# Patient Record
Sex: MALE | Race: WHITE | Employment: OTHER | URBAN - METROPOLITAN AREA
[De-identification: names, ages, dates, MRNs, and addresses within clinical notes are randomized per-mention and may not be internally consistent; named-entity substitution may affect disease eponyms.]

---

## 2019-02-11 ENCOUNTER — HOSPITAL ENCOUNTER (OUTPATIENT)
Dept: GENERAL RADIOLOGY | Facility: CLINIC | Age: 69
Discharge: HOME | End: 2019-02-11
Attending: FAMILY MEDICINE | Admitting: FAMILY MEDICINE
Payer: COMMERCIAL

## 2019-02-11 ENCOUNTER — OFFICE VISIT (OUTPATIENT)
Dept: FAMILY MEDICINE | Facility: CLINIC | Age: 69
End: 2019-02-11
Payer: COMMERCIAL

## 2019-02-11 VITALS
HEIGHT: 65 IN | OXYGEN SATURATION: 94 % | DIASTOLIC BLOOD PRESSURE: 102 MMHG | WEIGHT: 227.8 LBS | BODY MASS INDEX: 37.95 KG/M2 | SYSTOLIC BLOOD PRESSURE: 154 MMHG | RESPIRATION RATE: 16 BRPM | TEMPERATURE: 97.7 F | HEART RATE: 104 BPM

## 2019-02-11 DIAGNOSIS — M79.674 PAIN IN TOES OF BOTH FEET: ICD-10-CM

## 2019-02-11 DIAGNOSIS — I10 ESSENTIAL HYPERTENSION: ICD-10-CM

## 2019-02-11 DIAGNOSIS — M79.675 PAIN IN TOES OF BOTH FEET: Primary | ICD-10-CM

## 2019-02-11 DIAGNOSIS — Z87.891 PERSONAL HISTORY OF TOBACCO USE: ICD-10-CM

## 2019-02-11 DIAGNOSIS — M79.675 PAIN IN TOES OF BOTH FEET: ICD-10-CM

## 2019-02-11 DIAGNOSIS — M79.674 PAIN IN TOES OF BOTH FEET: Primary | ICD-10-CM

## 2019-02-11 DIAGNOSIS — Z12.11 COLON CANCER SCREENING: ICD-10-CM

## 2019-02-11 LAB
ALBUMIN SERPL-MCNC: 3.9 G/DL (ref 3.4–5)
ALP SERPL-CCNC: 73 U/L (ref 40–150)
ALT SERPL W P-5'-P-CCNC: 45 U/L (ref 0–70)
ANION GAP SERPL CALCULATED.3IONS-SCNC: 5 MMOL/L (ref 3–14)
AST SERPL W P-5'-P-CCNC: 28 U/L (ref 0–45)
BASOPHILS # BLD AUTO: 0.1 10E9/L (ref 0–0.2)
BASOPHILS NFR BLD AUTO: 0.6 %
BILIRUB SERPL-MCNC: 0.5 MG/DL (ref 0.2–1.3)
BUN SERPL-MCNC: 19 MG/DL (ref 7–30)
CALCIUM SERPL-MCNC: 8.6 MG/DL (ref 8.5–10.1)
CHLORIDE SERPL-SCNC: 105 MMOL/L (ref 94–109)
CHOLEST SERPL-MCNC: 194 MG/DL
CO2 SERPL-SCNC: 29 MMOL/L (ref 20–32)
CREAT SERPL-MCNC: 1.21 MG/DL (ref 0.66–1.25)
DIFFERENTIAL METHOD BLD: ABNORMAL
EOSINOPHIL NFR BLD AUTO: 3.9 %
ERYTHROCYTE [DISTWIDTH] IN BLOOD BY AUTOMATED COUNT: 15.1 % (ref 10–15)
ERYTHROCYTE [SEDIMENTATION RATE] IN BLOOD BY WESTERGREN METHOD: 2 MM/H (ref 0–20)
GFR SERPL CREATININE-BSD FRML MDRD: 61 ML/MIN/{1.73_M2}
GLUCOSE SERPL-MCNC: 91 MG/DL (ref 70–99)
HCT VFR BLD AUTO: 50.7 % (ref 40–53)
HDLC SERPL-MCNC: 40 MG/DL
HGB BLD-MCNC: 16.5 G/DL (ref 13.3–17.7)
IMM GRANULOCYTES # BLD: 0.1 10E9/L (ref 0–0.4)
IMM GRANULOCYTES NFR BLD: 0.9 %
LDLC SERPL CALC-MCNC: 122 MG/DL
LYMPHOCYTES # BLD AUTO: 2.2 10E9/L (ref 0.8–5.3)
LYMPHOCYTES NFR BLD AUTO: 17.1 %
MCH RBC QN AUTO: 29.9 PG (ref 26.5–33)
MCHC RBC AUTO-ENTMCNC: 32.5 G/DL (ref 31.5–36.5)
MCV RBC AUTO: 92 FL (ref 78–100)
MONOCYTES # BLD AUTO: 0.7 10E9/L (ref 0–1.3)
MONOCYTES NFR BLD AUTO: 5.6 %
NEUTROPHILS # BLD AUTO: 9.1 10E9/L (ref 1.6–8.3)
NEUTROPHILS NFR BLD AUTO: 71.9 %
NONHDLC SERPL-MCNC: 154 MG/DL
NRBC # BLD AUTO: 0 10*3/UL
NRBC BLD AUTO-RTO: 0 /100
PLATELET # BLD AUTO: 484 10E9/L (ref 150–450)
POTASSIUM SERPL-SCNC: 4.4 MMOL/L (ref 3.4–5.3)
PROT SERPL-MCNC: 7.7 G/DL (ref 6.8–8.8)
RBC # BLD AUTO: 5.51 10E12/L (ref 4.4–5.9)
SODIUM SERPL-SCNC: 139 MMOL/L (ref 133–144)
TRIGL SERPL-MCNC: 159 MG/DL
URATE SERPL-MCNC: 7.1 MG/DL (ref 3.5–7.2)
WBC # BLD AUTO: 12.7 10E9/L (ref 4–11)

## 2019-02-11 PROCEDURE — 80053 COMPREHEN METABOLIC PANEL: CPT | Performed by: FAMILY MEDICINE

## 2019-02-11 PROCEDURE — 85652 RBC SED RATE AUTOMATED: CPT | Performed by: FAMILY MEDICINE

## 2019-02-11 PROCEDURE — 85025 COMPLETE CBC W/AUTO DIFF WBC: CPT | Performed by: FAMILY MEDICINE

## 2019-02-11 PROCEDURE — 84550 ASSAY OF BLOOD/URIC ACID: CPT | Performed by: FAMILY MEDICINE

## 2019-02-11 PROCEDURE — 36415 COLL VENOUS BLD VENIPUNCTURE: CPT | Performed by: FAMILY MEDICINE

## 2019-02-11 PROCEDURE — 99204 OFFICE O/P NEW MOD 45 MIN: CPT | Performed by: FAMILY MEDICINE

## 2019-02-11 PROCEDURE — 80061 LIPID PANEL: CPT | Performed by: FAMILY MEDICINE

## 2019-02-11 PROCEDURE — 73660 X-RAY EXAM OF TOE(S): CPT | Mod: TC,LT

## 2019-02-11 RX ORDER — LISINOPRIL/HYDROCHLOROTHIAZIDE 10-12.5 MG
1 TABLET ORAL DAILY
Qty: 90 TABLET | Refills: 3 | Status: SHIPPED | OUTPATIENT
Start: 2019-02-11 | End: 2019-02-22

## 2019-02-11 RX ORDER — NAPROXEN 500 MG/1
500 TABLET ORAL 2 TIMES DAILY PRN
Qty: 60 TABLET | Refills: 1 | Status: ON HOLD | OUTPATIENT
Start: 2019-02-11 | End: 2020-02-10

## 2019-02-11 SDOH — HEALTH STABILITY: MENTAL HEALTH: HOW OFTEN DO YOU HAVE A DRINK CONTAINING ALCOHOL?: NEVER

## 2019-02-11 ASSESSMENT — PAIN SCALES - GENERAL: PAINLEVEL: SEVERE PAIN (7)

## 2019-02-11 ASSESSMENT — MIFFLIN-ST. JEOR: SCORE: 1731.52

## 2019-02-11 NOTE — NURSING NOTE
Chief Complaint   Patient presents with     Musculoskeletal Problem     bilateral feet, mostly in the toes, x10 days, no known injury     Health Maintenance Due   Topic Date Due     PHQ-2 Q1 YR  11/01/1961     HEPATITIS C SCREENING  11/01/1967     DTAP/TDAP/TD IMMUNIZATION (1 - Tdap) 11/01/1974     LIPID SCREEN Q5 YR MALE (SYSTEM ASSIGNED)  11/01/1984     COLON CANCER SCREEN (SYSTEM ASSIGNED)  11/01/1999     ZOSTER IMMUNIZATION (1 of 2) 11/01/1999     ADVANCE DIRECTIVE PLANNING Q5 YRS  11/01/2004     FALL RISK ASSESSMENT  11/01/2014     PNEUMOCOCCAL IMMUNIZATION 65+ LOW/MEDIUM RISK (1 of 2 - PCV13) 11/01/2014     AORTIC ANEURYSM SCREENING (SYSTEM ASSIGNED)  11/01/2014     INFLUENZA VACCINE (1) 09/01/2018     Patient does not go to the dr's because he cannot afford it.     Martin Guardado, CMA

## 2019-02-11 NOTE — PROGRESS NOTES
SUBJECTIVE:   Renny Gannon is a 68 year old male who presents to clinic today for the following health issues:    Joint Pain    Onset: 10 days    Description:   Location: bilateral feet (toes)  Character: throbbing    Intensity: moderate, severe    Progression of Symptoms: intermittent    Accompanying Signs & Symptoms:  Other symptoms: swelling    History:   Previous similar pain: no       Precipitating factors:   Trauma or overuse: no     Alleviating factors:  Improved by: straightening leg out     Therapies Tried and outcome: n/a      Renny is here today for 10-day history is of the left foot pain.  Is mainly is on the left first and second toe.  Is now spreading to the right big toe.  It is red and swelling and sore.  Never had this before.  No injury, trauma or unusual activities.  No change in shoe wear.  No histories of gout.  No fever or chills.  No ankle, hip, knee or back pain.    Has not had any medical care in the last 10 years.  He feels that he is healthy.  No history of high blood pressure.  No headache or dizziness.  No chest pain or shortness of breath.  No leg swelling, orthopnea or dyspnea.  No other concerns.      Problem list and histories reviewed & adjusted, as indicated.  Additional history: as documented    There is no problem list on file for this patient.    Past Surgical History:   Procedure Laterality Date     NO HISTORY OF SURGERY         Social History     Tobacco Use     Smoking status: Former Smoker     Smokeless tobacco: Never Used   Substance Use Topics     Alcohol use: No     Frequency: Never     Family History   Problem Relation Age of Onset     No Known Problems Mother      Unknown/Adopted Father      Unknown/Adopted Maternal Grandmother      Unknown/Adopted Maternal Grandfather      Unknown/Adopted Paternal Grandmother      Unknown/Adopted Paternal Grandfather      No Known Problems Brother      Cancer Sister      No Known Problems Brother      No Known Problems Sister       "    Current Outpatient Medications   Medication Sig Dispense Refill     lisinopril-hydrochlorothiazide (PRINZIDE/ZESTORETIC) 10-12.5 MG tablet Take 1 tablet by mouth daily 90 tablet 3     naproxen (NAPROSYN) 500 MG tablet Take 1 tablet (500 mg) by mouth 2 times daily as needed for moderate pain 60 tablet 1     Allergies   Allergen Reactions     No Known Drug Allergies        Reviewed and updated as needed this visit by clinical staff  Tobacco  Allergies  Meds  Soc Hx      Reviewed and updated as needed this visit by Provider         ROS:  Constitutional, HEENT, cardiovascular, pulmonary, GI, , musculoskeletal, neuro, skin, endocrine and psych systems are negative, except as otherwise noted.    OBJECTIVE:     BP (!) 154/102 (BP Location: Right arm, Patient Position: Sitting, Cuff Size: Adult Large)   Pulse 104   Temp 97.7  F (36.5  C) (Temporal)   Resp 16   Ht 1.661 m (5' 5.4\")   Wt 103.3 kg (227 lb 12.8 oz)   SpO2 94%   BMI 37.45 kg/m    Body mass index is 37.45 kg/m .  GENERAL: healthy, alert and no distress  NECK: no adenopathy, supple, no lymphadenopathy or thyromegaly.  RESP: lungs clear to auscultation - no rales, rhonchi or wheezes  CV: regular rate and rhythm, no no murmur, no peripheral edema and peripheral pulses strong  ABDOMEN: soft, nontender, nondistended, no palpable masses or organomegaly with normal bowel sounds.  MS: no gross musculoskeletal defects noted, no edema walk without limping.  Hips, knees and ankle exam were normal. No swelling on the feet.  Both big toes and the left second toe are slight red and swelling. No warmth but is slightly tender to the touch.  No open wound.  All toes are in the normal range of motion with normal capillary refill.  Mild tender with palpation.    SKIN: no suspicious lesions or rashes  NEURO: Normal strength and tone, mentation intact and speech normal.  No focal weakness.    Diagnostic Test Results:  Results for orders placed or performed in visit on " 02/11/19   CBC with platelets and differential   Result Value Ref Range    WBC 12.7 (H) 4.0 - 11.0 10e9/L    RBC Count 5.51 4.4 - 5.9 10e12/L    Hemoglobin 16.5 13.3 - 17.7 g/dL    Hematocrit 50.7 40.0 - 53.0 %    MCV 92 78 - 100 fl    MCH 29.9 26.5 - 33.0 pg    MCHC 32.5 31.5 - 36.5 g/dL    RDW 15.1 (H) 10.0 - 15.0 %    Platelet Count 484 (H) 150 - 450 10e9/L    Diff Method Automated Method     % Neutrophils 71.9 %    % Lymphocytes 17.1 %    % Monocytes 5.6 %    % Eosinophils 3.9 %    % Basophils 0.6 %    % Immature Granulocytes 0.9 %    Nucleated RBCs 0 0 /100    Absolute Neutrophil 9.1 (H) 1.6 - 8.3 10e9/L    Absolute Lymphocytes 2.2 0.8 - 5.3 10e9/L    Absolute Monocytes 0.7 0.0 - 1.3 10e9/L    Absolute Basophils 0.1 0.0 - 0.2 10e9/L    Abs Immature Granulocytes 0.1 0 - 0.4 10e9/L    Absolute Nucleated RBC 0.0    Lipid panel reflex to direct LDL Non-fasting   Result Value Ref Range    Cholesterol 194 <200 mg/dL    Triglycerides 159 (H) <150 mg/dL    HDL Cholesterol 40 >39 mg/dL    LDL Cholesterol Calculated 122 (H) <100 mg/dL    Non HDL Cholesterol 154 (H) <130 mg/dL   Comprehensive metabolic panel   Result Value Ref Range    Sodium 139 133 - 144 mmol/L    Potassium 4.4 3.4 - 5.3 mmol/L    Chloride 105 94 - 109 mmol/L    Carbon Dioxide 29 20 - 32 mmol/L    Anion Gap 5 3 - 14 mmol/L    Glucose 91 70 - 99 mg/dL    Urea Nitrogen 19 7 - 30 mg/dL    Creatinine 1.21 0.66 - 1.25 mg/dL    GFR Estimate 61 >60 mL/min/[1.73_m2]    GFR Estimate If Black 70 >60 mL/min/[1.73_m2]    Calcium 8.6 8.5 - 10.1 mg/dL    Bilirubin Total 0.5 0.2 - 1.3 mg/dL    Albumin 3.9 3.4 - 5.0 g/dL    Protein Total 7.7 6.8 - 8.8 g/dL    Alkaline Phosphatase 73 40 - 150 U/L    ALT 45 0 - 70 U/L    AST 28 0 - 45 U/L   ESR: Erythrocyte sedimentation rate   Result Value Ref Range    Sed Rate 2 0 - 20 mm/h   Uric acid   Result Value Ref Range    Uric Acid 7.1 3.5 - 7.2 mg/dL       ASSESSMENT/PLAN:     1. Pain in toes of both feet  New-onset and has  been having the pain for 10 days.  The painful areas are slightly swollen and red but no sign of infection.  No open wound.  No history of gout.  Has not had any medical care in the last 10 years.  Labs as ordered and the are normal.  Uric acid level was normal , but still could not rule out gouty attacks completely.  X-ray was normal.  Good shoe support recommended.  No sign of infection and therefore will hold off on antibiotic.  Start naproxen twice a day as needed.  Encouraged him to take it with food.  Side effect discussed.  Call in or follow-up if the symptoms persist or get worse.    - XR Toe Left G/E 2 Views; Future  - CBC with platelets and differential  - ESR: Erythrocyte sedimentation rate  - Uric acid  - naproxen (NAPROSYN) 500 MG tablet; Take 1 tablet (500 mg) by mouth 2 times daily as needed for moderate pain  Dispense: 60 tablet; Refill: 1    2. Essential hypertension  New diagnosis.  Blood pressure is quite high today.  I again had not had any medical care in the last 10 years.  Discussed with him about the nature of the condition.  Educated him the long-term and short-term consequences of uncontrolled high blood pressure.  Unclear how long he has the high blood pressure.  Labs as ordered and result reviewed.  Start him on a low-dose lisinopril/adequate thiazide.  Side effect discussed.  Follow-up in a month, earlier if any concerns or question.  He was instructed to stop by the clinic in a week for blood pressure check.  Low-salt diet also recommend recommended.  Encouraged to increase physical activities with daily exercise and work on weight loss.  Avoid high caffeine/sugar intake.  All his question was answered.    - Lipid panel reflex to direct LDL Non-fasting  - Comprehensive metabolic panel  - lisinopril-hydrochlorothiazide (PRINZIDE/ZESTORETIC) 10-12.5 MG tablet; Take 1 tablet by mouth daily  Dispense: 90 tablet; Refill: 3    3. Colon cancer screening    - Fecal colorectal cancer screen  (FIT); Future quit smoking 28 years ago.    4. Personal history of tobacco use  Encouraged him to keep the good work.    - Prof Fee: Shared Decision Making Visit for Lung Cancer Screening      Angus Clements Mai, MD  [facility]          Lung Cancer Screening Shared Decision Making Visit     Renny Gannon is not eligible for lung cancer screening on the basis of the information provided in my signed lung cancer screening order. Renny's smoking history is below the threshold and so it is not recommended.    Patient is not currently a smoker and so we did discuss that the only way to prevent lung cancer is to not smoke. Smoking cessation assistance was not offered - stopped smoking for 28 years.     ShouldIScreen

## 2019-02-12 ENCOUNTER — TELEPHONE (OUTPATIENT)
Dept: FAMILY MEDICINE | Facility: CLINIC | Age: 69
End: 2019-02-12

## 2019-02-12 NOTE — TELEPHONE ENCOUNTER
----- Message from Angus Clements Mai, MD sent at 2/12/2019  6:43 AM CST -----  Please let patient know that his labs showed his kidney function test, liver function test and electrolytes were normal.  No diabetes.  Uric acid level was normal, unlikely that he has gout.  Cholesterol is slightly elevated, recommended to work on healthy diet and exercise.  Repeat in a year.  Sed rate level was also normal, no indication of active inflammation.  CBC with a white count was slightly elevated.  No anemia.

## 2019-02-12 NOTE — TELEPHONE ENCOUNTER
Patient was informed that his xray of the toe was normal.  His labs showed his kidney function test, liver function test and electrolytes was normal.  No diabetes. Uric acid test was normal, unlikely he has gout. Cholesterol is slightly elevated, recommends to to work on healthy diet and exercise. Repeat in a year. Sed reate level was also normal. No indication of active inflammation. CBC with a white count was slightly elevated. No anemia.    Martin Guardado, CMA

## 2019-02-13 PROCEDURE — 82274 ASSAY TEST FOR BLOOD FECAL: CPT | Performed by: FAMILY MEDICINE

## 2019-02-15 ENCOUNTER — ALLIED HEALTH/NURSE VISIT (OUTPATIENT)
Dept: FAMILY MEDICINE | Facility: CLINIC | Age: 69
End: 2019-02-15
Payer: COMMERCIAL

## 2019-02-15 VITALS — SYSTOLIC BLOOD PRESSURE: 150 MMHG | DIASTOLIC BLOOD PRESSURE: 84 MMHG

## 2019-02-15 DIAGNOSIS — I10 ESSENTIAL HYPERTENSION: Primary | ICD-10-CM

## 2019-02-15 PROCEDURE — 99207 ZZC NO CHARGE NURSE ONLY: CPT

## 2019-02-15 NOTE — PROGRESS NOTES
Renny Gannon is a 68 year old year old patient who comes in today for a Blood Pressure check because of new medication.  Patient is taking medication as prescribed  Patient is tolerating medications well.  Patient is not monitoring Blood Pressure at home.    Current complaints: none  Disposition:  huddled with provider and per Dr. Scott increase lisinopril-hydrochlorothiazide to 2 a day and return in one week to recheck.  Yolanda Walls MA

## 2019-02-16 DIAGNOSIS — Z12.11 COLON CANCER SCREENING: ICD-10-CM

## 2019-02-16 LAB — HEMOCCULT STL QL IA: NEGATIVE

## 2019-02-22 ENCOUNTER — ALLIED HEALTH/NURSE VISIT (OUTPATIENT)
Dept: FAMILY MEDICINE | Facility: CLINIC | Age: 69
End: 2019-02-22
Payer: COMMERCIAL

## 2019-02-22 VITALS — HEART RATE: 76 BPM | DIASTOLIC BLOOD PRESSURE: 80 MMHG | SYSTOLIC BLOOD PRESSURE: 132 MMHG

## 2019-02-22 DIAGNOSIS — I10 ESSENTIAL HYPERTENSION: ICD-10-CM

## 2019-02-22 PROCEDURE — 99207 ZZC NO CHARGE NURSE ONLY: CPT

## 2019-02-22 RX ORDER — LISINOPRIL/HYDROCHLOROTHIAZIDE 10-12.5 MG
2 TABLET ORAL DAILY
Qty: 90 TABLET | Refills: 3 | COMMUNITY
Start: 2019-02-22 | End: 2019-07-12

## 2019-02-22 NOTE — NURSING NOTE
Patient came in for a blood pressure check.   Consulted with Dr. Scott on his blood pressure was 132/80. He said it was ok to go and follow up in 1 month a blood pressure check.     Patient was notified and he has an appt on 3/11/2019.  ADIN/MA

## 2019-03-11 ENCOUNTER — OFFICE VISIT (OUTPATIENT)
Dept: FAMILY MEDICINE | Facility: CLINIC | Age: 69
End: 2019-03-11
Payer: COMMERCIAL

## 2019-03-11 VITALS
RESPIRATION RATE: 16 BRPM | HEART RATE: 102 BPM | WEIGHT: 223.8 LBS | DIASTOLIC BLOOD PRESSURE: 78 MMHG | BODY MASS INDEX: 36.79 KG/M2 | SYSTOLIC BLOOD PRESSURE: 136 MMHG | TEMPERATURE: 97.2 F | OXYGEN SATURATION: 95 %

## 2019-03-11 DIAGNOSIS — E66.01 MORBID OBESITY (H): ICD-10-CM

## 2019-03-11 DIAGNOSIS — R35.0 URINARY FREQUENCY: ICD-10-CM

## 2019-03-11 DIAGNOSIS — Z11.59 ENCOUNTER FOR HEPATITIS C SCREENING TEST FOR LOW RISK PATIENT: ICD-10-CM

## 2019-03-11 DIAGNOSIS — Z00.00 ENCOUNTER FOR ROUTINE ADULT HEALTH EXAMINATION WITHOUT ABNORMAL FINDINGS: Primary | ICD-10-CM

## 2019-03-11 DIAGNOSIS — Z12.5 SCREENING FOR PROSTATE CANCER: ICD-10-CM

## 2019-03-11 DIAGNOSIS — G47.00 INSOMNIA, UNSPECIFIED TYPE: ICD-10-CM

## 2019-03-11 DIAGNOSIS — I78.1 NON-NEOPLASTIC NEVUS: ICD-10-CM

## 2019-03-11 DIAGNOSIS — I10 ESSENTIAL HYPERTENSION: ICD-10-CM

## 2019-03-11 DIAGNOSIS — L82.1 SEBORRHEIC KERATOSES: ICD-10-CM

## 2019-03-11 DIAGNOSIS — R41.3 MEMORY LOSS: ICD-10-CM

## 2019-03-11 LAB
PSA SERPL-ACNC: 0.33 UG/L (ref 0–4)
TSH SERPL DL<=0.005 MIU/L-ACNC: 2.44 MU/L (ref 0.4–4)
VIT B12 SERPL-MCNC: 1192 PG/ML (ref 193–986)

## 2019-03-11 PROCEDURE — G0438 PPPS, INITIAL VISIT: HCPCS | Performed by: FAMILY MEDICINE

## 2019-03-11 PROCEDURE — 82607 VITAMIN B-12: CPT | Performed by: FAMILY MEDICINE

## 2019-03-11 PROCEDURE — 84443 ASSAY THYROID STIM HORMONE: CPT | Performed by: FAMILY MEDICINE

## 2019-03-11 PROCEDURE — 36415 COLL VENOUS BLD VENIPUNCTURE: CPT | Performed by: FAMILY MEDICINE

## 2019-03-11 PROCEDURE — G0472 HEP C SCREEN HIGH RISK/OTHER: HCPCS | Performed by: FAMILY MEDICINE

## 2019-03-11 PROCEDURE — G0103 PSA SCREENING: HCPCS | Performed by: FAMILY MEDICINE

## 2019-03-11 PROCEDURE — 99213 OFFICE O/P EST LOW 20 MIN: CPT | Mod: 25 | Performed by: FAMILY MEDICINE

## 2019-03-11 RX ORDER — TRAZODONE HYDROCHLORIDE 50 MG/1
TABLET, FILM COATED ORAL
Qty: 30 TABLET | Refills: 3 | Status: SHIPPED | OUTPATIENT
Start: 2019-03-11 | End: 2019-12-11

## 2019-03-11 ASSESSMENT — PAIN SCALES - GENERAL: PAINLEVEL: NO PAIN (0)

## 2019-03-11 NOTE — PROGRESS NOTES
"    SUBJECTIVE:   Renny Gannon is a 68 year old male who presents for Preventive Visit.  Are you in the first 12 months of your Medicare Part B coverage?  No    Physical Health:    In general, how would you rate your overall physical health? good    Outside of work, how many days during the week do you exercise? 6-7 days/week    Outside of work, approximately how many minutes a day do you exercise?15-30 minutes    If you drink alcohol do you typically have >3 drinks per day or >7 drinks per week? No    Do you usually eat at least 4 servings of fruit and vegetables a day, include whole grains & fiber and avoid regularly eating high fat or \"junk\" foods? NO - 1 or 2    Do you have any problems taking medications regularly?  No    Do you have any side effects from medications? none    Needs assistance for the following daily activities: no assistance needed    Which of the following safety concerns are present in your home?  none identified     Hearing impairment: No    In the past 6 months, have you been bothered by leaking of urine? Frequency but not leaking    Mental Health:    In general, how would you rate your overall mental or emotional health? good  PHQ-2 Score:      Do you feel safe in your environment? Yes    Do you have a Health Care Directive? No: Advance care planning reviewed with patient; information given to patient to review.    Additional concerns to address?  YES    Fall risk:  Fallen 2 or more times in the past year?: No  Any fall with injury in the past year?: No    Cognitive Screenin) Repeat 3 items (Leader, Season, Table)    2) Clock draw: NORMAL  3) 3 item recall: Recalls 1 object   Results: NORMAL clock, 1-2 items recalled: COGNITIVE IMPAIRMENT LESS LIKELY    Mini-CogTM Copyright ALIDA Avina. Licensed by the author for use in [facility]; reprinted with permission (joi@.[facility]). All rights reserved.      Do you have sleep apnea, excessive snoring or daytime drowsiness?: can not " shut his mind off        PROBLEMS TO ADD ON...  Hypertension Follow-up      Outpatient blood pressures are not being checked.    Low Salt Diet: not monitoring salt      Renny is here today for his general physical with a couple of concerns.    1.  First is to follow-up on his high blood pressure.  He takes Zestoretic 10/12.5 mg twice a day with no side effect.  Does not check his blood pressure at home.  No headache or dizziness.  No chest pain or shortness of breath.  No leg swelling, orthopnea or dyspnea.    2.  Also concerned about his memory.  Feels like he has been getting more forgetful.  No history of traumatic brain injury or stroke.  No family history is of Alzheimer disease.  More of a short-term memory problem.  No problem with speech or thought process.  No alcohol and quit smoking years ago.  No drugs.    3.  Otherwise, he is doing well. His last physical exam was couple years ago and it was normal.  No major medical care or procedure done since the last physical. No headache or dizziness.  No acute visual change. No URI symptoms include running nose, nasal congestion, coughing, fever or chill. No CP/SOB. No N/V/D/C.  Daytime urinary frequency is about the same.  No nocturia.  Drinks about 10 cups of coffee and a lot of water during the day.  No nocturia.  No abdominal pain.  Denied of STD risks.  No leg swelling or pain. Does not exercises.  Been having problems with falling and maintaining sleep.  Want to try something for it and never been on medication for it.  Feels safe at home and lives wife.  No weight change and denies of being depressed. No other concern today    Reviewed and updated as needed this visit by clinical staff  Tobacco  Allergies  Meds  Soc Hx        Reviewed and updated as needed this visit by Provider        Social History     Tobacco Use     Smoking status: Former Smoker     Smokeless tobacco: Never Used   Substance Use Topics     Alcohol use: No     Frequency: Never                            Current providers sharing in care for this patient include:   Patient Care Team:  Angus Scott MD as PCP - General (Family Practice)  Angus Scott MD as Assigned PCP    The following health maintenance items are reviewed in Epic and correct as of today:  Health Maintenance   Topic Date Due     HEPATITIS C SCREENING  11/01/1967     DTAP/TDAP/TD IMMUNIZATION (1 - Tdap) 11/01/1974     ZOSTER IMMUNIZATION (1 of 2) 11/01/1999     ADVANCE DIRECTIVE PLANNING Q5 YRS  11/01/2004     MEDICARE ANNUAL WELLNESS VISIT  11/01/2014     PNEUMOCOCCAL IMMUNIZATION 65+ LOW/MEDIUM RISK (1 of 2 - PCV13) 11/01/2014     AORTIC ANEURYSM SCREENING (SYSTEM ASSIGNED)  11/01/2014     INFLUENZA VACCINE (1) 09/01/2018     FALL RISK ASSESSMENT  02/11/2020     PHQ-2 Q1 YR  02/11/2020     FIT Q1 YR  02/13/2020     LIPID SCREEN Q5 YR MALE (SYSTEM ASSIGNED)  02/11/2024     IPV IMMUNIZATION  Aged Out     MENINGITIS IMMUNIZATION  Aged Out     BP Readings from Last 3 Encounters:   03/11/19 136/78   02/22/19 132/80   02/15/19 150/84    Wt Readings from Last 3 Encounters:   03/11/19 101.5 kg (223 lb 12.8 oz)   02/11/19 103.3 kg (227 lb 12.8 oz)   12/12/07 96.6 kg (213 lb)                  Patient Active Problem List   Diagnosis     Essential hypertension     Memory loss     Obesity (BMI 35.0-39.9) with comorbidity (H)     Insomnia, unspecified type     Past Surgical History:   Procedure Laterality Date     NO HISTORY OF SURGERY         Social History     Tobacco Use     Smoking status: Former Smoker     Smokeless tobacco: Never Used   Substance Use Topics     Alcohol use: No     Frequency: Never     Family History   Problem Relation Age of Onset     No Known Problems Mother      Unknown/Adopted Father      Unknown/Adopted Maternal Grandmother      Unknown/Adopted Maternal Grandfather      Unknown/Adopted Paternal Grandmother      Unknown/Adopted Paternal Grandfather      Cancer Brother         lung cancer - smoking     No Known Problems  "Sister      Coronary Artery Disease Brother          of MI at 66     No Known Problems Sister          Current Outpatient Medications   Medication Sig Dispense Refill     lisinopril-hydrochlorothiazide (PRINZIDE/ZESTORETIC) 10-12.5 MG tablet Take 2 tablets by mouth daily 90 tablet 3     naproxen (NAPROSYN) 500 MG tablet Take 1 tablet (500 mg) by mouth 2 times daily as needed for moderate pain 60 tablet 1     traZODone (DESYREL) 50 MG tablet Take 1/2 to 1 tablet couple hours before bedtime 30 tablet 3     Allergies   Allergen Reactions     No Known Drug Allergies      Seasonal Allergies        ROS:  Constitutional, HEENT, cardiovascular, pulmonary, GI, , musculoskeletal, neuro, skin, endocrine and psych systems are negative, except as otherwise noted.    OBJECTIVE:   /78 (BP Location: Right arm, Patient Position: Sitting, Cuff Size: Adult Large)   Pulse 102   Temp 97.2  F (36.2  C) (Temporal)   Resp 16   Wt 101.5 kg (223 lb 12.8 oz)   SpO2 95%   BMI 36.79 kg/m   Estimated body mass index is 36.79 kg/m  as calculated from the following:    Height as of 19: 1.661 m (5' 5.4\").    Weight as of this encounter: 101.5 kg (223 lb 12.8 oz).  EXAM:   GENERAL: healthy, alert and no distress  EYES: Eyes grossly normal to inspection, PERRL and conjunctivae and sclerae normal.  No nystagmus.  All 4 visual fields are intact  HENT: Ear canals and TM's normal.  Nares are non-congested. Oropharynx is pink and moist. No tender with palpation to the sinuses.  NECK: no adenopathy, supple, no lymphadenopathy or thyromegaly.  No tender with palpation to the cervical spine or its paraspinous muscle.  RESP: lungs clear to auscultation - no rales, rhonchi or wheezes  CV: regular rate and rhythm, no murmur.  ABDOMEN: soft, nondistended, nontender, no palpable masses or organomegaly with normal bowel sounds.  MS: no gross musculoskeletal defects noted, no edema.  Walk with no limping, normal gait.  All 4 extremities are " equally in strength. Ankle, knees, hips, shoulders, elbows and wrists exams normal.  Normal fine motor skills on fingers.  Back is straight, no lordosis or scoliosis.  No tender with palpation.  SKIN: Crusted with callus nodule on the right pina with no crust or drainage.  No pigmentation.  A 1 x 0.7 cm dark black nodule on the left temporal area.  NEURO: Normal strength and tone, mentation intact and speech normal.  Cranial nerves II through XII intact, DTR +2 throughout, no focal neurological deficit.  PSYCH: mentation appears normal, affect normal/bright.  Thoughts intact, no hallucination.  No suicidal or homicidal ideation.  LYMPH: no cervical, supraclavicular or axillary adenopathy.   (male): normal male genitalia without lesions or urethral discharge, no hernia. Testes are descended bilaterally with no testicular mass.  Declined rectal exam.      Diagnostic Test Results:  No results found for this or any previous visit (from the past 24 hour(s)).  Results for orders placed or performed in visit on 03/11/19   Vitamin B12   Result Value Ref Range    Vitamin B12 1,192 (H) 193 - 986 pg/mL   TSH with free T4 reflex   Result Value Ref Range    TSH 2.44 0.40 - 4.00 mU/L   PSA, screen   Result Value Ref Range    PSA 0.33 0 - 4 ug/L   Hepatitis C antibody   Result Value Ref Range    Hepatitis C Antibody Nonreactive NR^Nonreactive       ASSESSMENT / PLAN:   1. Encounter for routine adult health examination without abnormal findings  Overall Renny is healthy and doing well.  UTD for immunization -declined flu, shingles, pneumonia and Tdap vaccinations today.  Risk and benefit discussed and he is willing to take the risk.  Topics appropriate for his age discussed include safety issue and healthy lifestyle modification as well as substance abuse/STD/depression prevention. Recommended daily exercise for at least 30 minutes.  Emphasized on healthy diet with adequate fluid intake and resting. Feels safe at home.  Follow in  1 year, earlier as needed.  Labs as ordered below.   UTD for colonoscopy Discussed about the pros and cons of prostate cancer screening with PSA.  All of his questions were answered.    2. Memory loss  Short-term memory loss.  Mini-Mental status exam today was normal with normal three words recall.  Labs as ordered.  Discussed with him about further workup with head MRI/CT scan but he declined.  Recommended to start working with memory exercise with words finding or playing puzzles.  Also encouraged to utilize reminder sticking notes.  Clinical presentation and physical exam did not suggest of stroke.  He has no history of stroke.  Follow-up if it gets worse or if has any concern.  He was also reminded that memory loss from Alzheimer disease is not reversible.    - Vitamin B12  - TSH with free T4 reflex    3. Essential hypertension  BP is normal and stable. Encouraged him to keep up the good work.  Continue with the current doses of Zestoretic.  Been tolerating it well.  Emphasized on healthy/low salt diet, daily excercise and weight loss. Avoid high sugar/caffeine intake. Lab as ordered.  Follow up in 6 month, earlier as needed.    4. Insomnia, unspecified type  Discussed with him about nature of the condition.  Good sleeping hygiene was dicussed.  Also discuss with him ways to reduce and adjust to stress.  Avoid high caffeinated beaverages after 3 pm.  Avoid any stimulant in the bedroom, include TV, radio, light or reading book.  Get up if not able to fall sleep in 30 minutes and go back to bed again when feel tire.  Avoid taking naps during the day complete.  Also recommend healthy diet and daily regular exercise.  Will have him to try the trazodone, side effect discussed.  Call in if not improve or worsening.    - traZODone (DESYREL) 50 MG tablet; Take 1/2 to 1 tablet couple hours before bedtime  Dispense: 30 tablet; Refill: 3    5. Morbid obesity (H)  Discussed about healthy diet and exercise.  He is not  "interested to see a dietitian or to take medication for it.  Definitely does not want surgery.    6. Urinary frequency  Most likely due to excessive intake of caffeine.  Recommend to limits to 2-3 cups of coffee a day.  No coffee after 3 PM.  No nocturia.  Will continue to monitor.    7. Screening for prostate cancer  Pro and con about prostate cancer screening with PSA discussed.  Informed him that there is a small chance of false elevation of PSA level which will be strongly recommended for further evaluation.  He would like to have the PSA level checked today.  - PSA, screen    8. Encounter for hepatitis C screening test for low risk patient    - Hepatitis C antibody    9. Non-neoplastic nevus  Lesion on the left scalp is quite dark.  Recommended to have it excised and he will return in a couple weeks for its excision.    10. Seborrheic keratoses  On his right ear.  Will treat it with liquid nitrogen at the next visit.      End of Life Planning:  Patient currently has an advanced directive: Yes.  Practitioner is supportive of decision.    COUNSELING:  Reviewed preventive health counseling, as reflected in patient instructions       Regular exercise       Healthy diet/nutrition       Vision screening       Hearing screening       Dental care       Fall risk prevention       Aspirin Prophylaxsis       Alcohol Use       Hepatitis C screening       Colon cancer screening       Prostate cancer screening       Osteoporosis Prevention/Bone Health    BP Readings from Last 1 Encounters:   03/11/19 136/78     Estimated body mass index is 36.79 kg/m  as calculated from the following:    Height as of 2/11/19: 1.661 m (5' 5.4\").    Weight as of this encounter: 101.5 kg (223 lb 12.8 oz).      Weight management plan: Discussed healthy diet and exercise guidelines     reports that he has quit smoking. he has never used smokeless tobacco.      Appropriate preventive services were discussed with this patient, including applicable " screening as appropriate for cardiovascular disease, diabetes, osteopenia/osteoporosis, and glaucoma.  As appropriate for age/gender, discussed screening for colorectal cancer, prostate cancer, breast cancer, and cervical cancer. Checklist reviewing preventive services available has been given to the patient.    Reviewed patients plan of care and provided an AVS. The Basic Care Plan (routine screening as documented in Health Maintenance) for Renny meets the Care Plan requirement. This Care Plan has been established and reviewed with the Patient.    Counseling Resources:  ATP IV Guidelines  Pooled Cohorts Equation Calculator  Breast Cancer Risk Calculator  FRAX Risk Assessment  ICSI Preventive Guidelines  Dietary Guidelines for Americans, 2010  USDA's MyPlate  ASA Prophylaxis  Lung CA Screening    Agnus Clements Mai, MD  [facility]

## 2019-03-11 NOTE — NURSING NOTE
Chief Complaint   Patient presents with     Physical     Health Maintenance Due   Topic Date Due     HEPATITIS C SCREENING  11/01/1967     DTAP/TDAP/TD IMMUNIZATION (1 - Tdap) 11/01/1974     ZOSTER IMMUNIZATION (1 of 2) 11/01/1999     ADVANCE DIRECTIVE PLANNING Q5 YRS  11/01/2004     MEDICARE ANNUAL WELLNESS VISIT  11/01/2014     PNEUMOCOCCAL IMMUNIZATION 65+ LOW/MEDIUM RISK (1 of 2 - PCV13) 11/01/2014     AORTIC ANEURYSM SCREENING (SYSTEM ASSIGNED)  11/01/2014     INFLUENZA VACCINE (1) 09/01/2018     Martin Guardado, [facility]

## 2019-03-11 NOTE — PATIENT INSTRUCTIONS
Preventive Health Recommendations:     See your health care provider every year to    Review health changes.     Discuss preventive care.      Review your medicines if your doctor has prescribed any.    Talk with your health care provider about whether you should have a test to screen for prostate cancer (PSA).    Every 3 years, have a diabetes test (fasting glucose). If you are at risk for diabetes, you should have this test more often.    Every 5 years, have a cholesterol test. Have this test more often if you are at risk for high cholesterol or heart disease.     Every 10 years, have a colonoscopy. Or, have a yearly FIT test (stool test). These exams will check for colon cancer.    Talk to with your health care provider about screening for Abdominal Aortic Aneurysm if you have a family history of AAA or have a history of smoking.  Shots:     Get a flu shot each year.     Get a tetanus shot every 10 years.     Talk to your doctor about your pneumonia vaccines. There are now two you should receive - Pneumovax (PPSV 23) and Prevnar (PCV 13).    Talk to your pharmacist about a shingles vaccine.     Talk to your doctor about the hepatitis B vaccine.  Nutrition:     Eat at least 5 servings of fruits and vegetables each day.     Eat whole-grain bread, whole-wheat pasta and brown rice instead of white grains and rice.     Get adequate Calcium and Vitamin D.   Lifestyle    Exercise for at least 150 minutes a week (30 minutes a day, 5 days a week). This will help you control your weight and prevent disease.     Limit alcohol to one drink per day.     No smoking.     Wear sunscreen to prevent skin cancer.     See your dentist every six months for an exam and cleaning.     See your eye doctor every 1 to 2 years to screen for conditions such as glaucoma, macular degeneration and cataracts.    Personalized Prevention Plan  You are due for the preventive services outlined below.  Your care team is available to assist you in  scheduling these services.  If you have already completed any of these items, please share that information with your care team to update in your medical record.    Health Maintenance Due   Topic Date Due     Hepatitis C Screening  11[birth year]01/1968     Diptheria Tetanus Pertussis (DTAP/TDAP/TD) Vaccine (1 - Tdap) 11/01/1974     Zoster (Shingles) Vaccine (1 of 2) 11/01/1999     Discuss Advance Directive Planning  11/01/2004     Annual Wellness Visit  11/01/2014     Pneumococcal Vaccine (1 of 2 - PCV13) 11/01/2014     AORTIC ANEURYSM SCREENING (SYSTEM ASSIGNED)  11/01/2014     Flu Vaccine (1) 09/01/2018

## 2019-03-12 ENCOUNTER — TELEPHONE (OUTPATIENT)
Dept: FAMILY MEDICINE | Facility: CLINIC | Age: 69
End: 2019-03-12

## 2019-03-12 LAB — HCV AB SERPL QL IA: NONREACTIVE

## 2019-03-12 NOTE — TELEPHONE ENCOUNTER
----- Message from Angus Clements Mai, MD sent at 3/11/2019  6:04 PM CDT -----  Please let patient know that his labs today show his vitamin B12 level was pretty high.  Please check to see if he is taking vitamin B12 supplement.  His prostate enzyme was normal as well as his thyroid level.  Thank you

## 2019-03-20 ENCOUNTER — OFFICE VISIT (OUTPATIENT)
Dept: FAMILY MEDICINE | Facility: CLINIC | Age: 69
End: 2019-03-20
Payer: COMMERCIAL

## 2019-03-20 VITALS
WEIGHT: 224 LBS | HEART RATE: 92 BPM | BODY MASS INDEX: 36.82 KG/M2 | SYSTOLIC BLOOD PRESSURE: 126 MMHG | TEMPERATURE: 97.8 F | DIASTOLIC BLOOD PRESSURE: 68 MMHG | RESPIRATION RATE: 16 BRPM | OXYGEN SATURATION: 94 %

## 2019-03-20 DIAGNOSIS — L82.1 SEBORRHEIC KERATOSES: ICD-10-CM

## 2019-03-20 DIAGNOSIS — L81.9 ATYPICAL PIGMENTED LESION: Primary | ICD-10-CM

## 2019-03-20 PROCEDURE — 17110 DESTRUCTION B9 LES UP TO 14: CPT | Performed by: FAMILY MEDICINE

## 2019-03-20 PROCEDURE — 88305 TISSUE EXAM BY PATHOLOGIST: CPT | Mod: TC | Performed by: FAMILY MEDICINE

## 2019-03-20 PROCEDURE — 11421 EXC H-F-NK-SP B9+MARG 0.6-1: CPT | Mod: 59 | Performed by: FAMILY MEDICINE

## 2019-03-20 ASSESSMENT — PAIN SCALES - GENERAL: PAINLEVEL: NO PAIN (0)

## 2019-03-20 NOTE — PROGRESS NOTES
SUBJECTIVE:   Renny Gannon is a 68 year old male who presents to clinic today for the following health issues:    Lesion removal on left side of head    Renny is here today for an excision of the lesion left scalp.  Been there for years; gotten darker and perhaps bigger slightly. No pain, irritation or pruritus.  No headache or dizziness.  No personal or family history is of skin cancer.  He was seen last week and was recommended to have it biopsied or excised.    Also have a lesions on his left ear.  It was calluses and irritated.  At the last visit, it was thought that he has seborrheic keratosis.  He would like it to be treated today as well.    Problem list and histories reviewed & adjusted, as indicated.  Additional history: as documented    Current Outpatient Medications   Medication Sig Dispense Refill     lisinopril-hydrochlorothiazide (PRINZIDE/ZESTORETIC) 10-12.5 MG tablet Take 2 tablets by mouth daily 90 tablet 3     naproxen (NAPROSYN) 500 MG tablet Take 1 tablet (500 mg) by mouth 2 times daily as needed for moderate pain 60 tablet 1     traZODone (DESYREL) 50 MG tablet Take 1/2 to 1 tablet couple hours before bedtime 30 tablet 3     Allergies   Allergen Reactions     No Known Drug Allergies      Seasonal Allergies        Reviewed and updated as needed this visit by clinical staff  Tobacco  Allergies  Meds  Soc Hx      Reviewed and updated as needed this visit by Provider         ROS:  Constitutional, HEENT, cardiovascular, pulmonary, gi and gu systems are negative, except as otherwise noted.    OBJECTIVE:     /68 (BP Location: Right arm, Patient Position: Sitting, Cuff Size: Adult Regular)   Pulse 92   Temp 97.8  F (36.6  C) (Temporal)   Resp 16   Wt 101.6 kg (224 lb)   SpO2 94%   BMI 36.82 kg/m    Body mass index is 36.82 kg/m .  GENERAL: healthy, alert and no distress  MS: no gross musculoskeletal defects noted, no edema  SKIN: small nodule on the right pina with no crust or  drainage.  No pigmentation.  A 1.2 x 0.7 cm dark black irregular nodule on the left temporal area, 2 cm above the ear.    Diagnostic Test Results:  No results found for this or any previous visit (from the past 24 hour(s)).    ASSESSMENT/PLAN:     1. Atypical pigmented lesion  Discussed with him about the options of have a bunch biopsy versus excisional biopsy.  Pros and cons of each option discussed.  He would like to have it excised today.  Please see the procedural note for further details.  Tolerated procedure well.  Postprocedure care discussed and symptoms that need to be seen or call in discussed.  Tylenol or Motrin as needed for pain.  Return in 10 days for suture removal with the nursing staff.  He feels comfortable with the plan.  He was informed that if it is cancer and margins are positive, he will have to come back for wide excision.  Unlikely is a cancerous lesion however.    - scalp, neck, hands, feet, genitalia, other  - Dermatological path order and indications    2. Seborrheic keratoses  Informed him that the nodules on his ear is consistent with seborrheic keratosis. I discussed with him about the nature of the condition and its pathophysiology.  I reassured him that it is benign in nature. He wanted to be treated today.  Cryotherapy with liquid nitrogen performed with no complication.  Please see my procedural note for further details.  He tolerated the procedure well.       - DESTRUC BENIGN/PREMAL,1ST LESION [[id number]]      Angus Clements Mai, MD  [facility]

## 2019-03-20 NOTE — NURSING NOTE
Chief Complaint   Patient presents with     Lesion Removal     left side of head     Patient signed the informed consent for surgery or procedure in the room.    Health Maintenance Due   Topic Date Due     DTAP/TDAP/TD IMMUNIZATION (1 - Tdap) 11/01/1974     ZOSTER IMMUNIZATION (1 of 2) 11/01/1999     ADVANCE DIRECTIVE PLANNING Q5 YRS  11/01/2004     AORTIC ANEURYSM SCREENING (SYSTEM ASSIGNED)  11/01/2014     INFLUENZA VACCINE (1) 09/01/2018     Martin Guardado, [facility]

## 2019-03-21 NOTE — PROCEDURES
Procedure: Cryotherapy with liquid nitrogen on the left ear    Indication: Irritated seborrheic keratosis    The whole procedure discussed with patient in details.  Risks associated with the procedure explained, which include pain, infection, scaring, or failure of the treatment.  He agrees to proceed the procedure.    The whole procedure was done in a usual sterile manner.  Liquid nitrogen applied directly on each of the nodule on the chest for 12 second follow by a 30 second thaw, 4 cycles total.  He tolerated the procedures well and there was no complication.  Post procedure care discussed. He was also educated symptoms to call in or be seen.     Angus Scott MD

## 2019-03-21 NOTE — PROCEDURES
Procedure:  Excision of lesion on the left temporal scalp     Indication:  Dark irregular border pigmented lesion that has gotten darker and bigger     The who procedure was discussed with patient in details.  Risk associated with the procedure was also discussed.  Educated alternative options including referral to dermatology.  She elected to have it remove today.      Time out performed - patient was identified time 2.  Name and location of the      The lesion that is 0.7x1.2 cm in diameter on the left temporal scalp was cleaned with alcohol swab and locally anesthetized with lidocaine with epi, about 5 ml were used. Good anesthesia noted.   The area was then cleaned with iodine in the usual fashion.  The whole procedure was performed in usual sterile manner.  Blade #15 was used to make an eclipse incision - 1x2 cm in dimension with no complication. The lesion was then removed in the usual manner, using the  and blade # 15. The incision was then sutured with 3.0 Vicryl, one run on suture placed.        The excisional area was then cleaned with normal saline.  Appropriate dressing applied. The procedure was well tolerated and without complications. Specimen was sent for pathological evaluation.     Post procedure and wound care discussed . Encouraged to monitor for sign of infections.  Follow-up in 10 days for suture removal, earlier as needed.     Angus Scott MD.

## 2019-03-22 LAB — COPATH REPORT: NORMAL

## 2019-03-29 ENCOUNTER — ALLIED HEALTH/NURSE VISIT (OUTPATIENT)
Dept: FAMILY MEDICINE | Facility: CLINIC | Age: 69
End: 2019-03-29
Payer: COMMERCIAL

## 2019-03-29 DIAGNOSIS — Z48.02 VISIT FOR SUTURE REMOVAL: Primary | ICD-10-CM

## 2019-03-29 PROCEDURE — 99207 ZZC NO CHARGE NURSE ONLY: CPT

## 2019-03-29 NOTE — NURSING NOTE
Renny Gannon presents to the clinic today for  removal of sutures.  The patient has had the sutures in place for 9 days.    There has been no history of infection or drainage.    O: 4 sutures are seen located on the left temporal scalp.  The wound is healing well with no signs of infection.    A: Suture removal.    P:  All sutures were easily removed today.  Routine wound care discussed.  The patient will follow up as needed.    Patient expressed verbal understanding of wound care.     Belle Peralta RN

## 2019-07-12 ENCOUNTER — TELEPHONE (OUTPATIENT)
Dept: FAMILY MEDICINE | Facility: CLINIC | Age: 69
End: 2019-07-12

## 2019-07-12 DIAGNOSIS — I10 ESSENTIAL HYPERTENSION: ICD-10-CM

## 2019-07-12 RX ORDER — LISINOPRIL AND HYDROCHLOROTHIAZIDE 20; 25 MG/1; MG/1
1 TABLET ORAL DAILY
Qty: 90 TABLET | Refills: 1 | Status: SHIPPED | OUTPATIENT
Start: 2019-07-12 | End: 2020-01-07

## 2019-07-12 NOTE — TELEPHONE ENCOUNTER
Reason for Call:  Medication or medication refill:    Do you use a [facility]?  Name of the pharmacy and phone number for the current request:  [facility] - [phone]    Name of the medication requested: lisinopril-hydrochlorothiazide (PRINZIDE/ZESTORETIC) 10-12.5 MG tablet    Other request: The order for this script does not equal up to the amount of tabs per refill that is needed. The refill says 90 tabs, but he is supposed to take 2 tabs a day. He is running out quicker then need be. He needs to get a refill asap. He is out. Pharmacy said to check with provider to get this fixed. Please address as soon as possible and contact patient once it has been addressed.     Can we leave a detailed message on this number? YES    Phone number patient can be reached at: Home number on file [phone] (home)    Best Time: any    Call taken on 7/12/2019 at 1:15 PM by Laura Healy

## 2019-12-11 ENCOUNTER — OFFICE VISIT (OUTPATIENT)
Dept: FAMILY MEDICINE | Facility: CLINIC | Age: 69
End: 2019-12-11
Payer: COMMERCIAL

## 2019-12-11 ENCOUNTER — HOSPITAL ENCOUNTER (OUTPATIENT)
Dept: GENERAL RADIOLOGY | Facility: CLINIC | Age: 69
Discharge: HOME | End: 2019-12-11
Attending: FAMILY MEDICINE | Admitting: FAMILY MEDICINE
Payer: COMMERCIAL

## 2019-12-11 VITALS
TEMPERATURE: 97.4 F | WEIGHT: 204.1 LBS | BODY MASS INDEX: 33.55 KG/M2 | DIASTOLIC BLOOD PRESSURE: 70 MMHG | OXYGEN SATURATION: 97 % | SYSTOLIC BLOOD PRESSURE: 120 MMHG | HEART RATE: 54 BPM | RESPIRATION RATE: 16 BRPM

## 2019-12-11 DIAGNOSIS — R07.9 CHEST PAIN, UNSPECIFIED TYPE: ICD-10-CM

## 2019-12-11 DIAGNOSIS — J18.9 PNEUMONIA DUE TO INFECTIOUS ORGANISM, UNSPECIFIED LATERALITY, UNSPECIFIED PART OF LUNG: ICD-10-CM

## 2019-12-11 DIAGNOSIS — I10 ESSENTIAL HYPERTENSION: Primary | ICD-10-CM

## 2019-12-11 DIAGNOSIS — D72.829 LEUKOCYTOSIS, UNSPECIFIED TYPE: ICD-10-CM

## 2019-12-11 LAB
ANION GAP SERPL CALCULATED.3IONS-SCNC: 7 MMOL/L (ref 3–14)
BUN SERPL-MCNC: 24 MG/DL (ref 7–30)
CALCIUM SERPL-MCNC: 8.6 MG/DL (ref 8.5–10.1)
CHLORIDE SERPL-SCNC: 104 MMOL/L (ref 94–109)
CHOLEST SERPL-MCNC: 165 MG/DL
CO2 SERPL-SCNC: 26 MMOL/L (ref 20–32)
CREAT SERPL-MCNC: 1.4 MG/DL (ref 0.66–1.25)
GFR SERPL CREATININE-BSD FRML MDRD: 51 ML/MIN/{1.73_M2}
GLUCOSE SERPL-MCNC: 101 MG/DL (ref 70–99)
HDLC SERPL-MCNC: 29 MG/DL
LDLC SERPL CALC-MCNC: 70 MG/DL
NONHDLC SERPL-MCNC: 136 MG/DL
POTASSIUM SERPL-SCNC: 4.1 MMOL/L (ref 3.4–5.3)
SODIUM SERPL-SCNC: 137 MMOL/L (ref 133–144)
TRIGL SERPL-MCNC: 332 MG/DL
TROPONIN I SERPL-MCNC: <0.015 UG/L (ref 0–0.04)
TSH SERPL DL<=0.005 MIU/L-ACNC: 2.14 MU/L (ref 0.4–4)

## 2019-12-11 PROCEDURE — 99215 OFFICE O/P EST HI 40 MIN: CPT | Performed by: FAMILY MEDICINE

## 2019-12-11 PROCEDURE — 84484 ASSAY OF TROPONIN QUANT: CPT | Performed by: FAMILY MEDICINE

## 2019-12-11 PROCEDURE — 36415 COLL VENOUS BLD VENIPUNCTURE: CPT | Performed by: FAMILY MEDICINE

## 2019-12-11 PROCEDURE — 93000 ELECTROCARDIOGRAM COMPLETE: CPT | Performed by: FAMILY MEDICINE

## 2019-12-11 PROCEDURE — 85025 COMPLETE CBC W/AUTO DIFF WBC: CPT | Performed by: FAMILY MEDICINE

## 2019-12-11 PROCEDURE — 85060 BLOOD SMEAR INTERPRETATION: CPT | Performed by: FAMILY MEDICINE

## 2019-12-11 PROCEDURE — 80061 LIPID PANEL: CPT | Performed by: FAMILY MEDICINE

## 2019-12-11 PROCEDURE — 80048 BASIC METABOLIC PNL TOTAL CA: CPT | Performed by: FAMILY MEDICINE

## 2019-12-11 PROCEDURE — 84443 ASSAY THYROID STIM HORMONE: CPT | Performed by: FAMILY MEDICINE

## 2019-12-11 PROCEDURE — 71046 X-RAY EXAM CHEST 2 VIEWS: CPT | Mod: TC

## 2019-12-11 RX ORDER — LEVOFLOXACIN 750 MG/1
750 TABLET, FILM COATED ORAL DAILY
Qty: 7 TABLET | Refills: 0 | Status: SHIPPED | OUTPATIENT
Start: 2019-12-11 | End: 2019-12-27

## 2019-12-11 ASSESSMENT — PAIN SCALES - GENERAL: PAINLEVEL: NO PAIN (0)

## 2019-12-11 NOTE — PROGRESS NOTES
Subjective     Renny Gannon is a 69 year old male who presents to clinic today for the following health issues:    HPI   Hypertension Follow-up      Do you check your blood pressure regularly outside of the clinic? No     Are you following a low salt diet? Yes    Are your blood pressures ever more than 140 on the top number (systolic) OR more   than 90 on the bottom number (diastolic), for example 140/90? No      How many servings of fruits and vegetables do you eat daily?  2-3    On average, how many sweetened beverages do you drink each day (Examples: soda, juice, sweet tea, etc.  Do NOT count diet or artificially sweetened beverages)?   1    How many days per week do you miss taking your medication? 0    Renny is here today for HTN follow with concern of chest pain. Known to have high blood pressure for a while. Been taking the medication as prescribed with no side effect.  Not checking his BP at home. No headache or dizziness.  No acute change in his vision.  No chest pain or shortness of breath.  No leg swelling, orthopnea or dyspnea.  Does not exercise but trying to be as active as possible.  Denied of excessive salt or caffeine intake.    Stated that he has been having the burning sensation and pressure discomfort across his upper chest with the exertion in the last month and is getting worse.  Never had it before.  It happens only when he is exerting himself such as walking about half a block or gingo up about 20 stairs.  Resolved within a minute with resting.  No associated tightness sensation, palpitation or diaphoresis.  No history of heart disease but has a strong family of CAD to his father and his brother who  of heart attack at 74 and 60 respectively.  He has an uncle die of heart disease in his early 40s.  Stated he also feel little shortness of breath when it happened; no orthopnea.  No leg swelling. It happens every time he exerts  himself.  No coughing or wheezing.  No fever or chill. No  N/V/D/C. No smoking drinking or drug.  Not taking aspirin or statin.  No other concerns.    Current Outpatient Medications   Medication Sig Dispense Refill     levofloxacin (LEVAQUIN) 750 MG tablet Take 1 tablet (750 mg) by mouth daily for 7 days 7 tablet 0     lisinopril-hydrochlorothiazide (PRINZIDE/ZESTORETIC) 20-25 MG tablet Take 1 tablet by mouth daily Please stop the 10/12.5 mg dose 90 tablet 1     naproxen (NAPROSYN) 500 MG tablet Take 1 tablet (500 mg) by mouth 2 times daily as needed for moderate pain 60 tablet 1     Allergies   Allergen Reactions     No Known Drug Allergies      Seasonal Allergies          Reviewed and updated as needed this visit by Provider         Review of Systems   ROS COMP: Constitutional, HEENT, cardiovascular, pulmonary, GI, , musculoskeletal, neuro, skin, endocrine and psych systems are negative, except as otherwise noted.      Objective    /70   Pulse 54   Temp 97.4  F (36.3  C) (Temporal)   Resp 16   Wt 92.6 kg (204 lb 1.6 oz)   SpO2 97%   BMI 33.55 kg/m    Body mass index is 33.55 kg/m .  Physical Exam   GENERAL: healthy, alert and no distress  EYES: Eyes grossly normal to inspection, PERRL and conjunctivae and sclerae normal  HENT: ear canals and TM's normal, nose and mouth without ulcers or lesions  NECK: no adenopathy, no asymmetry, masses, or scars and thyroid normal to palpation  RESP: lungs clear to auscultation - no rales, rhonchi or wheezes  CV: regular rate and rhythm, normal S1 S2, no S3 or S4, no murmur, click or rub, no peripheral edema and peripheral pulses strong  ABDOMEN: soft, nontender, no hepatosplenomegaly, no masses and bowel sounds normal  MS: no gross musculoskeletal defects noted, no edema  SKIN: no suspicious lesions or rashes  NEURO: Normal strength and tone, mentation intact and speech normal    Diagnostic Test Results:  Labs reviewed in Epic  Results for orders placed or performed in visit on 12/11/19   CBC with platelets and  differential     Status: Abnormal   Result Value Ref Range    WBC 79.1 (HH) 4.0 - 11.0 10e9/L    RBC Count 3.94 (L) 4.4 - 5.9 10e12/L    Hemoglobin 11.9 (L) 13.3 - 17.7 g/dL    Hematocrit 36.0 (L) 40.0 - 53.0 %    MCV 91 78 - 100 fl    MCH 30.2 26.5 - 33.0 pg    MCHC 33.1 31.5 - 36.5 g/dL    RDW 19.0 (H) 10.0 - 15.0 %    Platelet Count 378 150 - 450 10e9/L    Diff Method Manual Differential     % Neutrophils 72.0 %    % Lymphocytes 18.0 %    % Monocytes 2.0 %    % Eosinophils 0.0 %    % Basophils 0.0 %    % Other Cells 8.0 %    Nucleated RBCs 4 (H) 0 /100    Absolute Neutrophil 57.0 (H) 1.6 - 8.3 10e9/L    Absolute Lymphocytes 14.2 (H) 0.8 - 5.3 10e9/L    Absolute Monocytes 1.6 (H) 0.0 - 1.3 10e9/L    Absolute Eosinophils 0.0 0.0 - 0.7 10e9/L    Absolute Basophils 0.0 0.0 - 0.2 10e9/L    Absolute Other Cells 6.3 (H) 0 10e9/L    Absolute Nucleated RBC 3.2     Anisocytosis Slight     Smudge Cells Present     RBC Morphology Consistent with reported results     Platelet Estimate       Automated count confirmed.  Platelet morphology is normal.   Basic metabolic panel  (Ca, Cl, CO2, Creat, Gluc, K, Na, BUN)     Status: Abnormal   Result Value Ref Range    Sodium 137 133 - 144 mmol/L    Potassium 4.1 3.4 - 5.3 mmol/L    Chloride 104 94 - 109 mmol/L    Carbon Dioxide 26 20 - 32 mmol/L    Anion Gap 7 3 - 14 mmol/L    Glucose 101 (H) 70 - 99 mg/dL    Urea Nitrogen 24 7 - 30 mg/dL    Creatinine 1.40 (H) 0.66 - 1.25 mg/dL    GFR Estimate 51 (L) >60 mL/min/[1.73_m2]    GFR Estimate If Black 59 (L) >60 mL/min/[1.73_m2]    Calcium 8.6 8.5 - 10.1 mg/dL   Lipid panel reflex to direct LDL Non-fasting     Status: Abnormal   Result Value Ref Range    Cholesterol 165 <200 mg/dL    Triglycerides 332 (H) <150 mg/dL    HDL Cholesterol 29 (L) >39 mg/dL    LDL Cholesterol Calculated 70 <100 mg/dL    Non HDL Cholesterol 136 (H) <130 mg/dL   Troponin I     Status: None   Result Value Ref Range    Troponin I ES <0.015 0.000 - 0.045 ug/L   TSH  with free T4 reflex     Status: None   Result Value Ref Range    TSH 2.14 0.40 - 4.00 mU/L     EKG: Sinus rhythm with frequent PAC.  Heart rate was 95 bpm.  No ST elevation.  ST depression noted in V2 to V4    CHEST TWO VIEW 2019 3:42 PM      HISTORY: Chest pain, unspecified type.     COMPARISON: None.                                                                      IMPRESSION: Elevation left hemidiaphragm of uncertain age and  etiology. Small amount of infiltrate and/or atelectatic change in the  right lung base. No pleural effusions. The remainder of the lungs are  clear and the heart size is borderline.     TAURUS TUCKER MD     Assessment & Plan     1. Essential hypertension  BP is normal today and it has been stable. Encouraged him to keep up the good work with taking the medication.  Continue with the current doses of Zestoretic.  Been tolerating it well.  Emphasized on healthy/low salt diet, daily excercise and weight loss. Avoid high sugar/caffeine intake. Lab as ordered.  Follow up in 6 month, earlier as needed.    - Lipid panel reflex to direct LDL Non-fasting  - TSH with free T4 reflex    2. Chest pain, unspecified type  Clinical presentation is suggestive of angina.  No personal history of CAD although his father and brother  of MI at age of 74 and 60 respectively.  He also has an uncle die of heart attack at the age of 40.  Risk factors for CAD include high blood pressure which is well controlled.  He does not have high cholesterol or diabetes.  No smoking, drugs or alcohol.  Not on aspirin or statin.  EKG today shows sinus rhythm with frequent PAC.  Minimal ST depression noted in V2 to V4; its significant is unclear.  Labs as ordered and reviewed which showed leukocytosis with white blood count of 71,000 with elevated neutrophil, lymphocyte and monocyte.  Troponin was normal.  BMP showed creatinine 1.4 which is slightly elevated.  TSH level was normal.  Cholesterol did not look too bad  -with LDL of 70 and HDL of 29.  Chest x-ray is also reviewed.    I am concerned about his angina symptoms and need to rule out cardiac origin.  He is set up for nuclear stress test which is scheduled for tomorrow.  In the meantime, continue with the lisinopril.  Recommend to start a baby aspirin daily.  Further work-up/evaluation based on the nuclear stress test result.  In the meantime, instructed him to go to the ER if develop chest pain, shortness of breath, diaphoresis, dyspnea, orthopnea, or if has any concern.    - EKG 12-lead complete w/read - Clinics  - XR Chest 2 Views; Future  - CBC with platelets and differential  - Basic metabolic panel  (Ca, Cl, CO2, Creat, Gluc, K, Na, BUN)  - Lipid panel reflex to direct LDL Non-fasting  - Troponin I  - NM Exercise stress test; Future    3. Pneumonia due to infectious organism, unspecified laterality, unspecified part of lung  CBC showed leukocytosis with elevated neutrophils, lymphocytes and monocytes.  Chest x-ray showed elevated left diaphragm with with small amount of infiltrate/atelectasis, no pleural effusion.  These results came after he left the office today.  I discussed with him about the results over the phone.  Will treat empirically for pneumonia with Levaquin - aware of his kidney function test status.  He will return tomorrow for re-evaluation and potential further evaluation.  He did not look acutely sick in the office with no respiratory symptoms.  O2 sat was stable and normal.  Will monitor this closely.  If not improve or get worse, will consider CT scan for further evaluation.    - levofloxacin (LEVAQUIN) 750 MG tablet; Take 1 tablet (750 mg) by mouth daily for 7 days  Dispense: 7 tablet; Refill: 0    4. Leukocytosis, unspecified type  White blood count 71,000 with elevated neutrophils, monocytes and lymphocytes.  He did not display symptoms of pneumonia.  Does not smoke.  Follow up tomorrow, will recheck CBC and checking the peripheral blood  "smear.  Will continue to monitor closely, if not improve with the antibiotic, will refer to hematology for further evaluation.  Concerned for leukemia/lymphoma.  His last CBC was 10 months ago and the white blood count was minimally elevated at that time.       BMI:   Estimated body mass index is 33.55 kg/m  as calculated from the following:    Height as of 2/11/19: 1.661 m (5' 5.4\").    Weight as of this encounter: 92.6 kg (204 lb 1.6 oz).   Weight management plan: Discussed healthy diet and exercise guidelines      Return in about 1 day (around 12/12/2019) for folow up chest pain with leukocytosis.    Angus Clements Mai, MD  [facility]        "

## 2019-12-13 ENCOUNTER — OFFICE VISIT (OUTPATIENT)
Dept: FAMILY MEDICINE | Facility: CLINIC | Age: 69
End: 2019-12-13
Payer: COMMERCIAL

## 2019-12-13 VITALS
OXYGEN SATURATION: 96 % | DIASTOLIC BLOOD PRESSURE: 66 MMHG | WEIGHT: 206.6 LBS | TEMPERATURE: 97.5 F | BODY MASS INDEX: 33.96 KG/M2 | SYSTOLIC BLOOD PRESSURE: 120 MMHG | HEART RATE: 88 BPM | RESPIRATION RATE: 18 BRPM

## 2019-12-13 DIAGNOSIS — R93.89 ABNORMAL CHEST X-RAY: ICD-10-CM

## 2019-12-13 DIAGNOSIS — R07.9 EXERTIONAL CHEST PAIN: ICD-10-CM

## 2019-12-13 DIAGNOSIS — D72.829 LEUKOCYTOSIS, UNSPECIFIED TYPE: Primary | ICD-10-CM

## 2019-12-13 LAB
DIFFERENTIAL METHOD BLD: ABNORMAL
ERYTHROCYTE [DISTWIDTH] IN BLOOD BY AUTOMATED COUNT: 19.3 % (ref 10–15)
HCT VFR BLD AUTO: 35.7 % (ref 40–53)
HGB BLD-MCNC: 11.7 G/DL (ref 13.3–17.7)
MCH RBC QN AUTO: 30.2 PG (ref 26.5–33)
MCHC RBC AUTO-ENTMCNC: 32.8 G/DL (ref 31.5–36.5)
MCV RBC AUTO: 92 FL (ref 78–100)
PLATELET # BLD AUTO: 351 10E9/L (ref 150–450)
RBC # BLD AUTO: 3.88 10E12/L (ref 4.4–5.9)
RETICS # AUTO: 126.5 10E9/L (ref 25–95)
RETICS/RBC NFR AUTO: 3.3 % (ref 0.5–2)
WBC # BLD AUTO: 73.5 10E9/L (ref 4–11)

## 2019-12-13 PROCEDURE — 85045 AUTOMATED RETICULOCYTE COUNT: CPT | Performed by: FAMILY MEDICINE

## 2019-12-13 PROCEDURE — 36415 COLL VENOUS BLD VENIPUNCTURE: CPT | Performed by: FAMILY MEDICINE

## 2019-12-13 PROCEDURE — 85025 COMPLETE CBC W/AUTO DIFF WBC: CPT | Performed by: FAMILY MEDICINE

## 2019-12-13 PROCEDURE — 99214 OFFICE O/P EST MOD 30 MIN: CPT | Performed by: FAMILY MEDICINE

## 2019-12-13 RX ORDER — ASPIRIN 81 MG/1
81 TABLET ORAL DAILY
COMMUNITY
End: 2019-12-27

## 2019-12-13 ASSESSMENT — PAIN SCALES - GENERAL: PAINLEVEL: NO PAIN (0)

## 2019-12-13 NOTE — PROGRESS NOTES
Subjective     Renny Gannon is a 69 year old male who presents to clinic today for the following health issues:    HPI     Recheck lab    Concern - Recheck pneumonia  Onset: a while over a month    Description:   Burns a little from walking from the parking lot    Intensity: mild    Progression of Symptoms:  same    Accompanying Signs & Symptoms:  none    Precipitating factors:   Worsened by: exerting self    Alleviating factors:  Improved by: pills    Therapies Tried and outcome: niyahuin    Renny is here to follow up pneumonia and leukocytosis.  He was seen couple days a go for physical exam with chest pain that he has had with exertion in the last couple months.  Please see my last dictation for further details.  Lab work up showed leukocytosis with the WBC of 79,000.  Chest Xray showed elevated left hemidiaphragm and small amount of infiltrate and/or atelectasis at the base of the right lung.  He did not look acute sick or septic.  Cardiac work up was normal and EKG showed NSR with frequent PAC.  He was treated empirically with Levaquin for pneumonia.  He is scheduled for nuclear stress test next week.      Stated that she still feel about the same.  Still has the chest discomfort with exertion.  No runny nose, nasal congestion or coughing.  No fever or chill. No excessive sweating or night sweat.  No N/V/D/C.  No problem with urination.  No coughing.  Normal appetite.  No unintended weight loss.  No family history of lymphoma.  Denied of excessive fatigue.  No other concern today.    Current Outpatient Medications   Medication Sig Dispense Refill     aspirin 81 MG EC tablet Take 81 mg by mouth daily       lisinopril-hydrochlorothiazide (PRINZIDE/ZESTORETIC) 20-25 MG tablet Take 1 tablet by mouth daily Please stop the 10/12.5 mg dose 90 tablet 1     naproxen (NAPROSYN) 500 MG tablet Take 1 tablet (500 mg) by mouth 2 times daily as needed for moderate pain 60 tablet 1     aspirin (ASA) 81 MG tablet Take 1 tablet  (81 mg) by mouth daily 100 tablet 0     metoprolol succinate ER (TOPROL-XL) 25 MG 24 hr tablet Take 0.5 tablets (12.5 mg) by mouth daily 30 tablet 0     Allergies   Allergen Reactions     No Known Drug Allergies      Seasonal Allergies        Reviewed and updated as needed this visit by Provider         Review of Systems   ROS COMP: Constitutional, HEENT, cardiovascular, pulmonary, gi and gu systems are negative, except as otherwise noted.      Objective    /66 (BP Location: Left arm, Patient Position: Chair, Cuff Size: Adult Large)   Pulse 88   Temp 97.5  F (36.4  C) (Temporal)   Resp 18   Wt 93.7 kg (206 lb 9.6 oz)   SpO2 96%   BMI 33.96 kg/m    Body mass index is 33.96 kg/m .  Physical Exam   GENERAL: alert and no distress. Did not look acute sick or septic.  Comfortable sitting on chair - speaking in full sentences.  EYES: Eyes grossly normal to inspection, PERRL and conjunctivae and sclerae normal.  No conjunctival injection or redness  HENT: ear canals and TM's normal, nose and mouth without ulcers or lesions.  Nares are non-congested. Oropharynx is pink and moist.  No tonsillar hypertrophy, exudate or erythema.  No tender with palpation to the sinuses.   NECK: no adenopathy, supple, no lymphadenopathy or thyroidmegaly.  No tender with palpation to the spine or its para-spinous muscle.   RESP: lungs clear to auscultation - no rales, rhonchi or wheezes.  Good respiratory effort through out.  CV: regular rate and rhythm, no murmur, click or rub, no peripheral edema and peripheral pulses strong  ABDOMEN: soft, nontender, non-distended, normal bowel sound. No palpable massed or organomegaly.  No CVA tenderness.  No tender with palpation to the lower abdomen or suprapelvic area.    MS: no gross musculoskeletal defects noted, no edema.  All joints are in the normal range of motion,  No focal weakness.  No joint pain  SKIN: no suspicious lesions or rashes  NEURO: Normal strength and tone, mentation intact  and speech normal. Cranial nerve 2-12 are in tact.    Diagnostic Test Results:  Labs reviewed in Epic  Results for orders placed or performed in visit on 12/13/19   CBC with platelets and differential     Status: Abnormal   Result Value Ref Range    WBC 73.5 (HH) 4.0 - 11.0 10e9/L    RBC Count 3.88 (L) 4.4 - 5.9 10e12/L    Hemoglobin 11.7 (L) 13.3 - 17.7 g/dL    Hematocrit 35.7 (L) 40.0 - 53.0 %    MCV 92 78 - 100 fl    MCH 30.2 26.5 - 33.0 pg    MCHC 32.8 31.5 - 36.5 g/dL    RDW 19.3 (H) 10.0 - 15.0 %    Platelet Count 351 150 - 450 10e9/L    Diff Method Manual Method    Blood Morphology Pathologist Review     Status: None   Result Value Ref Range    Copath Report       Patient Name: ABI VALDEZ  MR#: [id number]  Specimen #: [id number]  Collected: 12/11/2019  Received: 12/12/2019  Reported: 12/15/2019 15:37  Ordering Phy(s): ANA LUISA GOLDEN MAI    For improved result formatting, select 'View Enhanced Report Format' under   Linked Documents section.    TEST(S):  Peripheral Smear Morphology    FINAL DIAGNOSIS:  Peripheral Smear Morphology:  - Leukoerythroblastic reaction with neutrophilic left shift and rare   circulating blasts without dysplasia.  - Moderate normochromic, normocytic anemia.    COMMENT:  The presence of a leukoerythroblastic reaction may be related to a   infiltrating process in the bone marrow,  including both benign and malignant neoplastic processes, marked   physiologic stress such as sepsis, or a  primary disorder myelopoiesis such as a myeloproliferative neoplastic   process.  Further clinical correlation  is required.    Electronically signed out by:    WALI Guerra M.D.    CLINICAL HISTORY:  69-year-old male.  Elevated white count.     PERIPHERAL BLOOD DATA:  PERIPHERAL BLOOD DATA (Date: 12/11/19)  See Epic imported clinical laboratory data at the end of this report which   includes peripheral blood indices.    PERIPHERAL BLOOD DIFFERENTIAL: Manual 200 cells    The manual differential is  preferred for this case as immature precursors   are present which are not adequately  differentiated in the automated differential.    (Reference ranges >18 year old)    Percent  0.5%  Blasts  94.0% Neutrophils    - 66.0% Segmented    - 10.5% Metamyelocytes    - 17.5% Myelocytes  3.5%  Lymphocytes  0.0%  Monocytes  1.5%  Eosinophils  0.5%  Basophils  NRBC 6/100 WBC    Absolute  0.40   Blasts  74.33  Neutrophils  (Ref normal 1.6 - 8.3 x 10*9/L)  2.77   Lymphocytes  (Ref normal 0.8 - 5.3 x 10*9/L)  0.00   Monocytes (Ref normal 0 -1.3 x 10*9/L)  1.19   Eosinophils (Ref normal 0 - 0.7 x 10*9/L)  0.40   Basophils (Ref normal 0 - 0.2 x 10*9/L)    PERIPHERAL BLOOD MORPHOLOGY:    ERYTHROCYTES:  The red cells are normocytic, normochromic  and mildly to   moderately decreased in number for the  patient's age and gender.  Moderate anisopoikilocytosis is present with   ovalocytes and occasional  stomatocytes.  Normoblasts are present (6/100WBC).    LEUKOCYTES:  The leukocytes are markedly increased in number with a   neutrophilic left shift without dysplasia  and occasional circulating blasts.  No Wily rods are seen in the blasts.    A mild absolute basophilia is  present.    PLATELETS:  The platelets are normal in number and morphology. (Dictated   by: ÁNGEL Guerra MD 12/15/2019)    CLINICAL LAB RESULTS:  Battery Order No. Lab Test Code Clinical Result Ref. Range Units Result   Date  Lipid Panel, Reflex [id number] PL Non HDL Cholesterol SEE TEXT <130 mg/dL   12/11/2019 17:05       Text/Comments:   Flags  H  136 Above Desirable:  130-159 mg/dl Borderline high:  160-189 mg/dl High:      190-219 mg/dl Very high:       >219 mg/dl    Basic Metabolic Bat   Urea Nitrogen 24 7-30 mg/dL 12/11/2019 15:44       Creatinine H 1.40 0. 66-1.25 mg/dL 12/11/2019 15:44       GFR, Estimated SEE TEXT >60 mL/min/1.73_m2 12/11/2019 15:44       Text/Comments:   Flags  L  51 Non  GFR Calc Starting 12/18/2018, serum creatinine    based  estimated GFR (eGFR) will be calculated using the Chronic Kidney Disease  Epidemiology Collaboration (CKD-EPI) equation.       GFR, Est., if Black SEE TEXT >60 mL/min/1.73_m2 12/11/2019 15:44       Text/Comments:   Flags  L  59  GFR Calc Starting 12/18/2018, serum creatinine based  estimated GFR (eGFR) will be calculated using the Chronic Kidney Disease  Epidemiology Collaboration (CKD-EPI) equation.    Hemogram/Diff/PLT   WBC Count SEE TEXT 4.0-11.0 10e9/L 12/11/2019 15:35       Text/Comments:   Flags  HH  79.1 This result has been called to BHARATHI HERNANDEZ by Yulisa Crabtree on 12 11 2019 at  1533, and has been read back.       RBC Count L 3.94 4.4-5.9 10e12/L 12/11/2019 15:35       Hemoglobin L 11.9 13.3-17.7 g/dL 12/11/2019 15:35       Hematocrit L 36.0 40.0-53. 0 % 12/11/2019 15:35       MCV 91  fl 12/11/2019 15:35       MCH 30.2 26.5-33.0 pg 12/11/2019 15:35       MCHC 33.1 31.5-36.5 g/dL 12/11/2019 15:35       RDW H 19.0 10.0-15.0 % 12/11/2019 15:35       Platelet Count 378 150-450 10e9/L 12/11/2019 15:35        SEE TEXT   12/11/2019 15:35       Text/Comments:  Manual Differential STBR Slide to be reviewed by Pathologist       % Neutrophils 72.0  % 12/11/2019 15:35       % Lymphocytes 18.0  % 12/11/2019 15:35       % Monocytes 2.0  % 12/11/2019 15:35       % Eosinophils 0.0  % 12/11/2019 15:35       % Basophils 0.0  % 12/11/2019 15:35       % Other Cells 8.0  % 12/11/2019 15:35       Nucleated RBCs H 4 0 /100 12/11/2019 15:35       abs Neutrophils H 57.0 1.6-8.3 10e9/L 12/11/2019 15:35       abs Lymphocytes H 14.2 0.8-5.3 10e9/L 12/11/2019 15:35       abs Monocytes H 1.6 0.0-1.3 10e9/L 12/11/2019 15:35       abs Eosinophils 0.0 0.0-0.7 10e9/L 12/11/2019 15:35       abs Basophils 0.0 0.0-0.2 10e9/L 12/11/2019 15:35       abs Other Cell s H 6.3 0 10e9/L 12/11/2019 15:35       abs NRBC 3.2   12/11/2019 15:35       Anisocytosis Slight   12/11/2019 15:35       Smudge Cells Present    12/11/2019 15:35       RBC Morphology SEE TEXT   12/11/2019 15:35       Text/Comments:  Consistent with reported results       Platelet Estimate SEE TEXT   12/11/2019 15:35       Text/Comments:  Automated count confirmed.  Platelet morphology is normal.    The technical component of this testing was completed at the [facility], with the professional component performed   at the [facility], [address] [place] [id number] ([phone])    CPT Codes:  A: 09660-AXVJ    COLLECTION SITE:  Client:  [facility]  Location:  [facility] (P)       Reticulocyte Count     Status: Abnormal   Result Value Ref Range    % Retic 3.3 (H) 0.5 - 2.0 %    Absolute Retic 126.5 (H) 25 - 95 10e9/L           Assessment & Plan       ICD-10-CM    1. Leukocytosis, unspecified type D72.829 CBC with platelets and differential     Blood Morphology Pathologist Review     Reticulocyte Count     CT Chest w/o Contrast     CANCELED: CBC with platelets differential     CANCELED: CT Chest w/o & w Contrast   2. Abnormal chest x-ray R93.89 CT Chest w/o Contrast     CANCELED: CT Chest w/o & w Contrast   3. Exertional chest pain R07.9      Incidental leukocytosis with elevated  Neutrophil and eosinophils.  Last CBC was on 2/2019 which was pretty normal.  Been having chest pain with minimal SOB with exertion in the last couple months and it getting worse.  Chest xray showed small infiltrate/ectalectasis with elevated left hemidiaphragm. No coughing or URI symptoms.  Vital signs have been stable and normal.  He did not look acute sick or septic in the office.  Physical exam was otherwise normal.      Unlikely his Leukocytosis is due to infection or sepsis.  I am concerned of neoplastic process.   Recheck CBC today.  Also check peripheral blood smear and reticulocyte counts.  Send for chest CT to further evaluate its  abnormal finding on the Xray.      Also refer to hematology for further evaluation.      Will continue with the Levaquin as prescribed.  Call in or ER if has any concern.    CP - stable. Troponin was negative.  EKG showed NSR with frequent PAC.  Nuclear stress test as scheduled next week.      He feels comfortable with the plan and all of his questions were answered.      Return in about 1 week (around 12/20/2019) for folow up for leukocytosis with Hematologist as  scheduled..    Angus Clements Mai, MD  [facility]

## 2019-12-16 ENCOUNTER — HOSPITAL ENCOUNTER (OUTPATIENT)
Dept: CT IMAGING | Facility: CLINIC | Age: 69
Discharge: HOME | End: 2019-12-16
Attending: FAMILY MEDICINE | Admitting: FAMILY MEDICINE
Payer: COMMERCIAL

## 2019-12-16 DIAGNOSIS — D72.829 LEUKOCYTOSIS, UNSPECIFIED TYPE: ICD-10-CM

## 2019-12-16 DIAGNOSIS — D72.829 LEUKOCYTOSIS, UNSPECIFIED TYPE: Primary | ICD-10-CM

## 2019-12-16 DIAGNOSIS — R93.89 ABNORMAL CHEST X-RAY: ICD-10-CM

## 2019-12-16 LAB
ANISOCYTOSIS BLD QL SMEAR: SLIGHT
BASOPHILS # BLD AUTO: 0.4 10E9/L (ref 0–0.2)
BASOPHILS NFR BLD AUTO: 0.5 %
BLASTS # BLD: 0.4 10E9/L
BLASTS BLD QL SMEAR: 0.5 %
DIFFERENTIAL METHOD BLD: ABNORMAL
EOSINOPHIL # BLD AUTO: 1.2 10E9/L (ref 0–0.7)
EOSINOPHIL NFR BLD AUTO: 1.5 %
ERYTHROCYTE [DISTWIDTH] IN BLOOD BY AUTOMATED COUNT: 19 % (ref 10–15)
HCT VFR BLD AUTO: 36 % (ref 40–53)
HGB BLD-MCNC: 11.9 G/DL (ref 13.3–17.7)
LYMPHOCYTES # BLD AUTO: 2.8 10E9/L (ref 0.8–5.3)
LYMPHOCYTES NFR BLD AUTO: 3.5 %
MCH RBC QN AUTO: 30.2 PG (ref 26.5–33)
MCHC RBC AUTO-ENTMCNC: 33.1 G/DL (ref 31.5–36.5)
MCV RBC AUTO: 91 FL (ref 78–100)
METAMYELOCYTES # BLD: 8.3 10E9/L
METAMYELOCYTES NFR BLD MANUAL: 10.5 %
MYELOCYTES # BLD: 13.8 10E9/L
MYELOCYTES NFR BLD MANUAL: 17.5 %
NEUTROPHILS # BLD AUTO: 52.2 10E9/L (ref 1.6–8.3)
NEUTROPHILS NFR BLD AUTO: 66 %
NRBC # BLD AUTO: 2.4 10*3/UL
NRBC BLD AUTO-RTO: 3 /100
PLATELET # BLD AUTO: 378 10E9/L (ref 150–450)
PLATELET # BLD EST: ABNORMAL 10*3/UL
RBC # BLD AUTO: 3.94 10E12/L (ref 4.4–5.9)
RBC MORPH BLD: ABNORMAL
SMUDGE CELLS BLD QL SMEAR: PRESENT
WBC # BLD AUTO: 79.1 10E9/L (ref 4–11)

## 2019-12-16 PROCEDURE — 71250 CT THORAX DX C-: CPT

## 2019-12-17 ENCOUNTER — TELEPHONE (OUTPATIENT)
Dept: FAMILY MEDICINE | Facility: CLINIC | Age: 69
End: 2019-12-17

## 2019-12-17 ENCOUNTER — HOSPITAL ENCOUNTER (OUTPATIENT)
Dept: NUCLEAR MEDICINE | Facility: CLINIC | Age: 69
Setting detail: NUCLEAR MEDICINE
Discharge: HOME | End: 2019-12-17
Attending: FAMILY MEDICINE | Admitting: FAMILY MEDICINE
Payer: COMMERCIAL

## 2019-12-17 VITALS — WEIGHT: 204 LBS | BODY MASS INDEX: 33.53 KG/M2

## 2019-12-17 DIAGNOSIS — I10 ESSENTIAL HYPERTENSION: Primary | ICD-10-CM

## 2019-12-17 DIAGNOSIS — I25.118 CORONARY ARTERY DISEASE WITH EXERTIONAL ANGINA (H): Primary | ICD-10-CM

## 2019-12-17 DIAGNOSIS — R07.9 CHEST PAIN, UNSPECIFIED TYPE: ICD-10-CM

## 2019-12-17 DIAGNOSIS — R93.1 ABNORMAL NUCLEAR CARDIAC IMAGING TEST: ICD-10-CM

## 2019-12-17 LAB
CV STRESS MAX HR HE: 122
NUC STRESS EJECTION FRACTION: 66 %
RATE PRESSURE PRODUCT: NORMAL
STRESS ECHO BASELINE DIASTOLIC HE: 72
STRESS ECHO BASELINE HR: 100
STRESS ECHO BASELINE SYSTOLIC BP: 124
STRESS ECHO CALCULATED PERCENT HR: 81 %
STRESS ECHO LAST STRESS DIASTOLIC BP: 68
STRESS ECHO LAST STRESS SYSTOLIC BP: 116
STRESS ECHO TARGET HR: 150
STRESS/REST PERFUSION RATIO: 1.09

## 2019-12-17 PROCEDURE — 93017 CV STRESS TEST TRACING ONLY: CPT | Performed by: INTERNAL MEDICINE

## 2019-12-17 PROCEDURE — 78452 HT MUSCLE IMAGE SPECT MULT: CPT | Mod: 26 | Performed by: INTERNAL MEDICINE

## 2019-12-17 PROCEDURE — 25000128 H RX IP 250 OP 636: Performed by: FAMILY MEDICINE

## 2019-12-17 PROCEDURE — 34300033 ZZH RX 343: Performed by: FAMILY MEDICINE

## 2019-12-17 PROCEDURE — A9502 TC99M TETROFOSMIN: HCPCS | Performed by: FAMILY MEDICINE

## 2019-12-17 PROCEDURE — 93018 CV STRESS TEST I&R ONLY: CPT | Performed by: INTERNAL MEDICINE

## 2019-12-17 PROCEDURE — 93016 CV STRESS TEST SUPVJ ONLY: CPT | Performed by: INTERNAL MEDICINE

## 2019-12-17 PROCEDURE — 78452 HT MUSCLE IMAGE SPECT MULT: CPT

## 2019-12-17 RX ORDER — REGADENOSON 0.08 MG/ML
0.4 INJECTION, SOLUTION INTRAVENOUS ONCE
Status: COMPLETED | OUTPATIENT
Start: 2019-12-17 | End: 2019-12-17

## 2019-12-17 RX ADMIN — REGADENOSON 0.4 MG: 0.08 INJECTION, SOLUTION INTRAVENOUS at 09:01

## 2019-12-17 RX ADMIN — TETROFOSMIN 30.2 MCI.: 1.38 INJECTION, POWDER, LYOPHILIZED, FOR SOLUTION INTRAVENOUS at 09:26

## 2019-12-17 RX ADMIN — TETROFOSMIN 10 MCI.: 1.38 INJECTION, POWDER, LYOPHILIZED, FOR SOLUTION INTRAVENOUS at 07:34

## 2019-12-17 NOTE — TELEPHONE ENCOUNTER
Patient stated he is a bit frazzled but okay. Patient finished the antibiotic this morning and still has some burring when he walks. He doesn't feel he needs to follow up. Has his CT scan come in yet?  Dianne Olivares MA 12/17/2019

## 2019-12-17 NOTE — TELEPHONE ENCOUNTER
Attempted to reach patient by phone. Unable to leave voicemail due to it not being set up. Will try again later.   Dianne Olivares MA 12/17/2019

## 2019-12-17 NOTE — TELEPHONE ENCOUNTER
----- Message from Angus Clements Mai, MD sent at 12/16/2019 10:06 PM CST -----  Please call with results. Please call to check on how he is doing? Follow up if has any concern. thanks.      Angus Scott MD.

## 2019-12-18 RX ORDER — METOPROLOL SUCCINATE 25 MG/1
12.5 TABLET, EXTENDED RELEASE ORAL DAILY
Qty: 30 TABLET | Refills: 0 | Status: SHIPPED | OUTPATIENT
Start: 2019-12-18 | End: 2020-01-02

## 2019-12-18 NOTE — TELEPHONE ENCOUNTER
Spoke with cardiology they stated that when Dr. Scott comes back from deliveries to call Dr. Yousif.  He is to call [phone] and they will get Dr. Yousif for him.   Marta REBOLLEDO

## 2019-12-18 NOTE — TELEPHONE ENCOUNTER
----- Message from Angus Clements Mai, MD sent at 12/17/2019  5:28 PM CST -----  Talked to patient and stated that he is feeling good - no concern. No chest pain or sob. Discussed about the the nuclear stress result - informed him that it is abnormal.  Recommended to go to ER but he refused. Instructed him to the ER tonight if develops ches pain, SOB, or if has any concern.    Please refer him to cardiology asap - preferred to be seen today 12/18/19 due chest pain and abnormal nuclear stress.  Ordered.  Thanks    Angus Scott MD  [facility]             Please call with results. Please ensure that his chest CT was normal - no concerned finding identified.       Angus Scott MD.

## 2019-12-18 NOTE — TELEPHONE ENCOUNTER
Jan 2nd. @ 1:00pm is the soonest for the cardiology appointment.  Per Dr. Scott he would like to speak with a cardiology re: this patient.  I have called the cardiology dept and also sent a page to them to have them discuss this with Dr. Scott.     Marta REBOLLEDO

## 2019-12-19 NOTE — TELEPHONE ENCOUNTER
Attempted to reach patient by phone. Unable to leave voicemail due to it not being set up. Will try again later.   Dianne Olivares MA 12/19/2019

## 2019-12-19 NOTE — TELEPHONE ENCOUNTER
Talked to Dr. Yousif over the phone.  He reviewed the nuclear stress test.  He feels comfortable to wait until the next available appointment on 1/2/19.  Please be sure that he is aware of the cardiology appointment on 1/2/19.  Also please be sure that he is aware of the hematology appointment on December 26 as well.    As per Dr. Yousif's recommendations, please have him start low dose/baby aspirin 81 mg daily.  Also will started him on medication call Toprol -  take half a tablet daily.  The prescriptions were sent to the pharmacy.    Thanks

## 2019-12-20 ENCOUNTER — DOCUMENTATION ONLY (OUTPATIENT)
Dept: OTHER | Facility: CLINIC | Age: 69
End: 2019-12-20

## 2019-12-23 NOTE — TELEPHONE ENCOUNTER
Patient informed of PCP's message below.  No further questions at this time.  Tatianna Baker, CMA

## 2019-12-24 LAB — COPATH REPORT: NORMAL

## 2019-12-26 ENCOUNTER — ONCOLOGY VISIT (OUTPATIENT)
Dept: ONCOLOGY | Facility: CLINIC | Age: 69
End: 2019-12-26
Attending: FAMILY MEDICINE
Payer: COMMERCIAL

## 2019-12-26 ENCOUNTER — TELEPHONE (OUTPATIENT)
Dept: ONCOLOGY | Facility: CLINIC | Age: 69
End: 2019-12-26

## 2019-12-26 VITALS
HEART RATE: 96 BPM | RESPIRATION RATE: 20 BRPM | BODY MASS INDEX: 34.27 KG/M2 | HEIGHT: 65 IN | WEIGHT: 205.7 LBS | OXYGEN SATURATION: 97 % | DIASTOLIC BLOOD PRESSURE: 60 MMHG | SYSTOLIC BLOOD PRESSURE: 110 MMHG | TEMPERATURE: 96.9 F

## 2019-12-26 DIAGNOSIS — D72.829 LEUKOCYTOSIS, UNSPECIFIED TYPE: Primary | ICD-10-CM

## 2019-12-26 DIAGNOSIS — I10 ESSENTIAL HYPERTENSION: ICD-10-CM

## 2019-12-26 LAB
ALBUMIN SERPL-MCNC: 3.8 G/DL (ref 3.4–5)
ALP SERPL-CCNC: 72 U/L (ref 40–150)
ALT SERPL W P-5'-P-CCNC: 28 U/L (ref 0–70)
ANION GAP SERPL CALCULATED.3IONS-SCNC: 2 MMOL/L (ref 3–14)
ANISOCYTOSIS BLD QL SMEAR: SLIGHT
AST SERPL W P-5'-P-CCNC: 37 U/L (ref 0–45)
BASOPHILS # BLD AUTO: 0 10E9/L (ref 0–0.2)
BASOPHILS NFR BLD AUTO: 0 %
BILIRUB SERPL-MCNC: 0.6 MG/DL (ref 0.2–1.3)
BLASTS # BLD: 0.8 10E9/L
BLASTS BLD QL SMEAR: 1 %
BUN SERPL-MCNC: 26 MG/DL (ref 7–30)
CALCIUM SERPL-MCNC: 8.7 MG/DL (ref 8.5–10.1)
CHLORIDE SERPL-SCNC: 107 MMOL/L (ref 94–109)
CO2 SERPL-SCNC: 28 MMOL/L (ref 20–32)
CREAT SERPL-MCNC: 1.25 MG/DL (ref 0.66–1.25)
CRP SERPL-MCNC: 4.9 MG/L (ref 0–8)
DIFFERENTIAL METHOD BLD: ABNORMAL
EOSINOPHIL # BLD AUTO: 4.8 10E9/L (ref 0–0.7)
EOSINOPHIL NFR BLD AUTO: 6 %
ERYTHROCYTE [DISTWIDTH] IN BLOOD BY AUTOMATED COUNT: 19.3 % (ref 10–15)
GFR SERPL CREATININE-BSD FRML MDRD: 58 ML/MIN/{1.73_M2}
GLUCOSE SERPL-MCNC: 92 MG/DL (ref 70–99)
HCT VFR BLD AUTO: 37 % (ref 40–53)
HGB BLD-MCNC: 11.7 G/DL (ref 13.3–17.7)
LYMPHOCYTES # BLD AUTO: 11.2 10E9/L (ref 0.8–5.3)
LYMPHOCYTES NFR BLD AUTO: 14 %
MCH RBC QN AUTO: 29.8 PG (ref 26.5–33)
MCHC RBC AUTO-ENTMCNC: 31.6 G/DL (ref 31.5–36.5)
MCV RBC AUTO: 94 FL (ref 78–100)
METAMYELOCYTES # BLD: 2.4 10E9/L
METAMYELOCYTES NFR BLD MANUAL: 3 %
MONOCYTES # BLD AUTO: 7.2 10E9/L (ref 0–1.3)
MONOCYTES NFR BLD AUTO: 9 %
MYELOCYTES # BLD: 1.6 10E9/L
MYELOCYTES NFR BLD MANUAL: 2 %
NEUTROPHILS # BLD AUTO: 49.5 10E9/L (ref 1.6–8.3)
NEUTROPHILS NFR BLD AUTO: 62 %
PLATELET # BLD AUTO: 366 10E9/L (ref 150–450)
POTASSIUM SERPL-SCNC: 4.4 MMOL/L (ref 3.4–5.3)
PROMYELOCYTES # BLD MANUAL: 2.4 10E9/L
PROMYELOCYTES NFR BLD MANUAL: 3 %
PROT SERPL-MCNC: 7.6 G/DL (ref 6.8–8.8)
RBC # BLD AUTO: 3.92 10E12/L (ref 4.4–5.9)
RBC MORPH BLD: ABNORMAL
SODIUM SERPL-SCNC: 137 MMOL/L (ref 133–144)
URATE SERPL-MCNC: 8.3 MG/DL (ref 3.5–7.2)
WBC # BLD AUTO: 79.9 10E9/L (ref 4–11)

## 2019-12-26 PROCEDURE — 99205 OFFICE O/P NEW HI 60 MIN: CPT | Performed by: INTERNAL MEDICINE

## 2019-12-26 PROCEDURE — 81206 BCR/ABL1 GENE MAJOR BP: CPT | Performed by: INTERNAL MEDICINE

## 2019-12-26 PROCEDURE — 86140 C-REACTIVE PROTEIN: CPT | Performed by: INTERNAL MEDICINE

## 2019-12-26 PROCEDURE — 80053 COMPREHEN METABOLIC PANEL: CPT | Performed by: INTERNAL MEDICINE

## 2019-12-26 PROCEDURE — 36415 COLL VENOUS BLD VENIPUNCTURE: CPT | Performed by: INTERNAL MEDICINE

## 2019-12-26 PROCEDURE — 85025 COMPLETE CBC W/AUTO DIFF WBC: CPT | Performed by: INTERNAL MEDICINE

## 2019-12-26 PROCEDURE — 84550 ASSAY OF BLOOD/URIC ACID: CPT | Performed by: INTERNAL MEDICINE

## 2019-12-26 ASSESSMENT — MIFFLIN-ST. JEOR: SCORE: 1626.28

## 2019-12-26 NOTE — PATIENT INSTRUCTIONS
Today: labs     Office visit follow up with Dr. Cristina in 3-4 days after bone marrow biopsy. The onlcolgy RN will be in contact with you to schedule this:    Bone Marrow Biopsy Date/Time:       Office Visit Date/Time:      If you have any questions or concerns please feel free to call.    If you need to reschedule please call:  Clinic or Lab Appointment - [phone]  Infusion - [phone]  Imaging - [phone]    JACOB Bass  [facility]  Oncology/Hematology at [facility]  [phone]

## 2019-12-26 NOTE — TELEPHONE ENCOUNTER
BMBX Coordination    Dr. Scott, are you ok with clearing patient for BMBX. I am trying to get patient scheduled for Monday December 30th 2019.    Coordination update:  Patient would like BMBX in  with sedation. I have sent the email, sending a message to Dr. Scott for pre-op clearance, and awaiting confirmation from multidisciplinary team.    Shelia Bustos RN on 12/26/2019 at 12:57 PM

## 2019-12-26 NOTE — NURSING NOTE
DISCHARGE PLAN:  Next appointments: See patient instruction section  Departure Mode:   Accompanied by:   Debi reviewed AVS and plan with patient  60 minutes for nursing discharge (face to face time)     Patient was not seen by writing nurse at time of appointment. Patient to get labs drawn today. Then RN will coordinate a BMBx. Jonnie would ideally like this done Mon/Tues, then to see patient back for an office visit in 1 week from today. Writer to coordinate BMBX then call and coordinate with patient. See patient instructions and Oncologist's Progress note for further details. Questions and concerns addressed to patient's satisfaction. Contact information provided and patient is encouraged to call with any that arise in the interim of care.    Shelia Bustos RN  [facility]  [phone]  12/26/2019 12:12 PM

## 2019-12-26 NOTE — PROGRESS NOTES
DATE OF VISIT: Dec 26, 2019    REASON FOR REFERRAL: Leukocytosis    CHIEF COMPLAINT:   Chief Complaint   Patient presents with     Hematology     Leukocytosis, unspecified type     Consult     Ref: Angus Scott MD       HISTORY OF PRESENT ILLNESS:   69-year-old male patient was referred today for evaluation for leukocytosis.  Presented to primary care physician with a complaint of increasing fatigue and shortness of breath on exertion.  Further work-up including a CBC which was done on December 11, 2019 showed a total white count of 79.1 with a hemoglobin is 11.9 platelet count of 378.  Peripheral blood smear revealed leukoerythroblastic reaction with neutrophilic left shift and rare circulating blasts without dysplasia.  Patient denies any bruising or bleeding.  He denies any fever or chills.  He denies any bone aches or pains.  He denies any weight loss.  He denies any mouth sores or ulcers.  His main complaint including exertional dyspnea and intermittent retrosternal chest pain.  Patient is scheduled to see cardiology soon.    REVIEW OF SYSTEMS:   Constitutional: Negative for fever, chills, and night sweats.  Increasing fatigue.  Skin: negative.  Eyes: negative.  Ears/Nose/Throat: negative.  Respiratory: Increasing shortness of breath on exertion, cough, or hemoptysis.  Cardiovascular: Intermittent chest pain.  The patient will be seen by cardiology next week.  Gastrointestinal: negative.  Genitourinary: negative.  Musculoskeletal: negative.  Neurologic: negative.  Psychiatric: negative.  Hematologic/Lymphatic/Immunologic: negative.  Endocrine: negative.    PAST MEDICAL HISTORY:   Past Medical History:   Diagnosis Date     Hypertension        PAST SURGICAL HISTORY:   Past Surgical History:   Procedure Laterality Date     NO HISTORY OF SURGERY         ALLERGIES:   Allergies as of 12/26/2019 - Reviewed 12/26/2019   Allergen Reaction Noted     No known drug allergies  01/25/2005     Seasonal allergies  03/11/2019        MEDICATIONS:   Current Outpatient Medications   Medication Sig Dispense Refill     aspirin (ASA) 81 MG tablet Take 1 tablet (81 mg) by mouth daily 100 tablet 0     aspirin 81 MG EC tablet Take 81 mg by mouth daily       lisinopril-hydrochlorothiazide (PRINZIDE/ZESTORETIC) 20-25 MG tablet Take 1 tablet by mouth daily Please stop the 10/12.5 mg dose 90 tablet 1     metoprolol succinate ER (TOPROL-XL) 25 MG 24 hr tablet Take 0.5 tablets (12.5 mg) by mouth daily 30 tablet 0     naproxen (NAPROSYN) 500 MG tablet Take 1 tablet (500 mg) by mouth 2 times daily as needed for moderate pain 60 tablet 1        FAMILY HISTORY:   Family History   Problem Relation Age of Onset     No Known Problems Mother      Unknown/Adopted Father      Unknown/Adopted Maternal Grandmother      Unknown/Adopted Maternal Grandfather      Unknown/Adopted Paternal Grandmother      Unknown/Adopted Paternal Grandfather      Cancer Brother         lung cancer - smoking     No Known Problems Sister      Coronary Artery Disease Brother          of MI at 66     No Known Problems Sister           SOCIAL HISTORY:   Social History     Socioeconomic History     Marital status: Single     Spouse name: None     Number of children: None     Years of education: None     Highest education level: None   Occupational History     None   Social Needs     Financial resource strain: None     Food insecurity:     Worry: None     Inability: None     Transportation needs:     Medical: None     Non-medical: None   Tobacco Use     Smoking status: Former Smoker     Smokeless tobacco: Never Used   Substance and Sexual Activity     Alcohol use: No     Frequency: Never     Drug use: No     Sexual activity: Never   Lifestyle     Physical activity:     Days per week: None     Minutes per session: None     Stress: None   Relationships     Social connections:     Talks on phone: None     Gets together: None     Attends Hoahaoism service: None     Active member of club or  "organization: None     Attends meetings of clubs or organizations: None     Relationship status: None     Intimate partner violence:     Fear of current or ex partner: None     Emotionally abused: None     Physically abused: None     Forced sexual activity: None   Other Topics Concern     None   Social History Narrative     None       PHYSICAL EXAMINATION:   /60   Pulse 96   Temp 96.9  F (36.1  C) (Temporal)   Resp 20   Ht 1.661 m (5' 5.4\")   Wt 93.3 kg (205 lb 11.2 oz)   SpO2 97%   BMI 33.81 kg/m    Wt Readings from Last 10 Encounters:   12/26/19 93.3 kg (205 lb 11.2 oz)   12/17/19 92.5 kg (204 lb)   12/13/19 93.7 kg (206 lb 9.6 oz)   12/11/19 92.6 kg (204 lb 1.6 oz)   03/20/19 101.6 kg (224 lb)   03/11/19 101.5 kg (223 lb 12.8 oz)   02/11/19 103.3 kg (227 lb 12.8 oz)   12/12/07 96.6 kg (213 lb)   01/25/05 98 kg (216 lb)      ECOG performance status: 0  GENERAL APPEARANCE: Healthy, alert and in no acute distress.  HEENT: Sclerae anicteric. PERRLA. Oropharynx without ulcers, lesions, or thrush.  NECK: Supple. No asymmetry or masses.  LYMPHATICS: No palpable cervical, supraclavicular, axillary, or inguinal lymphadenopathy.  RESP: Lungs clear to auscultation bilaterally without rales, rhonchi or wheezes.  CARDIOVASCULAR: Regular rate and rhythm. Normal S1, S2; no S3 or S4. No murmur, gallop, or rub.  ABDOMEN: Soft, nontender. Bowel sounds normal.  Splenomegaly MUSCULOSKELETAL: Extremities without gross deformities noted. No edema of bilateral lower extremities.  SKIN: No suspicious lesions or rashes.  NEURO: Alert and oriented x 3. Cranial nerves II-XII grossly intact.  PSYCHIATRIC: Mentation and affect appear normal.    LABORATORY RESULTS:  Hospital Outpatient Visit on 12/17/2019   Component Date Value Ref Range Status     Target HR 12/17/2019 150   Final     Baseline Systolic BP 12/17/2019 124   Final     Baseline Diastolic BP 12/17/2019 72   Final     Last Stress Systolic BP 12/17/2019 116   Final     " Last Stress Diastolic BP 12/17/2019 68   Final     Baseline HR 12/17/2019 100   Final     Max HR 12/17/2019 122   Final     Calculated Percent HR 12/17/2019 81  % Final     Rate Pressure Product 12/17/2019 14,152.0   Final     Left Ventricular EF 12/17/2019 66  % Final     Stress/rest perfusion ratio 12/17/2019 1.09   Final       IMAGING RESULTS:  Recent Results (from the past 744 hour(s))   XR Chest 2 Views    Narrative    CHEST TWO VIEW 12/11/2019 3:42 PM     HISTORY: Chest pain, unspecified type.    COMPARISON: None.      Impression    IMPRESSION: Elevation left hemidiaphragm of uncertain age and  etiology. Small amount of infiltrate and/or atelectatic change in the  right lung base. No pleural effusions. The remainder of the lungs are  clear and the heart size is borderline.    TAURUS TUCKER MD   CT Chest w/o Contrast    Narrative    CT CHEST WITHOUT CONTRAST  12/16/2019 3:52 PM    HISTORY:  Pneumonia, unresolved or complicated. Leukocytosis with WBC  of 79,000, no symptoms of infection and chest x-ray was abnormal.  Leukocytosis, unspecified type. Abnormal chest x-ray.    TECHNIQUE:  Scans obtained from the apices through the diaphragm  without IV contrast.  Radiation dose for this scan was reduced using  automated exposure control, adjustment of the mA and/or kV according  to patient size, or iterative reconstruction technique.    COMPARISON:  Chest x-rays dated 12/11/2019.    FINDINGS:  Calcific pleural plaquing along bilateral hemidiaphragms  indicates prior chronic history of industrial dust exposure. There is  atelectasis in the left lung base. Lungs are otherwise grossly clear.  No infiltrate, effusion, pneumothorax, nodule, or mass is identified.    The heart is grossly normal size. Pulmonary artery and aortic  calcifications. The spleen is enlarged measuring 12.4 x 19.8 cm in  transverse and AP dimensions respectively. It measures at least 15.5  cm in craniocaudal dimension but is not completely  included on this  study. Liver and other visualized upper abdominal contents are  otherwise grossly within normal limits given the lack of IV contrast.    No aggressive osseous lesions or acute osseous fractures are  identified. There are degenerative changes in the spine.      Impression    IMPRESSION:  1. Evidence of chronic industrial dust exposure as described above.  2. Splenomegaly. The liver appears somewhat smaller than typically  seen. Cannot exclude cirrhosis.  3. Nonaneurysmal atherosclerosis.  4. No other significant abnormalities are notified.     LEEANNA KAUR MD   NM MPI w Lexiscan   Result Value    Target     Baseline Systolic     Baseline Diastolic BP 72    Last Stress Systolic     Last Stress Diastolic BP 68    Baseline     Max     Calculated Percent HR 81    Rate Pressure Product 14,152.0    Left Ventricular EF 66    Stress/rest perfusion ratio 1.09    Narrative       The nuclear stress test is abnormal.     There is a small area of nontransmural infarction in the apical   segment(s) of the left ventricle.     There is a small area of ischemia in the basal anterior segment(s) of   the left ventricle.     The stress electrocardiogram was non-diagnostic due to baseline ST   changes. However, there was significant worsening of the ST depression in   the inferolateral and precordial leads during stress. There was also 1.5   mm ST elevation in aVR during stress. EKG changes coincided with 7/10   epigastric burning noted during the test. Consider further testing with   another stress modality or CT coronary angiogram.     Left ventricular function is normal. The left ventricular ejection   fraction at stress is 66%.     The image quality is suboptimal due to significant extra cardiac uptake   of the radioisotope which decreases senstivity of the test.     There is no prior study for comparison.         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  I reviewed with the patient differential diagnosis.  Today we will check next-generation sequencing.  I will also arrange a bone marrow biopsy to rule out myelo infiltrative process and to rule out acute leukemia.  I will see the patient results of investigations are available to discuss further management plan.    (I10) Essential hypertension  Blood pressure is currently well controlled.  Patient currently on lisinopril-hydrochlorothiazide 20-25 mg orally daily.  Patient currently on metoprolol ER 25 mg orally daily.    The patient is ready to learn, no apparent learning barriers were identified, Diagnosis and treatment plans were explained to the patient. The patient expressed understanding of the content. The patient questions were answered to his satisfaction.    Mani Cristina MD    Chart documentation with Dragon Voice recognition Software. Although reviewed after completion, some words and grammatical errors may remain.

## 2019-12-26 NOTE — NURSING NOTE
"Oncology Rooming Note    December 26, 2019 4:50 PM   Renny Gannon is a 69 year old male who presents for:    Chief Complaint   Patient presents with     Hematology     Leukocytosis, unspecified type     Consult     Ref: Angus Scott MD     Initial Vitals: /60   Pulse 96   Temp 96.9  F (36.1  C) (Temporal)   Resp 20   Ht 1.661 m (5' 5.4\")   Wt 93.3 kg (205 lb 11.2 oz)   SpO2 97%   BMI 33.81 kg/m   Estimated body mass index is 33.81 kg/m  as calculated from the following:    Height as of this encounter: 1.661 m (5' 5.4\").    Weight as of this encounter: 93.3 kg (205 lb 11.2 oz). Body surface area is 2.07 meters squared.  Data Unavailable Comment: Data Unavailable   No LMP for male patient.  Allergies reviewed: Yes  Medications reviewed: Yes    Medications: Medication refills not needed today.  Pharmacy name entered into [facility]: [facility] - [place], [place] - [address]    4 minutes for nursing intake (face to face time)     Debi FAIR CMA              "

## 2019-12-27 ENCOUNTER — OFFICE VISIT (OUTPATIENT)
Dept: FAMILY MEDICINE | Facility: CLINIC | Age: 69
End: 2019-12-27
Payer: COMMERCIAL

## 2019-12-27 ENCOUNTER — ANESTHESIA EVENT (OUTPATIENT)
Dept: SURGERY | Facility: CLINIC | Age: 69
End: 2019-12-27
Payer: COMMERCIAL

## 2019-12-27 VITALS
RESPIRATION RATE: 16 BRPM | WEIGHT: 201.4 LBS | HEART RATE: 94 BPM | BODY MASS INDEX: 33.11 KG/M2 | DIASTOLIC BLOOD PRESSURE: 58 MMHG | OXYGEN SATURATION: 94 % | SYSTOLIC BLOOD PRESSURE: 112 MMHG | TEMPERATURE: 97.5 F

## 2019-12-27 DIAGNOSIS — D72.829 LEUKOCYTOSIS, UNSPECIFIED TYPE: ICD-10-CM

## 2019-12-27 DIAGNOSIS — I10 ESSENTIAL HYPERTENSION: ICD-10-CM

## 2019-12-27 DIAGNOSIS — Z01.818 PREOP GENERAL PHYSICAL EXAM: Primary | ICD-10-CM

## 2019-12-27 PROCEDURE — 99214 OFFICE O/P EST MOD 30 MIN: CPT | Performed by: FAMILY MEDICINE

## 2019-12-27 ASSESSMENT — LIFESTYLE VARIABLES: TOBACCO_USE: 1

## 2019-12-27 ASSESSMENT — PAIN SCALES - GENERAL: PAINLEVEL: NO PAIN (0)

## 2019-12-27 NOTE — TELEPHONE ENCOUNTER
BMBX confirmed for Monday January 30th at 0730 in [place] with lab, surgery scheduling, and pathologist via email.  appt with Jonnie scheduled 1/2/20 for BMBX results.    Writer called patient and informed him of these dates/times. Informed patient that a nurse from [facility] will call him later today to review check in instructions. Patient verbalizes understanding.    Shelia Bustos RN on 12/27/2019 at 11:12 AM

## 2019-12-27 NOTE — PROGRESS NOTES
[address] [address]  [phone]  Dept: [phone]    PRE-OP EVALUATION:  Today's date: 2019    Renny Gannon (: 1950) presents for pre-operative evaluation assessment as requested by Dr. Krause.  He requires evaluation and anesthesia risk assessment prior to undergoing surgery/procedure for treatment of Bone Marrow Biospy  .    Proposed Surgery/ Procedure: bone marrow biopsy  Date of Surgery/ Procedure: 2019  Time of Surgery/ Procedure: 7:30am  Hospital/Surgical Facility: [facility]     Primary Physician: Angus Scott  Type of Anesthesia Anticipated: to be determined    Patient has a Health Care Directive or Living Will:  NO    1. NO - Do you have a history of heart attack, stroke, stent, bypass or surgery on an artery in the head, neck, heart or legs?  2. NO - Do you ever have any pain or discomfort in your chest?  3. NO - Do you have a history of  Heart Failure?  4. YES - ARE YOUR TROUBLED BY SHORTNESS OF BREATH WHEN WALKING ON THE LEVEL, UP A SLIGHT HILL OR AT NIGHT? Up a hill sometimes.    5. NO - Do you currently have a cold, bronchitis or other respiratory infection?  6. NO - Do you have a cough, shortness of breath or wheezing?  7. YES - DO YOU SOMETIMES GET PAINS IN THE CALVES OF YOUR LEGS WHEN YOU WALK? sometimes  8. NO - Do you or anyone in your family have previous history of blood clots?  9. NO - Do you or does anyone in your family have a serious bleeding problem such as prolonged bleeding following surgeries or cuts?  10. NO - Have you ever had problems with anemia or been told to take iron pills?  11. NO - Have you had any abnormal blood loss such as black, tarry or bloody stools, or abnormal vaginal bleeding?  12. NO - Have you ever had a blood transfusion?  13. NO - Have you or any of your relatives ever had problems with anesthesia?  14. NO - Do you have sleep apnea, excessive snoring or daytime drowsiness?  15. NO - Do  "you have any prosthetic heart valves?  16. NO - Do you have prosthetic joints?  17. NO - Is there any chance that you may be pregnant?      HPI:     HPI related to upcoming procedure:     Renny is here today for pre-op physical. He is scheduled for bone marrow biopsy on 12/30/2019 with Dr. Krause at [facility] for leukocytosis.  It is expected to be the same day procedure with MAC anesthesia. No personal or family history of anesthesia complication or pre-matured CAD or MI.  Has not been on steroid orally in the last 6 months.  Taking Aspirin daily with the last dose was couple days ago.  No other form of blood thinner.  Last dose of NSAID was yesterday.      Generally is healthy.  His blood pressure has been well controlled with the Zestoretic and metoprolol.  He has been having mild chest pain and shortness of breath with exertion in the last couple months.  Recent nuclear stress test showed \"small area of non-transmural infarction of the apical segment of the left ventricle, small area of ischemia in the basal inferior segment of the left ventricle\" but the left ventricular function was normal with ejection fraction of 66%.  He is scheduled to see the cardiologist in the next week.    He generally is doing well and has no concern today. No headache or dizziness. No URI symptoms include running nose, nasal congestion, ST, coughing, fever or chill.  No chest pain or SOB.  No N/V/D/C and no problem with urination.  No smoking and  denied of having breathing problem.  No history of asthma or COPD.      MEDICAL HISTORY:     Patient Active Problem List    Diagnosis Date Noted     Leukocytosis, unspecified type 12/22/2019     Priority: Medium     Essential hypertension 03/11/2019     Priority: Medium     Memory loss 03/11/2019     Priority: Medium     Obesity (BMI 35.0-39.9) with comorbidity (H) 03/11/2019     Priority: Medium     Insomnia, unspecified type 03/11/2019     Priority: Medium      Past Medical " History:   Diagnosis Date     Hypertension      Past Surgical History:   Procedure Laterality Date     NO HISTORY OF SURGERY       Current Outpatient Medications   Medication Sig Dispense Refill     aspirin (ASA) 81 MG tablet Take 1 tablet (81 mg) by mouth daily 100 tablet 0     lisinopril-hydrochlorothiazide (PRINZIDE/ZESTORETIC) 20-25 MG tablet Take 1 tablet by mouth daily Please stop the 10/12.5 mg dose 90 tablet 1     metoprolol succinate ER (TOPROL-XL) 25 MG 24 hr tablet Take 0.5 tablets (12.5 mg) by mouth daily 30 tablet 0     naproxen (NAPROSYN) 500 MG tablet Take 1 tablet (500 mg) by mouth 2 times daily as needed for moderate pain 60 tablet 1     OTC products: None, except as noted above    Allergies   Allergen Reactions     No Known Drug Allergies      Seasonal Allergies       Latex Allergy: NO    Social History     Tobacco Use     Smoking status: Former Smoker     Smokeless tobacco: Never Used   Substance Use Topics     Alcohol use: No     Frequency: Never     History   Drug Use No       REVIEW OF SYSTEMS:   Constitutional, neuro, ENT, endocrine, pulmonary, cardiac, gastrointestinal, genitourinary, musculoskeletal, integument and psychiatric systems are negative, except as otherwise noted.    EXAM:   /58   Pulse 94   Temp 97.5  F (36.4  C) (Temporal)   Resp 16   Wt 91.4 kg (201 lb 6.4 oz)   SpO2 94%   BMI 33.11 kg/m        GENERAL APPEARANCE: healthy, alert and no distress     EYES: EOMI,- PERRL     HENT: ear canals and TM's normal. Nares are non-congested. Oropharynx is pink and moist, no ulcers or lesions. No tender with palpation to the sinuses.     NECK: no adenopathy, no asymmetry.  No tender with palpation to the cervical spine and its para-spinous muscle bilaterally.     RESP: lungs clear to auscultation - no rales, rhonchi or wheezes     CV: regular rates and rhythm and no murmur.     ABDOMEN:  soft, nontender, nondistended with no palpable masses and bowel sounds normal     MS:  extremities normal- no gross deformities noted.  No edema.  All 4 extremities are equally in strength.     NEURO: Normal strength and tone,  mentation intact and speech normal.  No focal neurological deficit             DIAGNOSTICS:   EKG: Not indicated due to non-vascular surgery and last ekg on 12/11/2019 (within 30 days for chest discomfort and shortness of breath with exertion)  EKG showed SR with frequent PAC.    Recent Labs   Lab Test 12/26/19  1222 12/13/19  1538 12/11/19  1505   HGB 11.7* 11.7* 11.9*    351 378     --  137   POTASSIUM 4.4  --  4.1   CR 1.25  --  1.40*        IMPRESSION:   Reason for surgery/procedure: Leukocytosis   Diagnosis/reason for consult: pre-op physical to evaluate for anesthesia and its fili-operative risks.    The proposed surgical procedure is considered LOW risk.    REVISED CARDIAC RISK INDEX  The patient has the following serious cardiovascular risks for perioperative complications such as (MI, PE, VFib and 3  AV Block):  Coronary Artery Disease (MI, positive stress test, angina, Qs on EKG)  INTERPRETATION: 1 risks: Class II (low risk - 0.9% complication rate)    The patient has the following additional risks for perioperative complications:  No identified additional risks      ICD-10-CM    1. Preop general physical exam Z01.818    2. Leukocytosis, unspecified type D72.829    3. Essential hypertension I10        RECOMMENDATIONS:     --No history of DVT or PE history, DVT/PE prophylaxis per surgeon's desire/recommendation.    Cardiovascular Risk  Performs 4 METs exercise without symptoms (Light housework (dusting, washing dishes) and Climb a flight of stairs) .   Patient is already on a Beta Blocker. Continue Betablocker therapy after surgery, using Beta blocker order set as necessary for NPO status.    His blood pressure is well controlled.  Been having some discomfort on the chest with a little shortness of breath with exertion the last couple months which is  resolving.  Nuclear stress test recently showed abnormality with ejection fraction reserved/normal.  Scheduled to see a cardiologist next weeks.  Low risk procedures with MAC anesthesia.       Pulmonary Risk  No pulmonary risk identified.  No history of COPD or asthma.  Quitted smoking many years ago.      Obstructive Sleep Apnea (or suspected sleep apnea)  No history of sleep apnea.  No risk for sleep apnea identified.      Anemia  No history of anemia or blood transfusion.  He is okay to be transfused if necessary.      --Patient is to take all scheduled medications on the day of surgery EXCEPT for modifications listed below.  Please take your medications as prescribed except.  No aspirin or NSAID (Ibuprofen, Aleve, Motrin, Naproxen, ect.) until after the procedure  Hold lisinopril/hctz for 24 hours before the procedure   Take the Metoprolol as usual on am of procedure with small sip of water  Nothing to eat or drink except taking med with small sip of water for 8 hrs before the procedure.    APPROVAL GIVEN to proceed with proposed procedure, without further diagnostic evaluation    Renny is overall doing well.  He is clear for the bone marrow biopsy procedure as scheduled.  No further work up is needed.  Reviewed his medication and instruction as above. A written instruction was given. May resume all of his medications after the surgery.  Follow up with the cardiologist as scheduled.  All of his questions were answered.         Signed Electronically by: Angus Clements Mai, MD    Copy of this evaluation report is provided to requesting physician.    [place] Preop Guidelines    Revised Cardiac Risk Index

## 2019-12-27 NOTE — ANESTHESIA PREPROCEDURE EVALUATION
Anesthesia Pre-Procedure Evaluation    Patient: Renny Gannon   MRN: [id number] : 1950          Preoperative Diagnosis: Leukocytosis, unspecified type [D72.829]    Procedure(s):  BIOPSY, BONE MARROW    Past Medical History:   Diagnosis Date     Hypertension      Past Surgical History:   Procedure Laterality Date     NO HISTORY OF SURGERY         Anesthesia Evaluation     . Pt has not had prior anesthetic            ROS/MED HX    ENT/Pulmonary: Comment: SOB with increased fatigue    Recent pneumonia 19    (+)tobacco use, Past use , . .    Neurologic: Comment: Memory loss        Cardiovascular:     (+) hypertension----. : . . . :. . Previous cardiac testing date:results:Stress Testdate: results:  The nuclear stress test is abnormal.    There is a small area of nontransmural infarction in the apical   segment(s) of the left ventricle.    There is a small area of ischemia in the basal anterior segment(s) of   the left ventricle.     The stress electrocardiogram was non-diagnostic due to baseline ST   changes. However, there was significant worsening of the ST depression in   the inferolateral and precordial leads during stress. There was also 1.5   mm ST elevation in aVR during stress. EKG changes coincided with 7/10   epigastric burning noted during the test. Consider further testing with   another stress modality or CT coronary angiogram.     Left ventricular function is normal. The left ventricular ejection   fraction at stress is 66%.     The image quality is suboptimal due to significant extra cardiac uptake   of the radioisotope which decreases senstivity of the test.     There is no prior study for comparison.      Successful Lexiscan Nuclear Stress Test. Chest burning sensation constant the  last month,. With testing there was subtle ECG changes mid lexiscan/exercise  until into recovery when there was observed more prominent ST changes in  ANT/LAT leads. These returned to normal around  "7:48 araceli in recovery.ECG reviewed date:12/11/19 results:SR with PAC's date: results:          METS/Exercise Tolerance:  1 - Eating, dressing   Hematologic: Comments: Leukocytosis        Musculoskeletal:         GI/Hepatic:  - neg GI/hepatic ROS       Renal/Genitourinary:         Endo:     (+) Obesity, .      Psychiatric: Comment: insomnia        Infectious Disease:         Malignancy:         Other:    (+) No chance of pregnancy C-spine cleared: N/A, no H/O Chronic Pain,                        Physical Exam  Normal systems: cardiovascular, pulmonary and dental    Airway   Mallampati: II  TM distance: >3 FB  Neck ROM: full    Dental     Cardiovascular   Rhythm and rate: regular and normal      Pulmonary             Lab Results   Component Value Date    WBC 79.9 (HH) 12/26/2019    HGB 11.7 (L) 12/26/2019    HCT 37.0 (L) 12/26/2019     12/26/2019    CRP 4.9 12/26/2019    SED 2 02/11/2019     12/26/2019    POTASSIUM 4.4 12/26/2019    CHLORIDE 107 12/26/2019    CO2 28 12/26/2019    BUN 26 12/26/2019    CR 1.25 12/26/2019    GLC 92 12/26/2019    MINNIE 8.7 12/26/2019    ALBUMIN 3.8 12/26/2019    PROTTOTAL 7.6 12/26/2019    ALT 28 12/26/2019    AST 37 12/26/2019    ALKPHOS 72 12/26/2019    BILITOTAL 0.6 12/26/2019    TSH 2.14 12/11/2019       Preop Vitals  BP Readings from Last 3 Encounters:   12/27/19 112/58   12/26/19 110/60   12/13/19 120/66    Pulse Readings from Last 3 Encounters:   12/27/19 94   12/26/19 96   12/13/19 88      Resp Readings from Last 3 Encounters:   12/27/19 16   12/26/19 20   12/13/19 18    SpO2 Readings from Last 3 Encounters:   12/27/19 94%   12/26/19 97%   12/13/19 96%      Temp Readings from Last 1 Encounters:   12/27/19 97.5  F (36.4  C) (Temporal)    Ht Readings from Last 1 Encounters:   12/26/19 1.661 m (5' 5.4\")      Wt Readings from Last 1 Encounters:   12/27/19 91.4 kg (201 lb 6.4 oz)    Estimated body mass index is 33.11 kg/m  as calculated from the following:    Height as of " "12/26/19: 1.661 m (5' 5.4\").    Weight as of 12/27/19: 91.4 kg (201 lb 6.4 oz).       Anesthesia Plan      History & Physical Review  History and physical reviewed and following examination; no interval change.    ASA Status:  3 .    NPO Status:  > 8 hours    Plan for MAC with Intravenous and Propofol induction. Maintenance will be TIVA.  Reason for MAC:  Difficulty with conscious sedation (QS)         Postoperative Care  Postoperative pain management:  Oral pain medications and IV analgesics.      Consents  Anesthetic plan, risks, benefits and alternatives discussed with:  Patient..                 UNA Hernánedz CRNA  "

## 2019-12-27 NOTE — PATIENT INSTRUCTIONS
Before Your Surgery      Call your surgeon if there is any change in your health. This includes signs of a cold or flu (such as a sore throat, runny nose, cough, rash or fever).    Do not smoke, drink alcohol or take over the counter medicine (unless your surgeon or primary care doctor tells you to) for the 24 hours before and after surgery.    If you take prescribed drugs: Follow your doctor s orders about which medicines to take and which to stop until after surgery.    Eating and drinking prior to surgery: follow the instructions from your surgeon    Take a shower or bath the night before surgery. Use the soap your surgeon gave you to gently clean your skin. If you do not have soap from your surgeon, use your regular soap. Do not shave or scrub the surgery site.  Wear clean pajamas and have clean sheets on your bed.         Please take your medications as prescribed except.  No aspirin or NSAID (Ibuprofen, Aleve, Motrin, Naproxen, ect.) until after the procedure  Hold lisinopril/hctz for 24 hours before the procedure   Take the Metoprolol as usual on am of procedure with small sip of water  Nothing to eat or drink except taking med with small sip of water for 8 hrs before the procedure.

## 2019-12-30 ENCOUNTER — ANESTHESIA (OUTPATIENT)
Dept: SURGERY | Facility: CLINIC | Age: 69
End: 2019-12-30
Payer: COMMERCIAL

## 2019-12-30 ENCOUNTER — HOSPITAL ENCOUNTER (OUTPATIENT)
Facility: CLINIC | Age: 69
Discharge: HOME | End: 2019-12-30
Attending: PATHOLOGY | Admitting: PATHOLOGY
Payer: COMMERCIAL

## 2019-12-30 VITALS
DIASTOLIC BLOOD PRESSURE: 62 MMHG | OXYGEN SATURATION: 92 % | HEART RATE: 80 BPM | SYSTOLIC BLOOD PRESSURE: 106 MMHG | TEMPERATURE: 97 F | RESPIRATION RATE: 20 BRPM

## 2019-12-30 DIAGNOSIS — D72.829 LEUKOCYTOSIS, UNSPECIFIED TYPE: Primary | ICD-10-CM

## 2019-12-30 LAB
ANISOCYTOSIS BLD QL SMEAR: SLIGHT
BASO STIPL BLD QL SMEAR: PRESENT
BASOPHILS # BLD AUTO: 0 10E9/L (ref 0–0.2)
BASOPHILS NFR BLD AUTO: 0 %
COPATH REPORT: NORMAL
DIFFERENTIAL METHOD BLD: ABNORMAL
EOSINOPHIL # BLD AUTO: 0 10E9/L (ref 0–0.7)
EOSINOPHIL NFR BLD AUTO: 0 %
ERYTHROCYTE [DISTWIDTH] IN BLOOD BY AUTOMATED COUNT: 19.3 % (ref 10–15)
HCT VFR BLD AUTO: 37.1 % (ref 40–53)
HGB BLD-MCNC: 12.1 G/DL (ref 13.3–17.7)
LYMPHOCYTES # BLD AUTO: 17.7 10E9/L (ref 0.8–5.3)
LYMPHOCYTES NFR BLD AUTO: 20 %
MCH RBC QN AUTO: 30 PG (ref 26.5–33)
MCHC RBC AUTO-ENTMCNC: 32.6 G/DL (ref 31.5–36.5)
MCV RBC AUTO: 92 FL (ref 78–100)
MONOCYTES # BLD AUTO: 4.4 10E9/L (ref 0–1.3)
MONOCYTES NFR BLD AUTO: 5 %
NEUTROPHILS # BLD AUTO: 61 10E9/L (ref 1.6–8.3)
NEUTROPHILS NFR BLD AUTO: 69 %
NRBC # BLD AUTO: 0.9 10*3/UL
NRBC BLD AUTO-RTO: 1 /100
OTHER CELLS # BLD MANUAL: 5.3 10E9/L
OTHER CELLS NFR BLD MANUAL: 6 %
PLATELET # BLD AUTO: 385 10E9/L (ref 150–450)
POIKILOCYTOSIS BLD QL SMEAR: SLIGHT
POLYCHROMASIA BLD QL SMEAR: ABNORMAL
RBC # BLD AUTO: 4.04 10E12/L (ref 4.4–5.9)
WBC # BLD AUTO: 88.4 10E9/L (ref 4–11)

## 2019-12-30 PROCEDURE — 37000009 ZZH ANESTHESIA TECHNICAL FEE, EACH ADDTL 15 MIN: Performed by: PATHOLOGY

## 2019-12-30 PROCEDURE — 81403 MOPATH PROCEDURE LEVEL 4: CPT | Performed by: PATHOLOGY

## 2019-12-30 PROCEDURE — 88161 CYTOPATH SMEAR OTHER SOURCE: CPT | Mod: 26 | Performed by: INTERNAL MEDICINE

## 2019-12-30 PROCEDURE — 88313 SPECIAL STAINS GROUP 2: CPT | Performed by: INTERNAL MEDICINE

## 2019-12-30 PROCEDURE — 88311 DECALCIFY TISSUE: CPT | Mod: 26 | Performed by: INTERNAL MEDICINE

## 2019-12-30 PROCEDURE — 25000128 H RX IP 250 OP 636: Performed by: NURSE ANESTHETIST, CERTIFIED REGISTERED

## 2019-12-30 PROCEDURE — 88161 CYTOPATH SMEAR OTHER SOURCE: CPT | Performed by: INTERNAL MEDICINE

## 2019-12-30 PROCEDURE — 88313 SPECIAL STAINS GROUP 2: CPT | Mod: 26 | Performed by: INTERNAL MEDICINE

## 2019-12-30 PROCEDURE — 25000125 ZZHC RX 250: Performed by: NURSE ANESTHETIST, CERTIFIED REGISTERED

## 2019-12-30 PROCEDURE — 81206 BCR/ABL1 GENE MAJOR BP: CPT | Performed by: PATHOLOGY

## 2019-12-30 PROCEDURE — 88342 IMHCHEM/IMCYTCHM 1ST ANTB: CPT | Mod: 26 | Performed by: INTERNAL MEDICINE

## 2019-12-30 PROCEDURE — 40000803 ZZHCL STATISTIC DNA ISOL HIGH PURITY: Performed by: PATHOLOGY

## 2019-12-30 PROCEDURE — 71000027 ZZH RECOVERY PHASE 2 EACH 15 MINS: Performed by: PATHOLOGY

## 2019-12-30 PROCEDURE — 40000305 ZZH STATISTIC PRE PROC ASSESS I: Performed by: PATHOLOGY

## 2019-12-30 PROCEDURE — 88264 CHROMOSOME ANALYSIS 20-25: CPT | Performed by: INTERNAL MEDICINE

## 2019-12-30 PROCEDURE — 88305 TISSUE EXAM BY PATHOLOGIST: CPT | Mod: 26 | Performed by: INTERNAL MEDICINE

## 2019-12-30 PROCEDURE — 88280 CHROMOSOME KARYOTYPE STUDY: CPT | Performed by: INTERNAL MEDICINE

## 2019-12-30 PROCEDURE — 88342 IMHCHEM/IMCYTCHM 1ST ANTB: CPT | Performed by: INTERNAL MEDICINE

## 2019-12-30 PROCEDURE — 88237 TISSUE CULTURE BONE MARROW: CPT | Performed by: INTERNAL MEDICINE

## 2019-12-30 PROCEDURE — 85025 COMPLETE CBC W/AUTO DIFF WBC: CPT | Performed by: PATHOLOGY

## 2019-12-30 PROCEDURE — 88185 FLOWCYTOMETRY/TC ADD-ON: CPT | Performed by: INTERNAL MEDICINE

## 2019-12-30 PROCEDURE — 25800030 ZZH RX IP 258 OP 636: Performed by: NURSE ANESTHETIST, CERTIFIED REGISTERED

## 2019-12-30 PROCEDURE — 88305 TISSUE EXAM BY PATHOLOGIST: CPT | Performed by: INTERNAL MEDICINE

## 2019-12-30 PROCEDURE — 88311 DECALCIFY TISSUE: CPT | Performed by: INTERNAL MEDICINE

## 2019-12-30 PROCEDURE — 36415 COLL VENOUS BLD VENIPUNCTURE: CPT | Performed by: PATHOLOGY

## 2019-12-30 PROCEDURE — 40000847 ZZHCL STATISTIC MORPHOLOGY W/INTERP HISTOLOGY TC 85060: Performed by: INTERNAL MEDICINE

## 2019-12-30 PROCEDURE — 40000424 ZZHCL STATISTIC BONE MARROW CORE PERF TC 38221: Performed by: INTERNAL MEDICINE

## 2019-12-30 PROCEDURE — 85060 BLOOD SMEAR INTERPRETATION: CPT | Performed by: INTERNAL MEDICINE

## 2019-12-30 PROCEDURE — 81270 JAK2 GENE: CPT | Performed by: PATHOLOGY

## 2019-12-30 PROCEDURE — 36000050 ZZH SURGERY LEVEL 2 1ST 30 MIN: Performed by: PATHOLOGY

## 2019-12-30 PROCEDURE — 37000008 ZZH ANESTHESIA TECHNICAL FEE, 1ST 30 MIN: Performed by: PATHOLOGY

## 2019-12-30 PROCEDURE — 88184 FLOWCYTOMETRY/ TC 1 MARKER: CPT | Performed by: INTERNAL MEDICINE

## 2019-12-30 PROCEDURE — 40001005 ZZHCL STATISTIC FLOW >15 ABY TC 88189: Performed by: INTERNAL MEDICINE

## 2019-12-30 PROCEDURE — 81219 CALR GENE COM VARIANTS: CPT | Performed by: PATHOLOGY

## 2019-12-30 RX ORDER — MEPERIDINE HYDROCHLORIDE 25 MG/ML
12.5 INJECTION INTRAMUSCULAR; INTRAVENOUS; SUBCUTANEOUS
Status: DISCONTINUED | OUTPATIENT
Start: 2019-12-30 | End: 2019-12-30 | Stop reason: HOSPADM

## 2019-12-30 RX ORDER — ONDANSETRON 4 MG/1
4 TABLET, ORALLY DISINTEGRATING ORAL EVERY 30 MIN PRN
Status: DISCONTINUED | OUTPATIENT
Start: 2019-12-30 | End: 2019-12-30 | Stop reason: HOSPADM

## 2019-12-30 RX ORDER — LIDOCAINE HYDROCHLORIDE 20 MG/ML
INJECTION, SOLUTION INFILTRATION; PERINEURAL PRN
Status: DISCONTINUED | OUTPATIENT
Start: 2019-12-30 | End: 2019-12-30

## 2019-12-30 RX ORDER — SODIUM CHLORIDE, SODIUM LACTATE, POTASSIUM CHLORIDE, CALCIUM CHLORIDE 600; 310; 30; 20 MG/100ML; MG/100ML; MG/100ML; MG/100ML
INJECTION, SOLUTION INTRAVENOUS CONTINUOUS
Status: DISCONTINUED | OUTPATIENT
Start: 2019-12-30 | End: 2019-12-30 | Stop reason: HOSPADM

## 2019-12-30 RX ORDER — LIDOCAINE 40 MG/G
CREAM TOPICAL
Status: DISCONTINUED | OUTPATIENT
Start: 2019-12-30 | End: 2019-12-30 | Stop reason: HOSPADM

## 2019-12-30 RX ORDER — PROPOFOL 10 MG/ML
INJECTION, EMULSION INTRAVENOUS CONTINUOUS PRN
Status: DISCONTINUED | OUTPATIENT
Start: 2019-12-30 | End: 2019-12-30

## 2019-12-30 RX ORDER — FENTANYL CITRATE 50 UG/ML
INJECTION, SOLUTION INTRAMUSCULAR; INTRAVENOUS PRN
Status: DISCONTINUED | OUTPATIENT
Start: 2019-12-30 | End: 2019-12-30

## 2019-12-30 RX ORDER — ONDANSETRON 2 MG/ML
4 INJECTION INTRAMUSCULAR; INTRAVENOUS EVERY 30 MIN PRN
Status: DISCONTINUED | OUTPATIENT
Start: 2019-12-30 | End: 2019-12-30 | Stop reason: HOSPADM

## 2019-12-30 RX ORDER — PROPOFOL 10 MG/ML
INJECTION, EMULSION INTRAVENOUS PRN
Status: DISCONTINUED | OUTPATIENT
Start: 2019-12-30 | End: 2019-12-30

## 2019-12-30 RX ORDER — NALOXONE HYDROCHLORIDE 0.4 MG/ML
.1-.4 INJECTION, SOLUTION INTRAMUSCULAR; INTRAVENOUS; SUBCUTANEOUS
Status: DISCONTINUED | OUTPATIENT
Start: 2019-12-30 | End: 2019-12-30 | Stop reason: HOSPADM

## 2019-12-30 RX ORDER — EPHEDRINE SULFATE 50 MG/ML
INJECTION, SOLUTION INTRAMUSCULAR; INTRAVENOUS; SUBCUTANEOUS PRN
Status: DISCONTINUED | OUTPATIENT
Start: 2019-12-30 | End: 2019-12-30

## 2019-12-30 RX ADMIN — FENTANYL CITRATE 25 MCG: 50 INJECTION, SOLUTION INTRAMUSCULAR; INTRAVENOUS at 07:28

## 2019-12-30 RX ADMIN — LIDOCAINE HYDROCHLORIDE 40 MG: 20 INJECTION, SOLUTION INFILTRATION; PERINEURAL at 07:29

## 2019-12-30 RX ADMIN — LIDOCAINE HYDROCHLORIDE 1 ML: 10 INJECTION, SOLUTION EPIDURAL; INFILTRATION; INTRACAUDAL; PERINEURAL at 07:12

## 2019-12-30 RX ADMIN — FENTANYL CITRATE 50 MCG: 50 INJECTION, SOLUTION INTRAMUSCULAR; INTRAVENOUS at 07:29

## 2019-12-30 RX ADMIN — FENTANYL CITRATE 25 MCG: 50 INJECTION, SOLUTION INTRAMUSCULAR; INTRAVENOUS at 07:26

## 2019-12-30 RX ADMIN — PROPOFOL 40 MG: 10 INJECTION, EMULSION INTRAVENOUS at 07:29

## 2019-12-30 RX ADMIN — Medication 5 MG: at 07:44

## 2019-12-30 RX ADMIN — SODIUM CHLORIDE, POTASSIUM CHLORIDE, SODIUM LACTATE AND CALCIUM CHLORIDE: 600; 310; 30; 20 INJECTION, SOLUTION INTRAVENOUS at 07:12

## 2019-12-30 RX ADMIN — PROPOFOL 150 MCG/KG/MIN: 10 INJECTION, EMULSION INTRAVENOUS at 07:29

## 2019-12-30 NOTE — ANESTHESIA CARE TRANSFER NOTE
Patient: Renny Gannon    Procedure(s):  BIOPSY, BONE MARROW    Diagnosis: Leukocytosis, unspecified type [D72.829]  Diagnosis Additional Information: No value filed.    Anesthesia Type:   MAC     Note:  Airway :Room Air  Patient transferred to:Phase II  Handoff Report: Identifed the Patient, Identified the Reponsible Provider, Reviewed the pertinent medical history, Discussed the surgical course, Reviewed Intra-OP anesthesia mangement and issues during anesthesia, Set expectations for post-procedure period and Allowed opportunity for questions and acknowledgement of understanding      Vitals: (Last set prior to Anesthesia Care Transfer)    CRNA VITALS  12/30/2019 0723 - 12/30/2019 0803      12/30/2019             SpO2:  91 %    Resp Rate (observed):  24                Electronically Signed By: UNA Hernández CRNA  December 30, 2019  8:03 AM

## 2019-12-30 NOTE — OP NOTE
"Bone Marrow Procedure Note    Date: 12/30/2019  Diagnosis or Indication: Leukocytosis  Location: [address]    Premedication per physician order.    Patients identification was positively verified by: identification band. After informed consent was obtained (see the completed Affirmation of Consent for Bone Marrow Aspiration and/or Biopsy procedures form in the patient's chart), the patient was placed in the lateral decubitous position and the bony landmarks of the pelvis were identified.     Medical staff reconfirmed the patient's name, date of birth, procedure, and procedure site marking(s).  Medication was administered.(See Anesthesia documentation). The skin surface(s) over the posterior iliac crest(s) was scrubbed with Betadine and draped in a sterile fashion with sterile towels to create a sterile field.  The skin and periosteal surface(s) of the Right posterior iliac crest (RPIC) were anesthetized with a total of 2.0 mL of 1% Lidocaine and a small incision(s) was made through the skin over the corresponding site(s) with a scalpel.     Trephine bone marrow core(s) were obtained from the RPIC (# 2). Bone marrow aspirates were \"dry tap\"; therefore a second bone marrow core was obtained, this was submitted in [facility] for the ancillary studies (cytogenetics, flow cytometry and molecular diagnostics).  Touch imprints were made.      Direct pressure was applied to the biopsy site(s) with sterile gauze. The biopsy site(s) was cleaned with alcohol and sterile dressing(s) was placed over the biopsy incision(s) using a pressure bandage. The patient was then placed in the supine position to maintain pressure on the biopsy site.  The patient's bed/examination table was lowered (if possible) and the railings were raised (if present).  Post-procedure wound care instructions, including routin dressing instructions and analgesia, were given to the caregiver.     The patient tolerated the procedure well with no discomfort. " Complications: None.    Aguilar Krause MD

## 2019-12-30 NOTE — DISCHARGE INSTRUCTIONS
"[facility]  Same-Day Surgery   Adult Discharge Orders & Instructions     For 24 hours after surgery    1. Get plenty of rest.  A responsible adult must stay with you for at least 24 hours after you leave the hospital.   2. Do not drive or use heavy equipment.  If you have weakness or tingling, don't drive or use heavy equipment until this feeling goes away.  3. Do not drink alcohol.  4. Avoid strenuous or risky activities.  Ask for help when climbing stairs.   5. You may feel lightheaded.  IF so, sit for a few minutes before standing.  Have someone help you get up.   6. If you have nausea (feel sick to your stomach): Drink only clear liquids such as apple juice, ginger ale, broth or 7-Up.  Rest may also help.  Be sure to drink enough fluids.  Move to a regular diet as you feel able.  7. You may have a slight fever. Call the doctor if your fever is over 100 F (37.7 C) (taken under the tongue) or lasts longer than 24 hours.  8. You may have a dry mouth, a sore throat, muscle aches or trouble sleeping.  These should go away after 24 hours.  9. Do not make important or legal decisions.   Call your doctor for any of the followin.  Signs of infection (fever, growing tenderness at the surgery site, a large amount of drainage or bleeding, severe pain, foul-smelling drainage, redness, swelling).    2. It has been over 8 to 10 hours since surgery and you are still not able to urinate (pass water).    3.  Headache for over 24 hours.      Pt. Also received Pre-printed Information Sheet titled \"Your Bone Marrow Biopsy\"      "

## 2019-12-30 NOTE — ANESTHESIA POSTPROCEDURE EVALUATION
Patient: Renny Gannon    Procedure(s):  BIOPSY, BONE MARROW    Diagnosis:Leukocytosis, unspecified type [D72.829]  Diagnosis Additional Information: No value filed.    Anesthesia Type:  MAC    Note:  Anesthesia Post Evaluation    Patient location during evaluation: Phase 2  Patient participation: Able to fully participate in evaluation  Level of consciousness: awake and alert  Pain management: adequate  Airway patency: patent  Cardiovascular status: blood pressure returned to baseline  Respiratory status: spontaneous ventilation and room air  Hydration status: acceptable  PONV: none     Anesthetic complications: None    Comments: Patient was very pleased with his anesthetic. No anesthesia concerns.         Last vitals:  Vitals:    12/30/19 0820 12/30/19 0830 12/30/19 0845   BP:  110/57 106/62   Pulse: 80 78 80   Resp: 20     Temp:      SpO2: 96%  92%         Electronically Signed By: UNA Hernández CRNA  December 30, 2019  9:06 AM

## 2019-12-31 NOTE — OR NURSING
[facility] Same Day Surgery  Discharge Call Back  Renny Gannon  1950  MRN: [id number]  Home: [phone] (home)   PCP: Angus Scott    We are calling to see how you're doing since your surgery/procedure with us?   Comments: doing well  Clinical Questions  1. Have you had time to look at your discharge instructions? Do you have any questions in regards to the instructions?   Comment: yes no  2. Do you feel your pain is being controlled with the regimen the surgeon sent you home on? (ie: prescription medications, over the counter pain medications, ice packs)   Comments: yes  3. Have you noticed any drainage on your dressing? Do you know what to do if you have bleeding as a result of your procedure?   Comments: no yes  4. Have you had any nausea/vomiting? Do you know how to treat this?   Comment: no yes  5. Have you had any signs/symptoms of infection? (ie: fever, swelling, heat, drainage or redness) Do you know what to do if you have?   Comment: no yes  6. Do you have a follow up appointment made with your surgeon? Do you have a number to contact them at if you need it?   Comment: yes  Retained Foreign Object (BYRON, Hemovac, Penrose, Wound Packing, Vaginal Packing, Nasal Splints, Urethral Stents, Almendarez Catheter)  1. Do you still have NA in place?   2. If the item is still in place, can you review the plan for removal with me? NA      You may be randomly selected to fill out a [facility] survey. We would appreciate you taking the time to fill this out. It is important to us if you would answer all of the questions on the survey.

## 2020-01-02 ENCOUNTER — HOSPITAL ENCOUNTER (OUTPATIENT)
Dept: CARDIOLOGY | Facility: CLINIC | Age: 70
Discharge: HOME | End: 2020-01-02
Attending: FAMILY MEDICINE | Admitting: FAMILY MEDICINE
Payer: COMMERCIAL

## 2020-01-02 ENCOUNTER — OFFICE VISIT (OUTPATIENT)
Dept: CARDIOLOGY | Facility: CLINIC | Age: 70
End: 2020-01-02
Attending: FAMILY MEDICINE
Payer: COMMERCIAL

## 2020-01-02 VITALS
HEART RATE: 80 BPM | DIASTOLIC BLOOD PRESSURE: 62 MMHG | OXYGEN SATURATION: 98 % | SYSTOLIC BLOOD PRESSURE: 110 MMHG | HEIGHT: 68 IN | WEIGHT: 207 LBS | BODY MASS INDEX: 31.37 KG/M2

## 2020-01-02 DIAGNOSIS — I25.118 CORONARY ARTERY DISEASE INVOLVING NATIVE CORONARY ARTERY OF NATIVE HEART WITH OTHER FORM OF ANGINA PECTORIS (H): Primary | ICD-10-CM

## 2020-01-02 DIAGNOSIS — I10 ESSENTIAL HYPERTENSION: ICD-10-CM

## 2020-01-02 LAB
COPATH REPORT: NORMAL
COPATH REPORT: NORMAL

## 2020-01-02 PROCEDURE — 93010 ELECTROCARDIOGRAM REPORT: CPT | Performed by: INTERNAL MEDICINE

## 2020-01-02 PROCEDURE — 99203 OFFICE O/P NEW LOW 30 MIN: CPT | Performed by: INTERNAL MEDICINE

## 2020-01-02 PROCEDURE — 93005 ELECTROCARDIOGRAM TRACING: CPT | Performed by: REHABILITATION PRACTITIONER

## 2020-01-02 RX ORDER — ATORVASTATIN CALCIUM 40 MG/1
40 TABLET, FILM COATED ORAL DAILY
Qty: 30 TABLET | Refills: 11 | Status: SHIPPED | OUTPATIENT
Start: 2020-01-02 | End: 2020-05-29

## 2020-01-02 RX ORDER — NITROGLYCERIN 0.4 MG/1
TABLET SUBLINGUAL
Qty: 30 TABLET | Refills: 3 | Status: ON HOLD | OUTPATIENT
Start: 2020-01-02 | End: 2020-02-10

## 2020-01-02 RX ORDER — METOPROLOL SUCCINATE 25 MG/1
50 TABLET, EXTENDED RELEASE ORAL DAILY
Qty: 30 TABLET | Refills: 3 | Status: ON HOLD | OUTPATIENT
Start: 2020-01-02 | End: 2020-02-10

## 2020-01-02 ASSESSMENT — MIFFLIN-ST. JEOR: SCORE: 1673.45

## 2020-01-02 NOTE — LETTER
"1/2/2020    Angus Clements Mai, MD  [address] [place] [address]    RE: Renny Gannon       Dear Colleague,    I had the pleasure of seeing Renny Gannon in the [facility].    HISTORY OF PRESENT ILLNESS:  One month history exertional chest burning. Referred for stress test.   HTN, does not smoke. Positive family history. LDL ok not on statin. No diabetes. No surgery. No allergies. 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 2 sisters and mom.     Orders this Visit:  Orders Placed This Encounter   Procedures     EKG 12-LEAD CLINIC READ     No orders of the defined types were placed in this encounter.    There are no discontinued medications.    Encounter Diagnosis   Name Primary?     Chest pain, unspecified type Yes       CURRENT MEDICATIONS:  Current Outpatient Medications   Medication Sig Dispense Refill     aspirin (ASA) 81 MG tablet Take 1 tablet (81 mg) by mouth daily 100 tablet 0     lisinopril-hydrochlorothiazide (PRINZIDE/ZESTORETIC) 20-25 MG tablet Take 1 tablet by mouth daily Please stop the 10/12.5 mg dose 90 tablet 1     metoprolol succinate ER (TOPROL-XL) 25 MG 24 hr tablet Take 0.5 tablets (12.5 mg) by mouth daily 30 tablet 0     naproxen (NAPROSYN) 500 MG tablet Take 1 tablet (500 mg) by mouth 2 times daily as needed for moderate pain 60 tablet 1       ALLERGIES     Allergies   Allergen Reactions     No Known Drug Allergies      Seasonal Allergies        PAST MEDICAL, SURGICAL, FAMILY, SOCIAL HISTORY:  History was reviewed and updated as needed, see medical record.    Review of Systems:  A 12-point review of systems was completed, see medical record for detailed review of systems information.    Physical Exam:  Vitals: /62 (BP Location: Right arm, Patient Position: Fowlers, Cuff Size: Adult Large)   Pulse 80   Ht 1.727 m (5' 8\")   Wt 93.9 kg (207 lb)   SpO2 98%   BMI 31.47 kg/m       Constitutional:           Skin:      "      Head:           Eyes:           ENT:           Neck:           Chest:           Cardiac:                    Abdomen:           Vascular:                                        Extremities and Back:           Neurological:           ASSESSMENT: progressive angina. Positive stress test.        RECOMMENDATIONS:   1) increase metoprolol ER to 50 daily  2) add NTG prn  3) to ER immediately for any worsening  4) CAG possible PCI risks benefits explained.       Recent Lab Results:  LIPID RESULTS:  Lab Results   Component Value Date    CHOL 165 12/11/2019    HDL 29 (L) 12/11/2019    LDL 70 12/11/2019    TRIG 332 (H) 12/11/2019       LIVER ENZYME RESULTS:  Lab Results   Component Value Date    AST 37 12/26/2019    ALT 28 12/26/2019       CBC RESULTS:  Lab Results   Component Value Date    WBC 88.4 (HH) 12/30/2019    RBC 4.04 (L) 12/30/2019    HGB 12.1 (L) 12/30/2019    HCT 37.1 (L) 12/30/2019    MCV 92 12/30/2019    MCH 30.0 12/30/2019    MCHC 32.6 12/30/2019    RDW 19.3 (H) 12/30/2019     12/30/2019       BMP RESULTS:  Lab Results   Component Value Date     12/26/2019    POTASSIUM 4.4 12/26/2019    CHLORIDE 107 12/26/2019    CO2 28 12/26/2019    ANIONGAP 2 (L) 12/26/2019    GLC 92 12/26/2019    BUN 26 12/26/2019    CR 1.25 12/26/2019    GFRESTIMATED 58 (L) 12/26/2019    GFRESTBLACK 67 12/26/2019    MINNIE 8.7 12/26/2019        A1C RESULTS:  No results found for: A1C    INR RESULTS:  No results found for: INR    We greatly appreciate the opportunity to be involved in the care of your patient, Renny Gannon.    Sincerely,  Devin Bentley MD      CC  Angus Clements Mai, MD  [address] [place] [address]                                                                       Thank you for allowing me to participate in the care of your patient.      Sincerely,     Devin Bentley MD     [facility]    cc:   MD Daryl Conde [place] [facility] [place] [id number]

## 2020-01-02 NOTE — PATIENT INSTRUCTIONS
Coronary Angiogram    Scheduled: TBD  Location: [place]  Check-in time: TBD  Procedure time: TBD      Prep instructions were reviewed with patient in clinic. Patient had no questions.    See instructions below    If procedure is before 12 noon  NPO after midnight, the night before the procedure.     If procedure is after 12 noon   Clear liquid breakfast before 8:00 am. NPO after 8:00 am.     Patient should hold lisinopril-Hydrocholorthiazide the day of procedure.    All other medications can be taken on the morning of the procedure (with small sips of water).       Patient is aware they will need a ride home, and a person to stay with him for 24 hours after the procedure.       Vivian Rubin RN

## 2020-01-02 NOTE — PROGRESS NOTES
"HISTORY OF PRESENT ILLNESS:  One month history exertional chest burning. Referred for stress test.   HTN, does not smoke. Positive family history. LDL ok not on statin. No diabetes. No surgery. No allergies. 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 2 sisters and mom.     Orders this Visit:  Orders Placed This Encounter   Procedures     EKG 12-LEAD CLINIC READ     No orders of the defined types were placed in this encounter.    There are no discontinued medications.    Encounter Diagnosis   Name Primary?     Chest pain, unspecified type Yes       CURRENT MEDICATIONS:  Current Outpatient Medications   Medication Sig Dispense Refill     aspirin (ASA) 81 MG tablet Take 1 tablet (81 mg) by mouth daily 100 tablet 0     lisinopril-hydrochlorothiazide (PRINZIDE/ZESTORETIC) 20-25 MG tablet Take 1 tablet by mouth daily Please stop the 10/12.5 mg dose 90 tablet 1     metoprolol succinate ER (TOPROL-XL) 25 MG 24 hr tablet Take 0.5 tablets (12.5 mg) by mouth daily 30 tablet 0     naproxen (NAPROSYN) 500 MG tablet Take 1 tablet (500 mg) by mouth 2 times daily as needed for moderate pain 60 tablet 1       ALLERGIES     Allergies   Allergen Reactions     No Known Drug Allergies      Seasonal Allergies        PAST MEDICAL, SURGICAL, FAMILY, SOCIAL HISTORY:  History was reviewed and updated as needed, see medical record.    Review of Systems:  A 12-point review of systems was completed, see medical record for detailed review of systems information.    Physical Exam:  Vitals: /62 (BP Location: Right arm, Patient Position: Fowlers, Cuff Size: Adult Large)   Pulse 80   Ht 1.727 m (5' 8\")   Wt 93.9 kg (207 lb)   SpO2 98%   BMI 31.47 kg/m      Constitutional:           Skin:           Head:           Eyes:           ENT:           Neck:           Chest:           Cardiac:                    Abdomen:           Vascular:                                        Extremities and Back:       "     Neurological:           ASSESSMENT: progressive angina. Positive stress test.        RECOMMENDATIONS:   1) increase metoprolol ER to 50 daily  2) add NTG prn  3) to ER immediately for any worsening  4) CAG possible PCI risks benefits explained.       Recent Lab Results:  LIPID RESULTS:  Lab Results   Component Value Date    CHOL 165 12/11/2019    HDL 29 (L) 12/11/2019    LDL 70 12/11/2019    TRIG 332 (H) 12/11/2019       LIVER ENZYME RESULTS:  Lab Results   Component Value Date    AST 37 12/26/2019    ALT 28 12/26/2019       CBC RESULTS:  Lab Results   Component Value Date    WBC 88.4 (HH) 12/30/2019    RBC 4.04 (L) 12/30/2019    HGB 12.1 (L) 12/30/2019    HCT 37.1 (L) 12/30/2019    MCV 92 12/30/2019    MCH 30.0 12/30/2019    MCHC 32.6 12/30/2019    RDW 19.3 (H) 12/30/2019     12/30/2019       BMP RESULTS:  Lab Results   Component Value Date     12/26/2019    POTASSIUM 4.4 12/26/2019    CHLORIDE 107 12/26/2019    CO2 28 12/26/2019    ANIONGAP 2 (L) 12/26/2019    GLC 92 12/26/2019    BUN 26 12/26/2019    CR 1.25 12/26/2019    GFRESTIMATED 58 (L) 12/26/2019    GFRESTBLACK 67 12/26/2019    MINNIE 8.7 12/26/2019        A1C RESULTS:  No results found for: A1C    INR RESULTS:  No results found for: INR    We greatly appreciate the opportunity to be involved in the care of your patient, Renny KESHA Jagdeep.    Sincerely,  Devin Bentley MD        Angus Clements Mai, MD  [address] [place] [address]

## 2020-01-02 NOTE — PROGRESS NOTES
Service Date: 2020      Angus Clements Mai, MD    [address] [place] [address]      RE: Renny Gannon   MRN: [id number]   : 1950      Dear Doctors:       Renny Gannon, a 69-year-old man with recently diagnosed hypertension and leukoerythroblastic reaction, was evaluated in consultation at your request for exertional chest burning and an abnormal stress test.      For about 1 month, Mr. Gannon has been subject to episodes of substernal chest burning with dyspnea.  The patient had been working out on a regular basis in his health club until about 2 weeks ago at which time he noted that recurrent exercise-induced chest burning made him stop activity.During a nuclear stress test on 2019, the patient demonstrated hypertension and  developed ST-segment changes along with chest burning.  Imaging showed a normal resting ejection fraction but an area of small nontransmural infarction in the apical segment and inferior wall ischemia.   There have been no episodes of awaking him  from sleep or occurring at rest.  He feels the symptoms have been slightly progressive since their onset about 1 month ago. You placed the patient on metoprolol XL  12.5 daily, aspirin, lisinopril  and requested cardiology consultation.       During his recent office visit, you noted his  CBC  showed a white blood cell count of 79.1 with a hemoglobin of 11.9 and a platelet count of 378,000.  Peripheral blood smear revealed leukoerythroblastic reaction with neutrophilic shift and rare blasts.  The patient was referred to  Dr. Cristina Hematology/Oncology  A bone marrow biopsy has been performed and the results are pending at the time of this clinic visit.      The patient's symptoms have not changed since he last saw you.  He denies episodes occurring at rest or awakening from sleep.      PAST MEDICAL HISTORY:   1.  Hypertension.   2.  Recently diagnosed leukoerythroblastic reaction.  Bone marrow  biopsy pending.   3.  Hypertension.      ALLERGIES:  None known.       HABITS:  The patient does not abuse tobacco or alcohol.      SOCIAL HISTORY:  The patient is single.  He lives on his own.  He has 2 sisters and a mother who are still alive, living in the area.  He had a brother who had a myocardial infarction.      CARDIAC RISK FACTORS:  Family history significant for coronary disease, hypertension.      Denies smoking, diabetes mellitus or previous MI.      REVIEW OF SYSTEMS:  A 12-point review of systems was performed.  Outside the issues mentioned in the HPI, there are no other complaints.      PHYSICAL EXAMINATION:   GENERAL:  Today demonstrates a very pleasant, cooperative and soft-spoken 69-year-old man.   VITAL SIGNS:  His blood pressure is 110/62, his heart rate is 80.  His height is 1.73 meters, his weight is 90.49 kg, his BMI is 31.   LUNGS:  Clear to percussion and auscultation.   CARDIOVASCULAR:  Shows a normal S1 with a normal S2, no S3.  There is no murmur or click.   EXTREMITIES:  His pulses are full and symmetrical.      DIAGNOSTIC STUDIES:  His ECG shows a sinus rhythm with occasional premature atrial contractions.  Diffuse nonspecific ST wave changes.  An age-indeterminate inferior wall infarction cannot be excluded based on Q waves present in II, III and aVF.  There is somewhat early R-wave transition.      The bone marrow biopsy results are pending.  His hemoglobin is 12.1, platelet count 385,000.      His creatinine is 1.25.  His LDL cholesterol is 70 with triglycerides of 332.      ASSESSMENT:  This 69-year-old man with hypertension presents with progressive exertional chest burning and abnormal nuclear stress test.  His presentation is highly suspicious for significant underlying coronary disease.      I am uncertain as to the cause of his hematologic abnormalities but bone marrow biopsy is pending to help determine whether he has a hematologic malignancy    He has typical anginal  symptoms, slightly progressive despite current medical therapy..  In addition to intensifying medical therapy, diagnostic angiography is warranted to see whether the patient is a candidate for mechanical revascularization.  Hopefully, the results of the biopsy will be back soon, so we can make better decisions regarding the patient's candidacy for mechanical revascularization.      I have reviewed the risks of coronary angiography/ possible revascularization including  death, myocardial infarction, stroke, hematoma, urgent bypass surgery, use of physiologic testing,  the need for dual antiplatelet therapy should a stent be placed, the option of medical therapy alone and the risk of peripheral vascular complications.  He voiced understanding and wishes to proceed.      RECOMMENDATIONS:   1.  We would like to see the results of the patient's bone marrow biopsy and Dr. Cristina's recommendations as soon as they are available.  I will send a copy of this consultation to Dr. Cristina today.  2.  Agree with aspirin.  The patient's blood pressure is well controlled on lisinopril.   3.  Increase metoprolol XL from 12.5 to 50 mg daily.  We could increase to as high as 100 mg a day if needed as long as heart rate is greater than 60 and blood pressure greater than 100. PRN NTG sl.   4.  Add atorvastatin 40 mg daily.     5.  Diagnostic coronary angiography followed by possible revascularization depending on the findings.  If the patient does have leukemia, we need to discuss his management in more detail with Dr. Cristina.      I have asked the patient to go directly to the emergency room should there be any change in the pattern of his symptoms between now and his upcoming coronary angiogram / possible intervention     We have appreciated the opportunity to see your patient, Mr. Renny Gannon.       Sincerely,          Devin Bentley MD       cc:   Mani Cristina MD    [facility]    [address] [place] [address]         MELIA YAP MD             D: 2020   T: 2020   MT: CHRISTIAN      Name:     ABI VALDEZ   MRN:      [id number]        Account:      [id number]   :      1950           Service Date: 2020      Document: [id number]

## 2020-01-02 NOTE — LETTER
2020      Angus Clements Mai, MD  [address] [place] [address] [place] [address]      RE: Renny Gannon       Dear Colleague,    I had the pleasure of seeing Renny Gannon in the [facility].    Service Date: 2020      Angsu Clements Mai, MD    [address] [place] [address]      RE: Renny Gannon   MRN: [id number]   : 1950      Dear Doctors:       Renny Gannon, a 69-year-old man with recently diagnosed hypertension and leukoerythroblastic reaction, was evaluated in consultation at your request for exertional chest burning and an abnormal stress test.      For about 1 month, Mr. Gannon has been subject to episodes of substernal chest burning with dyspnea.  The patient had been working out on a regular basis in his health club until about 2 weeks ago at which time he noted that recurrent exercise-induced chest burning made him stop activity.During a nuclear stress test on 2019, the patient demonstrated hypertension and  developed ST-segment changes along with chest burning.  Imaging showed a normal resting ejection fraction but an area of small nontransmural infarction in the apical segment and inferior wall ischemia.   There have been no episodes of awaking him  from sleep or occurring at rest.  He feels the symptoms have been slightly progressive since their onset about 1 month ago. You placed the patient on metoprolol XL  12.5 daily, aspirin, lisinopril  and requested cardiology consultation.       During his recent office visit, you noted his  CBC  showed a white blood cell count of 79.1 with a hemoglobin of 11.9 and a platelet count of 378,000.  Peripheral blood smear revealed leukoerythroblastic reaction with neutrophilic shift and rare blasts.  The patient was referred to  Dr. Cristina Hematology/Oncology  A bone marrow biopsy has been performed and the results are pending at the time of this clinic visit.      The patient's symptoms have  not changed since he last saw you.  He denies episodes occurring at rest or awakening from sleep.      PAST MEDICAL HISTORY:   1.  Hypertension.   2.  Recently diagnosed leukoerythroblastic reaction.  Bone marrow biopsy pending.   3.  Hypertension.      ALLERGIES:  None known.       HABITS:  The patient does not abuse tobacco or alcohol.      SOCIAL HISTORY:  The patient is single.  He lives on his own.  He has 2 sisters and a mother who are still alive, living in the area.  He had a brother who had a myocardial infarction.      CARDIAC RISK FACTORS:  Family history significant for coronary disease, hypertension.      Denies smoking, diabetes mellitus or previous MI.      REVIEW OF SYSTEMS:  A 12-point review of systems was performed.  Outside the issues mentioned in the HPI, there are no other complaints.      PHYSICAL EXAMINATION:   GENERAL:  Today demonstrates a very pleasant, cooperative and soft-spoken 69-year-old man.   VITAL SIGNS:  His blood pressure is 110/62, his heart rate is 80.  His height is 1.73 meters, his weight is 90.49 kg, his BMI is 31.   LUNGS:  Clear to percussion and auscultation.   CARDIOVASCULAR:  Shows a normal S1 with a normal S2, no S3.  There is no murmur or click.   EXTREMITIES:  His pulses are full and symmetrical.      DIAGNOSTIC STUDIES:  His ECG shows a sinus rhythm with occasional premature atrial contractions.  Diffuse nonspecific ST wave changes.  An age-indeterminate inferior wall infarction cannot be excluded based on Q waves present in II, III and aVF.  There is somewhat early R-wave transition.      The bone marrow biopsy results are pending.  His hemoglobin is 12.1, platelet count 385,000.      His creatinine is 1.25.  His LDL cholesterol is 70 with triglycerides of 332.      ASSESSMENT:  This 69-year-old man with hypertension presents with progressive exertional chest burning and abnormal nuclear stress test.  His presentation is highly suspicious for significant underlying  coronary disease.      I am uncertain as to the cause of his hematologic abnormalities but bone marrow biopsy is pending to help determine whether he has a hematologic malignancy    He has typical anginal symptoms, slightly progressive despite current medical therapy..  In addition to intensifying medical therapy, diagnostic angiography is warranted to see whether the patient is a candidate for mechanical revascularization.  Hopefully, the results of the biopsy will be back soon, so we can make better decisions regarding the patient's candidacy for mechanical revascularization.      I have reviewed the risks of coronary angiography/ possible revascularization including  death, myocardial infarction, stroke, hematoma, urgent bypass surgery, use of physiologic testing,  the need for dual antiplatelet therapy should a stent be placed, the option of medical therapy alone and the risk of peripheral vascular complications.  He voiced understanding and wishes to proceed.      RECOMMENDATIONS:   1.  We would like to see the results of the patient's bone marrow biopsy and Dr. Cristina's recommendations as soon as they are available.  I will send a copy of this consultation to Dr. Cristina today.  2.  Agree with aspirin.  The patient's blood pressure is well controlled on lisinopril.   3.  Increase metoprolol XL from 12.5 to 50 mg daily.  We could increase to as high as 100 mg a day if needed as long as heart rate is greater than 60 and blood pressure greater than 100. PRN NTG sl.   4.  Add atorvastatin 40 mg daily.     5.  Diagnostic coronary angiography followed by possible revascularization depending on the findings.  If the patient does have leukemia, we need to discuss his management in more detail with Dr. Cristina.      I have asked the patient to go directly to the emergency room should there be any change in the pattern of his symptoms between now and his upcoming coronary angiogram / possible intervention     We have  appreciated the opportunity to see your patient, Mr. Renny Valdez.       Sincerely,          Devin Bentley MD       cc:   Mani Cristina MD    [facility]    [address] [place] [address]         DEVIN BENTLEY MD             D: 2020   T: 2020   MT: CHRSITIAN      Name:     RENNY VALDEZ   MRN:      [id number]        Account:      [id number]   :      1950           Service Date: 2020      Document: [id number]           Outpatient Encounter Medications as of 2020   Medication Sig Dispense Refill     aspirin (ASA) 81 MG tablet Take 1 tablet (81 mg) by mouth daily 100 tablet 0     atorvastatin (LIPITOR) 40 MG tablet Take 1 tablet (40 mg) by mouth daily 30 tablet 11     metoprolol succinate ER (TOPROL-XL) 25 MG 24 hr tablet Take 2 tablets (50 mg) by mouth daily 30 tablet 3     naproxen (NAPROSYN) 500 MG tablet Take 1 tablet (500 mg) by mouth 2 times daily as needed for moderate pain 60 tablet 1     nitroGLYcerin (NITROSTAT) 0.4 MG sublingual tablet For chest pain place 1 tablet under the tongue every 5 minutes for 3 doses. If symptoms persist 5 minutes after 1st dose call 911. 30 tablet 3     [DISCONTINUED] lisinopril-hydrochlorothiazide (PRINZIDE/ZESTORETIC) 20-25 MG tablet Take 1 tablet by mouth daily Please stop the 10/12.5 mg dose 90 tablet 1     [DISCONTINUED] metoprolol succinate ER (TOPROL-XL) 25 MG 24 hr tablet Take 0.5 tablets (12.5 mg) by mouth daily 30 tablet 0     No facility-administered encounter medications on file as of 2020.        Again, thank you for allowing me to participate in the care of your patient.      Sincerely,    Devin Bentley MD     [facility]

## 2020-01-03 LAB — COPATH REPORT: NORMAL

## 2020-01-07 DIAGNOSIS — I10 ESSENTIAL HYPERTENSION: ICD-10-CM

## 2020-01-07 RX ORDER — POTASSIUM CHLORIDE 750 MG/1
20 TABLET, EXTENDED RELEASE ORAL
Status: CANCELLED | OUTPATIENT
Start: 2020-01-07

## 2020-01-07 RX ORDER — SODIUM CHLORIDE 9 MG/ML
INJECTION, SOLUTION INTRAVENOUS CONTINUOUS
Status: CANCELLED | OUTPATIENT
Start: 2020-01-07

## 2020-01-07 RX ORDER — LISINOPRIL AND HYDROCHLOROTHIAZIDE 20; 25 MG/1; MG/1
TABLET ORAL
Qty: 90 TABLET | Refills: 1 | Status: ON HOLD | OUTPATIENT
Start: 2020-01-07 | End: 2020-02-10

## 2020-01-07 RX ORDER — LIDOCAINE 40 MG/G
CREAM TOPICAL
Status: CANCELLED | OUTPATIENT
Start: 2020-01-07

## 2020-01-07 RX ORDER — ASPIRIN 81 MG/1
81 TABLET ORAL DAILY
Status: CANCELLED | OUTPATIENT
Start: 2020-01-07 | End: 2020-01-09

## 2020-01-07 NOTE — TELEPHONE ENCOUNTER
"Lisinopril  Last Written Prescription Date:  0712/2019  Last Fill Quantity: 90,  # refills: 1   Last office visit: 12/27/2019 with prescribing provider:  Sonia   Future Office Visit:   Next 5 appointments (look out 90 days)    Jan 09, 2020  3:15 PM CST  Return Visit with Mani Cristina MD  [facility] [address] [place] [id number]  [phone]   [place]inton 15, 2020 10:45 AM CST  Return Visit with UNA Lopes CNP  [facility] ([address] [address]  [phone]      Prescription approved per G Refill Protocol.    Requested Prescriptions   Pending Prescriptions Disp Refills     lisinopril-hydrochlorothiazide (PRINZIDE/ZESTORETIC) 20-25 MG tablet [Pharmacy Med Name: LISINOPRIL-HCTZ 20-25MG TABS] 90 tablet 1     Sig: TAKE ONE TABLET BY MOUTH ONCE DAILY STOP THE 10-12.5 MG DOSE       Diuretics (Including Combos) Protocol Passed - 1/7/2020 10:55 AM        Passed - Blood pressure under 140/90 in past 12 months     BP Readings from Last 3 Encounters:   01/02/20 110/62   12/30/19 106/62   12/27/19 112/58           Passed - Recent (12 mo) or future (30 days) visit within the authorizing provider's specialty     Patient has had an office visit with the authorizing provider or a provider within the authorizing providers department within the previous 12 mos or has a future within next 30 days. See \"Patient Info\" tab in inbasket, or \"Choose Columns\" in Meds & Orders section of the refill encounter.          Passed - Medication is active on med list        Passed - Patient is age 18 or older        Passed - Normal serum creatinine on file in past 12 months     Recent Labs   Lab Test 12/26/19  1222   CR 1.25           Passed - Normal serum potassium on file in past 12 months     Recent Labs   Lab Test 12/26/19  1222   POTASSIUM 4.4           Passed - Normal serum sodium on " file in past 12 months     Recent Labs   Lab Test 12/26/19  1222            Belle Peralta RN

## 2020-01-08 LAB — COPATH REPORT: NORMAL

## 2020-01-09 ENCOUNTER — APPOINTMENT (OUTPATIENT)
Dept: CARDIOLOGY | Facility: CLINIC | Age: 70
End: 2020-01-09
Attending: SURGERY
Payer: COMMERCIAL

## 2020-01-09 ENCOUNTER — APPOINTMENT (OUTPATIENT)
Dept: ULTRASOUND IMAGING | Facility: CLINIC | Age: 70
End: 2020-01-09
Attending: SURGERY
Payer: COMMERCIAL

## 2020-01-09 ENCOUNTER — APPOINTMENT (OUTPATIENT)
Dept: GENERAL RADIOLOGY | Facility: CLINIC | Age: 70
End: 2020-01-09
Attending: SURGERY
Payer: COMMERCIAL

## 2020-01-09 ENCOUNTER — HOSPITAL ENCOUNTER (OUTPATIENT)
Facility: CLINIC | Age: 70
Discharge: HOME | End: 2020-01-09
Admitting: INTERNAL MEDICINE
Payer: COMMERCIAL

## 2020-01-09 VITALS
OXYGEN SATURATION: 94 % | HEART RATE: 83 BPM | BODY MASS INDEX: 30.45 KG/M2 | RESPIRATION RATE: 16 BRPM | HEIGHT: 68 IN | TEMPERATURE: 97.9 F | WEIGHT: 200.9 LBS | DIASTOLIC BLOOD PRESSURE: 66 MMHG | SYSTOLIC BLOOD PRESSURE: 110 MMHG

## 2020-01-09 DIAGNOSIS — I65.23 BILATERAL CAROTID ARTERY STENOSIS: Primary | ICD-10-CM

## 2020-01-09 DIAGNOSIS — I25.118 CORONARY ARTERY DISEASE INVOLVING NATIVE CORONARY ARTERY OF NATIVE HEART WITH OTHER FORM OF ANGINA PECTORIS (H): ICD-10-CM

## 2020-01-09 DIAGNOSIS — I10 ESSENTIAL HYPERTENSION: ICD-10-CM

## 2020-01-09 PROBLEM — Z98.890 STATUS POST CORONARY ANGIOGRAM: Status: ACTIVE | Noted: 2020-01-09

## 2020-01-09 LAB
ALBUMIN SERPL-MCNC: 4.1 G/DL (ref 3.4–5)
ALBUMIN UR-MCNC: NEGATIVE MG/DL
ALP SERPL-CCNC: 77 U/L (ref 40–150)
ALT SERPL W P-5'-P-CCNC: 33 U/L (ref 0–70)
ANION GAP SERPL CALCULATED.3IONS-SCNC: 6 MMOL/L (ref 3–14)
APPEARANCE UR: CLEAR
APTT PPP: 33 SEC (ref 22–37)
AST SERPL W P-5'-P-CCNC: 61 U/L (ref 0–45)
BILIRUB DIRECT SERPL-MCNC: <0.1 MG/DL (ref 0–0.2)
BILIRUB SERPL-MCNC: 0.7 MG/DL (ref 0.2–1.3)
BILIRUB UR QL STRIP: NEGATIVE
BUN SERPL-MCNC: 25 MG/DL (ref 7–30)
CALCIUM SERPL-MCNC: 9.2 MG/DL (ref 8.5–10.1)
CHLORIDE SERPL-SCNC: 106 MMOL/L (ref 94–109)
CO2 SERPL-SCNC: 25 MMOL/L (ref 20–32)
COLOR UR AUTO: YELLOW
COPATH REPORT: NORMAL
COPATH REPORT: NORMAL
CREAT SERPL-MCNC: 1.24 MG/DL (ref 0.66–1.25)
ERYTHROCYTE [DISTWIDTH] IN BLOOD BY AUTOMATED COUNT: 19.3 % (ref 10–15)
GFR SERPL CREATININE-BSD FRML MDRD: 58 ML/MIN/{1.73_M2}
GLUCOSE SERPL-MCNC: 101 MG/DL (ref 70–99)
GLUCOSE UR STRIP-MCNC: NEGATIVE MG/DL
HBA1C MFR BLD: 5.6 % (ref 0–5.6)
HCT VFR BLD AUTO: 37.4 % (ref 40–53)
HGB BLD-MCNC: 12.1 G/DL (ref 13.3–17.7)
HGB UR QL STRIP: NEGATIVE
INR PPP: 1.15 (ref 0.86–1.14)
KETONES UR STRIP-MCNC: NEGATIVE MG/DL
LEUKOCYTE ESTERASE UR QL STRIP: NEGATIVE
MCH RBC QN AUTO: 29.6 PG (ref 26.5–33)
MCHC RBC AUTO-ENTMCNC: 32.4 G/DL (ref 31.5–36.5)
MCV RBC AUTO: 91 FL (ref 78–100)
MUCOUS THREADS #/AREA URNS LPF: PRESENT /LPF
NITRATE UR QL: NEGATIVE
PH UR STRIP: 6 PH (ref 5–7)
PLATELET # BLD AUTO: 426 10E9/L (ref 150–450)
POTASSIUM SERPL-SCNC: 4.5 MMOL/L (ref 3.4–5.3)
PROT SERPL-MCNC: 7.8 G/DL (ref 6.8–8.8)
RBC # BLD AUTO: 4.09 10E12/L (ref 4.4–5.9)
RBC #/AREA URNS AUTO: 0 /HPF (ref 0–2)
SODIUM SERPL-SCNC: 137 MMOL/L (ref 133–144)
SOURCE: ABNORMAL
SP GR UR STRIP: 1.01 (ref 1–1.03)
UROBILINOGEN UR STRIP-MCNC: NORMAL MG/DL (ref 0–2)
WBC # BLD AUTO: 90 10E9/L (ref 4–11)
WBC #/AREA URNS AUTO: <1 /HPF (ref 0–5)

## 2020-01-09 PROCEDURE — 93306 TTE W/DOPPLER COMPLETE: CPT | Mod: 26 | Performed by: INTERNAL MEDICINE

## 2020-01-09 PROCEDURE — 99152 MOD SED SAME PHYS/QHP 5/>YRS: CPT | Performed by: INTERNAL MEDICINE

## 2020-01-09 PROCEDURE — 86850 RBC ANTIBODY SCREEN: CPT | Performed by: SURGERY

## 2020-01-09 PROCEDURE — 85730 THROMBOPLASTIN TIME PARTIAL: CPT | Performed by: INTERNAL MEDICINE

## 2020-01-09 PROCEDURE — 80048 BASIC METABOLIC PNL TOTAL CA: CPT | Performed by: INTERNAL MEDICINE

## 2020-01-09 PROCEDURE — 86900 BLOOD TYPING SEROLOGIC ABO: CPT | Performed by: SURGERY

## 2020-01-09 PROCEDURE — 36415 COLL VENOUS BLD VENIPUNCTURE: CPT | Performed by: SURGERY

## 2020-01-09 PROCEDURE — 99153 MOD SED SAME PHYS/QHP EA: CPT | Performed by: INTERNAL MEDICINE

## 2020-01-09 PROCEDURE — C1887 CATHETER, GUIDING: HCPCS | Performed by: INTERNAL MEDICINE

## 2020-01-09 PROCEDURE — 25000125 ZZHC RX 250: Performed by: INTERNAL MEDICINE

## 2020-01-09 PROCEDURE — 80076 HEPATIC FUNCTION PANEL: CPT | Performed by: INTERNAL MEDICINE

## 2020-01-09 PROCEDURE — C1769 GUIDE WIRE: HCPCS | Performed by: INTERNAL MEDICINE

## 2020-01-09 PROCEDURE — 25000128 H RX IP 250 OP 636: Performed by: INTERNAL MEDICINE

## 2020-01-09 PROCEDURE — 40000235 ZZH STATISTIC TELEMETRY

## 2020-01-09 PROCEDURE — 36415 COLL VENOUS BLD VENIPUNCTURE: CPT

## 2020-01-09 PROCEDURE — 93306 TTE W/DOPPLER COMPLETE: CPT

## 2020-01-09 PROCEDURE — 93970 EXTREMITY STUDY: CPT

## 2020-01-09 PROCEDURE — C1894 INTRO/SHEATH, NON-LASER: HCPCS | Performed by: INTERNAL MEDICINE

## 2020-01-09 PROCEDURE — 81001 URINALYSIS AUTO W/SCOPE: CPT | Performed by: SURGERY

## 2020-01-09 PROCEDURE — 71046 X-RAY EXAM CHEST 2 VIEWS: CPT

## 2020-01-09 PROCEDURE — 83036 HEMOGLOBIN GLYCOSYLATED A1C: CPT | Performed by: INTERNAL MEDICINE

## 2020-01-09 PROCEDURE — 85027 COMPLETE CBC AUTOMATED: CPT | Performed by: INTERNAL MEDICINE

## 2020-01-09 PROCEDURE — 25500064 ZZH RX 255 OP 636

## 2020-01-09 PROCEDURE — 93454 CORONARY ARTERY ANGIO S&I: CPT | Performed by: INTERNAL MEDICINE

## 2020-01-09 PROCEDURE — 86923 COMPATIBILITY TEST ELECTRIC: CPT | Performed by: SURGERY

## 2020-01-09 PROCEDURE — 93005 ELECTROCARDIOGRAM TRACING: CPT

## 2020-01-09 PROCEDURE — 40000065 ZZH STATISTIC EKG NON-CHARGEABLE

## 2020-01-09 PROCEDURE — 25800030 ZZH RX IP 258 OP 636: Performed by: INTERNAL MEDICINE

## 2020-01-09 PROCEDURE — 40000852 ZZH STATISTIC HEART CATH LAB OR EP LAB

## 2020-01-09 PROCEDURE — 85610 PROTHROMBIN TIME: CPT | Performed by: INTERNAL MEDICINE

## 2020-01-09 PROCEDURE — 25000128 H RX IP 250 OP 636

## 2020-01-09 PROCEDURE — 93880 EXTRACRANIAL BILAT STUDY: CPT

## 2020-01-09 PROCEDURE — 93010 ELECTROCARDIOGRAM REPORT: CPT | Performed by: INTERNAL MEDICINE

## 2020-01-09 PROCEDURE — 27210794 ZZH OR GENERAL SUPPLY STERILE: Performed by: INTERNAL MEDICINE

## 2020-01-09 PROCEDURE — 25000132 ZZH RX MED GY IP 250 OP 250 PS 637: Performed by: INTERNAL MEDICINE

## 2020-01-09 PROCEDURE — 86901 BLOOD TYPING SEROLOGIC RH(D): CPT | Performed by: SURGERY

## 2020-01-09 RX ORDER — NALOXONE HYDROCHLORIDE 0.4 MG/ML
.2-.4 INJECTION, SOLUTION INTRAMUSCULAR; INTRAVENOUS; SUBCUTANEOUS
Status: DISCONTINUED | OUTPATIENT
Start: 2020-01-09 | End: 2020-01-09 | Stop reason: HOSPADM

## 2020-01-09 RX ORDER — NITROGLYCERIN 20 MG/100ML
.07-2 INJECTION INTRAVENOUS CONTINUOUS PRN
Status: DISCONTINUED | OUTPATIENT
Start: 2020-01-09 | End: 2020-01-09 | Stop reason: HOSPADM

## 2020-01-09 RX ORDER — ACETAMINOPHEN 325 MG/1
650 TABLET ORAL EVERY 4 HOURS PRN
Status: DISCONTINUED | OUTPATIENT
Start: 2020-01-09 | End: 2020-01-09 | Stop reason: HOSPADM

## 2020-01-09 RX ORDER — DOBUTAMINE HYDROCHLORIDE 200 MG/100ML
2-20 INJECTION INTRAVENOUS CONTINUOUS PRN
Status: DISCONTINUED | OUTPATIENT
Start: 2020-01-09 | End: 2020-01-09 | Stop reason: HOSPADM

## 2020-01-09 RX ORDER — FENTANYL CITRATE 50 UG/ML
INJECTION, SOLUTION INTRAMUSCULAR; INTRAVENOUS
Status: DISCONTINUED | OUTPATIENT
Start: 2020-01-09 | End: 2020-01-09 | Stop reason: HOSPADM

## 2020-01-09 RX ORDER — POTASSIUM CHLORIDE 1500 MG/1
20 TABLET, EXTENDED RELEASE ORAL
Status: DISCONTINUED | OUTPATIENT
Start: 2020-01-09 | End: 2020-01-09 | Stop reason: HOSPADM

## 2020-01-09 RX ORDER — ARGATROBAN 1 MG/ML
150 INJECTION, SOLUTION INTRAVENOUS
Status: DISCONTINUED | OUTPATIENT
Start: 2020-01-09 | End: 2020-01-09 | Stop reason: HOSPADM

## 2020-01-09 RX ORDER — LIDOCAINE 40 MG/G
CREAM TOPICAL
Status: DISCONTINUED | OUTPATIENT
Start: 2020-01-09 | End: 2020-01-09 | Stop reason: HOSPADM

## 2020-01-09 RX ORDER — HEPARIN SODIUM 1000 [USP'U]/ML
INJECTION, SOLUTION INTRAVENOUS; SUBCUTANEOUS
Status: DISCONTINUED | OUTPATIENT
Start: 2020-01-09 | End: 2020-01-09 | Stop reason: HOSPADM

## 2020-01-09 RX ORDER — VERAPAMIL HYDROCHLORIDE 2.5 MG/ML
INJECTION, SOLUTION INTRAVENOUS
Status: DISCONTINUED | OUTPATIENT
Start: 2020-01-09 | End: 2020-01-09 | Stop reason: HOSPADM

## 2020-01-09 RX ORDER — ASPIRIN 81 MG/1
81 TABLET ORAL DAILY
Status: DISCONTINUED | OUTPATIENT
Start: 2020-01-09 | End: 2020-01-09 | Stop reason: HOSPADM

## 2020-01-09 RX ORDER — ATROPINE SULFATE 0.1 MG/ML
0.5 INJECTION INTRAVENOUS EVERY 5 MIN PRN
Status: DISCONTINUED | OUTPATIENT
Start: 2020-01-09 | End: 2020-01-09 | Stop reason: HOSPADM

## 2020-01-09 RX ORDER — SODIUM CHLORIDE 9 MG/ML
INJECTION, SOLUTION INTRAVENOUS CONTINUOUS
Status: DISCONTINUED | OUTPATIENT
Start: 2020-01-09 | End: 2020-01-09 | Stop reason: HOSPADM

## 2020-01-09 RX ORDER — NOREPINEPHRINE BITARTRATE 0.06 MG/ML
.03-.4 INJECTION, SOLUTION INTRAVENOUS CONTINUOUS PRN
Status: DISCONTINUED | OUTPATIENT
Start: 2020-01-09 | End: 2020-01-09 | Stop reason: HOSPADM

## 2020-01-09 RX ORDER — PHENYLEPHRINE HCL IN 0.9% NACL 50MG/250ML
.5-6 PLASTIC BAG, INJECTION (ML) INTRAVENOUS CONTINUOUS PRN
Status: DISCONTINUED | OUTPATIENT
Start: 2020-01-09 | End: 2020-01-09 | Stop reason: HOSPADM

## 2020-01-09 RX ORDER — EPTIFIBATIDE 2 MG/ML
2 INJECTION, SOLUTION INTRAVENOUS CONTINUOUS PRN
Status: DISCONTINUED | OUTPATIENT
Start: 2020-01-09 | End: 2020-01-09 | Stop reason: HOSPADM

## 2020-01-09 RX ORDER — NITROGLYCERIN 5 MG/ML
VIAL (ML) INTRAVENOUS
Status: DISCONTINUED | OUTPATIENT
Start: 2020-01-09 | End: 2020-01-09 | Stop reason: HOSPADM

## 2020-01-09 RX ORDER — DOPAMINE HYDROCHLORIDE 160 MG/100ML
2-20 INJECTION, SOLUTION INTRAVENOUS CONTINUOUS PRN
Status: DISCONTINUED | OUTPATIENT
Start: 2020-01-09 | End: 2020-01-09 | Stop reason: HOSPADM

## 2020-01-09 RX ORDER — HEPARIN SODIUM 10000 [USP'U]/100ML
100-1000 INJECTION, SOLUTION INTRAVENOUS CONTINUOUS PRN
Status: DISCONTINUED | OUTPATIENT
Start: 2020-01-09 | End: 2020-01-09 | Stop reason: HOSPADM

## 2020-01-09 RX ORDER — FENTANYL CITRATE 50 UG/ML
25-50 INJECTION, SOLUTION INTRAMUSCULAR; INTRAVENOUS
Status: DISCONTINUED | OUTPATIENT
Start: 2020-01-09 | End: 2020-01-09 | Stop reason: HOSPADM

## 2020-01-09 RX ORDER — ARGATROBAN 1 MG/ML
350 INJECTION, SOLUTION INTRAVENOUS
Status: DISCONTINUED | OUTPATIENT
Start: 2020-01-09 | End: 2020-01-09 | Stop reason: HOSPADM

## 2020-01-09 RX ORDER — EPTIFIBATIDE 2 MG/ML
180 INJECTION, SOLUTION INTRAVENOUS EVERY 10 MIN PRN
Status: DISCONTINUED | OUTPATIENT
Start: 2020-01-09 | End: 2020-01-09 | Stop reason: HOSPADM

## 2020-01-09 RX ORDER — FLUMAZENIL 0.1 MG/ML
0.2 INJECTION, SOLUTION INTRAVENOUS
Status: DISCONTINUED | OUTPATIENT
Start: 2020-01-09 | End: 2020-01-09 | Stop reason: HOSPADM

## 2020-01-09 RX ORDER — NALOXONE HYDROCHLORIDE 0.4 MG/ML
.1-.4 INJECTION, SOLUTION INTRAMUSCULAR; INTRAVENOUS; SUBCUTANEOUS
Status: DISCONTINUED | OUTPATIENT
Start: 2020-01-09 | End: 2020-01-09 | Stop reason: HOSPADM

## 2020-01-09 RX ADMIN — HUMAN ALBUMIN MICROSPHERES AND PERFLUTREN 9 ML: 10; .22 INJECTION, SOLUTION INTRAVENOUS at 17:09

## 2020-01-09 RX ADMIN — SODIUM CHLORIDE: 9 INJECTION, SOLUTION INTRAVENOUS at 09:37

## 2020-01-09 RX ADMIN — ASPIRIN 81 MG: 81 TABLET, DELAYED RELEASE ORAL at 10:03

## 2020-01-09 ASSESSMENT — MIFFLIN-ST. JEOR: SCORE: 1645.78

## 2020-01-09 NOTE — DISCHARGE INSTRUCTIONS
Cardiac Angiogram Discharge Instructions - Radial    After you go home:      Have an adult stay with you until tomorrow.    Drink extra fluids for 2 days.    You may resume your normal diet.    No smoking       For 24 hours - due to the sedation you received:    Relax and take it easy.    Do NOT make any important or legal decisions.    Do NOT drive or operate machines at home or at work.    Do NOT drink alcohol.    Care of Wrist Puncture Site:      For the first 24 hrs - check the puncture site every 1-2 hours while awake.    It is normal to have soreness at the puncture site and mild tingling in your hand for up to 3 days.    Remove the bandaid after 24 hours. If there is minor oozing, apply another bandaid and remove it after 12 hours.    You may shower tomorrow.  Do NOT take a bath, or use a hot tub or pool for at least 3 days. Do NOT scrub the site. Do not use lotion or powder near the puncture site.           Activity:        For 2 days:     do not use your hand or arm to support your weight (such as rising from a chair)     do not bend your wrist (such as lifting a garage door).    do not lift more than 5 pounds or exercise your arm (such as tennis, golf or bowling).    Do NOT do any heavy activity such as exercise, lifting, or straining.     Bleeding:      If you start bleeding from the site in your wrist, sit down and press firmly on/above the site for 10 minutes.     Once bleeding stops, keep arm still for 2 hours.     Call [facility] as soon as you can.       Call 911 right away if you have heavy bleeding or bleeding that does not stop.      Medicines:      You may resume your medications    Follow Up Appointments:      Follow up with [facility] Nurse Practitioner at [facility] of patient preference in 7-10 days.    Call the clinic if:      You have a large or growing hard lump around the site.    The site is red, swollen, hot or tender.    Blood or fluid is draining from the site.    You have chills  or a fever greater than 101 F (38 C).    Your arm feels numb, cool or changes color.    You have hives, a rash or unusual itching.    Any questions or concerns.          [facility] [place]:    [phone] UM (7 days a week)

## 2020-01-09 NOTE — PROGRESS NOTES
Care Suites Discharge Nursing Note    Education/questions answered: yes  UA sent, Echo completed.  Pt is ready to be discharged.  Patient DC location: Door 3  Accompanied by: sisters  CS discharge time: 7640

## 2020-01-09 NOTE — PROGRESS NOTES
Care Suites Admission Nursing Note    Reason for admission: Left heart cath  CS arrival time: 9:05  Accompanied by: Colette-   Name/phone of DC : Colette  Medications held: Took all meds yesterday  Consent signed: Pending  Abnormal assessment/labs: WBC 90.    If abnormal, provider notified: Is being seen by hematology/oncology.  Bone marrow biopsy pending  Education/questions answered: Yes  Plan: Left heart cath at 11:00 am

## 2020-01-09 NOTE — PRE-PROCEDURE
GENERAL PRE-PROCEDURE:   Procedure:  Coronary angiogram possible revascularization  Date/Time:  1/9/2020 11:31 AM    Verbal consent obtained?: Yes    Consent given by:  Patient  Patient states understanding of procedure being performed: Yes    Procedure consent matches procedure scheduled: Yes    Expected level of sedation:  Moderate  Appropriately NPO:  Yes  ASA Class:  Class 3- Severe systemic disease, definite functional limitations  Mallampati  :  Grade 2- soft palate, base of uvula, tonsillar pillars, and portion of posterior pharyngeal wall visible  Lungs:  Lungs clear with good breath sounds bilaterally  History & Physical reviewed:  History and physical reviewed and no updates needed  I have examined the patient, reviewed the history, medications and pre procedural tests. He has angina with a positive stress test and symptoms continue despite present therapy. Please see my note. His bone marrow is suggestive myelodysplasia, however formal input from  is pending.  I have explained to the patient the risks of death, MI, stroke, hematoma, possible urgent bypass surgery for failed PCI, use of stents, thienopyridine agents, possible peripheral vascular complications, arrhythmia, the use of FFR in clinical decision-making and alternative of medical therapy alone in regards to left heart catheterization, left ventriculography, coronary angiography, and possible percutaneous coronary intervention. The patient voiced understanding and wishes to proceed. The patient has a good right radial pulse, normal ulnar pulse and a normal Bean's sign.

## 2020-01-10 ENCOUNTER — TELEPHONE (OUTPATIENT)
Dept: CARDIOLOGY | Facility: CLINIC | Age: 70
End: 2020-01-10

## 2020-01-10 NOTE — TELEPHONE ENCOUNTER
Attempted to call pt after coronary angiogram yesterday showed three vessel disease. Recommendations were surgery evaluation.I see that part of that work up has been initiated. Pt did not answer either home or cell phone, there was not an option to leave a voice mail message. He has follow up next week in clinic with Madeleine and a visit with our surgeon Dr. Barrera 1/21.    UNA Cordon, CNP  Cardiology

## 2020-01-12 LAB — INTERPRETATION ECG - MUSE: NORMAL

## 2020-01-13 ENCOUNTER — TELEPHONE (OUTPATIENT)
Dept: ONCOLOGY | Facility: CLINIC | Age: 70
End: 2020-01-13

## 2020-01-13 NOTE — TELEPHONE ENCOUNTER
Called patient to reschedule with Dr. Cristina sooner per MD request.  Patient ok with rescheduling for this Thursday at 8:15.  Reviewed upcoming schedule.  Patient denies questions or concerns.    Ki Lamb RN, BSN, OCN  1/13/2020, 8:34 AM

## 2020-01-15 ENCOUNTER — OFFICE VISIT (OUTPATIENT)
Dept: CARDIOLOGY | Facility: CLINIC | Age: 70
End: 2020-01-15
Payer: COMMERCIAL

## 2020-01-15 VITALS
DIASTOLIC BLOOD PRESSURE: 68 MMHG | HEART RATE: 72 BPM | BODY MASS INDEX: 31.22 KG/M2 | SYSTOLIC BLOOD PRESSURE: 106 MMHG | OXYGEN SATURATION: 98 % | WEIGHT: 206 LBS | HEIGHT: 68 IN

## 2020-01-15 DIAGNOSIS — I25.118 CORONARY ARTERY DISEASE INVOLVING NATIVE CORONARY ARTERY OF NATIVE HEART WITH OTHER FORM OF ANGINA PECTORIS (H): Primary | ICD-10-CM

## 2020-01-15 PROCEDURE — 99204 OFFICE O/P NEW MOD 45 MIN: CPT | Performed by: NURSE PRACTITIONER

## 2020-01-15 RX ORDER — ISOSORBIDE MONONITRATE 30 MG/1
30 TABLET, EXTENDED RELEASE ORAL DAILY
Qty: 30 TABLET | Refills: 4 | Status: ON HOLD | OUTPATIENT
Start: 2020-01-15 | End: 2020-02-10

## 2020-01-15 ASSESSMENT — MIFFLIN-ST. JEOR: SCORE: 1668.91

## 2020-01-15 NOTE — LETTER
1/15/2020    Angus Clements Mai, MD  [address] [place] [address]    RE: Renny Gannon       Dear Colleague,    I had the pleasure of seeing Renny Gannon in the [facility].    Cardiology Clinic Progress Note  Renny Gannon MRN# [id number]   YOB: 1950 Age: 69 year old     Primary cardiologist: Dr. Bentley    Reason for visit: Post coronary angiogram follow-up    History of presenting illness:    Renny Gannon, a pleasant 69 year old patient who was initially seen in consultation by Dr. Bentley on 1/2/2020 with exertional chest burning and an abnormal stress test.  His metoprolol XL was increased to 50 mg daily and atorvastatin 40 mg daily was added. Subsequently, he underwent a coronary angiogram that revealed left main and three-vessel coronary artery disease (40% distal left main, 50% ostial to proximal LAD, 70% proximal to mid LAD, 70% first diagonal, 90% proximal first OM, 60-90 90% first marginal, and 90% RPDA).  Dr. Bentley recommended a CT surgery consultation for potential CABG. he recently underwent a bone marrow biopsy and will be following up with Dr. Cristina in oncology tomorrow to review the results.  He also has an upcoming appointment with Dr. Barrera in CV surgery on 1/21/2020.  His echocardiogram revealed preserved LVEF without significant valvular or wall motion abnormalities.    Today in clinic he is accompanied by his sister, Colette.  He states he continues to have exertional chest burning with ambulation.  Specifically, he has symptoms when walking from his apartment to his car.  He has no other symptoms of shortness of breath, PND, orthopnea, dizziness or lightheadedness.    Past medical history :    1. Hypertension  2. Coronary artery disease  3. Leukoerythroblastic reaction  4. Obesity         Assessment and Plan:     ASSESSMENT:    1. Coronary artery disease    Three-vessel coronary artery disease including left main 40% stenosis  "distally    Referred to CV surgery and has upcoming appointment on 1/21/2020    Ongoing exertional chest burning    Aspirin 81 mg daily, metoprolol XL 50 mg daily and atorvastatin 40 mg daily    2. 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 Imdur 30 mg daily for ongoing exertional chest discomfort  2. Tylenol as need for Imdur associated headaches  3. Follow up with oncology tomorrow and Dr. Barrera 1/21/2020            Review of Systems:     Review of Systems:  Skin:  Negative     Eyes:  Positive for glasses  ENT:  Negative    Respiratory:  Positive for dyspnea on exertion  Cardiovascular:  Negative for;palpitations Positive for;chest pain;edema;lightheadedness;dizziness;fatigue  Gastroenterology: Negative    Genitourinary:  Negative    Musculoskeletal:  Negative    Neurologic:  Negative    Psychiatric:  Positive for sleep disturbances  Heme/Lymph/Imm:  Positive for allergies  Endocrine:  Negative              Physical Exam:     Vitals: /68 (BP Location: Right arm, Patient Position: Fowlers, Cuff Size: Adult Regular)   Pulse 72   Ht 1.727 m (5' 8\")   Wt 93.4 kg (206 lb)   SpO2 98%   BMI 31.32 kg/m     Constitutional:  cooperative, alert and oriented, well developed, well nourished, in no acute distress        Skin:  warm and dry to the touch, no apparent skin lesions or masses noted        Head:  normocephalic, no masses or lesions        Eyes:  pupils equal and round, conjunctivae and lids unremarkable, sclera white, no xanthalasma, EOMS intact, no nystagmus        ENT:  no pallor or cyanosis, dentition good        Neck:  carotid pulses are full and equal bilaterally, JVP normal, no carotid bruit        Chest:  normal breath sounds, clear to auscultation, normal A-P diameter, normal " symmetry, normal respiratory excursion, no use of accessory muscles        Cardiac: regular rhythm, normal S1/S2, no S3 or S4, apical impulse not displaced, no murmurs, gallops or rubs                  Abdomen:  abdomen soft, non-tender, BS normoactive, no mass, no HSM, no bruits        Vascular: pulses full and equal, no bruits auscultated                                      Extremities and Back:  no deformities, clubbing, cyanosis, erythema observed        Neurological:  no gross motor deficits   Right radial access site CDI without hematoma or tenderness           Medications:     Current Outpatient Medications   Medication Sig Dispense Refill     aspirin (ASA) 81 MG tablet Take 1 tablet (81 mg) by mouth daily 100 tablet 0     atorvastatin (LIPITOR) 40 MG tablet Take 1 tablet (40 mg) by mouth daily 30 tablet 11     isosorbide mononitrate (IMDUR) 30 MG 24 hr tablet Take 1 tablet (30 mg) by mouth daily 30 tablet 4     lisinopril-hydrochlorothiazide (PRINZIDE/ZESTORETIC) 20-25 MG tablet TAKE ONE TABLET BY MOUTH ONCE DAILY STOP THE 10-12.5 MG DOSE 90 tablet 1     metoprolol succinate ER (TOPROL-XL) 25 MG 24 hr tablet Take 2 tablets (50 mg) by mouth daily 30 tablet 3     naproxen (NAPROSYN) 500 MG tablet Take 1 tablet (500 mg) by mouth 2 times daily as needed for moderate pain 60 tablet 1     nitroGLYcerin (NITROSTAT) 0.4 MG sublingual tablet For chest pain place 1 tablet under the tongue every 5 minutes for 3 doses. If symptoms persist 5 minutes after 1st dose call 911. (Patient not taking: Reported on 1/15/2020) 30 tablet 3       Family History   Problem Relation Age of Onset     No Known Problems Mother      Unknown/Adopted Father      Unknown/Adopted Maternal Grandmother      Unknown/Adopted Maternal Grandfather      Unknown/Adopted Paternal Grandmother      Unknown/Adopted Paternal Grandfather      Cancer Brother         lung cancer - smoking     No Known Problems Sister      Coronary Artery Disease Brother           of MI at 66     No Known Problems Sister        Social History     Socioeconomic History     Marital status: Single     Spouse name: Not on file     Number of children: Not on file     Years of education: Not on file     Highest education level: Not on file   Occupational History     Not on file   Social Needs     Financial resource strain: Not on file     Food insecurity:     Worry: Not on file     Inability: Not on file     Transportation needs:     Medical: Not on file     Non-medical: Not on file   Tobacco Use     Smoking status: Former Smoker     Smokeless tobacco: Never Used   Substance and Sexual Activity     Alcohol use: No     Frequency: Never     Drug use: No     Sexual activity: Not Currently   Lifestyle     Physical activity:     Days per week: Not on file     Minutes per session: Not on file     Stress: Not on file   Relationships     Social connections:     Talks on phone: Not on file     Gets together: Not on file     Attends Muslim service: Not on file     Active member of club or organization: Not on file     Attends meetings of clubs or organizations: Not on file     Relationship status: Not on file     Intimate partner violence:     Fear of current or ex partner: Not on file     Emotionally abused: Not on file     Physically abused: Not on file     Forced sexual activity: Not on file   Other Topics Concern     Not on file   Social History Narrative     Not on file            Past Medical History:     Past Medical History:   Diagnosis Date     Hypertension               Past Surgical History:     Past Surgical History:   Procedure Laterality Date     BONE MARROW BIOPSY, BONE SPECIMEN, NEEDLE/TROCAR N/A 2019    Procedure: BIOPSY, BONE MARROW;  Surgeon: Aguilar Krause MD;  Location: [facility]     CV CORONARY ANGIOGRAM Left 2020    Procedure: Coronary Angiogram;  Surgeon: Devin Bentley MD;  Location:  HE[facility] CARDI[facility] CATH LAB     NO HISTORY OF SURGERY                 Allergies:   No known drug allergies and Seasonal allergies       Data:   All laboratory data reviewed:    Recent Labs   Lab Test 12/11/19  1505 03/11/19  1102 02/11/19  1123   LDL 70  --  122*   HDL 29*  --  40   NHDL 136*  --  154*   CHOL 165  --  194   TRIG 332*  --  159*   TSH 2.14 2.44  --        Lab Results   Component Value Date    WBC 90.0 (HH) 01/09/2020    RBC 4.09 (L) 01/09/2020    HGB 12.1 (L) 01/09/2020    HCT 37.4 (L) 01/09/2020    MCV 91 01/09/2020    MCH 29.6 01/09/2020    MCHC 32.4 01/09/2020    RDW 19.3 (H) 01/09/2020     01/09/2020       Lab Results   Component Value Date     01/09/2020    POTASSIUM 4.5 01/09/2020    CHLORIDE 106 01/09/2020    CO2 25 01/09/2020    ANIONGAP 6 01/09/2020     (H) 01/09/2020    BUN 25 01/09/2020    CR 1.24 01/09/2020    GFRESTIMATED 58 (L) 01/09/2020    GFRESTBLACK 68 01/09/2020    MINNIE 9.2 01/09/2020      Lab Results   Component Value Date    AST 61 (H) 01/09/2020    ALT 33 01/09/2020       Lab Results   Component Value Date    A1C 5.6 01/09/2020       Lab Results   Component Value Date    INR 1.15 (H) 01/09/2020         UNA YEE CNP  [facility]  Pager: [phone]  RN phone: [phone]    Thank you for allowing me to participate in the care of your patient.    Sincerely,     UNA YEE CNP     [facility]

## 2020-01-15 NOTE — LETTER
1/15/2020    Angus Clements Mai, MD  [address] [place] [address]    RE: Renny Gannon       Dear Colleague,    I had the pleasure of seeing Renny Gannon in the [facility].    Cardiology Clinic Progress Note  Renny Gannon MRN# [id number]   YOB: 1950 Age: 69 year old     Primary cardiologist: Dr. Bentley    Reason for visit: Post coronary angiogram follow-up    History of presenting illness:    Renny Gannon, a pleasant 69 year old patient who was initially seen in consultation by Dr. Bentley on 1/2/2020 with exertional chest burning and an abnormal stress test.  His metoprolol XL was increased to 50 mg daily and atorvastatin 40 mg daily was added. Subsequently, he underwent a coronary angiogram that revealed left main and three-vessel coronary artery disease (40% distal left main, 50% ostial to proximal LAD, 70% proximal to mid LAD, 70% first diagonal, 90% proximal first OM, 60-90 90% first marginal, and 90% RPDA).  Dr. Bentley recommended a CT surgery consultation for potential CABG. he recently underwent a bone marrow biopsy and will be following up with Dr. Cristina in oncology tomorrow to review the results.  He also has an upcoming appointment with Dr. Barrera in CV surgery on 1/21/2020.  His echocardiogram revealed preserved LVEF without significant valvular or wall motion abnormalities.    Today in clinic he is accompanied by his sister, Colette.  He states he continues to have exertional chest burning with ambulation.  Specifically, he has symptoms when walking from his apartment to his car.  He has no other symptoms of shortness of breath, PND, orthopnea, dizziness or lightheadedness.    Past medical history :    1. Hypertension  2. Coronary artery disease  3. Leukoerythroblastic reaction  4. Obesity         Assessment and Plan:     ASSESSMENT:    1. Coronary artery disease    Three-vessel coronary artery disease including left main 40% stenosis  "distally    Referred to CV surgery and has upcoming appointment on 1/21/2020    Ongoing exertional chest burning    Aspirin 81 mg daily, metoprolol XL 50 mg daily and atorvastatin 40 mg daily    2. 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 Imdur 30 mg daily for ongoing exertional chest discomfort  2. Tylenol as need for Imdur associated headaches  3. Follow up with oncology tomorrow and Dr. Barrera 1/21/2020            Review of Systems:     Review of Systems:  Skin:  Negative     Eyes:  Positive for glasses  ENT:  Negative    Respiratory:  Positive for dyspnea on exertion  Cardiovascular:  Negative for;palpitations Positive for;chest pain;edema;lightheadedness;dizziness;fatigue  Gastroenterology: Negative    Genitourinary:  Negative    Musculoskeletal:  Negative    Neurologic:  Negative    Psychiatric:  Positive for sleep disturbances  Heme/Lymph/Imm:  Positive for allergies  Endocrine:  Negative              Physical Exam:     Vitals: /68 (BP Location: Right arm, Patient Position: Fowlers, Cuff Size: Adult Regular)   Pulse 72   Ht 1.727 m (5' 8\")   Wt 93.4 kg (206 lb)   SpO2 98%   BMI 31.32 kg/m     Constitutional:  cooperative, alert and oriented, well developed, well nourished, in no acute distress        Skin:  warm and dry to the touch, no apparent skin lesions or masses noted        Head:  normocephalic, no masses or lesions        Eyes:  pupils equal and round, conjunctivae and lids unremarkable, sclera white, no xanthalasma, EOMS intact, no nystagmus        ENT:  no pallor or cyanosis, dentition good        Neck:  carotid pulses are full and equal bilaterally, JVP normal, no carotid bruit        Chest:  normal breath sounds, clear to auscultation, normal A-P diameter, normal " symmetry, normal respiratory excursion, no use of accessory muscles        Cardiac: regular rhythm, normal S1/S2, no S3 or S4, apical impulse not displaced, no murmurs, gallops or rubs                  Abdomen:  abdomen soft, non-tender, BS normoactive, no mass, no HSM, no bruits        Vascular: pulses full and equal, no bruits auscultated                                      Extremities and Back:  no deformities, clubbing, cyanosis, erythema observed        Neurological:  no gross motor deficits   Right radial access site CDI without hematoma or tenderness           Medications:     Current Outpatient Medications   Medication Sig Dispense Refill     aspirin (ASA) 81 MG tablet Take 1 tablet (81 mg) by mouth daily 100 tablet 0     atorvastatin (LIPITOR) 40 MG tablet Take 1 tablet (40 mg) by mouth daily 30 tablet 11     isosorbide mononitrate (IMDUR) 30 MG 24 hr tablet Take 1 tablet (30 mg) by mouth daily 30 tablet 4     lisinopril-hydrochlorothiazide (PRINZIDE/ZESTORETIC) 20-25 MG tablet TAKE ONE TABLET BY MOUTH ONCE DAILY STOP THE 10-12.5 MG DOSE 90 tablet 1     metoprolol succinate ER (TOPROL-XL) 25 MG 24 hr tablet Take 2 tablets (50 mg) by mouth daily 30 tablet 3     naproxen (NAPROSYN) 500 MG tablet Take 1 tablet (500 mg) by mouth 2 times daily as needed for moderate pain 60 tablet 1     nitroGLYcerin (NITROSTAT) 0.4 MG sublingual tablet For chest pain place 1 tablet under the tongue every 5 minutes for 3 doses. If symptoms persist 5 minutes after 1st dose call 911. (Patient not taking: Reported on 1/15/2020) 30 tablet 3       Family History   Problem Relation Age of Onset     No Known Problems Mother      Unknown/Adopted Father      Unknown/Adopted Maternal Grandmother      Unknown/Adopted Maternal Grandfather      Unknown/Adopted Paternal Grandmother      Unknown/Adopted Paternal Grandfather      Cancer Brother         lung cancer - smoking     No Known Problems Sister      Coronary Artery Disease Brother           of MI at 66     No Known Problems Sister        Social History     Socioeconomic History     Marital status: Single     Spouse name: Not on file     Number of children: Not on file     Years of education: Not on file     Highest education level: Not on file   Occupational History     Not on file   Social Needs     Financial resource strain: Not on file     Food insecurity:     Worry: Not on file     Inability: Not on file     Transportation needs:     Medical: Not on file     Non-medical: Not on file   Tobacco Use     Smoking status: Former Smoker     Smokeless tobacco: Never Used   Substance and Sexual Activity     Alcohol use: No     Frequency: Never     Drug use: No     Sexual activity: Not Currently   Lifestyle     Physical activity:     Days per week: Not on file     Minutes per session: Not on file     Stress: Not on file   Relationships     Social connections:     Talks on phone: Not on file     Gets together: Not on file     Attends [employer] service: Not on file     Active member of club or organization: Not on file     Attends meetings of clubs or organizations: Not on file     Relationship status: Not on file     Intimate partner violence:     Fear of current or ex partner: Not on file     Emotionally abused: Not on file     Physically abused: Not on file     Forced sexual activity: Not on file   Other Topics Concern     Not on file   Social History Narrative     Not on file            Past Medical History:     Past Medical History:   Diagnosis Date     Hypertension               Past Surgical History:     Past Surgical History:   Procedure Laterality Date     BONE MARROW BIOPSY, BONE SPECIMEN, NEEDLE/TROCAR N/A 2019    Procedure: BIOPSY, BONE MARROW;  Surgeon: Aguilar Krause MD;  Location: [facility]     CV CORONARY ANGIOGRAM Left 2020    Procedure: Coronary Angiogram;  Surgeon: Devin Bentley MD;  Location:  HE[facility] CARDI[facility] CATH LAB     NO HISTORY OF SURGERY                 Allergies:   No known drug allergies and Seasonal allergies       Data:   All laboratory data reviewed:    Recent Labs   Lab Test 12/11/19  1505 03/11/19  1102 02/11/19  1123   LDL 70  --  122*   HDL 29*  --  40   NHDL 136*  --  154*   CHOL 165  --  194   TRIG 332*  --  159*   TSH 2.14 2.44  --        Lab Results   Component Value Date    WBC 90.0 (HH) 01/09/2020    RBC 4.09 (L) 01/09/2020    HGB 12.1 (L) 01/09/2020    HCT 37.4 (L) 01/09/2020    MCV 91 01/09/2020    MCH 29.6 01/09/2020    MCHC 32.4 01/09/2020    RDW 19.3 (H) 01/09/2020     01/09/2020       Lab Results   Component Value Date     01/09/2020    POTASSIUM 4.5 01/09/2020    CHLORIDE 106 01/09/2020    CO2 25 01/09/2020    ANIONGAP 6 01/09/2020     (H) 01/09/2020    BUN 25 01/09/2020    CR 1.24 01/09/2020    GFRESTIMATED 58 (L) 01/09/2020    GFRESTBLACK 68 01/09/2020    MINNIE 9.2 01/09/2020      Lab Results   Component Value Date    AST 61 (H) 01/09/2020    ALT 33 01/09/2020       Lab Results   Component Value Date    A1C 5.6 01/09/2020       Lab Results   Component Value Date    INR 1.15 (H) 01/09/2020         UNA YEE CNP  [facility]  Pager: [phone]  RN phone: [phone]    Thank you for allowing me to participate in the care of your patient.      Sincerely,     UNA YEE CNP     [facility]    cc:   No referring provider defined for this encounter.

## 2020-01-15 NOTE — PATIENT INSTRUCTIONS
TODAY'S RECOMMENDATIONS    1. Start Imdur 30 mg daily (long acting nitroglycerin)  2. Use Tylenol as needed for headaches secondary to Imdur  3. Follow up with Dr. Barrera as scheduled    If you have questions or concerns please call clinic at [phone].    Please call [phone] for scheduling.      It was a pleasure seeing you today!

## 2020-01-15 NOTE — PROGRESS NOTES
Cardiology Clinic Progress Note  Renny Gannon MRN# [id number]   YOB: 1950 Age: 69 year old     Primary cardiologist: Dr. Bentley    Reason for visit: Post coronary angiogram follow-up    History of presenting illness:    Renny Gannon, a pleasant 69 year old patient who was initially seen in consultation by Dr. Bentley on 1/2/2020 with exertional chest burning and an abnormal stress test.  His metoprolol XL was increased to 50 mg daily and atorvastatin 40 mg daily was added. Subsequently, he underwent a coronary angiogram that revealed left main and three-vessel coronary artery disease (40% distal left main, 50% ostial to proximal LAD, 70% proximal to mid LAD, 70% first diagonal, 90% proximal first OM, 60-90 90% first marginal, and 90% RPDA).  Dr. Bentley recommended a CT surgery consultation for potential CABG. he recently underwent a bone marrow biopsy and will be following up with Dr. Cristina in oncology tomorrow to review the results.  He also has an upcoming appointment with Dr. Barrera in CV surgery on 1/21/2020.  His echocardiogram revealed preserved LVEF without significant valvular or wall motion abnormalities.    Today in clinic he is accompanied by his sister, Colette.  He states he continues to have exertional chest burning with ambulation.  Specifically, he has symptoms when walking from his apartment to his car.  He has no other symptoms of shortness of breath, PND, orthopnea, dizziness or lightheadedness.    Past medical history :    1. Hypertension  2. Coronary artery disease  3. Leukoerythroblastic reaction  4. Obesity         Assessment and Plan:     ASSESSMENT:    1. Coronary artery disease    Three-vessel coronary artery disease including left main 40% stenosis distally    Referred to CV surgery and has upcoming appointment on 1/21/2020    Ongoing exertional chest burning    Aspirin 81 mg daily, metoprolol XL 50 mg daily and atorvastatin 40 mg  "daily    2. 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 Imdur 30 mg daily for ongoing exertional chest discomfort  2. Tylenol as need for Imdur associated headaches  3. Follow up with oncology tomorrow and Dr. Barrera 1/21/2020            Review of Systems:     Review of Systems:  Skin:  Negative     Eyes:  Positive for glasses  ENT:  Negative    Respiratory:  Positive for dyspnea on exertion  Cardiovascular:  Negative for;palpitations Positive for;chest pain;edema;lightheadedness;dizziness;fatigue  Gastroenterology: Negative    Genitourinary:  Negative    Musculoskeletal:  Negative    Neurologic:  Negative    Psychiatric:  Positive for sleep disturbances  Heme/Lymph/Imm:  Positive for allergies  Endocrine:  Negative              Physical Exam:     Vitals: /68 (BP Location: Right arm, Patient Position: Fowlers, Cuff Size: Adult Regular)   Pulse 72   Ht 1.727 m (5' 8\")   Wt 93.4 kg (206 lb)   SpO2 98%   BMI 31.32 kg/m    Constitutional:  cooperative, alert and oriented, well developed, well nourished, in no acute distress        Skin:  warm and dry to the touch, no apparent skin lesions or masses noted        Head:  normocephalic, no masses or lesions        Eyes:  pupils equal and round, conjunctivae and lids unremarkable, sclera white, no xanthalasma, EOMS intact, no nystagmus        ENT:  no pallor or cyanosis, dentition good        Neck:  carotid pulses are full and equal bilaterally, JVP normal, no carotid bruit        Chest:  normal breath sounds, clear to auscultation, normal A-P diameter, normal symmetry, normal respiratory excursion, no use of accessory muscles        Cardiac: regular rhythm, normal S1/S2, no S3 or S4, apical impulse not displaced, no murmurs, gallops or rubs         "          Abdomen:  abdomen soft, non-tender, BS normoactive, no mass, no HSM, no bruits        Vascular: pulses full and equal, no bruits auscultated                                      Extremities and Back:  no deformities, clubbing, cyanosis, erythema observed        Neurological:  no gross motor deficits   Right radial access site CDI without hematoma or tenderness           Medications:     Current Outpatient Medications   Medication Sig Dispense Refill     aspirin (ASA) 81 MG tablet Take 1 tablet (81 mg) by mouth daily 100 tablet 0     atorvastatin (LIPITOR) 40 MG tablet Take 1 tablet (40 mg) by mouth daily 30 tablet 11     isosorbide mononitrate (IMDUR) 30 MG 24 hr tablet Take 1 tablet (30 mg) by mouth daily 30 tablet 4     lisinopril-hydrochlorothiazide (PRINZIDE/ZESTORETIC) 20-25 MG tablet TAKE ONE TABLET BY MOUTH ONCE DAILY STOP THE 10-12.5 MG DOSE 90 tablet 1     metoprolol succinate ER (TOPROL-XL) 25 MG 24 hr tablet Take 2 tablets (50 mg) by mouth daily 30 tablet 3     naproxen (NAPROSYN) 500 MG tablet Take 1 tablet (500 mg) by mouth 2 times daily as needed for moderate pain 60 tablet 1     nitroGLYcerin (NITROSTAT) 0.4 MG sublingual tablet For chest pain place 1 tablet under the tongue every 5 minutes for 3 doses. If symptoms persist 5 minutes after 1st dose call 911. (Patient not taking: Reported on 1/15/2020) 30 tablet 3       Family History   Problem Relation Age of Onset     No Known Problems Mother      Unknown/Adopted Father      Unknown/Adopted Maternal Grandmother      Unknown/Adopted Maternal Grandfather      Unknown/Adopted Paternal Grandmother      Unknown/Adopted Paternal Grandfather      Cancer Brother         lung cancer - smoking     No Known Problems Sister      Coronary Artery Disease Brother          of MI at 66     No Known Problems Sister        Social History     Socioeconomic History     Marital status: Single     Spouse name: Not on file     Number of children: Not on file      Years of education: Not on file     Highest education level: Not on file   Occupational History     Not on file   Social Needs     Financial resource strain: Not on file     Food insecurity:     Worry: Not on file     Inability: Not on file     Transportation needs:     Medical: Not on file     Non-medical: Not on file   Tobacco Use     Smoking status: Former Smoker     Smokeless tobacco: Never Used   Substance and Sexual Activity     Alcohol use: No     Frequency: Never     Drug use: No     Sexual activity: Not Currently   Lifestyle     Physical activity:     Days per week: Not on file     Minutes per session: Not on file     Stress: Not on file   Relationships     Social connections:     Talks on phone: Not on file     Gets together: Not on file     Attends [employer] service: Not on file     Active member of club or organization: Not on file     Attends meetings of clubs or organizations: Not on file     Relationship status: Not on file     Intimate partner violence:     Fear of current or ex partner: Not on file     Emotionally abused: Not on file     Physically abused: Not on file     Forced sexual activity: Not on file   Other Topics Concern     Not on file   Social History Narrative     Not on file            Past Medical History:     Past Medical History:   Diagnosis Date     Hypertension               Past Surgical History:     Past Surgical History:   Procedure Laterality Date     BONE MARROW BIOPSY, BONE SPECIMEN, NEEDLE/TROCAR N/A 12/30/2019    Procedure: BIOPSY, BONE MARROW;  Surgeon: Aguilar Krause MD;  Location: [facility]     CV CORONARY ANGIOGRAM Left 1/9/2020    Procedure: Coronary Angiogram;  Surgeon: Devin Bentley MD;  Location:  [facility]     NO HISTORY OF SURGERY                Allergies:   No known drug allergies and Seasonal allergies       Data:   All laboratory data reviewed:    Recent Labs   Lab Test 12/11/19  1505 03/11/19  1102 02/11/19  1123   LDL 70  --   122*   HDL 29*  --  40   NHDL 136*  --  154*   CHOL 165  --  194   TRIG 332*  --  159*   TSH 2.14 2.44  --        Lab Results   Component Value Date    WBC 90.0 (HH) 01/09/2020    RBC 4.09 (L) 01/09/2020    HGB 12.1 (L) 01/09/2020    HCT 37.4 (L) 01/09/2020    MCV 91 01/09/2020    MCH 29.6 01/09/2020    MCHC 32.4 01/09/2020    RDW 19.3 (H) 01/09/2020     01/09/2020       Lab Results   Component Value Date     01/09/2020    POTASSIUM 4.5 01/09/2020    CHLORIDE 106 01/09/2020    CO2 25 01/09/2020    ANIONGAP 6 01/09/2020     (H) 01/09/2020    BUN 25 01/09/2020    CR 1.24 01/09/2020    GFRESTIMATED 58 (L) 01/09/2020    GFRESTBLACK 68 01/09/2020    MINNIE 9.2 01/09/2020      Lab Results   Component Value Date    AST 61 (H) 01/09/2020    ALT 33 01/09/2020       Lab Results   Component Value Date    A1C 5.6 01/09/2020       Lab Results   Component Value Date    INR 1.15 (H) 01/09/2020         UNA YEE [facility]  Pager: [phone]  RN phone: [phone]

## 2020-01-16 ENCOUNTER — ONCOLOGY VISIT (OUTPATIENT)
Dept: ONCOLOGY | Facility: CLINIC | Age: 70
End: 2020-01-16
Payer: COMMERCIAL

## 2020-01-16 ENCOUNTER — TELEPHONE (OUTPATIENT)
Dept: ONCOLOGY | Facility: CLINIC | Age: 70
End: 2020-01-16

## 2020-01-16 VITALS
BODY MASS INDEX: 30.68 KG/M2 | HEIGHT: 68 IN | WEIGHT: 202.4 LBS | DIASTOLIC BLOOD PRESSURE: 68 MMHG | RESPIRATION RATE: 18 BRPM | OXYGEN SATURATION: 94 % | HEART RATE: 88 BPM | SYSTOLIC BLOOD PRESSURE: 124 MMHG | TEMPERATURE: 97.6 F

## 2020-01-16 DIAGNOSIS — D47.1 MYELOPROLIFERATIVE DISORDER (H): Primary | ICD-10-CM

## 2020-01-16 LAB
ANISOCYTOSIS BLD QL SMEAR: ABNORMAL
BASOPHILS # BLD AUTO: 0 10E9/L (ref 0–0.2)
BASOPHILS NFR BLD AUTO: 0 %
DIFFERENTIAL METHOD BLD: ABNORMAL
EOSINOPHIL # BLD AUTO: 0 10E9/L (ref 0–0.7)
EOSINOPHIL NFR BLD AUTO: 0 %
ERYTHROCYTE [DISTWIDTH] IN BLOOD BY AUTOMATED COUNT: 19.4 % (ref 10–15)
HCT VFR BLD AUTO: 35.4 % (ref 40–53)
HGB BLD-MCNC: 11.5 G/DL (ref 13.3–17.7)
LYMPHOCYTES # BLD AUTO: 17 10E9/L (ref 0.8–5.3)
LYMPHOCYTES NFR BLD AUTO: 19 %
MCH RBC QN AUTO: 29.9 PG (ref 26.5–33)
MCHC RBC AUTO-ENTMCNC: 32.5 G/DL (ref 31.5–36.5)
MCV RBC AUTO: 92 FL (ref 78–100)
MONOCYTES # BLD AUTO: 0.9 10E9/L (ref 0–1.3)
MONOCYTES NFR BLD AUTO: 1 %
NEUTROPHILS # BLD AUTO: 65.3 10E9/L (ref 1.6–8.3)
NEUTROPHILS NFR BLD AUTO: 73 %
NRBC # BLD AUTO: 0.9 10*3/UL
NRBC BLD AUTO-RTO: 1 /100
OTHER CELLS # BLD MANUAL: 6.3 10E9/L
OTHER CELLS NFR BLD MANUAL: 7 %
PLATELET # BLD AUTO: 376 10E9/L (ref 150–450)
PLATELET # BLD EST: ABNORMAL 10*3/UL
POLYCHROMASIA BLD QL SMEAR: SLIGHT
RBC # BLD AUTO: 3.85 10E12/L (ref 4.4–5.9)
RBC MORPH BLD: ABNORMAL
WBC # BLD AUTO: 89.5 10E9/L (ref 4–11)

## 2020-01-16 PROCEDURE — 36415 COLL VENOUS BLD VENIPUNCTURE: CPT | Performed by: INTERNAL MEDICINE

## 2020-01-16 PROCEDURE — 99214 OFFICE O/P EST MOD 30 MIN: CPT | Performed by: INTERNAL MEDICINE

## 2020-01-16 PROCEDURE — 85025 COMPLETE CBC W/AUTO DIFF WBC: CPT | Performed by: INTERNAL MEDICINE

## 2020-01-16 RX ORDER — HYDROXYUREA 500 MG/1
500 CAPSULE ORAL DAILY
Qty: 90 CAPSULE | Refills: 1 | Status: SHIPPED | OUTPATIENT
Start: 2020-01-16 | End: 2020-02-27

## 2020-01-16 ASSESSMENT — MIFFLIN-ST. JEOR: SCORE: 1652.58

## 2020-01-16 NOTE — TELEPHONE ENCOUNTER
DATE:  1/16/2020   TIME OF RECEIPT FROM LAB:  0959  LAB TEST:  WBC  LAB VALUE:  89.5   RESULTS GIVEN WITH READ-BACK TO (PROVIDER):  PAU DOVER  TIME LAB VALUE REPORTED TO PROVIDER:   This lab was expected. Continue current plan of care.    Shelia Bustos RN on 1/16/2020 at 10:00 AM

## 2020-01-16 NOTE — NURSING NOTE
"DISCHARGE PLAN:  Next appointments: See patient instruction section  Departure Mode: Ambulatory  Accompanied by: sister  25 minutes for nursing discharge (face to face time)     Writing nurse seen patient after Medical Oncology appointment to address questions/concerns/coordinate care. patient will be starting on hydroxyurea -writer reviewed education on this with patient/sister. To have weekly labs starting today, start hydroxyurea, US for liver & spleen, and f/u in 1 month. Ki popped in the room for just a moment and said she would call the patient next week to see how he is doing. Writer also gave patient the \"chemo waste\" printout. Patient ambulated by nurse to  to schedule follow up and/or lab appointments.  Imaging scheduled if ordered. See patient instructions and Oncologist's Progress note for further details. Questions and concerns addressed to patient's satisfaction. Contact information provided and patient is encouraged to call with any that arise in the interim of care.    Shelia Bustos RN  [facility]  [phone]  1/16/2020 9:30 AM          "

## 2020-01-16 NOTE — PATIENT INSTRUCTIONS
Diagnosis: Myeloproliferative disorder    Today: start hydroxyurea (see printout provided).  Labs today. Then weekly    Lab date/time (1/23/20):    Lab date/time (1/30/20):    Lab date/time (2/6/20):    Ultrasound liver/spleen date/time:       Office visit follow up with Dr. Cristina in 1 month with labs prior (2/13/20)  Office Visit Date/Time:    If you have any questions or concerns please feel free to call.    If you need to reschedule please call:  Clinic or Lab Appointment - [phone]  Infusion - [phone]  Imaging - [phone]    JACOB Bass  [facility]  Oncology/Hematology at [facility]  [phone]

## 2020-01-16 NOTE — LETTER
1/16/2020         RE: Renny Gannon  [address]  [place] [facility] [address]        Dear Colleague,    Thank you for referring your patient, Renny Gannon, to the [facility]. Please see a copy of my visit note below.    DATE OF VISIT: Jan 16, 2020    Renny Gannon is a 69 year old male is seen today for   Chief Complaint   Patient presents with     RECHECK     BMBX results completed 12/30/19     Treatment Plan   .       (D47.1) Myeloproliferative disorder (H)  (primary encounter diagnosis)  I reviewed with the patient today the results of most recent bone marrow biopsy from December 30, 2019 showing extremely hypercellular marrow with granulocytic and megakaryocytic proliferation, megakaryocytic clustering and atypia, marrow fibrosis and 1.5% blasts.  There is also marked peripheral leukocytosis and leukoerythroblastic clusters and 1.5% circulating blasts.  There is mild normocytic normochromic anemia.  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  I reviewed with the patient today the natural history of myelo fibrosis in details.  We talked about transformation to leukemia, prognosis and management.  At this time I would recommend to start hydroxyurea 500 mg orally daily.  We will continue to monitor.  I will arrange for ultrasound of the spleen there is significant enlargement of the spleen we will consider the use of Jakafi.  I will see the patient again in 1 month's time or sooner if there are new developments or concerns.  Patient is scheduled to proceed with bypass surgery.  I do not see any contraindication to proceed with his surgery if absolutely indicated.    The patient is ready to learn, no apparent learning barriers were identified.  Diagnosis and treatment plans were explained to the patient. The patient expressed understanding of the content. The patient asked appropriate questions. The patient questions were  answered to his satisfaction.  Time spent 25 minutes more than 50% of the time in counseling coordination of care including discussion of myelofibrosis, natural history, management, follow-up and prognosis.  Chart documentation with Dragon Voice recognition Software. Although reviewed after completion, some words and grammatical errors may remain.    Again, thank you for allowing me to participate in the care of your patient.        Sincerely,        Mani Cristina MD

## 2020-01-16 NOTE — NURSING NOTE
"Oncology Rooming Note    January 16, 2020 8:28 AM   Renny Gannon is a 69 year old male who presents for:    Chief Complaint   Patient presents with     RECHECK     BMBX results completed 12/30/19     Treatment Plan     Initial Vitals: /68   Pulse 88   Temp 97.6  F (36.4  C) (Temporal)   Resp 18   Ht 1.727 m (5' 8\")   Wt 91.8 kg (202 lb 6.4 oz)   SpO2 94%   BMI 30.77 kg/m   Estimated body mass index is 30.77 kg/m  as calculated from the following:    Height as of this encounter: 1.727 m (5' 8\").    Weight as of this encounter: 91.8 kg (202 lb 6.4 oz). Body surface area is 2.1 meters squared.  Data Unavailable Comment: Data Unavailable   No LMP for male patient.  Allergies reviewed: Yes  Medications reviewed: Yes    Medications: Medication refills not needed today.  Pharmacy name entered into [employer]: [facility] - [place], [place] - [facility]note 3] [address]    Clinical concerns: plan of care. Concerns reviewed with Dr. Cristina     5 minutes for nursing intake (face to face time)     Debi FAIR CMA              "

## 2020-01-20 ENCOUNTER — HOSPITAL ENCOUNTER (OUTPATIENT)
Dept: ULTRASOUND IMAGING | Facility: CLINIC | Age: 70
Discharge: HOME | End: 2020-01-20
Attending: INTERNAL MEDICINE | Admitting: INTERNAL MEDICINE
Payer: COMMERCIAL

## 2020-01-20 DIAGNOSIS — D47.1 MYELOPROLIFERATIVE DISORDER (H): ICD-10-CM

## 2020-01-20 PROCEDURE — 76700 US EXAM ABDOM COMPLETE: CPT

## 2020-01-21 ENCOUNTER — DOCUMENTATION ONLY (OUTPATIENT)
Dept: OTHER | Facility: CLINIC | Age: 70
End: 2020-01-21

## 2020-01-21 ENCOUNTER — OFFICE VISIT (OUTPATIENT)
Dept: CARDIOLOGY | Facility: CLINIC | Age: 70
End: 2020-01-21
Payer: COMMERCIAL

## 2020-01-21 VITALS
SYSTOLIC BLOOD PRESSURE: 102 MMHG | BODY MASS INDEX: 30.62 KG/M2 | WEIGHT: 202 LBS | HEIGHT: 68 IN | HEART RATE: 77 BPM | DIASTOLIC BLOOD PRESSURE: 63 MMHG

## 2020-01-21 DIAGNOSIS — I25.118 CORONARY ARTERY DISEASE INVOLVING NATIVE CORONARY ARTERY OF NATIVE HEART WITH OTHER FORM OF ANGINA PECTORIS (H): Primary | ICD-10-CM

## 2020-01-21 ASSESSMENT — MIFFLIN-ST. JEOR: SCORE: 1650.77

## 2020-01-21 NOTE — LETTER
1/21/2020      RE: Rneny Gannon  [address]  [place] [address]       Dear Colleague,    Thank you for the opportunity to participate in the care of your patient, Renny Gannon, at the [facility] CARDIOTHORACIC at [facility]. Please see a copy of my visit note below.    CV Surgery    Patient seen, clinic note dictated #[id number].    Mable Barrera MD    Please do not hesitate to contact me if you have any questions/concerns.     Sincerely,     Mable Barrera MD

## 2020-01-21 NOTE — LETTER
1/21/2020       RE: Renny Gannon  [address]  [place] [place] [facility] [address]     Dear Colleague,    Thank you for referring your patient, Renny Gannon, to the [facility] CARDIOTHORACIC at [facility]. Please see a copy of my visit note below.    CV Surgery    Patient seen, clinic note dictated #[id number].    Mable Barrera MD    Service Date: 01/21/2020      REFERRING CARDIOLOGIST:  Devin Bentley MD      REASON FOR CONSULTATION:  Evaluation for coronary bypass grafting.      HISTORY OF PRESENT ILLNESS:  Mr. Gannon is a 69-year-old gentleman who was recently diagnosed to have leucoerythroblastic reaction with a white blood cell count of 80 who also for about a month or so has been experiencing exertional chest discomfort which eventually led him to have a nuclear stress test last month, demonstrating ST depression changes along the inferior wall.  A coronary angiogram was performed by Dr. Bentley on 01/02 demonstrating severe 3-vessel coronary artery disease.  For this reason, the patient was referred to me for coronary artery bypass grafting.      PAST MEDICAL HISTORY:  Hypertension and recently diagnosed leucoerythroblastic reaction.      PAST SURGICAL HISTORY:  Unremarkable.      ALLERGIES:  No known drug allergies.      CURRENT OUTPATIENT MEDICATIONS:  Aspirin, lisinopril, hydrochlorothiazide, metoprolol extended release, naproxen, hydroxyurea and Imdur.      FAMILY HISTORY:  Noncontributory except for coronary artery disease.      SOCIAL HISTORY:  He is single.  He is a former smoker.  He does not drink alcohol.      REVIEW OF SYSTEMS:  As per HPI.  All other 10-point review of systems are completed and were otherwise negative unless stated above.      PHYSICAL EXAMINATION:   VITAL SIGNS:  Stable.   GENERAL:  He appears well, in no acute distress.   HEENT:  Within normal limits.   NECK:  Supple, no lymphadenopathy.   CARDIOVASCULAR:  Regular rate and rhythm, normal S1, S2, no  murmurs.   LUNGS:  Clear bilaterally.   ABDOMEN:  Soft, nontender, nondistended.   EXTREMITIES:  Negative for edema, cyanosis or clubbing.   NEUROLOGIC:  A&O x3 with no focal deficits.      LABORATORY STUDIES:  Coronary angiogram performed 01/09/2020 demonstrates 40% distal left main disease, 70% proximal to mid-LAD disease, 70% first diagonal artery disease, 90% stenosis of the proximal portion of the first OM and 90% OM1 disease and 90% right-sided PDA disease as well.  Echocardiogram also from 01/09 demonstrates preserved LV systolic function with no valvular disease.      IMPRESSION AND PLAN:  Mr. Gannon is a 69-year-old gentleman with newly diagnosed severe 3-vessel coronary artery disease with preserved LV function.  He unfortunately also does have a recent diagnosis of leucoerythroblastic reaction with a white blood cell count with significant leukocytosis.  I have sent a message out to Dr. Mani Cristina, his hematologist/oncologist, to see what the risk factors would be specifically for him undergoing coronary artery bypass surgery from a hematologic standpoint and if there is anything we could be doing preoperatively or perioperatively to reduce his hematologic risk.  As long as he is a reasonable surgical candidate from a hematologic standpoint, I think we will offer him coronary artery bypass surgery.  His surgical risk would be low excluding his leukocytosis and I estimated his operative mortality risk to be around 1%.  I went over the diagnosis in detail and the reason for recommending coronary artery bypass surgery.  I also went over the risks and benefits of the operation and the potential complications associated with the surgery, which include but are not strictly limited to infection, bleeding, stroke, postop MI, postop arrhythmias, pulmonary or renal complications.  I will wait to hear back from Dr. Cristina and hope to schedule him for coronary bypass grafting with endoscopic vein harvest  within the next couple of weeks.      It was a pleasure to meet Mr. Gannon today.  Thank you very much for this referral.         YADIRA HODGSON MD             D: 2020   T: 2020   MT: cary      Name:     ABI GANNON   MRN:      [id number]        Account:      [id number]   :      1950           Service Date: 2020      Document: [id number]

## 2020-01-21 NOTE — PROGRESS NOTES
Met with patient and his sister in consultation with Dr. Barrera. Pre op imaging and labs reviewed. Pre op instructions reviewed. Plan CABG soon.

## 2020-01-22 ENCOUNTER — PREP FOR PROCEDURE (OUTPATIENT)
Dept: CARDIOLOGY | Facility: CLINIC | Age: 70
End: 2020-01-22

## 2020-01-22 ENCOUNTER — TELEPHONE (OUTPATIENT)
Dept: CARDIOLOGY | Facility: CLINIC | Age: 70
End: 2020-01-22

## 2020-01-22 DIAGNOSIS — I25.10 CAD (CORONARY ARTERY DISEASE): Primary | ICD-10-CM

## 2020-01-22 RX ORDER — MUPIROCIN 20 MG/G
OINTMENT TOPICAL 2 TIMES DAILY
Status: CANCELLED | OUTPATIENT
Start: 2020-01-22 | End: 2020-01-23

## 2020-01-22 RX ORDER — METOPROLOL TARTRATE 25 MG/1
25 TABLET, FILM COATED ORAL
Status: CANCELLED | OUTPATIENT
Start: 2020-01-22

## 2020-01-22 RX ORDER — CEFAZOLIN SODIUM 1 G/3ML
1 INJECTION, POWDER, FOR SOLUTION INTRAMUSCULAR; INTRAVENOUS SEE ADMIN INSTRUCTIONS
Status: CANCELLED | OUTPATIENT
Start: 2020-01-22

## 2020-01-22 RX ORDER — CEFAZOLIN SODIUM 2 G/100ML
2 INJECTION, SOLUTION INTRAVENOUS
Status: CANCELLED | OUTPATIENT
Start: 2020-01-22

## 2020-01-22 RX ORDER — FAMOTIDINE 20 MG/1
20 TABLET, FILM COATED ORAL
Status: CANCELLED | OUTPATIENT
Start: 2020-01-22

## 2020-01-22 NOTE — TELEPHONE ENCOUNTER
Per task, pt needs to schedule surgery with Dr. Barrera. Talked with pt and offered him the first opening 1/31. Pt ok with that. Will call if anything changes

## 2020-01-23 DIAGNOSIS — D47.1 MYELOPROLIFERATIVE DISORDER (H): ICD-10-CM

## 2020-01-23 PROCEDURE — 85025 COMPLETE CBC W/AUTO DIFF WBC: CPT | Performed by: INTERNAL MEDICINE

## 2020-01-23 PROCEDURE — 36415 COLL VENOUS BLD VENIPUNCTURE: CPT | Performed by: INTERNAL MEDICINE

## 2020-01-23 NOTE — PROGRESS NOTES
Service Date: 01/21/2020      REFERRING CARDIOLOGIST:  Devin Bentley MD      REASON FOR CONSULTATION:  Evaluation for coronary bypass grafting.      HISTORY OF PRESENT ILLNESS:  Mr. Gannon is a 69-year-old gentleman who was recently diagnosed to have leucoerythroblastic reaction with a white blood cell count of 80 who also for about a month or so has been experiencing exertional chest discomfort which eventually led him to have a nuclear stress test last month, demonstrating ST depression changes along the inferior wall.  A coronary angiogram was performed by Dr. Bentley on 01/02 demonstrating severe 3-vessel coronary artery disease.  For this reason, the patient was referred to me for coronary artery bypass grafting.      PAST MEDICAL HISTORY:  Hypertension and recently diagnosed leucoerythroblastic reaction.      PAST SURGICAL HISTORY:  Unremarkable.      ALLERGIES:  No known drug allergies.      CURRENT OUTPATIENT MEDICATIONS:  Aspirin, lisinopril, hydrochlorothiazide, metoprolol extended release, naproxen, hydroxyurea and Imdur.      FAMILY HISTORY:  Noncontributory except for coronary artery disease.      SOCIAL HISTORY:  He is single.  He is a former smoker.  He does not drink alcohol.      REVIEW OF SYSTEMS:  As per HPI.  All other 10-point review of systems are completed and were otherwise negative unless stated above.      PHYSICAL EXAMINATION:   VITAL SIGNS:  Stable.   GENERAL:  He appears well, in no acute distress.   HEENT:  Within normal limits.   NECK:  Supple, no lymphadenopathy.   CARDIOVASCULAR:  Regular rate and rhythm, normal S1, S2, no murmurs.   LUNGS:  Clear bilaterally.   ABDOMEN:  Soft, nontender, nondistended.   EXTREMITIES:  Negative for edema, cyanosis or clubbing.   NEUROLOGIC:  A&O x3 with no focal deficits.      LABORATORY STUDIES:  Coronary angiogram performed 01/09/2020 demonstrates 40% distal left main disease, 70% proximal to mid-LAD disease, 70% first diagonal artery  disease, 90% stenosis of the proximal portion of the first OM and 90% OM1 disease and 90% right-sided PDA disease as well.  Echocardiogram also from  demonstrates preserved LV systolic function with no valvular disease.      IMPRESSION AND PLAN:  Mr. Gannon is a 69-year-old gentleman with newly diagnosed severe 3-vessel coronary artery disease with preserved LV function.  He unfortunately also does have a recent diagnosis of leucoerythroblastic reaction with a white blood cell count with significant leukocytosis.  I have sent a message out to Dr. Mani Cristina, his hematologist/oncologist, to see what the risk factors would be specifically for him undergoing coronary artery bypass surgery from a hematologic standpoint and if there is anything we could be doing preoperatively or perioperatively to reduce his hematologic risk.  As long as he is a reasonable surgical candidate from a hematologic standpoint, I think we will offer him coronary artery bypass surgery.  His surgical risk would be low excluding his leukocytosis and I estimated his operative mortality risk to be around 1%.  I went over the diagnosis in detail and the reason for recommending coronary artery bypass surgery.  I also went over the risks and benefits of the operation and the potential complications associated with the surgery, which include but are not strictly limited to infection, bleeding, stroke, postop MI, postop arrhythmias, pulmonary or renal complications.  I will wait to hear back from Dr. Cristina and hope to schedule him for coronary bypass grafting with endoscopic vein harvest within the next couple of weeks.      It was a pleasure to meet Mr. Gannon today.  Thank you very much for this referral.         YADIRA HODGSON MD             D: 2020   T: 2020   MT: cary      Name:     ABI GANNON   MRN:      [id number]        Account:      [id number]   :      1950           Service Date: 2020       Document: [id number]

## 2020-01-27 LAB
DIFFERENTIAL METHOD BLD: ABNORMAL
EOSINOPHIL # BLD AUTO: 1.1 10E9/L (ref 0–0.7)
EOSINOPHIL NFR BLD AUTO: 2 %
ERYTHROCYTE [DISTWIDTH] IN BLOOD BY AUTOMATED COUNT: 19.7 % (ref 10–15)
HCT VFR BLD AUTO: 34.1 % (ref 40–53)
HGB BLD-MCNC: 11 G/DL (ref 13.3–17.7)
LYMPHOCYTES # BLD AUTO: 3.7 10E9/L (ref 0.8–5.3)
LYMPHOCYTES NFR BLD AUTO: 7 %
MCH RBC QN AUTO: 29.6 PG (ref 26.5–33)
MCHC RBC AUTO-ENTMCNC: 32.3 G/DL (ref 31.5–36.5)
MCV RBC AUTO: 92 FL (ref 78–100)
METAMYELOCYTES # BLD: 5.4 10E9/L
METAMYELOCYTES NFR BLD MANUAL: 10 %
MONOCYTES # BLD AUTO: 1.1 10E9/L (ref 0–1.3)
MONOCYTES NFR BLD AUTO: 2 %
MYELOCYTES # BLD: 13.9 10E9/L
MYELOCYTES NFR BLD MANUAL: 26 %
NEUTROPHILS # BLD AUTO: 28.4 10E9/L (ref 1.6–8.3)
NEUTROPHILS NFR BLD AUTO: 53 %
NRBC # BLD AUTO: 1.6 10*3/UL
NRBC BLD AUTO-RTO: 3 /100
PLATELET # BLD AUTO: 322 10E9/L (ref 150–450)
RBC # BLD AUTO: 3.71 10E12/L (ref 4.4–5.9)
WBC # BLD AUTO: 53.5 10E9/L (ref 4–11)

## 2020-01-27 NOTE — PROGRESS NOTES
DATE OF VISIT: Jan 16, 2020    Renny Gannon is a 69 year old male is seen today for   Chief Complaint   Patient presents with     RECHECK     BMBX results completed 12/30/19     Treatment Plan   .       (D47.1) Myeloproliferative disorder (H)  (primary encounter diagnosis)  I reviewed with the patient today the results of most recent bone marrow biopsy from December 30, 2019 showing extremely hypercellular marrow with granulocytic and megakaryocytic proliferation, megakaryocytic clustering and atypia, marrow fibrosis and 1.5% blasts.  There is also marked peripheral leukocytosis and leukoerythroblastic clusters and 1.5% circulating blasts.  There is mild normocytic normochromic anemia.  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  I reviewed with the patient today the natural history of myelo fibrosis in details.  We talked about transformation to leukemia, prognosis and management.  At this time I would recommend to start hydroxyurea 500 mg orally daily.  We will continue to monitor.  I will arrange for ultrasound of the spleen there is significant enlargement of the spleen we will consider the use of Jakafi.  I will see the patient again in 1 month's time or sooner if there are new developments or concerns.  Patient is scheduled to proceed with bypass surgery.  I do not see any contraindication to proceed with his surgery if absolutely indicated.    The patient is ready to learn, no apparent learning barriers were identified.  Diagnosis and treatment plans were explained to the patient. The patient expressed understanding of the content. The patient asked appropriate questions. The patient questions were answered to his satisfaction.  Time spent 25 minutes more than 50% of the time in counseling coordination of care including discussion of myelofibrosis, natural history, management, follow-up and prognosis.  Chart documentation with Dragon  Voice recognition Software. Although reviewed after completion, some words and grammatical errors may remain.

## 2020-01-30 ENCOUNTER — ANESTHESIA EVENT (OUTPATIENT)
Dept: SURGERY | Facility: CLINIC | Age: 70
DRG: 236 | End: 2020-01-30
Payer: COMMERCIAL

## 2020-01-30 DIAGNOSIS — D47.1 MYELOPROLIFERATIVE DISORDER (H): ICD-10-CM

## 2020-01-30 PROCEDURE — 36415 COLL VENOUS BLD VENIPUNCTURE: CPT | Performed by: INTERNAL MEDICINE

## 2020-01-30 PROCEDURE — 85025 COMPLETE CBC W/AUTO DIFF WBC: CPT | Performed by: INTERNAL MEDICINE

## 2020-01-30 ASSESSMENT — ENCOUNTER SYMPTOMS: SEIZURES: 0

## 2020-01-30 ASSESSMENT — LIFESTYLE VARIABLES: TOBACCO_USE: 0

## 2020-01-31 ENCOUNTER — ANESTHESIA (OUTPATIENT)
Dept: SURGERY | Facility: CLINIC | Age: 70
DRG: 236 | End: 2020-01-31
Payer: COMMERCIAL

## 2020-01-31 ENCOUNTER — HOSPITAL ENCOUNTER (INPATIENT)
Facility: CLINIC | Age: 70
LOS: 10 days | Discharge: HOME HEALTH | DRG: 236 | End: 2020-02-10
Attending: SURGERY | Admitting: SURGERY
Payer: COMMERCIAL

## 2020-01-31 ENCOUNTER — APPOINTMENT (OUTPATIENT)
Dept: GENERAL RADIOLOGY | Facility: CLINIC | Age: 70
DRG: 236 | End: 2020-01-31
Attending: SURGERY
Payer: COMMERCIAL

## 2020-01-31 DIAGNOSIS — I25.10 CAD (CORONARY ARTERY DISEASE): ICD-10-CM

## 2020-01-31 DIAGNOSIS — Z95.1 S/P CABG (CORONARY ARTERY BYPASS GRAFT): Primary | ICD-10-CM

## 2020-01-31 LAB
ABO + RH BLD: NORMAL
ABO + RH BLD: NORMAL
ALBUMIN SERPL-MCNC: 2.9 G/DL (ref 3.4–5)
ALP SERPL-CCNC: 61 U/L (ref 40–150)
ALT SERPL W P-5'-P-CCNC: 37 U/L (ref 0–70)
ANION GAP SERPL CALCULATED.3IONS-SCNC: 5 MMOL/L (ref 3–14)
ANION GAP SERPL CALCULATED.3IONS-SCNC: 7 MMOL/L (ref 3–14)
ANISOCYTOSIS BLD QL SMEAR: ABNORMAL
APTT PPP: 41 SEC (ref 22–37)
APTT PPP: 46 SEC (ref 22–37)
APTT PPP: 62 SEC (ref 22–37)
APTT PPP: 67 SEC (ref 22–37)
APTT PPP: 87 SEC (ref 22–37)
AST SERPL W P-5'-P-CCNC: 85 U/L (ref 0–45)
BASE DEFICIT BLDA-SCNC: 7.2 MMOL/L
BASE DEFICIT BLDV-SCNC: 1.1 MMOL/L
BASE DEFICIT BLDV-SCNC: 4.7 MMOL/L
BASE DEFICIT BLDV-SCNC: 6.6 MMOL/L
BASOPHILS # BLD AUTO: 0 10E9/L (ref 0–0.2)
BASOPHILS NFR BLD AUTO: 0 %
BILIRUB SERPL-MCNC: 0.6 MG/DL (ref 0.2–1.3)
BLASTS # BLD: 0.9 10E9/L
BLASTS BLD QL SMEAR: 2 %
BLD GP AB SCN SERPL QL: NORMAL
BLD PROD TYP BPU: NORMAL
BLD UNIT ID BPU: 0
BLOOD BANK CMNT PATIENT-IMP: NORMAL
BLOOD BANK CMNT PATIENT-IMP: NORMAL
BLOOD PRODUCT CODE: NORMAL
BPU ID: NORMAL
BUN SERPL-MCNC: 22 MG/DL (ref 7–30)
BUN SERPL-MCNC: 23 MG/DL (ref 7–30)
CA-I BLD-MCNC: 4.6 MG/DL (ref 4.4–5.2)
CA-I BLD-SCNC: 3.9 MG/DL (ref 4.4–5.2)
CA-I BLD-SCNC: 4.3 MG/DL (ref 4.4–5.2)
CA-I BLD-SCNC: 4.5 MG/DL (ref 4.4–5.2)
CA-I BLD-SCNC: 5 MG/DL (ref 4.4–5.2)
CALCIUM SERPL-MCNC: 7.7 MG/DL (ref 8.5–10.1)
CALCIUM SERPL-MCNC: 8.4 MG/DL (ref 8.5–10.1)
CHLORIDE SERPL-SCNC: 108 MMOL/L (ref 94–109)
CHLORIDE SERPL-SCNC: 114 MMOL/L (ref 94–109)
CO2 BLD-SCNC: 23 MMOL/L (ref 21–28)
CO2 BLD-SCNC: 24 MMOL/L (ref 21–28)
CO2 BLD-SCNC: 25 MMOL/L (ref 21–28)
CO2 BLDCOV-SCNC: 26 MMOL/L (ref 21–28)
CO2 SERPL-SCNC: 22 MMOL/L (ref 20–32)
CO2 SERPL-SCNC: 23 MMOL/L (ref 20–32)
CPB APPLIED: ABNORMAL
CREAT SERPL-MCNC: 1.13 MG/DL (ref 0.66–1.25)
CREAT SERPL-MCNC: 1.24 MG/DL (ref 0.66–1.25)
DACRYOCYTES BLD QL SMEAR: SLIGHT
DIFFERENTIAL METHOD BLD: ABNORMAL
EOSINOPHIL # BLD AUTO: 2.7 10E9/L (ref 0–0.7)
EOSINOPHIL NFR BLD AUTO: 6 %
ERYTHROCYTE [DISTWIDTH] IN BLOOD BY AUTOMATED COUNT: 18.1 % (ref 10–15)
ERYTHROCYTE [DISTWIDTH] IN BLOOD BY AUTOMATED COUNT: 20.6 % (ref 10–15)
ERYTHROCYTE [DISTWIDTH] IN BLOOD BY AUTOMATED COUNT: 20.9 % (ref 10–15)
ERYTHROCYTE [DISTWIDTH] IN BLOOD BY AUTOMATED COUNT: 21.1 % (ref 10–15)
ERYTHROCYTE [DISTWIDTH] IN BLOOD BY AUTOMATED COUNT: 21.1 % (ref 10–15)
FIBRINOGEN PPP-MCNC: 267 MG/DL (ref 200–420)
FIBRINOGEN PPP-MCNC: 293 MG/DL (ref 200–420)
FIBRINOGEN PPP-MCNC: 314 MG/DL (ref 200–420)
FIBRINOGEN PPP-MCNC: 334 MG/DL (ref 200–420)
GFR SERPL CREATININE-BSD FRML MDRD: 58 ML/MIN/{1.73_M2}
GFR SERPL CREATININE-BSD FRML MDRD: 65 ML/MIN/{1.73_M2}
GLUCOSE BLDC GLUCOMTR-MCNC: 107 MG/DL (ref 70–99)
GLUCOSE BLDC GLUCOMTR-MCNC: 128 MG/DL (ref 70–99)
GLUCOSE BLDC GLUCOMTR-MCNC: 144 MG/DL (ref 70–99)
GLUCOSE BLDC GLUCOMTR-MCNC: 170 MG/DL (ref 70–99)
GLUCOSE BLDC GLUCOMTR-MCNC: 171 MG/DL (ref 70–99)
GLUCOSE BLDC GLUCOMTR-MCNC: 182 MG/DL (ref 70–99)
GLUCOSE BLDC GLUCOMTR-MCNC: 188 MG/DL (ref 70–99)
GLUCOSE BLDC GLUCOMTR-MCNC: 191 MG/DL (ref 70–99)
GLUCOSE BLDC GLUCOMTR-MCNC: 195 MG/DL (ref 70–99)
GLUCOSE BLDC GLUCOMTR-MCNC: 197 MG/DL (ref 70–99)
GLUCOSE BLDC GLUCOMTR-MCNC: 203 MG/DL (ref 70–99)
GLUCOSE BLDC GLUCOMTR-MCNC: 203 MG/DL (ref 70–99)
GLUCOSE BLDC GLUCOMTR-MCNC: 218 MG/DL (ref 70–99)
GLUCOSE BLDC GLUCOMTR-MCNC: 219 MG/DL (ref 70–99)
GLUCOSE SERPL-MCNC: 189 MG/DL (ref 70–99)
GLUCOSE SERPL-MCNC: 197 MG/DL (ref 70–99)
HCO3 BLD-SCNC: 20 MMOL/L (ref 21–28)
HCO3 BLDV-SCNC: 21 MMOL/L (ref 21–28)
HCO3 BLDV-SCNC: 21 MMOL/L (ref 21–28)
HCO3 BLDV-SCNC: 24 MMOL/L (ref 21–28)
HCT VFR BLD AUTO: 25 % (ref 40–53)
HCT VFR BLD AUTO: 25.6 % (ref 40–53)
HCT VFR BLD AUTO: 29.6 % (ref 40–53)
HCT VFR BLD AUTO: 31.6 % (ref 40–53)
HCT VFR BLD AUTO: 36.6 % (ref 40–53)
HCT VFR BLD CALC: 29 %PCV (ref 40–53)
HCT VFR BLD CALC: 31 %PCV (ref 40–53)
HCT VFR BLD CALC: 32 %PCV (ref 40–53)
HCT VFR BLD CALC: 33 %PCV (ref 40–53)
HGB BLD CALC-MCNC: 10.5 G/DL (ref 13.3–17.7)
HGB BLD CALC-MCNC: 10.9 G/DL (ref 13.3–17.7)
HGB BLD CALC-MCNC: 11.2 G/DL (ref 13.3–17.7)
HGB BLD CALC-MCNC: 9.9 G/DL (ref 13.3–17.7)
HGB BLD-MCNC: 11.9 G/DL (ref 13.3–17.7)
HGB BLD-MCNC: 7.8 G/DL (ref 13.3–17.7)
HGB BLD-MCNC: 8.1 G/DL (ref 13.3–17.7)
HGB BLD-MCNC: 9.2 G/DL (ref 13.3–17.7)
HGB BLD-MCNC: 9.9 G/DL (ref 13.3–17.7)
INR PPP: 0.79 (ref 0.86–1.14)
INR PPP: 0.91 (ref 0.86–1.14)
INR PPP: 1.55 (ref 0.86–1.14)
INR PPP: 1.57 (ref 0.86–1.14)
INR PPP: 1.74 (ref 0.86–1.14)
LACTATE BLD-SCNC: 3.3 MMOL/L (ref 0.7–2)
LYMPHOCYTES # BLD AUTO: 3.2 10E9/L (ref 0.8–5.3)
LYMPHOCYTES NFR BLD AUTO: 7 %
MAGNESIUM SERPL-MCNC: 2.8 MG/DL (ref 1.6–2.3)
MCH RBC QN AUTO: 28.9 PG (ref 26.5–33)
MCH RBC QN AUTO: 29.1 PG (ref 26.5–33)
MCH RBC QN AUTO: 29.1 PG (ref 26.5–33)
MCH RBC QN AUTO: 29.2 PG (ref 26.5–33)
MCH RBC QN AUTO: 29.7 PG (ref 26.5–33)
MCHC RBC AUTO-ENTMCNC: 31.1 G/DL (ref 31.5–36.5)
MCHC RBC AUTO-ENTMCNC: 31.2 G/DL (ref 31.5–36.5)
MCHC RBC AUTO-ENTMCNC: 31.3 G/DL (ref 31.5–36.5)
MCHC RBC AUTO-ENTMCNC: 31.6 G/DL (ref 31.5–36.5)
MCHC RBC AUTO-ENTMCNC: 32.5 G/DL (ref 31.5–36.5)
MCV RBC AUTO: 91 FL (ref 78–100)
MCV RBC AUTO: 92 FL (ref 78–100)
MCV RBC AUTO: 93 FL (ref 78–100)
METAMYELOCYTES # BLD: 3.7 10E9/L
METAMYELOCYTES NFR BLD MANUAL: 8 %
MONOCYTES # BLD AUTO: 0 10E9/L (ref 0–1.3)
MONOCYTES NFR BLD AUTO: 0 %
MRSA DNA SPEC QL NAA+PROBE: NEGATIVE
MYELOCYTES # BLD: 1.8 10E9/L
MYELOCYTES NFR BLD MANUAL: 4 %
NEUTROPHILS # BLD AUTO: 32.4 10E9/L (ref 1.6–8.3)
NEUTROPHILS NFR BLD AUTO: 71 %
NRBC # BLD AUTO: 0.9 10*3/UL
NRBC BLD AUTO-RTO: 2 /100
NUM BPU REQUESTED: 4
O2/TOTAL GAS SETTING VFR VENT: ABNORMAL %
OXYHGB MFR BLD: 97 % (ref 92–100)
OXYHGB MFR BLDV: 50 %
OXYHGB MFR BLDV: 59 %
OXYHGB MFR BLDV: 61 %
PCO2 BLD: 42 MM HG (ref 35–45)
PCO2 BLD: 44 MM HG (ref 35–45)
PCO2 BLD: 50 MM HG (ref 35–45)
PCO2 BLD: 52 MM HG (ref 35–45)
PCO2 BLDV: 43 MM HG (ref 40–50)
PCO2 BLDV: 44 MM HG (ref 40–50)
PCO2 BLDV: 53 MM HG (ref 40–50)
PCO2 BLDV: 60 MM HG (ref 40–50)
PH BLD: 7.26 PH (ref 7.35–7.45)
PH BLD: 7.26 PH (ref 7.35–7.45)
PH BLD: 7.3 PH (ref 7.35–7.45)
PH BLD: 7.36 PH (ref 7.35–7.45)
PH BLDV: 7.21 PH (ref 7.32–7.43)
PH BLDV: 7.24 PH (ref 7.32–7.43)
PH BLDV: 7.3 PH (ref 7.32–7.43)
PH BLDV: 7.36 PH (ref 7.32–7.43)
PHOSPHATE SERPL-MCNC: 4.5 MG/DL (ref 2.5–4.5)
PLATELET # BLD AUTO: 286 10E9/L (ref 150–450)
PLATELET # BLD AUTO: 334 10E9/L (ref 150–450)
PLATELET # BLD AUTO: 400 10E9/L (ref 150–450)
PLATELET # BLD AUTO: 429 10E9/L (ref 150–450)
PLATELET # BLD AUTO: 430 10E9/L (ref 150–450)
PLATELET # BLD EST: ABNORMAL 10*3/UL
PO2 BLD: 119 MM HG (ref 80–105)
PO2 BLD: 151 MM HG (ref 80–105)
PO2 BLD: 162 MM HG (ref 80–105)
PO2 BLD: 177 MM HG (ref 80–105)
PO2 BLDV: 26 MM HG (ref 25–47)
PO2 BLDV: 31 MM HG (ref 25–47)
PO2 BLDV: 37 MM HG (ref 25–47)
PO2 BLDV: 45 MM HG (ref 25–47)
POLYCHROMASIA BLD QL SMEAR: SLIGHT
POTASSIUM BLD-SCNC: 4.4 MMOL/L (ref 3.4–5.3)
POTASSIUM BLD-SCNC: 6.1 MMOL/L (ref 3.4–5.3)
POTASSIUM BLD-SCNC: 6.2 MMOL/L (ref 3.4–5.3)
POTASSIUM BLD-SCNC: 6.4 MMOL/L (ref 3.4–5.3)
POTASSIUM SERPL-SCNC: 4.3 MMOL/L (ref 3.4–5.3)
POTASSIUM SERPL-SCNC: 4.5 MMOL/L (ref 3.4–5.3)
POTASSIUM SERPL-SCNC: 5.3 MMOL/L (ref 3.4–5.3)
PROMYELOCYTES # BLD MANUAL: 0.9 10E9/L
PROMYELOCYTES NFR BLD MANUAL: 2 %
PROT SERPL-MCNC: 5.2 G/DL (ref 6.8–8.8)
RBC # BLD AUTO: 2.68 10E12/L (ref 4.4–5.9)
RBC # BLD AUTO: 2.78 10E12/L (ref 4.4–5.9)
RBC # BLD AUTO: 3.18 10E12/L (ref 4.4–5.9)
RBC # BLD AUTO: 3.39 10E12/L (ref 4.4–5.9)
RBC # BLD AUTO: 4.01 10E12/L (ref 4.4–5.9)
SAO2 % BLDA FROM PO2: 99 % (ref 92–100)
SAO2 % BLDV FROM PO2: 71 %
SODIUM BLD-SCNC: 135 MMOL/L (ref 133–144)
SODIUM BLD-SCNC: 135 MMOL/L (ref 133–144)
SODIUM BLD-SCNC: 136 MMOL/L (ref 133–144)
SODIUM BLD-SCNC: 138 MMOL/L (ref 133–144)
SODIUM SERPL-SCNC: 137 MMOL/L (ref 133–144)
SODIUM SERPL-SCNC: 142 MMOL/L (ref 133–144)
SPECIMEN EXP DATE BLD: NORMAL
SPECIMEN SOURCE: NORMAL
TRANSFUSION STATUS PATIENT QL: NORMAL
URATE SERPL-MCNC: 7.7 MG/DL (ref 3.5–7.2)
WBC # BLD AUTO: 101.7 10E9/L (ref 4–11)
WBC # BLD AUTO: 45.7 10E9/L (ref 4–11)
WBC # BLD AUTO: 71.9 10E9/L (ref 4–11)
WBC # BLD AUTO: 98 10E9/L (ref 4–11)
WBC # BLD AUTO: 98.3 10E9/L (ref 4–11)

## 2020-01-31 PROCEDURE — 25000125 ZZHC RX 250: Performed by: REGISTERED NURSE

## 2020-01-31 PROCEDURE — 40000986 XR CHEST PORT 1 VW

## 2020-01-31 PROCEDURE — 84550 ASSAY OF BLOOD/URIC ACID: CPT | Performed by: SURGERY

## 2020-01-31 PROCEDURE — 83735 ASSAY OF MAGNESIUM: CPT | Performed by: SURGERY

## 2020-01-31 PROCEDURE — 85730 THROMBOPLASTIN TIME PARTIAL: CPT | Performed by: SURGERY

## 2020-01-31 PROCEDURE — 99291 CRITICAL CARE FIRST HOUR: CPT | Performed by: NURSE PRACTITIONER

## 2020-01-31 PROCEDURE — 41000022 ZZH PER-PERFUSION, SH ONLY,  1ST 30 MIN: Performed by: SURGERY

## 2020-01-31 PROCEDURE — 37000009 ZZH ANESTHESIA TECHNICAL FEE, EACH ADDTL 15 MIN: Performed by: SURGERY

## 2020-01-31 PROCEDURE — 27210794 ZZH OR GENERAL SUPPLY STERILE: Performed by: SURGERY

## 2020-01-31 PROCEDURE — 25800030 ZZH RX IP 258 OP 636: Performed by: REGISTERED NURSE

## 2020-01-31 PROCEDURE — 80053 COMPREHEN METABOLIC PANEL: CPT | Performed by: SURGERY

## 2020-01-31 PROCEDURE — 85610 PROTHROMBIN TIME: CPT | Performed by: SURGERY

## 2020-01-31 PROCEDURE — 83605 ASSAY OF LACTIC ACID: CPT | Performed by: SURGERY

## 2020-01-31 PROCEDURE — 4A133B3 MONITORING OF ARTERIAL PRESSURE, PULMONARY, PERCUTANEOUS APPROACH: ICD-10-PCS | Performed by: SURGERY

## 2020-01-31 PROCEDURE — 85014 HEMATOCRIT: CPT

## 2020-01-31 PROCEDURE — 40000275 ZZH STATISTIC RCP TIME EA 10 MIN

## 2020-01-31 PROCEDURE — 82805 BLOOD GASES W/O2 SATURATION: CPT | Performed by: SURGERY

## 2020-01-31 PROCEDURE — 87641 MR-STAPH DNA AMP PROBE: CPT | Performed by: SURGERY

## 2020-01-31 PROCEDURE — 85384 FIBRINOGEN ACTIVITY: CPT | Performed by: SURGERY

## 2020-01-31 PROCEDURE — 25000131 ZZH RX MED GY IP 250 OP 636 PS 637: Performed by: SURGERY

## 2020-01-31 PROCEDURE — 4A1239Z MONITORING OF CARDIAC OUTPUT, PERCUTANEOUS APPROACH: ICD-10-PCS | Performed by: SURGERY

## 2020-01-31 PROCEDURE — 84132 ASSAY OF SERUM POTASSIUM: CPT

## 2020-01-31 PROCEDURE — 021109W BYPASS CORONARY ARTERY, TWO ARTERIES FROM AORTA WITH AUTOLOGOUS VENOUS TISSUE, OPEN APPROACH: ICD-10-PCS | Performed by: SURGERY

## 2020-01-31 PROCEDURE — 25800030 ZZH RX IP 258 OP 636: Performed by: SURGERY

## 2020-01-31 PROCEDURE — 85025 COMPLETE CBC W/AUTO DIFF WBC: CPT | Performed by: ANESTHESIOLOGY

## 2020-01-31 PROCEDURE — 36000073 ZZH SURGERY LEVEL 6 1ST 30 MIN: Performed by: SURGERY

## 2020-01-31 PROCEDURE — 40000008 ZZH STATISTIC AIRWAY CARE

## 2020-01-31 PROCEDURE — 25800030 ZZH RX IP 258 OP 636: Performed by: ANESTHESIOLOGY

## 2020-01-31 PROCEDURE — 84132 ASSAY OF SERUM POTASSIUM: CPT | Performed by: ANESTHESIOLOGY

## 2020-01-31 PROCEDURE — B245ZZ4 ULTRASONOGRAPHY OF LEFT HEART, TRANSESOPHAGEAL: ICD-10-PCS | Performed by: SURGERY

## 2020-01-31 PROCEDURE — 82330 ASSAY OF CALCIUM: CPT | Performed by: SURGERY

## 2020-01-31 PROCEDURE — P9041 ALBUMIN (HUMAN),5%, 50ML: HCPCS

## 2020-01-31 PROCEDURE — 84100 ASSAY OF PHOSPHORUS: CPT | Performed by: SURGERY

## 2020-01-31 PROCEDURE — 25000132 ZZH RX MED GY IP 250 OP 250 PS 637: Performed by: SURGERY

## 2020-01-31 PROCEDURE — 25000128 H RX IP 250 OP 636: Performed by: REGISTERED NURSE

## 2020-01-31 PROCEDURE — P9016 RBC LEUKOCYTES REDUCED: HCPCS | Performed by: SURGERY

## 2020-01-31 PROCEDURE — 00000146 ZZHCL STATISTIC GLUCOSE BY METER IP

## 2020-01-31 PROCEDURE — 40000344 ZZHCL STATISTIC THAWING COMPONENT: Performed by: SURGERY

## 2020-01-31 PROCEDURE — 25000128 H RX IP 250 OP 636: Performed by: SURGERY

## 2020-01-31 PROCEDURE — 82803 BLOOD GASES ANY COMBINATION: CPT

## 2020-01-31 PROCEDURE — 80048 BASIC METABOLIC PNL TOTAL CA: CPT | Performed by: SURGERY

## 2020-01-31 PROCEDURE — 5A1221Z PERFORMANCE OF CARDIAC OUTPUT, CONTINUOUS: ICD-10-PCS | Performed by: SURGERY

## 2020-01-31 PROCEDURE — 25000555 ZZHC RX FACTOR IP 250 OP 636: Performed by: SURGERY

## 2020-01-31 PROCEDURE — 87640 STAPH A DNA AMP PROBE: CPT | Performed by: SURGERY

## 2020-01-31 PROCEDURE — 25000128 H RX IP 250 OP 636: Performed by: NURSE PRACTITIONER

## 2020-01-31 PROCEDURE — 25000128 H RX IP 250 OP 636

## 2020-01-31 PROCEDURE — 25000128 H RX IP 250 OP 636: Performed by: ANESTHESIOLOGY

## 2020-01-31 PROCEDURE — 84295 ASSAY OF SERUM SODIUM: CPT

## 2020-01-31 PROCEDURE — 40000171 ZZH STATISTIC PRE-PROCEDURE ASSESSMENT III: Performed by: SURGERY

## 2020-01-31 PROCEDURE — 94003 VENT MGMT INPAT SUBQ DAY: CPT

## 2020-01-31 PROCEDURE — 99292 CRITICAL CARE ADDL 30 MIN: CPT | Performed by: INTERNAL MEDICINE

## 2020-01-31 PROCEDURE — 06BQ4ZZ EXCISION OF LEFT SAPHENOUS VEIN, PERCUTANEOUS ENDOSCOPIC APPROACH: ICD-10-PCS | Performed by: SURGERY

## 2020-01-31 PROCEDURE — P9041 ALBUMIN (HUMAN),5%, 50ML: HCPCS | Performed by: SURGERY

## 2020-01-31 PROCEDURE — 37000008 ZZH ANESTHESIA TECHNICAL FEE, 1ST 30 MIN: Performed by: SURGERY

## 2020-01-31 PROCEDURE — 36000075 ZZH SURGERY LEVEL 6 EA 15 ADDTL MIN: Performed by: SURGERY

## 2020-01-31 PROCEDURE — 82330 ASSAY OF CALCIUM: CPT

## 2020-01-31 PROCEDURE — 02100Z9 BYPASS CORONARY ARTERY, ONE ARTERY FROM LEFT INTERNAL MAMMARY, OPEN APPROACH: ICD-10-PCS | Performed by: SURGERY

## 2020-01-31 PROCEDURE — 25000125 ZZHC RX 250: Performed by: SURGERY

## 2020-01-31 PROCEDURE — 25000566 ZZH SEVOFLURANE, EA 15 MIN: Performed by: SURGERY

## 2020-01-31 PROCEDURE — 36415 COLL VENOUS BLD VENIPUNCTURE: CPT | Performed by: ANESTHESIOLOGY

## 2020-01-31 PROCEDURE — 25000125 ZZHC RX 250: Performed by: ANESTHESIOLOGY

## 2020-01-31 PROCEDURE — 85027 COMPLETE CBC AUTOMATED: CPT | Performed by: SURGERY

## 2020-01-31 PROCEDURE — 06BP4ZZ EXCISION OF RIGHT SAPHENOUS VEIN, PERCUTANEOUS ENDOSCOPIC APPROACH: ICD-10-PCS | Performed by: SURGERY

## 2020-01-31 PROCEDURE — 93005 ELECTROCARDIOGRAM TRACING: CPT

## 2020-01-31 PROCEDURE — C9113 INJ PANTOPRAZOLE SODIUM, VIA: HCPCS | Performed by: SURGERY

## 2020-01-31 PROCEDURE — P9059 PLASMA, FRZ BETWEEN 8-24HOUR: HCPCS | Performed by: SURGERY

## 2020-01-31 PROCEDURE — 20000003 ZZH R&B ICU

## 2020-01-31 PROCEDURE — 25000301 ZZH OR RX SURGIFLO W/THROMBIN KIT 2ML 1991 OPNP: Performed by: SURGERY

## 2020-01-31 PROCEDURE — 41000023 ZZH PERA-PERFUSION, SH ONLY,  EACH ADDTL 15 MIN: Performed by: SURGERY

## 2020-01-31 PROCEDURE — 93010 ELECTROCARDIOGRAM REPORT: CPT | Performed by: INTERNAL MEDICINE

## 2020-01-31 RX ORDER — MEPERIDINE HYDROCHLORIDE 25 MG/ML
12.5-25 INJECTION INTRAMUSCULAR; INTRAVENOUS; SUBCUTANEOUS
Status: DISCONTINUED | OUTPATIENT
Start: 2020-01-31 | End: 2020-02-02

## 2020-01-31 RX ORDER — CEFAZOLIN SODIUM 2 G/100ML
2 INJECTION, SOLUTION INTRAVENOUS EVERY 8 HOURS
Status: COMPLETED | OUTPATIENT
Start: 2020-01-31 | End: 2020-02-01

## 2020-01-31 RX ORDER — LIDOCAINE 40 MG/G
CREAM TOPICAL
Status: DISCONTINUED | OUTPATIENT
Start: 2020-01-31 | End: 2020-02-10 | Stop reason: HOSPADM

## 2020-01-31 RX ORDER — GABAPENTIN 100 MG/1
100 CAPSULE ORAL 3 TIMES DAILY
Status: DISCONTINUED | OUTPATIENT
Start: 2020-01-31 | End: 2020-02-01

## 2020-01-31 RX ORDER — METHOCARBAMOL 500 MG/1
500 TABLET, FILM COATED ORAL 4 TIMES DAILY PRN
Status: DISCONTINUED | OUTPATIENT
Start: 2020-01-31 | End: 2020-02-10 | Stop reason: HOSPADM

## 2020-01-31 RX ORDER — NALOXONE HYDROCHLORIDE 0.4 MG/ML
.1-.4 INJECTION, SOLUTION INTRAMUSCULAR; INTRAVENOUS; SUBCUTANEOUS
Status: DISCONTINUED | OUTPATIENT
Start: 2020-01-31 | End: 2020-02-10 | Stop reason: HOSPADM

## 2020-01-31 RX ORDER — SODIUM CHLORIDE 9 MG/ML
INJECTION, SOLUTION INTRAVENOUS CONTINUOUS
Status: DISCONTINUED | OUTPATIENT
Start: 2020-01-31 | End: 2020-02-03

## 2020-01-31 RX ORDER — MAGNESIUM SULFATE HEPTAHYDRATE 40 MG/ML
4 INJECTION, SOLUTION INTRAVENOUS EVERY 4 HOURS PRN
Status: DISCONTINUED | OUTPATIENT
Start: 2020-01-31 | End: 2020-02-10 | Stop reason: HOSPADM

## 2020-01-31 RX ORDER — ALBUMIN, HUMAN INJ 5% 5 %
25 SOLUTION INTRAVENOUS ONCE
Status: DISCONTINUED | OUTPATIENT
Start: 2020-01-31 | End: 2020-01-31

## 2020-01-31 RX ORDER — DEXTROSE MONOHYDRATE 100 MG/ML
INJECTION, SOLUTION INTRAVENOUS CONTINUOUS PRN
Status: DISCONTINUED | OUTPATIENT
Start: 2020-01-31 | End: 2020-02-03

## 2020-01-31 RX ORDER — SODIUM CHLORIDE, SODIUM LACTATE, POTASSIUM CHLORIDE, CALCIUM CHLORIDE 600; 310; 30; 20 MG/100ML; MG/100ML; MG/100ML; MG/100ML
INJECTION, SOLUTION INTRAVENOUS CONTINUOUS PRN
Status: DISCONTINUED | OUTPATIENT
Start: 2020-01-31 | End: 2020-01-31

## 2020-01-31 RX ORDER — VECURONIUM BROMIDE 1 MG/ML
INJECTION, POWDER, LYOPHILIZED, FOR SOLUTION INTRAVENOUS PRN
Status: DISCONTINUED | OUTPATIENT
Start: 2020-01-31 | End: 2020-01-31

## 2020-01-31 RX ORDER — DEXTROSE MONOHYDRATE 25 G/50ML
25-50 INJECTION, SOLUTION INTRAVENOUS
Status: DISCONTINUED | OUTPATIENT
Start: 2020-01-31 | End: 2020-02-06

## 2020-01-31 RX ORDER — PROPOFOL 10 MG/ML
INJECTION, EMULSION INTRAVENOUS CONTINUOUS PRN
Status: DISCONTINUED | OUTPATIENT
Start: 2020-01-31 | End: 2020-01-31

## 2020-01-31 RX ORDER — ATORVASTATIN CALCIUM 40 MG/1
40 TABLET, FILM COATED ORAL AT BEDTIME
Status: DISCONTINUED | OUTPATIENT
Start: 2020-01-31 | End: 2020-02-10 | Stop reason: HOSPADM

## 2020-01-31 RX ORDER — HYDRALAZINE HYDROCHLORIDE 20 MG/ML
10 INJECTION INTRAMUSCULAR; INTRAVENOUS EVERY 30 MIN PRN
Status: DISCONTINUED | OUTPATIENT
Start: 2020-01-31 | End: 2020-02-10 | Stop reason: HOSPADM

## 2020-01-31 RX ORDER — HEPARIN SODIUM 1000 [USP'U]/ML
INJECTION, SOLUTION INTRAVENOUS; SUBCUTANEOUS PRN
Status: DISCONTINUED | OUTPATIENT
Start: 2020-01-31 | End: 2020-01-31

## 2020-01-31 RX ORDER — ONDANSETRON 2 MG/ML
4 INJECTION INTRAMUSCULAR; INTRAVENOUS EVERY 6 HOURS PRN
Status: DISCONTINUED | OUTPATIENT
Start: 2020-01-31 | End: 2020-02-10 | Stop reason: HOSPADM

## 2020-01-31 RX ORDER — PROPOFOL 10 MG/ML
5-75 INJECTION, EMULSION INTRAVENOUS CONTINUOUS
Status: DISCONTINUED | OUTPATIENT
Start: 2020-01-31 | End: 2020-02-03

## 2020-01-31 RX ORDER — SODIUM CHLORIDE, SODIUM LACTATE, POTASSIUM CHLORIDE, CALCIUM CHLORIDE 600; 310; 30; 20 MG/100ML; MG/100ML; MG/100ML; MG/100ML
INJECTION, SOLUTION INTRAVENOUS CONTINUOUS
Status: DISCONTINUED | OUTPATIENT
Start: 2020-01-31 | End: 2020-01-31 | Stop reason: HOSPADM

## 2020-01-31 RX ORDER — HEPARIN SODIUM 5000 [USP'U]/.5ML
5000 INJECTION, SOLUTION INTRAVENOUS; SUBCUTANEOUS EVERY 8 HOURS
Status: DISCONTINUED | OUTPATIENT
Start: 2020-02-01 | End: 2020-02-10 | Stop reason: HOSPADM

## 2020-01-31 RX ORDER — LIDOCAINE HYDROCHLORIDE 20 MG/ML
INJECTION, SOLUTION INFILTRATION; PERINEURAL PRN
Status: DISCONTINUED | OUTPATIENT
Start: 2020-01-31 | End: 2020-01-31

## 2020-01-31 RX ORDER — PROTAMINE SULFATE 10 MG/ML
40 INJECTION, SOLUTION INTRAVENOUS ONCE
Status: DISCONTINUED | OUTPATIENT
Start: 2020-01-31 | End: 2020-01-31

## 2020-01-31 RX ORDER — PAPAVERINE HYDROCHLORIDE 30 MG/ML
INJECTION INTRAMUSCULAR; INTRAVENOUS PRN
Status: DISCONTINUED | OUTPATIENT
Start: 2020-01-31 | End: 2020-01-31 | Stop reason: HOSPADM

## 2020-01-31 RX ORDER — AMOXICILLIN 250 MG
2 CAPSULE ORAL 2 TIMES DAILY
Status: DISCONTINUED | OUTPATIENT
Start: 2020-01-31 | End: 2020-02-01

## 2020-01-31 RX ORDER — METOPROLOL TARTRATE 25 MG/1
25 TABLET, FILM COATED ORAL
Status: DISCONTINUED | OUTPATIENT
Start: 2020-01-31 | End: 2020-01-31 | Stop reason: HOSPADM

## 2020-01-31 RX ORDER — EPHEDRINE SULFATE 50 MG/ML
INJECTION, SOLUTION INTRAMUSCULAR; INTRAVENOUS; SUBCUTANEOUS PRN
Status: DISCONTINUED | OUTPATIENT
Start: 2020-01-31 | End: 2020-01-31

## 2020-01-31 RX ORDER — POTASSIUM CHLORIDE 29.8 MG/ML
20 INJECTION INTRAVENOUS
Status: DISCONTINUED | OUTPATIENT
Start: 2020-01-31 | End: 2020-02-10 | Stop reason: HOSPADM

## 2020-01-31 RX ORDER — ALBUMIN, HUMAN INJ 5% 5 %
25 SOLUTION INTRAVENOUS ONCE
Status: COMPLETED | OUTPATIENT
Start: 2020-01-31 | End: 2020-01-31

## 2020-01-31 RX ORDER — ALBUMIN, HUMAN INJ 5% 5 %
12.5 SOLUTION INTRAVENOUS ONCE
Status: COMPLETED | OUTPATIENT
Start: 2020-01-31 | End: 2020-01-31

## 2020-01-31 RX ORDER — DIPHENHYDRAMINE HYDROCHLORIDE 50 MG/ML
12.5 INJECTION INTRAMUSCULAR; INTRAVENOUS EVERY 6 HOURS PRN
Status: DISCONTINUED | OUTPATIENT
Start: 2020-01-31 | End: 2020-02-10 | Stop reason: HOSPADM

## 2020-01-31 RX ORDER — HYDROXYUREA 500 MG/1
500 CAPSULE ORAL DAILY
Status: DISCONTINUED | OUTPATIENT
Start: 2020-01-31 | End: 2020-01-31

## 2020-01-31 RX ORDER — MAGNESIUM HYDROXIDE 1200 MG/15ML
LIQUID ORAL PRN
Status: DISCONTINUED | OUTPATIENT
Start: 2020-01-31 | End: 2020-01-31 | Stop reason: HOSPADM

## 2020-01-31 RX ORDER — CALCIUM CARBONATE/VITAMIN D3 600 MG-10
250 TABLET ORAL DAILY
COMMUNITY
End: 2020-04-29

## 2020-01-31 RX ORDER — CEFAZOLIN SODIUM 1 G/3ML
INJECTION, POWDER, FOR SOLUTION INTRAMUSCULAR; INTRAVENOUS PRN
Status: DISCONTINUED | OUTPATIENT
Start: 2020-01-31 | End: 2020-01-31

## 2020-01-31 RX ORDER — ACETAMINOPHEN 325 MG/1
975 TABLET ORAL EVERY 8 HOURS
Status: DISPENSED | OUTPATIENT
Start: 2020-01-31 | End: 2020-02-03

## 2020-01-31 RX ORDER — DIPHENHYDRAMINE HCL 12.5MG/5ML
12.5 LIQUID (ML) ORAL EVERY 6 HOURS PRN
Status: DISCONTINUED | OUTPATIENT
Start: 2020-01-31 | End: 2020-02-10 | Stop reason: HOSPADM

## 2020-01-31 RX ORDER — ONDANSETRON 4 MG/1
4 TABLET, ORALLY DISINTEGRATING ORAL EVERY 6 HOURS PRN
Status: DISCONTINUED | OUTPATIENT
Start: 2020-01-31 | End: 2020-02-10 | Stop reason: HOSPADM

## 2020-01-31 RX ORDER — METOCLOPRAMIDE HYDROCHLORIDE 5 MG/ML
5 INJECTION INTRAMUSCULAR; INTRAVENOUS EVERY 6 HOURS PRN
Status: DISCONTINUED | OUTPATIENT
Start: 2020-01-31 | End: 2020-02-10 | Stop reason: HOSPADM

## 2020-01-31 RX ORDER — POTASSIUM CHLORIDE 1.5 G/1.58G
20-40 POWDER, FOR SOLUTION ORAL
Status: DISCONTINUED | OUTPATIENT
Start: 2020-01-31 | End: 2020-02-10 | Stop reason: HOSPADM

## 2020-01-31 RX ORDER — MUPIROCIN 20 MG/G
0.5 OINTMENT TOPICAL 2 TIMES DAILY
Status: DISCONTINUED | OUTPATIENT
Start: 2020-01-31 | End: 2020-02-03 | Stop reason: CLARIF

## 2020-01-31 RX ORDER — PROCHLORPERAZINE MALEATE 5 MG
5 TABLET ORAL EVERY 6 HOURS PRN
Status: DISCONTINUED | OUTPATIENT
Start: 2020-01-31 | End: 2020-02-10 | Stop reason: HOSPADM

## 2020-01-31 RX ORDER — NICOTINE POLACRILEX 4 MG
15-30 LOZENGE BUCCAL
Status: DISCONTINUED | OUTPATIENT
Start: 2020-01-31 | End: 2020-02-06

## 2020-01-31 RX ORDER — AMOXICILLIN 250 MG
1 CAPSULE ORAL 2 TIMES DAILY
Status: DISCONTINUED | OUTPATIENT
Start: 2020-01-31 | End: 2020-02-01

## 2020-01-31 RX ORDER — METOPROLOL TARTRATE 25 MG/1
25 TABLET, FILM COATED ORAL EVERY 12 HOURS
Status: DISCONTINUED | OUTPATIENT
Start: 2020-02-01 | End: 2020-02-01

## 2020-01-31 RX ORDER — POTASSIUM CHLORIDE 1500 MG/1
20-40 TABLET, EXTENDED RELEASE ORAL
Status: DISCONTINUED | OUTPATIENT
Start: 2020-01-31 | End: 2020-02-10 | Stop reason: HOSPADM

## 2020-01-31 RX ORDER — FENTANYL CITRATE 50 UG/ML
INJECTION, SOLUTION INTRAMUSCULAR; INTRAVENOUS PRN
Status: DISCONTINUED | OUTPATIENT
Start: 2020-01-31 | End: 2020-01-31

## 2020-01-31 RX ORDER — POTASSIUM CL/LIDO/0.9 % NACL 10MEQ/0.1L
10 INTRAVENOUS SOLUTION, PIGGYBACK (ML) INTRAVENOUS
Status: DISCONTINUED | OUTPATIENT
Start: 2020-01-31 | End: 2020-02-10 | Stop reason: HOSPADM

## 2020-01-31 RX ORDER — HYDROMORPHONE HYDROCHLORIDE 1 MG/ML
.3-.5 INJECTION, SOLUTION INTRAMUSCULAR; INTRAVENOUS; SUBCUTANEOUS
Status: DISCONTINUED | OUTPATIENT
Start: 2020-01-31 | End: 2020-02-10 | Stop reason: HOSPADM

## 2020-01-31 RX ORDER — PROTAMINE SULFATE 10 MG/ML
INJECTION, SOLUTION INTRAVENOUS
Status: COMPLETED
Start: 2020-01-31 | End: 2020-01-31

## 2020-01-31 RX ORDER — DEXTROSE MONOHYDRATE, SODIUM CHLORIDE, AND POTASSIUM CHLORIDE 50; 1.49; 4.5 G/1000ML; G/1000ML; G/1000ML
INJECTION, SOLUTION INTRAVENOUS CONTINUOUS
Status: DISCONTINUED | OUTPATIENT
Start: 2020-01-31 | End: 2020-02-03

## 2020-01-31 RX ORDER — PROTAMINE SULFATE 10 MG/ML
INJECTION, SOLUTION INTRAVENOUS PRN
Status: DISCONTINUED | OUTPATIENT
Start: 2020-01-31 | End: 2020-01-31

## 2020-01-31 RX ORDER — PHENYLEPHRINE HCL IN 0.9% NACL 50MG/250ML
.5-2 PLASTIC BAG, INJECTION (ML) INTRAVENOUS CONTINUOUS
Status: DISCONTINUED | OUTPATIENT
Start: 2020-01-31 | End: 2020-02-03

## 2020-01-31 RX ORDER — MUPIROCIN 20 MG/G
OINTMENT TOPICAL 2 TIMES DAILY
Status: DISCONTINUED | OUTPATIENT
Start: 2020-01-31 | End: 2020-01-31 | Stop reason: HOSPADM

## 2020-01-31 RX ORDER — PROTAMINE SULFATE 10 MG/ML
50 INJECTION, SOLUTION INTRAVENOUS ONCE
Status: COMPLETED | OUTPATIENT
Start: 2020-01-31 | End: 2020-01-31

## 2020-01-31 RX ORDER — NITROGLYCERIN 20 MG/100ML
.07-2 INJECTION INTRAVENOUS CONTINUOUS
Status: DISCONTINUED | OUTPATIENT
Start: 2020-01-31 | End: 2020-02-03

## 2020-01-31 RX ORDER — POTASSIUM CHLORIDE, SODIUM CHLORIDE, CALCIUM CHLORIDE, AND MAGNESIUM CHLORIDE 17.6; 325.3; 119.3; 643 MG/100ML; MG/100ML; MG/100ML; MG/100ML
INJECTION, SOLUTION INTRA-ARTERIAL ONCE
Status: DISCONTINUED | OUTPATIENT
Start: 2020-01-31 | End: 2020-01-31

## 2020-01-31 RX ORDER — FUROSEMIDE 10 MG/ML
20 INJECTION INTRAMUSCULAR; INTRAVENOUS
Status: DISCONTINUED | OUTPATIENT
Start: 2020-01-31 | End: 2020-02-03

## 2020-01-31 RX ORDER — LABETALOL HYDROCHLORIDE 5 MG/ML
10 INJECTION, SOLUTION INTRAVENOUS EVERY 10 MIN PRN
Status: DISCONTINUED | OUTPATIENT
Start: 2020-01-31 | End: 2020-02-03

## 2020-01-31 RX ORDER — ALBUMIN, HUMAN INJ 5% 5 %
500-1000 SOLUTION INTRAVENOUS
Status: COMPLETED | OUTPATIENT
Start: 2020-01-31 | End: 2020-01-31

## 2020-01-31 RX ORDER — SODIUM CHLORIDE 9 MG/ML
INJECTION, SOLUTION INTRAVENOUS CONTINUOUS PRN
Status: DISCONTINUED | OUTPATIENT
Start: 2020-01-31 | End: 2020-01-31

## 2020-01-31 RX ORDER — OXYCODONE HYDROCHLORIDE 5 MG/1
5-10 TABLET ORAL EVERY 4 HOURS PRN
Status: DISCONTINUED | OUTPATIENT
Start: 2020-01-31 | End: 2020-02-10 | Stop reason: HOSPADM

## 2020-01-31 RX ORDER — CEFAZOLIN SODIUM 2 G/100ML
2 INJECTION, SOLUTION INTRAVENOUS
Status: COMPLETED | OUTPATIENT
Start: 2020-01-31 | End: 2020-01-31

## 2020-01-31 RX ORDER — ALBUMIN, HUMAN INJ 5% 5 %
SOLUTION INTRAVENOUS
Status: COMPLETED
Start: 2020-01-31 | End: 2020-01-31

## 2020-01-31 RX ORDER — FENTANYL CITRATE 50 UG/ML
50 INJECTION, SOLUTION INTRAMUSCULAR; INTRAVENOUS EVERY 5 MIN PRN
Status: DISCONTINUED | OUTPATIENT
Start: 2020-01-31 | End: 2020-01-31 | Stop reason: HOSPADM

## 2020-01-31 RX ORDER — FAMOTIDINE 20 MG/1
20 TABLET, FILM COATED ORAL
Status: COMPLETED | OUTPATIENT
Start: 2020-01-31 | End: 2020-01-31

## 2020-01-31 RX ORDER — POTASSIUM CHLORIDE 7.45 MG/ML
10 INJECTION INTRAVENOUS
Status: DISCONTINUED | OUTPATIENT
Start: 2020-01-31 | End: 2020-02-10 | Stop reason: HOSPADM

## 2020-01-31 RX ORDER — MAGNESIUM SULFATE HEPTAHYDRATE 40 MG/ML
2 INJECTION, SOLUTION INTRAVENOUS DAILY PRN
Status: DISCONTINUED | OUTPATIENT
Start: 2020-01-31 | End: 2020-02-10 | Stop reason: HOSPADM

## 2020-01-31 RX ORDER — METOCLOPRAMIDE 5 MG/1
5 TABLET ORAL EVERY 6 HOURS PRN
Status: DISCONTINUED | OUTPATIENT
Start: 2020-01-31 | End: 2020-02-10 | Stop reason: HOSPADM

## 2020-01-31 RX ORDER — ACETAMINOPHEN 325 MG/1
650 TABLET ORAL EVERY 4 HOURS PRN
Status: DISCONTINUED | OUTPATIENT
Start: 2020-02-03 | End: 2020-02-10 | Stop reason: HOSPADM

## 2020-01-31 RX ORDER — CEFAZOLIN SODIUM 1 G/3ML
1 INJECTION, POWDER, FOR SOLUTION INTRAMUSCULAR; INTRAVENOUS SEE ADMIN INSTRUCTIONS
Status: DISCONTINUED | OUTPATIENT
Start: 2020-01-31 | End: 2020-01-31 | Stop reason: HOSPADM

## 2020-01-31 RX ADMIN — SODIUM CHLORIDE, POTASSIUM CHLORIDE, SODIUM LACTATE AND CALCIUM CHLORIDE: 600; 310; 30; 20 INJECTION, SOLUTION INTRAVENOUS at 06:41

## 2020-01-31 RX ADMIN — VECURONIUM BROMIDE 5 MG: 1 INJECTION, POWDER, LYOPHILIZED, FOR SOLUTION INTRAVENOUS at 09:22

## 2020-01-31 RX ADMIN — ALBUMIN HUMAN 25 G: 0.05 INJECTION, SOLUTION INTRAVENOUS at 15:23

## 2020-01-31 RX ADMIN — CEFAZOLIN SODIUM 2 G: 2 INJECTION, SOLUTION INTRAVENOUS at 20:17

## 2020-01-31 RX ADMIN — CEFAZOLIN 1 G: 1 INJECTION, POWDER, FOR SOLUTION INTRAMUSCULAR; INTRAVENOUS at 11:45

## 2020-01-31 RX ADMIN — PHENYLEPHRINE HYDROCHLORIDE 50 MCG: 10 INJECTION INTRAVENOUS at 11:51

## 2020-01-31 RX ADMIN — PHENYLEPHRINE HYDROCHLORIDE 50 MCG: 10 INJECTION INTRAVENOUS at 11:52

## 2020-01-31 RX ADMIN — FENTANYL CITRATE 50 MCG: 50 INJECTION, SOLUTION INTRAMUSCULAR; INTRAVENOUS at 10:19

## 2020-01-31 RX ADMIN — MUPIROCIN 0.5 G: 20 OINTMENT TOPICAL at 20:17

## 2020-01-31 RX ADMIN — SODIUM CHLORIDE 3 UNITS/HR: 9 INJECTION, SOLUTION INTRAVENOUS at 23:42

## 2020-01-31 RX ADMIN — GABAPENTIN 100 MG: 100 CAPSULE ORAL at 21:09

## 2020-01-31 RX ADMIN — Medication 5 MG: at 12:12

## 2020-01-31 RX ADMIN — PHENYLEPHRINE HYDROCHLORIDE 100 MCG: 10 INJECTION INTRAVENOUS at 11:44

## 2020-01-31 RX ADMIN — SODIUM CHLORIDE: 9 INJECTION, SOLUTION INTRAVENOUS at 07:29

## 2020-01-31 RX ADMIN — VECURONIUM BROMIDE 5 MG: 1 INJECTION, POWDER, LYOPHILIZED, FOR SOLUTION INTRAVENOUS at 08:08

## 2020-01-31 RX ADMIN — MIDAZOLAM HYDROCHLORIDE 1 MG: 1 INJECTION, SOLUTION INTRAMUSCULAR; INTRAVENOUS at 07:26

## 2020-01-31 RX ADMIN — SODIUM CHLORIDE: 9 INJECTION, SOLUTION INTRAVENOUS at 12:33

## 2020-01-31 RX ADMIN — PANTOPRAZOLE SODIUM 40 MG: 40 INJECTION, POWDER, FOR SOLUTION INTRAVENOUS at 15:27

## 2020-01-31 RX ADMIN — PHENYLEPHRINE HYDROCHLORIDE 50 MCG: 10 INJECTION INTRAVENOUS at 09:28

## 2020-01-31 RX ADMIN — AMINOCAPROIC ACID 5 G/HR: 250 INJECTION, SOLUTION INTRAVENOUS at 07:50

## 2020-01-31 RX ADMIN — FENTANYL CITRATE 50 MCG: 50 INJECTION, SOLUTION INTRAMUSCULAR; INTRAVENOUS at 11:38

## 2020-01-31 RX ADMIN — PHENYLEPHRINE HYDROCHLORIDE 100 MCG: 10 INJECTION INTRAVENOUS at 11:43

## 2020-01-31 RX ADMIN — ALBUMIN HUMAN 12.5 G: 0.05 INJECTION, SOLUTION INTRAVENOUS at 17:04

## 2020-01-31 RX ADMIN — VECURONIUM BROMIDE 5 MG: 1 INJECTION, POWDER, LYOPHILIZED, FOR SOLUTION INTRAVENOUS at 10:19

## 2020-01-31 RX ADMIN — FENTANYL CITRATE 50 MCG: 50 INJECTION, SOLUTION INTRAMUSCULAR; INTRAVENOUS at 06:51

## 2020-01-31 RX ADMIN — FENTANYL CITRATE 100 MCG: 50 INJECTION, SOLUTION INTRAMUSCULAR; INTRAVENOUS at 08:16

## 2020-01-31 RX ADMIN — PROTAMINE SULFATE 50 MG: 10 INJECTION, SOLUTION INTRAVENOUS at 14:37

## 2020-01-31 RX ADMIN — Medication 5 MG: at 11:55

## 2020-01-31 RX ADMIN — CEFAZOLIN SODIUM 2 G: 2 INJECTION, SOLUTION INTRAVENOUS at 07:45

## 2020-01-31 RX ADMIN — CEFAZOLIN 1 G: 1 INJECTION, POWDER, FOR SOLUTION INTRAMUSCULAR; INTRAVENOUS at 09:45

## 2020-01-31 RX ADMIN — ALBUMIN HUMAN 25 G: 50 SOLUTION INTRAVENOUS at 15:23

## 2020-01-31 RX ADMIN — PHENYLEPHRINE HYDROCHLORIDE 100 MCG: 10 INJECTION INTRAVENOUS at 11:32

## 2020-01-31 RX ADMIN — PHENYLEPHRINE HYDROCHLORIDE 100 MCG: 10 INJECTION INTRAVENOUS at 07:36

## 2020-01-31 RX ADMIN — MUPIROCIN: 20 OINTMENT TOPICAL at 05:59

## 2020-01-31 RX ADMIN — POTASSIUM CHLORIDE, DEXTROSE MONOHYDRATE AND SODIUM CHLORIDE: 150; 5; 450 INJECTION, SOLUTION INTRAVENOUS at 13:43

## 2020-01-31 RX ADMIN — SODIUM CHLORIDE 1 UNITS/HR: 9 INJECTION, SOLUTION INTRAVENOUS at 13:58

## 2020-01-31 RX ADMIN — LIDOCAINE HYDROCHLORIDE 2 ML: 10 INJECTION, SOLUTION EPIDURAL; INFILTRATION; INTRACAUDAL; PERINEURAL at 06:42

## 2020-01-31 RX ADMIN — PROPOFOL 30 MCG/KG/MIN: 10 INJECTION, EMULSION INTRAVENOUS at 22:50

## 2020-01-31 RX ADMIN — ALBUMIN HUMAN 12.5 G: 50 SOLUTION INTRAVENOUS at 17:04

## 2020-01-31 RX ADMIN — ROCURONIUM BROMIDE 50 MG: 10 INJECTION INTRAVENOUS at 07:30

## 2020-01-31 RX ADMIN — ATORVASTATIN CALCIUM 40 MG: 40 TABLET, FILM COATED ORAL at 21:09

## 2020-01-31 RX ADMIN — PHENYLEPHRINE HYDROCHLORIDE 150 MCG: 10 INJECTION INTRAVENOUS at 11:48

## 2020-01-31 RX ADMIN — PHENYLEPHRINE HYDROCHLORIDE 0.25 MCG/KG/MIN: 10 INJECTION INTRAVENOUS at 07:36

## 2020-01-31 RX ADMIN — PHENYLEPHRINE HYDROCHLORIDE 100 MCG: 10 INJECTION INTRAVENOUS at 11:54

## 2020-01-31 RX ADMIN — PROPOFOL 40 MCG/KG/MIN: 10 INJECTION, EMULSION INTRAVENOUS at 12:35

## 2020-01-31 RX ADMIN — PHENYLEPHRINE HYDROCHLORIDE 100 MCG: 10 INJECTION INTRAVENOUS at 12:08

## 2020-01-31 RX ADMIN — PHENYLEPHRINE HYDROCHLORIDE 50 MCG: 10 INJECTION INTRAVENOUS at 09:25

## 2020-01-31 RX ADMIN — AMINOCAPROIC ACID 1 G/HR: 250 INJECTION, SOLUTION INTRAVENOUS at 08:50

## 2020-01-31 RX ADMIN — PHENYLEPHRINE HYDROCHLORIDE 100 MCG: 10 INJECTION INTRAVENOUS at 09:53

## 2020-01-31 RX ADMIN — PHENYLEPHRINE HYDROCHLORIDE 100 MCG: 10 INJECTION INTRAVENOUS at 09:56

## 2020-01-31 RX ADMIN — PHENYLEPHRINE HYDROCHLORIDE 100 MCG: 10 INJECTION INTRAVENOUS at 09:05

## 2020-01-31 RX ADMIN — SODIUM CHLORIDE, POTASSIUM CHLORIDE, SODIUM LACTATE AND CALCIUM CHLORIDE: 600; 310; 30; 20 INJECTION, SOLUTION INTRAVENOUS at 07:35

## 2020-01-31 RX ADMIN — EPINEPHRINE 0.03 MCG/KG/MIN: 1 INJECTION INTRAMUSCULAR; INTRAVENOUS; SUBCUTANEOUS at 11:22

## 2020-01-31 RX ADMIN — PHENYLEPHRINE HYDROCHLORIDE 100 MCG: 10 INJECTION INTRAVENOUS at 09:40

## 2020-01-31 RX ADMIN — FENTANYL CITRATE 50 MCG: 50 INJECTION, SOLUTION INTRAMUSCULAR; INTRAVENOUS at 09:22

## 2020-01-31 RX ADMIN — MIDAZOLAM HYDROCHLORIDE 1 MG: 1 INJECTION, SOLUTION INTRAMUSCULAR; INTRAVENOUS at 06:53

## 2020-01-31 RX ADMIN — LIDOCAINE HYDROCHLORIDE 100 MG: 20 INJECTION, SOLUTION INFILTRATION; PERINEURAL at 07:30

## 2020-01-31 RX ADMIN — HEPARIN SODIUM 35000 UNITS: 1000 INJECTION, SOLUTION INTRAVENOUS; SUBCUTANEOUS at 09:14

## 2020-01-31 RX ADMIN — COAGULATION FACTOR VIIA (RECOMBINANT) 8000 MCG: KIT at 18:53

## 2020-01-31 RX ADMIN — PHENYLEPHRINE HYDROCHLORIDE 50 MCG: 10 INJECTION INTRAVENOUS at 09:47

## 2020-01-31 RX ADMIN — PHENYLEPHRINE HYDROCHLORIDE 100 MCG: 10 INJECTION INTRAVENOUS at 12:27

## 2020-01-31 RX ADMIN — PHENYLEPHRINE HYDROCHLORIDE 100 MCG: 10 INJECTION INTRAVENOUS at 11:42

## 2020-01-31 RX ADMIN — SENNOSIDES AND DOCUSATE SODIUM 1 TABLET: 8.6; 5 TABLET ORAL at 21:09

## 2020-01-31 RX ADMIN — ACETAMINOPHEN 975 MG: 325 TABLET, FILM COATED ORAL at 21:08

## 2020-01-31 RX ADMIN — PROPOFOL 30 MCG/KG/MIN: 10 INJECTION, EMULSION INTRAVENOUS at 17:08

## 2020-01-31 RX ADMIN — PHENYLEPHRINE HYDROCHLORIDE 50 MCG: 10 INJECTION INTRAVENOUS at 09:57

## 2020-01-31 RX ADMIN — PHENYLEPHRINE HYDROCHLORIDE 100 MCG: 10 INJECTION INTRAVENOUS at 07:51

## 2020-01-31 RX ADMIN — ALBUMIN HUMAN 500 ML: 0.05 INJECTION, SOLUTION INTRAVENOUS at 14:16

## 2020-01-31 RX ADMIN — VECURONIUM BROMIDE 5 MG: 1 INJECTION, POWDER, LYOPHILIZED, FOR SOLUTION INTRAVENOUS at 08:46

## 2020-01-31 RX ADMIN — PHENYLEPHRINE HYDROCHLORIDE 1.4 MCG/KG/MIN: 10 INJECTION INTRAVENOUS at 19:22

## 2020-01-31 RX ADMIN — PROTAMINE SULFATE 200 MG: 10 INJECTION, SOLUTION INTRAVENOUS at 11:41

## 2020-01-31 RX ADMIN — FENTANYL CITRATE 300 MCG: 50 INJECTION, SOLUTION INTRAMUSCULAR; INTRAVENOUS at 07:30

## 2020-01-31 RX ADMIN — SODIUM CHLORIDE: 9 INJECTION, SOLUTION INTRAVENOUS at 22:09

## 2020-01-31 RX ADMIN — HYDROMORPHONE HYDROCHLORIDE 0.3 MG: 1 INJECTION, SOLUTION INTRAMUSCULAR; INTRAVENOUS; SUBCUTANEOUS at 16:00

## 2020-01-31 RX ADMIN — PHENYLEPHRINE HYDROCHLORIDE 100 MCG: 10 INJECTION INTRAVENOUS at 09:51

## 2020-01-31 RX ADMIN — PHENYLEPHRINE HYDROCHLORIDE 100 MCG: 10 INJECTION INTRAVENOUS at 12:56

## 2020-01-31 RX ADMIN — FAMOTIDINE 20 MG: 20 TABLET, FILM COATED ORAL at 05:59

## 2020-01-31 RX ADMIN — PHENYLEPHRINE HYDROCHLORIDE 100 MCG: 10 INJECTION INTRAVENOUS at 09:54

## 2020-01-31 RX ADMIN — PHENYLEPHRINE HYDROCHLORIDE 50 MCG: 10 INJECTION INTRAVENOUS at 09:46

## 2020-01-31 RX ADMIN — PROTAMINE SULFATE 50 MG: 10 INJECTION, SOLUTION INTRAVENOUS at 17:14

## 2020-01-31 RX ADMIN — FENTANYL CITRATE 100 MCG: 50 INJECTION, SOLUTION INTRAMUSCULAR; INTRAVENOUS at 08:09

## 2020-01-31 RX ADMIN — MIDAZOLAM HYDROCHLORIDE 8 MG: 1 INJECTION, SOLUTION INTRAMUSCULAR; INTRAVENOUS at 07:30

## 2020-01-31 RX ADMIN — MIDAZOLAM HYDROCHLORIDE 1 MG: 1 INJECTION, SOLUTION INTRAMUSCULAR; INTRAVENOUS at 07:22

## 2020-01-31 ASSESSMENT — ACTIVITIES OF DAILY LIVING (ADL)
ADLS_ACUITY_SCORE: 11
ADLS_ACUITY_SCORE: 11

## 2020-01-31 ASSESSMENT — MIFFLIN-ST. JEOR: SCORE: 1628.09

## 2020-01-31 NOTE — ANESTHESIA PROCEDURE NOTES
PA Catheter Insertion Note  Anesthesiologist: Boo Weston MD    PA Catheter placed by resident/CRNA in the presence of a teaching physician.  Introducer: Introducer placed as part of procedure (SEE separate note)   Skin prep:  Chloraprep Cap, Full body drape, hand hygiene, Mask, Sterile gloves and Sterile gown        Distance catheter advanced:  47 cm.    Appropriate RA, RV, PA  waveforms?: Yes    Dressing:  Biopatch, Tegaderm and Occlusive dressing    Complications:  None apparent        PA catheter notes:  Ultrasound Interpretation, central venous and arterial catheter    1. Ultrasound guidance was used to evaluate potential access sites.  2. Ultrasound was also used to verify the patency of the vessel specified above.   3. Ultrasound was used to visualize the needle entering the vessel.   4. The visualized structures were anatomically normal.  5. There were no apparent abnormal pathological findings.  6. A permanent ultrasound image was saved in the patient's record.

## 2020-01-31 NOTE — PROGRESS NOTES
Hematology consult received.  Patient to be formally seen tomorrow.  Patient is a patient of Dr. Cristina with a recent diagnosis of myeloproliferative neoplasm with associated leukocytosis and anemia.  He is s/p CABG and has expected worsened anemia with blood loss from the procedure. Note patient currently on ventilator in ICU.  Would recommend holding hydroxyurea at this time until patient is extubated and able to take PO meds.  Follow CBC daily and transfuse for Hgb < 7 g/dL.    Aaliyah Mena MD  Hematology/Oncology  [facility]

## 2020-01-31 NOTE — BRIEF OP NOTE
[facility]    Brief Operative Note    Pre-operative diagnosis: CAD (coronary artery disease) [I25.10]  Post-operative diagnosis Same as pre-operative diagnosis    Procedure(s):  CORONARY ARTERY BYPASS GRAFTING X 3 - LIMA TO LAD, SV TO OM  AND PDA; ENDOVEIN HARVEST OF BILATERAL LEGS; ON PUMP WITH SANDRA    Surgeon: Surgeon(s) and Role:     * Mable Barrera MD - Primary     * Suzy Chacon PA-C - Assisting     * Yovani Melendrez MD - Fellow - Assisting    Anesthesia: General     Estimated blood loss: 500cc    Drains: Mediastinal x 2    Specimens: * No specimens in log *    Findings:   coronary artery disease.  Complications: None.  Implants: * No implants in log *

## 2020-01-31 NOTE — CONSULTS
[facility]  History and Physical/ Consult  Critical Care Service  Date of Admission:  1/31/2020  Date of Service (when I saw the patient): 1/31/20  Assessment & Plan   Renny Gannon is a 69 year old male with a past medical history of HTN, and recently diagnosed with myelofibrosis who presented to the clinic for worsening shortness of breath and chest discomfort. He was was found to have  severe 3-vessel coronary artery disease. He underwent a CABx3 by Dr. Barrera on January 31, 2020     Neuro  1. AMS, 2nd to surgery and anesthesia  2. Acute pain  3. Sedation  Plan:  -- Scheduled acetaminophen, lidoderm with PRN dilaudid and oxycodone.  -- Propofol for sedation as needed until extubation    CV  1. S/p CAB x3   2. HTN  Plan:  -- Per pathway  -- Cardiac meds per CV surgery  -- Continue statin  -- Hold Imdur, metoprolol, lisinopril/hctz and nitroglycerin fili-operatively    Resp:  1. Post operative ventilator management  Plan:  -- AC ventilation   -- PST when hemodynamically stable    GI/Nutrition  1. No prior hx  Plan:  -- NPO  -- PPI    Renal  1. No prior hx.  Baseline creatinine appears to be 1.15  Plan:  --monitor function and electrolytes as needed with replacement per ICU protocols.   -- generally avoid nephrotoxic agents such as NSAID, IV contrast unless specifically required  -- adjust medications as needed for renal clearance  -- follow I/O's as appropriate.  -- maintain euvolemia    ID  1. No acute issues   Plan:  -Ancef per CV surgery     Endocrine  1. Stress Hyperglycemia  Plan:  --  Insulin gtt per protocol if indicated  -- Keep BG  <180 for optimal healing    Heme:  1. Acute blood loss anemia  2. Myelofibrosis   Plan:  -- Monitor hemoglobin.  -- Transfuse to keep > 7.0  -- Heme/Onc consulted outpatient.  -- Daily CBC   -- Continue hydroxyurea 500 mg daily    MSK  1. Deconditioning   Plan:  -- PT/OT consult     Skin  1. Sternotomy/Incision    Plan:  -- Wound care as ordered     General  cares:  DVT Prophylaxis: Pneumatic Compression Devices  GI Prophylaxis: PPI  Restraints: Restraints for medical healing needed: NO  Family update by me today: No  Current lines are required for patient management  Access:      John Payan    Time Spent on this Encounter   Billing:  I spent 35 minutes bedside and on the inpatient unit today managing the critical care of Renny Gannon in relation to the issues listed in this note.    Code Status   Full Code    Primary Care Physician   Angus Clements Mai    Chief Complaint   Chest pain         Past Medical History    I have reviewed this patient's medical history and updated it with pertinent information if needed.   Past Medical History:   Diagnosis Date     Hypertension        Past Surgical History   I have reviewed this patient's surgical history and updated it with pertinent information if needed.  Past Surgical History:   Procedure Laterality Date     BONE MARROW BIOPSY, BONE SPECIMEN, NEEDLE/TROCAR N/A 12/30/2019    Procedure: BIOPSY, BONE MARROW;  Surgeon: Aguilar Krause MD;  Location: [facility] OR     CV CORONARY ANGIOGRAM Left 1/9/2020    Procedure: Coronary Angiogram;  Surgeon: Devin Bentley MD;  Location:  [facility]ARD[facility] C[facility]     NO HISTORY OF SURGERY         Prior to Admission Medications   Prior to Admission Medications   Prescriptions Last Dose Informant Patient Reported? Taking?   atorvastatin (LIPITOR) 40 MG tablet 1/30/2020 at AM Self No Yes   Sig: Take 1 tablet (40 mg) by mouth daily   hydroxyurea (HYDREA) 500 MG capsule 1/30/2020 at AM Self No Yes   Sig: Take 1 capsule (500 mg) by mouth daily   isosorbide mononitrate (IMDUR) 30 MG 24 hr tablet 1/30/2020 at AM Self No Yes   Sig: Take 1 tablet (30 mg) by mouth daily   lisinopril-hydrochlorothiazide (PRINZIDE/ZESTORETIC) 20-25 MG tablet 1/30/2020 at AM Self No Yes   Sig: TAKE ONE TABLET BY MOUTH ONCE DAILY STOP THE 10-12.5 MG DOSE   metoprolol succinate ER (TOPROL-XL) 25 MG 24 hr  tablet 2020 at 0300 Self No Yes   Sig: Take 2 tablets (50 mg) by mouth daily   Patient taking differently: Take 25 mg by mouth daily    naproxen (NAPROSYN) 500 MG tablet MORE THAN A WEEK at PRN Self No Yes   Sig: Take 1 tablet (500 mg) by mouth 2 times daily as needed for moderate pain   nitroGLYcerin (NITROSTAT) 0.4 MG sublingual tablet ON HAND Self No Yes   Sig: For chest pain place 1 tablet under the tongue every 5 minutes for 3 doses. If symptoms persist 5 minutes after 1st dose call 911.   vitamin B-12 (CYANOCOBALAMIN) 250 MCG tablet 2020 at AM Self Yes Yes   Sig: Take 250 mcg by mouth daily      Facility-Administered Medications: None     Allergies   Allergies   Allergen Reactions     No Known Drug Allergies      Seasonal Allergies        Social History   I have reviewed this patient's social history and updated it with pertinent information if needed. Renny Gannon  reports that he quit smoking about 19 years ago. His smoking use included cigarettes. He started smoking about 59 years ago. He quit after 30.00 years of use. He has never used smokeless tobacco. He reports that he does not drink alcohol or use drugs.    Family History   I have reviewed this patient's family history and updated it with pertinent information if needed.   Family History   Problem Relation Age of Onset     No Known Problems Mother      Unknown/Adopted Father      Unknown/Adopted Maternal Grandmother      Unknown/Adopted Maternal Grandfather      Unknown/Adopted Paternal Grandmother      Unknown/Adopted Paternal Grandfather      Cancer Brother         lung cancer - smoking     No Known Problems Sister      Coronary Artery Disease Brother          of MI at 66     No Known Problems Sister        Review of Systems   Review of systems not obtained due to patient factors - intubation and sedation    Physical Exam   Temp: 97  F (36.1  C) Temp src: Temporal Temp  Min: 97  F (36.1  C)  Max: 97  F (36.1  C) BP: (!) 145/68 Pulse:  89 Heart Rate: 89 Resp: 16 SpO2: 95 % O2 Device: None (Room air)    Vital Signs with Ranges  Temp:  [97  F (36.1  C)] 97  F (36.1  C)  Pulse:  [89] 89  Heart Rate:  [89] 89  Resp:  [16] 16  BP: (145)/(68) 145/68  SpO2:  [95 %] 95 %  197 lbs 0 oz    GEN: Intubated and sedated   EYES: PERRL, Anicteric sclera.   HEENT:  Normocephalic, atraumatic, trachea midline, ETT secure  CV: RRR, no gallops, rubs, or murmurs  PULM/CHEST: Clear breath sounds bilaterally without rhonchi, crackles or wheeze, symmetric chest rise  GI: normal bowel sounds, soft, non-tender, no rebound tenderness or guarding, no masses  : hightower catheter in place, urine yellow and clear  EXTREMITIES: No peripheral edema, moving all extremities, peripheral pulses intact  NEURO: No focal deficits noted.   SKIN: No rashes, sores or ulcerations  PSYCH:  MARGI   Imaging personally reviewed:  ECG, chest xr     Data   Results for orders placed or performed during the hospital encounter of 01/31/20 (from the past 24 hour(s))   Methicillin Resistant Staph Aureus PCR   Result Value Ref Range    Specimen Description Nares     Methicillin Resist/Sens S. aureus PCR Negative NEG^Negative   Potassium   Result Value Ref Range    Potassium 4.5 3.4 - 5.3 mmol/L   CBC with platelets differential   Result Value Ref Range    WBC 45.7 (H) 4.0 - 11.0 10e9/L    RBC Count 4.01 (L) 4.4 - 5.9 10e12/L    Hemoglobin 11.9 (L) 13.3 - 17.7 g/dL    Hematocrit 36.6 (L) 40.0 - 53.0 %    MCV 91 78 - 100 fl    MCH 29.7 26.5 - 33.0 pg    MCHC 32.5 31.5 - 36.5 g/dL    RDW 20.6 (H) 10.0 - 15.0 %    Platelet Count 334 150 - 450 10e9/L    Diff Method Manual Differential     % Neutrophils 71.0 %    % Lymphocytes 7.0 %    % Monocytes 0.0 %    % Eosinophils 6.0 %    % Basophils 0.0 %    % Metamyelocytes 8.0 %    % Myelocytes 4.0 %    % Promyelocytes 2.0 %    % Blasts 2.0 %    Nucleated RBCs 2 (H) 0 /100    Absolute Neutrophil 32.4 (H) 1.6 - 8.3 10e9/L    Absolute Lymphocytes 3.2 0.8 - 5.3 10e9/L     Absolute Monocytes 0.0 0.0 - 1.3 10e9/L    Absolute Eosinophils 2.7 (H) 0.0 - 0.7 10e9/L    Absolute Basophils 0.0 0.0 - 0.2 10e9/L    Absolute Metamyelocytes 3.7 (H) 0 10e9/L    Absolute Myelocytes 1.8 (H) 0 10e9/L    Absolute Promyeloctyes 0.9 (H) 0 10e9/L    Absolute Blasts 0.9 (H) 0 10e9/L    Absolute Nucleated RBC 0.9     Anisocytosis Moderate     Polychromasia Slight     Teardrop Cells Slight     Platelet Estimate       Automated count confirmed.  Giant platelets are present.   ISTAT gases elec ica art POCT   Result Value Ref Range    pH Arterial 7.36 7.35 - 7.45 pH    pCO2 Arterial 42 35 - 45 mm Hg    pO2 Arterial 162 (H) 80 - 105 mm Hg    Bicarbonate Arterial 24 21 - 28 mmol/L    O2 Sat Arterial 99 92 - 100 %    Sodium 138 133 - 144 mmol/L    Potassium 4.4 3.4 - 5.3 mmol/L    Calcium Ionized 5.0 4.4 - 5.2 mg/dL    Hemoglobin 11.2 (L) 13.3 - 17.7 g/dL    Hematocrit - POCT 33 (L) 40.0 - 53.0 %PCV   Glucose by meter   Result Value Ref Range    Glucose 107 (H) 70 - 99 mg/dL   ISTAT gases elec ica carlos POCT   Result Value Ref Range    Ph Venous 7.24 (L) 7.32 - 7.43 pH    PCO2 Venous 60 (H) 40 - 50 mm Hg    PO2 Venous 45 25 - 47 mm Hg    Bicarbonate Venous 26 21 - 28 mmol/L    O2 Sat Venous 71 %    Sodium 136 133 - 144 mmol/L    Potassium 6.1 (HH) 3.4 - 5.3 mmol/L    Calcium Ionized 4.5 4.4 - 5.2 mg/dL    CPB Applied       CPB Algorithm Applied to Hemoglobin and/or Hematocrit    Hemoglobin 10.9 (L) 13.3 - 17.7 g/dL    Hematocrit - POCT 32 (L) 40.0 - 53.0 %PCV   ISTAT gases elec ica art POCT   Result Value Ref Range    pH Arterial 7.30 (L) 7.35 - 7.45 pH    pCO2 Arterial 50 (H) 35 - 45 mm Hg    pO2 Arterial 177 (H) 80 - 105 mm Hg    Bicarbonate Arterial 25 21 - 28 mmol/L    O2 Sat Arterial 99 92 - 100 %    Sodium 135 133 - 144 mmol/L    Potassium 6.2 (HH) 3.4 - 5.3 mmol/L    Calcium Ionized 4.3 (L) 4.4 - 5.2 mg/dL    CPB Applied       CPB Algorithm Applied to Hemoglobin and/or Hematocrit    Hemoglobin 10.5 (L) 13.3  - 17.7 g/dL    Hematocrit - POCT 31 (L) 40.0 - 53.0 %PCV   Glucose by meter   Result Value Ref Range    Glucose 191 (H) 70 - 99 mg/dL   ISTAT gases elec ica art POCT   Result Value Ref Range    pH Arterial 7.26 (L) 7.35 - 7.45 pH    pCO2 Arterial 52 (H) 35 - 45 mm Hg    pO2 Arterial 151 (H) 80 - 105 mm Hg    Bicarbonate Arterial 23 21 - 28 mmol/L    O2 Sat Arterial 99 92 - 100 %    Sodium 135 133 - 144 mmol/L    Potassium 6.4 (HH) 3.4 - 5.3 mmol/L    Calcium Ionized 3.9 (L) 4.4 - 5.2 mg/dL    CPB Applied       CPB Algorithm Applied to Hemoglobin and/or Hematocrit    Hemoglobin 9.9 (L) 13.3 - 17.7 g/dL    Hematocrit - POCT 29 (L) 40.0 - 53.0 %PCV   Glucose by meter   Result Value Ref Range    Glucose 197 (H) 70 - 99 mg/dL   Basic metabolic panel   Result Value Ref Range    Sodium 137 133 - 144 mmol/L    Potassium 5.3 3.4 - 5.3 mmol/L    Chloride 108 94 - 109 mmol/L    Carbon Dioxide 22 20 - 32 mmol/L    Anion Gap 7 3 - 14 mmol/L    Glucose 197 (H) 70 - 99 mg/dL    Urea Nitrogen 23 7 - 30 mg/dL    Creatinine 1.13 0.66 - 1.25 mg/dL    GFR Estimate 65 >60 mL/min/[1.73_m2]    GFR Estimate If Black 76 >60 mL/min/[1.73_m2]    Calcium 8.4 (L) 8.5 - 10.1 mg/dL   CBC with platelets   Result Value Ref Range    .7 (HH) 4.0 - 11.0 10e9/L    RBC Count 3.18 (L) 4.4 - 5.9 10e12/L    Hemoglobin 9.2 (L) 13.3 - 17.7 g/dL    Hematocrit 29.6 (L) 40.0 - 53.0 %    MCV 93 78 - 100 fl    MCH 28.9 26.5 - 33.0 pg    MCHC 31.1 (L) 31.5 - 36.5 g/dL    RDW 21.1 (H) 10.0 - 15.0 %    Platelet Count 430 150 - 450 10e9/L   Fibrinogen activity   Result Value Ref Range    Fibrinogen 334 200 - 420 mg/dL   INR   Result Value Ref Range    INR 1.55 (H) 0.86 - 1.14   Partial thromboplastin time   Result Value Ref Range    PTT 67 (H) 22 - 37 sec   Glucose by meter   Result Value Ref Range    Glucose 195 (H) 70 - 99 mg/dL

## 2020-01-31 NOTE — ANESTHESIA PREPROCEDURE EVALUATION
Anesthesia Pre-Procedure Evaluation    Patient: Renny Gannon   MRN: [id number] : 1950          Preoperative Diagnosis: CAD (coronary artery disease) [I25.10]    Procedure(s):  CORONARY ARTERY BYPASS GRAFTING    Past Medical History:   Diagnosis Date     Hypertension      Past Surgical History:   Procedure Laterality Date     BONE MARROW BIOPSY, BONE SPECIMEN, NEEDLE/TROCAR N/A 2019    Procedure: BIOPSY, BONE MARROW;  Surgeon: Aguilar Krause MD;  Location: [facility] OR     CV CORONARY ANGIOGRAM Left 2020    Procedure: Coronary Angiogram;  Surgeon: Devin Bentley MD;  Location:  [facility]ARD[facility]ATH LAB     NO HISTORY OF SURGERY         Anesthesia Evaluation     . Pt has had prior anesthetic. Type: General    No history of anesthetic complications          ROS/MED HX    ENT/Pulmonary:      (-) tobacco use and sleep apnea   Neurologic:      (-) seizures and CVA   Cardiovascular: Comment: 20 Echo;  Interpretation Summary     1. The left ventricle is normal in structure, function and size. The visual  ejection fraction is estimated at 60%. Normal left ventricular wall motion  2. The right ventricle is normal in structure, function and size.  3. No valve disease.     No previous echo for comparison.    20 cath;  1.  Severe three-vessel CAD as described below including the proximal to mid LAD, D1, multiple lesions within a large and branching OM1 system, and the RPDA.    (+) Dyslipidemia, hypertension--CAD, angina-with excertion, -. : . . . :. .       METS/Exercise Tolerance:     Hematologic: Comments: Myeloproliferative disorder.         Musculoskeletal:         GI/Hepatic:        (-) GERD   Renal/Genitourinary:     (+) chronic renal disease, type: CRI,       Endo:     (+) Obesity, .   (-) Type II DM   Psychiatric:         Infectious Disease:         Malignancy:   (+) Malignancy History of Lymphoma/Leukemia  Lymph CA Active status post,         Other:                       "    Physical Exam  Normal systems: dental    Airway   Mallampati: III  TM distance: >3 FB  Neck ROM: full    Dental     Cardiovascular   Rhythm and rate: regular  (-) no murmur    Pulmonary    breath sounds clear to auscultation            Lab Results   Component Value Date    WBC 42.3 (H) 01/30/2020    HGB 11.6 (L) 01/30/2020    HCT 35.9 (L) 01/30/2020     01/30/2020    CRP 4.9 12/26/2019    SED 2 02/11/2019     01/09/2020    POTASSIUM 4.5 01/09/2020    CHLORIDE 106 01/09/2020    CO2 25 01/09/2020    BUN 25 01/09/2020    CR 1.24 01/09/2020     (H) 01/09/2020    MINNIE 9.2 01/09/2020    ALBUMIN 4.1 01/09/2020    PROTTOTAL 7.8 01/09/2020    ALT 33 01/09/2020    AST 61 (H) 01/09/2020    ALKPHOS 77 01/09/2020    BILITOTAL 0.7 01/09/2020    PTT 33 01/09/2020    INR 1.15 (H) 01/09/2020    TSH 2.14 12/11/2019       Preop Vitals  BP Readings from Last 3 Encounters:   01/21/20 102/63   01/16/20 124/68   01/15/20 106/68    Pulse Readings from Last 3 Encounters:   01/21/20 77   01/16/20 88   01/15/20 72      Resp Readings from Last 3 Encounters:   01/16/20 18   01/09/20 16   12/30/19 20    SpO2 Readings from Last 3 Encounters:   01/16/20 94%   01/15/20 98%   01/09/20 94%      Temp Readings from Last 1 Encounters:   01/16/20 36.4  C (97.6  F) (Temporal)    Ht Readings from Last 1 Encounters:   01/21/20 1.727 m (5' 8\")      Wt Readings from Last 1 Encounters:   01/21/20 91.6 kg (202 lb)    Estimated body mass index is 30.71 kg/m  as calculated from the following:    Height as of 1/21/20: 1.727 m (5' 8\").    Weight as of 1/21/20: 91.6 kg (202 lb).       Anesthesia Plan      History & Physical Review  History and physical reviewed and following examination; no interval change.    ASA Status:  3 .    NPO Status:  > 8 hours    Plan for General and ETT with Intravenous induction. Maintenance will be Balanced.    PONV prophylaxis:  Ondansetron (or other 5HT-3)  Additional equipment: SANDRA, PA Catheter, CVP, Arterial " Line, Central Line and Videolaryngoscope Normal LV.       Postoperative Care  Postoperative pain management:  Multi-modal analgesia.      Consents  Anesthetic plan, risks, benefits and alternatives discussed with:  Patient.  Use of blood products discussed: Yes.   Use of blood products discussed with Patient.  Consented to blood products.  .                 Boo Weston MD

## 2020-01-31 NOTE — ANESTHESIA PROCEDURE NOTES
CENTRAL LINE INSERTION PROCEDURE NOTE:   Pre-Procedure  Performed by  in the presence of a teaching physician  Boo Weston MD  Location: pre-op      Pre-Anesthestic Checklist: patient identified, IV checked, risks and benefits discussed, informed consent, monitors and equipment checked, pre-op evaluation, at physician/surgeon's request and post-op pain management    Timeout  Correct Patient: Yes   Correct Procedure: Yes   Correct Site: Yes   Correct Laterality: N/A   Correct Position: Yes   Site Marked: N/A   .   Procedure Documentation   Procedure: central line  Position: Trendelenburg  Patient Prep/Sterile Barriers; chlorhexidine gluconate and isopropyl alcohol, maximum sterile barriers used during central venous catheter insertion      Insertion Site:right, internal jugular    Skin infiltrated with mL of 1% lidocaine.  Catheter: 9 Vietnamese, 10 cm (sheath introducer)  Assessment/Narrative    Catheter: 9 Vietnamese, 10 cm (sheath introducer)      Tegaderm and Biopatch dressing used.  blood aspirated from all lumens  All lumens flushed: Yes    Comments:  Patient tolerated well.   No complications.  Ultrasound Interpretation, central venous and arterial catheter    1. Ultrasound guidance was used to evaluate potential access sites.  2. Ultrasound was also used to verify the patency of the vessel specified above.   3. Ultrasound was used to visualize the needle entering the vessel.   4. The visualized structures were anatomically normal.  5. There were no apparent abnormal pathological findings.  6. A permanent ultrasound image was saved in the patient's record.

## 2020-01-31 NOTE — CONSULTS
CARDIAC SURGERY NUTRITION CONSULT    Received standing order to assess and educate patient.    Will follow and complete assessment once patient is extubated and/or is transferred to medical unit.    Patient will receive nutrition education during the Outpatient Cardiac Rehab Program (nutrition classes/dietitian counseling).    Glenda Graham RD, LD, [facility]   Clinical Dietitian - [facility]

## 2020-01-31 NOTE — PROGRESS NOTES
Medication History Completed by Medication Scribe  Admission medication history interview status for the 1/31/2020  admission is complete. See EPIC admission navigator for prior to admission medications     Medication history sources: Patient and H&P  Medication history source reliability: Moderate  Adherence assessment: N/A Not Observed    Significant changes made to the medication list:  None      Additional medication history information:   Patient brought own home meds: NITROSTAT    Medication reconciliation completed by provider prior to medication history? No    Time spent in this activity: 20 MINUTES      Prior to Admission medications    Medication Sig Last Dose Taking? Auth Provider   atorvastatin (LIPITOR) 40 MG tablet Take 1 tablet (40 mg) by mouth daily 1/30/2020 at AM Yes Devin Bentley MD   hydroxyurea (HYDREA) 500 MG capsule Take 1 capsule (500 mg) by mouth daily 1/30/2020 at AM Yes Mani Cristina MD   isosorbide mononitrate (IMDUR) 30 MG 24 hr tablet Take 1 tablet (30 mg) by mouth daily 1/30/2020 at AM Yes Madeleine Durand APRN CNP   lisinopril-hydrochlorothiazide (PRINZIDE/ZESTORETIC) 20-25 MG tablet TAKE ONE TABLET BY MOUTH ONCE DAILY STOP THE 10-12.5 MG DOSE 1/30/2020 at AM Yes Angus Scott MD   metoprolol succinate ER (TOPROL-XL) 25 MG 24 hr tablet Take 2 tablets (50 mg) by mouth daily  Patient taking differently: Take 25 mg by mouth daily  1/31/2020 at 0300 Yes Devin Bentley MD   naproxen (NAPROSYN) 500 MG tablet Take 1 tablet (500 mg) by mouth 2 times daily as needed for moderate pain MORE THAN A WEEK at PRN Yes Angus Scott MD   nitroGLYcerin (NITROSTAT) 0.4 MG sublingual tablet For chest pain place 1 tablet under the tongue every 5 minutes for 3 doses. If symptoms persist 5 minutes after 1st dose call 911. ON HAND Yes Devin Bentley MD   vitamin B-12 (CYANOCOBALAMIN) 250 MCG tablet Take 250 mcg by mouth daily 1/30/2020 at AM Yes Reported, Patient

## 2020-01-31 NOTE — ANESTHESIA CARE TRANSFER NOTE
Patient: Renny Gannon    Procedure(s):  CORONARY ARTERY BYPASS GRAFTING X 3 - LIMA TO LAD, SV TO OM  AND PDA; ENDOVEIN HARVEST OF BILATERAL LEGS; ON PUMP WITH SANDRA    Diagnosis: CAD (coronary artery disease) [I25.10]  Diagnosis Additional Information: No value filed.    Anesthesia Type:   General, ETT     Note:  Airway :ETT  Patient transferred to:ICU  Comments: Patient connected to SpO2, EKG, and arterial blood pressure transport monitors and accompanied by CRNA, anesthesiologist, circulating RN, surgeon (fellow) to ICU room. Patient ventilated by anesthesiologist with ambu via ETT with O2 at 6 liters per minute during transport.     On arrival to ICU, endotracheal tube position unchanged, equal, bilateral breath sounds auscultated in ICU room, patient placed on ICU ventilator by respiratory therapist, teeth and oral mucosa intact and unchanged at handoff of care. At anesthesia handoff of care, clinical monitors and alarms on and functioning, report on patient's clinical status given to ICU RN, report on patient's clinical status given to intensivist, ICU staff questions answered.ICU Handoff: Call for PAUSE to initiate/utilize ICU HANDOFF, Identified Patient, Identified Responsible Provider, Reviewed the Pertinent Medical History, Discussed Surgical Course, Reviewed Intra-OP Anesthesia Management and Issues during Anesthesia, Set Expectations for Post Procedure Period and Allowed Opportunity for Questions and Acknowledgement of Understanding      Vitals: (Last set prior to Anesthesia Care Transfer)    CRNA VITALS  1/31/2020 1246 - 1/31/2020 1345      1/31/2020             ART BP:  (!) 84/50    ART Mean:  69    Resp Rate (observed):  (!) 33                Electronically Signed By: UNA Cullen CRNA  January 31, 2020  1:45 PM

## 2020-01-31 NOTE — ANESTHESIA PROCEDURE NOTES
ARTERIAL LINE PROCEDURE NOTE:   Pre-Procedure  Performed by Boo Weston MD  Location: pre-op      Pre-Anesthestic Checklist: patient identified, IV checked, risks and benefits discussed, informed consent, monitors and equipment checked and pre-op evaluation    Timeout  Correct Patient: Yes   Correct Procedure: Yes   Correct Site: Yes   Correct Laterality: Yes   Correct Position: Yes   Site Marked: Yes   .   Procedure Documentation  Procedure: arterial line  Diagnosis:Hemodynamic instability   Supine  Insertion Site:radial (Right).Skin infiltrated with 3 mL of 1% lidocaine. Injection technique: ultrasound guided  .  Artery evaluated via ultrasound confirming patency.  Using realtime imaging the artery was punctured and needle was observed entering artery..  A permanent image is entered into the patient's record.Patient Prep/Sterile Barriers; all elements of maximal sterile barrier technique followed, mask, hat, sterile gown, sterile gloves, draped, hand hygiene, chlorhexidine gluconate and isopropyl alcohol    Assessment/Narrative    Catheter: 20 gauge, 1.75 in/4.5 cm quick cath (integral wire)     Secured by other  Tegaderm dressing used.    Arterial waveform: Yes     Comments:  Ultrasound Interpretation, central venous and arterial catheter    1. Ultrasound guidance was used to evaluate potential access sites.  2. Ultrasound was also used to verify the patency of the vessel specified above.   3. Ultrasound was used to visualize the needle entering the vessel.   4. The visualized structures were anatomically normal.  5. There were no apparent abnormal pathological findings.  6. A permanent ultrasound image was saved in the patient's record.

## 2020-01-31 NOTE — OP NOTE
Procedure Date: 01/31/2020      REFERRING CARDIOLOGIST:  Devin Bentley MD      PREOPERATIVE DIAGNOSIS:  Severe 3-vessel coronary artery disease.      POSTOPERATIVE DIAGNOSIS:  Severe 3-vessel coronary artery disease.      SURGEON:  Mable Barrera MD      ASSISTANTS:  Yovani Melendrez MD; ETHAN Calderon      NAME OF OPERATION:  Coronary artery bypass grafting x3 with left internal mammary artery to the left anterior descending, reverse saphenous vein graft to posterior descending artery, reverse saphenous vein graft to obtuse marginal 2 artery, endoscopic vein harvest from bilateral lower extremities, intraoperative SANDRA.      ANESTHESIA:  General endotracheal.      INDICATIONS FOR PROCEDURE:  Mr. Gannon is a very pleasant 69-year-old gentleman who was recently diagnosed with leukoerythroblastic reaction and also severe 3-vessel coronary artery disease that was found after being worked up for exertional angina that he had been experiencing for about a month or so.  Coronary angiogram demonstrated severe diffuse 3-vessel coronary artery disease.  He was taken to the operating room today for coronary artery bypass grafting.      OPERATIVE FINDINGS:  The patient had an overall normal LV systolic size and function.  His left internal mammary artery was an excellent quality conduit with good flow and it was roughly 2.5 mm in diameter.  The greater saphenous veins were of acceptable quality for use.  They were quite thin-walled and somewhat small, roughly 3 mm in maximal diameter.  Overall, his coronary arteries were quite small and diffusely calcified and there were challenging surgical targets.  The posterior descending artery was on the small side, diffusely and moderately diseased.  The probe size was 1.5 mm.  The obtuse marginal 1 artery was intramyocardial and was too calcified for bypass grafting.  The obtuse marginal 2 artery was also moderately diseased but with a severe segment that was graftable and was  1.5 mm in diameter.  The mid to distal LAD had mild disease and the probe size was 1.5 mm.      DESCRIPTION OF PROCEDURE:  After informed consent was obtained, the patient was brought down to the operating room and was placed on the OR table in a supine position.  Intravenous and intra-arterial lines were begun.  While monitoring his blood pressure and EKG tracing, he was anesthetized and intubated using a single lumen endotracheal tube.  His entire chest, abdomen, both groins and legs were prepped down to the toes using multiple layers of prep.  He was draped in a sterile field.  Median sternotomy was performed and, in the meantime, the greater saphenous veins were harvested endoscopically from both legs.  The left internal mammary artery was taken down.  Prior to clipping the LIMA distally, the patient was fully heparinized.  The sternal edges were retracted laterally and the pericardium was opened to suspend the heart in a pericardial cradle.  The ascending aorta and the right atrial appendage were cannulated.  A retrograde cardioplegia catheter was placed into the coronary sinus without difficulty.  An antegrade needle/aortic root was placed in the ascending aorta as well.  After appropriate ACT level was achieved, cardiopulmonary bypass was established.  The patient was kept normothermic during the entire operation.  The aorta was then crossclamped and antegrade cold blood cardioplegia was given to arrest the heart.  The patient went into good diastolic arrest without any LV distention.  Following this, intermittent retrograde cardioplegia dose was given on average of 15 minutes for myocardial protection while the aorta was crossclamped.      The first coronary vessel grafted was the posterior descending artery.  Conduit used was a saphenous vein.  This anastomosis was performed in an end-to-side fashion using running 7-0 Prolene.  Next, the obtuse marginal 2 artery was grafted.  Another saphenous vein was used  as a conduit.  This anastomosis was performed in an end-to-side fashion using running 7-0 Prolene.  Finally, the LIMA to LAD anastomosis was performed.  This was also done in an end-to-side fashion using running 7-0 Prolene.  This anastomosis was protected by tacking the LIMA pedicle down to the epicardium using interrupted 6-0 Prolene x2.  A liter of warm blood cardioplegia was given as a retrograde hot shot and the aortic crossclamp was removed.  Aortic crossclamp time was 66 minutes.  Partial clamp was placed on the mid ascending aorta.  Two 4-mm aortotomies were created to perform the 2 proximal vein anastomoses in an end-to-side fashion using running 6-0 Prolene.  The partial clamp was removed.        The patient was weaned off cardiopulmonary bypass with low dose epinephrine and Luan-Synephrine drips.  Total cardiopulmonary bypass time was 94 minutes.  LV function was good on SANDRA.  Once the patient remained stable off bypass, the venous cannula was removed and protamine was administered.  The aortic cannula was subsequently removed as well.  Temporary ventricular pacing wires placed in the RV muscle, 32-Chinese angled and straight chest tubes were placed in mediastinum as well.  These were all brought out percutaneously below the sternotomy incision and secured to the skin using 2-0 Ethibond.  The right pleural space was slightly opened.  The mediastinum was irrigated with antibiotic saline and hemostasis was achieved.  The sternum was reapproximated using multiple and interrupted single wires.  The incision was closed in layers of running Vicryl suture.  Skin was closed using 4-0 Vicryl and was sealed using Dermabond.  It should be noted that temporary ventricular pacing wire was placed in the RV muscle.  A 32-Chinese straight chest tube was placed in mediastinum as well.  These were all brought out percutaneously below the sternotomy incision and secured to skin using 2-0 Ethibond.      There were no  intraoperative complications and the patient tolerated the operation well.  No blood products were given intraoperatively.  All sponge counts, needle counts and instrument counts were correct x2 at the end the operation.      ESTIMATED BLOOD LOSS:  Unknown.      SPECIMEN REMOVED:  None.      The patient was brought to the ICU in medically stable condition and remained intubated.         YADIRA HODGSON MD             D: 2020   T: 2020   MT: SARAHI      Name:     ABI VALDEZ   MRN:      [id number]        Account:        [id number]   :      1950           Procedure Date: 2020      Document: [id number]       cc: Devin Bentley MD

## 2020-02-01 ENCOUNTER — APPOINTMENT (OUTPATIENT)
Dept: PHYSICAL THERAPY | Facility: CLINIC | Age: 70
DRG: 236 | End: 2020-02-01
Attending: SURGERY
Payer: COMMERCIAL

## 2020-02-01 ENCOUNTER — APPOINTMENT (OUTPATIENT)
Dept: CT IMAGING | Facility: CLINIC | Age: 70
DRG: 236 | End: 2020-02-01
Attending: SURGERY
Payer: COMMERCIAL

## 2020-02-01 ENCOUNTER — APPOINTMENT (OUTPATIENT)
Dept: CT IMAGING | Facility: CLINIC | Age: 70
DRG: 236 | End: 2020-02-01
Attending: INTERNAL MEDICINE
Payer: COMMERCIAL

## 2020-02-01 ENCOUNTER — APPOINTMENT (OUTPATIENT)
Dept: GENERAL RADIOLOGY | Facility: CLINIC | Age: 70
DRG: 236 | End: 2020-02-01
Attending: SURGERY
Payer: COMMERCIAL

## 2020-02-01 LAB
ALBUMIN SERPL-MCNC: 3.2 G/DL (ref 3.4–5)
ALP SERPL-CCNC: 45 U/L (ref 40–150)
ALT SERPL W P-5'-P-CCNC: 47 U/L (ref 0–70)
ANION GAP SERPL CALCULATED.3IONS-SCNC: 4 MMOL/L (ref 3–14)
AST SERPL W P-5'-P-CCNC: 125 U/L (ref 0–45)
BASE DEFICIT BLDA-SCNC: 1.8 MMOL/L
BILIRUB SERPL-MCNC: 0.4 MG/DL (ref 0.2–1.3)
BLD PROD TYP BPU: NORMAL
BLD UNIT ID BPU: 0
BLOOD PRODUCT CODE: NORMAL
BPU ID: NORMAL
BUN SERPL-MCNC: 28 MG/DL (ref 7–30)
CA-I BLD-MCNC: 4 MG/DL (ref 4.4–5.2)
CALCIUM SERPL-MCNC: 7.3 MG/DL (ref 8.5–10.1)
CHLORIDE SERPL-SCNC: 115 MMOL/L (ref 94–109)
CO2 SERPL-SCNC: 25 MMOL/L (ref 20–32)
CREAT SERPL-MCNC: 1.43 MG/DL (ref 0.66–1.25)
ERYTHROCYTE [DISTWIDTH] IN BLOOD BY AUTOMATED COUNT: 18 % (ref 10–15)
ERYTHROCYTE [DISTWIDTH] IN BLOOD BY AUTOMATED COUNT: 18.5 % (ref 10–15)
GFR SERPL CREATININE-BSD FRML MDRD: 49 ML/MIN/{1.73_M2}
GLUCOSE BLDC GLUCOMTR-MCNC: 105 MG/DL (ref 70–99)
GLUCOSE BLDC GLUCOMTR-MCNC: 110 MG/DL (ref 70–99)
GLUCOSE BLDC GLUCOMTR-MCNC: 113 MG/DL (ref 70–99)
GLUCOSE BLDC GLUCOMTR-MCNC: 115 MG/DL (ref 70–99)
GLUCOSE BLDC GLUCOMTR-MCNC: 115 MG/DL (ref 70–99)
GLUCOSE BLDC GLUCOMTR-MCNC: 124 MG/DL (ref 70–99)
GLUCOSE BLDC GLUCOMTR-MCNC: 130 MG/DL (ref 70–99)
GLUCOSE BLDC GLUCOMTR-MCNC: 132 MG/DL (ref 70–99)
GLUCOSE BLDC GLUCOMTR-MCNC: 134 MG/DL (ref 70–99)
GLUCOSE BLDC GLUCOMTR-MCNC: 138 MG/DL (ref 70–99)
GLUCOSE BLDC GLUCOMTR-MCNC: 142 MG/DL (ref 70–99)
GLUCOSE BLDC GLUCOMTR-MCNC: 143 MG/DL (ref 70–99)
GLUCOSE BLDC GLUCOMTR-MCNC: 168 MG/DL (ref 70–99)
GLUCOSE SERPL-MCNC: 172 MG/DL (ref 70–99)
HCO3 BLD-SCNC: 23 MMOL/L (ref 21–28)
HCT VFR BLD AUTO: 24.1 % (ref 40–53)
HCT VFR BLD AUTO: 24.1 % (ref 40–53)
HGB BLD-MCNC: 7.6 G/DL (ref 13.3–17.7)
HGB BLD-MCNC: 8 G/DL (ref 13.3–17.7)
HGB BLD-MCNC: 8 G/DL (ref 13.3–17.7)
MAGNESIUM SERPL-MCNC: 2.5 MG/DL (ref 1.6–2.3)
MCH RBC QN AUTO: 28.8 PG (ref 26.5–33)
MCH RBC QN AUTO: 30 PG (ref 26.5–33)
MCHC RBC AUTO-ENTMCNC: 31.5 G/DL (ref 31.5–36.5)
MCHC RBC AUTO-ENTMCNC: 33.2 G/DL (ref 31.5–36.5)
MCV RBC AUTO: 90 FL (ref 78–100)
MCV RBC AUTO: 91 FL (ref 78–100)
O2/TOTAL GAS SETTING VFR VENT: ABNORMAL %
OXYHGB MFR BLD: 93 % (ref 92–100)
PCO2 BLD: 40 MM HG (ref 35–45)
PH BLD: 7.37 PH (ref 7.35–7.45)
PHOSPHATE SERPL-MCNC: 4.1 MG/DL (ref 2.5–4.5)
PLATELET # BLD AUTO: 261 10E9/L (ref 150–450)
PLATELET # BLD AUTO: 320 10E9/L (ref 150–450)
PO2 BLD: 69 MM HG (ref 80–105)
POTASSIUM SERPL-SCNC: 4.4 MMOL/L (ref 3.4–5.3)
PROT SERPL-MCNC: 5.5 G/DL (ref 6.8–8.8)
RBC # BLD AUTO: 2.64 10E12/L (ref 4.4–5.9)
RBC # BLD AUTO: 2.67 10E12/L (ref 4.4–5.9)
SODIUM SERPL-SCNC: 144 MMOL/L (ref 133–144)
TRANSFUSION STATUS PATIENT QL: NORMAL
TRANSFUSION STATUS PATIENT QL: NORMAL
WBC # BLD AUTO: 54 10E9/L (ref 4–11)
WBC # BLD AUTO: 64.4 10E9/L (ref 4–11)

## 2020-02-01 PROCEDURE — 93010 ELECTROCARDIOGRAM REPORT: CPT | Performed by: INTERNAL MEDICINE

## 2020-02-01 PROCEDURE — 80053 COMPREHEN METABOLIC PANEL: CPT | Performed by: SURGERY

## 2020-02-01 PROCEDURE — 70498 CT ANGIOGRAPHY NECK: CPT

## 2020-02-01 PROCEDURE — 25000132 ZZH RX MED GY IP 250 OP 250 PS 637: Performed by: SURGERY

## 2020-02-01 PROCEDURE — 99291 CRITICAL CARE FIRST HOUR: CPT | Performed by: INTERNAL MEDICINE

## 2020-02-01 PROCEDURE — 0042T CT HEAD PERFUSION WITH CONTRAST: CPT

## 2020-02-01 PROCEDURE — 85027 COMPLETE CBC AUTOMATED: CPT | Performed by: SURGERY

## 2020-02-01 PROCEDURE — 25000131 ZZH RX MED GY IP 250 OP 636 PS 637: Performed by: SURGERY

## 2020-02-01 PROCEDURE — 82805 BLOOD GASES W/O2 SATURATION: CPT | Performed by: SURGERY

## 2020-02-01 PROCEDURE — 25000128 H RX IP 250 OP 636: Performed by: PHYSICIAN ASSISTANT

## 2020-02-01 PROCEDURE — 40000008 ZZH STATISTIC AIRWAY CARE

## 2020-02-01 PROCEDURE — 25800030 ZZH RX IP 258 OP 636: Performed by: SURGERY

## 2020-02-01 PROCEDURE — 27210338 ZZH CIRCUIT HUMID FACE/TRACH MSK

## 2020-02-01 PROCEDURE — 25000128 H RX IP 250 OP 636: Performed by: SURGERY

## 2020-02-01 PROCEDURE — 86850 RBC ANTIBODY SCREEN: CPT | Performed by: SURGERY

## 2020-02-01 PROCEDURE — 82330 ASSAY OF CALCIUM: CPT | Performed by: SURGERY

## 2020-02-01 PROCEDURE — 00000146 ZZHCL STATISTIC GLUCOSE BY METER IP

## 2020-02-01 PROCEDURE — 40000275 ZZH STATISTIC RCP TIME EA 10 MIN

## 2020-02-01 PROCEDURE — 86901 BLOOD TYPING SEROLOGIC RH(D): CPT | Performed by: SURGERY

## 2020-02-01 PROCEDURE — 94003 VENT MGMT INPAT SUBQ DAY: CPT

## 2020-02-01 PROCEDURE — 97161 PT EVAL LOW COMPLEX 20 MIN: CPT | Mod: GP | Performed by: PHYSICAL THERAPIST

## 2020-02-01 PROCEDURE — 25000128 H RX IP 250 OP 636: Performed by: NURSE PRACTITIONER

## 2020-02-01 PROCEDURE — 70450 CT HEAD/BRAIN W/O DYE: CPT

## 2020-02-01 PROCEDURE — 97110 THERAPEUTIC EXERCISES: CPT | Mod: GP | Performed by: PHYSICAL THERAPIST

## 2020-02-01 PROCEDURE — 27210339 ZZH CIRCUIT HUMIDITY W/CPAP BIP

## 2020-02-01 PROCEDURE — 84100 ASSAY OF PHOSPHORUS: CPT | Performed by: SURGERY

## 2020-02-01 PROCEDURE — 71045 X-RAY EXAM CHEST 1 VIEW: CPT

## 2020-02-01 PROCEDURE — 93005 ELECTROCARDIOGRAM TRACING: CPT

## 2020-02-01 PROCEDURE — 83735 ASSAY OF MAGNESIUM: CPT | Performed by: SURGERY

## 2020-02-01 PROCEDURE — P9041 ALBUMIN (HUMAN),5%, 50ML: HCPCS | Performed by: PHYSICIAN ASSISTANT

## 2020-02-01 PROCEDURE — 85018 HEMOGLOBIN: CPT | Performed by: INTERNAL MEDICINE

## 2020-02-01 PROCEDURE — P9016 RBC LEUKOCYTES REDUCED: HCPCS | Performed by: SURGERY

## 2020-02-01 PROCEDURE — 99356 ZZC PROLONGED SERV,INPATIENT,1ST HR: CPT | Performed by: NURSE PRACTITIONER

## 2020-02-01 PROCEDURE — 25000125 ZZHC RX 250: Performed by: SURGERY

## 2020-02-01 PROCEDURE — 86900 BLOOD TYPING SEROLOGIC ABO: CPT | Performed by: SURGERY

## 2020-02-01 PROCEDURE — 86923 COMPATIBILITY TEST ELECTRIC: CPT | Performed by: SURGERY

## 2020-02-01 PROCEDURE — 20000003 ZZH R&B ICU

## 2020-02-01 PROCEDURE — 97530 THERAPEUTIC ACTIVITIES: CPT | Mod: GP | Performed by: PHYSICAL THERAPIST

## 2020-02-01 RX ORDER — GABAPENTIN 100 MG/1
100 CAPSULE ORAL 3 TIMES DAILY
Status: DISCONTINUED | OUTPATIENT
Start: 2020-02-01 | End: 2020-02-10 | Stop reason: HOSPADM

## 2020-02-01 RX ORDER — GABAPENTIN 250 MG/5ML
100 SOLUTION ORAL 3 TIMES DAILY
Status: DISCONTINUED | OUTPATIENT
Start: 2020-02-01 | End: 2020-02-01

## 2020-02-01 RX ORDER — AMOXICILLIN 250 MG
1-2 CAPSULE ORAL 2 TIMES DAILY
Status: DISCONTINUED | OUTPATIENT
Start: 2020-02-01 | End: 2020-02-10 | Stop reason: HOSPADM

## 2020-02-01 RX ORDER — ALBUMIN, HUMAN INJ 5% 5 %
25 SOLUTION INTRAVENOUS ONCE
Status: COMPLETED | OUTPATIENT
Start: 2020-02-01 | End: 2020-02-01

## 2020-02-01 RX ORDER — IOPAMIDOL 755 MG/ML
120 INJECTION, SOLUTION INTRAVASCULAR ONCE
Status: COMPLETED | OUTPATIENT
Start: 2020-02-01 | End: 2020-02-01

## 2020-02-01 RX ORDER — ASPIRIN 325 MG
325 TABLET ORAL DAILY
Status: DISCONTINUED | OUTPATIENT
Start: 2020-02-01 | End: 2020-02-01

## 2020-02-01 RX ORDER — PANTOPRAZOLE SODIUM 40 MG/1
40 TABLET, DELAYED RELEASE ORAL
Status: DISCONTINUED | OUTPATIENT
Start: 2020-02-02 | End: 2020-02-02

## 2020-02-01 RX ADMIN — GABAPENTIN 100 MG: 250 SOLUTION ORAL at 10:46

## 2020-02-01 RX ADMIN — DOCUSATE SODIUM 100 MG: 50 LIQUID ORAL at 09:12

## 2020-02-01 RX ADMIN — GABAPENTIN 100 MG: 100 CAPSULE ORAL at 16:50

## 2020-02-01 RX ADMIN — SODIUM CHLORIDE 100 ML: 9 INJECTION, SOLUTION INTRAVENOUS at 18:20

## 2020-02-01 RX ADMIN — Medication 40 MG: at 09:12

## 2020-02-01 RX ADMIN — OXYCODONE HYDROCHLORIDE 5 MG: 5 TABLET ORAL at 14:53

## 2020-02-01 RX ADMIN — IOPAMIDOL 120 ML: 755 INJECTION, SOLUTION INTRAVENOUS at 18:20

## 2020-02-01 RX ADMIN — OXYCODONE HYDROCHLORIDE 5 MG: 5 TABLET ORAL at 01:08

## 2020-02-01 RX ADMIN — PHENYLEPHRINE HYDROCHLORIDE 1.2 MCG/KG/MIN: 10 INJECTION INTRAVENOUS at 08:04

## 2020-02-01 RX ADMIN — HEPARIN SODIUM 5000 UNITS: 5000 INJECTION, SOLUTION INTRAVENOUS; SUBCUTANEOUS at 12:18

## 2020-02-01 RX ADMIN — HYDROMORPHONE HYDROCHLORIDE 0.5 MG: 1 INJECTION, SOLUTION INTRAMUSCULAR; INTRAVENOUS; SUBCUTANEOUS at 09:59

## 2020-02-01 RX ADMIN — SENNOSIDES 5 ML: 8.8 SYRUP ORAL at 09:12

## 2020-02-01 RX ADMIN — SODIUM CHLORIDE: 9 INJECTION, SOLUTION INTRAVENOUS at 21:26

## 2020-02-01 RX ADMIN — MUPIROCIN 0.5 G: 20 OINTMENT TOPICAL at 09:44

## 2020-02-01 RX ADMIN — ACETAMINOPHEN 975 MG: 325 TABLET, FILM COATED ORAL at 14:53

## 2020-02-01 RX ADMIN — HYDROMORPHONE HYDROCHLORIDE 0.3 MG: 1 INJECTION, SOLUTION INTRAMUSCULAR; INTRAVENOUS; SUBCUTANEOUS at 22:18

## 2020-02-01 RX ADMIN — METHOCARBAMOL 500 MG: 500 TABLET, FILM COATED ORAL at 10:01

## 2020-02-01 RX ADMIN — HEPARIN SODIUM 5000 UNITS: 5000 INJECTION, SOLUTION INTRAVENOUS; SUBCUTANEOUS at 20:29

## 2020-02-01 RX ADMIN — PROPOFOL 25 MCG/KG/MIN: 10 INJECTION, EMULSION INTRAVENOUS at 05:34

## 2020-02-01 RX ADMIN — MUPIROCIN 0.5 G: 20 OINTMENT TOPICAL at 20:31

## 2020-02-01 RX ADMIN — OXYCODONE HYDROCHLORIDE 10 MG: 5 TABLET ORAL at 10:01

## 2020-02-01 RX ADMIN — METHOCARBAMOL 500 MG: 500 TABLET, FILM COATED ORAL at 01:08

## 2020-02-01 RX ADMIN — CEFAZOLIN SODIUM 2 G: 2 INJECTION, SOLUTION INTRAVENOUS at 12:16

## 2020-02-01 RX ADMIN — ALBUMIN HUMAN 25 G: 0.05 INJECTION, SOLUTION INTRAVENOUS at 14:46

## 2020-02-01 RX ADMIN — SODIUM CHLORIDE: 9 INJECTION, SOLUTION INTRAVENOUS at 07:43

## 2020-02-01 RX ADMIN — POTASSIUM CHLORIDE, DEXTROSE MONOHYDRATE AND SODIUM CHLORIDE: 150; 5; 450 INJECTION, SOLUTION INTRAVENOUS at 18:47

## 2020-02-01 RX ADMIN — SODIUM CHLORIDE 3.5 UNITS/HR: 9 INJECTION, SOLUTION INTRAVENOUS at 08:27

## 2020-02-01 RX ADMIN — CEFAZOLIN SODIUM 2 G: 2 INJECTION, SOLUTION INTRAVENOUS at 03:52

## 2020-02-01 RX ADMIN — ASPIRIN 325 MG ORAL TABLET 325 MG: 325 PILL ORAL at 10:03

## 2020-02-01 RX ADMIN — PHENYLEPHRINE HYDROCHLORIDE 1.8 MCG/KG/MIN: 10 INJECTION INTRAVENOUS at 01:08

## 2020-02-01 RX ADMIN — ACETAMINOPHEN 975 MG: 325 TABLET, FILM COATED ORAL at 05:34

## 2020-02-01 ASSESSMENT — ACTIVITIES OF DAILY LIVING (ADL)
ADLS_ACUITY_SCORE: 14

## 2020-02-01 ASSESSMENT — MIFFLIN-ST. JEOR: SCORE: 1644.5

## 2020-02-01 NOTE — PROGRESS NOTES
Intensivist Daily Note  2020      Brief History:  Renny Gannon is a 69 year old male with a past medical history of HTN, and recently diagnosed with myelofibrosis who presented to the clinic for worsening shortness of breath and chest discomfort. He was was found to have  severe 3-vessel coronary artery disease. He underwent a CABx3 by Dr. Barrera on 2020     Interval Events:  Did well overnight.  Epinephrine gtt weaned off mid-morning today, continues on low dose phenylephrine for BP support (0.3 presently).  Remains intubated but doing well on PST with imminent plans for extubation.  Course complicated by post-op coaguloapthy ultimately requiring Factor 7. Received 1U pRBC for hgb 7.6 this morning.      PMH:  Past Medical History:   Diagnosis Date     Hypertension        PSurgHx:  Past Surgical History:   Procedure Laterality Date     BONE MARROW BIOPSY, BONE SPECIMEN, NEEDLE/TROCAR N/A 2019    Procedure: BIOPSY, BONE MARROW;  Surgeon: Aguilar Krause MD;  Location: [facility]     CV CORONARY ANGIOGRAM Left 2020    Procedure: Coronary Angiogram;  Surgeon: Devin Bentley MD;  Location:  [facility]     NO HISTORY OF SURGERY         Family History:  Family History     Problem (# of Occurrences) Relation (Name,Age of Onset)    Cancer (1) Brother: lung cancer - smoking    Coronary Artery Disease (1) Brother:  of MI at 66    No Known Problems (3) Mother, Sister, Sister    Unknown/Adopted (5) Father, Maternal Grandmother, Maternal Grandfather, Paternal Grandmother, Paternal Grandfather          Social History:  Social History     Socioeconomic History     Marital status: Single     Spouse name: Not on file     Number of children: Not on file     Years of education: Not on file     Highest education level: Not on file   Occupational History     Not on file   Social Needs     Financial resource strain: Not on file     Food insecurity:     Worry: Not on file      Inability: Not on file     Transportation needs:     Medical: Not on file     Non-medical: Not on file   Tobacco Use     Smoking status: Former Smoker     Years: 30.00     Types: Cigarettes     Start date: 1961     Last attempt to quit: 2001     Years since quittin.0     Smokeless tobacco: Never Used   Substance and Sexual Activity     Alcohol use: No     Frequency: Never     Drug use: No     Sexual activity: Not Currently   Lifestyle     Physical activity:     Days per week: Not on file     Minutes per session: Not on file     Stress: Not on file   Relationships     Social connections:     Talks on phone: Not on file     Gets together: Not on file     Attends [employer] service: Not on file     Active member of club or organization: Not on file     Attends meetings of clubs or organizations: Not on file     Relationship status: Not on file     Intimate partner violence:     Fear of current or ex partner: Not on file     Emotionally abused: Not on file     Physically abused: Not on file     Forced sexual activity: Not on file   Other Topics Concern     Parent/sibling w/ CABG, MI or angioplasty before 65F 55M? Not Asked   Social History Narrative     Not on file       Allergy:  Allergies   Allergen Reactions     No Known Drug Allergies      Seasonal Allergies         Medications:  Current Facility-Administered Medications   Medication     [START ON 2/3/2020] acetaminophen (TYLENOL) tablet 650 mg     acetaminophen (TYLENOL) tablet 975 mg     aminocaproic acid (AMICAR) 7.5 g in sodium chloride 0.9 % 150 mL infusion     aspirin (ASA) tablet 325 mg     atorvastatin (LIPITOR) tablet 40 mg     ceFAZolin (ANCEF) intermittent infusion 2 g in 100 mL dextrose PRE-MIX     dextrose 10% infusion     dextrose 5% and 0.45% NaCl + KCl 20 mEq/L infusion     glucose gel 15-30 g    Or     dextrose 50 % injection 25-50 mL    Or     glucagon injection 1 mg     diphenhydrAMINE (BENADRYL) solution 12.5 mg    Or      diphenhydrAMINE (BENADRYL) injection 12.5 mg     docusate (COLACE) 50 MG/5ML liquid  mg     EPINEPHrine (ADRENALIN) 5 mg in sodium chloride 0.9 % 250 mL infusion     furosemide (LASIX) injection 20 mg     gabapentin (NEURONTIN) solution 100 mg     heparin ANTICOAGULANT injection 5,000 Units     hydrALAZINE (APRESOLINE) injection 10 mg     HYDROmorphone (PF) (DILAUDID) injection 0.3-0.5 mg     insulin 1 unit/mL in saline (NovoLIN, HumuLIN Regular) drip - ADULT IV Infusion     insulin aspart (NovoLOG) inj (RAPID ACTING)     insulin aspart (NovoLOG) inj (RAPID ACTING)     labetalol (NORMODYNE/TRANDATE) injection 10 mg     lidocaine (LMX4) cream     lidocaine 1 % 0.1-1 mL     magnesium sulfate 2 g in water intermittent infusion     magnesium sulfate 4 g in 100 mL sterile water (premade)     melatonin tablet 1 mg     meperidine (DEMEROL) injection 12.5-25 mg     methocarbamol (ROBAXIN) tablet 500 mg     metoclopramide (REGLAN) tablet 5 mg    Or     metoclopramide (REGLAN) injection 5 mg     metoprolol tartrate (LOPRESSOR) half-tab 12.5 mg     mupirocin (BACTROBAN) 2 % ointment 0.5 g     naloxone (NARCAN) injection 0.1-0.4 mg     nitroGLYcerin 50 mg in D5W 250 mL (adult std) infusion     ondansetron (ZOFRAN-ODT) ODT tab 4 mg    Or     ondansetron (ZOFRAN) injection 4 mg     oxyCODONE (ROXICODONE) tablet 5-10 mg     pantoprazole (PROTONIX) 2 mg/mL suspension 40 mg     phenylephrine (SONAM-SYNEPHRINE) 50 mg in NaCl 0.9 % 250 mL infusion     potassium chloride (KLOR-CON) Packet 20-40 mEq     potassium chloride 10 mEq in 100 mL intermittent infusion with 10 mg lidocaine     potassium chloride 10 mEq in 100 mL sterile water intermittent infusion (premix)     potassium chloride 20 mEq in 50 mL intermittent infusion     potassium chloride ER (K-DUR/KLOR-CON M) CR tablet 20-40 mEq     prochlorperazine (COMPAZINE) injection 5 mg    Or     prochlorperazine (COMPAZINE) tablet 5 mg     propofol (DIPRIVAN) infusion     sennosides  (SENOKOT) syrup 5-10 mL     sodium chloride (PF) 0.9% PF flush 3 mL     sodium chloride (PF) 0.9% PF flush 3 mL     sodium chloride 0.9% infusion     sodium phosphate 10 mmol in D5W intermittent infusion     sodium phosphate 15 mmol in D5W intermittent infusion     sodium phosphate 20 mmol in D5W intermittent infusion     sodium phosphate 25 mmol in D5W intermittent infusion         Physical examination:  Vital signs:  Temp: 99.3  F (37.4  C) Temp src: Bladder BP: 110/55 Pulse: 91 Heart Rate: 111 Resp: (!) 34 SpO2: 95 % O2 Device: BiPAP/CPAP      General: Intubated but follows commands on sedation wean.  Appears comfortable.  HEENT: neck supple, symmetrical  Lungs: Clear to auscultation, no wheezing  CVS: Normal S1 & S2   Abdomen: soft, non tender  Extremities/musculoskeletal: no edema.  Extremities warm and dry.  Neurology: sleepy/sedate but awakens easily and follows all commands.  Skin: no rash  Exam of Line sites: No erythema or discharge.    Data:    ROUTINE ICU LABS (Last four results)  CMP  Recent Labs   Lab 02/01/20  0400 01/31/20  1335 01/31/20  1149 01/31/20  1118    142 137 135   POTASSIUM 4.4 4.3 5.3 6.4*   CHLORIDE 115* 114* 108  --    CO2 25 23 22  --    ANIONGAP 4 5 7  --    * 189* 197*  --    BUN 28 22 23  --    CR 1.43* 1.24 1.13  --    GFRESTIMATED 49* 58* 65  --    GFRESTBLACK 57* 68 76  --    MINNIE 7.3* 7.7* 8.4*  --    MAG 2.5* 2.8*  --   --    PHOS 4.1 4.5  --   --    PROTTOTAL 5.5* 5.2*  --   --    ALBUMIN 3.2* 2.9*  --   --    BILITOTAL 0.4 0.6  --   --    ALKPHOS 45 61  --   --    * 85*  --   --    ALT 47 37  --   --      CBC  Recent Labs   Lab 02/01/20  0400 01/31/20  2334 01/31/20  1944 01/31/20  1625   WBC 64.4* 54.0* 71.9* 98.3*   RBC 2.64* 2.67* 2.78* 2.68*   HGB 7.6* 8.0* 8.1* 7.8*   HCT 24.1* 24.1* 25.6* 25.0*   MCV 91 90 92 93   MCH 28.8 30.0 29.1 29.1   MCHC 31.5 33.2 31.6 31.2*   RDW 18.5* 18.0* 18.1* 21.1*    261 286 400     INR  Recent Labs   Lab  01/31/20  2334 01/31/20  1944 01/31/20  1625 01/31/20  1335   INR 0.91 0.79* 1.74* 1.57*     Arterial Blood Gas  Recent Labs   Lab 02/01/20  1045 01/31/20  1355 01/31/20  1335 01/31/20  1118 01/31/20  1048   [facility] 7.37  --  7.26* 7.26* 7.30*   PCO2 40  --  44 52* 50*   PO2 69*  --  119* 151* 177*   HCO3 23  --  20* 23 25   O2PER Ventilator 60%  --   --   --        Recent Results (from the past 24 hour(s))   XR Chest Port 1 View    Narrative    CHEST PORTABLE ONE VIEW   1/31/2020 2:40 PM     HISTORY: CAB.    COMPARISON: 1/9/2020.      Impression    IMPRESSION: ET tube newly seen, its tip at the mid trachea. Right  [place]-Ezekiel catheter tip at the pulmonary artery origin region.  Nasogastric tube in place within the stomach. Stable cardiac  silhouette. Bibasilar atelectasis. Hypoinflated lungs.    ALEXIS FARIAS MD   XR Chest Port 1 View    Narrative    XR PORTABLE CHEST ONE VIEW   1/31/2020 4:34 PM     HISTORY: Postoperative coronary artery bypass graft.      COMPARISON: Chest x-ray 1/31/2020 at 1421 hours.      Impression    IMPRESSION: Stable ET tube lying above the brett. Stable nasogastric  tube and right neck [place]-Ezekiel catheter. Stable hypoinflated lungs. No  new airspace disease. Stable cardiac silhouette.    ALEXIS FARIAS MD   XR Chest Port 1 View    Narrative    EXAM: XR CHEST PORTABLE 1 VIEW  LOCATION: [facility]  DATE/TIME: 02/01/2020, 5:27 AM    INDICATION: Postop day #1 coronary artery bypass.  COMPARISON: 01/31/2020.    FINDINGS: Semiupright portable chest. ET tube 4.5 cm above the brett. Right IJ pulmonary artery catheter in place. NG tube passes into the stomach. Sternal wires and mediastinal clips. Mediastinal drain. There is no pneumothorax. The heart size is   normal. There is atelectasis at the lung bases.      Impression    IMPRESSION: Mild bibasilar atelectasis. No pneumothorax.           Assessment and Plan:    Neuro  1. Acute pain  2. Sedation  Plan:  -- Scheduled acetaminophen, lidoderm,  gabapentin, with PRN dilaudid and oxycodone.  -- Propofol for sedation as needed until extubation     CV  1. S/p CAB x3   2. HTN  3. Shock  Plan:  -- Cardiac meds per CV surgery  -- Continue statin and ASA   -- Start metoprolol low dose today  -- continue phenylephrine gtt to keep MAP > 65, wean as tolerated     Resp:  1. Acute hypoxic respiratory failure -- post operative ventilator management  Plan:  -- AC ventilation   -- Mild tachypnea on PST this am but work of breathing appears reasonable and RSBI in 80s, will plan for trial of extubation to Bipap.     GI/Nutrition  1. No prior hx  Plan:  -- NPO; bedside swallow eval once extubated  -- PPI for SUP     Renal  1. Mild KRIS with creatinine 1.43.  Baseline creatinine appears to be 1.15  Plan:  --monitor function and electrolytes as needed with replacement per ICU protocols.   -- generally avoid nephrotoxic agents such as NSAID, IV contrast unless specifically required  -- adjust medications as needed for renal clearance  -- follow I/O's as appropriate.  -- maintain euvolemia     ID  1. No acute issues   Plan:  -Monitor fever curve, WBC less helpful given known myelofibrosis     Endocrine  1. Stress Hyperglycemia  Plan:  --  Insulin gtt per protocol if indicated otherwise sliding scale insulin appropriate  -- Keep BG  <180 for optimal healing     Heme:  1. Acute blood loss anemia   2. Myelofibrosis   Plan:  -- Monitor hemoglobin; received 1U pRBC this am, will recheck hemoglobin this afternoon  -- Transfuse to keep > 7.0  -- Heme/Onc consult  -- Daily CBC   -- Hold hydroxyurea until extubated and able to swallow per heme     MSK  1. Deconditioning   Plan:  -- PT/OT consult for mobility     Skin  1. Sternotomy/Incision    Plan:  -- Wound care as ordered      General cares:  DVT Prophylaxis: Pneumatic Compression Devices; SQH to start today  GI Prophylaxis: PPI  Restraints: Restraints for medical healing needed: YES  Family update by me today: No  Current lines are  required for patient management      Billing: Critical care time 40 min excluding procedure time.      Chichi Brown MD  Pulmonary and Critical Care Medicine

## 2020-02-01 NOTE — PLAN OF CARE
Neuro: pt remains on propofol, pt follows commands and responds to voice. Pupils are equal and reactive. Not complaining of pain.  CV: remains on Luan. Epi was off at some point but back on to keep MAP > 65. Chest tube output has been around 60ml over 12hrs. Remains dark red. Tip & tilt done. SR, hemodynamic numbers stable - see previous notes. 1 unit of RBCS pending type & screen.   Pulm: clear ls. 40%/600/16/5.   GI/: good urine output, cloudy. Hypoactive BS. OG remains clamped.   Lines: All lines remain in place. [place] 49.   Restraints: remain in place for patient safety & adequacy of lines and tubes.   Plan: Pt sister updated by phone last night. Plan to p/s? WIll give blood & and continue to monitor.

## 2020-02-01 NOTE — PROGRESS NOTES
Extubation Note    Successful completion of SBT (Yes or No):Yes PS 5/5 for 80 minutes  Extubation time:1110    Patient assessment:  Lung sounds:clear and diminished  Stridor Present (Yes or No):No  Patient tolerance:Good    Oxygen device:  BiPAP 10/5 40%    SpO2:97%    Plan:Will cont BiPAP for now. Gel pad and mepilex in place. Skin intact. Alarm volume set at 10.  Will cont to monitor.  2/1/2020  Verenice Andrade, RT

## 2020-02-01 NOTE — CODE/RAPID RESPONSE
[facility]    CODE STROKE NOTE  2/1/2020   Time Called: 5: 37 PM    Neuro:  Left sided facial droop   Time:  Pre-operatively 1/31/2020  tPA: No large vessel occlusion   Glucose level: 115    Physical Exam   Vital Signs with Ranges:  Temp:  [97.3  F (36.3  C)-101.7  F (38.7  C)] 98.2  F (36.8  C)  Pulse:  [] 97  Heart Rate:  [] 93  Resp:  [8-45] 22  BP: ()/(46-78) 92/49  MAP:  [46 mmHg-83 mmHg] 65 mmHg  Arterial Line BP: ()/(10-66) 98/41  FiO2 (%):  [40 %] 40 %  SpO2:  [85 %-100 %] 96 %  I/O last 3 completed shifts:  In: 6241.14 [P.O.:915; I.V.:2893.14; NG/GT:180]  Out: 2638 [Urine:1218; Chest Tube:1420]    Assessment & Plan   Left sided facial droop status post coronary artery bypass 1/31/2020  Code Stroke called by ICU RN after left sided facial droop noted just prior to 1700 2/1/2020. Mr. Gannon remained intubated after CAB on 1/31/2020 and was extubated directly to BiPap at 1100 2/1/2020. BiPap taken off just prior to 1700 with slight left facial droop noted. No other obvious focal deficits. He is normotensive on IV vasopressors, normothermic, and normoglycemic.     Discussed with Dr. Lozano- no large vessel occlusion, 50% right carotid artery stenosis, which was previously known. Code Stroke de-escalated.     Discussed with and defer further cares to Dr. Lozano, Stroke Neurology. I have called and updated his sister Rose at his request.     Code Status: Full Code    Demetrice Gonzalez, UNA, CNP  Hospitalist Service, House Officer  [facility]     Text Page  Pager: [phone]    Allergies   Allergies   Allergen Reactions     No Known Drug Allergies      Seasonal Allergies      NEURO: Awake, alert, oriented x 3. Slight left sided facial droop with nasolabial fold still present. Extremities globally weak, no pronator drift.   Constitutional: 69- year old male laying in bed without obvious acute distress.   Pulmonary: He requires oxygen. No obvious acute  respiratory distress.   Cardiovascular: Appears well perfused.   Skin/Integumen: No obvious concerning rashes or lesions.   Psych:  Calm, cooperative.   Extremities: Moves all extremities.     ABG:  -  Recent Labs   Lab 02/01/20  1045   PH 7.37   PCO2 40   PO2 69*   HCO3 23   O2PER Ventilator       Troponin:    Recent Labs   Lab Test 12/11/19  1505   TROPI <0.015       IMAGING: (X-ray/CT/MRI)   Recent Results (from the past 24 hour(s))   XR Chest Port 1 View    Narrative    EXAM: XR CHEST PORTABLE 1 VIEW  LOCATION: [facility]  DATE/TIME: 02/01/2020, 5:27 AM    INDICATION: Postop day #1 coronary artery bypass.  COMPARISON: 01/31/2020.    FINDINGS: Semiupright portable chest. ET tube 4.5 cm above the brett. Right IJ pulmonary artery catheter in place. NG tube passes into the stomach. Sternal wires and mediastinal clips. Mediastinal drain. There is no pneumothorax. The heart size is   normal. There is atelectasis at the lung bases.      Impression    IMPRESSION: Mild bibasilar atelectasis. No pneumothorax.         CBC with Diff:  Recent Labs   Lab Test 02/01/20  1335 02/01/20  0400 01/31/20  2334   WBC  --  64.4* 54.0*   HGB 8.0* 7.6* 8.0*   MCV  --  91 90   PLT  --  320 261   INR  --   --  0.91      Absolute Retic (10e9/L)   Date Value   12/13/2019 126.5 (H)     % Retic (%)   Date Value   12/13/2019 3.3 (H)       Lactic Acid:    Lab Results   Component Value Date    LACT 3.3 01/31/2020           Comprehensive Metabolic Panel:  Recent Labs   Lab 02/01/20  0400      POTASSIUM 4.4   CHLORIDE 115*   CO2 25   ANIONGAP 4   *   BUN 28   CR 1.43*   GFRESTIMATED 49*   GFRESTBLACK 57*   MINNIE 7.3*   MAG 2.5*   PHOS 4.1   PROTTOTAL 5.5*   ALBUMIN 3.2*   BILITOTAL 0.4   ALKPHOS 45   *   ALT 47       INR:    Recent Labs   Lab Test 01/31/20  2334   INR 0.91       Time Spent on this Encounter   I spent 70 minutes on the unit/floor managing the care of Renny Gannon. Over 50% of my time was spent  counseling the patient and/or coordinating care regarding services listed in this note.

## 2020-02-01 NOTE — PROGRESS NOTES
[facility]  Cardiovascular and Thoracic Surgery Daily Note          Assessment and Plan:   POD#1 s/p CABx3 (LIMA to LAD, rSVG to OM and rPDA  -CVS: post op coagulopathy recovered with 2u FFP, 3u pRBCs, protamine 50mg x 2 and factor 7.  Last 12 hours with minimal chest tube output.  On minimal epi and rosa.  Rhythm NSR.  ASA and betablocker today after pressor discontinuation.  Removed lower leg wraps, dusky feet improved, never lost signals.  -Resp: ABG appropriate, chest xray wnl, extubate after weaning sedation if able.  -Neuro:  Appropriate when pausing propofol sedation  -Renal: good UOP, creatinine bump, will trend.  H/o CKD, electrolytes normal  -GI:  Continue bowel regimen  -:  Almendarez in place, clear yellow  -Endo: Continue Insulin gtt 48hrs, Consult placed to Hospitalist for glucose management  -FEN: replete lytes as needed, ADAT  -ID: Temp (24hrs), Av.1  F (37.8  C), Min:99  F (37.2  C), Max:101.7  F (38.7  C)  WBC reactive, forgo infectious workup unless clinical indication is high  Continue post op abx to completion  -Heme: history of myeloproliferative disorder.  Recommendation from hem/onc to hold hydroxyurea..  -Proph: PCD, heparin subcutaneous 5000u  -Dispo: extubate today. Chest tubes to remain.  Wean pressors. F/u hem/onc recommendations for hydroxyurea.          Interval History:   Coagulopathy post op, responded to resuscitation with blood products and factor 7.  Stable overnight on minimal pressors.  Remained extubated to watch hemodynamics.  Follows commands on sedation pause.          Medications:       acetaminophen  975 mg Oral Q8H     aspirin  325 mg Oral Daily     atorvastatin  40 mg Oral At Bedtime     ceFAZolin  2 g Intravenous Q8H     gabapentin  100 mg Oral TID     heparin ANTICOAGULANT  5,000 Units Subcutaneous Q8H     insulin aspart   Subcutaneous TID w/meals     metoprolol tartrate  25 mg Oral Q12H    Or     metoprolol  25 mg Per NG tube Q12H     mupirocin  0.5  "g Both Nostrils BID     pantoprazole (PROTONIX) IV  40 mg Intravenous Daily     senna-docusate  1 tablet Oral BID    Or     senna-docusate  2 tablet Oral BID     sodium chloride (PF)  3 mL Intracatheter Q8H     [START ON 2/3/2020] acetaminophen, dextrose, glucose **OR** dextrose **OR** glucagon, diphenhydrAMINE **OR** diphenhydrAMINE, furosemide, hydrALAZINE, HYDROmorphone, insulin aspart, labetalol, lidocaine 4%, lidocaine (buffered or not buffered), magnesium sulfate, magnesium sulfate, melatonin, meperidine, methocarbamol, metoclopramide **OR** metoclopramide, naloxone, ondansetron **OR** ondansetron, oxyCODONE IR, potassium chloride, potassium chloride with lidocaine, potassium chloride, potassium chloride, potassium chloride, prochlorperazine **OR** prochlorperazine, sodium chloride (PF), sodium phosphate, sodium phosphate, sodium phosphate, sodium phosphate          Physical Exam:   Vitals were reviewed  Blood pressure 101/50, pulse 90, temperature 100.8  F (38.2  C), resp. rate 22, height 1.727 m (5' 8\"), weight 91 kg (200 lb 9.9 oz), SpO2 97 %.  Rhythm: RRR    Lungs: breathing comfortably on vent, bilateral chest rise    Cardiovascular: chest tubes dry,     Abdomen: soft, non distended    Extremeties: well perfused, palpable pulses    Incision: clean, dry, intact    CT: 1480cc total since post op, 40 ml since midnight    Weight:   Vitals:    01/31/20 0606 02/01/20 0400   Weight: 89.4 kg (197 lb) 91 kg (200 lb 9.9 oz)            Data:   Labs:   Lab Results   Component Value Date    WBC 64.4 02/01/2020     Lab Results   Component Value Date    RBC 2.64 02/01/2020     Lab Results   Component Value Date    HGB 7.6 02/01/2020     Lab Results   Component Value Date    HCT 24.1 02/01/2020     No components found for: MCT  Lab Results   Component Value Date    MCV 91 02/01/2020     Lab Results   Component Value Date    MCH 28.8 02/01/2020     Lab Results   Component Value Date    MCHC 31.5 02/01/2020     Lab Results "   Component Value Date    RDW 18.5 02/01/2020     Lab Results   Component Value Date     02/01/2020       Last Basic Metabolic Panel:  Lab Results   Component Value Date     02/01/2020      Lab Results   Component Value Date    POTASSIUM 4.4 02/01/2020     Lab Results   Component Value Date    CHLORIDE 115 02/01/2020     Lab Results   Component Value Date    MINNIE 7.3 02/01/2020     Lab Results   Component Value Date    CO2 25 02/01/2020     Lab Results   Component Value Date    BUN 28 02/01/2020     Lab Results   Component Value Date    CR 1.43 02/01/2020     Lab Results   Component Value Date     02/01/2020       CXR: Mild bibasilar atelectasis. No pneumothorax.    Yovani Scottammed  Cardiac Surgery Fellow  [phone]

## 2020-02-01 NOTE — PROGRESS NOTES
[facility] ICU RESPIRATORY NOTE        Date of Admission: 1/31/2020    Date of Intubation (most recent):  1/31/20    Reason for Mechanical Ventilation: Heart    Number of Days on Mechanical Ventilation:  1    Met Criteria for Spontaneous Breathing Trial: No    Reason for No Spontaneous Breathing Trial: Per MD    Significant Events Today:  None    ABG Results:   Recent Labs   Lab 01/31/20  1355 01/31/20  1335 01/31/20  1118 01/31/20  1048 01/31/20  0752   PH  --  7.26* 7.26* 7.30* 7.36   PCO2  --  44 52* 50* 42   PO2  --  119* 151* 177* 162*   HCO3  --  20* 23 25 24   O2PER 60%  --   --   --   --        Current Vent Settings: Ventilation Mode: CMV/AC  (Continuous Mandatory Ventilation/ Assist Control)  FiO2 (%): 40 %  Rate Set (breaths/minute): 16 breaths/min  Tidal Volume Set (mL): 600 mL  PEEP (cm H2O): 5 cmH2O  Oxygen Concentration (%): 60 %  Resp: 15      Plan:  Pt to remain on full vent support overnight    Freddy Faith, RT

## 2020-02-01 NOTE — PLAN OF CARE
[id number]  Returned from OR at 1325.  Chest tubes: R one very little output, L clots easily.  I: Continuously stripping chest tubes by 1 person for whole time period. I: Dr Melendrez aware.  Albumin total 1250cc /4 hrs. E: Chest tube output increased and cont to clot easily. I: Dr Melendrez notified and here.  Chest tube suctioned out with suction catheter.  E: Continuing to clot easily and yet drainage up to 180-250cc/hr.  I: Dr Melendrez notified and labs sent.  Dr Melendrez here.  CVP and B/P decreases after albumin infused.  Dr Melendrez updated re labs and continuously in room for rest of shift.  2 units PRBC  and 2 units FFP infused at 999cc/hr.  Chest tubes continues with clots and drainage 2-300cc/hr. Chest tubes suctioned with suction catheter to remove clots. I: Factor VII given and care passed to next nurse.  Neuro: Propofol decreased to 20mcg/kg/min.  Pt nodded yes to pain and started moving hands to pull on chest tubes.  Also moves legs spontaneously and equally.  I: due to chest tube loss, pt given pain med and Propofol increased.  Lungs: Vent settings adjusted.  Lungs clear.  O2 sats 100%  CV: ST.  Cuff pressure greater than Turner MAP > 65 even with SBP 88.  I: Albumin.  E: once more Albumin infused, cuff and Turner close.  Remains on Phenylephrine and Epinephrine to keep MAP greater than 65.  Able to wean some.  U/O borderline to increasing.    Sisters here and updated prior to 1500.

## 2020-02-01 NOTE — PROVIDER NOTIFICATION
Pt Hgb 7.6., bp labile with turns as low as 80s. bp does come back up with time. , co 6.1 & ci 3.8.    Paged Dr. Melendrez, CV surg, Orders to give 1 unit RBCS. Will continue to monitor.

## 2020-02-01 NOTE — PROGRESS NOTES
[facility] ICU RESPIRATORY NOTE           Date of Admission: 1/31/2020     Date of Intubation (most recent):  1/31/20     Reason for Mechanical Ventilation: Heart     Number of Days on Mechanical Ventilation:  2     Met Criteria for Spontaneous Breathing Trial: No     Reason for No Spontaneous Breathing Trial: Per MD     Significant Events Today:  None     ABG Results:   Recent Labs   Lab 01/31/20  1355 01/31/20  1335 01/31/20  1118 01/31/20  1048 01/31/20  0752   PH  --  7.26* 7.26* 7.30* 7.36   PCO2  --  44 52* 50* 42   PO2  --  119* 151* 177* 162*   HCO3  --  20* 23 25 24   O2PER 60%  --   --   --   --      Ventilation Mode: CMV/AC  (Continuous Mandatory Ventilation/ Assist Control)  FiO2 (%): 40 %  Rate Set (breaths/minute): 16 breaths/min  Tidal Volume Set (mL): 600 mL  PEEP (cm H2O): 5 cmH2O  Oxygen Concentration (%): 60 %  Resp: 16    Will continue to follow.     Karan Pate, RT

## 2020-02-01 NOTE — PLAN OF CARE
Discharge Planner PT   Patient plan for discharge: None stated.    Current status: Evaluation completed, treatment initiated. 68 yo male POD # 1 CABG x 3. Extubated this am.    Resides in an apartment, alone. Independent at baseline. States he was goi ng to the gym and doing cardio and weights, but began getting too tired about 6 months ago.     Presents alert and oriented x 4. Fatigued, but participates. Sup>sit Mod A. Sit>stand Min A, stand pivot Min A with 2nd assist for lines and furniture assist. Follows sternal precautions.     Barriers to return to prior living situation: Lives alone, impaired activity tolerance and strength.     Recommendations for discharge: TCU    Rationale for recommendations: Pt will benefit from continued skilled rehab services to progress independence and safety with functional mobility, continue to monitor and progress exercise/activity tolerance as well as continue education for optimal heart health.        Entered by: Ada Paula 02/01/2020 4:45 PM

## 2020-02-01 NOTE — PROVIDER NOTIFICATION
Svco2 40% after new VBG drawn and put into vigilance monitor. Bp stable,CO and CI stable. pt not bleeding from chest tube and urine output is fine. Remains on Luan. Epi off.     Dr. Melendrez CV surg notified. No new orders at this time.

## 2020-02-01 NOTE — PROGRESS NOTES
"   02/01/20 1618   Quick Adds   Type of Visit Initial PT Evaluation   Living Environment   Lives With alone   Living Arrangements house;apartment   Living Environment Comment reports independent living   Self-Care   Usual Activity Tolerance good   Current Activity Tolerance fair   Equipment Currently Used at Home none   Activity/Exercise/Self-Care Comment Reports he had been doing \"cardio and strength \" at the gym up until about 6 months ago.    Functional Level Prior   Ambulation 0-->independent   Transferring 0-->independent   Toileting 0-->independent   Bathing 0-->independent   Fall history within last six months no   Which of the above functional risks had a recent onset or change? ambulation;transferring;toileting;bathing;dressing   Prior Functional Level Comment independent [facility] ADLs, IADLs and mobility at baseline.   General Information   Onset of Illness/Injury or Date of Surgery - Date 01/31/20   Referring Physician Yovani Melendrez MD   Patient/Family Goals Statement None stated.   Pertinent History of Current Problem (include personal factors and/or comorbidities that impact the POC) 68 yo male adm with worsening SOB, found to have 3 vessel disease, now POD#1 CABG x 3. Recent diagnosis of myelofibrosis. Just extubated this am.   Precautions/Limitations fall precautions;oxygen therapy device and L/min;sternal precautions  (6L via NC)   General Info Comments Multiple lines, Saxton in place, R radial art line, CT, hightower, pacer wires.    Cognitive Status Examination   Orientation orientation to person, place and time   Level of Consciousness alert   Follows Commands and Answers Questions 100% of the time;able to follow single-step instructions   Personal Safety and Judgment intact   Integumentary/Edema   Integumentary/Edema Comments B LEs 1+ pitting   Posture    Posture Forward head position;Protracted shoulders;Kyphosis   Range of Motion (ROM)   ROM Comment B LEs WFL as obs via AROM in sjpine. B UEs " Attempted to contact the patient by phone; the patient was not available and a message was left regarding regarding results and need for stress test to  contact office back.Patient phone message stated he is out of town.    Letter mailed to patient to contact office back to schedule the test.   "WFL wiht in sternal prec range.   Strength   Strength Comments Demonstrates antigravity strength, impaired activity tolerance with mobility   Bed Mobility   Bed Mobility Comments Sup>sit Mod A at roya trunk   Transfer Skills   Transfer Comments Sit>stand Min A   Gait   Gait Comments Bedside gait Min A   Balance   Balance Comments Sitting balance fair +, leans R   Sensory Examination   Sensory Perception Comments B LEs intact to light touch   General Therapy Interventions   Planned Therapy Interventions balance training;bed mobility training;gait training;neuromuscular re-education;ROM;strengthening;stretching;transfer training;home program guidelines;progressive activity/exercise;risk factor education   Clinical Impression   Criteria for Skilled Therapeutic Intervention yes, treatment indicated   PT Diagnosis Impaired functional activity tolerance   Influenced by the following impairments weakness, fatigue, sternal precautions, decreased balance, need for monitored exercise and activity tolerance post heart surgery   Functional limitations due to impairments Decreased independence wtih functional mobilitly   Clinical Presentation Stable/Uncomplicated   Clinical Presentation Rationale see MR   Clinical Decision Making (Complexity) Low complexity   Therapy Frequency 2x/day   Predicted Duration of Therapy Intervention (days/wks) 6 days   Anticipated Discharge Disposition Transitional Care Facility   Risk & Benefits of therapy have been explained Yes   Patient, Family & other staff in agreement with plan of care Yes   [facility] [employer] \"6 Clicks\"   2016, Trustees of [facility], under license to [employer].  All rights reserved.   6 Clicks Short Forms Basic Mobility Inpatient Short Form   [facility] [employer]  \"6 Clicks\" V.2 Basic Mobility Inpatient Short Form   1. Turning from your back to your side while in a flat bed without using bedrails? 3 - A Little   2. Moving from lying on your back to " sitting on the side of a flat bed without using bedrails? 2 - A Lot   3. Moving to and from a bed to a chair (including a wheelchair)? 3 - A Little   4. Standing up from a chair using your arms (e.g., wheelchair, or bedside chair)? 2 - A Lot   5. To walk in hospital room? 3 - A Little   6. Climbing 3-5 steps with a railing? 2 - A Lot   Basic Mobility Raw Score (Score out of 24.Lower scores equate to lower levels of function) 15   Total Evaluation Time   Total Evaluation Time (Minutes) 10

## 2020-02-02 ENCOUNTER — APPOINTMENT (OUTPATIENT)
Dept: GENERAL RADIOLOGY | Facility: CLINIC | Age: 70
DRG: 236 | End: 2020-02-02
Attending: SURGERY
Payer: COMMERCIAL

## 2020-02-02 ENCOUNTER — APPOINTMENT (OUTPATIENT)
Dept: PHYSICAL THERAPY | Facility: CLINIC | Age: 70
DRG: 236 | End: 2020-02-02
Attending: SURGERY
Payer: COMMERCIAL

## 2020-02-02 ENCOUNTER — APPOINTMENT (OUTPATIENT)
Dept: SPEECH THERAPY | Facility: CLINIC | Age: 70
DRG: 236 | End: 2020-02-02
Attending: PHYSICIAN ASSISTANT
Payer: COMMERCIAL

## 2020-02-02 LAB
ABO + RH BLD: NORMAL
ABO + RH BLD: NORMAL
BLD GP AB SCN SERPL QL: NORMAL
BLD PROD TYP BPU: NORMAL
BLD PROD TYP BPU: NORMAL
BLD UNIT ID BPU: 0
BLOOD BANK CMNT PATIENT-IMP: NORMAL
BLOOD PRODUCT CODE: NORMAL
BPU ID: NORMAL
BUN SERPL-MCNC: 31 MG/DL (ref 7–30)
CA-I BLD-MCNC: 4.1 MG/DL (ref 4.4–5.2)
CALCIUM SERPL-MCNC: 7.6 MG/DL (ref 8.5–10.1)
CHLORIDE SERPL-SCNC: 114 MMOL/L (ref 94–109)
CO2 SERPL-SCNC: 25 MMOL/L (ref 20–32)
CREAT SERPL-MCNC: 1.39 MG/DL (ref 0.66–1.25)
ERYTHROCYTE [DISTWIDTH] IN BLOOD BY AUTOMATED COUNT: 18.4 % (ref 10–15)
GFR SERPL CREATININE-BSD FRML MDRD: 51 ML/MIN/{1.73_M2}
GLUCOSE BLDC GLUCOMTR-MCNC: 108 MG/DL (ref 70–99)
GLUCOSE BLDC GLUCOMTR-MCNC: 113 MG/DL (ref 70–99)
GLUCOSE BLDC GLUCOMTR-MCNC: 114 MG/DL (ref 70–99)
GLUCOSE BLDC GLUCOMTR-MCNC: 114 MG/DL (ref 70–99)
GLUCOSE BLDC GLUCOMTR-MCNC: 120 MG/DL (ref 70–99)
GLUCOSE BLDC GLUCOMTR-MCNC: 122 MG/DL (ref 70–99)
GLUCOSE BLDC GLUCOMTR-MCNC: 124 MG/DL (ref 70–99)
GLUCOSE BLDC GLUCOMTR-MCNC: 126 MG/DL (ref 70–99)
GLUCOSE SERPL-MCNC: 127 MG/DL (ref 70–99)
HCT VFR BLD AUTO: 22.4 % (ref 40–53)
HGB BLD-MCNC: 6.9 G/DL (ref 13.3–17.7)
HGB BLD-MCNC: 7.8 G/DL (ref 13.3–17.7)
MAGNESIUM SERPL-MCNC: 2.9 MG/DL (ref 1.6–2.3)
MCH RBC QN AUTO: 28.9 PG (ref 26.5–33)
MCHC RBC AUTO-ENTMCNC: 30.8 G/DL (ref 31.5–36.5)
MCV RBC AUTO: 94 FL (ref 78–100)
NUM BPU REQUESTED: 2
PHOSPHATE SERPL-MCNC: 3.4 MG/DL (ref 2.5–4.5)
PLATELET # BLD AUTO: 228 10E9/L (ref 150–450)
POTASSIUM SERPL-SCNC: 4.6 MMOL/L (ref 3.4–5.3)
RBC # BLD AUTO: 2.39 10E12/L (ref 4.4–5.9)
SODIUM SERPL-SCNC: 142 MMOL/L (ref 133–144)
SPECIMEN EXP DATE BLD: NORMAL
TRANSFUSION STATUS PATIENT QL: NORMAL
TRANSFUSION STATUS PATIENT QL: NORMAL
WBC # BLD AUTO: 28.7 10E9/L (ref 4–11)

## 2020-02-02 PROCEDURE — 99221 1ST HOSP IP/OBS SF/LOW 40: CPT | Mod: GC | Performed by: PSYCHIATRY & NEUROLOGY

## 2020-02-02 PROCEDURE — 25000125 ZZHC RX 250: Performed by: PHYSICIAN ASSISTANT

## 2020-02-02 PROCEDURE — 97110 THERAPEUTIC EXERCISES: CPT | Mod: GP | Performed by: PHYSICAL THERAPIST

## 2020-02-02 PROCEDURE — 92526 ORAL FUNCTION THERAPY: CPT | Mod: GN | Performed by: SPEECH-LANGUAGE PATHOLOGIST

## 2020-02-02 PROCEDURE — 20000003 ZZH R&B ICU

## 2020-02-02 PROCEDURE — 92610 EVALUATE SWALLOWING FUNCTION: CPT | Mod: GN | Performed by: SPEECH-LANGUAGE PATHOLOGIST

## 2020-02-02 PROCEDURE — 82330 ASSAY OF CALCIUM: CPT | Performed by: SURGERY

## 2020-02-02 PROCEDURE — 25000132 ZZH RX MED GY IP 250 OP 250 PS 637: Performed by: SURGERY

## 2020-02-02 PROCEDURE — P9016 RBC LEUKOCYTES REDUCED: HCPCS | Performed by: SURGERY

## 2020-02-02 PROCEDURE — 00000146 ZZHCL STATISTIC GLUCOSE BY METER IP

## 2020-02-02 PROCEDURE — 85027 COMPLETE CBC AUTOMATED: CPT | Performed by: SURGERY

## 2020-02-02 PROCEDURE — 25800030 ZZH RX IP 258 OP 636: Performed by: SURGERY

## 2020-02-02 PROCEDURE — 84100 ASSAY OF PHOSPHORUS: CPT | Performed by: SURGERY

## 2020-02-02 PROCEDURE — 97530 THERAPEUTIC ACTIVITIES: CPT | Mod: GP | Performed by: PHYSICAL THERAPIST

## 2020-02-02 PROCEDURE — 40000275 ZZH STATISTIC RCP TIME EA 10 MIN

## 2020-02-02 PROCEDURE — 71045 X-RAY EXAM CHEST 1 VIEW: CPT

## 2020-02-02 PROCEDURE — 99233 SBSQ HOSP IP/OBS HIGH 50: CPT | Performed by: INTERNAL MEDICINE

## 2020-02-02 PROCEDURE — 25000128 H RX IP 250 OP 636: Performed by: SURGERY

## 2020-02-02 PROCEDURE — 25000131 ZZH RX MED GY IP 250 OP 636 PS 637: Performed by: SURGERY

## 2020-02-02 PROCEDURE — 25000132 ZZH RX MED GY IP 250 OP 250 PS 637: Performed by: PHYSICIAN ASSISTANT

## 2020-02-02 PROCEDURE — 94660 CPAP INITIATION&MGMT: CPT

## 2020-02-02 PROCEDURE — 25000128 H RX IP 250 OP 636: Performed by: PHYSICIAN ASSISTANT

## 2020-02-02 PROCEDURE — 83735 ASSAY OF MAGNESIUM: CPT | Performed by: SURGERY

## 2020-02-02 PROCEDURE — 85018 HEMOGLOBIN: CPT | Performed by: PHYSICIAN ASSISTANT

## 2020-02-02 PROCEDURE — 80048 BASIC METABOLIC PNL TOTAL CA: CPT | Performed by: SURGERY

## 2020-02-02 PROCEDURE — C9113 INJ PANTOPRAZOLE SODIUM, VIA: HCPCS | Performed by: PHYSICIAN ASSISTANT

## 2020-02-02 RX ORDER — ASPIRIN 325 MG
325 TABLET ORAL DAILY
Status: DISCONTINUED | OUTPATIENT
Start: 2020-02-02 | End: 2020-02-10 | Stop reason: HOSPADM

## 2020-02-02 RX ORDER — METOPROLOL TARTRATE 1 MG/ML
2.5 INJECTION, SOLUTION INTRAVENOUS ONCE
Status: COMPLETED | OUTPATIENT
Start: 2020-02-02 | End: 2020-02-02

## 2020-02-02 RX ADMIN — OXYCODONE HYDROCHLORIDE 10 MG: 5 TABLET ORAL at 10:25

## 2020-02-02 RX ADMIN — PANTOPRAZOLE SODIUM 40 MG: 40 INJECTION, POWDER, FOR SOLUTION INTRAVENOUS at 13:14

## 2020-02-02 RX ADMIN — SODIUM CHLORIDE 1.5 UNITS/HR: 9 INJECTION, SOLUTION INTRAVENOUS at 08:35

## 2020-02-02 RX ADMIN — ATORVASTATIN CALCIUM 40 MG: 40 TABLET, FILM COATED ORAL at 22:15

## 2020-02-02 RX ADMIN — MUPIROCIN 0.5 G: 20 OINTMENT TOPICAL at 22:16

## 2020-02-02 RX ADMIN — GABAPENTIN 100 MG: 100 CAPSULE ORAL at 22:23

## 2020-02-02 RX ADMIN — METOPROLOL TARTRATE 2.5 MG: 5 INJECTION INTRAVENOUS at 08:13

## 2020-02-02 RX ADMIN — MUPIROCIN 0.5 G: 20 OINTMENT TOPICAL at 10:48

## 2020-02-02 RX ADMIN — GABAPENTIN 100 MG: 100 CAPSULE ORAL at 18:23

## 2020-02-02 RX ADMIN — SENNOSIDES AND DOCUSATE SODIUM 1 TABLET: 8.6; 5 TABLET ORAL at 10:26

## 2020-02-02 RX ADMIN — HYDROMORPHONE HYDROCHLORIDE 0.5 MG: 1 INJECTION, SOLUTION INTRAMUSCULAR; INTRAVENOUS; SUBCUTANEOUS at 00:05

## 2020-02-02 RX ADMIN — ACETAMINOPHEN 975 MG: 325 TABLET, FILM COATED ORAL at 18:23

## 2020-02-02 RX ADMIN — HYDROMORPHONE HYDROCHLORIDE 0.5 MG: 1 INJECTION, SOLUTION INTRAMUSCULAR; INTRAVENOUS; SUBCUTANEOUS at 08:39

## 2020-02-02 RX ADMIN — SENNOSIDES AND DOCUSATE SODIUM 1 TABLET: 8.6; 5 TABLET ORAL at 22:14

## 2020-02-02 RX ADMIN — SODIUM CHLORIDE 1.5 UNITS/HR: 9 INJECTION, SOLUTION INTRAVENOUS at 00:13

## 2020-02-02 RX ADMIN — ASPIRIN 325 MG ORAL TABLET 325 MG: 325 PILL ORAL at 13:02

## 2020-02-02 RX ADMIN — METOPROLOL TARTRATE 12.5 MG: 25 TABLET, FILM COATED ORAL at 22:14

## 2020-02-02 RX ADMIN — ACETAMINOPHEN 975 MG: 325 TABLET, FILM COATED ORAL at 10:26

## 2020-02-02 RX ADMIN — HEPARIN SODIUM 5000 UNITS: 5000 INJECTION, SOLUTION INTRAVENOUS; SUBCUTANEOUS at 22:14

## 2020-02-02 RX ADMIN — HYDROMORPHONE HYDROCHLORIDE 0.5 MG: 1 INJECTION, SOLUTION INTRAMUSCULAR; INTRAVENOUS; SUBCUTANEOUS at 04:14

## 2020-02-02 RX ADMIN — HEPARIN SODIUM 5000 UNITS: 5000 INJECTION, SOLUTION INTRAVENOUS; SUBCUTANEOUS at 13:00

## 2020-02-02 RX ADMIN — HEPARIN SODIUM 5000 UNITS: 5000 INJECTION, SOLUTION INTRAVENOUS; SUBCUTANEOUS at 04:27

## 2020-02-02 RX ADMIN — METOPROLOL TARTRATE 12.5 MG: 25 TABLET, FILM COATED ORAL at 10:25

## 2020-02-02 RX ADMIN — GABAPENTIN 100 MG: 100 CAPSULE ORAL at 10:26

## 2020-02-02 ASSESSMENT — ACTIVITIES OF DAILY LIVING (ADL)
ADLS_ACUITY_SCORE: 13
ADLS_ACUITY_SCORE: 13
ADLS_ACUITY_SCORE: 12
ADLS_ACUITY_SCORE: 13
ADLS_ACUITY_SCORE: 12
ADLS_ACUITY_SCORE: 13

## 2020-02-02 ASSESSMENT — MIFFLIN-ST. JEOR: SCORE: 1654.5

## 2020-02-02 NOTE — PROVIDER NOTIFICATION
Pt to go to MRI, pt still has Pacer wires. Notified Dr. Lozano Neuro crit. Ok to wait until pacer out. Will continue to monitor.

## 2020-02-02 NOTE — PROGRESS NOTES
Consult received. Patient has myelofibrosis. Patient had CABG on 01/31/2020. Will see him in next few days when he is better.

## 2020-02-02 NOTE — PROGRESS NOTES
Pt stable on Bipap overnight, no issues. Skin protective barriers in place, alarm level set at 10.    Karan Pate, RT

## 2020-02-02 NOTE — PROGRESS NOTES
Intensivist Daily Note  2020      Brief History:  Renny Gannon is a 69 year old male with a past medical history of HTN, and recently diagnosed with myelofibrosis who presented to the clinic for worsening shortness of breath and chest discomfort. He was was found to have  severe 3-vessel coronary artery disease. He underwent a CABx3 by Dr. Barrera on 2020     Interval Events:  Code stroke called overnight for left facial droop.  Head CT negative for acute bleed. Respiratory status stable on bipap overnight on 6L NC this morning.  Pressors off since yesterday , afternoon..  Received 1U pRBC for hgb 6.9 this am.    PMH:  Past Medical History:   Diagnosis Date     Hypertension        PSurgHx:  Past Surgical History:   Procedure Laterality Date     BONE MARROW BIOPSY, BONE SPECIMEN, NEEDLE/TROCAR N/A 2019    Procedure: BIOPSY, BONE MARROW;  Surgeon: Aguilar Krause MD;  Location: [facility]     CV CORONARY ANGIOGRAM Left 2020    Procedure: Coronary Angiogram;  Surgeon: Devin Bentley MD;  Location:  [facility]     NO HISTORY OF SURGERY         Family History:  Family History     Problem (# of Occurrences) Relation (Name,Age of Onset)    Cancer (1) Brother: lung cancer - smoking    Coronary Artery Disease (1) Brother:  of MI at 66    No Known Problems (3) Mother, Sister, Sister    Unknown/Adopted (5) Father, Maternal Grandmother, Maternal Grandfather, Paternal Grandmother, Paternal Grandfather          Social History:  Social History     Socioeconomic History     Marital status: Single     Spouse name: Not on file     Number of children: Not on file     Years of education: Not on file     Highest education level: Not on file   Occupational History     Not on file   Social Needs     Financial resource strain: Not on file     Food insecurity:     Worry: Not on file     Inability: Not on file     Transportation needs:     Medical: Not on file     Non-medical:  Not on file   Tobacco Use     Smoking status: Former Smoker     Years: 30.00     Types: Cigarettes     Start date: 1961     Last attempt to quit: 2001     Years since quittin.0     Smokeless tobacco: Never Used   Substance and Sexual Activity     Alcohol use: No     Frequency: Never     Drug use: No     Sexual activity: Not Currently   Lifestyle     Physical activity:     Days per week: Not on file     Minutes per session: Not on file     Stress: Not on file   Relationships     Social connections:     Talks on phone: Not on file     Gets together: Not on file     Attends Tenri[employer] service: Not on file     Active member of club or organization: Not on file     Attends meetings of clubs or organizations: Not on file     Relationship status: Not on file     Intimate partner violence:     Fear of current or ex partner: Not on file     Emotionally abused: Not on file     Physically abused: Not on file     Forced sexual activity: Not on file   Other Topics Concern     Parent/sibling w/ CABG, MI or angioplasty before 65F 55M? Not Asked   Social History Narrative     Not on file       Allergy:  Allergies   Allergen Reactions     No Known Drug Allergies      Seasonal Allergies         Medications:  Current Facility-Administered Medications   Medication     [START ON 2/3/2020] acetaminophen (TYLENOL) tablet 650 mg     acetaminophen (TYLENOL) tablet 975 mg     aminocaproic acid (AMICAR) 7.5 g in sodium chloride 0.9 % 150 mL infusion     aspirin (ASA) tablet 325 mg     atorvastatin (LIPITOR) tablet 40 mg     dextrose 10% infusion     dextrose 5% and 0.45% NaCl + KCl 20 mEq/L infusion     glucose gel 15-30 g    Or     dextrose 50 % injection 25-50 mL    Or     glucagon injection 1 mg     diphenhydrAMINE (BENADRYL) solution 12.5 mg    Or     diphenhydrAMINE (BENADRYL) injection 12.5 mg     EPINEPHrine (ADRENALIN) 5 mg in sodium chloride 0.9 % 250 mL infusion     furosemide (LASIX) injection 20 mg     gabapentin  (NEURONTIN) capsule 100 mg     heparin ANTICOAGULANT injection 5,000 Units     hydrALAZINE (APRESOLINE) injection 10 mg     HYDROmorphone (PF) (DILAUDID) injection 0.3-0.5 mg     insulin 1 unit/mL in saline (NovoLIN, HumuLIN Regular) drip - ADULT IV Infusion     insulin aspart (NovoLOG) inj (RAPID ACTING)     insulin aspart (NovoLOG) inj (RAPID ACTING)     labetalol (NORMODYNE/TRANDATE) injection 10 mg     lidocaine (LMX4) cream     lidocaine 1 % 0.1-1 mL     magnesium sulfate 2 g in water intermittent infusion     magnesium sulfate 4 g in 100 mL sterile water (premade)     melatonin tablet 1 mg     methocarbamol (ROBAXIN) tablet 500 mg     metoclopramide (REGLAN) tablet 5 mg    Or     metoclopramide (REGLAN) injection 5 mg     metoprolol tartrate (LOPRESSOR) half-tab 12.5 mg     mupirocin (BACTROBAN) 2 % ointment 0.5 g     naloxone (NARCAN) injection 0.1-0.4 mg     nitroGLYcerin 50 mg in D5W 250 mL (adult std) infusion     ondansetron (ZOFRAN-ODT) ODT tab 4 mg    Or     ondansetron (ZOFRAN) injection 4 mg     oxyCODONE (ROXICODONE) tablet 5-10 mg     pantoprazole (PROTONIX) 40 mg IV push injection     phenylephrine (SONAM-SYNEPHRINE) 50 mg in NaCl 0.9 % 250 mL infusion     potassium chloride (KLOR-CON) Packet 20-40 mEq     potassium chloride 10 mEq in 100 mL intermittent infusion with 10 mg lidocaine     potassium chloride 10 mEq in 100 mL sterile water intermittent infusion (premix)     potassium chloride 20 mEq in 50 mL intermittent infusion     potassium chloride ER (K-DUR/KLOR-CON M) CR tablet 20-40 mEq     prochlorperazine (COMPAZINE) injection 5 mg    Or     prochlorperazine (COMPAZINE) tablet 5 mg     propofol (DIPRIVAN) infusion     senna-docusate (SENOKOT-S/PERICOLACE) 8.6-50 MG per tablet 1-2 tablet     sodium chloride (PF) 0.9% PF flush 3 mL     sodium chloride (PF) 0.9% PF flush 3 mL     sodium chloride 0.9% infusion     sodium phosphate 10 mmol in D5W intermittent infusion     sodium phosphate 15 mmol in  D5W intermittent infusion     sodium phosphate 20 mmol in D5W intermittent infusion     sodium phosphate 25 mmol in D5W intermittent infusion         Physical examination:  Vital signs:  Temp: 97.7  F (36.5  C) Temp src: Bladder BP: 115/69 Pulse: 100 Heart Rate: 100 Resp: (!) 39 SpO2: 95 % O2 Device: (P) Nasal cannula Oxygen Delivery: (P) 6 LPM    General: Awake, seated up in chair this morning, no acute distress.  HEENT: neck supple, symmetrical  Lungs: Clear to auscultation, no wheezing  CVS: Normal S1 & S2   Abdomen: soft, non tender  Extremities/musculoskeletal: no edema.  Extremities warm and dry.  Neurology: alert and oriented x 3  Skin: no rash  Exam of Line sites: No erythema or discharge.    Data:    ROUTINE ICU LABS (Last four results)  CMP  Recent Labs   Lab 02/02/20 0410 02/01/20 0400 01/31/20  1335 01/31/20  1149    144 142 137   POTASSIUM 4.6 4.4 4.3 5.3   CHLORIDE 114* 115* 114* 108   CO2 25 25 23 22   ANIONGAP  --  4 5 7   * 172* 189* 197*   BUN 31* 28 22 23   CR 1.39* 1.43* 1.24 1.13   GFRESTIMATED 51* 49* 58* 65   GFRESTBLACK 59* 57* 68 76   MINNIE 7.6* 7.3* 7.7* 8.4*   MAG 2.9* 2.5* 2.8*  --    PHOS 3.4 4.1 4.5  --    PROTTOTAL  --  5.5* 5.2*  --    ALBUMIN  --  3.2* 2.9*  --    BILITOTAL  --  0.4 0.6  --    ALKPHOS  --  45 61  --    AST  --  125* 85*  --    ALT  --  47 37  --      CBC  Recent Labs   Lab 02/02/20  0410 02/01/20  1335 02/01/20  0400 01/31/20  2334 01/31/20  1944   WBC 28.7*  --  64.4* 54.0* 71.9*   RBC 2.39*  --  2.64* 2.67* 2.78*   HGB 6.9* 8.0* 7.6* 8.0* 8.1*   HCT 22.4*  --  24.1* 24.1* 25.6*   MCV 94  --  91 90 92   MCH 28.9  --  28.8 30.0 29.1   MCHC 30.8*  --  31.5 33.2 31.6   RDW 18.4*  --  18.5* 18.0* 18.1*     --  320 261 286     INR  Recent Labs   Lab 01/31/20  2334 01/31/20  1944 01/31/20  1625 01/31/20  1335   INR 0.91 0.79* 1.74* 1.57*     Arterial Blood Gas  Recent Labs   Lab 02/01/20  1045 01/31/20  1355 01/31/20  1335 01/31/20  1118 01/31/20  1048    [facility] 7.37  --  7.26* 7.26* 7.30*   PCO2 40  --  44 52* 50*   PO2 69*  --  119* 151* 177*   HCO3 23  --  20* 23 25   O2PER Ventilator 60%  --   --   --        Recent Results (from the past 24 hour(s))   CT Head w/o Contrast    Narrative    CT SCAN OF THE HEAD WITHOUT CONTRAST   2/1/2020 6:11 PM     HISTORY: Left facial droop.    TECHNIQUE:  Axial images of the head and coronal reformations without  IV contrast material. Radiation dose for this scan was reduced using  automated exposure control, adjustment of the mA and/or kV according  to patient size, or iterative reconstruction technique.    COMPARISON: None.    FINDINGS: There is no evidence of intracranial hemorrhage, mass, or  anomaly. The ventricles are normal in size, shape and configuration.  The brain parenchyma and subarachnoid spaces are normal.     The visualized portions of the sinuses and mastoids appear normal. The  bony calvarium and bones of the skull base appear intact.       Impression    IMPRESSION:   No evidence of acute intracranial hemorrhage, mass, or  herniation.    KELSEY ELI MD   CTA Head Neck with Contrast    Narrative    CT ANGIOGRAM OF THE HEAD AND NECK WITH CONTRAST  CT HEAD PERFUSION WITH CONTRAST  2/1/2020 6:20 PM     HISTORY: Left facial droop.    TECHNIQUE:  CT angiography with an injection of 70 mL Isovue-370      (accession [id number]), 50 mL Isovue-370 (accession [id number]) IV with  scans through the head and neck. Images were transferred to a separate  3-D workstation where multiplanar reformations and 3-D images were  created. Estimates of carotid stenoses are made relative to the distal  internal carotid artery diameters except as noted. Radiation dose for  this scan was reduced using automated exposure control, adjustment of  the mA and/or kV according to patient size, or iterative  reconstruction technique.     Perfusion scans were performed with injection of additional IV  contrast. These images were processed on a  separate 3-D workstation.     COMPARISON: None.     CT HEAD FINDINGS: No contrast enhancing lesions. CT perfusion images  of the head are unremarkable.     CT ANGIOGRAM HEAD FINDINGS:  The major intracranial arteries including  the proximal branches of the anterior cerebral, middle cerebral, and  posterior cerebral arteries appear patent without vascular cutoff. No  aneurysm identified. No significant stenosis. Venous circulation is  unremarkable.     The A1 segment of the right anterior cerebral artery is not visualized  and is likely hypoplastic or congenitally absent. The A2 segment of  right anterior cerebral artery is supplied by the patent anterior  communicating artery.    CT ANGIOGRAM NECK FINDINGS:   Normal origin of the great vessels from the aortic arch.     Right carotid artery: The right common and internal carotid arteries  are patent. Marked soft and calcified plaque at the carotid  bifurcation and proximal internal carotid artery resulting in  approximately 50% stenosis by NASCET criteria.     Left carotid artery: The left common and internal carotid arteries are  patent. Mild calcified atherosclerotic disease at the carotid  bifurcation without stenosis.     Vertebral arteries: Vertebral arteries are patent without evidence of  dissection. Mild stenosis at the origin the right vertebral artery  secondary to soft plaque.    Other findings: Partially visualized surgical changes of sternotomy  and coronary artery bypass grafting. Scattered foci of air in the  mediastinum and neck likely related to recent surgery.       Impression    IMPRESSION:   1. Patent proximal major intracranial arteries without vascular  cutoff. No significant intracranial stenosis or aneurysm.  2. Patent arteries in the neck without evidence of dissection.  3. Moderate atherosclerotic disease in the right carotid artery  resulting in approximately 50% stenosis by NASCET criteria. Mild  atherosclerotic disease in the left carotid  artery without significant  stenosis.  4. Mild stenosis at the origin of the right vertebral artery secondary  to soft plaque.  5. Unremarkable CT perfusion images of the head.    Results discussed with nurse Delmi Conn at 6:35 PM on 2/1/2020.      KELSEY ELI MD   CT Head Perfusion w Contrast    Narrative    CT ANGIOGRAM OF THE HEAD AND NECK WITH CONTRAST  CT HEAD PERFUSION WITH CONTRAST  2/1/2020 6:20 PM     HISTORY: Left facial droop.    TECHNIQUE:  CT angiography with an injection of 70 mL Isovue-370      (accession [id number]), 50 mL Isovue-370 (accession [id number]) IV with  scans through the head and neck. Images were transferred to a separate  3-D workstation where multiplanar reformations and 3-D images were  created. Estimates of carotid stenoses are made relative to the distal  internal carotid artery diameters except as noted. Radiation dose for  this scan was reduced using automated exposure control, adjustment of  the mA and/or kV according to patient size, or iterative  reconstruction technique.     Perfusion scans were performed with injection of additional IV  contrast. These images were processed on a separate 3-D workstation.     COMPARISON: None.     CT HEAD FINDINGS: No contrast enhancing lesions. CT perfusion images  of the head are unremarkable.     CT ANGIOGRAM HEAD FINDINGS:  The major intracranial arteries including  the proximal branches of the anterior cerebral, middle cerebral, and  posterior cerebral arteries appear patent without vascular cutoff. No  aneurysm identified. No significant stenosis. Venous circulation is  unremarkable.     The A1 segment of the right anterior cerebral artery is not visualized  and is likely hypoplastic or congenitally absent. The A2 segment of  right anterior cerebral artery is supplied by the patent anterior  communicating artery.    CT ANGIOGRAM NECK FINDINGS:   Normal origin of the great vessels from the aortic arch.     Right carotid artery: The  right common and internal carotid arteries  are patent. Marked soft and calcified plaque at the carotid  bifurcation and proximal internal carotid artery resulting in  approximately 50% stenosis by NASCET criteria.     Left carotid artery: The left common and internal carotid arteries are  patent. Mild calcified atherosclerotic disease at the carotid  bifurcation without stenosis.     Vertebral arteries: Vertebral arteries are patent without evidence of  dissection. Mild stenosis at the origin the right vertebral artery  secondary to soft plaque.    Other findings: Partially visualized surgical changes of sternotomy  and coronary artery bypass grafting. Scattered foci of air in the  mediastinum and neck likely related to recent surgery.       Impression    IMPRESSION:   1. Patent proximal major intracranial arteries without vascular  cutoff. No significant intracranial stenosis or aneurysm.  2. Patent arteries in the neck without evidence of dissection.  3. Moderate atherosclerotic disease in the right carotid artery  resulting in approximately 50% stenosis by NASCET criteria. Mild  atherosclerotic disease in the left carotid artery without significant  stenosis.  4. Mild stenosis at the origin of the right vertebral artery secondary  to soft plaque.  5. Unremarkable CT perfusion images of the head.    Results discussed with nurse Delmi Conn at 6:35 PM on 2/1/2020.      KELSEY ELI MD   XR Chest Port 1 View    Narrative    XR PORTABLE CHEST ONE VIEW   2/2/2020 8:25 AM     HISTORY: CAB    COMPARISON: 2/1/2020.      Impression    IMPRESSION: Removal of ET tube and nasogastric tube. Right neck  central venous sheath is in place. Increasing consolidation at the  left lung base. Cannot exclude increasing acute airspace disease.  Stable right lung. Cardiomegaly again suggested.         Assessment and Plan:    Neuro  1. Acute pain  2. Left facial droop -- code stroke 2/1 w/ negative head CT  Plan:  -- Scheduled  acetaminophen, lidoderm, gabapentin, with PRN dilaudid and oxycodone for pain  -- Neurocritical care/stroke team following -- recommending MRI, will be done once pacer wires come out (? Tomorrow) unless change in neuro status     CV  1. S/p CAB x3   2. HTN  3. Shock -- resolved, off pressors since 2/1  Plan:  -- Cardiac meds per CV surgery  -- Continue statin and ASA        Resp:  1. Acute hypoxic respiratory failure -- resolved, extubated 2/1  Plan:  -- Supplemental O2 to keep sats > 92%, bipap prn for patient comfort  -- pulmonary hygiene measures     GI/Nutrition  1. No prior hx  Plan:  -- Bedside swallow today  -- PPI for SUP     Renal  1. Mild KRIS with creatinine 1.39.  Baseline creatinine appears to be 1.15  Plan:  --monitor function and electrolytes as needed with replacement per ICU protocols.   -- generally avoid nephrotoxic agents such as NSAID, IV contrast unless specifically required  -- adjust medications as needed for renal clearance  -- follow I/O's as appropriate.       ID  1. No acute issues   Plan:  -Monitor fever curve, WBC less helpful given known myelofibrosis     Endocrine  1. Stress Hyperglycemia  Plan:  -- Insulin gtt per protocol if indicated otherwise sliding scale insulin appropriate  -- Keep BG  <180 for optimal healing     Heme:  1. Acute blood loss anemia   2. Myelofibrosis   Plan:  -- Monitor hemoglobin; received 1U pRBC this am  -- Transfuse to keep > 7.0  -- Heme/Onc consult  -- Daily CBC   -- Hold hydroxyurea until extubated and able to swallow per heme     MSK  1. Deconditioning   Plan:  -- PT/OT consult for mobility     Skin  1. Sternotomy/Incision    Plan:  -- Wound care as ordered      General cares:  DVT Prophylaxis: Pneumatic Compression Devices; SQH   GI Prophylaxis: PPI  Restraints: Restraints for medical healing needed: YES  Family update by me today: No  Current lines are required for patient management      Chichi Brown MD  Pulmonary and Critical Care  Medicine

## 2020-02-02 NOTE — PLAN OF CARE
Discharge Planner SLP   Patient plan for discharge: Did not state  Current status: SLP: Pt presents with moderate dysphagia on Clinical Swallow Evaluation. Pt alert and reported difficulty swallowing, but did not expand on symptoms. Oral motor exam significant for left facial droop. Ice chips and thin liquids trialed with function oral phase, however, suspect premature spillage and pharyngeal dysphagia with immediate coughing. Continued good oral phase with honey thick liquids by teaspoon and puree solids, however, double swallows on each sip. After 3 bites of applesauce, pt denied additional PO. Reported globus sensation improved with dry swallows.     Pt verbalized agreement with recommend: full liquid, honey thick liquids by teaspoon only. Pt must be alert, fully upright, and encourage 2-3 swallows per bite. Would crush meds if able or place meds in applesauce and clear with dry swallows and liquid washes. Pending progress, pt will likely need instrumental assessment to further evaluate pharyngeal phase.     Barriers to return to prior living situation: Dysphagia; pt reported aphasia symptoms  Recommendations for discharge: ARU  Rationale for recommendations: Expect pt will progress to be able to tolerate 3 hours of therapy a day. Pt motivated to return to PLOF. Will continue daily for swallowing and further evaluate speech/language skills.        Entered by: Neetu Tello 02/02/2020 9:26 AM

## 2020-02-02 NOTE — PROVIDER NOTIFICATION
HGB 6.9. Notified Dr. Melendrez, CV surg. Orders to give 1 unit RBCs.     Will continue to monitor.

## 2020-02-02 NOTE — CONSULTS
"  [facility]    Stroke Consult Note    Reason for Consult:  L facial droop    Chief Complaint: No chief complaint on file.       HPI  Renny Gannon is a 69 year old male with a past medical history of HTN, and recently diagnosed with myelofibrosis who presented to the clinic for worsening shortness of breath and chest discomfort. He was was found to have  severe 3-vessel coronary artery disease. He underwent a CABG on January 31, 2020. Post-CABG, he was transferred to ICU intubated and sedated. Post-extubation, nurse noted left facial droop for which stroke code was alerted. No other neuro deficits. NIHSS was 1. CTH/CTA/CTP were negative for any acute abnormality.     This morning his L facial droop appears to be resolved. Has mild dysarthria and dysphagia which could be from intubation. NIHSS 0. Currently on aspirin and statin.     Impression  Left facial droop- which appears to be resolved today. Unclear if a stroke/TIA. Some reported dysarthria dn dysphagia, which warrents MRI brain to rule out any small stroke     Recommendations  - Neurochecks Q 4 hours  - Daily aspirin 325 mg for secondary stroke prevention  - Statin: Lipitor  - MRI Stroke Protocol  - PT/OT/SLP  - Stroke Education  - Euthermia, Euglycemia    Patient Follow-up    - final recommendation pending work-up    Thank you for this consult. We will continue to follow.     Haja Painting MD  Fellow, Vascular Neurology  Text Page    To page stroke neurology after hours through [facility], click here: [facility]COM  Choose \"On Call\" tab at top, then search dropdown box for \"Neurology Adult\"    _____________________________________________________    Past Medical History   Past Medical History:   Diagnosis Date     Hypertension      Past Surgical History   Past Surgical History:   Procedure Laterality Date     BONE MARROW BIOPSY, BONE SPECIMEN, NEEDLE/TROCAR N/A 12/30/2019    Procedure: BIOPSY, BONE MARROW;  Surgeon: Aguilar Krause MD; "  Location: [facility] OR     CV CORONARY ANGIOGRAM Left 1/9/2020    Procedure: Coronary Angiogram;  Surgeon: Devin Bentley MD;  Location:  [facility]     NO HISTORY OF SURGERY       Medications   Home Meds  Prior to Admission medications    Medication Sig Start Date End Date Taking? Authorizing Provider   atorvastatin (LIPITOR) 40 MG tablet Take 1 tablet (40 mg) by mouth daily 1/2/20  Yes Devin Bentley MD   hydroxyurea (HYDREA) 500 MG capsule Take 1 capsule (500 mg) by mouth daily 1/16/20  Yes Mani Cristina MD   isosorbide mononitrate (IMDUR) 30 MG 24 hr tablet Take 1 tablet (30 mg) by mouth daily 1/15/20  Yes Madeleine Durand APRN CNP   lisinopril-hydrochlorothiazide (PRINZIDE/ZESTORETIC) 20-25 MG tablet TAKE ONE TABLET BY MOUTH ONCE DAILY STOP THE 10-12.5 MG DOSE 1/7/20  Yes Angus Scott MD   metoprolol succinate ER (TOPROL-XL) 25 MG 24 hr tablet Take 2 tablets (50 mg) by mouth daily  Patient taking differently: Take 25 mg by mouth daily  1/2/20  Yes Devin Bentley MD   naproxen (NAPROSYN) 500 MG tablet Take 1 tablet (500 mg) by mouth 2 times daily as needed for moderate pain 2/11/19  Yes Angus Scott MD   nitroGLYcerin (NITROSTAT) 0.4 MG sublingual tablet For chest pain place 1 tablet under the tongue every 5 minutes for 3 doses. If symptoms persist 5 minutes after 1st dose call 911. 1/2/20  Yes Devin Bentley MD   vitamin B-12 (CYANOCOBALAMIN) 250 MCG tablet Take 250 mcg by mouth daily   Yes Reported, Patient       Scheduled Meds    acetaminophen  975 mg Oral Q8H     aspirin  325 mg Oral Daily     atorvastatin  40 mg Oral At Bedtime     gabapentin  100 mg Oral TID     heparin ANTICOAGULANT  5,000 Units Subcutaneous Q8H     insulin aspart  1-7 Units Subcutaneous TID AC     insulin aspart  1-5 Units Subcutaneous At Bedtime     metoprolol tartrate  12.5 mg Oral BID     mupirocin  0.5 g Both Nostrils BID     pantoprazole (PROTONIX) IV  40 mg Intravenous Daily with  breakfast     senna-docusate  1-2 tablet Oral BID     sodium chloride (PF)  3 mL Intracatheter Q8H       Infusion Meds    aminocaproic acid (AMICAR) infusion 7.5gm/150mL ADULT Stopped (20 1530)     dextrose       dextrose 5% and 0.45% NaCl + KCl 20 mEq/L Stopped (20 0502)     EPINEPHrine IV infusion ADULT Stopped (20 1025)     nitroGLYcerin Stopped (20 1342)     phenylephrine Stopped (20 1930)     propofol (DIPRIVAN) infusion Stopped (20 1105)     sodium chloride 10 mL/hr at 20 0502       PRN Meds  [START ON 2/3/2020] acetaminophen, dextrose, glucose **OR** dextrose **OR** glucagon, diphenhydrAMINE **OR** diphenhydrAMINE, furosemide, hydrALAZINE, HYDROmorphone, labetalol, lidocaine 4%, lidocaine (buffered or not buffered), magnesium sulfate, magnesium sulfate, melatonin, methocarbamol, metoclopramide **OR** metoclopramide, naloxone, ondansetron **OR** ondansetron, oxyCODONE IR, potassium chloride, potassium chloride with lidocaine, potassium chloride, potassium chloride, potassium chloride, prochlorperazine **OR** prochlorperazine, sodium chloride (PF), sodium phosphate, sodium phosphate, sodium phosphate, sodium phosphate    Allergies   Allergies   Allergen Reactions     No Known Drug Allergies      Seasonal Allergies      Family History   Family History   Problem Relation Age of Onset     No Known Problems Mother      Unknown/Adopted Father      Unknown/Adopted Maternal Grandmother      Unknown/Adopted Maternal Grandfather      Unknown/Adopted Paternal Grandmother      Unknown/Adopted Paternal Grandfather      Cancer Brother         lung cancer - smoking     No Known Problems Sister      Coronary Artery Disease Brother          of MI at 66     No Known Problems Sister      Social History   Social History     Tobacco Use     Smoking status: Former Smoker     Years: 30.00     Types: Cigarettes     Start date: 1961     Last attempt to quit: 2001     Years since  quittin.0     Smokeless tobacco: Never Used   Substance Use Topics     Alcohol use: No     Frequency: Never     Drug use: No       Review of Systems   The 10 point Review of Systems is negative other than noted in the HPI or here.        PHYSICAL EXAMINATION   Temp:  [76.1  F (24.5  C)-99.1  F (37.3  C)] 99.1  F (37.3  C)  Pulse:  [] 100  Heart Rate:  [] 101  Resp:  [8-62] 25  BP: ()/(43-85) 100/59  MAP:  [65 mmHg-83 mmHg] 65 mmHg  Arterial Line BP: ()/(18-65) 98/41  FiO2 (%):  [40 %] 40 %  SpO2:  [85 %-99 %] 94 %    Neurologic  Mental Status:  alert, oriented x 3, follows commands, speech clear and fluent, naming and repetition normal  Cranial Nerves:  visual fields intact, PERRL, EOMI with normal smooth pursuit, facial sensation intact and symmetric, facial movements symmetric, hearing not formally tested but intact to conversation, palate elevation symmetric and uvula midline, no dysarthria, shoulder shrug strong bilaterally, tongue protrusion midline  Motor:  normal muscle tone and bulk, no abnormal movements, able to move all limbs spontaneously, strength 5/5 throughout upper and lower extremities, no pronator drift  Reflexes:  toes down-going  Sensory:  light touch sensation intact and symmetric throughout upper and lower extremities, no extinction on double simultaneous stimulation   Coordination:  normal finger-to-nose and heel-to-shin bilaterally without dysmetria, rapid alternating movements symmetric  Station/Gait:  deferred      Stroke Scales    NIHSS  Interval (day 1) (20 1354)   Interval Comments     1a. Level of Consciousness 0-->Alert, keenly responsive   1b. LOC Questions 0-->Answers both questions correctly   1c. LOC Commands 0-->Performs both tasks correctly   2.   Best Gaze 0-->Normal   3.   Visual 0-->No visual loss   4.   Facial Palsy 0-->Normal symmetrical movements   5a. Motor Arm, Left 0-->No drift, limb holds 90 (or 45) degrees for full 10 secs   5b. Motor  Arm, Right 0-->No drift, limb holds 90 (or 45) degrees for full 10 secs   6a. Motor Leg, Left 0-->No drift, leg holds 30 degree position for full 5 secs   6b. Motor Leg, right 0-->No drift, leg holds 30 degree position for full 5 secs   7.   Limb Ataxia 0-->Absent   8.   Sensory 0-->Normal, no sensory loss   9.   Best Language 0-->No aphasia, normal   10. Dysarthria 0-->Normal   11. Extinction and Inattention  0-->No abnormality   Total 0 (02/02/20 1354)       Imaging  I personally reviewed all imaging; relevant findings per HPI.    Labs Data   CBC  Recent Labs   Lab 02/02/20  0410 02/01/20  1335 02/01/20  0400 01/31/20  2334   WBC 28.7*  --  64.4* 54.0*   RBC 2.39*  --  2.64* 2.67*   HGB 6.9* 8.0* 7.6* 8.0*   HCT 22.4*  --  24.1* 24.1*     --  320 261     Basic Metabolic Panel   Recent Labs   Lab 02/02/20  0410 02/01/20  0400 01/31/20  1335    144 142   POTASSIUM 4.6 4.4 4.3   CHLORIDE 114* 115* 114*   CO2 25 25 23   BUN 31* 28 22   CR 1.39* 1.43* 1.24   * 172* 189*   MINNIE 7.6* 7.3* 7.7*     Liver Panel  Recent Labs   Lab Test 02/01/20  0400 01/31/20  1335 01/09/20  0926   PROTTOTAL 5.5* 5.2* 7.8   ALBUMIN 3.2* 2.9* 4.1   BILITOTAL 0.4 0.6 0.7   ALKPHOS 45 61 77   * 85* 61*   ALT 47 37 33     INR  Recent Labs   Lab Test 01/31/20  2334 01/31/20  1944 01/31/20  1625   INR 0.91 0.79* 1.74*      Lipid Profile  Recent Labs   Lab Test 12/11/19  1505 02/11/19  1123   CHOL 165 194   HDL 29* 40   LDL 70 122*   TRIG 332* 159*     A1C  Recent Labs   Lab Test 01/09/20  0926   A1C 5.6     Troponin Shannon results for input(s): TROPI in the last 168 hours.       Stroke Code / Stroke Consult Data Data This was a non-emergent, non-tele stroke consult.

## 2020-02-02 NOTE — PLAN OF CARE
[id number]  0800: Follows commands, answers yes and no questions.    0950: Propofol weaned.  Began weaning on vent 5/5.  RR 28-35.  Complains of pain.  I: Oxycodone, Robaxin E: RR cont increased.  I: Dilaudid. E: About 3/4 of pain gone.  E: Cont to wean with RR 27-33, -450's.  I: ABG's.  KYLAH Melendrez PA ok with extubation.  ABG's results back and notified Dr Brown  assessed pt  Extubated to BIPAP at 1110.  RR upper 20's to 32 's to 500's.  When fell asleep, rate decreased to upper teens. Uses IS poorly to 750, Flutter valve better.  Small amt white to creamy secretions.  Around 1230, discussed with KYLAH Melendrez PS re up in chair.  Ok to dangle with chest tubes. No leak now.  1310 Dangled .  No chest tube air leak. Off BIPAP, on NC for dangling.  Feels slightly dizzy.  MAP >65.  Dangled for 5 min. Answered questions appropriately.Back on BIPAP. Slept  Able to wean off Epinephrine, weaning Phenylephrine to keep MAP >65.  Repeat Hgb after 1 unit PRBC 8.0.  Albumin given.  1530: Cardiac Rehab here.  Up in chair.  Feels dizzy when up.  MAP>65.  Maria T slight leak, not sutured in and partially out. I: Removed.  Took water and few sips Boost but feels slight difficulty swallowing. Back to bed at 1645.  Full neurocheck at 1700.  Found L facial droop.  Pt states he does not have facial droop at home.  Sister, Rose, called and agrees.  I: CV Beeper text paged re facial droop, no return call.  Dr Melendrez notified.  Neuro to see.  Dr Smith consulted and Code Stroke called.  Team here, Dr Lozano returned call, to CT scan with flyer and bedside nurse. See S Roby NP note.  Care passed to next nurse.  Dr Melendrez also notified of air leak from L chest tube after back to bed.  See order. Also notified of Tie Siding out.

## 2020-02-02 NOTE — PROGRESS NOTES
[facility]  Cardiovascular and Thoracic Surgery Daily Note          Assessment and Plan:   POD#2 s/p CABx3 (LIMA to LAD, rSVG to OM and rPDA  -CVS: post op coagulopathy recovered with 2u FFP, 3u pRBCs, protamine 50mg x 2 and factor 7.  Off pressors.  Rhythm NSR.  Not quite ready for metop or lasix yet.  1 unit prbc this morning for 6.9  Removed lower leg wraps, dusky feet improved, never lost signals.  -Resp: on nasal canula.  Aggressive pulm hygiene.  -Neuro:  Slight left facial droop last night.  Stroke code activated, no large vessel abnormality of perfusion scan.  Recommended MRI.  Pt has pacer wires, not stable to remove.  Would prefer to hold MRI until wires removed tomorrow.  Unless neurologic symptoms worsen.  -Renal: good UOP, creatinine 1.39<1.43.  -GI:  Continue bowel regimen  -:  Almendarez in place, clear yellow  -Endo: Continue Insulin gtt 48hrs, Consult placed to Hospitalist for glucose management  -FEN: replete lytes as needed, ADAT  -ID: Temp (24hrs), Av.1  F (37.8  C), Min:99  F (37.2  C), Max:101.7  F (38.7  C)  WBC reactive, forgo infectious workup unless clinical indication is high  Continue post op abx to completion  -Heme: history of myeloproliferative disorder.  Recommendation from hem/onc to hold hydroxyurea..  -Proph: PCD, heparin subcutaneous 5000u  -Dispo: remove left mediastinal tube, slightly pulled out now has air leak.  Pulm hygiene.  MRI recommended per neuro, hold until wires are removed.          Interval History:   No longer bleeding.  Left facial droop without any other deficits.  Breathing comfortably.         Medications:       acetaminophen  975 mg Oral Q8H     aspirin  325 mg Oral Daily     atorvastatin  40 mg Oral At Bedtime     gabapentin  100 mg Oral TID     heparin ANTICOAGULANT  5,000 Units Subcutaneous Q8H     insulin aspart   Subcutaneous TID w/meals     metoprolol tartrate  12.5 mg Oral BID     mupirocin  0.5 g Both Nostrils BID     pantoprazole  40  "mg Oral QAM AC     senna-docusate  1-2 tablet Oral BID     sodium chloride (PF)  3 mL Intracatheter Q8H     [START ON 2/3/2020] acetaminophen, dextrose, glucose **OR** dextrose **OR** glucagon, diphenhydrAMINE **OR** diphenhydrAMINE, furosemide, hydrALAZINE, HYDROmorphone, insulin aspart, labetalol, lidocaine 4%, lidocaine (buffered or not buffered), magnesium sulfate, magnesium sulfate, melatonin, methocarbamol, metoclopramide **OR** metoclopramide, naloxone, ondansetron **OR** ondansetron, oxyCODONE IR, potassium chloride, potassium chloride with lidocaine, potassium chloride, potassium chloride, potassium chloride, prochlorperazine **OR** prochlorperazine, sodium chloride (PF), sodium phosphate, sodium phosphate, sodium phosphate, sodium phosphate          Physical Exam:   Vitals were reviewed  Blood pressure 124/62, pulse 107, temperature 97.7  F (36.5  C), resp. rate 20, height 1.727 m (5' 8\"), weight 92 kg (202 lb 13.2 oz), SpO2 95 %.  Rhythm: RRR    Lungs: breathing comfortably on NC, bilateral chest rise    Cardiovascular: chest tubes dry,     Abdomen: soft, non distended    Extremeties: well perfused, palpable pulses    Incision: clean, dry, intact    CT: 150cc overnight.    Weight:   Vitals:    01/31/20 0606 02/01/20 0400 02/02/20 0400   Weight: 89.4 kg (197 lb) 91 kg (200 lb 9.9 oz) 92 kg (202 lb 13.2 oz)            Data:   Labs:   Lab Results   Component Value Date    WBC 64.4 02/01/2020     Lab Results   Component Value Date    RBC 2.64 02/01/2020     Lab Results   Component Value Date    HGB 7.6 02/01/2020     Lab Results   Component Value Date    HCT 24.1 02/01/2020     No components found for: MCT  Lab Results   Component Value Date    MCV 91 02/01/2020     Lab Results   Component Value Date    MCH 28.8 02/01/2020     Lab Results   Component Value Date    MCHC 31.5 02/01/2020     Lab Results   Component Value Date    RDW 18.5 02/01/2020     Lab Results   Component Value Date     02/01/2020 "       Last Basic Metabolic Panel:  Lab Results   Component Value Date     02/01/2020      Lab Results   Component Value Date    POTASSIUM 4.4 02/01/2020     Lab Results   Component Value Date    CHLORIDE 115 02/01/2020     Lab Results   Component Value Date    MINNIE 7.3 02/01/2020     Lab Results   Component Value Date    CO2 25 02/01/2020     Lab Results   Component Value Date    BUN 28 02/01/2020     Lab Results   Component Value Date    CR 1.43 02/01/2020     Lab Results   Component Value Date     02/01/2020       CXR: pending    Yovani Scottammed  Cardiac Surgery Fellow  [phone]

## 2020-02-02 NOTE — PLAN OF CARE
Neuro: Pt complains of pain 7/10 prn dilaudid given with minimal results. Pt does have flat affect and very soft spoken. Slight left facial droop unchanged. Otherwise neuro intact.   CV: SR to ST. BP stable, remains off rosa. Hgb 6.9, 1 unit infusing. Chest tube air leak significant. No output. WDL at site.   Pulm: Clear Ls. Pt on bipap most of night, 40%, 10/5. Otherwise on NC 6L. Pt needs more encouragement on cough/deep breath with IS. Does well on acapella.   GI/: good urine output. Hypoactive bs. NPO. Pt complained of swallowing being difficult.   Lines: swan pulled.   Plan: MRI once pacer wires out. Pt updated on care overnight. Will continue to monitor.

## 2020-02-02 NOTE — PROGRESS NOTES
ICU Brief Update    Nursing noted new left facial droop otherwise ext symmetric- CV notified as was I - Stroke code called.  CTH and CTA w/o change or acute large vessel occlusion. MRI pending      Vishal Vang MD

## 2020-02-02 NOTE — PROGRESS NOTES
"   02/02/20 0930   General Information   Onset Date 01/31/20   Start of Care Date 02/02/20   Referring Physician Alix Melendrez PA-C   Patient Profile Review/OT: Additional Occupational Profile Info See Profile for full history and prior level of function   Patient/Family Goals Statement did not state   Swallowing Evaluation Bedside swallow evaluation   Behaviorial Observations WFL (within functional limits)   Mode of current nutrition NPO   Respiratory Status O2 Supply   Type of O2 supply Nasal cannula   Comments \"Pt is 1 day post CABG. Found with left facial droop today. CTH and CTA without change or large vessel occlusion. Right carotid stenosis.\" MRI pending. Pt denied dysphagia symptoms at baseline.    Clinical Swallow Evaluation   Oral Musculature anomalies present   Dentition present and adequate   Mucosal Quality dry;sticky   Oral Labial Strength and Mobility impaired seal   Lingual Strength and Mobility WFL   Laryngeal Function Cough;Throat clear;Swallow;Voicing initiated   Oral Musculature Comments left droop; weak cough   Additional Documentation Yes   Clinical Swallow Eval: Thin Liquid Texture Trial   Mode of Presentation, Thin Liquids cup;spoon;straw;self-fed   Oral Phase of Swallow Premature pharyngeal entry   Pharyngeal Phase of Swallow impaired;coughing/choking;repeated swallows;wet vocal quality after swallow   Diagnostic Statement s/sx of aspiration   Clinical Swallow Eval: Honey Thick Liquid Texture Trial   Mode of Presentation, Honey spoon;fed by clinician   Oral Phase, Honey WFL   Pharyngeal Phase, Honey impaired;repeated swallows   Diagnostic Statement double swallow; no s/sx of aspiration   Clinical Swallow Eval: Puree Solid Texture Trial   Mode of Presentation, Puree spoon;fed by clinician   Oral Phase, Puree WFL   Pharyngeal Phase, Puree coughing/choking;repeated swallows   Diagnostic Statement double swallowing; globus sensation reported   Swallow Compensations   Swallow Compensations " Alternate viscosity of consistencies;Effortful swallow;Pacing;Reduce amounts;Multiple swallow   General Therapy Interventions   Planned Therapy Interventions Dysphagia Treatment   Dysphagia treatment Modified diet education;Instruction of safe swallow strategies   Swallow Eval: Clinical Impressions   Skilled Criteria for Therapy Intervention Skilled criteria met.  Treatment indicated.   Functional Assessment Scale (FAS) 3   Treatment Diagnosis dysphagia   Diet texture recommendations Full liquid;Honey thick liquids   Recommended Feeding/Eating Techniques alternate between small bites and sips of food/liquid;check mouth frequently for oral residue/pocketing;hard swallow w/ each bite or sip;small sips/bites   Demonstrates Need for Referral to Another Service dietitian   Therapy Frequency Daily   Predicted Duration of Therapy Intervention (days/wks) 1 week   Anticipated Discharge Disposition extended care facility   Risks and Benefits of Treatment have been explained. Yes   Patient, family and/or staff in agreement with Plan of Care Yes   Clinical Impression Comments SLP: Pt presents with moderate dysphagia on Clinical Swallow Evaluation. Pt alert and reported difficulty swallowing, but did not expand on symptoms. Oral motor exam significant for left facial droop. Ice chips and thin liquids trialed with function oral phase, however, suspect premature spillage and pharyngeal dysphagia with immediate coughing. Continued good oral phase with honey thick liquids by teaspoon and puree solids, however, double swallows on each sip. After 3 bites of applesauce, pt denied additional PO. Reported globus sensation improved with dry swallows. Pt verbalized agreement with recommend: full liquid, honey thick liquids by teaspoon only. Pt must be alert, fully upright, and encourage 2-3 swallows per bite. Would crush meds if able or place meds in applesauce and clear with dry swallows and liquid washes. Pending progress, pt will likely  need instrumental assessment to further evaluate pharyngeal phase.    Total Evaluation Time   Total Evaluation Time (Minutes) 20

## 2020-02-02 NOTE — PLAN OF CARE
Discharge Planner PT   Patient plan for discharge: None stated.  Current status: Sup>sit Min A. Sit<>stand Min A. Stand pivot Min A. Tolerated supine and seated cals with reported dizziness. VS monitored throughout session, stable. Slight drop in systolic and diastolic pressure; otherwise VSS.   Barriers to return to prior living situation: Lives alone, not at baseline, post op weakness, fatigue, sternal precautions  Recommendations for discharge: TCU  Rationale for recommendations: Pt will benefit from continued skilled rehab services to progress independence and safety with mobility, closely monitor exercise/activity progression and continue education for optimal heart health.        Entered by: Ada Paula 02/02/2020 12:19 PM

## 2020-02-02 NOTE — PLAN OF CARE
[id number]  Neuro: L facial droop remains.  Slight when rested, worse after being up in chair for 2 hrs, otherwise no change.  Lungs: Tolerating 4LPM NC  CV: Increased PAC's especially this am.  I: KYLAH LONG notified  Lopressor 2.5mg given.  E: Less PAC's  's to 110's.  Lopressor oral given after swallow study. E: SB/P 100-110's.  GI: poor appetite.  Magic Shake arrived.  Spoon fed pt. Swallows better when tucks chin.  No coughing.  U/O: borderline to at 1400, 30cc/2 hrs.  35cc/1.5hr after.  CV surgery notified and care passed to next nurse.

## 2020-02-02 NOTE — PROGRESS NOTES
Brief NCC Note    Pt is 1 day post CABG. Found with left facial droop today. CTH and CTA without change or large vessel occlusion. Right carotid stenosis.    -MRI brain when able  -Cont Aspirin for now    Yusef Lozano

## 2020-02-03 ENCOUNTER — APPOINTMENT (OUTPATIENT)
Dept: GENERAL RADIOLOGY | Facility: CLINIC | Age: 70
DRG: 236 | End: 2020-02-03
Attending: SURGERY
Payer: COMMERCIAL

## 2020-02-03 ENCOUNTER — APPOINTMENT (OUTPATIENT)
Dept: PHYSICAL THERAPY | Facility: CLINIC | Age: 70
DRG: 236 | End: 2020-02-03
Attending: SURGERY
Payer: COMMERCIAL

## 2020-02-03 ENCOUNTER — APPOINTMENT (OUTPATIENT)
Dept: SPEECH THERAPY | Facility: CLINIC | Age: 70
DRG: 236 | End: 2020-02-03
Attending: PHYSICIAN ASSISTANT
Payer: COMMERCIAL

## 2020-02-03 LAB
ALBUMIN SERPL-MCNC: 3.1 G/DL (ref 3.4–5)
ALP SERPL-CCNC: 59 U/L (ref 40–150)
ALT SERPL W P-5'-P-CCNC: 78 U/L (ref 0–70)
ANION GAP SERPL CALCULATED.3IONS-SCNC: 3 MMOL/L (ref 3–14)
AST SERPL W P-5'-P-CCNC: 106 U/L (ref 0–45)
BILIRUB SERPL-MCNC: 0.7 MG/DL (ref 0.2–1.3)
BUN SERPL-MCNC: 35 MG/DL (ref 7–30)
CALCIUM SERPL-MCNC: 7.6 MG/DL (ref 8.5–10.1)
CHLORIDE SERPL-SCNC: 112 MMOL/L (ref 94–109)
CO2 SERPL-SCNC: 25 MMOL/L (ref 20–32)
CREAT SERPL-MCNC: 1.24 MG/DL (ref 0.66–1.25)
ERYTHROCYTE [DISTWIDTH] IN BLOOD BY AUTOMATED COUNT: 18.2 % (ref 10–15)
GFR SERPL CREATININE-BSD FRML MDRD: 58 ML/MIN/{1.73_M2}
GLUCOSE BLDC GLUCOMTR-MCNC: 108 MG/DL (ref 70–99)
GLUCOSE BLDC GLUCOMTR-MCNC: 112 MG/DL (ref 70–99)
GLUCOSE BLDC GLUCOMTR-MCNC: 116 MG/DL (ref 70–99)
GLUCOSE SERPL-MCNC: 150 MG/DL (ref 70–99)
HCT VFR BLD AUTO: 23.9 % (ref 40–53)
HGB BLD-MCNC: 7.5 G/DL (ref 13.3–17.7)
INTERPRETATION ECG - MUSE: NORMAL
INTERPRETATION ECG - MUSE: NORMAL
LACTATE BLD-SCNC: 1.3 MMOL/L (ref 0.7–2)
MCH RBC QN AUTO: 29.9 PG (ref 26.5–33)
MCHC RBC AUTO-ENTMCNC: 31.4 G/DL (ref 31.5–36.5)
MCV RBC AUTO: 95 FL (ref 78–100)
PLATELET # BLD AUTO: 288 10E9/L (ref 150–450)
POTASSIUM SERPL-SCNC: 4.6 MMOL/L (ref 3.4–5.3)
PROT SERPL-MCNC: 5.8 G/DL (ref 6.8–8.8)
RBC # BLD AUTO: 2.51 10E12/L (ref 4.4–5.9)
SODIUM SERPL-SCNC: 140 MMOL/L (ref 133–144)
WBC # BLD AUTO: 32.7 10E9/L (ref 4–11)

## 2020-02-03 PROCEDURE — 36415 COLL VENOUS BLD VENIPUNCTURE: CPT | Performed by: SURGERY

## 2020-02-03 PROCEDURE — 25800030 ZZH RX IP 258 OP 636: Performed by: PHYSICIAN ASSISTANT

## 2020-02-03 PROCEDURE — 80053 COMPREHEN METABOLIC PANEL: CPT | Performed by: SURGERY

## 2020-02-03 PROCEDURE — 00000146 ZZHCL STATISTIC GLUCOSE BY METER IP

## 2020-02-03 PROCEDURE — 25000132 ZZH RX MED GY IP 250 OP 250 PS 637: Performed by: PHYSICIAN ASSISTANT

## 2020-02-03 PROCEDURE — 25000128 H RX IP 250 OP 636: Performed by: PHYSICIAN ASSISTANT

## 2020-02-03 PROCEDURE — C9113 INJ PANTOPRAZOLE SODIUM, VIA: HCPCS | Performed by: PHYSICIAN ASSISTANT

## 2020-02-03 PROCEDURE — 99232 SBSQ HOSP IP/OBS MODERATE 35: CPT | Mod: GC | Performed by: PSYCHIATRY & NEUROLOGY

## 2020-02-03 PROCEDURE — 25000128 H RX IP 250 OP 636: Performed by: SURGERY

## 2020-02-03 PROCEDURE — 85027 COMPLETE CBC AUTOMATED: CPT | Performed by: SURGERY

## 2020-02-03 PROCEDURE — 71045 X-RAY EXAM CHEST 1 VIEW: CPT

## 2020-02-03 PROCEDURE — 99221 1ST HOSP IP/OBS SF/LOW 40: CPT | Performed by: INTERNAL MEDICINE

## 2020-02-03 PROCEDURE — 83605 ASSAY OF LACTIC ACID: CPT | Performed by: SURGERY

## 2020-02-03 PROCEDURE — 92526 ORAL FUNCTION THERAPY: CPT | Mod: GN | Performed by: SPEECH-LANGUAGE PATHOLOGIST

## 2020-02-03 PROCEDURE — 25000132 ZZH RX MED GY IP 250 OP 250 PS 637: Performed by: SURGERY

## 2020-02-03 PROCEDURE — 97110 THERAPEUTIC EXERCISES: CPT | Mod: GP | Performed by: PHYSICAL THERAPIST

## 2020-02-03 PROCEDURE — 97530 THERAPEUTIC ACTIVITIES: CPT | Mod: GP | Performed by: PHYSICAL THERAPIST

## 2020-02-03 PROCEDURE — 12000000 ZZH R&B MED SURG/OB

## 2020-02-03 RX ORDER — SODIUM CHLORIDE 9 MG/ML
INJECTION, SOLUTION INTRAVENOUS CONTINUOUS
Status: DISCONTINUED | OUTPATIENT
Start: 2020-02-03 | End: 2020-02-03

## 2020-02-03 RX ORDER — HYDROXYUREA 500 MG/1
500 CAPSULE ORAL
Status: DISCONTINUED | OUTPATIENT
Start: 2020-02-03 | End: 2020-02-04

## 2020-02-03 RX ADMIN — PANTOPRAZOLE SODIUM 40 MG: 40 INJECTION, POWDER, FOR SOLUTION INTRAVENOUS at 08:30

## 2020-02-03 RX ADMIN — HEPARIN SODIUM 5000 UNITS: 5000 INJECTION, SOLUTION INTRAVENOUS; SUBCUTANEOUS at 11:52

## 2020-02-03 RX ADMIN — ATORVASTATIN CALCIUM 40 MG: 40 TABLET, FILM COATED ORAL at 21:35

## 2020-02-03 RX ADMIN — GABAPENTIN 100 MG: 100 CAPSULE ORAL at 21:35

## 2020-02-03 RX ADMIN — METOPROLOL TARTRATE 12.5 MG: 25 TABLET, FILM COATED ORAL at 08:33

## 2020-02-03 RX ADMIN — SODIUM CHLORIDE: 9 INJECTION, SOLUTION INTRAVENOUS at 12:56

## 2020-02-03 RX ADMIN — SENNOSIDES AND DOCUSATE SODIUM 2 TABLET: 8.6; 5 TABLET ORAL at 20:54

## 2020-02-03 RX ADMIN — ACETAMINOPHEN 975 MG: 325 TABLET, FILM COATED ORAL at 10:24

## 2020-02-03 RX ADMIN — SENNOSIDES AND DOCUSATE SODIUM 2 TABLET: 8.6; 5 TABLET ORAL at 08:33

## 2020-02-03 RX ADMIN — HEPARIN SODIUM 5000 UNITS: 5000 INJECTION, SOLUTION INTRAVENOUS; SUBCUTANEOUS at 04:19

## 2020-02-03 RX ADMIN — ACETAMINOPHEN 975 MG: 325 TABLET, FILM COATED ORAL at 03:14

## 2020-02-03 RX ADMIN — ASPIRIN 325 MG ORAL TABLET 325 MG: 325 PILL ORAL at 08:33

## 2020-02-03 RX ADMIN — HEPARIN SODIUM 5000 UNITS: 5000 INJECTION, SOLUTION INTRAVENOUS; SUBCUTANEOUS at 20:53

## 2020-02-03 RX ADMIN — METOPROLOL TARTRATE 12.5 MG: 25 TABLET, FILM COATED ORAL at 20:54

## 2020-02-03 RX ADMIN — MUPIROCIN 0.5 G: 20 OINTMENT TOPICAL at 08:39

## 2020-02-03 RX ADMIN — GABAPENTIN 100 MG: 100 CAPSULE ORAL at 15:46

## 2020-02-03 RX ADMIN — GABAPENTIN 100 MG: 100 CAPSULE ORAL at 08:33

## 2020-02-03 ASSESSMENT — ACTIVITIES OF DAILY LIVING (ADL)
ADLS_ACUITY_SCORE: 12
ADLS_ACUITY_SCORE: 14

## 2020-02-03 ASSESSMENT — MIFFLIN-ST. JEOR
SCORE: 1646.5
SCORE: 1650.5

## 2020-02-03 NOTE — CONSULTS
Consult Date:  02/03/2020      This consult has been requested by Alix Melendrez PA-C for myelofibrosis.      Mr. Gannon is a 69-year-old gentleman with myelofibrosis.  He was seen by Dr. Cristina in Oncology Clinic in 12/2019 for leukocytosis.  The patient had a bone marrow biopsy on 12/30/2019.  CBC on the day of bone marrow revealed WBC of 88 with hemoglobin of 12.1 and platelet of 385.  Bone marrow biopsy revealed extremely hypercellular bone marrow with 100% cellularity with granulocytic and megakaryocytic proliferation.  There was megakaryocytic clustering.  There was marrow fibrosis.  Picture is all consistent with myelofibrosis.  JAK2 mutation positive.  The patient was started on hydroxyurea 500 mg once a day on 01/16/2020.      The patient has coronary artery disease.  The patient was admitted for bypass surgery.  He had CABG x 3 on 01/31/2020.  He is recovering from surgery.  Because of surgery, his hydroxyurea was put on hold.      The patient has some discomfort in the chest from surgery.  No headache.  No acute shortness of breath.  No vomiting.  Appetite is improving.  No fever or chills.  No bleeding.      All other review of systems negative.      ALLERGIES:  REVIEWED.      MEDICATIONS:  Reviewed.      PAST MEDICAL HISTORY:   1.  Myelofibrosis.   2.  Hypertension.   3.  Coronary artery disease.      SOCIAL HISTORY:  He is a former smoker.  No alcohol use.      PHYSICAL EXAMINATION:   GENERAL:  He was alert, oriented x3.   VITAL SIGNS:  Reviewed.   The rest of the systems not examined.      LABORATORY DATA:  Reviewed.      ASSESSMENT:    1.  A 69-year-old gentleman with myelofibrosis diagnosed in 12/2019.  He was on hydroxyurea.  Hydroxyurea on hold because of surgery.   2.  Normocytic anemia.  Anemia is mainly from postoperative blood loss.  Some anemia is from myelofibrosis.   3.  Other medical problems including hypertension and coronary artery disease.      RECOMMENDATIONS:   1.  The patient is  recovering well from surgery.  He is going to be transferred out of ICU today.  I explained to the patient that for myelofibrosis we will restart his hydroxyurea tomorrow.  He is agreeable for it.  He was tolerating it well before and I am hoping he will continue to tolerate it well.   2.  The patient has anemia, mainly due to blood loss.  We will check iron studies.   3.  The patient will follow up with Dr. Cristina in Oncology Clinic in about a month.  CBC will be monitored as outpatient.  We will sign off.  Please call us with any questions. Discussed with Alix Melendrez PA-C.     Thanks for the consult.      TOTAL TIME SPENT:  30 minutes, more than 50% of the time spent in counseling and coordination of care.         DAVE DE LA FUENTE MD             D: 2020   T: 2020   MT: ANNIE      Name:     ABI VALDEZ   MRN:      [id number]        Account:       [id number]   :      1950           Consult Date:  2020      Document: [id number]

## 2020-02-03 NOTE — PLAN OF CARE
Pt up to the chair multiple times per the last 16 hours and also went for a walk with PT.     Pt does have a continued left facial droop. No changes neurologically this shift.   CV- Irregular sinus rhythm, one chest tube.  Resp-Dim lung sounds, mostly clear, on 3L NC  -adequate urine output  GI-Sat on the commode, passed gas only  Skin-Reddened and blanchable coccyx, mepilex in place.  Pt did have a swallow study and is cleared for honey thickened full liquid diet.

## 2020-02-03 NOTE — PLAN OF CARE
"Discharge Planner PT   Patient plan for discharge: None stated.  Current status: Ambulated 60' with WW, Min A for balance and walker management. On 4L, stable CV response to activity. Up in chair following.   PM: Increased ambulation distance,pace and tolerance this afternoon. Ambulated with WW, CGA, Min A for walker navigation 120'. HR increased this  with ambulation, states he does \"feel a little better,\" this afternoon.   Barriers to return to prior living situation: Lives alone, post op weakness, fatigue and decreased activity tolerance. Decreased balance fall risk, sternal precautions.   Recommendations for discharge: TCU  Rationale for recommendations: Pt will benefit from continued skilled rehab services to progress independence and safety with mobility, continue education for optimal heart health and closely monitor progression of exercise/activity tolerance post heart surgery.        Entered by: Ada Paula 02/03/2020 9:02 AM       "

## 2020-02-03 NOTE — PLAN OF CARE
Discharge Planner SLP   Patient plan for discharge: Not addressed.   Current status: SLP: Patient seen for swallow treatment while up in the chair. Speech was clear with decreased vocal intensity. He was able to tolerate honey thick liquids by spoon with mild delay and reduced laryngeal elevation. No overt Sx of aspiration. Mildly reduced AP movment with apple sauce and minimal oral residue on left side of the tongue, that cleared with a liquid rinse. He demonstrated premature entry of nectar and thin liquids with a delayed cough response. Suspect penetration and silent aspiration can not be excluded.   Recommend: 1. Continue on the full liquid diet with honey thick liquids. 2. Upright or in a chair, small bites/sips, slow pace and alternate liquids solids. 3. Complete a video swallow study to full assess pharyngeal function and determine aspiration risk. MD please order if in agreement.   Barriers to return to prior living situation: Acuity of his illness.  Recommendations for discharge: TCU  Rationale for recommendations: Patient will need on going ST needs for dysphagia management and complete a speech/language evaluation. Patient is below his baseline.        Entered by: Verenice Chavarria 02/03/2020 11:41 AM

## 2020-02-03 NOTE — PLAN OF CARE
Pt. In nasal cannula. With clear diminished lungs. Noted to sleep/snore load and sat into high 80s at times, needing 4LNC vs 3LNC to resolve, good perfusion with adequate urine output through hightower, improving PO intake with thickened liquid, overall appetite not large , chest tube and pacer wires removed by CV surgery, pt's sisters visited briefly, and he was transferred to station 33 successfully.

## 2020-02-03 NOTE — PROGRESS NOTES
[facility]  Cardiovascular and Thoracic Surgery Daily Note          Assessment and Plan:   POD#3 s/p Coronary artery bypass grafting x3 with left internal mammary artery to left anterior descending, reverse saphenous vein graft to posterior descending artery, reverse saphenous vein graft to obtuse marginal 2 artery, endoscopic vein harvest from bilateral lower extremities, intraoperative SANDRA by Dr. Mable Barrera  -CVS: HR: 90s. SBP: 90s-100s, pre op EF: 60%. ASA, BB, statin. Plan to remove chest tubes and pacer wires today.   -Resp: Extubated on POD#1. Saturating well on 3L. Continue to encourage IS, cough, deep breathing, ambulation.   -Neuro:  Grossly intact. Slight left facial droop noted on POD#1. Stroke code activated, no large vessel abnormality of perfusion scan. Neurology following- MRI cancelled as thought it will not change therapy.   -Renal: borderline UOP. Cr: 1.24<1.39<1.43 up about 2kg from preoperative weight.   -GI:  Tolerating diet. Dysphagia. SLP following.  -:  Continue hightower today.  -Endo: pre op A1c: 5.6. Completed insulin gtt. Continue sliding scale insulin.   -FEN: replace electrolytes as needed. Na: 140. K: 4.6.   Orders Placed This Encounter      Combination Diet Full Liquid Diet; Honey Thickened Liquids (pre-thickened or use instant food thickener) (by teaspoon only)    -ID: Temp (24hrs), Av.4  F (37.4  C), Min:99.3  F (37.4  C), Max:100  F (37.8  C)  WBC: 32.7<28.7<64.4. Completed perioperative abx. Leukocytosis likely related to hx of Myeloproliferative disorder and stress from surgery.   -Heme: Hgb: 7.5. plt: 288. Acute blood loss anemia and thrombocytopenia related to surgery. Post op coagulopathy recovered with 2u FFP, 3u PRBCs, protamine and factor 7. Hx of myeloproliferative disoder, Heme/onc following. Planning to resume hydroxyurea tomorrow.   -Proph: PCD, ASA, BB, statin, PPI, sub q heparin  -Dispo: Transfer to [facility] today. Continue therapies. Chest tubes  "and pacer wires removed today. MRI cancelled per neuro. Neuro and heme/onc following.           Interval History:   No acute events overnight. Saturating well on 2-3L. Pain controlled. Tolerating diet. No BM.          Medications:       acetaminophen  975 mg Oral Q8H     aspirin  325 mg Oral Daily     atorvastatin  40 mg Oral At Bedtime     gabapentin  100 mg Oral TID     heparin ANTICOAGULANT  5,000 Units Subcutaneous Q8H     insulin aspart  1-7 Units Subcutaneous TID AC     insulin aspart  1-5 Units Subcutaneous At Bedtime     metoprolol tartrate  12.5 mg Oral BID     mupirocin  0.5 g Both Nostrils BID     pantoprazole (PROTONIX) IV  40 mg Intravenous Daily with breakfast     senna-docusate  1-2 tablet Oral BID     sodium chloride (PF)  3 mL Intracatheter Q8H     acetaminophen, dextrose, glucose **OR** dextrose **OR** glucagon, diphenhydrAMINE **OR** diphenhydrAMINE, furosemide, hydrALAZINE, HYDROmorphone, labetalol, lidocaine 4%, lidocaine (buffered or not buffered), magnesium sulfate, magnesium sulfate, melatonin, methocarbamol, metoclopramide **OR** metoclopramide, naloxone, ondansetron **OR** ondansetron, oxyCODONE IR, potassium chloride, potassium chloride with lidocaine, potassium chloride, potassium chloride, potassium chloride, prochlorperazine **OR** prochlorperazine, sodium chloride (PF), sodium phosphate, sodium phosphate, sodium phosphate, sodium phosphate          Physical Exam:   Vitals were reviewed  Blood pressure 96/53, pulse 93, temperature 99.3  F (37.4  C), resp. rate 25, height 1.727 m (5' 8\"), weight 91.6 kg (201 lb 15.1 oz), SpO2 96 %.  Rhythm: NSR    Lungs: diminished bases    Cardiovascular: RRR normal s1 and s2    Abdomen: soft NTND    Extremeties: 1-2+ edema    Incision: CDI    CT: n/a    Weight:   Vitals:    01/31/20 0606 02/01/20 0400 02/02/20 0400 02/03/20 0530   Weight: 89.4 kg (197 lb) 91 kg (200 lb 9.9 oz) 92 kg (202 lb 13.2 oz) 91.6 kg (201 lb 15.1 oz)            Data:   Labs: "   Lab Results   Component Value Date    WBC 32.7 02/03/2020     Lab Results   Component Value Date    RBC 2.51 02/03/2020     Lab Results   Component Value Date    HGB 7.5 02/03/2020     Lab Results   Component Value Date    HCT 23.9 02/03/2020     No components found for: MCT  Lab Results   Component Value Date    MCV 95 02/03/2020     Lab Results   Component Value Date    MCH 29.9 02/03/2020     Lab Results   Component Value Date    MCHC 31.4 02/03/2020     Lab Results   Component Value Date    RDW 18.2 02/03/2020     Lab Results   Component Value Date     02/03/2020       Last Basic Metabolic Panel:  Lab Results   Component Value Date     02/03/2020      Lab Results   Component Value Date    POTASSIUM 4.6 02/03/2020     Lab Results   Component Value Date    CHLORIDE 112 02/03/2020     Lab Results   Component Value Date    MINNIE 7.6 02/03/2020     Lab Results   Component Value Date    CO2 25 02/03/2020     Lab Results   Component Value Date    BUN 35 02/03/2020     Lab Results   Component Value Date    CR 1.24 02/03/2020     Lab Results   Component Value Date     02/03/2020       CXR: 2/3/2020    FINDINGS: Upright portable chest. Sternal wires and mediastinal clips. Right IJ cordis. No chest tube is evident. No pneumothorax. Probable bilateral pleural effusions. Calcified pleural plaque at the right lung base. Atelectasis at the left lung base.                                                                      IMPRESSION: Bilateral pleural effusions. Left basilar probable atelectasis.    Alix Melendrez PA-C  CV Surgery  Pager # [phone]

## 2020-02-03 NOTE — PROGRESS NOTES
"  [facility]    Stroke Progress Note    Interval Events  This morning his L facial droop again appears subtle. NIHSS 0. Currently on aspirin and statin.      Impression  Left facial droop- which appears to be very subtle. He may have a small stroke. MRI brain would not change the current management, so not needed.       Leukocytosis (32.7k) likely related to hx of Myeloproliferative disorder and stress from surgery.      Recommendations  - Daily aspirin 325 mg for secondary stroke prevention  - Statin: Lipitor 40 mg daily  - PT/OT/SLP  - Stroke Education  - Euthermia, Euglycemia     Thank you for this consult. No further work up is needed from stroke standpoint. We will sign off. Please call us with any questions      Haja Painting MD  Fellow, Vascular Neurology  Text Page     To page stroke neurology after hours through [facility], click here: AMCOM  Choose \"On Call\" tab at top, then search dropdown box for \"Neurology Adult\"    ______________________________________________________    Medications   Home Meds  Prior to Admission medications    Medication Sig Start Date End Date Taking? Authorizing Provider   atorvastatin (LIPITOR) 40 MG tablet Take 1 tablet (40 mg) by mouth daily 1/2/20  Yes Devin Bentley MD   hydroxyurea (HYDREA) 500 MG capsule Take 1 capsule (500 mg) by mouth daily 1/16/20  Yes Mani Cristina MD   isosorbide mononitrate (IMDUR) 30 MG 24 hr tablet Take 1 tablet (30 mg) by mouth daily 1/15/20  Yes Madeleine Durand APRN CNP   lisinopril-hydrochlorothiazide (PRINZIDE/ZESTORETIC) 20-25 MG tablet TAKE ONE TABLET BY MOUTH ONCE DAILY STOP THE 10-12.5 MG DOSE 1/7/20  Yes Angus Scott MD   metoprolol succinate ER (TOPROL-XL) 25 MG 24 hr tablet Take 2 tablets (50 mg) by mouth daily  Patient taking differently: Take 25 mg by mouth daily  1/2/20  Yes Devin Bentley MD   naproxen (NAPROSYN) 500 MG tablet Take 1 tablet (500 mg) by mouth 2 times daily as needed for moderate " pain 2/11/19  Yes Angus Scott MD   nitroGLYcerin (NITROSTAT) 0.4 MG sublingual tablet For chest pain place 1 tablet under the tongue every 5 minutes for 3 doses. If symptoms persist 5 minutes after 1st dose call 911. 1/2/20  Yes Devin Bentley MD   vitamin B-12 (CYANOCOBALAMIN) 250 MCG tablet Take 250 mcg by mouth daily   Yes Reported, Patient       Scheduled Meds    acetaminophen  975 mg Oral Q8H     aspirin  325 mg Oral Daily     atorvastatin  40 mg Oral At Bedtime     gabapentin  100 mg Oral TID     heparin ANTICOAGULANT  5,000 Units Subcutaneous Q8H     insulin aspart  1-7 Units Subcutaneous TID AC     insulin aspart  1-5 Units Subcutaneous At Bedtime     metoprolol tartrate  12.5 mg Oral BID     mupirocin  0.5 g Both Nostrils BID     pantoprazole (PROTONIX) IV  40 mg Intravenous Daily with breakfast     senna-docusate  1-2 tablet Oral BID     sodium chloride (PF)  3 mL Intracatheter Q8H       Infusion Meds    aminocaproic acid (AMICAR) infusion 7.5gm/150mL ADULT Stopped (01/31/20 1530)     dextrose       EPINEPHrine IV infusion ADULT Stopped (02/01/20 1025)     nitroGLYcerin Stopped (01/31/20 1342)     phenylephrine Stopped (02/01/20 1930)     propofol (DIPRIVAN) infusion Stopped (02/01/20 1105)     sodium chloride 10 mL/hr at 02/03/20 1256     sodium chloride Stopped (02/02/20 1330)       PRN Meds  acetaminophen, dextrose, glucose **OR** dextrose **OR** glucagon, diphenhydrAMINE **OR** diphenhydrAMINE, furosemide, hydrALAZINE, HYDROmorphone, labetalol, lidocaine 4%, lidocaine (buffered or not buffered), magnesium sulfate, magnesium sulfate, melatonin, methocarbamol, metoclopramide **OR** metoclopramide, naloxone, ondansetron **OR** ondansetron, oxyCODONE IR, potassium chloride, potassium chloride with lidocaine, potassium chloride, potassium chloride, potassium chloride, prochlorperazine **OR** prochlorperazine, sodium chloride (PF), sodium phosphate, sodium phosphate, sodium phosphate, sodium  phosphate       PHYSICAL EXAMINATION  Temp:  [98.7  F (37.1  C)-100  F (37.8  C)] 98.7  F (37.1  C)  Pulse:  [] 100  Heart Rate:  [] 101  Resp:  [7-50] 21  BP: ()/(32-86) 104/60  SpO2:  [80 %-98 %] 94 %     Neurologic  Mental Status:  alert, oriented x 3, follows commands, speech hypophonic and slow but no aphasia  Cranial Nerves:  visual fields intact, PERRL, EOMI with normal smooth pursuit, facial sensation intact and symmetric, hearing not formally tested but intact to conversation, palate elevation symmetric and uvula midline, no dysarthria, shoulder shrug strong bilaterally, tongue protrusion midline, mild asymmetry facial movements  Motor:  normal muscle tone and bulk, no abnormal movements, able to move all limbs spontaneously, strength 5/5 throughout upper and lower extremities, no pronator drift  Reflexes:  toes down-going  Sensory:  light touch sensation intact and symmetric throughout upper and lower extremities, no extinction on double simultaneous stimulation   Coordination:  normal finger-to-nose and heel-to-shin bilaterally without dysmetria, rapid alternating movements symmetric  Station/Gait:  deferred     Stroke Scales    NIHSS  Interval  (02/03/20)   Interval Comments     1a. Level of Consciousness 0-->Alert, keenly responsive   1b. LOC Questions 0-->Answers both questions correctly   1c. LOC Commands 0-->Performs both tasks correctly   2.   Best Gaze 0-->Normal   3.   Visual 0-->No visual loss   4.   Facial Palsy 0-->Normal symmetrical movements   5a. Motor Arm, Left 0-->No drift, limb holds 90 (or 45) degrees for full 10 secs   5b. Motor Arm, Right 0-->No drift, limb holds 90 (or 45) degrees for full 10 secs   6a. Motor Leg, Left 0-->No drift, leg holds 30 degree position for full 5 secs   6b. Motor Leg, right 0-->No drift, leg holds 30 degree position for full 5 secs   7.   Limb Ataxia 0-->Absent   8.   Sensory 0-->Normal, no sensory loss   9.   Best Language 0-->No aphasia,  normal   10. Dysarthria 0-->Normal   11. Extinction and Inattention  0-->No abnormality   Total 0  (02/03/20)       Imaging  I personally reviewed all imaging; relevant findings per HPI.     Lab Results Data   CBC  Recent Labs   Lab 02/03/20  0424 02/02/20  1638 02/02/20  0410  02/01/20  0400   WBC 32.7*  --  28.7*  --  64.4*   RBC 2.51*  --  2.39*  --  2.64*   HGB 7.5* 7.8* 6.9*   < > 7.6*   HCT 23.9*  --  22.4*  --  24.1*     --  228  --  320    < > = values in this interval not displayed.     Basic Metabolic Panel    Recent Labs   Lab 02/03/20  0424 02/02/20  0410 02/01/20  0400    142 144   POTASSIUM 4.6 4.6 4.4   CHLORIDE 112* 114* 115*   CO2 25 25 25   BUN 35* 31* 28   CR 1.24 1.39* 1.43*   * 127* 172*   MINNIE 7.6* 7.6* 7.3*     Liver Panel  Recent Labs   Lab Test 02/03/20  0424 02/01/20  0400 01/31/20  1335   PROTTOTAL 5.8* 5.5* 5.2*   ALBUMIN 3.1* 3.2* 2.9*   BILITOTAL 0.7 0.4 0.6   ALKPHOS 59 45 61   * 125* 85*   ALT 78* 47 37     INR  Recent Labs   Lab Test 01/31/20  2334 01/31/20  1944 01/31/20  1625   INR 0.91 0.79* 1.74*      Lipid Profile  Recent Labs   Lab Test 12/11/19  1505 02/11/19  1123   CHOL 165 194   HDL 29* 40   LDL 70 122*   TRIG 332* 159*     A1C  Recent Labs   Lab Test 01/09/20  0926   A1C 5.6     Troponin Shannon results for input(s): TROPI in the last 168 hours.

## 2020-02-04 ENCOUNTER — APPOINTMENT (OUTPATIENT)
Dept: PHYSICAL THERAPY | Facility: CLINIC | Age: 70
DRG: 236 | End: 2020-02-04
Attending: SURGERY
Payer: COMMERCIAL

## 2020-02-04 ENCOUNTER — APPOINTMENT (OUTPATIENT)
Dept: GENERAL RADIOLOGY | Facility: CLINIC | Age: 70
DRG: 236 | End: 2020-02-04
Attending: SURGERY
Payer: COMMERCIAL

## 2020-02-04 ENCOUNTER — APPOINTMENT (OUTPATIENT)
Dept: SPEECH THERAPY | Facility: CLINIC | Age: 70
DRG: 236 | End: 2020-02-04
Attending: SURGERY
Payer: COMMERCIAL

## 2020-02-04 ENCOUNTER — APPOINTMENT (OUTPATIENT)
Dept: GENERAL RADIOLOGY | Facility: CLINIC | Age: 70
DRG: 236 | End: 2020-02-04
Attending: PHYSICIAN ASSISTANT
Payer: COMMERCIAL

## 2020-02-04 LAB
ANION GAP SERPL CALCULATED.3IONS-SCNC: 1 MMOL/L (ref 3–14)
BUN SERPL-MCNC: 29 MG/DL (ref 7–30)
CALCIUM SERPL-MCNC: 8.6 MG/DL (ref 8.5–10.1)
CHLORIDE SERPL-SCNC: 112 MMOL/L (ref 94–109)
CO2 SERPL-SCNC: 28 MMOL/L (ref 20–32)
CREAT SERPL-MCNC: 1.07 MG/DL (ref 0.66–1.25)
ERYTHROCYTE [DISTWIDTH] IN BLOOD BY AUTOMATED COUNT: 18.2 % (ref 10–15)
FERRITIN SERPL-MCNC: 410 NG/ML (ref 26–388)
GFR SERPL CREATININE-BSD FRML MDRD: 70 ML/MIN/{1.73_M2}
GLUCOSE BLDC GLUCOMTR-MCNC: 108 MG/DL (ref 70–99)
GLUCOSE BLDC GLUCOMTR-MCNC: 128 MG/DL (ref 70–99)
GLUCOSE BLDC GLUCOMTR-MCNC: 134 MG/DL (ref 70–99)
GLUCOSE BLDC GLUCOMTR-MCNC: 158 MG/DL (ref 70–99)
GLUCOSE BLDC GLUCOMTR-MCNC: 99 MG/DL (ref 70–99)
GLUCOSE SERPL-MCNC: 106 MG/DL (ref 70–99)
HCT VFR BLD AUTO: 26.3 % (ref 40–53)
HGB BLD-MCNC: 8.4 G/DL (ref 13.3–17.7)
IRON SATN MFR SERPL: 4 % (ref 15–46)
IRON SERPL-MCNC: 6 UG/DL (ref 35–180)
LACTATE BLD-SCNC: 1.4 MMOL/L (ref 0.7–2)
MCH RBC QN AUTO: 30.1 PG (ref 26.5–33)
MCHC RBC AUTO-ENTMCNC: 31.9 G/DL (ref 31.5–36.5)
MCV RBC AUTO: 94 FL (ref 78–100)
PLATELET # BLD AUTO: 360 10E9/L (ref 150–450)
POTASSIUM SERPL-SCNC: 4.2 MMOL/L (ref 3.4–5.3)
RBC # BLD AUTO: 2.79 10E12/L (ref 4.4–5.9)
SODIUM SERPL-SCNC: 141 MMOL/L (ref 133–144)
TIBC SERPL-MCNC: 168 UG/DL (ref 240–430)
WBC # BLD AUTO: 48 10E9/L (ref 4–11)

## 2020-02-04 PROCEDURE — 83605 ASSAY OF LACTIC ACID: CPT | Performed by: SURGERY

## 2020-02-04 PROCEDURE — 00000146 ZZHCL STATISTIC GLUCOSE BY METER IP

## 2020-02-04 PROCEDURE — 97116 GAIT TRAINING THERAPY: CPT | Mod: GP | Performed by: PHYSICAL THERAPIST

## 2020-02-04 PROCEDURE — 80048 BASIC METABOLIC PNL TOTAL CA: CPT | Performed by: PHYSICIAN ASSISTANT

## 2020-02-04 PROCEDURE — 36415 COLL VENOUS BLD VENIPUNCTURE: CPT | Performed by: SURGERY

## 2020-02-04 PROCEDURE — 97110 THERAPEUTIC EXERCISES: CPT | Mod: GP | Performed by: PHYSICAL THERAPIST

## 2020-02-04 PROCEDURE — 25000132 ZZH RX MED GY IP 250 OP 250 PS 637: Performed by: INTERNAL MEDICINE

## 2020-02-04 PROCEDURE — 25000132 ZZH RX MED GY IP 250 OP 250 PS 637: Performed by: PHYSICIAN ASSISTANT

## 2020-02-04 PROCEDURE — 92526 ORAL FUNCTION THERAPY: CPT | Mod: GN

## 2020-02-04 PROCEDURE — 97530 THERAPEUTIC ACTIVITIES: CPT | Mod: GP | Performed by: PHYSICAL THERAPIST

## 2020-02-04 PROCEDURE — 12000000 ZZH R&B MED SURG/OB

## 2020-02-04 PROCEDURE — 82728 ASSAY OF FERRITIN: CPT | Performed by: INTERNAL MEDICINE

## 2020-02-04 PROCEDURE — 71046 X-RAY EXAM CHEST 2 VIEWS: CPT

## 2020-02-04 PROCEDURE — 92611 MOTION FLUOROSCOPY/SWALLOW: CPT | Mod: GN

## 2020-02-04 PROCEDURE — 25800030 ZZH RX IP 258 OP 636: Performed by: INTERNAL MEDICINE

## 2020-02-04 PROCEDURE — 25000128 H RX IP 250 OP 636: Performed by: PHYSICIAN ASSISTANT

## 2020-02-04 PROCEDURE — 83540 ASSAY OF IRON: CPT | Performed by: INTERNAL MEDICINE

## 2020-02-04 PROCEDURE — 74230 X-RAY XM SWLNG FUNCJ C+: CPT

## 2020-02-04 PROCEDURE — 25000128 H RX IP 250 OP 636: Performed by: INTERNAL MEDICINE

## 2020-02-04 PROCEDURE — C9113 INJ PANTOPRAZOLE SODIUM, VIA: HCPCS | Performed by: PHYSICIAN ASSISTANT

## 2020-02-04 PROCEDURE — 83550 IRON BINDING TEST: CPT | Performed by: INTERNAL MEDICINE

## 2020-02-04 PROCEDURE — 36415 COLL VENOUS BLD VENIPUNCTURE: CPT | Performed by: INTERNAL MEDICINE

## 2020-02-04 PROCEDURE — 85027 COMPLETE CBC AUTOMATED: CPT | Performed by: INTERNAL MEDICINE

## 2020-02-04 RX ORDER — BARIUM SULFATE 400 MG/ML
SUSPENSION ORAL ONCE
Status: DISCONTINUED | OUTPATIENT
Start: 2020-02-04 | End: 2020-02-04 | Stop reason: CLARIF

## 2020-02-04 RX ORDER — BARIUM SULFATE 400 MG/ML
SUSPENSION ORAL ONCE
Status: COMPLETED | OUTPATIENT
Start: 2020-02-04 | End: 2020-02-04

## 2020-02-04 RX ORDER — METOPROLOL TARTRATE 25 MG/1
25 TABLET, FILM COATED ORAL 2 TIMES DAILY
Status: DISCONTINUED | OUTPATIENT
Start: 2020-02-04 | End: 2020-02-06

## 2020-02-04 RX ORDER — HYDROXYUREA 500 MG/1
500 CAPSULE ORAL DAILY
Status: DISCONTINUED | OUTPATIENT
Start: 2020-02-04 | End: 2020-02-10 | Stop reason: HOSPADM

## 2020-02-04 RX ORDER — FERROUS SULFATE 325(65) MG
325 TABLET ORAL DAILY
Status: DISCONTINUED | OUTPATIENT
Start: 2020-02-05 | End: 2020-02-10 | Stop reason: HOSPADM

## 2020-02-04 RX ADMIN — GABAPENTIN 100 MG: 100 CAPSULE ORAL at 16:38

## 2020-02-04 RX ADMIN — GABAPENTIN 100 MG: 100 CAPSULE ORAL at 21:33

## 2020-02-04 RX ADMIN — HEPARIN SODIUM 5000 UNITS: 5000 INJECTION, SOLUTION INTRAVENOUS; SUBCUTANEOUS at 05:02

## 2020-02-04 RX ADMIN — IRON SUCROSE 300 MG: 20 INJECTION, SOLUTION INTRAVENOUS at 12:28

## 2020-02-04 RX ADMIN — HEPARIN SODIUM 5000 UNITS: 5000 INJECTION, SOLUTION INTRAVENOUS; SUBCUTANEOUS at 12:28

## 2020-02-04 RX ADMIN — METOPROLOL TARTRATE 25 MG: 25 TABLET ORAL at 21:33

## 2020-02-04 RX ADMIN — ATORVASTATIN CALCIUM 40 MG: 40 TABLET, FILM COATED ORAL at 21:33

## 2020-02-04 RX ADMIN — METOPROLOL TARTRATE 12.5 MG: 25 TABLET, FILM COATED ORAL at 16:38

## 2020-02-04 RX ADMIN — SENNOSIDES AND DOCUSATE SODIUM 2 TABLET: 8.6; 5 TABLET ORAL at 08:51

## 2020-02-04 RX ADMIN — BARIUM SULFATE 20 ML: 400 SUSPENSION ORAL at 11:07

## 2020-02-04 RX ADMIN — PANTOPRAZOLE SODIUM 40 MG: 40 INJECTION, POWDER, FOR SOLUTION INTRAVENOUS at 08:54

## 2020-02-04 RX ADMIN — METOPROLOL TARTRATE 12.5 MG: 25 TABLET, FILM COATED ORAL at 08:51

## 2020-02-04 RX ADMIN — HEPARIN SODIUM 5000 UNITS: 5000 INJECTION, SOLUTION INTRAVENOUS; SUBCUTANEOUS at 21:33

## 2020-02-04 RX ADMIN — GABAPENTIN 100 MG: 100 CAPSULE ORAL at 08:51

## 2020-02-04 RX ADMIN — ASPIRIN 325 MG ORAL TABLET 325 MG: 325 PILL ORAL at 08:51

## 2020-02-04 RX ADMIN — HYDROXYUREA 500 MG: 500 CAPSULE ORAL at 13:58

## 2020-02-04 RX ADMIN — SENNOSIDES AND DOCUSATE SODIUM 1 TABLET: 8.6; 5 TABLET ORAL at 21:33

## 2020-02-04 ASSESSMENT — ACTIVITIES OF DAILY LIVING (ADL)
ADLS_ACUITY_SCORE: 14
ADLS_ACUITY_SCORE: 15
ADLS_ACUITY_SCORE: 16
ADLS_ACUITY_SCORE: 15
ADLS_ACUITY_SCORE: 15
ADLS_ACUITY_SCORE: 14

## 2020-02-04 ASSESSMENT — MIFFLIN-ST. JEOR: SCORE: 1643.96

## 2020-02-04 NOTE — PLAN OF CARE
Discharge Planner SLP   Patient plan for discharge: Did not discuss  Current status: Pt seen for video swallow evaluation to further assess pharyngeal phase of swallow and risk for aspiration. Deficits include premature spillage of liquids. Prolonged and piecemeal posterior propulsion. Mild delay in swallow initiation and delayed but eventual complete epiglottic inversion. Reduced tongue base retraction and weak pharyngeal constriction.  Penetration is felt secondary to delayed epiglottic inversion and bolus size as pt demonstrated adequate airway protection with smaller boluses.     Recommend dysphagia diet 1/puree and thin liquids - no straws.  Pt should have 1:1 supervision at this time and be cued to take small & single cup sips.   He should be at 90 degrees with PO intake, eat/drink at a slow rate, and alternate solids and liquids.     Barriers to return to prior living situation: Below baseline  Recommendations for discharge: TCU  Rationale for recommendations: SLP at next level of care for management of dysphagia given swallow function is below baseline requiring ongoing assessment, education, and exercise program          Entered by: Nancy Carias 02/04/2020 11:41 AM

## 2020-02-04 NOTE — PROGRESS NOTES
[facility]  Cardiovascular and Thoracic Surgery Daily Note          Assessment and Plan:   POD#4 s/p Coronary artery bypass grafting x3 with left internal mammary artery to left anterior descending, reverse saphenous vein graft to posterior descending artery, reverse saphenous vein graft to obtuse marginal 2 artery, endoscopic vein harvest from bilateral lower extremities, intraoperative SANDRA by Dr. Mable Barrera  -CVS: HR: 90s-100s. SBP: 100s-110s. Pre op EF: 60%. ASA, BB, statin. Chest tubes and pacer wires removed yesterday. CXR stable.   -Resp: Extubated on POD#1. Saturating well on 2L. Wean as able. Continue to encourage IS, cough, deep breathing, ambulation.   -Neuro:  Grossly intact. Pain controlled. Slight left facial droop noted on POD#1. Stroke code activated, no large vessel abnormality of perfusion scan. Neurology evaluated- determine no further interventions needed, agree with daily 325mg aspirin and have since signed off.   -Renal: good UOP. Cr: 1.07<1.24<1.39<1.43. KRIS resolved. Up about 1kg from preoperative weight. Minimal   -GI:  Dysphagia. SLP following. Video swallow done today. SLP following. Recommend continued dysphagia diet with 1:1 supervision with meals. +BM. Continue bowel regimen.   -:  Remove hightower today.   -Endo: pre op A1c: 5.6. Completed insulin gtt. Continue sliding scale insulin.   -FEN: replace electrolytes as needed. Na: 141. K: 4.2.   Orders Placed This Encounter      Combination Diet Dysphagia Diet Level 1: Pureed; Thin Liquids (water, ice chips, juice, milk gelatin, ice cream, etc) (No straw)    -ID: Temp (24hrs), Av.2  F (36.8  C), Min:97.6  F (36.4  C), Max:98.8  F (37.1  C)  WBC: 48.0<32.7<28.7<64.4. Completed perioperative abx. Leukocytosis likely related to hx of Myeloproliferative disorder and stress from surgery.   -Heme: Hgb: 8.4. plt: 360. Acute blood loss anemia and thrombocytopenia related to surgery. Post op coagulopathy recovered with 2u FFP,  "3u PRBCs, protamine and factor 7. Hx of myeloproliferative disoder, Heme/onc following. Planning to resume hydroxyurea tomorrow.   -Proph: PCD, ASA, BB, statin, PPI, sub q heparin  -Dispo: Continue St[address]. Continue therapies. CXR stable. Hydroxyurea resumed today. Swallow study today.           Interval History:   No acute events overnight. Saturating well on 2L. Pain controlled. Tolerating diet. +BM.          Medications:       aspirin  325 mg Oral Daily     atorvastatin  40 mg Oral At Bedtime     [START ON 2/5/2020] ferrous sulfate  325 mg Oral Daily     gabapentin  100 mg Oral TID     heparin ANTICOAGULANT  5,000 Units Subcutaneous Q8H     hydroxyurea  500 mg Oral Daily     insulin aspart  1-7 Units Subcutaneous TID AC     insulin aspart  1-5 Units Subcutaneous At Bedtime     metoprolol tartrate  12.5 mg Oral BID     pantoprazole (PROTONIX) IV  40 mg Intravenous Daily with breakfast     senna-docusate  1-2 tablet Oral BID     sodium chloride (PF)  3 mL Intracatheter Q8H     acetaminophen, glucose **OR** dextrose **OR** glucagon, diphenhydrAMINE **OR** diphenhydrAMINE, hydrALAZINE, HYDROmorphone, lidocaine 4%, lidocaine (buffered or not buffered), magnesium sulfate, magnesium sulfate, melatonin, methocarbamol, metoclopramide **OR** metoclopramide, naloxone, ondansetron **OR** ondansetron, oxyCODONE IR, potassium chloride, potassium chloride with lidocaine, potassium chloride, potassium chloride, potassium chloride, prochlorperazine **OR** prochlorperazine, sodium chloride (PF), sodium phosphate, sodium phosphate, sodium phosphate, sodium phosphate          Physical Exam:   Vitals were reviewed  Blood pressure 126/71, pulse 92, temperature 97.9  F (36.6  C), temperature source Axillary, resp. rate 18, height 1.727 m (5' 8\"), weight 90.9 kg (200 lb 8 oz), SpO2 96 %.  Rhythm: NSR    Lungs: diminished bases    Cardiovascular: RRR normal s1 and s2    Abdomen: soft NTND    Extremeties: minimal edema    Incision: " CDI    CT: n/a    Weight:   Vitals:    02/01/20 0400 02/02/20 0400 02/03/20 0530 02/03/20 1613   Weight: 91 kg (200 lb 9.9 oz) 92 kg (202 lb 13.2 oz) 91.6 kg (201 lb 15.1 oz) 91.2 kg (201 lb 1 oz)    02/04/20 0617   Weight: 90.9 kg (200 lb 8 oz)            Data:   Labs:   Lab Results   Component Value Date    WBC 48.0 02/04/2020     Lab Results   Component Value Date    RBC 2.79 02/04/2020     Lab Results   Component Value Date    HGB 8.4 02/04/2020     Lab Results   Component Value Date    HCT 26.3 02/04/2020     No components found for: MCT  Lab Results   Component Value Date    MCV 94 02/04/2020     Lab Results   Component Value Date    MCH 30.1 02/04/2020     Lab Results   Component Value Date    MCHC 31.9 02/04/2020     Lab Results   Component Value Date    RDW 18.2 02/04/2020     Lab Results   Component Value Date     02/04/2020       Last Basic Metabolic Panel:  Lab Results   Component Value Date     02/04/2020      Lab Results   Component Value Date    POTASSIUM 4.2 02/04/2020     Lab Results   Component Value Date    CHLORIDE 112 02/04/2020     Lab Results   Component Value Date    MINNIE 8.6 02/04/2020     Lab Results   Component Value Date    CO2 28 02/04/2020     Lab Results   Component Value Date    BUN 29 02/04/2020     Lab Results   Component Value Date    CR 1.07 02/04/2020     Lab Results   Component Value Date     02/04/2020       CXR: 2/4/2020    IMPRESSION: Since February 3, 2020, cardiomediastinal silhouette  remains enlarged. Suspect small bilateral pleural effusions, slightly  increased from previous exam. Scattered bibasilar opacities relatively  unchanged. No pneumothorax. Interval removal of right IJ sheath.      Alix Melendrez PA-C  CV Surgery  Pager # [phone]

## 2020-02-04 NOTE — PROGRESS NOTES
"   02/04/20 1113   General Information   Onset Date 01/31/20   Start of Care Date 02/02/20   Referring Physician Alix Melendrez PA-C   Patient Profile Review/OT: Additional Occupational Profile Info See Profile for full history and prior level of function   Patient/Family Goals Statement None stated    Swallowing Evaluation Videofluoroscopic evaluation   Behaviorial Observations WFL (within functional limits)   Mode of current nutrition Oral diet   Type of oral diet Honey - thick liquid  (full liquid )   Respiratory Status O2 Supply   Type of O2 supply Nasal cannula   Comments \"Pt is 1 day post CABG. Found with left facial droop today. CTH and CTA without change or large vessel occlusion. Right carotid stenosis.\" MRI pending. Pt denied dysphagia symptoms at baseline. Video swallow completed per provider order to further assess pharyngeal phase and risk for aspiration    VFSS Eval: Radiology   Radiologist Dr. Keith    Views Taken left lateral   Physical Location of Procedure [facility]. Radiology Suite   VFSS Eval: Thin Liquid Texture Trial   Mode of Presentation, Thin Liquid cup;straw;self-fed   Preparatory Phase WFL   Oral Phase, Thin Liquid Premature pharyngeal entry;Poor AP movement   Pharyngeal Phase, Thin Liquid Residue in valleculae;Residue in pyriform sinus;Delayed swallow reflex;Pharyngeal wall coating  (Mild delay)   Rosenbek's Penetration Aspiration Scale: Thin Liquid Trial Results 3 - contrast remains above the vocal cords, visible residue remains (penetration)   Diagnostic Statement Moderate deep penetration x 2 - once during consecutive swallows and once when using a straw. Additionall small/single cup sips were consumed without penetration or aspiration. Mild-moderate amounts of pharyngeal residue, pt completing double swallows appropriately    VFSS Eval: Nectar Thick Liquid Texture Trial   Mode of Presentation, Nectar cup;spoon;self-fed   Preparatory Phase WFL   Oral Phase, " Nectar Premature pharyngeal entry;Poor AP movement   Pharyngeal Phase, Nectar Residue in valleculae;Residue in pyriform sinus   Rosenbek's Penetration Aspiration Scale: Nectar-Thick Liquid Trial Results 1 - no aspiration, contrast does not enter airway   Diagnostic Statement No penetration or aspiration, mild-moderate pharyngeal residue    VFSS Eval: Puree Solid Texture Trial   Mode of Presentation, Puree spoon;fed by clinician   Preparatory Phase WFL   Oral Phase, Puree WFL   Pharyngeal Phase, Puree Pharyngeal wall coating;Residue in valleculae;Residue in pyriform sinus   Rosenbek's Penetration Aspiration Scale: Puree Food Trial Results 1 - no aspiration, contrast does not enter airway   Diagnostic Statement Adequate oral manipulation. No penetration or aspiration. Mild-moderate pharyngeal residue    VFSS Eval: Solid Food Texture Trial   Mode of Presentation, Solid self-fed   Preparatory Phase WFL   Oral Phase, Solid Poor AP movement  (Prolonged manipulation )   Pharyngeal Phase, Solid WFL   Rosenbek's Penetration Aspiration Scale: Solid Food Trial Results 1 - no aspiration, contrast does not enter airway   Diagnostic Statement Prolonged oral manipulation, piecemeal posterior transport. No penetration or aspiration. Mild-mod amounts of pharyngeal residue    Swallow Compensations   Swallow Compensations Alternate viscosity of consistencies;Effortful swallow;Pacing;Multiple swallow   Results No difficulties noted   Esophageal Phase of Swallow   Patient reports or presents with symptoms of esophageal dysphagia No   General Therapy Interventions   Planned Therapy Interventions Dysphagia Treatment   Dysphagia treatment Oropharyngeal exercise training;Modified diet education;Instruction of safe swallow strategies   Swallow Eval: Clinical Impressions   Skilled Criteria for Therapy Intervention Skilled criteria met.  Treatment indicated.   Dysphagia Outcome Severity Scale (ERVIN) Level 4 - ERVIN   Treatment Diagnosis  Mild-moderate oropharyngeal dysphagia    Diet texture recommendations Dysphagia diet level 2;Thin liquids   Recommended Feeding/Eating Techniques alternate between small bites and sips of food/liquid;check mouth frequently for oral residue/pocketing;hard swallow w/ each bite or sip;maintain upright posture during/after eating for 30 mins;no straws;small sips/bites   Therapy Frequency 5x/week   Predicted Duration of Therapy Intervention (days/wks) 1 week    Anticipated Discharge Disposition extended care facility   Risks and Benefits of Treatment have been explained. Yes   Patient, family and/or staff in agreement with Plan of Care Yes   Clinical Impression Comments Pt seen for video swallow evaluation to further assess pharyngeal phase of swallow and risk for aspiration. Pt was assessed with various textures/consistencies of barium under fluoroscopy. Deficits include premature spillage of liquids. Prolonged and piecemeal posterior propulsion. Mild delay in swallow initiation and delayed but eventual complete epiglottic inversion. Reduced tongue base retraction and weak pharyngeal constriction. Thin liquids were trialed numerous times by small/single cup sip and there was minimal transient penetration in 1/3 trials, full clearance of the laryngeal vestibule and no aspiration. Consecutive cup sips of thin liquids resulted in moderately deep penetration with trace residuals in laryngeal vestibule upon completion of swallow. Straw sip resulted in deeper penetration again with trace residuals. Penetration is felt secondary to delayed epiglottic inversion and bolus size as pt demonstrated adequate airway protection with smaller boluses. Nectar thick, puree, and solids were consumed without penetration or aspiration. Mild-moderate amounts of vallecular, pyriform and pharyngeal wall residue across trials - double swallows and alternating solids and liquids assisted in clearing residuals. Recommend dysphagia diet 1puree and  thin liquids - no straws. Pt should have 1:1 supervision at this time and be cued to take small & single cup sips. He should be at 90 degrees with PO intake, eat/drink at a slow rate, and alternate solids and liquids. SLP will further assess solids and compliance with strategies at bedside before further advancing pt's diet. Oropharyngeal exercises also to be initiated.    Total Evaluation Time   Total Evaluation Time (Minutes) 10

## 2020-02-04 NOTE — PLAN OF CARE
"Discharge Planner PT   Patient plan for discharge: TCU  Current status: CGA sit<>stand, CGA with ambulation x 8 minutes in the [place] at slow pace, use of WW on 2L via NC. Rates self 5/10 on the OMNI scale. Moving better today. Reports having some blurry vision with initial walking, stated \"it got better the longer I was up.\" Pt reported this just upon finishing walking, did not mention during activity. Of note pt more talkative today, tolerated increased duration of activity, stronger.  Barriers to return to prior living situation: Lives alone, impaired strength, activity tolerance and balance, sternal precautions, fall risk  Recommendations for discharge: TCU  Rationale for recommendations: pt will benefit from continued skilled rehab services to progress independence and safety with activity/exercise tolerance as well as continue education for optimal childs health.        Entered by: Ada Paula 02/04/2020 11:02 AM       "

## 2020-02-04 NOTE — PLAN OF CARE
Discharge Planner PT   Patient plan for discharge: None stated during session, per chart pt is agreeable to TCU.  Current status: Review sternal precautions with patient. Pt required Benjamin for bed mobility, cues provided to maintain sternal precautions. Transfers with use of FWW and CGA. Pt tolerated slow ambulation in hallway x 7 minutes with FWW and CGA, wheelchair follow provided for safety. Pt reported 5/10 on OMNI effort scale.   Barriers to return to prior living situation: Decreased activity tolerance, Level of assist, Sternal precautions  Recommendations for discharge: TCU  Rationale for recommendations: Pt will benefit from continued skilled PT intervention in order to progress independence and safety with mobility.        Entered by: Rosemary Zarate 02/04/2020 5:04 PM

## 2020-02-04 NOTE — PLAN OF CARE
VSS, LS clear, dim.  SR w/ accas PAC.  Denies pain, +BM, adequate UOP per hightower.  Up Ax1 w/ GB walker.  Swallow eval scheduled for today.  @ view CXR done.

## 2020-02-04 NOTE — PLAN OF CARE
VSS.  Pt's sternal incision is C/D/I with derma bond.  Tele= SR c PACs.  Pt denies pain.  Pt was to the chair with assist x 1 this shift. Phos+ was replaced per protocol.

## 2020-02-04 NOTE — PLAN OF CARE
Patient arrived to floor from ICU at 1605. A/Ox4. VSS. Tele ST with PAC. LS diminished. +Bs, +flatus, +BM. Almendarez patent with adequate output. Sternal incision ecchymotic cdi/brandon with liquid bandage. Adair sites ecchymotic cdi/brandon with liquid bandage. Dressing to chest tube sites cdi. Denies pain. On full liquid honey thick diet. Refused dinner. Up with assist x1gb and walker.

## 2020-02-04 NOTE — CONSULTS
"NUTRITION ASSESSMENT    REASON FOR ASSESSMENT:  Cardiac Surgery Nutrition Consult    CURRENT DIET / INTAKE:  DD1 + Thins (upgraded from NTL today).   - Pt resting w/ Magic Shake at bedside, having taken a few bites. He reports that he was able to tolerate the Cherry Gelatein (20 g pro, 150 kcal) yesterday afternoon and is happy to continue receiving supplements.   - overall, appetite is poor.   - 50% documentation for meals.     ANTHROPOMETRICS:   Ht: 5' 8\"  Wt: 89.4 kg (197#)  BMI: 29.95 kg/m2  IBW: 70 kg  Weight Status: Overweight BMI 25-29.9  %IBW: 128%    MALNUTRITION:  Patient does not meet two of the following criteria necessary for diagnosing malnutrition:  significant weight loss, reduced intake, subcutaneous fat loss, muscle loss or fluid retention. Nutrition Focused Physical Assessment (NFPA) not appropriate at this time.    NUTRITION DIAGNOSIS:   Inadequate oral intake r/t poor appetite post op CABG x3 and dysphagia AEB intakes of 50% of meals or less since surgery on 1/31.    INTERVENTIONS:    Nutrition Prescription:  Diet per SLP  Diet-Appropriate Supplements BID between meals, w/ lunch and dinner.     Implementation:  Nutrition Education (Content):  Discussed the importance of adequate nutrition post-op for healing and energy, emphasizing protein foods, and encouraged patient to order a protein food at each meal.    Goals:  Patient to consume ~75% at meals in the next 3 - 5 days    Follow Up/Monitoring (InPatient):  Food and Fluid intake -  Monitor for adequacy    Follow Up/Monitoring (OutPatient):  Patient will participate in out-patient cardiac rehab and attend nutrition classes during the program    Dianne Khan RD, LD  [facility], [address], [place]   Pager: [phone]  Weekend Pager: [phone]    "

## 2020-02-04 NOTE — PROGRESS NOTES
Chart check.  Labs reveal iron deficiency.  This is secondary to his surgical blood loss.  We will give him 1 dose of IV Venofer and also start oral ferrous sulfate.

## 2020-02-05 ENCOUNTER — APPOINTMENT (OUTPATIENT)
Dept: PHYSICAL THERAPY | Facility: CLINIC | Age: 70
DRG: 236 | End: 2020-02-05
Attending: SURGERY
Payer: COMMERCIAL

## 2020-02-05 ENCOUNTER — APPOINTMENT (OUTPATIENT)
Dept: SPEECH THERAPY | Facility: CLINIC | Age: 70
DRG: 236 | End: 2020-02-05
Attending: SURGERY
Payer: COMMERCIAL

## 2020-02-05 LAB
ALBUMIN UR-MCNC: 30 MG/DL
ANION GAP SERPL CALCULATED.3IONS-SCNC: 6 MMOL/L (ref 3–14)
APPEARANCE UR: CLEAR
BILIRUB UR QL STRIP: NEGATIVE
BUN SERPL-MCNC: 30 MG/DL (ref 7–30)
CALCIUM SERPL-MCNC: 8.4 MG/DL (ref 8.5–10.1)
CHLORIDE SERPL-SCNC: 108 MMOL/L (ref 94–109)
CO2 SERPL-SCNC: 24 MMOL/L (ref 20–32)
COLOR UR AUTO: YELLOW
CREAT SERPL-MCNC: 1.14 MG/DL (ref 0.66–1.25)
ERYTHROCYTE [DISTWIDTH] IN BLOOD BY AUTOMATED COUNT: 18.1 % (ref 10–15)
GFR SERPL CREATININE-BSD FRML MDRD: 65 ML/MIN/{1.73_M2}
GLUCOSE BLDC GLUCOMTR-MCNC: 103 MG/DL (ref 70–99)
GLUCOSE BLDC GLUCOMTR-MCNC: 112 MG/DL (ref 70–99)
GLUCOSE BLDC GLUCOMTR-MCNC: 112 MG/DL (ref 70–99)
GLUCOSE BLDC GLUCOMTR-MCNC: 97 MG/DL (ref 70–99)
GLUCOSE SERPL-MCNC: 106 MG/DL (ref 70–99)
GLUCOSE UR STRIP-MCNC: NEGATIVE MG/DL
HCT VFR BLD AUTO: 26.2 % (ref 40–53)
HGB BLD-MCNC: 8.3 G/DL (ref 13.3–17.7)
HGB UR QL STRIP: ABNORMAL
HYALINE CASTS #/AREA URNS LPF: 1 /LPF (ref 0–2)
KETONES UR STRIP-MCNC: NEGATIVE MG/DL
LACTATE BLD-SCNC: 1.7 MMOL/L (ref 0.7–2)
LEUKOCYTE ESTERASE UR QL STRIP: NEGATIVE
MAGNESIUM SERPL-MCNC: 2.6 MG/DL (ref 1.6–2.3)
MCH RBC QN AUTO: 29.7 PG (ref 26.5–33)
MCHC RBC AUTO-ENTMCNC: 31.7 G/DL (ref 31.5–36.5)
MCV RBC AUTO: 94 FL (ref 78–100)
MUCOUS THREADS #/AREA URNS LPF: PRESENT /LPF
NITRATE UR QL: NEGATIVE
PH UR STRIP: 5.5 PH (ref 5–7)
PHOSPHATE SERPL-MCNC: 1.9 MG/DL (ref 2.5–4.5)
PHOSPHATE SERPL-MCNC: 2.7 MG/DL (ref 2.5–4.5)
PLATELET # BLD AUTO: 377 10E9/L (ref 150–450)
POTASSIUM SERPL-SCNC: 3.9 MMOL/L (ref 3.4–5.3)
RBC # BLD AUTO: 2.79 10E12/L (ref 4.4–5.9)
RBC #/AREA URNS AUTO: <1 /HPF (ref 0–2)
SODIUM SERPL-SCNC: 138 MMOL/L (ref 133–144)
SOURCE: ABNORMAL
SP GR UR STRIP: 1.03 (ref 1–1.03)
UROBILINOGEN UR STRIP-MCNC: NORMAL MG/DL (ref 0–2)
WBC # BLD AUTO: 50.2 10E9/L (ref 4–11)
WBC #/AREA URNS AUTO: 1 /HPF (ref 0–5)

## 2020-02-05 PROCEDURE — C9113 INJ PANTOPRAZOLE SODIUM, VIA: HCPCS | Performed by: PHYSICIAN ASSISTANT

## 2020-02-05 PROCEDURE — 83605 ASSAY OF LACTIC ACID: CPT | Performed by: SURGERY

## 2020-02-05 PROCEDURE — 25000132 ZZH RX MED GY IP 250 OP 250 PS 637: Performed by: PHYSICIAN ASSISTANT

## 2020-02-05 PROCEDURE — 25000128 H RX IP 250 OP 636: Performed by: PHYSICIAN ASSISTANT

## 2020-02-05 PROCEDURE — 97110 THERAPEUTIC EXERCISES: CPT | Mod: GP

## 2020-02-05 PROCEDURE — 81001 URINALYSIS AUTO W/SCOPE: CPT | Performed by: PHYSICIAN ASSISTANT

## 2020-02-05 PROCEDURE — 36415 COLL VENOUS BLD VENIPUNCTURE: CPT | Performed by: PHYSICIAN ASSISTANT

## 2020-02-05 PROCEDURE — 25800030 ZZH RX IP 258 OP 636: Performed by: PHYSICIAN ASSISTANT

## 2020-02-05 PROCEDURE — 25000125 ZZHC RX 250: Performed by: PHYSICIAN ASSISTANT

## 2020-02-05 PROCEDURE — 83735 ASSAY OF MAGNESIUM: CPT | Performed by: PHYSICIAN ASSISTANT

## 2020-02-05 PROCEDURE — 00000146 ZZHCL STATISTIC GLUCOSE BY METER IP

## 2020-02-05 PROCEDURE — 36415 COLL VENOUS BLD VENIPUNCTURE: CPT | Performed by: SURGERY

## 2020-02-05 PROCEDURE — 85027 COMPLETE CBC AUTOMATED: CPT | Performed by: PHYSICIAN ASSISTANT

## 2020-02-05 PROCEDURE — 84100 ASSAY OF PHOSPHORUS: CPT | Performed by: SURGERY

## 2020-02-05 PROCEDURE — 97530 THERAPEUTIC ACTIVITIES: CPT | Mod: GP

## 2020-02-05 PROCEDURE — 84100 ASSAY OF PHOSPHORUS: CPT | Performed by: PHYSICIAN ASSISTANT

## 2020-02-05 PROCEDURE — 97110 THERAPEUTIC EXERCISES: CPT | Mod: GP | Performed by: PHYSICAL THERAPIST

## 2020-02-05 PROCEDURE — 92526 ORAL FUNCTION THERAPY: CPT | Mod: GN

## 2020-02-05 PROCEDURE — 97530 THERAPEUTIC ACTIVITIES: CPT | Mod: GP | Performed by: PHYSICAL THERAPIST

## 2020-02-05 PROCEDURE — 12000000 ZZH R&B MED SURG/OB

## 2020-02-05 PROCEDURE — 25000132 ZZH RX MED GY IP 250 OP 250 PS 637: Performed by: INTERNAL MEDICINE

## 2020-02-05 PROCEDURE — 80048 BASIC METABOLIC PNL TOTAL CA: CPT | Performed by: PHYSICIAN ASSISTANT

## 2020-02-05 RX ORDER — CEPHALEXIN 500 MG/1
500 CAPSULE ORAL EVERY 12 HOURS SCHEDULED
Status: DISCONTINUED | OUTPATIENT
Start: 2020-02-05 | End: 2020-02-10 | Stop reason: HOSPADM

## 2020-02-05 RX ADMIN — HEPARIN SODIUM 5000 UNITS: 5000 INJECTION, SOLUTION INTRAVENOUS; SUBCUTANEOUS at 21:12

## 2020-02-05 RX ADMIN — HEPARIN SODIUM 5000 UNITS: 5000 INJECTION, SOLUTION INTRAVENOUS; SUBCUTANEOUS at 04:36

## 2020-02-05 RX ADMIN — GABAPENTIN 100 MG: 100 CAPSULE ORAL at 21:10

## 2020-02-05 RX ADMIN — SENNOSIDES AND DOCUSATE SODIUM 2 TABLET: 8.6; 5 TABLET ORAL at 08:29

## 2020-02-05 RX ADMIN — METOPROLOL TARTRATE 25 MG: 25 TABLET ORAL at 08:29

## 2020-02-05 RX ADMIN — HEPARIN SODIUM 5000 UNITS: 5000 INJECTION, SOLUTION INTRAVENOUS; SUBCUTANEOUS at 12:44

## 2020-02-05 RX ADMIN — ATORVASTATIN CALCIUM 40 MG: 40 TABLET, FILM COATED ORAL at 21:10

## 2020-02-05 RX ADMIN — METOPROLOL TARTRATE 25 MG: 25 TABLET ORAL at 21:10

## 2020-02-05 RX ADMIN — FERROUS SULFATE TAB 325 MG (65 MG ELEMENTAL FE) 325 MG: 325 (65 FE) TAB at 08:29

## 2020-02-05 RX ADMIN — SENNOSIDES AND DOCUSATE SODIUM 1 TABLET: 8.6; 5 TABLET ORAL at 21:10

## 2020-02-05 RX ADMIN — GABAPENTIN 100 MG: 100 CAPSULE ORAL at 08:29

## 2020-02-05 RX ADMIN — POTASSIUM CHLORIDE 20 MEQ: 1.5 POWDER, FOR SOLUTION ORAL at 08:29

## 2020-02-05 RX ADMIN — CEPHALEXIN 500 MG: 500 CAPSULE ORAL at 21:10

## 2020-02-05 RX ADMIN — ASPIRIN 325 MG ORAL TABLET 325 MG: 325 PILL ORAL at 08:29

## 2020-02-05 RX ADMIN — SODIUM PHOSPHATE, MONOBASIC, MONOHYDRATE 20 MMOL: 276; 142 INJECTION, SOLUTION INTRAVENOUS at 10:39

## 2020-02-05 RX ADMIN — PANTOPRAZOLE SODIUM 40 MG: 40 INJECTION, POWDER, FOR SOLUTION INTRAVENOUS at 08:30

## 2020-02-05 RX ADMIN — GABAPENTIN 100 MG: 100 CAPSULE ORAL at 17:04

## 2020-02-05 RX ADMIN — HYDROXYUREA 500 MG: 500 CAPSULE ORAL at 08:57

## 2020-02-05 ASSESSMENT — ACTIVITIES OF DAILY LIVING (ADL)
ADLS_ACUITY_SCORE: 15
ADLS_ACUITY_SCORE: 17
ADLS_ACUITY_SCORE: 15
ADLS_ACUITY_SCORE: 15

## 2020-02-05 ASSESSMENT — MIFFLIN-ST. JEOR: SCORE: 1642.5

## 2020-02-05 NOTE — PLAN OF CARE
Discharge Planner SLP   Patient plan for discharge: did not discuss  Current status: Swallow treatment completed. Pt full from lunch but agreeable to small single sips of water which he tolerated well. Initiated oropharyngeal exercises: pt completed effortful swallow x10, musa maneuver x10, mendelsohn maneuver x10 with min-mod cues before fatiguing.     Recommend dysphagia diet 1/puree and thin liquids - no straws.  Pt should have 1:1 supervision at this time and be cued to take small & single cup sips.   He should be at 90 degrees with PO intake, eat/drink at a slow rate, and alternate solids and liquids.      Barriers to return to prior living situation: Below baseline  Recommendations for discharge: TCU  Rationale for recommendations: SLP at next level of care for management of dysphagia given swallow function is below baseline requiring ongoing assessment, education, and exercise program        Entered by: Stacey Burrell 02/05/2020 3:15 PM

## 2020-02-05 NOTE — CONSULTS
Care Transition Initial Assessment - DANA     Met with: Patient and Family    Principal Problem:    CAD (coronary artery disease)       DATA  Lives With: alone   Living Arrangements: house, apartment  Quality of Family Relationships: supportive, helpful  Description of Support System: Involved, Supportive  Who is your support system?: Sibling(s)  Support Assessment: Adequate family and caregiver support, Adequate social supports.  Quality of Family Relationships: supportive, helpful    ASSESSMENT  Cognitive Status:  awake, alert and oriented    DANA has reviewed pt records. Pt is Renny, a 68yo gentleman s/p CABG x3 on 1/31/20. It is recommended that pt discharge to TCU prior to returning home. DANA discussed with CV surgery team today and it was confirmed that pt will continue on his oral chemo medication (Hydroxyurea 500mg) while at the TCU. This is known to be a potential barrier to TCU placement. DANA first checking with facility near pt home ([place]) to determine if able to bring his own medication from home. DANA sent information to [facility]. DANA also sent to  TCU to determine if this facility would be able to accept if [facility] TCUs unable. DANA will update pt and care team tomorrow once options known. DANA left message with [facility] admissions requesting return call to discuss case further.     PLAN  Financial costs for the patient includes: None known at this time  Patient given options and choices for discharge Determining options available in light of need for chemo med while at TCU.  Patient/family is agreeable to the plan?  Yes  Transportation/person available to transport on day of discharge: TBD  Patient Goals and Preferences: TCU.  Patient anticipates discharging to:  TCU.    NICANOR Gonzalez, LICSW  [facility]  Daytime (8:00am-4:30pm): [phone]  After-Hours SW Pager (4:30pm-11:30pm): [phone]

## 2020-02-05 NOTE — PLAN OF CARE
A&Ox4. VSS,tele sinus tachy() dysrhythmia w/ BBB, still desat below 90% on RA. Sat 95% on 2L/NC. Encouraged CDB & IS Incision ecchymotic-CDI. Up with one assist, gait belt & walker. Maintained sternal precautions. Denies pain. Will continue to monitor.

## 2020-02-05 NOTE — PLAN OF CARE
A&Ox4. VSS on 2L O2 NC. DD1 with thin liquids, poor appetite. Takes pills in applesauce, 1:1 supervision with feeds. CT removal sites LOIDA. Up with assist of one GB and fww. Blood sugar checks. LLE with drszulma, blister. Chest incisions WNL, ecchymosis. Denies pain.

## 2020-02-05 NOTE — PROGRESS NOTES
SPIRITUAL HEALTH SERVICES  Spiritual Assessment Progress Note  [facility]   had a brief conversation with pt.  He stated that he was doing well and noticing improvement each day.  Pt stated that he lives in an apartment in [place], [place] and his sisters live nearby.     No needs at this time.    No plans to follow.                                                                                                                                            Kiara Barrera M.Div., [facility]  Staff    Pager [phone]

## 2020-02-05 NOTE — PLAN OF CARE
Discharge Planner PT   Patient plan for discharge: TCU  Current status: Pt performs sit to supine with mod assist, sit to/from stand with min assist, cues for sternal precautions. Pt tolerates ambulating x 10 minutes with seated rest break with CGA, on 2L O2 via NC. Pt with normal CV response to activity, please see flow sheet.  Barriers to return to prior living situation: Lives alone, impaired strength, activity tolerance and balance, sternal precautions, fall risk  Recommendations for discharge: TCU  Rationale for recommendations: Tt will benefit from continued skilled rehab services to progress independence and safety with activity/exercise tolerance as well as continue education for optimal heart health.        Entered by: Laina Dumont 02/05/2020 11:14 AM

## 2020-02-05 NOTE — PROVIDER NOTIFICATION
Around 1200 informed Alix PA of redness, warmth and swelling of the pt's (L) LE.  New orders placed in the computer by the PA.

## 2020-02-05 NOTE — PROGRESS NOTES
[facility]  Cardiovascular and Thoracic Surgery Daily Note          Assessment and Plan:   POD#5 s/p Coronary artery bypass grafting x3 with left internal mammary artery to left anterior descending, reverse saphenous vein graft to posterior descending artery, reverse saphenous vein graft to obtuse marginal 2 artery, endoscopic vein harvest from bilateral lower extremities, intraoperative SANDRA by Dr. Mable Barrera  -CVS: HR: 80s-90s. SBP: 100-130s. Pre op EF: 60%. ASA, BB, statin.   -Resp: Extubated within protocol. Saturating well on 2L. Wean as able. Continue to encourage IS, cough, deep breathing, ambulation.   -Neuro:  Grossly intact. Pain controlled. Slight left facial droop noted on POD#1. Stroke code activated, no large vessel abnormality of perfusion scan. Neurology evaluated- determine no further interventions needed, agree with daily 325mg aspirin and have since signed off.   -Renal: good UOP. Cr: 1.14<1.07<1.24<1.39<1.43. KRIS resolved. Up about 1kg from preoperative weight. Will continue to monitor.   -GI:  Dysphagia. SLP following. Video swallow done yesterday. Recommended continued dysphagia diet with 1:1 supervision with meals. +BM. Continue bowel regimen.   -:  Voiding well on own.   -Endo: pre op A1c: 5.6. Completed insulin gtt. Continue sliding scale insulin.   -FEN: replace electrolytes as needed. Na: 138. K: 3.9.  Orders Placed This Encounter      Dysphagia Diet Level 1 Pureed Thin Liquids (water, ice chips, juice, milk gelatin, ice cream, etc)    -ID: Temp (24hrs), Av.3  F (36.3  C), Min:94.6  F (34.8  C), Max:98.8  F (37.1  C)  WBC: 50.2<48.0<32.7<28.7<64.4. Completed perioperative abx. Leukocytosis likely related to hx of Myeloproliferative disorder and stress from surgery.   -Heme: Hgb: 8.3. plt: 377. Acute blood loss anemia and thrombocytopenia related to stress from surgery. Post op coagulopathy recovered with 2u FFP, 3u PRBCs, protamine and factor 7. Hx of  "myeloproliferative disorder. Heme/onc consulted- resumed hydroxyurea, have since signed off.   -Proph: PCD, ASA, BB, statin, PPI, sub q heparin  -Dispo: [facility]. Continue therapies. Social work consulted- exploring options for discharge. May be complicated d/t chemotherapy drugs.           Interval History:   No acute events overnight. Saturating well on 2L. Pain controlled. Tolerating diet. +BM         Medications:       aspirin  325 mg Oral Daily     atorvastatin  40 mg Oral At Bedtime     ferrous sulfate  325 mg Oral Daily     gabapentin  100 mg Oral TID     heparin ANTICOAGULANT  5,000 Units Subcutaneous Q8H     hydroxyurea  500 mg Oral Daily     insulin aspart  1-7 Units Subcutaneous TID AC     insulin aspart  1-5 Units Subcutaneous At Bedtime     metoprolol tartrate  25 mg Oral BID     pantoprazole (PROTONIX) IV  40 mg Intravenous Daily with breakfast     senna-docusate  1-2 tablet Oral BID     sodium chloride (PF)  3 mL Intracatheter Q8H     acetaminophen, glucose **OR** dextrose **OR** glucagon, diphenhydrAMINE **OR** diphenhydrAMINE, hydrALAZINE, HYDROmorphone, lidocaine 4%, lidocaine (buffered or not buffered), magnesium sulfate, magnesium sulfate, melatonin, methocarbamol, metoclopramide **OR** metoclopramide, naloxone, ondansetron **OR** ondansetron, oxyCODONE IR, potassium chloride, potassium chloride with lidocaine, potassium chloride, potassium chloride, potassium chloride, prochlorperazine **OR** prochlorperazine, sodium chloride (PF), sodium phosphate, sodium phosphate, sodium phosphate, sodium phosphate          Physical Exam:   Vitals were reviewed  Blood pressure 104/64, pulse 102, temperature 97.9  F (36.6  C), temperature source Axillary, resp. rate 18, height 1.727 m (5' 8\"), weight 90.8 kg (200 lb 2.8 oz), SpO2 97 %.  Rhythm: NSR    Lungs: diminished bases    Cardiovascular: RRR normal s1 and s2    Abdomen: soft NTND    Extremeties: 1-2+ edema    Incision: CDI    CT: n/a    Weight:   Vitals: "    02/02/20 0400 02/03/20 0530 02/03/20 1613 02/04/20 0617   Weight: 92 kg (202 lb 13.2 oz) 91.6 kg (201 lb 15.1 oz) 91.2 kg (201 lb 1 oz) 90.9 kg (200 lb 8 oz)    02/05/20 0500   Weight: 90.8 kg (200 lb 2.8 oz)            Data:   Labs:   Lab Results   Component Value Date    WBC 50.2 02/05/2020     Lab Results   Component Value Date    RBC 2.79 02/05/2020     Lab Results   Component Value Date    HGB 8.3 02/05/2020     Lab Results   Component Value Date    HCT 26.2 02/05/2020     No components found for: MCT  Lab Results   Component Value Date    MCV 94 02/05/2020     Lab Results   Component Value Date    MCH 29.7 02/05/2020     Lab Results   Component Value Date    MCHC 31.7 02/05/2020     Lab Results   Component Value Date    RDW 18.1 02/05/2020     Lab Results   Component Value Date     02/05/2020       Last Basic Metabolic Panel:  Lab Results   Component Value Date     02/05/2020      Lab Results   Component Value Date    POTASSIUM 3.9 02/05/2020     Lab Results   Component Value Date    CHLORIDE 108 02/05/2020     Lab Results   Component Value Date    MINNIE 8.4 02/05/2020     Lab Results   Component Value Date    CO2 24 02/05/2020     Lab Results   Component Value Date    BUN 30 02/05/2020     Lab Results   Component Value Date    CR 1.14 02/05/2020     Lab Results   Component Value Date     02/05/2020       CXR: 2/4/2020    IMPRESSION: Since February 3, 2020, cardiomediastinal silhouette  remains enlarged. Suspect small bilateral pleural effusions, slightly  increased from previous exam. Scattered bibasilar opacities relatively  unchanged. No pneumothorax. Interval removal of right IJ sheath.    Alix Melendrez PA-C  CV Surgery  Pager # [phone]

## 2020-02-05 NOTE — PLAN OF CARE
A/Ox4. AVSS except tachy at times. Tele ST with PAC's. Tolerating 2LO2 via nasal cannula. Advanced to DD1 with thin liquids, no straws. Poor appetite. Denies any pain. LS diminished. Up with AX1 GB and walker. CMS intact. Incisions WDL ex. Bruising. Voiding and BM+. Continue to monitor.

## 2020-02-05 NOTE — PLAN OF CARE
VSS.  Tele= SR with PAC's.  Pt denies pain.  Some redness, warmth and swelling noted on the pt's (L) LEAlix aware.  Sternal incision is C/D/I.  Bilateral LE have 3+ edema, bilateral LE elevated on pillows.  Phos+ replaced per protocol.

## 2020-02-06 ENCOUNTER — APPOINTMENT (OUTPATIENT)
Dept: PHYSICAL THERAPY | Facility: CLINIC | Age: 70
DRG: 236 | End: 2020-02-06
Attending: SURGERY
Payer: COMMERCIAL

## 2020-02-06 ENCOUNTER — APPOINTMENT (OUTPATIENT)
Dept: SPEECH THERAPY | Facility: CLINIC | Age: 70
DRG: 236 | End: 2020-02-06
Attending: SURGERY
Payer: COMMERCIAL

## 2020-02-06 LAB
ANION GAP SERPL CALCULATED.3IONS-SCNC: 6 MMOL/L (ref 3–14)
BUN SERPL-MCNC: 28 MG/DL (ref 7–30)
CALCIUM SERPL-MCNC: 8.4 MG/DL (ref 8.5–10.1)
CHLORIDE SERPL-SCNC: 109 MMOL/L (ref 94–109)
CO2 SERPL-SCNC: 25 MMOL/L (ref 20–32)
CREAT SERPL-MCNC: 1.13 MG/DL (ref 0.66–1.25)
ERYTHROCYTE [DISTWIDTH] IN BLOOD BY AUTOMATED COUNT: 18.5 % (ref 10–15)
GFR SERPL CREATININE-BSD FRML MDRD: 65 ML/MIN/{1.73_M2}
GLUCOSE BLDC GLUCOMTR-MCNC: 103 MG/DL (ref 70–99)
GLUCOSE BLDC GLUCOMTR-MCNC: 104 MG/DL (ref 70–99)
GLUCOSE BLDC GLUCOMTR-MCNC: 110 MG/DL (ref 70–99)
GLUCOSE BLDC GLUCOMTR-MCNC: 97 MG/DL (ref 70–99)
GLUCOSE SERPL-MCNC: 106 MG/DL (ref 70–99)
HCT VFR BLD AUTO: 25.3 % (ref 40–53)
HGB BLD-MCNC: 8.1 G/DL (ref 13.3–17.7)
MCH RBC QN AUTO: 29.9 PG (ref 26.5–33)
MCHC RBC AUTO-ENTMCNC: 32 G/DL (ref 31.5–36.5)
MCV RBC AUTO: 93 FL (ref 78–100)
PHOSPHATE SERPL-MCNC: 2.8 MG/DL (ref 2.5–4.5)
PLATELET # BLD AUTO: 345 10E9/L (ref 150–450)
POTASSIUM SERPL-SCNC: 4.1 MMOL/L (ref 3.4–5.3)
RBC # BLD AUTO: 2.71 10E12/L (ref 4.4–5.9)
SODIUM SERPL-SCNC: 140 MMOL/L (ref 133–144)
WBC # BLD AUTO: 49 10E9/L (ref 4–11)

## 2020-02-06 PROCEDURE — 84100 ASSAY OF PHOSPHORUS: CPT | Performed by: PHYSICIAN ASSISTANT

## 2020-02-06 PROCEDURE — 25000128 H RX IP 250 OP 636: Performed by: PHYSICIAN ASSISTANT

## 2020-02-06 PROCEDURE — 92526 ORAL FUNCTION THERAPY: CPT | Mod: GN

## 2020-02-06 PROCEDURE — 85027 COMPLETE CBC AUTOMATED: CPT | Performed by: PHYSICIAN ASSISTANT

## 2020-02-06 PROCEDURE — 25000132 ZZH RX MED GY IP 250 OP 250 PS 637: Performed by: PHYSICIAN ASSISTANT

## 2020-02-06 PROCEDURE — 80048 BASIC METABOLIC PNL TOTAL CA: CPT | Performed by: PHYSICIAN ASSISTANT

## 2020-02-06 PROCEDURE — 12000000 ZZH R&B MED SURG/OB

## 2020-02-06 PROCEDURE — 25000132 ZZH RX MED GY IP 250 OP 250 PS 637: Performed by: INTERNAL MEDICINE

## 2020-02-06 PROCEDURE — C9113 INJ PANTOPRAZOLE SODIUM, VIA: HCPCS | Performed by: PHYSICIAN ASSISTANT

## 2020-02-06 PROCEDURE — 97110 THERAPEUTIC EXERCISES: CPT | Mod: GP

## 2020-02-06 PROCEDURE — 97530 THERAPEUTIC ACTIVITIES: CPT | Mod: GP

## 2020-02-06 PROCEDURE — 36415 COLL VENOUS BLD VENIPUNCTURE: CPT | Performed by: PHYSICIAN ASSISTANT

## 2020-02-06 PROCEDURE — 00000146 ZZHCL STATISTIC GLUCOSE BY METER IP

## 2020-02-06 RX ORDER — METOPROLOL TARTRATE 25 MG/1
25 TABLET, FILM COATED ORAL ONCE
Status: COMPLETED | OUTPATIENT
Start: 2020-02-06 | End: 2020-02-06

## 2020-02-06 RX ORDER — METOPROLOL TARTRATE 50 MG
50 TABLET ORAL 2 TIMES DAILY
Status: DISCONTINUED | OUTPATIENT
Start: 2020-02-06 | End: 2020-02-08

## 2020-02-06 RX ADMIN — CEPHALEXIN 500 MG: 500 CAPSULE ORAL at 08:18

## 2020-02-06 RX ADMIN — ATORVASTATIN CALCIUM 40 MG: 40 TABLET, FILM COATED ORAL at 21:10

## 2020-02-06 RX ADMIN — GABAPENTIN 100 MG: 100 CAPSULE ORAL at 08:18

## 2020-02-06 RX ADMIN — FERROUS SULFATE TAB 325 MG (65 MG ELEMENTAL FE) 325 MG: 325 (65 FE) TAB at 08:18

## 2020-02-06 RX ADMIN — METHOCARBAMOL 500 MG: 500 TABLET, FILM COATED ORAL at 08:18

## 2020-02-06 RX ADMIN — GABAPENTIN 100 MG: 100 CAPSULE ORAL at 21:10

## 2020-02-06 RX ADMIN — SENNOSIDES AND DOCUSATE SODIUM 2 TABLET: 8.6; 5 TABLET ORAL at 08:18

## 2020-02-06 RX ADMIN — METOPROLOL TARTRATE 25 MG: 25 TABLET, FILM COATED ORAL at 12:26

## 2020-02-06 RX ADMIN — PANTOPRAZOLE SODIUM 40 MG: 40 INJECTION, POWDER, FOR SOLUTION INTRAVENOUS at 08:19

## 2020-02-06 RX ADMIN — CEPHALEXIN 500 MG: 500 CAPSULE ORAL at 20:30

## 2020-02-06 RX ADMIN — ASPIRIN 325 MG ORAL TABLET 325 MG: 325 PILL ORAL at 08:23

## 2020-02-06 RX ADMIN — HYDROXYUREA 500 MG: 500 CAPSULE ORAL at 08:21

## 2020-02-06 RX ADMIN — HEPARIN SODIUM 5000 UNITS: 5000 INJECTION, SOLUTION INTRAVENOUS; SUBCUTANEOUS at 03:46

## 2020-02-06 RX ADMIN — METOPROLOL TARTRATE 50 MG: 50 TABLET, FILM COATED ORAL at 20:30

## 2020-02-06 RX ADMIN — HEPARIN SODIUM 5000 UNITS: 5000 INJECTION, SOLUTION INTRAVENOUS; SUBCUTANEOUS at 20:30

## 2020-02-06 RX ADMIN — HEPARIN SODIUM 5000 UNITS: 5000 INJECTION, SOLUTION INTRAVENOUS; SUBCUTANEOUS at 12:25

## 2020-02-06 RX ADMIN — METOPROLOL TARTRATE 25 MG: 25 TABLET ORAL at 08:18

## 2020-02-06 RX ADMIN — GABAPENTIN 100 MG: 100 CAPSULE ORAL at 16:08

## 2020-02-06 ASSESSMENT — ACTIVITIES OF DAILY LIVING (ADL)
ADLS_ACUITY_SCORE: 15
ADLS_ACUITY_SCORE: 16
ADLS_ACUITY_SCORE: 15

## 2020-02-06 ASSESSMENT — MIFFLIN-ST. JEOR: SCORE: 1649.41

## 2020-02-06 NOTE — PROGRESS NOTES
"CT surgery Progress Note    Severe 3 vessel coronary artery disease s/p CABG x 3 (LIMA to LAD, SVG to PDA, SVG to OM2) POD #6    Subjective:  States that pain is being controlled. Denies any hallucinations. Breathing is ok. Working with IS. Appetite is ok. Denies any nausea. +BMs, denies any popping/clicking in his breast bone, ambulated x 2 out of room yesterday, was able to get some sleep last night, has showered, plans for TCU at the time of discharge    Objective:  Up in chair, comfortable, -O2  BP (!) 140/70 (BP Location: Right arm)   Pulse 95   Temp 95.6  F (35.3  C) (Oral)   Resp 18   Ht 1.727 m (5' 8\")   Wt 91.5 kg (201 lb 11.2 oz)   SpO2 95%   BMI 30.67 kg/m    CT removed  CV - RRR, telemetry SR 90s, +edema LE  Pulm - CTA, -1000 ml  Abd - BS+, NT/ND, soft  Derm - sternal incision D/I   L LE incisions D/I, +blood blister near knee incision    R LE incisions D/I  Lab - WBC 49.0   Hgb/Hct 8.1/25.3   Plt 345   Na 140   K 4.1   Cr/BUN 1.13/28   Glucose   Extubated POD #1 at 1110  Transferred to step down unit on 2/3/20  UA neg (2/5/20)  Echo (1/9/20) - EF 60%   No valvular disease   Mild tricuspid regurgitation   No pericardial effusion  Carotid US (1/9/20) - R ICA greater than 70% stenosis   L ICA 50-69% stenosis  CT angio Head/Neck (2/1/20) - mod atherosclerotic disease in the R ICA (approximately 50%)   Mild stenosis at the origin of the R vertebral artery   Unremarkable CT perfusion images of the head    A/P:  1. S/p CABG x 3 POD #6  2. HTN - increase lopressor to 50 mg bid  3. Hyperlipidemia - lipitor 40 mg   4. R ICA stenosis - will set up in vascular clinic as outpatient  5. Myeloproliferative disorder - hydroxyurea 500 mg daily  6. Dysphagia - speech following, dysphagia diet  7. Anticoagulation - aspirin 325 mg, heparin subcutaneous tid  8. L LE erythema - improved, keflex 500 mg PO bid started 2/5 (will continue x 7 days)   Encouraged IS/TCDB/amb. Sternal precautions. Will " discontinue accuchecks. Working on placement for TCU. Legs wrapped due to swelling. Continue to monitor.    Gareth Llanos PA-C  [phone]

## 2020-02-06 NOTE — PLAN OF CARE
Alert and VSS. Weaned off O2 and sating well on RA. Rhythm sinus tachy with v rate in low 100s. Incisions brandon. BLE edema present, LE erythematous. Lymph therapy utilized. OOB and ambulating in halls. Discharge to TCU pending.  Will continue to monitor.

## 2020-02-06 NOTE — PROGRESS NOTES
SW:    I: DANA following for discharge planning. [facility] contacted this writer, reviewing medications to determine ability to accept. Pt has [employer] and will need prior auth.    P: SW to follow.    ADDENDUM: [facility] is able to clinically accept, will submit [employer] auth today. Anticipate discharge tomorrow 2/7 if [employer] auth obtained.    NICANOR Gonzalez, LICSW  [facility]  Daytime (8:00am-4:30pm): [phone]  After-Hours SW Pager (4:30pm-11:30pm): [phone]

## 2020-02-06 NOTE — PLAN OF CARE
Discharge Planner PT   Patient plan for discharge: Pt receptive to TCU  Current status: Pt continues to need cueing for sternal precautions with functional tasks. Pt CGA for sit to stand transfers. Pt amb 5 mins with FWW and SBA to CGA with increased fatigue. Pt with very slow pace and needing one standing rest break. Pt demonstrates hypertensive response to activity. Pt reports 7/10 on OMNI effort scale.  Barriers to return to prior living situation: Decreased activity tolerance, increased assistance needed for functional mobility tasks and pt lives alone  Recommendations for discharge: TCU  Rationale for recommendations: Pt would benefit from continued PT at TCU to optimize functional independence prior to return home followed by outpatient cardiac rehab once able to manage being home alone to optimize aerobic activity tolerance in monitored setting and for education to promote optimal heart health.       Entered by: Ana Laura Adler 02/06/2020 8:52 AM

## 2020-02-06 NOTE — PLAN OF CARE
Pt is A&Ox4, VSS, on O2 Overnight for desating into upper 80s with exertion. Sternal incision is CDI, LOIDA.  Bilateral groin sites are CDI, LOIDA. LLE harvest site dsg is CDI. LS dim, BS+, BM x1, flatus+, voiding adequately. Pt is up with assist x1 and walker, on DD1 diet, no straws. Tele: NSR.

## 2020-02-06 NOTE — PLAN OF CARE
Patient is A+OX4, VSS on RA. Tele sinus tachycardia. Denies pain. Incisions CDI. Up with assist of 1. Phosphorus recheck 2.7. Sepsis protocol triggered- lactic acid 1.7. Urine sample collected. Chemo precautions maintained. TCU at discharge, date TBD.

## 2020-02-06 NOTE — PLAN OF CARE
Discharge Planner SLP   Patient plan for discharge: Did not discuss  Current status: Pt tolerated puree and regular solids. Oral manipulation of solids is slow but very functional and pt independently taking small bites/sips and eating/drinking at a slow rate. No overt s/sx of aspiration with thin liquids by cup sip.     Dysphagia diet 3 and thin liquids - no straws. Chair for meals, small bites/sips, slow rate of intake, and alternate solids and liquids     Barriers to return to prior living situation: Below baseline, deconditioning   Recommendations for discharge: TCU  Rationale for recommendations: SLP at next level of care given swallow function is below baseline         Entered by: Nancy Carias 02/06/2020 12:01 PM

## 2020-02-07 ENCOUNTER — APPOINTMENT (OUTPATIENT)
Dept: PHYSICAL THERAPY | Facility: CLINIC | Age: 70
DRG: 236 | End: 2020-02-07
Attending: SURGERY
Payer: COMMERCIAL

## 2020-02-07 ENCOUNTER — APPOINTMENT (OUTPATIENT)
Dept: OCCUPATIONAL THERAPY | Facility: CLINIC | Age: 70
DRG: 236 | End: 2020-02-07
Attending: PHYSICIAN ASSISTANT
Payer: COMMERCIAL

## 2020-02-07 ENCOUNTER — APPOINTMENT (OUTPATIENT)
Dept: SPEECH THERAPY | Facility: CLINIC | Age: 70
DRG: 236 | End: 2020-02-07
Attending: SURGERY
Payer: COMMERCIAL

## 2020-02-07 LAB
ERYTHROCYTE [DISTWIDTH] IN BLOOD BY AUTOMATED COUNT: 18.6 % (ref 10–15)
GLUCOSE BLDC GLUCOMTR-MCNC: 115 MG/DL (ref 70–99)
HCT VFR BLD AUTO: 26.5 % (ref 40–53)
HGB BLD-MCNC: 8.6 G/DL (ref 13.3–17.7)
LACTATE BLD-SCNC: 1 MMOL/L (ref 0.7–2)
MAGNESIUM SERPL-MCNC: 2.6 MG/DL (ref 1.6–2.3)
MCH RBC QN AUTO: 30.4 PG (ref 26.5–33)
MCHC RBC AUTO-ENTMCNC: 32.5 G/DL (ref 31.5–36.5)
MCV RBC AUTO: 94 FL (ref 78–100)
PHOSPHATE SERPL-MCNC: 2.8 MG/DL (ref 2.5–4.5)
PLATELET # BLD AUTO: 367 10E9/L (ref 150–450)
POTASSIUM SERPL-SCNC: 4.6 MMOL/L (ref 3.4–5.3)
RBC # BLD AUTO: 2.83 10E12/L (ref 4.4–5.9)
WBC # BLD AUTO: 51.5 10E9/L (ref 4–11)

## 2020-02-07 PROCEDURE — 25000128 H RX IP 250 OP 636: Performed by: PHYSICIAN ASSISTANT

## 2020-02-07 PROCEDURE — 85027 COMPLETE CBC AUTOMATED: CPT | Performed by: PHYSICIAN ASSISTANT

## 2020-02-07 PROCEDURE — 12000000 ZZH R&B MED SURG/OB

## 2020-02-07 PROCEDURE — 00000146 ZZHCL STATISTIC GLUCOSE BY METER IP

## 2020-02-07 PROCEDURE — 25000132 ZZH RX MED GY IP 250 OP 250 PS 637: Performed by: INTERNAL MEDICINE

## 2020-02-07 PROCEDURE — 84100 ASSAY OF PHOSPHORUS: CPT | Performed by: PHYSICIAN ASSISTANT

## 2020-02-07 PROCEDURE — 93010 ELECTROCARDIOGRAM REPORT: CPT | Performed by: INTERNAL MEDICINE

## 2020-02-07 PROCEDURE — 97166 OT EVAL MOD COMPLEX 45 MIN: CPT | Mod: GO

## 2020-02-07 PROCEDURE — 97110 THERAPEUTIC EXERCISES: CPT | Mod: GP

## 2020-02-07 PROCEDURE — 25000132 ZZH RX MED GY IP 250 OP 250 PS 637: Performed by: PHYSICIAN ASSISTANT

## 2020-02-07 PROCEDURE — 36415 COLL VENOUS BLD VENIPUNCTURE: CPT | Performed by: PHYSICIAN ASSISTANT

## 2020-02-07 PROCEDURE — 93005 ELECTROCARDIOGRAM TRACING: CPT

## 2020-02-07 PROCEDURE — 83735 ASSAY OF MAGNESIUM: CPT | Performed by: PHYSICIAN ASSISTANT

## 2020-02-07 PROCEDURE — 84132 ASSAY OF SERUM POTASSIUM: CPT | Performed by: PHYSICIAN ASSISTANT

## 2020-02-07 PROCEDURE — 83605 ASSAY OF LACTIC ACID: CPT | Performed by: SURGERY

## 2020-02-07 PROCEDURE — 97535 SELF CARE MNGMENT TRAINING: CPT | Mod: GO

## 2020-02-07 PROCEDURE — C9113 INJ PANTOPRAZOLE SODIUM, VIA: HCPCS | Performed by: PHYSICIAN ASSISTANT

## 2020-02-07 PROCEDURE — 92526 ORAL FUNCTION THERAPY: CPT | Mod: GN | Performed by: SPEECH-LANGUAGE PATHOLOGIST

## 2020-02-07 PROCEDURE — 97530 THERAPEUTIC ACTIVITIES: CPT | Mod: GP

## 2020-02-07 RX ORDER — POTASSIUM CHLORIDE 750 MG/1
10 TABLET, EXTENDED RELEASE ORAL DAILY
Status: DISCONTINUED | OUTPATIENT
Start: 2020-02-07 | End: 2020-02-10 | Stop reason: HOSPADM

## 2020-02-07 RX ORDER — FUROSEMIDE 20 MG
20 TABLET ORAL DAILY
Status: DISCONTINUED | OUTPATIENT
Start: 2020-02-07 | End: 2020-02-08

## 2020-02-07 RX ADMIN — METOPROLOL TARTRATE 50 MG: 50 TABLET, FILM COATED ORAL at 19:39

## 2020-02-07 RX ADMIN — PANTOPRAZOLE SODIUM 40 MG: 40 INJECTION, POWDER, FOR SOLUTION INTRAVENOUS at 09:40

## 2020-02-07 RX ADMIN — METHOCARBAMOL 500 MG: 500 TABLET, FILM COATED ORAL at 19:39

## 2020-02-07 RX ADMIN — HEPARIN SODIUM 5000 UNITS: 5000 INJECTION, SOLUTION INTRAVENOUS; SUBCUTANEOUS at 19:38

## 2020-02-07 RX ADMIN — HEPARIN SODIUM 5000 UNITS: 5000 INJECTION, SOLUTION INTRAVENOUS; SUBCUTANEOUS at 05:04

## 2020-02-07 RX ADMIN — HEPARIN SODIUM 5000 UNITS: 5000 INJECTION, SOLUTION INTRAVENOUS; SUBCUTANEOUS at 12:52

## 2020-02-07 RX ADMIN — METOPROLOL TARTRATE 50 MG: 50 TABLET, FILM COATED ORAL at 09:40

## 2020-02-07 RX ADMIN — ATORVASTATIN CALCIUM 40 MG: 40 TABLET, FILM COATED ORAL at 19:38

## 2020-02-07 RX ADMIN — HYDROXYUREA 500 MG: 500 CAPSULE ORAL at 09:37

## 2020-02-07 RX ADMIN — POTASSIUM CHLORIDE 10 MEQ: 10 TABLET, EXTENDED RELEASE ORAL at 09:40

## 2020-02-07 RX ADMIN — FERROUS SULFATE TAB 325 MG (65 MG ELEMENTAL FE) 325 MG: 325 (65 FE) TAB at 09:39

## 2020-02-07 RX ADMIN — ASPIRIN 325 MG ORAL TABLET 325 MG: 325 PILL ORAL at 09:39

## 2020-02-07 RX ADMIN — FUROSEMIDE 20 MG: 20 TABLET ORAL at 09:39

## 2020-02-07 RX ADMIN — CEPHALEXIN 500 MG: 500 CAPSULE ORAL at 19:39

## 2020-02-07 RX ADMIN — SENNOSIDES AND DOCUSATE SODIUM 1 TABLET: 8.6; 5 TABLET ORAL at 09:40

## 2020-02-07 RX ADMIN — GABAPENTIN 100 MG: 100 CAPSULE ORAL at 09:40

## 2020-02-07 RX ADMIN — GABAPENTIN 100 MG: 100 CAPSULE ORAL at 15:54

## 2020-02-07 RX ADMIN — GABAPENTIN 100 MG: 100 CAPSULE ORAL at 19:39

## 2020-02-07 RX ADMIN — CEPHALEXIN 500 MG: 500 CAPSULE ORAL at 09:39

## 2020-02-07 ASSESSMENT — MIFFLIN-ST. JEOR: SCORE: 1648.5

## 2020-02-07 ASSESSMENT — ACTIVITIES OF DAILY LIVING (ADL)
ADLS_ACUITY_SCORE: 15
ADLS_ACUITY_SCORE: 15
PREVIOUS_RESPONSIBILITIES: MEAL PREP;HOUSEKEEPING;LAUNDRY;SHOPPING;MEDICATION MANAGEMENT;FINANCES;DRIVING
ADLS_ACUITY_SCORE: 15

## 2020-02-07 NOTE — PLAN OF CARE
Discharge Planner OT   Patient plan for discharge: home  Current status: OT eval completed and treatment initiated on 70yo male s/p CABG with post-op stroke. Pt lives alone in apartment and was previously independent with ADLs, IADLs including meal prep, laundry, cleaning, med and financial mgmt, driving,  mobility.Today patient A & O to self, , month, date, year. Could not recall city, name of hospital or [facility]. Required cueing for sternal precautions during 3/4 transfer tasks. Sit<>stand from chair and commode over toilet SBA with cues as noted. Required Mod A with LB dressing, Min A UB dressing. Tired quickly with amb to/from bathroom (FWW CGA). OT to see daily to advance ADLs and will perform cognitive assessment next session as concerned with his returning home alone to current living situation.   Barriers to return to prior living situation: lives alone, current level of A with self-cares, possibly impaired cognitive function  Recommendations for discharge: TCU however has been declined by patient's insurance company. Therefore recommend home with HH nurse to manage meds, HHA for bathing and dressing tasks, family A with IADLS (laundry, shopping, driving, cleaning) and home health OT for home safety assessment and ADL retraining .   Rationale for recommendations: given pt's current level of performance not safe to manage ADLs/IADLS independently at this time.        Entered by: Kobi Michelle 2020 4:12 PM

## 2020-02-07 NOTE — PLAN OF CARE
A&Ox4. VSS ex tachy. Up with assist of one. Up in recliner. DD3 with thin liquid diet, 1:1 supervision with feeding. ACE wrap in place on bilateral lower extremities. Tele: NSR w/ PACs.

## 2020-02-07 NOTE — PLAN OF CARE
A&O, VSS. Lung sounds diminished. Bowel sounds active, +flatus. Adequate urine output. Sternal incision & bilateral harvest sites LOIDA w some ecchymosis. BLE +3 edema. Wraps in place.  Ambulates assist x1. Tolerating DD3 Diet. Denies pain. Tele: NSR. Possible discharge to TCU tomorrow.

## 2020-02-07 NOTE — PLAN OF CARE
Discharge Planner SLP   Patient plan for discharge: Not stated.   Current status: Patient seen for dysphagia treatment. Patient tolerating single sips of thin liquids, exhibiting throat clear with consecutive swallowing.  Educated patient re: dysmotility, safe swallowing strategies trained.  Recommend continue dysphagia diet level 3 with thin liquids when alert and up to chair, one sip at a time.   Barriers to return to prior living situation: Weakness.   Recommendations for discharge: TCU  Rationale for recommendations: Consider cognitive-linguistic evaluation at next level of care, continue dysphagia intervention for safe return to regular diet.        Entered by: Anastasiya Manning 02/07/2020 10:10 AM

## 2020-02-07 NOTE — PLAN OF CARE
A+O VSS on room air. LS diminished, reports SOB when laying in bed but improves when up to chair. Tele NSR. Bowels active, flatus+. Voiding adequately. Incisions bruised. Lymphedema wraps on BLE for 3+ edema. Tolerating DD3 diet. Denies pain.

## 2020-02-07 NOTE — PROGRESS NOTES
"CT surgery Progress Note    Severe 3 vessel coronary artery disease s/p CABG x 3 (LIMA to LAD, SVG to PDA, SVG to OM2) POD #7    Subjective:  No acute complaints. States that pain is being controlled. Denies any hallucinations. Breathing is ok, does have mild SPENCER and orthopnea. Working with IS. Appetite is ok. Denies any nausea. +BMs, denies any popping/clicking in his breast bone, plans for TCU at the time of discharge    Objective:  Up in chair, comfortable, -O2  /62 (BP Location: Right arm)   Pulse 103   Temp 98.3  F (36.8  C) (Axillary)   Resp 16   Ht 1.727 m (5' 8\")   Wt 91.4 kg (201 lb 8 oz)   SpO2 94%   BMI 30.64 kg/m    CT removed  CV - RRR, telemetry SR 90s, +edema LE  Pulm - CTA, -1000 ml  Abd - BS+, NT/ND, soft  Derm - sternal incision D/I   L LE incisions D/I, +blood blister near knee incision, no erythema surrounding    R LE incisions D/I  Labs:  BMP  Recent Labs   Lab 02/06/20  0706 02/05/20  0731 02/04/20  0724 02/03/20  0424    138 141 140   POTASSIUM 4.1 3.9 4.2 4.6   CHLORIDE 109 108 112* 112*   MINNIE 8.4* 8.4* 8.6 7.6*   CO2 25 24 28 25   BUN 28 30 29 35*   CR 1.13 1.14 1.07 1.24   * 106* 106* 150*     CBC  Recent Labs   Lab 02/07/20  0808 02/06/20  0706 02/05/20  0731 02/04/20  0724   WBC 51.5* 49.0* 50.2* 48.0*   RBC 2.83* 2.71* 2.79* 2.79*   HGB 8.6* 8.1* 8.3* 8.4*   HCT 26.5* 25.3* 26.2* 26.3*   MCV 94 93 94 94   MCH 30.4 29.9 29.7 30.1   MCHC 32.5 32.0 31.7 31.9   RDW 18.6* 18.5* 18.1* 18.2*    345 377 360     INR  Recent Labs   Lab 01/31/20  2334 01/31/20  1944 01/31/20  1625 01/31/20  1335   INR 0.91 0.79* 1.74* 1.57*      Hepatic Panel   Recent Labs   Lab 02/03/20  0424 02/01/20  0400 01/31/20  1335   * 125* 85*   ALT 78* 47 37   ALKPHOS 59 45 61   BILITOTAL 0.7 0.4 0.6   ALBUMIN 3.1* 3.2* 2.9*     Glucose  Recent Labs   Lab 02/06/20  1221 02/06/20  0803 02/06/20  0706 02/06/20  0211 02/05/20  2109 02/05/20  1702 02/05/20  1248  02/05/20  0731  " 02/04/20  0724  02/03/20  0424  02/02/20  0410  02/01/20  0400   GLC  --   --  106*  --   --   --   --   --  106*  --  106*  --  150*  --  127*  --  172*   * 97  --  103* 110* 97 103*   < >  --    < >  --    < >  --    < >  --    < >  --     < > = values in this interval not displayed.     Extubated POD #1 at 1110  Transferred to step down unit on 2/3/20  UA neg (2/5/20)  Echo (1/9/20) - EF 60%   No valvular disease   Mild tricuspid regurgitation   No pericardial effusion  Carotid US (1/9/20) - R ICA greater than 70% stenosis   L ICA 50-69% stenosis  CT angio Head/Neck (2/1/20) - mod atherosclerotic disease in the R ICA (approximately 50%)   Mild stenosis at the origin of the R vertebral artery   Unremarkable CT perfusion images of the head    A/P:  1. S/p CABG x 3 POD #7  2. HTN - Lopressor to 50 mg bid  3. Hyperlipidemia - lipitor 40 mg   4. R ICA stenosis - will set up in vascular clinic as outpatient  5. Myeloproliferative disorder - hydroxyurea 500 mg daily  6. Dysphagia - speech following, dysphagia diet  7. Anticoagulation - aspirin 325 mg, heparin subcutaneous tid  8. L LE erythema - improved, keflex 500 mg PO bid started 2/5 (will continue x 7 days)   Encouraged IS/TCDB/amb. Sternal precautions. Working on placement for TCU. Legs wrapped due to swelling. Start PO lasix 20 mg PO for LE edema, weight up from pre-op. Possible discharge today. Continue to monitor.    Joo Bonilla PA-C  Cardiothoracic Surgery  p: [phone]

## 2020-02-08 ENCOUNTER — APPOINTMENT (OUTPATIENT)
Dept: PHYSICAL THERAPY | Facility: CLINIC | Age: 70
DRG: 236 | End: 2020-02-08
Attending: SURGERY
Payer: COMMERCIAL

## 2020-02-08 LAB
BUN SERPL-MCNC: 27 MG/DL (ref 7–30)
CALCIUM SERPL-MCNC: 8.5 MG/DL (ref 8.5–10.1)
CHLORIDE SERPL-SCNC: 109 MMOL/L (ref 94–109)
CO2 SERPL-SCNC: 21 MMOL/L (ref 20–32)
CREAT SERPL-MCNC: 1.05 MG/DL (ref 0.66–1.25)
ERYTHROCYTE [DISTWIDTH] IN BLOOD BY AUTOMATED COUNT: 18.7 % (ref 10–15)
GFR SERPL CREATININE-BSD FRML MDRD: 71 ML/MIN/{1.73_M2}
GLUCOSE BLDC GLUCOMTR-MCNC: 97 MG/DL (ref 70–99)
GLUCOSE SERPL-MCNC: 96 MG/DL (ref 70–99)
HCT VFR BLD AUTO: 26.6 % (ref 40–53)
HGB BLD-MCNC: 8.5 G/DL (ref 13.3–17.7)
LACTATE BLD-SCNC: 1.1 MMOL/L (ref 0.7–2)
MCH RBC QN AUTO: 30.1 PG (ref 26.5–33)
MCHC RBC AUTO-ENTMCNC: 32 G/DL (ref 31.5–36.5)
MCV RBC AUTO: 94 FL (ref 78–100)
PLATELET # BLD AUTO: 396 10E9/L (ref 150–450)
POTASSIUM SERPL-SCNC: 4.5 MMOL/L (ref 3.4–5.3)
RBC # BLD AUTO: 2.82 10E12/L (ref 4.4–5.9)
SODIUM SERPL-SCNC: 138 MMOL/L (ref 133–144)
WBC # BLD AUTO: 57.8 10E9/L (ref 4–11)

## 2020-02-08 PROCEDURE — 25000132 ZZH RX MED GY IP 250 OP 250 PS 637: Performed by: PHYSICIAN ASSISTANT

## 2020-02-08 PROCEDURE — 85027 COMPLETE CBC AUTOMATED: CPT | Performed by: PHYSICIAN ASSISTANT

## 2020-02-08 PROCEDURE — 97110 THERAPEUTIC EXERCISES: CPT | Mod: GP

## 2020-02-08 PROCEDURE — 36415 COLL VENOUS BLD VENIPUNCTURE: CPT | Performed by: INTERNAL MEDICINE

## 2020-02-08 PROCEDURE — 80048 BASIC METABOLIC PNL TOTAL CA: CPT | Performed by: PHYSICIAN ASSISTANT

## 2020-02-08 PROCEDURE — 83605 ASSAY OF LACTIC ACID: CPT | Performed by: INTERNAL MEDICINE

## 2020-02-08 PROCEDURE — 00000146 ZZHCL STATISTIC GLUCOSE BY METER IP

## 2020-02-08 PROCEDURE — C9113 INJ PANTOPRAZOLE SODIUM, VIA: HCPCS | Performed by: PHYSICIAN ASSISTANT

## 2020-02-08 PROCEDURE — 36415 COLL VENOUS BLD VENIPUNCTURE: CPT | Performed by: PHYSICIAN ASSISTANT

## 2020-02-08 PROCEDURE — 25000128 H RX IP 250 OP 636: Performed by: PHYSICIAN ASSISTANT

## 2020-02-08 PROCEDURE — 25000132 ZZH RX MED GY IP 250 OP 250 PS 637: Performed by: INTERNAL MEDICINE

## 2020-02-08 PROCEDURE — 12000000 ZZH R&B MED SURG/OB

## 2020-02-08 PROCEDURE — 97530 THERAPEUTIC ACTIVITIES: CPT | Mod: GP

## 2020-02-08 RX ORDER — FUROSEMIDE 20 MG
20 TABLET ORAL ONCE
Status: COMPLETED | OUTPATIENT
Start: 2020-02-08 | End: 2020-02-08

## 2020-02-08 RX ORDER — METOPROLOL TARTRATE 25 MG/1
25 TABLET, FILM COATED ORAL ONCE
Status: COMPLETED | OUTPATIENT
Start: 2020-02-08 | End: 2020-02-08

## 2020-02-08 RX ORDER — FUROSEMIDE 40 MG
40 TABLET ORAL DAILY
Status: DISCONTINUED | OUTPATIENT
Start: 2020-02-09 | End: 2020-02-10 | Stop reason: HOSPADM

## 2020-02-08 RX ADMIN — ASPIRIN 325 MG ORAL TABLET 325 MG: 325 PILL ORAL at 08:30

## 2020-02-08 RX ADMIN — FUROSEMIDE 20 MG: 20 TABLET ORAL at 12:04

## 2020-02-08 RX ADMIN — GABAPENTIN 100 MG: 100 CAPSULE ORAL at 08:30

## 2020-02-08 RX ADMIN — POTASSIUM CHLORIDE 10 MEQ: 10 TABLET, EXTENDED RELEASE ORAL at 08:30

## 2020-02-08 RX ADMIN — HEPARIN SODIUM 5000 UNITS: 5000 INJECTION, SOLUTION INTRAVENOUS; SUBCUTANEOUS at 12:04

## 2020-02-08 RX ADMIN — SENNOSIDES AND DOCUSATE SODIUM 1 TABLET: 8.6; 5 TABLET ORAL at 08:30

## 2020-02-08 RX ADMIN — PANTOPRAZOLE SODIUM 40 MG: 40 INJECTION, POWDER, FOR SOLUTION INTRAVENOUS at 08:30

## 2020-02-08 RX ADMIN — CEPHALEXIN 500 MG: 500 CAPSULE ORAL at 22:30

## 2020-02-08 RX ADMIN — HEPARIN SODIUM 5000 UNITS: 5000 INJECTION, SOLUTION INTRAVENOUS; SUBCUTANEOUS at 04:44

## 2020-02-08 RX ADMIN — METOPROLOL TARTRATE 25 MG: 25 TABLET ORAL at 08:43

## 2020-02-08 RX ADMIN — SENNOSIDES AND DOCUSATE SODIUM 1 TABLET: 8.6; 5 TABLET ORAL at 22:30

## 2020-02-08 RX ADMIN — METOPROLOL SUCCINATE 75 MG: 25 TABLET, EXTENDED RELEASE ORAL at 22:30

## 2020-02-08 RX ADMIN — FERROUS SULFATE TAB 325 MG (65 MG ELEMENTAL FE) 325 MG: 325 (65 FE) TAB at 08:30

## 2020-02-08 RX ADMIN — GABAPENTIN 100 MG: 100 CAPSULE ORAL at 15:54

## 2020-02-08 RX ADMIN — FUROSEMIDE 20 MG: 20 TABLET ORAL at 08:30

## 2020-02-08 RX ADMIN — ATORVASTATIN CALCIUM 40 MG: 40 TABLET, FILM COATED ORAL at 22:30

## 2020-02-08 RX ADMIN — CEPHALEXIN 500 MG: 500 CAPSULE ORAL at 08:30

## 2020-02-08 RX ADMIN — GABAPENTIN 100 MG: 100 CAPSULE ORAL at 22:30

## 2020-02-08 RX ADMIN — HEPARIN SODIUM 5000 UNITS: 5000 INJECTION, SOLUTION INTRAVENOUS; SUBCUTANEOUS at 23:23

## 2020-02-08 RX ADMIN — HYDROXYUREA 500 MG: 500 CAPSULE ORAL at 08:35

## 2020-02-08 ASSESSMENT — ACTIVITIES OF DAILY LIVING (ADL)
ADLS_ACUITY_SCORE: 15

## 2020-02-08 ASSESSMENT — MIFFLIN-ST. JEOR: SCORE: 1642.15

## 2020-02-08 NOTE — PLAN OF CARE
Discharge Planner PT   Patient plan for discharge: Not discussed this visit  Current status: Pt SBA for sit to stand transfers, needs cues for sternal precautions throughout, limited carryover between sessions. Pt amb with FWW in [place] at slow speed for 5 minutes, 15 seconds, only able to ambulate 300' in that time and needing 3 standing rest breaks due to SOB. Pt rates activity as 8/10 on OMNI effort scale. Vital signs stable with activity but pt obviously SOB and fatigued with minimal ambulation. Pt with blisters on B feet which are painful with ambulation.  Barriers to return to prior living situation: Decreased activity tolerance, ? Cognitive/memory deficits, unable to tolerate the amount of activity necessary to safely perform ADLs at home without assistance and pt lives alone  Recommendations for discharge: TCU  Rationale for recommendations: Pt is below baseline level of function, ambulates at a slow speed and only able to tolerate 5 minutes of continuous activity prior to fatiguing and needing to rest. Pt also appears to have limited carryover from session to session especially with sternal precautions. Pt lives alone and is not currently safe to ambulate without supervision due to quickly fatiguing.       Entered by: Ana Laura Adler 02/08/2020 10:38 AM

## 2020-02-08 NOTE — PLAN OF CARE
A&O. VSS ex tachy at times, on RA. Up with assist of one. Poor appetite, needs 1:1 supervision with feeds. Needs reminders with sternal cares. Denies pain. Chest incision with liquid bandage, eccymotic. Bilateral lower extremities with blisters, mepilex placed on top of bilateral toes and heels and also coccyx. Ambulated hallway x3.Chemo precautions. Tele NSR w/ PACs    WOC consult placed, Chair cushion ordered for recliner.

## 2020-02-08 NOTE — PROGRESS NOTES
Cardiothoracic Surgery Progress Note - 02/08/20  Pt: Renny Gannon  MRN: [id number]     No acute events overnight. Working with PT, tolerating regular diet. Voiding freely. Having regular bowel movements. Afebrile, pain controlled.     Temp:  [97.5  F (36.4  C)-98.1  F (36.7  C)] 97.5  F (36.4  C)  Pulse:  [] 97  Heart Rate:  [] 102  Resp:  [14-16] 16  BP: (104-129)/(60-71) 129/66  SpO2:  [94 %-98 %] 96 %    I/O last 3 completed shifts:  In: 1360 [P.O.:1360]  Out: 1575 [Urine:1575]    Resp: 16      Physical Exam:  Gen:  69 male Alert and oriented. No acute distress.  Pulm: Pt is on room air, breathing is unlabored.   CV: Regular rate and rhythm.   Chest: Sternal Incision: C/D/I. EVH sites healing well, left leg cellulitis cleared.  Abd: Abdomen soft, non-tender, non-distended    Labs reviewed in full; available in Epic.  LABS    BMP  Recent Labs   Lab 02/08/20  0732 02/07/20  0808 02/06/20  0706  02/05/20  0731   NA  --   --  140  --  138   POTASSIUM 4.5 4.6 4.1  --  3.9   CHLORIDE  --   --  109  --  108   CO2  --   --  25  --  24   ANIONGAP  --   --  6  --  6   GLC  --   --  106*  --  106*   BUN  --   --  28  --  30   CR  --   --  1.13  --  1.14   MINNIE  --   --  8.4*  --  8.4*   MAG  --  2.6*  --   --  2.6*   PHOS  --  2.8 2.8   < > 1.9*    < > = values in this interval not displayed.       LFT  Recent Labs   Lab 02/03/20  0424   ALBUMIN 3.1*   BILITOTAL 0.7   ALKPHOS 59   *   ALT 78*   PROTTOTAL 5.8*       CBC  Recent Labs   Lab 02/08/20  0732 02/07/20  0808   WBC 57.8* 51.5*   HGB 8.5* 8.6*    367   HCT 26.6* 26.5*       INRNo lab results found in last 7 days.    Arterial Blood Gas  Recent Labs   Lab 02/01/20  1045   PH 7.37   PCO2 40   PO2 69*   HCO3 23   O2PER Ventilator       A/P  Renny Gannon is a 69 year old male with a history of CAD who is POD# 8 s/p CABG x 3    NEURO: Stable. Pain: Well managed with PRN tylenol  CV:  Stable.   - Continue metoprolol  PULM:  Stable.   - Continue  IS, and rehab  FEN/GI: on electrolyte replacement protocol    - Diet: Regular diet   - Bowel regimen: PRN Senna/Docusate, Miralax   Renal: Voiding freely.  HEME: Hgb stable.  ID: Stable, WBC 50, at baseline for patient due to myeloproliferative disorder. Abx: Keflex of leg cellulitis  ENDO: Stable.  LINES: Vascular access: Peripheral IV   - Drains/tubes: Removed  PPx: DVT: Heparin Subq, GI: protonix,   DISPO: Rehab placement vs Home with home help    Pt seen and examined with Dr. Marina.    Myron Salas, PGY8  CT Surgery Fellow  Pager: [phone]

## 2020-02-08 NOTE — PROGRESS NOTES
Nursing [phone]: A&Ox4. Pleasant. Denies pain. A-1. Sternal and leg incisions C/D/I. Refused to go to bed to sleep. Wants to sleep in the recliner chair. Advised to elevate BLE due to the edema. Tele: NSR w/pacs.

## 2020-02-08 NOTE — PLAN OF CARE
A&O. VSS on RA. Up with 1 assist. Tele SR-ST with PACs. BLE edema. Rt knee incision weeping. Blisters noted to bilateral feet. Denies pain. Awaiting safe discharge.

## 2020-02-09 ENCOUNTER — APPOINTMENT (OUTPATIENT)
Dept: OCCUPATIONAL THERAPY | Facility: CLINIC | Age: 70
DRG: 236 | End: 2020-02-09
Attending: SURGERY
Payer: COMMERCIAL

## 2020-02-09 ENCOUNTER — APPOINTMENT (OUTPATIENT)
Dept: SPEECH THERAPY | Facility: CLINIC | Age: 70
DRG: 236 | End: 2020-02-09
Attending: SURGERY
Payer: COMMERCIAL

## 2020-02-09 ENCOUNTER — APPOINTMENT (OUTPATIENT)
Dept: PHYSICAL THERAPY | Facility: CLINIC | Age: 70
DRG: 236 | End: 2020-02-09
Attending: SURGERY
Payer: COMMERCIAL

## 2020-02-09 LAB
ANION GAP SERPL CALCULATED.3IONS-SCNC: 6 MMOL/L (ref 3–14)
BUN SERPL-MCNC: 24 MG/DL (ref 7–30)
CALCIUM SERPL-MCNC: 8.6 MG/DL (ref 8.5–10.1)
CHLORIDE SERPL-SCNC: 110 MMOL/L (ref 94–109)
CO2 SERPL-SCNC: 24 MMOL/L (ref 20–32)
CREAT SERPL-MCNC: 1.05 MG/DL (ref 0.66–1.25)
ERYTHROCYTE [DISTWIDTH] IN BLOOD BY AUTOMATED COUNT: 19.4 % (ref 10–15)
GFR SERPL CREATININE-BSD FRML MDRD: 71 ML/MIN/{1.73_M2}
GLUCOSE SERPL-MCNC: 113 MG/DL (ref 70–99)
HCT VFR BLD AUTO: 27.2 % (ref 40–53)
HGB BLD-MCNC: 8.7 G/DL (ref 13.3–17.7)
MCH RBC QN AUTO: 30.2 PG (ref 26.5–33)
MCHC RBC AUTO-ENTMCNC: 32 G/DL (ref 31.5–36.5)
MCV RBC AUTO: 94 FL (ref 78–100)
PLATELET # BLD AUTO: 408 10E9/L (ref 150–450)
POTASSIUM SERPL-SCNC: 4.5 MMOL/L (ref 3.4–5.3)
RBC # BLD AUTO: 2.88 10E12/L (ref 4.4–5.9)
SODIUM SERPL-SCNC: 140 MMOL/L (ref 133–144)
WBC # BLD AUTO: 58.3 10E9/L (ref 4–11)

## 2020-02-09 PROCEDURE — 85027 COMPLETE CBC AUTOMATED: CPT | Performed by: PHYSICIAN ASSISTANT

## 2020-02-09 PROCEDURE — 36415 COLL VENOUS BLD VENIPUNCTURE: CPT | Performed by: PHYSICIAN ASSISTANT

## 2020-02-09 PROCEDURE — 25000132 ZZH RX MED GY IP 250 OP 250 PS 637: Performed by: PHYSICIAN ASSISTANT

## 2020-02-09 PROCEDURE — 25000128 H RX IP 250 OP 636: Performed by: PHYSICIAN ASSISTANT

## 2020-02-09 PROCEDURE — 97110 THERAPEUTIC EXERCISES: CPT | Mod: GP | Performed by: PHYSICAL THERAPIST

## 2020-02-09 PROCEDURE — 92526 ORAL FUNCTION THERAPY: CPT | Mod: GN | Performed by: SPEECH-LANGUAGE PATHOLOGIST

## 2020-02-09 PROCEDURE — 97535 SELF CARE MNGMENT TRAINING: CPT | Mod: GO | Performed by: OCCUPATIONAL THERAPIST

## 2020-02-09 PROCEDURE — 12000000 ZZH R&B MED SURG/OB

## 2020-02-09 PROCEDURE — 80048 BASIC METABOLIC PNL TOTAL CA: CPT | Performed by: PHYSICIAN ASSISTANT

## 2020-02-09 PROCEDURE — C9113 INJ PANTOPRAZOLE SODIUM, VIA: HCPCS | Performed by: PHYSICIAN ASSISTANT

## 2020-02-09 PROCEDURE — 25000132 ZZH RX MED GY IP 250 OP 250 PS 637: Performed by: INTERNAL MEDICINE

## 2020-02-09 RX ORDER — METOPROLOL SUCCINATE 100 MG/1
100 TABLET, EXTENDED RELEASE ORAL 2 TIMES DAILY
Status: DISCONTINUED | OUTPATIENT
Start: 2020-02-09 | End: 2020-02-10 | Stop reason: HOSPADM

## 2020-02-09 RX ADMIN — CEPHALEXIN 500 MG: 500 CAPSULE ORAL at 08:28

## 2020-02-09 RX ADMIN — PANTOPRAZOLE SODIUM 40 MG: 40 INJECTION, POWDER, FOR SOLUTION INTRAVENOUS at 08:28

## 2020-02-09 RX ADMIN — ASPIRIN 325 MG ORAL TABLET 325 MG: 325 PILL ORAL at 08:27

## 2020-02-09 RX ADMIN — METOPROLOL SUCCINATE 75 MG: 25 TABLET, EXTENDED RELEASE ORAL at 08:28

## 2020-02-09 RX ADMIN — GABAPENTIN 100 MG: 100 CAPSULE ORAL at 15:44

## 2020-02-09 RX ADMIN — GABAPENTIN 100 MG: 100 CAPSULE ORAL at 20:55

## 2020-02-09 RX ADMIN — SENNOSIDES AND DOCUSATE SODIUM 2 TABLET: 8.6; 5 TABLET ORAL at 08:27

## 2020-02-09 RX ADMIN — FUROSEMIDE 40 MG: 40 TABLET ORAL at 08:28

## 2020-02-09 RX ADMIN — HEPARIN SODIUM 5000 UNITS: 5000 INJECTION, SOLUTION INTRAVENOUS; SUBCUTANEOUS at 08:27

## 2020-02-09 RX ADMIN — HEPARIN SODIUM 5000 UNITS: 5000 INJECTION, SOLUTION INTRAVENOUS; SUBCUTANEOUS at 15:44

## 2020-02-09 RX ADMIN — CEPHALEXIN 500 MG: 500 CAPSULE ORAL at 20:55

## 2020-02-09 RX ADMIN — HYDROXYUREA 500 MG: 500 CAPSULE ORAL at 08:36

## 2020-02-09 RX ADMIN — SENNOSIDES AND DOCUSATE SODIUM 2 TABLET: 8.6; 5 TABLET ORAL at 20:55

## 2020-02-09 RX ADMIN — GABAPENTIN 100 MG: 100 CAPSULE ORAL at 08:28

## 2020-02-09 RX ADMIN — POTASSIUM CHLORIDE 10 MEQ: 10 TABLET, EXTENDED RELEASE ORAL at 08:27

## 2020-02-09 RX ADMIN — METOPROLOL SUCCINATE 100 MG: 100 TABLET, EXTENDED RELEASE ORAL at 20:55

## 2020-02-09 RX ADMIN — ATORVASTATIN CALCIUM 40 MG: 40 TABLET, FILM COATED ORAL at 20:55

## 2020-02-09 RX ADMIN — FERROUS SULFATE TAB 325 MG (65 MG ELEMENTAL FE) 325 MG: 325 (65 FE) TAB at 08:28

## 2020-02-09 ASSESSMENT — MIFFLIN-ST. JEOR: SCORE: 1640.5

## 2020-02-09 ASSESSMENT — ACTIVITIES OF DAILY LIVING (ADL)
ADLS_ACUITY_SCORE: 15

## 2020-02-09 NOTE — PLAN OF CARE
Discharge Planner OT   Patient plan for discharge: home  Current status: Pt sitting in a chair, willing to participate.  Completed SLUMS, scored at the dementia level per scoring guidelines for the screen.  Stood to work to gather supplies for grooming task.  Needed moderate cueing to find supplies and avoid pushing off on arms of the chair to maintain sternal precautions.  Breakfast came towards end of session, pt set up to eat and was able to start feeding himself.  Barriers to return to prior living situation: lives alone, current level of A with self-cares, possibly impaired cognitive function  Recommendations for discharge: TCU however has been declined by patient's insurance company. Therefore recommend home with HH nurse to manage meds, HHA for bathing and dressing tasks, family A with IADLS (laundry, shopping, driving, cleaning) and home health OT for home safety assessment and ADL retraining .  Rationale for recommendations: Pt is currently not safe to manage home cares without assistance at this time.       Entered by: Chavez Mccormick 02/09/2020 8:05 AM

## 2020-02-09 NOTE — PLAN OF CARE
Dx:CABG x3 Hx:HTN, chemo Vs:stable on room air. A/O:x4. Labs: WBC 57.8. Incisions:Sternal incision LOIDA. BLE wrapped CDI. CWMS:WDL. Sinus Tach on tele. Pain level/controlled with:denies. Up with:Ax1 walker and GB. Tolerating Dd3 with thin liquid diet. No N/V. Passing gas. Voiding adequately. IS: using at bedside. GI:Ax4. Resp:clear lung sounds. Dyspnea on exertion. On chemo precautions. Plan: Will continue monitor. Waiting TCU placement

## 2020-02-09 NOTE — CONSULTS
Care Transition Initial Assessment - RN        Met with: Patient.  DATA   Principal Problem:    CAD (coronary artery disease)       Cognitive Status: awake and alert.     Contact information and PCP information verified: Yes  Lives With: alone   Living Arrangements: house, apartment  Quality of Family Relationships: supportive, helpful  Description of Support System: Involved, Supportive   Who is your support system?: Sibling(s)   Support Assessment: Adequate family and caregiver support, Adequate social supports   Insurance concerns: Other  Denied TCU auth  ASSESSMENT  Patient currently receives the following services:  none        Identified issues/concerns regarding health management: Received orders to meet with pt regarding home care due to pt's insurance denying TCU coverage.  Pt/family was given the Medicare Compare list for Home Care, with associated star ratings to assist with choice for referrals/discharge planning; Yes.  Education was given to pt/family that star ratings are updated/maintained by Medicare and can be reviewed by visiting www.medicare.gov; Yes. Pt would like to use [facility]. Pt understands they must be homebound. Pt informed of the plan and in agreement with the plan. E-mail referral sent to [facility] updating them on the orders.  Home Care phone number placed on discharge instructions.     PLAN  Financial costs for the patient include; copays.  Patient given options and choices for discharge; yes.  Patient/family is agreeable to the plan?  Yes:   Patient anticipates discharging to home with home care.     Patient anticipates needs for home equipment: Yes; needs walker  Transportation/person available to transport on day of discharge his sister.     Plan/Disposition: Home   Appointments:  See AVS    Attempted to assist with PCP appt however unable to schedule due to MDs schedule is full. Will have am care coordinator call the clinic.     Care  (CTS)  will continue to follow as needed.    Ada Villagomez RN BAN  Inpatient Care Coordination  [address]  [place] [address]  [email or web address]  [email or web address]   Office: [phone]  Fax: [phone]  Gender pronouns: she/her/hers  Employed by [employer]

## 2020-02-09 NOTE — PLAN OF CARE
Discharge Planner SLP   Patient plan for discharge: Patient did not state.   Current status: SLP: Patient was seen for swallow treatment at noon meal while up in the chair. He demonstrated prolonged mastication of the solids with mild oral residue. Had a coughing episode x1 when leaning back in the chair and started to cough on a noodle. He was able to tolerate small sips of coffee with premature entry but no overt Sx of aspiration. Patient not ready for a diet advancement.  Recommend: 1. Continue on the DDL 3 with thin liquids. 2. Up in a chair for all meals, no straws, small bites and single sips, check for oral residue and slow pace of eating.   Barriers to return to prior living situation: Dysphagia and safety.   Recommendations for discharge: Per notes patient is going home with home health.   Rationale for recommendations: Patient may benefit from short term ST needs for dysphagia management, diet tolerance and advancement. Patient below his baseline.        Entered by: Verenice Chavarria 02/09/2020 1:38 PM

## 2020-02-09 NOTE — PROGRESS NOTES
Cardiothoracic Surgery Progress Note - 02/08/20  Pt: Renny Gannon  MRN: [id number]     No acute events overnight. Working with PT, tolerating regular diet. Voiding freely. Having regular bowel movements. Afebrile, pain controlled.     Temp:  [97.5  F (36.4  C)-99.1  F (37.3  C)] 97.6  F (36.4  C)  Pulse:  [] 105  Heart Rate:  [] 94  Resp:  [16-20] 20  BP: (113-124)/(61-67) 117/65  SpO2:  [93 %-95 %] 94 %    I/O last 3 completed shifts:  In: 600 [P.O.:600]  Out: 1075 [Urine:1075]    Resp: 20      Physical Exam:  Gen:  69 male Alert and oriented. No acute distress.  Pulm: Pt is on room air, breathing is unlabored.   CV: Regular rate and rhythm.   Chest: Sternal Incision: C/D/I. EVH sites healing well, left leg cellulitis cleared.  Abd: Abdomen soft, non-tender, non-distended    Labs reviewed in full; available in Epic.  LABS    BMP  Recent Labs   Lab 02/09/20  0713 02/08/20  0732 02/07/20  0808 02/06/20  0706  02/05/20  0731    138  --  140  --  138   POTASSIUM 4.5 4.5 4.6 4.1  --  3.9   CHLORIDE 110* 109  --  109  --  108   CO2 24 21  --  25  --  24   ANIONGAP 6  --   --  6  --  6   * 96  --  106*  --  106*   BUN 24 27  --  28  --  30   CR 1.05 1.05  --  1.13  --  1.14   MINNIE 8.6 8.5  --  8.4*  --  8.4*   MAG  --   --  2.6*  --   --  2.6*   PHOS  --   --  2.8 2.8   < > 1.9*    < > = values in this interval not displayed.       LFT  Recent Labs   Lab 02/03/20  0424   ALBUMIN 3.1*   BILITOTAL 0.7   ALKPHOS 59   *   ALT 78*   PROTTOTAL 5.8*       CBC  Recent Labs   Lab 02/09/20  0713 02/08/20  0732   WBC 58.3* 57.8*   HGB 8.7* 8.5*    396   HCT 27.2* 26.6*       INRNo lab results found in last 7 days.    Arterial Blood Gas  No lab results found in last 7 days.    A/P  Renny Gannon is a 69 year old male with a history of CAD who is POD# 9 s/p CABG x 3    NEURO: Stable. Pain: Well managed with PRN tylenol  CV:  Stable.   - Continue metoprolol  PULM:  Stable.   - Continue IS, and  rehab  FEN/GI: on electrolyte replacement protocol    - Diet: Regular diet   - Bowel regimen: PRN Senna/Docusate, Miralax   Renal: Voiding freely.  HEME: Hgb stable.  ID: Stable, WBC 50, at baseline for patient due to myeloproliferative disorder. Abx: Keflex of leg cellulitis x 7 days total  ENDO: Stable.  LINES: Vascular access: Peripheral IV   - Drains/tubes: Removed  PPx: DVT: Heparin Subq, GI: protonix,   DISPO: Home with home help likely tomorrow    Pt seen and examined with Dr. Marina.    Myron Salas, PGY8  CT Surgery Fellow  Pager: [phone]

## 2020-02-09 NOTE — PLAN OF CARE
A&O. VSS on RA. Tele SR. Up with 1 assist. Sternal incisions CDI. Left knee incision, Bilateral feet blisters with drainage. BLE wrapped. +3 edema noted in BLE. Denies pain. Awaiting safe discharge.

## 2020-02-09 NOTE — PLAN OF CARE
Discharge Planner PT   Patient plan for discharge: Pt reports plan now for home    Current status: Pt SBA for sit<>stand transfers and ambulation with FWW, needs cues for sternal precautions, poor carryover through repetitions. Pt tolerated standing/ambulation activity to simulate home activity x4.5 minutes and x7 minutes, needed seated rest between bouts of standing activity. VSS but pt SPENCER and SOB with activity. Pt participated in seated/standing exercises for activity tolerance.    Barriers to return to prior living situation: Decreased activity tolerance, ? Cognitive/memory deficits, unable to tolerate the amount of activity necessary to safely perform ADLs at home without assistance and pt lives alone    Recommendations for discharge: TCU; however appears pt declined for TCU and plan for home; recommend use of FWW and Home PT    Rationale for recommendations: Pt limited be impaired balance, decreased activity tolerance; does not have tolerance to be IND at home without assistance. Pt will benefit from Home PT to progress functional activity tolerance, safety and IND with functional mobility. Pt appropriate for Home PT as he requires AD, assistive person, and taxing effort to leave the home.       Entered by: Betzaida Galeano 02/09/2020 10:18 AM

## 2020-02-10 ENCOUNTER — APPOINTMENT (OUTPATIENT)
Dept: PHYSICAL THERAPY | Facility: CLINIC | Age: 70
DRG: 236 | End: 2020-02-10
Attending: SURGERY
Payer: COMMERCIAL

## 2020-02-10 ENCOUNTER — TELEPHONE (OUTPATIENT)
Dept: FAMILY MEDICINE | Facility: CLINIC | Age: 70
End: 2020-02-10

## 2020-02-10 ENCOUNTER — APPOINTMENT (OUTPATIENT)
Dept: OCCUPATIONAL THERAPY | Facility: CLINIC | Age: 70
DRG: 236 | End: 2020-02-10
Attending: SURGERY
Payer: COMMERCIAL

## 2020-02-10 VITALS
HEART RATE: 90 BPM | HEIGHT: 68 IN | BODY MASS INDEX: 29.9 KG/M2 | TEMPERATURE: 97.9 F | SYSTOLIC BLOOD PRESSURE: 109 MMHG | DIASTOLIC BLOOD PRESSURE: 58 MMHG | OXYGEN SATURATION: 95 % | RESPIRATION RATE: 16 BRPM | WEIGHT: 197.31 LBS

## 2020-02-10 PROBLEM — R73.9 TRANSIENT HYPERGLYCEMIA POST PROCEDURE: Status: ACTIVE | Noted: 2020-02-10

## 2020-02-10 PROBLEM — R13.10 DYSPHAGIA: Status: ACTIVE | Noted: 2020-02-10

## 2020-02-10 PROBLEM — G45.9 TIA (TRANSIENT ISCHEMIC ATTACK): Status: ACTIVE | Noted: 2020-02-10

## 2020-02-10 PROBLEM — E87.70 FLUID OVERLOAD: Status: ACTIVE | Noted: 2020-02-10

## 2020-02-10 LAB — PLATELET # BLD AUTO: 428 10E9/L (ref 150–450)

## 2020-02-10 PROCEDURE — 40000901 ZZH STATISTIC WOC PT EDUCATION, 0-15 MIN

## 2020-02-10 PROCEDURE — 97530 THERAPEUTIC ACTIVITIES: CPT | Mod: GP

## 2020-02-10 PROCEDURE — 97116 GAIT TRAINING THERAPY: CPT | Mod: GP

## 2020-02-10 PROCEDURE — 25000132 ZZH RX MED GY IP 250 OP 250 PS 637: Performed by: PHYSICIAN ASSISTANT

## 2020-02-10 PROCEDURE — G0463 HOSPITAL OUTPT CLINIC VISIT: HCPCS

## 2020-02-10 PROCEDURE — 36415 COLL VENOUS BLD VENIPUNCTURE: CPT | Performed by: PHYSICIAN ASSISTANT

## 2020-02-10 PROCEDURE — 25000128 H RX IP 250 OP 636: Performed by: PHYSICIAN ASSISTANT

## 2020-02-10 PROCEDURE — 25000132 ZZH RX MED GY IP 250 OP 250 PS 637: Performed by: INTERNAL MEDICINE

## 2020-02-10 PROCEDURE — 97535 SELF CARE MNGMENT TRAINING: CPT | Mod: GO

## 2020-02-10 PROCEDURE — 85049 AUTOMATED PLATELET COUNT: CPT | Performed by: PHYSICIAN ASSISTANT

## 2020-02-10 PROCEDURE — C9113 INJ PANTOPRAZOLE SODIUM, VIA: HCPCS | Performed by: PHYSICIAN ASSISTANT

## 2020-02-10 RX ORDER — FERROUS SULFATE 325(65) MG
325 TABLET ORAL DAILY
Qty: 30 TABLET | Refills: 0 | Status: SHIPPED | OUTPATIENT
Start: 2020-02-11 | End: 2020-08-17

## 2020-02-10 RX ORDER — METOPROLOL SUCCINATE 100 MG/1
100 TABLET, EXTENDED RELEASE ORAL 2 TIMES DAILY
Qty: 60 TABLET | Refills: 3 | Status: SHIPPED | OUTPATIENT
Start: 2020-02-10 | End: 2020-05-29

## 2020-02-10 RX ORDER — METHOCARBAMOL 500 MG/1
500 TABLET, FILM COATED ORAL 4 TIMES DAILY PRN
Qty: 20 TABLET | Refills: 0 | Status: SHIPPED | OUTPATIENT
Start: 2020-02-10 | End: 2020-03-04

## 2020-02-10 RX ORDER — FAMOTIDINE 20 MG/1
20 TABLET, FILM COATED ORAL 2 TIMES DAILY
Qty: 28 TABLET | Refills: 0 | Status: SHIPPED | OUTPATIENT
Start: 2020-02-10 | End: 2020-03-04

## 2020-02-10 RX ORDER — AMOXICILLIN 250 MG
1-2 CAPSULE ORAL 2 TIMES DAILY PRN
Qty: 20 TABLET | Refills: 0 | Status: SHIPPED | OUTPATIENT
Start: 2020-02-10 | End: 2020-02-14

## 2020-02-10 RX ORDER — ASPIRIN 325 MG
325 TABLET ORAL DAILY
Qty: 30 TABLET | Refills: 0 | Status: SHIPPED | OUTPATIENT
Start: 2020-02-11 | End: 2020-05-29

## 2020-02-10 RX ORDER — POTASSIUM CHLORIDE 1500 MG/1
20 TABLET, EXTENDED RELEASE ORAL DAILY
Qty: 30 TABLET | Refills: 0 | Status: SHIPPED | OUTPATIENT
Start: 2020-02-10 | End: 2020-04-29

## 2020-02-10 RX ORDER — ACETAMINOPHEN 325 MG/1
650 TABLET ORAL EVERY 4 HOURS PRN
Qty: 40 TABLET | Refills: 0 | Status: ON HOLD | OUTPATIENT
Start: 2020-02-10 | End: 2020-02-16

## 2020-02-10 RX ORDER — CEPHALEXIN 500 MG/1
500 CAPSULE ORAL EVERY 12 HOURS
Qty: 6 CAPSULE | Refills: 0 | Status: SHIPPED | OUTPATIENT
Start: 2020-02-10 | End: 2020-02-14

## 2020-02-10 RX ORDER — FUROSEMIDE 40 MG
40 TABLET ORAL DAILY
Qty: 30 TABLET | Refills: 0 | Status: SHIPPED | OUTPATIENT
Start: 2020-02-11 | End: 2020-02-14

## 2020-02-10 RX ADMIN — FUROSEMIDE 40 MG: 40 TABLET ORAL at 08:46

## 2020-02-10 RX ADMIN — METOPROLOL SUCCINATE 100 MG: 100 TABLET, EXTENDED RELEASE ORAL at 08:45

## 2020-02-10 RX ADMIN — SENNOSIDES AND DOCUSATE SODIUM 2 TABLET: 8.6; 5 TABLET ORAL at 08:45

## 2020-02-10 RX ADMIN — ASPIRIN 325 MG ORAL TABLET 325 MG: 325 PILL ORAL at 08:45

## 2020-02-10 RX ADMIN — HEPARIN SODIUM 5000 UNITS: 5000 INJECTION, SOLUTION INTRAVENOUS; SUBCUTANEOUS at 08:45

## 2020-02-10 RX ADMIN — FERROUS SULFATE TAB 325 MG (65 MG ELEMENTAL FE) 325 MG: 325 (65 FE) TAB at 08:45

## 2020-02-10 RX ADMIN — GABAPENTIN 100 MG: 100 CAPSULE ORAL at 08:46

## 2020-02-10 RX ADMIN — HEPARIN SODIUM 5000 UNITS: 5000 INJECTION, SOLUTION INTRAVENOUS; SUBCUTANEOUS at 01:30

## 2020-02-10 RX ADMIN — CEPHALEXIN 500 MG: 500 CAPSULE ORAL at 08:45

## 2020-02-10 RX ADMIN — POTASSIUM CHLORIDE 10 MEQ: 10 TABLET, EXTENDED RELEASE ORAL at 08:45

## 2020-02-10 RX ADMIN — HYDROXYUREA 500 MG: 500 CAPSULE ORAL at 08:43

## 2020-02-10 RX ADMIN — PANTOPRAZOLE SODIUM 40 MG: 40 INJECTION, POWDER, FOR SOLUTION INTRAVENOUS at 08:45

## 2020-02-10 ASSESSMENT — ACTIVITIES OF DAILY LIVING (ADL)
ADLS_ACUITY_SCORE: 15

## 2020-02-10 ASSESSMENT — MIFFLIN-ST. JEOR: SCORE: 1629.5

## 2020-02-10 NOTE — PLAN OF CARE
Discharge Planner OT   Patient plan for discharge: Home today    Current status: Pt required SBA with FWW for functional transfers. Despite education on compensatory techniques and AE, pt completed LE dressing with mod-max A. Provided pt with AE/DME resources.     Barriers to return to prior living situation: Lives alone; Fall risk    Recommendations for discharge: TCU    Rationale for recommendations: Pt continues to be limited by impaired cognition and safety, sternal precautions, and decreased balance. Pt would benefit from TCU stay; however, it appears insurance has declined TCU. If discharges home, recommend A from family/friends for IADLs, HHA for bathing, home RN for med mgmt, and home OT for ongoing intervention.        Entered by: Darwin Yates 02/10/2020 12:16 PM      Occupational Therapy Discharge Summary    Reason for therapy discharge:    Discharged to home with home therapy.    Progress towards therapy goal(s). See goals on Care Plan in [facility] electronic health record for goal details.  Goals not met.  Barriers to achieving goals:   limited tolerance for therapy and discharge from facility.    Therapy recommendation(s):    Continued therapy is recommended.  Rationale/Recommendations:  Pt would benefit from rehab stay; however, discharged home today. Upon discharge home, pt would benefit from A from family/friends for all IADLs, HHA for bathing, home RN for med mgmt, and home OT for home safety eval and ongoing intervention to ensure safety and I with ADL participation. .

## 2020-02-10 NOTE — PROGRESS NOTES
CV Surgery    S: Sitting up in chair, breathing fine, tolerating diet, needs more rehab but turned down by insurance company    O: B/P: 104/61, T: 98.5, P: 93, R: 16  General: NAD  Heart: s1/s2, no m/r/g  Lungs: clear  Abd: s/nt/nd  Sternum: cdi  Extremities: 1+ LE edema    Lab Results   Component Value Date    WBC 58.3 02/09/2020     Lab Results   Component Value Date    RBC 2.88 02/09/2020     Lab Results   Component Value Date    HGB 8.7 02/09/2020     Lab Results   Component Value Date    HCT 27.2 02/09/2020     No components found for: MCT  Lab Results   Component Value Date    MCV 94 02/09/2020     Lab Results   Component Value Date    MCH 30.2 02/09/2020     Lab Results   Component Value Date    MCHC 32.0 02/09/2020     Lab Results   Component Value Date    RDW 19.4 02/09/2020     Lab Results   Component Value Date     02/10/2020       Last Basic Metabolic Panel:  Lab Results   Component Value Date     02/09/2020      Lab Results   Component Value Date    POTASSIUM 4.5 02/09/2020     Lab Results   Component Value Date    CHLORIDE 110 02/09/2020     Lab Results   Component Value Date    MINNIE 8.6 02/09/2020     Lab Results   Component Value Date    CO2 24 02/09/2020     Lab Results   Component Value Date    BUN 24 02/09/2020     Lab Results   Component Value Date    CR 1.05 02/09/2020     Lab Results   Component Value Date     02/09/2020       A/P: POD#10 s/p Coronary artery bypass grafting x3 with left internal mammary artery to left anterior descending, reverse saphenous vein graft to posterior descending artery, reverse saphenous vein graft to obtuse marginal 2 artery, endoscopic vein harvest from bilateral lower extremities, intraoperative SANDRA by Dr. Mable Chacon PA-C  Pager [phone]

## 2020-02-10 NOTE — TELEPHONE ENCOUNTER
Patient called to schedule an appointment for a hospital follow-up or appeared on a report showing that they were recently discharged from the hospital.    [facility]  Discharged date: 2/10/20  Reason for hospital admission:  Coronary artery bypass graph  Does patient have future appointment scheduled with provider? No  Date of future appointment:  Needs follow up in 7-10 days. NO 40 min spots available. Can you work him in?       This information will be used to help the care team plan for the patients upcoming visit.  The triage RN may determine that a follow up call is necessary and reach out to the patient via the phone number listed in the chart.     Please route this message on routine priority to the Triage RN pool.      Pt c/o nausea at this time, Dr Maria Fernanda Johnson aware       Radha Hernandez, RN  08/13/19 [id number]

## 2020-02-10 NOTE — PROGRESS NOTES
AVS given and explained to patient and sister, all medications given and explained to patient and sister. All belongings with patient. Escorted to ride by transport and [employer].

## 2020-02-10 NOTE — PLAN OF CARE
AVSS on RA. Pain controlled with PO. Incisions CDI with skin glue. [place]. LS dim. Diet regular with Good appt. Up with SBA. BS active and audible; stool this PM.

## 2020-02-10 NOTE — PLAN OF CARE
Speech Language Therapy Discharge Summary    Reason for therapy discharge:    Discharged to home with home therapy.    Progress towards therapy goal(s). See goals on Care Plan in [facility] electronic health record for goal details.  Goals partially met.  Barriers to achieving goals:   discharge from facility.    Therapy recommendation(s):    Continued therapy is recommended.  Rationale/Recommendations:  HH SLP given pt is below baseline swallow function.      At time of discharge - Recommend: 1. Continue on the DDL 3 with thin liquids. 2. Up in a chair for all meals, no straws, small bites and single sips, check for oral residue and slow pace of eating.

## 2020-02-10 NOTE — PLAN OF CARE
A&O. VSS. Flat affect. Denies pain. Sternal incision CDI. Tele NSR. Dressing to feet blisters oozing; dressings changed. Pt appear to have a large non-painful hernia above his umbilicus, that patient states is new for him. Pt has non-blanchable redness to inner buttock fold. Mepilex replaced. Pt sleeping in recliner and sitting on chair cushion. Pt refuses to sleep in bed. Encouraged repositioning self in chair frequently. Encouraged to place recliner feet up, however patient refusing.

## 2020-02-10 NOTE — PLAN OF CARE
Discharge Planner PT   Patient plan for discharge: home today   Current status:     Pt continues to require cues to maintain sternal precautions, poor carryover from previous session, supine > sit with SBA and cues to not pull with UE. Pt sat EOB for several minutes, skilled monitoring of vitals during session. Pt performed STS x 8 during session, improved carryover with blocked practice.     Hallway ambulation for improved activity tolerance and gait pattern, 120' x 2 FWW CGA, 2 extended standing rest breaks     Reviewed importance of AP and LA Q exercises pt to perform IND throughout the day. Pt left seated in recliner end of session with alarm on     Barriers to return to prior living situation: Lives alone, impaired activity tolerance   Recommendations for discharge: TCU  Rationale for recommendations: Pt will benefit from continued skilled PT at TCU to improve strength, balance, gait, endurance to improve functional mobility prior to return home as pt currently fatigues very quickly, requires cues to maintain sternal precautions, will not be safe at home alone. Per chart pt's insurance has declined TCU therefore recommend HHPT          Entered by: Yesenia Zelaya 02/10/2020 12:30 PM        Physical Therapy Discharge Summary    Reason for therapy discharge:    Discharged to home with home therapy.    Progress towards therapy goal(s). See goals on Care Plan in [facility] electronic health record for goal details.  Goals not met.  Barriers to achieving goals:   discharge from facility.    Therapy recommendation(s):    Continued therapy is recommended.  Rationale/Recommendations:  See above .

## 2020-02-10 NOTE — DISCHARGE SUMMARY
"Discharge Summary    Renny Gannon MRN# [id number]   YOB: 1950 Age: 69 year old     Date of Admission:  1/31/2020  Date of Discharge:  2/10/2020  Admitting Physician:  Mable Barrera MD  Discharge Physician:  Mable Barrera,*  Discharging Service:  Cardiovascular and Thoracic Surgery     Home clinic: [facility]  Primary Provider: Angus Scott          Admission Diagnoses:   CAD (coronary artery disease) [I25.10]          Discharge Diagnosis:   Patient Active Problem List   Diagnosis     Essential hypertension     Memory loss     Obesity (BMI 35.0-39.9) with comorbidity (H)     Insomnia, unspecified type     Leukocytosis, unspecified type     Coronary artery disease involving native coronary artery of native heart with other form of angina pectoris (H)     Status post coronary angiogram     CAD (coronary artery disease)     Myeloproliferative disorder (H)     S/P CABG (coronary artery bypass graft)     Fluid overload     Transient hyperglycemia post procedure     TIA (transient ischemic attack)     Dysphagia                Discharge Disposition:   Discharged to home           Condition on Discharge:   Discharge condition: Stable   Discharge vitals: Blood pressure 109/58, pulse 90, temperature 97.9  F (36.6  C), temperature source Axillary, resp. rate 16, height 1.727 m (5' 8\"), weight 89.5 kg (197 lb 5 oz), SpO2 95 %.     Code status on discharge: Full Code           Procedures:   On 1/31/20 Mr Gannon successfully underwent Coronary artery bypass grafting x3 with left internal mammary artery to the left anterior descending, reverse saphenous vein graft to posterior descending artery, reverse saphenous vein graft to obtuse marginal 2 artery, endoscopic vein harvest from bilateral lower extremities, intraoperative SANRDA by Dr. Mable Barrera.           Medications Prior to Admission:     Medications Prior to Admission   Medication Sig Dispense Refill Last Dose     atorvastatin " (LIPITOR) 40 MG tablet Take 1 tablet (40 mg) by mouth daily 30 tablet 11 1/30/2020 at AM     hydroxyurea (HYDREA) 500 MG capsule Take 1 capsule (500 mg) by mouth daily 90 capsule 1 1/30/2020 at AM     vitamin B-12 (CYANOCOBALAMIN) 250 MCG tablet Take 250 mcg by mouth daily   1/30/2020 at AM     [DISCONTINUED] isosorbide mononitrate (IMDUR) 30 MG 24 hr tablet Take 1 tablet (30 mg) by mouth daily 30 tablet 4 1/30/2020 at AM     [DISCONTINUED] lisinopril-hydrochlorothiazide (PRINZIDE/ZESTORETIC) 20-25 MG tablet TAKE ONE TABLET BY MOUTH ONCE DAILY STOP THE 10-12.5 MG DOSE 90 tablet 1 1/30/2020 at AM     [DISCONTINUED] metoprolol succinate ER (TOPROL-XL) 25 MG 24 hr tablet Take 2 tablets (50 mg) by mouth daily (Patient taking differently: Take 25 mg by mouth daily ) 30 tablet 3 1/31/2020 at 0300     [DISCONTINUED] naproxen (NAPROSYN) 500 MG tablet Take 1 tablet (500 mg) by mouth 2 times daily as needed for moderate pain 60 tablet 1 MORE THAN A WEEK at PRN     [DISCONTINUED] nitroGLYcerin (NITROSTAT) 0.4 MG sublingual tablet For chest pain place 1 tablet under the tongue every 5 minutes for 3 doses. If symptoms persist 5 minutes after 1st dose call 911. 30 tablet 3 ON HAND             Discharge Medications:     Current Discharge Medication List      START taking these medications    Details   acetaminophen (TYLENOL) 325 MG tablet Take 2 tablets (650 mg) by mouth every 4 hours as needed for mild pain  Qty: 40 tablet, Refills: 0    Associated Diagnoses: S/P CABG (coronary artery bypass graft)      aspirin (ASA) 325 MG tablet Take 1 tablet (325 mg) by mouth daily  Qty: 30 tablet, Refills: 0    Associated Diagnoses: S/P CABG (coronary artery bypass graft)      cephALEXin (KEFLEX) 500 MG capsule Take 1 capsule (500 mg) by mouth every 12 hours for 3 days  Qty: 6 capsule, Refills: 0    Associated Diagnoses: S/P CABG (coronary artery bypass graft)      famotidine (PEPCID) 20 MG tablet Take 1 tablet (20 mg) by mouth 2 times  daily  Qty: 28 tablet, Refills: 0    Associated Diagnoses: S/P CABG (coronary artery bypass graft)      ferrous sulfate (FEROSUL) 325 (65 Fe) MG tablet Take 1 tablet (325 mg) by mouth daily  Qty: 30 tablet, Refills: 0    Associated Diagnoses: S/P CABG (coronary artery bypass graft)      furosemide (LASIX) 40 MG tablet Take 1 tablet (40 mg) by mouth daily  Qty: 30 tablet, Refills: 0    Associated Diagnoses: S/P CABG (coronary artery bypass graft)      methocarbamol (ROBAXIN) 500 MG tablet Take 1 tablet (500 mg) by mouth 4 times daily as needed for muscle spasms  Qty: 20 tablet, Refills: 0    Associated Diagnoses: S/P CABG (coronary artery bypass graft)      potassium chloride ER (K-DUR/KLOR-CON M) 20 MEQ CR tablet Take 1 tablet (20 mEq) by mouth daily  Qty: 30 tablet, Refills: 0    Associated Diagnoses: S/P CABG (coronary artery bypass graft)      senna-docusate (SENOKOT-S/PERICOLACE) 8.6-50 MG tablet Take 1-2 tablets by mouth 2 times daily as needed for constipation  Qty: 20 tablet, Refills: 0    Associated Diagnoses: S/P CABG (coronary artery bypass graft)         CONTINUE these medications which have CHANGED    Details   metoprolol succinate ER (TOPROL-XL) 100 MG 24 hr tablet Take 1 tablet (100 mg) by mouth 2 times daily  Qty: 60 tablet, Refills: 3    Associated Diagnoses: S/P CABG (coronary artery bypass graft)         CONTINUE these medications which have NOT CHANGED    Details   atorvastatin (LIPITOR) 40 MG tablet Take 1 tablet (40 mg) by mouth daily  Qty: 30 tablet, Refills: 11    Associated Diagnoses: Coronary artery disease involving native coronary artery of native heart with other form of angina pectoris (H); Essential hypertension      hydroxyurea (HYDREA) 500 MG capsule Take 1 capsule (500 mg) by mouth daily  Qty: 90 capsule, Refills: 1    Associated Diagnoses: Myeloproliferative disorder (H)      vitamin B-12 (CYANOCOBALAMIN) 250 MCG tablet Take 250 mcg by mouth daily         STOP taking these  medications       isosorbide mononitrate (IMDUR) 30 MG 24 hr tablet Comments:   Reason for Stopping:         lisinopril-hydrochlorothiazide (PRINZIDE/ZESTORETIC) 20-25 MG tablet Comments:   Reason for Stopping:         naproxen (NAPROSYN) 500 MG tablet Comments:   Reason for Stopping:         nitroGLYcerin (NITROSTAT) 0.4 MG sublingual tablet Comments:   Reason for Stopping:                     Consultations:   Nutrition, Intensivist, SW, Heme/onc, Neurology             Brief History of Illness:   Mr. Gannon is a very pleasant 69-year-old gentleman who was recently diagnosed with leukoerythroblastic reaction and also severe 3-vessel coronary artery disease that was found after being worked up for exertional angina that he had been experiencing for about a month or so.  Coronary angiogram demonstrated severe diffuse 3-vessel coronary artery disease.  He was taken to the operating room today for coronary artery bypass grafting          Hospital Course:   On 1/31/20 Mr Gannon successfully underwent Coronary artery bypass grafting x3 with left internal mammary artery to the left anterior descending, reverse saphenous vein graft to posterior descending artery, reverse saphenous vein graft to obtuse marginal 2 artery, endoscopic vein harvest from bilateral lower extremities, intraoperative SANDRA by Dr. Mable Barrera.   Was extubated within 24 hours of surgery.   He has a myeloproliferative disease (baseline WBC 50K). He is managed on hydroxyurea.   Had some transient hyperglycemia treated with an insulin gtt, transitioned to sliding scale insulin and has no need at discharge.  Had some trouble swallowing. SLP was consulted and worked with him. He is discharged on level 3 diet.   He had a code stroke called on pod#1 for facial droop. Head CT negative. Resolved at discharge.   Had some fluid overload treated with diuretic medication. At discharge he is at his pre-op weight but appears to be fluid overloaded (LE  edema/blistering) and is sent with lasix and potassium.  He was recommended to go to TCU but his insurance denied him on fact that he could walk 4'. He will receive PT/OT/SLP at his apt. His sisters are also able to help although they seem easily flustered at discharge teaching. I have given them our number to call with any questions.   Heart rhythm remained stable. He progressed slowly with cardiac rehab. They are discharged to home on pod# 10 with the help of their family.              Significant Results:   Lab Results   Component Value Date    WBC 58.3 02/09/2020     Lab Results   Component Value Date    RBC 2.88 02/09/2020     Lab Results   Component Value Date    HGB 8.7 02/09/2020     Lab Results   Component Value Date    HCT 27.2 02/09/2020     No components found for: MCT  Lab Results   Component Value Date    MCV 94 02/09/2020     Lab Results   Component Value Date    MCH 30.2 02/09/2020     Lab Results   Component Value Date    MCHC 32.0 02/09/2020     Lab Results   Component Value Date    RDW 19.4 02/09/2020     Lab Results   Component Value Date     02/10/2020       Last Basic Metabolic Panel:  Lab Results   Component Value Date     02/09/2020      Lab Results   Component Value Date    POTASSIUM 4.5 02/09/2020     Lab Results   Component Value Date    CHLORIDE 110 02/09/2020     Lab Results   Component Value Date    MINNIE 8.6 02/09/2020     Lab Results   Component Value Date    CO2 24 02/09/2020     Lab Results   Component Value Date    BUN 24 02/09/2020     Lab Results   Component Value Date    CR 1.05 02/09/2020     Lab Results   Component Value Date     02/09/2020                  Pending Results:   None           Discharge Instructions and Follow-Up:   Discharge diet: Regular   Discharge activity: Daily weights: Call if weight gain 2-3 lbs over 24 hours or if greater than 5 lbs in 1 week.  No lifting more than 10 lbs for 8-12 weeks.  No driving for 1 month.  Call for pain  medication refill.  Call for temperature greater than 101.0  Daily shower with antibacterial soap.   Discharge follow-up: *Follow up primary care provider in 7-10 days after discharge in order to review your medication, vital signs, obtain any necessary lab work your doctor may want, and to update them on your hospitalization and medical issues.   *Follow up with Suzy/Alix/Flora Melendez with Dr Barrera, heart surgeon, at [facility] at [facility] [address] on 2/19/20 at 2:00 PM. If any questions or concerns call [phone].  You will see us once at this visit and then if everything is going well you will not need to see us again.  You will follow long term with your cardiologist.   *Follow up with Dr Bentley, cardiologist, on April 22, at 11 am in [place]. This is who you will follow with long term about your heart issues. [phone].   *Please schedule outpatient cardiac rehab within 5 days of discharge from TCU, call [phone].   Outpatient therapy: Cardiac rehab   Home Care agency: None    Supplies and equipment: None   Lines and drains: None    Wound care: Wash incision daily with antibacterial soap   Other instructions: None

## 2020-02-11 ENCOUNTER — TELEPHONE (OUTPATIENT)
Dept: EMERGENCY MEDICINE | Facility: CLINIC | Age: 70
End: 2020-02-11

## 2020-02-11 ENCOUNTER — HOSPITAL ENCOUNTER (EMERGENCY)
Facility: CLINIC | Age: 70
Discharge: HOME | End: 2020-02-11
Attending: EMERGENCY MEDICINE | Admitting: EMERGENCY MEDICINE
Payer: COMMERCIAL

## 2020-02-11 ENCOUNTER — APPOINTMENT (OUTPATIENT)
Dept: GENERAL RADIOLOGY | Facility: CLINIC | Age: 70
End: 2020-02-11
Attending: EMERGENCY MEDICINE
Payer: COMMERCIAL

## 2020-02-11 VITALS
HEART RATE: 99 BPM | SYSTOLIC BLOOD PRESSURE: 134 MMHG | DIASTOLIC BLOOD PRESSURE: 69 MMHG | TEMPERATURE: 96.7 F | RESPIRATION RATE: 16 BRPM | OXYGEN SATURATION: 99 % | BODY MASS INDEX: 29.95 KG/M2 | WEIGHT: 197 LBS

## 2020-02-11 DIAGNOSIS — D64.9 ANEMIA, UNSPECIFIED TYPE: ICD-10-CM

## 2020-02-11 DIAGNOSIS — S90.829A BLISTER (NONTHERMAL), UNSPECIFIED FOOT, INITIAL ENCOUNTER: ICD-10-CM

## 2020-02-11 DIAGNOSIS — J90 PLEURAL EFFUSION: ICD-10-CM

## 2020-02-11 DIAGNOSIS — D72.829 LEUKOCYTOSIS, UNSPECIFIED TYPE: ICD-10-CM

## 2020-02-11 LAB
ALBUMIN SERPL-MCNC: 3 G/DL (ref 3.4–5)
ALP SERPL-CCNC: 90 U/L (ref 40–150)
ALT SERPL W P-5'-P-CCNC: 22 U/L (ref 0–70)
ANION GAP SERPL CALCULATED.3IONS-SCNC: 8 MMOL/L (ref 3–14)
ANISOCYTOSIS BLD QL SMEAR: ABNORMAL
AST SERPL W P-5'-P-CCNC: 43 U/L (ref 0–45)
BASE EXCESS BLDV CALC-SCNC: 1.3 MMOL/L
BASOPHILS # BLD AUTO: 0.4 10E9/L (ref 0–0.2)
BASOPHILS NFR BLD AUTO: 1 %
BILIRUB SERPL-MCNC: 1 MG/DL (ref 0.2–1.3)
BUN SERPL-MCNC: 21 MG/DL (ref 7–30)
CALCIUM SERPL-MCNC: 8.4 MG/DL (ref 8.5–10.1)
CHLORIDE SERPL-SCNC: 106 MMOL/L (ref 94–109)
CO2 SERPL-SCNC: 25 MMOL/L (ref 20–32)
CREAT SERPL-MCNC: 1.23 MG/DL (ref 0.66–1.25)
DIFFERENTIAL METHOD BLD: ABNORMAL
EOSINOPHIL # BLD AUTO: 0.4 10E9/L (ref 0–0.7)
EOSINOPHIL NFR BLD AUTO: 1 %
ERYTHROCYTE [DISTWIDTH] IN BLOOD BY AUTOMATED COUNT: 19.8 % (ref 10–15)
GFR SERPL CREATININE-BSD FRML MDRD: 59 ML/MIN/{1.73_M2}
GLUCOSE SERPL-MCNC: 99 MG/DL (ref 70–99)
HCO3 BLDV-SCNC: 27 MMOL/L (ref 21–28)
HCT VFR BLD AUTO: 28.1 % (ref 40–53)
HGB BLD-MCNC: 8.8 G/DL (ref 13.3–17.7)
INTERPRETATION ECG - MUSE: NORMAL
LYMPHOCYTES # BLD AUTO: 3.5 10E9/L (ref 0.8–5.3)
LYMPHOCYTES NFR BLD AUTO: 8 %
MCH RBC QN AUTO: 30.1 PG (ref 26.5–33)
MCHC RBC AUTO-ENTMCNC: 31.3 G/DL (ref 31.5–36.5)
MCV RBC AUTO: 96 FL (ref 78–100)
METAMYELOCYTES # BLD: 3.5 10E9/L
METAMYELOCYTES NFR BLD MANUAL: 8 %
MONOCYTES # BLD AUTO: 1.3 10E9/L (ref 0–1.3)
MONOCYTES NFR BLD AUTO: 3 %
MYELOCYTES # BLD: 2.2 10E9/L
MYELOCYTES NFR BLD MANUAL: 5 %
NEUTROPHILS # BLD AUTO: 32.6 10E9/L (ref 1.6–8.3)
NEUTROPHILS NFR BLD AUTO: 74 %
NRBC # BLD AUTO: 7.9 10*3/UL
NRBC BLD AUTO-RTO: 18 /100
NT-PROBNP SERPL-MCNC: 3337 PG/ML (ref 0–900)
O2/TOTAL GAS SETTING VFR VENT: 21 %
PCO2 BLDV: 45 MM HG (ref 40–50)
PH BLDV: 7.39 PH (ref 7.32–7.43)
PLATELET # BLD AUTO: 405 10E9/L (ref 150–450)
PLATELET # BLD EST: ABNORMAL 10*3/UL
PO2 BLDV: 18 MM HG (ref 25–47)
POLYCHROMASIA BLD QL SMEAR: ABNORMAL
POTASSIUM SERPL-SCNC: 4.2 MMOL/L (ref 3.4–5.3)
PROT SERPL-MCNC: 6.6 G/DL (ref 6.8–8.8)
RBC # BLD AUTO: 2.92 10E12/L (ref 4.4–5.9)
SODIUM SERPL-SCNC: 139 MMOL/L (ref 133–144)
WBC # BLD AUTO: 44.1 10E9/L (ref 4–11)

## 2020-02-11 PROCEDURE — 99284 EMERGENCY DEPT VISIT MOD MDM: CPT | Mod: Z6 | Performed by: EMERGENCY MEDICINE

## 2020-02-11 PROCEDURE — 71046 X-RAY EXAM CHEST 2 VIEWS: CPT | Mod: TC

## 2020-02-11 PROCEDURE — 99284 EMERGENCY DEPT VISIT MOD MDM: CPT | Mod: 25 | Performed by: EMERGENCY MEDICINE

## 2020-02-11 PROCEDURE — 85025 COMPLETE CBC W/AUTO DIFF WBC: CPT | Performed by: EMERGENCY MEDICINE

## 2020-02-11 PROCEDURE — 83880 ASSAY OF NATRIURETIC PEPTIDE: CPT | Performed by: EMERGENCY MEDICINE

## 2020-02-11 PROCEDURE — 25000132 ZZH RX MED GY IP 250 OP 250 PS 637: Performed by: EMERGENCY MEDICINE

## 2020-02-11 PROCEDURE — 80053 COMPREHEN METABOLIC PANEL: CPT | Performed by: EMERGENCY MEDICINE

## 2020-02-11 PROCEDURE — 82803 BLOOD GASES ANY COMBINATION: CPT | Performed by: EMERGENCY MEDICINE

## 2020-02-11 RX ORDER — HYDROCODONE BITARTRATE AND ACETAMINOPHEN 5; 325 MG/1; MG/1
1 TABLET ORAL ONCE
Status: COMPLETED | OUTPATIENT
Start: 2020-02-11 | End: 2020-02-11

## 2020-02-11 RX ORDER — HYDROCODONE BITARTRATE AND ACETAMINOPHEN 5; 325 MG/1; MG/1
1 TABLET ORAL EVERY 4 HOURS PRN
Qty: 15 TABLET | Refills: 0 | Status: SHIPPED | OUTPATIENT
Start: 2020-02-11 | End: 2020-02-24

## 2020-02-11 RX ORDER — ACETAMINOPHEN 500 MG
500 TABLET ORAL ONCE
Status: COMPLETED | OUTPATIENT
Start: 2020-02-11 | End: 2020-02-11

## 2020-02-11 RX ADMIN — ACETAMINOPHEN 500 MG: 500 TABLET, FILM COATED ORAL at 11:30

## 2020-02-11 RX ADMIN — HYDROCODONE BITARTRATE AND ACETAMINOPHEN 1 TABLET: 5; 325 TABLET ORAL at 15:14

## 2020-02-11 ASSESSMENT — ENCOUNTER SYMPTOMS
FATIGUE: 1
NAUSEA: 0
DIFFICULTY URINATING: 0
ABDOMINAL PAIN: 0
APNEA: 0
DIAPHORESIS: 0
LIGHT-HEADEDNESS: 0
CHEST TIGHTNESS: 0
ABDOMINAL DISTENTION: 1
ARTHRALGIAS: 1
FEVER: 0
SHORTNESS OF BREATH: 1
WEAKNESS: 1
ACTIVITY CHANGE: 1
COUGH: 0

## 2020-02-11 NOTE — PROGRESS NOTES
[facility] WOC Nurse Inpatient Wound Assessment   Assessment of wound(s) on pt's:   Fluid filled and rupt bulla on bilateral feet/ toes and                                                                     Lt medial lower leg incision    WOC NURSE ASSESSMENT    Fluid filled and rupt bulla on bilateral feet/toes and Lt meidal lower leg incision: etiology per Cardiothoracic surgery is fluid overload. 2+ edema, feet cool. No local s/s infection  WOC NURSE TREATMENT PLAN    Plan of care for wound located  BLE edema, blisters toes/feet and Lt incision rupt blister: change dressing every 1-2 days and/or prior to compression wraps  1. Clean gently   2. Cover blister with strips of Aqua cell AG  3. Cover with non-stick foam  4. Secure Kerlix  5. Apply wraps as odered      Nursing to notify Provider(s) and re-consult the WOC Nurse if wound(s) deteriorate or new skin concerns    WOC Nurse follow-up plan: pt is discharging to home  OBJECTIVE DATA    Patient history according to provider notes:   On 1/31/20 Mr Gannon successfully underwent Coronary artery bypass grafting x3 with left internal mammary artery to the left anterior descending, reverse saphenous vein graft to posterior descending artery, reverse saphenous vein graft to obtuse marginal 2 artery, endoscopic vein harvest from bilateral lower extremities, intraoperative SANDRA by Dr. Mable Barrera.     Andrea Risk Assessment  Sensory Perception: 4-->no impairment    Moisture: 3-->occasionally moist   Activity: 3-->walks occasionally     Mobility: 3-->slightly limited   Nutrition: 3-->adequate   Friction and Shear: 3-->no apparent problem      Positioning: sits in chair secondary to SOB/anxiety    Mattress:  Atmos air    Current diet  Orders Placed This Encounter      Diet        Moisture Management: brief   I/O last 3 completed shifts:  In: 720 [P.O.:720]  Out: 780 [Urine:780]    Labs:   Recent Labs   Lab Test 02/09/20  0713  02/03/20  0424  01/31/20  2334  01/09/20  0930   12/26/19  1222   ALBUMIN  --   --  3.1*   < >  --    < > 4.1  --  3.8   HGB 8.7*   < > 7.5*   < > 8.0*   < > 12.1*   < > 11.7*   INR  --   --   --   --  0.91   < > 1.15*  --   --    WBC 58.3*   < > 32.7*   < > 54.0*   < > 90.0*   < > 79.9*   A1C  --   --   --   --   --   --  5.6  --   --    CRP  --   --   --   --   --   --   --   --  4.9    < > = values in this interval not displayed.         WO NURSE PHYSICAL EXAM    Wound Assessment (location):  Fluid filled and rupt bulla on bilateral feet/ toes and                                                           Lt medial lower leg incision    Date of last  WOC photo     WOC photo: Rt foot/toe blisters  2-10-20          WOC photo: 2-10-20 Lt leg incision        WOC photo: Lt toe/foot blisters    Bilateral feettoes with mix fluid filled and rupt blisters,  no erythema, no drainage, 2+ edema, foot and legs cool     Drainage:  Moderate sero-sang    Odor: None    Pain:  Tenderness, foot cool     WO NURSE INTERVENTIONS    Assessed   Wound Care: completed. Suzy Chacon Cardiothoracic Surgery JOSE applied ace wraps to legs prior to discharge  Supplies: @ bedside for discharge  Discussed plan of care with Patient, CC, Nursing       Janki Hinson, RN WOC Nurse

## 2020-02-11 NOTE — ED PROVIDER NOTES
History     Chief Complaint   Patient presents with     Foot Burn     Tailbone Pain     HPI  Renny Gannon is a 69 year old male who presents with shortness of breath, tailbone pain, and bilateral lower extremity edema. He has a history of CAD S/P CABG graft at [facility] on 01/31/20 with subsequent hospitalization until yesterday. He also has a history of a myeloproliferative disorder and he is receiving oral chemotherapy. He reports that he was originally planning on spending time in a TCU following discharge, but there were some discrepancies with insurance coverage. He was discharged to home with home care in addition to family caregivers.   Today he complains of shortness of breath, coccyx pain with skin breakdown, and bilateral lower extremity edema with blistering. He reports that he has been short of breath since his procedure and has been receiving lasix. He denies chest pain, coughing or wheezing. The pain in his coccyx developed following prolonged periods of time in bed following his bypass grafting. His bilateral lower extremity edema has also increased since his procedure and he has developed blistering and weeping from bilateral ankles and feet. He has been attempting to manage at his apartment with the care of family, who stayed with him throughout the night, and home care. He has not yet been seen by home care and due to his situation and circumstances he decided to come to the ER for evaluation.     Allergies:  Allergies   Allergen Reactions     No Known Drug Allergies      Seasonal Allergies        Problem List:    Patient Active Problem List    Diagnosis Date Noted     S/P CABG (coronary artery bypass graft) 02/10/2020     Priority: Medium     Fluid overload 02/10/2020     Priority: Medium     Transient hyperglycemia post procedure 02/10/2020     Priority: Medium     TIA (transient ischemic attack) 02/10/2020     Priority: Medium     Dysphagia 02/10/2020     Priority: Medium      Myeloproliferative disorder (H) 01/26/2020     Priority: Medium     CAD (coronary artery disease) 01/22/2020     Priority: Medium     Added automatically from request for surgery [id number]       Status post coronary angiogram 01/09/2020     Priority: Medium     Coronary artery disease involving native coronary artery of native heart with other form of angina pectoris (H) 01/02/2020     Priority: Medium     Added automatically from request for surgery [id number]       Leukocytosis, unspecified type 12/22/2019     Priority: Medium     Essential hypertension 03/11/2019     Priority: Medium     Memory loss 03/11/2019     Priority: Medium     Obesity (BMI 35.0-39.9) with comorbidity (H) 03/11/2019     Priority: Medium     Insomnia, unspecified type 03/11/2019     Priority: Medium        Past Medical History:    Past Medical History:   Diagnosis Date     Hypertension        Past Surgical History:    Past Surgical History:   Procedure Laterality Date     BONE MARROW BIOPSY, BONE SPECIMEN, NEEDLE/TROCAR N/A 12/30/2019    Procedure: BIOPSY, BONE MARROW;  Surgeon: Aguilar Krause MD;  Location:  OR     BYPASS GRAFT ARTERY CORONARY N/A 1/31/2020    Procedure: CORONARY ARTERY BYPASS GRAFTING X 3 - LIMA TO LAD, SV TO OM  AND PDA; ENDOVEIN HARVEST OF BILATERAL LEGS; ON PUMP WITH SANDRA;  Surgeon: Mable Barrera MD;  Location:  OR     CV CORONARY ANGIOGRAM Left 1/9/2020    Procedure: Coronary Angiogram;  Surgeon: Devin Bentley MD;  Location:  HE[facility] CARDI[facility] CATH LAB     NO HISTORY OF SURGERY         Family History:    Family History   Problem Relation Age of Onset     No Known Problems Mother      Unknown/Adopted Father      Unknown/Adopted Maternal Grandmother      Unknown/Adopted Maternal Grandfather      Unknown/Adopted Paternal Grandmother      Unknown/Adopted Paternal Grandfather      Cancer Brother         lung cancer - smoking     No Known Problems Sister      Coronary Artery Disease Brother           of MI at 66     No Known Problems Sister        Social History:  Marital Status:  Single [1]  Social History     Tobacco Use     Smoking status: Former Smoker     Years: 30.00     Types: Cigarettes     Start date: 1961     Last attempt to quit: 2001     Years since quittin.0     Smokeless tobacco: Never Used   Substance Use Topics     Alcohol use: No     Frequency: Never     Drug use: No        Medications:    HYDROcodone-acetaminophen (NORCO) 5-325 MG tablet  acetaminophen (TYLENOL) 325 MG tablet  aspirin (ASA) 325 MG tablet  atorvastatin (LIPITOR) 40 MG tablet  cephALEXin (KEFLEX) 500 MG capsule  famotidine (PEPCID) 20 MG tablet  ferrous sulfate (FEROSUL) 325 (65 Fe) MG tablet  furosemide (LASIX) 40 MG tablet  hydroxyurea (HYDREA) 500 MG capsule  methocarbamol (ROBAXIN) 500 MG tablet  metoprolol succinate ER (TOPROL-XL) 100 MG 24 hr tablet  potassium chloride ER (K-DUR/KLOR-CON M) 20 MEQ CR tablet  senna-docusate (SENOKOT-S/PERICOLACE) 8.6-50 MG tablet  vitamin B-12 (CYANOCOBALAMIN) 250 MCG tablet          Review of Systems   Constitutional: Positive for activity change and fatigue. Negative for diaphoresis and fever.   Respiratory: Positive for shortness of breath. Negative for apnea, cough and chest tightness.    Cardiovascular: Positive for leg swelling. Negative for chest pain.   Gastrointestinal: Positive for abdominal distention. Negative for abdominal pain and nausea.   Genitourinary: Negative for difficulty urinating.   Musculoskeletal: Positive for arthralgias and gait problem.   Neurological: Positive for weakness. Negative for syncope and light-headedness.       Physical Exam   BP: 124/57  Pulse: 92  Temp: 96.7  F (35.9  C)  Resp: 12  Weight: 89.4 kg (197 lb)  SpO2: 97 %      Physical Exam  Constitutional:       General: He is awake.      Appearance: He is ill-appearing. He is not toxic-appearing or diaphoretic.   Neck:      Vascular: No JVD.   Cardiovascular:      Rate and  Rhythm: Normal rate and regular rhythm.      Pulses:           Radial pulses are 2+ on the right side and 2+ on the left side.   Pulmonary:      Effort: Tachypnea present.      Breath sounds: Decreased air movement present. Examination of the right-lower field reveals decreased breath sounds. Examination of the left-lower field reveals decreased breath sounds. Decreased breath sounds present.   Chest:       Abdominal:      General: Abdomen is protuberant. Bowel sounds are normal. There is distension.      Palpations: Abdomen is soft.      Tenderness: There is no abdominal tenderness.   Musculoskeletal:      Right lower leg: 3+ Pitting Edema present.      Left lower leg: 3+ Pitting Edema present.   Feet:      Right foot:      Skin integrity: Blister and skin breakdown present.      Left foot:      Skin integrity: Blister and skin breakdown present.   Skin:     General: Skin is warm.      Findings: Wound present.          Neurological:      Mental Status: He is alert and oriented to person, place, and time.   Psychiatric:         Attention and Perception: Attention and perception normal.         Mood and Affect: Mood and affect normal.         Speech: Speech normal.         Behavior: Behavior is cooperative.         ED Course        Procedures                   Results for orders placed or performed during the hospital encounter of 02/11/20 (from the past 24 hour(s))   CBC with platelets differential   Result Value Ref Range    WBC 44.1 (H) 4.0 - 11.0 10e9/L    RBC Count 2.92 (L) 4.4 - 5.9 10e12/L    Hemoglobin 8.8 (L) 13.3 - 17.7 g/dL    Hematocrit 28.1 (L) 40.0 - 53.0 %    MCV 96 78 - 100 fl    MCH 30.1 26.5 - 33.0 pg    MCHC 31.3 (L) 31.5 - 36.5 g/dL    RDW 19.8 (H) 10.0 - 15.0 %    Platelet Count 405 150 - 450 10e9/L    Diff Method Manual Differential     % Neutrophils 74.0 %    % Lymphocytes 8.0 %    % Monocytes 3.0 %    % Eosinophils 1.0 %    % Basophils 1.0 %    % Metamyelocytes 8.0 %    % Myelocytes 5.0 %     Nucleated RBCs 18 (H) 0 /100    Absolute Neutrophil 32.6 (H) 1.6 - 8.3 10e9/L    Absolute Lymphocytes 3.5 0.8 - 5.3 10e9/L    Absolute Monocytes 1.3 0.0 - 1.3 10e9/L    Absolute Eosinophils 0.4 0.0 - 0.7 10e9/L    Absolute Basophils 0.4 (H) 0.0 - 0.2 10e9/L    Absolute Metamyelocytes 3.5 (H) 0 10e9/L    Absolute Myelocytes 2.2 (H) 0 10e9/L    Absolute Nucleated RBC 7.9     Anisocytosis Moderate     Polychromasia Moderate     Platelet Estimate       Automated count confirmed.  Platelet morphology is normal.   Comprehensive metabolic panel   Result Value Ref Range    Sodium 139 133 - 144 mmol/L    Potassium 4.2 3.4 - 5.3 mmol/L    Chloride 106 94 - 109 mmol/L    Carbon Dioxide 25 20 - 32 mmol/L    Anion Gap 8 3 - 14 mmol/L    Glucose 99 70 - 99 mg/dL    Urea Nitrogen 21 7 - 30 mg/dL    Creatinine 1.23 0.66 - 1.25 mg/dL    GFR Estimate 59 (L) >60 mL/min/[1.73_m2]    GFR Estimate If Black 68 >60 mL/min/[1.73_m2]    Calcium 8.4 (L) 8.5 - 10.1 mg/dL    Bilirubin Total 1.0 0.2 - 1.3 mg/dL    Albumin 3.0 (L) 3.4 - 5.0 g/dL    Protein Total 6.6 (L) 6.8 - 8.8 g/dL    Alkaline Phosphatase 90 40 - 150 U/L    ALT 22 0 - 70 U/L    AST 43 0 - 45 U/L   Nt probnp inpatient (BNP)   Result Value Ref Range    N-Terminal Pro BNP Inpatient 3,337 (H) 0 - 900 pg/mL   Blood gas venous   Result Value Ref Range    Ph Venous 7.39 7.32 - 7.43 pH    PCO2 Venous 45 40 - 50 mm Hg    PO2 Venous 18 (L) 25 - 47 mm Hg    Bicarbonate Venous 27 21 - 28 mmol/L    Base Excess Venous 1.3 mmol/L    FIO2 21    XR Chest 2 Views    Narrative    CHEST TWO VIEWS February 11, 2020 12:41 PM     HISTORY: Shortness of breath, status post coronary artery bypass  graft.    COMPARISON: Chest x-rays dated 2/4/2020.    FINDINGS: Postop changes status post CABG are again noted. Cardiac  silhouette enlargement is again seen. There are bilateral pleural  fluid collections which are small to moderate-sized on the left and  small on the right. The right pleural fluid  collection appears similar  in size to prior study. Left pleural fluid collection may have  slightly increased since the prior study. There is minimal vascular  congestion. No pneumothorax is identified. No acute fracture is seen.      Impression    IMPRESSION:  1. Slightly increased size of small to moderate-sized left pleural  fluid collection is noted. Right pleural fluid collection appears  slightly decreased in size. There appears to be minimal vascular  congestion. Along with cardiomegaly, vascular congestion and pleural  fluid collections indicate mild heart failure.  2. Postoperative changes of the chest are again noted.    I discussed the possibility of mild congestive heart failure with Dr. Enrique on 2/11/2020 12:53 PM.         Medications   acetaminophen (TYLENOL) tablet 500 mg (500 mg Oral Given 2/11/20 6580)   HYDROcodone-acetaminophen (NORCO) 5-325 MG per tablet 1 tablet (1 tablet Oral Given 2/11/20 7334)       Assessments & Plan (with Medical Decision Making)  69 yr old post CABG discharged from [facility] yesterday and is here with sob.  Pleural effusion noted on xray but patient not hypoxic and probably normal after CABG  Blisters on feet - wound care to see patient tomorrow. Since several blisters were tense and painful we decided to puncture them gently and allow fluid to drain.  Antibiotic ointment and non stick bandage placed.  Deconditioning - counseled patient and shifting positions to avoid pressure ulcers. Home care to come to his place tomorrow.  I spoke with  and reviewed the discharge plan.  I would have preferred for the patient to go to TCU for rehab and more intensive services but that was denied by [employer] insurance and the appeal also denied when he left [facility].  He has orders for wound care, pt, home health care already and should be seen tomorrow. I discussed observation admission with the patient and his sister but they preferred to go home at this time.   Return to er  precautions discussed.   Leukemia - anemia and leukocytosis is not new      I have reviewed the nursing notes.    I have reviewed the findings, diagnosis, plan and need for follow up with the patient.      New Prescriptions    HYDROCODONE-ACETAMINOPHEN (NORCO) 5-325 MG TABLET    Take 1 tablet by mouth every 4 hours as needed       Final diagnoses:   Pleural effusion   Blister (nonthermal), unspecified foot, initial encounter   Anemia, unspecified type   Leukocytosis, unspecified type       2/11/2020   [facility] EMERGENCY DEPARTMENT     Casey Enrique MD  02/11/20 0281

## 2020-02-11 NOTE — ED NOTES
Pt arrives with redness and discomfort to his coccyx and blisters/ weeping to bilateral feet with pain and burning after having a CABG on 1/31/2020. He was denied by his insurance to go to a TCU and was to have [facility]. [facility] was planning on seeing him today or tomorrow. [facility] nurse primary is Jennyfer [phone]. Prior to having his CABG he was diagnosed with leukemia and is on oral chemo. Pt lives alone and only has help from his sibblings who occasionally drop into see him.

## 2020-02-11 NOTE — DISCHARGE INSTRUCTIONS
Return to er if new or worsening symptoms. Follow up with home care, pt/ot, wound care etc. I hope you get better soon.

## 2020-02-11 NOTE — TELEPHONE ENCOUNTER
Reason for call:  Other   Patient called regarding (reason for call): appointment  Additional comments: ED followup with Dr Scott for pleural effusion this week    Phone number to reach patient:  Cell number on file:    Telephone Information:   Mobile [phone]       Best Time:  Any time    Can we leave a detailed message on this number?  YES

## 2020-02-11 NOTE — PROGRESS NOTES
Consulted to assist with disposition for patient.  Patient was discharged yesterday from [facility].  Talked with Dr. Enrique in the ED.  Sounds like patient was going to go to [facility] for rehab, however, was denied by [employer].  Writer talked with Scotty, at [facility].  Scotty also confirms that patient was denied by [employer].  They also did a peer to peer review while patient was at [facility], and [employer] still denied.  [facility]  If patient's discharges back home, he will not need a new home care referral as he had it ordered from [facility].    Yolie Murillo [facility] [phone]/ [facility] [phone]

## 2020-02-11 NOTE — ED AVS SNAPSHOT
[facility] Emergency Department  [address]   [place] [address]  Phone:  [phone]  Fax:  [phone]                                    Renny Gannon   MRN: [id number]    Department:  [facility] Emergency Department   Date of Visit:  2/11/2020           After Visit Summary Signature Page    I have received my discharge instructions, and my questions have been answered. I have discussed any challenges I see with this plan with the nurse or doctor.    ..........................................................................................................................................  Patient/Patient Representative Signature      ..........................................................................................................................................  Patient Representative Print Name and Relationship to Patient    ..................................................               ................................................  Date                                   Time    ..........................................................................................................................................  Reviewed by Signature/Title    ...................................................              ..............................................  Date                                               Time          22EPIC Rev 08/18

## 2020-02-11 NOTE — DISCHARGE INSTRUCTIONS
BLE edema, blisters toes/feet and Lt incision rupt blister: change dressing every 1-2 days and/or prior to compression wraps  1. Clean gently   2. Cover blister with strips of Aqua cell AG  3. Cover with non-stick foam  4. Secure Kerlix  5. Apply wraps as odered    Lt distal inner buttock PI  1. Change dressing on even days and prn  2. Foam dressing

## 2020-02-11 NOTE — TELEPHONE ENCOUNTER
Attempted to reach patient to assist in scheduling.  Can transfer to team to assist in scheduling.  Tatianna Baker, CMA

## 2020-02-11 NOTE — ED NOTES
Gareth Gudino the PA dressed the Pt's feet. Told Pt and his family that [facility] will be out in the AM. We will be sending some pain pills for him. Gave report to Jennyfer the [facility] nurse. Pt is eating lunch prior to his discharge.

## 2020-02-11 NOTE — PROGRESS NOTES
[facility]  WO Nurse Inpatient Wound Assessment   Reason for consultation: Evaluate and treat  Lt distal inner coccyx area suspected PI     Assessment  Lt distal inner coccyx area: Stage 2 PI with surrounding non-blanchable erythema. No local s/s infection    Treatment Plan  Wound care: Lt distal inner buttock  Lt distal inner buttock PI  1. Change dressing on even days and prn  2. Foam dressing   3. Pt sent with chair cushion    Orders In AVS      Nursing to notify the Provider(s) and re-consult the WO Nurse if wound(s) deteriorates or new skin concern.    Patient History  According to provider note(s):  s/p Coronary artery bypass grafting x3 with left internal mammary artery to left anterior descending, reverse saphenous vein graft to posterior descending artery, reverse saphenous vein graft to obtuse marginal 2 artery, endoscopic vein harvest from bilateral lower extremities, intraoperative SANDRA by Dr. Mable Barrera     Objective Data  Containment of urine/stool: brief and incontinence protocol for FIC/UIC    Active Diet Order:  Orders Placed This Encounter      Diet    Output:   I/O last 3 completed shifts:  In: 720 [P.O.:720]  Out: 780 [Urine:780]    Risk Assessment:   Sensory Perception: 4-->no impairment  Moisture: 3-->occasionally moist  Activity: 3-->walks occasionally  Mobility: 3-->slightly limited  Nutrition: 3-->adequate  Friction and Shear: 3-->no apparent problem  Andrea Score: 19                          Labs:   Recent Labs   Lab 02/09/20  0713   HGB 8.7*   WBC 58.3*       Physical Exam    Wound Location:  Lt distal inner coccyx   Date of last WOC  photo: 2-10-20            Measurements (length x width x depth, in cm): 2.0cm x 1.0cm x .2cm   Wound Base 100% pink  Tunneling: N/A  Undermining: N/A  Palpation of the wound bed: normal   Periwound skin: intact  Color:  Non-blanchable darker erythema  Temperature: normal   Drainage: none  Description of drainage: none  Odor:  none  Pain: very tender per patient    Interventions  Current support surface: atmos air  Current off-loading measures:  pillows  Visual inspection of wound(s) completed  Wound Care: completed  Supplies: for discharge  Education provided: off-loading by reclining and elevating legs on pillows with heel suspended  Discussed plan of care with Patient, Nursing, CC    Janki Hinson RN [facility] Nurse

## 2020-02-12 ENCOUNTER — MEDICAL CORRESPONDENCE (OUTPATIENT)
Dept: HEALTH INFORMATION MANAGEMENT | Facility: CLINIC | Age: 70
End: 2020-02-12

## 2020-02-12 ENCOUNTER — TELEPHONE (OUTPATIENT)
Dept: FAMILY MEDICINE | Facility: CLINIC | Age: 70
End: 2020-02-12

## 2020-02-12 LAB
DIFFERENTIAL METHOD BLD: ABNORMAL
EOSINOPHIL # BLD AUTO: 0.8 10E9/L (ref 0–0.7)
EOSINOPHIL NFR BLD AUTO: 2 %
ERYTHROCYTE [DISTWIDTH] IN BLOOD BY AUTOMATED COUNT: 20.3 % (ref 10–15)
HCT VFR BLD AUTO: 35.9 % (ref 40–53)
HGB BLD-MCNC: 11.6 G/DL (ref 13.3–17.7)
LYMPHOCYTES # BLD AUTO: 3 10E9/L (ref 0.8–5.3)
LYMPHOCYTES NFR BLD AUTO: 7 %
MCH RBC QN AUTO: 30 PG (ref 26.5–33)
MCHC RBC AUTO-ENTMCNC: 32.3 G/DL (ref 31.5–36.5)
MCV RBC AUTO: 93 FL (ref 78–100)
METAMYELOCYTES # BLD: 1.7 10E9/L
METAMYELOCYTES NFR BLD MANUAL: 4 %
MYELOCYTES # BLD: 6.3 10E9/L
MYELOCYTES NFR BLD MANUAL: 15 %
NEUTROPHILS # BLD AUTO: 30.5 10E9/L (ref 1.6–8.3)
NEUTROPHILS NFR BLD AUTO: 72 %
PLATELET # BLD AUTO: 302 10E9/L (ref 150–450)
RBC # BLD AUTO: 3.87 10E12/L (ref 4.4–5.9)
WBC # BLD AUTO: 42.3 10E9/L (ref 4–11)

## 2020-02-12 NOTE — TELEPHONE ENCOUNTER
Reason for Call:  Home Health Care    Ruby with [facility] called regarding (reason for call): verbal orders     Orders are needed for this patient:  She would not tell me, asked that the nurse just call her.   Pt Provider: Dr. Scott     Phone Number Homecare Nurse can be reached at: [phone]    Can we leave a detailed message on this number? YES    Phone number patient can be reached at: Home number on file [phone] (home)    Best Time: any     Call taken on 2/12/2020 at 1:11 PM by Leatha Grover

## 2020-02-12 NOTE — TELEPHONE ENCOUNTER
"Pt discharged from [facility] on 2/10/20. Needs Hospital F/U within 7-10 days. No available 40 minute appts. Could he be worked in?     Please call pt at home. Ok to leave a detailed message if pt does not answer [phone]    ED/Discharge Protocol    \"Hi, my name is Claudine Mello RN, a registered nurse, and I am calling on behalf of Dr. Scott's office at [facility].  I am calling to follow up and see how things are going for you after your recent visit.\"    \"I see that you were in the (ER/UC/IP) on 1/31/2020.    How are you doing now that you are home?\" feeling a little better. Some shortness of breath    Is patient experiencing symptoms that may require a hospital visit?  No    Discharge Instructions    \"Let's review your discharge instructions.  What is/are the follow-up recommendations?  Pt. Response: I don't think I have any questions.     \"Were you instructed to make a follow-up appointment?\"  Pt. Response: No.       \"When you see the provider, I would recommend that you bring your discharge instructions with you.    Medications    \"How many new medications are you on since your hospitalization/ED visit?\"    0-1  \"How many of your current medicines changed (dose, timing, name, etc.) while you were in the hospital/ED visit?\"   0-1  \"Do you have questions about your medications?\"   No  \"Were you newly diagnosed with heart failure, COPD, diabetes or did you have a heart attack?\"   No  For patients on insulin: \"Did you start on insulin in the hospital or did you have your insulin dose changed?\"   No  Post Discharge Medication Reconciliation Status: unable to reconcile discharge medications due to pt declines this today. .    Was MTM referral placed (*Make sure to put transitions as reason for referral)?   No    Call Summary    \"Do you have any questions or concerns about your condition or care plan at the moment?\"    No  Triage nurse advice given: No, home health coming out today.    Patient was in ER twice in " "the past year (assess appropriateness of ER visits.)      \"If you have questions or things don't continue to improve, we encourage you contact us through the main clinic number,  [phone].  Even if the clinic is not open, triage nurses are available 24/7 to help you.     We would like you to know that our clinic has extended hours (provide information).  We also have urgent care (provide details on closest location and hours/contact info)\"      \"Thank you for your time and take care!\"  Claudine Mello, RN, BSN      "

## 2020-02-12 NOTE — TELEPHONE ENCOUNTER
Okay to have a 20-minute appointment for him, be sure within a week after discharge from the hospital.

## 2020-02-13 ENCOUNTER — TELEPHONE (OUTPATIENT)
Dept: OTHER | Facility: CLINIC | Age: 70
End: 2020-02-13

## 2020-02-13 LAB
ANISOCYTOSIS BLD QL SMEAR: ABNORMAL
BASOPHILS # BLD AUTO: 0 10E9/L (ref 0–0.2)
BASOPHILS NFR BLD AUTO: 0 %
DIFFERENTIAL METHOD BLD: ABNORMAL
EOSINOPHIL # BLD AUTO: 1.9 10E9/L (ref 0–0.7)
EOSINOPHIL NFR BLD AUTO: 5.2 %
ERYTHROCYTE [DISTWIDTH] IN BLOOD BY AUTOMATED COUNT: 20.3 % (ref 10–15)
HCT VFR BLD AUTO: 32.3 % (ref 40–53)
HGB BLD-MCNC: 9.9 G/DL (ref 13.3–17.7)
LYMPHOCYTES # BLD AUTO: 3.7 10E9/L (ref 0.8–5.3)
LYMPHOCYTES NFR BLD AUTO: 10.3 %
MCH RBC QN AUTO: 29.5 PG (ref 26.5–33)
MCHC RBC AUTO-ENTMCNC: 30.7 G/DL (ref 31.5–36.5)
MCV RBC AUTO: 96 FL (ref 78–100)
METAMYELOCYTES # BLD: 2.5 10E9/L
METAMYELOCYTES NFR BLD MANUAL: 6.9 %
MICROCYTES BLD QL SMEAR: PRESENT
MONOCYTES # BLD AUTO: 1.5 10E9/L (ref 0–1.3)
MONOCYTES NFR BLD AUTO: 4.3 %
NEUTROPHILS # BLD AUTO: 26.1 10E9/L (ref 1.6–8.3)
NEUTROPHILS NFR BLD AUTO: 73.3 %
NRBC # BLD AUTO: 7.4 10*3/UL
NRBC BLD AUTO-RTO: 21 /100
PLATELET # BLD AUTO: 380 10E9/L (ref 150–450)
PLATELET # BLD EST: NORMAL 10*3/UL
POLYCHROMASIA BLD QL SMEAR: ABNORMAL
RBC # BLD AUTO: 3.36 10E12/L (ref 4.4–5.9)
WBC # BLD AUTO: 35.6 10E9/L (ref 4–11)

## 2020-02-13 PROCEDURE — 85025 COMPLETE CBC W/AUTO DIFF WBC: CPT | Performed by: FAMILY MEDICINE

## 2020-02-13 NOTE — TELEPHONE ENCOUNTER
48 hour post op call    ACTIVITY  How are you doing with activity? I am doing ok. Therapy is coming out to the house.   Are you continuing to use your IS 3-4 times a day and do you have any shortness of breath. My breathing is better. Yes I am using my IS.   Are you weighing yourself daily and has it been stable?. I am loosing weight. The swelling is getting better.     PAIN  How is your pain, what are you doing for your pain? I take the pain pills every 4 hours. I take tylenol and the muscle relaxer on ocassin. Instructed t take tylenol 3 X a day and Robaxin 4 X a day and take the Oxycodone for mod to severe pain only.     Are you still doing sternal precautions? Yes   Do you hear any clicking when you are moving or taking a deep breath?  No I do no have any clicking      INCISION: It looks ok. My legs are looking a little better.     ASSISTANCE  Do you have someone at home to help you with your daily activities and transportation needs? I an staying with my sister.       MEDICATIONS  I would like to review your discharge medications and answer any questions you may have.   reviewed briefly. States his sister is helping manage that.     Are you on a blood thinner/Coumadin  NO    Who is managing your Coumadin dosing/INR? NA       FOLLOW UP       Scheduled for cardiac rehab? I am getting home care   Scheduled to see our PA or Surgeon? 2/19 reviewed in [facility]  Scheduled to see your cardiologist ? 4/23  Scheduled to see your primary care physician? Not yet  Do you have our contact information? Contact information [email or web address]      Were you happy with your care? Yes I was  Could we have done anything better?  I wish I could of stayed longer or go to TCU but my insurance no.

## 2020-02-14 ENCOUNTER — OFFICE VISIT (OUTPATIENT)
Dept: FAMILY MEDICINE | Facility: CLINIC | Age: 70
End: 2020-02-14
Payer: COMMERCIAL

## 2020-02-14 ENCOUNTER — HOSPITAL ENCOUNTER (OUTPATIENT)
Dept: GENERAL RADIOLOGY | Facility: CLINIC | Age: 70
Discharge: HOME | End: 2020-02-14
Attending: FAMILY MEDICINE | Admitting: FAMILY MEDICINE
Payer: COMMERCIAL

## 2020-02-14 VITALS
BODY MASS INDEX: 29.65 KG/M2 | DIASTOLIC BLOOD PRESSURE: 64 MMHG | OXYGEN SATURATION: 97 % | WEIGHT: 195 LBS | SYSTOLIC BLOOD PRESSURE: 106 MMHG | HEART RATE: 85 BPM | TEMPERATURE: 98.3 F | RESPIRATION RATE: 14 BRPM

## 2020-02-14 DIAGNOSIS — D47.1 MYELOPROLIFERATIVE DISORDER (H): ICD-10-CM

## 2020-02-14 DIAGNOSIS — J90 PLEURAL EFFUSION: ICD-10-CM

## 2020-02-14 DIAGNOSIS — Z09 HOSPITAL DISCHARGE FOLLOW-UP: Primary | ICD-10-CM

## 2020-02-14 DIAGNOSIS — M79.89 LEG SWELLING: ICD-10-CM

## 2020-02-14 DIAGNOSIS — K59.00 CONSTIPATION, UNSPECIFIED CONSTIPATION TYPE: ICD-10-CM

## 2020-02-14 DIAGNOSIS — I25.118 CORONARY ARTERY DISEASE INVOLVING NATIVE CORONARY ARTERY OF NATIVE HEART WITH OTHER FORM OF ANGINA PECTORIS (H): ICD-10-CM

## 2020-02-14 DIAGNOSIS — L89.159 PRESSURE INJURY OF SKIN OF SACRAL REGION, UNSPECIFIED INJURY STAGE: ICD-10-CM

## 2020-02-14 DIAGNOSIS — I10 ESSENTIAL HYPERTENSION: ICD-10-CM

## 2020-02-14 DIAGNOSIS — Z95.1 S/P CABG (CORONARY ARTERY BYPASS GRAFT): ICD-10-CM

## 2020-02-14 PROBLEM — D72.829 LEUKOCYTOSIS, UNSPECIFIED TYPE: Status: RESOLVED | Noted: 2019-12-22 | Resolved: 2020-02-14

## 2020-02-14 PROCEDURE — 71046 X-RAY EXAM CHEST 2 VIEWS: CPT | Mod: TC

## 2020-02-14 PROCEDURE — 99215 OFFICE O/P EST HI 40 MIN: CPT | Performed by: FAMILY MEDICINE

## 2020-02-14 RX ORDER — AMOXICILLIN 250 MG
2 CAPSULE ORAL 2 TIMES DAILY
Qty: 120 TABLET | Refills: 0 | Status: SHIPPED | OUTPATIENT
Start: 2020-02-14 | End: 2020-05-29

## 2020-02-14 RX ORDER — FUROSEMIDE 40 MG
TABLET ORAL
Qty: 45 TABLET | Refills: 1 | Status: SHIPPED | OUTPATIENT
Start: 2020-02-14 | End: 2020-05-29

## 2020-02-14 ASSESSMENT — PAIN SCALES - GENERAL: PAINLEVEL: NO PAIN (0)

## 2020-02-14 NOTE — PROGRESS NOTES
Subjective     Renny Gannon is a 69 year old male who presents to clinic today for the following health issues:    \[facility]\""       Hospital Follow-up Visit:    Hospital/Nursing Home/IP Rehab Facility: [facility]  Date of Admission: 01/31/2020  Date of Discharge: 02/10/2020  Reason(s) for Admission: CAD with CABG            Problems taking medications regularly:  None       Medication changes since discharge: None       Problems adhering to non-medication therapy:  None    Summary of hospitalization:  [facility] discharge summary reviewed  Diagnostic Tests/Treatments reviewed.  Follow up needed: none  Other Healthcare Providers Involved in Patient s Care:         Homecare, Specialist appointment - Heme-Onc, Dr. Cristina and Surgical follow-up appointment - Cardiology  Update since discharge: stable.  Additional issues: Leg wounds, sacral pressure ulcer   Post Discharge Medication Reconciliation: discharge medications reconciled and changed, per note/orders (see AVS).  Plan of care communicated with patient and family     Coding guidelines for this visit:  Type of Medical   Decision Making Face-to-Face Visit       within 7 Days of discharge Face-to-Face Visit        within 14 days of discharge   Moderate Complexity [id number]   High Complexity [id number]          Renny is here today with his sister for follow-up after his hospitalization for CABG surgery. He was recently diagnosed with myeloproliferative disorder and CAD.  He has been on oral chemo therapy for his myeloproliferative which is managed by the hematologist/oncologist.  He tolerated the chemotherapy well.  He also had an open heart surgery on 1/31/2020 with no complication.  He was discharged from the hospital on 2/10/2020 and has been taking the medication as prescribed with no side effect.  Been taking aspirin 325 mg daily, Lipitor, iron sulfate, Lasix 40 mg a morning, hydroxyurea, metoprolol and potassium.  Has not been taking the  "Robaxin as he doesn't feel the need for it. Was unable to live in TCU after discharge hospital due to insurance issues.  Currently living with his sister and brother-in-law who are helping with his care.  Also has home care for wound dressings every other day.      He was seen in the ED the day after he was discharged from the hospital for shortness of breath, cocci pain with skin breakdown and bilateral lower extremity edema with blistering.  Home health care has been working on the wound care for pressure ulcers, feet blistering and leg swelling.  Overall he feels \"so-so\" since discharge.  No chest pain or incisional pain.  No fever or chills.  No drainage from the incisions on the chest.  Appetite is is low but been drinking a lot of water.  No nausea or vomiting.  Been constipated.  Pain is tolerable and has not been taking much of the pain med. Been taking Senna once daily.  Last bowel movement was 2 to 3 days ago, and had to strain to pass stool.  Leg swelling is about the same.    Following closely with heme-onc for myeloproliferative disorder.  Will follow-up with Dr. Florez next week.  Has been tolerating his chemotherapy well, without any nausea or vomiting.  Does experience this decrease in his appetite, but still eating and drinking plenty of fluids.      Also has postop follow-up next week with cardiology.    Patient Active Problem List   Diagnosis     Essential hypertension     Memory loss     Obesity (BMI 35.0-39.9) with comorbidity (H)     Insomnia, unspecified type     Coronary artery disease involving native coronary artery of native heart with other form of angina pectoris (H)     Status post coronary angiogram     CAD (coronary artery disease)     Myeloproliferative disorder (H)     S/P CABG (coronary artery bypass graft)     Fluid overload     Transient hyperglycemia post procedure     TIA (transient ischemic attack)     Dysphagia     Past Surgical History:   Procedure Laterality Date     BONE " MARROW BIOPSY, BONE SPECIMEN, NEEDLE/TROCAR N/A 2019    Procedure: BIOPSY, BONE MARROW;  Surgeon: Aguilar Krause MD;  Location: [facility] OR     BYPASS GRAFT ARTERY CORONARY N/A 2020    Procedure: CORONARY ARTERY BYPASS GRAFTING X 3 - LIMA TO LAD, SV TO OM  AND PDA; ENDOVEIN HARVEST OF BILATERAL LEGS; ON PUMP WITH SANDRA;  Surgeon: Mable Barrera MD;  Location:  OR     CV CORONARY ANGIOGRAM Left 2020    Procedure: Coronary Angiogram;  Surgeon: Devin Bentley MD;  Location:  [facility]     NO HISTORY OF SURGERY         Social History     Tobacco Use     Smoking status: Former Smoker     Years: 30.00     Types: Cigarettes     Start date: 1961     Last attempt to quit: 2001     Years since quittin.0     Smokeless tobacco: Never Used   Substance Use Topics     Alcohol use: No     Frequency: Never     Family History   Problem Relation Age of Onset     No Known Problems Mother      Unknown/Adopted Father      Unknown/Adopted Maternal Grandmother      Unknown/Adopted Maternal Grandfather      Unknown/Adopted Paternal Grandmother      Unknown/Adopted Paternal Grandfather      Cancer Brother         lung cancer - smoking     No Known Problems Sister      Coronary Artery Disease Brother          of MI at 66     No Known Problems Sister          Current Outpatient Medications   Medication Sig Dispense Refill     aspirin (ASA) 325 MG tablet Take 1 tablet (325 mg) by mouth daily 30 tablet 0     atorvastatin (LIPITOR) 40 MG tablet Take 1 tablet (40 mg) by mouth daily 30 tablet 11     famotidine (PEPCID) 20 MG tablet Take 1 tablet (20 mg) by mouth 2 times daily 28 tablet 0     ferrous sulfate (FEROSUL) 325 (65 Fe) MG tablet Take 1 tablet (325 mg) by mouth daily 30 tablet 0     furosemide (LASIX) 40 MG tablet Take 1 tablet in am and 0.5 tablet at 1 pm 45 tablet 1     HYDROcodone-acetaminophen (NORCO) 5-325 MG tablet Take 1 tablet by mouth every 4 hours as needed  15 tablet 0     hydroxyurea (HYDREA) 500 MG capsule Take 1 capsule (500 mg) by mouth daily 90 capsule 1     methocarbamol (ROBAXIN) 500 MG tablet Take 1 tablet (500 mg) by mouth 4 times daily as needed for muscle spasms 20 tablet 0     metoprolol succinate ER (TOPROL-XL) 100 MG 24 hr tablet Take 1 tablet (100 mg) by mouth 2 times daily 60 tablet 3     potassium chloride ER (K-DUR/KLOR-CON M) 20 MEQ CR tablet Take 1 tablet (20 mEq) by mouth daily 30 tablet 0     senna-docusate (SENOKOT-S/PERICOLACE) 8.6-50 MG tablet Take 2 tablets by mouth 2 times daily 120 tablet 0     vitamin B-12 (CYANOCOBALAMIN) 250 MCG tablet Take 250 mcg by mouth daily       acetaminophen (TYLENOL) 325 MG tablet Take 2 tablets (650 mg) by mouth every 4 hours as needed for mild pain (Patient not taking: Reported on 2/14/2020) 40 tablet 0     Allergies   Allergen Reactions     No Known Drug Allergies      Seasonal Allergies        Reviewed and updated as needed this visit by Provider         Review of Systems   ROS COMP: Constitutional, HEENT, cardiovascular, pulmonary, GI, , musculoskeletal, neuro, skin, endocrine and psych systems are negative, except as otherwise noted.      Objective    /64   Pulse 85   Temp 98.3  F (36.8  C) (Temporal)   Resp 14   Wt 88.5 kg (195 lb)   SpO2 97%   BMI 29.65 kg/m    Body mass index is 29.65 kg/m .  Physical Exam   GENERAL:  alert and no distress - general weakness - on wheelchair  EYES: Eyes grossly normal to inspection, PERRL and conjunctivae and sclerae normal  RESP: lungs clear to auscultation - no rales, rhonchi or wheezes.  Decreased breath sounds at the left base.  CV: regular rate and rhythm, normal S1 S2, no S3 or S4, no murmur, click or rub  MS: 2+ pitting edema to knee in bilateral lower extremities, lower extremities cool to touch bilaterally  SKIN:   - Left lower extremity: tense, intact, large bullae on dorsal aspect of foot at the base of the toes, appropriate healing incision on  medial calf, just below the knee - no redness or drainage.  -Right lower extremity: large, bursted bullae on dorsal aspect of foot, sporadic small intact bullae, well healing incision on medial upper calf just below the knee as well. No redness or drainage.  - Sacrum: stage 1 pressure ulcer  NEURO: Normal strength and tone, mentation intact and speech normal.    PSYCH: mentation appears normal, affect flat    Diagnostic Test Results:  Labs reviewed in Epic  CXR - left effusion, unchanged from 3 days ago        Assessment & Plan     1. Hospital discharge follow-up  Was hospitalized for about 10 days for CABG without complication.  Been taking the medications as prescribed with no side effect.  Chest wound is healing as expected and pain is overall controlled.  No chest pain or worsening SOB.  Not worsening of orthopnea.  Weight has been stable - dropped couple pound in the last 4 days.  Overall he is doing well. Currently living with sister with home health care every other day.  Will continue with the current meds and follow up with Cardiology as scheduled next week.  Continue with the wound care.  Encouraged to monitor his weight daily - call in if gained more than 3 pounds in 24 hrs or 5 pounds in 72 hrs.  Also he was educated about symptoms that need to be seen or call in.    Discussed depression associated with CAD.  He does not feel depressed and does want intervention at this time. Encouraged to let me if develops depressive symptoms.       2. Coronary artery disease involving native coronary artery of native heart with other form of angina pectoris (H)  Status post CABG.  Please see # 1 above for further details.      3. S/P CABG (coronary artery bypass graft)  Please see #1 above.    4. Myeloproliferative disorder (H)  Managed by Dr. Cristina and tolerated the oral chemotherapy well.  Last CBC showed improvement in his white blood cell count.  No nausea or vomiting and tolerates oral intake well.  Follow-up  appointment with heme-onc next week.    5. Essential hypertension  Blood pressure well controlled - on the lower side today.  No lightheadedness or dizziness.  Will follow blood pressures closely with increase of Lasix.    6. Pressure injury of skin of sacral region, unspecified injury stage  Completed the course of Keflex.  Wound on chest and leg are healing as expected. Mild ulcer pressured noted in the sacral area with no sign of active infection. Discussed with him about the nature of the condition.  Continue with the current wound care and home health.  Encourage to change position frequently and to try donut seating. Symptoms that need to be seen or call in discussed.  Follow-up in 1 week for close monitoring - low threshold to refer to wound care clinic.    7. Leg swelling  Bilateral leg swelling with pleural effusion. BNP level was high recently. Orthopnea stable - not worse. Weight also has been stable. Legs swelling is not getting worse or better.  Last echocardiogram on 1/9/2020 was normal with EF of 60%. Had opened heart surgery on 1/30/2020.  Does not look acutely sick today.  His leg swelling is most likely due to venous stasis.  Encouraged to follow up with cardiologist as scheduled.  Encouraged leg elevation and Ace wrapping of the leg as tolerated.  Avoid high salt diet.  Continue with the Lasix at 40 mg in the morning, added 20 mg at 1 PM.  Also encouraged to monitor his weight closely, call in if gaining more than 3 pounds in 24 hours or 5 pounds in 72 hours.  Symptom for congestive failure also discussed and recommend to call in if the answer question.    - furosemide (LASIX) 40 MG tablet; Take 1 tablet in am and 0.5 tablet at 1 pm  Dispense: 45 tablet; Refill: 1    8. Pleural effusion  Chest x-ray, reviewed and unchanged from the last chest x-ray couple days ago.  Please see #7 above for further details.    - XR Chest 2 Views; Future   - furosemide (LASIX) 40 MG tablet; Take 1 tablet in am and  0.5 tablet at 1 pm  Dispense: 45 tablet; Refill: 1    9.  Constipation  Recommended to increase Senna to 2 tablets twice daily and MiraLAX as needed.  Also recommended to increase fiber intake.      - senna-docusate (SENOKOT-S/PERICOLACE) 8.6-50 MG tablet; Take 2 tablets by mouth 2 times daily  Dispense: 120 tablet; Refill: 0      Follow-up in 1 week.    Return in about 1 week (around 2/21/2020) for folow up - pressure ulcers and leg swelling.    This document serves as a record of the services and decisions personally performed and made by Angus Scott MD.  It was created on his behalf by Kerry George, a trained medical student and scribe.  The creation of this record is based on the provider's personal observations and the statements of the patient.     Kerry George, MS3  February 14, 2020    Angus Clements Mai, MD  [facility]

## 2020-02-14 NOTE — PROGRESS NOTES
Appropriate assistive devices provided during their visit. yes (Yes, No, N/A) wheelchair (list device)    Exam table and/or cart  placed in the lowest position. yes (Yes, No, N/A)    Brakes on tables/carts/wheelchairs used at all times. yes (Yes, No, N/A)    Non slip footwear applied. yes (Yes, No, NA)    Patient was accompanied by staff throughout visit. yes (Yes, No, N/A)    Equipment safety straps used. yes (Yes, No, N/A)    Assist with toileting. na (Yes, No, N/A)

## 2020-02-15 ENCOUNTER — HOSPITAL ENCOUNTER (INPATIENT)
Facility: CLINIC | Age: 70
LOS: 9 days | Discharge: NURSING, REHAB OR LONG-TERM CARE | DRG: 187 | End: 2020-02-24
Attending: INTERNAL MEDICINE | Admitting: INTERNAL MEDICINE
Payer: COMMERCIAL

## 2020-02-15 ENCOUNTER — HOSPITAL ENCOUNTER (EMERGENCY)
Facility: CLINIC | Age: 70
Discharge: ANOTHER HOSPITAL | End: 2020-02-15
Attending: FAMILY MEDICINE | Admitting: FAMILY MEDICINE
Payer: COMMERCIAL

## 2020-02-15 ENCOUNTER — APPOINTMENT (OUTPATIENT)
Dept: GENERAL RADIOLOGY | Facility: CLINIC | Age: 70
End: 2020-02-15
Attending: FAMILY MEDICINE
Payer: COMMERCIAL

## 2020-02-15 DIAGNOSIS — D47.1 MYELOPROLIFERATIVE DISORDER (H): ICD-10-CM

## 2020-02-15 DIAGNOSIS — E87.70 HYPERVOLEMIA, UNSPECIFIED HYPERVOLEMIA TYPE: ICD-10-CM

## 2020-02-15 DIAGNOSIS — Z95.1 S/P CABG (CORONARY ARTERY BYPASS GRAFT): ICD-10-CM

## 2020-02-15 DIAGNOSIS — I25.10 CORONARY ARTERY DISEASE INVOLVING NATIVE HEART, ANGINA PRESENCE UNSPECIFIED, UNSPECIFIED VESSEL OR LESION TYPE: ICD-10-CM

## 2020-02-15 DIAGNOSIS — I10 ESSENTIAL HYPERTENSION: ICD-10-CM

## 2020-02-15 DIAGNOSIS — G47.00 INSOMNIA, UNSPECIFIED TYPE: ICD-10-CM

## 2020-02-15 DIAGNOSIS — R41.3 MEMORY LOSS: ICD-10-CM

## 2020-02-15 PROBLEM — R06.02 SOB (SHORTNESS OF BREATH): Status: ACTIVE | Noted: 2020-02-15

## 2020-02-15 PROBLEM — E66.01 MORBID OBESITY (H): Status: RESOLVED | Noted: 2019-03-11 | Resolved: 2020-02-15

## 2020-02-15 LAB
ALBUMIN SERPL-MCNC: 3 G/DL (ref 3.4–5)
ALP SERPL-CCNC: 105 U/L (ref 40–150)
ALT SERPL W P-5'-P-CCNC: 28 U/L (ref 0–70)
ANION GAP SERPL CALCULATED.3IONS-SCNC: 7 MMOL/L (ref 3–14)
APTT PPP: 35 SEC (ref 22–37)
AST SERPL W P-5'-P-CCNC: 34 U/L (ref 0–45)
BASE EXCESS BLDV CALC-SCNC: 3.4 MMOL/L
BASOPHILS # BLD AUTO: 0 10E9/L (ref 0–0.2)
BASOPHILS NFR BLD AUTO: 0 %
BILIRUB SERPL-MCNC: 0.8 MG/DL (ref 0.2–1.3)
BUN SERPL-MCNC: 22 MG/DL (ref 7–30)
CALCIUM SERPL-MCNC: 8.7 MG/DL (ref 8.5–10.1)
CHLORIDE SERPL-SCNC: 104 MMOL/L (ref 94–109)
CO2 SERPL-SCNC: 27 MMOL/L (ref 20–32)
CREAT SERPL-MCNC: 1.13 MG/DL (ref 0.66–1.25)
CRP SERPL-MCNC: 24.2 MG/L (ref 0–8)
DIFFERENTIAL METHOD BLD: ABNORMAL
EOSINOPHIL # BLD AUTO: 0 10E9/L (ref 0–0.7)
EOSINOPHIL NFR BLD AUTO: 0 %
ERYTHROCYTE [DISTWIDTH] IN BLOOD BY AUTOMATED COUNT: 21.5 % (ref 10–15)
FLUAV+FLUBV AG SPEC QL: NEGATIVE
FLUAV+FLUBV AG SPEC QL: NEGATIVE
GFR SERPL CREATININE-BSD FRML MDRD: 65 ML/MIN/{1.73_M2}
GLUCOSE SERPL-MCNC: 112 MG/DL (ref 70–99)
HCO3 BLDV-SCNC: 29 MMOL/L (ref 21–28)
HCT VFR BLD AUTO: 33 % (ref 40–53)
HGB BLD-MCNC: 10.3 G/DL (ref 13.3–17.7)
INR PPP: 1.16 (ref 0.86–1.14)
LACTATE BLD-SCNC: 0.8 MMOL/L (ref 0.7–2)
LYMPHOCYTES # BLD AUTO: 5.5 10E9/L (ref 0.8–5.3)
LYMPHOCYTES NFR BLD AUTO: 20 %
MCH RBC QN AUTO: 30.6 PG (ref 26.5–33)
MCHC RBC AUTO-ENTMCNC: 31.2 G/DL (ref 31.5–36.5)
MCV RBC AUTO: 98 FL (ref 78–100)
MONOCYTES # BLD AUTO: 0.5 10E9/L (ref 0–1.3)
MONOCYTES NFR BLD AUTO: 2 %
NEUTROPHILS # BLD AUTO: 21.4 10E9/L (ref 1.6–8.3)
NEUTROPHILS NFR BLD AUTO: 78 %
NT-PROBNP SERPL-MCNC: 2853 PG/ML (ref 0–900)
O2/TOTAL GAS SETTING VFR VENT: 21 %
PCO2 BLDV: 46 MM HG (ref 40–50)
PH BLDV: 7.4 PH (ref 7.32–7.43)
PLATELET # BLD AUTO: 329 10E9/L (ref 150–450)
PO2 BLDV: 20 MM HG (ref 25–47)
POTASSIUM SERPL-SCNC: 4.5 MMOL/L (ref 3.4–5.3)
PROT SERPL-MCNC: 7.2 G/DL (ref 6.8–8.8)
RBC # BLD AUTO: 3.37 10E12/L (ref 4.4–5.9)
SODIUM SERPL-SCNC: 138 MMOL/L (ref 133–144)
SPECIMEN SOURCE: NORMAL
TROPONIN I SERPL-MCNC: 0.07 UG/L (ref 0–0.04)
TROPONIN I SERPL-MCNC: 0.09 UG/L (ref 0–0.04)
WBC # BLD AUTO: 27.4 10E9/L (ref 4–11)

## 2020-02-15 PROCEDURE — 87800 DETECT AGNT MULT DNA DIREC: CPT | Performed by: FAMILY MEDICINE

## 2020-02-15 PROCEDURE — 93005 ELECTROCARDIOGRAM TRACING: CPT | Performed by: FAMILY MEDICINE

## 2020-02-15 PROCEDURE — 84484 ASSAY OF TROPONIN QUANT: CPT | Performed by: FAMILY MEDICINE

## 2020-02-15 PROCEDURE — 83605 ASSAY OF LACTIC ACID: CPT | Performed by: FAMILY MEDICINE

## 2020-02-15 PROCEDURE — 99284 EMERGENCY DEPT VISIT MOD MDM: CPT | Mod: 25 | Performed by: FAMILY MEDICINE

## 2020-02-15 PROCEDURE — 71046 X-RAY EXAM CHEST 2 VIEWS: CPT | Mod: TC

## 2020-02-15 PROCEDURE — 36415 COLL VENOUS BLD VENIPUNCTURE: CPT | Performed by: FAMILY MEDICINE

## 2020-02-15 PROCEDURE — 83880 ASSAY OF NATRIURETIC PEPTIDE: CPT | Performed by: FAMILY MEDICINE

## 2020-02-15 PROCEDURE — 99285 EMERGENCY DEPT VISIT HI MDM: CPT | Mod: 25 | Performed by: FAMILY MEDICINE

## 2020-02-15 PROCEDURE — 93010 ELECTROCARDIOGRAM REPORT: CPT | Mod: Z6 | Performed by: FAMILY MEDICINE

## 2020-02-15 PROCEDURE — 25000125 ZZHC RX 250: Performed by: FAMILY MEDICINE

## 2020-02-15 PROCEDURE — 87186 SC STD MICRODIL/AGAR DIL: CPT | Performed by: FAMILY MEDICINE

## 2020-02-15 PROCEDURE — 87077 CULTURE AEROBIC IDENTIFY: CPT | Performed by: FAMILY MEDICINE

## 2020-02-15 PROCEDURE — 80053 COMPREHEN METABOLIC PANEL: CPT | Performed by: EMERGENCY MEDICINE

## 2020-02-15 PROCEDURE — 84484 ASSAY OF TROPONIN QUANT: CPT | Performed by: EMERGENCY MEDICINE

## 2020-02-15 PROCEDURE — 21000001 ZZH R&B HEART CARE

## 2020-02-15 PROCEDURE — 94640 AIRWAY INHALATION TREATMENT: CPT

## 2020-02-15 PROCEDURE — 85730 THROMBOPLASTIN TIME PARTIAL: CPT | Performed by: FAMILY MEDICINE

## 2020-02-15 PROCEDURE — 87040 BLOOD CULTURE FOR BACTERIA: CPT | Performed by: FAMILY MEDICINE

## 2020-02-15 PROCEDURE — 85610 PROTHROMBIN TIME: CPT | Performed by: FAMILY MEDICINE

## 2020-02-15 PROCEDURE — 82803 BLOOD GASES ANY COMBINATION: CPT | Performed by: FAMILY MEDICINE

## 2020-02-15 PROCEDURE — 83880 ASSAY OF NATRIURETIC PEPTIDE: CPT | Performed by: EMERGENCY MEDICINE

## 2020-02-15 PROCEDURE — 87804 INFLUENZA ASSAY W/OPTIC: CPT | Performed by: FAMILY MEDICINE

## 2020-02-15 PROCEDURE — 86140 C-REACTIVE PROTEIN: CPT | Performed by: FAMILY MEDICINE

## 2020-02-15 PROCEDURE — 85025 COMPLETE CBC W/AUTO DIFF WBC: CPT | Performed by: EMERGENCY MEDICINE

## 2020-02-15 RX ORDER — ACETAMINOPHEN 325 MG/1
650 TABLET ORAL EVERY 4 HOURS PRN
Status: DISCONTINUED | OUTPATIENT
Start: 2020-02-15 | End: 2020-02-15

## 2020-02-15 RX ORDER — HYDROXYUREA 500 MG/1
500 CAPSULE ORAL DAILY
Status: DISCONTINUED | OUTPATIENT
Start: 2020-02-16 | End: 2020-02-16

## 2020-02-15 RX ORDER — FUROSEMIDE 10 MG/ML
20 INJECTION INTRAMUSCULAR; INTRAVENOUS
Status: DISCONTINUED | OUTPATIENT
Start: 2020-02-16 | End: 2020-02-21

## 2020-02-15 RX ORDER — ONDANSETRON 4 MG/1
4 TABLET, ORALLY DISINTEGRATING ORAL EVERY 6 HOURS PRN
Status: DISCONTINUED | OUTPATIENT
Start: 2020-02-15 | End: 2020-02-24 | Stop reason: HOSPADM

## 2020-02-15 RX ORDER — ATORVASTATIN CALCIUM 40 MG/1
40 TABLET, FILM COATED ORAL DAILY
Status: DISCONTINUED | OUTPATIENT
Start: 2020-02-16 | End: 2020-02-24 | Stop reason: HOSPADM

## 2020-02-15 RX ORDER — ONDANSETRON 2 MG/ML
4 INJECTION INTRAMUSCULAR; INTRAVENOUS EVERY 6 HOURS PRN
Status: DISCONTINUED | OUTPATIENT
Start: 2020-02-15 | End: 2020-02-24 | Stop reason: HOSPADM

## 2020-02-15 RX ORDER — NITROGLYCERIN 0.4 MG/1
0.4 TABLET SUBLINGUAL EVERY 5 MIN PRN
Status: DISCONTINUED | OUTPATIENT
Start: 2020-02-15 | End: 2020-02-24 | Stop reason: HOSPADM

## 2020-02-15 RX ORDER — LIDOCAINE 40 MG/G
CREAM TOPICAL
Status: DISCONTINUED | OUTPATIENT
Start: 2020-02-15 | End: 2020-02-24 | Stop reason: HOSPADM

## 2020-02-15 RX ORDER — FAMOTIDINE 20 MG/1
20 TABLET, FILM COATED ORAL 2 TIMES DAILY
Status: DISCONTINUED | OUTPATIENT
Start: 2020-02-16 | End: 2020-02-24 | Stop reason: HOSPADM

## 2020-02-15 RX ORDER — FERROUS SULFATE 325(65) MG
325 TABLET ORAL DAILY
Status: DISCONTINUED | OUTPATIENT
Start: 2020-02-16 | End: 2020-02-24 | Stop reason: HOSPADM

## 2020-02-15 RX ORDER — METOPROLOL SUCCINATE 100 MG/1
100 TABLET, EXTENDED RELEASE ORAL 2 TIMES DAILY
Status: DISCONTINUED | OUTPATIENT
Start: 2020-02-16 | End: 2020-02-24 | Stop reason: HOSPADM

## 2020-02-15 RX ORDER — AMOXICILLIN 250 MG
2 CAPSULE ORAL 2 TIMES DAILY
Status: DISCONTINUED | OUTPATIENT
Start: 2020-02-16 | End: 2020-02-24 | Stop reason: HOSPADM

## 2020-02-15 RX ORDER — ASPIRIN 325 MG
325 TABLET ORAL DAILY
Status: DISCONTINUED | OUTPATIENT
Start: 2020-02-16 | End: 2020-02-24 | Stop reason: HOSPADM

## 2020-02-15 RX ORDER — IPRATROPIUM BROMIDE AND ALBUTEROL SULFATE 2.5; .5 MG/3ML; MG/3ML
3 SOLUTION RESPIRATORY (INHALATION) ONCE
Status: COMPLETED | OUTPATIENT
Start: 2020-02-15 | End: 2020-02-15

## 2020-02-15 RX ORDER — ACETAMINOPHEN 325 MG/1
650 TABLET ORAL EVERY 4 HOURS PRN
Status: DISCONTINUED | OUTPATIENT
Start: 2020-02-15 | End: 2020-02-24 | Stop reason: HOSPADM

## 2020-02-15 RX ORDER — ASPIRIN 81 MG/1
324 TABLET, CHEWABLE ORAL ONCE
Status: COMPLETED | OUTPATIENT
Start: 2020-02-16 | End: 2020-02-16

## 2020-02-15 RX ORDER — POTASSIUM CHLORIDE 1500 MG/1
20 TABLET, EXTENDED RELEASE ORAL DAILY
Status: DISCONTINUED | OUTPATIENT
Start: 2020-02-16 | End: 2020-02-24 | Stop reason: HOSPADM

## 2020-02-15 RX ORDER — CYANOCOBALAMIN (VITAMIN B-12) 500 MCG
250 TABLET ORAL DAILY
Status: DISCONTINUED | OUTPATIENT
Start: 2020-02-16 | End: 2020-02-24 | Stop reason: HOSPADM

## 2020-02-15 RX ORDER — ACETAMINOPHEN 650 MG/1
650 SUPPOSITORY RECTAL EVERY 4 HOURS PRN
Status: DISCONTINUED | OUTPATIENT
Start: 2020-02-15 | End: 2020-02-24 | Stop reason: HOSPADM

## 2020-02-15 RX ADMIN — IPRATROPIUM BROMIDE AND ALBUTEROL SULFATE 3 ML: .5; 3 SOLUTION RESPIRATORY (INHALATION) at 17:00

## 2020-02-15 ASSESSMENT — ENCOUNTER SYMPTOMS
COUGH: 0
WEAKNESS: 0
CHILLS: 0
FEVER: 0
BACK PAIN: 0
DIZZINESS: 0
SHORTNESS OF BREATH: 1
EYE REDNESS: 0
SORE THROAT: 0
WHEEZING: 0
DYSURIA: 0
FATIGUE: 1

## 2020-02-15 ASSESSMENT — ACTIVITIES OF DAILY LIVING (ADL)
COGNITION: 0 - NO COGNITION ISSUES REPORTED
RETIRED_COMMUNICATION: 0-->UNDERSTANDS/COMMUNICATES WITHOUT DIFFICULTY
SWALLOWING: 0-->SWALLOWS FOODS/LIQUIDS WITHOUT DIFFICULTY
AMBULATION: 1-->ASSISTIVE EQUIPMENT
TRANSFERRING: 1-->ASSISTIVE EQUIPMENT
TOILETING: 1-->ASSISTIVE EQUIPMENT
FALL_HISTORY_WITHIN_LAST_SIX_MONTHS: NO
RETIRED_EATING: 0-->INDEPENDENT
AMBULATION: 0-->INDEPENDENT
WHICH_OF_THE_ABOVE_FUNCTIONAL_RISKS_HAD_A_RECENT_ONSET_OR_CHANGE?: AMBULATION;TRANSFERRING;TOILETING;DRESSING
BATHING: 0-->INDEPENDENT
DRESS: 0-->INDEPENDENT

## 2020-02-15 ASSESSMENT — MIFFLIN-ST. JEOR: SCORE: 1588.17

## 2020-02-15 NOTE — ED TRIAGE NOTES
Presents to ED with shortness of breath x4 hours and intermittent chest heaviness and pressure.  Had triple bypass 1 week ago.

## 2020-02-16 VITALS
OXYGEN SATURATION: 97 % | RESPIRATION RATE: 15 BRPM | TEMPERATURE: 97.5 F | HEART RATE: 94 BPM | SYSTOLIC BLOOD PRESSURE: 98 MMHG | DIASTOLIC BLOOD PRESSURE: 75 MMHG

## 2020-02-16 LAB
ANION GAP SERPL CALCULATED.3IONS-SCNC: 5 MMOL/L (ref 3–14)
BUN SERPL-MCNC: 22 MG/DL (ref 7–30)
CALCIUM SERPL-MCNC: 8.9 MG/DL (ref 8.5–10.1)
CHLORIDE SERPL-SCNC: 103 MMOL/L (ref 94–109)
CO2 SERPL-SCNC: 27 MMOL/L (ref 20–32)
CREAT SERPL-MCNC: 1.16 MG/DL (ref 0.66–1.25)
CRP SERPL-MCNC: 19.1 MG/L (ref 0–8)
ERYTHROCYTE [DISTWIDTH] IN BLOOD BY AUTOMATED COUNT: 21.4 % (ref 10–15)
ERYTHROCYTE [SEDIMENTATION RATE] IN BLOOD BY WESTERGREN METHOD: 30 MM/H (ref 0–20)
GFR SERPL CREATININE-BSD FRML MDRD: 63 ML/MIN/{1.73_M2}
GLUCOSE SERPL-MCNC: 107 MG/DL (ref 70–99)
HCT VFR BLD AUTO: 33.1 % (ref 40–53)
HGB BLD-MCNC: 10 G/DL (ref 13.3–17.7)
LACTATE BLD-SCNC: 0.9 MMOL/L (ref 0.7–2)
LDH SERPL L TO P-CCNC: 654 U/L (ref 85–227)
MCH RBC QN AUTO: 29 PG (ref 26.5–33)
MCHC RBC AUTO-ENTMCNC: 30.2 G/DL (ref 31.5–36.5)
MCV RBC AUTO: 96 FL (ref 78–100)
PLATELET # BLD AUTO: 307 10E9/L (ref 150–450)
POTASSIUM SERPL-SCNC: 4.6 MMOL/L (ref 3.4–5.3)
PROCALCITONIN SERPL-MCNC: 0.14 NG/ML
PROT SERPL-MCNC: 7.2 G/DL (ref 6.8–8.8)
RBC # BLD AUTO: 3.45 10E12/L (ref 4.4–5.9)
SODIUM SERPL-SCNC: 135 MMOL/L (ref 133–144)
URATE SERPL-MCNC: 8.3 MG/DL (ref 3.5–7.2)
WBC # BLD AUTO: 24.9 10E9/L (ref 4–11)

## 2020-02-16 PROCEDURE — 25000132 ZZH RX MED GY IP 250 OP 250 PS 637: Performed by: INTERNAL MEDICINE

## 2020-02-16 PROCEDURE — 84550 ASSAY OF BLOOD/URIC ACID: CPT | Performed by: INTERNAL MEDICINE

## 2020-02-16 PROCEDURE — 99221 1ST HOSP IP/OBS SF/LOW 40: CPT | Performed by: INTERNAL MEDICINE

## 2020-02-16 PROCEDURE — 12000000 ZZH R&B MED SURG/OB

## 2020-02-16 PROCEDURE — 80048 BASIC METABOLIC PNL TOTAL CA: CPT | Performed by: INTERNAL MEDICINE

## 2020-02-16 PROCEDURE — 36415 COLL VENOUS BLD VENIPUNCTURE: CPT | Performed by: HOSPITALIST

## 2020-02-16 PROCEDURE — 00000146 ZZHCL STATISTIC GLUCOSE BY METER IP

## 2020-02-16 PROCEDURE — 83615 LACTATE (LD) (LDH) ENZYME: CPT | Performed by: INTERNAL MEDICINE

## 2020-02-16 PROCEDURE — 99223 1ST HOSP IP/OBS HIGH 75: CPT | Mod: AI | Performed by: INTERNAL MEDICINE

## 2020-02-16 PROCEDURE — 84155 ASSAY OF PROTEIN SERUM: CPT | Performed by: INTERNAL MEDICINE

## 2020-02-16 PROCEDURE — 85027 COMPLETE CBC AUTOMATED: CPT | Performed by: INTERNAL MEDICINE

## 2020-02-16 PROCEDURE — 36415 COLL VENOUS BLD VENIPUNCTURE: CPT | Performed by: INTERNAL MEDICINE

## 2020-02-16 PROCEDURE — 86140 C-REACTIVE PROTEIN: CPT | Performed by: INTERNAL MEDICINE

## 2020-02-16 PROCEDURE — 84145 PROCALCITONIN (PCT): CPT | Performed by: HOSPITALIST

## 2020-02-16 PROCEDURE — 25000132 ZZH RX MED GY IP 250 OP 250 PS 637: Performed by: HOSPITALIST

## 2020-02-16 PROCEDURE — 83605 ASSAY OF LACTIC ACID: CPT | Performed by: INTERNAL MEDICINE

## 2020-02-16 PROCEDURE — 85652 RBC SED RATE AUTOMATED: CPT | Performed by: INTERNAL MEDICINE

## 2020-02-16 PROCEDURE — 25000128 H RX IP 250 OP 636: Performed by: INTERNAL MEDICINE

## 2020-02-16 RX ORDER — HYDROXYUREA 500 MG/1
500 CAPSULE ORAL DAILY
Status: DISCONTINUED | OUTPATIENT
Start: 2020-02-17 | End: 2020-02-24 | Stop reason: HOSPADM

## 2020-02-16 RX ORDER — MULTIPLE VITAMINS W/ MINERALS TAB 9MG-400MCG
1 TAB ORAL DAILY
Status: DISCONTINUED | OUTPATIENT
Start: 2020-02-16 | End: 2020-02-24 | Stop reason: HOSPADM

## 2020-02-16 RX ADMIN — POTASSIUM CHLORIDE 20 MEQ: 1500 TABLET, EXTENDED RELEASE ORAL at 08:12

## 2020-02-16 RX ADMIN — FAMOTIDINE 20 MG: 20 TABLET, FILM COATED ORAL at 20:36

## 2020-02-16 RX ADMIN — FUROSEMIDE 20 MG: 10 INJECTION, SOLUTION INTRAMUSCULAR; INTRAVENOUS at 15:36

## 2020-02-16 RX ADMIN — ASPIRIN 81 MG 324 MG: 81 TABLET ORAL at 00:19

## 2020-02-16 RX ADMIN — METOPROLOL SUCCINATE 100 MG: 100 TABLET, EXTENDED RELEASE ORAL at 20:35

## 2020-02-16 RX ADMIN — CYANOCOBALAMIN TAB 500 MCG 250 MCG: 500 TAB at 08:12

## 2020-02-16 RX ADMIN — HYDROXYUREA 500 MG: 500 CAPSULE ORAL at 08:18

## 2020-02-16 RX ADMIN — FAMOTIDINE 20 MG: 20 TABLET, FILM COATED ORAL at 08:12

## 2020-02-16 RX ADMIN — FUROSEMIDE 20 MG: 10 INJECTION, SOLUTION INTRAMUSCULAR; INTRAVENOUS at 08:12

## 2020-02-16 RX ADMIN — ATORVASTATIN CALCIUM 40 MG: 40 TABLET, FILM COATED ORAL at 08:12

## 2020-02-16 RX ADMIN — FERROUS SULFATE TAB 325 MG (65 MG ELEMENTAL FE) 325 MG: 325 (65 FE) TAB at 08:12

## 2020-02-16 RX ADMIN — FAMOTIDINE 20 MG: 20 TABLET, FILM COATED ORAL at 00:19

## 2020-02-16 RX ADMIN — ASPIRIN 325 MG ORAL TABLET 325 MG: 325 PILL ORAL at 08:12

## 2020-02-16 RX ADMIN — SENNOSIDES AND DOCUSATE SODIUM 1 TABLET: 8.6; 5 TABLET ORAL at 00:19

## 2020-02-16 RX ADMIN — METOPROLOL SUCCINATE 100 MG: 100 TABLET, EXTENDED RELEASE ORAL at 00:20

## 2020-02-16 RX ADMIN — SENNOSIDES AND DOCUSATE SODIUM 2 TABLET: 8.6; 5 TABLET ORAL at 08:12

## 2020-02-16 RX ADMIN — METOPROLOL SUCCINATE 100 MG: 100 TABLET, EXTENDED RELEASE ORAL at 08:12

## 2020-02-16 RX ADMIN — SENNOSIDES AND DOCUSATE SODIUM 2 TABLET: 8.6; 5 TABLET ORAL at 20:35

## 2020-02-16 RX ADMIN — MULTIPLE VITAMINS W/ MINERALS TAB 1 TABLET: TAB at 16:58

## 2020-02-16 ASSESSMENT — ACTIVITIES OF DAILY LIVING (ADL)
ADLS_ACUITY_SCORE: 18

## 2020-02-16 ASSESSMENT — MIFFLIN-ST. JEOR: SCORE: 1576.38

## 2020-02-16 NOTE — PLAN OF CARE
VSS. Room air. Lungs clear except for left lower lobe crackles. Patient does appear SOB with short walks in room. Up with standby assist and walker. Voiding in urinal. Last BM 2/14 per patient report. Wound to butt stage II. Mepilex in place. Incision to bilateral lower knee from previous graft site. Mepilex in place. Feet wrapped with non adherent bandage and Kerlix. Open blister above right toes. Large blisters above left toes. Wound care consult today. Chest incision is scabbed and appears to be healing well. Patient denies pain. Prefers sleeping in chair.

## 2020-02-16 NOTE — H&P
Admitted:     02/15/2020      PRIMARY CARE PHYSICIAN:  Angus SNIDER Mai, MD      PRIMARY CARDIOVASCULAR SURGEON:  Mable Barrera MD      PRIMARY ONCOLOGIST:  Mani Florez MD      CHIEF COMPLAINT:  Shortness of breath.      HISTORY OF PRESENT ILLNESS:  Renny Gannon is a very pleasant 69-year-old  gentleman who is single and lives by himself up in [place] who underwent 3-vessel coronary bypass surgery on 01/31 and discharged on 02/10.  He has myeloproliferative disorder for which he is followed by Dr. Cristina.  The patient had a prolonged hospitalization after his bypass surgery.  He did have a code stroke called for facial droop that resolved, and imaging studies were negative.  He was also volume overloaded for which he was treated with diuretics.  He was recommended to go to TCU, but his insurance denied him because he could walk 4 feet.  The patient was discharged to his sister's until his evaluation at [facility].      The patient states he has been having chronic orthopnea for a number of months, predating his bypass surgery.  His baseline weight is about 200.  He currently weighs 190 pounds.  The patient states he has been feeling more increased work of breathing, particularly when he wakes up in the morning, lasts for up to 4-5 hours a day, but seems to get a little bit better as the day goes on.  The patient is an extremely poor historian and not very forthcoming.  I wonder if the patient has some cognitive impairment.  The patient went into [facility] this morning with shortness of breath.  He had stable blood pressure and normal oxygen saturations.  He was afebrile.  His white count was elevated at 27,000, noting he has a myeloproliferative disorder.  His hemoglobin is stable at 10.3, and platelets were 329.  BNP was also normal.  BUN was 22, creatinine 1.13 with calculated GFR of 65.  LFTs were also unremarkable with a low albumin of 3.  He had a chest x-ray which showed  stable minimal known moderate left basilar pleural fluid with some atelectasis and minimal right basilar pleural fluid that is unchanged.  ECG shows sinus rhythm with nonspecific ST segment T-wave changes.  He did receive nebs which improved his wheezing.  After discussion with Cardiology, the patient was subsequently transferred to [facility] for further evaluation, diuresis and evaluation for possible post-thoracotomy syndrome.  The patient specifically denies any chest pain.  Currently, the patient is sitting upright, comfortable without any oxygen and without any increased work of breathing.      PAST MEDICAL HISTORY:   1.  ASCVD with history of coronary bypass surgery.   2.  Postop volume overload.   3.  History of TIA.   4.  Dysphagia.     5.  Myeloproliferative disorder.   6.  Hypertension.   7.  Memory loss.    8.  Insomnia.      PAST SURGICAL HISTORY:  Three-vessel coronary bypass surgery and bone marrow biopsy.      FAMILY HISTORY:  The patient is adopted.      SOCIAL HISTORY:  Single, lives by himself.  Former smoker, quit in 2001.  No alcohol.      CODE STATUS:  FULL CODE.      ALLERGIES:  NO KNOWN DRUG ALLERGIES.      CURRENT MEDICATIONS:   1.  Tylenol.   2.  Aspirin.   3.  Lipitor.   4.  Pepcid.   5.  Ferrous sulfate.     6.  Lasix.     7.  Norco.     8.  Hydrea.     9.  Methocarbamol   10.  Metoprolol XL.   11.  Potassium chloride.   12.  Senna.     13.  Docusate.     14.  Vitamin B12.      REVIEW OF SYSTEMS:  Ten points reviewed and as dictated in history of present illness.      PHYSICAL EXAMINATION:   VITAL SIGNS:  Temperature 97.3, heart rate 89, respirations 16, blood pressure 125/72, sats 95% on room air.   GENERAL:  The patient is alert and oriented x 3, currently in no distress.   HEENT:  Pupils equal.  Sclerae are anicteric.  Mucous membranes are moist.   NECK:  JVP is not elevated.   PULMONARY:  He has diminished breath sounds at the bases, left greater than right.    CARDIOVASCULAR:  S1, S2, regular rate and rhythm.   ABDOMEN:  Soft, nontender, normoactive bowel sounds.   MUSCULOSKELETAL:  Shows +1 edema.   NEUROLOGIC:  He is grossly nonfocal.  Cranial nerves grossly intact.   SKIN:  Warm, dry, well perfused.   PSYCHIATRIC:  Stable.      LABORATORY DATA:  As dictated in history of present illness.      ASSESSMENT:  Renny Gannon is a 69-year-old male who is post-coronary bypass surgery of about a little over 2 weeks, admitted with ongoing intermittent shortness of breath, as well as ongoing left pleural effusion, being admitted for further evaluation.      PLAN:   1.  Shortness of breath.  The patient currently is stable, not requiring oxygen, and is breathing comfortably.  The patient states his weight is actually below his baseline weight.  The EKG which shows no acute changes.  This does not appear to be post-cardiac surgery syndrome.  We will check inflammatory markers.  Given his shortness of breath and his effusion, we will proceed with ultrasound-guided thoracentesis for therapeutic and diagnostic purposes.  We will obtain a consultation from Cardiology and Cardiothoracic Surgery.  We will also start the patient on IV Lasix 20 mg twice a day and monitor his input and output.     2.  Atherosclerotic cardiovascular disease (ASCVD) with 3-vessel coronary bypass surgery.  The patient will be continued on his aspirin, statin, and beta blockers.  The patient denies any kind of chest pain or chest pressure.   3.  History of myeloproliferative disorder.  The patient will continue his Hydrea   4.  History of gastroesophageal reflux disease (GERD).  The patient will be continued on Pepcid.   5.  Hyperlipidemia.  The patient will be continued on Lipitor.   6.  Deep venous thrombosis prophylaxis.  Patient with compression boots.      CODE STATUS:  Full.         RENNY ASH MD             D: 02/16/2020   T: 02/16/2020   MT:       Name:     RENNY GANNON   MRN:       [id number]        Account:      [id number]   :      1950        Admitted:     02/15/2020                   Document: [id number]       cc: Mani Greco Mai, MD

## 2020-02-16 NOTE — PHARMACY-ADMISSION MEDICATION HISTORY
"Pharmacy Medication History  Admission medication history interview status for the 2/15/2020  admission is complete. See EPIC admission navigator for prior to admission medications     Medication history sources: Patient's family/friend (Sister Mary)  Medication history source reliability: Good  Adherence assessment: Good    Significant changes made to the medication list:  Deleted Acetaminophen  No other significant changes made at this time       Additional medication history information:   Patient appeared fully coherent, however stated that he wasn't aware of the medications he was on. He then stated his sister is responsible for setting his medications. Spoke to his Sister via phone after obtaining permission from the patient. The med list found on file appears to be up to date. Patient's sister was able to name all the medications and the SIGs, stating he last took his medications 02/15/2020 morning. She also mentions \"last time the nurse stopped by, she said his muscle relaxer will make him sleepy\". Patient decided to cut back the use of it and last took it either Monday or Tuesday 02/10 or 02/11    Medication reconciliation completed by provider prior to medication history? No    Time spent in this activity: 30min      Prior to Admission medications    Medication Sig Last Dose Taking? Auth Provider   aspirin (ASA) 325 MG tablet Take 1 tablet (325 mg) by mouth daily 2/15/2020 at amtime Yes Suzy Chacon PA-C   atorvastatin (LIPITOR) 40 MG tablet Take 1 tablet (40 mg) by mouth daily 2/15/2020 at am Yes Devin Bentley MD   famotidine (PEPCID) 20 MG tablet Take 1 tablet (20 mg) by mouth 2 times daily 2/15/2020 at am Yes Suzy Chacon PA-C   ferrous sulfate (FEROSUL) 325 (65 Fe) MG tablet Take 1 tablet (325 mg) by mouth daily 2/15/2020 at am Yes Suzy Chacon PA-C   furosemide (LASIX) 40 MG tablet Take 1 tablet in am and 0.5 tablet at 1 pm 2/15/2020 at am Yes Angus Scott MD "   HYDROcodone-acetaminophen (NORCO) 5-325 MG tablet Take 1 tablet by mouth every 4 hours as needed 2/15/2020 at am Yes Casey Enrique MD   hydroxyurea (HYDREA) 500 MG capsule Take 1 capsule (500 mg) by mouth daily 2/15/2020 at am Yes Mani Cristina MD   methocarbamol (ROBAXIN) 500 MG tablet Take 1 tablet (500 mg) by mouth 4 times daily as needed for muscle spasms Past Week at Unknown time Yes Suzy Chacon PA-C   metoprolol succinate ER (TOPROL-XL) 100 MG 24 hr tablet Take 1 tablet (100 mg) by mouth 2 times daily 2/15/2020 at am Yes Suzy Chacon PA-C   potassium chloride ER (K-DUR/KLOR-CON M) 20 MEQ CR tablet Take 1 tablet (20 mEq) by mouth daily 2/15/2020 at am Yes Suzy Chacon PA-C   senna-docusate (SENOKOT-S/PERICOLACE) 8.6-50 MG tablet Take 2 tablets by mouth 2 times daily 2/15/2020 at am Yes Angus Scott MD   vitamin B-12 (CYANOCOBALAMIN) 250 MCG tablet Take 250 mcg by mouth daily 2/15/2020 at am Yes Reported, Patient

## 2020-02-16 NOTE — CONSULTS
"CLINICAL NUTRITION SERVICES  -  ASSESSMENT NOTE    Recommendations Ordered by Registered Dietitian (RD):   - Boost Plus vanilla @ 2 pm  - Gelatein plus @ 10 am  - thera vit M daily     Nutrition education: Provided education on importance of protein, energy for healing, encouraged pt to have a snack at least while in the hospital. Pt hesitant at first to try snacks, but agreed after learning about importance of protein intake.    Malnutrition: (2/16)  % Weight Loss:  > 5% in 1 month (severe malnutrition)  % Intake:  <75% for >/= 3 months (non-severe malnutrition)  Subcutaneous Fat Loss:  Orbital region moderate-severe depletion and Upper arm region moderate depletion  Muscle Loss:  Temporal region mild-moderate depletion, Clavicle bone region mild-moderate depletion, Acromion bone region mild-moderate depletion and Dorsal hand region mild depletion  Fluid Retention:  None noted    Malnutrition Diagnosis: Severe malnutrition  In Context of:  Acute illness or injury  Chronic illness or disease     REASON FOR ASSESSMENT  Renny Gannon is a 69 year old male seen by Registered Dietitian for Admission Nutrition Risk Screen for unintentional loss of 10# or more in the past two months, reduced oral intake over the last month and stageable pressure injuries or large/non-healing wound or burn    NUTRITION HISTORY  - Information obtained from patient, EMR.   - Lives alone, had been staying with his sister post-op.  - Pt noted to be a poor historian, not very forthcoming.   - Recent CABG 1/31/2020    - Patient reports that his appetite has been reduced for the past year. It it likely even more so over the past month, though patient does not state this. When asked what he has been eating outside the hospital, he tells me the exact meals that he has eaten here today. I again attempted to ask him what he was eating at his sister's house and he stated \"pretty much the same thing\". Unclear how accurate this report his. " "   Breakfast: oatmeal, brown sugar, coffee   Lunch: grilled cheese, tomato soup   Dinner: \"I haven't ordered that yet\" Noted that at home an example might be Pizza.   - He states that he was drinking 1 vanilla Boost Plus daily.     CURRENT NUTRITION ORDERS  Diet Order:     Low Saturated Fat/2400 mg Sodium     Current Intake/Tolerance:  50% for one meal since admission, pt has ordered breakfast and lunch today. (oatmeal for breakfast, grilled cheese/soup for lunch)    NUTRITION FOCUSED PHYSICAL ASSESSMENT FOR DIAGNOSING MALNUTRITION)  Yes         Observed:    Muscle wasting (refer to documentation in Malnutrition section) and Subcutaneous fat loss (refer to documentation in Malnutrition section)    Obtained from Chart/Interdisciplinary Team:  Planned left thoracentesis  No BM since Friday 2/14    ANTHROPOMETRICS  Height: 5' 8\"  Weight: 185 lbs 9.6 oz (84.2 kg)   Body mass index is 28.22 kg/m .  Weight Status:  Overweight BMI 25-29.9  IBW: 70 kg  % IBW: 120%  Weight History: weight has trended down since CABG, up to a 15# loss (7.5%) from baseline wt 200#. Patient estimates he has also lost ~30# in the past year.   Wt Readings from Last 10 Encounters:   02/16/20 84.2 kg (185 lb 9.6 oz)   02/14/20 88.5 kg (195 lb)   02/11/20 89.4 kg (197 lb)   02/10/20 89.5 kg (197 lb 5 oz)   01/21/20 91.6 kg (202 lb)   01/16/20 91.8 kg (202 lb 6.4 oz)   01/15/20 93.4 kg (206 lb)   01/09/20 91.1 kg (200 lb 14.4 oz)   01/02/20 93.9 kg (207 lb)   12/27/19 91.4 kg (201 lb 6.4 oz)     LABS  Labs reviewed  Recent Labs   Lab Test 02/16/20  0553 02/15/20  1612 02/11/20  1224 02/09/20  0713 02/08/20  0732   POTASSIUM 4.6 4.5 4.2 4.5 4.5     Recent Labs   Lab Test 02/16/20  0553 02/15/20  1612 02/11/20  1224 02/09/20  0713 02/08/20  0732    138 139 140 138     Recent Labs   Lab Test 02/16/20  0553 02/15/20  1612 02/11/20  1224 02/09/20  0713 02/08/20  0732   CR 1.16 1.13 1.23 1.05 1.05     Recent Labs   Lab 02/16/20  0553 02/15/20  1612 " 02/11/20  1224   * 112* 99     Lab Results   Component Value Date    A1C 5.6 01/09/2020     MEDICATIONS  Medications reviewed  Vitamin B12 250 mcg  Ferrous sulfate -- 65 mg elemental Iron  Lasix  Bowel meds    ASSESSED NUTRITION NEEDS PER APPROVED PRACTICE GUIDELINES:  Dosing Weight 84.2 kg  Estimated Energy Needs: 5665-4479 kcals (25-30 Kcal/Kg)  Justification: maintenance/post op  Estimated Protein Needs: 101-126 grams protein (1.2-1.5 g pro/Kg)  Justification: post-op  Estimated Fluid Needs: Per MD pending fluid status    MALNUTRITION:  % Weight Loss:  > 5% in 1 month (severe malnutrition)  % Intake:  <75% for >/= 3 months (non-severe malnutrition)  Subcutaneous Fat Loss:  Orbital region moderate-severe depletion and Upper arm region moderate depletion  Muscle Loss:  Temporal region mild-moderate depletion, Clavicle bone region mild-moderate depletion, Acromion bone region mild-moderate depletion and Dorsal hand region mild depletion  Fluid Retention:  None noted    Malnutrition Diagnosis: Severe malnutrition  In Context of:  Acute illness or injury  Chronic illness or disease    NUTRITION DIAGNOSIS:  Malnutrition related to inadequate oral intake and increased nutrient needs (protein/calorie) post op CABG as evidenced by weight loss of 7.5% in the past month or less, e/o fat and muscle losses, reported reduced appetite for the past 1 year.       NUTRITION INTERVENTIONS  Recommendations / Nutrition Prescription  Diet per MD - KYLEIGHF, Na <2400 mg    Boost Plus @ 2 pm + Gelatien @ 10 am  Thera vit M daily     Implementation  Nutrition education: Provided education on importance of protein, energy for healing, encouraged pt to have a snack at least while in the hospital. Pt hesitant at first to try snacks, but agreed after learning about importance of protein intake.   Medical Food Supplement and Multivitamin/Mineral      Nutrition Goals  Intake of at least 75% meals + 2 supplements daily.       MONITORING AND  EVALUATION:  Progress towards goals will be monitored and evaluated per protocol and Practice Guidelines    Dianne Khan RD, LD  [facility] [place], 65, 54, [facility]   Pager: [phone]  Weekend Pager: [phone]

## 2020-02-16 NOTE — CONSULTS
[facility]    Cardiology Consultation     Date of Admission:  2/15/2020    Assessment & Plan   Renny Gannon is a 69 year old male who was admitted on 2/15/2020. I was asked to see the patient for shortness of breath.    The patient had a CABG on 1/31/2020.  The patient unfortunately is a poor historian so the details of the interval history are difficult to obtain.  I took a phone call on the patient last night and there was some concerns that he may have been volume overloaded shortly after his discharge.  Additionally there is a large left pleural effusion.  This is most notable on chest x-ray from 2/11/2020 and has been redemonstrated on chest x-ray dated 2/14 and 2/15/2020.    His troponin is mildly elevated but I do not suspect that this is related to an acute coronary event.  I suspect this is more likely due to demand from volume overload and the stress from his recent surgery.    The patient does have a pleural effusion and a mildly elevated CRP.  The mild elevation in CRP is less consistent with post sternotomy syndrome.  However this should be kept in the differential.    #1 Severe 3-vessel coronary artery disease s/p CABG x3 on 1/31/2020.  #2 HTN  #3 Fluid overload.  #4 Dyspnea  #5 Left pleural effusion    Plan:  We should keep post sternotomy syndrome in the differential but I think more likely he would benefit from a thoracentesis and diuresis.    It may be reasonable to obtain an echocardiogram to ensure the patient does not have a pericardial effusion (which would possibly be present and poststernotomy syndrome)    Agree with current Lasix dosing    Agree with evaluation for thoracentesis    Continue aspirin, metoprolol    Roberto Gonzalez MD     Code Status    Full Code    Reason for Consult   Reason for consult: Dyspnea    Primary Care Physician   Angus Clements Mai    Chief Complaint   Dyspnea    History is obtained from the patient    History of Present Illness   Renny Gannon  is a 69 year old male who presents with dyspnea on exertion.  The patient is status post a three-vessel bypass on 1/31/2020.  Postoperatively he complained of volume overload over the last few weeks.  The patient has been residing with a family member.  It was advised that he go to a transitional care unit but this was not approved by his insurance.    Unfortunately the patient is a very poor historian.    Patient has a left-sided pleural effusion.  This is most notable on chest x-ray from 2/11/2020 and has been redemonstrated on chest x-ray dated 2/14 and 2/15/2020.    Most recent echocardiogram was performed on 1/9/2020.  This noted a normal LVEF with no pericardial effusion.    Currently the patient is sitting up without any acute complaints or concerns.  He does not give any additional history.    Most recent coronary angiogram was done on 1/9/2020.  This noted severe three-vessel CAD as  including the proximal to mid LAD, D1, multiple lesions within a large and branching OM1 system, and the RPDA.    From this hospital encounter did not note any ST elevation consistent with ST elevation myocardial infarction.    In the setting of his volume overload and recent cardiovascular surgery troponin is mildly elevated however there is no significant trend..    Past Medical History   I have reviewed this patient's medical history and updated it with pertinent information if needed.   Past Medical History:   Diagnosis Date     Hypertension        Past Surgical History   I have reviewed this patient's surgical history and updated it with pertinent information if needed.  Past Surgical History:   Procedure Laterality Date     BONE MARROW BIOPSY, BONE SPECIMEN, NEEDLE/TROCAR N/A 12/30/2019    Procedure: BIOPSY, BONE MARROW;  Surgeon: Aguilar Krause MD;  Location: [facility] OR     BYPASS GRAFT ARTERY CORONARY N/A 1/31/2020    Procedure: CORONARY ARTERY BYPASS GRAFTING X 3 - LIMA TO LAD, SV TO OM  AND PDA; ENDOVEIN  HARVEST OF BILATERAL LEGS; ON PUMP WITH SANDRA;  Surgeon: Mable Barrera MD;  Location:  OR     CV CORONARY ANGIOGRAM Left 1/9/2020    Procedure: Coronary Angiogram;  Surgeon: Devin Bentley MD;  Location:  [facility]     NO HISTORY OF SURGERY         Prior to Admission Medications   Prior to Admission Medications   Prescriptions Last Dose Informant Patient Reported? Taking?   HYDROcodone-acetaminophen (NORCO) 5-325 MG tablet 2/15/2020 at am  No Yes   Sig: Take 1 tablet by mouth every 4 hours as needed   aspirin (ASA) 325 MG tablet 2/15/2020 at amtime  No Yes   Sig: Take 1 tablet (325 mg) by mouth daily   atorvastatin (LIPITOR) 40 MG tablet 2/15/2020 at am Self No Yes   Sig: Take 1 tablet (40 mg) by mouth daily   famotidine (PEPCID) 20 MG tablet 2/15/2020 at am  No Yes   Sig: Take 1 tablet (20 mg) by mouth 2 times daily   ferrous sulfate (FEROSUL) 325 (65 Fe) MG tablet 2/15/2020 at am  No Yes   Sig: Take 1 tablet (325 mg) by mouth daily   furosemide (LASIX) 40 MG tablet 2/15/2020 at am  No Yes   Sig: Take 1 tablet in am and 0.5 tablet at 1 pm   hydroxyurea (HYDREA) 500 MG capsule 2/15/2020 at am Self No Yes   Sig: Take 1 capsule (500 mg) by mouth daily   methocarbamol (ROBAXIN) 500 MG tablet Past Week at Unknown time  No Yes   Sig: Take 1 tablet (500 mg) by mouth 4 times daily as needed for muscle spasms   metoprolol succinate ER (TOPROL-XL) 100 MG 24 hr tablet 2/15/2020 at am  No Yes   Sig: Take 1 tablet (100 mg) by mouth 2 times daily   potassium chloride ER (K-DUR/KLOR-CON M) 20 MEQ CR tablet 2/15/2020 at am  No Yes   Sig: Take 1 tablet (20 mEq) by mouth daily   senna-docusate (SENOKOT-S/PERICOLACE) 8.6-50 MG tablet 2/15/2020 at am  No Yes   Sig: Take 2 tablets by mouth 2 times daily   vitamin B-12 (CYANOCOBALAMIN) 250 MCG tablet 2/15/2020 at am Self Yes Yes   Sig: Take 250 mcg by mouth daily      Facility-Administered Medications: None     Allergies   Allergies   Allergen Reactions      No Known Drug Allergies      Seasonal Allergies        Social History   I have reviewed this patient's social history and updated it with pertinent information if needed. Renny Gannon  reports that he quit smoking about 19 years ago. His smoking use included cigarettes. He started smoking about 59 years ago. He quit after 30.00 years of use. He has never used smokeless tobacco. He reports that he does not drink alcohol or use drugs.    Family History   I have reviewed this patient's family history and updated it with pertinent information if needed.   Family History   Problem Relation Age of Onset     No Known Problems Mother      Unknown/Adopted Father      Unknown/Adopted Maternal Grandmother      Unknown/Adopted Maternal Grandfather      Unknown/Adopted Paternal Grandmother      Unknown/Adopted Paternal Grandfather      Cancer Brother         lung cancer - smoking     No Known Problems Sister      Coronary Artery Disease Brother          of MI at 66     No Known Problems Sister        Review of Systems   The 12 point Review of Systems is negative other than noted in the HPI or here.     Physical Exam   Temp: 97.5  F (36.4  C) Temp src: Axillary BP: 112/61 Pulse: 86 Heart Rate: 85 Resp: 18 SpO2: 95 % O2 Device: None (Room air)    Vital Signs with Ranges  Temp:  [97.2  F (36.2  C)-97.9  F (36.6  C)] 97.5  F (36.4  C)  Pulse:  [78-94] 86  Heart Rate:  [76-93] 85  Resp:  [6-25] 18  BP: ()/(58-75) 112/61  SpO2:  [94 %-97 %] 95 %  185 lbs 9.6 oz    GENERAL APPEARANCE: Alert and oriented x3. No acute distress.  SKIN: Inspection of the skin reveals no rashes, ulcerations, warm, dry  NECK: Supple and symmetric.   Normal JVP  LUNGS: Bibasilar crackles and diminished in the bases worse on the left.  CARDIOVASCULAR: S1, S2, regular rate and rhythm without any murmurs, gallops, rubs. The carotid pulses were normal and 2+ bilaterally without bruits. Peripheral pulses were 2+ and symmetric.  ABDOMEN: Soft,  non-tender, non-distended with normal bowel sounds.  No ascites noted.  EXTREMITIES: 1+ edema to the knees.  NEUROLOGIC:  Slower than expected mentation      Data   Results for orders placed or performed during the hospital encounter of 02/15/20 (from the past 24 hour(s))   Lactate Dehydrogenase   Result Value Ref Range    Lactate Dehydrogenase 654 (H) 85 - 227 U/L   Protein total   Result Value Ref Range    Protein Total 7.2 6.8 - 8.8 g/dL   Uric acid   Result Value Ref Range    Uric Acid 8.3 (H) 3.5 - 7.2 mg/dL   Erythrocyte sedimentation rate auto   Result Value Ref Range    Sed Rate 30 (H) 0 - 20 mm/h   CRP inflammation   Result Value Ref Range    CRP Inflammation 19.1 (H) 0.0 - 8.0 mg/L   Basic metabolic panel   Result Value Ref Range    Sodium 135 133 - 144 mmol/L    Potassium 4.6 3.4 - 5.3 mmol/L    Chloride 103 94 - 109 mmol/L    Carbon Dioxide 27 20 - 32 mmol/L    Anion Gap 5 3 - 14 mmol/L    Glucose 107 (H) 70 - 99 mg/dL    Urea Nitrogen 22 7 - 30 mg/dL    Creatinine 1.16 0.66 - 1.25 mg/dL    GFR Estimate 63 >60 mL/min/[1.73_m2]    GFR Estimate If Black 73 >60 mL/min/[1.73_m2]    Calcium 8.9 8.5 - 10.1 mg/dL   CBC with platelets   Result Value Ref Range    WBC 24.9 (H) 4.0 - 11.0 10e9/L    RBC Count 3.45 (L) 4.4 - 5.9 10e12/L    Hemoglobin 10.0 (L) 13.3 - 17.7 g/dL    Hematocrit 33.1 (L) 40.0 - 53.0 %    MCV 96 78 - 100 fl    MCH 29.0 26.5 - 33.0 pg    MCHC 30.2 (L) 31.5 - 36.5 g/dL    RDW 21.4 (H) 10.0 - 15.0 %    Platelet Count 307 150 - 450 10e9/L   Cardiovascular Surgery IP Consult: Patient to be seen: Routine - within 24 hours; post op sob; Consultant may enter orders: Yes; Requesting provider? Attending physician    Narrative    Flora Bhatt NP     2/16/2020 12:16 PM  CV Surgery Progress Note   February 16, 2020   HPI:  Mr. Gannon is a very pleasant 69-year old gentleman with a PMH   of HTN, former smoker, recently diagnosed myelofibrosis on   hydroxyurea and severe 3-vessel coronary artery  disease s/p   coronary artery bypass grafting on 1/31/2020 by Dr. Mable Barrera. He did have a code stroke called postoperatively with   noted facial droop which resolved and CT head was negative for   acute pathology. He was discharged to home on diuretics on   2/10/2020 with home therapy as insurance refused authorization   for transitional care placement as recommended. He is staying   with his sister for now as he lives alone. Since discharge, he   has had two emergency department visits, the last being yesterday   (2/15) where he as admitted for ongoing shortness of breath and   BLE edema. Primacy care provider recently increased lasix dosing   and weights have been improving but he has continued dyspnea at   rest with associated orthopnea. Denies fever, chills, myalgias,   dysuria, productive cough, dizziness, syncope or near syncope,   palpitations or chest pain. Of note, he has a persistent left   pleural effusion.    Assessment & Plan:   # Severe 3-vessel coronary artery disease s/p CABG x3 on   1/31/2020.  # HTN  -  mg daily  - Atorvastatin 40 mg daily  - Metoprolol succinate 100 mg BID    # Former smoker  # Fluid overload. Weight today 185 lbs; was 197 lbs at time of   discharge from CABG stay. 1+ BLE appreciated. BNP 2853.  # Dyspnea  # Left pleural effusion. CRP 19.1, sed rate 30, .   Diminished breath sounds on left. Is reaching 1500 mL on IS.   Saturating well on room air.  - Agree with left thoracentesis  - Will obtain echo to assess for pericardial effusion  - Agree with lasix    # Myelofibrosis. Baseline WBC is between 40-50  - Continue PTA hydroxyurea    # Normocytic anemia in the setting of surgical/acute blood loss  - Continue Ferrous Sulfate    Subjective: Feels dyspneic at rest. Not on oxygen. Reaches 1500   mL on IS. States pain is minimal. Endorses BLE edema. Appetite   reduced, has not had BM since Friday. Denies sternal   popping/clicking. Denies chills, myalgias,  "palpitations, chest   pain, dizziness, syncope or near syncope.    Objective: Blood Pressure 120/64   Pulse 83   Temperature 97.8    F (36.6  C) (Oral)   Respiration 18   Height 1.727 m (5' 8\")     Weight 84.2 kg (185 lb 9.6 oz)   Oxygen Saturation 97%   Body   Mass Index 28.22 kg/m       General: NAD  Heart: S1, S2, RRR, no m/r/g  Rhythm: ST with PACs on most recent EKG  Lungs: CTA, dim left mid to lower lung fields  Abd: S/ND, +BS  Sternum/Incisions: Midline sternal and LLE incisions CDI  Extremities: 1+ BLE edema    Recent Labs   Lab 02/16/20  0553 02/16/20  0011 02/15/20  1936 02/15/20  1612 02/13/20  1335 02/11/20  1224   WBC 24.9*  --   --  27.4* 35.6* 44.1*   HGB 10.0*  --   --  10.3* 9.9* 8.8*   MCV 96  --   --  98 96 96     --   --  329 380 405   INR  --   --   --  1.16*  --   --      --   --  138  --  139   POTASSIUM 4.6  --   --  4.5  --  4.2   CHLORIDE 103  --   --  104  --  106   CO2 27  --   --  27  --  25   BUN 22  --   --  22  --  21   CR 1.16  --   --  1.13  --  1.23   ANIONGAP 5  --   --  7  --  8   MINNIE 8.9  --   --  8.7  --  8.4*   *  --   --  112*  --  99   ALBUMIN  --   --   --  3.0*  --  3.0*   PROTTOTAL  --  7.2  --  7.2  --  6.6*   BILITOTAL  --   --   --  0.8  --  1.0   ALKPHOS  --   --   --  105  --  90   ALT  --   --   --  28  --  22   AST  --   --   --  34  --  43   TROPI  --   --  0.086* 0.071*  --   --        HECTOR López, [place] [place]  Pager: [phone]                    "

## 2020-02-16 NOTE — CONSULTS
CV Surgery Progress Note   February 16, 2020   HPI:  Mr. Gannon is a very pleasant 69-year old gentleman with a PMH of HTN, former smoker, recently diagnosed myelofibrosis on hydroxyurea and severe 3-vessel coronary artery disease s/p coronary artery bypass grafting on 1/31/2020 by Dr. Mable Barrera. He did have a code stroke called postoperatively with noted facial droop which resolved and CT head was negative for acute pathology. He was discharged to home on diuretics on 2/10/2020 with home therapy as insurance refused authorization for transitional care placement as recommended. He is staying with his sister for now as he lives alone. Since discharge, he has had two emergency department visits, the last being yesterday (2/15) where he as admitted for ongoing shortness of breath and BLE edema. Primary care provider recently increased lasix dosing and weights have been improving but he has continued dyspnea at rest with associated orthopnea. Denies fever, chills, myalgias, dysuria, productive cough, dizziness, syncope or near syncope, palpitations or chest pain. Of note, he has a persistent left pleural effusion.    Assessment & Plan:   # Severe 3-vessel coronary artery disease s/p CABG x3 on 1/31/2020.  # HTN  -  mg daily  - Atorvastatin 40 mg daily  - Metoprolol succinate 100 mg BID    # Former smoker  # Fluid overload. Weight today 185 lbs; was 197 lbs at time of discharge from CABG stay. 1+ BLE appreciated. BNP 2853.  # Dyspnea  # Left pleural effusion. CRP 19.1, sed rate 30, . Diminished breath sounds on left. Is reaching 1500 mL on IS. Saturating well on room air.  - Agree with left thoracentesis  - Will obtain echo to assess for pericardial effusion  - Agree with lasix    # Myelofibrosis. Baseline WBC is between 40-50  - Continue PTA hydroxyurea    # Normocytic anemia in the setting of surgical/acute blood loss  - Continue Ferrous Sulfate    Subjective: Feels dyspneic at rest. Not on oxygen.  "Reaches 1500 mL on IS. States pain is minimal. Endorses BLE edema. Appetite reduced, has not had BM since Friday. Denies sternal popping/clicking. Denies chills, myalgias, palpitations, chest pain, dizziness, syncope or near syncope.    Objective: Blood Pressure 120/64   Pulse 83   Temperature 97.8  F (36.6  C) (Oral)   Respiration 18   Height 1.727 m (5' 8\")   Weight 84.2 kg (185 lb 9.6 oz)   Oxygen Saturation 97%   Body Mass Index 28.22 kg/m       General: NAD  Heart: S1, S2, RRR, no m/r/g  Rhythm: ST with PACs on most recent EKG  Lungs: CTA, dim left mid to lower lung fields  Abd: S/ND, +BS  Sternum/Incisions: Midline sternal and LLE incisions CDI  Extremities: 1+ BLE edema    Recent Labs   Lab 02/16/20  0553 02/16/20  0011 02/15/20  1936 02/15/20  1612 02/13/20  1335 02/11/20  1224   WBC 24.9*  --   --  27.4* 35.6* 44.1*   HGB 10.0*  --   --  10.3* 9.9* 8.8*   MCV 96  --   --  98 96 96     --   --  329 380 405   INR  --   --   --  1.16*  --   --      --   --  138  --  139   POTASSIUM 4.6  --   --  4.5  --  4.2   CHLORIDE 103  --   --  104  --  106   CO2 27  --   --  27  --  25   BUN 22  --   --  22  --  21   CR 1.16  --   --  1.13  --  1.23   ANIONGAP 5  --   --  7  --  8   MINNIE 8.9  --   --  8.7  --  8.4*   *  --   --  112*  --  99   ALBUMIN  --   --   --  3.0*  --  3.0*   PROTTOTAL  --  7.2  --  7.2  --  6.6*   BILITOTAL  --   --   --  0.8  --  1.0   ALKPHOS  --   --   --  105  --  90   ALT  --   --   --  28  --  22   AST  --   --   --  34  --  43   TROPI  --   --  0.086* 0.071*  --   --        HECTOR López, CCRN-[facility] [place]  Pager: [phone]          "

## 2020-02-16 NOTE — ED PROVIDER NOTES
"  History     Chief Complaint   Patient presents with     Shortness of Breath     HPI  Renny Gannon is a 69 year old male who recently underwent three-vessel bypass on 1/31 and was just discharged from the hospital on 2/10 who presents to the emergency department with some ongoing shortness of breath.  The patient also has a myeloproliferative disorder and is been seeing hematology/oncology for this.  He denies having any chest pain.  He was seen yesterday in the clinic by his primary care provider and his Lasix was increased to 40 mg in the a.m. and 20 mg in the p.m.  He has been noted to have a pleural effusion ever since his surgery.  It appears to be slowly resolving.  The patient's weight has slowly been in decreasing.  He denies any recent fever or chills.  He has an occasional nonproductive cough.  He feels worse if he tries to lay flat.  He also has dyspnea on exertion but no chest pain with exertion.  At rest here in the emergency department he states that he feels \"better\".    Allergies:  Allergies   Allergen Reactions     No Known Drug Allergies      Seasonal Allergies        Problem List:    Patient Active Problem List    Diagnosis Date Noted     S/P CABG (coronary artery bypass graft) 02/10/2020     Priority: Medium     Fluid overload 02/10/2020     Priority: Medium     Transient hyperglycemia post procedure 02/10/2020     Priority: Medium     TIA (transient ischemic attack) 02/10/2020     Priority: Medium     Dysphagia 02/10/2020     Priority: Medium     Myeloproliferative disorder (H) 01/26/2020     Priority: Medium     CAD (coronary artery disease) 01/22/2020     Priority: Medium     Added automatically from request for surgery [id number]       Status post coronary angiogram 01/09/2020     Priority: Medium     Coronary artery disease involving native coronary artery of native heart with other form of angina pectoris (H) 01/02/2020     Priority: Medium     Added automatically from request for surgery " [id number]       Essential hypertension 2019     Priority: Medium     Memory loss 2019     Priority: Medium     Insomnia, unspecified type 2019     Priority: Medium        Past Medical History:    Past Medical History:   Diagnosis Date     Hypertension        Past Surgical History:    Past Surgical History:   Procedure Laterality Date     BONE MARROW BIOPSY, BONE SPECIMEN, NEEDLE/TROCAR N/A 2019    Procedure: BIOPSY, BONE MARROW;  Surgeon: Aguilar Krause MD;  Location: [facility]     BYPASS GRAFT ARTERY CORONARY N/A 2020    Procedure: CORONARY ARTERY BYPASS GRAFTING X 3 - LIMA TO LAD, SV TO OM  AND PDA; ENDOVEIN HARVEST OF BILATERAL LEGS; ON PUMP WITH SANDRA;  Surgeon: Mable Barrera MD;  Location:  OR     CV CORONARY ANGIOGRAM Left 2020    Procedure: Coronary Angiogram;  Surgeon: Devin Bentley MD;  Location:  [facility]     NO HISTORY OF SURGERY         Family History:    Family History   Problem Relation Age of Onset     No Known Problems Mother      Unknown/Adopted Father      Unknown/Adopted Maternal Grandmother      Unknown/Adopted Maternal Grandfather      Unknown/Adopted Paternal Grandmother      Unknown/Adopted Paternal Grandfather      Cancer Brother         lung cancer - smoking     No Known Problems Sister      Coronary Artery Disease Brother          of MI at 66     No Known Problems Sister        Social History:  Marital Status:  Single [1]  Social History     Tobacco Use     Smoking status: Former Smoker     Years: 30.00     Types: Cigarettes     Start date: 1961     Last attempt to quit: 2001     Years since quittin.0     Smokeless tobacco: Never Used   Substance Use Topics     Alcohol use: No     Frequency: Never     Drug use: No        Medications:    acetaminophen (TYLENOL) 325 MG tablet  aspirin (ASA) 325 MG tablet  atorvastatin (LIPITOR) 40 MG tablet  famotidine (PEPCID) 20 MG tablet  ferrous sulfate  (FEROSUL) 325 (65 Fe) MG tablet  furosemide (LASIX) 40 MG tablet  HYDROcodone-acetaminophen (NORCO) 5-325 MG tablet  hydroxyurea (HYDREA) 500 MG capsule  methocarbamol (ROBAXIN) 500 MG tablet  metoprolol succinate ER (TOPROL-XL) 100 MG 24 hr tablet  potassium chloride ER (K-DUR/KLOR-CON M) 20 MEQ CR tablet  senna-docusate (SENOKOT-S/PERICOLACE) 8.6-50 MG tablet  vitamin B-12 (CYANOCOBALAMIN) 250 MCG tablet          Review of Systems   Constitutional: Positive for fatigue. Negative for chills and fever.   HENT: Negative for congestion and sore throat.    Eyes: Negative for redness.   Respiratory: Positive for shortness of breath. Negative for cough and wheezing.    Genitourinary: Negative for dysuria.   Musculoskeletal: Negative for back pain.   Skin: Negative for rash.   Allergic/Immunologic: Negative for immunocompromised state.   Neurological: Negative for dizziness and weakness.       Physical Exam   BP: 117/61  Pulse: 89  Heart Rate: 85  Temp: 97.5  F (36.4  C)  Resp: 18  SpO2: 97 %      Physical Exam  Vitals signs and nursing note reviewed.   Constitutional:       General: He is not in acute distress.     Appearance: He is obese. He is not ill-appearing.   HENT:      Head: Normocephalic and atraumatic.      Mouth/Throat:      Mouth: Mucous membranes are moist.      Pharynx: Oropharynx is clear.   Eyes:      Pupils: Pupils are equal, round, and reactive to light.   Neck:      Musculoskeletal: Normal range of motion and neck supple.   Cardiovascular:      Rate and Rhythm: Normal rate and regular rhythm.   Pulmonary:      Effort: Pulmonary effort is normal. Tachypnea present.      Breath sounds: Wheezing and rhonchi present.   Chest:      Chest wall: No deformity.      Comments: Incision healing well  Abdominal:      General: Bowel sounds are normal.      Palpations: Abdomen is soft.   Musculoskeletal:      Comments: Bilateral leg swelling-stable   Skin:     General: Skin is warm and dry.      Capillary Refill:  Capillary refill takes less than 2 seconds.   Neurological:      General: No focal deficit present.      Mental Status: He is alert and oriented to person, place, and time.   Psychiatric:         Mood and Affect: Mood normal.         Behavior: Behavior normal.         ED Course        Procedures               Critical Care time:  none               Results for orders placed or performed during the hospital encounter of 02/15/20 (from the past 24 hour(s))   CBC with platelets differential   Result Value Ref Range    WBC 27.4 (H) 4.0 - 11.0 10e9/L    RBC Count 3.37 (L) 4.4 - 5.9 10e12/L    Hemoglobin 10.3 (L) 13.3 - 17.7 g/dL    Hematocrit 33.0 (L) 40.0 - 53.0 %    MCV 98 78 - 100 fl    MCH 30.6 26.5 - 33.0 pg    MCHC 31.2 (L) 31.5 - 36.5 g/dL    RDW 21.5 (H) 10.0 - 15.0 %    Platelet Count 329 150 - 450 10e9/L    Diff Method Manual Differential     % Neutrophils 78.0 %    % Lymphocytes 20.0 %    % Monocytes 2.0 %    % Eosinophils 0.0 %    % Basophils 0.0 %    Absolute Neutrophil 21.4 (H) 1.6 - 8.3 10e9/L    Absolute Lymphocytes 5.5 (H) 0.8 - 5.3 10e9/L    Absolute Monocytes 0.5 0.0 - 1.3 10e9/L    Absolute Eosinophils 0.0 0.0 - 0.7 10e9/L    Absolute Basophils 0.0 0.0 - 0.2 10e9/L   Comprehensive metabolic panel   Result Value Ref Range    Sodium 138 133 - 144 mmol/L    Potassium 4.5 3.4 - 5.3 mmol/L    Chloride 104 94 - 109 mmol/L    Carbon Dioxide 27 20 - 32 mmol/L    Anion Gap 7 3 - 14 mmol/L    Glucose 112 (H) 70 - 99 mg/dL    Urea Nitrogen 22 7 - 30 mg/dL    Creatinine 1.13 0.66 - 1.25 mg/dL    GFR Estimate 65 >60 mL/min/[1.73_m2]    GFR Estimate If Black 76 >60 mL/min/[1.73_m2]    Calcium 8.7 8.5 - 10.1 mg/dL    Bilirubin Total 0.8 0.2 - 1.3 mg/dL    Albumin 3.0 (L) 3.4 - 5.0 g/dL    Protein Total 7.2 6.8 - 8.8 g/dL    Alkaline Phosphatase 105 40 - 150 U/L    ALT 28 0 - 70 U/L    AST 34 0 - 45 U/L   Troponin I   Result Value Ref Range    Troponin I ES 0.071 (H) 0.000 - 0.045 ug/L   Nt probnp inpatient   Result  Value Ref Range    N-Terminal Pro BNP Inpatient 2,853 (H) 0 - 900 pg/mL   INR   Result Value Ref Range    INR 1.16 (H) 0.86 - 1.14   Partial thromboplastin time   Result Value Ref Range    PTT 35 22 - 37 sec   CRP inflammation   Result Value Ref Range    CRP Inflammation 24.2 (H) 0.0 - 8.0 mg/L   Influenza A/B antigen   Result Value Ref Range    Influenza A/B Agn Specimen Nasopharyngeal     Influenza A Negative NEG^Negative    Influenza B Negative NEG^Negative   Lactic acid whole blood   Result Value Ref Range    Lactic Acid 0.8 0.7 - 2.0 mmol/L   Blood gas venous   Result Value Ref Range    Ph Venous 7.40 7.32 - 7.43 pH    PCO2 Venous 46 40 - 50 mm Hg    PO2 Venous 20 (L) 25 - 47 mm Hg    Bicarbonate Venous 29 (H) 21 - 28 mmol/L    Base Excess Venous 3.4 mmol/L    FIO2 21    XR Chest 2 Views    Narrative    CHEST TWO VIEWS 2/15/2020 6:19 PM     HISTORY: Shortness of breath for 4 hours with intermittent chest  heaviness and pressure. History of recent bypass surgery.    COMPARISON: 2/14/2020, 2/11/2020       Impression    Impression: Stable minimal to moderate left basilar pleural fluid with  atelectasis. Minimal right basilar pleural fluid unchanged. Mild  cardiac enlargement. Normal pulmonary vascularity. Sternotomy.  Atherosclerotic vascular calcification. Minimal degenerative changes  in the spine.    CURT L BEHRNS, MD          EKG Interpretation:      Interpreted by Severiano Wang MD   Symptoms at time of EKG: Shortness of breath     Rhythm: Normal sinus   Rate: Normal  Axis: Normal  Ectopy: None  Conduction: Normal  ST Segments/ T Waves: Non-specific ST-T wave changes  Q Waves: None  Comparison to prior: Unchanged    Clinical Impression: Normal sinus rhythm at a rate of 87 with some nonspecific ST-T changes.  No evidence of ischemia or infarction.        Medications   ipratropium - albuterol 0.5 mg/2.5 mg/3 mL (DUONEB) neb solution 3 mL (3 mLs Nebulization Given 2/15/20 1700)       Assessments & Plan (with  Medical Decision Making)   MDM--69-year-old male who presents to the emergency room with ongoing difficulties breathing.  He recently had three-vessel bypass done at [facility] .  Patient was discharged home 2/11.  He had a left pleural effusion at that time.  He saw his primary care provider yesterday who increased his Lasix from 40 mg a day to 40 and 20 mg a day.  Patient has not been able to lay flat since his surgery.  He had some cough and wheezing on arrival here was given 1 neb and resolved.  He still feels very weak.  He denies having any chest pain.  He has had no fever and no infectious symptoms.  Patient's medical history is complicated in that he has a myeloproliferative disorder.  His white count is been elevated but improving.  Chest x-ray here today shows an ongoing pleural effusion which looks stable.  I discussed the case with cardiology and discussed it with Dr. Gonzalez who recommended hospitalization and diuresis and further evaluation for post thoracotomy syndrome.  I discussed with our hospitalist who is not comfortable with him staying here.  I then discussed it with the hospitalist at [facility] with  who agrees with the transfer and hospitalization.  I also discussed this with the patient and his sister were also comfortable with this transfer plan.  Troponin has been repeated but is not back yet.  Patient is feeling comfortable at this time.  I have decided not to give him any IV diuresis here as he will be on the road during his transfer and this can be managed better once he gets down to [facility].      I have reviewed the nursing notes.    I have reviewed the findings, diagnosis, plan and need for follow up with the patient.       New Prescriptions    No medications on file       Final diagnoses:   S/P CABG (coronary artery bypass graft)   Hypervolemia, unspecified hypervolemia type   Myeloproliferative disorder (H)   Coronary artery disease involving  native heart, angina presence unspecified, unspecified vessel or lesion type   Essential hypertension   Memory loss   Insomnia, unspecified type       2/15/2020   [facility] EMERGENCY DEPARTMENT     Kathleen, Severiano FERRER MD  02/15/20 1943

## 2020-02-16 NOTE — PROGRESS NOTES
Pt A&O, denies pain and SOB. Report given to [facility] [address] RN prior to transfer. Pt transferred with all belongings via wheelchair.     US called regarding thoracentesis order, would provider like done today? Spoke with Dr Olsen who stated that we can hold off until Cards and CV surgery see so they can weigh in on need for thora today. RN on [address] notified.    S/F.

## 2020-02-17 ENCOUNTER — APPOINTMENT (OUTPATIENT)
Dept: CARDIOLOGY | Facility: CLINIC | Age: 70
DRG: 187 | End: 2020-02-17
Attending: NURSE PRACTITIONER
Payer: COMMERCIAL

## 2020-02-17 ENCOUNTER — APPOINTMENT (OUTPATIENT)
Dept: OCCUPATIONAL THERAPY | Facility: CLINIC | Age: 70
DRG: 187 | End: 2020-02-17
Attending: NURSE PRACTITIONER
Payer: COMMERCIAL

## 2020-02-17 ENCOUNTER — APPOINTMENT (OUTPATIENT)
Dept: ULTRASOUND IMAGING | Facility: CLINIC | Age: 70
DRG: 187 | End: 2020-02-17
Attending: INTERNAL MEDICINE
Payer: COMMERCIAL

## 2020-02-17 LAB
APPEARANCE FLD: NORMAL
COLOR FLD: NORMAL
CRP SERPL-MCNC: 19.8 MG/L (ref 0–8)
EOSINOPHIL NFR FLD MANUAL: 9 %
GLUCOSE BLDC GLUCOMTR-MCNC: 118 MG/DL (ref 70–99)
GLUCOSE FLD-MCNC: 119 MG/DL
GRAM STN SPEC: NORMAL
LDH FLD L TO P-CCNC: 654 U/L
LYMPHOCYTES NFR FLD MANUAL: 35 %
MONOS+MACROS NFR FLD MANUAL: 3 %
NEUTS BAND NFR FLD MANUAL: 53 %
PH FLD: NORMAL PH
PROCALCITONIN SERPL-MCNC: 0.12 NG/ML
PROT FLD-MCNC: 4.1 G/DL
SPECIMEN SOURCE FLD: NORMAL
SPECIMEN SOURCE: NORMAL
WBC # FLD AUTO: 3098 /UL

## 2020-02-17 PROCEDURE — 97110 THERAPEUTIC EXERCISES: CPT | Mod: GO | Performed by: OCCUPATIONAL THERAPIST

## 2020-02-17 PROCEDURE — 40000893 ZZH STATISTIC PT IP EVAL DEFER

## 2020-02-17 PROCEDURE — 25000128 H RX IP 250 OP 636: Performed by: INTERNAL MEDICINE

## 2020-02-17 PROCEDURE — 93321 DOPPLER ECHO F-UP/LMTD STD: CPT | Mod: 26 | Performed by: INTERNAL MEDICINE

## 2020-02-17 PROCEDURE — 89051 BODY FLUID CELL COUNT: CPT | Performed by: INTERNAL MEDICINE

## 2020-02-17 PROCEDURE — 87205 SMEAR GRAM STAIN: CPT | Performed by: INTERNAL MEDICINE

## 2020-02-17 PROCEDURE — 0W9B3ZX DRAINAGE OF LEFT PLEURAL CAVITY, PERCUTANEOUS APPROACH, DIAGNOSTIC: ICD-10-PCS | Performed by: RADIOLOGY

## 2020-02-17 PROCEDURE — 93325 DOPPLER ECHO COLOR FLOW MAPG: CPT

## 2020-02-17 PROCEDURE — 82945 GLUCOSE OTHER FLUID: CPT | Performed by: INTERNAL MEDICINE

## 2020-02-17 PROCEDURE — 25000132 ZZH RX MED GY IP 250 OP 250 PS 637: Performed by: INTERNAL MEDICINE

## 2020-02-17 PROCEDURE — 97166 OT EVAL MOD COMPLEX 45 MIN: CPT | Mod: GO | Performed by: OCCUPATIONAL THERAPIST

## 2020-02-17 PROCEDURE — 84145 PROCALCITONIN (PCT): CPT | Performed by: HOSPITALIST

## 2020-02-17 PROCEDURE — G0463 HOSPITAL OUTPT CLINIC VISIT: HCPCS

## 2020-02-17 PROCEDURE — 88112 CYTOPATH CELL ENHANCE TECH: CPT | Performed by: INTERNAL MEDICINE

## 2020-02-17 PROCEDURE — 83615 LACTATE (LD) (LDH) ENZYME: CPT | Performed by: INTERNAL MEDICINE

## 2020-02-17 PROCEDURE — 93308 TTE F-UP OR LMTD: CPT | Mod: 26 | Performed by: INTERNAL MEDICINE

## 2020-02-17 PROCEDURE — 99232 SBSQ HOSP IP/OBS MODERATE 35: CPT | Performed by: HOSPITALIST

## 2020-02-17 PROCEDURE — 88305 TISSUE EXAM BY PATHOLOGIST: CPT | Mod: 26 | Performed by: INTERNAL MEDICINE

## 2020-02-17 PROCEDURE — 12000000 ZZH R&B MED SURG/OB

## 2020-02-17 PROCEDURE — 93325 DOPPLER ECHO COLOR FLOW MAPG: CPT | Mod: 26 | Performed by: INTERNAL MEDICINE

## 2020-02-17 PROCEDURE — 00000102 ZZHCL STATISTIC CYTO WRIGHT STAIN TC: Performed by: INTERNAL MEDICINE

## 2020-02-17 PROCEDURE — 25000128 H RX IP 250 OP 636: Performed by: HOSPITALIST

## 2020-02-17 PROCEDURE — 36415 COLL VENOUS BLD VENIPUNCTURE: CPT | Performed by: HOSPITALIST

## 2020-02-17 PROCEDURE — 00000155 ZZHCL STATISTIC H-CELL BLOCK W/STAIN: Performed by: INTERNAL MEDICINE

## 2020-02-17 PROCEDURE — 27210190 US THORACENTESIS

## 2020-02-17 PROCEDURE — 25000132 ZZH RX MED GY IP 250 OP 250 PS 637: Performed by: HOSPITALIST

## 2020-02-17 PROCEDURE — 87070 CULTURE OTHR SPECIMN AEROBIC: CPT | Performed by: INTERNAL MEDICINE

## 2020-02-17 PROCEDURE — 83986 ASSAY PH BODY FLUID NOS: CPT | Performed by: INTERNAL MEDICINE

## 2020-02-17 PROCEDURE — 25500064 ZZH RX 255 OP 636: Performed by: INTERNAL MEDICINE

## 2020-02-17 PROCEDURE — 88305 TISSUE EXAM BY PATHOLOGIST: CPT | Performed by: INTERNAL MEDICINE

## 2020-02-17 PROCEDURE — 25800030 ZZH RX IP 258 OP 636: Performed by: HOSPITALIST

## 2020-02-17 PROCEDURE — 88112 CYTOPATH CELL ENHANCE TECH: CPT | Mod: 26 | Performed by: INTERNAL MEDICINE

## 2020-02-17 PROCEDURE — 86140 C-REACTIVE PROTEIN: CPT | Performed by: HOSPITALIST

## 2020-02-17 PROCEDURE — 84157 ASSAY OF PROTEIN OTHER: CPT | Performed by: INTERNAL MEDICINE

## 2020-02-17 PROCEDURE — 25000125 ZZHC RX 250: Performed by: RADIOLOGY

## 2020-02-17 PROCEDURE — 97535 SELF CARE MNGMENT TRAINING: CPT | Mod: GO | Performed by: OCCUPATIONAL THERAPIST

## 2020-02-17 PROCEDURE — 40000863 ZZH STATISTIC RADIOLOGY XRAY, US, CT, MAR, NM

## 2020-02-17 PROCEDURE — 99231 SBSQ HOSP IP/OBS SF/LOW 25: CPT | Mod: 25 | Performed by: INTERNAL MEDICINE

## 2020-02-17 RX ORDER — NICOTINE POLACRILEX 4 MG
15-30 LOZENGE BUCCAL
Status: DISCONTINUED | OUTPATIENT
Start: 2020-02-17 | End: 2020-02-17

## 2020-02-17 RX ORDER — DEXTROSE MONOHYDRATE 25 G/50ML
25-50 INJECTION, SOLUTION INTRAVENOUS
Status: DISCONTINUED | OUTPATIENT
Start: 2020-02-17 | End: 2020-02-17

## 2020-02-17 RX ORDER — LIDOCAINE HYDROCHLORIDE 10 MG/ML
10 INJECTION, SOLUTION EPIDURAL; INFILTRATION; INTRACAUDAL; PERINEURAL ONCE
Status: COMPLETED | OUTPATIENT
Start: 2020-02-17 | End: 2020-02-17

## 2020-02-17 RX ADMIN — SENNOSIDES AND DOCUSATE SODIUM 2 TABLET: 8.6; 5 TABLET ORAL at 21:20

## 2020-02-17 RX ADMIN — METOPROLOL SUCCINATE 100 MG: 100 TABLET, EXTENDED RELEASE ORAL at 21:20

## 2020-02-17 RX ADMIN — ATORVASTATIN CALCIUM 40 MG: 40 TABLET, FILM COATED ORAL at 08:02

## 2020-02-17 RX ADMIN — MULTIPLE VITAMINS W/ MINERALS TAB 1 TABLET: TAB at 08:02

## 2020-02-17 RX ADMIN — FERROUS SULFATE TAB 325 MG (65 MG ELEMENTAL FE) 325 MG: 325 (65 FE) TAB at 08:02

## 2020-02-17 RX ADMIN — POTASSIUM CHLORIDE 20 MEQ: 1500 TABLET, EXTENDED RELEASE ORAL at 08:02

## 2020-02-17 RX ADMIN — ASPIRIN 325 MG ORAL TABLET 325 MG: 325 PILL ORAL at 08:02

## 2020-02-17 RX ADMIN — HUMAN ALBUMIN MICROSPHERES AND PERFLUTREN 9 ML: 10; .22 INJECTION, SOLUTION INTRAVENOUS at 09:00

## 2020-02-17 RX ADMIN — METOPROLOL SUCCINATE 100 MG: 100 TABLET, EXTENDED RELEASE ORAL at 08:02

## 2020-02-17 RX ADMIN — VANCOMYCIN HYDROCHLORIDE 1750 MG: 10 INJECTION, POWDER, LYOPHILIZED, FOR SOLUTION INTRAVENOUS at 14:55

## 2020-02-17 RX ADMIN — FAMOTIDINE 20 MG: 20 TABLET, FILM COATED ORAL at 08:01

## 2020-02-17 RX ADMIN — FAMOTIDINE 20 MG: 20 TABLET, FILM COATED ORAL at 21:20

## 2020-02-17 RX ADMIN — SENNOSIDES AND DOCUSATE SODIUM 2 TABLET: 8.6; 5 TABLET ORAL at 08:01

## 2020-02-17 RX ADMIN — FUROSEMIDE 20 MG: 10 INJECTION, SOLUTION INTRAMUSCULAR; INTRAVENOUS at 08:02

## 2020-02-17 RX ADMIN — FUROSEMIDE 20 MG: 10 INJECTION, SOLUTION INTRAMUSCULAR; INTRAVENOUS at 17:00

## 2020-02-17 RX ADMIN — LIDOCAINE HYDROCHLORIDE 10 ML: 10 INJECTION, SOLUTION EPIDURAL; INFILTRATION; INTRACAUDAL; PERINEURAL at 09:20

## 2020-02-17 RX ADMIN — HYDROXYUREA 500 MG: 500 CAPSULE ORAL at 08:06

## 2020-02-17 RX ADMIN — CYANOCOBALAMIN TAB 500 MCG 250 MCG: 500 TAB at 08:01

## 2020-02-17 ASSESSMENT — ACTIVITIES OF DAILY LIVING (ADL)
ADLS_ACUITY_SCORE: 16
ADLS_ACUITY_SCORE: 18
PREVIOUS_RESPONSIBILITIES: MEAL PREP;HOUSEKEEPING;LAUNDRY;SHOPPING;MEDICATION MANAGEMENT;FINANCES;DRIVING
ADLS_ACUITY_SCORE: 16

## 2020-02-17 ASSESSMENT — MIFFLIN-ST. JEOR: SCORE: 1558.5

## 2020-02-17 NOTE — PROGRESS NOTES
[facility]    Cardiology Consultation     Date of Admission:  2/15/2020    Assessment & Plan   Renny Gannon is a 69 year old male who was admitted on 2/15/2020. I was asked to see the patient for shortness of breath.    The patient had a CABG on 1/31/2020.  The patient unfortunately is a poor historian so the details of the interval history are difficult to obtain.  I took a phone call on the patient last night and there was some concerns that he may have been volume overloaded shortly after his discharge.  Additionally there is a large left pleural effusion.  This is most notable on chest x-ray from 2/11/2020 and has been redemonstrated on chest x-ray dated 2/14 and 2/15/2020.    His troponin is mildly elevated but I do not suspect that this is related to an acute coronary event.  I suspect this is more likely due to demand from volume overload and the stress from his recent surgery.    The patient does have a pleural effusion and a mildly elevated CRP.  The mild elevation in CRP is less consistent with post sternotomy syndrome.  However this should be kept in the differential.    #1 Severe 3-vessel coronary artery disease s/p CABG x3 on 1/31/2020.  #2 HTN  #3 Fluid overload.  #4 Dyspnea  #5 Left pleural effusion    Plan:    1.  Patient underwent thoracentesis with removal of almost 1 L of serosanguineous fluid on the left side.  Patient states that his symptoms have significantly improved.  Review of echocardiogram does not demonstrate any significant pericardial effusion.  LV systolic function remains preserved.    2.  Agree with current Lasix dosing    3.  Continue aspirin, metoprolol    4.  Symptoms likely related to pleural effusion.  Cardiology will be available for questions.      Jalen Yap MD     Code Status    Full Code    Reason for Consult   Reason for consult: Dyspnea    Primary Care Physician   Angus Clements Mai    Chief Complaint   Dyspnea    History is obtained from the  patient    History of Present Illness   Renny Gannon is a 69 year old male who presents with dyspnea on exertion.  The patient is status post a three-vessel bypass on 1/31/2020.  Postoperatively he complained of volume overload over the last few weeks.  The patient has been residing with a family member.  It was advised that he go to a transitional care unit but this was not approved by his insurance.    Unfortunately the patient is a very poor historian.    Patient has a left-sided pleural effusion.  This is most notable on chest x-ray from 2/11/2020 and has been redemonstrated on chest x-ray dated 2/14 and 2/15/2020.    Most recent echocardiogram was performed on 1/9/2020.  This noted a normal LVEF with no pericardial effusion.    Currently the patient is sitting up without any acute complaints or concerns.  He does not give any additional history.    Most recent coronary angiogram was done on 1/9/2020.  This noted severe three-vessel CAD as  including the proximal to mid LAD, D1, multiple lesions within a large and branching OM1 system, and the RPDA.    From this hospital encounter did not note any ST elevation consistent with ST elevation myocardial infarction.    In the setting of his volume overload and recent cardiovascular surgery troponin is mildly elevated however there is no significant trend..    Past Medical History   I have reviewed this patient's medical history and updated it with pertinent information if needed.   Past Medical History:   Diagnosis Date     Hypertension        Past Surgical History   I have reviewed this patient's surgical history and updated it with pertinent information if needed.  Past Surgical History:   Procedure Laterality Date     BONE MARROW BIOPSY, BONE SPECIMEN, NEEDLE/TROCAR N/A 12/30/2019    Procedure: BIOPSY, BONE MARROW;  Surgeon: Aguilar Krause MD;  Location: [facility]     BYPASS GRAFT ARTERY CORONARY N/A 1/31/2020    Procedure: CORONARY ARTERY BYPASS  GRAFTING X 3 - LIMA TO LAD, SV TO OM  AND PDA; ENDOVEIN HARVEST OF BILATERAL LEGS; ON PUMP WITH SANDRA;  Surgeon: Mable Barrera MD;  Location:  OR     CV CORONARY ANGIOGRAM Left 1/9/2020    Procedure: Coronary Angiogram;  Surgeon: Devin Bentley MD;  Location:  [facility]ARDI[facility] CATH LAB     NO HISTORY OF SURGERY         Prior to Admission Medications   Prior to Admission Medications   Prescriptions Last Dose Informant Patient Reported? Taking?   HYDROcodone-acetaminophen (NORCO) 5-325 MG tablet 2/15/2020 at am  No Yes   Sig: Take 1 tablet by mouth every 4 hours as needed   aspirin (ASA) 325 MG tablet 2/15/2020 at amtime  No Yes   Sig: Take 1 tablet (325 mg) by mouth daily   atorvastatin (LIPITOR) 40 MG tablet 2/15/2020 at am Self No Yes   Sig: Take 1 tablet (40 mg) by mouth daily   famotidine (PEPCID) 20 MG tablet 2/15/2020 at am  No Yes   Sig: Take 1 tablet (20 mg) by mouth 2 times daily   ferrous sulfate (FEROSUL) 325 (65 Fe) MG tablet 2/15/2020 at am  No Yes   Sig: Take 1 tablet (325 mg) by mouth daily   furosemide (LASIX) 40 MG tablet 2/15/2020 at am  No Yes   Sig: Take 1 tablet in am and 0.5 tablet at 1 pm   hydroxyurea (HYDREA) 500 MG capsule 2/15/2020 at am Self No Yes   Sig: Take 1 capsule (500 mg) by mouth daily   methocarbamol (ROBAXIN) 500 MG tablet Past Week at Unknown time  No Yes   Sig: Take 1 tablet (500 mg) by mouth 4 times daily as needed for muscle spasms   metoprolol succinate ER (TOPROL-XL) 100 MG 24 hr tablet 2/15/2020 at am  No Yes   Sig: Take 1 tablet (100 mg) by mouth 2 times daily   potassium chloride ER (K-DUR/KLOR-CON M) 20 MEQ CR tablet 2/15/2020 at am  No Yes   Sig: Take 1 tablet (20 mEq) by mouth daily   senna-docusate (SENOKOT-S/PERICOLACE) 8.6-50 MG tablet 2/15/2020 at am  No Yes   Sig: Take 2 tablets by mouth 2 times daily   vitamin B-12 (CYANOCOBALAMIN) 250 MCG tablet 2/15/2020 at am Self Yes Yes   Sig: Take 250 mcg by mouth daily      Facility-Administered  Medications: None     Allergies   Allergies   Allergen Reactions     No Known Drug Allergies      Seasonal Allergies        Social History   I have reviewed this patient's social history and updated it with pertinent information if needed. Renny Gannon  reports that he quit smoking about 19 years ago. His smoking use included cigarettes. He started smoking about 59 years ago. He quit after 30.00 years of use. He has never used smokeless tobacco. He reports that he does not drink alcohol or use drugs.    Family History   I have reviewed this patient's family history and updated it with pertinent information if needed.   Family History   Problem Relation Age of Onset     No Known Problems Mother      Unknown/Adopted Father      Unknown/Adopted Maternal Grandmother      Unknown/Adopted Maternal Grandfather      Unknown/Adopted Paternal Grandmother      Unknown/Adopted Paternal Grandfather      Cancer Brother         lung cancer - smoking     No Known Problems Sister      Coronary Artery Disease Brother          of MI at 66     No Known Problems Sister        Review of Systems   The 12 point Review of Systems is negative other than noted in the HPI or here.     Physical Exam   Temp: 97.5  F (36.4  C) Temp src: Axillary BP: 117/60 Pulse: 98 Heart Rate: 78 Resp: 18 SpO2: 97 % O2 Device: None (Room air)    Vital Signs with Ranges  Temp:  [94.4  F (34.7  C)-98  F (36.7  C)] 97.5  F (36.4  C)  Pulse:  [68-98] 98  Heart Rate:  [78-88] 78  Resp:  [16-18] 18  BP: ()/(47-82) 117/60  SpO2:  [90 %-98 %] 97 %  181 lbs 10.54 oz    GENERAL APPEARANCE: Alert and oriented x3. No acute distress.  SKIN: Inspection of the skin reveals no rashes, ulcerations, warm, dry  NECK: Supple and symmetric.   Normal JVP  LUNGS: Bibasilar crackles and diminished in the bases worse on the left.  CARDIOVASCULAR: S1, S2, regular rate and rhythm without any murmurs, gallops, rubs. The carotid pulses were normal and 2+ bilaterally without  bruits. Peripheral pulses were 2+ and symmetric.  ABDOMEN: Soft, non-tender, non-distended with normal bowel sounds.  No ascites noted.  EXTREMITIES: 1+ edema to the knees.  NEUROLOGIC:  Slower than expected mentation      Data   Results for orders placed or performed during the hospital encounter of 02/15/20 (from the past 24 hour(s))   Procalcitonin   Result Value Ref Range    Procalcitonin 0.14 ng/ml   Lactic acid level STAT   Result Value Ref Range    Lactate for Sepsis Protocol 0.9 0.7 - 2.0 mmol/L   Glucose by meter   Result Value Ref Range    Glucose 118 (H) 70 - 99 mg/dL   pH fluid   Result Value Ref Range    pH Fluid Source Canceled, Test credited     Ph Fluid Canceled, Test credited pH   US Thoracentesis    Narrative    THORACENTESIS 2/17/2020 9:38 AM    HISTORY: Patient with history of postop left pleural effusion.    PROCEDURE/FINDINGS:  After obtaining informed consent, the patient was  positioned appropriately. The back was prepped and draped in the usual  sterile manner. Under ultrasound guidance, a Yueh needle was used to  access the pleural space. A permanent ultrasound image was saved to  document needle position. Thoracentesis was performed. Approximately  950 mL dark red/blood-tinged fluid was aspirated out. Fluid sample was  set aside for diagnostic testing.    The patient tolerated the procedure well. There were no immediate  postprocedure complications. The patient's vital signs were monitored  by radiology nursing staff under my supervision and remained stable  throughout the study.      Impression    IMPRESSION: Successful left thoracentesis performed with removal of  950 mL bloody fluid.    LEO MELTON MD

## 2020-02-17 NOTE — PLAN OF CARE
Lymph: Lymphedema consult received, chart reviewed, spoke with patient and observed pt's LEs. Pt has large blisters on B feet and toes which are wrapped in kerlix right now. Pt's lower legs demonstrate mild 1+ pitting edema and pedal edema 1-2+. PT does not feel that pt needs the intensity of compression that short stretch bandages provide at this time. Recommend pt continue to elevate LEs and nursing to continue wound cares for pt's blisters and open wounds. Would not provide compression over pt's blisters. Lymphedema to sign off.

## 2020-02-17 NOTE — PROGRESS NOTES
"   02/17/20 1726   Quick Adds   Type of Visit Initial Occupational Therapy Evaluation   Living Environment   Lives With alone   Living Arrangements apartment   Living Environment Comment Lives alone but has been staying with his sister since discharged from the hospital last week after CABG surgery 1/31/20   Self-Care   Usual Activity Tolerance good   Current Activity Tolerance fair   Equipment Currently Used at Home none   Activity/Exercise/Self-Care Comment Pt does not normally use equipment but has been using a walker the last couple of weeks since CABG   Functional Level   Ambulation 0-->independent   Transferring 0-->independent   Toileting 0-->independent   Bathing 0-->independent   Dressing 0-->independent   Eating 0-->independent   Communication 0-->understands/communicates without difficulty   Cognition 1 - attention or memory deficits   Fall history within last six months no   Prior Functional Level Comment Reports some trouble with memory the last several years and trouble with swallowing since his CABG a couple weeks ago. Normally he drives, shops, does his own meds, meals, chores.   General Information   Onset of Illness/Injury or Date of Surgery - Date 01/31/20   Referring Physician Flora Bhatt NP   Patient/Family Goals Statement \"I don't know\"   Additional Occupational Profile Info/Pertinent History of Current Problem Renyn Gannon is a 69 year old male who underwent a coronary artery bypass grafting x 3 on 1/31/20 and presented with shortness of breath to ER twice since discharge last week. He was readmitted and found to have a moderate left pleural effusion, s/p Thoracentesis. Of note, pt was recommended to go to TCU with last hospital stay but insurance adamantly declined to cover it and he was sent home with his sister. Medical history includes cognitive impairment, wounds on feet, myeloproliferative disorder and anemia   Precautions/Limitations fall precautions;sternal precautions   General " Observations Sitting up in a chair, agreeable to OT   Cognitive Status Examination   Orientation orientation to person, place and time   Level of Consciousness alert   Follows Commands (Cognition) follows one step commands   Memory impaired   Cognitive Comment Needs repeated cues for sternal precautions. Scored in dementia range on SLUMS when completed on Feb 9th.   Visual Perception   Visual Perception Wears glasses   Sensory Examination   Sensory Comments Reports diminished sensation in both feet   Pain Assessment   Patient Currently in Pain Yes, see Vital Sign flowsheet  (painful to take a deep breath)   Integumentary/Edema   Integumentary/Edema Comments Pitting edema and wounds on BLE; healing sternal incision   Posture   Posture forward head position;protracted shoulders   Range of Motion (ROM)   ROM Comment BUE AROM limited with sternal precautions   Strength   Strength Comments Limited by sternal precautions (10# restriction)   Hand Strength   Hand Strength Comments strong, equal grasp   Muscle Tone Assessment   Muscle Tone Quick Adds No deficits were identified   Coordination   Coordination Comments BUE mild impairment with reach to target and pincer grasp   Mobility   Bed Mobility Comments Not observed this session   Transfer Skill: Sit to Stand   Level of [place]: Sit/Stand minimum assist (75% patients effort)   Physical Assist/Nonphysical Assist: Sit/Stand verbal cues   Transfer Skill: Toilet Transfer   Level of [place]: Toilet minimum assist (75% patients effort)   Physical Assist/Nonphysical Assist: Toilet verbal cues   Balance   Balance Comments CGA with dynamic standing balance with WW   Upper Body Dressing   Level of [place]: Dress Upper Body minimum assist (75% patients effort)   Lower Body Dressing   Level of [place]: Dress Lower Body maximum assist (25% patients effort)  (wounds on feet)   Grooming   Level of [place]: Grooming stand-by assist   Eating/Self Feeding   Level  of [place]: Eating independent   Physical Assist/Nonphysical Assist: Eating set-up required   Instrumental Activities of Daily Living (IADL)   Previous Responsibilities meal prep;housekeeping;laundry;shopping;medication management;finances;driving   Activities of Daily Living Analysis   Impairments Contributing to Impaired Activities of Daily Living balance impaired;cognition impaired;coordination impaired;pain;post surgical precautions;postural control impaired;ROM decreased;strength decreased;sensation decreased   General Therapy Interventions   Planned Therapy Interventions ADL retraining;IADL retraining;balance training;bed mobility training;cognition;transfer training;risk factor education;progressive activity/exercise   Clinical Impression   Criteria for Skilled Therapeutic Interventions Met yes, treatment indicated   OT Diagnosis Impaired ADL and moiblity   Influenced by the following impairments sternal precautions, impaired cognition   Assessment of Occupational Performance 5 or more Performance Deficits   Identified Performance Deficits ADL, IADL, mobility   Clinical Decision Making (Complexity) Moderate complexity   Therapy Frequency 2x/day   Predicted Duration of Therapy Intervention (days/wks) 5 days   Anticipated Equipment Needs at Discharge shower chair;raised toilet seat   Anticipated Discharge Disposition Transitional Care Facility   Risks and Benefits of Treatment have been explained. Yes   Patient, Family & other staff in agreement with plan of care Yes   Clinical Impression Comments OT also doing cardiac rehab while here.   Total Evaluation Time   Total Evaluation Time (Minutes) 10

## 2020-02-17 NOTE — PROGRESS NOTES
"  [facility] Nurse Inpatient Wound Assessment   Initial Assessment of wound(s) on pt's:   Buttocks and bilateral feet    [facility] NURSE ASSESSMENT      Bilateral fleshy buttocks with 3 areas of full thickness, community acquired pressure injuries that are healing, nearly healed, tissue looks a little too dry, apply lotion and good PIP measures, no dressings necessary     Bilateral toes/ dorsal toes with blisters both intact and popped.  Drainage is a bit yellow looking, will use silver dressing and Plurogel for wound care and lymphedema ordered, see plan below  [facility] NURSE TREATMENT PLAN    Plan of care for wound located on buttocks- effective now  - use Caterina Cleanse and Protect for perineal cares, will help to moisturize the buttocks, too  - assure pt is sitting on a pressure relieving cushion (#381961) and does not sit for longer than 1-2 hours total at a time.  -pt should reposition every 2 hours, side to side only, when in bed    Plan of care for bilateral feet- daily  PERFORM \"GOOD FOOT CARE\" AT ALL TIMES:  1. CLEAN feet daily with a gentle soap and water, making sure to clean between the toes. Dry thoroughly, especially between the toes. Be careful when drying between toes to not use a sawing-action, this may cut the skin between the toes.   2. Spray the skin from toes to knees, not between the toes, with a thick layer of Caterina Cleanse and Protect, massage into skin and allow sit on skin for 5-10 minutes then wipe or rinse off (the Caterina will \"condition\" the skin by loosening crusty debris, moisturizing and cleaning the skin)  3. LOTION from toes to knees, not between toes (lotion moisturizes and too much moisture between toes may cause a fungal rash).    4.  WOUND CARE TO TOES- daily  - drape wounds/ blisters with kerlix fluffs, running gauze between toes, wet with Vashe (#[id number]) and let soak into the wounds x 5-10 minutes, wipe clean and dry to dry  - apply No Sting Skin Prep to all periwound tissue and across the " intact blisters (note there are blisters on the top and bottom of toes), dry  - weave strips of dry Aquacel Ag between toes  - apply smear of Plurogel to each open wound  - cover toe wounds with another piece of dry AquacelAg  - manage drainage with wide strip of the blue Covidien Pad, cut to wrap around toes  - secure with belkis or kerlix  -time and date dressing change  5.  If noticing moisture, itching or odor between the toes dust with antifungal powder, think Desenex, twice a day x 4-5 days.    6.  Keep nails trimmed and filed to minimize snagging, this could create a wound.  Follow up with podiatry to trim nails as needed.  7. Clean SOCKS every day.  8. SHOES:  Wear hard soled shoes at all times to minimize bumping feet and/or stepping on something.  This includes wearing hard soled slippers around the house.  No flip flops or open toed shoes in the summer, etc.  You need to protect your toes and feet. And if shoes do not fit then DO NOT WEAR THEM  8  Are you using COMPRESSION WRAPS?  Assure you follow up for regular fittings.    Note- all wraps and stockings wear out.  Some after only a few weeks.          Nursing to notify Provider(s) and re-consult the [facility] Nurse if wound(s) deteriorate or new skin concerns    [facility] Nurse follow-up plan: weekly  OBJECTIVE DATA    Patient history according to provider notes: 69-year old gentleman with a PMH of HTN, former smoker, recently diagnosed myelofibrosis on hydroxyurea and severe 3-vessel coronary artery disease s/p coronary artery bypass grafting on 1/31/2020 by Dr. Mable Barrera. He did have a code stroke called postoperatively with noted facial droop which resolved and CT head was negative for acute pathology. He was discharged to home on diuretics on 2/10/2020 with home therapy as insurance refused authorization for transitional care placement as recommended. He is staying with his sister for now as he lives alone. Since discharge, he has had two emergency  department visits, the last being yesterday (2/15) where he as admitted for ongoing shortness of breath and BLE edema. Primary care provider recently increased lasix dosing and weights have been improving but he has continued dyspnea at rest with associated orthopnea. Denies fever, chills, myalgias, dysuria, productive cough, dizziness, syncope or near syncope, palpitations or chest pain. Of note, he has a persistent left pleural effusion.  # Severe 3-vessel coronary artery disease s/p CABG x3 on 1/31/2020.  # HTN.     Andrea Risk Assessment  Sensory Perception: 4-->no impairment    Moisture: 4-->rarely moist   Activity: 3-->walks occasionally     Mobility: 3-->slightly limited   Nutrition: 3-->adequate   Friction and Shear: 3-->no apparent problem      Positioning: Pillows,     Mattress:  Standard , Atmos Air mattress    Current diet  Orders Placed This Encounter      Combination Diet Low Saturated Fat Na <2400mg Diet; No Caffeine for 24 hours (once tests completed, may have caffeine)      Moisture Management:  continent  o Catheter secured? Not applicable     o I/O last 3 completed shifts:  o In: 600 [P.O.:600]  o Out: 2075 [Urine:2075]    Labs:   Recent Labs   Lab Test 02/16/20  0553 02/15/20  1612  01/09/20  0926   ALBUMIN  --  3.0*   < > 4.1   HGB 10.0* 10.3*   < > 12.1*   INR  --  1.16*   < > 1.15*   WBC 24.9* 27.4*   < > 90.0*   A1C  --   --   --  5.6   CRP 19.1* 24.2*  --   --     < > = values in this interval not displayed.         [facility] NURSE PHYSICAL EXAM    Wound Assessment (location):   Fleshy buttocks  Wound History:  Buttocks are dry with several areas of light flaking of sloughy devitalized tissue  Date of last photo feb 17, 2020      Left buttock with an area of about 5cm x 1.5cm with two areas of lifting, dry slough, each spot about 1cm x 0.5cm, the right side has an area, more in the IT area with a similar appearance, about 1cm x 0.8cm, dry, no drainage, no fluctuance, no pain, no  erythema    Assessment of bilateral feet/ toes  Pt had open hear surgery 01/31/2020 and pt states he developed fluid and blisters on his toes following the surgery.  At the time of my assessment pt had his legs wrapped in ACE wraps  Feb 17, 2020        Bilateral LE with +1-2 edema, bilateral +2-3 pedal edema.    Right foot- intact to popped serous blisters, wound beds are moist with moderate serous drainage, drainage is a bit yellow-green but no odor, erythema present.  Left foot- blisters more intact than popped on this foot, dorsal side blisters are serous and plantar toes 3-4-5 are serosanguinous      [facility] NURSE INTERVENTIONS    Assessed buttocks and bilateral LE/ feet as noted above  Wound Care: done per plan of care  Supplies: reviewed, gathered, placed at the bedside and discussed with patient  Discussed plan of care with Patient  Education provided: importance of repositioning, wound progress, Infection prevention , Moisture management, Hygiene, Nail care and Off-loading pressure    Purvi Tiwari RN

## 2020-02-17 NOTE — PLAN OF CARE
A/Ox4. AVSS. Up with AX1 GB walker. Low sat/fat diet. Good appetite. Denies pain or discomfort. Sternal incision scabbed, healing well. Leg incisions erythremic with blisters on feet. Covered with gauze WOC consult pending. Denies SOB, on RA LS diminished with crackles in the left lower base. Plan for echo and possible thoracentesis pending testing results.

## 2020-02-17 NOTE — PROGRESS NOTES
[facility]    Medicine Progress Note - Hospitalist Service       Date of Admission:  2/15/2020  Assessment & Plan   Renny Gannon is a 69 year old male who underwent a coronary artery bypass grafting x 3 and presented with shortness of breath. He was found to have a moderate left pleural effusion.    Symptomatic left pleural effusion after coronary artery bypass grafting   Coronary artery disease status post coronary artery bypass grafting x3 on 1/31/20  He had a thoracentesis today with about 1L of fluid removed. He is feeling better.     Await pleural fluid studies     Continue furosemide     Cardiology and CT surgery are following    Continue other cardiac medications     MPD     Continue hydroxyurea     Gastroesophageal reflux disease     Continue H2 blocker     Hyperlipidemia     Continue statin     Addendum  1/2 admission blood cultures is growing GPC    Check C-reactive protein and pro-calcitonin     Start vancomycin     Await final report     Diet: Combination Diet Low Saturated Fat Na <2400mg Diet; No Caffeine for 24 hours (once tests completed, may have caffeine)  Snacks/Supplements Adult: Other; Gelatein @ 10 am, Vanilla Boost @ 2 pm; Between Meals    DVT Prophylaxis: Pneumatic Compression Devices  Almendarez Catheter: not present  Code Status: Full Code      Disposition Plan   Expected discharge: 1-2 days, recommended to prior living arrangement once cardiac /pulmonary status are stable .  Entered: Art Olsen MD 02/17/2020, 1:22 PM       The patient's care was discussed with the Patient.    Art Olsen MD  Hospitalist Service  [facility]    ______________________________________________________________________    Interval History   Back from thoracentesis and feeling better.  Less short of breath.  No chest pain. Mild non-productive cough.    Data reviewed today: I reviewed all medications, new labs and imaging results over the last 24 hours. I  personally reviewed no images or EKG's today.    Physical Exam   Vital Signs: Temp: 97.4  F (36.3  C) Temp src: Axillary BP: 106/58 Pulse: 84 Heart Rate: 83 Resp: 18 SpO2: 93 % O2 Device: None (Room air)    Weight: 181 lbs 10.54 oz  Constitutional: awake, alert, cooperative, no apparent distress, and appears stated age  Respiratory: No increased work of breathing, good air exchange, clear to auscultation bilaterally, no crackles or wheezing  Cardiovascular:  regular rate and rhythm, normal S1 and S2, no S3 or S4, and no murmur noted; 2+ right lower extremity edema, 1+ left lower extremity edema  GI: No scars, normal bowel sounds, soft, non-distended, non-tender  Neuropsychiatric: General: normal, calm and normal eye contact    Data   Recent Labs   Lab 02/16/20  0553 02/16/20  0011 02/15/20  1936 02/15/20  1612 02/13/20  1335 02/11/20  1224   WBC 24.9*  --   --  27.4* 35.6* 44.1*   HGB 10.0*  --   --  10.3* 9.9* 8.8*   MCV 96  --   --  98 96 96     --   --  329 380 405   INR  --   --   --  1.16*  --   --      --   --  138  --  139   POTASSIUM 4.6  --   --  4.5  --  4.2   CHLORIDE 103  --   --  104  --  106   CO2 27  --   --  27  --  25   BUN 22  --   --  22  --  21   CR 1.16  --   --  1.13  --  1.23   ANIONGAP 5  --   --  7  --  8   MINNIE 8.9  --   --  8.7  --  8.4*   *  --   --  112*  --  99   ALBUMIN  --   --   --  3.0*  --  3.0*   PROTTOTAL  --  7.2  --  7.2  --  6.6*   BILITOTAL  --   --   --  0.8  --  1.0   ALKPHOS  --   --   --  105  --  90   ALT  --   --   --  28  --  22   AST  --   --   --  34  --  43   TROPI  --   --  0.086* 0.071*  --   --      Recent Results (from the past 24 hour(s))   US Thoracentesis    Narrative    THORACENTESIS 2/17/2020 9:38 AM    HISTORY: Patient with history of postop left pleural effusion.    PROCEDURE/FINDINGS:  After obtaining informed consent, the patient was  positioned appropriately. The back was prepped and draped in the usual  sterile manner. Under  ultrasound guidance, a Yueh needle was used to  access the pleural space. A permanent ultrasound image was saved to  document needle position. Thoracentesis was performed. Approximately  950 mL dark red/blood-tinged fluid was aspirated out. Fluid sample was  set aside for diagnostic testing.    The patient tolerated the procedure well. There were no immediate  postprocedure complications. The patient's vital signs were monitored  by radiology nursing staff under my supervision and remained stable  throughout the study.      Impression    IMPRESSION: Successful left thoracentesis performed with removal of  950 mL bloody fluid.    LEO MELTON MD     Medications     - MEDICATION INSTRUCTIONS -         aspirin  325 mg Oral Daily     atorvastatin  40 mg Oral Daily     vitamin B-12  250 mcg Oral Daily     famotidine  20 mg Oral BID     ferrous sulfate  325 mg Oral Daily     furosemide  20 mg Intravenous BID     hydroxyurea  500 mg Oral Daily     metoprolol succinate ER  100 mg Oral BID     multivitamin w/minerals  1 tablet Oral Daily     potassium chloride ER  20 mEq Oral Daily     senna-docusate  2 tablet Oral BID     sodium chloride (PF)  3 mL Intracatheter Q8H

## 2020-02-17 NOTE — PROGRESS NOTES
Care Suites Procedure Nursing Note    Patient Information  Name: Renny Gannon  Age: 69 year old    Procedure  Procedure:  Thoracentesis  Timeout done at about 0918  Procedure start time:  0922  Procedure complete time:  0927  950 sanguinous fluid aspirated.  Concerns/abnormal assessment: no/no  If abnormal assessment, provider notified: N/A  Plan/Other: return to 3rd floor.  Transported per cart back to 3rd floor at 0936.    Demetrice Juarez RN

## 2020-02-17 NOTE — PHARMACY-VANCOMYCIN DOSING SERVICE
Pharmacy Vancomycin Initial Note  Date of Service 2020  Patient's  1950  69 year old, male    Indication: Sepsis    Current estimated CrCl = Estimated Creatinine Clearance: 62 mL/min (based on SCr of 1.16 mg/dL).    Creatinine for last 3 days  2/15/2020:  4:12 PM Creatinine 1.13 mg/dL  2020:  5:53 AM Creatinine 1.16 mg/dL    Recent Vancomycin Level(s) for last 3 days  No results found for requested labs within last 72 hours.      Vancomycin IV Administrations (past 72 hours)      No vancomycin orders with administrations in past 72 hours.                Nephrotoxins and other renal medications (From now, onward)    Start     Dose/Rate Route Frequency Ordered Stop    20 1430  vancomycin (VANCOCIN) 1,750 mg in sodium chloride 0.9 % 500 mL intermittent infusion      1,750 mg  over 2 Hours Intravenous EVERY 12 HOURS 20 1406      20 0800  furosemide (LASIX) injection 20 mg      20 mg  over 1-2 Minutes Intravenous 2 TIMES DAILY. 02/15/20 2345            Contrast Orders - past 72 hours (72h ago, onward)    Start     Dose/Rate Route Frequency Ordered Stop    20 0900  perflutren diluted 1mL to 2mL with saline (OPTISON) diluted injection 3 mL      3 mL Intravenous ONCE 20 0851 20 0900                Plan:  1.  Start vancomycin  1750 mg IV q12h.   2.  Goal Trough Level: 15-20 mg/L   3.  Pharmacy will check trough levels as appropriate in 1-3 Days or sooner if renal function worsens.  4. Serum creatinine levels will be ordered daily for the first week of therapy and at least twice weekly for subsequent weeks.    5. Elizabeth method utilized to dose vancomycin therapy: Method 2    Lilli Aviles, CarloD

## 2020-02-17 NOTE — PROGRESS NOTES
CV Surgery Progress Note   February 17, 2020   HPI:  Mr. Gannon is a very pleasant 69-year old gentleman with a PMH of HTN, former smoker, recently diagnosed myelofibrosis on hydroxyurea and severe 3-vessel coronary artery disease s/p coronary artery bypass grafting on 1/31/2020 by Dr. Mable Barrera. He did have a code stroke called postoperatively with noted facial droop which resolved and CT head was negative for acute pathology. He was discharged to home on diuretics on 2/10/2020 with home therapy as insurance refused authorization for transitional care placement as recommended. He is staying with his sister for now as he lives alone. Since discharge, he has had two emergency department visits, the last being yesterday (2/15) where he as admitted for ongoing shortness of breath and BLE edema. Primary care provider recently increased lasix dosing and weights have been improving but he has continued dyspnea at rest with associated orthopnea. Denies fever, chills, myalgias, dysuria, productive cough, dizziness, syncope or near syncope, palpitations or chest pain. Of note, he has a persistent left pleural effusion.     Assessment & Plan:   # Severe 3-vessel coronary artery disease s/p CABG x3 on 1/31/2020.  # HTN. -115/70s  -  mg daily  - Atorvastatin 40 mg daily  - Metoprolol succinate 100 mg BID     # Former smoker  # Fluid overload. Weight today 181 lbs down from 185 lbs on admission; was 197 lbs at time of discharge from CABG stay. 1+ BLE appreciated. BNP 2853.  # Dyspnea  # Left pleural effusion s/p left thoracentesis for 950 mL evacuated. CRP 19.1, sed rate 30, . Diminished breath sounds on left. Is reaching 1000 mL on IS. Saturating well on room air. Endorsing some pleuritic pain this am.  - Will obtain echo to assess for pericardial effusion (completed this morning)  - Agree with lasix  - Lymph edema wraps     # Myelofibrosis. Baseline WBC is between 40-50  - Continue PTA  "hydroxyurea     # Normocytic anemia in the setting of surgical/acute blood loss  - Continue Ferrous Sulfate     Subjective: No acute events overnight. States pain is well managed on current regimen, slept well overnight. Tolerating diet, is passing flatus, + BM. Breathing is good, able to reach 1000 mL on IS. Up in chair, working with therapies and ambulating in halls. Denies chest pain, palpitations, dizziness, syncopal symptoms, chills, myalgias or sternal popping/clicking.     Objective: Blood Pressure 117/60   Pulse 98   Temperature 97.5  F (36.4  C) (Axillary)   Respiration 18   Height 1.727 m (5' 8\")   Weight 82.4 kg (181 lb 10.5 oz)   Oxygen Saturation 97%   Body Mass Index 27.62 kg/m       General: NAD  Heart: S1, S2, RRR, no m/r/g  Rhythm: NSR  Lungs: CTA, dim left mid to lower lung fields  Abd: S/ND, +BS  Sternum/Incisions: Midline sternal and LLE incisions CDI  Extremities: 2+ BLE edema              Recent Labs   Lab 02/16/20  0553 02/16/20  0011 02/15/20  1936 02/15/20  1612 02/13/20  1335 02/11/20  1224   WBC 24.9*  --   --  27.4* 35.6* 44.1*   HGB 10.0*  --   --  10.3* 9.9* 8.8*   MCV 96  --   --  98 96 96     --   --  329 380 405   INR  --   --   --  1.16*  --   --      --   --  138  --  139   POTASSIUM 4.6  --   --  4.5  --  4.2   CHLORIDE 103  --   --  104  --  106   CO2 27  --   --  27  --  25   BUN 22  --   --  22  --  21   CR 1.16  --   --  1.13  --  1.23   ANIONGAP 5  --   --  7  --  8   MINNIE 8.9  --   --  8.7  --  8.4*   *  --   --  112*  --  99   ALBUMIN  --   --   --  3.0*  --  3.0*   PROTTOTAL  --  7.2  --  7.2  --  6.6*   BILITOTAL  --   --   --  0.8  --  1.0   ALKPHOS  --   --   --  105  --  90   ALT  --   --   --  28  --  22   AST  --   --   --  34  --  43   TROPI  --   --  0.086* 0.071*  --   --          HECTOR López, CCRN-[facility] [place]  Pager: [phone]       "

## 2020-02-17 NOTE — PLAN OF CARE
Patient is alert, up with stand by assist, vital signs stable, denies pain. Chest incision approximated. Mepilex in place to coccyx area. Mepilex in place to bilateral lower knee from previous graft site.  Feet wrapped with non adherent bandage and Kerlix. Open blister on toes toes. Wound care consult in place. Tele:

## 2020-02-17 NOTE — PLAN OF CARE
Discharge Planner OT   Patient plan for discharge: TCU  Current status: OT/CR eval completed and treatment initiated. Pt was recently discharged from this hospital after CABG x 3 on 1-31-20, and now readmitted with pleural effusion requiring thoracentesis. At that time, TCU was recommended but was denied by his insurance company, forcing him to go to his sister's house as he lives alone. Today patient requires continued cues for sternal precautions with transfers and ADL and Min A for transfers from lower surfaces. CGA for toilet transfers and Max A for LE dressing due to swelling and wounds on BLE. Needed CGA and WW for ambulation in hallway x 3 min with effort rated at 7/10. C/o mild chest tightness that resolves with rest. /70, O2 94% on RA, HR 86 bpm after activity. Updated RN.  Barriers to return to prior living situation: Lives alone, sternal precautions, impaired activity tolerance, current level of assist, wound cares  Recommendations for discharge: TCU  Rationale for recommendations: Continued therapies needed in an inpatient setting to maximize safety, (I), activity tolerance with ADL, IADL and mobility. Pt is not at his baseline ((I) with all mobility, driving, shopping, bathing, medications, chores, etc). Pt is not safe to discharge home alone. Last week patient was discharged home to his sisters house due to insurance denying TCU stay and subsequently had 2 ER visits and now is readmitted.       Entered by: Marilyn Almaraz 02/17/2020 2:19 PM

## 2020-02-18 ENCOUNTER — APPOINTMENT (OUTPATIENT)
Dept: GENERAL RADIOLOGY | Facility: CLINIC | Age: 70
DRG: 187 | End: 2020-02-18
Attending: HOSPITALIST
Payer: COMMERCIAL

## 2020-02-18 ENCOUNTER — APPOINTMENT (OUTPATIENT)
Dept: OCCUPATIONAL THERAPY | Facility: CLINIC | Age: 70
DRG: 187 | End: 2020-02-18
Attending: NURSE PRACTITIONER
Payer: COMMERCIAL

## 2020-02-18 LAB
COPATH REPORT: NORMAL
CREAT SERPL-MCNC: 1.19 MG/DL (ref 0.66–1.25)
GFR SERPL CREATININE-BSD FRML MDRD: 61 ML/MIN/{1.73_M2}
GLUCOSE BLDC GLUCOMTR-MCNC: 108 MG/DL (ref 70–99)
GLUCOSE BLDC GLUCOMTR-MCNC: 116 MG/DL (ref 70–99)

## 2020-02-18 PROCEDURE — 25800030 ZZH RX IP 258 OP 636: Performed by: HOSPITALIST

## 2020-02-18 PROCEDURE — 99233 SBSQ HOSP IP/OBS HIGH 50: CPT | Performed by: HOSPITALIST

## 2020-02-18 PROCEDURE — 25000132 ZZH RX MED GY IP 250 OP 250 PS 637: Performed by: INTERNAL MEDICINE

## 2020-02-18 PROCEDURE — 00000146 ZZHCL STATISTIC GLUCOSE BY METER IP

## 2020-02-18 PROCEDURE — 25000128 H RX IP 250 OP 636: Performed by: INTERNAL MEDICINE

## 2020-02-18 PROCEDURE — 97535 SELF CARE MNGMENT TRAINING: CPT | Mod: GO

## 2020-02-18 PROCEDURE — 71046 X-RAY EXAM CHEST 2 VIEWS: CPT

## 2020-02-18 PROCEDURE — 12000000 ZZH R&B MED SURG/OB

## 2020-02-18 PROCEDURE — 36415 COLL VENOUS BLD VENIPUNCTURE: CPT | Performed by: INTERNAL MEDICINE

## 2020-02-18 PROCEDURE — 82565 ASSAY OF CREATININE: CPT | Performed by: INTERNAL MEDICINE

## 2020-02-18 PROCEDURE — 25000128 H RX IP 250 OP 636: Performed by: HOSPITALIST

## 2020-02-18 PROCEDURE — 25000132 ZZH RX MED GY IP 250 OP 250 PS 637: Performed by: HOSPITALIST

## 2020-02-18 RX ADMIN — FAMOTIDINE 20 MG: 20 TABLET, FILM COATED ORAL at 20:37

## 2020-02-18 RX ADMIN — METOPROLOL SUCCINATE 100 MG: 100 TABLET, EXTENDED RELEASE ORAL at 20:37

## 2020-02-18 RX ADMIN — ACETAMINOPHEN 650 MG: 325 TABLET, FILM COATED ORAL at 06:35

## 2020-02-18 RX ADMIN — POTASSIUM CHLORIDE 20 MEQ: 1500 TABLET, EXTENDED RELEASE ORAL at 08:21

## 2020-02-18 RX ADMIN — VANCOMYCIN HYDROCHLORIDE 1750 MG: 10 INJECTION, POWDER, LYOPHILIZED, FOR SOLUTION INTRAVENOUS at 16:10

## 2020-02-18 RX ADMIN — METOPROLOL SUCCINATE 100 MG: 100 TABLET, EXTENDED RELEASE ORAL at 08:21

## 2020-02-18 RX ADMIN — CYANOCOBALAMIN TAB 500 MCG 250 MCG: 500 TAB at 08:21

## 2020-02-18 RX ADMIN — ATORVASTATIN CALCIUM 40 MG: 40 TABLET, FILM COATED ORAL at 08:21

## 2020-02-18 RX ADMIN — FERROUS SULFATE TAB 325 MG (65 MG ELEMENTAL FE) 325 MG: 325 (65 FE) TAB at 08:21

## 2020-02-18 RX ADMIN — ASPIRIN 325 MG ORAL TABLET 325 MG: 325 PILL ORAL at 08:21

## 2020-02-18 RX ADMIN — SENNOSIDES AND DOCUSATE SODIUM 2 TABLET: 8.6; 5 TABLET ORAL at 08:21

## 2020-02-18 RX ADMIN — FUROSEMIDE 20 MG: 10 INJECTION, SOLUTION INTRAMUSCULAR; INTRAVENOUS at 08:22

## 2020-02-18 RX ADMIN — HYDROXYUREA 500 MG: 500 CAPSULE ORAL at 08:19

## 2020-02-18 RX ADMIN — MULTIPLE VITAMINS W/ MINERALS TAB 1 TABLET: TAB at 08:21

## 2020-02-18 RX ADMIN — FUROSEMIDE 20 MG: 10 INJECTION, SOLUTION INTRAMUSCULAR; INTRAVENOUS at 16:50

## 2020-02-18 RX ADMIN — VANCOMYCIN HYDROCHLORIDE 1750 MG: 10 INJECTION, POWDER, LYOPHILIZED, FOR SOLUTION INTRAVENOUS at 05:04

## 2020-02-18 RX ADMIN — FAMOTIDINE 20 MG: 20 TABLET, FILM COATED ORAL at 08:21

## 2020-02-18 ASSESSMENT — ACTIVITIES OF DAILY LIVING (ADL)
ADLS_ACUITY_SCORE: 16

## 2020-02-18 ASSESSMENT — MIFFLIN-ST. JEOR: SCORE: 1546.89

## 2020-02-18 NOTE — PLAN OF CARE
VSS, denies pain, up SBA, ambulating in room, voiding, dressings CDI, A&O, tolerating meals, incisions WNL. Edema to bilateral LE unchanged.

## 2020-02-18 NOTE — CONSULTS
Care Transition Initial Assessment - DANA     Met with: Patient    Active Problems:    SOB (shortness of breath)       DATA  Lives With: alone   Living Arrangements: apartment  Quality of Family Relationships: involved, helpful  Description of Support System: Supportive, Involved  Who is your support system?: Sibling(s)  Support Assessment: Adequate family and caregiver support, Adequate social supports.   Identified issues/concerns regarding health management: Pt was denied auth by [employer] to go to TCU after previous admission. He has now been readmitted and the rec continues to be TCU.  Quality of Family Relationships: involved, helpful       ASSESSMENT  Cognitive Status:  awake, alert and oriented    DANA has reviewed pt records, also familiar to this writer from previous hospitalization. Pt is Renny, a 69 gentleman who has a recent hx of CABG who was readmitted due to pleural effusion. Therapy has evaluated and recommend TCU.     During last admission, care team planned for pt to have a TCU stay and had arrangements for [facility]. However, pt requires a prior auth from insurance and unfortunately this request was denied as [facility]ena felt pt could manage at home. DANA sent another referral to [facility] and will await response as to whether facility has bed availability. Zena will again need to review and provide prior auth before admission to TCU.      PLAN  Financial costs for the patient includes: None known at this time.  Patient given options and choices for discharge: Yes.  Patient/family is agreeable to the plan?  Yes  Transportation/person available to transport on day of discharge is: Unknown at this time.  Patient Goals and Preferences: TCU.  Patient anticipates discharging to:  TCU.    NICANOR Gonzalez, LICSW  [facility]  Daytime (8:00am-4:30pm): [phone]  After-Hours  Pager (4:30pm-11:30pm): [phone]

## 2020-02-18 NOTE — PROGRESS NOTES
CV Surgery Progress Note   February 18, 2020   HPI:  Mr. Gannon is a very pleasant 69-year old gentleman with a PMH of HTN, former smoker, recently diagnosed myelofibrosis on hydroxyurea and severe 3-vessel coronary artery disease s/p coronary artery bypass grafting on 1/31/2020 by Dr. Mable Barrera. He did have a code stroke called postoperatively with noted facial droop which resolved and CT head was negative for acute pathology. He was discharged to home on diuretics on 2/10/2020 with home therapy as insurance refused authorization for transitional care placement as recommended. He is staying with his sister for now as he lives alone. Since discharge, he has had two emergency department visits, the last being yesterday (2/15) where he as admitted for ongoing shortness of breath and BLE edema. Primary care provider recently increased lasix dosing and weights have been improving but he has continued dyspnea at rest with associated orthopnea. Denies fever, chills, myalgias, dysuria, productive cough, dizziness, syncope or near syncope, palpitations or chest pain. Of note, he has a persistent left pleural effusion.     Assessment & Plan:   # Severe 3-vessel coronary artery disease s/p CABG x3 on 1/31/2020.  # HTN. -120/60-70s, HR 70-80s/NSR  -  mg daily  - Atorvastatin 40 mg daily  - Metoprolol succinate 100 mg BID     # Former smoker  # Fluid overload. Weight today 179 lbs down from 185 lbs on admission; was 197 lbs at time of discharge from CABG stay. 1+ BLE appreciated. BNP 2853.  # Dyspnea  # Left pleural effusion s/p left thoracentesis for 950 mL evacuated. CRP 19.1, sed rate 30, . Diminished breath sounds on left. Is reaching 1000 mL on IS. Saturating well on room air. States breathing feels better. CXR today improved. Echocardiogram on 2/17 demonstrates no pericardial effusion.  - Agree with lasix  - Lymph edema wraps  - Fluid cultures pending    # Myelofibrosis. Baseline WBC is between  "40-50  - Continue PTA hydroxyurea     # Normocytic anemia in the setting of surgical/acute blood loss  - Continue Ferrous Sulfate    # GPC bacteremia on 1/2 blood cultures. Questionable contaminant.  - Currently on IV vancomycin  - Follow cultures     Subjective: No acute events overnight. States pain is well managed on current regimen, slept well overnight. Tolerating diet, is passing flatus, + BM. Breathing is improved today.  Up in chair, working with therapies and ambulating in halls. Denies chest pain, palpitations, dizziness, syncopal symptoms, chills, myalgias or sternal popping/clicking.       Objective: Blood Pressure 118/62 (BP Location: Left arm)   Pulse 96   Temperature 98.5  F (36.9  C) (Oral)   Respiration 18   Height 1.727 m (5' 8\")   Weight 81.2 kg (179 lb 1.6 oz)   Oxygen Saturation 98%   Body Mass Index 27.23 kg/m       General: NAD  Heart: S1, S2, RRR, no m/r/g  Rhythm: NSR  Lungs: CTA, improved aeration  Abd: S/ND, +BS  Sternum/Incisions: Midline sternal and LLE incisions CDI  Extremities: 2+ BLE edema              Recent Labs   Lab 02/16/20  0553 02/16/20  0011 02/15/20  1936 02/15/20  1612 02/13/20  1335 02/11/20  1224   WBC 24.9*  --   --  27.4* 35.6* 44.1*   HGB 10.0*  --   --  10.3* 9.9* 8.8*   MCV 96  --   --  98 96 96     --   --  329 380 405   INR  --   --   --  1.16*  --   --      --   --  138  --  139   POTASSIUM 4.6  --   --  4.5  --  4.2   CHLORIDE 103  --   --  104  --  106   CO2 27  --   --  27  --  25   BUN 22  --   --  22  --  21   CR 1.16  --   --  1.13  --  1.23   ANIONGAP 5  --   --  7  --  8   MINNIE 8.9  --   --  8.7  --  8.4*   *  --   --  112*  --  99   ALBUMIN  --   --   --  3.0*  --  3.0*   PROTTOTAL  --  7.2  --  7.2  --  6.6*   BILITOTAL  --   --   --  0.8  --  1.0   ALKPHOS  --   --   --  105  --  90   ALT  --   --   --  28  --  22   AST  --   --   --  34  --  43   TROPI  --   --  0.086* 0.071*  --   --       CXR:  IMPRESSION: Two views of the " chest are performed. Atelectasis left  lung base and small left pleural effusion appears stable compared to  prior exam. Probable mild elevation left hemidiaphragm. Lungs are  otherwise clear. No right pleural fluid. Heart size appears mildly  prominent, but is unchanged. Prior median sternotomy for CABG.  Degenerative lower thoracic spine changes are noted. No evidence of  Pneumothorax.    HECTOR López, CCRN-[facility] [place]  Pager: [phone]

## 2020-02-18 NOTE — PROGRESS NOTES
[facility]    Medicine Progress Note - Hospitalist Service       Date of Admission:  2/15/2020  Assessment & Plan   Renny Gannon is a 69 year old male who underwent a coronary artery bypass grafting x 3 and presented with shortness of breath. He was found to have a moderate left pleural effusion.    Symptomatic exudative left pleural effusion after coronary artery bypass grafting   Coronary artery disease status post coronary artery bypass grafting x3 on 1/31/20  GPC bacteremia  The patient underwent thoracentesis on February 17.  About 1 L of dark red/blood-tinged fluid was aspirated.  The patient felt better after the procedure.  Pleural fluid LDH is 654 and protein 4.1.  This is likely exudative.  Procalcitonin was mildly elevated at 0.14 and a repeat was 0.12.  He has a leukocytosis but he has a chronic leukocytosis from his myeloproliferative disorder.  He is afebrile and is not hypoxic.  He has a minimal non-productive cough but no pleuritic chest pain.  1 out of 2 admission blood cultures is growing GPC.  This may turn out to be a skin contaminant.    Chest x-ray today    Await pleural fluid culture    Continue vancomycin until cultures are finalized    Continue furosemide     Cardiology and CT surgery are following    Continue other cardiac medications     MPD     Continue hydroxyurea     Gastroesophageal reflux disease     Continue H2 blocker     Hyperlipidemia     Continue statin     Diet: Combination Diet Low Saturated Fat Na <2400mg Diet; No Caffeine for 24 hours (once tests completed, may have caffeine)  Snacks/Supplements Adult: Other; Gelatein @ 10 am, Vanilla Boost @ 2 pm; Between Meals    DVT Prophylaxis: Pneumatic Compression Devices  Almendarez Catheter: not present  Code Status: Full Code      Disposition Plan   Expected discharge: 1-2 days, recommended to prior living arrangement once cardiac /pulmonary status are stable .  Entered: Art Olsen MD 02/18/2020, 11:42 AM        The patient's care was discussed with the Patient.    Art Olsen MD  Hospitalist Service  [facility]    ______________________________________________________________________    Interval History   Renny has no new complaints.  He is feeling better overall.  He is not short of breath.  He has a minimal nonproductive cough.  He has no pleuritic chest pain.    Data reviewed today: I reviewed all medications, new labs and imaging results over the last 24 hours. I personally reviewed no images or EKG's today.    Physical Exam   Vital Signs: Temp: 97.9  F (36.6  C) Temp src: Oral BP: 122/77 Pulse: 96 Heart Rate: 88 Resp: 18 SpO2: 96 % O2 Device: None (Room air)    Weight: 179 lbs 1.6 oz  Constitutional: awake, alert, cooperative, no apparent distress, and appears stated age  Respiratory: No increased work of breathing, good air exchange, clear to auscultation bilaterally, no crackles or wheezing  Cardiovascular:  regular rate and rhythm, normal S1 and S2, no S3 or S4, and no murmur noted; 2+ right lower extremity edema, 1+ left lower extremity edema  GI: No scars, normal bowel sounds, soft, non-distended, non-tender  Neuropsychiatric: General: normal, calm and normal eye contact    Data   Recent Labs   Lab 02/18/20  0757 02/16/20  0553 02/16/20  0011 02/15/20  1936 02/15/20  1612 02/13/20  1335 02/11/20  1224   WBC  --  24.9*  --   --  27.4* 35.6* 44.1*   HGB  --  10.0*  --   --  10.3* 9.9* 8.8*   MCV  --  96  --   --  98 96 96   PLT  --  307  --   --  329 380 405   INR  --   --   --   --  1.16*  --   --    NA  --  135  --   --  138  --  139   POTASSIUM  --  4.6  --   --  4.5  --  4.2   CHLORIDE  --  103  --   --  104  --  106   CO2  --  27  --   --  27  --  25   BUN  --  22  --   --  22  --  21   CR 1.19 1.16  --   --  1.13  --  1.23   ANIONGAP  --  5  --   --  7  --  8   MINNIE  --  8.9  --   --  8.7  --  8.4*   GLC  --  107*  --   --  112*  --  99   ALBUMIN  --   --   --   --  3.0*  --   3.0*   PROTTOTAL  --   --  7.2  --  7.2  --  6.6*   BILITOTAL  --   --   --   --  0.8  --  1.0   ALKPHOS  --   --   --   --  105  --  90   ALT  --   --   --   --  28  --  22   AST  --   --   --   --  34  --  43   TROPI  --   --   --  0.086* 0.071*  --   --      No results found for this or any previous visit (from the past 24 hour(s)).  Medications     - MEDICATION INSTRUCTIONS -         aspirin  325 mg Oral Daily     atorvastatin  40 mg Oral Daily     vitamin B-12  250 mcg Oral Daily     famotidine  20 mg Oral BID     ferrous sulfate  325 mg Oral Daily     furosemide  20 mg Intravenous BID     hydroxyurea  500 mg Oral Daily     metoprolol succinate ER  100 mg Oral BID     multivitamin w/minerals  1 tablet Oral Daily     potassium chloride ER  20 mEq Oral Daily     senna-docusate  2 tablet Oral BID     sodium chloride (PF)  3 mL Intracatheter Q8H     vancomycin (VANCOCIN) IV  1,750 mg Intravenous Q12H

## 2020-02-18 NOTE — PROVIDER NOTIFICATION
Paged Flora Bhatt.  Blood Pressure 99/54, HR 80, pt not symptomatic, lasix IV scheduled for now.  Okay to give lasix per Flora.  Will continue to monitor.

## 2020-02-18 NOTE — PROGRESS NOTES
[facility]  Patient is currently open to home care services with [facility]. The [facility] and has received RN and OT evaluations at home; PT/SW and HA services are pending. Critical access hospital  and team have been notified of patient admission. Critical access hospital liaison will continue to follow patient during stay. If appropriate provide orders to resume home care at time of discharge.

## 2020-02-18 NOTE — PLAN OF CARE
OT/CR: Two attempts to see pt for intervention this morning. Initially pt receiving wound care for B LE and then pt leaving room for xray.

## 2020-02-18 NOTE — PLAN OF CARE
Discharge Planner OT   Patient plan for discharge: Rehab before home    Current status: Pt required min A with FWW for functional transfers. Pt stood at sink to brush teeth with CGA. Pt ambulated ~75 ft with CGA and FWW.    Barriers to return to prior living situation: Lives alone; Fall risk; Stairs; Cognitive impairment with safety concerns    Recommendations for discharge: TCU    Rationale for recommendations: Pt limited by impaired cognition and safety, sternal precautions, and decreased balance and activity tolerance; however, making gains with daily OT. Pt is not safe to return home at this time. If discharges home, recommend 24 hr supervision from family for A with dressing, bathing, and all IADLs and home RN/OT/PT.        Entered by: Darwin Yates 02/18/2020 3:24 PM

## 2020-02-18 NOTE — PLAN OF CARE
VSS.  Tele= SR with PACs.  Pt denies pain.  Bilateral foot care done per MD orders.  (L) thoracentesis site is CDI with a Band-Aid.  Sternal incision is C/D/I with derma bond.

## 2020-02-18 NOTE — PLAN OF CARE
A&O. VSS on room air. Lung sounds diminished. Tele: SR/ST with PACs. +2 BLE edema. Bilateral foot dressings CDI. Voiding well. +BS/flatus. Denies pain.

## 2020-02-19 ENCOUNTER — APPOINTMENT (OUTPATIENT)
Dept: OCCUPATIONAL THERAPY | Facility: CLINIC | Age: 70
DRG: 187 | End: 2020-02-19
Attending: INTERNAL MEDICINE
Payer: COMMERCIAL

## 2020-02-19 LAB
BACTERIA SPEC CULT: ABNORMAL
CREAT SERPL-MCNC: 1.24 MG/DL (ref 0.66–1.25)
CRP SERPL-MCNC: 37.6 MG/L (ref 0–8)
ERYTHROCYTE [DISTWIDTH] IN BLOOD BY AUTOMATED COUNT: 21.3 % (ref 10–15)
GFR SERPL CREATININE-BSD FRML MDRD: 58 ML/MIN/{1.73_M2}
HCT VFR BLD AUTO: 33.6 % (ref 40–53)
HGB BLD-MCNC: 10.1 G/DL (ref 13.3–17.7)
LACTATE BLD-SCNC: 1 MMOL/L (ref 0.7–2)
Lab: ABNORMAL
MCH RBC QN AUTO: 29.4 PG (ref 26.5–33)
MCHC RBC AUTO-ENTMCNC: 30.1 G/DL (ref 31.5–36.5)
MCV RBC AUTO: 98 FL (ref 78–100)
PLATELET # BLD AUTO: 257 10E9/L (ref 150–450)
PROCALCITONIN SERPL-MCNC: 0.09 NG/ML
RBC # BLD AUTO: 3.44 10E12/L (ref 4.4–5.9)
SPECIMEN SOURCE: ABNORMAL
VANCOMYCIN SERPL-MCNC: 24.7 MG/L
WBC # BLD AUTO: 19.1 10E9/L (ref 4–11)

## 2020-02-19 PROCEDURE — 97110 THERAPEUTIC EXERCISES: CPT | Mod: GO | Performed by: OCCUPATIONAL THERAPIST

## 2020-02-19 PROCEDURE — 86140 C-REACTIVE PROTEIN: CPT | Performed by: INTERNAL MEDICINE

## 2020-02-19 PROCEDURE — 25000132 ZZH RX MED GY IP 250 OP 250 PS 637: Performed by: INTERNAL MEDICINE

## 2020-02-19 PROCEDURE — 97535 SELF CARE MNGMENT TRAINING: CPT | Mod: GO | Performed by: OCCUPATIONAL THERAPIST

## 2020-02-19 PROCEDURE — 25000132 ZZH RX MED GY IP 250 OP 250 PS 637: Performed by: HOSPITALIST

## 2020-02-19 PROCEDURE — 25000128 H RX IP 250 OP 636: Performed by: HOSPITALIST

## 2020-02-19 PROCEDURE — 85027 COMPLETE CBC AUTOMATED: CPT | Performed by: HOSPITALIST

## 2020-02-19 PROCEDURE — 99232 SBSQ HOSP IP/OBS MODERATE 35: CPT | Performed by: HOSPITALIST

## 2020-02-19 PROCEDURE — 36415 COLL VENOUS BLD VENIPUNCTURE: CPT | Performed by: INTERNAL MEDICINE

## 2020-02-19 PROCEDURE — 25000128 H RX IP 250 OP 636: Performed by: INTERNAL MEDICINE

## 2020-02-19 PROCEDURE — 36415 COLL VENOUS BLD VENIPUNCTURE: CPT | Performed by: HOSPITALIST

## 2020-02-19 PROCEDURE — 12000000 ZZH R&B MED SURG/OB

## 2020-02-19 PROCEDURE — 80202 ASSAY OF VANCOMYCIN: CPT | Performed by: INTERNAL MEDICINE

## 2020-02-19 PROCEDURE — 82565 ASSAY OF CREATININE: CPT | Performed by: INTERNAL MEDICINE

## 2020-02-19 PROCEDURE — 25800030 ZZH RX IP 258 OP 636: Performed by: HOSPITALIST

## 2020-02-19 PROCEDURE — 84145 PROCALCITONIN (PCT): CPT | Performed by: HOSPITALIST

## 2020-02-19 PROCEDURE — 83605 ASSAY OF LACTIC ACID: CPT | Performed by: INTERNAL MEDICINE

## 2020-02-19 PROCEDURE — 25800030 ZZH RX IP 258 OP 636: Performed by: INTERNAL MEDICINE

## 2020-02-19 RX ADMIN — ATORVASTATIN CALCIUM 40 MG: 40 TABLET, FILM COATED ORAL at 07:55

## 2020-02-19 RX ADMIN — SENNOSIDES AND DOCUSATE SODIUM 2 TABLET: 8.6; 5 TABLET ORAL at 07:54

## 2020-02-19 RX ADMIN — FERROUS SULFATE TAB 325 MG (65 MG ELEMENTAL FE) 325 MG: 325 (65 FE) TAB at 07:55

## 2020-02-19 RX ADMIN — VANCOMYCIN HYDROCHLORIDE 1250 MG: 5 INJECTION, POWDER, LYOPHILIZED, FOR SOLUTION INTRAVENOUS at 11:00

## 2020-02-19 RX ADMIN — ASPIRIN 325 MG ORAL TABLET 325 MG: 325 PILL ORAL at 07:54

## 2020-02-19 RX ADMIN — METOPROLOL SUCCINATE 100 MG: 100 TABLET, EXTENDED RELEASE ORAL at 19:29

## 2020-02-19 RX ADMIN — FUROSEMIDE 20 MG: 10 INJECTION, SOLUTION INTRAMUSCULAR; INTRAVENOUS at 17:01

## 2020-02-19 RX ADMIN — VANCOMYCIN HYDROCHLORIDE 1750 MG: 10 INJECTION, POWDER, LYOPHILIZED, FOR SOLUTION INTRAVENOUS at 04:47

## 2020-02-19 RX ADMIN — FAMOTIDINE 20 MG: 20 TABLET, FILM COATED ORAL at 07:55

## 2020-02-19 RX ADMIN — FAMOTIDINE 20 MG: 20 TABLET, FILM COATED ORAL at 19:29

## 2020-02-19 RX ADMIN — ACETAMINOPHEN 650 MG: 325 TABLET, FILM COATED ORAL at 11:01

## 2020-02-19 RX ADMIN — MULTIPLE VITAMINS W/ MINERALS TAB 1 TABLET: TAB at 07:55

## 2020-02-19 RX ADMIN — ACETAMINOPHEN 650 MG: 325 TABLET, FILM COATED ORAL at 03:10

## 2020-02-19 RX ADMIN — ACETAMINOPHEN 650 MG: 325 TABLET, FILM COATED ORAL at 18:34

## 2020-02-19 RX ADMIN — HYDROXYUREA 500 MG: 500 CAPSULE ORAL at 07:48

## 2020-02-19 RX ADMIN — POTASSIUM CHLORIDE 20 MEQ: 1500 TABLET, EXTENDED RELEASE ORAL at 07:55

## 2020-02-19 RX ADMIN — METOPROLOL SUCCINATE 100 MG: 100 TABLET, EXTENDED RELEASE ORAL at 07:54

## 2020-02-19 RX ADMIN — CYANOCOBALAMIN TAB 500 MCG 250 MCG: 500 TAB at 07:56

## 2020-02-19 RX ADMIN — FUROSEMIDE 20 MG: 10 INJECTION, SOLUTION INTRAMUSCULAR; INTRAVENOUS at 07:54

## 2020-02-19 RX ADMIN — SENNOSIDES AND DOCUSATE SODIUM 2 TABLET: 8.6; 5 TABLET ORAL at 19:29

## 2020-02-19 ASSESSMENT — MIFFLIN-ST. JEOR: SCORE: 1550.97

## 2020-02-19 ASSESSMENT — ACTIVITIES OF DAILY LIVING (ADL)
ADLS_ACUITY_SCORE: 15
ADLS_ACUITY_SCORE: 16

## 2020-02-19 NOTE — PLAN OF CARE
Vital Signs stable. Telemetry sinus rhythm with PACs. Alert and Oriented. Lung sounds diminished, IS to 1250. Bowel sounds active and audible. Passing flatus. Low Na diet. Denies nausea. Adequate urinary output. CMS intact ex bilateral lower extremity edema, Bilateral foot care done per order. Sternal incision is open to air with liquid bandage closure. Thoracentesis site is clean dry and intact with band-aid covering. Up with assist of 1. Pt has denied pain.

## 2020-02-19 NOTE — PROGRESS NOTES
CV Surgery Progress Note   February 19, 2020   HPI:  Mr. Gannon is a very pleasant 69-year old gentleman with a PMH of HTN, former smoker, recently diagnosed myelofibrosis on hydroxyurea and severe 3-vessel coronary artery disease s/p coronary artery bypass grafting on 1/31/2020 by Dr. Mable Barrera. He did have a code stroke called postoperatively with noted facial droop which resolved and CT head was negative for acute pathology. He was discharged to home on diuretics on 2/10/2020 with home therapy as insurance refused authorization for transitional care placement as recommended. He is staying with his sister for now as he lives alone. Since discharge, he has had two emergency department visits, the last being yesterday (2/15) where he as admitted for ongoing shortness of breath and BLE edema. Primary care provider recently increased lasix dosing and weights have been improving but he has continued dyspnea at rest with associated orthopnea. Denies fever, chills, myalgias, dysuria, productive cough, dizziness, syncope or near syncope, palpitations or chest pain. Of note, he has a persistent left pleural effusion.     Assessment & Plan:   # Severe 3-vessel coronary artery disease s/p CABG x3 on 1/31/2020.  # HTN. -120/60s, HR 80-90s/NSR  -  mg daily  - Atorvastatin 40 mg daily  - Metoprolol succinate 100 mg BID     # Former smoker  # Fluid overload. Weight today 180 lbs down from 185 lbs on admission; was 197 lbs at time of discharge from CABG stay. 1+ BLE appreciated. BNP 2853.  # Dyspnea  # Left pleural effusion s/p left thoracentesis for 950 mL evacuated. CRP 19.1, sed rate 30, . Diminished breath sounds on left. Is reaching 1000 mL on IS. Saturating well on room air. States breathing feels better. CXR today improved. Echocardiogram on 2/17 demonstrates no pericardial effusion.  - Agree with lasix 20 mg IV BID  - Lymph edema wraps to BLE  - Fluid cultures pending    # Myelofibrosis. Baseline  "WBC is between 40-50  - Continue PTA hydroxyurea     # Normocytic anemia in the setting of surgical/acute blood loss  - Continue Ferrous Sulfate    # GPC bacteremia on 1/2 blood cultures. Questionable contaminant.  - Currently on IV vancomycin  - Follow cultures; growing micrococcus - sensitive to PCN and Vanco  - Management per hospitalist service     Subjective: No acute events overnight. States pain is well managed on current regimen, slept well overnight. Tolerating diet, is passing flatus, + BM. Breathing is good, able to reach 1250 mL on IS. Up in chair, working with therapies and ambulating in halls. Denies chest pain, palpitations, dizziness, syncopal symptoms, chills, myalgias or sternal popping/clicking.     Objective: Blood Pressure 110/60   Pulse 79   Temperature 97.8  F (36.6  C) (Oral)   Respiration 16   Height 1.727 m (5' 8\")   Weight 81.6 kg (180 lb)   Oxygen Saturation 95%   Body Mass Index 27.37 kg/m       General: NAD  Heart: S1, S2, RRR, no m/r/g  Rhythm: NSR  Lungs: CTA, improved aeration  Abd: S/ND, +BS  Sternum/Incisions: Midline sternal and LLE incisions CDI. LLE with some necrotic area but appears CDI, healing  Extremities: 2+ BLE edema              Recent Labs   Lab 02/16/20  0553 02/16/20  0011 02/15/20  1936 02/15/20  1612 02/13/20  1335 02/11/20  1224   WBC 24.9*  --   --  27.4* 35.6* 44.1*   HGB 10.0*  --   --  10.3* 9.9* 8.8*   MCV 96  --   --  98 96 96     --   --  329 380 405   INR  --   --   --  1.16*  --   --      --   --  138  --  139   POTASSIUM 4.6  --   --  4.5  --  4.2   CHLORIDE 103  --   --  104  --  106   CO2 27  --   --  27  --  25   BUN 22  --   --  22  --  21   CR 1.16  --   --  1.13  --  1.23   ANIONGAP 5  --   --  7  --  8   MINNIE 8.9  --   --  8.7  --  8.4*   *  --   --  112*  --  99   ALBUMIN  --   --   --  3.0*  --  3.0*   PROTTOTAL  --  7.2  --  7.2  --  6.6*   BILITOTAL  --   --   --  0.8  --  1.0   ALKPHOS  --   --   --  105  --  90   ALT "  --   --   --  28  --  22   AST  --   --   --  34  --  43   TROPI  --   --  0.086* 0.071*  --   --       CXR:  No new imaging today    HECTOR López, CCRN-[facility] [place]  Pager: [phone]

## 2020-02-19 NOTE — PLAN OF CARE
Neuro: Aox4.     Respiratory: RA. Diminished LS. IS use encouraged. Pt demonstrated correct technique.     Cardiac: WNL.     GI/: Bowel sounds audible x4 quads. BM 02/18. Adequate urine OP.     Skin: Sternal incision LOIDA; site WNL. Bilat knee incisions LOIDA; WNL. Bilat feet dressings CDI; UTV blisters.     Pain: Denies.     Mobility: Sbx1.     Diet: Low fat.    IVF: -    Plan of Care: Monitor cardiac activity; wounds and incisions.      Will continue to monitor.

## 2020-02-19 NOTE — PHARMACY-VANCOMYCIN DOSING SERVICE
Pharmacy Vancomycin Note  Date of Service 2020  Patient's  1950   69 year old, male    Indication: Sepsis  Goal Trough Level: 15-20 mg/L  Day of Therapy: 3  Current Vancomycin regimen:  1750 mg IV q12h    Current estimated CrCl = Estimated Creatinine Clearance: 64 mL/min (based on SCr of 1.24 mg/dL).    Creatinine for last 3 days  2020:  7:57 AM Creatinine 1.19 mg/dL  2020:  4:36 AM Creatinine 1.24 mg/dL    Recent Vancomycin Levels (past 3 days)  2020:  4:36 AM Vancomycin Level 24.7 mg/L    Vancomycin IV Administrations (past 72 hours)                   vancomycin (VANCOCIN) 1,750 mg in sodium chloride 0.9 % 500 mL intermittent infusion (mg) 1,750 mg New Bag 20 0447     1,750 mg New Bag 20 1610     1,750 mg New Bag  0504     1,750 mg New Bag 20 1455                Nephrotoxins and other renal medications (From now, onward)    Start     Dose/Rate Route Frequency Ordered Stop    20 1100  vancomycin 1250 mg in 0.9% NaCl 250 mL intermittent infusion 1,250 mg      1,250 mg  over 90 Minutes Intravenous EVERY 12 HOURS 20 1012      20 0800  furosemide (LASIX) injection 20 mg      20 mg  over 1-2 Minutes Intravenous 2 TIMES DAILY. 02/15/20 2345               Contrast Orders - past 72 hours (72h ago, onward)    Start     Dose/Rate Route Frequency Ordered Stop    20 0900  perflutren diluted 1mL to 2mL with saline (OPTISON) diluted injection 3 mL      3 mL Intravenous ONCE 20 0851 20 0900          Interpretation of levels and current regimen:  Trough level is  Supratherapeutic    Has serum creatinine changed > 50% in last 72 hours: No    Urine output:  unable to determine    Renal Function: Stable    Plan:  1.  Decrease Dose to Vancomycin 1250 mg IV q12h but will start ~ 18 hours after last dose.    2.  Pharmacy will check trough levels as appropriate in 1-3 Days.    3. Serum creatinine levels will be ordered daily for the first week of  therapy and at least twice weekly for subsequent weeks.      Nerissa King, PharmD, BCPS        .

## 2020-02-19 NOTE — CONSULTS
[facility]    Infectious Disease Consultation     Date of Admission:  2/15/2020  Date of Consult (When I saw the patient): 02/19/20    Assessment & Plan   Renny Gannon is a 69 year old male who was admitted on 2/15/2020.     Impression:  1. 69 y.o male s/p CABG X 3 done on 1/31/2020.   2. Admitted now with shortness of breath.   3. MDS with chronic leucocytosis.   4. Found to have a left sided pleural effusion, s/p thoracocentesis.   5. 1/2 positive blood cultures positive for micrococcus species, no fevers, procal negative.     Recommendations:   1. 1/2 positive blood culture for micrococcus is a contaminant, stop vancomycin.         Clari Montemayor MD    Reason for Consult   Reason for consult: I was asked to evaluate this patient for Micrococcus species in 1/2 blood cultures.    Primary Care Physician   Angus Clements Mai    Chief Complaint   Shortness of breath     History is obtained from the patient and medical records    History of Present Illness   Renny Gannon is a 69 year old male who underwent a coronary artery bypass grafting x 3 and presented with shortness of breath. He was found to have a bloody  left pleural effusion.  1/2 admission blood cultures is growing micrococcus    Past Medical History   I have reviewed this patient's medical history and updated it with pertinent information if needed.   Past Medical History:   Diagnosis Date     Hypertension        Past Surgical History   I have reviewed this patient's surgical history and updated it with pertinent information if needed.  Past Surgical History:   Procedure Laterality Date     BONE MARROW BIOPSY, BONE SPECIMEN, NEEDLE/TROCAR N/A 12/30/2019    Procedure: BIOPSY, BONE MARROW;  Surgeon: Aguilar Krause MD;  Location: [facility] OR     BYPASS GRAFT ARTERY CORONARY N/A 1/31/2020    Procedure: CORONARY ARTERY BYPASS GRAFTING X 3 - LIMA TO LAD, SV TO OM  AND PDA; ENDOVEIN HARVEST OF BILATERAL LEGS; ON PUMP WITH SANDRA;  Surgeon:  Mable Barrera MD;  Location:  OR     CV CORONARY ANGIOGRAM Left 1/9/2020    Procedure: Coronary Angiogram;  Surgeon: Devin Bentley MD;  Location:  HE[facility]ARDI[facility] CATH LAB     NO HISTORY OF SURGERY         Prior to Admission Medications   Prior to Admission Medications   Prescriptions Last Dose Informant Patient Reported? Taking?   HYDROcodone-acetaminophen (NORCO) 5-325 MG tablet 2/15/2020 at am  No Yes   Sig: Take 1 tablet by mouth every 4 hours as needed   aspirin (ASA) 325 MG tablet 2/15/2020 at amtime  No Yes   Sig: Take 1 tablet (325 mg) by mouth daily   atorvastatin (LIPITOR) 40 MG tablet 2/15/2020 at am Self No Yes   Sig: Take 1 tablet (40 mg) by mouth daily   famotidine (PEPCID) 20 MG tablet 2/15/2020 at am  No Yes   Sig: Take 1 tablet (20 mg) by mouth 2 times daily   ferrous sulfate (FEROSUL) 325 (65 Fe) MG tablet 2/15/2020 at am  No Yes   Sig: Take 1 tablet (325 mg) by mouth daily   furosemide (LASIX) 40 MG tablet 2/15/2020 at am  No Yes   Sig: Take 1 tablet in am and 0.5 tablet at 1 pm   hydroxyurea (HYDREA) 500 MG capsule 2/15/2020 at am Self No Yes   Sig: Take 1 capsule (500 mg) by mouth daily   methocarbamol (ROBAXIN) 500 MG tablet Past Week at Unknown time  No Yes   Sig: Take 1 tablet (500 mg) by mouth 4 times daily as needed for muscle spasms   metoprolol succinate ER (TOPROL-XL) 100 MG 24 hr tablet 2/15/2020 at am  No Yes   Sig: Take 1 tablet (100 mg) by mouth 2 times daily   potassium chloride ER (K-DUR/KLOR-CON M) 20 MEQ CR tablet 2/15/2020 at am  No Yes   Sig: Take 1 tablet (20 mEq) by mouth daily   senna-docusate (SENOKOT-S/PERICOLACE) 8.6-50 MG tablet 2/15/2020 at am  No Yes   Sig: Take 2 tablets by mouth 2 times daily   vitamin B-12 (CYANOCOBALAMIN) 250 MCG tablet 2/15/2020 at am Self Yes Yes   Sig: Take 250 mcg by mouth daily      Facility-Administered Medications: None     Allergies   Allergies   Allergen Reactions     No Known Drug Allergies      Seasonal Allergies         Immunization History     There is no immunization history on file for this patient.    Social History   I have reviewed this patient's social history and updated it with pertinent information if needed. Renny Gannon  reports that he quit smoking about 19 years ago. His smoking use included cigarettes. He started smoking about 59 years ago. He quit after 30.00 years of use. He has never used smokeless tobacco. He reports that he does not drink alcohol or use drugs.    Family History   I have reviewed this patient's family history and updated it with pertinent information if needed.   Family History   Problem Relation Age of Onset     No Known Problems Mother      Unknown/Adopted Father      Unknown/Adopted Maternal Grandmother      Unknown/Adopted Maternal Grandfather      Unknown/Adopted Paternal Grandmother      Unknown/Adopted Paternal Grandfather      Cancer Brother         lung cancer - smoking     No Known Problems Sister      Coronary Artery Disease Brother          of MI at 66     No Known Problems Sister        Review of Systems   The 10 point Review of Systems is negative other than noted in the HPI or here.     Physical Exam   Temp: 97.3  F (36.3  C) Temp src: Axillary BP: 121/57 Pulse: 79 Heart Rate: 132 Resp: 16 SpO2: 99 % O2 Device: None (Room air)    Vital Signs with Ranges  Temp:  [97.3  F (36.3  C)-97.8  F (36.6  C)] 97.3  F (36.3  C)  Pulse:  [79-98] 79  Heart Rate:  [] 132  Resp:  [16-19] 16  BP: ()/(54-68) 121/57  SpO2:  [94 %-99 %] 99 %  180 lbs 0 oz  Body mass index is 27.37 kg/m .    GENERAL APPEARANCE:  alert and no distress  EYES: Eyes grossly normal to inspection, PERRL and conjunctivae and sclerae normal  HENT: ear canals and TM's normal and nose and mouth without ulcers or lesions  NECK: no adenopathy, no asymmetry, masses, or scars and thyroid normal to palpation  RESP: lungs clear to auscultation - no rales, rhonchi or wheezes. The incision on the chest looks ok    The area of the bilateral vein graft has a dry eschar on the left and some redness the right loos ok   CV: regular rates and rhythm, normal S1 S2, no S3 or S4 and no murmur, click or rub  LYMPHATICS: normal ant/post cervical and supraclavicular nodes  ABDOMEN: soft, nontender, without hepatosplenomegaly or masses and bowel sounds normal  MS: extremities normal- no gross deformities noted      Data   Lab Results   Component Value Date    WBC 19.1 (H) 02/19/2020    HGB 10.1 (L) 02/19/2020    HCT 33.6 (L) 02/19/2020     02/19/2020     02/16/2020    POTASSIUM 4.6 02/16/2020    CHLORIDE 103 02/16/2020    CO2 27 02/16/2020    BUN 22 02/16/2020    CR 1.24 02/19/2020     (H) 02/16/2020    SED 30 (H) 02/16/2020    NTBNPI 2,853 (H) 02/15/2020    TROPI 0.086 (H) 02/15/2020    AST 34 02/15/2020    ALT 28 02/15/2020    ALKPHOS 105 02/15/2020    BILITOTAL 0.8 02/15/2020    INR 1.16 (H) 02/15/2020     Recent Labs   Lab 02/17/20  0925 02/15/20  1718 02/15/20  1659   CULT Culture negative monitoring continues Cultured on the 2nd day of incubation:  Micrococcus species  *  Critical Value/Significant Value, preliminary result only, called to and read back by  Jeanne Carson RN. @1322. 2.17.20. BS.     (Note)  NEGATIVE for the following: Staphylococcus spp., Staph aureus, Staph  epidermidis, Staph lugdunensis, Streptococcus spp., Strep pneumoniae,  Strep pyogenes, Strep agalactiae, Strep anginosus group, Enterococcus  faecalis, Enterococcus faecium, and Listeria spp. by [place]illionigene  multiplex nucleic acid test. Final identification and antimicrobial  susceptibility testing will be verified by standard methods.    Critical Value/Significant Value called to and read back by  Margot Patel RN @ 1610. 2/17/20. AV   No growth after 4 days     Recent Labs   Lab Test 02/17/20  0925 02/15/20  1718 02/15/20  1659   CULT Culture negative monitoring continues Cultured on the 2nd day of incubation:  Micrococcus species  *   Critical Value/Significant Value, preliminary result only, called to and read back by  Jeanne Carson RN. @1322. 2.17.20. BS.     (Note)  NEGATIVE for the following: Staphylococcus spp., Staph aureus, Staph  epidermidis, Staph lugdunensis, Streptococcus spp., Strep pneumoniae,  Strep pyogenes, Strep agalactiae, Strep anginosus group, Enterococcus  faecalis, Enterococcus faecium, and Listeria spp. by [place]  multiplex nucleic acid test. Final identification and antimicrobial  susceptibility testing will be verified by standard methods.    Critical Value/Significant Value called to and read back by  Margot Patel RN @ 1610. 2/17/20. AV   No growth after 4 days       Amount of time performed on this consult: 45 minutes. This includes face to face assessment and care coordination with the primary team.

## 2020-02-19 NOTE — PROGRESS NOTES
[facility]    Medicine Progress Note - Hospitalist Service       Date of Admission:  2/15/2020  Assessment & Plan   Renny Gannon is a 69 year old male who underwent a coronary artery bypass grafting x 3 and presented with shortness of breath. He was found to have a moderate exudative left pleural effusion.  1/2 admission blood cultures is growing micrococcus.    Symptomatic exudative left pleural effusion after coronary artery bypass grafting   Coronary artery disease status post coronary artery bypass grafting x3 on 1/31/20  GPC bacteremia (micrococcus)  The patient underwent thoracentesis on February 17.  About 1 L of dark red/blood-tinged fluid was aspirated.  The patient felt better after the procedure.  Pleural fluid LDH is 654 and protein 4.1.  This is likely exudative.  Procalcitonin was mildly elevated at 0.14 and a repeat was 0.12.  He has a leukocytosis but he has a chronic leukocytosis from his myeloproliferative disorder.  He is afebrile and is not hypoxic.  He has a minimal non-productive cough but no pleuritic chest pain.  1 out of 2 admission blood cultures is growing GPC which has now been identified as micrococcus. Pleural fluid culture is negative.  The culture is probably a contaminant but will get infectious disease's opinion.    Continue vancomycin for now     Continue furosemide      CT surgery is following    Continue other cardiac medications     MPD     Continue hydroxyurea     Gastroesophageal reflux disease     Continue H2 blocker     Hyperlipidemia     Continue statin     Diet: Combination Diet Low Saturated Fat Na <2400mg Diet; No Caffeine for 24 hours (once tests completed, may have caffeine)  Snacks/Supplements Adult: Other; Gelatein @ 10 am, Vanilla Boost @ 2 pm; Between Meals    DVT Prophylaxis: Pneumatic Compression Devices  Almendarez Catheter: not present  Code Status: Full Code      Disposition Plan   Expected discharge: 1-2 days, recommended to prior living  arrangement once cardiac /pulmonary status are stable .  Entered: Art Olsen MD 02/19/2020, 2:37 PM       The patient's care was discussed with the Patient.    Art Olsen MD  Hospitalist Service  [facility]    ______________________________________________________________________    Interval History   Renny complains of pedal edema.  No chest pain or shortness of breath. Minimal cough.  No chills.    Data reviewed today: I reviewed all medications, new labs and imaging results over the last 24 hours. I personally reviewed no images or EKG's today.    Physical Exam   Vital Signs: Temp: 97.8  F (36.6  C) Temp src: Oral BP: 110/60 Pulse: 79 Heart Rate: 84 Resp: 16 SpO2: 95 % O2 Device: None (Room air)    Weight: 180 lbs 0 oz  Constitutional: awake, alert, cooperative, no apparent distress, and appears stated age  Respiratory: No increased work of breathing, good air exchange, clear to auscultation bilaterally, no crackles or wheezing  Cardiovascular:  regular rate and rhythm, normal S1 and S2, no S3 or S4, and no murmur noted; 1+ right lower extremity edema, 1+ left lower extremity edema  GI: No scars, normal bowel sounds, soft, non-distended, non-tender  Neuropsychiatric: General: normal, calm and normal eye contact    Data   Recent Labs   Lab 02/19/20  1206 02/19/20  0436 02/18/20  0757 02/16/20  0553 02/16/20  0011 02/15/20  1936 02/15/20  1612   WBC 19.1*  --   --  24.9*  --   --  27.4*   HGB 10.1*  --   --  10.0*  --   --  10.3*   MCV 98  --   --  96  --   --  98     --   --  307  --   --  329   INR  --   --   --   --   --   --  1.16*   NA  --   --   --  135  --   --  138   POTASSIUM  --   --   --  4.6  --   --  4.5   CHLORIDE  --   --   --  103  --   --  104   CO2  --   --   --  27  --   --  27   BUN  --   --   --  22  --   --  22   CR  --  1.24 1.19 1.16  --   --  1.13   ANIONGAP  --   --   --  5  --   --  7   MINNIE  --   --   --  8.9  --   --  8.7   GLC  --   --   --   107*  --   --  112*   ALBUMIN  --   --   --   --   --   --  3.0*   PROTTOTAL  --   --   --   --  7.2  --  7.2   BILITOTAL  --   --   --   --   --   --  0.8   ALKPHOS  --   --   --   --   --   --  105   ALT  --   --   --   --   --   --  28   AST  --   --   --   --   --   --  34   TROPI  --   --   --   --   --  0.086* 0.071*     No results found for this or any previous visit (from the past 24 hour(s)).  Medications     - MEDICATION INSTRUCTIONS -         aspirin  325 mg Oral Daily     atorvastatin  40 mg Oral Daily     vitamin B-12  250 mcg Oral Daily     famotidine  20 mg Oral BID     ferrous sulfate  325 mg Oral Daily     furosemide  20 mg Intravenous BID     hydroxyurea  500 mg Oral Daily     metoprolol succinate ER  100 mg Oral BID     multivitamin w/minerals  1 tablet Oral Daily     potassium chloride ER  20 mEq Oral Daily     senna-docusate  2 tablet Oral BID     sodium chloride (PF)  3 mL Intracatheter Q8H     vancomycin (VANCOCIN) IV  1,250 mg Intravenous Q12H

## 2020-02-19 NOTE — PROGRESS NOTES
SPIRITUAL HEALTH SERVICES  Spiritual Assessment Progress Note  [facility]   attempted visit due to LOS.  Pt declined, stating he is doing just fine.     No plans to follow.                                                                                                                                           Kiara Barrera M.Div., [facility]  Staff    Pager [phone]

## 2020-02-19 NOTE — PLAN OF CARE
Discharge Planner OT   Patient plan for discharge: TCU  Current status: Pt requires Max A for LE dressing due to complex wound care for multiple wounds on B feet with oozing and fluid-filled blisters. He needs CGA and constant cueing for sternal precautions for transfers, bed mobility and ambulation. Ambulated approx 7 min with CGA and WW at slow pace. Planned to attempt stairs, but pt reports too fatigued and needed to return to bed. Vitals appropriate following activity.  Barriers to return to prior living situation: Lives alone, falls risk, complex wound care, cognitive impairment, level of assist with ADL and mobility  Recommendations for discharge: TCU  Rationale for recommendations: Pt limited by impaired cognition and safety, sternal precautions, and decreased balance and activity tolerance; however, making gains with daily OT. Pt is not safe to return home at this time. If discharges home, recommend 24 hr supervision from family for A with dressing, bathing, and all IADLs and home RN/OT/PT as pt is unable to leave the home without assist of another person and AD.       Entered by: Marilyn Almaraz 02/19/2020 10:31 AM     PM Session: Completed 5 stairs with railing and CGA; HR up to 132 bpm with stair training. Used NuStep x 10 min and denies CV symptoms. Continues to need significant cueing for sternal precautions, to monitor for CV symptoms (cannot tell OT what the symptoms to watch for are).

## 2020-02-20 ENCOUNTER — APPOINTMENT (OUTPATIENT)
Dept: PHYSICAL THERAPY | Facility: CLINIC | Age: 70
DRG: 187 | End: 2020-02-20
Attending: NURSE PRACTITIONER
Payer: COMMERCIAL

## 2020-02-20 ENCOUNTER — APPOINTMENT (OUTPATIENT)
Dept: OCCUPATIONAL THERAPY | Facility: CLINIC | Age: 70
DRG: 187 | End: 2020-02-20
Attending: INTERNAL MEDICINE
Payer: COMMERCIAL

## 2020-02-20 PROBLEM — R79.9 CONTAMINATION OF BLOOD CULTURE: Status: ACTIVE | Noted: 2020-02-20

## 2020-02-20 PROBLEM — J90 EXUDATIVE PLEURAL EFFUSION: Status: ACTIVE | Noted: 2020-02-20

## 2020-02-20 LAB
ANION GAP SERPL CALCULATED.3IONS-SCNC: 3 MMOL/L (ref 3–14)
BUN SERPL-MCNC: 25 MG/DL (ref 7–30)
CALCIUM SERPL-MCNC: 9.1 MG/DL (ref 8.5–10.1)
CHLORIDE SERPL-SCNC: 108 MMOL/L (ref 94–109)
CO2 SERPL-SCNC: 27 MMOL/L (ref 20–32)
CREAT SERPL-MCNC: 1.13 MG/DL (ref 0.66–1.25)
ERYTHROCYTE [DISTWIDTH] IN BLOOD BY AUTOMATED COUNT: 21.4 % (ref 10–15)
GFR SERPL CREATININE-BSD FRML MDRD: 65 ML/MIN/{1.73_M2}
GLUCOSE BLDC GLUCOMTR-MCNC: 97 MG/DL (ref 70–99)
GLUCOSE SERPL-MCNC: 101 MG/DL (ref 70–99)
HCT VFR BLD AUTO: 33.8 % (ref 40–53)
HGB BLD-MCNC: 10.2 G/DL (ref 13.3–17.7)
MCH RBC QN AUTO: 29.5 PG (ref 26.5–33)
MCHC RBC AUTO-ENTMCNC: 30.2 G/DL (ref 31.5–36.5)
MCV RBC AUTO: 98 FL (ref 78–100)
PLATELET # BLD AUTO: 281 10E9/L (ref 150–450)
POTASSIUM SERPL-SCNC: 4.3 MMOL/L (ref 3.4–5.3)
RBC # BLD AUTO: 3.46 10E12/L (ref 4.4–5.9)
SODIUM SERPL-SCNC: 138 MMOL/L (ref 133–144)
WBC # BLD AUTO: 18.2 10E9/L (ref 4–11)

## 2020-02-20 PROCEDURE — 25000128 H RX IP 250 OP 636: Performed by: INTERNAL MEDICINE

## 2020-02-20 PROCEDURE — 80048 BASIC METABOLIC PNL TOTAL CA: CPT | Performed by: HOSPITALIST

## 2020-02-20 PROCEDURE — 97535 SELF CARE MNGMENT TRAINING: CPT | Mod: GO

## 2020-02-20 PROCEDURE — 25000132 ZZH RX MED GY IP 250 OP 250 PS 637: Performed by: INTERNAL MEDICINE

## 2020-02-20 PROCEDURE — 99232 SBSQ HOSP IP/OBS MODERATE 35: CPT | Performed by: INTERNAL MEDICINE

## 2020-02-20 PROCEDURE — 97140 MANUAL THERAPY 1/> REGIONS: CPT | Mod: GP | Performed by: PHYSICAL THERAPIST

## 2020-02-20 PROCEDURE — 85027 COMPLETE CBC AUTOMATED: CPT | Performed by: HOSPITALIST

## 2020-02-20 PROCEDURE — 12000000 ZZH R&B MED SURG/OB

## 2020-02-20 PROCEDURE — 25000132 ZZH RX MED GY IP 250 OP 250 PS 637: Performed by: HOSPITALIST

## 2020-02-20 PROCEDURE — 00000146 ZZHCL STATISTIC GLUCOSE BY METER IP

## 2020-02-20 PROCEDURE — 36415 COLL VENOUS BLD VENIPUNCTURE: CPT | Performed by: HOSPITALIST

## 2020-02-20 PROCEDURE — 97110 THERAPEUTIC EXERCISES: CPT | Mod: GO

## 2020-02-20 RX ORDER — POTASSIUM CHLORIDE 7.45 MG/ML
10 INJECTION INTRAVENOUS
Status: DISCONTINUED | OUTPATIENT
Start: 2020-02-20 | End: 2020-02-24 | Stop reason: HOSPADM

## 2020-02-20 RX ORDER — POTASSIUM CHLORIDE 1.5 G/1.58G
20-40 POWDER, FOR SOLUTION ORAL
Status: DISCONTINUED | OUTPATIENT
Start: 2020-02-20 | End: 2020-02-24 | Stop reason: HOSPADM

## 2020-02-20 RX ORDER — POTASSIUM CL/LIDO/0.9 % NACL 10MEQ/0.1L
10 INTRAVENOUS SOLUTION, PIGGYBACK (ML) INTRAVENOUS
Status: DISCONTINUED | OUTPATIENT
Start: 2020-02-20 | End: 2020-02-24 | Stop reason: HOSPADM

## 2020-02-20 RX ORDER — MAGNESIUM SULFATE HEPTAHYDRATE 40 MG/ML
4 INJECTION, SOLUTION INTRAVENOUS EVERY 4 HOURS PRN
Status: DISCONTINUED | OUTPATIENT
Start: 2020-02-20 | End: 2020-02-24 | Stop reason: HOSPADM

## 2020-02-20 RX ORDER — POTASSIUM CHLORIDE 29.8 MG/ML
20 INJECTION INTRAVENOUS
Status: DISCONTINUED | OUTPATIENT
Start: 2020-02-20 | End: 2020-02-24 | Stop reason: HOSPADM

## 2020-02-20 RX ORDER — POTASSIUM CHLORIDE 1500 MG/1
20-40 TABLET, EXTENDED RELEASE ORAL
Status: DISCONTINUED | OUTPATIENT
Start: 2020-02-20 | End: 2020-02-24 | Stop reason: HOSPADM

## 2020-02-20 RX ADMIN — FUROSEMIDE 20 MG: 10 INJECTION, SOLUTION INTRAMUSCULAR; INTRAVENOUS at 08:31

## 2020-02-20 RX ADMIN — METOPROLOL SUCCINATE 100 MG: 100 TABLET, EXTENDED RELEASE ORAL at 21:25

## 2020-02-20 RX ADMIN — ASPIRIN 325 MG ORAL TABLET 325 MG: 325 PILL ORAL at 08:31

## 2020-02-20 RX ADMIN — HYDROXYUREA 500 MG: 500 CAPSULE ORAL at 08:41

## 2020-02-20 RX ADMIN — POTASSIUM CHLORIDE 20 MEQ: 1500 TABLET, EXTENDED RELEASE ORAL at 08:31

## 2020-02-20 RX ADMIN — CYANOCOBALAMIN TAB 500 MCG 250 MCG: 500 TAB at 08:31

## 2020-02-20 RX ADMIN — FERROUS SULFATE TAB 325 MG (65 MG ELEMENTAL FE) 325 MG: 325 (65 FE) TAB at 08:31

## 2020-02-20 RX ADMIN — FAMOTIDINE 20 MG: 20 TABLET, FILM COATED ORAL at 21:25

## 2020-02-20 RX ADMIN — ATORVASTATIN CALCIUM 40 MG: 40 TABLET, FILM COATED ORAL at 08:39

## 2020-02-20 RX ADMIN — SENNOSIDES AND DOCUSATE SODIUM 2 TABLET: 8.6; 5 TABLET ORAL at 21:25

## 2020-02-20 RX ADMIN — FUROSEMIDE 20 MG: 10 INJECTION, SOLUTION INTRAMUSCULAR; INTRAVENOUS at 15:25

## 2020-02-20 RX ADMIN — MULTIPLE VITAMINS W/ MINERALS TAB 1 TABLET: TAB at 08:31

## 2020-02-20 RX ADMIN — SENNOSIDES AND DOCUSATE SODIUM 2 TABLET: 8.6; 5 TABLET ORAL at 08:31

## 2020-02-20 RX ADMIN — FAMOTIDINE 20 MG: 20 TABLET, FILM COATED ORAL at 08:31

## 2020-02-20 RX ADMIN — METOPROLOL SUCCINATE 100 MG: 100 TABLET, EXTENDED RELEASE ORAL at 08:31

## 2020-02-20 ASSESSMENT — ACTIVITIES OF DAILY LIVING (ADL)
ADLS_ACUITY_SCORE: 16

## 2020-02-20 ASSESSMENT — MIFFLIN-ST. JEOR: SCORE: 1541.5

## 2020-02-20 NOTE — PROGRESS NOTES
SW:    I: DANA following for discharge planning. DANA spoke with Scotty in admissions at [facility] on 2/19/20. Referral received and pt familiar to facility from previous hospital admission and referral. Per Scotty, male bed availability is tight and he is awaiting response from other pt prior to being able to accept. If able, he will initiate prior auth with [employer].    P: SW to follow.    NICANOR Gonzalez, [facility] HospitalSW  [facility]  Daytime (8:00am-4:30pm): [phone]  After-Hours SW Pager (4:30pm-11:30pm): [phone]

## 2020-02-20 NOTE — PROGRESS NOTES
CV Surgery Progress Note   February 19, 2020   HPI:  Mr. Gannon is a very pleasant 69-year old gentleman with a PMH of HTN, former smoker, recently diagnosed myelofibrosis on hydroxyurea and severe 3-vessel coronary artery disease s/p coronary artery bypass grafting on 1/31/2020 by Dr. Mable Barrera. He did have a code stroke called postoperatively with noted facial droop which resolved and CT head was negative for acute pathology. He was discharged to home on diuretics on 2/10/2020 with home therapy as insurance refused authorization for transitional care placement as recommended. He is staying with his sister for now as he lives alone. Since discharge, he has had two emergency department visits, the last being yesterday (2/15) where he as admitted for ongoing shortness of breath and BLE edema. Primary care provider recently increased lasix dosing and weights have been improving but he has continued dyspnea at rest with associated orthopnea. Denies fever, chills, myalgias, dysuria, productive cough, dizziness, syncope or near syncope, palpitations or chest pain. Of note, he has a persistent left pleural effusion.     Assessment & Plan:   # Severe 3-vessel coronary artery disease s/p CABG x3 on 1/31/2020.  # HTN. -120/60s, HR 80-90s/NSR  -  mg daily  - Atorvastatin 40 mg daily  - Metoprolol succinate 100 mg BID     # Former smoker  # Fluid overload. Weight today 175 lbs down from 188 lbs on admission; was 197 lbs at time of discharge from CABG stay. 1+ BLE appreciated. BNP 2853.  # Dyspnea  # Left pleural effusion s/p left thoracentesis for 950 mL evacuated. CRP 19.1, sed rate 30, . Diminished breath sounds on left. Is reaching 1000 mL on IS. Saturating well on room air. States breathing feels better. CXR 2/18 improved. Echocardiogram on 2/17 demonstrates no pericardial effusion.  - Agree with lasix 20 mg IV BID  - Lymph edema wraps to BLE  - Fluid cultures pending    # Myelofibrosis. Baseline  "WBC is between 40-50  - Continue PTA hydroxyurea     # Normocytic anemia in the setting of surgical/acute blood loss  - Continue Ferrous Sulfate    # GPC bacteremia on 1/2 blood cultures. Questionable contaminant.  - Follow cultures; growing micrococcus - sensitive to PCN and Vanco  -Micrococcus thought to be contaminant per ID- vancomycin stopped.   - Management per hospitalist service and ID.     Subjective: No acute events overnight. Saturating well on room air. Pain is controlled. Tolerating diet. +BM.      Objective: BP 97/56 (BP Location: Left arm)   Pulse 89   Temp 97.3  F (36.3  C) (Axillary)   Resp 18   Ht 1.727 m (5' 8\")   Wt 80.7 kg (177 lb 14.6 oz)   SpO2 98%   BMI 27.05 kg/m       General: NAD  Heart: S1, S2, RRR, no m/r/g  Rhythm: NSR  Lungs: CTA, improved aeration  Abd: S/ND, +BS  Sternum/Incisions: Midline sternal and LLE incisions CDI. LLE with some necrotic area but appears CDI, healing  Extremities: 2+ BLE edema              Recent Labs   Lab 02/16/20  0553 02/16/20  0011 02/15/20  1936 02/15/20  1612 02/13/20  1335 02/11/20  1224   WBC 24.9*  --   --  27.4* 35.6* 44.1*   HGB 10.0*  --   --  10.3* 9.9* 8.8*   MCV 96  --   --  98 96 96     --   --  329 380 405   INR  --   --   --  1.16*  --   --      --   --  138  --  139   POTASSIUM 4.6  --   --  4.5  --  4.2   CHLORIDE 103  --   --  104  --  106   CO2 27  --   --  27  --  25   BUN 22  --   --  22  --  21   CR 1.16  --   --  1.13  --  1.23   ANIONGAP 5  --   --  7  --  8   MINNIE 8.9  --   --  8.7  --  8.4*   *  --   --  112*  --  99   ALBUMIN  --   --   --  3.0*  --  3.0*   PROTTOTAL  --  7.2  --  7.2  --  6.6*   BILITOTAL  --   --   --  0.8  --  1.0   ALKPHOS  --   --   --  105  --  90   ALT  --   --   --  28  --  22   AST  --   --   --  34  --  43   TROPI  --   --  0.086* 0.071*  --   --       CXR:  No new imaging today    Alix Melendrez PA-C  CV surgery  Pager [phone]       "

## 2020-02-20 NOTE — PLAN OF CARE
A/Ox4. VSS Tele: SR.  LS diminished. +BS, +flatus. Voiding adequately. Thoracentesis sites cdi/brandon. Denied pain. Up with assist x1. Tolerating diet. Plan to discharge today.

## 2020-02-20 NOTE — PROGRESS NOTES
SW:    I: DANA following for discharge planning. Pt is clinically accepted at [facility], bed available once prior auth received from [facility]. Admissions is submitting this request today. SW will await call from admissions regarding insurance response, hopeful for discharge tomorrow 2/21.    P: SW to follow.    NICANOR Gonzalez, [facility] CenterSW  [facility]  Daytime (8:00am-4:30pm): [phone]  After-Hours SW Pager (4:30pm-11:30pm): [phone]

## 2020-02-20 NOTE — PLAN OF CARE
Discharge Planner OT   Patient plan for discharge: Rehab before home    Current status: Pt required SBA with FWW for toileting task, standing at sink for G/H tasks, and retrieval of ADL items. Pt completed ambulation task with SBA and FWW. WY=191 bpm during exercise.     Barriers to return to prior living situation: Lives alone; Fall risk; Current level of A required    Recommendations for discharge: TCU    Rationale for recommendations: Pt continues to be limited by impaired cognition and safety, decreased balance and activity tolerance, and sternal precautions; thus, OT/CR will continue with daily intervention to ensure safety with ADLs and monitor vitals during exercise. Pt is not safe to return to independent living. Pt will need A for all dressing, bathing, and all IADLs if discharged home along with home RN/HHA/OT/PT.        Entered by: Darwin Yates 02/20/2020 9:57 AM

## 2020-02-20 NOTE — PROGRESS NOTES
[facility]    Hospitalist Progress Note      Assessment & Plan   Renny Gannon is a 69 year old male who underwent a coronary artery bypass grafting x 3 pm 1/31/2020 and presented on 2/16/2020 with shortness of breath. He was found to have a moderate exudative left pleural effusion s/p thoracentesis on 2/17/2020.    # Exudative Left Pleural Effusion s/p Thoracentesis on 2/17/2020 with 1L dark red/blood-tinged fluid removed, currently stable on room air  2/17/2020 Pleural Fluid Cx NGTD  Creat 1.13 (1.24)  - appreciate CT Surgery assistance  - Lasix 20mg IVP BID  - BLE lymph edema wraps    # CAD s/p recent CABG x3 on 1/31/2020  - appreciate CT Surgery assistance  - cont ASA 325mg PO daily  - cont Atorvastatin 40mg PO daily  - cont Metoprolol 100mg PO BID    # Positive Blood Cultures likely 2/2 Contaminant  1 out of 2 bottles positive Micrococcus  Afebrile, non-tachycardic  Procalcitonin negative  Chronic leukocytosis  - appreciate Infectious Disease assistance  - Vancomycin discontinued on 2/19    # MPD  - cont Hydroxyurea 500mg PO daily    # FEN  - cardiac diet    # Dispo  - CT Surgery following  - cont IV diuresis  - anticipate discharge to TCU when medically stable    DVT Prophylaxis: Pneumatic Compression Devices  Code Status: Full Code  Expected discharge: 2 - 3 days, recommended to transitional care unit once volume status improves.    Yao Garcia MD  Text page (7am - 6pm)    Interval History   - ambulated with PT today on room air, felt okay  - breathing is improved overall  - denies pain/discomfort  - 10+ ROS otherwise negative    -Data reviewed today: I reviewed all new labs and imaging results over the last 24 hours.    Physical Exam   Temp: 97.1  F (36.2  C) Temp src: Axillary BP: 110/65 Pulse: 86 Heart Rate: 81 Resp: 18 SpO2: 96 % O2 Device: None (Room air)    Vitals:    02/18/20 0600 02/19/20 0623 02/20/20 0500   Weight: 81.2 kg (179 lb 1.6 oz) 81.6 kg (180 lb) 80.7 kg (177 lb 14.6 oz)      Vital Signs with Ranges  Temp:  [97.1  F (36.2  C)-97.9  F (36.6  C)] 97.1  F (36.2  C)  Pulse:  [86-91] 86  Heart Rate:  [] 81  Resp:  [16-18] 18  BP: ()/(56-67) 110/65  SpO2:  [94 %-99 %] 96 %  I/O last 3 completed shifts:  In: 1240 [P.O.:1240]  Out: 1350 [Urine:1350]    Constitutional: Awake, alert, cooperative, no apparent distress.  Respiratory: Clear to auscultation bilaterally, no crackles or wheezing.  Cardiovascular: Regular rate and rhythm, normal S1 and S2, and no murmur noted.  GI: Soft, non-distended, non-tender, normal bowel sounds.  Skin: No rashes, no cyanosis, no edema.  Musculoskeletal: No joint swelling, erythema or tenderness.  Neurologic: Cranial nerves 2-12 intact, normal strength and sensation.  Psychiatric: Alert, oriented to person, place and time, no obvious anxiety or depression.    Medications     - MEDICATION INSTRUCTIONS -         aspirin  325 mg Oral Daily     atorvastatin  40 mg Oral Daily     vitamin B-12  250 mcg Oral Daily     famotidine  20 mg Oral BID     ferrous sulfate  325 mg Oral Daily     furosemide  20 mg Intravenous BID     hydroxyurea  500 mg Oral Daily     metoprolol succinate ER  100 mg Oral BID     multivitamin w/minerals  1 tablet Oral Daily     potassium chloride ER  20 mEq Oral Daily     senna-docusate  2 tablet Oral BID     sodium chloride (PF)  3 mL Intracatheter Q8H       Data   Recent Labs   Lab 02/20/20  0733 02/19/20  1206 02/19/20  0436 02/18/20  0757 02/16/20  0553 02/16/20  0011 02/15/20  1936 02/15/20  1612   WBC 18.2* 19.1*  --   --  24.9*  --   --  27.4*   HGB 10.2* 10.1*  --   --  10.0*  --   --  10.3*   MCV 98 98  --   --  96  --   --  98    257  --   --  307  --   --  329   INR  --   --   --   --   --   --   --  1.16*     --   --   --  135  --   --  138   POTASSIUM 4.3  --   --   --  4.6  --   --  4.5   CHLORIDE 108  --   --   --  103  --   --  104   CO2 27  --   --   --  27  --   --  27   BUN 25  --   --   --  22  --   --   22   CR 1.13  --  1.24 1.19 1.16  --   --  1.13   ANIONGAP 3  --   --   --  5  --   --  7   MINNIE 9.1  --   --   --  8.9  --   --  8.7   *  --   --   --  107*  --   --  112*   ALBUMIN  --   --   --   --   --   --   --  3.0*   PROTTOTAL  --   --   --   --   --  7.2  --  7.2   BILITOTAL  --   --   --   --   --   --   --  0.8   ALKPHOS  --   --   --   --   --   --   --  105   ALT  --   --   --   --   --   --   --  28   AST  --   --   --   --   --   --   --  34   TROPI  --   --   --   --   --   --  0.086* 0.071*       No results found for this or any previous visit (from the past 24 hour(s)).

## 2020-02-20 NOTE — PLAN OF CARE
A & O x 4.  [place].  Performed foot/wound care per plan of care.  Lymphedema wraps put on after by P.T.   LS diminished.  BM+, Flatus+.  VSS.  Voiding adequately.  BLE 2+.  Thoracentesis site is LOIDA.  Pain minimal; declines interventions.  Assist x 1 w /walker.  Diet of low fat/no caffeine, low NA tolerated; good appetite.  Tele=SR.

## 2020-02-20 NOTE — PLAN OF CARE
Discharge Planner PT   Patient plan for discharge: TCU  Current status: Received lymphadema order. Patient has 2+pitting edema bilateral lower legs. Edema stops bilaterally about 2 inches below the knee. Patient has open sores bilateral feet and bandages were changed prior to therapist seeing patient. Bilateral short stretch bandages applied to bilateral LE's with chux covering the dressings on his feet to keep bandages dry in case of drainage. Patient educated on keeping LE's elevated even when up in the chair. Left handout in taped on the wall beside the white board and educated nurse and patient on when to take bandages off-if they become soiled, if they are painful, if patient becomes SOB and toes turn white or blue. Nurse and patient expressed understanding. Wrote on white board to remove bandages and redress wounds at 2 tomorrow and PT will see at 3. If patient discharges to TCU, please include orders to continue lymphadema bandages. Educated patient not to throw them away.   Barriers to return to prior living situation: Lives alone, decreased activity tolerance  Recommendations for discharge: TC U with continued lymphadema wraps  Rationale for recommendations: Pt continues to be limited by impaired cognition and safety, decreased balance and activity tolerance, and sternal precautions. Would benefit from continued lymphadema therapy as well as therapy to increase functional independence and safety at TCU.        Entered by: Moira Tan 02/20/2020 2:41 PM

## 2020-02-20 NOTE — PROGRESS NOTES
CLINICAL NUTRITION SERVICES - REASSESSMENT NOTE      Recommendations Ordered by Registered Dietitian (RD):     Will cancel current supplement order ---> instead will send a 4oz PLUS2 drink at 10am and 2pm (each provides 240 malena and 9 gm pro)     Malnutrition: (2/16)  % Weight Loss:  > 5% in 1 month (severe malnutrition)  % Intake:  <75% for >/= 3 months (non-severe malnutrition)  Subcutaneous Fat Loss:  Orbital region moderate-severe depletion and Upper arm region moderate depletion  Muscle Loss:  Temporal region mild-moderate depletion, Clavicle bone region mild-moderate depletion, Acromion bone region mild-moderate depletion and Dorsal hand region mild depletion  Fluid Retention:  None noted     Malnutrition Diagnosis: Severe malnutrition  In Context of:  Acute illness or injury  Chronic illness or disease        EVALUATION OF PROGRESS TOWARD GOALS   Diet:    2400 mg Na, LSF, no caffeine  10am: Gelatein PLUS  2pm: vanilla Boost    Intake/Tolerance:    Chart reviewed  Pt tolerating po  Flowsheets reflect meal intake has been %    Visited with pt this morning  Tells me that he ate 100% of his breakfast - oatmeal, milk, banana  Has been taking the supplements, but is getting sick of them and would like a smaller option      NEW FINDINGS:   Working on discharge plans (poss TCU)    2/17: Left pleural effusion s/p left thoracentesis for 950 mL evacuated    Previous Goals (2/16):   Intake of at least 75% meals + 2 supplements daily  Evaluation: Met    Previous Nutrition Diagnosis (2/16):   Malnutrition related to inadequate oral intake and increased nutrient needs (protein/calorie) post op CABG as evidenced by weight loss of 7.5% in the past month or less, e/o fat and muscle losses, reported reduced appetite for the past 1 year.   Evaluation: No change      CURRENT NUTRITION DIAGNOSIS  Malnutrition related to inadequate oral intake and increased nutrient needs (protein/calorie) post op CABG as evidenced by weight  loss of 7.5% in the past month or less, e/o fat and muscle losses, reported reduced appetite for the past 1 year.     INTERVENTIONS  Recommendations / Nutrition Prescription  2400 mg Na, LSF, no caffeine  Nutrition supplements    Implementation  Will cancel current supplement order ---> instead will send a 4oz PLUS2 drink at 10am and 2pm (each provides 240 malena and 9 gm pro)    Goals  Pt to consume 75% meals ordered and 100% supplements      MONITORING AND EVALUATION:  Progress towards goals will be monitored and evaluated per protocol and Practice Guidelines

## 2020-02-20 NOTE — PROGRESS NOTES
No acute changes. Alert and interactive. Up in chair for the evening. BLE elevated on a chair. Dressings C/D/I. On sternal precaution. Did endorse BLE aching pain. Pain well managed with PRN PO tylenol x 1. Eating and drinking well. No N/V/D. Tele: NSR.

## 2020-02-21 ENCOUNTER — TELEPHONE (OUTPATIENT)
Dept: FAMILY MEDICINE | Facility: CLINIC | Age: 70
End: 2020-02-21

## 2020-02-21 ENCOUNTER — APPOINTMENT (OUTPATIENT)
Dept: OCCUPATIONAL THERAPY | Facility: CLINIC | Age: 70
DRG: 187 | End: 2020-02-21
Attending: INTERNAL MEDICINE
Payer: COMMERCIAL

## 2020-02-21 LAB
ANION GAP SERPL CALCULATED.3IONS-SCNC: 3 MMOL/L (ref 3–14)
BACTERIA SPEC CULT: NO GROWTH
BUN SERPL-MCNC: 24 MG/DL (ref 7–30)
CALCIUM SERPL-MCNC: 9.3 MG/DL (ref 8.5–10.1)
CHLORIDE SERPL-SCNC: 107 MMOL/L (ref 94–109)
CO2 SERPL-SCNC: 29 MMOL/L (ref 20–32)
CREAT SERPL-MCNC: 1.22 MG/DL (ref 0.66–1.25)
GFR SERPL CREATININE-BSD FRML MDRD: 60 ML/MIN/{1.73_M2}
GLUCOSE SERPL-MCNC: 100 MG/DL (ref 70–99)
MAGNESIUM SERPL-MCNC: 2.4 MG/DL (ref 1.6–2.3)
POTASSIUM SERPL-SCNC: 4.5 MMOL/L (ref 3.4–5.3)
SODIUM SERPL-SCNC: 139 MMOL/L (ref 133–144)
SPECIMEN SOURCE: NORMAL

## 2020-02-21 PROCEDURE — 25000132 ZZH RX MED GY IP 250 OP 250 PS 637: Performed by: HOSPITALIST

## 2020-02-21 PROCEDURE — 97110 THERAPEUTIC EXERCISES: CPT | Mod: GO

## 2020-02-21 PROCEDURE — 80048 BASIC METABOLIC PNL TOTAL CA: CPT | Performed by: INTERNAL MEDICINE

## 2020-02-21 PROCEDURE — 97535 SELF CARE MNGMENT TRAINING: CPT | Mod: GO

## 2020-02-21 PROCEDURE — 25000132 ZZH RX MED GY IP 250 OP 250 PS 637: Performed by: INTERNAL MEDICINE

## 2020-02-21 PROCEDURE — 83735 ASSAY OF MAGNESIUM: CPT | Performed by: INTERNAL MEDICINE

## 2020-02-21 PROCEDURE — 99232 SBSQ HOSP IP/OBS MODERATE 35: CPT | Performed by: INTERNAL MEDICINE

## 2020-02-21 PROCEDURE — 36415 COLL VENOUS BLD VENIPUNCTURE: CPT | Performed by: INTERNAL MEDICINE

## 2020-02-21 PROCEDURE — 12000000 ZZH R&B MED SURG/OB

## 2020-02-21 RX ADMIN — METOPROLOL SUCCINATE 100 MG: 100 TABLET, EXTENDED RELEASE ORAL at 21:20

## 2020-02-21 RX ADMIN — CYANOCOBALAMIN TAB 500 MCG 250 MCG: 500 TAB at 08:48

## 2020-02-21 RX ADMIN — ACETAMINOPHEN 650 MG: 325 TABLET, FILM COATED ORAL at 21:39

## 2020-02-21 RX ADMIN — POTASSIUM CHLORIDE 20 MEQ: 1500 TABLET, EXTENDED RELEASE ORAL at 08:49

## 2020-02-21 RX ADMIN — FAMOTIDINE 20 MG: 20 TABLET, FILM COATED ORAL at 21:20

## 2020-02-21 RX ADMIN — ATORVASTATIN CALCIUM 40 MG: 40 TABLET, FILM COATED ORAL at 08:49

## 2020-02-21 RX ADMIN — HYDROXYUREA 500 MG: 500 CAPSULE ORAL at 08:48

## 2020-02-21 RX ADMIN — MULTIPLE VITAMINS W/ MINERALS TAB 1 TABLET: TAB at 08:49

## 2020-02-21 RX ADMIN — ASPIRIN 325 MG ORAL TABLET 325 MG: 325 PILL ORAL at 08:49

## 2020-02-21 RX ADMIN — FERROUS SULFATE TAB 325 MG (65 MG ELEMENTAL FE) 325 MG: 325 (65 FE) TAB at 08:49

## 2020-02-21 RX ADMIN — METOPROLOL SUCCINATE 100 MG: 100 TABLET, EXTENDED RELEASE ORAL at 08:49

## 2020-02-21 RX ADMIN — FAMOTIDINE 20 MG: 20 TABLET, FILM COATED ORAL at 08:49

## 2020-02-21 ASSESSMENT — ACTIVITIES OF DAILY LIVING (ADL)
ADLS_ACUITY_SCORE: 16

## 2020-02-21 ASSESSMENT — MIFFLIN-ST. JEOR: SCORE: 1537.5

## 2020-02-21 NOTE — TELEPHONE ENCOUNTER
Reason for Call:  Form, our goal is to have forms completed with 72 hours, however, some forms may require a visit or additional information.    Type of letter, form or note:  Home Health Certification    Who is the form from?: Home care    Where did the form come from: form was faxed in    What clinic location was the form placed at?: [facility]    Where the form was placed: Given to MA/RN    What number is listed as a contact on the form?: [phone]       Additional comments: please fax to [phone] and also [phone]    Call taken on 2/21/2020 at 2:07 PM by Susan Sorensen

## 2020-02-21 NOTE — PROGRESS NOTES
CV Surgery    Social visit. Patient appearing much improved, lymphedema wraps on legs. TCU hopefully, pending his insurance company will realize that patient needs additional help than what can be provided at home with home care from last time.     Will see patient in clinic 3/2/20 at 1:30    Suzy Chacon PA-C  CV Surgery

## 2020-02-21 NOTE — PROGRESS NOTES
SW:    I: DANA following for discharge planning. Pt has been clinically accepted at [facility], awaiting insurance approval. DANA received call from admission this morning; Zena requesting more clinical information. Scotty at [facility] will continue to update this writer in re: to insurance auth.    P: DANA to follow.    ADDENDUM: DANA received call from [facility] admissions. Zena is requesting a peer to peer review. CV surgery on floor; inquired if PA desires to do this as she performed the peer to peer last admissions when he was denied TCU.    DANA to update [facility] with outcome.    NICANOR Gonzalez, LICSW  [facility]  Daytime (8:00am-4:30pm): [phone]  After-Hours  Pager (4:30pm-11:30pm): [phone]

## 2020-02-21 NOTE — PLAN OF CARE
PT-Therapist was late to see patient for lymphadema. Nurse had already unwrapped LE's, redressed the wounds and rewrapped LE's with short stretch bandages. Nurse reports patient tolerated them without difficulty and that edema was very minimal. Will check back tomorrow and determine whether wraps need to be continued or not.

## 2020-02-21 NOTE — PLAN OF CARE
Discharge Planner OT   Patient plan for discharge: rehab prior to home    Current status: Pt completed lower body dressing with minimal assistance, grooming standing at sink with supervision. Pt ambulated approximately 200 ft in [place] with FWW with SBA. VSS on room air. Pt needed reminders for sternal precautions and walker safety.    Barriers to return to prior living situation: Lives alone; Fall risk; Current level of A required, wound and edema management     Recommendations for discharge: TCU     Rationale for recommendations: Pt continues to be limited by impaired cognition and safety, decreased balance and activity tolerance, and sternal precautions; thus, OT/CR will continue with daily intervention to ensure safety with ADLs and monitor vitals during exercise. Pt is not safe to return to independent living. Pt will need A for all dressing, bathing, and all IADLs if discharged home along with home RN/HHA/OT/PT.        Entered by: Sophie Johnston 02/21/2020 9:04 AM

## 2020-02-21 NOTE — PROGRESS NOTES
[facility]    Hospitalist Progress Note      Assessment & Plan   Renny Gannon is a 69 year old male who underwent a coronary artery bypass grafting x 3 pm 1/31/2020 and presented on 2/16/2020 with shortness of breath. He was found to have a moderate exudative left pleural effusion s/p thoracentesis on 2/17/2020.     # Exudative Left Pleural Effusion s/p Thoracentesis on 2/17/2020 with 1L dark red/blood-tinged fluid removed, currently stable on room air  2/17/2020 Pleural Fluid Cx NGTD  Creat 1.22 (1.13)  - appreciate CT Surgery assistance  - discontinue Lasix  - BLE lymph edema wraps     # CAD s/p recent CABG x3 on 1/31/2020  - appreciate CT Surgery assistance  - cont ASA 325mg PO daily  - cont Atorvastatin 40mg PO daily  - cont Metoprolol 100mg PO BID  - outpt f/u with CT Surgery     # Positive Blood Cultures likely 2/2 Contaminant  1 out of 2 bottles positive Micrococcus  Afebrile, non-tachycardic  Procalcitonin negative  Chronic leukocytosis  - appreciate Infectious Disease assistance  - Vancomycin discontinued on 2/19     # MPD  - cont Hydroxyurea 500mg PO daily     # FEN  - cardiac diet     # Dispo  - patient is medically stable for discharge to TCU     DVT Prophylaxis: Pneumatic Compression Devices  Code Status: Full Code  Expected discharge: tomorrow, recommended to transitional care unit once placement is available.    Yao Garcia MD  Text page (7am - 6pm)    Interval History   - breathing is comfortable  - has been ambulating with very mild SPENCER, but feels much better overall  - 10+ ROS otherwise negative    -Data reviewed today: I reviewed all new labs and imaging results over the last 24 hours.    Physical Exam   Temp: 97.8  F (36.6  C) Temp src: Oral BP: 111/67 Pulse: 82 Heart Rate: 84 Resp: 16 SpO2: 92 % O2 Device: None (Room air)    Vitals:    02/19/20 0623 02/20/20 0500 02/21/20 0504   Weight: 81.6 kg (180 lb) 80.7 kg (177 lb 14.6 oz) (P) 80.3 kg (177 lb 0.5 oz)     Vital Signs with  Ranges  Temp:  [97.3  F (36.3  C)-98.3  F (36.8  C)] 97.8  F (36.6  C)  Pulse:  [76-89] 82  Heart Rate:  [74-87] 84  Resp:  [16-18] 16  BP: ()/(55-69) 111/67  SpO2:  [92 %-98 %] 92 %  I/O last 3 completed shifts:  In: 1260 [P.O.:1260]  Out: 1350 [Urine:1350]    Constitutional: Awake, alert, cooperative, no apparent distress.  Respiratory: Clear to auscultation bilaterally, no crackles or wheezing.  Cardiovascular: Regular rate and rhythm, normal S1 and S2, and no murmur noted.  GI: Soft, non-distended, non-tender, normal bowel sounds.  Skin: No rashes, no cyanosis, no edema.  Musculoskeletal: No joint swelling, erythema or tenderness.  Neurologic: Cranial nerves 2-12 intact, normal strength and sensation.  Psychiatric: Alert, oriented to person, place and time, no obvious anxiety or depression.    Medications     - MEDICATION INSTRUCTIONS -         aspirin  325 mg Oral Daily     atorvastatin  40 mg Oral Daily     vitamin B-12  250 mcg Oral Daily     famotidine  20 mg Oral BID     ferrous sulfate  325 mg Oral Daily     furosemide  20 mg Intravenous BID     hydroxyurea  500 mg Oral Daily     metoprolol succinate ER  100 mg Oral BID     multivitamin w/minerals  1 tablet Oral Daily     potassium chloride ER  20 mEq Oral Daily     senna-docusate  2 tablet Oral BID     sodium chloride (PF)  3 mL Intracatheter Q8H       Data   Recent Labs   Lab 02/21/20  0736 02/20/20  0733 02/19/20  1206 02/19/20  0436  02/16/20  0553 02/16/20  0011 02/15/20  1936 02/15/20  1612   WBC  --  18.2* 19.1*  --   --  24.9*  --   --  27.4*   HGB  --  10.2* 10.1*  --   --  10.0*  --   --  10.3*   MCV  --  98 98  --   --  96  --   --  98   PLT  --  281 257  --   --  307  --   --  329   INR  --   --   --   --   --   --   --   --  1.16*    138  --   --   --  135  --   --  138   POTASSIUM 4.5 4.3  --   --   --  4.6  --   --  4.5   CHLORIDE 107 108  --   --   --  103  --   --  104   CO2 PENDING 27  --   --   --  27  --   --  27   BUN  PENDING 25  --   --   --  22  --   --  22   CR PENDING 1.13  --  1.24   < > 1.16  --   --  1.13   ANIONGAP PENDING 3  --   --   --  5  --   --  7   MINNIE PENDING 9.1  --   --   --  8.9  --   --  8.7   GLC PENDING 101*  --   --   --  107*  --   --  112*   ALBUMIN  --   --   --   --   --   --   --   --  3.0*   PROTTOTAL  --   --   --   --   --   --  7.2  --  7.2   BILITOTAL  --   --   --   --   --   --   --   --  0.8   ALKPHOS  --   --   --   --   --   --   --   --  105   ALT  --   --   --   --   --   --   --   --  28   AST  --   --   --   --   --   --   --   --  34   TROPI  --   --   --   --   --   --   --  0.086* 0.071*    < > = values in this interval not displayed.       No results found for this or any previous visit (from the past 24 hour(s)).

## 2020-02-21 NOTE — PLAN OF CARE
A&O x 4, AVSS sinus rhythm. Denied pain. Lungs clear/diminished. Up with 1. Voids per urinal.  Incision cdi. Legs wrap with lymph wraps.

## 2020-02-22 ENCOUNTER — APPOINTMENT (OUTPATIENT)
Dept: PHYSICAL THERAPY | Facility: CLINIC | Age: 70
DRG: 187 | End: 2020-02-22
Attending: INTERNAL MEDICINE
Payer: COMMERCIAL

## 2020-02-22 LAB
BACTERIA SPEC CULT: NO GROWTH
CREAT SERPL-MCNC: 1.19 MG/DL (ref 0.66–1.25)
GFR SERPL CREATININE-BSD FRML MDRD: 61 ML/MIN/{1.73_M2}
SPECIMEN SOURCE: NORMAL

## 2020-02-22 PROCEDURE — 25000132 ZZH RX MED GY IP 250 OP 250 PS 637: Performed by: HOSPITALIST

## 2020-02-22 PROCEDURE — 36415 COLL VENOUS BLD VENIPUNCTURE: CPT | Performed by: INTERNAL MEDICINE

## 2020-02-22 PROCEDURE — 97140 MANUAL THERAPY 1/> REGIONS: CPT | Mod: GP | Performed by: PHYSICAL THERAPIST

## 2020-02-22 PROCEDURE — 12000000 ZZH R&B MED SURG/OB

## 2020-02-22 PROCEDURE — 99232 SBSQ HOSP IP/OBS MODERATE 35: CPT | Performed by: INTERNAL MEDICINE

## 2020-02-22 PROCEDURE — 25000132 ZZH RX MED GY IP 250 OP 250 PS 637: Performed by: INTERNAL MEDICINE

## 2020-02-22 PROCEDURE — 82565 ASSAY OF CREATININE: CPT | Performed by: INTERNAL MEDICINE

## 2020-02-22 RX ADMIN — METOPROLOL SUCCINATE 100 MG: 100 TABLET, EXTENDED RELEASE ORAL at 20:29

## 2020-02-22 RX ADMIN — FAMOTIDINE 20 MG: 20 TABLET, FILM COATED ORAL at 08:44

## 2020-02-22 RX ADMIN — ACETAMINOPHEN 650 MG: 325 TABLET, FILM COATED ORAL at 23:20

## 2020-02-22 RX ADMIN — SENNOSIDES AND DOCUSATE SODIUM 2 TABLET: 8.6; 5 TABLET ORAL at 08:44

## 2020-02-22 RX ADMIN — CYANOCOBALAMIN TAB 500 MCG 250 MCG: 500 TAB at 08:44

## 2020-02-22 RX ADMIN — METOPROLOL SUCCINATE 100 MG: 100 TABLET, EXTENDED RELEASE ORAL at 08:44

## 2020-02-22 RX ADMIN — MULTIPLE VITAMINS W/ MINERALS TAB 1 TABLET: TAB at 08:45

## 2020-02-22 RX ADMIN — ASPIRIN 325 MG ORAL TABLET 325 MG: 325 PILL ORAL at 08:45

## 2020-02-22 RX ADMIN — SENNOSIDES AND DOCUSATE SODIUM 2 TABLET: 8.6; 5 TABLET ORAL at 20:28

## 2020-02-22 RX ADMIN — HYDROXYUREA 500 MG: 500 CAPSULE ORAL at 08:47

## 2020-02-22 RX ADMIN — FERROUS SULFATE TAB 325 MG (65 MG ELEMENTAL FE) 325 MG: 325 (65 FE) TAB at 08:44

## 2020-02-22 RX ADMIN — ATORVASTATIN CALCIUM 40 MG: 40 TABLET, FILM COATED ORAL at 08:45

## 2020-02-22 RX ADMIN — FAMOTIDINE 20 MG: 20 TABLET, FILM COATED ORAL at 20:28

## 2020-02-22 RX ADMIN — POTASSIUM CHLORIDE 20 MEQ: 1500 TABLET, EXTENDED RELEASE ORAL at 08:44

## 2020-02-22 ASSESSMENT — ACTIVITIES OF DAILY LIVING (ADL)
ADLS_ACUITY_SCORE: 16
ADLS_ACUITY_SCORE: 14
ADLS_ACUITY_SCORE: 16

## 2020-02-22 ASSESSMENT — MIFFLIN-ST. JEOR: SCORE: 1532.5

## 2020-02-22 NOTE — PLAN OF CARE
Discharge Planner PT   Patient plan for discharge: TCU  Current status: Patient seen for lymphadema. Wraps had been removed by nursing and bilateral foot wounds had been dressed when therapist arrived. No edema noted in left lower leg and only trace in right lower leg but bilateral feet still puffy especially on the right. Therapist used 8cm short stretch bandage to wrap bilateral feet to just above the ankle but didn't use second bandage as lower legs not edematous. Educated patient and nurse to keep patients feet above his knees to assist with decreasing edema in bilateral feet. Therapist put the knees of the bed down and placed LE's on a second pillow to keep feet elevated.   Barriers to return to prior living situation: Lives alone, decreased activity tolerance.   Recommendations for discharge: TCU  Rationale for recommendations: Pt continues to be limited by impaired cognition and safety, decreased balance and activity tolerance, and sternal precautions. Would benefit from continued lymphadema therapy as well as therapy to increase functional independence and safety at TCU. May not need continued lymphadema wraps as edema is only in feet now.        Entered by: Moira Tan 02/22/2020 5:18 PM

## 2020-02-22 NOTE — PLAN OF CARE
VSS, room air .Tele SR. A/Ox 4. Incisions CDI, ace wraps to legs not removed. Pain controlled with tylenol. Up with SBA. Reg diet. BS +. Flatus +. Voiding +. LS clear. IS to 1000.  Legs remained ace wrapped, skin integrity maintained. Awaiting TCU placement.

## 2020-02-22 NOTE — PLAN OF CARE
VSS. A/O. SBA. Incisions CDI. Coccyx open skin, turns Q2, pt turns ind, reminded. Bilateral LE wound dressing changed. Lymphedema rewrapped. Minimal edema. L leg vein harvest site incision wound black in color. Tylenol given. Tolerating diet. Voiding fine. Dim lungs, IS 1000. Tele SR

## 2020-02-22 NOTE — PROGRESS NOTES
[facility]    Hospitalist Progress Note      Assessment & Plan   Renny Gannon is a 69 year old male who underwent a coronary artery bypass grafting x 3 pm 1/31/2020 and presented on 2/16/2020 with shortness of breath. He was found to have a moderate exudative left pleural effusion s/p thoracentesis on 2/17/2020.     # Exudative Left Pleural Effusion s/p Thoracentesis on 2/17/2020 with 1L dark red/blood-tinged fluid removed, currently stable on room air  2/17/2020 Pleural Fluid Cx NGTD  Creat 1.19 today  - appreciate CT Surgery assistance  - BLE lymph edema wraps     # CAD s/p recent CABG x3 on 1/31/2020  - appreciate CT Surgery assistance  - cont ASA 325mg PO daily  - cont Atorvastatin 40mg PO daily  - cont Metoprolol 100mg PO BID  - outpt f/u with CT Surgery     # Positive Blood Cultures likely 2/2 Contaminant  1 out of 2 bottles positive Micrococcus  Afebrile, non-tachycardic  Procalcitonin negative  Chronic leukocytosis  - appreciate Infectious Disease assistance  - Vancomycin discontinued on 2/19     # MPD  - cont Hydroxyurea 500mg PO daily     # FEN  - cardiac diet     # Dispo  - patient is medically stable for discharge to TCU     DVT Prophylaxis: Pneumatic Compression Devices  Code Status: Full Code  Expected discharge: tomorrow, recommended to transitional care unit once placement is available.    Yao Garcia MD  Text page (7am - 6pm)    Interval History   - breathing is comfortable, ambulating independently  - 10+ ROS otherwise negative    -Data reviewed today: I reviewed all new labs and imaging results over the last 24 hours.    Physical Exam   Temp: 97.6  F (36.4  C) Temp src: Axillary BP: 107/62 Pulse: 74 Heart Rate: 78 Resp: 16 SpO2: 95 % O2 Device: None (Room air)    Vitals:    02/20/20 0500 02/21/20 0504 02/22/20 0500   Weight: 80.7 kg (177 lb 14.6 oz) 80.3 kg (177 lb 0.5 oz) 79.8 kg (175 lb 14.8 oz)     Vital Signs with Ranges  Temp:  [97.2  F (36.2  C)-97.6  F (36.4  C)] 97.6  F  (36.4  C)  Pulse:  [74-84] 74  Heart Rate:  [78-87] 78  Resp:  [16] 16  BP: (104-116)/(58-67) 107/62  SpO2:  [94 %-97 %] 95 %  I/O last 3 completed shifts:  In: 800 [P.O.:800]  Out: 1225 [Urine:1225]    Constitutional: Awake, alert, cooperative, no apparent distress.  Respiratory: Clear to auscultation bilaterally, no crackles or wheezing.  Cardiovascular: Regular rate and rhythm, normal S1 and S2, and no murmur noted.  GI: Soft, non-distended, non-tender, normal bowel sounds.  Skin: No rashes, no cyanosis, no edema.  Musculoskeletal: No joint swelling, erythema or tenderness.  Neurologic: Cranial nerves 2-12 intact, normal strength and sensation.  Psychiatric: Alert, oriented to person, place and time, no obvious anxiety or depression.    Medications     - MEDICATION INSTRUCTIONS -         aspirin  325 mg Oral Daily     atorvastatin  40 mg Oral Daily     vitamin B-12  250 mcg Oral Daily     famotidine  20 mg Oral BID     ferrous sulfate  325 mg Oral Daily     hydroxyurea  500 mg Oral Daily     metoprolol succinate ER  100 mg Oral BID     multivitamin w/minerals  1 tablet Oral Daily     potassium chloride ER  20 mEq Oral Daily     senna-docusate  2 tablet Oral BID     sodium chloride (PF)  3 mL Intracatheter Q8H       Data   Recent Labs   Lab 02/22/20  0835 02/21/20  0736 02/20/20  0733 02/19/20  1206  02/16/20  0553 02/16/20  0011 02/15/20  1936 02/15/20  1612   WBC  --   --  18.2* 19.1*  --  24.9*  --   --  27.4*   HGB  --   --  10.2* 10.1*  --  10.0*  --   --  10.3*   MCV  --   --  98 98  --  96  --   --  98   PLT  --   --  281 257  --  307  --   --  329   INR  --   --   --   --   --   --   --   --  1.16*   NA  --  139 138  --   --  135  --   --  138   POTASSIUM  --  4.5 4.3  --   --  4.6  --   --  4.5   CHLORIDE  --  107 108  --   --  103  --   --  104   CO2  --  29 27  --   --  27  --   --  27   BUN  --  24 25  --   --  22  --   --  22   CR 1.19 1.22 1.13  --    < > 1.16  --   --  1.13   ANIONGAP  --  3 3  --    --  5  --   --  7   MINNIE  --  9.3 9.1  --   --  8.9  --   --  8.7   GLC  --  100* 101*  --   --  107*  --   --  112*   ALBUMIN  --   --   --   --   --   --   --   --  3.0*   PROTTOTAL  --   --   --   --   --   --  7.2  --  7.2   BILITOTAL  --   --   --   --   --   --   --   --  0.8   ALKPHOS  --   --   --   --   --   --   --   --  105   ALT  --   --   --   --   --   --   --   --  28   AST  --   --   --   --   --   --   --   --  34   TROPI  --   --   --   --   --   --   --  0.086* 0.071*    < > = values in this interval not displayed.       No results found for this or any previous visit (from the past 24 hour(s)).

## 2020-02-23 ENCOUNTER — APPOINTMENT (OUTPATIENT)
Dept: OCCUPATIONAL THERAPY | Facility: CLINIC | Age: 70
DRG: 187 | End: 2020-02-23
Attending: INTERNAL MEDICINE
Payer: COMMERCIAL

## 2020-02-23 ENCOUNTER — APPOINTMENT (OUTPATIENT)
Dept: PHYSICAL THERAPY | Facility: CLINIC | Age: 70
DRG: 187 | End: 2020-02-23
Attending: INTERNAL MEDICINE
Payer: COMMERCIAL

## 2020-02-23 LAB
CREAT SERPL-MCNC: 1.1 MG/DL (ref 0.66–1.25)
GFR SERPL CREATININE-BSD FRML MDRD: 68 ML/MIN/{1.73_M2}

## 2020-02-23 PROCEDURE — 25000132 ZZH RX MED GY IP 250 OP 250 PS 637: Performed by: HOSPITALIST

## 2020-02-23 PROCEDURE — 36415 COLL VENOUS BLD VENIPUNCTURE: CPT | Performed by: INTERNAL MEDICINE

## 2020-02-23 PROCEDURE — 97110 THERAPEUTIC EXERCISES: CPT | Mod: GP | Performed by: PHYSICAL THERAPIST

## 2020-02-23 PROCEDURE — 82565 ASSAY OF CREATININE: CPT | Performed by: INTERNAL MEDICINE

## 2020-02-23 PROCEDURE — 99232 SBSQ HOSP IP/OBS MODERATE 35: CPT | Performed by: INTERNAL MEDICINE

## 2020-02-23 PROCEDURE — 12000000 ZZH R&B MED SURG/OB

## 2020-02-23 PROCEDURE — 97535 SELF CARE MNGMENT TRAINING: CPT | Mod: GO | Performed by: OCCUPATIONAL THERAPIST

## 2020-02-23 PROCEDURE — 25000132 ZZH RX MED GY IP 250 OP 250 PS 637: Performed by: INTERNAL MEDICINE

## 2020-02-23 PROCEDURE — 97110 THERAPEUTIC EXERCISES: CPT | Mod: GO | Performed by: OCCUPATIONAL THERAPIST

## 2020-02-23 PROCEDURE — 97140 MANUAL THERAPY 1/> REGIONS: CPT | Mod: GP | Performed by: PHYSICAL THERAPIST

## 2020-02-23 PROCEDURE — 97530 THERAPEUTIC ACTIVITIES: CPT | Mod: GP | Performed by: PHYSICAL THERAPIST

## 2020-02-23 RX ADMIN — CYANOCOBALAMIN TAB 500 MCG 250 MCG: 500 TAB at 08:09

## 2020-02-23 RX ADMIN — METOPROLOL SUCCINATE 100 MG: 100 TABLET, EXTENDED RELEASE ORAL at 08:09

## 2020-02-23 RX ADMIN — FERROUS SULFATE TAB 325 MG (65 MG ELEMENTAL FE) 325 MG: 325 (65 FE) TAB at 08:09

## 2020-02-23 RX ADMIN — SENNOSIDES AND DOCUSATE SODIUM 2 TABLET: 8.6; 5 TABLET ORAL at 08:09

## 2020-02-23 RX ADMIN — MULTIPLE VITAMINS W/ MINERALS TAB 1 TABLET: TAB at 08:21

## 2020-02-23 RX ADMIN — ACETAMINOPHEN 650 MG: 325 TABLET, FILM COATED ORAL at 08:21

## 2020-02-23 RX ADMIN — METOPROLOL SUCCINATE 100 MG: 100 TABLET, EXTENDED RELEASE ORAL at 20:15

## 2020-02-23 RX ADMIN — ACETAMINOPHEN 650 MG: 325 TABLET, FILM COATED ORAL at 20:15

## 2020-02-23 RX ADMIN — ATORVASTATIN CALCIUM 40 MG: 40 TABLET, FILM COATED ORAL at 08:08

## 2020-02-23 RX ADMIN — SENNOSIDES AND DOCUSATE SODIUM 2 TABLET: 8.6; 5 TABLET ORAL at 20:15

## 2020-02-23 RX ADMIN — ASPIRIN 325 MG ORAL TABLET 325 MG: 325 PILL ORAL at 08:09

## 2020-02-23 RX ADMIN — HYDROXYUREA 500 MG: 500 CAPSULE ORAL at 08:09

## 2020-02-23 RX ADMIN — FAMOTIDINE 20 MG: 20 TABLET, FILM COATED ORAL at 08:09

## 2020-02-23 RX ADMIN — POTASSIUM CHLORIDE 20 MEQ: 1500 TABLET, EXTENDED RELEASE ORAL at 08:09

## 2020-02-23 RX ADMIN — FAMOTIDINE 20 MG: 20 TABLET, FILM COATED ORAL at 20:15

## 2020-02-23 ASSESSMENT — MIFFLIN-ST. JEOR: SCORE: 1529.5

## 2020-02-23 ASSESSMENT — ACTIVITIES OF DAILY LIVING (ADL)
ADLS_ACUITY_SCORE: 16
ADLS_ACUITY_SCORE: 14
ADLS_ACUITY_SCORE: 16

## 2020-02-23 NOTE — PLAN OF CARE
Pt up in halls with assist of one with walker and gait belt, complains of some leg pain but denies the need for pain medications, lymph wraps to legs and foot wound care done per orders, tolerates diet, incisions clean dry and intact, good urine output, did have BM this shift,

## 2020-02-23 NOTE — PROGRESS NOTES
[facility]    Hospitalist Progress Note      Assessment & Plan   Renny Gannon is a 69 year old male who underwent a coronary artery bypass grafting x 3 pm 1/31/2020 and presented on 2/16/2020 with shortness of breath. He was found to have a moderate exudative left pleural effusion s/p thoracentesis on 2/17/2020.     # Exudative Left Pleural Effusion s/p Thoracentesis on 2/17/2020 with 1L dark red/blood-tinged fluid removed, currently stable on room air  2/17/2020 Pleural Fluid Cx NGTD  Creat 1.10 today  - appreciate CT Surgery assistance  - BLE lymph edema wraps     # CAD s/p recent CABG x3 on 1/31/2020  - appreciate CT Surgery assistance  - cont ASA 325mg PO daily  - cont Atorvastatin 40mg PO daily  - cont Metoprolol 100mg PO BID  - outpt f/u with CT Surgery     # Positive Blood Cultures likely 2/2 Contaminant  1 out of 2 bottles positive Micrococcus  Afebrile, non-tachycardic  Procalcitonin negative  Chronic leukocytosis  - appreciate Infectious Disease assistance  - Vancomycin discontinued on 2/19     # MPD  - cont Hydroxyurea 500mg PO daily     # FEN  - cardiac diet     # Dispo  - patient is medically stable for discharge to TCU     DVT Prophylaxis: Pneumatic Compression Devices  Code Status: Full Code  Expected discharge: tomorrow, recommended to transitional care unit once placement is available.    Yao Garcia MD  Text page (7am - 6pm)    Interval History   - feels well overall, breathing is comfortable  - 10+ ROS otherwise negative    -Data reviewed today: I reviewed all new labs and imaging results over the last 24 hours.    Physical Exam   Temp: 97.2  F (36.2  C) Temp src: Axillary BP: 116/59 Pulse: 84 Heart Rate: 76 Resp: 16 SpO2: 95 % O2 Device: None (Room air)    Vitals:    02/21/20 0504 02/22/20 0500 02/23/20 0500   Weight: 80.3 kg (177 lb 0.5 oz) 79.8 kg (175 lb 14.8 oz) 79.5 kg (175 lb 4.3 oz)     Vital Signs with Ranges  Temp:  [97.2  F (36.2  C)-97.7  F (36.5  C)] 97.2  F (36.2   C)  Pulse:  [81-84] 84  Heart Rate:  [76-82] 76  Resp:  [16-18] 16  BP: (107-116)/(59-68) 116/59  SpO2:  [93 %-95 %] 95 %  I/O last 3 completed shifts:  In: 1440 [P.O.:1440]  Out: 1025 [Urine:1025]    Constitutional: Awake, alert, cooperative, no apparent distress.  Respiratory: Clear to auscultation bilaterally, no crackles or wheezing.  Cardiovascular: Regular rate and rhythm, normal S1 and S2, and no murmur noted.  GI: Soft, non-distended, non-tender, normal bowel sounds.  Skin: No rashes, no cyanosis, no edema.  Musculoskeletal: No joint swelling, erythema or tenderness.  Neurologic: Cranial nerves 2-12 intact, normal strength and sensation.  Psychiatric: Alert, oriented to person, place and time, no obvious anxiety or depression.      Medications     - MEDICATION INSTRUCTIONS -         aspirin  325 mg Oral Daily     atorvastatin  40 mg Oral Daily     vitamin B-12  250 mcg Oral Daily     famotidine  20 mg Oral BID     ferrous sulfate  325 mg Oral Daily     hydroxyurea  500 mg Oral Daily     metoprolol succinate ER  100 mg Oral BID     multivitamin w/minerals  1 tablet Oral Daily     potassium chloride ER  20 mEq Oral Daily     senna-docusate  2 tablet Oral BID     sodium chloride (PF)  3 mL Intracatheter Q8H       Data   Recent Labs   Lab 02/23/20  0728 02/22/20  0835 02/21/20  0736 02/20/20  0733 02/19/20  1206   WBC  --   --   --  18.2* 19.1*   HGB  --   --   --  10.2* 10.1*   MCV  --   --   --  98 98   PLT  --   --   --  281 257   NA  --   --  139 138  --    POTASSIUM  --   --  4.5 4.3  --    CHLORIDE  --   --  107 108  --    CO2  --   --  29 27  --    BUN  --   --  24 25  --    CR 1.10 1.19 1.22 1.13  --    ANIONGAP  --   --  3 3  --    MINNIE  --   --  9.3 9.1  --    GLC  --   --  100* 101*  --        No results found for this or any previous visit (from the past 24 hour(s)).

## 2020-02-23 NOTE — PLAN OF CARE
Lymphadema Discharge Summary    Reason for therapy discharge:    Lymphadema to be managed by nursing.      Progress towards therapy goal(s). See goals on Care Plan in Epic electronic health record for goal details.  Lymphadema goals met.     Therapy recommendation(s):    Continued physical therapy needed for functional mobility to progress patients independence with bed mobility and transfers while following sternal precautions and with amb as patient currently needing a walker for balance and amb without AD prior to CABG.

## 2020-02-23 NOTE — PLAN OF CARE
A+O VSS on room air. LS diminished. Tele NSR. Bowels active, flatus+. Voiding adequately. Past surgical incisions scabbed ACE wraps to BLE. Tylenol for pain. Up SBA.

## 2020-02-23 NOTE — PROGRESS NOTES
SW:    I: DANA following for discharge planning. Peer to peer review was completed by Suzy Chacon PA-C with [facility] yesterday, 2/22. [facility] ([phone], option [phone]) not willing to yet give auth, desire updated therapy note for Monday 2/24 review. Determination will be made Monday, Suzy to follow-up.    DANA to connect with [facility]-admissions asap 2/24 to discuss the need for admissions to fax into [facility] the updated therapy notes, as they have access to prior auth submission. DANA did leave a message for Scotty [phone] with this update and request to send updated notes asap Monday morning.    P: DANA to follow. [facility] vs Home with Home care pending [facility]'s decision on 2/24.    NICANOR Gonzalez, [facility]  Daytime (8:00am-4:30pm): [phone]  After-Hours SW Pager (4:30pm-11:30pm): [phone]

## 2020-02-23 NOTE — PLAN OF CARE
"Discharge Planner OT   Patient plan for discharge: rehab prior to home  Current status: Pt in bed, willing to participate.  Remembered he had to \"watch out\" for something with his arms when asked, but did not know specifics.  Reviewed sternal precautions, able to use them to come up to EOB.  Used walker ambulate 200 feet in [place].  Ending /64, HR 75.  Also used walker to get into bathroom, stood to urinate, and then shaved at the sink with Min verbal reminders for safety to avoid cutting himself.  Fatigued by end of session, used Min verbal cues to use sternal precautions to return to bed.  Scored at the mild neurocognitive disorder level when redid the SLUMS.  This is an improvement from his score on 2/9 but still indicates that he needs supervision to consistently engage in sternal precuations, wound care needs, and safe completion of IADLS such as finances, medication management, and shopping.  Pt stated he would need help for any rides and could not leave home on his own at this time.  Stated the sister where he was staying prior to this admission would have difficulty having him home as her spouse is having medical issues himself.  Could try his other sister, would need to do a flight of stairs at her house to get to a bedroom and shower.  Barriers to return to prior living situation: Stairs, decreased endurance, unknown amount of assistance family is able to provide, especially for longer time periods.  Recommendations for discharge: TCU  Rationale for recommendations: Pt is improving but still needs regular reminders and supervision to consistently use sternal precautions, manage foot and wound care, and complete IADLs.  Not able to leave his house without help or transportation.  Is getting stronger but still was fatigued by end of session, indicating pt would need to use EC techniques to do these tasks most efficiently.  Cognitive screen score indicates pt would have increased difficulty safely " managing more complex tasks or situations that change from a routine.  This could include tasks such as medication management, cooking, financial management, more complex wound care routines, and shopping.  If pt is not approved to go to TCU would recommend home with home care services including home OT for safety routines and following precautions, energy conservation techniques and adaption of home for safety.  Recommend regular supervision by family or home care personnel at this time if pt were to go home.       Entered by: Chavez Mccormick 02/23/2020 1:27 PM

## 2020-02-23 NOTE — PLAN OF CARE
Discharge Planner PT   Patient plan for discharge: TCU, He reports if he can't go to TCU then he would have to go to another sisters house. She has about 7 steps to get to the main level.   Current status: The dorsum of patients feet and inferior to bilateral malleoli (left LE > right) continue to be edematous but fairly normal contours of ankles. Bilateral lower legs above the ankle with only trace edema. Bilateral LE's wrapped with 10cm short stretch bandages taking care to maintain proper tension and spacing from toes to just above the ankle. Patient left sitting up in chair with LE's elevated on leg rest and then a pillow under LE's to assist in decreasing edema. Nursing to continue with lymphadema wraps starting tomorrow and order placed in computer. Instructions hung beside the patients white board.   Patient unable to maintain sternal precautions with sup to sit without min assist of 1. Attempted times 2 but has to push with left UE unless given min assist. Patient able to transfer sit to stand with supervision from the bed but needs min assist from an 18 inch chair in order to follow sternal precautions. Patient able to go up a flight of steps but pulls with his left arm and needed min assist for eccentric lowering going down steps.   OT/CR to continue with bed mobility, sternal precautions education, transfers. Lymphadema to be managed by nursing.  PT to address balance without an AD as patient was independent with prior to CABG surgery.   Barriers to return to prior living situation: Needs min assist for sup to sit and for sit to stand from standard 18 inch surfaces, Needs to pull with arms too much to maintain precautions when going up steps and needs min assist going down steps. Currently needing AD for balance.   Recommendations for discharge: TCU  Rationale for recommendations: Patient currently needs min assist for bed mobility and sit to stand from standard height surfaces in order to maintain  precautions and he does not have this assist at home. If he goes to his sisters home he has steps and currently he needs to use his arms too much to maintain precautions on stairs and needs min assist going down stairs.        Entered by: Moira Tan 02/23/2020 5:45 PM

## 2020-02-24 ENCOUNTER — APPOINTMENT (OUTPATIENT)
Dept: PHYSICAL THERAPY | Facility: CLINIC | Age: 70
DRG: 187 | End: 2020-02-24
Attending: PHYSICIAN ASSISTANT
Payer: COMMERCIAL

## 2020-02-24 ENCOUNTER — TELEPHONE (OUTPATIENT)
Dept: GERIATRICS | Facility: CLINIC | Age: 70
End: 2020-02-24

## 2020-02-24 VITALS
RESPIRATION RATE: 16 BRPM | OXYGEN SATURATION: 91 % | TEMPERATURE: 97.4 F | SYSTOLIC BLOOD PRESSURE: 105 MMHG | HEART RATE: 87 BPM | BODY MASS INDEX: 26.3 KG/M2 | HEIGHT: 68 IN | DIASTOLIC BLOOD PRESSURE: 64 MMHG | WEIGHT: 173.5 LBS

## 2020-02-24 DIAGNOSIS — J90 EXUDATIVE PLEURAL EFFUSION: Primary | ICD-10-CM

## 2020-02-24 LAB
CREAT SERPL-MCNC: 1.17 MG/DL (ref 0.66–1.25)
GFR SERPL CREATININE-BSD FRML MDRD: 63 ML/MIN/{1.73_M2}

## 2020-02-24 PROCEDURE — 25000132 ZZH RX MED GY IP 250 OP 250 PS 637: Performed by: HOSPITALIST

## 2020-02-24 PROCEDURE — 97750 PHYSICAL PERFORMANCE TEST: CPT | Mod: GP | Performed by: PHYSICAL THERAPIST

## 2020-02-24 PROCEDURE — G0463 HOSPITAL OUTPT CLINIC VISIT: HCPCS

## 2020-02-24 PROCEDURE — 82565 ASSAY OF CREATININE: CPT | Performed by: INTERNAL MEDICINE

## 2020-02-24 PROCEDURE — 99239 HOSP IP/OBS DSCHRG MGMT >30: CPT | Performed by: INTERNAL MEDICINE

## 2020-02-24 PROCEDURE — 97530 THERAPEUTIC ACTIVITIES: CPT | Mod: GP | Performed by: PHYSICAL THERAPIST

## 2020-02-24 PROCEDURE — 36415 COLL VENOUS BLD VENIPUNCTURE: CPT | Performed by: INTERNAL MEDICINE

## 2020-02-24 PROCEDURE — 25000132 ZZH RX MED GY IP 250 OP 250 PS 637: Performed by: INTERNAL MEDICINE

## 2020-02-24 RX ORDER — HYDROCODONE BITARTRATE AND ACETAMINOPHEN 5; 325 MG/1; MG/1
1 TABLET ORAL EVERY 4 HOURS PRN
Qty: 25 TABLET | Refills: 0 | Status: SHIPPED | OUTPATIENT
Start: 2020-02-24 | End: 2020-03-04

## 2020-02-24 RX ADMIN — POTASSIUM CHLORIDE 20 MEQ: 1500 TABLET, EXTENDED RELEASE ORAL at 08:15

## 2020-02-24 RX ADMIN — FAMOTIDINE 20 MG: 20 TABLET, FILM COATED ORAL at 08:15

## 2020-02-24 RX ADMIN — ASPIRIN 325 MG ORAL TABLET 325 MG: 325 PILL ORAL at 08:15

## 2020-02-24 RX ADMIN — HYDROXYUREA 500 MG: 500 CAPSULE ORAL at 08:15

## 2020-02-24 RX ADMIN — CYANOCOBALAMIN TAB 500 MCG 250 MCG: 500 TAB at 08:15

## 2020-02-24 RX ADMIN — ACETAMINOPHEN 650 MG: 325 TABLET, FILM COATED ORAL at 00:26

## 2020-02-24 RX ADMIN — ATORVASTATIN CALCIUM 40 MG: 40 TABLET, FILM COATED ORAL at 08:15

## 2020-02-24 RX ADMIN — FERROUS SULFATE TAB 325 MG (65 MG ELEMENTAL FE) 325 MG: 325 (65 FE) TAB at 08:15

## 2020-02-24 RX ADMIN — MULTIPLE VITAMINS W/ MINERALS TAB 1 TABLET: TAB at 08:15

## 2020-02-24 RX ADMIN — METOPROLOL SUCCINATE 100 MG: 100 TABLET, EXTENDED RELEASE ORAL at 08:15

## 2020-02-24 RX ADMIN — ACETAMINOPHEN 650 MG: 325 TABLET, FILM COATED ORAL at 06:53

## 2020-02-24 ASSESSMENT — ACTIVITIES OF DAILY LIVING (ADL)
ADLS_ACUITY_SCORE: 14

## 2020-02-24 ASSESSMENT — MIFFLIN-ST. JEOR: SCORE: 1521.5

## 2020-02-24 NOTE — PLAN OF CARE
Pt up in halls with assist of one and walker, tolerates diet, tylenol given for foot pain, foot wound dressing change done per plan of care, lymph wraps applied to feet and ankles, lungs diminished, O2 sats min 90's on room air, good urine output, passing flatus, did have BM today

## 2020-02-24 NOTE — PLAN OF CARE
A&O. VSS. Lung sounds clear. Adequate urine output, Previous sternal incision LOIDA. Left back incision LOIDA. BLE lymphedema wraps in place. Ambulates assist x1. Tolerating heart healthy diet. Denies pain. Discharge instructions given & questions answered. Pt. Discharge to TCU with sister.

## 2020-02-24 NOTE — PLAN OF CARE
Discharge Planner PT   Patient plan for discharge: TCU    Current status: Continues to require Min A for sup>sit in order to adhere to sternal precautions-bed flat, no rail, use of pillow for sternal brace. Completed DGI balance assessment with a score of 16/24, indicating higher fall risk. Pt comes almost to a complete stop in order to turn and look as well as navigate obstacles in the hallway. He is able to state sternal precautions with clues, but does not demonstrate with mobility, significant use of the railing with stairs. Up in recliner, LEs elevated all needs in reach.    Barriers to return to prior living situation: Lives alone, continues to requires Min A for bed mobility and transfers in order to adhere to sternal precautions. Currently needing AD for balance, score from DGI indicates increased fall risk.  If to discharge to sister's home would need to do stairs regularly, Min A with significant use of the arms on stairs.    Recommendations for discharge: TCU    Rationale for recommendations: Pt will benefit from continued skilled rehab services to progress strength and balance for independence and safety with functional mobility, provide skilled monitoring of cardiovascular response with exercise/activity tolerance as well as continue education for optimal heart health.       Entered by: Ada Paula 02/24/2020 9:39 AM     Physical Therapy Discharge Summary    Reason for therapy discharge:    Discharged to transitional care facility.    Progress towards therapy goal(s). See goals on Care Plan in [facility] electronic health record for goal details.  Goals partially met.  Barriers to achieving goals:   discharge from facility.    Therapy recommendation(s):    Continued therapy is recommended.  Rationale/Recommendations:  See above.

## 2020-02-24 NOTE — PROGRESS NOTES
SW:  D:  DANA following for discharge planning.  Call placed to [facility] to check insurance authorization and spoke with Scotty in admissions.  Per Scotty, he faxed over therapy notes this morning to [employer] and will call back once they get approval.     Addendum:    SW:  D:  Received call back from admissions stating they got [employer] authorization.  Patient will plan on going to [facility] on discharge.    P:  Will continue to follow.    DANA Ramos Student  [phone]  [facility]

## 2020-02-24 NOTE — PROGRESS NOTES
Dynamic Gait Index (DGI):The DGI is a measure of balance during gait that is reliable and valid for the elderly and individuals with Parkinson's disease, MS, vestibular disorders, or s/p stroke. Gait assistive device used: None    Patient score: 16/24  Scores ?19/24 indicate an increased risk for falls according to Leslie et al 2000  Minimal Detectable Change = 2.9 in community dwelling elderly according to Dennis et al 2011    Assessment (rationale for performing, application to patient s function & care plan): Care planning assist, continued rehab focus, need of AD, objective measure reference.  Minutes billed as physical performance test: 15       02/24/20 0999   Signing Clinician's Name / Credentials   Signing clinician's name / credentials Ada Paula PT,DPT   Dynamic Gait Index (Mp and Fallon Faywood, 1995)   Gait Level Surface 2   Change in Gait Speed 2   Gait and Horizontal Head Turns 2   Gait with Vertical Head Turns 2   Gait and Pivot Turns 2   Step Over Obstacle 2   Step Around Obstacles 2   Steps 2   Total Dynamic Gait Index Score  (A score of 19 or less has been correlated to an increased risk of falls in community dwelling older adults, patients with vestibular disorders, and patients with MS.)   Total Score (out of 24) 16

## 2020-02-24 NOTE — PROGRESS NOTES
SW:  Discharge Planner   Discharge Plans in progress: [facility] TCU  Barriers to discharge plan: none identified  Follow up plan: DANA spoke to Virgil in adm's who confirmed [employer] ins auth obtained. Patient needs to arrive by 1500.       Entered by: Marta Goode 02/24/2020 10:22 AM       DC orders: sent via DOD at 1117  Scripts: none in chart  PAS: completed  Trans: sister to transport at 1230

## 2020-02-24 NOTE — PLAN OF CARE
A+O VSS on room air. LS diminished. Tele NSR. Bowels active, flatus+. Voiding adequately. Past surgical incisions scabbed. ACE wraps to BLE. Foot wound dressings CDI. Tylenol x2 for pain. Up SBA w/ walker.

## 2020-02-24 NOTE — PROGRESS NOTES
"  [facility] Nurse Inpatient Wound Assessment   Follow up Assessment of wound(s) on pt's:   Buttocks and bilateral feet    [facility] NURSE ASSESSMENT      Medial gluteal cleft with linear area of very thin, lifting serous scabs, right side completely healed.  No dressings necessary, discussed with pt that it is really important he repositions himself, he needs to take control of repositioning and helping himself to help heal.  Tissue still a bit too dry so needs a good balance of moisture, adding a bit of lotion as able.  There had been a total of 3 areas of full thickness, community acquired pressure injuries.     Bilateral toes/ dorsal toes with blisters both intact and popped.  The blisters present are now more dry, overlying tissue dry, healing, wound on dorsal right foot is now nicely defined, minimal erythema.  Will continue with current plan of compression wraps, silver dressings and Plurogel, see plan below  [facility] NURSE TREATMENT PLAN    Plan of care for wound located on buttocks- effective now  - use Caterina Cleanse and Protect for perineal cares, will help to moisturize the buttocks, too  - assure pt is sitting on a pressure relieving cushion (#[id number]) and does not sit for longer than 1-2 hours total at a time.  -pt should reposition every 2 hours, side to side only, when in bed    Plan of care for bilateral feet- daily  PERFORM \"GOOD FOOT CARE\" AT ALL TIMES:  1. CLEAN feet daily with a gentle soap and water, making sure to clean between the toes. Dry thoroughly, especially between the toes. Be careful when drying between toes to not use a sawing-action, this may cut the skin between the toes.   2. Spray the skin from toes to knees, not between the toes, with a thick layer of Caterina Cleanse and Protect, massage into skin and allow sit on skin for 5-10 minutes then wipe or rinse off (the Caterina will \"condition\" the skin by loosening crusty debris, moisturizing and cleaning the skin)  3. LOTION from toes to knees, not between " toes (lotion moisturizes and too much moisture between toes may cause a fungal rash).    4.  WOUND CARE TO TOES- daily  - drape wounds/ blisters with kerlix fluffs, running gauze between toes, wet with Vashe (#[id number]) and let soak into the wounds x 5-10 minutes, wipe clean and dry to dry  - apply No Sting Skin Prep to all periwound tissue and across the intact blisters (note there are blisters on the top and bottom of toes), dry  - weave strips of dry Aquacel Ag between toes  - apply smear of Plurogel to each open wound  - cover toe wounds with another piece of dry AquacelAg  - manage drainage with wide strip of the blue Covidien Pad, cut to wrap around toes  - secure with belkis or kerlix  -time and date dressing change  5.  If noticing moisture, itching or odor between the toes dust with antifungal powder, think Desenex, twice a day x 4-5 days.    6.  Keep nails trimmed and filed to minimize snagging, this could create a wound.  Follow up with podiatry to trim nails as needed.  7. Clean SOCKS every day.  8. SHOES:  Wear hard soled shoes at all times to minimize bumping feet and/or stepping on something.  This includes wearing hard soled slippers around the house.  No flip flops or open toed shoes in the summer, etc.  You need to protect your toes and feet. And if shoes do not fit then DO NOT WEAR THEM  8  Are you using COMPRESSION WRAPS?  Assure you follow up for regular fittings.    Note- all wraps and stockings wear out.  Some after only a few weeks.          Nursing to notify Provider(s) and re-consult the WO Nurse if wound(s) deteriorate or new skin concerns    [facility] Nurse follow-up plan: weekly  OBJECTIVE DATA    Patient history according to provider notes: 69-year old gentleman with a PMH of HTN, former smoker, recently diagnosed myelofibrosis on hydroxyurea and severe 3-vessel coronary artery disease s/p coronary artery bypass grafting on 1/31/2020 by Dr. Mable Barrera. He did have a code stroke called  postoperatively with noted facial droop which resolved and CT head was negative for acute pathology. He was discharged to home on diuretics on 2/10/2020 with home therapy as insurance refused authorization for transitional care placement as recommended. He is staying with his sister for now as he lives alone. Since discharge, he has had two emergency department visits, the last being yesterday (2/15) where he as admitted for ongoing shortness of breath and BLE edema. Primary care provider recently increased lasix dosing and weights have been improving but he has continued dyspnea at rest with associated orthopnea. Denies fever, chills, myalgias, dysuria, productive cough, dizziness, syncope or near syncope, palpitations or chest pain. Of note, he has a persistent left pleural effusion.  # Severe 3-vessel coronary artery disease s/p CABG x3 on 1/31/2020.  # HTN.     Andrea Risk Assessment  Sensory Perception: 4-->no impairment    Moisture: 4-->rarely moist   Activity: 3-->walks occasionally     Mobility: 3-->slightly limited   Nutrition: 3-->adequate   Friction and Shear: 3-->no apparent problem      Positioning: Pillows,     Mattress:  Standard , Atmos Air mattress    Current diet  Orders Placed This Encounter      Combination Diet Low Saturated Fat Na <2400mg Diet; No Caffeine for 24 hours (once tests completed, may have caffeine)      Advance Diet as Tolerated      Moisture Management:  continent  o Catheter secured? Not applicable     I/O last 3 completed shifts:  In: 960 [P.O.:960]  Out: 2125 [Urine:2125]    Labs:   Recent Labs   Lab Test 02/16/20  0553 02/15/20  1612  01/09/20  0926   ALBUMIN  --  3.0*   < > 4.1   HGB 10.0* 10.3*   < > 12.1*   INR  --  1.16*   < > 1.15*   WBC 24.9* 27.4*   < > 90.0*   A1C  --   --   --  5.6   CRP 19.1* 24.2*  --   --     < > = values in this interval not displayed.         C NURSE PHYSICAL EXAM    Wound Assessment (location):   Fleshy buttocks  Wound History:  Buttocks are  dry with several areas of light flaking of sloughy devitalized tissue  Date of last photo feb 17, 2020      Left buttock with a linear area of 3cm x 0.5cm of thin, dry, lifting scabs, no drainage, no moisture, no erythema.  Tissue overall very dry and flaking.  Right buttock, etc intact. No pain    Assessment of bilateral feet/ toes  Pt had open heart surgery 01/31/2020 and pt states he developed fluid and blisters on his toes following the surgery.  Prior to admission and at the time of my first assessment pt was using ACE wraps for compression  Feb 17, 2020        Bilateral LE with +1 edema  Right foot- dorsal blister has popped down to moist, pale pink dermal/ granulation tissue, a little painful with touch, moderate serous drainage, 2.8cm x 3.4cm x 0.2cm.  Numerous areas of serousanginous intact blisters weaving through the toes, no drianage  Left foot- the large blisters that had been located along the toes-dorsal foot have all popped, moist healing tissue, pale pink, moderate drainage.  Again, through the toes, between the toes, are numerous intact serosanguineous  blisters, more dry and flat today    [facility] NURSE INTERVENTIONS    Assessed buttocks and bilateral LE/ feet as noted above  Wound Care: assessed  Supplies: reviewed, discussed with RN and discussed with patient  Discussed plan of care with Patient and Nurse  Education provided: importance of repositioning, wound progress, Infection prevention , Moisture management, Hygiene, Nail care and Off-loading pressure    Purvi Tiwari RN

## 2020-02-24 NOTE — DISCHARGE SUMMARY
[facility]    Discharge Summary  Hospitalist    Date of Admission:  2/15/2020  Date of Discharge:  2/24/2020  Discharging Provider: Yao Garcia MD  Date of Service (when I saw the patient): 02/24/20    Discharge Diagnoses   Exudative Left Pleural Effusion    History of Present Illness   Renny Gannon is a 69 year old male who underwent a coronary artery bypass grafting x 3 pm 1/31/2020 and presented on 2/16/2020 with shortness of breath. He was found to have a moderate exudative left pleural effusion s/p thoracentesis on 2/17/2020.    Hospital Course   Renny Gannon was admitted on 2/15/2020.  The following problems were addressed during his hospitalization:    # Exudative Left Pleural Effusion  - CT Surgery was consulted given the patient's recent 3x CABG. An ultrasound guided thoracentesis was performed on 2/17/2020 which removed 950cc of blood fluid. Post-procedure the patient recovered well. At the time of discharge he was stable on room air and ambulating without respiratory symptoms.    # CAD s/p recent 3x CABG on 1/31/2020  - CT Surgery was consulted. The patient was continued on ASA, Atorvastatin, and Metoprolol.    Yao Garcia MD    Significant Results and Procedures       Pending Results   These results will be followed up by   Unresulted Labs Ordered in the Past 30 Days of this Admission     Date and Time Order Name Status Description    1/31/2020 1705 Plasma prepare order unit In process           Code Status   Full Code       Primary Care Physician   Angus Clements Mai    Constitutional: Awake, alert, cooperative, no apparent distress.  Respiratory: Clear to auscultation bilaterally, no crackles or wheezing.  Cardiovascular: Regular rate and rhythm, normal S1 and S2, and no murmur noted.  GI: Soft, non-distended, non-tender, normal bowel sounds.  Skin: No rashes, no cyanosis, no edema.  Musculoskeletal: No joint swelling, erythema or tenderness.  Neurologic: Cranial nerves 2-12 intact,  normal strength and sensation.  Psychiatric: Alert, oriented to person, place and time, no obvious anxiety or depression.    Discharge Disposition   Discharged to short-term care facility  Condition at discharge: Stable    Consultations This Hospital Stay   CARDIOLOGY IP CONSULT  WOUND OSTOMY CONTINENCE NURSE  IP CONSULT  CARDIOVASCULAR SURGERY IP CONSULT  LYMPHEDEMA THERAPY IP CONSULT  CARDIAC REHAB IP CONSULT  PHARMACY TO DOSE VANCO  CARE TRANSITION RN/SW IP CONSULT  PHYSICAL THERAPY ADULT IP CONSULT  INFECTIOUS DISEASES IP CONSULT  PHYSICAL THERAPY ADULT IP CONSULT  PHYSICAL THERAPY ADULT IP CONSULT  OCCUPATIONAL THERAPY ADULT IP CONSULT  SMOKING CESSATION PROGRAM IP CONSULT    Time Spent on this Encounter   I, Yao Garcia MD, personally saw the patient today and spent greater than 30 minutes discharging this patient.    Discharge Orders      General info for SNF    Length of Stay Estimate: Short Term Care: Estimated # of Days <30  Condition at Discharge: Stable  Level of care:skilled   Rehabilitation Potential: Fair  Admission H&P remains valid and up-to-date: Yes  Recent Chemotherapy: N/A  Use Nursing Home Standing Orders: Yes     Mantoux instructions    Give two-step Mantoux (PPD) Per Facility Policy Yes     Reason for your hospital stay    69M with recent coronary artery bypass grafting x3 on 1/31/2020 who presented on 2/16/2020 with SOB. Found to have moderate left pleural effusion s/p thoracentesis on 2/17/2020 that removed 1 liter of dark red, blood-tinged fluid. Patient has remained stable post-thora, on room air, ambulating without symptoms.     Follow Up and recommended labs and tests    Follow up with Nursing home physician.  No follow up labs or test are needed.     Activity - Up ad conchita     Physical Therapy Adult Consult    Evaluate and treat as clinically indicated.    Reason:  Strength and conditioning     Occupational Therapy Adult Consult    Evaluate and treat as clinically indicated.    Reason:   Strength and conditioning     Advance Diet as Tolerated    Follow this diet upon discharge: Orders Placed This Encounter      Snacks/Supplements Adult: Other; 4oz PLUS2 drink at 10am and 2pm (RD); Between Meals      Combination Diet Low Saturated Fat Na <2400mg Diet; No Caffeine for 24 hours (once tests completed, may have caffeine)     Discharge Medications   Current Discharge Medication List      CONTINUE these medications which have NOT CHANGED    Details   aspirin (ASA) 325 MG tablet Take 1 tablet (325 mg) by mouth daily  Qty: 30 tablet, Refills: 0    Associated Diagnoses: S/P CABG (coronary artery bypass graft)      atorvastatin (LIPITOR) 40 MG tablet Take 1 tablet (40 mg) by mouth daily  Qty: 30 tablet, Refills: 11    Associated Diagnoses: Coronary artery disease involving native coronary artery of native heart with other form of angina pectoris (H); Essential hypertension      famotidine (PEPCID) 20 MG tablet Take 1 tablet (20 mg) by mouth 2 times daily  Qty: 28 tablet, Refills: 0    Associated Diagnoses: S/P CABG (coronary artery bypass graft)      ferrous sulfate (FEROSUL) 325 (65 Fe) MG tablet Take 1 tablet (325 mg) by mouth daily  Qty: 30 tablet, Refills: 0    Associated Diagnoses: S/P CABG (coronary artery bypass graft)      furosemide (LASIX) 40 MG tablet Take 1 tablet in am and 0.5 tablet at 1 pm  Qty: 45 tablet, Refills: 1    Associated Diagnoses: S/P CABG (coronary artery bypass graft)      HYDROcodone-acetaminophen (NORCO) 5-325 MG tablet Take 1 tablet by mouth every 4 hours as needed  Qty: 15 tablet, Refills: 0      hydroxyurea (HYDREA) 500 MG capsule Take 1 capsule (500 mg) by mouth daily  Qty: 90 capsule, Refills: 1    Associated Diagnoses: Myeloproliferative disorder (H)      methocarbamol (ROBAXIN) 500 MG tablet Take 1 tablet (500 mg) by mouth 4 times daily as needed for muscle spasms  Qty: 20 tablet, Refills: 0    Associated Diagnoses: S/P CABG (coronary artery bypass graft)      metoprolol  succinate ER (TOPROL-XL) 100 MG 24 hr tablet Take 1 tablet (100 mg) by mouth 2 times daily  Qty: 60 tablet, Refills: 3    Associated Diagnoses: S/P CABG (coronary artery bypass graft)      potassium chloride ER (K-DUR/KLOR-CON M) 20 MEQ CR tablet Take 1 tablet (20 mEq) by mouth daily  Qty: 30 tablet, Refills: 0    Associated Diagnoses: S/P CABG (coronary artery bypass graft)      senna-docusate (SENOKOT-S/PERICOLACE) 8.6-50 MG tablet Take 2 tablets by mouth 2 times daily  Qty: 120 tablet, Refills: 0    Associated Diagnoses: S/P CABG (coronary artery bypass graft)      vitamin B-12 (CYANOCOBALAMIN) 250 MCG tablet Take 250 mcg by mouth daily           Allergies   Allergies   Allergen Reactions     No Known Drug Allergies      Seasonal Allergies

## 2020-02-24 NOTE — PLAN OF CARE
Occupational Therapy Discharge Summary    Reason for therapy discharge:    Discharged to transitional care facility.    Progress towards therapy goal(s). See goals on Care Plan in [facility] electronic health record for goal details.  Goals partially met.  Barriers to achieving goals:   discharge from facility.    Therapy recommendation(s):    Continued therapy is recommended.  Rationale/Recommendations:  continued OT recommended in TCU setting to advance safety and indep with ADLs prior to return home.

## 2020-02-24 NOTE — TELEPHONE ENCOUNTER
New patient to [facility] and did not come with Norco script.  25 tabs sent to [facility]    Electronically signed by Kaylan De La Garza RN, CNP

## 2020-02-24 NOTE — PLAN OF CARE
Physical Therapy Discharge Summary    Reason for therapy discharge:    Discharged to transitional care facility.    Progress towards therapy goal(s). See goals on Care Plan in [facility] electronic health record for goal details.  Goals partially met.  Barriers to achieving goals:   discharge from facility.    Therapy recommendation(s):    Continued therapy is recommended.  Rationale/Recommendations:  Pt would benefit from continued therapy in TCU setting for progressing in independence and safety with functional mobility. .

## 2020-02-25 ENCOUNTER — TELEPHONE (OUTPATIENT)
Dept: CARDIOLOGY | Facility: CLINIC | Age: 70
End: 2020-02-25

## 2020-02-25 ENCOUNTER — NURSING HOME VISIT (OUTPATIENT)
Dept: GERIATRICS | Facility: CLINIC | Age: 70
End: 2020-02-25
Payer: COMMERCIAL

## 2020-02-25 VITALS
HEIGHT: 68 IN | DIASTOLIC BLOOD PRESSURE: 69 MMHG | RESPIRATION RATE: 16 BRPM | SYSTOLIC BLOOD PRESSURE: 126 MMHG | OXYGEN SATURATION: 93 % | WEIGHT: 174 LBS | TEMPERATURE: 95.6 F | BODY MASS INDEX: 26.37 KG/M2 | HEART RATE: 100 BPM

## 2020-02-25 DIAGNOSIS — I10 ESSENTIAL HYPERTENSION: ICD-10-CM

## 2020-02-25 DIAGNOSIS — K59.01 SLOW TRANSIT CONSTIPATION: ICD-10-CM

## 2020-02-25 DIAGNOSIS — R23.8 BLISTERS OF MULTIPLE SITES: ICD-10-CM

## 2020-02-25 DIAGNOSIS — D64.9 ANEMIA, UNSPECIFIED TYPE: ICD-10-CM

## 2020-02-25 DIAGNOSIS — M62.81 GENERALIZED MUSCLE WEAKNESS: ICD-10-CM

## 2020-02-25 DIAGNOSIS — Z95.1 S/P CABG (CORONARY ARTERY BYPASS GRAFT): ICD-10-CM

## 2020-02-25 DIAGNOSIS — J90 EXUDATIVE PLEURAL EFFUSION: Primary | ICD-10-CM

## 2020-02-25 DIAGNOSIS — D47.1 MYELOPROLIFERATIVE DISORDER (H): ICD-10-CM

## 2020-02-25 DIAGNOSIS — I25.810 CORONARY ARTERY DISEASE INVOLVING CORONARY BYPASS GRAFT OF NATIVE HEART WITHOUT ANGINA PECTORIS: ICD-10-CM

## 2020-02-25 PROCEDURE — 99310 SBSQ NF CARE HIGH MDM 45: CPT | Performed by: NURSE PRACTITIONER

## 2020-02-25 ASSESSMENT — MIFFLIN-ST. JEOR: SCORE: 1523.76

## 2020-02-25 NOTE — PROGRESS NOTES
[facility]  PRIMARY CARE PROVIDER AND CLINIC:  Angus Clements Mai, MD, [address]  / [place] [address]  Chief Complaint   Patient presents with     Hospital F/U     [facility] Medical Record Number:  [id number]  Place of Service where encounter took place:  [facility] [[id number]]    Renny Gannon  is a 69 year old  (1950), admitted to the above facility from  [facility]. Hospital stay 2/15/20 through 2/24/20..  Admitted to this facility for  rehab, medical management and nursing care.    HPI:    HPI information obtained from: facility chart records, facility staff, patient report and [facility] chart review.   Brief Summary of Hospital Course: On 1/31/2020, Renny underwent a CABG x3 vessel.  On 2/15/2020, he went to the [facility] ED with shortness of breath.  Was transferred down to [facility] for further management of the effusion.    He was found to have moderate exudative left pleural effusion after a thoracentesis on 2.17.2020.  950cc of blood fluid.  Did not require oxygen after thoracentesis and able to ambulate.    Updates on Status Since Skilled nursing Admission: meeting with Renny today in his room.  Laying on his bed.  No acute distress.  Only thing that gives him pain is his right foot.  Removed his sock and found resolving blisters from edema and not pressure.    Reviewed medications, goals of care and anticipated discharge planning.    CODE STATUS/ADVANCE DIRECTIVES DISCUSSION:   DNR / DNI  Patient's living condition: lives alone  ALLERGIES: No known drug allergies and Seasonal allergies  PAST MEDICAL HISTORY:  has a past medical history of Hypertension. He also has no past medical history of PONV (postoperative nausea and vomiting).  PAST SURGICAL HISTORY:   has a past surgical history that includes no history of surgery; Bone marrow biopsy, bone specimen, needle/trocar (N/A, 12/30/2019); Coronary Angiogram (Left, 1/9/2020); and  Bypass graft artery coronary (N/A, 1/31/2020).  FAMILY HISTORY: family history includes Cancer in his brother; Coronary Artery Disease in his brother; No Known Problems in his mother, sister, and sister; Unknown/Adopted in his father, maternal grandfather, maternal grandmother, paternal grandfather, and paternal grandmother.  SOCIAL HISTORY:   reports that he quit smoking about 19 years ago. His smoking use included cigarettes. He started smoking about 59 years ago. He quit after 30.00 years of use. He has never used smokeless tobacco. He reports that he does not drink alcohol or use drugs.    Post Discharge Medication Reconciliation Status: discharge medications reconciled, continue medications without change    Current Outpatient Medications   Medication Sig Dispense Refill     aspirin (ASA) 325 MG tablet Take 1 tablet (325 mg) by mouth daily 30 tablet 0     atorvastatin (LIPITOR) 40 MG tablet Take 1 tablet (40 mg) by mouth daily 30 tablet 11     famotidine (PEPCID) 20 MG tablet Take 1 tablet (20 mg) by mouth 2 times daily 28 tablet 0     ferrous sulfate (FEROSUL) 325 (65 Fe) MG tablet Take 1 tablet (325 mg) by mouth daily 30 tablet 0     furosemide (LASIX) 40 MG tablet Take 1 tablet in am and 0.5 tablet at 1 pm 45 tablet 1     HYDROcodone-acetaminophen (NORCO) 5-325 MG tablet Take 1 tablet by mouth every 4 hours as needed for pain 25 tablet 0     hydroxyurea (HYDREA) 500 MG capsule Take 1 capsule (500 mg) by mouth daily 90 capsule 1     methocarbamol (ROBAXIN) 500 MG tablet Take 1 tablet (500 mg) by mouth 4 times daily as needed for muscle spasms 20 tablet 0     metoprolol succinate ER (TOPROL-XL) 100 MG 24 hr tablet Take 1 tablet (100 mg) by mouth 2 times daily 60 tablet 3     potassium chloride ER (K-DUR/KLOR-CON M) 20 MEQ CR tablet Take 1 tablet (20 mEq) by mouth daily 30 tablet 0     senna-docusate (SENOKOT-S/PERICOLACE) 8.6-50 MG tablet Take 2 tablets by mouth 2 times daily 120 tablet 0     vitamin B-12  "(CYANOCOBALAMIN) 250 MCG tablet Take 250 mcg by mouth daily           ROS:  10 point ROS of systems including Constitutional, Eyes, Respiratory, Cardiovascular, Gastroenterology, Genitourinary, Integumentary, Musculoskeletal, Psychiatric were all negative except for pertinent positives noted in my HPI.    Vitals:  /69   Pulse 100   Temp 95.6  F (35.3  C)   Resp 16   Ht 1.727 m (5' 8\")   Wt 78.9 kg (174 lb)   SpO2 93%   BMI 26.46 kg/m    Exam:  GENERAL APPEARANCE:  Alert, in no distress, cooperative, quiet demeanor  EYES:  EOM, conjunctivae, lids, pupils and irises normal, PERRL  RESP:  respiratory effort and palpation of chest normal, lungs clear to auscultation , no respiratory distress  CV:  Palpation and auscultation of heart done , regular rate and rhythm, no murmur, rub, or gallop, no edema  ABDOMEN:  normal bowel sounds, soft, nontender, no hepatosplenomegaly or other masses, no guarding or rebound  M/S:   Gait and station abnormal uses a walker for ambulation and assist with one with transfers  SKIN:  large resolving dried blister on top of left foot.  right foot has multiple resolving blisters.  one is moist looking and has a dressing that is loose so able to pick to the side.  behind the toes at the base appears to have purple (blood) discoloration.    PSYCH:  oriented X 3, normal insight, judgement and memory, affect and mood normal, quiet    Lab/Diagnostic data:  Component      Latest Ref Rng & Units 2/22/2020   Creatinine      0.66 - 1.25 mg/dL 1.19   GFR Estimate      >60 mL/min/1.73:m2 61   GFR Estimate If Black      >60 mL/min/1.73:m2 71     Component      Latest Ref Rng & Units 2/20/2020   WBC      4.0 - 11.0 10e9/L 18.2 (H)   RBC Count      4.4 - 5.9 10e12/L 3.46 (L)   Hemoglobin      13.3 - 17.7 g/dL 10.2 (L)   Hematocrit      40.0 - 53.0 % 33.8 (L)   MCV      78 - 100 fl 98   MCH      26.5 - 33.0 pg 29.5   MCHC      31.5 - 36.5 g/dL 30.2 (L)   RDW      10.0 - 15.0 % 21.4 (H) "   Platelet Count      150 - 450 10e9/L 281       ASSESSMENT/PLAN:  Exudative pleural effusion  Generalized muscle weakness  Does not appear to need any follow up appointments except with NH doctor.  Explained to Renny that if he is here when the house MD comes in March, then he will be seen.  Otherwise he will follow up with his primary after discharge.  Goal is to attend therapies and once strong enough and health is stable, arrangements will be made for discharge.    Monitor his respiratory status for any further changes.      Coronary artery disease involving coronary bypass graft of native heart without angina pectoris  S/P CABG (coronary artery bypass graft)  - ASA 325mg daily and atorvastatin 40mg daily.    Incision on chest and 2 small ones on abdomen are scabbed and healing without signs of infection.    Has PRN Norco 325mg 1 tab every 4 hours prn and Robaxin 500mg QID prn for comfort.  No changes.      Essential hypertension  Blood pressures range from 100-120's/50-70's.    Vitals daily while here.  Remains on metoprolol 100mg twice a day.    Does not have edema but is on Lasix 20mg daily and K+ 20meq daily.    Needs orders for Lymph edema wraps that he came with.  On in AM and off in PM.    Myeloproliferative disorder (H)  Does follow oncology and has an upcoming appointment on 2/27/20  Remains on hydroxyurea 500mg daily.    Will see how his CBC is next Monday as noted his WBC runs elevated.    Anemia, unspecified type  Does take FeSO4 325mg daily as well as Vitamin B12 250mcg daily.    No changes.  Having labs next week.      Slow transit constipation  Does take Senna-S 2 tabs twice a day.  BMs monitored and use of facility standing orders is available.    Blisters of multiple sites  Orders to be written are:  Left foot blister:  Leave open to air  Right foot treatment is:  Cleanse with NS, apply non-stick dressing and cover with kerlix.  Weave intra dry in between toes to separate toes and blisters.          Orders written by provider at facility and transcribed by : Ruby Green MA  1.  CBC & BMP next Monday.  Dx: CAD, HTN  2.  Left foot blister:  Leave open to air  3.  Right foot treatment:  Cleanse areas with NS, apply non-stick dressing and cover with kerlix.  Weave intra dry in between toes to separate toes and blisters.  4.  Lymph edema wraps on in AM and off in PM - edema.    Total time spent with patient visit at the skilled nursing [facility] was 39 minutes including patient visit, review of past records and coordinating care with nursing. Greater than 50% of total time spent with counseling and coordinating care due to alteration in skin and discussing treatment with nursing, discussing his hospital stay, what his expectations are of his stay, review of medications and anticipated discharge.     Electronically signed by:  UNA Carmichael CNP

## 2020-02-25 NOTE — TELEPHONE ENCOUNTER
Patient was evaluated by cardiology while inpatient for SPENCER and SOB s/p 3V CABG on 1/31/20, elevated troponin. Pt noted to have large left pleural effusion without pericardial effusion. 2/15/20: Left thoracentesis drained 1000 ml. RN called [facility] to confirm the follow up plan for patient with Care Coordinator. RN confirmed with Care Coordinator that patient has a scheduled F/U appt on 3/2/20 with [facility] OR. Reminded to send last progress note, MAR, active orders, and provider order sheet to appt. ZARIA Cortés RN.

## 2020-02-25 NOTE — LETTER
2/25/2020        RE: Renny Gannon  [address]  [facility] [address]        [place] [facility]  PRIMARY CARE PROVIDER AND CLINIC:  Angus Clements Mai, MD, [address] [facility] / [place] [address]  Chief Complaint   Patient presents with     Hospital F/U     [facility] Medical Record Number:  [id number]  Place of Service where encounter took place:  [facility] [place] ([facility]) [[id number]]    Renny Gannon  is a 69 year old  (1950), admitted to the above facility from  [facility]. Hospital stay 2/15/20 through 2/24/20..  Admitted to this facility for  rehab, medical management and nursing care.    HPI:    HPI information obtained from: facility chart records, facility staff, patient report and [facility] chart review.   Brief Summary of Hospital Course: On 1/31/2020, Renny underwent a CABG x3 vessel.  On 2/15/2020, he went to the [facility] ED with shortness of breath.  Was transferred down to [facility] for further management of the effusion.    He was found to have moderate exudative left pleural effusion after a thoracentesis on 2.17.2020.  950cc of blood fluid.  Did not require oxygen after thoracentesis and able to ambulate.    Updates on Status Since Skilled nursing Admission: meeting with Renny today in his room.  Laying on his bed.  No acute distress.  Only thing that gives him pain is his right foot.  Removed his sock and found resolving blisters from edema and not pressure.    Reviewed medications, goals of care and anticipated discharge planning.    CODE STATUS/ADVANCE DIRECTIVES DISCUSSION:   DNR / DNI  Patient's living condition: lives alone  ALLERGIES: No known drug allergies and Seasonal allergies  PAST MEDICAL HISTORY:  has a past medical history of Hypertension. He also has no past medical history of PONV (postoperative nausea and vomiting).  PAST SURGICAL HISTORY:   has a past surgical history that includes no history of surgery; Bone marrow biopsy,  bone specimen, needle/trocar (N/A, 12/30/2019); Coronary Angiogram (Left, 1/9/2020); and Bypass graft artery coronary (N/A, 1/31/2020).  FAMILY HISTORY: family history includes Cancer in his brother; Coronary Artery Disease in his brother; No Known Problems in his mother, sister, and sister; Unknown/Adopted in his father, maternal grandfather, maternal grandmother, paternal grandfather, and paternal grandmother.  SOCIAL HISTORY:   reports that he quit smoking about 19 years ago. His smoking use included cigarettes. He started smoking about 59 years ago. He quit after 30.00 years of use. He has never used smokeless tobacco. He reports that he does not drink alcohol or use drugs.    Post Discharge Medication Reconciliation Status: discharge medications reconciled, continue medications without change    Current Outpatient Medications   Medication Sig Dispense Refill     aspirin (ASA) 325 MG tablet Take 1 tablet (325 mg) by mouth daily 30 tablet 0     atorvastatin (LIPITOR) 40 MG tablet Take 1 tablet (40 mg) by mouth daily 30 tablet 11     famotidine (PEPCID) 20 MG tablet Take 1 tablet (20 mg) by mouth 2 times daily 28 tablet 0     ferrous sulfate (FEROSUL) 325 (65 Fe) MG tablet Take 1 tablet (325 mg) by mouth daily 30 tablet 0     furosemide (LASIX) 40 MG tablet Take 1 tablet in am and 0.5 tablet at 1 pm 45 tablet 1     HYDROcodone-acetaminophen (NORCO) 5-325 MG tablet Take 1 tablet by mouth every 4 hours as needed for pain 25 tablet 0     hydroxyurea (HYDREA) 500 MG capsule Take 1 capsule (500 mg) by mouth daily 90 capsule 1     methocarbamol (ROBAXIN) 500 MG tablet Take 1 tablet (500 mg) by mouth 4 times daily as needed for muscle spasms 20 tablet 0     metoprolol succinate ER (TOPROL-XL) 100 MG 24 hr tablet Take 1 tablet (100 mg) by mouth 2 times daily 60 tablet 3     potassium chloride ER (K-DUR/KLOR-CON M) 20 MEQ CR tablet Take 1 tablet (20 mEq) by mouth daily 30 tablet 0     senna-docusate (SENOKOT-S/PERICOLACE)  "8.6-50 MG tablet Take 2 tablets by mouth 2 times daily 120 tablet 0     vitamin B-12 (CYANOCOBALAMIN) 250 MCG tablet Take 250 mcg by mouth daily           ROS:  10 point ROS of systems including Constitutional, Eyes, Respiratory, Cardiovascular, Gastroenterology, Genitourinary, Integumentary, Musculoskeletal, Psychiatric were all negative except for pertinent positives noted in my HPI.    Vitals:  /69   Pulse 100   Temp 95.6  F (35.3  C)   Resp 16   Ht 1.727 m (5' 8\")   Wt 78.9 kg (174 lb)   SpO2 93%   BMI 26.46 kg/m     Exam:  GENERAL APPEARANCE:  Alert, in no distress, cooperative, quiet demeanor  EYES:  EOM, conjunctivae, lids, pupils and irises normal, PERRL  RESP:  respiratory effort and palpation of chest normal, lungs clear to auscultation , no respiratory distress  CV:  Palpation and auscultation of heart done , regular rate and rhythm, no murmur, rub, or gallop, no edema  ABDOMEN:  normal bowel sounds, soft, nontender, no hepatosplenomegaly or other masses, no guarding or rebound  M/S:   Gait and station abnormal uses a walker for ambulation and assist with one with transfers  SKIN:  large resolving dried blister on top of left foot.  right foot has multiple resolving blisters.  one is moist looking and has a dressing that is loose so able to pick to the side.  behind the toes at the base appears to have purple (blood) discoloration.    PSYCH:  oriented X 3, normal insight, judgement and memory, affect and mood normal, quiet    Lab/Diagnostic data:  Component      Latest Ref Rng & Units 2/22/2020   Creatinine      0.66 - 1.25 mg/dL 1.19   GFR Estimate      >60 mL/min/1.73:m2 61   GFR Estimate If Black      >60 mL/min/1.73:m2 71     Component      Latest Ref Rng & Units 2/20/2020   WBC      4.0 - 11.0 10e9/L 18.2 (H)   RBC Count      4.4 - 5.9 10e12/L 3.46 (L)   Hemoglobin      13.3 - 17.7 g/dL 10.2 (L)   Hematocrit      40.0 - 53.0 % 33.8 (L)   MCV      78 - 100 fl 98   MCH      26.5 - 33.0 pg " 29.5   MCHC      31.5 - 36.5 g/dL 30.2 (L)   RDW      10.0 - 15.0 % 21.4 (H)   Platelet Count      150 - 450 10e9/L 281       ASSESSMENT/PLAN:  Exudative pleural effusion  Generalized muscle weakness  Does not appear to need any follow up appointments except with NH doctor.  Explained to Renny that if he is here when the house MD comes in March, then he will be seen.  Otherwise he will follow up with his primary after discharge.  Goal is to attend therapies and once strong enough and health is stable, arrangements will be made for discharge.    Monitor his respiratory status for any further changes.      Coronary artery disease involving coronary bypass graft of native heart without angina pectoris  S/P CABG (coronary artery bypass graft)  - ASA 325mg daily and atorvastatin 40mg daily.    Incision on chest and 2 small ones on abdomen are scabbed and healing without signs of infection.    Has PRN Norco 325mg 1 tab every 4 hours prn and Robaxin 500mg QID prn for comfort.  No changes.      Essential hypertension  Blood pressures range from 100-120's/50-70's.    Vitals daily while here.  Remains on metoprolol 100mg twice a day.    Does not have edema but is on Lasix 20mg daily and K+ 20meq daily.    Needs orders for Lymph edema wraps that he came with.  On in AM and off in PM.    Myeloproliferative disorder (H)  Does follow oncology and has an upcoming appointment on 2/27/20  Remains on hydroxyurea 500mg daily.    Will see how his CBC is next Monday as noted his WBC runs elevated.    Anemia, unspecified type  Does take FeSO4 325mg daily as well as Vitamin B12 250mcg daily.    No changes.  Having labs next week.      Slow transit constipation  Does take Senna-S 2 tabs twice a day.  BMs monitored and use of facility standing orders is available.    Blisters of multiple sites  Orders to be written are:  Left foot blister:  Leave open to air  Right foot treatment is:  Cleanse with NS, apply non-stick dressing and cover with  kerlix.  Weave intra dry in between toes to separate toes and blisters.         Orders written by provider at facility and transcribed by : Ruby Green MA  1.  CBC & BMP next Monday.  Dx: CAD, HTN  2.  Left foot blister:  Leave open to air  3.  Right foot treatment:  Cleanse areas with NS, apply non-stick dressing and cover with kerlix.  Weave intra dry in between toes to separate toes and blisters.  4.  Lymph edema wraps on in AM and off in PM - edema.    Total time spent with patient visit at the [facility] nursing [facility] was 39 minutes including patient visit, review of past records and coordinating care with nursing. Greater than 50% of total time spent with counseling and coordinating care due to alteration in skin and discussing treatment with nursing, discussing his hospital stay, what his expectations are of his stay, review of medications and anticipated discharge.     Electronically signed by:  UNA Carmichael CNP                         Sincerely,        UNA Carmichael CNP

## 2020-02-27 ENCOUNTER — ONCOLOGY VISIT (OUTPATIENT)
Dept: ONCOLOGY | Facility: CLINIC | Age: 70
End: 2020-02-27
Payer: COMMERCIAL

## 2020-02-27 ENCOUNTER — MEDICAL CORRESPONDENCE (OUTPATIENT)
Dept: HEALTH INFORMATION MANAGEMENT | Facility: CLINIC | Age: 70
End: 2020-02-27

## 2020-02-27 VITALS
DIASTOLIC BLOOD PRESSURE: 72 MMHG | HEIGHT: 68 IN | BODY MASS INDEX: 26.37 KG/M2 | TEMPERATURE: 98.1 F | WEIGHT: 174 LBS | HEART RATE: 96 BPM | RESPIRATION RATE: 18 BRPM | SYSTOLIC BLOOD PRESSURE: 124 MMHG | OXYGEN SATURATION: 96 %

## 2020-02-27 DIAGNOSIS — I10 ESSENTIAL HYPERTENSION: Primary | ICD-10-CM

## 2020-02-27 DIAGNOSIS — D47.1 MYELOPROLIFERATIVE DISORDER (H): ICD-10-CM

## 2020-02-27 LAB
ANISOCYTOSIS BLD QL SMEAR: ABNORMAL
BASOPHILS # BLD AUTO: 0 10E9/L (ref 0–0.2)
BASOPHILS NFR BLD AUTO: 0 %
DIFFERENTIAL METHOD BLD: ABNORMAL
EOSINOPHIL # BLD AUTO: 0 10E9/L (ref 0–0.7)
EOSINOPHIL NFR BLD AUTO: 0 %
ERYTHROCYTE [DISTWIDTH] IN BLOOD BY AUTOMATED COUNT: 21.3 % (ref 10–15)
HCT VFR BLD AUTO: 34.5 % (ref 40–53)
HGB BLD-MCNC: 10.9 G/DL (ref 13.3–17.7)
LYMPHOCYTES # BLD AUTO: 3.6 10E9/L (ref 0.8–5.3)
LYMPHOCYTES NFR BLD AUTO: 10 %
MCH RBC QN AUTO: 30.7 PG (ref 26.5–33)
MCHC RBC AUTO-ENTMCNC: 31.6 G/DL (ref 31.5–36.5)
MCV RBC AUTO: 97 FL (ref 78–100)
METAMYELOCYTES # BLD: 4 10E9/L
METAMYELOCYTES NFR BLD MANUAL: 11 %
MONOCYTES # BLD AUTO: 0.7 10E9/L (ref 0–1.3)
MONOCYTES NFR BLD AUTO: 2 %
MYELOCYTES # BLD: 2.2 10E9/L
MYELOCYTES NFR BLD MANUAL: 6 %
NEUTROPHILS # BLD AUTO: 25.6 10E9/L (ref 1.6–8.3)
NEUTROPHILS NFR BLD AUTO: 71 %
NRBC # BLD AUTO: 0.7 10*3/UL
NRBC BLD AUTO-RTO: 2 /100
PLATELET # BLD AUTO: 505 10E9/L (ref 150–450)
PLATELET # BLD EST: ABNORMAL 10*3/UL
POIKILOCYTOSIS BLD QL SMEAR: ABNORMAL
POLYCHROMASIA BLD QL SMEAR: ABNORMAL
RBC # BLD AUTO: 3.55 10E12/L (ref 4.4–5.9)
WBC # BLD AUTO: 36.1 10E9/L (ref 4–11)

## 2020-02-27 PROCEDURE — 36415 COLL VENOUS BLD VENIPUNCTURE: CPT | Performed by: INTERNAL MEDICINE

## 2020-02-27 PROCEDURE — 99214 OFFICE O/P EST MOD 30 MIN: CPT | Performed by: INTERNAL MEDICINE

## 2020-02-27 PROCEDURE — 85025 COMPLETE CBC W/AUTO DIFF WBC: CPT | Performed by: INTERNAL MEDICINE

## 2020-02-27 RX ORDER — HYDROXYUREA 500 MG/1
500 CAPSULE ORAL 2 TIMES DAILY
Qty: 60 CAPSULE | Refills: 3 | Status: SHIPPED | OUTPATIENT
Start: 2020-02-27 | End: 2020-03-03

## 2020-02-27 ASSESSMENT — PAIN SCALES - GENERAL: PAINLEVEL: MODERATE PAIN (5)

## 2020-02-27 ASSESSMENT — MIFFLIN-ST. JEOR: SCORE: 1523.76

## 2020-02-27 NOTE — NURSING NOTE
DISCHARGE PLAN:  Next appointments: See patient instruction section  Departure Mode: Ambulatory  Accompanied by: self  5 minutes for nursing discharge (face to face time)     Renny RAFA Jagdeep is here today for 6 wk follow up.  Writing nurse seen patient after Medical Oncology appointment to address questions/concerns/coordinate care. Patient ambulated by nurse to  to schedule follow up and/or lab appointments.   Hydrea twice daily, CBC weekly, f/u 1 month with labs.   See patient instructions and Oncologist's Progress note for further details. Questions and concerns addressed to patient's satisfaction. Patient verbalized and demonstrated understanding of plan.  Contact information provided and patient is encouraged to call with any that arise in the interim of care.    Debi FAIR [facility] Cancer/Hematology [facility]   [phone]  2/27/2020, 3:45 PM

## 2020-02-27 NOTE — LETTER
"    2/27/2020         RE: Renny Gannon  [address]  [facility] [address]        Dear Colleague,    Thank you for referring your patient, Renny Gannon, to the [facility]. Please see a copy of my visit note below.    Oncology Rooming Note    February 27, 2020 2:53 PM   Renny Gannon is a 69 year old male who presents for:    Chief Complaint   Patient presents with     Hematology     6 week follow up-Myeloproliferative disorder     Initial Vitals: /72   Pulse 96   Temp 98.1  F (36.7  C) (Temporal)   Resp 18   Ht 1.727 m (5' 8\")   Wt 78.9 kg (174 lb)   SpO2 96%   BMI 26.46 kg/m    Estimated body mass index is 26.46 kg/m  as calculated from the following:    Height as of this encounter: 1.727 m (5' 8\").    Weight as of this encounter: 78.9 kg (174 lb). Body surface area is 1.95 meters squared.  Moderate Pain (5) Comment: Data Unavailable   No LMP for male patient.  Allergies reviewed: Yes  Medications reviewed: Yes    Medications: Medication refills not needed today.  Pharmacy name entered into EPIC:    [facility] - [place], [place] - [address]  [facility] - [place], [place] - [address]    Clinical concerns: . Concerns reviewed with Dr. Cristina     5  minutes for nursing intake (face to face time)     Katie ALONZO CMA                Hematology/ Oncology Follow-up Visit:  Feb 27, 2020    Reason for Visit:   Chief Complaint   Patient presents with     Hematology     6 week follow up-Myeloproliferative disorder           Interval History:  Patient is here today for follow-up visit.  Last visit the patient was started on hydroxyurea because of myeloproliferative disorder with myelofibrosis.  Since then the patient had triple bypass surgery in January 31, 2020.  Discharge removed from the hospital and presented again with increasing shortness of breath related to pleural effusion.  Since he has thoracocentesis his symptoms has been improving.  Overall the patient is recovering " well from his surgery without significant weight loss or night sweats or fever or chills.  He has been tolerating hydroxyurea without significant issues.    Review Of Systems:  Constitutional: Negative for fever, chills, and night sweats.  Skin: negative.  Eyes: negative.  Ears/Nose/Throat: negative.  Respiratory: No shortness of breath, dyspnea on exertion, cough, or hemoptysis.  Cardiovascular: negative.  Gastrointestinal: negative.  Genitourinary: negative.  Musculoskeletal: negative.  Neurologic: negative.  Psychiatric: negative.  Hematologic/Lymphatic/Immunologic: negative.  Endocrine: negative.    All other ROS negative unless mentioned in interval history.    Past medical, social, surgical, and family histories reviewed.    Allergies:  Allergies as of 02/27/2020 - Reviewed 02/27/2020   Allergen Reaction Noted     No known drug allergies  01/25/2005     Seasonal allergies  03/11/2019       Current Medications:  Current Outpatient Medications   Medication Sig Dispense Refill     aspirin (ASA) 325 MG tablet Take 1 tablet (325 mg) by mouth daily 30 tablet 0     atorvastatin (LIPITOR) 40 MG tablet Take 1 tablet (40 mg) by mouth daily 30 tablet 11     famotidine (PEPCID) 20 MG tablet Take 1 tablet (20 mg) by mouth 2 times daily 28 tablet 0     ferrous sulfate (FEROSUL) 325 (65 Fe) MG tablet Take 1 tablet (325 mg) by mouth daily 30 tablet 0     furosemide (LASIX) 40 MG tablet Take 1 tablet in am and 0.5 tablet at 1 pm 45 tablet 1     HYDROcodone-acetaminophen (NORCO) 5-325 MG tablet Take 1 tablet by mouth every 4 hours as needed for pain 25 tablet 0     hydroxyurea (HYDREA) 500 MG capsule Take 1 capsule (500 mg) by mouth 2 times daily 60 capsule 3     methocarbamol (ROBAXIN) 500 MG tablet Take 1 tablet (500 mg) by mouth 4 times daily as needed for muscle spasms 20 tablet 0     metoprolol succinate ER (TOPROL-XL) 100 MG 24 hr tablet Take 1 tablet (100 mg) by mouth 2 times daily 60 tablet 3     potassium chloride ER  "(K-DUR/KLOR-CON M) 20 MEQ CR tablet Take 1 tablet (20 mEq) by mouth daily 30 tablet 0     senna-docusate (SENOKOT-S/PERICOLACE) 8.6-50 MG tablet Take 2 tablets by mouth 2 times daily 120 tablet 0     vitamin B-12 (CYANOCOBALAMIN) 250 MCG tablet Take 250 mcg by mouth daily          Physical Exam:  /72   Pulse 96   Temp 98.1  F (36.7  C) (Temporal)   Resp 18   Ht 1.727 m (5' 8\")   Wt 78.9 kg (174 lb)   SpO2 96%   BMI 26.46 kg/m     Wt Readings from Last 12 Encounters:   02/27/20 78.9 kg (174 lb)   02/25/20 78.9 kg (174 lb)   02/24/20 78.7 kg (173 lb 8 oz)   02/14/20 88.5 kg (195 lb)   02/11/20 89.4 kg (197 lb)   02/10/20 89.5 kg (197 lb 5 oz)   01/21/20 91.6 kg (202 lb)   01/16/20 91.8 kg (202 lb 6.4 oz)   01/15/20 93.4 kg (206 lb)   01/09/20 91.1 kg (200 lb 14.4 oz)   01/02/20 93.9 kg (207 lb)   12/27/19 91.4 kg (201 lb 6.4 oz)     ECOG performance status: 1  GENERAL APPEARANCE: Healthy, alert and in no acute distress.  HEENT: Sclerae anicteric. PERRLA. Oropharynx without ulcers, lesions, or thrush.  NECK: Supple. No asymmetry or masses.  LYMPHATICS: No palpable cervical, supraclavicular, axillary, or inguinal lymphadenopathy.  RESP: Lungs clear to auscultation bilaterally without rales, rhonchi or wheezes.  CARDIOVASCULAR: Regular rate and rhythm. Normal S1, S2; no S3 or S4. No murmur, gallop, or rub.  ABDOMEN: Soft, nontender. Bowel sounds normal. No palpable organomegaly or masses.  MUSCULOSKELETAL: Extremities without gross deformities noted. No edema of bilateral lower extremities.  SKIN: No suspicious lesions or rashes.  NEURO: Alert and oriented x 3. Cranial nerves II-XII grossly intact.  PSYCHIATRIC: Mentation and affect appear normal.    Laboratory/Imaging Studies:  No visits with results within 2 Week(s) from this visit.   Latest known visit with results is:   Orders Only on 02/01/2020   Component Date Value Ref Range Status     WBC 02/13/2020 35.6* 4.0 - 11.0 10e9/L Final     RBC Count " 02/13/2020 3.36* 4.4 - 5.9 10e12/L Final     Hemoglobin 02/13/2020 9.9* 13.3 - 17.7 g/dL Final     Hematocrit 02/13/2020 32.3* 40.0 - 53.0 % Final     MCV 02/13/2020 96  78 - 100 fl Final     MCH 02/13/2020 29.5  26.5 - 33.0 pg Final     MCHC 02/13/2020 30.7* 31.5 - 36.5 g/dL Final     RDW 02/13/2020 20.3* 10.0 - 15.0 % Final     Platelet Count 02/13/2020 380  150 - 450 10e9/L Final     Diff Method 02/13/2020 Manual Differential   Final     % Neutrophils 02/13/2020 73.3  % Final     % Lymphocytes 02/13/2020 10.3  % Final     % Monocytes 02/13/2020 4.3  % Final     % Eosinophils 02/13/2020 5.2  % Final     % Basophils 02/13/2020 0.0  % Final     % Metamyelocytes 02/13/2020 6.9  % Final     Nucleated RBCs 02/13/2020 21* 0 /100 Final     Absolute Neutrophil 02/13/2020 26.1* 1.6 - 8.3 10e9/L Final     Absolute Lymphocytes 02/13/2020 3.7  0.8 - 5.3 10e9/L Final     Absolute Monocytes 02/13/2020 1.5* 0.0 - 1.3 10e9/L Final     Absolute Eosinophils 02/13/2020 1.9* 0.0 - 0.7 10e9/L Final     Absolute Basophils 02/13/2020 0.0  0.0 - 0.2 10e9/L Final     Absolute Metamyelocytes 02/13/2020 2.5* 0 10e9/L Final     Absolute Nucleated RBC 02/13/2020 7.4   Final     Anisocytosis 02/13/2020 Moderate   Final     Polychromasia 02/13/2020 Moderate   Final     Microcytes 02/13/2020 Present   Final     Platelet Estimate 02/13/2020 Normal   Final        Recent Results (from the past 744 hour(s))   XR Chest Port 1 View    Narrative    CHEST PORTABLE ONE VIEW   1/31/2020 2:40 PM     HISTORY: CAB.    COMPARISON: 1/9/2020.      Impression    IMPRESSION: ET tube newly seen, its tip at the mid trachea. Right  [place]-Ezekiel catheter tip at the pulmonary artery origin region.  Nasogastric tube in place within the stomach. Stable cardiac  silhouette. Bibasilar atelectasis. Hypoinflated lungs.    ALEXIS FARIAS MD   XR Chest Port 1 View    Narrative    XR PORTABLE CHEST ONE VIEW   1/31/2020 4:34 PM     HISTORY: Postoperative coronary artery bypass graft.       COMPARISON: Chest x-ray 1/31/2020 at 1421 hours.      Impression    IMPRESSION: Stable ET tube lying above the brett. Stable nasogastric  tube and right neck [place]-Ezekiel catheter. Stable hypoinflated lungs. No  new airspace disease. Stable cardiac silhouette.    ALEXIS FARIAS MD   XR Chest Port 1 View    Narrative    EXAM: XR CHEST PORTABLE 1 VIEW  LOCATION: [facility]  DATE/TIME: 02/01/2020, 5:27 AM    INDICATION: Postop day #1 coronary artery bypass.  COMPARISON: 01/31/2020.    FINDINGS: Semiupright portable chest. ET tube 4.5 cm above the brett. Right IJ pulmonary artery catheter in place. NG tube passes into the stomach. Sternal wires and mediastinal clips. Mediastinal drain. There is no pneumothorax. The heart size is   normal. There is atelectasis at the lung bases.      Impression    IMPRESSION: Mild bibasilar atelectasis. No pneumothorax.     CT Head w/o Contrast    Narrative    CT SCAN OF THE HEAD WITHOUT CONTRAST   2/1/2020 6:11 PM     HISTORY: Left facial droop.    TECHNIQUE:  Axial images of the head and coronal reformations without  IV contrast material. Radiation dose for this scan was reduced using  automated exposure control, adjustment of the mA and/or kV according  to patient size, or iterative reconstruction technique.    COMPARISON: None.    FINDINGS: There is no evidence of intracranial hemorrhage, mass, or  anomaly. The ventricles are normal in size, shape and configuration.  The brain parenchyma and subarachnoid spaces are normal.     The visualized portions of the sinuses and mastoids appear normal. The  bony calvarium and bones of the skull base appear intact.       Impression    IMPRESSION:   No evidence of acute intracranial hemorrhage, mass, or  herniation.    KELSEY ELI MD   CTA Head Neck with Contrast    Narrative    CT ANGIOGRAM OF THE HEAD AND NECK WITH CONTRAST  CT HEAD PERFUSION WITH CONTRAST  2/1/2020 6:20 PM     HISTORY: Left facial droop.    TECHNIQUE:  CT  angiography with an injection of 70 mL Isovue-370      (accession [id number]), 50 mL Isovue-370 (accession [id number]) IV with  scans through the head and neck. Images were transferred to a separate  3-D workstation where multiplanar reformations and 3-D images were  created. Estimates of carotid stenoses are made relative to the distal  internal carotid artery diameters except as noted. Radiation dose for  this scan was reduced using automated exposure control, adjustment of  the mA and/or kV according to patient size, or iterative  reconstruction technique.     Perfusion scans were performed with injection of additional IV  contrast. These images were processed on a separate 3-D workstation.     COMPARISON: None.     CT HEAD FINDINGS: No contrast enhancing lesions. CT perfusion images  of the head are unremarkable.     CT ANGIOGRAM HEAD FINDINGS:  The major intracranial arteries including  the proximal branches of the anterior cerebral, middle cerebral, and  posterior cerebral arteries appear patent without vascular cutoff. No  aneurysm identified. No significant stenosis. Venous circulation is  unremarkable.     The A1 segment of the right anterior cerebral artery is not visualized  and is likely hypoplastic or congenitally absent. The A2 segment of  right anterior cerebral artery is supplied by the patent anterior  communicating artery.    CT ANGIOGRAM NECK FINDINGS:   Normal origin of the great vessels from the aortic arch.     Right carotid artery: The right common and internal carotid arteries  are patent. Marked soft and calcified plaque at the carotid  bifurcation and proximal internal carotid artery resulting in  approximately 50% stenosis by NASCET criteria.     Left carotid artery: The left common and internal carotid arteries are  patent. Mild calcified atherosclerotic disease at the carotid  bifurcation without stenosis.     Vertebral arteries: Vertebral arteries are patent without evidence  of  dissection. Mild stenosis at the origin the right vertebral artery  secondary to soft plaque.    Other findings: Partially visualized surgical changes of sternotomy  and coronary artery bypass grafting. Scattered foci of air in the  mediastinum and neck likely related to recent surgery.       Impression    IMPRESSION:   1. Patent proximal major intracranial arteries without vascular  cutoff. No significant intracranial stenosis or aneurysm.  2. Patent arteries in the neck without evidence of dissection.  3. Moderate atherosclerotic disease in the right carotid artery  resulting in approximately 50% stenosis by NASCET criteria. Mild  atherosclerotic disease in the left carotid artery without significant  stenosis.  4. Mild stenosis at the origin of the right vertebral artery secondary  to soft plaque.  5. Unremarkable CT perfusion images of the head.    Results discussed with nurse Delmi Conn at 6:35 PM on 2/1/2020.      KELSEY ELI MD   CT Head Perfusion w Contrast    Narrative    CT ANGIOGRAM OF THE HEAD AND NECK WITH CONTRAST  CT HEAD PERFUSION WITH CONTRAST  2/1/2020 6:20 PM     HISTORY: Left facial droop.    TECHNIQUE:  CT angiography with an injection of 70 mL Isovue-370      (accession [id number]), 50 mL Isovue-370 (accession [id number]) IV with  scans through the head and neck. Images were transferred to a separate  3-D workstation where multiplanar reformations and 3-D images were  created. Estimates of carotid stenoses are made relative to the distal  internal carotid artery diameters except as noted. Radiation dose for  this scan was reduced using automated exposure control, adjustment of  the mA and/or kV according to patient size, or iterative  reconstruction technique.     Perfusion scans were performed with injection of additional IV  contrast. These images were processed on a separate 3-D workstation.     COMPARISON: None.     CT HEAD FINDINGS: No contrast enhancing lesions. CT perfusion  images  of the head are unremarkable.     CT ANGIOGRAM HEAD FINDINGS:  The major intracranial arteries including  the proximal branches of the anterior cerebral, middle cerebral, and  posterior cerebral arteries appear patent without vascular cutoff. No  aneurysm identified. No significant stenosis. Venous circulation is  unremarkable.     The A1 segment of the right anterior cerebral artery is not visualized  and is likely hypoplastic or congenitally absent. The A2 segment of  right anterior cerebral artery is supplied by the patent anterior  communicating artery.    CT ANGIOGRAM NECK FINDINGS:   Normal origin of the great vessels from the aortic arch.     Right carotid artery: The right common and internal carotid arteries  are patent. Marked soft and calcified plaque at the carotid  bifurcation and proximal internal carotid artery resulting in  approximately 50% stenosis by NASCET criteria.     Left carotid artery: The left common and internal carotid arteries are  patent. Mild calcified atherosclerotic disease at the carotid  bifurcation without stenosis.     Vertebral arteries: Vertebral arteries are patent without evidence of  dissection. Mild stenosis at the origin the right vertebral artery  secondary to soft plaque.    Other findings: Partially visualized surgical changes of sternotomy  and coronary artery bypass grafting. Scattered foci of air in the  mediastinum and neck likely related to recent surgery.       Impression    IMPRESSION:   1. Patent proximal major intracranial arteries without vascular  cutoff. No significant intracranial stenosis or aneurysm.  2. Patent arteries in the neck without evidence of dissection.  3. Moderate atherosclerotic disease in the right carotid artery  resulting in approximately 50% stenosis by NASCET criteria. Mild  atherosclerotic disease in the left carotid artery without significant  stenosis.  4. Mild stenosis at the origin of the right vertebral artery secondary  to  soft plaque.  5. Unremarkable CT perfusion images of the head.    Results discussed with nurse Delmi Conn at 6:35 PM on 2/1/2020.      KELSEY ELI MD   XR Chest Port 1 View    Narrative    XR PORTABLE CHEST ONE VIEW   2/2/2020 8:25 AM     HISTORY: CAB    COMPARISON: 2/1/2020.      Impression    IMPRESSION: Removal of ET tube and nasogastric tube. Right neck  central venous sheath is in place. Increasing consolidation at the  left lung base. Cannot exclude increasing acute airspace disease.  Stable right lung. Cardiomegaly again suggested.    ALEXIS FARIAS MD   XR Chest Port 1 View    Narrative    EXAM: XR CHEST PORT 1 VW  LOCATION: [facility]  DATE/TIME: 2/3/2020 4:39 AM    INDICATION: Chest tube placement.  COMPARISON: 02/01/2020.    FINDINGS: Upright portable chest. Sternal wires and mediastinal clips. Right IJ cordis. No chest tube is evident. No pneumothorax. Probable bilateral pleural effusions. Calcified pleural plaque at the right lung base. Atelectasis at the left lung base.      Impression    IMPRESSION: Bilateral pleural effusions. Left basilar probable atelectasis.   XR Chest 2 Views    Narrative    CHEST TWO VIEWS February 4, 2020 6:31 AM     HISTORY: 69 year-old patient status post coronary artery bypass graft  and chest tube removal.       Impression    IMPRESSION: Since February 3, 2020, cardiomediastinal silhouette  remains enlarged. Suspect small bilateral pleural effusions, slightly  increased from previous exam. Scattered bibasilar opacities relatively  unchanged. No pneumothorax. Interval removal of right IJ sheath.    ANNA OREILLY MD   XR Video Swallow w/o Esophagram w/SLP or OT    Narrative    VIDEO SWALLOWING EVALUATION   2/4/2020 11:14 AM     HISTORY: Right CVA with CABG.    COMPARISON: None.    FLUOROSCOPY TIME: .7 minutes.  SPOT IMAGES OR CINE RUNS: 8      Impression    IMPRESSION:    Thin: Moderate penetration with the larger boluses and with straw. No  evidence of  aspiration.    Nectar: No penetration or aspiration.    Honey: Not administered.    Pudding: No penetration or aspiration.    Semisolid: Not administered.    Solid: No penetration or aspiration.    This study only includes the cervical esophagus.    KELSEY ELI MD   XR Chest 2 Views    Narrative    CHEST TWO VIEWS February 11, 2020 12:41 PM     HISTORY: Shortness of breath, status post coronary artery bypass  graft.    COMPARISON: Chest x-rays dated 2/4/2020.    FINDINGS: Postop changes status post CABG are again noted. Cardiac  silhouette enlargement is again seen. There are bilateral pleural  fluid collections which are small to moderate-sized on the left and  small on the right. The right pleural fluid collection appears similar  in size to prior study. Left pleural fluid collection may have  slightly increased since the prior study. There is minimal vascular  congestion. No pneumothorax is identified. No acute fracture is seen.      Impression    IMPRESSION:  1. Slightly increased size of small to moderate-sized left pleural  fluid collection is noted. Right pleural fluid collection appears  slightly decreased in size. There appears to be minimal vascular  congestion. Along with cardiomegaly, vascular congestion and pleural  fluid collections indicate mild heart failure.  2. Postoperative changes of the chest are again noted.    I discussed the possibility of mild congestive heart failure with Dr. Enrique on 2/11/2020 12:53 PM.    LEEANNA KAUR MD   XR Chest 2 Views    Narrative    CHEST TWO VIEWS 2/14/2020 2:50 PM     HISTORY: Pleural effusion    COMPARISON: Chest x-ray 2/11/2020       Impression    IMPRESSION: Sternotomy with CABG. Although the left heart border is  partially obscured, there is likely enlargement of the cardiac  silhouette. There is mild perihilar interstitial prominence, which may  be due to edema. A moderate left and small right pleural effusion are  not significantly changed. Compressive  atelectasis in both lower  lungs. No pneumothorax.    KYE GONZALEZ MD   XR Chest 2 Views    Narrative    CHEST TWO VIEWS 2/15/2020 6:19 PM     HISTORY: Shortness of breath for 4 hours with intermittent chest  heaviness and pressure. History of recent bypass surgery.    COMPARISON: 2020, 2020       Impression    Impression: Stable minimal to moderate left basilar pleural fluid with  atelectasis. Minimal right basilar pleural fluid unchanged. Mild  cardiac enlargement. Normal pulmonary vascularity. Sternotomy.  Atherosclerotic vascular calcification. Minimal degenerative changes  in the spine.    CURT L BEHRNS, MD   Echocardiogram Limited    Narrative    [id number]  [id number]  [id number]  [id number]^JOBY^JESSICA^TRACIE           [facility]  Echocardiography Laboratory  [address] [place] [address]        Name: ABI VALDEZ  MRN: [id number]  : 1950  Study Date: 2020 08:30 AM  Age: 69 yrs  Gender: Male  Patient Location: [facility]  Reason For Study: CABG  Ordering Physician: JESSICA HEMPHILL  Referring Physician: JESSICA HEMPHILL  Performed By: REY Ashraf     BSA: 2.0 m2  Height: 68 in  Weight: 181 lb  HR: 83  BP: 115/69 mmHg  _____________________________________________________________________________  __        Procedure  Optison (NDC #5383-4645) given intravenously. Limited Portable Echo Adult.  _____________________________________________________________________________  __        Interpretation Summary     Technically difficult study.     The left ventricle is normal in structure, function and size.  Mildly decreased right ventricular systolic function with normal RV size.  No significant valve dysfunction. Aortic sclerosis present without stenosis.  No pulmonary hypertension.  Inferior vena cava not well visualized for estimation of right atrial  pressure.  There is no pericardial effusion.     Compared to prior study dated 2020, mild RV dysfunction otherwise  no  change.  _____________________________________________________________________________  __        Left Ventricle  The left ventricle is normal in structure, function and size. The visual  ejection fraction is estimated at 60-65%. Left ventricular diastolic function  is normal. Normal left ventricular wall motion.     Right Ventricle  The right ventricle is normal size. Mildly decreased right ventricular  systolic function.     Atria  Normal left atrial size. Right atrial size is normal.     Mitral Valve  The mitral valve is normal in structure and function.        Tricuspid Valve  The tricuspid valve is normal in structure and function. The right ventricular  systolic pressure is approximated at 22.9 mmHg plus the right atrial pressure.  Right ventricular systolic pressure is normal. There is mild (1+) tricuspid  regurgitation.     Aortic Valve  The aortic valve is trileaflet with aortic valve sclerosis.     Pulmonic Valve  The pulmonic valve is not well visualized.     Vessels  The aortic root is normal size. Inferior vena cava not well visualized for  estimation of right atrial pressure.     Pericardium  There is no pericardial effusion.     _____________________________________________________________________________  __        Doppler Measurements & Calculations  TR max vlad: 239.0 cm/sec  TR max P.9 mmHg              _____________________________________________________________________________  __           Report approved by: Kalin Sewell 2020 01:45 PM      US Thoracentesis    Narrative    THORACENTESIS 2020 9:38 AM    HISTORY: Patient with history of postop left pleural effusion.    PROCEDURE/FINDINGS:  After obtaining informed consent, the patient was  positioned appropriately. The back was prepped and draped in the usual  sterile manner. Under ultrasound guidance, a Yueh needle was used to  access the pleural space. A permanent ultrasound image was saved to  document needle  position. Thoracentesis was performed. Approximately  950 mL dark red/blood-tinged fluid was aspirated out. Fluid sample was  set aside for diagnostic testing.    The patient tolerated the procedure well. There were no immediate  postprocedure complications. The patient's vital signs were monitored  by radiology nursing staff under my supervision and remained stable  throughout the study.      Impression    IMPRESSION: Successful left thoracentesis performed with removal of  950 mL bloody fluid.    LEO MELTON MD   XR Chest 2 Views    Narrative    CHEST TWO VIEWS    2/18/2020 11:04 AM     HISTORY: Left pleural effusion. Shortness of breath.    COMPARISON: Chest x-ray 2/15/2020.      Impression    IMPRESSION: Two views of the chest are performed. Atelectasis left  lung base and small left pleural effusion appears stable compared to  prior exam. Probable mild elevation left hemidiaphragm. Lungs are  otherwise clear. No right pleural fluid. Heart size appears mildly  prominent, but is unchanged. Prior median sternotomy for CABG.  Degenerative lower thoracic spine changes are noted. No evidence of  pneumothorax.    EJ MARIE MD       Assessment and plan:    (D47.1) Myeloproliferative disorder (H)  I reviewed with the patient today most recent laboratory tests.  I would recommend patient to continue on the hydroxyurea at the current dose.  We will continue to monitor CBC regularly and the dose of hydroxyurea will be adjusted accordingly.    (I10) Essential hypertension    Blood pressure is currently well controlled on metoprolol 100 mg orally daily.    The patient is ready to learn, no apparent learning barriers were identified.  Diagnosis and treatment plans were explained to the patient. The patient expressed understanding of the content. The patient asked appropriate questions. The patient questions were answered to his satisfaction.    Chart documentation with Dragon Voice recognition Software. Although  reviewed after completion, some words and grammatical errors may remain.      Again, thank you for allowing me to participate in the care of your patient.        Sincerely,        Mani Cristina MD

## 2020-02-27 NOTE — PATIENT INSTRUCTIONS
- Today:  Continue hydroxyurea (HYDREA) 500 MG capsule - twice daily    Weekly lab work.    Lab Date/Time:    Lab Date/Time:    Lab Date/Time:    - Please follow up with Dr. Cristina in 1 month with lab work.   - Please come 20 to 30 minutes early to complete lab work, check in at the [facility] desk they will direct you to the specialty lab area.    Office visit follow up with Dr. Cristina Date/Time:  Please come 20 to 30 minutes early to do labs.    If you have any questions or concerns please feel free to call.    If you need to reschedule please call:  Clinic or Lab Appointment - [phone]  Infusion - [phone]  Imaging - [phone]      [facility] [place] Team

## 2020-02-27 NOTE — PROGRESS NOTES
"Oncology Rooming Note    February 27, 2020 2:53 PM   Renny Gannon is a 69 year old male who presents for:    Chief Complaint   Patient presents with     Hematology     6 week follow up-Myeloproliferative disorder     Initial Vitals: /72   Pulse 96   Temp 98.1  F (36.7  C) (Temporal)   Resp 18   Ht 1.727 m (5' 8\")   Wt 78.9 kg (174 lb)   SpO2 96%   BMI 26.46 kg/m   Estimated body mass index is 26.46 kg/m  as calculated from the following:    Height as of this encounter: 1.727 m (5' 8\").    Weight as of this encounter: 78.9 kg (174 lb). Body surface area is 1.95 meters squared.  Moderate Pain (5) Comment: Data Unavailable   No LMP for male patient.  Allergies reviewed: Yes  Medications reviewed: Yes    Medications: Medication refills not needed today.  Pharmacy name entered into EPIC:    [facility] - [place], [place] - [address]  [facility] - [place], [place] - [address]    Clinical concerns: . Concerns reviewed with Dr. Cristina     5  minutes for nursing intake (face to face time)     Katie ALONZO CMA              "

## 2020-02-28 NOTE — PROGRESS NOTES
Hematology/ Oncology Follow-up Visit:  Feb 27, 2020    Reason for Visit:   Chief Complaint   Patient presents with     Hematology     6 week follow up-Myeloproliferative disorder           Interval History:  Patient is here today for follow-up visit.  Last visit the patient was started on hydroxyurea because of myeloproliferative disorder with myelofibrosis.  Since then the patient had triple bypass surgery in January 31, 2020.  Discharge removed from the hospital and presented again with increasing shortness of breath related to pleural effusion.  Since he has thoracocentesis his symptoms has been improving.  Overall the patient is recovering well from his surgery without significant weight loss or night sweats or fever or chills.  He has been tolerating hydroxyurea without significant issues.    Review Of Systems:  Constitutional: Negative for fever, chills, and night sweats.  Skin: negative.  Eyes: negative.  Ears/Nose/Throat: negative.  Respiratory: No shortness of breath, dyspnea on exertion, cough, or hemoptysis.  Cardiovascular: negative.  Gastrointestinal: negative.  Genitourinary: negative.  Musculoskeletal: negative.  Neurologic: negative.  Psychiatric: negative.  Hematologic/Lymphatic/Immunologic: negative.  Endocrine: negative.    All other ROS negative unless mentioned in interval history.    Past medical, social, surgical, and family histories reviewed.    Allergies:  Allergies as of 02/27/2020 - Reviewed 02/27/2020   Allergen Reaction Noted     No known drug allergies  01/25/2005     Seasonal allergies  03/11/2019       Current Medications:  Current Outpatient Medications   Medication Sig Dispense Refill     aspirin (ASA) 325 MG tablet Take 1 tablet (325 mg) by mouth daily 30 tablet 0     atorvastatin (LIPITOR) 40 MG tablet Take 1 tablet (40 mg) by mouth daily 30 tablet 11     famotidine (PEPCID) 20 MG tablet Take 1 tablet (20 mg) by mouth 2 times daily 28 tablet 0     ferrous sulfate (FEROSUL) 325  "(65 Fe) MG tablet Take 1 tablet (325 mg) by mouth daily 30 tablet 0     furosemide (LASIX) 40 MG tablet Take 1 tablet in am and 0.5 tablet at 1 pm 45 tablet 1     HYDROcodone-acetaminophen (NORCO) 5-325 MG tablet Take 1 tablet by mouth every 4 hours as needed for pain 25 tablet 0     hydroxyurea (HYDREA) 500 MG capsule Take 1 capsule (500 mg) by mouth 2 times daily 60 capsule 3     methocarbamol (ROBAXIN) 500 MG tablet Take 1 tablet (500 mg) by mouth 4 times daily as needed for muscle spasms 20 tablet 0     metoprolol succinate ER (TOPROL-XL) 100 MG 24 hr tablet Take 1 tablet (100 mg) by mouth 2 times daily 60 tablet 3     potassium chloride ER (K-DUR/KLOR-CON M) 20 MEQ CR tablet Take 1 tablet (20 mEq) by mouth daily 30 tablet 0     senna-docusate (SENOKOT-S/PERICOLACE) 8.6-50 MG tablet Take 2 tablets by mouth 2 times daily 120 tablet 0     vitamin B-12 (CYANOCOBALAMIN) 250 MCG tablet Take 250 mcg by mouth daily          Physical Exam:  /72   Pulse 96   Temp 98.1  F (36.7  C) (Temporal)   Resp 18   Ht 1.727 m (5' 8\")   Wt 78.9 kg (174 lb)   SpO2 96%   BMI 26.46 kg/m    Wt Readings from Last 12 Encounters:   02/27/20 78.9 kg (174 lb)   02/25/20 78.9 kg (174 lb)   02/24/20 78.7 kg (173 lb 8 oz)   02/14/20 88.5 kg (195 lb)   02/11/20 89.4 kg (197 lb)   02/10/20 89.5 kg (197 lb 5 oz)   01/21/20 91.6 kg (202 lb)   01/16/20 91.8 kg (202 lb 6.4 oz)   01/15/20 93.4 kg (206 lb)   01/09/20 91.1 kg (200 lb 14.4 oz)   01/02/20 93.9 kg (207 lb)   12/27/19 91.4 kg (201 lb 6.4 oz)     ECOG performance status: 1  GENERAL APPEARANCE: Healthy, alert and in no acute distress.  HEENT: Sclerae anicteric. PERRLA. Oropharynx without ulcers, lesions, or thrush.  NECK: Supple. No asymmetry or masses.  LYMPHATICS: No palpable cervical, supraclavicular, axillary, or inguinal lymphadenopathy.  RESP: Lungs clear to auscultation bilaterally without rales, rhonchi or wheezes.  CARDIOVASCULAR: Regular rate and rhythm. Normal S1, S2; " no S3 or S4. No murmur, gallop, or rub.  ABDOMEN: Soft, nontender. Bowel sounds normal. No palpable organomegaly or masses.  MUSCULOSKELETAL: Extremities without gross deformities noted. No edema of bilateral lower extremities.  SKIN: No suspicious lesions or rashes.  NEURO: Alert and oriented x 3. Cranial nerves II-XII grossly intact.  PSYCHIATRIC: Mentation and affect appear normal.    Laboratory/Imaging Studies:  No visits with results within 2 Week(s) from this visit.   Latest known visit with results is:   Orders Only on 02/01/2020   Component Date Value Ref Range Status     WBC 02/13/2020 35.6* 4.0 - 11.0 10e9/L Final     RBC Count 02/13/2020 3.36* 4.4 - 5.9 10e12/L Final     Hemoglobin 02/13/2020 9.9* 13.3 - 17.7 g/dL Final     Hematocrit 02/13/2020 32.3* 40.0 - 53.0 % Final     MCV 02/13/2020 96  78 - 100 fl Final     MCH 02/13/2020 29.5  26.5 - 33.0 pg Final     MCHC 02/13/2020 30.7* 31.5 - 36.5 g/dL Final     RDW 02/13/2020 20.3* 10.0 - 15.0 % Final     Platelet Count 02/13/2020 380  150 - 450 10e9/L Final     Diff Method 02/13/2020 Manual Differential   Final     % Neutrophils 02/13/2020 73.3  % Final     % Lymphocytes 02/13/2020 10.3  % Final     % Monocytes 02/13/2020 4.3  % Final     % Eosinophils 02/13/2020 5.2  % Final     % Basophils 02/13/2020 0.0  % Final     % Metamyelocytes 02/13/2020 6.9  % Final     Nucleated RBCs 02/13/2020 21* 0 /100 Final     Absolute Neutrophil 02/13/2020 26.1* 1.6 - 8.3 10e9/L Final     Absolute Lymphocytes 02/13/2020 3.7  0.8 - 5.3 10e9/L Final     Absolute Monocytes 02/13/2020 1.5* 0.0 - 1.3 10e9/L Final     Absolute Eosinophils 02/13/2020 1.9* 0.0 - 0.7 10e9/L Final     Absolute Basophils 02/13/2020 0.0  0.0 - 0.2 10e9/L Final     Absolute Metamyelocytes 02/13/2020 2.5* 0 10e9/L Final     Absolute Nucleated RBC 02/13/2020 7.4   Final     Anisocytosis 02/13/2020 Moderate   Final     Polychromasia 02/13/2020 Moderate   Final     Microcytes 02/13/2020 Present   Final      Platelet Estimate 02/13/2020 Normal   Final        Recent Results (from the past 744 hour(s))   XR Chest Port 1 View    Narrative    CHEST PORTABLE ONE VIEW   1/31/2020 2:40 PM     HISTORY: CAB.    COMPARISON: 1/9/2020.      Impression    IMPRESSION: ET tube newly seen, its tip at the mid trachea. Right  Washington-Ezekiel catheter tip at the pulmonary artery origin region.  Nasogastric tube in place within the stomach. Stable cardiac  silhouette. Bibasilar atelectasis. Hypoinflated lungs.    ALEXIS FARIAS MD   XR Chest Port 1 View    Narrative    XR PORTABLE CHEST ONE VIEW   1/31/2020 4:34 PM     HISTORY: Postoperative coronary artery bypass graft.      COMPARISON: Chest x-ray 1/31/2020 at 1421 hours.      Impression    IMPRESSION: Stable ET tube lying above the brett. Stable nasogastric  tube and right neck Washington-Ezekiel catheter. Stable hypoinflated lungs. No  new airspace disease. Stable cardiac silhouette.    ALEXIS FARIAS MD   XR Chest Port 1 View    Narrative    EXAM: XR CHEST PORTABLE 1 VIEW  LOCATION: [facility]  DATE/TIME: 02/01/2020, 5:27 AM    INDICATION: Postop day #1 coronary artery bypass.  COMPARISON: 01/31/2020.    FINDINGS: Semiupright portable chest. ET tube 4.5 cm above the brett. Right IJ pulmonary artery catheter in place. NG tube passes into the stomach. Sternal wires and mediastinal clips. Mediastinal drain. There is no pneumothorax. The heart size is   normal. There is atelectasis at the lung bases.      Impression    IMPRESSION: Mild bibasilar atelectasis. No pneumothorax.     CT Head w/o Contrast    Narrative    CT SCAN OF THE HEAD WITHOUT CONTRAST   2/1/2020 6:11 PM     HISTORY: Left facial droop.    TECHNIQUE:  Axial images of the head and coronal reformations without  IV contrast material. Radiation dose for this scan was reduced using  automated exposure control, adjustment of the mA and/or kV according  to patient size, or iterative reconstruction technique.    COMPARISON:  None.    FINDINGS: There is no evidence of intracranial hemorrhage, mass, or  anomaly. The ventricles are normal in size, shape and configuration.  The brain parenchyma and subarachnoid spaces are normal.     The visualized portions of the sinuses and mastoids appear normal. The  bony calvarium and bones of the skull base appear intact.       Impression    IMPRESSION:   No evidence of acute intracranial hemorrhage, mass, or  herniation.    KELSEY ELI MD   CTA Head Neck with Contrast    Narrative    CT ANGIOGRAM OF THE HEAD AND NECK WITH CONTRAST  CT HEAD PERFUSION WITH CONTRAST  2/1/2020 6:20 PM     HISTORY: Left facial droop.    TECHNIQUE:  CT angiography with an injection of 70 mL Isovue-370      (accession [id number]), 50 mL Isovue-370 (accession [id number]) IV with  scans through the head and neck. Images were transferred to a separate  3-D workstation where multiplanar reformations and 3-D images were  created. Estimates of carotid stenoses are made relative to the distal  internal carotid artery diameters except as noted. Radiation dose for  this scan was reduced using automated exposure control, adjustment of  the mA and/or kV according to patient size, or iterative  reconstruction technique.     Perfusion scans were performed with injection of additional IV  contrast. These images were processed on a separate 3-D workstation.     COMPARISON: None.     CT HEAD FINDINGS: No contrast enhancing lesions. CT perfusion images  of the head are unremarkable.     CT ANGIOGRAM HEAD FINDINGS:  The major intracranial arteries including  the proximal branches of the anterior cerebral, middle cerebral, and  posterior cerebral arteries appear patent without vascular cutoff. No  aneurysm identified. No significant stenosis. Venous circulation is  unremarkable.     The A1 segment of the right anterior cerebral artery is not visualized  and is likely hypoplastic or congenitally absent. The A2 segment of  right anterior  cerebral artery is supplied by the patent anterior  communicating artery.    CT ANGIOGRAM NECK FINDINGS:   Normal origin of the great vessels from the aortic arch.     Right carotid artery: The right common and internal carotid arteries  are patent. Marked soft and calcified plaque at the carotid  bifurcation and proximal internal carotid artery resulting in  approximately 50% stenosis by NASCET criteria.     Left carotid artery: The left common and internal carotid arteries are  patent. Mild calcified atherosclerotic disease at the carotid  bifurcation without stenosis.     Vertebral arteries: Vertebral arteries are patent without evidence of  dissection. Mild stenosis at the origin the right vertebral artery  secondary to soft plaque.    Other findings: Partially visualized surgical changes of sternotomy  and coronary artery bypass grafting. Scattered foci of air in the  mediastinum and neck likely related to recent surgery.       Impression    IMPRESSION:   1. Patent proximal major intracranial arteries without vascular  cutoff. No significant intracranial stenosis or aneurysm.  2. Patent arteries in the neck without evidence of dissection.  3. Moderate atherosclerotic disease in the right carotid artery  resulting in approximately 50% stenosis by NASCET criteria. Mild  atherosclerotic disease in the left carotid artery without significant  stenosis.  4. Mild stenosis at the origin of the right vertebral artery secondary  to soft plaque.  5. Unremarkable CT perfusion images of the head.    Results discussed with nurse Delmi Conn at 6:35 PM on 2/1/2020.      KELSEY ELI MD   CT Head Perfusion w Contrast    Narrative    CT ANGIOGRAM OF THE HEAD AND NECK WITH CONTRAST  CT HEAD PERFUSION WITH CONTRAST  2/1/2020 6:20 PM     HISTORY: Left facial droop.    TECHNIQUE:  CT angiography with an injection of 70 mL Isovue-370      (accession [id number]), 50 mL Isovue-370 (accession [id number]) IV with  scans through the  head and neck. Images were transferred to a separate  3-D workstation where multiplanar reformations and 3-D images were  created. Estimates of carotid stenoses are made relative to the distal  internal carotid artery diameters except as noted. Radiation dose for  this scan was reduced using automated exposure control, adjustment of  the mA and/or kV according to patient size, or iterative  reconstruction technique.     Perfusion scans were performed with injection of additional IV  contrast. These images were processed on a separate 3-D workstation.     COMPARISON: None.     CT HEAD FINDINGS: No contrast enhancing lesions. CT perfusion images  of the head are unremarkable.     CT ANGIOGRAM HEAD FINDINGS:  The major intracranial arteries including  the proximal branches of the anterior cerebral, middle cerebral, and  posterior cerebral arteries appear patent without vascular cutoff. No  aneurysm identified. No significant stenosis. Venous circulation is  unremarkable.     The A1 segment of the right anterior cerebral artery is not visualized  and is likely hypoplastic or congenitally absent. The A2 segment of  right anterior cerebral artery is supplied by the patent anterior  communicating artery.    CT ANGIOGRAM NECK FINDINGS:   Normal origin of the great vessels from the aortic arch.     Right carotid artery: The right common and internal carotid arteries  are patent. Marked soft and calcified plaque at the carotid  bifurcation and proximal internal carotid artery resulting in  approximately 50% stenosis by NASCET criteria.     Left carotid artery: The left common and internal carotid arteries are  patent. Mild calcified atherosclerotic disease at the carotid  bifurcation without stenosis.     Vertebral arteries: Vertebral arteries are patent without evidence of  dissection. Mild stenosis at the origin the right vertebral artery  secondary to soft plaque.    Other findings: Partially visualized surgical changes of  sternotomy  and coronary artery bypass grafting. Scattered foci of air in the  mediastinum and neck likely related to recent surgery.       Impression    IMPRESSION:   1. Patent proximal major intracranial arteries without vascular  cutoff. No significant intracranial stenosis or aneurysm.  2. Patent arteries in the neck without evidence of dissection.  3. Moderate atherosclerotic disease in the right carotid artery  resulting in approximately 50% stenosis by NASCET criteria. Mild  atherosclerotic disease in the left carotid artery without significant  stenosis.  4. Mild stenosis at the origin of the right vertebral artery secondary  to soft plaque.  5. Unremarkable CT perfusion images of the head.    Results discussed with nurse Delmi Conn at 6:35 PM on 2/1/2020.      KELSEY ELI MD   XR Chest Port 1 View    Narrative    XR PORTABLE CHEST ONE VIEW   2/2/2020 8:25 AM     HISTORY: CAB    COMPARISON: 2/1/2020.      Impression    IMPRESSION: Removal of ET tube and nasogastric tube. Right neck  central venous sheath is in place. Increasing consolidation at the  left lung base. Cannot exclude increasing acute airspace disease.  Stable right lung. Cardiomegaly again suggested.    ALEXIS FARIAS MD   XR Chest Port 1 View    Narrative    EXAM: XR CHEST PORT 1 VW  LOCATION: [facility]  DATE/TIME: 2/3/2020 4:39 AM    INDICATION: Chest tube placement.  COMPARISON: 02/01/2020.    FINDINGS: Upright portable chest. Sternal wires and mediastinal clips. Right IJ cordis. No chest tube is evident. No pneumothorax. Probable bilateral pleural effusions. Calcified pleural plaque at the right lung base. Atelectasis at the left lung base.      Impression    IMPRESSION: Bilateral pleural effusions. Left basilar probable atelectasis.   XR Chest 2 Views    Narrative    CHEST TWO VIEWS February 4, 2020 6:31 AM     HISTORY: 69 year-old patient status post coronary artery bypass graft  and chest tube removal.       Impression     IMPRESSION: Since February 3, 2020, cardiomediastinal silhouette  remains enlarged. Suspect small bilateral pleural effusions, slightly  increased from previous exam. Scattered bibasilar opacities relatively  unchanged. No pneumothorax. Interval removal of right IJ sheath.    ANNA OREILLY MD   XR Video Swallow w/o Esophagram w/SLP or OT    Narrative    VIDEO SWALLOWING EVALUATION   2/4/2020 11:14 AM     HISTORY: Right CVA with CABG.    COMPARISON: None.    FLUOROSCOPY TIME: .7 minutes.  SPOT IMAGES OR CINE RUNS: 8      Impression    IMPRESSION:    Thin: Moderate penetration with the larger boluses and with straw. No  evidence of aspiration.    Nectar: No penetration or aspiration.    Honey: Not administered.    Pudding: No penetration or aspiration.    Semisolid: Not administered.    Solid: No penetration or aspiration.    This study only includes the cervical esophagus.    KELSEY ELI MD   XR Chest 2 Views    Narrative    CHEST TWO VIEWS February 11, 2020 12:41 PM     HISTORY: Shortness of breath, status post coronary artery bypass  graft.    COMPARISON: Chest x-rays dated 2/4/2020.    FINDINGS: Postop changes status post CABG are again noted. Cardiac  silhouette enlargement is again seen. There are bilateral pleural  fluid collections which are small to moderate-sized on the left and  small on the right. The right pleural fluid collection appears similar  in size to prior study. Left pleural fluid collection may have  slightly increased since the prior study. There is minimal vascular  congestion. No pneumothorax is identified. No acute fracture is seen.      Impression    IMPRESSION:  1. Slightly increased size of small to moderate-sized left pleural  fluid collection is noted. Right pleural fluid collection appears  slightly decreased in size. There appears to be minimal vascular  congestion. Along with cardiomegaly, vascular congestion and pleural  fluid collections indicate mild heart failure.  2.  Postoperative changes of the chest are again noted.    I discussed the possibility of mild congestive heart failure with Dr. Enrique on 2020 12:53 PM.    LEEANNA KAUR MD   XR Chest 2 Views    Narrative    CHEST TWO VIEWS 2020 2:50 PM     HISTORY: Pleural effusion    COMPARISON: Chest x-ray 2020       Impression    IMPRESSION: Sternotomy with CABG. Although the left heart border is  partially obscured, there is likely enlargement of the cardiac  silhouette. There is mild perihilar interstitial prominence, which may  be due to edema. A moderate left and small right pleural effusion are  not significantly changed. Compressive atelectasis in both lower  lungs. No pneumothorax.    KYE GONZALEZ MD   XR Chest 2 Views    Narrative    CHEST TWO VIEWS 2/15/2020 6:19 PM     HISTORY: Shortness of breath for 4 hours with intermittent chest  heaviness and pressure. History of recent bypass surgery.    COMPARISON: 2020, 2020       Impression    Impression: Stable minimal to moderate left basilar pleural fluid with  atelectasis. Minimal right basilar pleural fluid unchanged. Mild  cardiac enlargement. Normal pulmonary vascularity. Sternotomy.  Atherosclerotic vascular calcification. Minimal degenerative changes  in the spine.    CURT L BEHRNS, MD   Echocardiogram Limited    Narrative    [id number]  [id number]  [id number]  [id number]           [facility]  Echocardiography Laboratory  [address] [place] [address]        Name: ABI VALDEZ  MRN: [id number]  : 1950  Study Date: 2020 08:30 AM  Age: 69 yrs  Gender: Male  Patient Location: [facility]  Reason For Study: CABG  Ordering Physician: JESSICA HEMPHILL  Referring Physician: JESSICA EHMPHILL  Performed By: REY Ashraf     BSA: 2.0 m2  Height: 68 in  Weight: 181 lb  HR: 83  BP: 115/69 mmHg  _____________________________________________________________________________  __        Procedure  Optison (NDC  #5622-4414) given intravenously. Limited Portable Echo Adult.  _____________________________________________________________________________  __        Interpretation Summary     Technically difficult study.     The left ventricle is normal in structure, function and size.  Mildly decreased right ventricular systolic function with normal RV size.  No significant valve dysfunction. Aortic sclerosis present without stenosis.  No pulmonary hypertension.  Inferior vena cava not well visualized for estimation of right atrial  pressure.  There is no pericardial effusion.     Compared to prior study dated 1/9/2020, mild RV dysfunction otherwise no  change.  _____________________________________________________________________________  __        Left Ventricle  The left ventricle is normal in structure, function and size. The visual  ejection fraction is estimated at 60-65%. Left ventricular diastolic function  is normal. Normal left ventricular wall motion.     Right Ventricle  The right ventricle is normal size. Mildly decreased right ventricular  systolic function.     Atria  Normal left atrial size. Right atrial size is normal.     Mitral Valve  The mitral valve is normal in structure and function.        Tricuspid Valve  The tricuspid valve is normal in structure and function. The right ventricular  systolic pressure is approximated at 22.9 mmHg plus the right atrial pressure.  Right ventricular systolic pressure is normal. There is mild (1+) tricuspid  regurgitation.     Aortic Valve  The aortic valve is trileaflet with aortic valve sclerosis.     Pulmonic Valve  The pulmonic valve is not well visualized.     Vessels  The aortic root is normal size. Inferior vena cava not well visualized for  estimation of right atrial pressure.     Pericardium  There is no pericardial effusion.     _____________________________________________________________________________  __        Doppler Measurements & Calculations  TR max vlad:  239.0 cm/sec  TR max P.9 mmHg              _____________________________________________________________________________  __           Report approved by: Kalin Sewell 2020 01:45 PM      US Thoracentesis    Narrative    THORACENTESIS 2020 9:38 AM    HISTORY: Patient with history of postop left pleural effusion.    PROCEDURE/FINDINGS:  After obtaining informed consent, the patient was  positioned appropriately. The back was prepped and draped in the usual  sterile manner. Under ultrasound guidance, a Yueh needle was used to  access the pleural space. A permanent ultrasound image was saved to  document needle position. Thoracentesis was performed. Approximately  950 mL dark red/blood-tinged fluid was aspirated out. Fluid sample was  set aside for diagnostic testing.    The patient tolerated the procedure well. There were no immediate  postprocedure complications. The patient's vital signs were monitored  by radiology nursing staff under my supervision and remained stable  throughout the study.      Impression    IMPRESSION: Successful left thoracentesis performed with removal of  950 mL bloody fluid.    LEO MELTON MD   XR Chest 2 Views    Narrative    CHEST TWO VIEWS    2020 11:04 AM     HISTORY: Left pleural effusion. Shortness of breath.    COMPARISON: Chest x-ray 2/15/2020.      Impression    IMPRESSION: Two views of the chest are performed. Atelectasis left  lung base and small left pleural effusion appears stable compared to  prior exam. Probable mild elevation left hemidiaphragm. Lungs are  otherwise clear. No right pleural fluid. Heart size appears mildly  prominent, but is unchanged. Prior median sternotomy for CABG.  Degenerative lower thoracic spine changes are noted. No evidence of  pneumothorax.    EJ MARIE MD       Assessment and plan:    (D47.1) Myeloproliferative disorder (H)  I reviewed with the patient today most recent laboratory tests.  I would recommend  patient to continue on the hydroxyurea at the current dose.  We will continue to monitor CBC regularly and the dose of hydroxyurea will be adjusted accordingly.    (I10) Essential hypertension    Blood pressure is currently well controlled on metoprolol 100 mg orally daily.    The patient is ready to learn, no apparent learning barriers were identified.  Diagnosis and treatment plans were explained to the patient. The patient expressed understanding of the content. The patient asked appropriate questions. The patient questions were answered to his satisfaction.    Chart documentation with Dragon Voice recognition Software. Although reviewed after completion, some words and grammatical errors may remain.

## 2020-03-02 ENCOUNTER — HOSPITAL LABORATORY (OUTPATIENT)
Dept: NURSING HOME | Facility: OTHER | Age: 70
End: 2020-03-02

## 2020-03-02 ENCOUNTER — OFFICE VISIT (OUTPATIENT)
Dept: CARDIOLOGY | Facility: CLINIC | Age: 70
End: 2020-03-02
Payer: COMMERCIAL

## 2020-03-02 VITALS
HEIGHT: 68 IN | DIASTOLIC BLOOD PRESSURE: 69 MMHG | SYSTOLIC BLOOD PRESSURE: 119 MMHG | WEIGHT: 178 LBS | HEART RATE: 92 BPM | BODY MASS INDEX: 26.98 KG/M2

## 2020-03-02 DIAGNOSIS — Z95.1 S/P CABG (CORONARY ARTERY BYPASS GRAFT): Primary | ICD-10-CM

## 2020-03-02 LAB
ANION GAP SERPL CALCULATED.3IONS-SCNC: 7 MMOL/L (ref 3–14)
BUN SERPL-MCNC: 18 MG/DL (ref 7–30)
CALCIUM SERPL-MCNC: 8.6 MG/DL (ref 8.5–10.1)
CHLORIDE SERPL-SCNC: 106 MMOL/L (ref 94–109)
CO2 SERPL-SCNC: 26 MMOL/L (ref 20–32)
CREAT SERPL-MCNC: 1.08 MG/DL (ref 0.66–1.25)
ERYTHROCYTE [DISTWIDTH] IN BLOOD BY AUTOMATED COUNT: 21.2 % (ref 10–15)
GFR SERPL CREATININE-BSD FRML MDRD: 69 ML/MIN/{1.73_M2}
GLUCOSE SERPL-MCNC: 97 MG/DL (ref 70–99)
HCT VFR BLD AUTO: 36.2 % (ref 40–53)
HGB BLD-MCNC: 11 G/DL (ref 13.3–17.7)
MCH RBC QN AUTO: 30.4 PG (ref 26.5–33)
MCHC RBC AUTO-ENTMCNC: 30.4 G/DL (ref 31.5–36.5)
MCV RBC AUTO: 100 FL (ref 78–100)
PLATELET # BLD AUTO: 503 10E9/L (ref 150–450)
POTASSIUM SERPL-SCNC: 4.1 MMOL/L (ref 3.4–5.3)
RBC # BLD AUTO: 3.62 10E12/L (ref 4.4–5.9)
SODIUM SERPL-SCNC: 139 MMOL/L (ref 133–144)
WBC # BLD AUTO: 28.6 10E9/L (ref 4–11)

## 2020-03-02 ASSESSMENT — MIFFLIN-ST. JEOR: SCORE: 1541.9

## 2020-03-02 NOTE — PROGRESS NOTES
CV Surgery Post Op Follow Up Note:     Assessment/Plan:     Mr. Gannon is doing well in his post operative period. He has been receiving daily if not bid cardiac rehab at the TCU and finds himself gaining his strength back. He has not worn shoes yet as his LE swelling is coming down. I have asked that he carefully monitor his weight and surgical incisions for any sign of infection. He was educated on warning signs. He will continue to use his IS and walk once he goes home. He has follow up with his PCP and nice cardiologist Dr. Bentley in [place].     All of the patient's questions were answered. Our contact information was given to him if they should have any further questions/concerns. No further follow-up is needed with us.    S: From discharge summary: On 1/31/20 Mr Gannon successfully underwent Coronary artery bypass grafting x3 with left internal mammary artery to the left anterior descending, reverse saphenous vein graft to posterior descending artery, reverse saphenous vein graft to obtuse marginal 2 artery, endoscopic vein harvest from bilateral lower extremities, intraoperative SANDRA by Dr. Mable Barrera.   Was extubated within 24 hours of surgery.   He has a myeloproliferative disease (baseline WBC 50K). He is managed on hydroxyurea.   Had some transient hyperglycemia treated with an insulin gtt, transitioned to sliding scale insulin and has no need at discharge.  Had some trouble swallowing. SLP was consulted and worked with him. He is discharged on level 3 diet.   He had a code stroke called on pod#1 for facial droop. Head CT negative. Resolved at discharge.   Had some fluid overload treated with diuretic medication. At discharge he is at his pre-op weight but appears to be fluid overloaded (LE edema/blistering) and is sent with lasix and potassium.  He was recommended to go to TCU but his insurance denied him on fact that he could walk 4'. He will receive PT/OT/SLP at his apt. His sisters are also  able to help although they seem easily flustered at discharge teaching. I have given them our number to call with any questions.   Heart rhythm remained stable. He progressed slowly with cardiac rehab. They are discharged to home on pod# 10 with the help of their family.     Allergies:   Allergies   Allergen Reactions     No Known Drug Allergies      Seasonal Allergies        Medications:   Current Outpatient Medications:      aspirin (ASA) 325 MG tablet, Take 1 tablet (325 mg) by mouth daily, Disp: 30 tablet, Rfl: 0     atorvastatin (LIPITOR) 40 MG tablet, Take 1 tablet (40 mg) by mouth daily, Disp: 30 tablet, Rfl: 11     ferrous sulfate (FEROSUL) 325 (65 Fe) MG tablet, Take 1 tablet (325 mg) by mouth daily, Disp: 30 tablet, Rfl: 0     furosemide (LASIX) 40 MG tablet, Take 1 tablet in am and 0.5 tablet at 1 pm, Disp: 45 tablet, Rfl: 1     hydroxyurea (HYDREA) 500 MG capsule, Take 1 capsule (500 mg) by mouth 2 times daily, Disp: 60 capsule, Rfl: 3     metoprolol succinate ER (TOPROL-XL) 100 MG 24 hr tablet, Take 1 tablet (100 mg) by mouth 2 times daily, Disp: 60 tablet, Rfl: 3     potassium chloride ER (K-DUR/KLOR-CON M) 20 MEQ CR tablet, Take 1 tablet (20 mEq) by mouth daily, Disp: 30 tablet, Rfl: 0     senna-docusate (SENOKOT-S/PERICOLACE) 8.6-50 MG tablet, Take 2 tablets by mouth 2 times daily, Disp: 120 tablet, Rfl: 0     vitamin B-12 (CYANOCOBALAMIN) 250 MCG tablet, Take 250 mcg by mouth daily, Disp: , Rfl:      famotidine (PEPCID) 20 MG tablet, Take 1 tablet (20 mg) by mouth 2 times daily (Patient not taking: Reported on 3/2/2020), Disp: 28 tablet, Rfl: 0     HYDROcodone-acetaminophen (NORCO) 5-325 MG tablet, Take 1 tablet by mouth every 4 hours as needed for pain (Patient not taking: Reported on 3/2/2020), Disp: 25 tablet, Rfl: 0     methocarbamol (ROBAXIN) 500 MG tablet, Take 1 tablet (500 mg) by mouth 4 times daily as needed for muscle spasms (Patient not taking: Reported on 3/2/2020), Disp: 20 tablet, Rfl:  "0    Physical Exam: /69   Pulse 92   Ht 1.727 m (5' 8\")   Wt 80.7 kg (178 lb)   BMI 27.06 kg/m    Constitutional: healthy, alert and no distress  Lungs: slt diminished on L  CV: s1/s2, no m/r/g  Sternum: cdi, so dehiscence at superior portion, cleansed and steri strips applied  Extremities: no LE edema        Suzy Chacon PA-C  CV Surgery  [facility]  Pager: [phone]    "

## 2020-03-02 NOTE — PATIENT INSTRUCTIONS
Date of Surgery: 1/31/20    Driving: Make decision with family    10 lbs weight restriction ends: 3/27/20     Cardiology: 4/22/20 at 11 am with Dr. Bentley in [place]

## 2020-03-03 ENCOUNTER — DISCHARGE SUMMARY NURSING HOME (OUTPATIENT)
Dept: GERIATRICS | Facility: CLINIC | Age: 70
End: 2020-03-03
Payer: COMMERCIAL

## 2020-03-03 VITALS
TEMPERATURE: 96.2 F | WEIGHT: 173.5 LBS | HEART RATE: 89 BPM | SYSTOLIC BLOOD PRESSURE: 106 MMHG | OXYGEN SATURATION: 97 % | DIASTOLIC BLOOD PRESSURE: 55 MMHG | RESPIRATION RATE: 20 BRPM | BODY MASS INDEX: 26.3 KG/M2 | HEIGHT: 68 IN

## 2020-03-03 DIAGNOSIS — R23.8 BLISTERS OF MULTIPLE SITES: ICD-10-CM

## 2020-03-03 DIAGNOSIS — D64.9 ANEMIA, UNSPECIFIED TYPE: ICD-10-CM

## 2020-03-03 DIAGNOSIS — I10 ESSENTIAL HYPERTENSION: ICD-10-CM

## 2020-03-03 DIAGNOSIS — J90 EXUDATIVE PLEURAL EFFUSION: Primary | ICD-10-CM

## 2020-03-03 DIAGNOSIS — M62.81 GENERALIZED MUSCLE WEAKNESS: ICD-10-CM

## 2020-03-03 DIAGNOSIS — I25.810 CORONARY ARTERY DISEASE INVOLVING CORONARY BYPASS GRAFT OF NATIVE HEART WITHOUT ANGINA PECTORIS: ICD-10-CM

## 2020-03-03 DIAGNOSIS — Z95.1 S/P CABG (CORONARY ARTERY BYPASS GRAFT): ICD-10-CM

## 2020-03-03 DIAGNOSIS — D47.1 MYELOPROLIFERATIVE DISORDER (H): ICD-10-CM

## 2020-03-03 DIAGNOSIS — K59.01 SLOW TRANSIT CONSTIPATION: ICD-10-CM

## 2020-03-03 PROCEDURE — 99316 NF DSCHRG MGMT 30 MIN+: CPT | Performed by: NURSE PRACTITIONER

## 2020-03-03 RX ORDER — ACETAMINOPHEN 325 MG/1
650 TABLET ORAL EVERY 4 HOURS PRN
COMMUNITY
End: 2020-08-17

## 2020-03-03 ASSESSMENT — MIFFLIN-ST. JEOR: SCORE: 1521.49

## 2020-03-03 NOTE — LETTER
3/3/2020        RE: Renny Gannon  [address]  [facility] [address]        [place] GERIATRIC SERVICES DISCHARGE SUMMARY  PATIENT'S NAME: Renny Gannon  YOB: 1950  MEDICAL RECORD NUMBER:  [id number]  Place of Service where encounter took place:  [facility] [place] ([facility]) [[id number]]    PRIMARY CARE PROVIDER AND CLINIC RESPONSIBLE AFTER TRANSFER:   Angus Clements Mai, MD, [address] [facility] [address]    [facility] Provider     Transferring providers: UNA Carmichael CNP, Zee Calvin MD  Recent Hospitalization/ED:  [facility] Hospital stay 2/15/20 to 2/24/20.  Date of SNF Admission: February / 24 / 2020  Date of SNF (anticipated) Discharge: March / 05 / 2020  Discharged to: previous independent home  Cognitive Scores: BIMS: 15/15  Physical Function: ambulating independently with walker  DME: Walker    CODE STATUS/ADVANCE DIRECTIVES DISCUSSION:  DNR / DNI   ALLERGIES: No known drug allergies and Seasonal allergies    DISCHARGE DIAGNOSIS/NURSING FACILITY COURSE:   Exudative pleural effusion  Renny came to [facility] for rehab after presenting to the ED with SOB after having a CABG x3 on 1/31/2020.  He was found to have a moderate exudative left pleural effusion s/p thoracentesis on 2/17/2020.  Has not had any symptoms since admission.  Has been attending therapies with goal of returning home.  Did see the surgeon on 3/2/2020.  Was asked to monitor his weight and then signs of infection at his incision sites.  No need to follow up with him anymore.  To follow regular cardiologist in [place].    - HOME CARE NURSING REFERRAL    Myeloproliferative disorder (H)  Is followed by oncology.  Labs monitored here for general reasons.  WBC elevated but stable.  Saw Dr. Cristina on 2/27/2020  - hydroxyurea (HYDREA) 500 MG capsule; Take 2 capsules (1,000 mg) by mouth daily  - HOME CARE NURSING REFERRAL    Generalized muscle weakness  Swelling getting better.  Using  a walker for ambulation.  Has been wearing gripper socks due to not able to get shoes on.  He feels he has gotten stronger as his voice and conversation has as well.  - HOME CARE NURSING REFERRAL    Coronary artery disease involving coronary bypass graft of native heart without angina pectoris  S/P CABG (coronary artery bypass graft)  Incisions monitored and healing without infection.    Remains on ASA 325mg daily, atorvastatin 40mg daily  - HOME CARE NURSING REFERRAL  1.  Discontinue Norco and Robaxin as no use.      Essential hypertension  Blood pressures range from 110-120's/50-60's.  Remains on metoprolol succinate 100mg twice a day.  No changes.  - HOME CARE NURSING REFERRAL  He does have Lasix 40mg in AM and 20mg in afternoon with K+ replacement.  Weight staying around 173 174 lbs.    Anemia, unspecified type  Remains on FeSO4 325mg daily.  No changes.  Is on vitamin B12 250mcg daily.  Lab Results   Component Value Date    HGB 11.0 03/02/2020    HGB 10.9 02/27/2020       Slow transit constipation  Does take Senna-S 2 tabs twice a day.  He had no complaints today.    Blisters of multiple sites  Dried and healing.  One on admission was moist in appearance and doing better now.  The blisters were from the amount of swelling he had.        Past Medical History:  has a past medical history of Hypertension. He also has no past medical history of PONV (postoperative nausea and vomiting).    Discharge Medications:    Current Outpatient Medications   Medication Sig Dispense Refill     acetaminophen (TYLENOL) 325 MG tablet Take 650 mg by mouth every 4 hours as needed for mild pain       aspirin (ASA) 325 MG tablet Take 1 tablet (325 mg) by mouth daily 30 tablet 0     atorvastatin (LIPITOR) 40 MG tablet Take 1 tablet (40 mg) by mouth daily 30 tablet 11     ferrous sulfate (FEROSUL) 325 (65 Fe) MG tablet Take 1 tablet (325 mg) by mouth daily 30 tablet 0     furosemide (LASIX) 40 MG tablet Take 1 tablet in am and 0.5  "tablet at 1 pm 45 tablet 1     hydroxyurea (HYDREA) 500 MG capsule Take 2 capsules (1,000 mg) by mouth daily 60 capsule 0     metoprolol succinate ER (TOPROL-XL) 100 MG 24 hr tablet Take 1 tablet (100 mg) by mouth 2 times daily 60 tablet 3     potassium chloride ER (K-DUR/KLOR-CON M) 20 MEQ CR tablet Take 1 tablet (20 mEq) by mouth daily 30 tablet 0     senna-docusate (SENOKOT-S/PERICOLACE) 8.6-50 MG tablet Take 2 tablets by mouth 2 times daily 120 tablet 0     vitamin B-12 (CYANOCOBALAMIN) 250 MCG tablet Take 250 mcg by mouth daily         Medication Changes/Rationale:   2/25/20 PT to eval and treat for therapeutic exercise, functional activity, gait training, and neuro re-ed  2/25/20  Change 4oz Plus 2 drink to 4oz mighty shake BID between meals.  Diet clarification:  MONI  2/25/20  CBC and BMP next Monday - CAD, HTN  2/27/20  tx for feet:  Left foot leave open to air.  Right foot - cleanse with NS, apply non-stick and cover with Kerlix.  Weave Intra dry to between toes, Lymph edema warps to legs on in AM off in PM  2/27/2020  Dr. Cristina - increase Hydroxyura to 2 tabs daily.  CBC weekly, follow up in one month.  2/28/2020  Add dx of malnutrition  3/2/20  Stop famotidine and iron can stop after 30 days - follow up with [facility] clinics.  3/2/2020  OK to discharge home with medications.  [facility]:  RN for vitals, skin checks and cardiac assessment, HHA for bathing, PT/OT eval and treat.    Discontinue Robaxin & Norco.    Tylenol 650 mg po Q4H PRN.    Discontinue Pepcid.    Controlled medications sent with patient:   not applicable/none     ROS:   4 point ROS including Respiratory, CV, GI and , other than that noted in the HPI,  is negative    Physical Exam:   Vitals: /55   Pulse 89   Temp 96.2  F (35.7  C)   Resp 20   Ht 1.727 m (5' 8\")   Wt 78.7 kg (173 lb 8 oz)   SpO2 97%   BMI 26.38 kg/m    BMI= Body mass index is 26.38 kg/m .  GENERAL APPEARANCE:  Alert, in no distress, appears healthy, " oriented  EYES:  EOM, conjunctivae, lids, pupils and irises normal, PERRL  RESP:  respiratory effort and palpation of chest normal, lungs clear to auscultation , no respiratory distress  CV:  Palpation and auscultation of heart done , regular rate and rhythm, no murmur, rub, or gallop, +1 edema bilaterally  ABDOMEN:  normal bowel sounds, soft, nontender, no hepatosplenomegaly or other masses, no guarding or rebound  M/S:   Gait and station abnormal using walker for ambulation  SKIN:  feet are covered but blisters are resolving  NEURO:   Cranial nerves 2-12 are normal tested and grossly at patient's baseline  PSYCH:  oriented X 3, normal insight, judgement and memory, affect and mood normal     SNF labs:   Most Recent 3 CBC's:  Recent Labs   Lab Test 03/02/20  0730 02/27/20  1413 02/20/20  0733   WBC 28.6* 36.1* 18.2*   HGB 11.0* 10.9* 10.2*    97 98   * 505* 281     Most Recent 3 BMP's:  Recent Labs   Lab Test 03/02/20  0730 02/24/20  0806 02/23/20  0728  02/21/20  0736 02/20/20  0733     --   --   --  139 138   POTASSIUM 4.1  --   --   --  4.5 4.3   CHLORIDE 106  --   --   --  107 108   CO2 26  --   --   --  29 27   BUN 18  --   --   --  24 25   CR 1.08 1.17 1.10   < > 1.22 1.13   ANIONGAP 7  --   --   --  3 3   MINNIE 8.6  --   --   --  9.3 9.1   GLC 97  --   --   --  100* 101*    < > = values in this interval not displayed.         DISCHARGE PLAN:    Follow up labs: weekly CBC via direction of oncology    Medical Follow Up:      Follow up with primary care provider in 1-2 weeks    MT referral needed and placed by this provider: No    Current [place] scheduled appointments:  Next 5 appointments (look out 90 days)    Mar 26, 2020  2:30 PM CDT  [facility] [address] [place] [id number] [phone]   Apr 22, 2020 11:00 AM CDT  Return Visit with Devin Bentley MD  [facility]  [facility] ([facility] [address] [place] [id number] [phone]           Discharge Services: Home Care:  Occupational Therapy, Physical Therapy, Registered Nurse, Home Health Aide and From:  [facility]    Discharge Instructions Verbalized to Patient at Discharge:     Weight bearing restrictions:  Weight bearing as tolerated.       TOTAL DISCHARGE TIME:   Greater than 30 minutes  Electronically signed by:  UNA Carmichael CNP                           Sincerely,        UNA Carmichael CNP

## 2020-03-03 NOTE — PROGRESS NOTES
[place] GERIATRIC SERVICES DISCHARGE SUMMARY  PATIENT'S NAME: Renny Gannon  YOB: 1950  MEDICAL RECORD NUMBER:  [id number]  Place of Service where encounter took place:  [facility] [place] ([facility]) [[id number]]    PRIMARY CARE PROVIDER AND CLINIC RESPONSIBLE AFTER TRANSFER:   Angus Clements Mai, MD, [address] [facility]  / [place] [address]    G Provider     Transferring providers: UNA Carmichael CNP, Zee Calvin MD  Recent Hospitalization/ED:  [facility] Hospital stay 2/15/20 to 2/24/20.  Date of SNF Admission: February / 24 / 2020  Date of SNF (anticipated) Discharge: March / 05 / 2020  Discharged to: previous independent home  Cognitive Scores: BIMS: 15/15  Physical Function: ambulating independently with walker  DME: Walker    CODE STATUS/ADVANCE DIRECTIVES DISCUSSION:  DNR / DNI   ALLERGIES: No known drug allergies and Seasonal allergies    DISCHARGE DIAGNOSIS/NURSING FACILITY COURSE:   Exudative pleural effusion  Renny came to [facility] for rehab after presenting to the ED with SOB after having a CABG x3 on 1/31/2020.  He was found to have a moderate exudative left pleural effusion s/p thoracentesis on 2/17/2020.  Has not had any symptoms since admission.  Has been attending therapies with goal of returning home.  Did see the surgeon on 3/2/2020.  Was asked to monitor his weight and then signs of infection at his incision sites.  No need to follow up with him anymore.  To follow regular cardiologist in [place].    - HOME CARE NURSING REFERRAL    Myeloproliferative disorder (H)  Is followed by oncology.  Labs monitored here for general reasons.  WBC elevated but stable.  Saw Dr. Cristina on 2/27/2020  - hydroxyurea (HYDREA) 500 MG capsule; Take 2 capsules (1,000 mg) by mouth daily  - HOME CARE NURSING REFERRAL    Generalized muscle weakness  Swelling getting better.  Using a walker for ambulation.  Has been wearing gripper socks due to not able to get shoes  on.  He feels he has gotten stronger as his voice and conversation has as well.  - HOME CARE NURSING REFERRAL    Coronary artery disease involving coronary bypass graft of native heart without angina pectoris  S/P CABG (coronary artery bypass graft)  Incisions monitored and healing without infection.    Remains on ASA 325mg daily, atorvastatin 40mg daily  - HOME CARE NURSING REFERRAL  1.  Discontinue Norco and Robaxin as no use.      Essential hypertension  Blood pressures range from 110-120's/50-60's.  Remains on metoprolol succinate 100mg twice a day.  No changes.  - HOME CARE NURSING REFERRAL  He does have Lasix 40mg in AM and 20mg in afternoon with K+ replacement.  Weight staying around 173 174 lbs.    Anemia, unspecified type  Remains on FeSO4 325mg daily.  No changes.  Is on vitamin B12 250mcg daily.  Lab Results   Component Value Date    HGB 11.0 03/02/2020    HGB 10.9 02/27/2020       Slow transit constipation  Does take Senna-S 2 tabs twice a day.  He had no complaints today.    Blisters of multiple sites  Dried and healing.  One on admission was moist in appearance and doing better now.  The blisters were from the amount of swelling he had.        Past Medical History:  has a past medical history of Hypertension. He also has no past medical history of PONV (postoperative nausea and vomiting).    Discharge Medications:    Current Outpatient Medications   Medication Sig Dispense Refill     acetaminophen (TYLENOL) 325 MG tablet Take 650 mg by mouth every 4 hours as needed for mild pain       aspirin (ASA) 325 MG tablet Take 1 tablet (325 mg) by mouth daily 30 tablet 0     atorvastatin (LIPITOR) 40 MG tablet Take 1 tablet (40 mg) by mouth daily 30 tablet 11     ferrous sulfate (FEROSUL) 325 (65 Fe) MG tablet Take 1 tablet (325 mg) by mouth daily 30 tablet 0     furosemide (LASIX) 40 MG tablet Take 1 tablet in am and 0.5 tablet at 1 pm 45 tablet 1     hydroxyurea (HYDREA) 500 MG capsule Take 2 capsules (1,000  "mg) by mouth daily 60 capsule 0     metoprolol succinate ER (TOPROL-XL) 100 MG 24 hr tablet Take 1 tablet (100 mg) by mouth 2 times daily 60 tablet 3     potassium chloride ER (K-DUR/KLOR-CON M) 20 MEQ CR tablet Take 1 tablet (20 mEq) by mouth daily 30 tablet 0     senna-docusate (SENOKOT-S/PERICOLACE) 8.6-50 MG tablet Take 2 tablets by mouth 2 times daily 120 tablet 0     vitamin B-12 (CYANOCOBALAMIN) 250 MCG tablet Take 250 mcg by mouth daily         Medication Changes/Rationale:   2/25/20 PT to eval and treat for therapeutic exercise, functional activity, gait training, and neuro re-ed  2/25/20  Change 4oz Plus 2 drink to 4oz mighty shake BID between meals.  Diet clarification:  MONI  2/25/20  CBC and BMP next Monday - CAD, HTN  2/27/20  tx for feet:  Left foot leave open to air.  Right foot - cleanse with NS, apply non-stick and cover with Kerlix.  Weave Intra dry to between toes, Lymph edema warps to legs on in AM off in PM  2/27/2020  Dr. Cristina - increase Hydroxyura to 2 tabs daily.  CBC weekly, follow up in one month.  2/28/2020  Add dx of malnutrition  3/2/20  Stop famotidine and iron can stop after 30 days - follow up with [facility] clinics.  3/2/2020  OK to discharge home with medications.  [facility]:  RN for vitals, skin checks and cardiac assessment, HHA for bathing, PT/OT eval and treat.    Discontinue Robaxin & Norco.    Tylenol 650 mg po Q4H PRN.    Discontinue Pepcid.    Controlled medications sent with patient:   not applicable/none     ROS:   4 point ROS including Respiratory, CV, GI and , other than that noted in the HPI,  is negative    Physical Exam:   Vitals: /55   Pulse 89   Temp 96.2  F (35.7  C)   Resp 20   Ht 1.727 m (5' 8\")   Wt 78.7 kg (173 lb 8 oz)   SpO2 97%   BMI 26.38 kg/m    BMI= Body mass index is 26.38 kg/m .  GENERAL APPEARANCE:  Alert, in no distress, appears healthy, oriented  EYES:  EOM, conjunctivae, lids, pupils and irises normal, PERRL  RESP:  respiratory " effort and palpation of chest normal, lungs clear to auscultation , no respiratory distress  CV:  Palpation and auscultation of heart done , regular rate and rhythm, no murmur, rub, or gallop, +1 edema bilaterally  ABDOMEN:  normal bowel sounds, soft, nontender, no hepatosplenomegaly or other masses, no guarding or rebound  M/S:   Gait and station abnormal using walker for ambulation  SKIN:  feet are covered but blisters are resolving  NEURO:   Cranial nerves 2-12 are normal tested and grossly at patient's baseline  PSYCH:  oriented X 3, normal insight, judgement and memory, affect and mood normal     SNF labs:   Most Recent 3 CBC's:  Recent Labs   Lab Test 03/02/20  0730 02/27/20  1413 02/20/20  0733   WBC 28.6* 36.1* 18.2*   HGB 11.0* 10.9* 10.2*    97 98   * 505* 281     Most Recent 3 BMP's:  Recent Labs   Lab Test 03/02/20  0730 02/24/20  0806 02/23/20  0728  02/21/20  0736 02/20/20  0733     --   --   --  139 138   POTASSIUM 4.1  --   --   --  4.5 4.3   CHLORIDE 106  --   --   --  107 108   CO2 26  --   --   --  29 27   BUN 18  --   --   --  24 25   CR 1.08 1.17 1.10   < > 1.22 1.13   ANIONGAP 7  --   --   --  3 3   MINNIE 8.6  --   --   --  9.3 9.1   GLC 97  --   --   --  100* 101*    < > = values in this interval not displayed.         DISCHARGE PLAN:    Follow up labs: weekly CBC via direction of oncology    Medical Follow Up:      Follow up with primary care provider in 1-2 weeks    [facility] referral needed and placed by this provider: No    Current [place] scheduled appointments:  Next 5 appointments (look out 90 days)    Mar 26, 2020  2:30 PM CDT  [facility] [address] [place] [id number] [phone]   Apr 22, 2020 11:00 AM CDT  Return Visit with Devin Bentley MD  [facility] ([facility]) [address] [facility]  [address]  [phone]           Discharge Services: Home Care:  Occupational Therapy, Physical Therapy, Registered Nurse, Home Health Aide and From:  [facility]    Discharge Instructions Verbalized to Patient at Discharge:     Weight bearing restrictions:  Weight bearing as tolerated.       TOTAL DISCHARGE TIME:   Greater than 30 minutes  Electronically signed by:  UNA Carmichael CNP                        home

## 2020-03-04 ENCOUNTER — TELEPHONE (OUTPATIENT)
Dept: FAMILY MEDICINE | Facility: CLINIC | Age: 70
End: 2020-03-04

## 2020-03-04 NOTE — TELEPHONE ENCOUNTER
Reason for Call:  March 16 Appointment, Requested Provider:  Angus Scott MD     PCP: Angus Scott    Reason for visit: Nursing home discharge. Pt getting discharged tomorrow 3.5. Please advise if pt can be seen on 3.16.    Duration of symptoms: Rehab unit since 2.24.    Have you been treated for this in the past? No    Additional comments:     Can we leave a detailed message on this number? YES    Phone number patient can be reached at: Home number on file [phone] (home)    Best Time:     Call taken on 3/4/2020 at 9:38 AM by Kaylee Zabala

## 2020-03-05 RX ORDER — HYDROXYUREA 500 MG/1
1000 CAPSULE ORAL DAILY
Qty: 60 CAPSULE | Refills: 0
Start: 2020-03-05 | End: 2020-03-24

## 2020-03-06 ENCOUNTER — HOSPITAL LABORATORY (OUTPATIENT)
Dept: NURSING HOME | Facility: OTHER | Age: 70
End: 2020-03-06

## 2020-03-09 ENCOUNTER — TELEPHONE (OUTPATIENT)
Dept: FAMILY MEDICINE | Facility: CLINIC | Age: 70
End: 2020-03-09

## 2020-03-09 DIAGNOSIS — K21.9 GASTROESOPHAGEAL REFLUX DISEASE WITHOUT ESOPHAGITIS: Primary | ICD-10-CM

## 2020-03-09 DIAGNOSIS — Z71.89 COUNSELING AND COORDINATION OF CARE: Primary | ICD-10-CM

## 2020-03-09 NOTE — TELEPHONE ENCOUNTER
Patient informed and verbalized understanding. No questions at this time. Ashley Busby LPN........3/9/2020 3:20 PM

## 2020-03-09 NOTE — TELEPHONE ENCOUNTER
Fax from [facility] states the following:    Famotidine 20 mg, #28, 1 tablet twice a day, after hospital discharge.     *H2 blockers including famotidine are in short supply or not available due to recalls. Please consider an alternative therapy. Thank you.

## 2020-03-09 NOTE — TELEPHONE ENCOUNTER
.Patient called to schedule an appointment for a hospital follow-up or appeared on a report showing that they were recently discharged from the hospital.    Patient was admitted to :  Geriatric services  Discharged date: 3/05/20  Reason for hospital admission: Exudative Pleural Effusion  Does patient have future appointment scheduled with provider? Yes Dr Scott  Date of future appointment:  3/16/20      This information will be used to help the care team plan for the patients upcoming visit.  The triage RN may determine that a follow up call is necessary and reach out to the patient via the phone number listed in the chart.     Please route this message on routine priority to the Triage RN pool.

## 2020-03-10 ENCOUNTER — TELEPHONE (OUTPATIENT)
Dept: ONCOLOGY | Facility: CLINIC | Age: 70
End: 2020-03-10

## 2020-03-10 ENCOUNTER — DOCUMENTATION ONLY (OUTPATIENT)
Dept: CARE COORDINATION | Facility: CLINIC | Age: 70
End: 2020-03-10

## 2020-03-10 ENCOUNTER — PATIENT OUTREACH (OUTPATIENT)
Dept: CARE COORDINATION | Facility: CLINIC | Age: 70
End: 2020-03-10

## 2020-03-10 NOTE — TELEPHONE ENCOUNTER
Routing to CC for review, patient was discharged from TCU after hospital stay.     [place] RN

## 2020-03-10 NOTE — TELEPHONE ENCOUNTER
CC orders placed, will have CHW out reach patient introduced care coordination for potential enrollment.    Barbara BARRAZA, RN, PHN, CCM  Primary Clinic Care Coordination    [facility]  [email or web address]   Office: [phone]  Employed by [facility]

## 2020-03-10 NOTE — TELEPHONE ENCOUNTER
Writing nurse calling to see if patient is willing to move appointment to 3/24/20 with the PA.  Patient agrees and appointment rescheduled.    Ki Lamb RN, BSN, OCN  3/10/2020, 10:59 AM

## 2020-03-10 NOTE — PROGRESS NOTES
Scheduled Follow Up Call: Attempt 1 Community Health Worker called and left a message for the patient. If the patient is returning my call, please transfer the patient to Mickie FERRER at [phone].        Reason for Referral: Care Transition: TCU discharge

## 2020-03-10 NOTE — PROGRESS NOTES
[facility] now requests orders and shares plan of care/discharge summaries for some patients through [employer].  Please REPLY TO THIS MESSAGE OR ROUTE BACK TO THE AUTHOR in order to give authorization for orders when needed.  This is considered a verbal order, you will still receive a faxed copy of orders for signature.  Thank you for your assistance in improving collaboration for our patients.    ORDER    Okay to discontinue pepcid?    Tried to order Pepcid, there is a shortage at pharmacy. Edu pt he can buy OTC, states he doesn't want it any more, therefore need okay to discontinue.     Thank you!  Claudine Leary RN  [email or web address]  [phone]

## 2020-03-11 ENCOUNTER — PATIENT OUTREACH (OUTPATIENT)
Dept: CARE COORDINATION | Facility: CLINIC | Age: 70
End: 2020-03-11

## 2020-03-11 NOTE — PROGRESS NOTES
The clinic Community Health Worker spoke with the patient today at the request of the PCP to discuss possible Clinic Care Coordination enrollment.  The service was described to the patient and immediate needs were discussed.  The patient declined enrollment at this time.  The PCP is encouraged to refer in the future if the patient's needs change.    The patient said that he is doing good being back home. He cant think of anything that he is having issues with at this time. He will keep CHW information and call if anything comes up.     Reason for Referral: Care Transition: TCU discharge

## 2020-03-12 ENCOUNTER — TELEPHONE (OUTPATIENT)
Dept: FAMILY MEDICINE | Facility: CLINIC | Age: 70
End: 2020-03-12

## 2020-03-12 NOTE — TELEPHONE ENCOUNTER
Reason for Call:  Form, our goal is to have forms completed with 72 hours, however, some forms may require a visit or additional information.    Type of letter, form or note:  Home Health Certification    Who is the form from?: Home care    Where did the form come from: form was faxed in    What clinic location was the form placed at?: [facility]    Where the form was placed: Given to MA/RN    What number is listed as a contact on the form?: [phone]       Additional comments: [phone] home care  [phone] HIMS    Call taken on 3/12/2020 at 2:46 PM by Susan Sorensen

## 2020-03-15 ENCOUNTER — TELEPHONE (OUTPATIENT)
Dept: FAMILY MEDICINE | Facility: CLINIC | Age: 70
End: 2020-03-15

## 2020-03-15 NOTE — TELEPHONE ENCOUNTER
Talked to patient over the phone.  Stated that he is good, does not feel the need to be seen tomorrow and is okay to reschedule.  Please call to reschedule him for follow-up.  He was also informed that if he feels the need to be seen, I am happy to see him anytime.

## 2020-03-20 ENCOUNTER — DOCUMENTATION ONLY (OUTPATIENT)
Dept: FAMILY MEDICINE | Facility: CLINIC | Age: 70
End: 2020-03-20

## 2020-03-20 NOTE — PROGRESS NOTES
[facility] utilizes an encounter to take the place of a direct phone call to your office. Please take a moment to review the below request. Please reply or route message to author of this encounter.  Message will act as a verbal OK of orders requested below. Thank you.    ORDER  Recommend change in plan of care for wound on the sterum, right and left upper abdomen, right dorsal foot, left medial lower leg and left foot 5 th toe. Leave open to air, cleanse with daily ADLs, ok to cover with dry dressing if drainage occurs.      MD SUMMARY/PLAN OF CARE  Hello, I assessed all of Renny's wounds and they are all dry, stable, no signs of infection, no drainage.  Recommend to leave open to air and only cover for signs of drainage or to protect sites from friction is any noted with clothing.   IF the above orders are ok please reply via Epic and I will enter new home care orders.  Thank you  Fernando Boykin RN [email or web address]

## 2020-03-23 DIAGNOSIS — D47.1 MYELOPROLIFERATIVE DISORDER (H): ICD-10-CM

## 2020-03-23 LAB
ANISOCYTOSIS BLD QL SMEAR: ABNORMAL
BASOPHILS # BLD AUTO: 0.4 10E9/L (ref 0–0.2)
BASOPHILS NFR BLD AUTO: 4 %
DIFFERENTIAL METHOD BLD: ABNORMAL
EOSINOPHIL # BLD AUTO: 0.6 10E9/L (ref 0–0.7)
EOSINOPHIL NFR BLD AUTO: 6 %
ERYTHROCYTE [DISTWIDTH] IN BLOOD BY AUTOMATED COUNT: 23.1 % (ref 10–15)
HCT VFR BLD AUTO: 33.1 % (ref 40–53)
HGB BLD-MCNC: 10.2 G/DL (ref 13.3–17.7)
LYMPHOCYTES # BLD AUTO: 1 10E9/L (ref 0.8–5.3)
LYMPHOCYTES NFR BLD AUTO: 11 %
MCH RBC QN AUTO: 30.9 PG (ref 26.5–33)
MCHC RBC AUTO-ENTMCNC: 30.8 G/DL (ref 31.5–36.5)
MCV RBC AUTO: 100 FL (ref 78–100)
METAMYELOCYTES # BLD: 0.4 10E9/L
METAMYELOCYTES NFR BLD MANUAL: 4 %
MONOCYTES # BLD AUTO: 0.9 10E9/L (ref 0–1.3)
MONOCYTES NFR BLD AUTO: 10 %
MYELOCYTES # BLD: 0.3 10E9/L
MYELOCYTES NFR BLD MANUAL: 3 %
NEUTROPHILS # BLD AUTO: 5.8 10E9/L (ref 1.6–8.3)
NEUTROPHILS NFR BLD AUTO: 62 %
PLATELET # BLD AUTO: 478 10E9/L (ref 150–450)
PLATELET # BLD EST: ABNORMAL 10*3/UL
POIKILOCYTOSIS BLD QL SMEAR: ABNORMAL
POLYCHROMASIA BLD QL SMEAR: SLIGHT
RBC # BLD AUTO: 3.3 10E12/L (ref 4.4–5.9)
WBC # BLD AUTO: 9.3 10E9/L (ref 4–11)

## 2020-03-23 PROCEDURE — 85025 COMPLETE CBC W/AUTO DIFF WBC: CPT | Performed by: INTERNAL MEDICINE

## 2020-03-23 PROCEDURE — 36415 COLL VENOUS BLD VENIPUNCTURE: CPT | Performed by: INTERNAL MEDICINE

## 2020-03-24 ENCOUNTER — VIRTUAL VISIT (OUTPATIENT)
Dept: ONCOLOGY | Facility: CLINIC | Age: 70
End: 2020-03-24
Payer: COMMERCIAL

## 2020-03-24 DIAGNOSIS — D47.1 MYELOPROLIFERATIVE DISORDER (H): Primary | ICD-10-CM

## 2020-03-24 PROCEDURE — 99212 OFFICE O/P EST SF 10 MIN: CPT | Mod: TEL | Performed by: PHYSICIAN ASSISTANT

## 2020-03-24 RX ORDER — HYDROXYUREA 500 MG/1
500 CAPSULE ORAL DAILY
Qty: 60 CAPSULE | Refills: 0 | Status: SHIPPED | OUTPATIENT
Start: 2020-03-24 | End: 2020-04-21

## 2020-03-24 NOTE — LETTER
"3/24/2020       RE: Renny Gannon  [address]  [facility] [address]     Dear Colleague,    Thank you for referring your patient, Renny Gannon, to the [facility]. Please see a copy of my visit note below.    Renny Gannon is a 69 year old male who is being evaluated via a billable telephone visit.      The patient has been notified of following:     \"This telephone visit will be conducted via a call between you and your physician/provider. We have found that certain health care needs can be provided without the need for a physical exam.  This service lets us provide the care you need with a short phone conversation.  If a prescription is necessary we can send it directly to your pharmacy.  If lab work is needed we can place an order for that and you can then stop by our lab to have the test done at a later time.    If during the course of the call the physician/provider feels a telephone visit is not appropriate, you will not be charged for this service.\"     Renny Gannon complains of  No chief complaint on file.      I have reviewed and updated the patient's Past Medical History, Social History, Family History and Medication List.    ALLERGIES  No known drug allergies and Seasonal allergies    Additional provider notes:     Reason for phone call: 4 week follow-up myelofibrosis    Heme history:  Mr Gannon is a 69 year old male with myelofibrosis, Thiago 2 positive. He has splenomegaly. He has been on hydroxyurea since 1/16/20. Current dose is 1 gram daily.    Interval history:   Mr. Gannon reports feeling fine and having no complaints today. He remains fatigued but this is stable. He is taking the hydrea and ferrous sulfate as prescribed without adverse effects. No bleeding sx.     ROS:  General: No fevers, chills, night sweats. Fatigue stable. Appetite ok. Weight stable.   HEENT: No mucositis  CV/pulm: No chest pain, cough, dyspnea  GI: No nausea, vomiting, diarrhea, constipation, " abdominal pain  : No frequency, urgency, dysuria  Musculoskeletal: No bone pain  Skin: no rash  Extremities:  lower extremity edema is stable  Psych: Mood good    Labs:  Review: 1/16 WBC was 90 and hgb 12.1.   3/2 hgb was down to 8 after CABG and was given IV iron and started on PO and hgb got up to 11 on 3/2. Now down to 10.2.     Results for ABI VALDEZ (MRN [id number]) as of 3/24/2020 12:17   Ref. Range 3/23/2020 13:00   WBC Latest Ref Range: 4.0 - 11.0 10e9/L 9.3   Hemoglobin Latest Ref Range: 13.3 - 17.7 g/dL 10.2 (L)   Hematocrit Latest Ref Range: 40.0 - 53.0 % 33.1 (L)   Platelet Count Latest Ref Range: 150 - 450 10e9/L 478 (H)   RBC Count Latest Ref Range: 4.4 - 5.9 10e12/L 3.30 (L)   MCV Latest Ref Range: 78 - 100 fl 100   MCH Latest Ref Range: 26.5 - 33.0 pg 30.9   MCHC Latest Ref Range: 31.5 - 36.5 g/dL 30.8 (L)   RDW Latest Ref Range: 10.0 - 15.0 % 23.1 (H)   Diff Method Unknown Manual Differential   % Neutrophils Latest Units: % 62.0   % Lymphocytes Latest Units: % 11.0   % Monocytes Latest Units: % 10.0   % Eosinophils Latest Units: % 6.0   % Basophils Latest Units: % 4.0   % Metamyelocytes Latest Units: % 4.0   % Myelocytes Latest Units: % 3.0   Absolute Neutrophil Latest Ref Range: 1.6 - 8.3 10e9/L 5.8   Absolute Lymphocytes Latest Ref Range: 0.8 - 5.3 10e9/L 1.0   Absolute Monocytes Latest Ref Range: 0.0 - 1.3 10e9/L 0.9   Absolute Eosinophils Latest Ref Range: 0.0 - 0.7 10e9/L 0.6   Absolute Basophils Latest Ref Range: 0.0 - 0.2 10e9/L 0.4 (H)   Absolute Metamyelocytes Latest Ref Range: 0 10e9/L 0.4 (H)   Absolute Myelocytes Latest Ref Range: 0 10e9/L 0.3 (H)   Anisocytosis Unknown Moderate   Poikilocytosis Unknown Moderate   Polychromasia Unknown Slight   Platelet Estimate Unknown Automated count confirmed.  Platelet morphology is normal.       Assessment/Plan:  Myelofibrosis  Presenting sx: leukocytosis, fatigue, dyspnea  Tx: Hydrea 1g daily  Response: normalization of WBC. Hgb low but also  has iron deficiency anemia from blood loss (CABG).   I spoke to Dr. Cristina regarding plan  Plan: decrease hydrea to 500 mg and check labs every 2 weeks.   Follow-up: Dr. Cristina by phone or in person in 4 weeks  All patients questions were answered    Iron deficiency anemia 2/2 blood loss   S/p 1 dose of venofer 2/5 and started ferrous sulfate 325 mg daily  -recheck ferritin and iron studies in 2 weeks    Phone call duration: 11 minutes    Alla Lopez PA-C      Again, thank you for allowing me to participate in the care of your patient.      Sincerely,    Alla Lopez PA-C

## 2020-03-24 NOTE — PATIENT INSTRUCTIONS
Reduce hydrea from 1 g daily to 500 mg daily  Labs every 2 weeks (CBC) and add ferritin and iron studies to labs in 2 weeks  Follow-up phone or in person with Dr. Cristina in 1 month    Today:  See Above.    Please follow up with Dr. Cristina in 4 weeks with labs prior.  Please schedule labs in 2 weeks and then come 20-30 minutes prior to follow up appointment for another set of labs.    Lab Date/Time:    Office visit follow up with Dr. Cristina Date/Time:   Come early for labs.  If we change your appointment to a telephone visit then we will call and get you on the lab schedule day prior or morning of appointment.    If you have any questions or concerns please feel free to call.    If you need to reschedule please call:  Clinic or Lab Appointment - [phone]  Infusion - [phone]  Imaging - [phone]    Ki Lamb RN, BSN, OCN  [facility]   Oncology/Hematology Care Coordinator RN  [facility]  [phone]

## 2020-03-24 NOTE — PROGRESS NOTES
"Renny Gannon is a 69 year old male who is being evaluated via a billable telephone visit.      The patient has been notified of following:     \"This telephone visit will be conducted via a call between you and your physician/provider. We have found that certain health care needs can be provided without the need for a physical exam.  This service lets us provide the care you need with a short phone conversation.  If a prescription is necessary we can send it directly to your pharmacy.  If lab work is needed we can place an order for that and you can then stop by our lab to have the test done at a later time.    If during the course of the call the physician/provider feels a telephone visit is not appropriate, you will not be charged for this service.\"     Renny Gannon complains of  No chief complaint on file.      I have reviewed and updated the patient's Past Medical History, Social History, Family History and Medication List.    ALLERGIES  No known drug allergies and Seasonal allergies    Additional provider notes:     Reason for phone call: 4 week follow-up myelofibrosis    Heme history:  Mr Gannon is a 69 year old male with myelofibrosis, Thiago 2 positive. He has splenomegaly. He has been on hydroxyurea since 1/16/20. Current dose is 1 gram daily.    Interval history:   Mr. Gannon reports feeling fine and having no complaints today. He remains fatigued but this is stable. He is taking the hydrea and ferrous sulfate as prescribed without adverse effects. No bleeding sx.     ROS:  General: No fevers, chills, night sweats. Fatigue stable. Appetite ok. Weight stable.   HEENT: No mucositis  CV/pulm: No chest pain, cough, dyspnea  GI: No nausea, vomiting, diarrhea, constipation, abdominal pain  : No frequency, urgency, dysuria  Musculoskeletal: No bone pain  Skin: no rash  Extremities:  lower extremity edema is stable  Psych: Mood good    Labs:  Review: 1/16 WBC was 90 and hgb 12.1.   3/2 hgb was down to 8 after " CABG and was given IV iron and started on PO and hgb got up to 11 on 3/2. Now down to 10.2.     Results for ABI VALDEZ (MRN [id number]) as of 3/24/2020 12:17   Ref. Range 3/23/2020 13:00   WBC Latest Ref Range: 4.0 - 11.0 10e9/L 9.3   Hemoglobin Latest Ref Range: 13.3 - 17.7 g/dL 10.2 (L)   Hematocrit Latest Ref Range: 40.0 - 53.0 % 33.1 (L)   Platelet Count Latest Ref Range: 150 - 450 10e9/L 478 (H)   RBC Count Latest Ref Range: 4.4 - 5.9 10e12/L 3.30 (L)   MCV Latest Ref Range: 78 - 100 fl 100   MCH Latest Ref Range: 26.5 - 33.0 pg 30.9   MCHC Latest Ref Range: 31.5 - 36.5 g/dL 30.8 (L)   RDW Latest Ref Range: 10.0 - 15.0 % 23.1 (H)   Diff Method Unknown Manual Differential   % Neutrophils Latest Units: % 62.0   % Lymphocytes Latest Units: % 11.0   % Monocytes Latest Units: % 10.0   % Eosinophils Latest Units: % 6.0   % Basophils Latest Units: % 4.0   % Metamyelocytes Latest Units: % 4.0   % Myelocytes Latest Units: % 3.0   Absolute Neutrophil Latest Ref Range: 1.6 - 8.3 10e9/L 5.8   Absolute Lymphocytes Latest Ref Range: 0.8 - 5.3 10e9/L 1.0   Absolute Monocytes Latest Ref Range: 0.0 - 1.3 10e9/L 0.9   Absolute Eosinophils Latest Ref Range: 0.0 - 0.7 10e9/L 0.6   Absolute Basophils Latest Ref Range: 0.0 - 0.2 10e9/L 0.4 (H)   Absolute Metamyelocytes Latest Ref Range: 0 10e9/L 0.4 (H)   Absolute Myelocytes Latest Ref Range: 0 10e9/L 0.3 (H)   Anisocytosis Unknown Moderate   Poikilocytosis Unknown Moderate   Polychromasia Unknown Slight   Platelet Estimate Unknown Automated count confirmed.  Platelet morphology is normal.       Assessment/Plan:  Myelofibrosis  Presenting sx: leukocytosis, fatigue, dyspnea  Tx: Hydrea 1g daily  Response: normalization of WBC. Hgb low but also has iron deficiency anemia from blood loss (CABG).   I spoke to Dr. Cristina regarding plan  Plan: decrease hydrea to 500 mg and check labs every 2 weeks.   Follow-up: Dr. Cristina by phone or in person in 4 weeks  All patients questions  were answered    Iron deficiency anemia 2/2 blood loss   S/p 1 dose of venofer 2/5 and started ferrous sulfate 325 mg daily  -recheck ferritin and iron studies in 2 weeks    Phone call duration: 11 minutes    Alla Lopez PA-C

## 2020-03-31 DIAGNOSIS — Z53.9 DIAGNOSIS NOT YET DEFINED: Primary | ICD-10-CM

## 2020-03-31 PROCEDURE — G0180 MD CERTIFICATION HHA PATIENT: HCPCS | Performed by: FAMILY MEDICINE

## 2020-04-08 DIAGNOSIS — D72.829 LEUKOCYTOSIS, UNSPECIFIED TYPE: ICD-10-CM

## 2020-04-08 DIAGNOSIS — D47.1 MYELOPROLIFERATIVE DISORDER (H): Primary | ICD-10-CM

## 2020-04-09 DIAGNOSIS — D72.829 LEUKOCYTOSIS, UNSPECIFIED TYPE: ICD-10-CM

## 2020-04-09 DIAGNOSIS — D47.1 MYELOPROLIFERATIVE DISORDER (H): ICD-10-CM

## 2020-04-09 LAB
ANISOCYTOSIS BLD QL SMEAR: ABNORMAL
BASOPHILS # BLD AUTO: 0 10E9/L (ref 0–0.2)
BASOPHILS NFR BLD AUTO: 0 %
DIFFERENTIAL METHOD BLD: ABNORMAL
EOSINOPHIL # BLD AUTO: 0 10E9/L (ref 0–0.7)
EOSINOPHIL NFR BLD AUTO: 0 %
ERYTHROCYTE [DISTWIDTH] IN BLOOD BY AUTOMATED COUNT: 25.5 % (ref 10–15)
FERRITIN SERPL-MCNC: 310 NG/ML (ref 26–388)
HCT VFR BLD AUTO: 35.7 % (ref 40–53)
HGB BLD-MCNC: 10.8 G/DL (ref 13.3–17.7)
IRON SATN MFR SERPL: 28 % (ref 15–46)
IRON SERPL-MCNC: 65 UG/DL (ref 35–180)
LYMPHOCYTES # BLD AUTO: 1.7 10E9/L (ref 0.8–5.3)
LYMPHOCYTES NFR BLD AUTO: 20 %
MCH RBC QN AUTO: 32.1 PG (ref 26.5–33)
MCHC RBC AUTO-ENTMCNC: 30.3 G/DL (ref 31.5–36.5)
MCV RBC AUTO: 106 FL (ref 78–100)
MONOCYTES # BLD AUTO: 0.2 10E9/L (ref 0–1.3)
MONOCYTES NFR BLD AUTO: 2 %
NEUTROPHILS # BLD AUTO: 6.7 10E9/L (ref 1.6–8.3)
NEUTROPHILS NFR BLD AUTO: 78 %
PLATELET # BLD AUTO: 376 10E9/L (ref 150–450)
PLATELET # BLD EST: ABNORMAL 10*3/UL
POIKILOCYTOSIS BLD QL SMEAR: SLIGHT
POLYCHROMASIA BLD QL SMEAR: SLIGHT
RBC # BLD AUTO: 3.36 10E12/L (ref 4.4–5.9)
RBC MORPH BLD: ABNORMAL
TIBC SERPL-MCNC: 232 UG/DL (ref 240–430)
WBC # BLD AUTO: 8.6 10E9/L (ref 4–11)

## 2020-04-09 PROCEDURE — 82728 ASSAY OF FERRITIN: CPT | Performed by: INTERNAL MEDICINE

## 2020-04-09 PROCEDURE — 85025 COMPLETE CBC W/AUTO DIFF WBC: CPT | Performed by: INTERNAL MEDICINE

## 2020-04-09 PROCEDURE — 36415 COLL VENOUS BLD VENIPUNCTURE: CPT | Performed by: INTERNAL MEDICINE

## 2020-04-09 PROCEDURE — 83550 IRON BINDING TEST: CPT | Performed by: INTERNAL MEDICINE

## 2020-04-09 PROCEDURE — 83540 ASSAY OF IRON: CPT | Performed by: INTERNAL MEDICINE

## 2020-04-21 ENCOUNTER — VIRTUAL VISIT (OUTPATIENT)
Dept: ONCOLOGY | Facility: CLINIC | Age: 70
End: 2020-04-21
Payer: COMMERCIAL

## 2020-04-21 VITALS — WEIGHT: 176 LBS | BODY MASS INDEX: 26.76 KG/M2

## 2020-04-21 DIAGNOSIS — D47.1 MYELOPROLIFERATIVE DISORDER (H): ICD-10-CM

## 2020-04-21 LAB
ANISOCYTOSIS BLD QL SMEAR: ABNORMAL
BASOPHILS # BLD AUTO: 0.1 10E9/L (ref 0–0.2)
BASOPHILS NFR BLD AUTO: 1 %
DIFFERENTIAL METHOD BLD: ABNORMAL
EOSINOPHIL # BLD AUTO: 0 10E9/L (ref 0–0.7)
EOSINOPHIL NFR BLD AUTO: 0 %
ERYTHROCYTE [DISTWIDTH] IN BLOOD BY AUTOMATED COUNT: 26.1 % (ref 10–15)
HCT VFR BLD AUTO: 35.1 % (ref 40–53)
HGB BLD-MCNC: 10.8 G/DL (ref 13.3–17.7)
LYMPHOCYTES # BLD AUTO: 1.1 10E9/L (ref 0.8–5.3)
LYMPHOCYTES NFR BLD AUTO: 10 %
MCH RBC QN AUTO: 32.9 PG (ref 26.5–33)
MCHC RBC AUTO-ENTMCNC: 30.8 G/DL (ref 31.5–36.5)
MCV RBC AUTO: 107 FL (ref 78–100)
MICROCYTES BLD QL SMEAR: PRESENT
MONOCYTES # BLD AUTO: 0 10E9/L (ref 0–1.3)
MONOCYTES NFR BLD AUTO: 0 %
MYELOCYTES # BLD: 0.1 10E9/L
MYELOCYTES NFR BLD MANUAL: 1 %
NEUTROPHILS # BLD AUTO: 9.6 10E9/L (ref 1.6–8.3)
NEUTROPHILS NFR BLD AUTO: 87 %
PLATELET # BLD AUTO: 375 10E9/L (ref 150–450)
POLYCHROMASIA BLD QL SMEAR: SLIGHT
PROMYELOCYTES # BLD MANUAL: 0.1 10E9/L
PROMYELOCYTES NFR BLD MANUAL: 1 %
RBC # BLD AUTO: 3.28 10E12/L (ref 4.4–5.9)
STOMATOCYTES BLD QL SMEAR: SLIGHT
WBC # BLD AUTO: 11 10E9/L (ref 4–11)

## 2020-04-21 PROCEDURE — 36415 COLL VENOUS BLD VENIPUNCTURE: CPT | Performed by: INTERNAL MEDICINE

## 2020-04-21 PROCEDURE — 99213 OFFICE O/P EST LOW 20 MIN: CPT | Mod: 95 | Performed by: INTERNAL MEDICINE

## 2020-04-21 PROCEDURE — 85025 COMPLETE CBC W/AUTO DIFF WBC: CPT | Performed by: INTERNAL MEDICINE

## 2020-04-21 RX ORDER — HYDROXYUREA 500 MG/1
500 CAPSULE ORAL DAILY
Qty: 60 CAPSULE | Refills: 0 | Status: SHIPPED | OUTPATIENT
Start: 2020-04-21 | End: 2020-05-29

## 2020-04-21 ASSESSMENT — PAIN SCALES - GENERAL: PAINLEVEL: NO PAIN (0)

## 2020-04-21 NOTE — PROGRESS NOTES
"Renny Gannon is a 69 year old male who is being evaluated via a billable telephone visit.      The patient has been notified of following:     \"This telephone visit will be conducted via a call between you and your physician/provider. We have found that certain health care needs can be provided without the need for a physical exam.  This service lets us provide the care you need with a short phone conversation.  If a prescription is necessary we can send it directly to your pharmacy.  If lab work is needed we can place an order for that and you can then stop by our lab to have the test done at a later time.    Telephone visits are billed at different rates depending on your insurance coverage. During this emergency period, for some insurers they may be billed the same as an in-person visit.  Please reach out to your insurance provider with any questions.    If during the course of the call the physician/provider feels a telephone visit is not appropriate, you will not be charged for this service.\"    Patient has given verbal consent for Telephone visit?  Yes    How would you like to obtain your AVS? Mail a copy      Mani Cristina MD      "

## 2020-04-21 NOTE — PATIENT INSTRUCTIONS
Check CBC monthly.  Continue hydroxyurea at the same dose.  Follow-up in 3 months  Today:  Continue hydroxyurea at the same dose.    Monthly CBC lab appts.     Lab Date/Time:    Lab Date/Time:    Lab Date/Time:    - Please follow up with Dr. Cristina in 3 months.      Office visit with Dr. Cristina Date/Time:    If you have any questions or concerns please feel free to call.    If you need to reschedule please call:  Clinic or Lab Appointment - [phone]  Infusion - [phone]  Imaging - [phone]      [facility] Oncology/Hematology Care - [place] Team

## 2020-04-21 NOTE — LETTER
"    4/21/2020         RE: Renny Gannon  [address]  [facility] [address]        Dear Colleague,    Thank you for referring your patient, Renny Gannon, to the [facility]. Please see a copy of my visit note below.    Renny Gannon is a 69 year old male who is being evaluated via a billable telephone visit.      The patient has been notified of following:     \"This telephone visit will be conducted via a call between you and your physician/provider. We have found that certain health care needs can be provided without the need for a physical exam.  This service lets us provide the care you need with a short phone conversation.  If a prescription is necessary we can send it directly to your pharmacy.  If lab work is needed we can place an order for that and you can then stop by our lab to have the test done at a later time.    Telephone visits are billed at different rates depending on your insurance coverage. During this emergency period, for some insurers they may be billed the same as an in-person visit.  Please reach out to your insurance provider with any questions.    If during the course of the call the physician/provider feels a telephone visit is not appropriate, you will not be charged for this service.\"    Patient has given verbal consent for Telephone visit?  Yes    How would you like to obtain your AVS? Mail a copy      Mani Cristina MD        DATE OF VISIT: Apr 21, 2020    Renny Gannon is a 69 year old male is seen today for   Chief Complaint   Patient presents with     RECHECK     4 wk follow up   .       (D47.1) Myeloproliferative disorder (H)  This is a telephone visit for management of myeloproliferative disorder/myelofibrosis.  The patient currently on hydroxyurea.  Has been tolerating treatment very well.  He has been feeling well without any recent complaints of weight loss or night sweats or fever or chills.  He denies any nausea vomiting or diarrhea.  He denies any " shortness of breath or cough or wheezing.  I would recommend to continue on hydroxyurea at the current dose.  We will continue to monitor CBC monthly.  I will see the patient again in 6 months or sooner if there are new developments or concerns.    The patient is ready to learn, no apparent learning barriers were identified.  Diagnosis and treatment plans were explained to the patient. The patient expressed understanding of the content. The patient asked appropriate questions. The patient questions were answered to his satisfaction.  Time of this visit is 15 minutes.  Chart documentation with Dragon Voice recognition Software. Although reviewed after completion, some words and grammatical errors may remain.    Again, thank you for allowing me to participate in the care of your patient.        Sincerely,        Mani Cristina MD

## 2020-04-21 NOTE — PROGRESS NOTES
DATE OF VISIT: Apr 21, 2020    Renny Gannon is a 69 year old male is seen today for   Chief Complaint   Patient presents with     RECHECK     4 wk follow up   .       (D47.1) Myeloproliferative disorder (H)  This is a telephone visit for management of myeloproliferative disorder/myelofibrosis.  The patient currently on hydroxyurea.  Has been tolerating treatment very well.  He has been feeling well without any recent complaints of weight loss or night sweats or fever or chills.  He denies any nausea vomiting or diarrhea.  He denies any shortness of breath or cough or wheezing.  I would recommend to continue on hydroxyurea at the current dose.  We will continue to monitor CBC monthly.  I will see the patient again in 6 months or sooner if there are new developments or concerns.    The patient is ready to learn, no apparent learning barriers were identified.  Diagnosis and treatment plans were explained to the patient. The patient expressed understanding of the content. The patient asked appropriate questions. The patient questions were answered to his satisfaction.  Time of this visit is 15 minutes.  Chart documentation with Dragon Voice recognition Software. Although reviewed after completion, some words and grammatical errors may remain.

## 2020-04-22 ENCOUNTER — VIRTUAL VISIT (OUTPATIENT)
Dept: CARDIOLOGY | Facility: CLINIC | Age: 70
End: 2020-04-22
Payer: COMMERCIAL

## 2020-04-22 VITALS — WEIGHT: 176 LBS | BODY MASS INDEX: 26.76 KG/M2

## 2020-04-22 DIAGNOSIS — I10 ESSENTIAL HYPERTENSION: ICD-10-CM

## 2020-04-22 DIAGNOSIS — I25.10 CORONARY ARTERY DISEASE INVOLVING NATIVE CORONARY ARTERY OF NATIVE HEART WITHOUT ANGINA PECTORIS: Primary | ICD-10-CM

## 2020-04-22 DIAGNOSIS — D47.1 MYELOPROLIFERATIVE DISORDER (H): ICD-10-CM

## 2020-04-22 PROCEDURE — 99212 OFFICE O/P EST SF 10 MIN: CPT | Mod: 95 | Performed by: INTERNAL MEDICINE

## 2020-04-22 NOTE — PROGRESS NOTES
"Renny Gannon is a 69 year old male who is being evaluated via a billable telephone visit.      The patient has been notified of following:     \"This telephone visit will be conducted via a call between you and your physician/provider. We have found that certain health care needs can be provided without the need for a physical exam.  This service lets us provide the care you need with a short phone conversation.  If a prescription is necessary we can send it directly to your pharmacy.  If lab work is needed we can place an order for that and you can then stop by our lab to have the test done at a later time.    Telephone visits are billed at different rates depending on your insurance coverage. During this emergency period, for some insurers they may be billed the same as an in-person visit.  Please reach out to your insurance provider with any questions.    If during the course of the call the physician/provider feels a telephone visit is not appropriate, you will not be charged for this service.\"    Patient has given verbal consent for Telephone visit?  Yes    How would you like to obtain your AVS? Mail a copy     Weight: 176 #  Blood pressure: no blood pressure cuff at home   Pulse:      Phone call duration: 10  Minutes  Phone call  Began 10:25 AM End 10:35 AM    See dictation      "

## 2020-04-22 NOTE — LETTER
4/22/2020      RE: Renny Gannon  [address]  [facility] [address]       Dear Colleague,    Thank you for the opportunity to participate in the care of your patient, Renny Gannon, at the [facility]. Please see a copy of my visit note below.    Service Date: 04/22/2020      CARDIOLOGY CLINIC TELEPHONE VISIT NOTE      PRIMARY CARE DOCTOR:  Dr. Angus Clements Mai      This is a telephone visit.  We offered a telemetry video visit but the patient declined and indicated he wanted to use the phone.  The duration of the phone call with 10 minutes starting at 10:25 a.m., ending at 10:35 a.m.  Location of the patient is home.  Location of the provider is the [facility].        HISTORY OF PRESENT ILLNESS:  Renny Gannon, a 69-year-old man, was evaluated with a telephone visit on 04/22/2020 at the request of his primary care provider, Dr. Scott, for followup of bypass surgery.      Mr. Gannon initially presented on 01/02/2020 with a history of progressive angina pectoris and a markedly abnormal nuclear stress test.  Diagnostic coronary angiography demonstrated severe 3-vessel disease.  A CBC, however suggested a myeloproliferative disorder.  Bone marrow biopsy and peripheral studies confirmed the presence of a myeloproliferative disorder Proper therapy was initiated for his blood disorder and medical therapy for CAD was intensified. The patient continued to experience angina with physical exertion. The patient was seen by his oncologist, Dr. Cristina, who cleared him for bypass surgery. .      The patient underwent bypass surgery by Dr. Mable Barrera on 01/31/2020 at which time a LIMA graft was placed in the LAD and separate saphenous venous bypass grafts were placed to the posterior descending branch of the right coronary and to the second obtuse marginal branch of the circumflex.  The patient was discharged on  02/10/2020.      Subsequent to his discharge  the patient was readmitted between 2/15/2020 and 2/24/ 2020   for respiratory symptoms related  to volume overload and a pleural effusion.  The patient  underwent successful thoracocentesis and diuresis with furosemide. Since discharge he  has continued to receive treatment for myeloproliferative disorder with hydroxyurea and during a telephone visit yesterday, Dr. Cristina indicated he was satisfied with the patient's progress.      Mr. Gannon remains at his baseline weight.  He denies chest, arm, neck, jaw or back discomfort, dyspnea or worsening edema on his current medical therapy.  The patient's cardiac rehabilitation and has been temporarily suspended because of the COVID pandemic but he plans to resume formal rehabilitation when cleared.      PAST MEDICAL HISTORY:   1.  Coronary artery disease.   a.  Presentation with progressive disabling angina in early 01/2020.  Markedly abnormal nuclear stress test.   b.  Coronary angiography showing severe 3-vessel disease.   c. Status post bypass surgery with Dr. Barrera on 01/31/2020 with LIMA graft to the LAD and saphenous vein grafts to second obtuse marginal branch and right coronary.   2.  Postoperative left pleural effusion/volume overload.  Successfully treated with thoracocentesis and diuretic therapy.   3.  Hypertension.   4.  Dyslipidemia.   5.  Myeloproliferative disorder, well controlled on hydroxyurea 500 mg daily.      LABORATORY STUDIES:  The most recent BMP on 03/2020 showed a BUN of 18, creatinine of 1.08 and potassium of 4.1.      Most recent blood count on 04/21/2020 showed a hemoglobin stable at 10.8, platelet count of 375,000.      ASSESSMENT:  Mr. Gannon is asymptomatic on guideline-directed medical therapy with optimal systolic blood pressure and LDL cholesterol levels.  His weight has remained stable since the recent hospital admission.  His echo prior to surgery showed a normal ejection fraction.       RECOMMENDATIONS:   1.  Continue present medical therapy.   2.  Mediterranean-style diet.   3.  Participate in cardiac rehabilitation once he is able to.   4.  Followup visit with me in about 1 year.   5.  Continue hydroxyurea as directed by Dr. Cristina.      We appreciate the opportunity to care for your patient, Renny Gannon.      HISTORY OF PRESENT ILLNESS:  See dictation  Since discharge feels fine. Visit with oncology went well yesterday, counts stable on hydroxyurea. No complaints. Compliant with therapy. Living alone, but can care for self. Rehab held due to COVID pandemic.     Orders this Visit:  No orders of the defined types were placed in this encounter.    No orders of the defined types were placed in this encounter.    There are no discontinued medications.    No diagnosis found.    CURRENT MEDICATIONS:  Current Outpatient Medications   Medication Sig Dispense Refill     acetaminophen (TYLENOL) 325 MG tablet Take 650 mg by mouth every 4 hours as needed for mild pain       aspirin (ASA) 325 MG tablet Take 1 tablet (325 mg) by mouth daily 30 tablet 0     atorvastatin (LIPITOR) 40 MG tablet Take 1 tablet (40 mg) by mouth daily 30 tablet 11     ferrous sulfate (FEROSUL) 325 (65 Fe) MG tablet Take 1 tablet (325 mg) by mouth daily 30 tablet 0     furosemide (LASIX) 40 MG tablet Take 1 tablet in am and 0.5 tablet at 1 pm 45 tablet 1     hydroxyurea (HYDREA) 500 MG capsule Take 1 capsule (500 mg) by mouth daily 60 capsule 0     metoprolol succinate ER (TOPROL-XL) 100 MG 24 hr tablet Take 1 tablet (100 mg) by mouth 2 times daily 60 tablet 3     omeprazole (PRILOSEC) 20 MG DR capsule Take 1 capsule (20 mg) by mouth daily 30 capsule 1     potassium chloride ER (K-DUR/KLOR-CON M) 20 MEQ CR tablet Take 1 tablet (20 mEq) by mouth daily 30 tablet 0     senna-docusate (SENOKOT-S/PERICOLACE) 8.6-50 MG tablet Take 2 tablets by mouth 2 times daily 120 tablet 0     vitamin B-12 (CYANOCOBALAMIN) 250 MCG tablet Take 250 mcg  by mouth daily         ALLERGIES     Allergies   Allergen Reactions     No Known Drug Allergies      Seasonal Allergies        PAST MEDICAL, SURGICAL, FAMILY, SOCIAL HISTORY:  History was reviewed and updated as needed, see medical record.    Review of Systems:  A 12-point review of systems was completed, see medical record for detailed review of systems information.    Physical Exam:  Vitals: Wt 79.8 kg (176 lb)   BMI 26.76 kg/m      Constitutional:           Skin:           Head:           Eyes:           ENT:           Neck:           Chest:           Cardiac:                    Abdomen:           Vascular:                                        Extremities and Back:           Neurological:           ASSESSMENT: Stable on guideline directed medical therapy. Myeloproliferative disorder improved with hydroxyurea.       RECOMMENDATIONS:   Mediterranean diet  Complete rehab  Continue present meds  Follow-up in one year.      Recent Lab Results:  LIPID RESULTS:  Lab Results   Component Value Date    CHOL 165 12/11/2019    HDL 29 (L) 12/11/2019    LDL 70 12/11/2019    TRIG 332 (H) 12/11/2019     LIVER ENZYME RESULTS:  Lab Results   Component Value Date    AST 34 02/15/2020    ALT 28 02/15/2020     CBC RESULTS:  Lab Results   Component Value Date    WBC 11.0 04/21/2020    RBC 3.28 (L) 04/21/2020    HGB 10.8 (L) 04/21/2020    HCT 35.1 (L) 04/21/2020     (H) 04/21/2020    MCH 32.9 04/21/2020    MCHC 30.8 (L) 04/21/2020    RDW 26.1 (H) 04/21/2020     04/21/2020     BMP RESULTS:  Lab Results   Component Value Date     03/02/2020    POTASSIUM 4.1 03/02/2020    CHLORIDE 106 03/02/2020    CO2 26 03/02/2020    ANIONGAP 7 03/02/2020    GLC 97 03/02/2020    BUN 18 03/02/2020    CR 1.08 03/02/2020    GFRESTIMATED 69 03/02/2020    GFRESTBLACK 80 03/02/2020    MINNIE 8.6 03/02/2020      A1C RESULTS:  Lab Results   Component Value Date    A1C 5.6 01/09/2020     INR RESULTS:  Lab Results   Component Value Date    INR  1.16 (H) 02/15/2020    INR 0.91 01/31/2020     We greatly appreciate the opportunity to be involved in the care of your patient, Renny Gannon.    Please do not hesitate to contact me if you have any questions/concerns.     Sincerely,     Devin Bentley MD

## 2020-04-22 NOTE — PROGRESS NOTES
Service Date: 04/22/2020      CARDIOLOGY CLINIC TELEPHONE VISIT NOTE      PRIMARY CARE DOCTOR:  Dr. Angus Clements Mai      This is a telephone visit.  We offered a telemetry video visit but the patient declined and indicated he wanted to use the phone.  The duration of the phone call with 10 minutes starting at 10:25 a.m., ending at 10:35 a.m.  Location of the patient is home.  Location of the provider is the [facility].        HISTORY OF PRESENT ILLNESS:  Renny Gannon, a 69-year-old man, was evaluated with a telephone visit on 04/22/2020 at the request of his primary care provider, Dr. Scott, for followup of bypass surgery.      Mr. Gannon initially presented on 01/02/2020 with a history of progressive angina pectoris and a markedly abnormal nuclear stress test.  Diagnostic coronary angiography demonstrated severe 3-vessel disease.  A CBC, however suggested a myeloproliferative disorder.  Bone marrow biopsy and peripheral studies confirmed the presence of a myeloproliferative disorder Proper therapy was initiated for his blood disorder and medical therapy for CAD was intensified. The patient continued to experience angina with physical exertion. The patient was seen by his oncologist, Dr. Cristina, who cleared him for bypass surgery. .      The patient underwent bypass surgery by Dr. Mable Barrera on 01/31/2020 at which time a LIMA graft was placed in the LAD and separate saphenous venous bypass grafts were placed to the posterior descending branch of the right coronary and to the second obtuse marginal branch of the circumflex.  The patient was discharged on 02/10/2020.      Subsequent to his discharge  the patient was readmitted between 2/15/2020 and 2/24/ 2020   for respiratory symptoms related  to volume overload and a pleural effusion.  The patient  underwent successful thoracocentesis and diuresis with furosemide. Since discharge he  has continued to receive treatment for myeloproliferative  disorder with hydroxyurea and during a telephone visit yesterday, Dr. Cristina indicated he was satisfied with the patient's progress.      Mr. Gannon remains at his baseline weight.  He denies chest, arm, neck, jaw or back discomfort, dyspnea or worsening edema on his current medical therapy.  The patient's cardiac rehabilitation and has been temporarily suspended because of the COVID pandemic but he plans to resume formal rehabilitation when cleared.      PAST MEDICAL HISTORY:   1.  Coronary artery disease.   a.  Presentation with progressive disabling angina in early 01/2020.  Markedly abnormal nuclear stress test.   b.  Coronary angiography showing severe 3-vessel disease.   c. Status post bypass surgery with Dr. Barrera on 01/31/2020 with LIMA graft to the LAD and saphenous vein grafts to second obtuse marginal branch and right coronary.   2.  Postoperative left pleural effusion/volume overload.  Successfully treated with thoracocentesis and diuretic therapy.   3.  Hypertension.   4.  Dyslipidemia.   5.  Myeloproliferative disorder, well controlled on hydroxyurea 500 mg daily.      LABORATORY STUDIES:  The most recent BMP on 03/2020 showed a BUN of 18, creatinine of 1.08 and potassium of 4.1.      Most recent blood count on 04/21/2020 showed a hemoglobin stable at 10.8, platelet count of 375,000.      ASSESSMENT:  Mr. Gannon is asymptomatic on guideline-directed medical therapy with optimal systolic blood pressure and LDL cholesterol levels.  His weight has remained stable since the recent hospital admission.  His echo prior to surgery showed a normal ejection fraction.      RECOMMENDATIONS:   1.  Continue present medical therapy.   2.  Mediterranean-style diet.   3.  Participate in cardiac rehabilitation once he is able to.   4.  Followup visit with me in about 1 year.   5.  Continue hydroxyurea as directed by Dr. Cristina.      We appreciate the opportunity to care for your patient, Renny Gannon.      Devin  MD MELIA Bentley MD             D: 2020   T: 2020   MT: CHRISTIAN      Name:     ABI VALDEZ   MRN:      [id number]        Account:      [id number]   :      1950           Service Date: 2020      Document: [id number]

## 2020-04-22 NOTE — PROGRESS NOTES
HISTORY OF PRESENT ILLNESS:  See dictation  Since discharge feels fine. Visit with oncology went well yesterday, counts stable on hydroxyurea. No complaints. Compliant with therapy. Living alone, but can care for self. Rehab held due to COVID pandemic.     Orders this Visit:  No orders of the defined types were placed in this encounter.    No orders of the defined types were placed in this encounter.    There are no discontinued medications.    No diagnosis found.    CURRENT MEDICATIONS:  Current Outpatient Medications   Medication Sig Dispense Refill     acetaminophen (TYLENOL) 325 MG tablet Take 650 mg by mouth every 4 hours as needed for mild pain       aspirin (ASA) 325 MG tablet Take 1 tablet (325 mg) by mouth daily 30 tablet 0     atorvastatin (LIPITOR) 40 MG tablet Take 1 tablet (40 mg) by mouth daily 30 tablet 11     ferrous sulfate (FEROSUL) 325 (65 Fe) MG tablet Take 1 tablet (325 mg) by mouth daily 30 tablet 0     furosemide (LASIX) 40 MG tablet Take 1 tablet in am and 0.5 tablet at 1 pm 45 tablet 1     hydroxyurea (HYDREA) 500 MG capsule Take 1 capsule (500 mg) by mouth daily 60 capsule 0     metoprolol succinate ER (TOPROL-XL) 100 MG 24 hr tablet Take 1 tablet (100 mg) by mouth 2 times daily 60 tablet 3     omeprazole (PRILOSEC) 20 MG DR capsule Take 1 capsule (20 mg) by mouth daily 30 capsule 1     potassium chloride ER (K-DUR/KLOR-CON M) 20 MEQ CR tablet Take 1 tablet (20 mEq) by mouth daily 30 tablet 0     senna-docusate (SENOKOT-S/PERICOLACE) 8.6-50 MG tablet Take 2 tablets by mouth 2 times daily 120 tablet 0     vitamin B-12 (CYANOCOBALAMIN) 250 MCG tablet Take 250 mcg by mouth daily         ALLERGIES     Allergies   Allergen Reactions     No Known Drug Allergies      Seasonal Allergies        PAST MEDICAL, SURGICAL, FAMILY, SOCIAL HISTORY:  History was reviewed and updated as needed, see medical record.    Review of Systems:  A 12-point review of systems was completed, see medical record for  detailed review of systems information.    Physical Exam:  Vitals: Wt 79.8 kg (176 lb)   BMI 26.76 kg/m      Constitutional:           Skin:           Head:           Eyes:           ENT:           Neck:           Chest:           Cardiac:                    Abdomen:           Vascular:                                        Extremities and Back:           Neurological:           ASSESSMENT: Stable on guideline directed medical therapy. Myeloproliferative disorder improved with hydroxyurea.       RECOMMENDATIONS:   Mediterranean diet  Complete rehab  Continue present meds  Follow-up in one year.      Recent Lab Results:  LIPID RESULTS:  Lab Results   Component Value Date    CHOL 165 12/11/2019    HDL 29 (L) 12/11/2019    LDL 70 12/11/2019    TRIG 332 (H) 12/11/2019       LIVER ENZYME RESULTS:  Lab Results   Component Value Date    AST 34 02/15/2020    ALT 28 02/15/2020       CBC RESULTS:  Lab Results   Component Value Date    WBC 11.0 04/21/2020    RBC 3.28 (L) 04/21/2020    HGB 10.8 (L) 04/21/2020    HCT 35.1 (L) 04/21/2020     (H) 04/21/2020    MCH 32.9 04/21/2020    MCHC 30.8 (L) 04/21/2020    RDW 26.1 (H) 04/21/2020     04/21/2020       BMP RESULTS:  Lab Results   Component Value Date     03/02/2020    POTASSIUM 4.1 03/02/2020    CHLORIDE 106 03/02/2020    CO2 26 03/02/2020    ANIONGAP 7 03/02/2020    GLC 97 03/02/2020    BUN 18 03/02/2020    CR 1.08 03/02/2020    GFRESTIMATED 69 03/02/2020    GFRESTBLACK 80 03/02/2020    MINNIE 8.6 03/02/2020        A1C RESULTS:  Lab Results   Component Value Date    A1C 5.6 01/09/2020       INR RESULTS:  Lab Results   Component Value Date    INR 1.16 (H) 02/15/2020    INR 0.91 01/31/2020       We greatly appreciate the opportunity to be involved in the care of your patient, Renny Gannon.    Sincerely,  Devin Bentley MD      CC  No referring provider defined for this encounter.

## 2020-04-22 NOTE — PROGRESS NOTES
DISCHARGE PLAN:  Next appointments: See patient instruction section  Telephone or virtual visit.    Renny CRAIG Jagdeep had a telephone/Virtual visit for oncology follow up on Hydrea.  Patient instructions completed per Dr. Ellis's post call instructions.  Appointments scheduled, called to patient, and AVS mailed.  If infusion is being scheduled as well, AVS brought to infusion and they will schedule and connect with patient.  Imaging scheduled if ordered.  Patient to infusion to schedule port labs (if needed), follow up, and infusion.  Imaging scheduled if ordered.  See patient instructions and Oncologist's Progress note for further details. Questions and concerns addressed to patient's satisfaction. Patient verbalized and demonstrated understanding of plan.  Contact information provided and patient is encouraged to call with any that arise in the interim of care.    Ki Lamb, RN, BSN, OCN   Oncology Care Coordinator RN  [facility]  [phone]  4/22/2020, 1:30 PM

## 2020-04-28 DIAGNOSIS — Z95.1 S/P CABG (CORONARY ARTERY BYPASS GRAFT): ICD-10-CM

## 2020-04-29 RX ORDER — CALCIUM CARBONATE/VITAMIN D3 600 MG-10
TABLET ORAL
Qty: 30 TABLET | Refills: 0 | Status: SHIPPED | OUTPATIENT
Start: 2020-04-29 | End: 2020-05-29

## 2020-04-29 RX ORDER — FERROUS SULFATE 324(65)MG
TABLET, DELAYED RELEASE (ENTERIC COATED) ORAL
Qty: 30 TABLET | Refills: 0 | Status: SHIPPED | OUTPATIENT
Start: 2020-04-29 | End: 2021-01-11

## 2020-04-29 RX ORDER — POTASSIUM CHLORIDE 1500 MG/1
TABLET, EXTENDED RELEASE ORAL
Qty: 30 TABLET | Refills: 0 | Status: SHIPPED | OUTPATIENT
Start: 2020-04-29 | End: 2020-05-28

## 2020-04-29 NOTE — TELEPHONE ENCOUNTER
Patient is scheduled for 5/15/2020 with Dr. Scott.    Sending pending medication to Dr. Scott.    Luh Jimenez CMA ([facility]) 4/29/2020

## 2020-04-29 NOTE — TELEPHONE ENCOUNTER
Routing to team to set up virtual appointment for patient.  Please route to Dr Scott when appointment has been scheduled for completion of refill.  Also due for labs.

## 2020-05-15 ENCOUNTER — VIRTUAL VISIT (OUTPATIENT)
Dept: FAMILY MEDICINE | Facility: CLINIC | Age: 70
End: 2020-05-15
Payer: COMMERCIAL

## 2020-05-15 DIAGNOSIS — Z95.1 S/P CABG (CORONARY ARTERY BYPASS GRAFT): ICD-10-CM

## 2020-05-15 DIAGNOSIS — I25.118 CORONARY ARTERY DISEASE INVOLVING NATIVE CORONARY ARTERY OF NATIVE HEART WITH OTHER FORM OF ANGINA PECTORIS (H): ICD-10-CM

## 2020-05-15 DIAGNOSIS — D47.1 MYELOPROLIFERATIVE DISORDER (H): ICD-10-CM

## 2020-05-15 DIAGNOSIS — I10 ESSENTIAL HYPERTENSION: Primary | ICD-10-CM

## 2020-05-15 PROCEDURE — 99214 OFFICE O/P EST MOD 30 MIN: CPT | Mod: 95 | Performed by: FAMILY MEDICINE

## 2020-05-15 NOTE — PROGRESS NOTES
"Renny Gannon is a 69 year old male who is being evaluated via a billable telephone visit.      The patient has been notified of following:     \"This telephone visit will be conducted via a call between you and your physician/provider. We have found that certain health care needs can be provided without the need for a physical exam.  This service lets us provide the care you need with a short phone conversation.  If a prescription is necessary we can send it directly to your pharmacy.  If lab work is needed we can place an order for that and you can then stop by our lab to have the test done at a later time.    Telephone visits are billed at different rates depending on your insurance coverage. During this emergency period, for some insurers they may be billed the same as an in-person visit.  Please reach out to your insurance provider with any questions.    If during the course of the call the physician/provider feels a telephone visit is not appropriate, you will not be charged for this service.\"    Patient has given verbal consent for Telephone visit?  Yes    What phone number would you like to be contacted at? [phone]    How would you like to obtain your AVS? Mail a copy    Subjective     Renny Gannon is a 69 year old male who presents to clinic today for the following health issues:    HPI  Hypertension Follow-up      Do you check your blood pressure regularly outside of the clinic? No     Are you following a low salt diet? Yes    Are your blood pressures ever more than 140 on the top number (systolic) OR more   than 90 on the bottom number (diastolic), for example 140/90? No      How many servings of fruits and vegetables do you eat daily?  2-3    On average, how many sweetened beverages do you drink each day (Examples: soda, juice, sweet tea, etc.  Do NOT count diet or artificially sweetened beverages)?   2    How many days per week do you exercise enough to make your heart beat faster? 3 or " less    How many minutes a day do you exercise enough to make your heart beat faster? 10 - 19    How many days per week do you miss taking your medication? 0    Renny is being seen by telephone today for general checkup after CABG and post surgical hospitalization.  He was also recently diagnosed with Lymphoma. Stated that overall he is doing well.  Been seeing his Cardiologist and Oncologist as recommended.  Saw his cardiologist about a month and was told that he was doing well - no changed on medication and was recommended to follow up in a year.  Has been taking his medications as prescribed with no side effects. His weight has been stable; both dyspnea and orthopnea improved after his hospitalization.  Hypertension has been stable and normal based on last readings couple months ago.  Does not check his blood pressure regularly as he does not have a cuff at home.  No headache or dizziness.  No acute change in his vision.  No URI symptoms including running nose, nasal congestion, ST or coughing.  No fever or chill.  No chest pain.  No N/V/D/C.  Sleep okay .  No leg swelling, orthopnea or dyspnea.  No problem with urination.  Eating and drinking ok with no salty diet.  No other concern today.      Current Outpatient Medications   Medication Sig Dispense Refill     acetaminophen (TYLENOL) 325 MG tablet Take 650 mg by mouth every 4 hours as needed for mild pain       aspirin (ASA) 325 MG tablet Take 1 tablet (325 mg) by mouth daily 30 tablet 0     atorvastatin (LIPITOR) 40 MG tablet Take 1 tablet (40 mg) by mouth daily 30 tablet 11     ferrous sulfate (FEROSUL) 325 (65 Fe) MG tablet Take 1 tablet (325 mg) by mouth daily 30 tablet 0     Ferrous Sulfate 324 (65 Fe) MG TBEC TAKE ONE TABLET BY MOUTH ONCE DAILY AT NOON 30 tablet 0     furosemide (LASIX) 40 MG tablet Take 1 tablet in am and 0.5 tablet at 1 pm 45 tablet 1     hydroxyurea (HYDREA) 500 MG capsule Take 1 capsule (500 mg) by mouth daily 60 capsule 0      metoprolol succinate ER (TOPROL-XL) 100 MG 24 hr tablet Take 1 tablet (100 mg) by mouth 2 times daily 60 tablet 3     omeprazole (PRILOSEC) 20 MG DR capsule Take 1 capsule (20 mg) by mouth daily 30 capsule 1     potassium chloride ER (KLOR-CON M) 20 MEQ CR tablet TAKE ONE TABLET BY MOUTH EVERY MORNING 30 tablet 0     senna-docusate (SENOKOT-S/PERICOLACE) 8.6-50 MG tablet Take 2 tablets by mouth 2 times daily 120 tablet 0     vitamin B-12 (CYANOCOBALAMIN) 250 MCG tablet TAKE ONE TABLET BY MOUTH EVERY MORNING 30 tablet 0     Allergies   Allergen Reactions     No Known Drug Allergies      Seasonal Allergies        Reviewed and updated as needed this visit by Provider         Review of Systems   Constitutional, HEENT, cardiovascular, pulmonary, GI, , musculoskeletal, neuro, skin, endocrine and psych systems are negative, except as otherwise noted.       Objective   Reported vitals:  There were no vitals taken for this visit.   Sounds healthy, alert and no distress  PSYCH: Alert and oriented times 3; coherent speech, normal   rate and volume, able to articulate logical thoughts, able   to abstract reason, no tangential thoughts, no hallucinations   or delusions  His affect is normal  RESP: No cough, no audible wheezing, able to talk in full sentences  Remainder of exam unable to be completed due to telephone visits    Diagnostic Test Results:  none         Assessment/Plan:  1. Essential hypertension  BP was normal and stable at last in person visit. Continue with the current dose of metoprolol as he has been tolerating it well.  Emphasized on healthy/low salt diet and daily excercise.  Avoid high sugar/caffeine intake.  No labs needed today.  Follow up in 6 month, earlier as needed.    2. S/P CABG (coronary artery bypass graft)  Seen by cardiology about 1 month ago for hospital follow-up after CHF exacerbation.  At that time weight is stable, and was no longer having shortness of breath.  Continues to be asymptomatic.   Overall he is doing well.  Will continue with current medications as per cardiology recommendation and resume cardiac rehab after COVID-19 resolution.  Encouraged to monitor his weight daily -call in if he gains more than 3 pounds in 24 hours or 5 pounds in 72 hours.  Educated about symptoms that need to be seen or called in.    3. Coronary artery disease involving native coronary artery of native heart with other form of angina pectoris (H)  Please see #1 above.    4. Myeloproliferative disorder (H)  Managed by Dr. Cristina and tolerating oral chemotherapy well.  Labs have been stable.  Anemia also been stable on oral iron.  No nausea or vomiting and tolerates oral intake well.  Continues to be followed by heme-onc.      Return in about 4 months (around 9/15/2020) for Physical Exam.    This document serves as a record of the services and decisions personally performed and made by Angus Scott MD.  It was created on his behalf by Kerry George, a trained medical student and scribe.  The creation of this record is based on the provider's personal observations and the statements of the patient.     Kerry George, MS3  May 15, 2020      Phone call duration:  10 minutes    Angus Clements Mai, MD

## 2020-05-21 DIAGNOSIS — D47.1 MYELOPROLIFERATIVE DISORDER (H): ICD-10-CM

## 2020-05-21 LAB
ANISOCYTOSIS BLD QL SMEAR: SLIGHT
BASOPHILS # BLD AUTO: 0.2 10E9/L (ref 0–0.2)
BASOPHILS NFR BLD AUTO: 2 %
DIFFERENTIAL METHOD BLD: ABNORMAL
EOSINOPHIL # BLD AUTO: 0 10E9/L (ref 0–0.7)
EOSINOPHIL NFR BLD AUTO: 0 %
ERYTHROCYTE [DISTWIDTH] IN BLOOD BY AUTOMATED COUNT: 24 % (ref 10–15)
HCT VFR BLD AUTO: 38.2 % (ref 40–53)
HGB BLD-MCNC: 12 G/DL (ref 13.3–17.7)
LYMPHOCYTES # BLD AUTO: 1.7 10E9/L (ref 0.8–5.3)
LYMPHOCYTES NFR BLD AUTO: 18 %
MCH RBC QN AUTO: 34.6 PG (ref 26.5–33)
MCHC RBC AUTO-ENTMCNC: 31.4 G/DL (ref 31.5–36.5)
MCV RBC AUTO: 110 FL (ref 78–100)
METAMYELOCYTES # BLD: 0.2 10E9/L
METAMYELOCYTES NFR BLD MANUAL: 2 %
MONOCYTES # BLD AUTO: 0.2 10E9/L (ref 0–1.3)
MONOCYTES NFR BLD AUTO: 2 %
NEUTROPHILS # BLD AUTO: 7.1 10E9/L (ref 1.6–8.3)
NEUTROPHILS NFR BLD AUTO: 76 %
PLATELET # BLD AUTO: 291 10E9/L (ref 150–450)
PLATELET # BLD EST: ABNORMAL 10*3/UL
POLYCHROMASIA BLD QL SMEAR: SLIGHT
RBC # BLD AUTO: 3.47 10E12/L (ref 4.4–5.9)
RBC MORPH BLD: ABNORMAL
WBC # BLD AUTO: 9.3 10E9/L (ref 4–11)

## 2020-05-21 PROCEDURE — 85025 COMPLETE CBC W/AUTO DIFF WBC: CPT | Performed by: INTERNAL MEDICINE

## 2020-05-21 PROCEDURE — 36415 COLL VENOUS BLD VENIPUNCTURE: CPT | Performed by: INTERNAL MEDICINE

## 2020-05-28 DIAGNOSIS — Z95.1 S/P CABG (CORONARY ARTERY BYPASS GRAFT): ICD-10-CM

## 2020-05-28 RX ORDER — POTASSIUM CHLORIDE 1500 MG/1
TABLET, EXTENDED RELEASE ORAL
Qty: 30 TABLET | Refills: 5 | Status: SHIPPED | OUTPATIENT
Start: 2020-05-28 | End: 2020-11-17

## 2020-05-29 DIAGNOSIS — Z95.1 S/P CABG (CORONARY ARTERY BYPASS GRAFT): ICD-10-CM

## 2020-05-29 RX ORDER — AMOXICILLIN 250 MG
2 CAPSULE ORAL 2 TIMES DAILY
Qty: 120 TABLET | Refills: 5 | Status: ON HOLD | OUTPATIENT
Start: 2020-05-29 | End: 2021-01-28

## 2020-05-29 NOTE — TELEPHONE ENCOUNTER
Prescription approved per [facility] Refill Protocol.    Belle Peralta RN

## 2020-06-23 DIAGNOSIS — D47.1 MYELOPROLIFERATIVE DISORDER (H): ICD-10-CM

## 2020-06-23 LAB
BASOPHILS # BLD AUTO: 0 10E9/L (ref 0–0.2)
BASOPHILS NFR BLD AUTO: 0.4 %
DIFFERENTIAL METHOD BLD: ABNORMAL
EOSINOPHIL NFR BLD AUTO: 5.4 %
ERYTHROCYTE [DISTWIDTH] IN BLOOD BY AUTOMATED COUNT: 18.8 % (ref 10–15)
HCT VFR BLD AUTO: 37.7 % (ref 40–53)
HGB BLD-MCNC: 12.8 G/DL (ref 13.3–17.7)
IMM GRANULOCYTES # BLD: 0 10E9/L (ref 0–0.4)
IMM GRANULOCYTES NFR BLD: 0.4 %
LYMPHOCYTES # BLD AUTO: 1.4 10E9/L (ref 0.8–5.3)
LYMPHOCYTES NFR BLD AUTO: 18.3 %
MCH RBC QN AUTO: 37.2 PG (ref 26.5–33)
MCHC RBC AUTO-ENTMCNC: 34 G/DL (ref 31.5–36.5)
MCV RBC AUTO: 110 FL (ref 78–100)
MONOCYTES # BLD AUTO: 0.5 10E9/L (ref 0–1.3)
MONOCYTES NFR BLD AUTO: 6.5 %
NEUTROPHILS # BLD AUTO: 5.1 10E9/L (ref 1.6–8.3)
NEUTROPHILS NFR BLD AUTO: 69 %
NRBC # BLD AUTO: 0 10*3/UL
NRBC BLD AUTO-RTO: 0 /100
PLATELET # BLD AUTO: 167 10E9/L (ref 150–450)
RBC # BLD AUTO: 3.44 10E12/L (ref 4.4–5.9)
WBC # BLD AUTO: 7.4 10E9/L (ref 4–11)

## 2020-06-23 PROCEDURE — 85025 COMPLETE CBC W/AUTO DIFF WBC: CPT | Performed by: INTERNAL MEDICINE

## 2020-06-23 PROCEDURE — 36415 COLL VENOUS BLD VENIPUNCTURE: CPT | Performed by: INTERNAL MEDICINE

## 2020-07-21 ENCOUNTER — VIRTUAL VISIT (OUTPATIENT)
Dept: ONCOLOGY | Facility: CLINIC | Age: 70
End: 2020-07-21
Payer: COMMERCIAL

## 2020-07-21 VITALS — HEIGHT: 68 IN | BODY MASS INDEX: 29.7 KG/M2 | WEIGHT: 196 LBS

## 2020-07-21 DIAGNOSIS — D47.1 MYELOPROLIFERATIVE DISORDER (H): ICD-10-CM

## 2020-07-21 DIAGNOSIS — D47.1 MYELOPROLIFERATIVE DISORDER (H): Primary | ICD-10-CM

## 2020-07-21 DIAGNOSIS — I10 ESSENTIAL HYPERTENSION: ICD-10-CM

## 2020-07-21 LAB
BASOPHILS # BLD AUTO: 0 10E9/L (ref 0–0.2)
BASOPHILS NFR BLD AUTO: 0.4 %
DIFFERENTIAL METHOD BLD: ABNORMAL
EOSINOPHIL NFR BLD AUTO: 3 %
ERYTHROCYTE [DISTWIDTH] IN BLOOD BY AUTOMATED COUNT: 15.6 % (ref 10–15)
HCT VFR BLD AUTO: 38.3 % (ref 40–53)
HGB BLD-MCNC: 12.7 G/DL (ref 13.3–17.7)
IMM GRANULOCYTES # BLD: 0 10E9/L (ref 0–0.4)
IMM GRANULOCYTES NFR BLD: 0.4 %
LYMPHOCYTES # BLD AUTO: 1.4 10E9/L (ref 0.8–5.3)
LYMPHOCYTES NFR BLD AUTO: 16.6 %
MCH RBC QN AUTO: 37.1 PG (ref 26.5–33)
MCHC RBC AUTO-ENTMCNC: 33.2 G/DL (ref 31.5–36.5)
MCV RBC AUTO: 112 FL (ref 78–100)
MONOCYTES # BLD AUTO: 0.7 10E9/L (ref 0–1.3)
MONOCYTES NFR BLD AUTO: 8.8 %
NEUTROPHILS # BLD AUTO: 6 10E9/L (ref 1.6–8.3)
NEUTROPHILS NFR BLD AUTO: 70.8 %
NRBC # BLD AUTO: 0 10*3/UL
NRBC BLD AUTO-RTO: 0 /100
PLATELET # BLD AUTO: 225 10E9/L (ref 150–450)
RBC # BLD AUTO: 3.42 10E12/L (ref 4.4–5.9)
WBC # BLD AUTO: 8.4 10E9/L (ref 4–11)

## 2020-07-21 PROCEDURE — 85025 COMPLETE CBC W/AUTO DIFF WBC: CPT | Performed by: INTERNAL MEDICINE

## 2020-07-21 PROCEDURE — 36415 COLL VENOUS BLD VENIPUNCTURE: CPT | Performed by: INTERNAL MEDICINE

## 2020-07-21 PROCEDURE — 99214 OFFICE O/P EST MOD 30 MIN: CPT | Mod: 95 | Performed by: INTERNAL MEDICINE

## 2020-07-21 ASSESSMENT — MIFFLIN-ST. JEOR: SCORE: 1623.55

## 2020-07-21 NOTE — LETTER
"    7/21/2020         RE: Renny Gannon  [address]  [facility] [address]        Dear Colleague,    Thank you for referring your patient, Renny Gannon, to the [facility]. Please see a copy of my visit note below.    Renny Gannon is a 69 year old male who is being evaluated via a billable telephone visit.      The patient has been notified of following:     \"This telephone visit will be conducted via a call between you and your physician/provider. We have found that certain health care needs can be provided without the need for a physical exam.  This service lets us provide the care you need with a short phone conversation.  If a prescription is necessary we can send it directly to your pharmacy.  If lab work is needed we can place an order for that and you can then stop by our lab to have the test done at a later time.    Telephone visits are billed at different rates depending on your insurance coverage. During this emergency period, for some insurers they may be billed the same as an in-person visit.  Please reach out to your insurance provider with any questions.    If during the course of the call the physician/provider feels a telephone visit is not appropriate, you will not be charged for this service.\"    Patient has given verbal consent for Telephone visit?  Yes    What phone number would you like to be contacted at? [phone]    How would you like to obtain your AVS? [employer]    Phone call duration:  minutes            DISCHARGE PLAN:  Next appointments: See patient instruction section  Telephone or virtual visit.    Renny Gannon had a telephone/Virtual visit for Hematology follow up.  Patient instructions completed per Dr. Ellis's post call instructions.  Patient to have monthly labs with follow up in 6 months.  Appointments scheduled and AVS mailed.  See patient instructions and Oncologist's Progress note for further details. Questions and concerns addressed to patient's " satisfaction. Patient verbalized and demonstrated understanding of plan.  Contact information provided and patient is encouraged to call with any that arise in the interim of care.    Ki Lamb RN, BSN, OCN   Oncology Care Coordinator RN  [facility]  [phone]  7/21/2020, 11:58 AM          Hematology/ Oncology Telephone Visit:  Jul 21, 2020    Due to the concerns around COVID-19 and adhering to social distancing we conducted this visit over the telephone.      Reason for Visit:   Chief Complaint   Patient presents with     Oncology Clinic Visit     3 month f/u - Myeloproliferative disorder - Hydroxyurea     Results     Labs       Oncologic History:  Myeloproliferative disorder on hydroxyurea.    Interval History:  Patient has been doing well without any recent complaints weight loss unexpected fever or chills.  He denies any nausea vomiting or diarrhea.  He denies any fever or chills.  He has been significant side effects.    Review of systems:  Pertinent positives have been included in HPI; remainder of detailed complete 20-point ROS was negative.    Past medical, social, surgical, and family histories reviewed.    Allergies:  Allergies as of 07/21/2020 - Reviewed 07/21/2020   Allergen Reaction Noted     No known drug allergies  01/25/2005     Seasonal allergies  03/11/2019       Current Medications:  Current Outpatient Medications   Medication Sig Dispense Refill     acetaminophen (TYLENOL) 325 MG tablet Take 650 mg by mouth every 4 hours as needed for mild pain       atorvastatin (LIPITOR) 40 MG tablet TAKE ONE TABLET BY MOUTH ONCE DAILY AT BEDTIME 90 tablet 0     ferrous sulfate (FEROSUL) 325 (65 Fe) MG tablet Take 1 tablet (325 mg) by mouth daily 30 tablet 0     Ferrous Sulfate 324 (65 Fe) MG TBEC TAKE ONE TABLET BY MOUTH ONCE DAILY AT NOON 30 tablet 0     furosemide (LASIX) 40 MG tablet TAKE ONE TABLET BY MOUTH DAILY IN THE MORNING AND TAKE 1/2 TABLET AT NOON 135 tablet 1     GOODSENSE ASPIRIN  325 MG tablet TAKE ONE TABLET BY MOUTH ONCE DAILY IN THE MORNING 90 tablet 3     hydroxyurea (HYDREA) 500 MG capsule TAKE TWO CAPSULES BY MOUTH ONCE DAILY 120 capsule 1     lisinopril-hydrochlorothiazide (ZESTORETIC) 20-25 MG tablet TAKE ONE TABLET BY MOUTH ONCE DAILY 90 tablet 0     metoprolol succinate ER (TOPROL-XL) 100 MG 24 hr tablet TAKE ONE TABLET BY MOUTH TWICE A DAY IN THE MORNING AND IN THE EVENING 60 tablet 0     omeprazole (PRILOSEC) 20 MG DR capsule Take 1 capsule (20 mg) by mouth daily 30 capsule 1     potassium chloride ER (KLOR-CON M) 20 MEQ CR tablet TAKE ONE TABLET BY MOUTH EVERY MORNING 30 tablet 5     senna-docusate (SENOKOT-S/PERICOLACE) 8.6-50 MG tablet Take 2 tablets by mouth 2 times daily 120 tablet 5     vitamin B-12 (CYANOCOBALAMIN) 250 MCG tablet TAKE ONE TABLET BY MOUTH EVERY MORNING 90 tablet 3        Laboratory/Imaging Studies:  Orders Only on 07/21/2020   Component Date Value Ref Range Status     WBC 07/21/2020 8.4  4.0 - 11.0 10e9/L Final     RBC Count 07/21/2020 3.42* 4.4 - 5.9 10e12/L Final     Hemoglobin 07/21/2020 12.7* 13.3 - 17.7 g/dL Final     Hematocrit 07/21/2020 38.3* 40.0 - 53.0 % Final     MCV 07/21/2020 112* 78 - 100 fl Final     MCH 07/21/2020 37.1* 26.5 - 33.0 pg Final     MCHC 07/21/2020 33.2  31.5 - 36.5 g/dL Final     RDW 07/21/2020 15.6* 10.0 - 15.0 % Final     Platelet Count 07/21/2020 225  150 - 450 10e9/L Final     Diff Method 07/21/2020 Automated Method   Final     % Neutrophils 07/21/2020 70.8  % Final     % Lymphocytes 07/21/2020 16.6  % Final     % Monocytes 07/21/2020 8.8  % Final     % Eosinophils 07/21/2020 3.0  % Final     % Basophils 07/21/2020 0.4  % Final     % Immature Granulocytes 07/21/2020 0.4  % Final     Nucleated RBCs 07/21/2020 0  0 /100 Final     Absolute Neutrophil 07/21/2020 6.0  1.6 - 8.3 10e9/L Final     Absolute Lymphocytes 07/21/2020 1.4  0.8 - 5.3 10e9/L Final     Absolute Monocytes 07/21/2020 0.7  0.0 - 1.3 10e9/L Final     Absolute  Basophils 07/21/2020 0.0  0.0 - 0.2 10e9/L Final     Abs Immature Granulocytes 07/21/2020 0.0  0 - 0.4 10e9/L Final     Absolute Nucleated RBC 07/21/2020 0.0   Final            Assessment and plan:  (D47.1) Myeloproliferative disorder (H)  (primary encounter diagnosis)  I reviewed with patient today most recent laboratory test.  CBC has been stable without any evidence of significant abnormalities.  We will continue hydroxyurea at the current dose.  I will see the patient again in 6 months or sooner or concerns.    (I10) Essential hypertension  Blood pressure is currently well controlled on metoprolol 100 mg orally daily.  Is also on lisinopril-hydrochlorothiazide 20-25 mg orally daily.  Patient also on Lasix 40 mg orally daily.    Hyperlipidemia.    Patient currently on Lipitor 40 mg orally daily.    Gastroesophageal reflux disease.  Patient continue omeprazole    The patient is ready to learn, no apparent learning barriers were identified.  Diagnosis and treatment plans were explained to the patient. The patient expressed understanding of the content. The patient asked appropriate questions. The patient questions were answered to his satisfaction.    Telephone call lasted 25 minutes.    Mani Cristina MD    Chart documentation with Dragon Voice recognition Software. Although reviewed after completion, some words and grammatical errors may remain.                                                    Again, thank you for allowing me to participate in the care of your patient.        Sincerely,        Mani Cristina MD

## 2020-07-21 NOTE — PATIENT INSTRUCTIONS
Check CBC monthly.  Follow-up in 6 months    Please follow up with Dr. Cristina in 6 months.  Please schedule labs monthly.    Lab Date/Time:    Lab Date/Time:    Lab Date/Time:    Lab Date/Time:    Lab Date/Time:    Lab Date/Time:    Office visit follow up with Dr. Cristina Date/Time:     If you have any questions or concerns please feel free to call.    If you need to reschedule please call:  Clinic or Lab Appointment - [phone]  Infusion - [phone]  Imaging - [phone]    Ki Lamb, RN, BSN, OCN  [facility]   Oncology/Hematology Care Coordinator RN  [facility]  [phone]

## 2020-07-21 NOTE — PROGRESS NOTES
"Renny Gannon is a 69 year old male who is being evaluated via a billable telephone visit.      The patient has been notified of following:     \"This telephone visit will be conducted via a call between you and your physician/provider. We have found that certain health care needs can be provided without the need for a physical exam.  This service lets us provide the care you need with a short phone conversation.  If a prescription is necessary we can send it directly to your pharmacy.  If lab work is needed we can place an order for that and you can then stop by our lab to have the test done at a later time.    Telephone visits are billed at different rates depending on your insurance coverage. During this emergency period, for some insurers they may be billed the same as an in-person visit.  Please reach out to your insurance provider with any questions.    If during the course of the call the physician/provider feels a telephone visit is not appropriate, you will not be charged for this service.\"    Patient has given verbal consent for Telephone visit?  Yes    What phone number would you like to be contacted at? [phone]    How would you like to obtain your AVS? [place]    Phone call duration:  minutes          "

## 2020-07-21 NOTE — LETTER
"    7/21/2020         RE: Renny Gannon  [address]  [facility] [address]        Dear Colleague,    Thank you for referring your patient, Renny Gannon, to the [facility]. Please see a copy of my visit note below.    Renny Gannon is a 69 year old male who is being evaluated via a billable telephone visit.      The patient has been notified of following:     \"This telephone visit will be conducted via a call between you and your physician/provider. We have found that certain health care needs can be provided without the need for a physical exam.  This service lets us provide the care you need with a short phone conversation.  If a prescription is necessary we can send it directly to your pharmacy.  If lab work is needed we can place an order for that and you can then stop by our lab to have the test done at a later time.    Telephone visits are billed at different rates depending on your insurance coverage. During this emergency period, for some insurers they may be billed the same as an in-person visit.  Please reach out to your insurance provider with any questions.    If during the course of the call the physician/provider feels a telephone visit is not appropriate, you will not be charged for this service.\"    Patient has given verbal consent for Telephone visit?  Yes    What phone number would you like to be contacted at? [phone]    How would you like to obtain your AVS? Zoodakhart    Phone call duration:  minutes            DISCHARGE PLAN:  Next appointments: See patient instruction section  Telephone or virtual visit.    Renny Gannon had a telephone/Virtual visit for Hematology follow up.  Patient instructions completed per Dr. Ellis's post call instructions.  Patient to have monthly labs with follow up in 6 months.  Appointments scheduled and AVS mailed.  See patient instructions and Oncologist's Progress note for further details. Questions and concerns addressed to patient's " satisfaction. Patient verbalized and demonstrated understanding of plan.  Contact information provided and patient is encouraged to call with any that arise in the interim of care.    Ki Lamb RN, BSN, OCN   Oncology Care Coordinator RN  [facility]  [phone]  7/21/2020, 11:58 AM          Hematology/ Oncology Telephone Visit:  Jul 21, 2020    Due to the concerns around COVID-19 and adhering to social distancing we conducted this visit over the telephone.      Reason for Visit:   Chief Complaint   Patient presents with     Oncology Clinic Visit     3 month f/u - Myeloproliferative disorder - Hydroxyurea     Results     Labs       Oncologic History:  Myeloproliferative disorder on hydroxyurea.    Interval History:  Patient has been doing well without any recent complaints weight loss unexpected fever or chills.  He denies any nausea vomiting or diarrhea.  He denies any fever or chills.  He has been significant side effects.    Review of systems:  Pertinent positives have been included in HPI; remainder of detailed complete 20-point ROS was negative.    Past medical, social, surgical, and family histories reviewed.    Allergies:  Allergies as of 07/21/2020 - Reviewed 07/21/2020   Allergen Reaction Noted     No known drug allergies  01/25/2005     Seasonal allergies  03/11/2019       Current Medications:  Current Outpatient Medications   Medication Sig Dispense Refill     acetaminophen (TYLENOL) 325 MG tablet Take 650 mg by mouth every 4 hours as needed for mild pain       atorvastatin (LIPITOR) 40 MG tablet TAKE ONE TABLET BY MOUTH ONCE DAILY AT BEDTIME 90 tablet 0     ferrous sulfate (FEROSUL) 325 (65 Fe) MG tablet Take 1 tablet (325 mg) by mouth daily 30 tablet 0     Ferrous Sulfate 324 (65 Fe) MG TBEC TAKE ONE TABLET BY MOUTH ONCE DAILY AT NOON 30 tablet 0     furosemide (LASIX) 40 MG tablet TAKE ONE TABLET BY MOUTH DAILY IN THE MORNING AND TAKE 1/2 TABLET AT NOON 135 tablet 1     GOODSENSE ASPIRIN  325 MG tablet TAKE ONE TABLET BY MOUTH ONCE DAILY IN THE MORNING 90 tablet 3     hydroxyurea (HYDREA) 500 MG capsule TAKE TWO CAPSULES BY MOUTH ONCE DAILY 120 capsule 1     lisinopril-hydrochlorothiazide (ZESTORETIC) 20-25 MG tablet TAKE ONE TABLET BY MOUTH ONCE DAILY 90 tablet 0     metoprolol succinate ER (TOPROL-XL) 100 MG 24 hr tablet TAKE ONE TABLET BY MOUTH TWICE A DAY IN THE MORNING AND IN THE EVENING 60 tablet 0     omeprazole (PRILOSEC) 20 MG DR capsule Take 1 capsule (20 mg) by mouth daily 30 capsule 1     potassium chloride ER (KLOR-CON M) 20 MEQ CR tablet TAKE ONE TABLET BY MOUTH EVERY MORNING 30 tablet 5     senna-docusate (SENOKOT-S/PERICOLACE) 8.6-50 MG tablet Take 2 tablets by mouth 2 times daily 120 tablet 5     vitamin B-12 (CYANOCOBALAMIN) 250 MCG tablet TAKE ONE TABLET BY MOUTH EVERY MORNING 90 tablet 3        Laboratory/Imaging Studies:  Orders Only on 07/21/2020   Component Date Value Ref Range Status     WBC 07/21/2020 8.4  4.0 - 11.0 10e9/L Final     RBC Count 07/21/2020 3.42* 4.4 - 5.9 10e12/L Final     Hemoglobin 07/21/2020 12.7* 13.3 - 17.7 g/dL Final     Hematocrit 07/21/2020 38.3* 40.0 - 53.0 % Final     MCV 07/21/2020 112* 78 - 100 fl Final     MCH 07/21/2020 37.1* 26.5 - 33.0 pg Final     MCHC 07/21/2020 33.2  31.5 - 36.5 g/dL Final     RDW 07/21/2020 15.6* 10.0 - 15.0 % Final     Platelet Count 07/21/2020 225  150 - 450 10e9/L Final     Diff Method 07/21/2020 Automated Method   Final     % Neutrophils 07/21/2020 70.8  % Final     % Lymphocytes 07/21/2020 16.6  % Final     % Monocytes 07/21/2020 8.8  % Final     % Eosinophils 07/21/2020 3.0  % Final     % Basophils 07/21/2020 0.4  % Final     % Immature Granulocytes 07/21/2020 0.4  % Final     Nucleated RBCs 07/21/2020 0  0 /100 Final     Absolute Neutrophil 07/21/2020 6.0  1.6 - 8.3 10e9/L Final     Absolute Lymphocytes 07/21/2020 1.4  0.8 - 5.3 10e9/L Final     Absolute Monocytes 07/21/2020 0.7  0.0 - 1.3 10e9/L Final     Absolute  Basophils 07/21/2020 0.0  0.0 - 0.2 10e9/L Final     Abs Immature Granulocytes 07/21/2020 0.0  0 - 0.4 10e9/L Final     Absolute Nucleated RBC 07/21/2020 0.0   Final            Assessment and plan:  (D47.1) Myeloproliferative disorder (H)  (primary encounter diagnosis)  I reviewed with patient today most recent laboratory test.  CBC has been stable without any evidence of significant abnormalities.  We will continue hydroxyurea at the current dose.  I will see the patient again in 6 months or sooner or concerns.    (I10) Essential hypertension  Blood pressure is currently well controlled on metoprolol 100 mg orally daily.  Is also on lisinopril-hydrochlorothiazide 20-25 mg orally daily.  Patient also on Lasix 40 mg orally daily.    Hyperlipidemia.    Patient currently on Lipitor 40 mg orally daily.    Gastroesophageal reflux disease.  Patient continue omeprazole    The patient is ready to learn, no apparent learning barriers were identified.  Diagnosis and treatment plans were explained to the patient. The patient expressed understanding of the content. The patient asked appropriate questions. The patient questions were answered to his satisfaction.    Telephone call lasted 25 minutes.    Mani Cristina MD    Chart documentation with Dragon Voice recognition Software. Although reviewed after completion, some words and grammatical errors may remain.                                                    Again, thank you for allowing me to participate in the care of your patient.        Sincerely,        Mani Cristina MD

## 2020-07-21 NOTE — PROGRESS NOTES
DISCHARGE PLAN:  Next appointments: See patient instruction section  Telephone or virtual visit.    Renny CRAIG Jagdeep had a telephone/Virtual visit for Hematology follow up.  Patient instructions completed per Dr. Ellis's post call instructions.  Patient to have monthly labs with follow up in 6 months.  Appointments scheduled and AVS mailed.  See patient instructions and Oncologist's Progress note for further details. Questions and concerns addressed to patient's satisfaction. Patient verbalized and demonstrated understanding of plan.  Contact information provided and patient is encouraged to call with any that arise in the interim of care.    Ki Lamb, RN, BSN, OCN   Oncology Care Coordinator RN  [facility]  [phone]  7/21/2020, 11:58 AM

## 2020-07-22 DIAGNOSIS — D47.1 MYELOPROLIFERATIVE DISORDER (H): ICD-10-CM

## 2020-07-22 NOTE — TELEPHONE ENCOUNTER
Hydroxyurea 500 MG       Last Written Prescription Date:  5/29/2020  Last Fill Quantity: 30,   # refills: 1  Last Office Visit: 5/15/2020  Future Office visit:       Routing refill request to provider for review/approval because:  Drug not on the [facility], [facility] or [facility] refill protocol or controlled substance

## 2020-07-27 ENCOUNTER — TELEPHONE (OUTPATIENT)
Dept: FAMILY MEDICINE | Facility: CLINIC | Age: 70
End: 2020-07-27

## 2020-07-27 NOTE — TELEPHONE ENCOUNTER
Please verify Metoprolol succinate 100mg dosing.  Renny is on my adherence list and when I talked to him today, he says he is taking 1 tablet daily. The directions on his prescription are twice daily.  Should he be taking once daily or twice daily?    Thank you,  Christina Montenegro [facility]  [phone]

## 2020-07-27 NOTE — TELEPHONE ENCOUNTER
Please contact and set patient up for appointment with PCP for face to face visit within the next 2 months.  Pharmacy would like patient seen for BP check and medication review.     Will route to PCP as FYI upon his return.     B[place]eralta RN

## 2020-07-28 ENCOUNTER — TRANSFERRED RECORDS (OUTPATIENT)
Dept: HEALTH INFORMATION MANAGEMENT | Facility: CLINIC | Age: 70
End: 2020-07-28

## 2020-07-28 RX ORDER — HYDROXYUREA 500 MG/1
CAPSULE ORAL
Qty: 120 CAPSULE | Refills: 1 | Status: SHIPPED | OUTPATIENT
Start: 2020-07-28 | End: 2020-11-24

## 2020-07-28 NOTE — TELEPHONE ENCOUNTER
If find out if this med is prescribed by oncology?  If so, need to be refilled by his oncologist.  thanks

## 2020-07-29 NOTE — TELEPHONE ENCOUNTER
----- Message from Angus Clements Mai, MD sent at 2/12/2019  6:29 AM CST -----  Please let patient know that his x-ray of the toe was normal.   What Is The Reason For Today's Visit?: Full Body Skin Examination What Is The Reason For Today's Visit? (Being Monitored For X): concerning skin lesions on an annual basis How Severe Are Your Spot(S)?: moderate Additional History: Patient has a history of Psoriasis and treating with Taltz. Patient also stated he is having a possible flare throughout his body. He also would like his feet Checked for tinea. He treated in the past with Naftin gel.

## 2020-07-29 NOTE — PROGRESS NOTES
Hematology/ Oncology Telephone Visit:  Jul 21, 2020    Due to the concerns around COVID-19 and adhering to social distancing we conducted this visit over the telephone.      Reason for Visit:   Chief Complaint   Patient presents with     Oncology Clinic Visit     3 month f/u - Myeloproliferative disorder - Hydroxyurea     Results     Labs       Oncologic History:  Myeloproliferative disorder on hydroxyurea.    Interval History:  Patient has been doing well without any recent complaints weight loss unexpected fever or chills.  He denies any nausea vomiting or diarrhea.  He denies any fever or chills.  He has been significant side effects.    Review of systems:  Pertinent positives have been included in HPI; remainder of detailed complete 20-point ROS was negative.    Past medical, social, surgical, and family histories reviewed.    Allergies:  Allergies as of 07/21/2020 - Reviewed 07/21/2020   Allergen Reaction Noted     No known drug allergies  01/25/2005     Seasonal allergies  03/11/2019       Current Medications:  Current Outpatient Medications   Medication Sig Dispense Refill     acetaminophen (TYLENOL) 325 MG tablet Take 650 mg by mouth every 4 hours as needed for mild pain       atorvastatin (LIPITOR) 40 MG tablet TAKE ONE TABLET BY MOUTH ONCE DAILY AT BEDTIME 90 tablet 0     ferrous sulfate (FEROSUL) 325 (65 Fe) MG tablet Take 1 tablet (325 mg) by mouth daily 30 tablet 0     Ferrous Sulfate 324 (65 Fe) MG TBEC TAKE ONE TABLET BY MOUTH ONCE DAILY AT NOON 30 tablet 0     furosemide (LASIX) 40 MG tablet TAKE ONE TABLET BY MOUTH DAILY IN THE MORNING AND TAKE 1/2 TABLET AT NOON 135 tablet 1     GOODSENSE ASPIRIN 325 MG tablet TAKE ONE TABLET BY MOUTH ONCE DAILY IN THE MORNING 90 tablet 3     hydroxyurea (HYDREA) 500 MG capsule TAKE TWO CAPSULES BY MOUTH ONCE DAILY 120 capsule 1     lisinopril-hydrochlorothiazide (ZESTORETIC) 20-25 MG tablet TAKE ONE TABLET BY MOUTH ONCE DAILY 90 tablet 0     metoprolol succinate  ER (TOPROL-XL) 100 MG 24 hr tablet TAKE ONE TABLET BY MOUTH TWICE A DAY IN THE MORNING AND IN THE EVENING 60 tablet 0     omeprazole (PRILOSEC) 20 MG DR capsule Take 1 capsule (20 mg) by mouth daily 30 capsule 1     potassium chloride ER (KLOR-CON M) 20 MEQ CR tablet TAKE ONE TABLET BY MOUTH EVERY MORNING 30 tablet 5     senna-docusate (SENOKOT-S/PERICOLACE) 8.6-50 MG tablet Take 2 tablets by mouth 2 times daily 120 tablet 5     vitamin B-12 (CYANOCOBALAMIN) 250 MCG tablet TAKE ONE TABLET BY MOUTH EVERY MORNING 90 tablet 3        Laboratory/Imaging Studies:  Orders Only on 07/21/2020   Component Date Value Ref Range Status     WBC 07/21/2020 8.4  4.0 - 11.0 10e9/L Final     RBC Count 07/21/2020 3.42* 4.4 - 5.9 10e12/L Final     Hemoglobin 07/21/2020 12.7* 13.3 - 17.7 g/dL Final     Hematocrit 07/21/2020 38.3* 40.0 - 53.0 % Final     MCV 07/21/2020 112* 78 - 100 fl Final     MCH 07/21/2020 37.1* 26.5 - 33.0 pg Final     MCHC 07/21/2020 33.2  31.5 - 36.5 g/dL Final     RDW 07/21/2020 15.6* 10.0 - 15.0 % Final     Platelet Count 07/21/2020 225  150 - 450 10e9/L Final     Diff Method 07/21/2020 Automated Method   Final     % Neutrophils 07/21/2020 70.8  % Final     % Lymphocytes 07/21/2020 16.6  % Final     % Monocytes 07/21/2020 8.8  % Final     % Eosinophils 07/21/2020 3.0  % Final     % Basophils 07/21/2020 0.4  % Final     % Immature Granulocytes 07/21/2020 0.4  % Final     Nucleated RBCs 07/21/2020 0  0 /100 Final     Absolute Neutrophil 07/21/2020 6.0  1.6 - 8.3 10e9/L Final     Absolute Lymphocytes 07/21/2020 1.4  0.8 - 5.3 10e9/L Final     Absolute Monocytes 07/21/2020 0.7  0.0 - 1.3 10e9/L Final     Absolute Basophils 07/21/2020 0.0  0.0 - 0.2 10e9/L Final     Abs Immature Granulocytes 07/21/2020 0.0  0 - 0.4 10e9/L Final     Absolute Nucleated RBC 07/21/2020 0.0   Final            Assessment and plan:  (D47.1) Myeloproliferative disorder (H)  (primary encounter diagnosis)  I reviewed with patient today most  recent laboratory test.  CBC has been stable without any evidence of significant abnormalities.  We will continue hydroxyurea at the current dose.  I will see the patient again in 6 months or sooner or concerns.    (I10) Essential hypertension  Blood pressure is currently well controlled on metoprolol 100 mg orally daily.  Is also on lisinopril-hydrochlorothiazide 20-25 mg orally daily.  Patient also on Lasix 40 mg orally daily.    Hyperlipidemia.    Patient currently on Lipitor 40 mg orally daily.    Gastroesophageal reflux disease.  Patient continue omeprazole    The patient is ready to learn, no apparent learning barriers were identified.  Diagnosis and treatment plans were explained to the patient. The patient expressed understanding of the content. The patient asked appropriate questions. The patient questions were answered to his satisfaction.    Telephone call lasted 25 minutes.    Mani Cristina MD    Chart documentation with Dragon Voice recognition Software. Although reviewed after completion, some words and grammatical errors may remain.

## 2020-08-17 ENCOUNTER — OFFICE VISIT (OUTPATIENT)
Dept: FAMILY MEDICINE | Facility: CLINIC | Age: 70
End: 2020-08-17
Payer: COMMERCIAL

## 2020-08-17 VITALS
HEART RATE: 74 BPM | OXYGEN SATURATION: 97 % | BODY MASS INDEX: 31.47 KG/M2 | WEIGHT: 207 LBS | RESPIRATION RATE: 14 BRPM | DIASTOLIC BLOOD PRESSURE: 62 MMHG | TEMPERATURE: 97.4 F | SYSTOLIC BLOOD PRESSURE: 110 MMHG

## 2020-08-17 DIAGNOSIS — Z95.1 S/P CABG (CORONARY ARTERY BYPASS GRAFT): ICD-10-CM

## 2020-08-17 DIAGNOSIS — I10 ESSENTIAL HYPERTENSION: Primary | ICD-10-CM

## 2020-08-17 DIAGNOSIS — I25.10 CORONARY ARTERY DISEASE INVOLVING NATIVE CORONARY ARTERY OF NATIVE HEART WITHOUT ANGINA PECTORIS: ICD-10-CM

## 2020-08-17 DIAGNOSIS — Z12.11 COLON CANCER SCREENING: ICD-10-CM

## 2020-08-17 DIAGNOSIS — D47.1 MYELOPROLIFERATIVE DISORDER (H): ICD-10-CM

## 2020-08-17 DIAGNOSIS — Z28.21 VACCINATION REFUSED BY PATIENT: ICD-10-CM

## 2020-08-17 DIAGNOSIS — G45.9 TIA (TRANSIENT ISCHEMIC ATTACK): ICD-10-CM

## 2020-08-17 DIAGNOSIS — R79.89 ELEVATED SERUM CREATININE: ICD-10-CM

## 2020-08-17 DIAGNOSIS — Z86.39 HX OF HYPERGLYCEMIA: ICD-10-CM

## 2020-08-17 PROBLEM — G47.00 INSOMNIA, UNSPECIFIED TYPE: Status: RESOLVED | Noted: 2019-03-11 | Resolved: 2020-08-17

## 2020-08-17 PROBLEM — R06.02 SOB (SHORTNESS OF BREATH): Status: RESOLVED | Noted: 2020-02-15 | Resolved: 2020-08-17

## 2020-08-17 PROBLEM — R73.9 TRANSIENT HYPERGLYCEMIA POST PROCEDURE: Status: RESOLVED | Noted: 2020-02-10 | Resolved: 2020-08-17

## 2020-08-17 PROBLEM — R79.9 CONTAMINATION OF BLOOD CULTURE: Status: RESOLVED | Noted: 2020-02-20 | Resolved: 2020-08-17

## 2020-08-17 PROBLEM — J90 EXUDATIVE PLEURAL EFFUSION: Status: RESOLVED | Noted: 2020-02-20 | Resolved: 2020-08-17

## 2020-08-17 PROBLEM — R13.10 DYSPHAGIA: Status: RESOLVED | Noted: 2020-02-10 | Resolved: 2020-08-17

## 2020-08-17 PROBLEM — E87.70 FLUID OVERLOAD: Status: RESOLVED | Noted: 2020-02-10 | Resolved: 2020-08-17

## 2020-08-17 LAB
ALBUMIN SERPL-MCNC: 3.8 G/DL (ref 3.4–5)
ALP SERPL-CCNC: 59 U/L (ref 40–150)
ALT SERPL W P-5'-P-CCNC: 26 U/L (ref 0–70)
ANION GAP SERPL CALCULATED.3IONS-SCNC: 5 MMOL/L (ref 3–14)
AST SERPL W P-5'-P-CCNC: 17 U/L (ref 0–45)
BILIRUB SERPL-MCNC: 0.5 MG/DL (ref 0.2–1.3)
BUN SERPL-MCNC: 32 MG/DL (ref 7–30)
CALCIUM SERPL-MCNC: 9.4 MG/DL (ref 8.5–10.1)
CHLORIDE SERPL-SCNC: 107 MMOL/L (ref 94–109)
CHOLEST SERPL-MCNC: 113 MG/DL
CO2 SERPL-SCNC: 27 MMOL/L (ref 20–32)
CREAT SERPL-MCNC: 1.31 MG/DL (ref 0.66–1.25)
GFR SERPL CREATININE-BSD FRML MDRD: 54 ML/MIN/{1.73_M2}
GLUCOSE SERPL-MCNC: 92 MG/DL (ref 70–99)
HBA1C MFR BLD: 5.3 % (ref 0–5.6)
HDLC SERPL-MCNC: 39 MG/DL
LDLC SERPL CALC-MCNC: 46 MG/DL
NONHDLC SERPL-MCNC: 74 MG/DL
POTASSIUM SERPL-SCNC: 4.8 MMOL/L (ref 3.4–5.3)
PROT SERPL-MCNC: 7.5 G/DL (ref 6.8–8.8)
SODIUM SERPL-SCNC: 139 MMOL/L (ref 133–144)
TRIGL SERPL-MCNC: 141 MG/DL

## 2020-08-17 PROCEDURE — 36415 COLL VENOUS BLD VENIPUNCTURE: CPT | Performed by: FAMILY MEDICINE

## 2020-08-17 PROCEDURE — 83036 HEMOGLOBIN GLYCOSYLATED A1C: CPT | Performed by: FAMILY MEDICINE

## 2020-08-17 PROCEDURE — 80061 LIPID PANEL: CPT | Performed by: FAMILY MEDICINE

## 2020-08-17 PROCEDURE — 80053 COMPREHEN METABOLIC PANEL: CPT | Performed by: FAMILY MEDICINE

## 2020-08-17 PROCEDURE — 99214 OFFICE O/P EST MOD 30 MIN: CPT | Performed by: FAMILY MEDICINE

## 2020-08-17 RX ORDER — FUROSEMIDE 40 MG
TABLET ORAL
Qty: 135 TABLET | Refills: 1 | Status: SHIPPED | OUTPATIENT
Start: 2020-08-17 | End: 2020-11-17

## 2020-08-17 ASSESSMENT — PAIN SCALES - GENERAL: PAINLEVEL: NO PAIN (0)

## 2020-08-17 NOTE — Clinical Note
Please let patient know that due to elevated kidney function test, please have him to hold off on the Lasix p.m. dose (20 mg), but keep taking the morning dose which is 40 mg.  Monitor his weight closely and call if gaining more than 3 pounds in 24 hours or 5 pounds in 72 hours.  Also call in if increased leg swelling, increase SOB or has any concern. Recheck the BMP in 7-10 days. thanks

## 2020-08-17 NOTE — PROGRESS NOTES
Subjective     Renny Gannon is a 69 year old male who presents to clinic today for the following health issues:    HPI       Hypertension Follow-up      Do you check your blood pressure regularly outside of the clinic? No     Are you following a low salt diet? Yes    Are your blood pressures ever more than 140 on the top number (systolic) OR more   than 90 on the bottom number (diastolic), for example 140/90? No      How many servings of fruits and vegetables do you eat daily?  0-1    On average, how many sweetened beverages do you drink each day (Examples: soda, juice, sweet tea, etc.  Do NOT count diet or artificially sweetened beverages)?   1    How many days per week do you exercise enough to make your heart beat faster? 3 or less    How many minutes a day do you exercise enough to make your heart beat faster? 10 - 19    How many days per week do you miss taking your medication? 0    Renny is here today for follow-up on his high blood pressure.  He has complex medical history which includes CAD with s/p CABG, TIA, high blood pressure, and myeloproliferative disorder.  He is seeing Dr. Cristina, an oncologist for his myeloproliferative disorder and is taking hydroxyurea with no side effect.  He is also seeing the cardiologist for his heart disease and his last cardiology visit was in April 2020.  He takes the medications as prescribed with no side effect which include Lipitor, Lasix, aspirin, Zestoretic, and metoprolol.  Stated that overall he is feeling good and has no specific concerns today.  He does not check his blood pressure at home but denied of low blood pressure symptom.  No headache or dizziness.  No acute change in her vision.  No chest pain, shortness of breath, dyspnea or orthopnea.  No leg swelling.  No pain.  Eating and drinking well.  Not exercising but tries to stay active.  No problem with sleeping.  No excessive salt or caffeine intake.  He lives alone and feel safe; does no feel he needs help  at this time.  He has no other concerns today.    Current Outpatient Medications   Medication Sig Dispense Refill     atorvastatin (LIPITOR) 40 MG tablet TAKE ONE TABLET BY MOUTH ONCE DAILY AT BEDTIME 90 tablet 0     Ferrous Sulfate 324 (65 Fe) MG TBEC TAKE ONE TABLET BY MOUTH ONCE DAILY AT NOON 30 tablet 0     furosemide (LASIX) 40 MG tablet TAKE ONE TABLET BY MOUTH DAILY IN THE MORNING AND TAKE 1/2 TABLET AT NOON 135 tablet 1     GOODSENSE ASPIRIN 325 MG tablet TAKE ONE TABLET BY MOUTH ONCE DAILY IN THE MORNING 90 tablet 3     hydroxyurea (HYDREA) 500 MG capsule TAKE TWO CAPSULES BY MOUTH ONCE DAILY 120 capsule 1     lisinopril-hydrochlorothiazide (ZESTORETIC) 20-25 MG tablet TAKE ONE TABLET BY MOUTH ONCE DAILY 90 tablet 0     metoprolol succinate ER (TOPROL-XL) 100 MG 24 hr tablet TAKE ONE TABLET BY MOUTH TWICE A DAY IN THE MORNING AND IN THE EVENING 60 tablet 0     omeprazole (PRILOSEC) 20 MG DR capsule Take 1 capsule (20 mg) by mouth daily 30 capsule 1     potassium chloride ER (KLOR-CON M) 20 MEQ CR tablet TAKE ONE TABLET BY MOUTH EVERY MORNING 30 tablet 5     senna-docusate (SENOKOT-S/PERICOLACE) 8.6-50 MG tablet Take 2 tablets by mouth 2 times daily 120 tablet 5     vitamin B-12 (CYANOCOBALAMIN) 250 MCG tablet TAKE ONE TABLET BY MOUTH EVERY MORNING 90 tablet 3     Allergies   Allergen Reactions     No Known Drug Allergies      Seasonal Allergies      Recent Labs   Lab Test 08/17/20  1110 03/02/20  0730  02/15/20  1612 02/11/20  1224  01/09/20  0926  12/11/19  1505 03/11/19  1102 02/11/19  1123   A1C 5.3  --   --   --   --   --  5.6  --   --   --   --    LDL 46  --   --   --   --   --   --   --  70  --  122*   HDL 39*  --   --   --   --   --   --   --  29*  --  40   TRIG 141  --   --   --   --   --   --   --  332*  --  159*   ALT 26  --   --  28 22   < > 33   < >  --   --  45   CR 1.31* 1.08   < > 1.13 1.23   < > 1.24   < > 1.40*  --  1.21   GFRESTIMATED 54* 69   < > 65 59*   < > 58*   < > 51*  --  61    GFRESTBLACK 63 80   < > 76 68   < > 68   < > 59*  --  70   POTASSIUM 4.8 4.1   < > 4.5 4.2   < > 4.5   < > 4.1  --  4.4   TSH  --   --   --   --   --   --   --   --  2.14 2.44  --     < > = values in this interval not displayed.      BP Readings from Last 3 Encounters:   08/17/20 110/62   03/03/20 106/55   03/02/20 119/69    Wt Readings from Last 3 Encounters:   08/17/20 93.9 kg (207 lb)   07/21/20 88.9 kg (196 lb)   04/22/20 79.8 kg (176 lb)           Reviewed and updated as needed this visit by Provider         Review of Systems   Constitutional, HEENT, cardiovascular, pulmonary, gi and gu systems are negative, except as otherwise noted.      Objective    /62   Pulse 74   Temp 97.4  F (36.3  C) (Temporal)   Resp 14   Wt 93.9 kg (207 lb)   SpO2 97%   BMI 31.47 kg/m    Body mass index is 31.47 kg/m .  Physical Exam   GENERAL: healthy, alert and no distress  HENT: ear canals and TM's normal.  Nares are non-congested. Oropharynx is pink and moist. No tender with palpation to the sinuses.  NECK: Supple, no lymphadenopathy or thyromegaly.  RESP: lungs clear to auscultation - no rales, rhonchi or wheezes  CV: regular rate and rhythm, no murmur, no peripheral edema and peripheral pulses strong  ABDOMEN: soft, nontender, nondistended, no palpable masses organomegaly with no bowel sounds.  MS: no gross musculoskeletal defects noted, no edema.  Walk without limping.  No focal weakness.  SKIN: no suspicious lesions or rashes.  No petechia ecchymosis.  NEURO: Normal strength and tone, mentation intact and speech normal.  Cranial nerves II to XII intact.  DTR +2 throughout.  No focal neurological deficit.  PSYCH: mentation appears normal, affect normal/bright.    Diagnostic Test Results:  Labs reviewed in Epic  Results for orders placed or performed in visit on 08/17/20   Lipid panel reflex to direct LDL Fasting     Status: Abnormal   Result Value Ref Range    Cholesterol 113 <200 mg/dL    Triglycerides 141 <150  mg/dL    HDL Cholesterol 39 (L) >39 mg/dL    LDL Cholesterol Calculated 46 <100 mg/dL    Non HDL Cholesterol 74 <130 mg/dL   Hemoglobin A1c     Status: None   Result Value Ref Range    Hemoglobin A1C 5.3 0 - 5.6 %   Comprehensive metabolic panel     Status: Abnormal   Result Value Ref Range    Sodium 139 133 - 144 mmol/L    Potassium 4.8 3.4 - 5.3 mmol/L    Chloride 107 94 - 109 mmol/L    Carbon Dioxide 27 20 - 32 mmol/L    Anion Gap 5 3 - 14 mmol/L    Glucose 92 70 - 99 mg/dL    Urea Nitrogen 32 (H) 7 - 30 mg/dL    Creatinine 1.31 (H) 0.66 - 1.25 mg/dL    GFR Estimate 54 (L) >60 mL/min/[1.73_m2]    GFR Estimate If Black 63 >60 mL/min/[1.73_m2]    Calcium 9.4 8.5 - 10.1 mg/dL    Bilirubin Total 0.5 0.2 - 1.3 mg/dL    Albumin 3.8 3.4 - 5.0 g/dL    Protein Total 7.5 6.8 - 8.8 g/dL    Alkaline Phosphatase 59 40 - 150 U/L    ALT 26 0 - 70 U/L    AST 17 0 - 45 U/L           Assessment & Plan     Essential hypertension  BP is normal and stable.  Comorbidity include CAD with s/p CABG and TIA.  The goal for his blood pressure is around 120s/80s.  Tolerating medications well and therefore will continue with them at the current doses except decrease the lasix to 40 mg daily from 60 mg daily.   Emphasized on healthy and low salt diet, staying active and weight loss.  Avoid high sugar/caffeine intake. Lab as ordered.  Follow up in 6 month, earlier as needed.    - Lipid panel reflex to direct LDL Fasting  - Comprehensive metabolic panel    Coronary artery disease involving native coronary artery of native heart without angina pectoris  Overall it is stable and he has been asymptomatic.  He is on appropriate medications.  His leg swelling has resolved.  Kidney function is cracking up - need to monitor this closely.  Will continue with the Lipitor, Aspirin, Metoprolol, and Zestoretic.  Will decrease the Lasix from 60 mg to 40 mg daily.  Monitor his weight closely and instructed to call in if gaining more than 3 pounds in 24 hours  or 5 pounds in 3 days.  Also call in if increased leg swelling.  Symptoms that need to be seen or call in also discussed.  He is not a smoker.  Encouraged to follow-up with the cardiologist as per his recommendation.      - Lipid panel reflex to direct LDL Fasting  - Comprehensive metabolic panel    TIA (transient ischemic attack)  Stable and will continue with the aspirin 325 mg daily.  Also continue with the Lipitor.  Blood pressure is well controlled.  Healthy lifestyle modification discussed as above.    Myeloproliferative disorder (H)  Stable and is doing well.  Recent CBC was normal.  Continue with the hydroxyurea and encouraged him to follow-up with the oncologist as per his recommendation.    Colon cancer screening  Discussed with him about options for colon cancer screening which include colonoscopy, Cologuard or FIT test.  Pros and cons of each option discussed.  He preferred FIT test.  He understands that positive FIT test will require further evaluation with colonoscopy.  He also informed that FIT is to be done annually.     - Fecal colorectal cancer screen (FIT); Future    Hx of hyperglycemia  No history of diabetes.  Due to high risk comorbidity, will screen for diabetes with hemoglobin A1c today.    - Hemoglobin A1c    S/P CABG (coronary artery bypass graft)  Please see #2 above for further details.      - furosemide (LASIX) 40 MG tablet; TAKE ONE TABLET BY MOUTH DAILY IN THE MORNING AND TAKE 1/2 TABLET AT NOON    Vaccination refused by patient  He is due for Tdap, pneumococcal and shingles vaccination - he refused them all of them today.  Risks and benefits discussed.  He is willing to take the risk.    Elevated serum creatinine  His creatinine level is 1.35 with the GFR of 54.  No history of renal disease.  He is on high-dose of diuretic with Lasix 60 mg daily and hydrochlorothiazide 12.5 mg.  He is also on lisinopril.  Will decrease the Lasix to 40 mg daily as his leg swelling has resolved.   "Continue with his current other medications.  Recheck the BMP in couple weeks.       BMI:   Estimated body mass index is 31.47 kg/m  as calculated from the following:    Height as of 7/21/20: 1.727 m (5' 8\").    Weight as of this encounter: 93.9 kg (207 lb).   Weight management plan: Discussed healthy diet and exercise guidelines        Return in about 3 months (around 11/17/2020) for Physical Exam.    Angus Clemnets Mai, MD  [facility]    "

## 2020-08-18 ENCOUNTER — TELEPHONE (OUTPATIENT)
Dept: FAMILY MEDICINE | Facility: CLINIC | Age: 70
End: 2020-08-18

## 2020-08-18 NOTE — LETTER
August 19, 2020      Renny Gannon  [address]   [place] [address]        Dear ,    We are writing to inform you of your test results.    labs showed that his kidney function test is elevated which is needed to be monitored closely.  Recommended to recheck the BMP in a week.  If it remains to be elevated, will need to adjust his medication.  Electrolytes were normal; no diabetes.  Liver function test was normal.  Thank you       Resulted Orders   Lipid panel reflex to direct LDL Fasting   Result Value Ref Range    Cholesterol 113 <200 mg/dL    Triglycerides 141 <150 mg/dL      Comment:      Non Fasting    HDL Cholesterol 39 (L) >39 mg/dL    LDL Cholesterol Calculated 46 <100 mg/dL      Comment:      Desirable:       <100 mg/dl    Non HDL Cholesterol 74 <130 mg/dL   Hemoglobin A1c   Result Value Ref Range    Hemoglobin A1C 5.3 0 - 5.6 %      Comment:      Normal <5.7% Prediabetes 5.7-6.4%  Diabetes 6.5% or higher - adopted from ADA   consensus guidelines.     Comprehensive metabolic panel   Result Value Ref Range    Sodium 139 133 - 144 mmol/L    Potassium 4.8 3.4 - 5.3 mmol/L    Chloride 107 94 - 109 mmol/L    Carbon Dioxide 27 20 - 32 mmol/L    Anion Gap 5 3 - 14 mmol/L    Glucose 92 70 - 99 mg/dL      Comment:      Non Fasting    Urea Nitrogen 32 (H) 7 - 30 mg/dL    Creatinine 1.31 (H) 0.66 - 1.25 mg/dL    GFR Estimate 54 (L) >60 mL/min/[1.73_m2]      Comment:      Non  GFR Calc  Starting 12/18/2018, serum creatinine based estimated GFR (eGFR) will be   calculated using the Chronic Kidney Disease Epidemiology Collaboration   (CKD-EPI) equation.      GFR Estimate If Black 63 >60 mL/min/[1.73_m2]      Comment:       GFR Calc  Starting 12/18/2018, serum creatinine based estimated GFR (eGFR) will be   calculated using the Chronic Kidney Disease Epidemiology Collaboration   (CKD-EPI) equation.      Calcium 9.4 8.5 - 10.1 mg/dL    Bilirubin Total 0.5 0.2 - 1.3 mg/dL     Albumin 3.8 3.4 - 5.0 g/dL    Protein Total 7.5 6.8 - 8.8 g/dL    Alkaline Phosphatase 59 40 - 150 U/L    ALT 26 0 - 70 U/L    AST 17 0 - 45 U/L       If you have any questions or concerns, please call the clinic at the number listed above.       Sincerely,        Angus Clements Mai, MD

## 2020-08-18 NOTE — TELEPHONE ENCOUNTER
----- Message from Angus Clements Mai, MD sent at 8/18/2020  1:28 PM CDT -----  Please  let patient know that his labs showed that his kidney function test is elevated which is needed to be monitored closely.  Recommended to recheck the BMP in a week.  If it remains to be elevated, will need to adjust his medication.  Electrolytes were normal; no diabetes.  Liver function test was normal.  Thank you    Angus Scott MD.

## 2020-08-18 NOTE — TELEPHONE ENCOUNTER
Attempted to reach patient, unable to leave message. Will have staff try again later. If patient calls back. Please relay results below to patient.   Rosemary Calero MA

## 2020-08-19 NOTE — TELEPHONE ENCOUNTER
2nd attempt, number on file not working letter sent. Routing to provider as FYI unable to reach.   Rosemary Calero MA

## 2020-08-20 ENCOUNTER — TELEPHONE (OUTPATIENT)
Dept: FAMILY MEDICINE | Facility: CLINIC | Age: 70
End: 2020-08-20

## 2020-08-20 PROBLEM — Z28.21 VACCINATION REFUSED BY PATIENT: Status: ACTIVE | Noted: 2020-08-20

## 2020-08-20 NOTE — TELEPHONE ENCOUNTER
Sonia, Angus Clements MD  [facility]               Please let patient know that due to elevated kidney function test, please have him to hold off on the Lasix p.m. dose (20 mg), but keep taking the morning dose which is 40 mg.  Monitor his weight closely and call if gaining more than 3 pounds in 24 hours or 5 pounds in 72 hours.  Also call in if increased leg swelling, increase SOB or has any concern. Recheck the BMP in 7-10 days. thanks      Attempted to contact patient with the above recommendations per Dr. Scott, phone number is no longer in service. Bertha Lockhart LPN

## 2020-08-20 NOTE — LETTER
[facility]  [address] [facility] [address]  [phone]        August 25, 2020    Renny Gannon  [address]   [place] [address]          Dear Renny,      Please let patient know that due to elevated kidney function test, please have him to hold off on the Lasix p.m. dose (20 mg), but keep taking the morning dose which is 40 mg.  Monitor his weight closely and call if gaining more than 3 pounds in 24 hours or 5 pounds in 72 hours.  Also call in if increased leg swelling, increase SOB or has any concern. Recheck the BMP in 7-10 days. thanks       Sincerely,        Angus Scott MD

## 2020-08-21 DIAGNOSIS — D47.1 MYELOPROLIFERATIVE DISORDER (H): ICD-10-CM

## 2020-08-21 LAB
BASOPHILS # BLD AUTO: 0 10E9/L (ref 0–0.2)
BASOPHILS NFR BLD AUTO: 0.4 %
DIFFERENTIAL METHOD BLD: ABNORMAL
EOSINOPHIL NFR BLD AUTO: 2.8 %
ERYTHROCYTE [DISTWIDTH] IN BLOOD BY AUTOMATED COUNT: 15 % (ref 10–15)
HCT VFR BLD AUTO: 40.6 % (ref 40–53)
HGB BLD-MCNC: 13.7 G/DL (ref 13.3–17.7)
IMM GRANULOCYTES # BLD: 0 10E9/L (ref 0–0.4)
IMM GRANULOCYTES NFR BLD: 0.3 %
LYMPHOCYTES # BLD AUTO: 1.2 10E9/L (ref 0.8–5.3)
LYMPHOCYTES NFR BLD AUTO: 15.4 %
MCH RBC QN AUTO: 37.1 PG (ref 26.5–33)
MCHC RBC AUTO-ENTMCNC: 33.7 G/DL (ref 31.5–36.5)
MCV RBC AUTO: 110 FL (ref 78–100)
MONOCYTES # BLD AUTO: 0.4 10E9/L (ref 0–1.3)
MONOCYTES NFR BLD AUTO: 5.7 %
NEUTROPHILS # BLD AUTO: 5.8 10E9/L (ref 1.6–8.3)
NEUTROPHILS NFR BLD AUTO: 75.4 %
NRBC # BLD AUTO: 0 10*3/UL
NRBC BLD AUTO-RTO: 0 /100
PLATELET # BLD AUTO: 206 10E9/L (ref 150–450)
RBC # BLD AUTO: 3.69 10E12/L (ref 4.4–5.9)
WBC # BLD AUTO: 7.6 10E9/L (ref 4–11)

## 2020-08-21 PROCEDURE — 85025 COMPLETE CBC W/AUTO DIFF WBC: CPT | Performed by: INTERNAL MEDICINE

## 2020-08-21 PROCEDURE — 36415 COLL VENOUS BLD VENIPUNCTURE: CPT | Performed by: INTERNAL MEDICINE

## 2020-08-21 NOTE — TELEPHONE ENCOUNTER
"Contacted patients sister to reach out to patient regarding his phone number \"no longer in service\".  Asked her to have the patient call the clinic.  Please give information and instruction in previous message.    Susan CLARKEO/  "

## 2020-08-21 NOTE — PROGRESS NOTES
I called this patient with the following per Dr. Scott:  Please let patient know that due to elevated kidney function test, please have him to hold off on the Lasix p.m. dose (20 mg), but keep taking the morning dose which is 40 mg.  Monitor his weight closely and call if gaining more than 3 pounds in 24 hours or 5 pounds in 72 hours.  Also call in if increased leg swelling, increase SOB or has any concern. Recheck the BMP in 7-10 days. thanks

## 2020-08-28 DIAGNOSIS — I25.118 CORONARY ARTERY DISEASE INVOLVING NATIVE CORONARY ARTERY OF NATIVE HEART WITH OTHER FORM OF ANGINA PECTORIS (H): ICD-10-CM

## 2020-08-28 DIAGNOSIS — I10 ESSENTIAL HYPERTENSION: ICD-10-CM

## 2020-08-28 RX ORDER — ATORVASTATIN CALCIUM 40 MG/1
TABLET, FILM COATED ORAL
Qty: 90 TABLET | Refills: 1 | Status: ON HOLD | OUTPATIENT
Start: 2020-08-28 | End: 2021-01-01

## 2020-08-28 NOTE — TELEPHONE ENCOUNTER
"  Requested Prescriptions   Pending Prescriptions Disp Refills     atorvastatin (LIPITOR) 40 MG tablet [Pharmacy Med Name: ATORVASTATIN CALCIUM 40MG TABS] 90 tablet 0     Sig: TAKE ONE TABLET BY MOUTH EVERY NIGHT AT BEDTIME   Last Written Prescription Date:  5/29/2020  Last Fill Quantity: 90,  # refills: 0   Last office visit: 8/17/2020   Future Office Visit:   Next 5 appointments (look out 90 days)    Nov 17, 2020  9:00 AM CST  PHYSICAL with Angus Clements Mai, MD  [facility] ([address] [id number] [phone]           [facility] Protocol Passed - 8/28/2020  9:14 AM        Passed - LDL on file in past 12 months     Recent Labs   Lab Test 08/17/20  1110   LDL 46             Passed - No abnormal creatine kinase in past 12 months     No lab results found.             Passed - Recent (12 mo) or future (30 days) visit within the authorizing provider's specialty     Patient has had an office visit with the authorizing provider or a provider within the authorizing providers department within the previous 12 mos or has a future within next 30 days. See \"Patient Info\" tab in inbasket, or \"Choose Columns\" in Meds & Orders section of the refill encounter.              Passed - Medication is active on med list        Passed - Patient is age 18 or older         Prescription approved per [facility] Refill Protocol.  Nicolasa Hernandez RN      "

## 2020-09-04 DIAGNOSIS — Z95.1 S/P CABG (CORONARY ARTERY BYPASS GRAFT): ICD-10-CM

## 2020-09-04 RX ORDER — METOPROLOL SUCCINATE 100 MG/1
TABLET, EXTENDED RELEASE ORAL
Qty: 60 TABLET | Refills: 2 | Status: SHIPPED | OUTPATIENT
Start: 2020-09-04 | End: 2020-12-14

## 2020-09-04 NOTE — TELEPHONE ENCOUNTER
Prescription approved per [facility] Refill Protocol.    Belle Peralta RN

## 2020-09-21 DIAGNOSIS — D47.1 MYELOPROLIFERATIVE DISORDER (H): ICD-10-CM

## 2020-09-21 LAB
BASOPHILS # BLD AUTO: 0 10E9/L (ref 0–0.2)
BASOPHILS NFR BLD AUTO: 0.4 %
DIFFERENTIAL METHOD BLD: ABNORMAL
EOSINOPHIL NFR BLD AUTO: 3.1 %
ERYTHROCYTE [DISTWIDTH] IN BLOOD BY AUTOMATED COUNT: 17.9 % (ref 10–15)
HCT VFR BLD AUTO: 39.2 % (ref 40–53)
HGB BLD-MCNC: 13.1 G/DL (ref 13.3–17.7)
IMM GRANULOCYTES # BLD: 0 10E9/L (ref 0–0.4)
IMM GRANULOCYTES NFR BLD: 0.3 %
LYMPHOCYTES # BLD AUTO: 1.3 10E9/L (ref 0.8–5.3)
LYMPHOCYTES NFR BLD AUTO: 16.9 %
MCH RBC QN AUTO: 37.3 PG (ref 26.5–33)
MCHC RBC AUTO-ENTMCNC: 33.4 G/DL (ref 31.5–36.5)
MCV RBC AUTO: 112 FL (ref 78–100)
MONOCYTES # BLD AUTO: 0.4 10E9/L (ref 0–1.3)
MONOCYTES NFR BLD AUTO: 5.4 %
NEUTROPHILS # BLD AUTO: 5.6 10E9/L (ref 1.6–8.3)
NEUTROPHILS NFR BLD AUTO: 73.9 %
NRBC # BLD AUTO: 0 10*3/UL
NRBC BLD AUTO-RTO: 0 /100
PLATELET # BLD AUTO: 176 10E9/L (ref 150–450)
RBC # BLD AUTO: 3.51 10E12/L (ref 4.4–5.9)
WBC # BLD AUTO: 7.6 10E9/L (ref 4–11)

## 2020-09-21 PROCEDURE — 36415 COLL VENOUS BLD VENIPUNCTURE: CPT | Performed by: INTERNAL MEDICINE

## 2020-09-21 PROCEDURE — 85025 COMPLETE CBC W/AUTO DIFF WBC: CPT | Performed by: INTERNAL MEDICINE

## 2020-10-05 DIAGNOSIS — I10 ESSENTIAL HYPERTENSION: ICD-10-CM

## 2020-10-05 RX ORDER — LISINOPRIL AND HYDROCHLOROTHIAZIDE 20; 25 MG/1; MG/1
TABLET ORAL
Qty: 90 TABLET | Refills: 0 | Status: SHIPPED | OUTPATIENT
Start: 2020-10-05 | End: 2020-12-14

## 2020-10-05 NOTE — TELEPHONE ENCOUNTER
Routing refill request to provider for review/approval because:  Labs out of range:  Creatinine.     Belle Peralta RN

## 2020-10-21 DIAGNOSIS — D47.1 MYELOPROLIFERATIVE DISORDER (H): ICD-10-CM

## 2020-10-21 LAB
BASOPHILS # BLD AUTO: 0 10E9/L (ref 0–0.2)
BASOPHILS NFR BLD AUTO: 0 %
DIFFERENTIAL METHOD BLD: ABNORMAL
EOSINOPHIL # BLD AUTO: 0.1 10E9/L (ref 0–0.7)
EOSINOPHIL NFR BLD AUTO: 1 %
ERYTHROCYTE [DISTWIDTH] IN BLOOD BY AUTOMATED COUNT: 19.4 % (ref 10–15)
HCT VFR BLD AUTO: 39.3 % (ref 40–53)
HGB BLD-MCNC: 13.3 G/DL (ref 13.3–17.7)
LYMPHOCYTES # BLD AUTO: 1.4 10E9/L (ref 0.8–5.3)
LYMPHOCYTES NFR BLD AUTO: 18 %
MCH RBC QN AUTO: 39 PG (ref 26.5–33)
MCHC RBC AUTO-ENTMCNC: 33.8 G/DL (ref 31.5–36.5)
MCV RBC AUTO: 115 FL (ref 78–100)
MONOCYTES # BLD AUTO: 0.4 10E9/L (ref 0–1.3)
MONOCYTES NFR BLD AUTO: 5 %
NEUTROPHILS # BLD AUTO: 6.1 10E9/L (ref 1.6–8.3)
NEUTROPHILS NFR BLD AUTO: 76 %
PLATELET # BLD AUTO: 171 10E9/L (ref 150–450)
RBC # BLD AUTO: 3.41 10E12/L (ref 4.4–5.9)
WBC # BLD AUTO: 8 10E9/L (ref 4–11)

## 2020-10-21 PROCEDURE — 85025 COMPLETE CBC W/AUTO DIFF WBC: CPT | Performed by: INTERNAL MEDICINE

## 2020-10-21 PROCEDURE — 36415 COLL VENOUS BLD VENIPUNCTURE: CPT | Performed by: INTERNAL MEDICINE

## 2020-11-17 ENCOUNTER — OFFICE VISIT (OUTPATIENT)
Dept: FAMILY MEDICINE | Facility: CLINIC | Age: 70
End: 2020-11-17
Payer: COMMERCIAL

## 2020-11-17 VITALS
OXYGEN SATURATION: 96 % | DIASTOLIC BLOOD PRESSURE: 68 MMHG | HEART RATE: 74 BPM | BODY MASS INDEX: 32.99 KG/M2 | RESPIRATION RATE: 14 BRPM | SYSTOLIC BLOOD PRESSURE: 120 MMHG | WEIGHT: 217 LBS | TEMPERATURE: 96.3 F

## 2020-11-17 DIAGNOSIS — G45.9 TIA (TRANSIENT ISCHEMIC ATTACK): ICD-10-CM

## 2020-11-17 DIAGNOSIS — Z95.1 S/P CABG (CORONARY ARTERY BYPASS GRAFT): ICD-10-CM

## 2020-11-17 DIAGNOSIS — D47.1 MYELOPROLIFERATIVE DISORDER (H): ICD-10-CM

## 2020-11-17 DIAGNOSIS — Z12.11 COLON CANCER SCREENING: ICD-10-CM

## 2020-11-17 DIAGNOSIS — R41.3 MEMORY LOSS: ICD-10-CM

## 2020-11-17 DIAGNOSIS — I10 ESSENTIAL HYPERTENSION: ICD-10-CM

## 2020-11-17 DIAGNOSIS — Z28.21 VACCINATION REFUSED BY PATIENT: ICD-10-CM

## 2020-11-17 DIAGNOSIS — R63.5 WEIGHT GAIN: ICD-10-CM

## 2020-11-17 DIAGNOSIS — Z00.00 ENCOUNTER FOR MEDICARE ANNUAL WELLNESS EXAM: Primary | ICD-10-CM

## 2020-11-17 DIAGNOSIS — R79.89 ELEVATED SERUM CREATININE: ICD-10-CM

## 2020-11-17 DIAGNOSIS — I25.118 CORONARY ARTERY DISEASE INVOLVING NATIVE CORONARY ARTERY OF NATIVE HEART WITH OTHER FORM OF ANGINA PECTORIS (H): ICD-10-CM

## 2020-11-17 LAB
ANION GAP SERPL CALCULATED.3IONS-SCNC: 6 MMOL/L (ref 3–14)
BUN SERPL-MCNC: 37 MG/DL (ref 7–30)
CALCIUM SERPL-MCNC: 9.3 MG/DL (ref 8.5–10.1)
CHLORIDE SERPL-SCNC: 102 MMOL/L (ref 94–109)
CO2 SERPL-SCNC: 28 MMOL/L (ref 20–32)
CREAT SERPL-MCNC: 1.55 MG/DL (ref 0.66–1.25)
GFR SERPL CREATININE-BSD FRML MDRD: 44 ML/MIN/{1.73_M2}
GLUCOSE SERPL-MCNC: 98 MG/DL (ref 70–99)
NT-PROBNP SERPL-MCNC: 479 PG/ML (ref 0–125)
POTASSIUM SERPL-SCNC: 4.4 MMOL/L (ref 3.4–5.3)
SODIUM SERPL-SCNC: 136 MMOL/L (ref 133–144)
TSH SERPL DL<=0.005 MIU/L-ACNC: 1.88 MU/L (ref 0.4–4)

## 2020-11-17 PROCEDURE — 84443 ASSAY THYROID STIM HORMONE: CPT | Performed by: FAMILY MEDICINE

## 2020-11-17 PROCEDURE — 99213 OFFICE O/P EST LOW 20 MIN: CPT | Mod: 25 | Performed by: FAMILY MEDICINE

## 2020-11-17 PROCEDURE — 99397 PER PM REEVAL EST PAT 65+ YR: CPT | Performed by: FAMILY MEDICINE

## 2020-11-17 PROCEDURE — 83880 ASSAY OF NATRIURETIC PEPTIDE: CPT | Performed by: FAMILY MEDICINE

## 2020-11-17 PROCEDURE — 80048 BASIC METABOLIC PNL TOTAL CA: CPT | Performed by: FAMILY MEDICINE

## 2020-11-17 PROCEDURE — 36415 COLL VENOUS BLD VENIPUNCTURE: CPT | Performed by: FAMILY MEDICINE

## 2020-11-17 RX ORDER — SPIRONOLACTONE 25 MG/1
TABLET ORAL
Qty: 30 TABLET | Refills: 1 | Status: SHIPPED | OUTPATIENT
Start: 2020-11-17 | End: 2020-11-21

## 2020-11-17 RX ORDER — FUROSEMIDE 40 MG
TABLET ORAL
Qty: 135 TABLET | Refills: 1 | Status: ON HOLD | OUTPATIENT
Start: 2020-11-17 | End: 2021-01-01

## 2020-11-17 ASSESSMENT — ENCOUNTER SYMPTOMS
PARESTHESIAS: 0
NAUSEA: 0
HEARTBURN: 0
CONSTIPATION: 0
PALPITATIONS: 0
EYE PAIN: 0
HEMATURIA: 0
DIARRHEA: 0
NERVOUS/ANXIOUS: 0
FEVER: 0
MYALGIAS: 0
FREQUENCY: 0
CHILLS: 0
SORE THROAT: 0
ARTHRALGIAS: 0
ABDOMINAL PAIN: 0
SHORTNESS OF BREATH: 0
HEADACHES: 0
DYSURIA: 0
COUGH: 0
DIZZINESS: 0
WEAKNESS: 0
HEMATOCHEZIA: 0
JOINT SWELLING: 0

## 2020-11-17 ASSESSMENT — ACTIVITIES OF DAILY LIVING (ADL): CURRENT_FUNCTION: NO ASSISTANCE NEEDED

## 2020-11-17 ASSESSMENT — PAIN SCALES - GENERAL: PAINLEVEL: NO PAIN (0)

## 2020-11-17 NOTE — PROGRESS NOTES
SUBJECTIVE:   Renny Gannon is a 70 year old male who presents for Preventive Visit.      Patient has been advised of split billing requirements and indicates understanding: Yes   Are you in the first 12 months of your Medicare coverage?  No    Healthy Habits:   PHQ-2 Total Score: 0       Do you feel safe in your environment? Yes    Have you ever done Advance Care Planning? (For example, a Health Directive, POLST, or a discussion with a medical provider or your loved ones about your wishes): No, advance care planning information given to patient to review.  Patient declined advance care planning discussion at this time.      Fall risk  Fallen 2 or more times in the past year?: No  Any fall with injury in the past year?: No    Cognitive Screening   1) Repeat 3 items (Leader, Season, Table)    2) Clock draw: NORMAL  3) 3 item recall: Recalls NO objects   Results: 0 items recalled: PROBABLE COGNITIVE IMPAIRMENT, **INFORM PROVIDER**    Mini-CogTM Copyright ALIDA Avina. Licensed by the author for use in [facility]; reprinted with permission (joi@[facility]). All rights reserved.      Do you have sleep apnea, excessive snoring or daytime drowsiness?: no    Reviewed and updated as needed this visit by clinical staff  Tobacco  Allergies  Meds      Soc Hx        Reviewed and updated as needed this visit by Provider                Social History     Tobacco Use     Smoking status: Former Smoker     Years: 30.00     Types: Cigarettes     Start date: 1961     Quit date: 2001     Years since quittin.8     Smokeless tobacco: Never Used   Substance Use Topics     Alcohol use: No     Frequency: Never     If you drink alcohol do you typically have >3 drinks per day or >7 drinks per week? No    Alcohol Use 2020   Prescreen: >3 drinks/day or >7 drinks/week? Not Applicable           PROBLEMS TO ADD ON...  Swollen on upper left hand    Renny is here today for his general physical and general follow-up.   He has a complex medical history which include CAD, history of TIA, high blood pressure, and myeloproliferative disorder.  Stated that he overall is feeling pretty good and has no specific concerns today except that his hands and his feet are swollen.  Been taking the medications as prescribed with no side effect.  Not checking her blood pressure at home.  Does not monitor his weights.  Note chest pain, shortness of breath, orthopnea or dyspnea.  Denied of excessive salt/caffeine intake.  He feels his hand is slightly swollen, but not really bother him.  No headache or dizziness.  No acute visual change. No running nose, nasal congestion, coughing, fever or chill.  No N/V/D/C. No problem with urination.  No abdominal pain. No pain. Does not exercises - has been very sedentary.  No alcohol, drug or tobacco use. No problem with sleeping.  Feels safe at home lives alone, but his sisters check on him frequently.  He cooks his own food, mostly microwave food.  Denied of being depressed. No other concern today      Current providers sharing in care for this patient include:   Patient Care Team:  Angus Scott MD as PCP - General (Family Practice)  Angus Scott MD as Assigned PCP  Ki Lamb RN as Specialty Care Coordinator (Oncology)  Mani Cristina MD as MD (Hematology & Oncology)  Mani Cristina MD as Assigned Cancer Care Provider  Devin Bentley MD as Assigned Heart and Vascular Provider    The following health maintenance items are reviewed in Epic and correct as of today:  Health Maintenance   Topic Date Due     DTAP/TDAP/TD IMMUNIZATION (1 - Tdap) 11/01/1974     ZOSTER IMMUNIZATION (1 of 2) 11/01/1999     Pneumococcal Vaccine: 65+ Years (1 of 1 - PPSV23) 11/01/2014     AORTIC ANEURYSM SCREENING (SYSTEM ASSIGNED)  11/01/2014     COLORECTAL CANCER SCREENING  02/13/2020     MEDICARE ANNUAL WELLNESS VISIT  03/11/2020     FALL RISK ASSESSMENT  03/11/2020     INFLUENZA VACCINE (1) 09/01/2020      ADVANCE CARE PLANNING  2024     LIPID  2025     HEPATITIS C SCREENING  Completed     PHQ-2  Completed     Pneumococcal Vaccine: Pediatrics (0 to 5 Years) and At-Risk Patients (6 to 64 Years)  Aged Out     IPV IMMUNIZATION  Aged Out     MENINGITIS IMMUNIZATION  Aged Out     HEPATITIS B IMMUNIZATION  Aged Out     BP Readings from Last 3 Encounters:   20 120/68   20 110/62   20 106/55    Wt Readings from Last 3 Encounters:   20 98.4 kg (217 lb)   20 93.9 kg (207 lb)   20 88.9 kg (196 lb)                  Patient Active Problem List   Diagnosis     Essential hypertension     Memory loss     Coronary artery disease involving native coronary artery of native heart with other form of angina pectoris (H)     Status post coronary angiogram     Myeloproliferative disorder (H)     S/P CABG (coronary artery bypass graft)     TIA (transient ischemic attack)     Vaccination refused by patient     Elevated serum creatinine     Past Surgical History:   Procedure Laterality Date     BONE MARROW BIOPSY, BONE SPECIMEN, NEEDLE/TROCAR N/A 2019    Procedure: BIOPSY, BONE MARROW;  Surgeon: Aguilar Krause MD;  Location: [facility] OR     BYPASS GRAFT ARTERY CORONARY N/A 2020    Procedure: CORONARY ARTERY BYPASS GRAFTING X 3 - LIMA TO LAD, SV TO OM  AND PDA; ENDOVEIN HARVEST OF BILATERAL LEGS; ON PUMP WITH SANDRA;  Surgeon: Mable Barrera MD;  Location:  OR     CV CORONARY ANGIOGRAM Left 2020    Procedure: Coronary Angiogram;  Surgeon: Devin Bentley MD;  Location:  HE[facility] CARDI[facility] CATH LAB     NO HISTORY OF SURGERY         Social History     Tobacco Use     Smoking status: Former Smoker     Years: 30.00     Types: Cigarettes     Start date: 1961     Quit date: 2001     Years since quittin.8     Smokeless tobacco: Never Used   Substance Use Topics     Alcohol use: No     Frequency: Never     Family History   Problem Relation Age of Onset      No Known Problems Mother      Unknown/Adopted Father      Unknown/Adopted Maternal Grandmother      Unknown/Adopted Maternal Grandfather      Unknown/Adopted Paternal Grandmother      Unknown/Adopted Paternal Grandfather      Cancer Brother         lung cancer - smoking     No Known Problems Sister      Coronary Artery Disease Brother          of MI at 66     No Known Problems Sister          Current Outpatient Medications   Medication Sig Dispense Refill     atorvastatin (LIPITOR) 40 MG tablet TAKE ONE TABLET BY MOUTH EVERY NIGHT AT BEDTIME 90 tablet 1     Ferrous Sulfate 324 (65 Fe) MG TBEC TAKE ONE TABLET BY MOUTH ONCE DAILY AT NOON 30 tablet 0     furosemide (LASIX) 40 MG tablet TAKE ONE TABLET BY MOUTH DAILY IN THE MORNING AND TAKE 1/2 TABLET AT NOON 135 tablet 1     GOODSENSE ASPIRIN 325 MG tablet TAKE ONE TABLET BY MOUTH ONCE DAILY IN THE MORNING 90 tablet 3     hydroxyurea (HYDREA) 500 MG capsule TAKE TWO CAPSULES BY MOUTH ONCE DAILY 120 capsule 1     lisinopril-hydrochlorothiazide (ZESTORETIC) 20-25 MG tablet TAKE ONE TABLET BY MOUTH ONCE DAILY 90 tablet 0     metoprolol succinate ER (TOPROL-XL) 100 MG 24 hr tablet TAKE ONE TABLET BY MOUTH TWICE A DAY IN THE MORNING AND IN THE EVENING 60 tablet 2     omeprazole (PRILOSEC) 20 MG DR capsule Take 1 capsule (20 mg) by mouth daily 30 capsule 1     senna-docusate (SENOKOT-S/PERICOLACE) 8.6-50 MG tablet Take 2 tablets by mouth 2 times daily 120 tablet 5     spironolactone (ALDACTONE) 25 MG tablet Take 1 tablet in am with the Lasix and hold off on the potassium 30 tablet 1     vitamin B-12 (CYANOCOBALAMIN) 250 MCG tablet TAKE ONE TABLET BY MOUTH EVERY MORNING 90 tablet 3     Allergies   Allergen Reactions     No Known Drug Allergies      Seasonal Allergies      Recent Labs   Lab Test 20  0957 20  1110 02/15/20  1612 02/15/20  1612 20  1224 20  0926 20  0926 19  1505 19  1505 19  1123 19  1123   A1C  --  5.3   "--   --   --   --  5.6  --   --   --   --    LDL  --  46  --   --   --   --   --   --  70  --  122*   HDL  --  39*  --   --   --   --   --   --  29*  --  40   TRIG  --  141  --   --   --   --   --   --  332*  --  159*   ALT  --  26  --  28 22   < > 33   < >  --   --  45   CR 1.55* 1.31*   < > 1.13 1.23   < > 1.24   < > 1.40*  --  1.21   GFRESTIMATED 44* 54*   < > 65 59*   < > 58*   < > 51*  --  61   GFRESTBLACK 51* 63   < > 76 68   < > 68   < > 59*  --  70   POTASSIUM 4.4 4.8   < > 4.5 4.2   < > 4.5   < > 4.1  --  4.4   TSH 1.88  --   --   --   --   --   --   --  2.14   < >  --     < > = values in this interval not displayed.      Pneumonia Vaccine: declined    Review of Systems  Constitutional, HEENT, cardiovascular, pulmonary, GI, , musculoskeletal, neuro, skin, endocrine and psych systems are negative, except as otherwise noted.    OBJECTIVE:   /68   Pulse 74   Temp 96.3  F (35.7  C) (Temporal)   Resp 14   Wt 98.4 kg (217 lb)   SpO2 96%   BMI 32.99 kg/m   Estimated body mass index is 32.99 kg/m  as calculated from the following:    Height as of 7/21/20: 1.727 m (5' 8\").    Weight as of this encounter: 98.4 kg (217 lb).  Physical Exam   GENERAL: healthy, alert and no distress  EYES: Eyes grossly normal to inspection, PERRL and conjunctivae and sclerae normal.  No nystagmus.  All 4 visual fields are intact  HENT: Ear canals and TM's normal.  Nares are non-congested. Oropharynx is pink and moist. No tender with palpation to the sinuses.  NECK: no adenopathy, supple, no lymphadenopathy or thyromegaly.  No tender with palpation to the cervical spine or its paraspinous muscle.  RESP: lungs clear to auscultation - no rales, rhonchi or wheezes  CV: regular rate and rhythm, no murmur.  ABDOMEN: soft, nondistended, nontender, no palpable masses or organomegaly with normal bowel sounds.  MS: no gross musculoskeletal defects noted, no edema.  Walk with no limping, normal gait.  All 4 extremities are equally in " strength. Ankle, knees, hips, shoulders, elbows and wrists exams normal.  Normal fine motor skills on fingers.  Back is straight, no lordosis or scoliosis.  No tender with palpation.  SKIN: no suspicious lesions or rashes  NEURO: Normal strength and tone, mentation intact and speech normal.  Cranial nerves II through XII intact, DTR +2 throughout, no focal neurological deficit.  PSYCH: mentation appears normal, flat affect.  Thoughts intact, no hallucination.  No suicidal or homicidal ideation.  LYMPH: no cervical, supraclavicular or axillary adenopathy.   (male): Offered but he declined.  He has no concern about it.      Diagnostic Test Results:  Labs reviewed in Epic  Results for orders placed or performed in visit on 11/17/20   Basic metabolic panel  (Ca, Cl, CO2, Creat, Gluc, K, Na, BUN)     Status: Abnormal   Result Value Ref Range    Sodium 136 133 - 144 mmol/L    Potassium 4.4 3.4 - 5.3 mmol/L    Chloride 102 94 - 109 mmol/L    Carbon Dioxide 28 20 - 32 mmol/L    Anion Gap 6 3 - 14 mmol/L    Glucose 98 70 - 99 mg/dL    Urea Nitrogen 37 (H) 7 - 30 mg/dL    Creatinine 1.55 (H) 0.66 - 1.25 mg/dL    GFR Estimate 44 (L) >60 mL/min/[1.73_m2]    GFR Estimate If Black 51 (L) >60 mL/min/[1.73_m2]    Calcium 9.3 8.5 - 10.1 mg/dL   BNP-N terminal pro     Status: Abnormal   Result Value Ref Range    N-Terminal Pro Bnp 479 (H) 0 - 125 pg/mL   TSH with free T4 reflex     Status: None   Result Value Ref Range    TSH 1.88 0.40 - 4.00 mU/L       ASSESSMENT / PLAN:   1. Encounter for Medicare annual wellness exam  Overall has a complex medical history but overall he is doing well.  He refused all vaccinations.  Topics appropriate for his age discussed include safety issue and healthy lifestyle modification falling and depression prevention. Recommended daily exercise for at least 30 minutes or as tolerated.  Emphasized on healthy diet with adequate fluid intake and resting. Feels safe at home.  Follow in 1 year, earlier as  needed.  Labs as ordered below.  Colon cancer screening discussed and recommended.  He declined colonoscopy but will consider FIT test which ordered.  Declined prostate cancer screening overall stable.   All of his questions were answered.    2. Coronary artery disease involving native coronary artery of native heart with other form of angina pectoris (H)  Stable and is doing well.  Been taking the medications as prescribed.  He gained about 10 pounds in last 3 months and 40 pounds in the last 6 months.  His legs are minimally swollen and has no symptoms of heart failure.   BNP level was normal today.  (See weight gain section below for further details) Will continue with the Lipitor, Zestoretic and Toprol XR. Continue with the Lasix for diuretic.  Follow-up with his cardiologist as per her regular history medication.  Encouraged to avoid high salt diet.  Symptoms that need to be seen or call in discussed.  He does not smoke, drink or use drugs and encouraged him to keep the good work.    - Basic metabolic panel  (Ca, Cl, CO2, Creat, Gluc, K, Na, BUN)  - BNP-N terminal pro      3. TIA (transient ischemic attack)  Overall is stable.  Blood pressure is controlled.  Will continue with aspirin at 325 mg daily.    4. Essential hypertension  BP is normal and stable. Continue with the current doses of Zestoretic, Toprol-XL and furosemide, been tolerating them well.  Emphasized on healthy/low salt diet, daily excercise and weight loss.  Avoid high sugar/caffeine intake. Lab as ordered.  Follow up in 6 month, earlier as needed.    - Basic metabolic panel  (Ca, Cl, CO2, Creat, Gluc, K, Na, BUN)    5. Myeloproliferative disorder (H)  Stable and is managed by the oncologist, Dr. Cristina.  Encouraged to follow-up with Dr. Cristina as per his recommendation.    6. Memory loss  Stable and will continue to monitor    7. Elevated serum creatinine  The creatinine level is rising.  He is taking Lasix and Zestoretic.  Also has history  of CAD.  Will refer to nephrology for further evaluation and management.    - Basic metabolic panel  (Ca, Cl, CO2, Creat, Gluc, K, Na, BUN)    8. Weight gain  Most likely due to excess calorie intake.  His legs are minimally swollen and BNP level was normal, therefore  Less likely congestive failure.  No shortness of breath, dyspnea or orthopnea.  Emphasized on healthy diet and exercise as above.  We will continue to monitor.    - TSH with free T4 reflex    9. Colon cancer screening  Discussed with him about options for colon cancer screening which include colonoscopy, Cologuard or FIT test.  Pros and cons of each option discussed.  He preferred FIT test.  He understands that positive FIT test will require further evaluation with colonoscopy.  He also informed that FIT is to be done annually.     - Fecal colorectal cancer screen (FIT); Future    10. S/P CABG (coronary artery bypass graft)  Please see #2 above for further details.    - furosemide (LASIX) 40 MG tablet; TAKE ONE TABLET BY MOUTH DAILY IN THE MORNING AND TAKE 1/2 TABLET AT NOON  Dispense: 135 tablet; Refill: 1    11. Vaccination refused by patient  I had a long conversation with him about the current vaccination's recommendation and its standard of care.  I also discussed with him about the potential risks and benefits associated with the vaccinations; all of his questions about immunization were answered.  I emphasized on the importance of preventive care and informed him that vaccination is effective in preventing of the dz/infection.  He however at this point decided to forego all vaccinations and is willing to take the risks.  I encouraged him to let me know if he ever changes his mind in regard to this.       Patient has been advised of split billing requirements and indicates understanding: Yes  COUNSELING:  Reviewed preventive health counseling, as reflected in patient instructions       Regular exercise       Healthy diet/nutrition       Vision  "screening       Hearing screening       Dental care       Bladder control       Fall risk prevention       Immunizations    Declined: Influenza, TDAP and Zoster due to Conscientious objector               Aspirin Prophylaxsis       Colon cancer screening       Osteoporosis Prevention/Bone Health    Estimated body mass index is 32.99 kg/m  as calculated from the following:    Height as of 7/21/20: 1.727 m (5' 8\").    Weight as of this encounter: 98.4 kg (217 lb).    Weight management plan: Discussed healthy diet and exercise guidelines    He reports that he quit smoking about 19 years ago. His smoking use included cigarettes. He started smoking about 59 years ago. He quit after 30.00 years of use. He has never used smokeless tobacco.      Appropriate preventive services were discussed with this patient, including applicable screening as appropriate for cardiovascular disease, diabetes, osteopenia/osteoporosis, and glaucoma.  As appropriate for age/gender, discussed screening for colorectal cancer, prostate cancer, breast cancer, and cervical cancer. Checklist reviewing preventive services available has been given to the patient.    Reviewed patients plan of care and provided an AVS. The Basic Care Plan (routine screening as documented in Health Maintenance) for Renny meets the Care Plan requirement. This Care Plan has been established and reviewed with the Patient.    His sister Chaparrita will be his POA.  He is desirous for DNR/DNI.    Counseling Resources:  ATP IV Guidelines  Pooled Cohorts Equation Calculator  Breast Cancer Risk Calculator  Breast Cancer: Medication to Reduce Risk  FRAX Risk Assessment  ICSI Preventive Guidelines  Dietary Guidelines for Americans, 2010  [employer]'s MyPlate  ASA Prophylaxis  Lung CA Screening    Angus Clements Mai, MD  [facility]    Identified Health Risks:  "

## 2020-11-17 NOTE — PATIENT INSTRUCTIONS
Patient Education   Personalized Prevention Plan  You are due for the preventive services outlined below.  Your care team is available to assist you in scheduling these services.  If you have already completed any of these items, please share that information with your care team to update in your medical record.  Health Maintenance Due   Topic Date Due     Diptheria Tetanus Pertussis (DTAP/TDAP/TD) Vaccine (1 - Tdap) 11/01/1974     Zoster (Shingles) Vaccine (1 of 2) 11/01/1999     Pneumococcal Vaccine (1 of 1 - PPSV23) 11/01/2014     AORTIC ANEURYSM SCREENING (SYSTEM ASSIGNED)  11/01/2014     Colorectal Cancer Screening  02/13/2020     FALL RISK ASSESSMENT  03/11/2020     Flu Vaccine (1) 09/01/2020

## 2020-11-17 NOTE — Clinical Note
Please let patient know that I want him to stop the Spironolactone (aldactone) - the newest prescription. Also please check on the status of nephrology consult. Thanks.

## 2020-11-18 ENCOUNTER — TELEPHONE (OUTPATIENT)
Dept: FAMILY MEDICINE | Facility: CLINIC | Age: 70
End: 2020-11-18

## 2020-11-18 DIAGNOSIS — I25.118 CORONARY ARTERY DISEASE INVOLVING NATIVE CORONARY ARTERY OF NATIVE HEART WITH OTHER FORM OF ANGINA PECTORIS (H): ICD-10-CM

## 2020-11-18 DIAGNOSIS — R79.89 ELEVATED SERUM CREATININE: ICD-10-CM

## 2020-11-18 DIAGNOSIS — I10 ESSENTIAL HYPERTENSION: Primary | ICD-10-CM

## 2020-11-18 NOTE — TELEPHONE ENCOUNTER
----- Message from Angus Clements Mai, MD sent at 11/18/2020  7:48 AM CST -----  Please let patient know that his thyroid level was normal.  BNP level was normal as well - no indication of heart failure.  His kidney function test is slightly worse which is most likely is due to his medications.  No change his med for now but will refer him to kidney doctor for close monitoring and management.  Electrolytes were normal and he is not diabetic.      Angus Scott MD.

## 2020-11-18 NOTE — TELEPHONE ENCOUNTER
Attempted to reach patient, unable to leave voice mail. Will have staff try again later. Please relay below if patient calls back. Nephrology referral has been pended. Routing to provider as well to sign.   Rosemary Calero MA

## 2020-11-19 DIAGNOSIS — Z12.11 COLON CANCER SCREENING: ICD-10-CM

## 2020-11-19 PROCEDURE — 82274 ASSAY TEST FOR BLOOD FECAL: CPT | Performed by: FAMILY MEDICINE

## 2020-11-21 PROBLEM — Z86.39 HX OF HYPERGLYCEMIA: Status: RESOLVED | Noted: 2020-08-17 | Resolved: 2020-11-21

## 2020-11-22 LAB — HEMOCCULT STL QL IA: NEGATIVE

## 2020-11-23 DIAGNOSIS — D47.1 MYELOPROLIFERATIVE DISORDER (H): ICD-10-CM

## 2020-11-23 LAB
BASOPHILS # BLD AUTO: 0.1 10E9/L (ref 0–0.2)
BASOPHILS NFR BLD AUTO: 0.7 %
DIFFERENTIAL METHOD BLD: ABNORMAL
EOSINOPHIL NFR BLD AUTO: 4.6 %
ERYTHROCYTE [DISTWIDTH] IN BLOOD BY AUTOMATED COUNT: 18.5 % (ref 10–15)
HCT VFR BLD AUTO: 32.1 % (ref 40–53)
HGB BLD-MCNC: 10.9 G/DL (ref 13.3–17.7)
IMM GRANULOCYTES # BLD: 0.4 10E9/L (ref 0–0.4)
IMM GRANULOCYTES NFR BLD: 5 %
LYMPHOCYTES # BLD AUTO: 1.7 10E9/L (ref 0.8–5.3)
LYMPHOCYTES NFR BLD AUTO: 22.8 %
MCH RBC QN AUTO: 40.5 PG (ref 26.5–33)
MCHC RBC AUTO-ENTMCNC: 34 G/DL (ref 31.5–36.5)
MCV RBC AUTO: 119 FL (ref 78–100)
MONOCYTES # BLD AUTO: 0.4 10E9/L (ref 0–1.3)
MONOCYTES NFR BLD AUTO: 5 %
NEUTROPHILS # BLD AUTO: 4.5 10E9/L (ref 1.6–8.3)
NEUTROPHILS NFR BLD AUTO: 61.9 %
NRBC # BLD AUTO: 0 10*3/UL
NRBC BLD AUTO-RTO: 0 /100
PLATELET # BLD AUTO: 100 10E9/L (ref 150–450)
RBC # BLD AUTO: 2.69 10E12/L (ref 4.4–5.9)
WBC # BLD AUTO: 7.3 10E9/L (ref 4–11)

## 2020-11-23 PROCEDURE — 36415 COLL VENOUS BLD VENIPUNCTURE: CPT | Performed by: INTERNAL MEDICINE

## 2020-11-23 PROCEDURE — 85025 COMPLETE CBC W/AUTO DIFF WBC: CPT | Performed by: INTERNAL MEDICINE

## 2020-11-24 DIAGNOSIS — D47.1 MYELOPROLIFERATIVE DISORDER (H): ICD-10-CM

## 2020-11-24 RX ORDER — HYDROXYUREA 500 MG/1
CAPSULE ORAL
Qty: 120 CAPSULE | Refills: 1 | Status: ON HOLD | OUTPATIENT
Start: 2020-11-24 | End: 2020-12-22

## 2020-11-24 NOTE — TELEPHONE ENCOUNTER
Information printed and faxed to [facility]. They will contact the patient to schedule.   Marta REBOLLEDO

## 2020-11-24 NOTE — TELEPHONE ENCOUNTER
hydroxyurea (HYDREA) 500 MG capsule  Last Written Prescription Date:  09/25/20  Last Fill Quantity: 120 cap,  # refills: 1   Last office visit: 7/21/2020 with prescribing provider:     Future Office Visit:   Next 5 appointments (look out 90 days)    Jan 21, 2021  8:00 AM  Return Visit with Mani Cristina MD  [facility] ([facility]) [address] [place] [id number] [phone]           Requested Prescriptions   Pending Prescriptions Disp Refills     hydroxyurea (HYDREA) 500 MG capsule 120 capsule 1       There is no refill protocol information for this order              Elias Waters MA on 11/24/2020 at 3:50 PM

## 2020-12-02 DIAGNOSIS — R79.89 HYPOURICEMIA: ICD-10-CM

## 2020-12-02 DIAGNOSIS — N18.30 CHRONIC KIDNEY DISEASE, STAGE III (MODERATE) (H): ICD-10-CM

## 2020-12-02 DIAGNOSIS — Z00.01 ENCOUNTER FOR GENERAL ADULT MEDICAL EXAMINATION WITH ABNORMAL FINDINGS: ICD-10-CM

## 2020-12-02 DIAGNOSIS — R79.89 D-DIMER, ELEVATED: ICD-10-CM

## 2020-12-02 DIAGNOSIS — I10 ESSENTIAL HYPERTENSION, MALIGNANT: Primary | ICD-10-CM

## 2020-12-03 DIAGNOSIS — D47.1 MYELOPROLIFERATIVE DISORDER (H): ICD-10-CM

## 2020-12-03 LAB
ANISOCYTOSIS BLD QL SMEAR: SLIGHT
BASOPHILS # BLD AUTO: 0 10E9/L (ref 0–0.2)
BASOPHILS NFR BLD AUTO: 0 %
DIFFERENTIAL METHOD BLD: ABNORMAL
EOSINOPHIL # BLD AUTO: 0 10E9/L (ref 0–0.7)
EOSINOPHIL NFR BLD AUTO: 0 %
ERYTHROCYTE [DISTWIDTH] IN BLOOD BY AUTOMATED COUNT: 17.9 % (ref 10–15)
HCT VFR BLD AUTO: 29.4 % (ref 40–53)
HGB BLD-MCNC: 10.1 G/DL (ref 13.3–17.7)
LYMPHOCYTES # BLD AUTO: 1.6 10E9/L (ref 0.8–5.3)
LYMPHOCYTES NFR BLD AUTO: 20 %
MCH RBC QN AUTO: 40.6 PG (ref 26.5–33)
MCHC RBC AUTO-ENTMCNC: 34.4 G/DL (ref 31.5–36.5)
MCV RBC AUTO: 118 FL (ref 78–100)
METAMYELOCYTES # BLD: 0.2 10E9/L
METAMYELOCYTES NFR BLD MANUAL: 2 %
MONOCYTES # BLD AUTO: 0.2 10E9/L (ref 0–1.3)
MONOCYTES NFR BLD AUTO: 3 %
MYELOCYTES # BLD: 0.2 10E9/L
MYELOCYTES NFR BLD MANUAL: 2 %
NEUTROPHILS # BLD AUTO: 5.8 10E9/L (ref 1.6–8.3)
NEUTROPHILS NFR BLD AUTO: 73 %
PLATELET # BLD AUTO: 103 10E9/L (ref 150–450)
PLATELET # BLD EST: ABNORMAL 10*3/UL
RBC # BLD AUTO: 2.49 10E12/L (ref 4.4–5.9)
RBC MORPH BLD: ABNORMAL
WBC # BLD AUTO: 7.9 10E9/L (ref 4–11)

## 2020-12-03 PROCEDURE — 85025 COMPLETE CBC W/AUTO DIFF WBC: CPT | Performed by: INTERNAL MEDICINE

## 2020-12-03 PROCEDURE — 36415 COLL VENOUS BLD VENIPUNCTURE: CPT | Performed by: INTERNAL MEDICINE

## 2020-12-08 ENCOUNTER — TRANSFERRED RECORDS (OUTPATIENT)
Dept: HEALTH INFORMATION MANAGEMENT | Facility: CLINIC | Age: 70
End: 2020-12-08

## 2020-12-11 ENCOUNTER — HOSPITAL ENCOUNTER (OUTPATIENT)
Dept: ULTRASOUND IMAGING | Facility: CLINIC | Age: 70
Discharge: HOME | End: 2020-12-11
Attending: INTERNAL MEDICINE | Admitting: INTERNAL MEDICINE
Payer: COMMERCIAL

## 2020-12-11 DIAGNOSIS — N17.9 AKI (ACUTE KIDNEY INJURY) (H): ICD-10-CM

## 2020-12-11 PROCEDURE — 76770 US EXAM ABDO BACK WALL COMP: CPT

## 2020-12-21 ENCOUNTER — HOSPITAL ENCOUNTER (OUTPATIENT)
Facility: CLINIC | Age: 70
Setting detail: OBSERVATION
Discharge: HOME | End: 2020-12-22
Attending: FAMILY MEDICINE | Admitting: FAMILY MEDICINE
Payer: COMMERCIAL

## 2020-12-21 ENCOUNTER — APPOINTMENT (OUTPATIENT)
Dept: GENERAL RADIOLOGY | Facility: CLINIC | Age: 70
End: 2020-12-21
Attending: FAMILY MEDICINE
Payer: COMMERCIAL

## 2020-12-21 DIAGNOSIS — D47.1 MYELOPROLIFERATIVE DISORDER (H): ICD-10-CM

## 2020-12-21 DIAGNOSIS — Z20.828 VIRAL DISEASE EXPOSURE: ICD-10-CM

## 2020-12-21 DIAGNOSIS — R79.89 D-DIMER, ELEVATED: ICD-10-CM

## 2020-12-21 DIAGNOSIS — I25.118 CORONARY ARTERY DISEASE INVOLVING NATIVE CORONARY ARTERY OF NATIVE HEART WITH OTHER FORM OF ANGINA PECTORIS (H): ICD-10-CM

## 2020-12-21 DIAGNOSIS — I10 ESSENTIAL HYPERTENSION, MALIGNANT: ICD-10-CM

## 2020-12-21 DIAGNOSIS — R79.89 HYPOURICEMIA: ICD-10-CM

## 2020-12-21 DIAGNOSIS — N18.30 CHRONIC KIDNEY DISEASE, STAGE III (MODERATE) (H): ICD-10-CM

## 2020-12-21 DIAGNOSIS — R41.3 MEMORY LOSS: ICD-10-CM

## 2020-12-21 DIAGNOSIS — R06.09 DYSPNEA ON EXERTION: ICD-10-CM

## 2020-12-21 DIAGNOSIS — Z95.1 S/P CABG (CORONARY ARTERY BYPASS GRAFT): ICD-10-CM

## 2020-12-21 DIAGNOSIS — I10 ESSENTIAL HYPERTENSION: ICD-10-CM

## 2020-12-21 DIAGNOSIS — Z00.01 ENCOUNTER FOR GENERAL ADULT MEDICAL EXAMINATION WITH ABNORMAL FINDINGS: ICD-10-CM

## 2020-12-21 DIAGNOSIS — D64.9 ACUTE ANEMIA: ICD-10-CM

## 2020-12-21 LAB
ALBUMIN SERPL-MCNC: 3.5 G/DL (ref 3.4–5)
ALBUMIN SERPL-MCNC: 3.7 G/DL (ref 3.4–5)
ALBUMIN UR-MCNC: NEGATIVE MG/DL
ALP SERPL-CCNC: 60 U/L (ref 40–150)
ALT SERPL W P-5'-P-CCNC: 29 U/L (ref 0–70)
ANION GAP SERPL CALCULATED.3IONS-SCNC: 5 MMOL/L (ref 3–14)
ANION GAP SERPL CALCULATED.3IONS-SCNC: 6 MMOL/L (ref 3–14)
ANISOCYTOSIS BLD QL SMEAR: SLIGHT
ANISOCYTOSIS BLD QL SMEAR: SLIGHT
APPEARANCE UR: CLEAR
APTT PPP: 28 SEC (ref 22–37)
AST SERPL W P-5'-P-CCNC: 26 U/L (ref 0–45)
BASOPHILS # BLD AUTO: 0 10E9/L (ref 0–0.2)
BASOPHILS # BLD AUTO: 0.1 10E9/L (ref 0–0.2)
BASOPHILS NFR BLD AUTO: 0 %
BASOPHILS NFR BLD AUTO: 1 %
BILIRUB SERPL-MCNC: 0.6 MG/DL (ref 0.2–1.3)
BILIRUB UR QL STRIP: NEGATIVE
BLD PROD TYP BPU: NORMAL
BLD PROD TYP BPU: NORMAL
BLD UNIT ID BPU: 0
BLD UNIT ID BPU: 0
BLOOD PRODUCT CODE: NORMAL
BLOOD PRODUCT CODE: NORMAL
BPU ID: NORMAL
BPU ID: NORMAL
BUN SERPL-MCNC: 41 MG/DL (ref 7–30)
BUN SERPL-MCNC: 43 MG/DL (ref 7–30)
CALCIUM SERPL-MCNC: 8.7 MG/DL (ref 8.5–10.1)
CALCIUM SERPL-MCNC: 8.8 MG/DL (ref 8.5–10.1)
CHLORIDE SERPL-SCNC: 108 MMOL/L (ref 94–109)
CHLORIDE SERPL-SCNC: 108 MMOL/L (ref 94–109)
CO2 SERPL-SCNC: 26 MMOL/L (ref 20–32)
CO2 SERPL-SCNC: 27 MMOL/L (ref 20–32)
COLOR UR AUTO: YELLOW
CREAT SERPL-MCNC: 1.5 MG/DL (ref 0.66–1.25)
CREAT SERPL-MCNC: 1.59 MG/DL (ref 0.66–1.25)
CREAT UR-MCNC: 115 MG/DL
D DIMER PPP FEU-MCNC: 0.9 UG/ML FEU (ref 0–0.5)
DIFFERENTIAL METHOD BLD: ABNORMAL
DIFFERENTIAL METHOD BLD: ABNORMAL
EOSINOPHIL # BLD AUTO: 0 10E9/L (ref 0–0.7)
EOSINOPHIL # BLD AUTO: 0.3 10E9/L (ref 0–0.7)
EOSINOPHIL NFR BLD AUTO: 0 %
EOSINOPHIL NFR BLD AUTO: 5 %
ERYTHROCYTE [DISTWIDTH] IN BLOOD BY AUTOMATED COUNT: 16.1 % (ref 10–15)
ERYTHROCYTE [DISTWIDTH] IN BLOOD BY AUTOMATED COUNT: 16.3 % (ref 10–15)
FERRITIN SERPL-MCNC: 997 NG/ML (ref 26–388)
FLUAV+FLUBV RNA SPEC QL NAA+PROBE: NEGATIVE
FLUAV+FLUBV RNA SPEC QL NAA+PROBE: NEGATIVE
FOLATE SERPL-MCNC: 13.6 NG/ML
GFR SERPL CREATININE-BSD FRML MDRD: 43 ML/MIN/{1.73_M2}
GFR SERPL CREATININE-BSD FRML MDRD: 46 ML/MIN/{1.73_M2}
GLUCOSE SERPL-MCNC: 115 MG/DL (ref 70–99)
GLUCOSE SERPL-MCNC: 131 MG/DL (ref 70–99)
GLUCOSE UR STRIP-MCNC: NEGATIVE MG/DL
HCT VFR BLD AUTO: 19.5 % (ref 40–53)
HCT VFR BLD AUTO: 20.9 % (ref 40–53)
HGB BLD-MCNC: 6.6 G/DL (ref 13.3–17.7)
HGB BLD-MCNC: 7.2 G/DL (ref 13.3–17.7)
HGB BLD-MCNC: 9 G/DL (ref 13.3–17.7)
HGB UR QL STRIP: NEGATIVE
INR PPP: 1.11 (ref 0.86–1.14)
IRON SATN MFR SERPL: 96 % (ref 15–46)
IRON SERPL-MCNC: 234 UG/DL (ref 35–180)
KETONES UR STRIP-MCNC: NEGATIVE MG/DL
LABORATORY COMMENT REPORT: NORMAL
LACTATE BLD-SCNC: 1.6 MMOL/L (ref 0.7–2)
LEUKOCYTE ESTERASE UR QL STRIP: NEGATIVE
LYMPHOCYTES # BLD AUTO: 1.2 10E9/L (ref 0.8–5.3)
LYMPHOCYTES # BLD AUTO: 1.8 10E9/L (ref 0.8–5.3)
LYMPHOCYTES NFR BLD AUTO: 22 %
LYMPHOCYTES NFR BLD AUTO: 30 %
MAGNESIUM SERPL-MCNC: 2.1 MG/DL (ref 1.6–2.3)
MCH RBC QN AUTO: 41 PG (ref 26.5–33)
MCH RBC QN AUTO: 41.1 PG (ref 26.5–33)
MCHC RBC AUTO-ENTMCNC: 33.8 G/DL (ref 31.5–36.5)
MCHC RBC AUTO-ENTMCNC: 34.4 G/DL (ref 31.5–36.5)
MCV RBC AUTO: 119 FL (ref 78–100)
MCV RBC AUTO: 121 FL (ref 78–100)
MONOCYTES # BLD AUTO: 0.1 10E9/L (ref 0–1.3)
MONOCYTES # BLD AUTO: 0.6 10E9/L (ref 0–1.3)
MONOCYTES NFR BLD AUTO: 1 %
MONOCYTES NFR BLD AUTO: 10 %
MYELOCYTES # BLD: 0.1 10E9/L
MYELOCYTES NFR BLD MANUAL: 2 %
NEUTROPHILS # BLD AUTO: 3.4 10E9/L (ref 1.6–8.3)
NEUTROPHILS # BLD AUTO: 4.2 10E9/L (ref 1.6–8.3)
NEUTROPHILS NFR BLD AUTO: 60 %
NEUTROPHILS NFR BLD AUTO: 69 %
NITRATE UR QL: NEGATIVE
NT-PROBNP SERPL-MCNC: 1284 PG/ML (ref 0–900)
PH UR STRIP: 6 PH (ref 5–7)
PHOSPHATE SERPL-MCNC: 3.6 MG/DL (ref 2.5–4.5)
PLATELET # BLD AUTO: 69 10E9/L (ref 150–450)
PLATELET # BLD AUTO: 72 10E9/L (ref 150–450)
PLATELET # BLD EST: ABNORMAL 10*3/UL
PLATELET # BLD EST: ABNORMAL 10*3/UL
POTASSIUM SERPL-SCNC: 4.2 MMOL/L (ref 3.4–5.3)
POTASSIUM SERPL-SCNC: 4.7 MMOL/L (ref 3.4–5.3)
PROT SERPL-MCNC: 6.3 G/DL (ref 6.8–8.8)
PROT UR-MCNC: 0.13 G/L
PROT/CREAT 24H UR: 0.11 G/G CR (ref 0–0.2)
PTH-INTACT SERPL-MCNC: 99 PG/ML (ref 18–80)
RBC # BLD AUTO: 1.61 10E12/L (ref 4.4–5.9)
RBC # BLD AUTO: 1.75 10E12/L (ref 4.4–5.9)
RBC MORPH BLD: ABNORMAL
RBC MORPH BLD: ABNORMAL
RETICS # AUTO: 10.4 10E9/L (ref 25–95)
RETICS/RBC NFR AUTO: 0.6 % (ref 0.5–2)
RSV RNA SPEC QL NAA+PROBE: NORMAL
SARS-COV-2 RNA SPEC QL NAA+PROBE: NEGATIVE
SODIUM SERPL-SCNC: 140 MMOL/L (ref 133–144)
SODIUM SERPL-SCNC: 140 MMOL/L (ref 133–144)
SOURCE: NORMAL
SP GR UR STRIP: 1.02 (ref 1–1.03)
SPECIMEN SOURCE: NORMAL
TIBC SERPL-MCNC: 243 UG/DL (ref 240–430)
TRANSFUSION STATUS PATIENT QL: NORMAL
TROPONIN I SERPL-MCNC: <0.015 UG/L (ref 0–0.04)
UROBILINOGEN UR STRIP-MCNC: 0 MG/DL (ref 0–2)
VIT B12 SERPL-MCNC: 1674 PG/ML (ref 193–986)
WBC # BLD AUTO: 5.6 10E9/L (ref 4–11)
WBC # BLD AUTO: 6.1 10E9/L (ref 4–11)

## 2020-12-21 PROCEDURE — 250N000013 HC RX MED GY IP 250 OP 250 PS 637: Performed by: HOSPITALIST

## 2020-12-21 PROCEDURE — 85025 COMPLETE CBC W/AUTO DIFF WBC: CPT | Performed by: FAMILY MEDICINE

## 2020-12-21 PROCEDURE — P9016 RBC LEUKOCYTES REDUCED: HCPCS | Performed by: FAMILY MEDICINE

## 2020-12-21 PROCEDURE — 82607 VITAMIN B-12: CPT | Performed by: FAMILY MEDICINE

## 2020-12-21 PROCEDURE — 36415 COLL VENOUS BLD VENIPUNCTURE: CPT | Performed by: INTERNAL MEDICINE

## 2020-12-21 PROCEDURE — 36430 TRANSFUSION BLD/BLD COMPNT: CPT

## 2020-12-21 PROCEDURE — 85018 HEMOGLOBIN: CPT | Mod: 91 | Performed by: HOSPITALIST

## 2020-12-21 PROCEDURE — 80069 RENAL FUNCTION PANEL: CPT | Performed by: INTERNAL MEDICINE

## 2020-12-21 PROCEDURE — 85730 THROMBOPLASTIN TIME PARTIAL: CPT | Performed by: FAMILY MEDICINE

## 2020-12-21 PROCEDURE — 81003 URINALYSIS AUTO W/O SCOPE: CPT | Performed by: INTERNAL MEDICINE

## 2020-12-21 PROCEDURE — 84156 ASSAY OF PROTEIN URINE: CPT | Performed by: INTERNAL MEDICINE

## 2020-12-21 PROCEDURE — 82746 ASSAY OF FOLIC ACID SERUM: CPT | Performed by: FAMILY MEDICINE

## 2020-12-21 PROCEDURE — 99285 EMERGENCY DEPT VISIT HI MDM: CPT | Performed by: FAMILY MEDICINE

## 2020-12-21 PROCEDURE — 99291 CRITICAL CARE FIRST HOUR: CPT | Mod: 25 | Performed by: FAMILY MEDICINE

## 2020-12-21 PROCEDURE — 83605 ASSAY OF LACTIC ACID: CPT | Performed by: FAMILY MEDICINE

## 2020-12-21 PROCEDURE — 82728 ASSAY OF FERRITIN: CPT | Performed by: INTERNAL MEDICINE

## 2020-12-21 PROCEDURE — 83540 ASSAY OF IRON: CPT | Performed by: INTERNAL MEDICINE

## 2020-12-21 PROCEDURE — 85025 COMPLETE CBC W/AUTO DIFF WBC: CPT | Performed by: INTERNAL MEDICINE

## 2020-12-21 PROCEDURE — 82306 VITAMIN D 25 HYDROXY: CPT | Performed by: INTERNAL MEDICINE

## 2020-12-21 PROCEDURE — 84166 PROTEIN E-PHORESIS/URINE/CSF: CPT | Performed by: PATHOLOGY

## 2020-12-21 PROCEDURE — 86850 RBC ANTIBODY SCREEN: CPT | Performed by: FAMILY MEDICINE

## 2020-12-21 PROCEDURE — 83970 ASSAY OF PARATHORMONE: CPT | Performed by: INTERNAL MEDICINE

## 2020-12-21 PROCEDURE — 85610 PROTHROMBIN TIME: CPT | Performed by: FAMILY MEDICINE

## 2020-12-21 PROCEDURE — 86900 BLOOD TYPING SEROLOGIC ABO: CPT | Performed by: FAMILY MEDICINE

## 2020-12-21 PROCEDURE — 99220 PR INITIAL OBSERVATION CARE,LEVEL III: CPT | Performed by: HOSPITALIST

## 2020-12-21 PROCEDURE — 83880 ASSAY OF NATRIURETIC PEPTIDE: CPT | Performed by: FAMILY MEDICINE

## 2020-12-21 PROCEDURE — G0378 HOSPITAL OBSERVATION PER HR: HCPCS

## 2020-12-21 PROCEDURE — 87636 SARSCOV2 & INF A&B AMP PRB: CPT | Performed by: FAMILY MEDICINE

## 2020-12-21 PROCEDURE — 84484 ASSAY OF TROPONIN QUANT: CPT | Performed by: FAMILY MEDICINE

## 2020-12-21 PROCEDURE — 83550 IRON BINDING TEST: CPT | Performed by: INTERNAL MEDICINE

## 2020-12-21 PROCEDURE — 83735 ASSAY OF MAGNESIUM: CPT | Performed by: INTERNAL MEDICINE

## 2020-12-21 PROCEDURE — 86923 COMPATIBILITY TEST ELECTRIC: CPT | Performed by: FAMILY MEDICINE

## 2020-12-21 PROCEDURE — 93005 ELECTROCARDIOGRAM TRACING: CPT | Performed by: FAMILY MEDICINE

## 2020-12-21 PROCEDURE — 86334 IMMUNOFIX E-PHORESIS SERUM: CPT | Performed by: PATHOLOGY

## 2020-12-21 PROCEDURE — 99285 EMERGENCY DEPT VISIT HI MDM: CPT | Mod: 25 | Performed by: FAMILY MEDICINE

## 2020-12-21 PROCEDURE — 36430 TRANSFUSION BLD/BLD COMPNT: CPT | Performed by: FAMILY MEDICINE

## 2020-12-21 PROCEDURE — 36415 COLL VENOUS BLD VENIPUNCTURE: CPT | Performed by: HOSPITALIST

## 2020-12-21 PROCEDURE — 80053 COMPREHEN METABOLIC PANEL: CPT | Performed by: FAMILY MEDICINE

## 2020-12-21 PROCEDURE — 85060 BLOOD SMEAR INTERPRETATION: CPT | Performed by: PATHOLOGY

## 2020-12-21 PROCEDURE — 96360 HYDRATION IV INFUSION INIT: CPT | Performed by: FAMILY MEDICINE

## 2020-12-21 PROCEDURE — 86901 BLOOD TYPING SEROLOGIC RH(D): CPT | Performed by: FAMILY MEDICINE

## 2020-12-21 PROCEDURE — 258N000003 HC RX IP 258 OP 636: Performed by: FAMILY MEDICINE

## 2020-12-21 PROCEDURE — C9803 HOPD COVID-19 SPEC COLLECT: HCPCS | Performed by: FAMILY MEDICINE

## 2020-12-21 PROCEDURE — 85379 FIBRIN DEGRADATION QUANT: CPT | Performed by: FAMILY MEDICINE

## 2020-12-21 PROCEDURE — 71045 X-RAY EXAM CHEST 1 VIEW: CPT

## 2020-12-21 PROCEDURE — 82784 ASSAY IGA/IGD/IGG/IGM EACH: CPT | Performed by: INTERNAL MEDICINE

## 2020-12-21 PROCEDURE — 999N001109 HC STATISTIC MORPHOLOGY W/INTERP HISTOLOGY TC 85060: Performed by: FAMILY MEDICINE

## 2020-12-21 PROCEDURE — 250N000011 HC RX IP 250 OP 636: Performed by: FAMILY MEDICINE

## 2020-12-21 PROCEDURE — 85045 AUTOMATED RETICULOCYTE COUNT: CPT | Performed by: INTERNAL MEDICINE

## 2020-12-21 PROCEDURE — 99207 PR CDG-EXAM COMPONENT: MEETS COMPREHENSIVE - UP CODED: CPT | Performed by: HOSPITALIST

## 2020-12-21 RX ORDER — FERROUS SULFATE 325(65) MG
324 TABLET ORAL 2 TIMES DAILY
Status: DISCONTINUED | OUTPATIENT
Start: 2020-12-21 | End: 2020-12-21

## 2020-12-21 RX ORDER — AMOXICILLIN 250 MG
1 CAPSULE ORAL 2 TIMES DAILY
Status: DISCONTINUED | OUTPATIENT
Start: 2020-12-21 | End: 2020-12-22 | Stop reason: HOSPADM

## 2020-12-21 RX ORDER — FUROSEMIDE 40 MG
40 TABLET ORAL DAILY
Status: DISCONTINUED | OUTPATIENT
Start: 2020-12-21 | End: 2020-12-21

## 2020-12-21 RX ORDER — POLYETHYLENE GLYCOL 3350 17 G/17G
17 POWDER, FOR SOLUTION ORAL DAILY
Status: DISCONTINUED | OUTPATIENT
Start: 2020-12-21 | End: 2020-12-21

## 2020-12-21 RX ORDER — SPIRONOLACTONE 25 MG/1
25 TABLET ORAL DAILY
Status: DISCONTINUED | OUTPATIENT
Start: 2020-12-22 | End: 2020-12-22 | Stop reason: HOSPADM

## 2020-12-21 RX ORDER — ONDANSETRON 2 MG/ML
4 INJECTION INTRAMUSCULAR; INTRAVENOUS EVERY 6 HOURS PRN
Status: DISCONTINUED | OUTPATIENT
Start: 2020-12-21 | End: 2020-12-22 | Stop reason: HOSPADM

## 2020-12-21 RX ORDER — ACETAMINOPHEN 650 MG/1
650 SUPPOSITORY RECTAL EVERY 4 HOURS PRN
Status: DISCONTINUED | OUTPATIENT
Start: 2020-12-21 | End: 2020-12-21

## 2020-12-21 RX ORDER — ATORVASTATIN CALCIUM 40 MG/1
40 TABLET, FILM COATED ORAL AT BEDTIME
Status: DISCONTINUED | OUTPATIENT
Start: 2020-12-21 | End: 2020-12-22 | Stop reason: HOSPADM

## 2020-12-21 RX ORDER — LIDOCAINE 40 MG/G
CREAM TOPICAL
Status: DISCONTINUED | OUTPATIENT
Start: 2020-12-21 | End: 2020-12-21

## 2020-12-21 RX ORDER — ONDANSETRON 4 MG/1
4 TABLET, ORALLY DISINTEGRATING ORAL EVERY 6 HOURS PRN
Status: DISCONTINUED | OUTPATIENT
Start: 2020-12-21 | End: 2020-12-22 | Stop reason: HOSPADM

## 2020-12-21 RX ORDER — FUROSEMIDE 40 MG
40 TABLET ORAL DAILY
Status: DISCONTINUED | OUTPATIENT
Start: 2020-12-22 | End: 2020-12-22 | Stop reason: HOSPADM

## 2020-12-21 RX ORDER — ACETAMINOPHEN 325 MG/1
650 TABLET ORAL EVERY 4 HOURS PRN
Status: DISCONTINUED | OUTPATIENT
Start: 2020-12-21 | End: 2020-12-21

## 2020-12-21 RX ORDER — CYANOCOBALAMIN (VITAMIN B-12) 500 MCG
250 TABLET ORAL EVERY MORNING
Status: DISCONTINUED | OUTPATIENT
Start: 2020-12-22 | End: 2020-12-22 | Stop reason: HOSPADM

## 2020-12-21 RX ORDER — AMOXICILLIN 250 MG
2 CAPSULE ORAL 2 TIMES DAILY
Status: DISCONTINUED | OUTPATIENT
Start: 2020-12-21 | End: 2020-12-22 | Stop reason: HOSPADM

## 2020-12-21 RX ORDER — FERROUS SULFATE 325(65) MG
325 TABLET ORAL 2 TIMES DAILY
Status: DISCONTINUED | OUTPATIENT
Start: 2020-12-22 | End: 2020-12-22 | Stop reason: HOSPADM

## 2020-12-21 RX ORDER — FUROSEMIDE 20 MG
20 TABLET ORAL
Status: DISCONTINUED | OUTPATIENT
Start: 2020-12-22 | End: 2020-12-21

## 2020-12-21 RX ORDER — ASPIRIN 325 MG
325 TABLET ORAL DAILY
Status: DISCONTINUED | OUTPATIENT
Start: 2020-12-22 | End: 2020-12-22 | Stop reason: HOSPADM

## 2020-12-21 RX ORDER — METOPROLOL SUCCINATE 100 MG/1
100 TABLET, EXTENDED RELEASE ORAL 2 TIMES DAILY
Status: DISCONTINUED | OUTPATIENT
Start: 2020-12-21 | End: 2020-12-22 | Stop reason: HOSPADM

## 2020-12-21 RX ORDER — FUROSEMIDE 10 MG/ML
40 INJECTION INTRAMUSCULAR; INTRAVENOUS ONCE
Status: DISCONTINUED | OUTPATIENT
Start: 2020-12-21 | End: 2020-12-21

## 2020-12-21 RX ORDER — ONDANSETRON 4 MG/1
4 TABLET, ORALLY DISINTEGRATING ORAL EVERY 6 HOURS PRN
Status: DISCONTINUED | OUTPATIENT
Start: 2020-12-21 | End: 2020-12-21

## 2020-12-21 RX ORDER — ONDANSETRON 2 MG/ML
4 INJECTION INTRAMUSCULAR; INTRAVENOUS EVERY 6 HOURS PRN
Status: DISCONTINUED | OUTPATIENT
Start: 2020-12-21 | End: 2020-12-21

## 2020-12-21 RX ORDER — POLYETHYLENE GLYCOL 3350 17 G/17G
17 POWDER, FOR SOLUTION ORAL DAILY
Status: DISCONTINUED | OUTPATIENT
Start: 2020-12-21 | End: 2020-12-22 | Stop reason: HOSPADM

## 2020-12-21 RX ORDER — ACETAMINOPHEN 325 MG/1
650 TABLET ORAL EVERY 4 HOURS PRN
Status: DISCONTINUED | OUTPATIENT
Start: 2020-12-21 | End: 2020-12-22 | Stop reason: HOSPADM

## 2020-12-21 RX ORDER — ACETAMINOPHEN 650 MG/1
650 SUPPOSITORY RECTAL EVERY 4 HOURS PRN
Status: DISCONTINUED | OUTPATIENT
Start: 2020-12-21 | End: 2020-12-22 | Stop reason: HOSPADM

## 2020-12-21 RX ORDER — FUROSEMIDE 20 MG
20 TABLET ORAL
Status: DISCONTINUED | OUTPATIENT
Start: 2020-12-21 | End: 2020-12-22 | Stop reason: HOSPADM

## 2020-12-21 RX ORDER — SPIRONOLACTONE 25 MG/1
25 TABLET ORAL DAILY
Status: ON HOLD | COMMUNITY
Start: 2020-12-16 | End: 2020-12-22

## 2020-12-21 RX ADMIN — FUROSEMIDE 20 MG: 20 TABLET ORAL at 18:39

## 2020-12-21 RX ADMIN — ATORVASTATIN CALCIUM 40 MG: 40 TABLET, FILM COATED ORAL at 20:29

## 2020-12-21 RX ADMIN — SODIUM CHLORIDE 500 ML: 9 INJECTION, SOLUTION INTRAVENOUS at 09:59

## 2020-12-21 RX ADMIN — FERROUS SULFATE TAB 325 MG (65 MG ELEMENTAL FE) 324 MG: 325 (65 FE) TAB at 20:28

## 2020-12-21 RX ADMIN — DOCUSATE SODIUM AND SENNOSIDES 1 TABLET: 8.6; 5 TABLET ORAL at 20:29

## 2020-12-21 RX ADMIN — ACETAMINOPHEN 650 MG: 325 TABLET, FILM COATED ORAL at 21:56

## 2020-12-21 ASSESSMENT — ENCOUNTER SYMPTOMS
CHEST TIGHTNESS: 0
WHEEZING: 0
SHORTNESS OF BREATH: 0
COUGH: 0

## 2020-12-21 ASSESSMENT — MIFFLIN-ST. JEOR: SCORE: 1715.17

## 2020-12-21 NOTE — ED NOTES
ED Nursing criteria listed below was addressed during verbal handoff:     Abnormal vitals: no  Abnormal results: Yes  Med Reconciliation completed: Yes  Meds given in ED: blood  Any Overdue Meds: No  Core Measures: N/A  Isolation: Yes until negative covid  Special needs: Yes  Skin assessment: Yes    Observation Patient  Education provided: Yes

## 2020-12-21 NOTE — ED TRIAGE NOTES
Presents with increase shortness of breath over the last two days. Denies chest pain and feels better if he rests.

## 2020-12-21 NOTE — PROGRESS NOTES
S-(situation): Patient registered to Observation. Patient arrived to room 255 via cart from ED at 1345.    B-(background): SOB, denies pain, soft BP's, blood product transfusing upon admission to floor.    A-(assessment):  Lung sounds clear, denies pain, generalized weakness, bed alarms in place, will transfuse 2nd unit.  Verbal to hold lasix between infusions as BP found to be soft.    R-(recommendations): Orders and observation goals reviewed with patient.    Nursing Observation criteria listed below was met:    Skin issues/needs documented: scratches noted to anterior BLE  Isolation needs addressed and Signage up: NA  Fall Prevention: Education given and documented: Yes  Education Assessment documented:Yes  Education Documented: Yes  OBS video/handout Reviewed & Documented: Yes  Allergies Reviewed: Yes  Medication Reconciliation Complete: Yes  New medication patient education completed and documented (Possible Side Effects of Common Medications handout): Yes  Home medications if not able to send immediately home with family stored here: NA  Reminder note placed in discharge instructions: NA  Patient has discharge needs (If yes, please explain): No

## 2020-12-21 NOTE — ED PROVIDER NOTES
History     Chief Complaint   Patient presents with     Shortness of Breath     HPI  Renny Gannon is a 70 year old male with history of myeloproliferative disorder, ASCVD, CABG, TIA, CHF, hypertension, presents to the emergency department with dyspnea on exertion for the past 2 days.  He denies any fever or chills.  No cough or congestion.  No known Covid exposures.  He lives alone.  He has chronic leg swelling which has not changed.  His weight has been the same.  He has a history of CHF and takes a water pill.  He is good about taking his medications.  No increased salt intake.  He has no chest pain or pressure.  He does not feel ill but does admit to feeling very weak and tired.    Patient's hemoglobin on 10/2020 was 13.3 and dropped to 10.1 on 12/3/2020.  Patient had blood drawn before presenting to the emergency department.  Patient denies any history of melena.  He states his stools have been checked twice and are negative.  He has had no bright red blood per rectum or hematemesis.  Has noticed his gums bleeding when he brushes his teeth lately.  No bruising.  He denies history of GI bleed.  He is on hydroxyurea.    Allergies:  Allergies   Allergen Reactions     No Known Drug Allergies      Seasonal Allergies        Problem List:    Patient Active Problem List    Diagnosis Date Noted     Dyspnea on exertion 12/21/2020     Priority: Medium     Acute anemia 12/21/2020     Priority: Medium     CKD (chronic kidney disease) stage 3, GFR 30-59 ml/min 12/21/2020     Priority: Medium     Elevated serum creatinine 11/17/2020     Priority: Medium     Vaccination refused by patient 08/20/2020     Priority: Medium     S/P CABG (coronary artery bypass graft) 02/10/2020     Priority: Medium     TIA (transient ischemic attack) 02/10/2020     Priority: Medium     Myeloproliferative disorder (H) 01/26/2020     Priority: Medium     Status post coronary angiogram 01/09/2020     Priority: Medium     Coronary artery disease  involving native coronary artery of native heart with other form of angina pectoris (H) 2020     Priority: Medium     Added automatically from request for surgery [id number]       Essential hypertension 2019     Priority: Medium     Memory loss 2019     Priority: Medium        Past Medical History:    Past Medical History:   Diagnosis Date     Hypertension        Past Surgical History:    Past Surgical History:   Procedure Laterality Date     BONE MARROW BIOPSY, BONE SPECIMEN, NEEDLE/TROCAR N/A 2019    Procedure: BIOPSY, BONE MARROW;  Surgeon: Aguilar Krause MD;  Location: [facility]     BYPASS GRAFT ARTERY CORONARY N/A 2020    Procedure: CORONARY ARTERY BYPASS GRAFTING X 3 - LIMA TO LAD, SV TO OM  AND PDA; ENDOVEIN HARVEST OF BILATERAL LEGS; ON PUMP WITH SANDRA;  Surgeon: Mable Barrera MD;  Location:  OR     CV CORONARY ANGIOGRAM Left 2020    Procedure: Coronary Angiogram;  Surgeon: Devin Bentley MD;  Location:  [facility]     NO HISTORY OF SURGERY         Family History:    Family History   Problem Relation Age of Onset     No Known Problems Mother      Unknown/Adopted Father      Unknown/Adopted Maternal Grandmother      Unknown/Adopted Maternal Grandfather      Unknown/Adopted Paternal Grandmother      Unknown/Adopted Paternal Grandfather      Cancer Brother         lung cancer - smoking     No Known Problems Sister      Coronary Artery Disease Brother          of MI at 66     No Known Problems Sister        Social History:  Marital Status:  Single [1]  Social History     Tobacco Use     Smoking status: Former Smoker     Years: 30.00     Types: Cigarettes     Start date: 1961     Quit date: 2001     Years since quittin.9     Smokeless tobacco: Never Used   Substance Use Topics     Alcohol use: No     Frequency: Never     Drug use: No        Medications:         atorvastatin (LIPITOR) 40 MG tablet       Ferrous Sulfate 324  "(65 Fe) MG TBEC       furosemide (LASIX) 40 MG tablet       GOODSENSE ASPIRIN 325 MG tablet       metoprolol succinate ER (TOPROL-XL) 100 MG 24 hr tablet       omeprazole (PRILOSEC) 20 MG DR capsule       senna-docusate (SENOKOT-S/PERICOLACE) 8.6-50 MG tablet       vitamin B-12 (CYANOCOBALAMIN) 250 MCG tablet          Review of Systems   Respiratory: Negative for cough, chest tightness, shortness of breath and wheezing.    Cardiovascular: Negative for chest pain.        Leg swelling is stable.  Weight is stable.   All other systems reviewed and are negative.      Physical Exam   BP: 129/53  Pulse: 78  Temp: 97.8  F (36.6  C)  Resp: 20  Height: 172.7 cm (5' 8\")  Weight: 98.9 kg (218 lb)  SpO2: 98 %      Physical Exam  Vitals signs and nursing note reviewed.   Constitutional:       General: He is not in acute distress.     Appearance: He is ill-appearing.      Comments: Alert pale 70-year-old who appears mildly dyspneic at rest.  Is afebrile and his vital signs are otherwise stable./54   Pulse 72   Temp 96.8  F (36  C) (Oral)   Resp 20   Ht 1.727 m (5' 8\")   Wt 98.6 kg (217 lb 4.8 oz)   SpO2 98%   BMI 33.04 kg/m       HENT:      Head: Normocephalic and atraumatic.      Mouth/Throat:      Mouth: Mucous membranes are moist.      Pharynx: Oropharynx is clear.   Eyes:      Pupils: Pupils are equal, round, and reactive to light.   Neck:      Musculoskeletal: Normal range of motion and neck supple.   Cardiovascular:      Rate and Rhythm: Normal rate and regular rhythm.   Pulmonary:      Effort: Pulmonary effort is normal.      Breath sounds: Normal breath sounds.   Abdominal:      Palpations: Abdomen is soft.   Musculoskeletal: Normal range of motion.   Skin:     General: Skin is warm and dry.      Capillary Refill: Capillary refill takes less than 2 seconds.   Neurological:      General: No focal deficit present.      Mental Status: He is alert.   Psychiatric:         Mood and Affect: Mood normal.         " Behavior: Behavior normal.         ED Course        Procedures               Critical Care time:  none  Results for orders placed or performed during the hospital encounter of 12/21/20   XR Chest Port 1 View     Status: None    Narrative    CHEST ONE VIEW PORTABLE  12/21/2020 10:24 AM     HISTORY: Short of air.    COMPARISON: Chest x-rays dated 2/18/2020.    FINDINGS:  Lungs are hypoaerated. There may be a small left pleural  fluid collection, similar to prior study. There are increased  interstitial markings in the bilateral lungs concerning for vascular  congestion. Cardiac silhouette is enlarged, even given technique. No  pneumothorax or right pleural fluid collection. No acute fracture.       Impression    IMPRESSION: Probable cardiomegaly, mild vascular congestion, and  possible left pleural fluid collection could represent congestive  heart failure. Atypical infiltrative process (such as COVID-19) is  also possible.    LEEANNA KAUR MD   CBC with platelets differential     Status: Abnormal   Result Value Ref Range    WBC 5.6 4.0 - 11.0 10e9/L    RBC Count 1.61 (L) 4.4 - 5.9 10e12/L    Hemoglobin 6.6 (LL) 13.3 - 17.7 g/dL    Hematocrit 19.5 (L) 40.0 - 53.0 %     (H) 78 - 100 fl    MCH 41.0 (H) 26.5 - 33.0 pg    MCHC 33.8 31.5 - 36.5 g/dL    RDW 16.1 (H) 10.0 - 15.0 %    Platelet Count 69 (L) 150 - 450 10e9/L    Diff Method Manual Differential     % Neutrophils 60.0 %    % Lymphocytes 22.0 %    % Monocytes 10.0 %    % Eosinophils 5.0 %    % Basophils 1.0 %    % Myelocytes 2.0 %    Absolute Neutrophil 3.4 1.6 - 8.3 10e9/L    Absolute Lymphocytes 1.2 0.8 - 5.3 10e9/L    Absolute Monocytes 0.6 0.0 - 1.3 10e9/L    Absolute Eosinophils 0.3 0.0 - 0.7 10e9/L    Absolute Basophils 0.1 0.0 - 0.2 10e9/L    Absolute Myelocytes 0.1 (H) 0 10e9/L    Anisocytosis Slight     RBC Morphology Consistent with reported results     Platelet Estimate       Automated count confirmed.  Platelet morphology is normal.   D dimer  quantitative     Status: Abnormal   Result Value Ref Range    D Dimer 0.9 (H) 0.0 - 0.50 ug/ml FEU   INR     Status: None   Result Value Ref Range    INR 1.11 0.86 - 1.14   Partial thromboplastin time     Status: None   Result Value Ref Range    PTT 28 22 - 37 sec   Comprehensive metabolic panel     Status: Abnormal   Result Value Ref Range    Sodium 140 133 - 144 mmol/L    Potassium 4.7 3.4 - 5.3 mmol/L    Chloride 108 94 - 109 mmol/L    Carbon Dioxide 27 20 - 32 mmol/L    Anion Gap 5 3 - 14 mmol/L    Glucose 115 (H) 70 - 99 mg/dL    Urea Nitrogen 43 (H) 7 - 30 mg/dL    Creatinine 1.50 (H) 0.66 - 1.25 mg/dL    GFR Estimate 46 (L) >60 mL/min/[1.73_m2]    GFR Estimate If Black 53 (L) >60 mL/min/[1.73_m2]    Calcium 8.8 8.5 - 10.1 mg/dL    Bilirubin Total 0.6 0.2 - 1.3 mg/dL    Albumin 3.5 3.4 - 5.0 g/dL    Protein Total 6.3 (L) 6.8 - 8.8 g/dL    Alkaline Phosphatase 60 40 - 150 U/L    ALT 29 0 - 70 U/L    AST 26 0 - 45 U/L   Lactic acid whole blood     Status: None   Result Value Ref Range    Lactic Acid 1.6 0.7 - 2.0 mmol/L   Troponin I     Status: None   Result Value Ref Range    Troponin I ES <0.015 0.000 - 0.045 ug/L   Symptomatic Influenza A/B & SARS-CoV2 (COVID-19) Virus PCR Multiplex     Status: None    Specimen: Nasopharyngeal   Result Value Ref Range    Flu A/B & SARS-COV-2 PCR Source Nasopharyngeal     SARS-CoV-2 PCR Result NEGATIVE     Influenza A PCR Negative NEG^Negative    Influenza B PCR Negative NEG^Negative    Respiratory Syncytial Virus PCR (Note)     Flu A/B & SARS-CoV-2 PCR Comment (Note)    Nt probnp inpatient     Status: Abnormal   Result Value Ref Range    N-Terminal Pro BNP Inpatient 1,284 (H) 0 - 900 pg/mL   Vitamin B12     Status: Abnormal   Result Value Ref Range    Vitamin B12 1,674 (H) 193 - 986 pg/mL   Folate     Status: None   Result Value Ref Range    Folate 13.6 >5.4 ng/mL   Blood Morphology Pathologist Review     Status: None   Result Value Ref Range    Copath Report       Patient  Name: ABI VALDEZ  MR#: [id number]  Specimen #: [id number]  Collected: 12/21/2020  Received: 12/22/2020  Reported: 12/22/2020 15:12  Ordering Phy(s): LEEANNA FERRER ZENAIDA    For improved result formatting, select 'View Enhanced Report Format' under   Linked Documents section.    TEST(S):  Peripheral Smear Morphology    FINAL DIAGNOSIS:  Peripheral blood demonstrating macrocytic anemia, left shift with isolated   myeloblasts identified,  thrombocytopenia    COMMENT:  Causes for macrocytic anemia typically include B12/folate deficiency,   liver disease, alcohol, medication  reactions, hypothyroidism, myelodysplasia, and reticulocytosis. The origin   of the noted left shift and  associated thrombocytopenia is not apparent from evaluation of the   peripheral blood specimen. Clinical  correlation is required.    Electronically signed out by:    Luis Lin M.D.    PERIPHERAL BLOOD DATA:    PERIPHERAL BLOOD DATA  Patient Value (Reference Range >18 year old male)  6.07 . . .WBC (4.0 -11.0 x 10*9/L)  1.75 . . .RBC (4.4-5.9 x 10*12/L)  7.2 . . .HGB (13.3-17.7 g/dL)  20.9 . . .HCT (40.0-53.0 %)  119.4 . . .MCV (78-100fL)  41.1 . . .MCH (26.5-33.0 pg)  34.4 . . .MCHC (31.5-36.5 g/dL)  16.3 . . .RDW (10.0-15.0 %)  72 . . .PLT (150-450 x 10*9/L)  0.6 . . .RETIC (2.0-6.0%)    PERIPHERAL BLOOD DIFFERENTIAL  (Reference ranges >18 year old)    Percent  65.1. .Neutrophils, segmented and bands (40 - 75)  21.7. .Lymphocytes (20 - 48)  6.4. .Monocytes (0 - 12)  6.3. .Eosinophils (0 - 6)  0.5. .Basophils (0 - 2)    Absolute  3.95. .Neutrophils,segmented and bands  (1.6 - 8.3 x 10*9/L)  1.32. .Lymphocytes  (0.8 - 5.3 x 10*9/L)  0.39. .Monocytes  (0 -1.3 x 10*9/L)  0.38. .Eosinophils  (0 - 0.7 x 10*9/L)  0.03. .Basophils  (0 - 0.2 x 10*9/L)    PERIPHERAL MORPHOLOGY:    ERYTHROCYTES: The red blood cells are decreased in number and are   macrocytic normochromic. Anisopoikilocytosis  is mild and nonspecific. Polychromasia is not increased.  Rouleaux is   evident.    LEUKOCYTES: The  ite blood cells are normal in number with neutrophils   predominating. Neutrophils exhibit  toxic change. A significant left shift comprised of myelocytes and rare   myeloblasts is noted. Dysplastic  features are not apparent.    PLATELETS: The platelets appear reduced in number but are morphologically   unremarkable.    The technical component of this testing was completed at the [facility], with the professional component performed   at the [facility], [facility] [place]  [address] ([phone])    CPT Codes:  A: 58418-JAMF    COLLECTION SITE:  Client:  Critical access hospital  Location:  [facility] (P)       Hemoglobin     Status: Abnormal   Result Value Ref Range    Hemoglobin 9.0 (L) 13.3 - 17.7 g/dL   Basic metabolic panel     Status: Abnormal   Result Value Ref Range    Sodium 139 133 - 144 mmol/L    Potassium 4.3 3.4 - 5.3 mmol/L    Chloride 107 94 - 109 mmol/L    Carbon Dioxide 27 20 - 32 mmol/L    Anion Gap 5 3 - 14 mmol/L    Glucose 100 (H) 70 - 99 mg/dL    Urea Nitrogen 37 (H) 7 - 30 mg/dL    Creatinine 1.43 (H) 0.66 - 1.25 mg/dL    GFR Estimate 49 (L) >60 mL/min/[1.73_m2]    GFR Estimate If Black 57 (L) >60 mL/min/[1.73_m2]    Calcium 9.3 8.5 - 10.1 mg/dL   CBC with platelets     Status: Abnormal   Result Value Ref Range    WBC 7.3 4.0 - 11.0 10e9/L    RBC Count 2.70 (L) 4.4 - 5.9 10e12/L    Hemoglobin 10.2 (L) 13.3 - 17.7 g/dL    Hematocrit 29.5 (L) 40.0 - 53.0 %     (H) 78 - 100 fl    MCH 37.8 (H) 26.5 - 33.0 pg    MCHC 34.6 31.5 - 36.5 g/dL    RDW Dimorphic population - unable to calculate 10.0 - 15.0 %    Platelet Count 61 (L) 150 - 450 10e9/L   ABO/Rh type and screen     Status: None   Result Value Ref Range    Units Ordered 1     ABO A     RH(D) Neg     Antibody Screen Neg     Test Valid Only At [facility]        Specimen Expires  12/24/2020     Crossmatch Red Blood Cells    Blood component     Status: None   Result Value Ref Range    Unit Number [id number]     Blood Component Type Red Blood Cells Leukocyte Reduced     Division Number 00     Status of Unit Released to care unit     Blood Product Code [id number]     Unit Status ISS    Blood component     Status: None   Result Value Ref Range    Unit Number [id number]     Blood Component Type Red Blood Cells Leukocyte Reduced     Division Number 00     Status of Unit Released to care unit     Blood Product Code [id number]     Unit Status ISS                    No results found for this or any previous visit (from the past 24 hour(s)).    Medications   0.9% sodium chloride BOLUS (0 mLs Intravenous Stopped 12/21/20 1049)     11:32 AM--discussed with Dr. Florez the patient's hematologist.  He recommends stopping hydroxyurea and admitting the patient for transfusion.  Assessments & Plan (with Medical Decision Making)   MDM--70-year-old male with myeloproliferative disorder, hypertension, chronic kidney disease, coronary artery disease and history of CHF who presents with dyspnea on exertion.  His found to be very anemic.--Hemoglobin 6.6.  He has a history of congestive heart failure.--BNP is slightly elevated because of this I recommended we admit him for transfusion so that we can give him Lasix in between his 2 units of packed red cells to avoid fluid overload.  I discussed the case with the patient's hematologist/oncologist Dr. Florez.  I discussed the case with -hospitalist who accepts care of the patient.  I have reviewed the nursing notes.    I have reviewed the findings, diagnosis, plan and need for follow up with the patient.       Discharge Medication List as of 12/22/2020  9:09 AM          Final diagnoses:   Acute anemia   Dyspnea on exertion   S/P CABG (coronary artery bypass graft)   Myeloproliferative disorder (H)   Coronary artery disease involving native coronary artery  of native heart with other form of angina pectoris (H)   Essential hypertension   Memory loss       12/21/2020   [facility] EMERGENCY DEPT     Kathleen, Severiano FERRER MD  12/23/20 1935

## 2020-12-21 NOTE — H&P
[facility]    History and Physical  Hospitalist       Date of Admission:  12/21/2020    Assessment & Plan   Principal Problem:    Acute anemia    Assessment: anemia, possible due to hydroxyurea. Pt is off hydroxyurea for the last 4 days now. hgb is 6.6 on admit was 10 on 12/3/20    Plan: will give 2 unit of blood, monitor hgb, will do occult blood test    Active Problems:    Dyspnea on exertion    Assessment: possible related to anemia, o2 sat is normal in room air    Plan: will transfuse hgb as above      Essential hypertension    Assessment: on lasix and hydrochlorothiazide/ lisinopril at home, currently hypotensive    Plan: will hold hydrochlorothiazide/lisinopril, will give lasix 40mg iv after 2 unit of blood      Myeloproliferative disorder (H)    Assessment: on hydroxyurea    Plan: HOLD due to anemia      CKD (chronic kidney disease) stage 3, GFR 30-59 ml/min    Assessment: creatinine is 1.7, seem to be baseline    Plan: monitor kidney fx. Avoid nephrotoxin, renally dose medication      Coronary artery disease involving native coronary artery of native heart with other form of angina pectoris (H)    Assessment: on aspirin 325mg daily    Plan: continue aspirin.       Renny Gannon is a 70 year old male who presents with weakness and shortness of breath. Pt o2 sat is normal. Pt felt more short of breaht with activity. It was noted his hgb is low at 6.6, apparently pt has myeloproliferative disorder and was on hydroxiurea due to that. Pt denies chest pain or chest pressure. Pt covid was swabbed and resulted negative in the Er. Pt cxr show no infiltrate       # Pain Assessment:  Current Pain Score 12/21/2020   Patient currently in pain? denies   Pain score (0-10) -   CPOT pain score -   Renny son pain level was assessed and he currently denies pain.      DVT Prophylaxis: Low Risk/Ambulatory with no VTE prophylaxis indicated  Code Status: Full Code    Disposition: Expected discharge in  1-2 days once hgb stable, denies shortness of breath.    Sina Tarango    Primary Care Physician   Angus Clements Mai    Chief Complaint   Shortness of breath on exertion     History is obtained from the patient    History of Present Illness   Renny Gannon is a 70 year old male who presents with weakness and shortness of breath. Pt o2 sat is normal. Pt felt more short of breaht with activity. It was noted his hgb is low at 6.6, apparently pt has myeloproliferative disorder and was on hydroxiurea due to that. Pt denies chest pain or chest pressure. Pt covid was swabbed and resulted negative in the Er. Pt cxr show no infiltrate     Past Medical History    I have reviewed this patient's medical history and updated it with pertinent information if needed.   Past Medical History:   Diagnosis Date     Hypertension        Past Surgical History   I have reviewed this patient's surgical history and updated it with pertinent information if needed.  Past Surgical History:   Procedure Laterality Date     BONE MARROW BIOPSY, BONE SPECIMEN, NEEDLE/TROCAR N/A 12/30/2019    Procedure: BIOPSY, BONE MARROW;  Surgeon: Aguilar Krause MD;  Location:  OR     BYPASS GRAFT ARTERY CORONARY N/A 1/31/2020    Procedure: CORONARY ARTERY BYPASS GRAFTING X 3 - LIMA TO LAD, SV TO OM  AND PDA; ENDOVEIN HARVEST OF BILATERAL LEGS; ON PUMP WITH SANDRA;  Surgeon: Mable Barrera MD;  Location:  OR     CV CORONARY ANGIOGRAM Left 1/9/2020    Procedure: Coronary Angiogram;  Surgeon: Devin Bentley MD;  Location:  [facility]     NO HISTORY OF SURGERY         Prior to Admission Medications   Prior to Admission Medications   Prescriptions Last Dose Informant Patient Reported? Taking?   Ferrous Sulfate 324 (65 Fe) MG TBEC 12/21/2020 at am Self No Yes   Sig: TAKE ONE TABLET BY MOUTH ONCE DAILY AT NOON   Patient taking differently: Take 324 mg by mouth daily    GOODSENSE ASPIRIN 325 MG tablet 12/21/2020 at am Self No  Yes   Sig: TAKE ONE TABLET BY MOUTH ONCE DAILY IN THE MORNING   Patient taking differently: Take 325 mg by mouth daily    atorvastatin (LIPITOR) 40 MG tablet 12/20/2020 at hs Self No Yes   Sig: TAKE ONE TABLET BY MOUTH EVERY NIGHT AT BEDTIME   Patient taking differently: Take 40 mg by mouth daily    furosemide (LASIX) 40 MG tablet 12/21/2020 at am Self No Yes   Sig: TAKE ONE TABLET BY MOUTH DAILY IN THE MORNING AND TAKE 1/2 TABLET AT NOON   Patient taking differently: Take 40 mg by mouth daily    hydroxyurea (HYDREA) 500 MG capsule 12/21/2020 at am Self No Yes   Sig: TAKE TWO CAPSULES BY MOUTH ONCE DAILY   Patient taking differently: Take 1,000 mg by mouth daily    lisinopril-hydrochlorothiazide (ZESTORETIC) 20-25 MG tablet 12/21/2020 at am Self No Yes   Sig: Take 1 tablet by mouth daily   metoprolol succinate ER (TOPROL-XL) 100 MG 24 hr tablet 12/21/2020 at am Self No Yes   Sig: TAKE ONE TABLET BY MOUTH TWICE A DAY IN THE MORNING AND IN THE EVENING   Patient taking differently: Take 100 mg by mouth 2 times daily    omeprazole (PRILOSEC) 20 MG DR capsule 12/21/2020 at am Self No Yes   Sig: Take 1 capsule (20 mg) by mouth daily   senna-docusate (SENOKOT-S/PERICOLACE) 8.6-50 MG tablet 12/21/2020 at am Self No Yes   Sig: Take 2 tablets by mouth 2 times daily   spironolactone (ALDACTONE) 25 MG tablet 12/21/2020 at am Self Yes Yes   Sig: Take 25 mg by mouth daily    vitamin B-12 (CYANOCOBALAMIN) 250 MCG tablet 12/21/2020 at am Self No Yes   Sig: TAKE ONE TABLET BY MOUTH EVERY MORNING   Patient taking differently: Take 250 mcg by mouth daily       Facility-Administered Medications: None     Allergies   Allergies   Allergen Reactions     No Known Drug Allergies      Seasonal Allergies        Social History   I have reviewed this patient's social history and updated it with pertinent information if needed. Renny Gannon  reports that he quit smoking about 19 years ago. His smoking use included cigarettes. He started  smoking about 59 years ago. He quit after 30.00 years of use. He has never used smokeless tobacco. He reports that he does not drink alcohol or use drugs.    Family History   I have reviewed this patient's family history and updated it with pertinent information if needed.   Family History   Problem Relation Age of Onset     No Known Problems Mother      Unknown/Adopted Father      Unknown/Adopted Maternal Grandmother      Unknown/Adopted Maternal Grandfather      Unknown/Adopted Paternal Grandmother      Unknown/Adopted Paternal Grandfather      Cancer Brother         lung cancer - smoking     No Known Problems Sister      Coronary Artery Disease Brother          of MI at 66     No Known Problems Sister        Review of Systems   The 10 point Review of Systems is negative other than noted in the HPI or here.     Physical Exam   Temp: 96.2  F (35.7  C) Temp src: Oral BP: 90/54(right) Pulse: 72   Resp: 22 SpO2: 97 % O2 Device: None (Room air)    Vital Signs with Ranges  Temp:  [96.1  F (35.6  C)-97.8  F (36.6  C)] 96.2  F (35.7  C)  Pulse:  [68-78] 72  Resp:  [20-28] 22  BP: ()/(49-61) 90/54  SpO2:  [95 %-99 %] 97 %  217 lbs 4.8 oz    Constitutional: alert, oriented, mild distress  Eyes: PERRLA  HEENT: normocephalic, atraumatic.   Respiratory: clear to auscultation bilaterally  Cardiovascular: regular rate and rhythm  GI: supple, non tender, non distended  Lymph/Hematologic: no lymph node enlargement  Genitourinary: not examined  Skin: no rash or bruise  Musculoskeletal: no edema noted  Neurologic: alert, oriented, normal speech. No abnormal movement  Psychiatric: affect normal.     Data     Data reviewed today:    Recent Results (from the past 168 hour(s))   CBC with platelets differential    Collection Time: 20  8:20 AM   Result Value Ref Range    WBC 6.1 4.0 - 11.0 10e9/L    RBC Count 1.75 (L) 4.4 - 5.9 10e12/L    Hemoglobin 7.2 (L) 13.3 - 17.7 g/dL    Hematocrit 20.9 (L) 40.0 - 53.0 %      (H) 78 - 100 fl    MCH 41.1 (H) 26.5 - 33.0 pg    MCHC 34.4 31.5 - 36.5 g/dL    RDW 16.3 (H) 10.0 - 15.0 %    Platelet Count 72 (L) 150 - 450 10e9/L    Diff Method Manual Differential     % Neutrophils 69.0 %    % Lymphocytes 30.0 %    % Monocytes 1.0 %    % Eosinophils 0.0 %    % Basophils 0.0 %    Absolute Neutrophil 4.2 1.6 - 8.3 10e9/L    Absolute Lymphocytes 1.8 0.8 - 5.3 10e9/L    Absolute Monocytes 0.1 0.0 - 1.3 10e9/L    Absolute Eosinophils 0.0 0.0 - 0.7 10e9/L    Absolute Basophils 0.0 0.0 - 0.2 10e9/L    Anisocytosis Slight     RBC Morphology Consistent with reported results     Platelet Estimate       Automated count confirmed.  Platelet morphology is normal.   Basic metabolic panel  (Ca, Cl, CO2, Creat, Gluc, K, Na, BUN)    Collection Time: 12/21/20  8:20 AM   Result Value Ref Range    Sodium 140 133 - 144 mmol/L    Potassium 4.2 3.4 - 5.3 mmol/L    Chloride 108 94 - 109 mmol/L    Carbon Dioxide 26 20 - 32 mmol/L    Anion Gap 6 3 - 14 mmol/L    Glucose 131 (H) 70 - 99 mg/dL    Urea Nitrogen 41 (H) 7 - 30 mg/dL    Creatinine 1.59 (H) 0.66 - 1.25 mg/dL    GFR Estimate 43 (L) >60 mL/min/[1.73_m2]    GFR Estimate If Black 50 (L) >60 mL/min/[1.73_m2]    Calcium 8.7 8.5 - 10.1 mg/dL   Iron and iron binding capacity    Collection Time: 12/21/20  8:20 AM   Result Value Ref Range    Iron 234 (H) 35 - 180 ug/dL    Iron Binding Cap 243 240 - 430 ug/dL    Iron Saturation Index 96 (H) 15 - 46 %   Ferritin    Collection Time: 12/21/20  8:20 AM   Result Value Ref Range    Ferritin 997 (H) 26 - 388 ng/mL   Magnesium    Collection Time: 12/21/20  8:20 AM   Result Value Ref Range    Magnesium 2.1 1.6 - 2.3 mg/dL   Albumin level    Collection Time: 12/21/20  8:20 AM   Result Value Ref Range    Albumin 3.7 3.4 - 5.0 g/dL   Phosphorus    Collection Time: 12/21/20  8:20 AM   Result Value Ref Range    Phosphorus 3.6 2.5 - 4.5 mg/dL   Parathyroid Hormone Intact    Collection Time: 12/21/20  8:20 AM   Result Value Ref Range     Parathyroid Hormone Intact 99 (H) 18 - 80 pg/mL   Reticulocyte count    Collection Time: 12/21/20  8:20 AM   Result Value Ref Range    % Retic 0.6 0.5 - 2.0 %    Absolute Retic 10.4 (L) 25 - 95 10e9/L   UA reflex to Microscopic (Yani Lomeli; [facility])    Collection Time: 12/21/20  8:32 AM   Result Value Ref Range    Color Urine Yellow     Appearance Urine Clear     Glucose Urine Negative NEG^Negative mg/dL    Bilirubin Urine Negative NEG^Negative    Ketones Urine Negative NEG^Negative mg/dL    Specific Gravity Urine 1.016 1.003 - 1.035    Blood Urine Negative NEG^Negative    pH Urine 6.0 5.0 - 7.0 pH    Protein Albumin Urine Negative NEG^Negative mg/dL    Urobilinogen mg/dL 0.0 0.0 - 2.0 mg/dL    Nitrite Urine Negative NEG^Negative    Leukocyte Esterase Urine Negative NEG^Negative    Source Unspecified Urine    Protein  random urine with Creat Ratio    Collection Time: 12/21/20  8:32 AM   Result Value Ref Range    Protein Random Urine 0.13 g/L    Protein Total Urine g/gr Creatinine 0.11 0 - 0.2 g/g Cr   Creatinine urine calculation only    Collection Time: 12/21/20  8:32 AM   Result Value Ref Range    Creatinine Urine 115 mg/dL   Vitamin B12    Collection Time: 12/21/20  8:32 AM   Result Value Ref Range    Vitamin B12 1,674 (H) 193 - 986 pg/mL   CBC with platelets differential    Collection Time: 12/21/20  9:45 AM   Result Value Ref Range    WBC 5.6 4.0 - 11.0 10e9/L    RBC Count 1.61 (L) 4.4 - 5.9 10e12/L    Hemoglobin 6.6 (LL) 13.3 - 17.7 g/dL    Hematocrit 19.5 (L) 40.0 - 53.0 %     (H) 78 - 100 fl    MCH 41.0 (H) 26.5 - 33.0 pg    MCHC 33.8 31.5 - 36.5 g/dL    RDW 16.1 (H) 10.0 - 15.0 %    Platelet Count 69 (L) 150 - 450 10e9/L    Diff Method Manual Differential     % Neutrophils 60.0 %    % Lymphocytes 22.0 %    % Monocytes 10.0 %    % Eosinophils 5.0 %    % Basophils 1.0 %    % Myelocytes 2.0 %    Absolute Neutrophil 3.4 1.6 - 8.3 10e9/L    Absolute Lymphocytes 1.2 0.8 - 5.3 10e9/L    Absolute  Monocytes 0.6 0.0 - 1.3 10e9/L    Absolute Eosinophils 0.3 0.0 - 0.7 10e9/L    Absolute Basophils 0.1 0.0 - 0.2 10e9/L    Absolute Myelocytes 0.1 (H) 0 10e9/L    Anisocytosis Slight     RBC Morphology Consistent with reported results     Platelet Estimate       Automated count confirmed.  Platelet morphology is normal.   D dimer quantitative    Collection Time: 12/21/20  9:45 AM   Result Value Ref Range    D Dimer 0.9 (H) 0.0 - 0.50 ug/ml FEU   INR    Collection Time: 12/21/20  9:45 AM   Result Value Ref Range    INR 1.11 0.86 - 1.14   Partial thromboplastin time    Collection Time: 12/21/20  9:45 AM   Result Value Ref Range    PTT 28 22 - 37 sec   Comprehensive metabolic panel    Collection Time: 12/21/20  9:45 AM   Result Value Ref Range    Sodium 140 133 - 144 mmol/L    Potassium 4.7 3.4 - 5.3 mmol/L    Chloride 108 94 - 109 mmol/L    Carbon Dioxide 27 20 - 32 mmol/L    Anion Gap 5 3 - 14 mmol/L    Glucose 115 (H) 70 - 99 mg/dL    Urea Nitrogen 43 (H) 7 - 30 mg/dL    Creatinine 1.50 (H) 0.66 - 1.25 mg/dL    GFR Estimate 46 (L) >60 mL/min/[1.73_m2]    GFR Estimate If Black 53 (L) >60 mL/min/[1.73_m2]    Calcium 8.8 8.5 - 10.1 mg/dL    Bilirubin Total 0.6 0.2 - 1.3 mg/dL    Albumin 3.5 3.4 - 5.0 g/dL    Protein Total 6.3 (L) 6.8 - 8.8 g/dL    Alkaline Phosphatase 60 40 - 150 U/L    ALT 29 0 - 70 U/L    AST 26 0 - 45 U/L   Lactic acid whole blood    Collection Time: 12/21/20  9:45 AM   Result Value Ref Range    Lactic Acid 1.6 0.7 - 2.0 mmol/L   Troponin I    Collection Time: 12/21/20  9:45 AM   Result Value Ref Range    Troponin I ES <0.015 0.000 - 0.045 ug/L   Symptomatic Influenza A/B & SARS-CoV2 (COVID-19) Virus PCR Multiplex    Collection Time: 12/21/20  9:45 AM    Specimen: Nasopharyngeal   Result Value Ref Range    Flu A/B & SARS-COV-2 PCR Source Nasopharyngeal     SARS-CoV-2 PCR Result NEGATIVE     Influenza A PCR Negative NEG^Negative    Influenza B PCR Negative NEG^Negative    Respiratory Syncytial Virus  PCR (Note)     Flu A/B & SARS-CoV-2 PCR Comment (Note)    Nt probnp inpatient    Collection Time: 12/21/20  9:45 AM   Result Value Ref Range    N-Terminal Pro BNP Inpatient 1,284 (H) 0 - 900 pg/mL   ABO/Rh type and screen    Collection Time: 12/21/20  9:45 AM   Result Value Ref Range    Units Ordered 1     ABO A     RH(D) Neg     Antibody Screen Neg     Test Valid Only At [facility]        Specimen Expires 12/24/2020     Crossmatch Red Blood Cells    Folate    Collection Time: 12/21/20  9:45 AM   Result Value Ref Range    Folate 13.6 >5.4 ng/mL   Blood component    Collection Time: 12/21/20  9:45 AM   Result Value Ref Range    Unit Number [id number]     Blood Component Type Red Blood Cells Leukocyte Reduced     Division Number 00     Status of Unit Released to care unit 12/21/2020 1218     Blood Product Code X2794I61     Unit Status ISS    Blood component    Collection Time: 12/21/20  9:45 AM   Result Value Ref Range    Unit Number [id number]     Blood Component Type Red Blood Cells Leukocyte Reduced     Division Number 00     Status of Unit Released to care unit 12/21/2020 1456     Blood Product Code [id number]     Unit Status ISS       Recent Results (from the past 24 hour(s))   XR Chest Port 1 View    Narrative    CHEST ONE VIEW PORTABLE  12/21/2020 10:24 AM     HISTORY: Short of air.    COMPARISON: Chest x-rays dated 2/18/2020.    FINDINGS:  Lungs are hypoaerated. There may be a small left pleural  fluid collection, similar to prior study. There are increased  interstitial markings in the bilateral lungs concerning for vascular  congestion. Cardiac silhouette is enlarged, even given technique. No  pneumothorax or right pleural fluid collection. No acute fracture.       Impression    IMPRESSION: Probable cardiomegaly, mild vascular congestion, and  possible left pleural fluid collection could represent congestive  heart failure. Atypical infiltrative process (such as COVID-19) is  also  possible.

## 2020-12-21 NOTE — PROGRESS NOTES
Patient tolerated second infusion without complication. Writer contacted to charge nurse when 92/50 between units, questioned giving ordered iv lasix.  Verbal to hold and wait until after transfusion completed.  Hospitalist then decided to discontinue iv lasix and placed new orders for PO lasix this evening.  Lung sounds clear.  BP continues to be soft .  Good appetite with regular diet. SOB improved, remains on RA with easy respirations.  Continue to monitor respiratory status and hgb.

## 2020-12-22 ENCOUNTER — TELEPHONE (OUTPATIENT)
Dept: ONCOLOGY | Facility: CLINIC | Age: 70
End: 2020-12-22

## 2020-12-22 VITALS
SYSTOLIC BLOOD PRESSURE: 104 MMHG | RESPIRATION RATE: 22 BRPM | HEIGHT: 68 IN | TEMPERATURE: 96.3 F | OXYGEN SATURATION: 93 % | WEIGHT: 211.3 LBS | DIASTOLIC BLOOD PRESSURE: 56 MMHG | HEART RATE: 67 BPM | BODY MASS INDEX: 32.02 KG/M2

## 2020-12-22 LAB
ANION GAP SERPL CALCULATED.3IONS-SCNC: 5 MMOL/L (ref 3–14)
BUN SERPL-MCNC: 37 MG/DL (ref 7–30)
CALCIUM SERPL-MCNC: 9.3 MG/DL (ref 8.5–10.1)
CHLORIDE SERPL-SCNC: 107 MMOL/L (ref 94–109)
CO2 SERPL-SCNC: 27 MMOL/L (ref 20–32)
COPATH REPORT: NORMAL
CREAT SERPL-MCNC: 1.43 MG/DL (ref 0.66–1.25)
ERYTHROCYTE [DISTWIDTH] IN BLOOD BY AUTOMATED COUNT: ABNORMAL % (ref 10–15)
GFR SERPL CREATININE-BSD FRML MDRD: 49 ML/MIN/{1.73_M2}
GLUCOSE SERPL-MCNC: 100 MG/DL (ref 70–99)
HCT VFR BLD AUTO: 29.5 % (ref 40–53)
HGB BLD-MCNC: 10.2 G/DL (ref 13.3–17.7)
IGA SERPL-MCNC: 141 MG/DL (ref 84–499)
IGG SERPL-MCNC: 911 MG/DL (ref 610–1616)
IGM SERPL-MCNC: 27 MG/DL (ref 35–242)
MCH RBC QN AUTO: 37.8 PG (ref 26.5–33)
MCHC RBC AUTO-ENTMCNC: 34.6 G/DL (ref 31.5–36.5)
MCV RBC AUTO: 109 FL (ref 78–100)
PLATELET # BLD AUTO: 61 10E9/L (ref 150–450)
POTASSIUM SERPL-SCNC: 4.3 MMOL/L (ref 3.4–5.3)
PROT PATTERN SERPL IFE-IMP: ABNORMAL
PROT PATTERN UR ELPH-IMP: NORMAL
RBC # BLD AUTO: 2.7 10E12/L (ref 4.4–5.9)
SODIUM SERPL-SCNC: 139 MMOL/L (ref 133–144)
WBC # BLD AUTO: 7.3 10E9/L (ref 4–11)

## 2020-12-22 PROCEDURE — 85027 COMPLETE CBC AUTOMATED: CPT | Performed by: HOSPITALIST

## 2020-12-22 PROCEDURE — 36415 COLL VENOUS BLD VENIPUNCTURE: CPT | Performed by: HOSPITALIST

## 2020-12-22 PROCEDURE — 80048 BASIC METABOLIC PNL TOTAL CA: CPT | Performed by: HOSPITALIST

## 2020-12-22 PROCEDURE — 99217 PR OBSERVATION CARE DISCHARGE: CPT | Performed by: HOSPITALIST

## 2020-12-22 PROCEDURE — G0378 HOSPITAL OBSERVATION PER HR: HCPCS

## 2020-12-22 PROCEDURE — 250N000013 HC RX MED GY IP 250 OP 250 PS 637: Performed by: HOSPITALIST

## 2020-12-22 RX ADMIN — OMEPRAZOLE 20 MG: 20 CAPSULE, DELAYED RELEASE ORAL at 08:55

## 2020-12-22 RX ADMIN — ACETAMINOPHEN 650 MG: 325 TABLET, FILM COATED ORAL at 05:45

## 2020-12-22 RX ADMIN — FUROSEMIDE 40 MG: 40 TABLET ORAL at 08:55

## 2020-12-22 RX ADMIN — ASPIRIN 325 MG ORAL TABLET 325 MG: 325 PILL ORAL at 08:55

## 2020-12-22 RX ADMIN — METOPROLOL SUCCINATE 100 MG: 100 TABLET, EXTENDED RELEASE ORAL at 08:54

## 2020-12-22 RX ADMIN — FERROUS SULFATE TAB 325 MG (65 MG ELEMENTAL FE) 325 MG: 325 (65 FE) TAB at 08:55

## 2020-12-22 RX ADMIN — CYANOCOBALAMIN TAB 500 MCG 250 MCG: 500 TAB at 09:24

## 2020-12-22 ASSESSMENT — MIFFLIN-ST. JEOR: SCORE: 1687.95

## 2020-12-22 NOTE — DISCHARGE SUMMARY
[facility]    Discharge Summary  Hospitalist    Date of Admission:  12/21/2020  Date of Discharge:  12/22/2020  Discharging Provider: Sina Tarango  Date of Service (when I saw the patient): 12/22/20    Discharge Diagnoses   Principal Problem:    Acute anemia (12/21/2020)  Active Problems:    Dyspnea on exertion (12/21/2020)    Essential hypertension (3/11/2019)    Myeloproliferative disorder (H) (1/26/2020)    CKD (chronic kidney disease) stage 3, GFR 30-59 ml/min (12/21/2020)    Coronary artery disease involving native coronary artery of native heart with other form of angina pectoris (H) (1/2/2020)       Hospital Course   weakness and shortness of breath. Pt o2 sat is normal. Pt felt more short of breaht with activity. It was noted his hgb is low at 6.6, apparently pt has myeloproliferative disorder and was on hydroxiurea due to that. Pt denies chest pain or chest pressure. Pt covid was swabbed and resulted negative in the Er. Pt cxr show no infiltrate. Pt was given 2 unit of blood, pt hgb up to 10 this Am. Stool occult blood as per report is negative. Seem his anemia is related to the hydroxiuria. Pt will be sent home with HOLDING the hydroxyurea. Pt also going to have his spironolactone and hydrochlorothiazide and lisinopril held on discharge. Follow up with pcp in 3-5 days     # Discharge Pain Plan:   - Patient currently has NO PAIN and is not being prescribed pain medications on discharge.    Sina Tarango MD    Significant Results and Procedures   Recent Results (from the past 168 hour(s))   CBC with platelets differential    Collection Time: 12/21/20  8:20 AM   Result Value Ref Range    WBC 6.1 4.0 - 11.0 10e9/L    RBC Count 1.75 (L) 4.4 - 5.9 10e12/L    Hemoglobin 7.2 (L) 13.3 - 17.7 g/dL    Hematocrit 20.9 (L) 40.0 - 53.0 %     (H) 78 - 100 fl    MCH 41.1 (H) 26.5 - 33.0 pg    MCHC 34.4 31.5 - 36.5 g/dL    RDW 16.3 (H) 10.0 - 15.0 %    Platelet Count 72  (L) 150 - 450 10e9/L    Diff Method Manual Differential     % Neutrophils 69.0 %    % Lymphocytes 30.0 %    % Monocytes 1.0 %    % Eosinophils 0.0 %    % Basophils 0.0 %    Absolute Neutrophil 4.2 1.6 - 8.3 10e9/L    Absolute Lymphocytes 1.8 0.8 - 5.3 10e9/L    Absolute Monocytes 0.1 0.0 - 1.3 10e9/L    Absolute Eosinophils 0.0 0.0 - 0.7 10e9/L    Absolute Basophils 0.0 0.0 - 0.2 10e9/L    Anisocytosis Slight     RBC Morphology Consistent with reported results     Platelet Estimate       Automated count confirmed.  Platelet morphology is normal.   Basic metabolic panel  (Ca, Cl, CO2, Creat, Gluc, K, Na, BUN)    Collection Time: 12/21/20  8:20 AM   Result Value Ref Range    Sodium 140 133 - 144 mmol/L    Potassium 4.2 3.4 - 5.3 mmol/L    Chloride 108 94 - 109 mmol/L    Carbon Dioxide 26 20 - 32 mmol/L    Anion Gap 6 3 - 14 mmol/L    Glucose 131 (H) 70 - 99 mg/dL    Urea Nitrogen 41 (H) 7 - 30 mg/dL    Creatinine 1.59 (H) 0.66 - 1.25 mg/dL    GFR Estimate 43 (L) >60 mL/min/[1.73_m2]    GFR Estimate If Black 50 (L) >60 mL/min/[1.73_m2]    Calcium 8.7 8.5 - 10.1 mg/dL   Iron and iron binding capacity    Collection Time: 12/21/20  8:20 AM   Result Value Ref Range    Iron 234 (H) 35 - 180 ug/dL    Iron Binding Cap 243 240 - 430 ug/dL    Iron Saturation Index 96 (H) 15 - 46 %   Ferritin    Collection Time: 12/21/20  8:20 AM   Result Value Ref Range    Ferritin 997 (H) 26 - 388 ng/mL   Magnesium    Collection Time: 12/21/20  8:20 AM   Result Value Ref Range    Magnesium 2.1 1.6 - 2.3 mg/dL   Albumin level    Collection Time: 12/21/20  8:20 AM   Result Value Ref Range    Albumin 3.7 3.4 - 5.0 g/dL   Phosphorus    Collection Time: 12/21/20  8:20 AM   Result Value Ref Range    Phosphorus 3.6 2.5 - 4.5 mg/dL   Parathyroid Hormone Intact    Collection Time: 12/21/20  8:20 AM   Result Value Ref Range    Parathyroid Hormone Intact 99 (H) 18 - 80 pg/mL   Reticulocyte count    Collection Time: 12/21/20  8:20 AM   Result Value Ref  Range    % Retic 0.6 0.5 - 2.0 %    Absolute Retic 10.4 (L) 25 - 95 10e9/L   UA reflex to Microscopic ([facility]; [facility])    Collection Time: 12/21/20  8:32 AM   Result Value Ref Range    Color Urine Yellow     Appearance Urine Clear     Glucose Urine Negative NEG^Negative mg/dL    Bilirubin Urine Negative NEG^Negative    Ketones Urine Negative NEG^Negative mg/dL    Specific Gravity Urine 1.016 1.003 - 1.035    Blood Urine Negative NEG^Negative    pH Urine 6.0 5.0 - 7.0 pH    Protein Albumin Urine Negative NEG^Negative mg/dL    Urobilinogen mg/dL 0.0 0.0 - 2.0 mg/dL    Nitrite Urine Negative NEG^Negative    Leukocyte Esterase Urine Negative NEG^Negative    Source Unspecified Urine    Protein  random urine with Creat Ratio    Collection Time: 12/21/20  8:32 AM   Result Value Ref Range    Protein Random Urine 0.13 g/L    Protein Total Urine g/gr Creatinine 0.11 0 - 0.2 g/g Cr   Creatinine urine calculation only    Collection Time: 12/21/20  8:32 AM   Result Value Ref Range    Creatinine Urine 115 mg/dL   Vitamin B12    Collection Time: 12/21/20  8:32 AM   Result Value Ref Range    Vitamin B12 1,674 (H) 193 - 986 pg/mL   CBC with platelets differential    Collection Time: 12/21/20  9:45 AM   Result Value Ref Range    WBC 5.6 4.0 - 11.0 10e9/L    RBC Count 1.61 (L) 4.4 - 5.9 10e12/L    Hemoglobin 6.6 (LL) 13.3 - 17.7 g/dL    Hematocrit 19.5 (L) 40.0 - 53.0 %     (H) 78 - 100 fl    MCH 41.0 (H) 26.5 - 33.0 pg    MCHC 33.8 31.5 - 36.5 g/dL    RDW 16.1 (H) 10.0 - 15.0 %    Platelet Count 69 (L) 150 - 450 10e9/L    Diff Method Manual Differential     % Neutrophils 60.0 %    % Lymphocytes 22.0 %    % Monocytes 10.0 %    % Eosinophils 5.0 %    % Basophils 1.0 %    % Myelocytes 2.0 %    Absolute Neutrophil 3.4 1.6 - 8.3 10e9/L    Absolute Lymphocytes 1.2 0.8 - 5.3 10e9/L    Absolute Monocytes 0.6 0.0 - 1.3 10e9/L    Absolute Eosinophils 0.3 0.0 - 0.7 10e9/L    Absolute Basophils 0.1 0.0 - 0.2 10e9/L     Absolute Myelocytes 0.1 (H) 0 10e9/L    Anisocytosis Slight     RBC Morphology Consistent with reported results     Platelet Estimate       Automated count confirmed.  Platelet morphology is normal.   D dimer quantitative    Collection Time: 12/21/20  9:45 AM   Result Value Ref Range    D Dimer 0.9 (H) 0.0 - 0.50 ug/ml FEU   INR    Collection Time: 12/21/20  9:45 AM   Result Value Ref Range    INR 1.11 0.86 - 1.14   Partial thromboplastin time    Collection Time: 12/21/20  9:45 AM   Result Value Ref Range    PTT 28 22 - 37 sec   Comprehensive metabolic panel    Collection Time: 12/21/20  9:45 AM   Result Value Ref Range    Sodium 140 133 - 144 mmol/L    Potassium 4.7 3.4 - 5.3 mmol/L    Chloride 108 94 - 109 mmol/L    Carbon Dioxide 27 20 - 32 mmol/L    Anion Gap 5 3 - 14 mmol/L    Glucose 115 (H) 70 - 99 mg/dL    Urea Nitrogen 43 (H) 7 - 30 mg/dL    Creatinine 1.50 (H) 0.66 - 1.25 mg/dL    GFR Estimate 46 (L) >60 mL/min/[1.73_m2]    GFR Estimate If Black 53 (L) >60 mL/min/[1.73_m2]    Calcium 8.8 8.5 - 10.1 mg/dL    Bilirubin Total 0.6 0.2 - 1.3 mg/dL    Albumin 3.5 3.4 - 5.0 g/dL    Protein Total 6.3 (L) 6.8 - 8.8 g/dL    Alkaline Phosphatase 60 40 - 150 U/L    ALT 29 0 - 70 U/L    AST 26 0 - 45 U/L   Lactic acid whole blood    Collection Time: 12/21/20  9:45 AM   Result Value Ref Range    Lactic Acid 1.6 0.7 - 2.0 mmol/L   Troponin I    Collection Time: 12/21/20  9:45 AM   Result Value Ref Range    Troponin I ES <0.015 0.000 - 0.045 ug/L   Symptomatic Influenza A/B & SARS-CoV2 (COVID-19) Virus PCR Multiplex    Collection Time: 12/21/20  9:45 AM    Specimen: Nasopharyngeal   Result Value Ref Range    Flu A/B & SARS-COV-2 PCR Source Nasopharyngeal     SARS-CoV-2 PCR Result NEGATIVE     Influenza A PCR Negative NEG^Negative    Influenza B PCR Negative NEG^Negative    Respiratory Syncytial Virus PCR (Note)     Flu A/B & SARS-CoV-2 PCR Comment (Note)    Nt probnp inpatient    Collection Time: 12/21/20  9:45 AM    Result Value Ref Range    N-Terminal Pro BNP Inpatient 1,284 (H) 0 - 900 pg/mL   ABO/Rh type and screen    Collection Time: 12/21/20  9:45 AM   Result Value Ref Range    Units Ordered 1     ABO A     RH(D) Neg     Antibody Screen Neg     Test Valid Only At [facility]        Specimen Expires 12/24/2020     Crossmatch Red Blood Cells    Folate    Collection Time: 12/21/20  9:45 AM   Result Value Ref Range    Folate 13.6 >5.4 ng/mL   Blood component    Collection Time: 12/21/20  9:45 AM   Result Value Ref Range    Unit Number [id number]     Blood Component Type Red Blood Cells Leukocyte Reduced     Division Number 00     Status of Unit Released to care unit     Blood Product Code E1978W01     Unit Status ISS    Blood component    Collection Time: 12/21/20  9:45 AM   Result Value Ref Range    Unit Number [id number]     Blood Component Type Red Blood Cells Leukocyte Reduced     Division Number 00     Status of Unit Released to care unit     Blood Product Code B4411K67     Unit Status ISS    Hemoglobin    Collection Time: 12/21/20  7:22 PM   Result Value Ref Range    Hemoglobin 9.0 (L) 13.3 - 17.7 g/dL   Basic metabolic panel    Collection Time: 12/22/20  5:53 AM   Result Value Ref Range    Sodium 139 133 - 144 mmol/L    Potassium 4.3 3.4 - 5.3 mmol/L    Chloride 107 94 - 109 mmol/L    Carbon Dioxide 27 20 - 32 mmol/L    Anion Gap 5 3 - 14 mmol/L    Glucose 100 (H) 70 - 99 mg/dL    Urea Nitrogen 37 (H) 7 - 30 mg/dL    Creatinine 1.43 (H) 0.66 - 1.25 mg/dL    GFR Estimate 49 (L) >60 mL/min/[1.73_m2]    GFR Estimate If Black 57 (L) >60 mL/min/[1.73_m2]    Calcium 9.3 8.5 - 10.1 mg/dL   CBC with platelets    Collection Time: 12/22/20  5:53 AM   Result Value Ref Range    WBC 7.3 4.0 - 11.0 10e9/L    RBC Count 2.70 (L) 4.4 - 5.9 10e12/L    Hemoglobin 10.2 (L) 13.3 - 17.7 g/dL    Hematocrit 29.5 (L) 40.0 - 53.0 %     (H) 78 - 100 fl    MCH 37.8 (H) 26.5 - 33.0 pg    MCHC 34.6 31.5 - 36.5 g/dL     RDW Dimorphic population - unable to calculate 10.0 - 15.0 %    Platelet Count 61 (L) 150 - 450 10e9/L        Pending Results   These results will be followed up by Angus Scott   Unresulted Labs Ordered in the Past 30 Days of this Admission     Date and Time Order Name Status Description    12/21/2020 1131 Blood Morphology Pathologist Review In process     12/21/2020 0817 25 HYDROXYVITAMIN D2 AND D3 In process     12/21/2020 0816 PROTEIN ELECTROPHORESIS RANDOM URINE In process     12/21/2020 0816 PROTEIN IMMUNOFIXATION SERUM In process           Code Status   Full Code       Primary Care Physician   Angus Clements Mai    Physical Exam   Temp: 96.3  F (35.7  C) Temp src: Oral BP: 104/56 Pulse: 67   Resp: 22 SpO2: 93 % O2 Device: None (Room air)    Vitals:    12/21/20 0849 12/21/20 1404 12/22/20 0526   Weight: 98.9 kg (218 lb) 98.6 kg (217 lb 4.8 oz) 95.8 kg (211 lb 4.8 oz)     Vital Signs with Ranges  Temp:  [96  F (35.6  C)-97.8  F (36.6  C)] 96.3  F (35.7  C)  Pulse:  [67-78] 67  Resp:  [20-28] 22  BP: ()/(49-61) 104/56  SpO2:  [93 %-99 %] 93 %  I/O last 3 completed shifts:  In: 1390 [P.O.:490]  Out: 1050 [Urine:1050]    Constitutional: alert, oriented, minimal distress  Eyes: PERRLA  ENT: within normal limit  Respiratory: clear to auscultation bilaterally  Cardiovascular:  Regular rate and rhythm  GI: supple, nontender, non distended,  Lymph/Hematologic: no lymph node enlargement  Genitourinary: not examined  Skin: no rash or bruise  Musculoskeletal: no leg edema  Neurologic: alert, oriented, normal speech  Neuropsychiatric: affect is normal    Discharge Disposition   Discharged to home  Condition at discharge: Stable    Consultations This Hospital Stay   PHYSICAL THERAPY ADULT IP CONSULT    Time Spent on this Encounter   Sina ROSADO MD, personally saw the patient today and spent greater than 30 minutes discharging this patient.    Discharge Orders      Reason for your hospital stay    Anemia      Follow-up and recommended labs and tests     Follow up with primary care provider, Angus Clements Mai, within 3-5 days to evaluate medication change.  The following labs/tests are recommended: cbc, bmp  .     Activity    Your activity upon discharge: activity as tolerated     Full Code     Diet    Follow this diet upon discharge: Orders Placed This Encounter      Combination Diet Low Saturated Fat Na <2400mg Diet, No Caffeine Diet     Discharge Medications   Current Discharge Medication List      CONTINUE these medications which have NOT CHANGED    Details   atorvastatin (LIPITOR) 40 MG tablet TAKE ONE TABLET BY MOUTH EVERY NIGHT AT BEDTIME  Qty: 90 tablet, Refills: 1    Associated Diagnoses: Coronary artery disease involving native coronary artery of native heart with other form of angina pectoris (H); Essential hypertension      Ferrous Sulfate 324 (65 Fe) MG TBEC TAKE ONE TABLET BY MOUTH ONCE DAILY AT NOON  Qty: 30 tablet, Refills: 0    Associated Diagnoses: S/P CABG (coronary artery bypass graft)      furosemide (LASIX) 40 MG tablet TAKE ONE TABLET BY MOUTH DAILY IN THE MORNING AND TAKE 1/2 TABLET AT NOON  Qty: 135 tablet, Refills: 1    Associated Diagnoses: S/P CABG (coronary artery bypass graft)      GOODSENSE ASPIRIN 325 MG tablet TAKE ONE TABLET BY MOUTH ONCE DAILY IN THE MORNING  Qty: 90 tablet, Refills: 3    Associated Diagnoses: S/P CABG (coronary artery bypass graft)      metoprolol succinate ER (TOPROL-XL) 100 MG 24 hr tablet TAKE ONE TABLET BY MOUTH TWICE A DAY IN THE MORNING AND IN THE EVENING  Qty: 60 tablet, Refills: 5    Associated Diagnoses: S/P CABG (coronary artery bypass graft)      omeprazole (PRILOSEC) 20 MG DR capsule Take 1 capsule (20 mg) by mouth daily  Qty: 30 capsule, Refills: 1    Associated Diagnoses: Gastroesophageal reflux disease without esophagitis      senna-docusate (SENOKOT-S/PERICOLACE) 8.6-50 MG tablet Take 2 tablets by mouth 2 times daily  Qty: 120 tablet, Refills: 5     Associated Diagnoses: S/P CABG (coronary artery bypass graft)      vitamin B-12 (CYANOCOBALAMIN) 250 MCG tablet TAKE ONE TABLET BY MOUTH EVERY MORNING  Qty: 90 tablet, Refills: 3    Associated Diagnoses: S/P CABG (coronary artery bypass graft)         STOP taking these medications       hydroxyurea (HYDREA) 500 MG capsule Comments:   Reason for Stopping:         lisinopril-hydrochlorothiazide (ZESTORETIC) 20-25 MG tablet Comments:   Reason for Stopping:         spironolactone (ALDACTONE) 25 MG tablet Comments:   Reason for Stopping:             Allergies   Allergies   Allergen Reactions     No Known Drug Allergies      Seasonal Allergies      Data   Recent Results (from the past 168 hour(s))   CBC with platelets differential    Collection Time: 12/21/20  8:20 AM   Result Value Ref Range    WBC 6.1 4.0 - 11.0 10e9/L    RBC Count 1.75 (L) 4.4 - 5.9 10e12/L    Hemoglobin 7.2 (L) 13.3 - 17.7 g/dL    Hematocrit 20.9 (L) 40.0 - 53.0 %     (H) 78 - 100 fl    MCH 41.1 (H) 26.5 - 33.0 pg    MCHC 34.4 31.5 - 36.5 g/dL    RDW 16.3 (H) 10.0 - 15.0 %    Platelet Count 72 (L) 150 - 450 10e9/L    Diff Method Manual Differential     % Neutrophils 69.0 %    % Lymphocytes 30.0 %    % Monocytes 1.0 %    % Eosinophils 0.0 %    % Basophils 0.0 %    Absolute Neutrophil 4.2 1.6 - 8.3 10e9/L    Absolute Lymphocytes 1.8 0.8 - 5.3 10e9/L    Absolute Monocytes 0.1 0.0 - 1.3 10e9/L    Absolute Eosinophils 0.0 0.0 - 0.7 10e9/L    Absolute Basophils 0.0 0.0 - 0.2 10e9/L    Anisocytosis Slight     RBC Morphology Consistent with reported results     Platelet Estimate       Automated count confirmed.  Platelet morphology is normal.   Basic metabolic panel  (Ca, Cl, CO2, Creat, Gluc, K, Na, BUN)    Collection Time: 12/21/20  8:20 AM   Result Value Ref Range    Sodium 140 133 - 144 mmol/L    Potassium 4.2 3.4 - 5.3 mmol/L    Chloride 108 94 - 109 mmol/L    Carbon Dioxide 26 20 - 32 mmol/L    Anion Gap 6 3 - 14 mmol/L    Glucose 131 (H) 70 - 99  mg/dL    Urea Nitrogen 41 (H) 7 - 30 mg/dL    Creatinine 1.59 (H) 0.66 - 1.25 mg/dL    GFR Estimate 43 (L) >60 mL/min/[1.73_m2]    GFR Estimate If Black 50 (L) >60 mL/min/[1.73_m2]    Calcium 8.7 8.5 - 10.1 mg/dL   Iron and iron binding capacity    Collection Time: 12/21/20  8:20 AM   Result Value Ref Range    Iron 234 (H) 35 - 180 ug/dL    Iron Binding Cap 243 240 - 430 ug/dL    Iron Saturation Index 96 (H) 15 - 46 %   Ferritin    Collection Time: 12/21/20  8:20 AM   Result Value Ref Range    Ferritin 997 (H) 26 - 388 ng/mL   Magnesium    Collection Time: 12/21/20  8:20 AM   Result Value Ref Range    Magnesium 2.1 1.6 - 2.3 mg/dL   Albumin level    Collection Time: 12/21/20  8:20 AM   Result Value Ref Range    Albumin 3.7 3.4 - 5.0 g/dL   Phosphorus    Collection Time: 12/21/20  8:20 AM   Result Value Ref Range    Phosphorus 3.6 2.5 - 4.5 mg/dL   Parathyroid Hormone Intact    Collection Time: 12/21/20  8:20 AM   Result Value Ref Range    Parathyroid Hormone Intact 99 (H) 18 - 80 pg/mL   Reticulocyte count    Collection Time: 12/21/20  8:20 AM   Result Value Ref Range    % Retic 0.6 0.5 - 2.0 %    Absolute Retic 10.4 (L) 25 - 95 10e9/L   UA reflex to Microscopic ([place]; [facility])    Collection Time: 12/21/20  8:32 AM   Result Value Ref Range    Color Urine Yellow     Appearance Urine Clear     Glucose Urine Negative NEG^Negative mg/dL    Bilirubin Urine Negative NEG^Negative    Ketones Urine Negative NEG^Negative mg/dL    Specific Gravity Urine 1.016 1.003 - 1.035    Blood Urine Negative NEG^Negative    pH Urine 6.0 5.0 - 7.0 pH    Protein Albumin Urine Negative NEG^Negative mg/dL    Urobilinogen mg/dL 0.0 0.0 - 2.0 mg/dL    Nitrite Urine Negative NEG^Negative    Leukocyte Esterase Urine Negative NEG^Negative    Source Unspecified Urine    Protein  random urine with Creat Ratio    Collection Time: 12/21/20  8:32 AM   Result Value Ref Range    Protein Random Urine 0.13 g/L    Protein Total Urine g/gr  Creatinine 0.11 0 - 0.2 g/g Cr   Creatinine urine calculation only    Collection Time: 12/21/20  8:32 AM   Result Value Ref Range    Creatinine Urine 115 mg/dL   Vitamin B12    Collection Time: 12/21/20  8:32 AM   Result Value Ref Range    Vitamin B12 1,674 (H) 193 - 986 pg/mL   CBC with platelets differential    Collection Time: 12/21/20  9:45 AM   Result Value Ref Range    WBC 5.6 4.0 - 11.0 10e9/L    RBC Count 1.61 (L) 4.4 - 5.9 10e12/L    Hemoglobin 6.6 (LL) 13.3 - 17.7 g/dL    Hematocrit 19.5 (L) 40.0 - 53.0 %     (H) 78 - 100 fl    MCH 41.0 (H) 26.5 - 33.0 pg    MCHC 33.8 31.5 - 36.5 g/dL    RDW 16.1 (H) 10.0 - 15.0 %    Platelet Count 69 (L) 150 - 450 10e9/L    Diff Method Manual Differential     % Neutrophils 60.0 %    % Lymphocytes 22.0 %    % Monocytes 10.0 %    % Eosinophils 5.0 %    % Basophils 1.0 %    % Myelocytes 2.0 %    Absolute Neutrophil 3.4 1.6 - 8.3 10e9/L    Absolute Lymphocytes 1.2 0.8 - 5.3 10e9/L    Absolute Monocytes 0.6 0.0 - 1.3 10e9/L    Absolute Eosinophils 0.3 0.0 - 0.7 10e9/L    Absolute Basophils 0.1 0.0 - 0.2 10e9/L    Absolute Myelocytes 0.1 (H) 0 10e9/L    Anisocytosis Slight     RBC Morphology Consistent with reported results     Platelet Estimate       Automated count confirmed.  Platelet morphology is normal.   D dimer quantitative    Collection Time: 12/21/20  9:45 AM   Result Value Ref Range    D Dimer 0.9 (H) 0.0 - 0.50 ug/ml FEU   INR    Collection Time: 12/21/20  9:45 AM   Result Value Ref Range    INR 1.11 0.86 - 1.14   Partial thromboplastin time    Collection Time: 12/21/20  9:45 AM   Result Value Ref Range    PTT 28 22 - 37 sec   Comprehensive metabolic panel    Collection Time: 12/21/20  9:45 AM   Result Value Ref Range    Sodium 140 133 - 144 mmol/L    Potassium 4.7 3.4 - 5.3 mmol/L    Chloride 108 94 - 109 mmol/L    Carbon Dioxide 27 20 - 32 mmol/L    Anion Gap 5 3 - 14 mmol/L    Glucose 115 (H) 70 - 99 mg/dL    Urea Nitrogen 43 (H) 7 - 30 mg/dL    Creatinine  1.50 (H) 0.66 - 1.25 mg/dL    GFR Estimate 46 (L) >60 mL/min/[1.73_m2]    GFR Estimate If Black 53 (L) >60 mL/min/[1.73_m2]    Calcium 8.8 8.5 - 10.1 mg/dL    Bilirubin Total 0.6 0.2 - 1.3 mg/dL    Albumin 3.5 3.4 - 5.0 g/dL    Protein Total 6.3 (L) 6.8 - 8.8 g/dL    Alkaline Phosphatase 60 40 - 150 U/L    ALT 29 0 - 70 U/L    AST 26 0 - 45 U/L   Lactic acid whole blood    Collection Time: 12/21/20  9:45 AM   Result Value Ref Range    Lactic Acid 1.6 0.7 - 2.0 mmol/L   Troponin I    Collection Time: 12/21/20  9:45 AM   Result Value Ref Range    Troponin I ES <0.015 0.000 - 0.045 ug/L   Symptomatic Influenza A/B & SARS-CoV2 (COVID-19) Virus PCR Multiplex    Collection Time: 12/21/20  9:45 AM    Specimen: Nasopharyngeal   Result Value Ref Range    Flu A/B & SARS-COV-2 PCR Source Nasopharyngeal     SARS-CoV-2 PCR Result NEGATIVE     Influenza A PCR Negative NEG^Negative    Influenza B PCR Negative NEG^Negative    Respiratory Syncytial Virus PCR (Note)     Flu A/B & SARS-CoV-2 PCR Comment (Note)    Nt probnp inpatient    Collection Time: 12/21/20  9:45 AM   Result Value Ref Range    N-Terminal Pro BNP Inpatient 1,284 (H) 0 - 900 pg/mL   ABO/Rh type and screen    Collection Time: 12/21/20  9:45 AM   Result Value Ref Range    Units Ordered 1     ABO A     RH(D) Neg     Antibody Screen Neg     Test Valid Only At [facility]        Specimen Expires 12/24/2020     Crossmatch Red Blood Cells    Folate    Collection Time: 12/21/20  9:45 AM   Result Value Ref Range    Folate 13.6 >5.4 ng/mL   Blood component    Collection Time: 12/21/20  9:45 AM   Result Value Ref Range    Unit Number [id number]     Blood Component Type Red Blood Cells Leukocyte Reduced     Division Number 00     Status of Unit Released to care unit     Blood Product Code [id number]     Unit Status ISS    Blood component    Collection Time: 12/21/20  9:45 AM   Result Value Ref Range    Unit Number [id number]     Blood Component Type Red  Blood Cells Leukocyte Reduced     Division Number 00     Status of Unit Released to care unit     Blood Product Code [id number]     Unit Status ISS    Hemoglobin    Collection Time: 12/21/20  7:22 PM   Result Value Ref Range    Hemoglobin 9.0 (L) 13.3 - 17.7 g/dL   Basic metabolic panel    Collection Time: 12/22/20  5:53 AM   Result Value Ref Range    Sodium 139 133 - 144 mmol/L    Potassium 4.3 3.4 - 5.3 mmol/L    Chloride 107 94 - 109 mmol/L    Carbon Dioxide 27 20 - 32 mmol/L    Anion Gap 5 3 - 14 mmol/L    Glucose 100 (H) 70 - 99 mg/dL    Urea Nitrogen 37 (H) 7 - 30 mg/dL    Creatinine 1.43 (H) 0.66 - 1.25 mg/dL    GFR Estimate 49 (L) >60 mL/min/[1.73_m2]    GFR Estimate If Black 57 (L) >60 mL/min/[1.73_m2]    Calcium 9.3 8.5 - 10.1 mg/dL   CBC with platelets    Collection Time: 12/22/20  5:53 AM   Result Value Ref Range    WBC 7.3 4.0 - 11.0 10e9/L    RBC Count 2.70 (L) 4.4 - 5.9 10e12/L    Hemoglobin 10.2 (L) 13.3 - 17.7 g/dL    Hematocrit 29.5 (L) 40.0 - 53.0 %     (H) 78 - 100 fl    MCH 37.8 (H) 26.5 - 33.0 pg    MCHC 34.6 31.5 - 36.5 g/dL    RDW Dimorphic population - unable to calculate 10.0 - 15.0 %    Platelet Count 61 (L) 150 - 450 10e9/L

## 2020-12-22 NOTE — PLAN OF CARE
S-(situation): PT orders    B-(background): Pt is a 70 year old male admitted under OBS for weakness.    A-(assessment): Pt is discharged from hospital prior to PT evaluation.  Per chart review no assessment needed as pt is moving within room without concerns per RN staff.    R-(recommendations): No PT evaluation needed.  Pt is discharged.    Thank you for your referral.    Savannah Sue, PT, DPT  [facility]  [phone]

## 2020-12-22 NOTE — PROVIDER NOTIFICATION
Dr. Escalera informed that Hgb is 9.0, and that toprolol XL held d/t last /60, 102/54, 100/50.  Asked for toprolol parameters, none writen at this time.

## 2020-12-22 NOTE — TELEPHONE ENCOUNTER
Chart review and noted patient discharged from hospital today and Hydroxyurea put on hold.  Attempted call to patient to schedule next available.    Ki Lamb RN, BSN, OCN  12/22/2020, 11:32 AM

## 2020-12-22 NOTE — PLAN OF CARE
S-(situation): Patient discharged to Home via Wheel chair with alone    B-(background): Observation goals met Goals met    A-(assessment): Please review system assessment and VS.  Note that pt denies pain.  VSS    R-(recommendations): Discharge instructions reviewed with pt. Listed belongings gathered and returned to patient.sent with pt.  Patient Education resolved: Yes  New medications-Pt. Has been educated about reason of use and side effects NA  Home and hospital acquired medications returned to patient NA  Medication Bin checked and emptied on discharge Yes

## 2020-12-22 NOTE — PLAN OF CARE
Patient is calm and cooperative. Vitally stable on RA, sating mid 90's overnight. Complained of slight cramping feeling in right foot, improved with ambulation. Otherwise no pain complaints. Patient ambulated around room and stated it provided relief. Hgl after 2 RBC was 9.0. Sight edema BLE. No BM present on shift, adequate urine output. Covid test negative. Patient down 6 lbs from yesterday.     Problem: Adult Inpatient Plan of Care  Goal: Plan of Care Review  Outcome: No Change  Goal: Patient-Specific Goal (Individualized)  Outcome: No Change  Goal: Absence of Hospital-Acquired Illness or Injury  Outcome: No Change  Intervention: Identify and Manage Fall Risk  Recent Flowsheet Documentation  Taken 12/22/2020 0000 by Betzaida Foss RN  Safety Promotion/Fall Prevention:    activity supervised    fall prevention program maintained    clutter free environment maintained    lighting adjusted    nonskid shoes/slippers when out of bed    bed alarm on  Intervention: Prevent Skin Injury  Recent Flowsheet Documentation  Taken 12/22/2020 0000 by Betzaida Foss RN  Body Position:    position changed independently    turned    right  Intervention: Prevent and Manage VTE (Venous Thromboembolism) Risk  Recent Flowsheet Documentation  Taken 12/22/2020 0000 by Betzaida Foss RN  VTE Prevention/Management: ambulation promoted  Goal: Optimal Comfort and Wellbeing  Outcome: No Change  Goal: Readiness for Transition of Care  Outcome: No Change     Problem: Adult Inpatient Plan of Care  Goal: Optimal Comfort and Wellbeing  Outcome: No Change    Betzaida Foss RN on 12/22/2020 at 4:51 AM

## 2020-12-23 NOTE — PROGRESS NOTES
Subjective     Renny Gannon is a 70 year old male who presents to clinic today for the following health issues:    HPI           Hospital Follow-up Visit:    Hospital/Nursing Home/IP Rehab Facility: [facility]  Date of Admission: 12/21/20  Date of Discharge: 12/22/20  Reason(s) for Admission: anemia      Was your hospitalization related to COVID-19? No   Problems taking medications regularly:  None  Medication changes since discharge: None  Problems adhering to non-medication therapy:  None  Increased shortness of breath x 1 week with activity  Low back pain x 12/22/20- constant cramping. Using heating pad-tried tylenol which did not help    Summary of hospitalization:  [facility] discharge summary reviewed  Diagnostic Tests/Treatments reviewed.  Follow up needed: follow-up labs  Other Healthcare Providers Involved in Patient s Care:         Hematology/Oncology  Update since discharge: improved.       Post Discharge Medication Reconciliation: reviewed in EPIC.  Plan of care communicated with patient    Patient presents today for hospital follow-up. Patient was seen in the ER on 12/21/2020 and admitted for weakness and shortness of breath. It sounds noted that his hemoglobin was at 6.6. He was given 2 units of blood. His hemoglobin did increase to 10. It was felt the anemia was caused by the hydroxiuria. He was instructed to hold this medication until further follow-up with oncology/hematology. He was also instructed to hold his sprinolactone, hydrochlorothiazide and lisinopril. He does have a follow-up visit scheduled on 1/5/2020. He notes that shortness of breath is somewhat improved. Really only bothersome with activity. He denies any headaches or lightheadedness. No chest pain or shortness of breath. Low back has been a bit bothersome since hospital discharge. He suspect due to the bed. It is getting a little better. Lower legs are slightly swollen but reports this has been unchanged.       Review of Systems   Constitutional, HEENT, cardiovascular, pulmonary, GI, , musculoskeletal, neuro, skin, endocrine and psych systems are negative, except as otherwise noted.      Objective    /55   Pulse 93   Temp 98  F (36.7  C)   Resp 16   SpO2 95%   There is no height or weight on file to calculate BMI.  Physical Exam   GENERAL: healthy, alert and no distress  EYES: Eyes grossly normal to inspection, PERRL and conjunctivae and sclerae normal  HENT: ear canals and TM's normal, nose and mouth without ulcers or lesions  NECK: no adenopathy, no asymmetry, masses, or scars and thyroid normal to palpation  RESP: lungs clear to auscultation - no rales, rhonchi or wheezes  CV: regular rate and rhythm, normal S1 S2, no S3 or S4, no murmur, click or rub, no peripheral edema and peripheral pulses strong  ABDOMEN: soft, nontender, no hepatosplenomegaly, no masses and bowel sounds normal  MS: no gross musculoskeletal defects noted, no edema  SKIN: no suspicious lesions or rashes  EXT: Mild edema of lower extremities affecting feet to mid-calf area. No tenderness or discoloration present.  PSYCH: mentation appears normal, affect normal/bright    Results for orders placed or performed in visit on 12/28/20   CBC with platelets     Status: Abnormal   Result Value Ref Range    WBC 9.9 4.0 - 11.0 10e9/L    RBC Count 2.08 (L) 4.4 - 5.9 10e12/L    Hemoglobin 7.8 (L) 13.3 - 17.7 g/dL    Hematocrit 23.3 (L) 40.0 - 53.0 %     (H) 78 - 100 fl    MCH 37.5 (H) 26.5 - 33.0 pg    MCHC 33.5 31.5 - 36.5 g/dL    RDW 20.0 (H) 10.0 - 15.0 %    Platelet Count 66 (L) 150 - 450 10e9/L   Basic metabolic panel  (Ca, Cl, CO2, Creat, Gluc, K, Na, BUN)     Status: Abnormal   Result Value Ref Range    Sodium 142 133 - 144 mmol/L    Potassium 4.2 3.4 - 5.3 mmol/L    Chloride 109 94 - 109 mmol/L    Carbon Dioxide 27 20 - 32 mmol/L    Anion Gap 6 3 - 14 mmol/L    Glucose 96 70 - 99 mg/dL    Urea Nitrogen 28 7 - 30 mg/dL    Creatinine  1.39 (H) 0.66 - 1.25 mg/dL    GFR Estimate 51 (L) >60 mL/min/[1.73_m2]    GFR Estimate If Black 59 (L) >60 mL/min/[1.73_m2]    Calcium 9.2 8.5 - 10.1 mg/dL         Assessment & Plan     Hospital discharge follow-up  Patient overall feels somewhat better since hospital discharge. Feels symptoms are slowly improving. He denies any sign of active bleeding including no bloody or black stools. He has been holding medications as directed. Blood pressure is still a bit low today so will have patient continue to hold Spironolactone, hydrochlorothiazide and lisinopril. He will also continue to hold his hydroxyuria until he can be further evaluated by hematology/oncology. Will recheck CBC and basic panel today. Patient notes he does not have a cell phone at this time but will follow-up on labs at his visit 1/5/2020. Back pain has been resolving. Encouraged continuation of ice/heat and tylenol as needed. He is aware to closely monitor symptoms and seek care in the ER if changing or worsening.     Myeloproliferative disorder (H)    Essential hypertension  - Basic metabolic panel  (Ca, Cl, CO2, Creat, Gluc, K, Na, BUN)    Elevated serum creatinine  - Basic metabolic panel  (Ca, Cl, CO2, Creat, Gluc, K, Na, BUN)    Stage 3 chronic kidney disease, unspecified whether stage 3a or 3b CKD  - Basic metabolic panel  (Ca, Cl, CO2, Creat, Gluc, K, Na, BUN)    Acute anemia  - CBC with platelets  - Basic metabolic panel  (Ca, Cl, CO2, Creat, Gluc, K, Na, BUN)      The patient indicates understanding of these issues and agrees with the plan.    JOSE Cali [facility]

## 2020-12-23 NOTE — TELEPHONE ENCOUNTER
Able to reach patient today who agrees with seeing Dr. Cristina sooner.  Next available will be 1/7/21.  No other questions or concerns at this time.    Ki Lamb RN, BSN, OCN  12/23/2020, 11:29 AM

## 2020-12-24 LAB
DEPRECATED CALCIDIOL+CALCIFEROL SERPL-MC: <23 UG/L (ref 20–75)
VITAMIN D2 SERPL-MCNC: <5 UG/L
VITAMIN D3 SERPL-MCNC: 18 UG/L

## 2020-12-25 NOTE — ANESTHESIA POSTPROCEDURE EVALUATION
Patient: Renny Gannon    Procedure(s):  CORONARY ARTERY BYPASS GRAFTING X 3 - LIMA TO LAD, SV TO OM  AND PDA; ENDOVEIN HARVEST OF BILATERAL LEGS; ON PUMP WITH SANDRA    Diagnosis:CAD (coronary artery disease) [I25.10]  Diagnosis Additional Information: No value filed.    Anesthesia Type:  General, ETT    Note:  Anesthesia Post Evaluation    Patient location during evaluation: ICU  Patient participation: Unable to evaluate secondary to administered sedation  Pain management: adequate  Airway patency: patent  Cardiovascular status: hemodynamically stable  Respiratory status: ventilator and ETT  Hydration status: acceptable  PONV: none     Anesthetic complications: None          Last vitals:  Vitals:    01/31/20 1350 01/31/20 1355 01/31/20 1400   BP: (!) 86/57 (!) 88/67 (!) 78/53   Pulse: 115 117    Resp: 19 8 10   Temp:      SpO2: 100% 100% 100%         Electronically Signed By: Boo Weston MD  January 31, 2020  2:05 PM  
no diabetes and no thyroid trouble.

## 2020-12-27 LAB
ABO + RH BLD: NORMAL
ABO + RH BLD: NORMAL
BLD GP AB SCN SERPL QL: NORMAL
BLD PROD TYP BPU: NORMAL
BLOOD BANK CMNT PATIENT-IMP: NORMAL
NUM BPU REQUESTED: 2
SPECIMEN EXP DATE BLD: NORMAL

## 2020-12-28 ENCOUNTER — OFFICE VISIT (OUTPATIENT)
Dept: FAMILY MEDICINE | Facility: CLINIC | Age: 70
End: 2020-12-28
Payer: COMMERCIAL

## 2020-12-28 VITALS
SYSTOLIC BLOOD PRESSURE: 110 MMHG | TEMPERATURE: 98 F | DIASTOLIC BLOOD PRESSURE: 55 MMHG | RESPIRATION RATE: 16 BRPM | OXYGEN SATURATION: 95 % | HEART RATE: 93 BPM

## 2020-12-28 DIAGNOSIS — Z09 HOSPITAL DISCHARGE FOLLOW-UP: Primary | ICD-10-CM

## 2020-12-28 DIAGNOSIS — N18.30 STAGE 3 CHRONIC KIDNEY DISEASE, UNSPECIFIED WHETHER STAGE 3A OR 3B CKD (H): ICD-10-CM

## 2020-12-28 DIAGNOSIS — I10 ESSENTIAL HYPERTENSION: ICD-10-CM

## 2020-12-28 DIAGNOSIS — D47.1 MYELOPROLIFERATIVE DISORDER (H): ICD-10-CM

## 2020-12-28 DIAGNOSIS — D64.9 ACUTE ANEMIA: ICD-10-CM

## 2020-12-28 DIAGNOSIS — R79.89 ELEVATED SERUM CREATININE: ICD-10-CM

## 2020-12-28 LAB
ANION GAP SERPL CALCULATED.3IONS-SCNC: 6 MMOL/L (ref 3–14)
BUN SERPL-MCNC: 28 MG/DL (ref 7–30)
CALCIUM SERPL-MCNC: 9.2 MG/DL (ref 8.5–10.1)
CHLORIDE SERPL-SCNC: 109 MMOL/L (ref 94–109)
CO2 SERPL-SCNC: 27 MMOL/L (ref 20–32)
CREAT SERPL-MCNC: 1.39 MG/DL (ref 0.66–1.25)
ERYTHROCYTE [DISTWIDTH] IN BLOOD BY AUTOMATED COUNT: 20 % (ref 10–15)
GFR SERPL CREATININE-BSD FRML MDRD: 51 ML/MIN/{1.73_M2}
GLUCOSE SERPL-MCNC: 96 MG/DL (ref 70–99)
HCT VFR BLD AUTO: 23.3 % (ref 40–53)
HGB BLD-MCNC: 7.8 G/DL (ref 13.3–17.7)
MCH RBC QN AUTO: 37.5 PG (ref 26.5–33)
MCHC RBC AUTO-ENTMCNC: 33.5 G/DL (ref 31.5–36.5)
MCV RBC AUTO: 112 FL (ref 78–100)
PLATELET # BLD AUTO: 66 10E9/L (ref 150–450)
POTASSIUM SERPL-SCNC: 4.2 MMOL/L (ref 3.4–5.3)
RBC # BLD AUTO: 2.08 10E12/L (ref 4.4–5.9)
SODIUM SERPL-SCNC: 142 MMOL/L (ref 133–144)
WBC # BLD AUTO: 9.9 10E9/L (ref 4–11)

## 2020-12-28 PROCEDURE — 85027 COMPLETE CBC AUTOMATED: CPT | Performed by: PHYSICIAN ASSISTANT

## 2020-12-28 PROCEDURE — 99214 OFFICE O/P EST MOD 30 MIN: CPT | Performed by: PHYSICIAN ASSISTANT

## 2020-12-28 PROCEDURE — 36415 COLL VENOUS BLD VENIPUNCTURE: CPT | Performed by: PHYSICIAN ASSISTANT

## 2020-12-28 PROCEDURE — 80048 BASIC METABOLIC PNL TOTAL CA: CPT | Performed by: PHYSICIAN ASSISTANT

## 2020-12-28 NOTE — RESULT ENCOUNTER NOTE
Noted. Per patient, does not have phone. Will review with oncology next week. He is aware to seek care in the ER with any new/worsening symptoms.    Richa Mckeon PA-C

## 2021-01-01 ENCOUNTER — PATIENT OUTREACH (OUTPATIENT)
Dept: NURSING | Facility: CLINIC | Age: 71
End: 2021-01-01
Payer: COMMERCIAL

## 2021-01-01 ENCOUNTER — PATIENT OUTREACH (OUTPATIENT)
Dept: ONCOLOGY | Facility: CLINIC | Age: 71
End: 2021-01-01

## 2021-01-01 ENCOUNTER — MEDICAL CORRESPONDENCE (OUTPATIENT)
Dept: HEALTH INFORMATION MANAGEMENT | Facility: CLINIC | Age: 71
End: 2021-01-01

## 2021-01-01 ENCOUNTER — APPOINTMENT (OUTPATIENT)
Dept: GENERAL RADIOLOGY | Facility: CLINIC | Age: 71
DRG: 418 | End: 2021-01-01
Attending: NURSE PRACTITIONER
Payer: COMMERCIAL

## 2021-01-01 ENCOUNTER — DOCUMENTATION ONLY (OUTPATIENT)
Dept: ONCOLOGY | Facility: CLINIC | Age: 71
End: 2021-01-01

## 2021-01-01 ENCOUNTER — TELEPHONE (OUTPATIENT)
Dept: FAMILY MEDICINE | Facility: CLINIC | Age: 71
End: 2021-01-01

## 2021-01-01 ENCOUNTER — LAB (OUTPATIENT)
Dept: INFUSION THERAPY | Facility: CLINIC | Age: 71
End: 2021-01-01
Attending: STUDENT IN AN ORGANIZED HEALTH CARE EDUCATION/TRAINING PROGRAM
Payer: COMMERCIAL

## 2021-01-01 ENCOUNTER — INFUSION THERAPY VISIT (OUTPATIENT)
Dept: INFUSION THERAPY | Facility: CLINIC | Age: 71
End: 2021-01-01
Attending: INTERNAL MEDICINE
Payer: COMMERCIAL

## 2021-01-01 ENCOUNTER — TELEPHONE (OUTPATIENT)
Dept: FAMILY MEDICINE | Facility: CLINIC | Age: 71
End: 2021-01-01
Payer: COMMERCIAL

## 2021-01-01 ENCOUNTER — LAB (OUTPATIENT)
Dept: LAB | Facility: CLINIC | Age: 71
End: 2021-01-01
Payer: COMMERCIAL

## 2021-01-01 ENCOUNTER — TELEPHONE (OUTPATIENT)
Dept: SURGERY | Facility: CLINIC | Age: 71
End: 2021-01-01

## 2021-01-01 ENCOUNTER — ONCOLOGY VISIT (OUTPATIENT)
Dept: ONCOLOGY | Facility: CLINIC | Age: 71
End: 2021-01-01
Payer: COMMERCIAL

## 2021-01-01 ENCOUNTER — TRANSFERRED RECORDS (OUTPATIENT)
Dept: HEALTH INFORMATION MANAGEMENT | Facility: CLINIC | Age: 71
End: 2021-01-01

## 2021-01-01 ENCOUNTER — TELEPHONE (OUTPATIENT)
Dept: ONCOLOGY | Facility: CLINIC | Age: 71
End: 2021-01-01

## 2021-01-01 ENCOUNTER — APPOINTMENT (OUTPATIENT)
Dept: GENERAL RADIOLOGY | Facility: CLINIC | Age: 71
End: 2021-01-01
Attending: EMERGENCY MEDICINE
Payer: COMMERCIAL

## 2021-01-01 ENCOUNTER — OFFICE VISIT (OUTPATIENT)
Dept: CARDIOLOGY | Facility: CLINIC | Age: 71
End: 2021-01-01
Attending: NURSE PRACTITIONER
Payer: COMMERCIAL

## 2021-01-01 ENCOUNTER — APPOINTMENT (OUTPATIENT)
Dept: LAB | Facility: CLINIC | Age: 71
End: 2021-01-01
Payer: COMMERCIAL

## 2021-01-01 ENCOUNTER — ANESTHESIA (OUTPATIENT)
Dept: SURGERY | Facility: CLINIC | Age: 71
DRG: 418 | End: 2021-01-01
Payer: COMMERCIAL

## 2021-01-01 ENCOUNTER — APPOINTMENT (OUTPATIENT)
Dept: PHYSICAL THERAPY | Facility: CLINIC | Age: 71
DRG: 834 | End: 2021-01-01
Attending: STUDENT IN AN ORGANIZED HEALTH CARE EDUCATION/TRAINING PROGRAM
Payer: COMMERCIAL

## 2021-01-01 ENCOUNTER — NURSE TRIAGE (OUTPATIENT)
Dept: NURSING | Facility: CLINIC | Age: 71
End: 2021-01-01

## 2021-01-01 ENCOUNTER — ANESTHESIA EVENT (OUTPATIENT)
Dept: SURGERY | Facility: CLINIC | Age: 71
End: 2021-01-01
Payer: COMMERCIAL

## 2021-01-01 ENCOUNTER — HOSPITAL ENCOUNTER (INPATIENT)
Facility: CLINIC | Age: 71
LOS: 15 days | Discharge: HOME HEALTH | DRG: 834 | End: 2021-06-18
Attending: STUDENT IN AN ORGANIZED HEALTH CARE EDUCATION/TRAINING PROGRAM | Admitting: STUDENT IN AN ORGANIZED HEALTH CARE EDUCATION/TRAINING PROGRAM
Payer: COMMERCIAL

## 2021-01-01 ENCOUNTER — APPOINTMENT (OUTPATIENT)
Dept: ULTRASOUND IMAGING | Facility: CLINIC | Age: 71
DRG: 834 | End: 2021-01-01
Attending: PHYSICIAN ASSISTANT
Payer: COMMERCIAL

## 2021-01-01 ENCOUNTER — DOCUMENTATION ONLY (OUTPATIENT)
Dept: PHARMACY | Facility: CLINIC | Age: 71
End: 2021-01-01

## 2021-01-01 ENCOUNTER — APPOINTMENT (OUTPATIENT)
Dept: LAB | Facility: CLINIC | Age: 71
End: 2021-01-01
Attending: STUDENT IN AN ORGANIZED HEALTH CARE EDUCATION/TRAINING PROGRAM
Payer: COMMERCIAL

## 2021-01-01 ENCOUNTER — LAB (OUTPATIENT)
Dept: INFUSION THERAPY | Facility: CLINIC | Age: 71
End: 2021-01-01

## 2021-01-01 ENCOUNTER — OFFICE VISIT (OUTPATIENT)
Dept: ORTHOPEDICS | Facility: CLINIC | Age: 71
End: 2021-01-01
Payer: COMMERCIAL

## 2021-01-01 ENCOUNTER — VIRTUAL VISIT (OUTPATIENT)
Dept: FAMILY MEDICINE | Facility: CLINIC | Age: 71
End: 2021-01-01
Payer: COMMERCIAL

## 2021-01-01 ENCOUNTER — VIRTUAL VISIT (OUTPATIENT)
Dept: PHARMACY | Facility: CLINIC | Age: 71
End: 2021-01-01
Attending: NURSE PRACTITIONER
Payer: COMMERCIAL

## 2021-01-01 ENCOUNTER — OFFICE VISIT (OUTPATIENT)
Dept: FAMILY MEDICINE | Facility: CLINIC | Age: 71
End: 2021-01-01
Payer: COMMERCIAL

## 2021-01-01 ENCOUNTER — PATIENT OUTREACH (OUTPATIENT)
Dept: CARE COORDINATION | Facility: CLINIC | Age: 71
End: 2021-01-01

## 2021-01-01 ENCOUNTER — ANESTHESIA (OUTPATIENT)
Dept: SURGERY | Facility: CLINIC | Age: 71
End: 2021-01-01
Payer: COMMERCIAL

## 2021-01-01 ENCOUNTER — PRE VISIT (OUTPATIENT)
Dept: ONCOLOGY | Facility: CLINIC | Age: 71
End: 2021-01-01

## 2021-01-01 ENCOUNTER — VIRTUAL VISIT (OUTPATIENT)
Dept: ONCOLOGY | Facility: CLINIC | Age: 71
End: 2021-01-01
Payer: COMMERCIAL

## 2021-01-01 ENCOUNTER — OFFICE VISIT (OUTPATIENT)
Dept: TRANSPLANT | Facility: CLINIC | Age: 71
End: 2021-01-01
Attending: INTERNAL MEDICINE
Payer: COMMERCIAL

## 2021-01-01 ENCOUNTER — VIRTUAL VISIT (OUTPATIENT)
Dept: ONCOLOGY | Facility: CLINIC | Age: 71
End: 2021-01-01
Attending: STUDENT IN AN ORGANIZED HEALTH CARE EDUCATION/TRAINING PROGRAM
Payer: COMMERCIAL

## 2021-01-01 ENCOUNTER — APPOINTMENT (OUTPATIENT)
Dept: PHYSICAL THERAPY | Facility: CLINIC | Age: 71
DRG: 834 | End: 2021-01-01
Attending: PHYSICIAN ASSISTANT
Payer: COMMERCIAL

## 2021-01-01 ENCOUNTER — TELEPHONE (OUTPATIENT)
Dept: PHARMACY | Facility: CLINIC | Age: 71
End: 2021-01-01

## 2021-01-01 ENCOUNTER — APPOINTMENT (OUTPATIENT)
Dept: GENERAL RADIOLOGY | Facility: CLINIC | Age: 71
End: 2021-01-01
Attending: NURSE PRACTITIONER
Payer: COMMERCIAL

## 2021-01-01 ENCOUNTER — APPOINTMENT (OUTPATIENT)
Dept: GENERAL RADIOLOGY | Facility: CLINIC | Age: 71
DRG: 834 | End: 2021-01-01
Attending: PHYSICIAN ASSISTANT
Payer: COMMERCIAL

## 2021-01-01 ENCOUNTER — LAB (OUTPATIENT)
Dept: INFUSION THERAPY | Facility: CLINIC | Age: 71
End: 2021-01-01
Payer: COMMERCIAL

## 2021-01-01 ENCOUNTER — APPOINTMENT (OUTPATIENT)
Dept: CT IMAGING | Facility: CLINIC | Age: 71
DRG: 418 | End: 2021-01-01
Attending: FAMILY MEDICINE
Payer: COMMERCIAL

## 2021-01-01 ENCOUNTER — HOSPITAL ENCOUNTER (EMERGENCY)
Facility: CLINIC | Age: 71
Discharge: HOME | End: 2021-10-21
Attending: EMERGENCY MEDICINE | Admitting: EMERGENCY MEDICINE
Payer: COMMERCIAL

## 2021-01-01 ENCOUNTER — HOSPITAL ENCOUNTER (OUTPATIENT)
Facility: CLINIC | Age: 71
Discharge: HOME | End: 2021-10-15
Attending: SURGERY | Admitting: SURGERY
Payer: COMMERCIAL

## 2021-01-01 ENCOUNTER — HOSPITAL ENCOUNTER (EMERGENCY)
Facility: CLINIC | Age: 71
Discharge: ANOTHER HOSPITAL | End: 2021-06-02
Attending: EMERGENCY MEDICINE | Admitting: EMERGENCY MEDICINE
Payer: COMMERCIAL

## 2021-01-01 ENCOUNTER — APPOINTMENT (OUTPATIENT)
Dept: GENERAL RADIOLOGY | Facility: CLINIC | Age: 71
End: 2021-01-01
Attending: FAMILY MEDICINE
Payer: COMMERCIAL

## 2021-01-01 ENCOUNTER — TELEPHONE (OUTPATIENT)
Dept: TRANSPLANT | Facility: CLINIC | Age: 71
End: 2021-01-01

## 2021-01-01 ENCOUNTER — PATIENT OUTREACH (OUTPATIENT)
Dept: FAMILY MEDICINE | Facility: CLINIC | Age: 71
End: 2021-01-01
Payer: COMMERCIAL

## 2021-01-01 ENCOUNTER — HOSPITAL ENCOUNTER (OUTPATIENT)
Dept: GENERAL RADIOLOGY | Facility: CLINIC | Age: 71
End: 2021-10-15
Attending: SURGERY
Payer: COMMERCIAL

## 2021-01-01 ENCOUNTER — APPOINTMENT (OUTPATIENT)
Dept: GENERAL RADIOLOGY | Facility: CLINIC | Age: 71
End: 2021-01-01
Attending: SURGERY
Payer: COMMERCIAL

## 2021-01-01 ENCOUNTER — OFFICE VISIT (OUTPATIENT)
Dept: SURGERY | Facility: CLINIC | Age: 71
End: 2021-01-01
Payer: COMMERCIAL

## 2021-01-01 ENCOUNTER — HOSPITAL ENCOUNTER (EMERGENCY)
Facility: CLINIC | Age: 71
Discharge: HOME | End: 2021-11-06
Attending: FAMILY MEDICINE | Admitting: FAMILY MEDICINE
Payer: COMMERCIAL

## 2021-01-01 ENCOUNTER — APPOINTMENT (OUTPATIENT)
Dept: PHYSICAL THERAPY | Facility: CLINIC | Age: 71
DRG: 280 | End: 2021-01-01
Payer: COMMERCIAL

## 2021-01-01 ENCOUNTER — OFFICE VISIT (OUTPATIENT)
Dept: ONCOLOGY | Facility: CLINIC | Age: 71
End: 2021-01-01
Attending: INTERNAL MEDICINE
Payer: COMMERCIAL

## 2021-01-01 ENCOUNTER — APPOINTMENT (OUTPATIENT)
Dept: OCCUPATIONAL THERAPY | Facility: CLINIC | Age: 71
DRG: 809 | End: 2021-01-01
Payer: COMMERCIAL

## 2021-01-01 ENCOUNTER — APPOINTMENT (OUTPATIENT)
Dept: OCCUPATIONAL THERAPY | Facility: CLINIC | Age: 71
DRG: 834 | End: 2021-01-01
Attending: PHYSICIAN ASSISTANT
Payer: COMMERCIAL

## 2021-01-01 ENCOUNTER — OFFICE VISIT (OUTPATIENT)
Dept: CARDIOLOGY | Facility: CLINIC | Age: 71
End: 2021-01-01
Payer: COMMERCIAL

## 2021-01-01 ENCOUNTER — ANESTHESIA EVENT (OUTPATIENT)
Dept: SURGERY | Facility: CLINIC | Age: 71
DRG: 418 | End: 2021-01-01
Payer: COMMERCIAL

## 2021-01-01 ENCOUNTER — APPOINTMENT (OUTPATIENT)
Dept: PHYSICAL THERAPY | Facility: CLINIC | Age: 71
DRG: 835 | End: 2021-01-01
Attending: INTERNAL MEDICINE
Payer: COMMERCIAL

## 2021-01-01 ENCOUNTER — APPOINTMENT (OUTPATIENT)
Dept: ULTRASOUND IMAGING | Facility: CLINIC | Age: 71
DRG: 418 | End: 2021-01-01
Attending: FAMILY MEDICINE
Payer: COMMERCIAL

## 2021-01-01 ENCOUNTER — HOSPITAL ENCOUNTER (INPATIENT)
Facility: CLINIC | Age: 71
LOS: 1 days | Discharge: ANOTHER HOSPITAL | DRG: 835 | End: 2021-06-03
Attending: STUDENT IN AN ORGANIZED HEALTH CARE EDUCATION/TRAINING PROGRAM | Admitting: INTERNAL MEDICINE
Payer: COMMERCIAL

## 2021-01-01 ENCOUNTER — APPOINTMENT (OUTPATIENT)
Dept: GENERAL RADIOLOGY | Facility: CLINIC | Age: 71
DRG: 834 | End: 2021-01-01
Attending: STUDENT IN AN ORGANIZED HEALTH CARE EDUCATION/TRAINING PROGRAM
Payer: COMMERCIAL

## 2021-01-01 ENCOUNTER — DOCUMENTATION ONLY (OUTPATIENT)
Dept: CARE COORDINATION | Facility: CLINIC | Age: 71
End: 2021-01-01

## 2021-01-01 ENCOUNTER — APPOINTMENT (OUTPATIENT)
Dept: CT IMAGING | Facility: CLINIC | Age: 71
End: 2021-01-01
Attending: EMERGENCY MEDICINE
Payer: COMMERCIAL

## 2021-01-01 ENCOUNTER — HOSPITAL ENCOUNTER (INPATIENT)
Facility: CLINIC | Age: 71
LOS: 1 days | Discharge: HOME | DRG: 809 | End: 2021-07-21
Attending: FAMILY MEDICINE | Admitting: INTERNAL MEDICINE
Payer: COMMERCIAL

## 2021-01-01 ENCOUNTER — APPOINTMENT (OUTPATIENT)
Dept: ULTRASOUND IMAGING | Facility: CLINIC | Age: 71
DRG: 809 | End: 2021-01-01
Attending: FAMILY MEDICINE
Payer: COMMERCIAL

## 2021-01-01 ENCOUNTER — HOSPITAL ENCOUNTER (OUTPATIENT)
Facility: CLINIC | Age: 71
Setting detail: OBSERVATION
Discharge: HOME | End: 2021-03-29
Attending: NURSE PRACTITIONER | Admitting: INTERNAL MEDICINE
Payer: COMMERCIAL

## 2021-01-01 ENCOUNTER — HOSPITAL ENCOUNTER (EMERGENCY)
Facility: CLINIC | Age: 71
Discharge: HOME | End: 2021-06-19
Attending: EMERGENCY MEDICINE | Admitting: EMERGENCY MEDICINE
Payer: COMMERCIAL

## 2021-01-01 ENCOUNTER — TELEPHONE (OUTPATIENT)
Dept: NUTRITION | Facility: CLINIC | Age: 71
End: 2021-01-01

## 2021-01-01 ENCOUNTER — ANCILLARY PROCEDURE (OUTPATIENT)
Dept: GENERAL RADIOLOGY | Facility: CLINIC | Age: 71
End: 2021-01-01
Attending: ORTHOPAEDIC SURGERY
Payer: COMMERCIAL

## 2021-01-01 ENCOUNTER — APPOINTMENT (OUTPATIENT)
Dept: INTERVENTIONAL RADIOLOGY/VASCULAR | Facility: CLINIC | Age: 71
DRG: 834 | End: 2021-01-01
Attending: NURSE PRACTITIONER
Payer: COMMERCIAL

## 2021-01-01 ENCOUNTER — TELEPHONE (OUTPATIENT)
Dept: CARDIOLOGY | Facility: CLINIC | Age: 71
End: 2021-01-01

## 2021-01-01 ENCOUNTER — OFFICE VISIT (OUTPATIENT)
Dept: ONCOLOGY | Facility: CLINIC | Age: 71
End: 2021-01-01
Attending: PHYSICIAN ASSISTANT
Payer: COMMERCIAL

## 2021-01-01 ENCOUNTER — PATIENT OUTREACH (OUTPATIENT)
Dept: FAMILY MEDICINE | Facility: CLINIC | Age: 71
End: 2021-01-01
Attending: NURSE PRACTITIONER
Payer: COMMERCIAL

## 2021-01-01 ENCOUNTER — HOSPITAL ENCOUNTER (INPATIENT)
Facility: CLINIC | Age: 71
LOS: 3 days | Discharge: HOME HEALTH | DRG: 418 | End: 2021-08-18
Attending: FAMILY MEDICINE | Admitting: INTERNAL MEDICINE
Payer: COMMERCIAL

## 2021-01-01 ENCOUNTER — INFUSION THERAPY VISIT (OUTPATIENT)
Dept: INFUSION THERAPY | Facility: CLINIC | Age: 71
End: 2021-01-01
Attending: FAMILY MEDICINE
Payer: COMMERCIAL

## 2021-01-01 ENCOUNTER — APPOINTMENT (OUTPATIENT)
Dept: OCCUPATIONAL THERAPY | Facility: CLINIC | Age: 71
DRG: 834 | End: 2021-01-01
Attending: STUDENT IN AN ORGANIZED HEALTH CARE EDUCATION/TRAINING PROGRAM
Payer: COMMERCIAL

## 2021-01-01 ENCOUNTER — APPOINTMENT (OUTPATIENT)
Dept: CARDIOLOGY | Facility: CLINIC | Age: 71
DRG: 809 | End: 2021-01-01
Attending: INTERNAL MEDICINE
Payer: COMMERCIAL

## 2021-01-01 ENCOUNTER — APPOINTMENT (OUTPATIENT)
Dept: PHYSICAL THERAPY | Facility: CLINIC | Age: 71
End: 2021-01-01
Attending: FAMILY MEDICINE
Payer: COMMERCIAL

## 2021-01-01 ENCOUNTER — OFFICE VISIT (OUTPATIENT)
Dept: SURGERY | Facility: CLINIC | Age: 71
End: 2021-01-01
Attending: INTERNAL MEDICINE
Payer: COMMERCIAL

## 2021-01-01 ENCOUNTER — INFUSION THERAPY VISIT (OUTPATIENT)
Dept: INFUSION THERAPY | Facility: CLINIC | Age: 71
DRG: 418 | End: 2021-01-01
Attending: STUDENT IN AN ORGANIZED HEALTH CARE EDUCATION/TRAINING PROGRAM
Payer: COMMERCIAL

## 2021-01-01 ENCOUNTER — DOCUMENTATION ONLY (OUTPATIENT)
Dept: ORTHOPEDICS | Facility: CLINIC | Age: 71
End: 2021-01-01

## 2021-01-01 ENCOUNTER — HOSPITAL ENCOUNTER (EMERGENCY)
Facility: CLINIC | Age: 71
Discharge: HOME | End: 2021-05-30
Attending: FAMILY MEDICINE | Admitting: FAMILY MEDICINE
Payer: COMMERCIAL

## 2021-01-01 ENCOUNTER — LAB (OUTPATIENT)
Dept: LAB | Facility: CLINIC | Age: 71
End: 2021-01-01
Attending: PHYSICIAN ASSISTANT
Payer: COMMERCIAL

## 2021-01-01 ENCOUNTER — INFUSION THERAPY VISIT (OUTPATIENT)
Dept: TRANSPLANT | Facility: CLINIC | Age: 71
End: 2021-01-01
Attending: STUDENT IN AN ORGANIZED HEALTH CARE EDUCATION/TRAINING PROGRAM
Payer: COMMERCIAL

## 2021-01-01 ENCOUNTER — LAB (OUTPATIENT)
Dept: INFUSION THERAPY | Facility: CLINIC | Age: 71
End: 2021-01-01
Attending: STUDENT IN AN ORGANIZED HEALTH CARE EDUCATION/TRAINING PROGRAM

## 2021-01-01 ENCOUNTER — APPOINTMENT (OUTPATIENT)
Dept: CARDIOLOGY | Facility: CLINIC | Age: 71
DRG: 834 | End: 2021-01-01
Attending: PHYSICIAN ASSISTANT
Payer: COMMERCIAL

## 2021-01-01 ENCOUNTER — APPOINTMENT (OUTPATIENT)
Dept: CT IMAGING | Facility: CLINIC | Age: 71
DRG: 809 | End: 2021-01-01
Attending: FAMILY MEDICINE
Payer: COMMERCIAL

## 2021-01-01 VITALS
SYSTOLIC BLOOD PRESSURE: 117 MMHG | HEART RATE: 80 BPM | OXYGEN SATURATION: 98 % | DIASTOLIC BLOOD PRESSURE: 49 MMHG | TEMPERATURE: 97.7 F | RESPIRATION RATE: 40 BRPM

## 2021-01-01 VITALS
RESPIRATION RATE: 20 BRPM | WEIGHT: 154.44 LBS | DIASTOLIC BLOOD PRESSURE: 58 MMHG | DIASTOLIC BLOOD PRESSURE: 58 MMHG | SYSTOLIC BLOOD PRESSURE: 116 MMHG | TEMPERATURE: 97.3 F | TEMPERATURE: 98.4 F | OXYGEN SATURATION: 96 % | HEART RATE: 87 BPM | HEIGHT: 68 IN | RESPIRATION RATE: 12 BRPM | HEART RATE: 87 BPM | OXYGEN SATURATION: 96 % | SYSTOLIC BLOOD PRESSURE: 115 MMHG | BODY MASS INDEX: 23.41 KG/M2

## 2021-01-01 VITALS
RESPIRATION RATE: 18 BRPM | HEART RATE: 80 BPM | SYSTOLIC BLOOD PRESSURE: 109 MMHG | TEMPERATURE: 97.2 F | DIASTOLIC BLOOD PRESSURE: 51 MMHG | OXYGEN SATURATION: 99 %

## 2021-01-01 VITALS
SYSTOLIC BLOOD PRESSURE: 107 MMHG | RESPIRATION RATE: 20 BRPM | TEMPERATURE: 97.6 F | HEART RATE: 87 BPM | OXYGEN SATURATION: 97 % | DIASTOLIC BLOOD PRESSURE: 52 MMHG

## 2021-01-01 VITALS
SYSTOLIC BLOOD PRESSURE: 116 MMHG | BODY MASS INDEX: 27.95 KG/M2 | HEART RATE: 87 BPM | TEMPERATURE: 96.2 F | RESPIRATION RATE: 16 BRPM | WEIGHT: 183.8 LBS | DIASTOLIC BLOOD PRESSURE: 63 MMHG | OXYGEN SATURATION: 98 %

## 2021-01-01 VITALS
WEIGHT: 159.2 LBS | BODY MASS INDEX: 24.21 KG/M2 | SYSTOLIC BLOOD PRESSURE: 116 MMHG | HEART RATE: 83 BPM | RESPIRATION RATE: 20 BRPM | OXYGEN SATURATION: 97 % | TEMPERATURE: 97.6 F | DIASTOLIC BLOOD PRESSURE: 75 MMHG

## 2021-01-01 VITALS
RESPIRATION RATE: 30 BRPM | OXYGEN SATURATION: 97 % | HEART RATE: 92 BPM | DIASTOLIC BLOOD PRESSURE: 47 MMHG | TEMPERATURE: 98.4 F | SYSTOLIC BLOOD PRESSURE: 115 MMHG | TEMPERATURE: 97.5 F | OXYGEN SATURATION: 98 % | SYSTOLIC BLOOD PRESSURE: 104 MMHG | DIASTOLIC BLOOD PRESSURE: 57 MMHG | RESPIRATION RATE: 22 BRPM | HEART RATE: 79 BPM

## 2021-01-01 VITALS
RESPIRATION RATE: 28 BRPM | HEART RATE: 78 BPM | OXYGEN SATURATION: 99 % | SYSTOLIC BLOOD PRESSURE: 107 MMHG | WEIGHT: 157.9 LBS | TEMPERATURE: 97.9 F | DIASTOLIC BLOOD PRESSURE: 47 MMHG | BODY MASS INDEX: 24.01 KG/M2

## 2021-01-01 VITALS
WEIGHT: 159 LBS | SYSTOLIC BLOOD PRESSURE: 128 MMHG | OXYGEN SATURATION: 97 % | TEMPERATURE: 98.4 F | HEART RATE: 114 BPM | DIASTOLIC BLOOD PRESSURE: 72 MMHG | BODY MASS INDEX: 24.18 KG/M2

## 2021-01-01 VITALS
WEIGHT: 164.7 LBS | SYSTOLIC BLOOD PRESSURE: 103 MMHG | OXYGEN SATURATION: 98 % | RESPIRATION RATE: 20 BRPM | HEART RATE: 87 BPM | TEMPERATURE: 96.3 F | DIASTOLIC BLOOD PRESSURE: 58 MMHG | TEMPERATURE: 98.1 F | BODY MASS INDEX: 25.04 KG/M2 | RESPIRATION RATE: 22 BRPM | HEART RATE: 95 BPM | DIASTOLIC BLOOD PRESSURE: 56 MMHG | SYSTOLIC BLOOD PRESSURE: 105 MMHG

## 2021-01-01 VITALS
WEIGHT: 154.38 LBS | TEMPERATURE: 98.2 F | HEART RATE: 86 BPM | DIASTOLIC BLOOD PRESSURE: 64 MMHG | BODY MASS INDEX: 24.8 KG/M2 | DIASTOLIC BLOOD PRESSURE: 61 MMHG | SYSTOLIC BLOOD PRESSURE: 113 MMHG | RESPIRATION RATE: 22 BRPM | OXYGEN SATURATION: 99 % | WEIGHT: 163.6 LBS | TEMPERATURE: 97.4 F | BODY MASS INDEX: 23.47 KG/M2 | OXYGEN SATURATION: 97 % | HEIGHT: 68 IN | SYSTOLIC BLOOD PRESSURE: 112 MMHG | HEART RATE: 67 BPM | RESPIRATION RATE: 18 BRPM

## 2021-01-01 VITALS
OXYGEN SATURATION: 99 % | HEART RATE: 82 BPM | SYSTOLIC BLOOD PRESSURE: 112 MMHG | DIASTOLIC BLOOD PRESSURE: 50 MMHG | RESPIRATION RATE: 28 BRPM | TEMPERATURE: 97.6 F

## 2021-01-01 VITALS
BODY MASS INDEX: 23.7 KG/M2 | TEMPERATURE: 97.3 F | RESPIRATION RATE: 22 BRPM | SYSTOLIC BLOOD PRESSURE: 125 MMHG | OXYGEN SATURATION: 99 % | WEIGHT: 155.9 LBS | DIASTOLIC BLOOD PRESSURE: 58 MMHG | HEART RATE: 72 BPM

## 2021-01-01 VITALS
HEART RATE: 97 BPM | TEMPERATURE: 97.3 F | RESPIRATION RATE: 16 BRPM | DIASTOLIC BLOOD PRESSURE: 62 MMHG | SYSTOLIC BLOOD PRESSURE: 113 MMHG | HEART RATE: 81 BPM | DIASTOLIC BLOOD PRESSURE: 53 MMHG | SYSTOLIC BLOOD PRESSURE: 131 MMHG | RESPIRATION RATE: 20 BRPM | TEMPERATURE: 70 F | WEIGHT: 178.7 LBS | BODY MASS INDEX: 27.17 KG/M2 | OXYGEN SATURATION: 100 % | OXYGEN SATURATION: 97 %

## 2021-01-01 VITALS
BODY MASS INDEX: 24.83 KG/M2 | DIASTOLIC BLOOD PRESSURE: 51 MMHG | SYSTOLIC BLOOD PRESSURE: 107 MMHG | HEART RATE: 73 BPM | TEMPERATURE: 97.3 F | OXYGEN SATURATION: 100 % | HEIGHT: 68 IN | RESPIRATION RATE: 20 BRPM | WEIGHT: 163.8 LBS

## 2021-01-01 VITALS
BODY MASS INDEX: 24.38 KG/M2 | TEMPERATURE: 97.6 F | RESPIRATION RATE: 16 BRPM | SYSTOLIC BLOOD PRESSURE: 119 MMHG | WEIGHT: 160.27 LBS | HEART RATE: 82 BPM | OXYGEN SATURATION: 100 % | DIASTOLIC BLOOD PRESSURE: 64 MMHG

## 2021-01-01 VITALS
RESPIRATION RATE: 18 BRPM | DIASTOLIC BLOOD PRESSURE: 66 MMHG | DIASTOLIC BLOOD PRESSURE: 54 MMHG | OXYGEN SATURATION: 97 % | TEMPERATURE: 97.9 F | BODY MASS INDEX: 24.04 KG/M2 | SYSTOLIC BLOOD PRESSURE: 99 MMHG | RESPIRATION RATE: 18 BRPM | HEART RATE: 70 BPM | OXYGEN SATURATION: 100 % | TEMPERATURE: 97.2 F | DIASTOLIC BLOOD PRESSURE: 57 MMHG | HEART RATE: 81 BPM | SYSTOLIC BLOOD PRESSURE: 122 MMHG | SYSTOLIC BLOOD PRESSURE: 121 MMHG | WEIGHT: 165.6 LBS | HEART RATE: 81 BPM | RESPIRATION RATE: 20 BRPM | TEMPERATURE: 97.2 F | OXYGEN SATURATION: 99 %

## 2021-01-01 VITALS
HEART RATE: 71 BPM | DIASTOLIC BLOOD PRESSURE: 55 MMHG | OXYGEN SATURATION: 100 % | WEIGHT: 155.6 LBS | RESPIRATION RATE: 24 BRPM | BODY MASS INDEX: 23.66 KG/M2 | SYSTOLIC BLOOD PRESSURE: 112 MMHG | TEMPERATURE: 97.7 F

## 2021-01-01 VITALS
DIASTOLIC BLOOD PRESSURE: 52 MMHG | RESPIRATION RATE: 22 BRPM | OXYGEN SATURATION: 98 % | SYSTOLIC BLOOD PRESSURE: 108 MMHG | TEMPERATURE: 97.4 F | HEART RATE: 72 BPM

## 2021-01-01 VITALS
WEIGHT: 156.19 LBS | HEART RATE: 78 BPM | TEMPERATURE: 97.1 F | BODY MASS INDEX: 23.67 KG/M2 | RESPIRATION RATE: 14 BRPM | SYSTOLIC BLOOD PRESSURE: 108 MMHG | HEIGHT: 68 IN | OXYGEN SATURATION: 98 % | DIASTOLIC BLOOD PRESSURE: 58 MMHG

## 2021-01-01 VITALS
DIASTOLIC BLOOD PRESSURE: 61 MMHG | HEART RATE: 77 BPM | RESPIRATION RATE: 20 BRPM | TEMPERATURE: 98 F | SYSTOLIC BLOOD PRESSURE: 121 MMHG | OXYGEN SATURATION: 100 %

## 2021-01-01 VITALS
BODY MASS INDEX: 23.01 KG/M2 | WEIGHT: 151.8 LBS | HEIGHT: 68 IN | HEART RATE: 83 BPM | SYSTOLIC BLOOD PRESSURE: 113 MMHG | RESPIRATION RATE: 16 BRPM | TEMPERATURE: 97.3 F | OXYGEN SATURATION: 98 % | DIASTOLIC BLOOD PRESSURE: 54 MMHG

## 2021-01-01 VITALS
RESPIRATION RATE: 20 BRPM | TEMPERATURE: 97.4 F | SYSTOLIC BLOOD PRESSURE: 125 MMHG | HEART RATE: 82 BPM | BODY MASS INDEX: 24.81 KG/M2 | DIASTOLIC BLOOD PRESSURE: 69 MMHG | OXYGEN SATURATION: 99 % | WEIGHT: 163.2 LBS

## 2021-01-01 VITALS
TEMPERATURE: 97.9 F | DIASTOLIC BLOOD PRESSURE: 51 MMHG | SYSTOLIC BLOOD PRESSURE: 108 MMHG | OXYGEN SATURATION: 95 % | RESPIRATION RATE: 20 BRPM | HEART RATE: 88 BPM

## 2021-01-01 VITALS
RESPIRATION RATE: 26 BRPM | OXYGEN SATURATION: 98 % | BODY MASS INDEX: 22.75 KG/M2 | OXYGEN SATURATION: 100 % | SYSTOLIC BLOOD PRESSURE: 103 MMHG | DIASTOLIC BLOOD PRESSURE: 58 MMHG | BODY MASS INDEX: 24.6 KG/M2 | RESPIRATION RATE: 24 BRPM | DIASTOLIC BLOOD PRESSURE: 46 MMHG | HEART RATE: 83 BPM | HEART RATE: 96 BPM | HEIGHT: 68 IN | TEMPERATURE: 97.8 F | WEIGHT: 149.6 LBS | SYSTOLIC BLOOD PRESSURE: 100 MMHG | TEMPERATURE: 97 F | WEIGHT: 162.3 LBS

## 2021-01-01 VITALS
OXYGEN SATURATION: 95 % | DIASTOLIC BLOOD PRESSURE: 66 MMHG | HEART RATE: 94 BPM | SYSTOLIC BLOOD PRESSURE: 122 MMHG | BODY MASS INDEX: 30.42 KG/M2 | WEIGHT: 200.7 LBS | HEIGHT: 68 IN

## 2021-01-01 VITALS
SYSTOLIC BLOOD PRESSURE: 105 MMHG | DIASTOLIC BLOOD PRESSURE: 48 MMHG | WEIGHT: 200.84 LBS | BODY MASS INDEX: 30.44 KG/M2 | TEMPERATURE: 97.1 F | OXYGEN SATURATION: 93 % | HEART RATE: 80 BPM | HEIGHT: 68 IN | RESPIRATION RATE: 16 BRPM

## 2021-01-01 VITALS
RESPIRATION RATE: 32 BRPM | SYSTOLIC BLOOD PRESSURE: 102 MMHG | HEART RATE: 84 BPM | WEIGHT: 151.8 LBS | HEIGHT: 68 IN | OXYGEN SATURATION: 96 % | DIASTOLIC BLOOD PRESSURE: 61 MMHG | BODY MASS INDEX: 23.01 KG/M2 | TEMPERATURE: 97.6 F

## 2021-01-01 VITALS
RESPIRATION RATE: 20 BRPM | OXYGEN SATURATION: 100 % | TEMPERATURE: 97.7 F | DIASTOLIC BLOOD PRESSURE: 65 MMHG | HEART RATE: 78 BPM | SYSTOLIC BLOOD PRESSURE: 118 MMHG

## 2021-01-01 VITALS
HEART RATE: 89 BPM | BODY MASS INDEX: 28.46 KG/M2 | SYSTOLIC BLOOD PRESSURE: 123 MMHG | OXYGEN SATURATION: 98 % | DIASTOLIC BLOOD PRESSURE: 67 MMHG | WEIGHT: 187.2 LBS | TEMPERATURE: 97.3 F

## 2021-01-01 VITALS
BODY MASS INDEX: 24.81 KG/M2 | WEIGHT: 163.2 LBS | DIASTOLIC BLOOD PRESSURE: 48 MMHG | RESPIRATION RATE: 24 BRPM | SYSTOLIC BLOOD PRESSURE: 116 MMHG | TEMPERATURE: 98 F | OXYGEN SATURATION: 98 % | HEART RATE: 81 BPM

## 2021-01-01 VITALS
OXYGEN SATURATION: 98 % | SYSTOLIC BLOOD PRESSURE: 115 MMHG | RESPIRATION RATE: 20 BRPM | HEART RATE: 89 BPM | TEMPERATURE: 97.9 F | DIASTOLIC BLOOD PRESSURE: 51 MMHG

## 2021-01-01 VITALS
HEART RATE: 75 BPM | RESPIRATION RATE: 18 BRPM | OXYGEN SATURATION: 100 % | WEIGHT: 164.9 LBS | TEMPERATURE: 97.6 F | SYSTOLIC BLOOD PRESSURE: 111 MMHG | BODY MASS INDEX: 25.07 KG/M2 | DIASTOLIC BLOOD PRESSURE: 60 MMHG

## 2021-01-01 VITALS
TEMPERATURE: 97.5 F | RESPIRATION RATE: 16 BRPM | OXYGEN SATURATION: 100 % | SYSTOLIC BLOOD PRESSURE: 111 MMHG | DIASTOLIC BLOOD PRESSURE: 44 MMHG | HEART RATE: 85 BPM

## 2021-01-01 VITALS
TEMPERATURE: 97.6 F | HEART RATE: 76 BPM | OXYGEN SATURATION: 98 % | SYSTOLIC BLOOD PRESSURE: 107 MMHG | DIASTOLIC BLOOD PRESSURE: 48 MMHG | RESPIRATION RATE: 18 BRPM

## 2021-01-01 VITALS
SYSTOLIC BLOOD PRESSURE: 110 MMHG | RESPIRATION RATE: 28 BRPM | HEART RATE: 89 BPM | WEIGHT: 185 LBS | OXYGEN SATURATION: 93 % | HEIGHT: 68 IN | TEMPERATURE: 96 F | BODY MASS INDEX: 28.04 KG/M2 | DIASTOLIC BLOOD PRESSURE: 56 MMHG

## 2021-01-01 VITALS
OXYGEN SATURATION: 98 % | DIASTOLIC BLOOD PRESSURE: 51 MMHG | TEMPERATURE: 97.6 F | BODY MASS INDEX: 24.91 KG/M2 | HEART RATE: 82 BPM | RESPIRATION RATE: 20 BRPM | RESPIRATION RATE: 20 BRPM | SYSTOLIC BLOOD PRESSURE: 107 MMHG | TEMPERATURE: 97.7 F | OXYGEN SATURATION: 100 % | WEIGHT: 163.8 LBS | DIASTOLIC BLOOD PRESSURE: 48 MMHG | HEART RATE: 74 BPM | SYSTOLIC BLOOD PRESSURE: 115 MMHG

## 2021-01-01 VITALS
SYSTOLIC BLOOD PRESSURE: 124 MMHG | DIASTOLIC BLOOD PRESSURE: 51 MMHG | HEART RATE: 97 BPM | WEIGHT: 163.6 LBS | TEMPERATURE: 97.8 F | OXYGEN SATURATION: 98 % | BODY MASS INDEX: 24.88 KG/M2 | RESPIRATION RATE: 20 BRPM

## 2021-01-01 VITALS
DIASTOLIC BLOOD PRESSURE: 48 MMHG | BODY MASS INDEX: 23.78 KG/M2 | TEMPERATURE: 96 F | SYSTOLIC BLOOD PRESSURE: 107 MMHG | HEART RATE: 78 BPM | RESPIRATION RATE: 20 BRPM | OXYGEN SATURATION: 100 % | WEIGHT: 156.4 LBS

## 2021-01-01 VITALS
OXYGEN SATURATION: 99 % | RESPIRATION RATE: 20 BRPM | DIASTOLIC BLOOD PRESSURE: 52 MMHG | BODY MASS INDEX: 24.01 KG/M2 | TEMPERATURE: 97.5 F | SYSTOLIC BLOOD PRESSURE: 99 MMHG | HEART RATE: 78 BPM | WEIGHT: 158.4 LBS | HEIGHT: 68 IN

## 2021-01-01 VITALS
SYSTOLIC BLOOD PRESSURE: 114 MMHG | TEMPERATURE: 98.3 F | DIASTOLIC BLOOD PRESSURE: 53 MMHG | RESPIRATION RATE: 20 BRPM | HEART RATE: 83 BPM | OXYGEN SATURATION: 99 %

## 2021-01-01 VITALS
DIASTOLIC BLOOD PRESSURE: 48 MMHG | TEMPERATURE: 98.1 F | RESPIRATION RATE: 18 BRPM | HEIGHT: 68 IN | SYSTOLIC BLOOD PRESSURE: 112 MMHG | OXYGEN SATURATION: 100 % | BODY MASS INDEX: 24.01 KG/M2 | HEART RATE: 77 BPM | WEIGHT: 158.4 LBS

## 2021-01-01 VITALS
DIASTOLIC BLOOD PRESSURE: 51 MMHG | TEMPERATURE: 97.2 F | RESPIRATION RATE: 20 BRPM | OXYGEN SATURATION: 99 % | HEART RATE: 83 BPM | SYSTOLIC BLOOD PRESSURE: 111 MMHG

## 2021-01-01 VITALS
HEART RATE: 77 BPM | OXYGEN SATURATION: 99 % | TEMPERATURE: 97.6 F | SYSTOLIC BLOOD PRESSURE: 111 MMHG | DIASTOLIC BLOOD PRESSURE: 58 MMHG | BODY MASS INDEX: 23 KG/M2 | WEIGHT: 151.2 LBS | RESPIRATION RATE: 16 BRPM

## 2021-01-01 VITALS
DIASTOLIC BLOOD PRESSURE: 62 MMHG | TEMPERATURE: 96.9 F | HEART RATE: 84 BPM | RESPIRATION RATE: 18 BRPM | BODY MASS INDEX: 22.96 KG/M2 | WEIGHT: 151 LBS | SYSTOLIC BLOOD PRESSURE: 124 MMHG | OXYGEN SATURATION: 94 %

## 2021-01-01 VITALS
DIASTOLIC BLOOD PRESSURE: 60 MMHG | TEMPERATURE: 97.5 F | BODY MASS INDEX: 23.58 KG/M2 | SYSTOLIC BLOOD PRESSURE: 124 MMHG | WEIGHT: 155.1 LBS

## 2021-01-01 VITALS
SYSTOLIC BLOOD PRESSURE: 124 MMHG | RESPIRATION RATE: 20 BRPM | TEMPERATURE: 98.1 F | WEIGHT: 162 LBS | DIASTOLIC BLOOD PRESSURE: 68 MMHG | OXYGEN SATURATION: 100 % | BODY MASS INDEX: 24.63 KG/M2 | HEART RATE: 90 BPM

## 2021-01-01 VITALS
DIASTOLIC BLOOD PRESSURE: 56 MMHG | HEART RATE: 84 BPM | BODY MASS INDEX: 22.86 KG/M2 | OXYGEN SATURATION: 97 % | RESPIRATION RATE: 24 BRPM | SYSTOLIC BLOOD PRESSURE: 112 MMHG | TEMPERATURE: 98.3 F | WEIGHT: 157.5 LBS

## 2021-01-01 VITALS
HEART RATE: 98 BPM | WEIGHT: 154.5 LBS | DIASTOLIC BLOOD PRESSURE: 62 MMHG | SYSTOLIC BLOOD PRESSURE: 110 MMHG | HEIGHT: 68 IN | BODY MASS INDEX: 23.42 KG/M2 | OXYGEN SATURATION: 99 %

## 2021-01-01 VITALS
OXYGEN SATURATION: 93 % | WEIGHT: 155.65 LBS | HEIGHT: 68 IN | HEART RATE: 93 BPM | SYSTOLIC BLOOD PRESSURE: 106 MMHG | DIASTOLIC BLOOD PRESSURE: 57 MMHG | RESPIRATION RATE: 20 BRPM | TEMPERATURE: 97.9 F | BODY MASS INDEX: 23.59 KG/M2

## 2021-01-01 VITALS
DIASTOLIC BLOOD PRESSURE: 56 MMHG | RESPIRATION RATE: 20 BRPM | SYSTOLIC BLOOD PRESSURE: 116 MMHG | OXYGEN SATURATION: 100 % | HEART RATE: 78 BPM | TEMPERATURE: 97.7 F

## 2021-01-01 VITALS
DIASTOLIC BLOOD PRESSURE: 56 MMHG | SYSTOLIC BLOOD PRESSURE: 110 MMHG | HEIGHT: 68 IN | RESPIRATION RATE: 15 BRPM | TEMPERATURE: 96.9 F | WEIGHT: 159.38 LBS | HEART RATE: 111 BPM | BODY MASS INDEX: 24.15 KG/M2 | OXYGEN SATURATION: 100 %

## 2021-01-01 VITALS
HEART RATE: 80 BPM | DIASTOLIC BLOOD PRESSURE: 55 MMHG | SYSTOLIC BLOOD PRESSURE: 112 MMHG | WEIGHT: 163.3 LBS | DIASTOLIC BLOOD PRESSURE: 57 MMHG | BODY MASS INDEX: 24.8 KG/M2 | OXYGEN SATURATION: 100 % | TEMPERATURE: 97.8 F | HEART RATE: 75 BPM | RESPIRATION RATE: 16 BRPM | RESPIRATION RATE: 20 BRPM | WEIGHT: 163.6 LBS | BODY MASS INDEX: 24.83 KG/M2 | SYSTOLIC BLOOD PRESSURE: 103 MMHG | OXYGEN SATURATION: 98 % | HEIGHT: 68 IN | TEMPERATURE: 97.3 F

## 2021-01-01 VITALS
OXYGEN SATURATION: 96 % | TEMPERATURE: 97.6 F | BODY MASS INDEX: 28.02 KG/M2 | RESPIRATION RATE: 24 BRPM | DIASTOLIC BLOOD PRESSURE: 56 MMHG | HEART RATE: 82 BPM | SYSTOLIC BLOOD PRESSURE: 120 MMHG | WEIGHT: 184.3 LBS

## 2021-01-01 VITALS
HEART RATE: 91 BPM | TEMPERATURE: 98 F | OXYGEN SATURATION: 97 % | RESPIRATION RATE: 34 BRPM | SYSTOLIC BLOOD PRESSURE: 109 MMHG | DIASTOLIC BLOOD PRESSURE: 40 MMHG

## 2021-01-01 VITALS
SYSTOLIC BLOOD PRESSURE: 116 MMHG | TEMPERATURE: 98 F | RESPIRATION RATE: 20 BRPM | OXYGEN SATURATION: 100 % | HEART RATE: 75 BPM | DIASTOLIC BLOOD PRESSURE: 63 MMHG | BODY MASS INDEX: 24.31 KG/M2 | HEIGHT: 68 IN | WEIGHT: 160.4 LBS

## 2021-01-01 VITALS
RESPIRATION RATE: 16 BRPM | DIASTOLIC BLOOD PRESSURE: 62 MMHG | TEMPERATURE: 98.2 F | DIASTOLIC BLOOD PRESSURE: 55 MMHG | OXYGEN SATURATION: 100 % | SYSTOLIC BLOOD PRESSURE: 115 MMHG | OXYGEN SATURATION: 99 % | HEART RATE: 76 BPM | RESPIRATION RATE: 18 BRPM | HEART RATE: 92 BPM | SYSTOLIC BLOOD PRESSURE: 129 MMHG | TEMPERATURE: 97.8 F

## 2021-01-01 VITALS
TEMPERATURE: 97.8 F | SYSTOLIC BLOOD PRESSURE: 102 MMHG | WEIGHT: 157.3 LBS | BODY MASS INDEX: 23.84 KG/M2 | WEIGHT: 156.6 LBS | RESPIRATION RATE: 20 BRPM | OXYGEN SATURATION: 100 % | DIASTOLIC BLOOD PRESSURE: 58 MMHG | SYSTOLIC BLOOD PRESSURE: 112 MMHG | DIASTOLIC BLOOD PRESSURE: 51 MMHG | OXYGEN SATURATION: 98 % | TEMPERATURE: 97 F | RESPIRATION RATE: 20 BRPM | HEART RATE: 90 BPM | HEART RATE: 80 BPM | HEIGHT: 68 IN | BODY MASS INDEX: 23.81 KG/M2

## 2021-01-01 VITALS
RESPIRATION RATE: 24 BRPM | HEART RATE: 76 BPM | TEMPERATURE: 97.4 F | OXYGEN SATURATION: 99 % | SYSTOLIC BLOOD PRESSURE: 115 MMHG | DIASTOLIC BLOOD PRESSURE: 55 MMHG

## 2021-01-01 VITALS
RESPIRATION RATE: 16 BRPM | WEIGHT: 157.2 LBS | SYSTOLIC BLOOD PRESSURE: 112 MMHG | TEMPERATURE: 97.4 F | HEART RATE: 89 BPM | DIASTOLIC BLOOD PRESSURE: 63 MMHG | OXYGEN SATURATION: 98 % | BODY MASS INDEX: 22.82 KG/M2

## 2021-01-01 VITALS
DIASTOLIC BLOOD PRESSURE: 65 MMHG | SYSTOLIC BLOOD PRESSURE: 121 MMHG | RESPIRATION RATE: 22 BRPM | OXYGEN SATURATION: 99 % | HEART RATE: 87 BPM | TEMPERATURE: 98.3 F

## 2021-01-01 VITALS
BODY MASS INDEX: 24.54 KG/M2 | DIASTOLIC BLOOD PRESSURE: 64 MMHG | SYSTOLIC BLOOD PRESSURE: 130 MMHG | HEIGHT: 68 IN | WEIGHT: 161.9 LBS | TEMPERATURE: 97.6 F

## 2021-01-01 VITALS
HEART RATE: 88 BPM | OXYGEN SATURATION: 100 % | SYSTOLIC BLOOD PRESSURE: 188 MMHG | TEMPERATURE: 97.9 F | DIASTOLIC BLOOD PRESSURE: 53 MMHG | RESPIRATION RATE: 22 BRPM

## 2021-01-01 VITALS
SYSTOLIC BLOOD PRESSURE: 114 MMHG | TEMPERATURE: 97.9 F | DIASTOLIC BLOOD PRESSURE: 64 MMHG | WEIGHT: 167 LBS | TEMPERATURE: 97.4 F | DIASTOLIC BLOOD PRESSURE: 52 MMHG | SYSTOLIC BLOOD PRESSURE: 120 MMHG | RESPIRATION RATE: 18 BRPM | BODY MASS INDEX: 25.39 KG/M2 | OXYGEN SATURATION: 98 % | HEART RATE: 84 BPM | HEART RATE: 99 BPM | RESPIRATION RATE: 20 BRPM | OXYGEN SATURATION: 99 %

## 2021-01-01 VITALS
DIASTOLIC BLOOD PRESSURE: 51 MMHG | RESPIRATION RATE: 18 BRPM | SYSTOLIC BLOOD PRESSURE: 112 MMHG | TEMPERATURE: 97.6 F | HEART RATE: 75 BPM | OXYGEN SATURATION: 98 %

## 2021-01-01 VITALS
OXYGEN SATURATION: 99 % | RESPIRATION RATE: 30 BRPM | DIASTOLIC BLOOD PRESSURE: 67 MMHG | HEART RATE: 79 BPM | SYSTOLIC BLOOD PRESSURE: 115 MMHG | TEMPERATURE: 97.5 F

## 2021-01-01 VITALS
OXYGEN SATURATION: 100 % | SYSTOLIC BLOOD PRESSURE: 119 MMHG | RESPIRATION RATE: 18 BRPM | HEART RATE: 98 BPM | TEMPERATURE: 99.2 F | DIASTOLIC BLOOD PRESSURE: 57 MMHG

## 2021-01-01 VITALS
OXYGEN SATURATION: 98 % | BODY MASS INDEX: 23.97 KG/M2 | DIASTOLIC BLOOD PRESSURE: 56 MMHG | TEMPERATURE: 97 F | SYSTOLIC BLOOD PRESSURE: 122 MMHG | HEIGHT: 68 IN | WEIGHT: 158.13 LBS | HEART RATE: 89 BPM

## 2021-01-01 VITALS
HEIGHT: 68 IN | SYSTOLIC BLOOD PRESSURE: 104 MMHG | HEART RATE: 92 BPM | RESPIRATION RATE: 24 BRPM | DIASTOLIC BLOOD PRESSURE: 66 MMHG | TEMPERATURE: 97.3 F | OXYGEN SATURATION: 96 % | WEIGHT: 187.4 LBS | BODY MASS INDEX: 28.4 KG/M2

## 2021-01-01 VITALS
TEMPERATURE: 98.1 F | DIASTOLIC BLOOD PRESSURE: 48 MMHG | SYSTOLIC BLOOD PRESSURE: 112 MMHG | HEART RATE: 77 BPM | OXYGEN SATURATION: 100 % | RESPIRATION RATE: 18 BRPM

## 2021-01-01 VITALS
DIASTOLIC BLOOD PRESSURE: 49 MMHG | OXYGEN SATURATION: 97 % | RESPIRATION RATE: 26 BRPM | HEART RATE: 85 BPM | SYSTOLIC BLOOD PRESSURE: 109 MMHG | TEMPERATURE: 97.8 F

## 2021-01-01 VITALS
RESPIRATION RATE: 24 BRPM | OXYGEN SATURATION: 98 % | RESPIRATION RATE: 20 BRPM | HEART RATE: 94 BPM | SYSTOLIC BLOOD PRESSURE: 120 MMHG | TEMPERATURE: 98.1 F | OXYGEN SATURATION: 99 % | TEMPERATURE: 97.5 F | SYSTOLIC BLOOD PRESSURE: 125 MMHG | HEART RATE: 91 BPM | DIASTOLIC BLOOD PRESSURE: 74 MMHG | DIASTOLIC BLOOD PRESSURE: 53 MMHG

## 2021-01-01 VITALS
HEART RATE: 84 BPM | SYSTOLIC BLOOD PRESSURE: 121 MMHG | WEIGHT: 163.8 LBS | BODY MASS INDEX: 24.91 KG/M2 | TEMPERATURE: 98.5 F | RESPIRATION RATE: 20 BRPM | DIASTOLIC BLOOD PRESSURE: 58 MMHG | OXYGEN SATURATION: 99 %

## 2021-01-01 VITALS
HEART RATE: 100 BPM | HEIGHT: 68 IN | OXYGEN SATURATION: 98 % | SYSTOLIC BLOOD PRESSURE: 118 MMHG | DIASTOLIC BLOOD PRESSURE: 58 MMHG | WEIGHT: 194 LBS | RESPIRATION RATE: 14 BRPM | TEMPERATURE: 98 F | BODY MASS INDEX: 29.4 KG/M2

## 2021-01-01 VITALS
HEART RATE: 89 BPM | DIASTOLIC BLOOD PRESSURE: 56 MMHG | OXYGEN SATURATION: 99 % | WEIGHT: 160.1 LBS | SYSTOLIC BLOOD PRESSURE: 102 MMHG | TEMPERATURE: 96.5 F | BODY MASS INDEX: 24.26 KG/M2 | HEIGHT: 68 IN

## 2021-01-01 VITALS
OXYGEN SATURATION: 98 % | SYSTOLIC BLOOD PRESSURE: 110 MMHG | RESPIRATION RATE: 20 BRPM | DIASTOLIC BLOOD PRESSURE: 47 MMHG | TEMPERATURE: 97.6 F | HEART RATE: 79 BPM

## 2021-01-01 VITALS
RESPIRATION RATE: 24 BRPM | OXYGEN SATURATION: 98 % | DIASTOLIC BLOOD PRESSURE: 56 MMHG | TEMPERATURE: 97.4 F | HEART RATE: 81 BPM | SYSTOLIC BLOOD PRESSURE: 119 MMHG

## 2021-01-01 VITALS
HEART RATE: 83 BPM | OXYGEN SATURATION: 100 % | SYSTOLIC BLOOD PRESSURE: 117 MMHG | RESPIRATION RATE: 24 BRPM | DIASTOLIC BLOOD PRESSURE: 58 MMHG | TEMPERATURE: 97.7 F

## 2021-01-01 VITALS
BODY MASS INDEX: 24.74 KG/M2 | TEMPERATURE: 98.4 F | HEART RATE: 78 BPM | HEIGHT: 68 IN | OXYGEN SATURATION: 97 % | RESPIRATION RATE: 20 BRPM | SYSTOLIC BLOOD PRESSURE: 114 MMHG | WEIGHT: 163.2 LBS | DIASTOLIC BLOOD PRESSURE: 57 MMHG

## 2021-01-01 VITALS
TEMPERATURE: 97.4 F | WEIGHT: 195 LBS | SYSTOLIC BLOOD PRESSURE: 111 MMHG | BODY MASS INDEX: 29.65 KG/M2 | RESPIRATION RATE: 18 BRPM | HEART RATE: 89 BPM | OXYGEN SATURATION: 95 % | DIASTOLIC BLOOD PRESSURE: 67 MMHG

## 2021-01-01 VITALS
WEIGHT: 171.1 LBS | BODY MASS INDEX: 24.5 KG/M2 | RESPIRATION RATE: 16 BRPM | DIASTOLIC BLOOD PRESSURE: 57 MMHG | OXYGEN SATURATION: 96 % | HEART RATE: 90 BPM | TEMPERATURE: 96.2 F | SYSTOLIC BLOOD PRESSURE: 127 MMHG | HEIGHT: 70 IN

## 2021-01-01 VITALS
WEIGHT: 178 LBS | DIASTOLIC BLOOD PRESSURE: 61 MMHG | OXYGEN SATURATION: 95 % | RESPIRATION RATE: 18 BRPM | BODY MASS INDEX: 27.06 KG/M2 | TEMPERATURE: 97 F | SYSTOLIC BLOOD PRESSURE: 119 MMHG | HEART RATE: 101 BPM

## 2021-01-01 VITALS
BODY MASS INDEX: 27.93 KG/M2 | WEIGHT: 184.3 LBS | SYSTOLIC BLOOD PRESSURE: 124 MMHG | HEIGHT: 68 IN | DIASTOLIC BLOOD PRESSURE: 62 MMHG

## 2021-01-01 VITALS
TEMPERATURE: 97.1 F | RESPIRATION RATE: 26 BRPM | DIASTOLIC BLOOD PRESSURE: 62 MMHG | SYSTOLIC BLOOD PRESSURE: 126 MMHG | HEART RATE: 75 BPM

## 2021-01-01 VITALS
SYSTOLIC BLOOD PRESSURE: 134 MMHG | OXYGEN SATURATION: 98 % | RESPIRATION RATE: 16 BRPM | TEMPERATURE: 98.6 F | HEART RATE: 88 BPM | DIASTOLIC BLOOD PRESSURE: 60 MMHG

## 2021-01-01 VITALS
HEART RATE: 84 BPM | RESPIRATION RATE: 18 BRPM | BODY MASS INDEX: 24.52 KG/M2 | WEIGHT: 161.8 LBS | OXYGEN SATURATION: 97 % | SYSTOLIC BLOOD PRESSURE: 107 MMHG | DIASTOLIC BLOOD PRESSURE: 47 MMHG | TEMPERATURE: 97.3 F | HEIGHT: 68 IN

## 2021-01-01 VITALS
RESPIRATION RATE: 26 BRPM | OXYGEN SATURATION: 98 % | DIASTOLIC BLOOD PRESSURE: 52 MMHG | HEART RATE: 86 BPM | TEMPERATURE: 97.4 F | SYSTOLIC BLOOD PRESSURE: 112 MMHG

## 2021-01-01 VITALS
WEIGHT: 178 LBS | HEIGHT: 68 IN | BODY MASS INDEX: 26.98 KG/M2 | SYSTOLIC BLOOD PRESSURE: 132 MMHG | HEART RATE: 88 BPM | TEMPERATURE: 97.8 F | OXYGEN SATURATION: 97 % | DIASTOLIC BLOOD PRESSURE: 67 MMHG | RESPIRATION RATE: 20 BRPM

## 2021-01-01 VITALS
WEIGHT: 154.8 LBS | RESPIRATION RATE: 20 BRPM | BODY MASS INDEX: 23.54 KG/M2 | HEART RATE: 89 BPM | DIASTOLIC BLOOD PRESSURE: 56 MMHG | TEMPERATURE: 98.4 F | SYSTOLIC BLOOD PRESSURE: 114 MMHG | OXYGEN SATURATION: 99 %

## 2021-01-01 VITALS
HEART RATE: 80 BPM | OXYGEN SATURATION: 97 % | SYSTOLIC BLOOD PRESSURE: 109 MMHG | DIASTOLIC BLOOD PRESSURE: 43 MMHG | RESPIRATION RATE: 18 BRPM | TEMPERATURE: 97.7 F

## 2021-01-01 VITALS
BODY MASS INDEX: 27.67 KG/M2 | DIASTOLIC BLOOD PRESSURE: 51 MMHG | WEIGHT: 182 LBS | SYSTOLIC BLOOD PRESSURE: 114 MMHG | HEART RATE: 81 BPM | RESPIRATION RATE: 18 BRPM | OXYGEN SATURATION: 97 % | TEMPERATURE: 97.4 F

## 2021-01-01 VITALS
OXYGEN SATURATION: 100 % | SYSTOLIC BLOOD PRESSURE: 119 MMHG | TEMPERATURE: 98.4 F | DIASTOLIC BLOOD PRESSURE: 63 MMHG | RESPIRATION RATE: 18 BRPM | HEART RATE: 74 BPM

## 2021-01-01 VITALS
RESPIRATION RATE: 22 BRPM | DIASTOLIC BLOOD PRESSURE: 53 MMHG | SYSTOLIC BLOOD PRESSURE: 110 MMHG | HEART RATE: 77 BPM | OXYGEN SATURATION: 100 % | TEMPERATURE: 97.8 F

## 2021-01-01 VITALS
OXYGEN SATURATION: 99 % | RESPIRATION RATE: 20 BRPM | SYSTOLIC BLOOD PRESSURE: 125 MMHG | HEART RATE: 81 BPM | TEMPERATURE: 97.5 F | DIASTOLIC BLOOD PRESSURE: 56 MMHG

## 2021-01-01 VITALS
SYSTOLIC BLOOD PRESSURE: 113 MMHG | TEMPERATURE: 100.9 F | HEART RATE: 108 BPM | DIASTOLIC BLOOD PRESSURE: 63 MMHG | RESPIRATION RATE: 16 BRPM | OXYGEN SATURATION: 100 %

## 2021-01-01 VITALS
DIASTOLIC BLOOD PRESSURE: 65 MMHG | HEART RATE: 88 BPM | RESPIRATION RATE: 20 BRPM | OXYGEN SATURATION: 99 % | TEMPERATURE: 97.7 F | SYSTOLIC BLOOD PRESSURE: 125 MMHG

## 2021-01-01 VITALS
OXYGEN SATURATION: 98 % | BODY MASS INDEX: 23.64 KG/M2 | RESPIRATION RATE: 20 BRPM | WEIGHT: 155.5 LBS | SYSTOLIC BLOOD PRESSURE: 117 MMHG | TEMPERATURE: 97.4 F | DIASTOLIC BLOOD PRESSURE: 58 MMHG | HEART RATE: 75 BPM

## 2021-01-01 VITALS
TEMPERATURE: 97.6 F | DIASTOLIC BLOOD PRESSURE: 45 MMHG | RESPIRATION RATE: 16 BRPM | HEART RATE: 69 BPM | SYSTOLIC BLOOD PRESSURE: 118 MMHG | OXYGEN SATURATION: 99 %

## 2021-01-01 VITALS
RESPIRATION RATE: 20 BRPM | DIASTOLIC BLOOD PRESSURE: 54 MMHG | SYSTOLIC BLOOD PRESSURE: 124 MMHG | OXYGEN SATURATION: 99 % | HEART RATE: 76 BPM | TEMPERATURE: 97.6 F

## 2021-01-01 DIAGNOSIS — D72.829 LEUKOCYTOSIS, UNSPECIFIED TYPE: Primary | ICD-10-CM

## 2021-01-01 DIAGNOSIS — C95.00 ACUTE LEUKEMIA NOT HAVING ACHIEVED REMISSION (H): Primary | ICD-10-CM

## 2021-01-01 DIAGNOSIS — D69.6 THROMBOCYTOPENIA (H): ICD-10-CM

## 2021-01-01 DIAGNOSIS — D75.81 MYELOFIBROSIS (H): ICD-10-CM

## 2021-01-01 DIAGNOSIS — D69.6 THROMBOCYTOPENIA (H): Primary | ICD-10-CM

## 2021-01-01 DIAGNOSIS — I24.89 DEMAND ISCHEMIA (H): ICD-10-CM

## 2021-01-01 DIAGNOSIS — C95.00 ACUTE LEUKEMIA NOT HAVING ACHIEVED REMISSION (H): ICD-10-CM

## 2021-01-01 DIAGNOSIS — I50.33 ACUTE ON CHRONIC DIASTOLIC CONGESTIVE HEART FAILURE (H): Primary | ICD-10-CM

## 2021-01-01 DIAGNOSIS — D46.9 MDS (MYELODYSPLASTIC SYNDROME) (H): ICD-10-CM

## 2021-01-01 DIAGNOSIS — D64.9 TRANSFUSION-DEPENDENT ANEMIA: ICD-10-CM

## 2021-01-01 DIAGNOSIS — T45.1X5A ANTINEOPLASTIC CHEMOTHERAPY INDUCED PANCYTOPENIA (H): ICD-10-CM

## 2021-01-01 DIAGNOSIS — D61.810 ANTINEOPLASTIC CHEMOTHERAPY INDUCED PANCYTOPENIA (H): ICD-10-CM

## 2021-01-01 DIAGNOSIS — D64.9 ANEMIA, UNSPECIFIED TYPE: ICD-10-CM

## 2021-01-01 DIAGNOSIS — D75.81 MYELOFIBROSIS (H): Primary | ICD-10-CM

## 2021-01-01 DIAGNOSIS — D64.9 TRANSFUSION-DEPENDENT ANEMIA: Primary | ICD-10-CM

## 2021-01-01 DIAGNOSIS — D63.8 ANEMIA IN OTHER CHRONIC DISEASES CLASSIFIED ELSEWHERE: ICD-10-CM

## 2021-01-01 DIAGNOSIS — D64.9 ACUTE ON CHRONIC ANEMIA: ICD-10-CM

## 2021-01-01 DIAGNOSIS — D47.1 MYELOPROLIFERATIVE DISORDER (H): ICD-10-CM

## 2021-01-01 DIAGNOSIS — D47.1 MYELOPROLIFERATIVE DISORDER (H): Primary | ICD-10-CM

## 2021-01-01 DIAGNOSIS — K60.2 ANAL FISSURE: ICD-10-CM
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
## 2021-01-01 DIAGNOSIS — K81.0 ACUTE CHOLECYSTITIS: ICD-10-CM

## 2021-01-01 DIAGNOSIS — K62.5 RECTAL BLEEDING: ICD-10-CM

## 2021-01-01 DIAGNOSIS — Z95.1 S/P CABG (CORONARY ARTERY BYPASS GRAFT): Primary | ICD-10-CM

## 2021-01-01 DIAGNOSIS — I50.20 HEART FAILURE WITH REDUCED EJECTION FRACTION (H): Primary | ICD-10-CM

## 2021-01-01 DIAGNOSIS — I50.32 CHRONIC DIASTOLIC CONGESTIVE HEART FAILURE (H): ICD-10-CM

## 2021-01-01 DIAGNOSIS — Z53.9 DIAGNOSIS NOT YET DEFINED: Primary | ICD-10-CM

## 2021-01-01 DIAGNOSIS — Z95.1 S/P CABG (CORONARY ARTERY BYPASS GRAFT): ICD-10-CM

## 2021-01-01 DIAGNOSIS — C91.00 ALL (ACUTE LYMPHOBLASTIC LEUKEMIA) (H): Primary | ICD-10-CM

## 2021-01-01 DIAGNOSIS — E87.6 HYPOKALEMIA: Primary | ICD-10-CM

## 2021-01-01 DIAGNOSIS — I50.9 CONGESTIVE HEART FAILURE, UNSPECIFIED HF CHRONICITY, UNSPECIFIED HEART FAILURE TYPE (H): ICD-10-CM

## 2021-01-01 DIAGNOSIS — D46.9 MDS (MYELODYSPLASTIC SYNDROME) (H): Primary | ICD-10-CM

## 2021-01-01 DIAGNOSIS — D46.9 MYELODYSPLASTIC SYNDROME (H): ICD-10-CM

## 2021-01-01 DIAGNOSIS — Z90.49 S/P CHOLECYSTECTOMY: ICD-10-CM

## 2021-01-01 DIAGNOSIS — R19.7 DIARRHEA, UNSPECIFIED TYPE: ICD-10-CM

## 2021-01-01 DIAGNOSIS — I21.4 NSTEMI (NON-ST ELEVATED MYOCARDIAL INFARCTION) (H): ICD-10-CM

## 2021-01-01 DIAGNOSIS — Z11.52 ENCOUNTER FOR SCREENING LABORATORY TESTING FOR SEVERE ACUTE RESPIRATORY SYNDROME CORONAVIRUS 2 (SARS-COV-2): ICD-10-CM

## 2021-01-01 DIAGNOSIS — D46.9 MDS (MYELODYSPLASTIC SYNDROME) (H): Chronic | ICD-10-CM

## 2021-01-01 DIAGNOSIS — I50.9 CONGESTIVE HEART FAILURE (H): Primary | ICD-10-CM

## 2021-01-01 DIAGNOSIS — E78.5 HYPERLIPIDEMIA LDL GOAL <100: ICD-10-CM

## 2021-01-01 DIAGNOSIS — I13.0 HYPERTENSIVE HEART AND RENAL DISEASE WITH CONGESTIVE HEART FAILURE (H): ICD-10-CM

## 2021-01-01 DIAGNOSIS — D47.1 MYELOPROLIFERATIVE NEOPLASM (H): ICD-10-CM

## 2021-01-01 DIAGNOSIS — R06.09 DYSPNEA ON EXERTION: ICD-10-CM

## 2021-01-01 DIAGNOSIS — D72.829 LEUKOCYTOSIS, UNSPECIFIED TYPE: ICD-10-CM

## 2021-01-01 DIAGNOSIS — I25.810 CORONARY ARTERY DISEASE INVOLVING CORONARY BYPASS GRAFT OF NATIVE HEART WITHOUT ANGINA PECTORIS: ICD-10-CM

## 2021-01-01 DIAGNOSIS — Z09 HOSPITAL DISCHARGE FOLLOW-UP: Primary | ICD-10-CM

## 2021-01-01 DIAGNOSIS — R06.02 SHORTNESS OF BREATH: ICD-10-CM

## 2021-01-01 DIAGNOSIS — K59.00 CONSTIPATION, UNSPECIFIED CONSTIPATION TYPE: Primary | ICD-10-CM

## 2021-01-01 DIAGNOSIS — C92.00 AML (ACUTE MYELOGENOUS LEUKEMIA) (H): ICD-10-CM

## 2021-01-01 DIAGNOSIS — N18.30 STAGE 3 CHRONIC KIDNEY DISEASE, UNSPECIFIED WHETHER STAGE 3A OR 3B CKD (H): ICD-10-CM

## 2021-01-01 DIAGNOSIS — I25.118 CORONARY ARTERY DISEASE INVOLVING NATIVE CORONARY ARTERY OF NATIVE HEART WITH OTHER FORM OF ANGINA PECTORIS (H): Primary | ICD-10-CM

## 2021-01-01 DIAGNOSIS — Z78.9 PROBLEM WITH VASCULAR ACCESS: ICD-10-CM

## 2021-01-01 DIAGNOSIS — R41.3 MEMORY LOSS: ICD-10-CM

## 2021-01-01 DIAGNOSIS — R11.0 NAUSEA: ICD-10-CM

## 2021-01-01 DIAGNOSIS — I25.10 CORONARY ARTERY DISEASE INVOLVING NATIVE CORONARY ARTERY OF NATIVE HEART WITHOUT ANGINA PECTORIS: ICD-10-CM

## 2021-01-01 DIAGNOSIS — I25.810 CORONARY ARTERY DISEASE INVOLVING CORONARY BYPASS GRAFT OF NATIVE HEART WITHOUT ANGINA PECTORIS: Primary | ICD-10-CM
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
## 2021-01-01 DIAGNOSIS — Z28.21 VACCINATION REFUSED BY PATIENT: ICD-10-CM

## 2021-01-01 DIAGNOSIS — R42 LIGHTHEADEDNESS: ICD-10-CM

## 2021-01-01 DIAGNOSIS — M25.511 RIGHT SHOULDER PAIN: ICD-10-CM

## 2021-01-01 DIAGNOSIS — Z79.899 MEDICATION MANAGEMENT: ICD-10-CM

## 2021-01-01 DIAGNOSIS — I10 ESSENTIAL HYPERTENSION: ICD-10-CM

## 2021-01-01 DIAGNOSIS — Z87.891 PERSONAL HISTORY OF TOBACCO USE, PRESENTING HAZARDS TO HEALTH: ICD-10-CM

## 2021-01-01 DIAGNOSIS — N18.31 STAGE 3A CHRONIC KIDNEY DISEASE (H): ICD-10-CM

## 2021-01-01 DIAGNOSIS — I50.9 HEART FAILURE, UNSPECIFIED HF CHRONICITY, UNSPECIFIED HEART FAILURE TYPE (H): ICD-10-CM

## 2021-01-01 DIAGNOSIS — I25.118 CORONARY ARTERY DISEASE OF NATIVE ARTERY OF NATIVE HEART WITH STABLE ANGINA PECTORIS (H): Primary | ICD-10-CM

## 2021-01-01 DIAGNOSIS — D61.818 PANCYTOPENIA (H): ICD-10-CM

## 2021-01-01 DIAGNOSIS — M25.511 RIGHT SHOULDER PAIN, UNSPECIFIED CHRONICITY: ICD-10-CM

## 2021-01-01 DIAGNOSIS — D63.8 ANEMIA IN OTHER CHRONIC DISEASES CLASSIFIED ELSEWHERE: Primary | ICD-10-CM

## 2021-01-01 DIAGNOSIS — C92.00 ACUTE MYELOID LEUKEMIA NOT HAVING ACHIEVED REMISSION (H): ICD-10-CM

## 2021-01-01 DIAGNOSIS — Z11.59 ENCOUNTER FOR SCREENING FOR OTHER VIRAL DISEASES: ICD-10-CM

## 2021-01-01 DIAGNOSIS — Z90.49 HISTORY OF CHOLECYSTECTOMY: ICD-10-CM

## 2021-01-01 DIAGNOSIS — R42 DIZZINESS AND GIDDINESS: ICD-10-CM

## 2021-01-01 DIAGNOSIS — S70.01XA CONTUSION OF RIGHT HIP, INITIAL ENCOUNTER: ICD-10-CM

## 2021-01-01 DIAGNOSIS — F51.04 PSYCHOPHYSIOLOGICAL INSOMNIA: ICD-10-CM

## 2021-01-01 DIAGNOSIS — D46.9 MYELODYSPLASTIC SYNDROME (H): Primary | ICD-10-CM

## 2021-01-01 DIAGNOSIS — M75.41 IMPINGEMENT SYNDROME, SHOULDER, RIGHT: ICD-10-CM

## 2021-01-01 DIAGNOSIS — Z09 HOSPITAL DISCHARGE FOLLOW-UP: ICD-10-CM

## 2021-01-01 DIAGNOSIS — S00.03XA CONTUSION OF SCALP, INITIAL ENCOUNTER: ICD-10-CM

## 2021-01-01 DIAGNOSIS — M75.41 SUBACROMIAL IMPINGEMENT OF RIGHT SHOULDER: Primary | ICD-10-CM

## 2021-01-01 DIAGNOSIS — M25.511 NONTRAUMATIC SHOULDER PAIN, RIGHT: ICD-10-CM

## 2021-01-01 DIAGNOSIS — R10.11 RUQ ABDOMINAL PAIN: ICD-10-CM

## 2021-01-01 DIAGNOSIS — Z78.9 TAKES DIETARY SUPPLEMENTS: ICD-10-CM

## 2021-01-01 DIAGNOSIS — J90 PLEURAL EFFUSION: ICD-10-CM

## 2021-01-01 DIAGNOSIS — J90 EXUDATIVE PLEURISY: ICD-10-CM

## 2021-01-01 DIAGNOSIS — S50.01XA CONTUSION OF RIGHT ELBOW, INITIAL ENCOUNTER: ICD-10-CM

## 2021-01-01 DIAGNOSIS — D61.818 PANCYTOPENIA (H): Primary | ICD-10-CM

## 2021-01-01 LAB
A*LOCUS SEROLOGIC EQUIVALENT: 1
A*LOCUS: NORMAL
AB TEST METHOD: NORMAL
ABO + RH BLD: NORMAL
ABO/RH TYPE: NORMAL
ABO/RH(D): NORMAL
ACANTHOCYTES BLD QL SMEAR: SLIGHT
ADDITIONAL COMMENTS: NORMAL
ADDITIONAL COMMENTS: NORMAL
ALBUMIN SERPL-MCNC: 2.4 G/DL (ref 3.4–5)
ALBUMIN SERPL-MCNC: 2.6 G/DL (ref 3.4–5)
ALBUMIN SERPL-MCNC: 2.7 G/DL (ref 3.4–5)
ALBUMIN SERPL-MCNC: 2.8 G/DL (ref 3.4–5)
ALBUMIN SERPL-MCNC: 2.9 G/DL (ref 3.4–5)
ALBUMIN SERPL-MCNC: 3 G/DL (ref 3.4–5)
ALBUMIN SERPL-MCNC: 3.1 G/DL (ref 3.4–5)
ALBUMIN SERPL-MCNC: 3.2 G/DL (ref 3.4–5)
ALBUMIN SERPL-MCNC: 3.3 G/DL (ref 3.4–5)
ALBUMIN SERPL-MCNC: 3.4 G/DL (ref 3.4–5)
ALBUMIN SERPL-MCNC: 3.5 G/DL (ref 3.4–5)
ALBUMIN SERPL-MCNC: 3.5 G/DL (ref 3.4–5)
ALBUMIN SERPL-MCNC: 3.6 G/DL (ref 3.4–5)
ALBUMIN SERPL-MCNC: 3.7 G/DL (ref 3.4–5)
ALBUMIN UR-MCNC: NEGATIVE MG/DL
ALP SERPL-CCNC: 100 U/L (ref 40–150)
ALP SERPL-CCNC: 100 U/L (ref 40–150)
ALP SERPL-CCNC: 101 U/L (ref 40–150)
ALP SERPL-CCNC: 102 U/L (ref 40–150)
ALP SERPL-CCNC: 102 U/L (ref 40–150)
ALP SERPL-CCNC: 103 U/L (ref 40–150)
ALP SERPL-CCNC: 104 U/L (ref 40–150)
ALP SERPL-CCNC: 104 U/L (ref 40–150)
ALP SERPL-CCNC: 105 U/L (ref 40–150)
ALP SERPL-CCNC: 106 U/L (ref 40–150)
ALP SERPL-CCNC: 114 U/L (ref 40–150)
ALP SERPL-CCNC: 116 U/L (ref 40–150)
ALP SERPL-CCNC: 119 U/L (ref 40–150)
ALP SERPL-CCNC: 122 U/L (ref 40–150)
ALP SERPL-CCNC: 122 U/L (ref 40–150)
ALP SERPL-CCNC: 123 U/L (ref 40–150)
ALP SERPL-CCNC: 124 U/L (ref 40–150)
ALP SERPL-CCNC: 129 U/L (ref 40–150)
ALP SERPL-CCNC: 135 U/L (ref 40–150)
ALP SERPL-CCNC: 136 U/L (ref 40–150)
ALP SERPL-CCNC: 143 U/L (ref 40–150)
ALP SERPL-CCNC: 155 U/L (ref 40–150)
ALP SERPL-CCNC: 156 U/L (ref 40–150)
ALP SERPL-CCNC: 156 U/L (ref 40–150)
ALP SERPL-CCNC: 160 U/L (ref 40–150)
ALP SERPL-CCNC: 172 U/L (ref 40–150)
ALP SERPL-CCNC: 178 U/L (ref 40–150)
ALP SERPL-CCNC: 184 U/L (ref 40–150)
ALP SERPL-CCNC: 190 U/L (ref 40–150)
ALP SERPL-CCNC: 193 U/L (ref 40–150)
ALP SERPL-CCNC: 213 U/L (ref 40–150)
ALP SERPL-CCNC: 230 U/L (ref 40–150)
ALP SERPL-CCNC: 246 U/L (ref 40–150)
ALP SERPL-CCNC: 263 U/L (ref 40–150)
ALP SERPL-CCNC: 71 U/L (ref 40–150)
ALP SERPL-CCNC: 76 U/L (ref 40–150)
ALP SERPL-CCNC: 78 U/L (ref 40–150)
ALP SERPL-CCNC: 79 U/L (ref 40–150)
ALP SERPL-CCNC: 80 U/L (ref 40–150)
ALP SERPL-CCNC: 81 U/L (ref 40–150)
ALP SERPL-CCNC: 83 U/L (ref 40–150)
ALP SERPL-CCNC: 84 U/L (ref 40–150)
ALP SERPL-CCNC: 85 U/L (ref 40–150)
ALP SERPL-CCNC: 87 U/L (ref 40–150)
ALP SERPL-CCNC: 87 U/L (ref 40–150)
ALP SERPL-CCNC: 88 U/L (ref 40–150)
ALP SERPL-CCNC: 89 U/L (ref 40–150)
ALP SERPL-CCNC: 90 U/L (ref 40–150)
ALP SERPL-CCNC: 91 U/L (ref 40–150)
ALP SERPL-CCNC: 92 U/L (ref 40–150)
ALP SERPL-CCNC: 94 U/L (ref 40–150)
ALP SERPL-CCNC: 95 U/L (ref 40–150)
ALP SERPL-CCNC: 96 U/L (ref 40–150)
ALP SERPL-CCNC: 97 U/L (ref 40–150)
ALP SERPL-CCNC: 97 U/L (ref 40–150)
ALT SERPL W P-5'-P-CCNC: 13 U/L (ref 0–70)
ALT SERPL W P-5'-P-CCNC: 13 U/L (ref 0–70)
ALT SERPL W P-5'-P-CCNC: 14 U/L (ref 0–70)
ALT SERPL W P-5'-P-CCNC: 14 U/L (ref 0–70)
ALT SERPL W P-5'-P-CCNC: 15 U/L (ref 0–70)
ALT SERPL W P-5'-P-CCNC: 16 U/L (ref 0–70)
ALT SERPL W P-5'-P-CCNC: 17 U/L (ref 0–70)
ALT SERPL W P-5'-P-CCNC: 18 U/L (ref 0–70)
ALT SERPL W P-5'-P-CCNC: 19 U/L (ref 0–70)
ALT SERPL W P-5'-P-CCNC: 20 U/L (ref 0–70)
ALT SERPL W P-5'-P-CCNC: 21 U/L (ref 0–70)
ALT SERPL W P-5'-P-CCNC: 22 U/L (ref 0–70)
ALT SERPL W P-5'-P-CCNC: 23 U/L (ref 0–70)
ALT SERPL W P-5'-P-CCNC: 24 U/L (ref 0–70)
ALT SERPL W P-5'-P-CCNC: 24 U/L (ref 0–70)
ALT SERPL W P-5'-P-CCNC: 26 U/L (ref 0–70)
ALT SERPL W P-5'-P-CCNC: 27 U/L (ref 0–70)
ALT SERPL W P-5'-P-CCNC: 27 U/L (ref 0–70)
ALT SERPL W P-5'-P-CCNC: 30 U/L (ref 0–70)
ALT SERPL W P-5'-P-CCNC: 31 U/L (ref 0–70)
ALT SERPL W P-5'-P-CCNC: 33 U/L (ref 0–70)
ALT SERPL W P-5'-P-CCNC: 34 U/L (ref 0–70)
ALT SERPL W P-5'-P-CCNC: 35 U/L (ref 0–70)
ALT SERPL W P-5'-P-CCNC: 35 U/L (ref 0–70)
ALT SERPL W P-5'-P-CCNC: 36 U/L (ref 0–70)
ALT SERPL W P-5'-P-CCNC: 56 U/L (ref 0–70)
AMORPH CRY #/AREA URNS HPF: ABNORMAL /HPF
ANION GAP SERPL CALCULATED.3IONS-SCNC: 10 MMOL/L (ref 3–14)
ANION GAP SERPL CALCULATED.3IONS-SCNC: 10 MMOL/L (ref 3–14)
ANION GAP SERPL CALCULATED.3IONS-SCNC: 2 MMOL/L (ref 3–14)
ANION GAP SERPL CALCULATED.3IONS-SCNC: 2 MMOL/L (ref 3–14)
ANION GAP SERPL CALCULATED.3IONS-SCNC: 3 MMOL/L (ref 3–14)
ANION GAP SERPL CALCULATED.3IONS-SCNC: 4 MMOL/L (ref 3–14)
ANION GAP SERPL CALCULATED.3IONS-SCNC: 5 MMOL/L (ref 3–14)
ANION GAP SERPL CALCULATED.3IONS-SCNC: 6 MMOL/L (ref 3–14)
ANION GAP SERPL CALCULATED.3IONS-SCNC: 7 MMOL/L (ref 3–14)
ANION GAP SERPL CALCULATED.3IONS-SCNC: 8 MMOL/L (ref 3–14)
ANION GAP SERPL CALCULATED.3IONS-SCNC: 9 MMOL/L (ref 3–14)
ANION GAP SERPL CALCULATED.3IONS-SCNC: <1 MMOL/L (ref 3–14)
ANION GAP SERPL CALCULATED.3IONS-SCNC: <1 MMOL/L (ref 3–14)
ANISOCYTOSIS BLD QL SMEAR: ABNORMAL
ANISOCYTOSIS BLD QL SMEAR: SLIGHT
ANTIBODY SCREEN: NEGATIVE
ANTIBODY SCREEN: NORMAL
APPEARANCE FLD: NORMAL
APPEARANCE UR: CLEAR
APTT PPP: 39 SECONDS (ref 22–38)
APTT PPP: 45 SEC (ref 22–37)
APTT PPP: 45 SECONDS (ref 22–38)
AST SERPL W P-5'-P-CCNC: 10 U/L (ref 0–45)
AST SERPL W P-5'-P-CCNC: 11 U/L (ref 0–45)
AST SERPL W P-5'-P-CCNC: 12 U/L (ref 0–45)
AST SERPL W P-5'-P-CCNC: 13 U/L (ref 0–45)
AST SERPL W P-5'-P-CCNC: 14 U/L (ref 0–45)
AST SERPL W P-5'-P-CCNC: 15 U/L (ref 0–45)
AST SERPL W P-5'-P-CCNC: 16 U/L (ref 0–45)
AST SERPL W P-5'-P-CCNC: 17 U/L (ref 0–45)
AST SERPL W P-5'-P-CCNC: 17 U/L (ref 0–45)
AST SERPL W P-5'-P-CCNC: 18 U/L (ref 0–45)
AST SERPL W P-5'-P-CCNC: 19 U/L (ref 0–45)
AST SERPL W P-5'-P-CCNC: 20 U/L (ref 0–45)
AST SERPL W P-5'-P-CCNC: 21 U/L (ref 0–45)
AST SERPL W P-5'-P-CCNC: 22 U/L (ref 0–45)
AST SERPL W P-5'-P-CCNC: 22 U/L (ref 0–45)
AST SERPL W P-5'-P-CCNC: 23 U/L (ref 0–45)
AST SERPL W P-5'-P-CCNC: 24 U/L (ref 0–45)
AST SERPL W P-5'-P-CCNC: 25 U/L (ref 0–45)
AST SERPL W P-5'-P-CCNC: 26 U/L (ref 0–45)
AST SERPL W P-5'-P-CCNC: 27 U/L (ref 0–45)
AST SERPL W P-5'-P-CCNC: 28 U/L (ref 0–45)
AST SERPL W P-5'-P-CCNC: 34 U/L (ref 0–45)
AST SERPL W P-5'-P-CCNC: 37 U/L (ref 0–45)
AST SERPL W P-5'-P-CCNC: 42 U/L (ref 0–45)
AST SERPL W P-5'-P-CCNC: 44 U/L (ref 0–45)
AST SERPL W P-5'-P-CCNC: 8 U/L (ref 0–45)
AST SERPL W P-5'-P-CCNC: 9 U/L (ref 0–45)
B*: NORMAL
B*LOCUS SEROLOGIC EQUIVALENT: 8
B*LOCUS: NORMAL
B*SEROLOGIC EQUIVALENT: 57
BACTERIA SPEC CULT: NO GROWTH
BACTERIA SPEC CULT: NORMAL
BASE EXCESS BLDV CALC-SCNC: 1.2 MMOL/L
BASOPHILS # BLD AUTO: 0 10E3/UL (ref 0–0.2)
BASOPHILS # BLD AUTO: 0 10E9/L (ref 0–0.2)
BASOPHILS # BLD AUTO: 0.1 10E3/UL (ref 0–0.2)
BASOPHILS # BLD AUTO: 0.1 10E9/L (ref 0–0.2)
BASOPHILS # BLD AUTO: 0.2 10E3/UL (ref 0–0.2)
BASOPHILS # BLD AUTO: 0.2 10E9/L (ref 0–0.2)
BASOPHILS # BLD AUTO: 0.3 10E9/L (ref 0–0.2)
BASOPHILS # BLD AUTO: 0.4 10E9/L (ref 0–0.2)
BASOPHILS # BLD AUTO: 0.6 10E9/L (ref 0–0.2)
BASOPHILS # BLD AUTO: 1.4 10E9/L (ref 0–0.2)
BASOPHILS # BLD AUTO: 1.4 10E9/L (ref 0–0.2)
BASOPHILS # BLD AUTO: 1.7 10E9/L (ref 0–0.2)
BASOPHILS # BLD AUTO: 1.8 10E9/L (ref 0–0.2)
BASOPHILS # BLD AUTO: 1.8 10E9/L (ref 0–0.2)
BASOPHILS # BLD AUTO: 2.2 10E9/L (ref 0–0.2)
BASOPHILS # BLD AUTO: 2.8 10E9/L (ref 0–0.2)
BASOPHILS # BLD AUTO: 2.9 10E9/L (ref 0–0.2)
BASOPHILS # BLD AUTO: 3.1 10E9/L (ref 0–0.2)
BASOPHILS # BLD MANUAL: 0 10E3/UL (ref 0–0.2)
BASOPHILS # BLD MANUAL: 0.1 10E3/UL (ref 0–0.2)
BASOPHILS # BLD MANUAL: 0.2 10E3/UL (ref 0–0.2)
BASOPHILS # BLD MANUAL: 0.3 10E3/UL (ref 0–0.2)
BASOPHILS # BLD MANUAL: 0.3 10E3/UL (ref 0–0.2)
BASOPHILS NFR BLD AUTO: 0 %
BASOPHILS NFR BLD AUTO: 0.9 %
BASOPHILS NFR BLD AUTO: 1 %
BASOPHILS NFR BLD AUTO: 1.8 %
BASOPHILS NFR BLD AUTO: 2 %
BASOPHILS NFR BLD AUTO: 2.5 %
BASOPHILS NFR BLD AUTO: 2.6 %
BASOPHILS NFR BLD AUTO: 2.6 %
BASOPHILS NFR BLD AUTO: 2.7 %
BASOPHILS NFR BLD AUTO: 3 %
BASOPHILS NFR BLD AUTO: 3.6 %
BASOPHILS NFR BLD AUTO: 3.6 %
BASOPHILS NFR BLD AUTO: 4.1 %
BASOPHILS NFR BLD AUTO: 4.3 %
BASOPHILS NFR BLD AUTO: 4.3 %
BASOPHILS NFR BLD AUTO: 4.4 %
BASOPHILS NFR BLD AUTO: 6.3 %
BASOPHILS NFR BLD AUTO: 6.4 %
BASOPHILS NFR BLD MANUAL: 0 %
BASOPHILS NFR BLD MANUAL: 1 %
BASOPHILS NFR BLD MANUAL: 2 %
BASOPHILS NFR BLD MANUAL: 3 %
BASOPHILS NFR BLD MANUAL: 3 %
BASOPHILS NFR BLD MANUAL: 5 %
BASOPHILS NFR BLD MANUAL: 6 %
BILIRUB DIRECT SERPL-MCNC: 0.1 MG/DL (ref 0–0.2)
BILIRUB DIRECT SERPL-MCNC: 0.2 MG/DL (ref 0–0.2)
BILIRUB DIRECT SERPL-MCNC: <0.1 MG/DL (ref 0–0.2)
BILIRUB SERPL-MCNC: 0.2 MG/DL (ref 0.2–1.3)
BILIRUB SERPL-MCNC: 0.3 MG/DL (ref 0.2–1.3)
BILIRUB SERPL-MCNC: 0.3 MG/DL (ref 0.2–1.3)
BILIRUB SERPL-MCNC: 0.4 MG/DL (ref 0.2–1.3)
BILIRUB SERPL-MCNC: 0.5 MG/DL (ref 0.2–1.3)
BILIRUB SERPL-MCNC: 0.6 MG/DL (ref 0.2–1.3)
BILIRUB SERPL-MCNC: 0.7 MG/DL (ref 0.2–1.3)
BILIRUB SERPL-MCNC: 0.8 MG/DL (ref 0.2–1.3)
BILIRUB SERPL-MCNC: 0.9 MG/DL (ref 0.2–1.3)
BILIRUB SERPL-MCNC: 0.9 MG/DL (ref 0.2–1.3)
BILIRUB SERPL-MCNC: 1 MG/DL (ref 0.2–1.3)
BILIRUB SERPL-MCNC: 1.2 MG/DL (ref 0.2–1.3)
BILIRUB UR QL STRIP: NEGATIVE
BLASTS # BLD MANUAL: 0 10E3/UL
BLASTS # BLD: 0.1 10E9/L
BLASTS # BLD: 0.2 10E9/L
BLASTS # BLD: 0.3 10E9/L
BLASTS # BLD: 0.5 10E9/L
BLASTS # BLD: 0.7 10E9/L
BLASTS # BLD: 1.4 10E9/L
BLASTS # BLD: 1.8 10E9/L
BLASTS # BLD: 12.8 10E9/L
BLASTS # BLD: 15.4 10E9/L
BLASTS # BLD: 16.1 10E9/L
BLASTS # BLD: 18.9 10E9/L
BLASTS # BLD: 2 10E9/L
BLASTS # BLD: 2.3 10E9/L
BLASTS # BLD: 2.4 10E9/L
BLASTS # BLD: 2.4 10E9/L
BLASTS # BLD: 2.7 10E9/L
BLASTS # BLD: 2.9 10E9/L
BLASTS # BLD: 21.7 10E9/L
BLASTS # BLD: 26.6 10E9/L
BLASTS # BLD: 3.1 10E9/L
BLASTS # BLD: 33.8 10E9/L
BLASTS # BLD: 4.4 10E9/L
BLASTS # BLD: 5.5 10E9/L
BLASTS # BLD: 7.9 10E9/L
BLASTS # BLD: 8.9 10E9/L
BLASTS BLD QL SMEAR: 1 %
BLASTS BLD QL SMEAR: 1 %
BLASTS BLD QL SMEAR: 1.8 %
BLASTS BLD QL SMEAR: 16.8 %
BLASTS BLD QL SMEAR: 17.4 %
BLASTS BLD QL SMEAR: 19.1 %
BLASTS BLD QL SMEAR: 23.2 %
BLASTS BLD QL SMEAR: 24.6 %
BLASTS BLD QL SMEAR: 25.2 %
BLASTS BLD QL SMEAR: 26 %
BLASTS BLD QL SMEAR: 26.1 %
BLASTS BLD QL SMEAR: 29.2 %
BLASTS BLD QL SMEAR: 3 %
BLASTS BLD QL SMEAR: 3 %
BLASTS BLD QL SMEAR: 31.3 %
BLASTS BLD QL SMEAR: 33.1 %
BLASTS BLD QL SMEAR: 34.2 %
BLASTS BLD QL SMEAR: 35 %
BLASTS BLD QL SMEAR: 35 %
BLASTS BLD QL SMEAR: 35.1 %
BLASTS BLD QL SMEAR: 37 %
BLASTS BLD QL SMEAR: 43.9 %
BLASTS BLD QL SMEAR: 46.4 %
BLASTS BLD QL SMEAR: 47.5 %
BLASTS BLD QL SMEAR: 7 %
BLASTS NFR BLD MANUAL: 2 %
BLD GP AB SCN SERPL QL: NORMAL
BLD PROD TYP BPU: NORMAL
BLD UNIT ID BPU: 0
BLOOD BANK CMNT PATIENT-IMP: NORMAL
BLOOD COMPONENT TYPE: NORMAL
BLOOD PRODUCT CODE: NORMAL
BPU ID: NORMAL
BUN SERPL-MCNC: 14 MG/DL (ref 7–30)
BUN SERPL-MCNC: 16 MG/DL (ref 7–30)
BUN SERPL-MCNC: 17 MG/DL (ref 7–30)
BUN SERPL-MCNC: 18 MG/DL (ref 7–30)
BUN SERPL-MCNC: 19 MG/DL (ref 7–30)
BUN SERPL-MCNC: 20 MG/DL (ref 7–30)
BUN SERPL-MCNC: 21 MG/DL (ref 7–30)
BUN SERPL-MCNC: 22 MG/DL (ref 7–30)
BUN SERPL-MCNC: 23 MG/DL (ref 7–30)
BUN SERPL-MCNC: 24 MG/DL (ref 7–30)
BUN SERPL-MCNC: 25 MG/DL (ref 7–30)
BUN SERPL-MCNC: 26 MG/DL (ref 7–30)
BUN SERPL-MCNC: 27 MG/DL (ref 7–30)
BUN SERPL-MCNC: 27 MG/DL (ref 7–30)
BUN SERPL-MCNC: 28 MG/DL (ref 7–30)
BUN SERPL-MCNC: 29 MG/DL (ref 7–30)
BUN SERPL-MCNC: 29 MG/DL (ref 7–30)
BUN SERPL-MCNC: 31 MG/DL (ref 7–30)
BUN SERPL-MCNC: 35 MG/DL (ref 7–30)
BUN SERPL-MCNC: 35 MG/DL (ref 7–30)
BUN SERPL-MCNC: 39 MG/DL (ref 7–30)
BUN SERPL-MCNC: 40 MG/DL (ref 7–30)
BW-1: NORMAL
BW-2: NORMAL
C*: NORMAL
C*LOCUS SEROLOGIC EQUIVALENT: 6
C*LOCUS: NORMAL
C*SEROLOGIC EQUIVALENT: 7
CALCIUM SERPL-MCNC: 7.8 MG/DL (ref 8.5–10.1)
CALCIUM SERPL-MCNC: 7.8 MG/DL (ref 8.5–10.1)
CALCIUM SERPL-MCNC: 7.9 MG/DL (ref 8.5–10.1)
CALCIUM SERPL-MCNC: 7.9 MG/DL (ref 8.5–10.1)
CALCIUM SERPL-MCNC: 8 MG/DL (ref 8.5–10.1)
CALCIUM SERPL-MCNC: 8.1 MG/DL (ref 8.5–10.1)
CALCIUM SERPL-MCNC: 8.2 MG/DL (ref 8.5–10.1)
CALCIUM SERPL-MCNC: 8.3 MG/DL (ref 8.5–10.1)
CALCIUM SERPL-MCNC: 8.4 MG/DL (ref 8.5–10.1)
CALCIUM SERPL-MCNC: 8.5 MG/DL (ref 8.5–10.1)
CALCIUM SERPL-MCNC: 8.5 MG/DL (ref 8.5–10.1)
CALCIUM SERPL-MCNC: 8.6 MG/DL (ref 8.5–10.1)
CALCIUM SERPL-MCNC: 8.7 MG/DL (ref 8.5–10.1)
CALCIUM SERPL-MCNC: 8.8 MG/DL (ref 8.5–10.1)
CALCIUM SERPL-MCNC: 8.9 MG/DL (ref 8.5–10.1)
CALCIUM SERPL-MCNC: 9 MG/DL (ref 8.5–10.1)
CALCIUM SERPL-MCNC: 9.1 MG/DL (ref 8.5–10.1)
CALCIUM SERPL-MCNC: 9.1 MG/DL (ref 8.5–10.1)
CALCIUM SERPL-MCNC: 9.2 MG/DL (ref 8.5–10.1)
CALCIUM SERPL-MCNC: 9.3 MG/DL (ref 8.5–10.1)
CALCIUM SERPL-MCNC: 9.4 MG/DL (ref 8.5–10.1)
CALCIUM SERPL-MCNC: 9.5 MG/DL (ref 8.5–10.1)
CALCIUM SERPL-MCNC: 9.5 MG/DL (ref 8.5–10.1)
CHLORIDE BLD-SCNC: 100 MMOL/L (ref 94–109)
CHLORIDE BLD-SCNC: 101 MMOL/L (ref 94–109)
CHLORIDE BLD-SCNC: 101 MMOL/L (ref 94–109)
CHLORIDE BLD-SCNC: 102 MMOL/L (ref 94–109)
CHLORIDE BLD-SCNC: 103 MMOL/L (ref 94–109)
CHLORIDE BLD-SCNC: 104 MMOL/L (ref 94–109)
CHLORIDE BLD-SCNC: 105 MMOL/L (ref 94–109)
CHLORIDE BLD-SCNC: 106 MMOL/L (ref 94–109)
CHLORIDE BLD-SCNC: 107 MMOL/L (ref 94–109)
CHLORIDE BLD-SCNC: 108 MMOL/L (ref 94–109)
CHLORIDE BLD-SCNC: 108 MMOL/L (ref 94–109)
CHLORIDE BLD-SCNC: 109 MMOL/L (ref 94–109)
CHLORIDE SERPL-SCNC: 100 MMOL/L (ref 94–109)
CHLORIDE SERPL-SCNC: 101 MMOL/L (ref 94–109)
CHLORIDE SERPL-SCNC: 102 MMOL/L (ref 94–109)
CHLORIDE SERPL-SCNC: 103 MMOL/L (ref 94–109)
CHLORIDE SERPL-SCNC: 104 MMOL/L (ref 94–109)
CHLORIDE SERPL-SCNC: 105 MMOL/L (ref 94–109)
CHLORIDE SERPL-SCNC: 106 MMOL/L (ref 94–109)
CHLORIDE SERPL-SCNC: 107 MMOL/L (ref 94–109)
CHLORIDE SERPL-SCNC: 108 MMOL/L (ref 94–109)
CHLORIDE SERPL-SCNC: 109 MMOL/L (ref 94–109)
CHLORIDE SERPL-SCNC: 96 MMOL/L (ref 94–109)
CMV IGG SERPL QL IA: 0.3 AI (ref 0–0.8)
CO2 SERPL-SCNC: 22 MMOL/L (ref 20–32)
CO2 SERPL-SCNC: 22 MMOL/L (ref 20–32)
CO2 SERPL-SCNC: 23 MMOL/L (ref 20–32)
CO2 SERPL-SCNC: 24 MMOL/L (ref 20–32)
CO2 SERPL-SCNC: 24 MMOL/L (ref 20–32)
CO2 SERPL-SCNC: 25 MMOL/L (ref 20–32)
CO2 SERPL-SCNC: 26 MMOL/L (ref 20–32)
CO2 SERPL-SCNC: 27 MMOL/L (ref 20–32)
CO2 SERPL-SCNC: 28 MMOL/L (ref 20–32)
CO2 SERPL-SCNC: 29 MMOL/L (ref 20–32)
CO2 SERPL-SCNC: 30 MMOL/L (ref 20–32)
CO2 SERPL-SCNC: 31 MMOL/L (ref 20–32)
CO2 SERPL-SCNC: 32 MMOL/L (ref 20–32)
CODING SYSTEM: NORMAL
COLOR FLD: NORMAL
COLOR UR AUTO: YELLOW
COPATH REPORT: NORMAL
CREAT SERPL-MCNC: 0.75 MG/DL (ref 0.66–1.25)
CREAT SERPL-MCNC: 0.76 MG/DL (ref 0.66–1.25)
CREAT SERPL-MCNC: 0.77 MG/DL (ref 0.66–1.25)
CREAT SERPL-MCNC: 0.78 MG/DL (ref 0.66–1.25)
CREAT SERPL-MCNC: 0.78 MG/DL (ref 0.66–1.25)
CREAT SERPL-MCNC: 0.8 MG/DL (ref 0.66–1.25)
CREAT SERPL-MCNC: 0.81 MG/DL (ref 0.66–1.25)
CREAT SERPL-MCNC: 0.82 MG/DL (ref 0.66–1.25)
CREAT SERPL-MCNC: 0.83 MG/DL (ref 0.66–1.25)
CREAT SERPL-MCNC: 0.84 MG/DL (ref 0.66–1.25)
CREAT SERPL-MCNC: 0.85 MG/DL (ref 0.66–1.25)
CREAT SERPL-MCNC: 0.86 MG/DL (ref 0.66–1.25)
CREAT SERPL-MCNC: 0.87 MG/DL (ref 0.66–1.25)
CREAT SERPL-MCNC: 0.88 MG/DL (ref 0.66–1.25)
CREAT SERPL-MCNC: 0.89 MG/DL (ref 0.66–1.25)
CREAT SERPL-MCNC: 0.9 MG/DL (ref 0.66–1.25)
CREAT SERPL-MCNC: 0.92 MG/DL (ref 0.66–1.25)
CREAT SERPL-MCNC: 0.93 MG/DL (ref 0.66–1.25)
CREAT SERPL-MCNC: 0.93 MG/DL (ref 0.66–1.25)
CREAT SERPL-MCNC: 0.94 MG/DL (ref 0.66–1.25)
CREAT SERPL-MCNC: 0.96 MG/DL (ref 0.66–1.25)
CREAT SERPL-MCNC: 0.97 MG/DL (ref 0.66–1.25)
CREAT SERPL-MCNC: 0.97 MG/DL (ref 0.66–1.25)
CREAT SERPL-MCNC: 0.98 MG/DL (ref 0.66–1.25)
CREAT SERPL-MCNC: 0.99 MG/DL (ref 0.66–1.25)
CREAT SERPL-MCNC: 1 MG/DL (ref 0.66–1.25)
CREAT SERPL-MCNC: 1 MG/DL (ref 0.66–1.25)
CREAT SERPL-MCNC: 1.01 MG/DL (ref 0.66–1.25)
CREAT SERPL-MCNC: 1.04 MG/DL (ref 0.66–1.25)
CREAT SERPL-MCNC: 1.05 MG/DL (ref 0.66–1.25)
CREAT SERPL-MCNC: 1.06 MG/DL (ref 0.66–1.25)
CREAT SERPL-MCNC: 1.06 MG/DL (ref 0.66–1.25)
CREAT SERPL-MCNC: 1.07 MG/DL (ref 0.66–1.25)
CREAT SERPL-MCNC: 1.08 MG/DL (ref 0.66–1.25)
CREAT SERPL-MCNC: 1.08 MG/DL (ref 0.66–1.25)
CREAT SERPL-MCNC: 1.1 MG/DL (ref 0.66–1.25)
CREAT SERPL-MCNC: 1.1 MG/DL (ref 0.66–1.25)
CREAT SERPL-MCNC: 1.13 MG/DL (ref 0.66–1.25)
CREAT SERPL-MCNC: 1.15 MG/DL (ref 0.66–1.25)
CREAT SERPL-MCNC: 1.21 MG/DL (ref 0.66–1.25)
CREAT SERPL-MCNC: 1.22 MG/DL (ref 0.66–1.25)
CREAT SERPL-MCNC: 1.25 MG/DL (ref 0.66–1.25)
CREAT SERPL-MCNC: 1.26 MG/DL (ref 0.66–1.25)
CREAT SERPL-MCNC: 1.45 MG/DL (ref 0.66–1.25)
CREAT SERPL-MCNC: 1.66 MG/DL (ref 0.66–1.25)
CREAT SERPL-MCNC: 1.75 MG/DL (ref 0.66–1.25)
CREAT SERPL-MCNC: 1.86 MG/DL (ref 0.66–1.25)
CROSSMATCH: NORMAL
CRP SERPL-MCNC: 120 MG/L (ref 0–8)
CRYSTALS SNV MICRO: NORMAL
CULTURE HARVEST COMPLETE DATE: NORMAL
D DIMER PPP FEU-MCNC: 6.24 UG/ML FEU (ref 0–0.5)
DIFFERENTIAL METHOD BLD: ABNORMAL
DPA1*: NORMAL
DPA1*LOCUS: NORMAL
DPB1*: NORMAL
DPB1*LOCUS NMDP: NORMAL
DPB1*LOCUS: NORMAL
DPB1*NMDP: NORMAL
DQA1*LOCUS: NORMAL
DQB1*LOCUS SEROLOGIC EQUIVALENT: 9
DQB1*LOCUS: NORMAL
DRB1*LOCUS SEROLOGIC EQUIVALENT: 7
DRB1*LOCUS: NORMAL
DRB4*LOCUS SEROLOGIC EQUIVALENT: NORMAL
DRB4*LOCUS: NORMAL
DRSSO TEST METHOD: NORMAL
EBV VCA IGG SER QL IA: >8 AI (ref 0–0.8)
ELLIPTOCYTES BLD QL SMEAR: SLIGHT
EOSINOPHIL # BLD AUTO: 0 10E3/UL (ref 0–0.7)
EOSINOPHIL # BLD AUTO: 0 10E9/L (ref 0–0.7)
EOSINOPHIL # BLD AUTO: 0.1 10E9/L (ref 0–0.7)
EOSINOPHIL # BLD AUTO: 0.2 10E9/L (ref 0–0.7)
EOSINOPHIL # BLD AUTO: 0.3 10E9/L (ref 0–0.7)
EOSINOPHIL # BLD AUTO: 0.4 10E3/UL (ref 0–0.7)
EOSINOPHIL # BLD AUTO: 0.4 10E9/L (ref 0–0.7)
EOSINOPHIL # BLD AUTO: 0.7 10E9/L (ref 0–0.7)
EOSINOPHIL # BLD AUTO: 0.8 10E9/L (ref 0–0.7)
EOSINOPHIL # BLD AUTO: 1.2 10E9/L (ref 0–0.7)
EOSINOPHIL # BLD AUTO: 1.2 10E9/L (ref 0–0.7)
EOSINOPHIL # BLD AUTO: 1.6 10E9/L (ref 0–0.7)
EOSINOPHIL # BLD AUTO: 1.6 10E9/L (ref 0–0.7)
EOSINOPHIL # BLD AUTO: 1.7 10E9/L (ref 0–0.7)
EOSINOPHIL # BLD AUTO: 2 10E9/L (ref 0–0.7)
EOSINOPHIL # BLD AUTO: 2 10E9/L (ref 0–0.7)
EOSINOPHIL # BLD AUTO: 2.1 10E9/L (ref 0–0.7)
EOSINOPHIL # BLD AUTO: 2.1 10E9/L (ref 0–0.7)
EOSINOPHIL # BLD AUTO: 2.3 10E9/L (ref 0–0.7)
EOSINOPHIL # BLD AUTO: 2.3 10E9/L (ref 0–0.7)
EOSINOPHIL # BLD AUTO: 2.5 10E9/L (ref 0–0.7)
EOSINOPHIL # BLD AUTO: 3.3 10E9/L (ref 0–0.7)
EOSINOPHIL # BLD AUTO: 3.6 10E9/L (ref 0–0.7)
EOSINOPHIL # BLD AUTO: 4.2 10E9/L (ref 0–0.7)
EOSINOPHIL # BLD AUTO: 6.2 10E9/L (ref 0–0.7)
EOSINOPHIL # BLD AUTO: 7.1 10E9/L (ref 0–0.7)
EOSINOPHIL # BLD AUTO: 7.3 10E9/L (ref 0–0.7)
EOSINOPHIL # BLD MANUAL: 0 10E3/UL (ref 0–0.7)
EOSINOPHIL # BLD MANUAL: 0.1 10E3/UL (ref 0–0.7)
EOSINOPHIL # BLD MANUAL: 0.3 10E3/UL (ref 0–0.7)
EOSINOPHIL # BLD MANUAL: 0.4 10E3/UL (ref 0–0.7)
EOSINOPHIL NFR BLD AUTO: 0 %
EOSINOPHIL NFR BLD AUTO: 0.9 %
EOSINOPHIL NFR BLD AUTO: 0.9 %
EOSINOPHIL NFR BLD AUTO: 1 %
EOSINOPHIL NFR BLD AUTO: 1.8 %
EOSINOPHIL NFR BLD AUTO: 10 %
EOSINOPHIL NFR BLD AUTO: 10 %
EOSINOPHIL NFR BLD AUTO: 10.5 %
EOSINOPHIL NFR BLD AUTO: 10.7 %
EOSINOPHIL NFR BLD AUTO: 11 %
EOSINOPHIL NFR BLD AUTO: 12 %
EOSINOPHIL NFR BLD AUTO: 12.6 %
EOSINOPHIL NFR BLD AUTO: 14 %
EOSINOPHIL NFR BLD AUTO: 15.5 %
EOSINOPHIL NFR BLD AUTO: 2 %
EOSINOPHIL NFR BLD AUTO: 2 %
EOSINOPHIL NFR BLD AUTO: 2.6 %
EOSINOPHIL NFR BLD AUTO: 3.3 %
EOSINOPHIL NFR BLD AUTO: 3.5 %
EOSINOPHIL NFR BLD AUTO: 3.6 %
EOSINOPHIL NFR BLD AUTO: 4 %
EOSINOPHIL NFR BLD AUTO: 4 %
EOSINOPHIL NFR BLD AUTO: 5 %
EOSINOPHIL NFR BLD AUTO: 5 %
EOSINOPHIL NFR BLD AUTO: 5.3 %
EOSINOPHIL NFR BLD AUTO: 5.4 %
EOSINOPHIL NFR BLD AUTO: 5.8 %
EOSINOPHIL NFR BLD AUTO: 6.1 %
EOSINOPHIL NFR BLD AUTO: 6.3 %
EOSINOPHIL NFR BLD AUTO: 7 %
EOSINOPHIL NFR BLD AUTO: 7 %
EOSINOPHIL NFR BLD AUTO: 8 %
EOSINOPHIL NFR BLD AUTO: 8 %
EOSINOPHIL NFR BLD AUTO: 9 %
EOSINOPHIL NFR BLD MANUAL: 0 %
EOSINOPHIL NFR BLD MANUAL: 1 %
EOSINOPHIL NFR BLD MANUAL: 2 %
EOSINOPHIL NFR BLD MANUAL: 3 %
EOSINOPHIL NFR BLD MANUAL: 3 %
EOSINOPHIL NFR BLD MANUAL: 5 %
EOSINOPHIL NFR BLD MANUAL: 5 %
EOSINOPHIL NFR BLD MANUAL: 8 %
ERYTHROCYTE [DISTWIDTH] IN BLOOD BY AUTOMATED COUNT: 13.3 % (ref 10–15)
ERYTHROCYTE [DISTWIDTH] IN BLOOD BY AUTOMATED COUNT: 13.4 % (ref 10–15)
ERYTHROCYTE [DISTWIDTH] IN BLOOD BY AUTOMATED COUNT: 13.4 % (ref 10–15)
ERYTHROCYTE [DISTWIDTH] IN BLOOD BY AUTOMATED COUNT: 13.6 % (ref 10–15)
ERYTHROCYTE [DISTWIDTH] IN BLOOD BY AUTOMATED COUNT: 13.7 % (ref 10–15)
ERYTHROCYTE [DISTWIDTH] IN BLOOD BY AUTOMATED COUNT: 13.8 % (ref 10–15)
ERYTHROCYTE [DISTWIDTH] IN BLOOD BY AUTOMATED COUNT: 13.8 % (ref 10–15)
ERYTHROCYTE [DISTWIDTH] IN BLOOD BY AUTOMATED COUNT: 13.9 % (ref 10–15)
ERYTHROCYTE [DISTWIDTH] IN BLOOD BY AUTOMATED COUNT: 14 % (ref 10–15)
ERYTHROCYTE [DISTWIDTH] IN BLOOD BY AUTOMATED COUNT: 14.3 % (ref 10–15)
ERYTHROCYTE [DISTWIDTH] IN BLOOD BY AUTOMATED COUNT: 14.4 % (ref 10–15)
ERYTHROCYTE [DISTWIDTH] IN BLOOD BY AUTOMATED COUNT: 14.5 % (ref 10–15)
ERYTHROCYTE [DISTWIDTH] IN BLOOD BY AUTOMATED COUNT: 14.6 % (ref 10–15)
ERYTHROCYTE [DISTWIDTH] IN BLOOD BY AUTOMATED COUNT: 14.7 % (ref 10–15)
ERYTHROCYTE [DISTWIDTH] IN BLOOD BY AUTOMATED COUNT: 14.7 % (ref 10–15)
ERYTHROCYTE [DISTWIDTH] IN BLOOD BY AUTOMATED COUNT: 14.8 % (ref 10–15)
ERYTHROCYTE [DISTWIDTH] IN BLOOD BY AUTOMATED COUNT: 14.9 % (ref 10–15)
ERYTHROCYTE [DISTWIDTH] IN BLOOD BY AUTOMATED COUNT: 15 % (ref 10–15)
ERYTHROCYTE [DISTWIDTH] IN BLOOD BY AUTOMATED COUNT: 15.1 % (ref 10–15)
ERYTHROCYTE [DISTWIDTH] IN BLOOD BY AUTOMATED COUNT: 15.3 % (ref 10–15)
ERYTHROCYTE [DISTWIDTH] IN BLOOD BY AUTOMATED COUNT: 15.3 % (ref 10–15)
ERYTHROCYTE [DISTWIDTH] IN BLOOD BY AUTOMATED COUNT: 15.4 % (ref 10–15)
ERYTHROCYTE [DISTWIDTH] IN BLOOD BY AUTOMATED COUNT: 15.4 % (ref 10–15)
ERYTHROCYTE [DISTWIDTH] IN BLOOD BY AUTOMATED COUNT: 15.5 % (ref 10–15)
ERYTHROCYTE [DISTWIDTH] IN BLOOD BY AUTOMATED COUNT: 15.6 % (ref 10–15)
ERYTHROCYTE [DISTWIDTH] IN BLOOD BY AUTOMATED COUNT: 15.7 % (ref 10–15)
ERYTHROCYTE [DISTWIDTH] IN BLOOD BY AUTOMATED COUNT: 15.7 % (ref 10–15)
ERYTHROCYTE [DISTWIDTH] IN BLOOD BY AUTOMATED COUNT: 15.8 % (ref 10–15)
ERYTHROCYTE [DISTWIDTH] IN BLOOD BY AUTOMATED COUNT: 15.9 % (ref 10–15)
ERYTHROCYTE [DISTWIDTH] IN BLOOD BY AUTOMATED COUNT: 16 % (ref 10–15)
ERYTHROCYTE [DISTWIDTH] IN BLOOD BY AUTOMATED COUNT: 16.1 % (ref 10–15)
ERYTHROCYTE [DISTWIDTH] IN BLOOD BY AUTOMATED COUNT: 16.2 % (ref 10–15)
ERYTHROCYTE [DISTWIDTH] IN BLOOD BY AUTOMATED COUNT: 16.3 % (ref 10–15)
ERYTHROCYTE [DISTWIDTH] IN BLOOD BY AUTOMATED COUNT: 16.4 % (ref 10–15)
ERYTHROCYTE [DISTWIDTH] IN BLOOD BY AUTOMATED COUNT: 16.4 % (ref 10–15)
ERYTHROCYTE [DISTWIDTH] IN BLOOD BY AUTOMATED COUNT: 16.5 % (ref 10–15)
ERYTHROCYTE [DISTWIDTH] IN BLOOD BY AUTOMATED COUNT: 16.6 % (ref 10–15)
ERYTHROCYTE [DISTWIDTH] IN BLOOD BY AUTOMATED COUNT: 16.7 % (ref 10–15)
ERYTHROCYTE [DISTWIDTH] IN BLOOD BY AUTOMATED COUNT: 16.7 % (ref 10–15)
ERYTHROCYTE [DISTWIDTH] IN BLOOD BY AUTOMATED COUNT: 17.2 % (ref 10–15)
ERYTHROCYTE [DISTWIDTH] IN BLOOD BY AUTOMATED COUNT: 17.9 % (ref 10–15)
ERYTHROCYTE [DISTWIDTH] IN BLOOD BY AUTOMATED COUNT: 18.2 % (ref 10–15)
ERYTHROCYTE [DISTWIDTH] IN BLOOD BY AUTOMATED COUNT: 18.6 % (ref 10–15)
ERYTHROCYTE [DISTWIDTH] IN BLOOD BY AUTOMATED COUNT: 19.6 % (ref 10–15)
ERYTHROCYTE [DISTWIDTH] IN BLOOD BY AUTOMATED COUNT: 20 % (ref 10–15)
ERYTHROCYTE [DISTWIDTH] IN BLOOD BY AUTOMATED COUNT: 20.2 % (ref 10–15)
ERYTHROCYTE [DISTWIDTH] IN BLOOD BY AUTOMATED COUNT: 20.6 % (ref 10–15)
ERYTHROCYTE [SEDIMENTATION RATE] IN BLOOD BY WESTERGREN METHOD: 76 MM/H (ref 0–20)
FASTING STATUS PATIENT QL REPORTED: YES
FIBRINOGEN PPP-MCNC: 494 MG/DL (ref 200–420)
FIBRINOGEN PPP-MCNC: 515 MG/DL (ref 200–420)
FIBRINOGEN PPP-MCNC: 549 MG/DL (ref 200–420)
FIBRINOGEN PPP-MCNC: 549 MG/DL (ref 200–420)
FIBRINOGEN PPP-MCNC: 562 MG/DL (ref 200–420)
FIBRINOGEN PPP-MCNC: 567 MG/DL (ref 200–420)
FIBRINOGEN PPP-MCNC: 574 MG/DL (ref 200–420)
FIBRINOGEN PPP-MCNC: 575 MG/DL (ref 200–420)
FIBRINOGEN PPP-MCNC: 576 MG/DL (ref 200–420)
FIBRINOGEN PPP-MCNC: 583 MG/DL (ref 200–420)
FIBRINOGEN PPP-MCNC: 603 MG/DL (ref 200–420)
FIBRINOGEN PPP-MCNC: 633 MG/DL (ref 200–420)
FIBRINOGEN PPP-MCNC: 645 MG/DL (ref 200–420)
FIBRINOGEN PPP-MCNC: 650 MG/DL (ref 200–420)
FIBRINOGEN PPP-MCNC: 659 MG/DL (ref 200–420)
FIBRINOGEN PPP-MCNC: 659 MG/DL (ref 200–420)
FLUAV RNA RESP QL NAA+PROBE: NEGATIVE
FLUBV RNA RESP QL NAA+PROBE: NEGATIVE
GFR SERPL CREATININE-BSD FRML MDRD: 35 ML/MIN/{1.73_M2}
GFR SERPL CREATININE-BSD FRML MDRD: 38 ML/MIN/{1.73_M2}
GFR SERPL CREATININE-BSD FRML MDRD: 41 ML/MIN/{1.73_M2}
GFR SERPL CREATININE-BSD FRML MDRD: 48 ML/MIN/{1.73_M2}
GFR SERPL CREATININE-BSD FRML MDRD: 57 ML/MIN/{1.73_M2}
GFR SERPL CREATININE-BSD FRML MDRD: 57 ML/MIN/{1.73_M2}
GFR SERPL CREATININE-BSD FRML MDRD: 59 ML/MIN/{1.73_M2}
GFR SERPL CREATININE-BSD FRML MDRD: 60 ML/MIN/{1.73_M2}
GFR SERPL CREATININE-BSD FRML MDRD: 63 ML/MIN/{1.73_M2}
GFR SERPL CREATININE-BSD FRML MDRD: 65 ML/MIN/{1.73_M2}
GFR SERPL CREATININE-BSD FRML MDRD: 67 ML/MIN/1.73M2
GFR SERPL CREATININE-BSD FRML MDRD: 67 ML/MIN/{1.73_M2}
GFR SERPL CREATININE-BSD FRML MDRD: 68 ML/MIN/{1.73_M2}
GFR SERPL CREATININE-BSD FRML MDRD: 68 ML/MIN/{1.73_M2}
GFR SERPL CREATININE-BSD FRML MDRD: 69 ML/MIN/{1.73_M2}
GFR SERPL CREATININE-BSD FRML MDRD: 70 ML/MIN/{1.73_M2}
GFR SERPL CREATININE-BSD FRML MDRD: 70 ML/MIN/{1.73_M2}
GFR SERPL CREATININE-BSD FRML MDRD: 71 ML/MIN/{1.73_M2}
GFR SERPL CREATININE-BSD FRML MDRD: 72 ML/MIN/{1.73_M2}
GFR SERPL CREATININE-BSD FRML MDRD: 74 ML/MIN/{1.73_M2}
GFR SERPL CREATININE-BSD FRML MDRD: 75 ML/MIN/{1.73_M2}
GFR SERPL CREATININE-BSD FRML MDRD: 76 ML/MIN/1.73M2
GFR SERPL CREATININE-BSD FRML MDRD: 76 ML/MIN/{1.73_M2}
GFR SERPL CREATININE-BSD FRML MDRD: 77 ML/MIN/1.73M2
GFR SERPL CREATININE-BSD FRML MDRD: 77 ML/MIN/1.73M2
GFR SERPL CREATININE-BSD FRML MDRD: 77 ML/MIN/{1.73_M2}
GFR SERPL CREATININE-BSD FRML MDRD: 78 ML/MIN/{1.73_M2}
GFR SERPL CREATININE-BSD FRML MDRD: 81 ML/MIN/1.73M2
GFR SERPL CREATININE-BSD FRML MDRD: 82 ML/MIN/1.73M2
GFR SERPL CREATININE-BSD FRML MDRD: 82 ML/MIN/{1.73_M2}
GFR SERPL CREATININE-BSD FRML MDRD: 82 ML/MIN/{1.73_M2}
GFR SERPL CREATININE-BSD FRML MDRD: 83 ML/MIN/1.73M2
GFR SERPL CREATININE-BSD FRML MDRD: 83 ML/MIN/{1.73_M2}
GFR SERPL CREATININE-BSD FRML MDRD: 85 ML/MIN/1.73M2
GFR SERPL CREATININE-BSD FRML MDRD: 86 ML/MIN/1.73M2
GFR SERPL CREATININE-BSD FRML MDRD: 86 ML/MIN/{1.73_M2}
GFR SERPL CREATININE-BSD FRML MDRD: 86 ML/MIN/{1.73_M2}
GFR SERPL CREATININE-BSD FRML MDRD: 87 ML/MIN/1.73M2
GFR SERPL CREATININE-BSD FRML MDRD: 88 ML/MIN/1.73M2
GFR SERPL CREATININE-BSD FRML MDRD: 89 ML/MIN/1.73M2
GFR SERPL CREATININE-BSD FRML MDRD: 90 ML/MIN/1.73M2
GFR SERPL CREATININE-BSD FRML MDRD: >90 ML/MIN/1.73M2
GLUCOSE BLD-MCNC: 100 MG/DL (ref 70–99)
GLUCOSE BLD-MCNC: 102 MG/DL (ref 70–99)
GLUCOSE BLD-MCNC: 104 MG/DL (ref 70–99)
GLUCOSE BLD-MCNC: 108 MG/DL (ref 70–99)
GLUCOSE BLD-MCNC: 108 MG/DL (ref 70–99)
GLUCOSE BLD-MCNC: 113 MG/DL (ref 70–99)
GLUCOSE BLD-MCNC: 115 MG/DL (ref 70–99)
GLUCOSE BLD-MCNC: 115 MG/DL (ref 70–99)
GLUCOSE BLD-MCNC: 116 MG/DL (ref 70–99)
GLUCOSE BLD-MCNC: 117 MG/DL (ref 70–99)
GLUCOSE BLD-MCNC: 118 MG/DL (ref 70–99)
GLUCOSE BLD-MCNC: 119 MG/DL (ref 70–99)
GLUCOSE BLD-MCNC: 121 MG/DL (ref 70–99)
GLUCOSE BLD-MCNC: 121 MG/DL (ref 70–99)
GLUCOSE BLD-MCNC: 123 MG/DL (ref 70–99)
GLUCOSE BLD-MCNC: 124 MG/DL (ref 70–99)
GLUCOSE BLD-MCNC: 125 MG/DL (ref 70–99)
GLUCOSE BLD-MCNC: 127 MG/DL (ref 70–99)
GLUCOSE BLD-MCNC: 127 MG/DL (ref 70–99)
GLUCOSE BLD-MCNC: 128 MG/DL (ref 70–99)
GLUCOSE BLD-MCNC: 128 MG/DL (ref 70–99)
GLUCOSE BLD-MCNC: 130 MG/DL (ref 70–99)
GLUCOSE BLD-MCNC: 133 MG/DL (ref 70–99)
GLUCOSE BLD-MCNC: 135 MG/DL (ref 70–99)
GLUCOSE BLD-MCNC: 136 MG/DL (ref 70–99)
GLUCOSE BLD-MCNC: 137 MG/DL (ref 70–99)
GLUCOSE BLD-MCNC: 137 MG/DL (ref 70–99)
GLUCOSE BLD-MCNC: 138 MG/DL (ref 70–99)
GLUCOSE BLD-MCNC: 138 MG/DL (ref 70–99)
GLUCOSE BLD-MCNC: 139 MG/DL (ref 70–99)
GLUCOSE BLD-MCNC: 144 MG/DL (ref 70–99)
GLUCOSE BLD-MCNC: 145 MG/DL (ref 70–99)
GLUCOSE BLD-MCNC: 146 MG/DL (ref 70–99)
GLUCOSE BLD-MCNC: 147 MG/DL (ref 70–99)
GLUCOSE BLD-MCNC: 148 MG/DL (ref 70–99)
GLUCOSE BLD-MCNC: 149 MG/DL (ref 70–99)
GLUCOSE BLD-MCNC: 149 MG/DL (ref 70–99)
GLUCOSE BLD-MCNC: 155 MG/DL (ref 70–99)
GLUCOSE BLD-MCNC: 157 MG/DL (ref 70–99)
GLUCOSE BLD-MCNC: 158 MG/DL (ref 70–99)
GLUCOSE BLD-MCNC: 161 MG/DL (ref 70–99)
GLUCOSE BLD-MCNC: 171 MG/DL (ref 70–99)
GLUCOSE BLD-MCNC: 172 MG/DL (ref 70–99)
GLUCOSE BLD-MCNC: 176 MG/DL (ref 70–99)
GLUCOSE BLD-MCNC: 90 MG/DL (ref 70–99)
GLUCOSE BLD-MCNC: 93 MG/DL (ref 70–99)
GLUCOSE BLD-MCNC: 93 MG/DL (ref 70–99)
GLUCOSE BLD-MCNC: 96 MG/DL (ref 70–99)
GLUCOSE BLD-MCNC: 99 MG/DL (ref 70–99)
GLUCOSE BLDC GLUCOMTR-MCNC: 122 MG/DL (ref 70–99)
GLUCOSE BLDC GLUCOMTR-MCNC: 124 MG/DL (ref 70–99)
GLUCOSE SERPL-MCNC: 101 MG/DL (ref 70–99)
GLUCOSE SERPL-MCNC: 102 MG/DL (ref 70–99)
GLUCOSE SERPL-MCNC: 102 MG/DL (ref 70–99)
GLUCOSE SERPL-MCNC: 103 MG/DL (ref 70–99)
GLUCOSE SERPL-MCNC: 105 MG/DL (ref 70–99)
GLUCOSE SERPL-MCNC: 106 MG/DL (ref 70–99)
GLUCOSE SERPL-MCNC: 107 MG/DL (ref 70–99)
GLUCOSE SERPL-MCNC: 109 MG/DL (ref 70–99)
GLUCOSE SERPL-MCNC: 110 MG/DL (ref 70–99)
GLUCOSE SERPL-MCNC: 113 MG/DL (ref 70–99)
GLUCOSE SERPL-MCNC: 114 MG/DL (ref 70–99)
GLUCOSE SERPL-MCNC: 116 MG/DL (ref 70–99)
GLUCOSE SERPL-MCNC: 119 MG/DL (ref 70–99)
GLUCOSE SERPL-MCNC: 119 MG/DL (ref 70–99)
GLUCOSE SERPL-MCNC: 124 MG/DL (ref 70–99)
GLUCOSE SERPL-MCNC: 130 MG/DL (ref 70–99)
GLUCOSE SERPL-MCNC: 133 MG/DL (ref 70–99)
GLUCOSE SERPL-MCNC: 134 MG/DL (ref 70–99)
GLUCOSE SERPL-MCNC: 135 MG/DL (ref 70–99)
GLUCOSE SERPL-MCNC: 145 MG/DL (ref 70–99)
GLUCOSE SERPL-MCNC: 161 MG/DL (ref 70–99)
GLUCOSE SERPL-MCNC: 207 MG/DL (ref 70–99)
GLUCOSE SERPL-MCNC: 85 MG/DL (ref 70–99)
GLUCOSE SERPL-MCNC: 86 MG/DL (ref 70–99)
GLUCOSE SERPL-MCNC: 87 MG/DL (ref 70–99)
GLUCOSE SERPL-MCNC: 88 MG/DL (ref 70–99)
GLUCOSE SERPL-MCNC: 91 MG/DL (ref 70–99)
GLUCOSE SERPL-MCNC: 92 MG/DL (ref 70–99)
GLUCOSE SERPL-MCNC: 92 MG/DL (ref 70–99)
GLUCOSE SERPL-MCNC: 93 MG/DL (ref 70–99)
GLUCOSE SERPL-MCNC: 94 MG/DL (ref 70–99)
GLUCOSE SERPL-MCNC: 94 MG/DL (ref 70–99)
GLUCOSE SERPL-MCNC: 96 MG/DL (ref 70–99)
GLUCOSE SERPL-MCNC: 99 MG/DL (ref 70–99)
GLUCOSE UR STRIP-MCNC: NEGATIVE MG/DL
GRAM STN SPEC: NORMAL
GRAM STN SPEC: NORMAL
HBV CORE AB SERPL QL IA: NONREACTIVE
HBV SURFACE AB SERPL IA-ACNC: 0.08 M[IU]/ML
HBV SURFACE AG SERPL QL IA: NONREACTIVE
HCO3 BLDV-SCNC: 26 MMOL/L (ref 21–28)
HCT VFR BLD AUTO: 14.6 % (ref 40–53)
HCT VFR BLD AUTO: 17.8 % (ref 40–53)
HCT VFR BLD AUTO: 17.9 % (ref 40–53)
HCT VFR BLD AUTO: 17.9 % (ref 40–53)
HCT VFR BLD AUTO: 18.1 % (ref 40–53)
HCT VFR BLD AUTO: 18.2 % (ref 40–53)
HCT VFR BLD AUTO: 18.6 % (ref 40–53)
HCT VFR BLD AUTO: 18.7 % (ref 40–53)
HCT VFR BLD AUTO: 18.9 % (ref 40–53)
HCT VFR BLD AUTO: 19.1 % (ref 40–53)
HCT VFR BLD AUTO: 19.2 % (ref 40–53)
HCT VFR BLD AUTO: 19.2 % (ref 40–53)
HCT VFR BLD AUTO: 19.3 % (ref 40–53)
HCT VFR BLD AUTO: 19.4 % (ref 40–53)
HCT VFR BLD AUTO: 19.5 % (ref 40–53)
HCT VFR BLD AUTO: 19.5 % (ref 40–53)
HCT VFR BLD AUTO: 19.6 % (ref 40–53)
HCT VFR BLD AUTO: 19.7 % (ref 40–53)
HCT VFR BLD AUTO: 19.7 % (ref 40–53)
HCT VFR BLD AUTO: 19.9 % (ref 40–53)
HCT VFR BLD AUTO: 20.1 % (ref 40–53)
HCT VFR BLD AUTO: 20.2 % (ref 40–53)
HCT VFR BLD AUTO: 20.3 % (ref 40–53)
HCT VFR BLD AUTO: 20.4 % (ref 40–53)
HCT VFR BLD AUTO: 20.5 % (ref 40–53)
HCT VFR BLD AUTO: 20.6 % (ref 40–53)
HCT VFR BLD AUTO: 20.6 % (ref 40–53)
HCT VFR BLD AUTO: 20.8 % (ref 40–53)
HCT VFR BLD AUTO: 20.9 % (ref 40–53)
HCT VFR BLD AUTO: 21.2 % (ref 40–53)
HCT VFR BLD AUTO: 21.4 % (ref 40–53)
HCT VFR BLD AUTO: 21.5 % (ref 40–53)
HCT VFR BLD AUTO: 21.6 % (ref 40–53)
HCT VFR BLD AUTO: 21.7 % (ref 40–53)
HCT VFR BLD AUTO: 21.7 % (ref 40–53)
HCT VFR BLD AUTO: 21.8 % (ref 40–53)
HCT VFR BLD AUTO: 22 % (ref 40–53)
HCT VFR BLD AUTO: 22.2 % (ref 40–53)
HCT VFR BLD AUTO: 22.2 % (ref 40–53)
HCT VFR BLD AUTO: 22.3 % (ref 40–53)
HCT VFR BLD AUTO: 22.4 % (ref 40–53)
HCT VFR BLD AUTO: 22.5 % (ref 40–53)
HCT VFR BLD AUTO: 22.7 % (ref 40–53)
HCT VFR BLD AUTO: 22.9 % (ref 40–53)
HCT VFR BLD AUTO: 23 % (ref 40–53)
HCT VFR BLD AUTO: 23 % (ref 40–53)
HCT VFR BLD AUTO: 23.1 % (ref 40–53)
HCT VFR BLD AUTO: 23.1 % (ref 40–53)
HCT VFR BLD AUTO: 23.2 % (ref 40–53)
HCT VFR BLD AUTO: 23.4 % (ref 40–53)
HCT VFR BLD AUTO: 23.5 % (ref 40–53)
HCT VFR BLD AUTO: 23.6 % (ref 40–53)
HCT VFR BLD AUTO: 23.6 % (ref 40–53)
HCT VFR BLD AUTO: 23.7 % (ref 40–53)
HCT VFR BLD AUTO: 23.8 % (ref 40–53)
HCT VFR BLD AUTO: 23.8 % (ref 40–53)
HCT VFR BLD AUTO: 24.1 % (ref 40–53)
HCT VFR BLD AUTO: 24.2 % (ref 40–53)
HCT VFR BLD AUTO: 24.2 % (ref 40–53)
HCT VFR BLD AUTO: 24.3 % (ref 40–53)
HCT VFR BLD AUTO: 24.4 % (ref 40–53)
HCT VFR BLD AUTO: 24.5 % (ref 40–53)
HCT VFR BLD AUTO: 24.6 % (ref 40–53)
HCT VFR BLD AUTO: 25.1 % (ref 40–53)
HCT VFR BLD AUTO: 25.1 % (ref 40–53)
HCT VFR BLD AUTO: 25.2 % (ref 40–53)
HCT VFR BLD AUTO: 25.3 % (ref 40–53)
HCT VFR BLD AUTO: 25.3 % (ref 40–53)
HCT VFR BLD AUTO: 25.5 % (ref 40–53)
HCT VFR BLD AUTO: 25.6 % (ref 40–53)
HCT VFR BLD AUTO: 25.7 % (ref 40–53)
HCT VFR BLD AUTO: 25.8 % (ref 40–53)
HCT VFR BLD AUTO: 25.9 % (ref 40–53)
HCT VFR BLD AUTO: 25.9 % (ref 40–53)
HCT VFR BLD AUTO: 26.1 % (ref 40–53)
HCT VFR BLD AUTO: 26.1 % (ref 40–53)
HCT VFR BLD AUTO: 26.4 % (ref 40–53)
HCT VFR BLD AUTO: 26.5 % (ref 40–53)
HCT VFR BLD AUTO: 26.6 % (ref 40–53)
HCT VFR BLD AUTO: 26.7 % (ref 40–53)
HCT VFR BLD AUTO: 26.8 % (ref 40–53)
HCT VFR BLD AUTO: 27.1 % (ref 40–53)
HCT VFR BLD AUTO: 27.4 % (ref 40–53)
HCT VFR BLD AUTO: 28.2 % (ref 40–53)
HCT VFR BLD AUTO: 28.3 % (ref 40–53)
HCV AB SERPL QL IA: NONREACTIVE
HGB BLD-MCNC: 4.8 G/DL (ref 13.3–17.7)
HGB BLD-MCNC: 5.6 G/DL (ref 13.3–17.7)
HGB BLD-MCNC: 5.8 G/DL (ref 13.3–17.7)
HGB BLD-MCNC: 5.9 G/DL (ref 13.3–17.7)
HGB BLD-MCNC: 6 G/DL (ref 13.3–17.7)
HGB BLD-MCNC: 6 G/DL (ref 13.3–17.7)
HGB BLD-MCNC: 6.2 G/DL (ref 13.3–17.7)
HGB BLD-MCNC: 6.2 G/DL (ref 13.3–17.7)
HGB BLD-MCNC: 6.3 G/DL (ref 13.3–17.7)
HGB BLD-MCNC: 6.4 G/DL (ref 13.3–17.7)
HGB BLD-MCNC: 6.5 G/DL (ref 13.3–17.7)
HGB BLD-MCNC: 6.6 G/DL (ref 13.3–17.7)
HGB BLD-MCNC: 6.6 G/DL (ref 13.3–17.7)
HGB BLD-MCNC: 6.7 G/DL (ref 13.3–17.7)
HGB BLD-MCNC: 6.8 G/DL (ref 13.3–17.7)
HGB BLD-MCNC: 6.9 G/DL (ref 13.3–17.7)
HGB BLD-MCNC: 6.9 G/DL (ref 13.3–17.7)
HGB BLD-MCNC: 7 G/DL (ref 13.3–17.7)
HGB BLD-MCNC: 7 G/DL (ref 13.3–17.7)
HGB BLD-MCNC: 7.1 G/DL (ref 13.3–17.7)
HGB BLD-MCNC: 7.2 G/DL (ref 13.3–17.7)
HGB BLD-MCNC: 7.3 G/DL (ref 13.3–17.7)
HGB BLD-MCNC: 7.3 G/DL (ref 13.3–17.7)
HGB BLD-MCNC: 7.4 G/DL (ref 13.3–17.7)
HGB BLD-MCNC: 7.5 G/DL (ref 13.3–17.7)
HGB BLD-MCNC: 7.6 G/DL (ref 13.3–17.7)
HGB BLD-MCNC: 7.7 G/DL (ref 13.3–17.7)
HGB BLD-MCNC: 7.8 G/DL (ref 13.3–17.7)
HGB BLD-MCNC: 7.9 G/DL (ref 13.3–17.7)
HGB BLD-MCNC: 8 G/DL (ref 13.3–17.7)
HGB BLD-MCNC: 8 G/DL (ref 13.3–17.7)
HGB BLD-MCNC: 8.1 G/DL (ref 13.3–17.7)
HGB BLD-MCNC: 8.2 G/DL (ref 13.3–17.7)
HGB BLD-MCNC: 8.3 G/DL (ref 13.3–17.7)
HGB BLD-MCNC: 8.4 G/DL (ref 13.3–17.7)
HGB BLD-MCNC: 8.5 G/DL (ref 13.3–17.7)
HGB BLD-MCNC: 8.6 G/DL (ref 13.3–17.7)
HGB BLD-MCNC: 8.6 G/DL (ref 13.3–17.7)
HGB BLD-MCNC: 8.7 G/DL (ref 13.3–17.7)
HGB BLD-MCNC: 8.8 G/DL (ref 13.3–17.7)
HGB BLD-MCNC: 8.8 G/DL (ref 13.3–17.7)
HGB BLD-MCNC: 8.9 G/DL (ref 13.3–17.7)
HGB BLD-MCNC: 8.9 G/DL (ref 13.3–17.7)
HGB BLD-MCNC: 9.1 G/DL (ref 13.3–17.7)
HGB UR QL STRIP: NEGATIVE
HIV 1+2 AB+HIV1 P24 AG SERPL QL IA: NONREACTIVE
HOLD SPECIMEN: NORMAL
HSV1 IGG SERPL QL IA: 6.2 AI (ref 0–0.8)
HSV2 IGG SERPL QL IA: <0.2 AI (ref 0–0.8)
HYPOCHROMIA BLD QL: PRESENT
IMM GRANULOCYTES # BLD: 0 10E3/UL
IMM GRANULOCYTES # BLD: 0.1 10E3/UL
IMM GRANULOCYTES NFR BLD: 1 %
IMM GRANULOCYTES NFR BLD: 2 %
IMM GRANULOCYTES NFR BLD: 3 %
INR PPP: 1.09 (ref 0.86–1.14)
INR PPP: 1.1 (ref 0.85–1.15)
INR PPP: 1.12 (ref 0.85–1.15)
INR PPP: 1.12 (ref 0.86–1.14)
INR PPP: 1.14 (ref 0.86–1.14)
INR PPP: 1.15 (ref 0.86–1.14)
INR PPP: 1.17 (ref 0.86–1.14)
INR PPP: 1.17 (ref 0.86–1.14)
INR PPP: 1.18 (ref 0.86–1.14)
INR PPP: 1.19 (ref 0.86–1.14)
INR PPP: 1.19 (ref 0.86–1.14)
INR PPP: 1.2 (ref 0.86–1.14)
INR PPP: 1.2 (ref 0.86–1.14)
INR PPP: 1.22 (ref 0.86–1.14)
INR PPP: 1.27 (ref 0.86–1.14)
INR PPP: 1.3 (ref 0.86–1.14)
INR PPP: 1.32 (ref 0.86–1.14)
INR PPP: 1.32 (ref 0.86–1.14)
INTERPRETATION ECG - MUSE: NORMAL
INTERPRETATION: NORMAL
ISCN: NORMAL
ISCN: NORMAL
ISSUE DATE AND TIME: NORMAL
KETONES UR STRIP-MCNC: NEGATIVE MG/DL
LAB DIRECTOR COMMENTS: NORMAL
LAB DIRECTOR DISCLAIMER: NORMAL
LAB DIRECTOR INTERPRETATION: NORMAL
LAB DIRECTOR METHODOLOGY: NORMAL
LAB DIRECTOR RESULTS: NORMAL
LABORATORY COMMENT REPORT: NORMAL
LACTATE BLD-SCNC: 0.7 MMOL/L (ref 0.7–2)
LACTATE BLD-SCNC: 1 MMOL/L (ref 0.7–2)
LACTATE BLD-SCNC: 1.1 MMOL/L (ref 0.7–2)
LACTATE BLD-SCNC: 1.3 MMOL/L (ref 0.7–2)
LACTATE BLD-SCNC: 1.3 MMOL/L (ref 0.7–2)
LACTATE BLD-SCNC: 1.9 MMOL/L (ref 0.7–2)
LACTATE SERPL-SCNC: 1 MMOL/L (ref 0.7–2)
LACTATE SERPL-SCNC: 1.1 MMOL/L (ref 0.7–2)
LDH SERPL L TO P-CCNC: 1000 U/L (ref 85–227)
LDH SERPL L TO P-CCNC: 1016 U/L (ref 85–227)
LDH SERPL L TO P-CCNC: 1121 U/L (ref 85–227)
LDH SERPL L TO P-CCNC: 1150 U/L (ref 85–227)
LDH SERPL L TO P-CCNC: 1247 U/L (ref 85–227)
LDH SERPL L TO P-CCNC: 1538 U/L (ref 85–227)
LDH SERPL L TO P-CCNC: 409 U/L (ref 85–227)
LDH SERPL L TO P-CCNC: 425 U/L (ref 85–227)
LDH SERPL L TO P-CCNC: 447 U/L (ref 85–227)
LDH SERPL L TO P-CCNC: 477 U/L (ref 85–227)
LDH SERPL L TO P-CCNC: 499 U/L (ref 85–227)
LDH SERPL L TO P-CCNC: 567 U/L (ref 85–227)
LDH SERPL L TO P-CCNC: 640 U/L (ref 85–227)
LDH SERPL L TO P-CCNC: 774 U/L (ref 85–227)
LDH SERPL L TO P-CCNC: 871 U/L (ref 85–227)
LDH SERPL L TO P-CCNC: 883 U/L (ref 85–227)
LDH SERPL L TO P-CCNC: 950 U/L (ref 85–227)
LDH SERPL L TO P-CCNC: 989 U/L (ref 85–227)
LEUKOCYTE ESTERASE UR QL STRIP: NEGATIVE
LIPASE SERPL-CCNC: 71 U/L (ref 73–393)
LVEF ECHO: NORMAL
LYMPHOCYTES # BLD AUTO: 0.4 10E3/UL (ref 0.8–5.3)
LYMPHOCYTES # BLD AUTO: 0.7 10E3/UL (ref 0.8–5.3)
LYMPHOCYTES # BLD AUTO: 0.7 10E9/L (ref 0.8–5.3)
LYMPHOCYTES # BLD AUTO: 0.8 10E3/UL (ref 0.8–5.3)
LYMPHOCYTES # BLD AUTO: 0.8 10E9/L (ref 0.8–5.3)
LYMPHOCYTES # BLD AUTO: 0.9 10E3/UL (ref 0.8–5.3)
LYMPHOCYTES # BLD AUTO: 0.9 10E3/UL (ref 0.8–5.3)
LYMPHOCYTES # BLD AUTO: 0.9 10E9/L (ref 0.8–5.3)
LYMPHOCYTES # BLD AUTO: 1 10E9/L (ref 0.8–5.3)
LYMPHOCYTES # BLD AUTO: 1 10E9/L (ref 0.8–5.3)
LYMPHOCYTES # BLD AUTO: 1.1 10E9/L (ref 0.8–5.3)
LYMPHOCYTES # BLD AUTO: 1.1 10E9/L (ref 0.8–5.3)
LYMPHOCYTES # BLD AUTO: 1.4 10E9/L (ref 0.8–5.3)
LYMPHOCYTES # BLD AUTO: 1.4 10E9/L (ref 0.8–5.3)
LYMPHOCYTES # BLD AUTO: 1.7 10E9/L (ref 0.8–5.3)
LYMPHOCYTES # BLD AUTO: 1.7 10E9/L (ref 0.8–5.3)
LYMPHOCYTES # BLD AUTO: 1.8 10E9/L (ref 0.8–5.3)
LYMPHOCYTES # BLD AUTO: 1.8 10E9/L (ref 0.8–5.3)
LYMPHOCYTES # BLD AUTO: 1.9 10E9/L (ref 0.8–5.3)
LYMPHOCYTES # BLD AUTO: 11.1 10E9/L (ref 0.8–5.3)
LYMPHOCYTES # BLD AUTO: 2 10E9/L (ref 0.8–5.3)
LYMPHOCYTES # BLD AUTO: 2.1 10E9/L (ref 0.8–5.3)
LYMPHOCYTES # BLD AUTO: 2.2 10E9/L (ref 0.8–5.3)
LYMPHOCYTES # BLD AUTO: 2.7 10E9/L (ref 0.8–5.3)
LYMPHOCYTES # BLD AUTO: 3 10E9/L (ref 0.8–5.3)
LYMPHOCYTES # BLD AUTO: 3.2 10E9/L (ref 0.8–5.3)
LYMPHOCYTES # BLD AUTO: 3.5 10E9/L (ref 0.8–5.3)
LYMPHOCYTES # BLD AUTO: 3.9 10E9/L (ref 0.8–5.3)
LYMPHOCYTES # BLD AUTO: 4.4 10E9/L (ref 0.8–5.3)
LYMPHOCYTES # BLD AUTO: 4.7 10E9/L (ref 0.8–5.3)
LYMPHOCYTES # BLD AUTO: 4.7 10E9/L (ref 0.8–5.3)
LYMPHOCYTES # BLD AUTO: 5.1 10E9/L (ref 0.8–5.3)
LYMPHOCYTES # BLD AUTO: 6.5 10E9/L (ref 0.8–5.3)
LYMPHOCYTES # BLD AUTO: 6.6 10E9/L (ref 0.8–5.3)
LYMPHOCYTES # BLD AUTO: 6.7 10E9/L (ref 0.8–5.3)
LYMPHOCYTES # BLD AUTO: 7.1 10E9/L (ref 0.8–5.3)
LYMPHOCYTES # BLD AUTO: 7.4 10E9/L (ref 0.8–5.3)
LYMPHOCYTES # BLD AUTO: 7.9 10E9/L (ref 0.8–5.3)
LYMPHOCYTES # BLD AUTO: 9.7 10E9/L (ref 0.8–5.3)
LYMPHOCYTES # BLD MANUAL: 0.3 10E3/UL (ref 0.8–5.3)
LYMPHOCYTES # BLD MANUAL: 0.4 10E3/UL (ref 0.8–5.3)
LYMPHOCYTES # BLD MANUAL: 0.5 10E3/UL (ref 0.8–5.3)
LYMPHOCYTES # BLD MANUAL: 0.6 10E3/UL (ref 0.8–5.3)
LYMPHOCYTES # BLD MANUAL: 0.7 10E3/UL (ref 0.8–5.3)
LYMPHOCYTES # BLD MANUAL: 0.7 10E3/UL (ref 0.8–5.3)
LYMPHOCYTES # BLD MANUAL: 0.8 10E3/UL (ref 0.8–5.3)
LYMPHOCYTES # BLD MANUAL: 0.9 10E3/UL (ref 0.8–5.3)
LYMPHOCYTES # BLD MANUAL: 1 10E3/UL (ref 0.8–5.3)
LYMPHOCYTES # BLD MANUAL: 1.1 10E3/UL (ref 0.8–5.3)
LYMPHOCYTES # BLD MANUAL: 1.2 10E3/UL (ref 0.8–5.3)
LYMPHOCYTES # BLD MANUAL: 1.2 10E3/UL (ref 0.8–5.3)
LYMPHOCYTES # BLD MANUAL: 1.4 10E3/UL (ref 0.8–5.3)
LYMPHOCYTES # BLD MANUAL: 1.5 10E3/UL (ref 0.8–5.3)
LYMPHOCYTES # BLD MANUAL: 1.6 10E3/UL (ref 0.8–5.3)
LYMPHOCYTES # BLD MANUAL: 1.6 10E3/UL (ref 0.8–5.3)
LYMPHOCYTES # BLD MANUAL: 1.7 10E3/UL (ref 0.8–5.3)
LYMPHOCYTES # BLD MANUAL: 7.4 10E3/UL (ref 0.8–5.3)
LYMPHOCYTES NFR BLD AUTO: 10 %
LYMPHOCYTES NFR BLD AUTO: 10.6 %
LYMPHOCYTES NFR BLD AUTO: 11 %
LYMPHOCYTES NFR BLD AUTO: 11.3 %
LYMPHOCYTES NFR BLD AUTO: 11.5 %
LYMPHOCYTES NFR BLD AUTO: 14 %
LYMPHOCYTES NFR BLD AUTO: 14 %
LYMPHOCYTES NFR BLD AUTO: 14.4 %
LYMPHOCYTES NFR BLD AUTO: 15 %
LYMPHOCYTES NFR BLD AUTO: 15.8 %
LYMPHOCYTES NFR BLD AUTO: 16 %
LYMPHOCYTES NFR BLD AUTO: 16.7 %
LYMPHOCYTES NFR BLD AUTO: 17 %
LYMPHOCYTES NFR BLD AUTO: 19.3 %
LYMPHOCYTES NFR BLD AUTO: 20 %
LYMPHOCYTES NFR BLD AUTO: 21 %
LYMPHOCYTES NFR BLD AUTO: 21 %
LYMPHOCYTES NFR BLD AUTO: 22.6 %
LYMPHOCYTES NFR BLD AUTO: 23 %
LYMPHOCYTES NFR BLD AUTO: 25 %
LYMPHOCYTES NFR BLD AUTO: 25.3 %
LYMPHOCYTES NFR BLD AUTO: 26 %
LYMPHOCYTES NFR BLD AUTO: 27 %
LYMPHOCYTES NFR BLD AUTO: 3 %
LYMPHOCYTES NFR BLD AUTO: 30 %
LYMPHOCYTES NFR BLD AUTO: 33 %
LYMPHOCYTES NFR BLD AUTO: 33 %
LYMPHOCYTES NFR BLD AUTO: 36 %
LYMPHOCYTES NFR BLD AUTO: 37 %
LYMPHOCYTES NFR BLD AUTO: 37 %
LYMPHOCYTES NFR BLD AUTO: 4.3 %
LYMPHOCYTES NFR BLD AUTO: 4.5 %
LYMPHOCYTES NFR BLD AUTO: 4.5 %
LYMPHOCYTES NFR BLD AUTO: 41 %
LYMPHOCYTES NFR BLD AUTO: 53 %
LYMPHOCYTES NFR BLD AUTO: 6 %
LYMPHOCYTES NFR BLD AUTO: 6 %
LYMPHOCYTES NFR BLD AUTO: 6.6 %
LYMPHOCYTES NFR BLD AUTO: 7 %
LYMPHOCYTES NFR BLD AUTO: 7 %
LYMPHOCYTES NFR BLD AUTO: 8 %
LYMPHOCYTES NFR BLD AUTO: 8.1 %
LYMPHOCYTES NFR BLD AUTO: 8.3 %
LYMPHOCYTES NFR BLD AUTO: 9 %
LYMPHOCYTES NFR BLD AUTO: 9.1 %
LYMPHOCYTES NFR BLD MANUAL: 12 %
LYMPHOCYTES NFR BLD MANUAL: 12 %
LYMPHOCYTES NFR BLD MANUAL: 13 %
LYMPHOCYTES NFR BLD MANUAL: 13 %
LYMPHOCYTES NFR BLD MANUAL: 15 %
LYMPHOCYTES NFR BLD MANUAL: 19 %
LYMPHOCYTES NFR BLD MANUAL: 21 %
LYMPHOCYTES NFR BLD MANUAL: 24 %
LYMPHOCYTES NFR BLD MANUAL: 25 %
LYMPHOCYTES NFR BLD MANUAL: 27 %
LYMPHOCYTES NFR BLD MANUAL: 28 %
LYMPHOCYTES NFR BLD MANUAL: 29 %
LYMPHOCYTES NFR BLD MANUAL: 30 %
LYMPHOCYTES NFR BLD MANUAL: 30 %
LYMPHOCYTES NFR BLD MANUAL: 31 %
LYMPHOCYTES NFR BLD MANUAL: 35 %
LYMPHOCYTES NFR BLD MANUAL: 37 %
LYMPHOCYTES NFR BLD MANUAL: 38 %
LYMPHOCYTES NFR BLD MANUAL: 41 %
LYMPHOCYTES NFR BLD MANUAL: 45 %
LYMPHOCYTES NFR BLD MANUAL: 45 %
LYMPHOCYTES NFR BLD MANUAL: 46 %
LYMPHOCYTES NFR BLD MANUAL: 48 %
LYMPHOCYTES NFR BLD MANUAL: 50 %
LYMPHOCYTES NFR BLD MANUAL: 50 %
LYMPHOCYTES NFR BLD MANUAL: 51 %
LYMPHOCYTES NFR BLD MANUAL: 58 %
LYMPHOCYTES NFR BLD MANUAL: 59 %
LYMPHOCYTES NFR BLD MANUAL: 76 %
LYMPHOCYTES NFR BLD MANUAL: 8 %
LYMPHOCYTES NFR BLD MANUAL: 85 %
LYMPHOCYTES NFR FLD MANUAL: 3 %
Lab: NORMAL
Lab: NORMAL
MAGNESIUM SERPL-MCNC: 2 MG/DL (ref 1.6–2.3)
MAGNESIUM SERPL-MCNC: 2.1 MG/DL (ref 1.6–2.3)
MAGNESIUM SERPL-MCNC: 2.2 MG/DL (ref 1.6–2.3)
MAGNESIUM SERPL-MCNC: 2.2 MG/DL (ref 1.6–2.3)
MAGNESIUM SERPL-MCNC: 2.3 MG/DL (ref 1.6–2.3)
MAGNESIUM SERPL-MCNC: 2.4 MG/DL (ref 1.6–2.3)
MAGNESIUM SERPL-MCNC: 2.6 MG/DL (ref 1.6–2.3)
MCH RBC QN AUTO: 27.7 PG (ref 26.5–33)
MCH RBC QN AUTO: 27.8 PG (ref 26.5–33)
MCH RBC QN AUTO: 27.9 PG (ref 26.5–33)
MCH RBC QN AUTO: 28.2 PG (ref 26.5–33)
MCH RBC QN AUTO: 28.2 PG (ref 26.5–33)
MCH RBC QN AUTO: 28.4 PG (ref 26.5–33)
MCH RBC QN AUTO: 28.4 PG (ref 26.5–33)
MCH RBC QN AUTO: 28.5 PG (ref 26.5–33)
MCH RBC QN AUTO: 28.6 PG (ref 26.5–33)
MCH RBC QN AUTO: 28.7 PG (ref 26.5–33)
MCH RBC QN AUTO: 28.8 PG (ref 26.5–33)
MCH RBC QN AUTO: 28.9 PG (ref 26.5–33)
MCH RBC QN AUTO: 29 PG (ref 26.5–33)
MCH RBC QN AUTO: 29.1 PG (ref 26.5–33)
MCH RBC QN AUTO: 29.2 PG (ref 26.5–33)
MCH RBC QN AUTO: 29.3 PG (ref 26.5–33)
MCH RBC QN AUTO: 29.4 PG (ref 26.5–33)
MCH RBC QN AUTO: 29.5 PG (ref 26.5–33)
MCH RBC QN AUTO: 29.6 PG (ref 26.5–33)
MCH RBC QN AUTO: 29.7 PG (ref 26.5–33)
MCH RBC QN AUTO: 29.8 PG (ref 26.5–33)
MCH RBC QN AUTO: 29.9 PG (ref 26.5–33)
MCH RBC QN AUTO: 30 PG (ref 26.5–33)
MCH RBC QN AUTO: 30.1 PG (ref 26.5–33)
MCH RBC QN AUTO: 30.1 PG (ref 26.5–33)
MCH RBC QN AUTO: 30.2 PG (ref 26.5–33)
MCH RBC QN AUTO: 30.3 PG (ref 26.5–33)
MCH RBC QN AUTO: 30.7 PG (ref 26.5–33)
MCHC RBC AUTO-ENTMCNC: 31.3 G/DL (ref 31.5–36.5)
MCHC RBC AUTO-ENTMCNC: 31.4 G/DL (ref 31.5–36.5)
MCHC RBC AUTO-ENTMCNC: 31.6 G/DL (ref 31.5–36.5)
MCHC RBC AUTO-ENTMCNC: 31.6 G/DL (ref 31.5–36.5)
MCHC RBC AUTO-ENTMCNC: 31.7 G/DL (ref 31.5–36.5)
MCHC RBC AUTO-ENTMCNC: 31.7 G/DL (ref 31.5–36.5)
MCHC RBC AUTO-ENTMCNC: 31.8 G/DL (ref 31.5–36.5)
MCHC RBC AUTO-ENTMCNC: 31.9 G/DL (ref 31.5–36.5)
MCHC RBC AUTO-ENTMCNC: 32 G/DL (ref 31.5–36.5)
MCHC RBC AUTO-ENTMCNC: 32.1 G/DL (ref 31.5–36.5)
MCHC RBC AUTO-ENTMCNC: 32.2 G/DL (ref 31.5–36.5)
MCHC RBC AUTO-ENTMCNC: 32.2 G/DL (ref 31.5–36.5)
MCHC RBC AUTO-ENTMCNC: 32.3 G/DL (ref 31.5–36.5)
MCHC RBC AUTO-ENTMCNC: 32.3 G/DL (ref 31.5–36.5)
MCHC RBC AUTO-ENTMCNC: 32.5 G/DL (ref 31.5–36.5)
MCHC RBC AUTO-ENTMCNC: 32.6 G/DL (ref 31.5–36.5)
MCHC RBC AUTO-ENTMCNC: 32.7 G/DL (ref 31.5–36.5)
MCHC RBC AUTO-ENTMCNC: 32.8 G/DL (ref 31.5–36.5)
MCHC RBC AUTO-ENTMCNC: 32.9 G/DL (ref 31.5–36.5)
MCHC RBC AUTO-ENTMCNC: 33 G/DL (ref 31.5–36.5)
MCHC RBC AUTO-ENTMCNC: 33.1 G/DL (ref 31.5–36.5)
MCHC RBC AUTO-ENTMCNC: 33.1 G/DL (ref 31.5–36.5)
MCHC RBC AUTO-ENTMCNC: 33.2 G/DL (ref 31.5–36.5)
MCHC RBC AUTO-ENTMCNC: 33.3 G/DL (ref 31.5–36.5)
MCHC RBC AUTO-ENTMCNC: 33.5 G/DL (ref 31.5–36.5)
MCHC RBC AUTO-ENTMCNC: 33.6 G/DL (ref 31.5–36.5)
MCHC RBC AUTO-ENTMCNC: 33.7 G/DL (ref 31.5–36.5)
MCHC RBC AUTO-ENTMCNC: 33.8 G/DL (ref 31.5–36.5)
MCHC RBC AUTO-ENTMCNC: 33.8 G/DL (ref 31.5–36.5)
MCHC RBC AUTO-ENTMCNC: 33.9 G/DL (ref 31.5–36.5)
MCHC RBC AUTO-ENTMCNC: 34 G/DL (ref 31.5–36.5)
MCHC RBC AUTO-ENTMCNC: 34.1 G/DL (ref 31.5–36.5)
MCHC RBC AUTO-ENTMCNC: 34.2 G/DL (ref 31.5–36.5)
MCHC RBC AUTO-ENTMCNC: 34.2 G/DL (ref 31.5–36.5)
MCHC RBC AUTO-ENTMCNC: 34.3 G/DL (ref 31.5–36.5)
MCHC RBC AUTO-ENTMCNC: 34.3 G/DL (ref 31.5–36.5)
MCHC RBC AUTO-ENTMCNC: 34.5 G/DL (ref 31.5–36.5)
MCHC RBC AUTO-ENTMCNC: 34.6 G/DL (ref 31.5–36.5)
MCHC RBC AUTO-ENTMCNC: 34.7 G/DL (ref 31.5–36.5)
MCV RBC AUTO: 84 FL (ref 78–100)
MCV RBC AUTO: 85 FL (ref 78–100)
MCV RBC AUTO: 86 FL (ref 78–100)
MCV RBC AUTO: 87 FL (ref 78–100)
MCV RBC AUTO: 88 FL (ref 78–100)
MCV RBC AUTO: 89 FL (ref 78–100)
MCV RBC AUTO: 90 FL (ref 78–100)
MCV RBC AUTO: 91 FL (ref 78–100)
MCV RBC AUTO: 92 FL (ref 78–100)
MCV RBC AUTO: 93 FL (ref 78–100)
METAMYELOCYTES # BLD MANUAL: 0 10E3/UL
METAMYELOCYTES # BLD MANUAL: 0 10E3/UL
METAMYELOCYTES # BLD MANUAL: 0.1 10E3/UL
METAMYELOCYTES # BLD MANUAL: 0.1 10E3/UL
METAMYELOCYTES # BLD MANUAL: 0.2 10E3/UL
METAMYELOCYTES # BLD MANUAL: 0.2 10E3/UL
METAMYELOCYTES # BLD MANUAL: 1.2 10E3/UL
METAMYELOCYTES # BLD: 0.1 10E9/L
METAMYELOCYTES # BLD: 0.1 10E9/L
METAMYELOCYTES # BLD: 0.2 10E9/L
METAMYELOCYTES # BLD: 0.2 10E9/L
METAMYELOCYTES # BLD: 0.3 10E9/L
METAMYELOCYTES # BLD: 0.5 10E9/L
METAMYELOCYTES # BLD: 0.6 10E9/L
METAMYELOCYTES # BLD: 0.8 10E9/L
METAMYELOCYTES # BLD: 0.9 10E9/L
METAMYELOCYTES # BLD: 1 10E9/L
METAMYELOCYTES # BLD: 1.1 10E9/L
METAMYELOCYTES # BLD: 1.1 10E9/L
METAMYELOCYTES # BLD: 1.4 10E9/L
METAMYELOCYTES # BLD: 1.5 10E9/L
METAMYELOCYTES # BLD: 1.5 10E9/L
METAMYELOCYTES # BLD: 2.5 10E9/L
METAMYELOCYTES # BLD: 3 10E9/L
METAMYELOCYTES # BLD: 3 10E9/L
METAMYELOCYTES NFR BLD MANUAL: 0.8 %
METAMYELOCYTES NFR BLD MANUAL: 0.9 %
METAMYELOCYTES NFR BLD MANUAL: 1 %
METAMYELOCYTES NFR BLD MANUAL: 1.8 %
METAMYELOCYTES NFR BLD MANUAL: 1.8 %
METAMYELOCYTES NFR BLD MANUAL: 11 %
METAMYELOCYTES NFR BLD MANUAL: 12 %
METAMYELOCYTES NFR BLD MANUAL: 18 %
METAMYELOCYTES NFR BLD MANUAL: 18 %
METAMYELOCYTES NFR BLD MANUAL: 2 %
METAMYELOCYTES NFR BLD MANUAL: 2.5 %
METAMYELOCYTES NFR BLD MANUAL: 2.7 %
METAMYELOCYTES NFR BLD MANUAL: 3 %
METAMYELOCYTES NFR BLD MANUAL: 4 %
METAMYELOCYTES NFR BLD MANUAL: 4.5 %
METAMYELOCYTES NFR BLD MANUAL: 6 %
METAMYELOCYTES NFR BLD MANUAL: 7.9 %
METHODS: NORMAL
METHODS: NORMAL
MICROCYTES BLD QL SMEAR: PRESENT
MONOCYTES # BLD AUTO: 0.1 10E3/UL (ref 0–1.3)
MONOCYTES # BLD AUTO: 0.2 10E3/UL (ref 0–1.3)
MONOCYTES # BLD AUTO: 0.2 10E9/L (ref 0–1.3)
MONOCYTES # BLD AUTO: 0.3 10E3/UL (ref 0–1.3)
MONOCYTES # BLD AUTO: 0.3 10E9/L (ref 0–1.3)
MONOCYTES # BLD AUTO: 0.4 10E3/UL (ref 0–1.3)
MONOCYTES # BLD AUTO: 0.4 10E9/L (ref 0–1.3)
MONOCYTES # BLD AUTO: 0.4 10E9/L (ref 0–1.3)
MONOCYTES # BLD AUTO: 0.5 10E3/UL (ref 0–1.3)
MONOCYTES # BLD AUTO: 0.5 10E3/UL (ref 0–1.3)
MONOCYTES # BLD AUTO: 0.5 10E9/L (ref 0–1.3)
MONOCYTES # BLD AUTO: 0.5 10E9/L (ref 0–1.3)
MONOCYTES # BLD AUTO: 0.6 10E3/UL (ref 0–1.3)
MONOCYTES # BLD AUTO: 0.7 10E9/L (ref 0–1.3)
MONOCYTES # BLD AUTO: 1.2 10E9/L (ref 0–1.3)
MONOCYTES # BLD AUTO: 1.3 10E9/L (ref 0–1.3)
MONOCYTES # BLD AUTO: 1.4 10E9/L (ref 0–1.3)
MONOCYTES # BLD AUTO: 1.8 10E9/L (ref 0–1.3)
MONOCYTES # BLD AUTO: 1.9 10E9/L (ref 0–1.3)
MONOCYTES # BLD AUTO: 15.1 10E9/L (ref 0–1.3)
MONOCYTES # BLD AUTO: 15.4 10E9/L (ref 0–1.3)
MONOCYTES # BLD AUTO: 16.6 10E9/L (ref 0–1.3)
MONOCYTES # BLD AUTO: 17.2 10E9/L (ref 0–1.3)
MONOCYTES # BLD AUTO: 19.4 10E9/L (ref 0–1.3)
MONOCYTES # BLD AUTO: 2 10E3/UL (ref 0–1.3)
MONOCYTES # BLD AUTO: 2.5 10E3/UL (ref 0–1.3)
MONOCYTES # BLD AUTO: 2.8 10E9/L (ref 0–1.3)
MONOCYTES # BLD AUTO: 27.3 10E9/L (ref 0–1.3)
MONOCYTES # BLD AUTO: 27.8 10E9/L (ref 0–1.3)
MONOCYTES # BLD AUTO: 28.4 10E9/L (ref 0–1.3)
MONOCYTES # BLD AUTO: 29.5 10E9/L (ref 0–1.3)
MONOCYTES # BLD AUTO: 3.6 10E9/L (ref 0–1.3)
MONOCYTES # BLD AUTO: 3.8 10E9/L (ref 0–1.3)
MONOCYTES # BLD AUTO: 4.1 10E9/L (ref 0–1.3)
MONOCYTES # BLD AUTO: 4.2 10E9/L (ref 0–1.3)
MONOCYTES # BLD AUTO: 4.5 10E9/L (ref 0–1.3)
MONOCYTES # BLD AUTO: 5.2 10E9/L (ref 0–1.3)
MONOCYTES # BLD AUTO: 9.3 10E9/L (ref 0–1.3)
MONOCYTES # BLD AUTO: 9.3 10E9/L (ref 0–1.3)
MONOCYTES # BLD MANUAL: 0 10E3/UL (ref 0–1.3)
MONOCYTES # BLD MANUAL: 0.1 10E3/UL (ref 0–1.3)
MONOCYTES # BLD MANUAL: 0.2 10E3/UL (ref 0–1.3)
MONOCYTES # BLD MANUAL: 0.3 10E3/UL (ref 0–1.3)
MONOCYTES # BLD MANUAL: 0.4 10E3/UL (ref 0–1.3)
MONOCYTES # BLD MANUAL: 0.6 10E3/UL (ref 0–1.3)
MONOCYTES # BLD MANUAL: 0.7 10E3/UL (ref 0–1.3)
MONOCYTES # BLD MANUAL: 0.8 10E3/UL (ref 0–1.3)
MONOCYTES # BLD MANUAL: 0.9 10E3/UL (ref 0–1.3)
MONOCYTES # BLD MANUAL: 1 10E3/UL (ref 0–1.3)
MONOCYTES # BLD MANUAL: 1 10E3/UL (ref 0–1.3)
MONOCYTES # BLD MANUAL: 1.3 10E3/UL (ref 0–1.3)
MONOCYTES # BLD MANUAL: 1.7 10E3/UL (ref 0–1.3)
MONOCYTES # BLD MANUAL: 1.8 10E3/UL (ref 0–1.3)
MONOCYTES # BLD MANUAL: 2.7 10E3/UL (ref 0–1.3)
MONOCYTES # BLD MANUAL: 3.2 10E3/UL (ref 0–1.3)
MONOCYTES NFR BLD AUTO: 1 %
MONOCYTES NFR BLD AUTO: 1.7 %
MONOCYTES NFR BLD AUTO: 10 %
MONOCYTES NFR BLD AUTO: 11 %
MONOCYTES NFR BLD AUTO: 11 %
MONOCYTES NFR BLD AUTO: 11.8 %
MONOCYTES NFR BLD AUTO: 12.3 %
MONOCYTES NFR BLD AUTO: 13.1 %
MONOCYTES NFR BLD AUTO: 13.3 %
MONOCYTES NFR BLD AUTO: 14.4 %
MONOCYTES NFR BLD AUTO: 15 %
MONOCYTES NFR BLD AUTO: 15 %
MONOCYTES NFR BLD AUTO: 16 %
MONOCYTES NFR BLD AUTO: 18 %
MONOCYTES NFR BLD AUTO: 18.9 %
MONOCYTES NFR BLD AUTO: 20 %
MONOCYTES NFR BLD AUTO: 21.1 %
MONOCYTES NFR BLD AUTO: 22 %
MONOCYTES NFR BLD AUTO: 22.5 %
MONOCYTES NFR BLD AUTO: 24 %
MONOCYTES NFR BLD AUTO: 26 %
MONOCYTES NFR BLD AUTO: 26.4 %
MONOCYTES NFR BLD AUTO: 27 %
MONOCYTES NFR BLD AUTO: 28.3 %
MONOCYTES NFR BLD AUTO: 3 %
MONOCYTES NFR BLD AUTO: 32 %
MONOCYTES NFR BLD AUTO: 33.3 %
MONOCYTES NFR BLD AUTO: 35.5 %
MONOCYTES NFR BLD AUTO: 42.6 %
MONOCYTES NFR BLD AUTO: 42.6 %
MONOCYTES NFR BLD AUTO: 42.9 %
MONOCYTES NFR BLD AUTO: 43 %
MONOCYTES NFR BLD AUTO: 48.7 %
MONOCYTES NFR BLD AUTO: 5 %
MONOCYTES NFR BLD AUTO: 57 %
MONOCYTES NFR BLD AUTO: 59 %
MONOCYTES NFR BLD AUTO: 8 %
MONOCYTES NFR BLD MANUAL: 0 %
MONOCYTES NFR BLD MANUAL: 1 %
MONOCYTES NFR BLD MANUAL: 1 %
MONOCYTES NFR BLD MANUAL: 10 %
MONOCYTES NFR BLD MANUAL: 13 %
MONOCYTES NFR BLD MANUAL: 14 %
MONOCYTES NFR BLD MANUAL: 16 %
MONOCYTES NFR BLD MANUAL: 18 %
MONOCYTES NFR BLD MANUAL: 19 %
MONOCYTES NFR BLD MANUAL: 2 %
MONOCYTES NFR BLD MANUAL: 21 %
MONOCYTES NFR BLD MANUAL: 22 %
MONOCYTES NFR BLD MANUAL: 22 %
MONOCYTES NFR BLD MANUAL: 26 %
MONOCYTES NFR BLD MANUAL: 29 %
MONOCYTES NFR BLD MANUAL: 3 %
MONOCYTES NFR BLD MANUAL: 3 %
MONOCYTES NFR BLD MANUAL: 30 %
MONOCYTES NFR BLD MANUAL: 34 %
MONOCYTES NFR BLD MANUAL: 37 %
MONOCYTES NFR BLD MANUAL: 4 %
MONOCYTES NFR BLD MANUAL: 43 %
MONOCYTES NFR BLD MANUAL: 5 %
MONOCYTES NFR BLD MANUAL: 6 %
MONOCYTES NFR BLD MANUAL: 8 %
MONOCYTES NFR BLD MANUAL: 9 %
MONOS+MACROS NFR FLD MANUAL: 59 %
MUCOUS THREADS #/AREA URNS LPF: PRESENT /LPF
MYELOCYTES # BLD MANUAL: 0 10E3/UL
MYELOCYTES # BLD MANUAL: 0.1 10E3/UL
MYELOCYTES # BLD MANUAL: 0.1 10E3/UL
MYELOCYTES # BLD MANUAL: 0.3 10E3/UL
MYELOCYTES # BLD MANUAL: 0.4 10E3/UL
MYELOCYTES # BLD MANUAL: 2 10E3/UL
MYELOCYTES # BLD: 0 10E9/L
MYELOCYTES # BLD: 0.1 10E9/L
MYELOCYTES # BLD: 0.2 10E9/L
MYELOCYTES # BLD: 0.3 10E9/L
MYELOCYTES # BLD: 0.3 10E9/L
MYELOCYTES # BLD: 0.4 10E9/L
MYELOCYTES # BLD: 0.4 10E9/L
MYELOCYTES # BLD: 0.6 10E9/L
MYELOCYTES # BLD: 0.7 10E9/L
MYELOCYTES # BLD: 0.8 10E9/L
MYELOCYTES # BLD: 0.9 10E9/L
MYELOCYTES # BLD: 1 10E9/L
MYELOCYTES # BLD: 1.1 10E9/L
MYELOCYTES # BLD: 1.2 10E9/L
MYELOCYTES # BLD: 1.7 10E9/L
MYELOCYTES # BLD: 1.8 10E9/L
MYELOCYTES # BLD: 1.8 10E9/L
MYELOCYTES # BLD: 2.1 10E9/L
MYELOCYTES # BLD: 2.2 10E9/L
MYELOCYTES # BLD: 2.7 10E9/L
MYELOCYTES # BLD: 3.7 10E9/L
MYELOCYTES # BLD: 4.3 10E9/L
MYELOCYTES # BLD: 5.3 10E9/L
MYELOCYTES # BLD: 5.9 10E9/L
MYELOCYTES NFR BLD MANUAL: 0.9 %
MYELOCYTES NFR BLD MANUAL: 1 %
MYELOCYTES NFR BLD MANUAL: 1.7 %
MYELOCYTES NFR BLD MANUAL: 1.7 %
MYELOCYTES NFR BLD MANUAL: 1.8 %
MYELOCYTES NFR BLD MANUAL: 10 %
MYELOCYTES NFR BLD MANUAL: 11.4 %
MYELOCYTES NFR BLD MANUAL: 12 %
MYELOCYTES NFR BLD MANUAL: 17 %
MYELOCYTES NFR BLD MANUAL: 2 %
MYELOCYTES NFR BLD MANUAL: 2 %
MYELOCYTES NFR BLD MANUAL: 2.6 %
MYELOCYTES NFR BLD MANUAL: 2.7 %
MYELOCYTES NFR BLD MANUAL: 26 %
MYELOCYTES NFR BLD MANUAL: 4 %
MYELOCYTES NFR BLD MANUAL: 5 %
MYELOCYTES NFR BLD MANUAL: 6 %
MYELOCYTES NFR BLD MANUAL: 7 %
MYELOCYTES NFR BLD MANUAL: 7.4 %
MYELOCYTES NFR BLD MANUAL: 8 %
MYELOCYTES NFR BLD MANUAL: 9 %
NEUTROPHILS # BLD AUTO: 0.6 10E3/UL (ref 1.6–8.3)
NEUTROPHILS # BLD AUTO: 0.8 10E9/L (ref 1.6–8.3)
NEUTROPHILS # BLD AUTO: 1 10E9/L (ref 1.6–8.3)
NEUTROPHILS # BLD AUTO: 1.2 10E3/UL (ref 1.6–8.3)
NEUTROPHILS # BLD AUTO: 1.2 10E9/L (ref 1.6–8.3)
NEUTROPHILS # BLD AUTO: 1.3 10E9/L (ref 1.6–8.3)
NEUTROPHILS # BLD AUTO: 1.7 10E3/UL (ref 1.6–8.3)
NEUTROPHILS # BLD AUTO: 1.9 10E3/UL (ref 1.6–8.3)
NEUTROPHILS # BLD AUTO: 10 10E9/L (ref 1.6–8.3)
NEUTROPHILS # BLD AUTO: 10.4 10E9/L (ref 1.6–8.3)
NEUTROPHILS # BLD AUTO: 10.8 10E9/L (ref 1.6–8.3)
NEUTROPHILS # BLD AUTO: 11.3 10E9/L (ref 1.6–8.3)
NEUTROPHILS # BLD AUTO: 12.9 10E9/L (ref 1.6–8.3)
NEUTROPHILS # BLD AUTO: 14.4 10E9/L (ref 1.6–8.3)
NEUTROPHILS # BLD AUTO: 15.1 10E9/L (ref 1.6–8.3)
NEUTROPHILS # BLD AUTO: 2 10E3/UL (ref 1.6–8.3)
NEUTROPHILS # BLD AUTO: 2 10E9/L (ref 1.6–8.3)
NEUTROPHILS # BLD AUTO: 2.3 10E3/UL (ref 1.6–8.3)
NEUTROPHILS # BLD AUTO: 3.2 10E9/L (ref 1.6–8.3)
NEUTROPHILS # BLD AUTO: 3.3 10E3/UL (ref 1.6–8.3)
NEUTROPHILS # BLD AUTO: 3.5 10E3/UL (ref 1.6–8.3)
NEUTROPHILS # BLD AUTO: 3.5 10E3/UL (ref 1.6–8.3)
NEUTROPHILS # BLD AUTO: 3.6 10E3/UL (ref 1.6–8.3)
NEUTROPHILS # BLD AUTO: 3.6 10E3/UL (ref 1.6–8.3)
NEUTROPHILS # BLD AUTO: 3.7 10E9/L (ref 1.6–8.3)
NEUTROPHILS # BLD AUTO: 3.9 10E9/L (ref 1.6–8.3)
NEUTROPHILS # BLD AUTO: 5.1 10E9/L (ref 1.6–8.3)
NEUTROPHILS # BLD AUTO: 5.3 10E9/L (ref 1.6–8.3)
NEUTROPHILS # BLD AUTO: 5.6 10E9/L (ref 1.6–8.3)
NEUTROPHILS # BLD AUTO: 5.8 10E9/L (ref 1.6–8.3)
NEUTROPHILS # BLD AUTO: 6 10E9/L (ref 1.6–8.3)
NEUTROPHILS # BLD AUTO: 6.5 10E9/L (ref 1.6–8.3)
NEUTROPHILS # BLD AUTO: 6.6 10E9/L (ref 1.6–8.3)
NEUTROPHILS # BLD AUTO: 6.7 10E9/L (ref 1.6–8.3)
NEUTROPHILS # BLD AUTO: 7.2 10E9/L (ref 1.6–8.3)
NEUTROPHILS # BLD AUTO: 7.4 10E9/L (ref 1.6–8.3)
NEUTROPHILS # BLD AUTO: 7.9 10E9/L (ref 1.6–8.3)
NEUTROPHILS # BLD AUTO: 8.3 10E9/L (ref 1.6–8.3)
NEUTROPHILS # BLD AUTO: 8.5 10E9/L (ref 1.6–8.3)
NEUTROPHILS # BLD AUTO: 8.8 10E9/L (ref 1.6–8.3)
NEUTROPHILS # BLD AUTO: 8.8 10E9/L (ref 1.6–8.3)
NEUTROPHILS # BLD AUTO: 9.1 10E9/L (ref 1.6–8.3)
NEUTROPHILS # BLD AUTO: 9.1 10E9/L (ref 1.6–8.3)
NEUTROPHILS # BLD AUTO: 9.6 10E9/L (ref 1.6–8.3)
NEUTROPHILS # BLD AUTO: 9.8 10E9/L (ref 1.6–8.3)
NEUTROPHILS # BLD AUTO: 9.9 10E9/L (ref 1.6–8.3)
NEUTROPHILS # BLD MANUAL: 0.1 10E3/UL (ref 1.6–8.3)
NEUTROPHILS # BLD MANUAL: 0.1 10E3/UL (ref 1.6–8.3)
NEUTROPHILS # BLD MANUAL: 0.2 10E3/UL (ref 1.6–8.3)
NEUTROPHILS # BLD MANUAL: 0.3 10E3/UL (ref 1.6–8.3)
NEUTROPHILS # BLD MANUAL: 0.3 10E3/UL (ref 1.6–8.3)
NEUTROPHILS # BLD MANUAL: 0.4 10E3/UL (ref 1.6–8.3)
NEUTROPHILS # BLD MANUAL: 0.5 10E3/UL (ref 1.6–8.3)
NEUTROPHILS # BLD MANUAL: 0.6 10E3/UL (ref 1.6–8.3)
NEUTROPHILS # BLD MANUAL: 0.6 10E3/UL (ref 1.6–8.3)
NEUTROPHILS # BLD MANUAL: 0.7 10E3/UL (ref 1.6–8.3)
NEUTROPHILS # BLD MANUAL: 0.8 10E3/UL (ref 1.6–8.3)
NEUTROPHILS # BLD MANUAL: 1.1 10E3/UL (ref 1.6–8.3)
NEUTROPHILS # BLD MANUAL: 1.4 10E3/UL (ref 1.6–8.3)
NEUTROPHILS # BLD MANUAL: 1.4 10E3/UL (ref 1.6–8.3)
NEUTROPHILS # BLD MANUAL: 1.5 10E3/UL (ref 1.6–8.3)
NEUTROPHILS # BLD MANUAL: 1.6 10E3/UL (ref 1.6–8.3)
NEUTROPHILS # BLD MANUAL: 1.8 10E3/UL (ref 1.6–8.3)
NEUTROPHILS # BLD MANUAL: 2.1 10E3/UL (ref 1.6–8.3)
NEUTROPHILS # BLD MANUAL: 2.2 10E3/UL (ref 1.6–8.3)
NEUTROPHILS # BLD MANUAL: 2.5 10E3/UL (ref 1.6–8.3)
NEUTROPHILS # BLD MANUAL: 2.6 10E3/UL (ref 1.6–8.3)
NEUTROPHILS # BLD MANUAL: 2.7 10E3/UL (ref 1.6–8.3)
NEUTROPHILS # BLD MANUAL: 2.8 10E3/UL (ref 1.6–8.3)
NEUTROPHILS # BLD MANUAL: 2.8 10E3/UL (ref 1.6–8.3)
NEUTROPHILS # BLD MANUAL: 3 10E3/UL (ref 1.6–8.3)
NEUTROPHILS # BLD MANUAL: 3.2 10E3/UL (ref 1.6–8.3)
NEUTROPHILS # BLD MANUAL: 3.2 10E3/UL (ref 1.6–8.3)
NEUTROPHILS # BLD MANUAL: 3.4 10E3/UL (ref 1.6–8.3)
NEUTROPHILS # BLD MANUAL: 3.4 10E3/UL (ref 1.6–8.3)
NEUTROPHILS # BLD MANUAL: 4.2 10E3/UL (ref 1.6–8.3)
NEUTROPHILS # BLD MANUAL: 7.2 10E3/UL (ref 1.6–8.3)
NEUTROPHILS NFR BLD AUTO: 11.4 %
NEUTROPHILS NFR BLD AUTO: 13.2 %
NEUTROPHILS NFR BLD AUTO: 13.3 %
NEUTROPHILS NFR BLD AUTO: 13.5 %
NEUTROPHILS NFR BLD AUTO: 13.9 %
NEUTROPHILS NFR BLD AUTO: 14 %
NEUTROPHILS NFR BLD AUTO: 14 %
NEUTROPHILS NFR BLD AUTO: 17 %
NEUTROPHILS NFR BLD AUTO: 18 %
NEUTROPHILS NFR BLD AUTO: 18.2 %
NEUTROPHILS NFR BLD AUTO: 18.9 %
NEUTROPHILS NFR BLD AUTO: 19.1 %
NEUTROPHILS NFR BLD AUTO: 19.2 %
NEUTROPHILS NFR BLD AUTO: 20 %
NEUTROPHILS NFR BLD AUTO: 20 %
NEUTROPHILS NFR BLD AUTO: 20.8 %
NEUTROPHILS NFR BLD AUTO: 23.6 %
NEUTROPHILS NFR BLD AUTO: 25.3 %
NEUTROPHILS NFR BLD AUTO: 30 %
NEUTROPHILS NFR BLD AUTO: 31.6 %
NEUTROPHILS NFR BLD AUTO: 32 %
NEUTROPHILS NFR BLD AUTO: 32.7 %
NEUTROPHILS NFR BLD AUTO: 33 %
NEUTROPHILS NFR BLD AUTO: 33 %
NEUTROPHILS NFR BLD AUTO: 33.6 %
NEUTROPHILS NFR BLD AUTO: 34 %
NEUTROPHILS NFR BLD AUTO: 35 %
NEUTROPHILS NFR BLD AUTO: 40 %
NEUTROPHILS NFR BLD AUTO: 42 %
NEUTROPHILS NFR BLD AUTO: 45 %
NEUTROPHILS NFR BLD AUTO: 46 %
NEUTROPHILS NFR BLD AUTO: 48 %
NEUTROPHILS NFR BLD AUTO: 50.9 %
NEUTROPHILS NFR BLD AUTO: 51 %
NEUTROPHILS NFR BLD AUTO: 55 %
NEUTROPHILS NFR BLD AUTO: 58 %
NEUTROPHILS NFR BLD AUTO: 58 %
NEUTROPHILS NFR BLD AUTO: 59 %
NEUTROPHILS NFR BLD AUTO: 62 %
NEUTROPHILS NFR BLD AUTO: 66 %
NEUTROPHILS NFR BLD AUTO: 66 %
NEUTROPHILS NFR BLD AUTO: 70 %
NEUTROPHILS NFR BLD AUTO: 71 %
NEUTROPHILS NFR BLD AUTO: 72 %
NEUTROPHILS NFR BLD AUTO: 72 %
NEUTROPHILS NFR BLD AUTO: 73 %
NEUTROPHILS NFR BLD AUTO: 78 %
NEUTROPHILS NFR BLD MANUAL: 14 %
NEUTROPHILS NFR BLD MANUAL: 22 %
NEUTROPHILS NFR BLD MANUAL: 24 %
NEUTROPHILS NFR BLD MANUAL: 24 %
NEUTROPHILS NFR BLD MANUAL: 27 %
NEUTROPHILS NFR BLD MANUAL: 32 %
NEUTROPHILS NFR BLD MANUAL: 34 %
NEUTROPHILS NFR BLD MANUAL: 36 %
NEUTROPHILS NFR BLD MANUAL: 38 %
NEUTROPHILS NFR BLD MANUAL: 39 %
NEUTROPHILS NFR BLD MANUAL: 42 %
NEUTROPHILS NFR BLD MANUAL: 42 %
NEUTROPHILS NFR BLD MANUAL: 43 %
NEUTROPHILS NFR BLD MANUAL: 44 %
NEUTROPHILS NFR BLD MANUAL: 45 %
NEUTROPHILS NFR BLD MANUAL: 46 %
NEUTROPHILS NFR BLD MANUAL: 47 %
NEUTROPHILS NFR BLD MANUAL: 47 %
NEUTROPHILS NFR BLD MANUAL: 50 %
NEUTROPHILS NFR BLD MANUAL: 50 %
NEUTROPHILS NFR BLD MANUAL: 51 %
NEUTROPHILS NFR BLD MANUAL: 52 %
NEUTROPHILS NFR BLD MANUAL: 54 %
NEUTROPHILS NFR BLD MANUAL: 55 %
NEUTROPHILS NFR BLD MANUAL: 58 %
NEUTROPHILS NFR BLD MANUAL: 58 %
NEUTROPHILS NFR BLD MANUAL: 64 %
NEUTROPHILS NFR BLD MANUAL: 65 %
NEUTROPHILS NFR BLD MANUAL: 70 %
NEUTROPHILS NFR BLD MANUAL: 70 %
NEUTROPHILS NFR BLD MANUAL: 71 %
NEUTROPHILS NFR BLD MANUAL: 71 %
NEUTROPHILS NFR BLD MANUAL: 76 %
NEUTS BAND # BLD AUTO: 0.2 10E9/L (ref 0–0.6)
NEUTS BAND NFR BLD MANUAL: 1 %
NEUTS BAND NFR FLD MANUAL: 38 %
NITRATE UR QL: NEGATIVE
NRBC # BLD AUTO: 0 10E3/UL
NRBC # BLD AUTO: 0.2 10*3/UL
NRBC # BLD AUTO: 0.3 10*3/UL
NRBC # BLD AUTO: 0.3 10*3/UL
NRBC # BLD AUTO: 0.4 10*3/UL
NRBC # BLD AUTO: 0.6 10*3/UL
NRBC # BLD AUTO: 1.2 10*3/UL
NRBC BLD AUTO-RTO: 0 /100
NRBC BLD AUTO-RTO: 1 /100
NRBC BLD AUTO-RTO: 2 /100
NRBC BLD AUTO-RTO: 3 /100
NRBC BLD AUTO-RTO: 3 /100
NRBC BLD MANUAL-RTO: 1 %
NT-PROBNP SERPL-MCNC: 1396 PG/ML (ref 0–900)
NT-PROBNP SERPL-MCNC: 5004 PG/ML (ref 0–900)
NUM BPU REQUESTED: 1
NUM BPU REQUESTED: 2
NUM BPU REQUESTED: 3
O2/TOTAL GAS SETTING VFR VENT: 21 %
OTHER CELLS # BLD MANUAL: 0 10E3/UL
OTHER CELLS # BLD MANUAL: 0.7 10E9/L
OTHER CELLS # BLD MANUAL: 13.6 10E9/L
OTHER CELLS NFR BLD MANUAL: 20.9 %
OTHER CELLS NFR BLD MANUAL: 3 %
OTHER CELLS NFR BLD MANUAL: 4 %
OVALOCYTES BLD QL SMEAR: SLIGHT
PATH REPORT.ADDENDUM SPEC: NORMAL
PATH REPORT.COMMENTS IMP SPEC: NORMAL
PATH REPORT.FINAL DX SPEC: NORMAL
PATH REPORT.GROSS SPEC: NORMAL
PATH REPORT.MICROSCOPIC SPEC OTHER STN: NORMAL
PATH REPORT.RELEVANT HX SPEC: NORMAL
PATH REV: ABNORMAL
PCO2 BLDV: 44 MM HG (ref 40–50)
PH BLDV: 7.39 PH (ref 7.32–7.43)
PH UR STRIP: 6 [PH] (ref 5–7)
PHOSPHATE SERPL-MCNC: 2.6 MG/DL (ref 2.5–4.5)
PHOSPHATE SERPL-MCNC: 3.3 MG/DL (ref 2.5–4.5)
PHOSPHATE SERPL-MCNC: 3.5 MG/DL (ref 2.5–4.5)
PHOSPHATE SERPL-MCNC: 3.6 MG/DL (ref 2.5–4.5)
PHOSPHATE SERPL-MCNC: 3.7 MG/DL (ref 2.5–4.5)
PHOSPHATE SERPL-MCNC: 3.8 MG/DL (ref 2.5–4.5)
PHOSPHATE SERPL-MCNC: 3.9 MG/DL (ref 2.5–4.5)
PHOSPHATE SERPL-MCNC: 3.9 MG/DL (ref 2.5–4.5)
PHOSPHATE SERPL-MCNC: 4.2 MG/DL (ref 2.5–4.5)
PHOSPHATE SERPL-MCNC: 4.3 MG/DL (ref 2.5–4.5)
PHOSPHATE SERPL-MCNC: 4.4 MG/DL (ref 2.5–4.5)
PHOSPHATE SERPL-MCNC: 4.6 MG/DL (ref 2.5–4.5)
PHOSPHATE SERPL-MCNC: 4.6 MG/DL (ref 2.5–4.5)
PHOSPHATE SERPL-MCNC: 5 MG/DL (ref 2.5–4.5)
PHOTO IMAGE: NORMAL
PLAT MORPH BLD: ABNORMAL
PLAT MORPH BLD: NORMAL
PLATELET # BLD AUTO: 1 10E3/UL (ref 150–450)
PLATELET # BLD AUTO: 10 10E3/UL (ref 150–450)
PLATELET # BLD AUTO: 11 10E3/UL (ref 150–450)
PLATELET # BLD AUTO: 11 10E3/UL (ref 150–450)
PLATELET # BLD AUTO: 11 10E9/L (ref 150–450)
PLATELET # BLD AUTO: 12 10E3/UL (ref 150–450)
PLATELET # BLD AUTO: 12 10E9/L (ref 150–450)
PLATELET # BLD AUTO: 12 10E9/L (ref 150–450)
PLATELET # BLD AUTO: 13 10E3/UL (ref 150–450)
PLATELET # BLD AUTO: 13 10E9/L (ref 150–450)
PLATELET # BLD AUTO: 14 10E3/UL (ref 150–450)
PLATELET # BLD AUTO: 14 10E3/UL (ref 150–450)
PLATELET # BLD AUTO: 15 10E3/UL (ref 150–450)
PLATELET # BLD AUTO: 15 10E9/L (ref 150–450)
PLATELET # BLD AUTO: 16 10E9/L (ref 150–450)
PLATELET # BLD AUTO: 17 10E3/UL (ref 150–450)
PLATELET # BLD AUTO: 17 10E9/L (ref 150–450)
PLATELET # BLD AUTO: 17 10E9/L (ref 150–450)
PLATELET # BLD AUTO: 18 10E3/UL (ref 150–450)
PLATELET # BLD AUTO: 18 10E9/L (ref 150–450)
PLATELET # BLD AUTO: 19 10E3/UL (ref 150–450)
PLATELET # BLD AUTO: 19 10E3/UL (ref 150–450)
PLATELET # BLD AUTO: 19 10E9/L (ref 150–450)
PLATELET # BLD AUTO: 2 10E3/UL (ref 150–450)
PLATELET # BLD AUTO: 20 10E3/UL (ref 150–450)
PLATELET # BLD AUTO: 20 10E3/UL (ref 150–450)
PLATELET # BLD AUTO: 20 10E9/L (ref 150–450)
PLATELET # BLD AUTO: 21 10E3/UL (ref 150–450)
PLATELET # BLD AUTO: 21 10E9/L (ref 150–450)
PLATELET # BLD AUTO: 22 10E3/UL (ref 150–450)
PLATELET # BLD AUTO: 23 10E3/UL (ref 150–450)
PLATELET # BLD AUTO: 23 10E3/UL (ref 150–450)
PLATELET # BLD AUTO: 23 10E9/L (ref 150–450)
PLATELET # BLD AUTO: 24 10E3/UL (ref 150–450)
PLATELET # BLD AUTO: 24 10E9/L (ref 150–450)
PLATELET # BLD AUTO: 25 10E3/UL (ref 150–450)
PLATELET # BLD AUTO: 25 10E9/L (ref 150–450)
PLATELET # BLD AUTO: 28 10E3/UL (ref 150–450)
PLATELET # BLD AUTO: 28 10E9/L (ref 150–450)
PLATELET # BLD AUTO: 29 10E3/UL (ref 150–450)
PLATELET # BLD AUTO: 30 10E3/UL (ref 150–450)
PLATELET # BLD AUTO: 31 10E3/UL (ref 150–450)
PLATELET # BLD AUTO: 31 10E9/L (ref 150–450)
PLATELET # BLD AUTO: 32 10E9/L (ref 150–450)
PLATELET # BLD AUTO: 33 10E3/UL (ref 150–450)
PLATELET # BLD AUTO: 33 10E3/UL (ref 150–450)
PLATELET # BLD AUTO: 34 10E3/UL (ref 150–450)
PLATELET # BLD AUTO: 34 10E3/UL (ref 150–450)
PLATELET # BLD AUTO: 37 10E3/UL (ref 150–450)
PLATELET # BLD AUTO: 37 10E9/L (ref 150–450)
PLATELET # BLD AUTO: 38 10E3/UL (ref 150–450)
PLATELET # BLD AUTO: 38 10E3/UL (ref 150–450)
PLATELET # BLD AUTO: 39 10E3/UL (ref 150–450)
PLATELET # BLD AUTO: 39 10E3/UL (ref 150–450)
PLATELET # BLD AUTO: 4 10E3/UL (ref 150–450)
PLATELET # BLD AUTO: 40 10E9/L (ref 150–450)
PLATELET # BLD AUTO: 40 10E9/L (ref 150–450)
PLATELET # BLD AUTO: 42 10E3/UL (ref 150–450)
PLATELET # BLD AUTO: 44 10E3/UL (ref 150–450)
PLATELET # BLD AUTO: 44 10E9/L (ref 150–450)
PLATELET # BLD AUTO: 5 10E3/UL (ref 150–450)
PLATELET # BLD AUTO: 5 10E3/UL (ref 150–450)
PLATELET # BLD AUTO: 54 10E9/L (ref 150–450)
PLATELET # BLD AUTO: 56 10E9/L (ref 150–450)
PLATELET # BLD AUTO: 57 10E9/L (ref 150–450)
PLATELET # BLD AUTO: 58 10E9/L (ref 150–450)
PLATELET # BLD AUTO: 6 10E3/UL (ref 150–450)
PLATELET # BLD AUTO: 6 10E9/L (ref 150–450)
PLATELET # BLD AUTO: 63 10E9/L (ref 150–450)
PLATELET # BLD AUTO: 67 10E9/L (ref 150–450)
PLATELET # BLD AUTO: 7 10E3/UL (ref 150–450)
PLATELET # BLD AUTO: 7 10E3/UL (ref 150–450)
PLATELET # BLD AUTO: 74 10E9/L (ref 150–450)
PLATELET # BLD AUTO: 78 10E9/L (ref 150–450)
PLATELET # BLD AUTO: 8 10E3/UL (ref 150–450)
PLATELET # BLD AUTO: 8 10E3/UL (ref 150–450)
PLATELET # BLD AUTO: 8 10E9/L (ref 150–450)
PLATELET # BLD AUTO: 81 10E9/L (ref 150–450)
PLATELET # BLD AUTO: 9 10E3/UL (ref 150–450)
PLATELET # BLD AUTO: 9 10E3/UL (ref 150–450)
PLATELET # BLD EST: ABNORMAL 10*3/UL
PO2 BLDV: 25 MM HG (ref 25–47)
POIKILOCYTOSIS BLD QL SMEAR: ABNORMAL
POIKILOCYTOSIS BLD QL SMEAR: SLIGHT
POLYCHROMASIA BLD QL SMEAR: SLIGHT
POTASSIUM BLD-SCNC: 3.2 MMOL/L (ref 3.4–5.3)
POTASSIUM BLD-SCNC: 3.3 MMOL/L (ref 3.4–5.3)
POTASSIUM BLD-SCNC: 3.6 MMOL/L (ref 3.4–5.3)
POTASSIUM BLD-SCNC: 3.7 MMOL/L (ref 3.4–5.3)
POTASSIUM BLD-SCNC: 3.8 MMOL/L (ref 3.4–5.3)
POTASSIUM BLD-SCNC: 3.9 MMOL/L (ref 3.4–5.3)
POTASSIUM BLD-SCNC: 4 MMOL/L (ref 3.4–5.3)
POTASSIUM BLD-SCNC: 4.1 MMOL/L (ref 3.4–5.3)
POTASSIUM BLD-SCNC: 4.2 MMOL/L (ref 3.4–5.3)
POTASSIUM BLD-SCNC: 4.3 MMOL/L (ref 3.4–5.3)
POTASSIUM BLD-SCNC: 4.3 MMOL/L (ref 3.4–5.3)
POTASSIUM BLD-SCNC: 4.4 MMOL/L (ref 3.4–5.3)
POTASSIUM BLD-SCNC: 4.4 MMOL/L (ref 3.4–5.3)
POTASSIUM BLD-SCNC: 4.6 MMOL/L (ref 3.4–5.3)
POTASSIUM BLD-SCNC: 4.7 MMOL/L (ref 3.4–5.3)
POTASSIUM BLD-SCNC: 4.8 MMOL/L (ref 3.4–5.3)
POTASSIUM SERPL-SCNC: 3.3 MMOL/L (ref 3.4–5.3)
POTASSIUM SERPL-SCNC: 3.4 MMOL/L (ref 3.4–5.3)
POTASSIUM SERPL-SCNC: 3.7 MMOL/L (ref 3.4–5.3)
POTASSIUM SERPL-SCNC: 3.8 MMOL/L (ref 3.4–5.3)
POTASSIUM SERPL-SCNC: 3.9 MMOL/L (ref 3.4–5.3)
POTASSIUM SERPL-SCNC: 4 MMOL/L (ref 3.4–5.3)
POTASSIUM SERPL-SCNC: 4.1 MMOL/L (ref 3.4–5.3)
POTASSIUM SERPL-SCNC: 4.2 MMOL/L (ref 3.4–5.3)
POTASSIUM SERPL-SCNC: 4.3 MMOL/L (ref 3.4–5.3)
POTASSIUM SERPL-SCNC: 4.6 MMOL/L (ref 3.4–5.3)
PROMYELOCYTES # BLD MANUAL: 0 10E3/UL
PROMYELOCYTES # BLD MANUAL: 0.1 10E9/L
PROMYELOCYTES # BLD MANUAL: 0.2 10E9/L
PROMYELOCYTES # BLD MANUAL: 0.3 10E9/L
PROMYELOCYTES # BLD MANUAL: 0.3 10E9/L
PROMYELOCYTES # BLD MANUAL: 0.4 10E9/L
PROMYELOCYTES # BLD MANUAL: 0.5 10E9/L
PROMYELOCYTES # BLD MANUAL: 0.8 10E9/L
PROMYELOCYTES # BLD MANUAL: 0.9 10E9/L
PROMYELOCYTES # BLD MANUAL: 1 10E9/L
PROMYELOCYTES # BLD MANUAL: 1.1 10E9/L
PROMYELOCYTES # BLD MANUAL: 1.1 10E9/L
PROMYELOCYTES # BLD MANUAL: 2.3 10E9/L
PROMYELOCYTES NFR BLD MANUAL: 0.8 %
PROMYELOCYTES NFR BLD MANUAL: 0.9 %
PROMYELOCYTES NFR BLD MANUAL: 0.9 %
PROMYELOCYTES NFR BLD MANUAL: 1 %
PROMYELOCYTES NFR BLD MANUAL: 1.7 %
PROMYELOCYTES NFR BLD MANUAL: 2 %
PROMYELOCYTES NFR BLD MANUAL: 3 %
PROMYELOCYTES NFR BLD MANUAL: 3.3 %
PROMYELOCYTES NFR BLD MANUAL: 4 %
PROMYELOCYTES NFR BLD MANUAL: 5 %
PROMYELOCYTES NFR BLD MANUAL: 6 %
PROT SERPL-MCNC: 6.1 G/DL (ref 6.8–8.8)
PROT SERPL-MCNC: 6.3 G/DL (ref 6.8–8.8)
PROT SERPL-MCNC: 6.3 G/DL (ref 6.8–8.8)
PROT SERPL-MCNC: 6.5 G/DL (ref 6.8–8.8)
PROT SERPL-MCNC: 6.7 G/DL (ref 6.8–8.8)
PROT SERPL-MCNC: 6.7 G/DL (ref 6.8–8.8)
PROT SERPL-MCNC: 6.8 G/DL (ref 6.8–8.8)
PROT SERPL-MCNC: 6.9 G/DL (ref 6.8–8.8)
PROT SERPL-MCNC: 7 G/DL (ref 6.8–8.8)
PROT SERPL-MCNC: 7.1 G/DL (ref 6.8–8.8)
PROT SERPL-MCNC: 7.2 G/DL (ref 6.8–8.8)
PROT SERPL-MCNC: 7.3 G/DL (ref 6.8–8.8)
PROT SERPL-MCNC: 7.4 G/DL (ref 6.8–8.8)
PROT SERPL-MCNC: 7.5 G/DL (ref 6.8–8.8)
PROT SERPL-MCNC: 7.6 G/DL (ref 6.8–8.8)
PROT SERPL-MCNC: 7.6 G/DL (ref 6.8–8.8)
PROT SERPL-MCNC: 7.7 G/DL (ref 6.8–8.8)
PROT SERPL-MCNC: 7.8 G/DL (ref 6.8–8.8)
PROT SERPL-MCNC: 7.9 G/DL (ref 6.8–8.8)
PROT SERPL-MCNC: 7.9 G/DL (ref 6.8–8.8)
PROT SERPL-MCNC: 8 G/DL (ref 6.8–8.8)
PROT SERPL-MCNC: 8 G/DL (ref 6.8–8.8)
RBC # BLD AUTO: 1.68 10E6/UL (ref 4.4–5.9)
RBC # BLD AUTO: 2.05 10E12/L (ref 4.4–5.9)
RBC # BLD AUTO: 2.07 10E6/UL (ref 4.4–5.9)
RBC # BLD AUTO: 2.08 10E6/UL (ref 4.4–5.9)
RBC # BLD AUTO: 2.1 10E6/UL (ref 4.4–5.9)
RBC # BLD AUTO: 2.14 10E6/UL (ref 4.4–5.9)
RBC # BLD AUTO: 2.15 10E12/L (ref 4.4–5.9)
RBC # BLD AUTO: 2.18 10E12/L (ref 4.4–5.9)
RBC # BLD AUTO: 2.18 10E6/UL (ref 4.4–5.9)
RBC # BLD AUTO: 2.19 10E6/UL (ref 4.4–5.9)
RBC # BLD AUTO: 2.2 10E12/L (ref 4.4–5.9)
RBC # BLD AUTO: 2.2 10E6/UL (ref 4.4–5.9)
RBC # BLD AUTO: 2.21 10E12/L (ref 4.4–5.9)
RBC # BLD AUTO: 2.21 10E6/UL (ref 4.4–5.9)
RBC # BLD AUTO: 2.23 10E12/L (ref 4.4–5.9)
RBC # BLD AUTO: 2.23 10E6/UL (ref 4.4–5.9)
RBC # BLD AUTO: 2.25 10E6/UL (ref 4.4–5.9)
RBC # BLD AUTO: 2.28 10E12/L (ref 4.4–5.9)
RBC # BLD AUTO: 2.29 10E12/L (ref 4.4–5.9)
RBC # BLD AUTO: 2.3 10E6/UL (ref 4.4–5.9)
RBC # BLD AUTO: 2.3 10E6/UL (ref 4.4–5.9)
RBC # BLD AUTO: 2.35 10E6/UL (ref 4.4–5.9)
RBC # BLD AUTO: 2.36 10E6/UL (ref 4.4–5.9)
RBC # BLD AUTO: 2.37 10E12/L (ref 4.4–5.9)
RBC # BLD AUTO: 2.37 10E6/UL (ref 4.4–5.9)
RBC # BLD AUTO: 2.39 10E12/L (ref 4.4–5.9)
RBC # BLD AUTO: 2.41 10E6/UL (ref 4.4–5.9)
RBC # BLD AUTO: 2.42 10E6/UL (ref 4.4–5.9)
RBC # BLD AUTO: 2.43 10E12/L (ref 4.4–5.9)
RBC # BLD AUTO: 2.43 10E12/L (ref 4.4–5.9)
RBC # BLD AUTO: 2.43 10E6/UL (ref 4.4–5.9)
RBC # BLD AUTO: 2.45 10E6/UL (ref 4.4–5.9)
RBC # BLD AUTO: 2.47 10E6/UL (ref 4.4–5.9)
RBC # BLD AUTO: 2.47 10E6/UL (ref 4.4–5.9)
RBC # BLD AUTO: 2.48 10E6/UL (ref 4.4–5.9)
RBC # BLD AUTO: 2.49 10E12/L (ref 4.4–5.9)
RBC # BLD AUTO: 2.5 10E12/L (ref 4.4–5.9)
RBC # BLD AUTO: 2.51 10E12/L (ref 4.4–5.9)
RBC # BLD AUTO: 2.51 10E6/UL (ref 4.4–5.9)
RBC # BLD AUTO: 2.51 10E6/UL (ref 4.4–5.9)
RBC # BLD AUTO: 2.52 10E6/UL (ref 4.4–5.9)
RBC # BLD AUTO: 2.53 10E6/UL (ref 4.4–5.9)
RBC # BLD AUTO: 2.54 10E6/UL (ref 4.4–5.9)
RBC # BLD AUTO: 2.55 10E12/L (ref 4.4–5.9)
RBC # BLD AUTO: 2.55 10E6/UL (ref 4.4–5.9)
RBC # BLD AUTO: 2.55 10E6/UL (ref 4.4–5.9)
RBC # BLD AUTO: 2.56 10E12/L (ref 4.4–5.9)
RBC # BLD AUTO: 2.56 10E6/UL (ref 4.4–5.9)
RBC # BLD AUTO: 2.57 10E6/UL (ref 4.4–5.9)
RBC # BLD AUTO: 2.58 10E12/L (ref 4.4–5.9)
RBC # BLD AUTO: 2.58 10E6/UL (ref 4.4–5.9)
RBC # BLD AUTO: 2.59 10E12/L (ref 4.4–5.9)
RBC # BLD AUTO: 2.59 10E12/L (ref 4.4–5.9)
RBC # BLD AUTO: 2.59 10E6/UL (ref 4.4–5.9)
RBC # BLD AUTO: 2.61 10E6/UL (ref 4.4–5.9)
RBC # BLD AUTO: 2.62 10E12/L (ref 4.4–5.9)
RBC # BLD AUTO: 2.63 10E12/L (ref 4.4–5.9)
RBC # BLD AUTO: 2.63 10E6/UL (ref 4.4–5.9)
RBC # BLD AUTO: 2.64 10E12/L (ref 4.4–5.9)
RBC # BLD AUTO: 2.64 10E6/UL (ref 4.4–5.9)
RBC # BLD AUTO: 2.65 10E12/L (ref 4.4–5.9)
RBC # BLD AUTO: 2.67 10E6/UL (ref 4.4–5.9)
RBC # BLD AUTO: 2.68 10E12/L (ref 4.4–5.9)
RBC # BLD AUTO: 2.68 10E6/UL (ref 4.4–5.9)
RBC # BLD AUTO: 2.69 10E6/UL (ref 4.4–5.9)
RBC # BLD AUTO: 2.71 10E6/UL (ref 4.4–5.9)
RBC # BLD AUTO: 2.72 10E6/UL (ref 4.4–5.9)
RBC # BLD AUTO: 2.73 10E6/UL (ref 4.4–5.9)
RBC # BLD AUTO: 2.74 10E6/UL (ref 4.4–5.9)
RBC # BLD AUTO: 2.75 10E12/L (ref 4.4–5.9)
RBC # BLD AUTO: 2.76 10E12/L (ref 4.4–5.9)
RBC # BLD AUTO: 2.77 10E6/UL (ref 4.4–5.9)
RBC # BLD AUTO: 2.78 10E6/UL (ref 4.4–5.9)
RBC # BLD AUTO: 2.83 10E12/L (ref 4.4–5.9)
RBC # BLD AUTO: 2.83 10E6/UL (ref 4.4–5.9)
RBC # BLD AUTO: 2.83 10E6/UL (ref 4.4–5.9)
RBC # BLD AUTO: 2.84 10E12/L (ref 4.4–5.9)
RBC # BLD AUTO: 2.84 10E6/UL (ref 4.4–5.9)
RBC # BLD AUTO: 2.85 10E6/UL (ref 4.4–5.9)
RBC # BLD AUTO: 2.88 10E12/L (ref 4.4–5.9)
RBC # BLD AUTO: 2.89 10E6/UL (ref 4.4–5.9)
RBC # BLD AUTO: 2.9 10E12/L (ref 4.4–5.9)
RBC # BLD AUTO: 2.91 10E6/UL (ref 4.4–5.9)
RBC # BLD AUTO: 2.92 10E12/L (ref 4.4–5.9)
RBC # BLD AUTO: 2.92 10E6/UL (ref 4.4–5.9)
RBC # BLD AUTO: 2.94 10E12/L (ref 4.4–5.9)
RBC # BLD AUTO: 2.94 10E6/UL (ref 4.4–5.9)
RBC # BLD AUTO: 2.94 10E6/UL (ref 4.4–5.9)
RBC # BLD AUTO: 2.96 10E12/L (ref 4.4–5.9)
RBC # BLD AUTO: 2.99 10E6/UL (ref 4.4–5.9)
RBC # BLD AUTO: 3 10E12/L (ref 4.4–5.9)
RBC # BLD AUTO: 3 10E12/L (ref 4.4–5.9)
RBC # BLD AUTO: 3.07 10E12/L (ref 4.4–5.9)
RBC # BLD AUTO: 3.09 10E12/L (ref 4.4–5.9)
RBC INCLUSIONS BLD: SLIGHT
RBC MORPH BLD: ABNORMAL
RBC MORPH BLD: NORMAL
RBC URINE: 1 /HPF
RETICS # AUTO: 9.7 10E9/L (ref 25–95)
RETICS/RBC NFR AUTO: 0.4 % (ref 0.5–2)
RSV RNA SPEC QL NAA+PROBE: NORMAL
SARS-COV-2 RNA RESP QL NAA+PROBE: NEGATIVE
SCR 1 TEST METHOD: NORMAL
SCR1 CELL: NORMAL
SCR1 COMMENTS: NORMAL
SCR1 RESULT: NORMAL
SCR2 CELL: NORMAL
SCR2 COMMENTS: NORMAL
SCR2 RESULT: NORMAL
SCR2 TEST METHOD: NORMAL
SIGNIFICANT RESULTS: NORMAL
SMUDGE CELLS BLD QL SMEAR: PRESENT
SODIUM SERPL-SCNC: 132 MMOL/L (ref 133–144)
SODIUM SERPL-SCNC: 133 MMOL/L (ref 133–144)
SODIUM SERPL-SCNC: 134 MMOL/L (ref 133–144)
SODIUM SERPL-SCNC: 135 MMOL/L (ref 133–144)
SODIUM SERPL-SCNC: 136 MMOL/L (ref 133–144)
SODIUM SERPL-SCNC: 137 MMOL/L (ref 133–144)
SODIUM SERPL-SCNC: 138 MMOL/L (ref 133–144)
SODIUM SERPL-SCNC: 139 MMOL/L (ref 133–144)
SODIUM SERPL-SCNC: 140 MMOL/L (ref 133–144)
SODIUM SERPL-SCNC: 141 MMOL/L (ref 133–144)
SP GR UR STRIP: 1.01 (ref 1–1.03)
SPECIMEN DESCRIPTION: NORMAL
SPECIMEN DESCRIPTION: NORMAL
SPECIMEN EXP DATE BLD: NORMAL
SPECIMEN EXPIRATION DATE: NORMAL
SPECIMEN SOURCE FLD: NORMAL
SPECIMEN SOURCE SNV: NORMAL
SPECIMEN SOURCE: NORMAL
TEST DETAILS, MDL: NORMAL
TOXIC GRANULES BLD QL SMEAR: PRESENT
TRANSFUSION STATUS PATIENT QL: NORMAL
TROPONIN I SERPL-MCNC: 0.05 UG/L (ref 0–0.04)
TROPONIN I SERPL-MCNC: 0.39 UG/L (ref 0–0.04)
TROPONIN I SERPL-MCNC: 0.6 UG/L (ref 0–0.04)
TROPONIN I SERPL-MCNC: <0.015 UG/L (ref 0–0.04)
UNIT ABO/RH: NORMAL
UNIT NUMBER: NORMAL
UNIT STATUS: NORMAL
UNIT TYPE ISBT: 2800
UNIT TYPE ISBT: 2800
UNIT TYPE ISBT: 600
UNIT TYPE ISBT: 6200
UNIT TYPE ISBT: 8400
UNIT TYPE ISBT: 9500
URATE SERPL-MCNC: 3.1 MG/DL (ref 3.5–7.2)
URATE SERPL-MCNC: 3.2 MG/DL (ref 3.5–7.2)
URATE SERPL-MCNC: 3.5 MG/DL (ref 3.5–7.2)
URATE SERPL-MCNC: 3.6 MG/DL (ref 3.5–7.2)
URATE SERPL-MCNC: 3.9 MG/DL (ref 3.5–7.2)
URATE SERPL-MCNC: 4 MG/DL (ref 3.5–7.2)
URATE SERPL-MCNC: 4.1 MG/DL (ref 3.5–7.2)
URATE SERPL-MCNC: 4.2 MG/DL (ref 3.5–7.2)
URATE SERPL-MCNC: 4.2 MG/DL (ref 3.5–7.2)
URATE SERPL-MCNC: 4.3 MG/DL (ref 3.5–7.2)
URATE SERPL-MCNC: 4.4 MG/DL (ref 3.5–7.2)
URATE SERPL-MCNC: 4.4 MG/DL (ref 3.5–7.2)
URATE SERPL-MCNC: 4.5 MG/DL (ref 3.5–7.2)
URATE SERPL-MCNC: 4.5 MG/DL (ref 3.5–7.2)
URATE SERPL-MCNC: 4.7 MG/DL (ref 3.5–7.2)
URATE SERPL-MCNC: 4.7 MG/DL (ref 3.5–7.2)
URATE SERPL-MCNC: 4.8 MG/DL (ref 3.5–7.2)
URATE SERPL-MCNC: 4.8 MG/DL (ref 3.5–7.2)
URATE SERPL-MCNC: 5.5 MG/DL (ref 3.5–7.2)
URATE SERPL-MCNC: 5.9 MG/DL (ref 3.5–7.2)
URATE SERPL-MCNC: 5.9 MG/DL (ref 3.5–7.2)
URATE SERPL-MCNC: 6 MG/DL (ref 3.5–7.2)
UROBILINOGEN UR STRIP-MCNC: NORMAL MG/DL
VARIANT LYMPHS BLD QL SMEAR: PRESENT
VARIANT LYMPHS BLD QL SMEAR: PRESENT
WBC # BLD AUTO: 0.2 10E3/UL (ref 4–11)
WBC # BLD AUTO: 0.3 10E3/UL (ref 4–11)
WBC # BLD AUTO: 0.3 10E3/UL (ref 4–11)
WBC # BLD AUTO: 0.4 10E3/UL (ref 4–11)
WBC # BLD AUTO: 0.5 10E3/UL (ref 4–11)
WBC # BLD AUTO: 0.5 10E3/UL (ref 4–11)
WBC # BLD AUTO: 0.7 10E3/UL (ref 4–11)
WBC # BLD AUTO: 0.8 10E3/UL (ref 4–11)
WBC # BLD AUTO: 1 10E3/UL (ref 4–11)
WBC # BLD AUTO: 1 10E3/UL (ref 4–11)
WBC # BLD AUTO: 1.1 10E3/UL (ref 4–11)
WBC # BLD AUTO: 1.2 10E3/UL (ref 4–11)
WBC # BLD AUTO: 1.3 10E3/UL (ref 4–11)
WBC # BLD AUTO: 1.4 10E3/UL (ref 4–11)
WBC # BLD AUTO: 1.6 10E3/UL (ref 4–11)
WBC # BLD AUTO: 1.7 10E3/UL (ref 4–11)
WBC # BLD AUTO: 1.8 10E3/UL (ref 4–11)
WBC # BLD AUTO: 11.5 10E9/L (ref 4–11)
WBC # BLD AUTO: 12.3 10E9/L (ref 4–11)
WBC # BLD AUTO: 13 10E9/L (ref 4–11)
WBC # BLD AUTO: 13.8 10E9/L (ref 4–11)
WBC # BLD AUTO: 14 10E9/L (ref 4–11)
WBC # BLD AUTO: 14.6 10E9/L (ref 4–11)
WBC # BLD AUTO: 14.7 10E9/L (ref 4–11)
WBC # BLD AUTO: 15.9 10E9/L (ref 4–11)
WBC # BLD AUTO: 16.5 10E9/L (ref 4–11)
WBC # BLD AUTO: 16.9 10E9/L (ref 4–11)
WBC # BLD AUTO: 16.9 10E9/L (ref 4–11)
WBC # BLD AUTO: 17.2 10E9/L (ref 4–11)
WBC # BLD AUTO: 17.3 10E9/L (ref 4–11)
WBC # BLD AUTO: 17.5 10E9/L (ref 4–11)
WBC # BLD AUTO: 17.7 10E9/L (ref 4–11)
WBC # BLD AUTO: 17.8 10E9/L (ref 4–11)
WBC # BLD AUTO: 18.3 10E9/L (ref 4–11)
WBC # BLD AUTO: 18.3 10E9/L (ref 4–11)
WBC # BLD AUTO: 18.8 10E9/L (ref 4–11)
WBC # BLD AUTO: 18.9 10E9/L (ref 4–11)
WBC # BLD AUTO: 19.3 10E9/L (ref 4–11)
WBC # BLD AUTO: 2 10E3/UL (ref 4–11)
WBC # BLD AUTO: 2.1 10E3/UL (ref 4–11)
WBC # BLD AUTO: 2.3 10E3/UL (ref 4–11)
WBC # BLD AUTO: 2.3 10E3/UL (ref 4–11)
WBC # BLD AUTO: 2.8 10E3/UL (ref 4–11)
WBC # BLD AUTO: 2.9 10E3/UL (ref 4–11)
WBC # BLD AUTO: 2.9 10E3/UL (ref 4–11)
WBC # BLD AUTO: 20 10E3/UL (ref 4–11)
WBC # BLD AUTO: 20.4 10E9/L (ref 4–11)
WBC # BLD AUTO: 22.6 10E9/L (ref 4–11)
WBC # BLD AUTO: 23 10E9/L (ref 4–11)
WBC # BLD AUTO: 25.1 10E9/L (ref 4–11)
WBC # BLD AUTO: 25.1 10E9/L (ref 4–11)
WBC # BLD AUTO: 3 10E3/UL (ref 4–11)
WBC # BLD AUTO: 3.1 10E3/UL (ref 4–11)
WBC # BLD AUTO: 3.3 10E3/UL (ref 4–11)
WBC # BLD AUTO: 3.5 10E3/UL (ref 4–11)
WBC # BLD AUTO: 3.9 10E3/UL (ref 4–11)
WBC # BLD AUTO: 30.2 10E9/L (ref 4–11)
WBC # BLD AUTO: 31.7 10E9/L (ref 4–11)
WBC # BLD AUTO: 34.7 10E9/L (ref 4–11)
WBC # BLD AUTO: 4 10E3/UL (ref 4–11)
WBC # BLD AUTO: 4.3 10E3/UL (ref 4–11)
WBC # BLD AUTO: 4.5 10E3/UL (ref 4–11)
WBC # BLD AUTO: 4.6 10E3/UL (ref 4–11)
WBC # BLD AUTO: 4.7 10E3/UL (ref 4–11)
WBC # BLD AUTO: 4.8 10E3/UL (ref 4–11)
WBC # BLD AUTO: 4.9 10E3/UL (ref 4–11)
WBC # BLD AUTO: 46.2 10E9/L (ref 4–11)
WBC # BLD AUTO: 46.8 10E9/L (ref 4–11)
WBC # BLD AUTO: 49 10E9/L (ref 4–11)
WBC # BLD AUTO: 5 10E3/UL (ref 4–11)
WBC # BLD AUTO: 5.1 10E3/UL (ref 4–11)
WBC # BLD AUTO: 5.3 10E3/UL (ref 4–11)
WBC # BLD AUTO: 5.4 10E9/L (ref 4–11)
WBC # BLD AUTO: 5.5 10E3/UL (ref 4–11)
WBC # BLD AUTO: 5.6 10E9/L (ref 4–11)
WBC # BLD AUTO: 5.7 10E9/L (ref 4–11)
WBC # BLD AUTO: 5.8 10E3/UL (ref 4–11)
WBC # BLD AUTO: 5.8 10E3/UL (ref 4–11)
WBC # BLD AUTO: 5.9 10E3/UL (ref 4–11)
WBC # BLD AUTO: 5.9 10E3/UL (ref 4–11)
WBC # BLD AUTO: 57.1 10E9/L (ref 4–11)
WBC # BLD AUTO: 6.1 10E3/UL (ref 4–11)
WBC # BLD AUTO: 6.1 10E3/UL (ref 4–11)
WBC # BLD AUTO: 6.2 10E3/UL (ref 4–11)
WBC # BLD AUTO: 6.3 10E3/UL (ref 4–11)
WBC # BLD AUTO: 6.4 10E3/UL (ref 4–11)
WBC # BLD AUTO: 6.4 10E3/UL (ref 4–11)
WBC # BLD AUTO: 6.7 10E3/UL (ref 4–11)
WBC # BLD AUTO: 6.7 10E9/L (ref 4–11)
WBC # BLD AUTO: 6.8 10E3/UL (ref 4–11)
WBC # BLD AUTO: 6.8 10E9/L (ref 4–11)
WBC # BLD AUTO: 6.9 10E3/UL (ref 4–11)
WBC # BLD AUTO: 62.9 10E9/L (ref 4–11)
WBC # BLD AUTO: 65.2 10E9/L (ref 4–11)
WBC # BLD AUTO: 66.3 10E9/L (ref 4–11)
WBC # BLD AUTO: 69.3 10E9/L (ref 4–11)
WBC # BLD AUTO: 71.1 10E9/L (ref 4–11)
WBC # BLD AUTO: 71.9 10E9/L (ref 4–11)
WBC # BLD AUTO: 72.4 10E9/L (ref 4–11)
WBC # BLD AUTO: 8 10E3/UL (ref 4–11)
WBC # BLD AUTO: 9.8 10E9/L (ref 4–11)
WBC # FLD AUTO: 7439 /UL
WBC URINE: 3 /HPF

## 2021-01-01 PROCEDURE — 86901 BLOOD TYPING SEROLOGIC RH(D): CPT

## 2021-01-01 PROCEDURE — 250N000013 HC RX MED GY IP 250 OP 250 PS 637: Performed by: INTERNAL MEDICINE

## 2021-01-01 PROCEDURE — 97110 THERAPEUTIC EXERCISES: CPT | Mod: GP

## 2021-01-01 PROCEDURE — 88311 DECALCIFY TISSUE: CPT | Mod: TC | Performed by: PHYSICIAN ASSISTANT

## 2021-01-01 PROCEDURE — 36415 COLL VENOUS BLD VENIPUNCTURE: CPT | Performed by: PEDIATRICS

## 2021-01-01 PROCEDURE — 36415 COLL VENOUS BLD VENIPUNCTURE: CPT | Performed by: NURSE PRACTITIONER

## 2021-01-01 PROCEDURE — 99284 EMERGENCY DEPT VISIT MOD MDM: CPT | Performed by: EMERGENCY MEDICINE

## 2021-01-01 PROCEDURE — 82040 ASSAY OF SERUM ALBUMIN: CPT | Performed by: STUDENT IN AN ORGANIZED HEALTH CARE EDUCATION/TRAINING PROGRAM

## 2021-01-01 PROCEDURE — 99283 EMERGENCY DEPT VISIT LOW MDM: CPT | Mod: 25 | Performed by: FAMILY MEDICINE

## 2021-01-01 PROCEDURE — 70450 CT HEAD/BRAIN W/O DYE: CPT

## 2021-01-01 PROCEDURE — 85025 COMPLETE CBC W/AUTO DIFF WBC: CPT | Performed by: INTERNAL MEDICINE

## 2021-01-01 PROCEDURE — 84550 ASSAY OF BLOOD/URIC ACID: CPT | Performed by: EMERGENCY MEDICINE

## 2021-01-01 PROCEDURE — 93971 EXTREMITY STUDY: CPT | Mod: 26 | Performed by: RADIOLOGY

## 2021-01-01 PROCEDURE — 36415 COLL VENOUS BLD VENIPUNCTURE: CPT

## 2021-01-01 PROCEDURE — 84550 ASSAY OF BLOOD/URIC ACID: CPT | Performed by: INTERNAL MEDICINE

## 2021-01-01 PROCEDURE — 999N001022 HC STATISTIC H-SPHEME PROCESS B/S: Performed by: PHYSICIAN ASSISTANT

## 2021-01-01 PROCEDURE — 36415 COLL VENOUS BLD VENIPUNCTURE: CPT | Performed by: PHYSICIAN ASSISTANT

## 2021-01-01 PROCEDURE — P9016 RBC LEUKOCYTES REDUCED: HCPCS | Performed by: STUDENT IN AN ORGANIZED HEALTH CARE EDUCATION/TRAINING PROGRAM

## 2021-01-01 PROCEDURE — 83605 ASSAY OF LACTIC ACID: CPT | Performed by: HOSPITALIST

## 2021-01-01 PROCEDURE — 86850 RBC ANTIBODY SCREEN: CPT | Performed by: STUDENT IN AN ORGANIZED HEALTH CARE EDUCATION/TRAINING PROGRAM

## 2021-01-01 PROCEDURE — 360N000082 HC SURGERY LEVEL 2 W/ FLUORO, PER MIN: Performed by: SURGERY

## 2021-01-01 PROCEDURE — 85027 COMPLETE CBC AUTOMATED: CPT | Performed by: INTERNAL MEDICINE

## 2021-01-01 PROCEDURE — 272N000001 HC OR GENERAL SUPPLY STERILE: Performed by: SURGERY

## 2021-01-01 PROCEDURE — 96413 CHEMO IV INFUSION 1 HR: CPT

## 2021-01-01 PROCEDURE — 80048 BASIC METABOLIC PNL TOTAL CA: CPT | Performed by: INTERNAL MEDICINE

## 2021-01-01 PROCEDURE — 999N001068 HC STATISTIC BONE MARROW CORE PERF TC 38221: Performed by: STUDENT IN AN ORGANIZED HEALTH CARE EDUCATION/TRAINING PROGRAM

## 2021-01-01 PROCEDURE — 999N000128 HC STATISTIC PERIPHERAL IV START W/O US GUIDANCE

## 2021-01-01 PROCEDURE — 88305 TISSUE EXAM BY PATHOLOGIST: CPT | Mod: 26 | Performed by: PATHOLOGY

## 2021-01-01 PROCEDURE — 86900 BLOOD TYPING SEROLOGIC ABO: CPT | Mod: 59 | Performed by: NURSE PRACTITIONER

## 2021-01-01 PROCEDURE — 120N000002 HC R&B MED SURG/OB UMMC

## 2021-01-01 PROCEDURE — 84100 ASSAY OF PHOSPHORUS: CPT | Performed by: PHYSICIAN ASSISTANT

## 2021-01-01 PROCEDURE — U0003 INFECTIOUS AGENT DETECTION BY NUCLEIC ACID (DNA OR RNA); SEVERE ACUTE RESPIRATORY SYNDROME CORONAVIRUS 2 (SARS-COV-2) (CORONAVIRUS DISEASE [COVID-19]), AMPLIFIED PROBE TECHNIQUE, MAKING USE OF HIGH THROUGHPUT TECHNOLOGIES AS DESCRIBED BY CMS-2020-01-R: HCPCS

## 2021-01-01 PROCEDURE — 36430 TRANSFUSION BLD/BLD COMPNT: CPT | Performed by: FAMILY MEDICINE

## 2021-01-01 PROCEDURE — 88184 FLOWCYTOMETRY/ TC 1 MARKER: CPT | Performed by: PHYSICIAN ASSISTANT

## 2021-01-01 PROCEDURE — 93005 ELECTROCARDIOGRAM TRACING: CPT

## 2021-01-01 PROCEDURE — 85384 FIBRINOGEN ACTIVITY: CPT | Performed by: PHYSICIAN ASSISTANT

## 2021-01-01 PROCEDURE — 36430 TRANSFUSION BLD/BLD COMPNT: CPT

## 2021-01-01 PROCEDURE — P9016 RBC LEUKOCYTES REDUCED: HCPCS | Mod: 59 | Performed by: STUDENT IN AN ORGANIZED HEALTH CARE EDUCATION/TRAINING PROGRAM

## 2021-01-01 PROCEDURE — 250N000011 HC RX IP 250 OP 636: Performed by: STUDENT IN AN ORGANIZED HEALTH CARE EDUCATION/TRAINING PROGRAM

## 2021-01-01 PROCEDURE — 36416 COLLJ CAPILLARY BLOOD SPEC: CPT | Performed by: INTERNAL MEDICINE

## 2021-01-01 PROCEDURE — P9016 RBC LEUKOCYTES REDUCED: HCPCS | Performed by: INTERNAL MEDICINE

## 2021-01-01 PROCEDURE — 93971 EXTREMITY STUDY: CPT | Mod: RT

## 2021-01-01 PROCEDURE — C9399 UNCLASSIFIED DRUGS OR BIOLOG: HCPCS | Performed by: INTERNAL MEDICINE

## 2021-01-01 PROCEDURE — 86900 BLOOD TYPING SEROLOGIC ABO: CPT | Performed by: INTERNAL MEDICINE

## 2021-01-01 PROCEDURE — 85097 BONE MARROW INTERPRETATION: CPT | Performed by: PATHOLOGY

## 2021-01-01 PROCEDURE — 88275 CYTOGENETICS 100-300: CPT | Performed by: PHYSICIAN ASSISTANT

## 2021-01-01 PROCEDURE — 99232 SBSQ HOSP IP/OBS MODERATE 35: CPT | Performed by: INTERNAL MEDICINE

## 2021-01-01 PROCEDURE — 86920 COMPATIBILITY TEST SPIN: CPT | Performed by: STUDENT IN AN ORGANIZED HEALTH CARE EDUCATION/TRAINING PROGRAM

## 2021-01-01 PROCEDURE — 258N000003 HC RX IP 258 OP 636: Performed by: PHYSICIAN ASSISTANT

## 2021-01-01 PROCEDURE — 76937 US GUIDE VASCULAR ACCESS: CPT | Mod: 26 | Performed by: SURGERY

## 2021-01-01 PROCEDURE — 85027 COMPLETE CBC AUTOMATED: CPT

## 2021-01-01 PROCEDURE — 84100 ASSAY OF PHOSPHORUS: CPT | Performed by: INTERNAL MEDICINE

## 2021-01-01 PROCEDURE — 250N000011 HC RX IP 250 OP 636: Performed by: FAMILY MEDICINE

## 2021-01-01 PROCEDURE — 97530 THERAPEUTIC ACTIVITIES: CPT | Mod: GP | Performed by: PHYSICAL THERAPIST

## 2021-01-01 PROCEDURE — 255N000002 HC RX 255 OP 636: Performed by: INTERNAL MEDICINE

## 2021-01-01 PROCEDURE — 999N001086 HC STATISTIC MORPHOLOGY W/INTERP HEMEPATH TC 85060: Performed by: PHYSICIAN ASSISTANT

## 2021-01-01 PROCEDURE — 85027 COMPLETE CBC AUTOMATED: CPT | Performed by: FAMILY MEDICINE

## 2021-01-01 PROCEDURE — 83615 LACTATE (LD) (LDH) ENZYME: CPT | Performed by: INTERNAL MEDICINE

## 2021-01-01 PROCEDURE — 86923 COMPATIBILITY TEST ELECTRIC: CPT | Performed by: STUDENT IN AN ORGANIZED HEALTH CARE EDUCATION/TRAINING PROGRAM

## 2021-01-01 PROCEDURE — 76705 ECHO EXAM OF ABDOMEN: CPT

## 2021-01-01 PROCEDURE — 250N000011 HC RX IP 250 OP 636: Performed by: SURGERY

## 2021-01-01 PROCEDURE — 258N000003 HC RX IP 258 OP 636: Performed by: NURSE ANESTHETIST, CERTIFIED REGISTERED

## 2021-01-01 PROCEDURE — 83735 ASSAY OF MAGNESIUM: CPT | Performed by: INTERNAL MEDICINE

## 2021-01-01 PROCEDURE — 250N000011 HC RX IP 250 OP 636: Performed by: INTERNAL MEDICINE

## 2021-01-01 PROCEDURE — 88189 FLOWCYTOMETRY/READ 16 & >: CPT | Performed by: PATHOLOGY

## 2021-01-01 PROCEDURE — 272N000103 HC INTRODUCER MICRO SET

## 2021-01-01 PROCEDURE — 86900 BLOOD TYPING SEROLOGIC ABO: CPT | Performed by: NURSE PRACTITIONER

## 2021-01-01 PROCEDURE — 80053 COMPREHEN METABOLIC PANEL: CPT | Performed by: PHYSICIAN ASSISTANT

## 2021-01-01 PROCEDURE — 86901 BLOOD TYPING SEROLOGIC RH(D): CPT | Performed by: INTERNAL MEDICINE

## 2021-01-01 PROCEDURE — 99232 SBSQ HOSP IP/OBS MODERATE 35: CPT | Performed by: STUDENT IN AN ORGANIZED HEALTH CARE EDUCATION/TRAINING PROGRAM

## 2021-01-01 PROCEDURE — 36415 COLL VENOUS BLD VENIPUNCTURE: CPT | Performed by: INTERNAL MEDICINE

## 2021-01-01 PROCEDURE — 80076 HEPATIC FUNCTION PANEL: CPT | Performed by: PHYSICIAN ASSISTANT

## 2021-01-01 PROCEDURE — 85610 PROTHROMBIN TIME: CPT | Performed by: INTERNAL MEDICINE

## 2021-01-01 PROCEDURE — 85060 BLOOD SMEAR INTERPRETATION: CPT | Performed by: PATHOLOGY

## 2021-01-01 PROCEDURE — 20605 DRAIN/INJ JOINT/BURSA W/O US: CPT

## 2021-01-01 PROCEDURE — 84550 ASSAY OF BLOOD/URIC ACID: CPT | Performed by: PHYSICIAN ASSISTANT

## 2021-01-01 PROCEDURE — 99233 SBSQ HOSP IP/OBS HIGH 50: CPT | Performed by: INTERNAL MEDICINE

## 2021-01-01 PROCEDURE — 250N000011 HC RX IP 250 OP 636: Performed by: PHYSICIAN ASSISTANT

## 2021-01-01 PROCEDURE — 999N001137 HC STATISTIC FLOW >15 ABY TC 88189: Performed by: INTERNAL MEDICINE

## 2021-01-01 PROCEDURE — 99215 OFFICE O/P EST HI 40 MIN: CPT | Performed by: PHYSICIAN ASSISTANT

## 2021-01-01 PROCEDURE — 84132 ASSAY OF SERUM POTASSIUM: CPT | Performed by: STUDENT IN AN ORGANIZED HEALTH CARE EDUCATION/TRAINING PROGRAM

## 2021-01-01 PROCEDURE — 250N000013 HC RX MED GY IP 250 OP 250 PS 637: Performed by: STUDENT IN AN ORGANIZED HEALTH CARE EDUCATION/TRAINING PROGRAM

## 2021-01-01 PROCEDURE — 96523 IRRIG DRUG DELIVERY DEVICE: CPT

## 2021-01-01 PROCEDURE — C9113 INJ PANTOPRAZOLE SODIUM, VIA: HCPCS | Performed by: SURGERY

## 2021-01-01 PROCEDURE — 80053 COMPREHEN METABOLIC PANEL: CPT | Performed by: FAMILY MEDICINE

## 2021-01-01 PROCEDURE — 97140 MANUAL THERAPY 1/> REGIONS: CPT | Mod: GP

## 2021-01-01 PROCEDURE — 250N000013 HC RX MED GY IP 250 OP 250 PS 637: Performed by: PHYSICIAN ASSISTANT

## 2021-01-01 PROCEDURE — 73030 X-RAY EXAM OF SHOULDER: CPT | Mod: RT

## 2021-01-01 PROCEDURE — 258N000003 HC RX IP 258 OP 636: Performed by: STUDENT IN AN ORGANIZED HEALTH CARE EDUCATION/TRAINING PROGRAM

## 2021-01-01 PROCEDURE — 83605 ASSAY OF LACTIC ACID: CPT | Performed by: INTERNAL MEDICINE

## 2021-01-01 PROCEDURE — 82247 BILIRUBIN TOTAL: CPT

## 2021-01-01 PROCEDURE — 85018 HEMOGLOBIN: CPT | Performed by: NURSE PRACTITIONER

## 2021-01-01 PROCEDURE — 99233 SBSQ HOSP IP/OBS HIGH 50: CPT | Performed by: HOSPITALIST

## 2021-01-01 PROCEDURE — 99207 PR CDG-CHARGE REQUIRED MANUAL ENTRY: CPT | Performed by: INTERNAL MEDICINE

## 2021-01-01 PROCEDURE — 250N000011 HC RX IP 250 OP 636: Performed by: HOSPITALIST

## 2021-01-01 PROCEDURE — 80053 COMPREHEN METABOLIC PANEL: CPT | Performed by: STUDENT IN AN ORGANIZED HEALTH CARE EDUCATION/TRAINING PROGRAM

## 2021-01-01 PROCEDURE — 82247 BILIRUBIN TOTAL: CPT | Performed by: STUDENT IN AN ORGANIZED HEALTH CARE EDUCATION/TRAINING PROGRAM

## 2021-01-01 PROCEDURE — 272N000458 ZZ HC KIT, 5 FR DL BIOFLO OPEN ENDED PICC

## 2021-01-01 PROCEDURE — 83880 ASSAY OF NATRIURETIC PEPTIDE: CPT | Performed by: FAMILY MEDICINE

## 2021-01-01 PROCEDURE — 36591 DRAW BLOOD OFF VENOUS DEVICE: CPT

## 2021-01-01 PROCEDURE — 99283 EMERGENCY DEPT VISIT LOW MDM: CPT | Performed by: EMERGENCY MEDICINE

## 2021-01-01 PROCEDURE — 86850 RBC ANTIBODY SCREEN: CPT | Performed by: INTERNAL MEDICINE

## 2021-01-01 PROCEDURE — 99214 OFFICE O/P EST MOD 30 MIN: CPT | Performed by: STUDENT IN AN ORGANIZED HEALTH CARE EDUCATION/TRAINING PROGRAM

## 2021-01-01 PROCEDURE — 36415 COLL VENOUS BLD VENIPUNCTURE: CPT | Performed by: HOSPITALIST

## 2021-01-01 PROCEDURE — 80053 COMPREHEN METABOLIC PANEL: CPT | Performed by: INTERNAL MEDICINE

## 2021-01-01 PROCEDURE — 36415 COLL VENOUS BLD VENIPUNCTURE: CPT | Performed by: FAMILY MEDICINE

## 2021-01-01 PROCEDURE — 86850 RBC ANTIBODY SCREEN: CPT | Performed by: NURSE PRACTITIONER

## 2021-01-01 PROCEDURE — 88271 CYTOGENETICS DNA PROBE: CPT | Performed by: STUDENT IN AN ORGANIZED HEALTH CARE EDUCATION/TRAINING PROGRAM

## 2021-01-01 PROCEDURE — 85027 COMPLETE CBC AUTOMATED: CPT | Performed by: STUDENT IN AN ORGANIZED HEALTH CARE EDUCATION/TRAINING PROGRAM

## 2021-01-01 PROCEDURE — 99207 PR MD CERTIFICATION HHA PATIENT: CPT | Performed by: FAMILY MEDICINE

## 2021-01-01 PROCEDURE — 83735 ASSAY OF MAGNESIUM: CPT | Performed by: PHYSICIAN ASSISTANT

## 2021-01-01 PROCEDURE — 85025 COMPLETE CBC W/AUTO DIFF WBC: CPT | Performed by: PHYSICIAN ASSISTANT

## 2021-01-01 PROCEDURE — 80048 BASIC METABOLIC PNL TOTAL CA: CPT | Performed by: PHYSICIAN ASSISTANT

## 2021-01-01 PROCEDURE — 999N001137 HC STATISTIC FLOW >15 ABY TC 88189: Performed by: PHYSICIAN ASSISTANT

## 2021-01-01 PROCEDURE — 36592 COLLECT BLOOD FROM PICC: CPT | Performed by: FAMILY MEDICINE

## 2021-01-01 PROCEDURE — P9035 PLATELET PHERES LEUKOREDUCED: HCPCS | Performed by: FAMILY MEDICINE

## 2021-01-01 PROCEDURE — 36415 COLL VENOUS BLD VENIPUNCTURE: CPT | Performed by: STUDENT IN AN ORGANIZED HEALTH CARE EDUCATION/TRAINING PROGRAM

## 2021-01-01 PROCEDURE — 85049 AUTOMATED PLATELET COUNT: CPT | Performed by: SURGERY

## 2021-01-01 PROCEDURE — 36592 COLLECT BLOOD FROM PICC: CPT | Performed by: INTERNAL MEDICINE

## 2021-01-01 PROCEDURE — 86828 HLA CLASS I&II ANTIBODY QUAL: CPT | Performed by: STUDENT IN AN ORGANIZED HEALTH CARE EDUCATION/TRAINING PROGRAM

## 2021-01-01 PROCEDURE — 250N000013 HC RX MED GY IP 250 OP 250 PS 637: Performed by: NURSE ANESTHETIST, CERTIFIED REGISTERED

## 2021-01-01 PROCEDURE — 85014 HEMATOCRIT: CPT | Performed by: INTERNAL MEDICINE

## 2021-01-01 PROCEDURE — 86900 BLOOD TYPING SEROLOGIC ABO: CPT | Performed by: STUDENT IN AN ORGANIZED HEALTH CARE EDUCATION/TRAINING PROGRAM

## 2021-01-01 PROCEDURE — 250N000013 HC RX MED GY IP 250 OP 250 PS 637: Performed by: HOSPITALIST

## 2021-01-01 PROCEDURE — 86923 COMPATIBILITY TEST ELECTRIC: CPT | Performed by: INTERNAL MEDICINE

## 2021-01-01 PROCEDURE — 84132 ASSAY OF SERUM POTASSIUM: CPT | Performed by: HOSPITALIST

## 2021-01-01 PROCEDURE — 88341 IMHCHEM/IMCYTCHM EA ADD ANTB: CPT | Mod: 26 | Performed by: PATHOLOGY

## 2021-01-01 PROCEDURE — 71045 X-RAY EXAM CHEST 1 VIEW: CPT

## 2021-01-01 PROCEDURE — P9035 PLATELET PHERES LEUKOREDUCED: HCPCS | Performed by: NURSE PRACTITIONER

## 2021-01-01 PROCEDURE — 272N000004 HC RX 272: Performed by: SURGERY

## 2021-01-01 PROCEDURE — 999N001086 HC STATISTIC MORPHOLOGY W/INTERP HEMEPATH TC 85060: Performed by: STUDENT IN AN ORGANIZED HEALTH CARE EDUCATION/TRAINING PROGRAM

## 2021-01-01 PROCEDURE — 250N000013 HC RX MED GY IP 250 OP 250 PS 637: Performed by: NURSE PRACTITIONER

## 2021-01-01 PROCEDURE — 255N000002 HC RX 255 OP 636: Performed by: STUDENT IN AN ORGANIZED HEALTH CARE EDUCATION/TRAINING PROGRAM

## 2021-01-01 PROCEDURE — 360N000076 HC SURGERY LEVEL 3, PER MIN: Performed by: SURGERY

## 2021-01-01 PROCEDURE — 370N000017 HC ANESTHESIA TECHNICAL FEE, PER MIN: Performed by: SURGERY

## 2021-01-01 PROCEDURE — 80053 COMPREHEN METABOLIC PANEL: CPT

## 2021-01-01 PROCEDURE — 84484 ASSAY OF TROPONIN QUANT: CPT | Performed by: INTERNAL MEDICINE

## 2021-01-01 PROCEDURE — 0R9J3ZZ DRAINAGE OF RIGHT SHOULDER JOINT, PERCUTANEOUS APPROACH: ICD-10-PCS | Performed by: RADIOLOGY

## 2021-01-01 PROCEDURE — 83615 LACTATE (LD) (LDH) ENZYME: CPT | Performed by: PHYSICIAN ASSISTANT

## 2021-01-01 PROCEDURE — 86901 BLOOD TYPING SEROLOGIC RH(D): CPT | Performed by: NURSE PRACTITIONER

## 2021-01-01 PROCEDURE — 258N000003 HC RX IP 258 OP 636: Performed by: SURGERY

## 2021-01-01 PROCEDURE — 85730 THROMBOPLASTIN TIME PARTIAL: CPT | Performed by: NURSE PRACTITIONER

## 2021-01-01 PROCEDURE — P9037 PLATE PHERES LEUKOREDU IRRAD: HCPCS | Performed by: NURSE PRACTITIONER

## 2021-01-01 PROCEDURE — 99285 EMERGENCY DEPT VISIT HI MDM: CPT | Mod: 25 | Performed by: EMERGENCY MEDICINE

## 2021-01-01 PROCEDURE — 36568 INSJ PICC <5 YR W/O IMAGING: CPT

## 2021-01-01 PROCEDURE — 88311 DECALCIFY TISSUE: CPT | Mod: 26 | Performed by: PATHOLOGY

## 2021-01-01 PROCEDURE — 96360 HYDRATION IV INFUSION INIT: CPT

## 2021-01-01 PROCEDURE — 258N000003 HC RX IP 258 OP 636: Performed by: INTERNAL MEDICINE

## 2021-01-01 PROCEDURE — 96376 TX/PRO/DX INJ SAME DRUG ADON: CPT | Performed by: FAMILY MEDICINE

## 2021-01-01 PROCEDURE — 271N000001 HC OR GENERAL SUPPLY NON-STERILE: Performed by: SURGERY

## 2021-01-01 PROCEDURE — 88342 IMHCHEM/IMCYTCHM 1ST ANTB: CPT | Mod: TC | Performed by: PHYSICIAN ASSISTANT

## 2021-01-01 PROCEDURE — 250N000012 HC RX MED GY IP 250 OP 636 PS 637: Performed by: PHYSICIAN ASSISTANT

## 2021-01-01 PROCEDURE — C1788 PORT, INDWELLING, IMP: HCPCS | Performed by: SURGERY

## 2021-01-01 PROCEDURE — 71046 X-RAY EXAM CHEST 2 VIEWS: CPT | Mod: 26 | Performed by: RADIOLOGY

## 2021-01-01 PROCEDURE — 88342 IMHCHEM/IMCYTCHM 1ST ANTB: CPT | Mod: 26 | Performed by: PATHOLOGY

## 2021-01-01 PROCEDURE — P9016 RBC LEUKOCYTES REDUCED: HCPCS | Performed by: SURGERY

## 2021-01-01 PROCEDURE — 999N000208 ECHOCARDIOGRAM COMPLETE

## 2021-01-01 PROCEDURE — P9037 PLATE PHERES LEUKOREDU IRRAD: HCPCS | Performed by: PHYSICIAN ASSISTANT

## 2021-01-01 PROCEDURE — 88313 SPECIAL STAINS GROUP 2: CPT | Mod: 26 | Performed by: STUDENT IN AN ORGANIZED HEALTH CARE EDUCATION/TRAINING PROGRAM

## 2021-01-01 PROCEDURE — 88185 FLOWCYTOMETRY/TC ADD-ON: CPT | Performed by: PHYSICIAN ASSISTANT

## 2021-01-01 PROCEDURE — 85014 HEMATOCRIT: CPT

## 2021-01-01 PROCEDURE — 93306 TTE W/DOPPLER COMPLETE: CPT | Mod: 26 | Performed by: INTERNAL MEDICINE

## 2021-01-01 PROCEDURE — 86695 HERPES SIMPLEX TYPE 1 TEST: CPT | Performed by: PHYSICIAN ASSISTANT

## 2021-01-01 PROCEDURE — 99283 EMERGENCY DEPT VISIT LOW MDM: CPT | Performed by: FAMILY MEDICINE

## 2021-01-01 PROCEDURE — 88311 DECALCIFY TISSUE: CPT | Mod: TC | Performed by: STUDENT IN AN ORGANIZED HEALTH CARE EDUCATION/TRAINING PROGRAM

## 2021-01-01 PROCEDURE — 99207 PR NOT IN PERSON INPATIENT CONSULT STATISTICAL MARKER: CPT | Performed by: INTERNAL MEDICINE

## 2021-01-01 PROCEDURE — 88264 CHROMOSOME ANALYSIS 20-25: CPT | Performed by: PHYSICIAN ASSISTANT

## 2021-01-01 PROCEDURE — U0005 INFEC AGEN DETEC AMPLI PROBE: HCPCS | Performed by: PHYSICIAN ASSISTANT

## 2021-01-01 PROCEDURE — G0452 MOLECULAR PATHOLOGY INTERPR: HCPCS | Mod: 26 | Performed by: PATHOLOGY

## 2021-01-01 PROCEDURE — G0463 HOSPITAL OUTPT CLINIC VISIT: HCPCS

## 2021-01-01 PROCEDURE — 250N000013 HC RX MED GY IP 250 OP 250 PS 637: Performed by: SURGERY

## 2021-01-01 PROCEDURE — G0179 MD RECERTIFICATION HHA PT: HCPCS | Performed by: FAMILY MEDICINE

## 2021-01-01 PROCEDURE — 250N000026 HC DESFLURANE, PER MIN: Performed by: SURGERY

## 2021-01-01 PROCEDURE — 99231 SBSQ HOSP IP/OBS SF/LOW 25: CPT | Performed by: NURSE PRACTITIONER

## 2021-01-01 PROCEDURE — 999N001104 HC STATISTIC DNA ISOL HIGH PURITY: Performed by: PHYSICIAN ASSISTANT

## 2021-01-01 PROCEDURE — 250N000011 HC RX IP 250 OP 636: Performed by: NURSE PRACTITIONER

## 2021-01-01 PROCEDURE — 999N000128 HC STATISTIC PERIPHERAL IV START W/O US GUIDANCE: Performed by: STUDENT IN AN ORGANIZED HEALTH CARE EDUCATION/TRAINING PROGRAM

## 2021-01-01 PROCEDURE — 85060 BLOOD SMEAR INTERPRETATION: CPT | Mod: 26 | Performed by: PATHOLOGY

## 2021-01-01 PROCEDURE — P9016 RBC LEUKOCYTES REDUCED: HCPCS | Performed by: NURSE PRACTITIONER

## 2021-01-01 PROCEDURE — 20610 DRAIN/INJ JOINT/BURSA W/O US: CPT | Performed by: FAMILY MEDICINE

## 2021-01-01 PROCEDURE — 88237 TISSUE CULTURE BONE MARROW: CPT | Performed by: PHYSICIAN ASSISTANT

## 2021-01-01 PROCEDURE — 88342 IMHCHEM/IMCYTCHM 1ST ANTB: CPT | Mod: 26 | Performed by: STUDENT IN AN ORGANIZED HEALTH CARE EDUCATION/TRAINING PROGRAM

## 2021-01-01 PROCEDURE — 88237 TISSUE CULTURE BONE MARROW: CPT | Performed by: STUDENT IN AN ORGANIZED HEALTH CARE EDUCATION/TRAINING PROGRAM

## 2021-01-01 PROCEDURE — 83605 ASSAY OF LACTIC ACID: CPT | Performed by: PEDIATRICS

## 2021-01-01 PROCEDURE — C9113 INJ PANTOPRAZOLE SODIUM, VIA: HCPCS | Performed by: FAMILY MEDICINE

## 2021-01-01 PROCEDURE — 88313 SPECIAL STAINS GROUP 2: CPT | Mod: TC | Performed by: STUDENT IN AN ORGANIZED HEALTH CARE EDUCATION/TRAINING PROGRAM

## 2021-01-01 PROCEDURE — 73502 X-RAY EXAM HIP UNI 2-3 VIEWS: CPT

## 2021-01-01 PROCEDURE — 93005 ELECTROCARDIOGRAM TRACING: CPT | Performed by: EMERGENCY MEDICINE

## 2021-01-01 PROCEDURE — 272N000201 ZZ HC ADHESIVE SKIN CLOSURE, DERMABOND

## 2021-01-01 PROCEDURE — 88161 CYTOPATH SMEAR OTHER SOURCE: CPT | Mod: TC | Performed by: PHYSICIAN ASSISTANT

## 2021-01-01 PROCEDURE — 88305 TISSUE EXAM BY PATHOLOGIST: CPT | Mod: TC | Performed by: PHYSICIAN ASSISTANT

## 2021-01-01 PROCEDURE — 38222 DX BONE MARROW BX & ASPIR: CPT | Performed by: PHYSICIAN ASSISTANT

## 2021-01-01 PROCEDURE — 0FT44ZZ RESECTION OF GALLBLADDER, PERCUTANEOUS ENDOSCOPIC APPROACH: ICD-10-PCS | Performed by: SURGERY

## 2021-01-01 PROCEDURE — 88313 SPECIAL STAINS GROUP 2: CPT | Mod: TC | Performed by: PHYSICIAN ASSISTANT

## 2021-01-01 PROCEDURE — 99239 HOSP IP/OBS DSCHRG MGMT >30: CPT | Performed by: HOSPITALIST

## 2021-01-01 PROCEDURE — 36592 COLLECT BLOOD FROM PICC: CPT | Performed by: PHYSICIAN ASSISTANT

## 2021-01-01 PROCEDURE — 97530 THERAPEUTIC ACTIVITIES: CPT | Mod: GP

## 2021-01-01 PROCEDURE — 120N000001 HC R&B MED SURG/OB

## 2021-01-01 PROCEDURE — 99223 1ST HOSP IP/OBS HIGH 75: CPT | Mod: AI | Performed by: STUDENT IN AN ORGANIZED HEALTH CARE EDUCATION/TRAINING PROGRAM

## 2021-01-01 PROCEDURE — 20610 DRAIN/INJ JOINT/BURSA W/O US: CPT | Mod: RT | Performed by: ORTHOPAEDIC SURGERY

## 2021-01-01 PROCEDURE — P9016 RBC LEUKOCYTES REDUCED: HCPCS

## 2021-01-01 PROCEDURE — 73080 X-RAY EXAM OF ELBOW: CPT | Mod: RT

## 2021-01-01 PROCEDURE — P9100 PATHOGEN TEST FOR PLATELETS: HCPCS | Performed by: PHYSICIAN ASSISTANT

## 2021-01-01 PROCEDURE — 250N000011 HC RX IP 250 OP 636: Mod: JW | Performed by: STUDENT IN AN ORGANIZED HEALTH CARE EDUCATION/TRAINING PROGRAM

## 2021-01-01 PROCEDURE — 258N000003 HC RX IP 258 OP 636: Performed by: FAMILY MEDICINE

## 2021-01-01 PROCEDURE — 86923 COMPATIBILITY TEST ELECTRIC: CPT | Performed by: HOSPITALIST

## 2021-01-01 PROCEDURE — 97116 GAIT TRAINING THERAPY: CPT | Mod: GP

## 2021-01-01 PROCEDURE — 88280 CHROMOSOME KARYOTYPE STUDY: CPT | Performed by: PHYSICIAN ASSISTANT

## 2021-01-01 PROCEDURE — P9037 PLATE PHERES LEUKOREDU IRRAD: HCPCS | Performed by: SURGERY

## 2021-01-01 PROCEDURE — 82040 ASSAY OF SERUM ALBUMIN: CPT

## 2021-01-01 PROCEDURE — 710N000012 HC RECOVERY PHASE 2, PER MINUTE: Performed by: SURGERY

## 2021-01-01 PROCEDURE — 88313 SPECIAL STAINS GROUP 2: CPT | Mod: 26 | Performed by: PATHOLOGY

## 2021-01-01 PROCEDURE — 88185 FLOWCYTOMETRY/TC ADD-ON: CPT | Performed by: STUDENT IN AN ORGANIZED HEALTH CARE EDUCATION/TRAINING PROGRAM

## 2021-01-01 PROCEDURE — 250N000009 HC RX 250: Performed by: NURSE ANESTHETIST, CERTIFIED REGISTERED

## 2021-01-01 PROCEDURE — 85049 AUTOMATED PLATELET COUNT: CPT | Mod: XU | Performed by: STUDENT IN AN ORGANIZED HEALTH CARE EDUCATION/TRAINING PROGRAM

## 2021-01-01 PROCEDURE — 999N001017 HC STATISTIC GLUCOSE BY METER IP

## 2021-01-01 PROCEDURE — 999N001126 HC STATISTIC BONE MARROW INTERP TC 85097: Performed by: PHYSICIAN ASSISTANT

## 2021-01-01 PROCEDURE — 73020 X-RAY EXAM OF SHOULDER: CPT | Mod: 26 | Performed by: RADIOLOGY

## 2021-01-01 PROCEDURE — P9016 RBC LEUKOCYTES REDUCED: HCPCS | Performed by: FAMILY MEDICINE

## 2021-01-01 PROCEDURE — 99214 OFFICE O/P EST MOD 30 MIN: CPT | Performed by: NURSE PRACTITIONER

## 2021-01-01 PROCEDURE — 88280 CHROMOSOME KARYOTYPE STUDY: CPT | Performed by: STUDENT IN AN ORGANIZED HEALTH CARE EDUCATION/TRAINING PROGRAM

## 2021-01-01 PROCEDURE — 85027 COMPLETE CBC AUTOMATED: CPT | Performed by: NURSE PRACTITIONER

## 2021-01-01 PROCEDURE — 80076 HEPATIC FUNCTION PANEL: CPT | Performed by: INTERNAL MEDICINE

## 2021-01-01 PROCEDURE — 85045 AUTOMATED RETICULOCYTE COUNT: CPT | Performed by: INTERNAL MEDICINE

## 2021-01-01 PROCEDURE — 97530 THERAPEUTIC ACTIVITIES: CPT | Mod: GO

## 2021-01-01 PROCEDURE — 74177 CT ABD & PELVIS W/CONTRAST: CPT

## 2021-01-01 PROCEDURE — 86900 BLOOD TYPING SEROLOGIC ABO: CPT

## 2021-01-01 PROCEDURE — 93325 DOPPLER ECHO COLOR FLOW MAPG: CPT

## 2021-01-01 PROCEDURE — 99239 HOSP IP/OBS DSCHRG MGMT >30: CPT | Performed by: INTERNAL MEDICINE

## 2021-01-01 PROCEDURE — 99215 OFFICE O/P EST HI 40 MIN: CPT | Mod: GC | Performed by: INTERNAL MEDICINE

## 2021-01-01 PROCEDURE — 86900 BLOOD TYPING SEROLOGIC ABO: CPT | Performed by: FAMILY MEDICINE

## 2021-01-01 PROCEDURE — 73020 X-RAY EXAM OF SHOULDER: CPT | Mod: RT

## 2021-01-01 PROCEDURE — 2894A VOIDCORRECT: CPT | Performed by: SURGERY

## 2021-01-01 PROCEDURE — 86901 BLOOD TYPING SEROLOGIC RH(D): CPT | Performed by: STUDENT IN AN ORGANIZED HEALTH CARE EDUCATION/TRAINING PROGRAM

## 2021-01-01 PROCEDURE — 86923 COMPATIBILITY TEST ELECTRIC: CPT

## 2021-01-01 PROCEDURE — 87635 SARS-COV-2 COVID-19 AMP PRB: CPT | Performed by: INTERNAL MEDICINE

## 2021-01-01 PROCEDURE — 93010 ELECTROCARDIOGRAM REPORT: CPT | Performed by: INTERNAL MEDICINE

## 2021-01-01 PROCEDURE — 96374 THER/PROPH/DIAG INJ IV PUSH: CPT | Mod: 59

## 2021-01-01 PROCEDURE — 99220 PR INITIAL OBSERVATION CARE,LEVEL III: CPT | Mod: 95 | Performed by: INTERNAL MEDICINE

## 2021-01-01 PROCEDURE — 250N000013 HC RX MED GY IP 250 OP 250 PS 637: Performed by: EMERGENCY MEDICINE

## 2021-01-01 PROCEDURE — 99285 EMERGENCY DEPT VISIT HI MDM: CPT | Mod: 25 | Performed by: FAMILY MEDICINE

## 2021-01-01 PROCEDURE — 81001 URINALYSIS AUTO W/SCOPE: CPT | Performed by: FAMILY MEDICINE

## 2021-01-01 PROCEDURE — 99231 SBSQ HOSP IP/OBS SF/LOW 25: CPT | Performed by: INTERNAL MEDICINE

## 2021-01-01 PROCEDURE — 81175 ASXL1 FULL GENE SEQUENCE: CPT | Performed by: PHYSICIAN ASSISTANT

## 2021-01-01 PROCEDURE — 82565 ASSAY OF CREATININE: CPT | Performed by: INTERNAL MEDICINE

## 2021-01-01 PROCEDURE — 99495 TRANSJ CARE MGMT MOD F2F 14D: CPT | Performed by: FAMILY MEDICINE

## 2021-01-01 PROCEDURE — 99222 1ST HOSP IP/OBS MODERATE 55: CPT | Mod: 57 | Performed by: SURGERY

## 2021-01-01 PROCEDURE — P9035 PLATELET PHERES LEUKOREDUCED: HCPCS | Performed by: STUDENT IN AN ORGANIZED HEALTH CARE EDUCATION/TRAINING PROGRAM

## 2021-01-01 PROCEDURE — 999N000065 XR CHEST PORT 1 VIEW

## 2021-01-01 PROCEDURE — 85018 HEMOGLOBIN: CPT | Performed by: FAMILY MEDICINE

## 2021-01-01 PROCEDURE — 99214 OFFICE O/P EST MOD 30 MIN: CPT | Performed by: FAMILY MEDICINE

## 2021-01-01 PROCEDURE — 80048 BASIC METABOLIC PNL TOTAL CA: CPT | Performed by: NURSE PRACTITIONER

## 2021-01-01 PROCEDURE — 88291 CYTO/MOLECULAR REPORT: CPT | Performed by: MEDICAL GENETICS

## 2021-01-01 PROCEDURE — 86665 EPSTEIN-BARR CAPSID VCA: CPT | Performed by: PHYSICIAN ASSISTANT

## 2021-01-01 PROCEDURE — 96374 THER/PROPH/DIAG INJ IV PUSH: CPT | Mod: 59 | Performed by: FAMILY MEDICINE

## 2021-01-01 PROCEDURE — 80048 BASIC METABOLIC PNL TOTAL CA: CPT | Performed by: FAMILY MEDICINE

## 2021-01-01 PROCEDURE — P9035 PLATELET PHERES LEUKOREDUCED: HCPCS | Performed by: INTERNAL MEDICINE

## 2021-01-01 PROCEDURE — 88184 FLOWCYTOMETRY/ TC 1 MARKER: CPT | Mod: TC | Performed by: INTERNAL MEDICINE

## 2021-01-01 PROCEDURE — 87340 HEPATITIS B SURFACE AG IA: CPT | Performed by: PHYSICIAN ASSISTANT

## 2021-01-01 PROCEDURE — 83605 ASSAY OF LACTIC ACID: CPT | Performed by: STUDENT IN AN ORGANIZED HEALTH CARE EDUCATION/TRAINING PROGRAM

## 2021-01-01 PROCEDURE — 99214 OFFICE O/P EST MOD 30 MIN: CPT | Performed by: INTERNAL MEDICINE

## 2021-01-01 PROCEDURE — 85610 PROTHROMBIN TIME: CPT | Performed by: PHYSICIAN ASSISTANT

## 2021-01-01 PROCEDURE — 81378 HLA I & II TYPING HR: CPT | Performed by: STUDENT IN AN ORGANIZED HEALTH CARE EDUCATION/TRAINING PROGRAM

## 2021-01-01 PROCEDURE — 96375 TX/PRO/DX INJ NEW DRUG ADDON: CPT | Performed by: FAMILY MEDICINE

## 2021-01-01 PROCEDURE — 88304 TISSUE EXAM BY PATHOLOGIST: CPT | Mod: 26 | Performed by: PATHOLOGY

## 2021-01-01 PROCEDURE — C9803 HOPD COVID-19 SPEC COLLECT: HCPCS | Performed by: EMERGENCY MEDICINE

## 2021-01-01 PROCEDURE — 36416 COLLJ CAPILLARY BLOOD SPEC: CPT | Performed by: STUDENT IN AN ORGANIZED HEALTH CARE EDUCATION/TRAINING PROGRAM

## 2021-01-01 PROCEDURE — 999N000044 HC STATISTIC CVC DRESSING CHANGE

## 2021-01-01 PROCEDURE — 99285 EMERGENCY DEPT VISIT HI MDM: CPT | Performed by: EMERGENCY MEDICINE

## 2021-01-01 PROCEDURE — 99233 SBSQ HOSP IP/OBS HIGH 50: CPT | Performed by: NURSE PRACTITIONER

## 2021-01-01 PROCEDURE — 85041 AUTOMATED RBC COUNT: CPT | Performed by: INTERNAL MEDICINE

## 2021-01-01 PROCEDURE — 86140 C-REACTIVE PROTEIN: CPT | Performed by: STUDENT IN AN ORGANIZED HEALTH CARE EDUCATION/TRAINING PROGRAM

## 2021-01-01 PROCEDURE — 999N001068 HC STATISTIC BONE MARROW CORE PERF TC 38221: Performed by: PHYSICIAN ASSISTANT

## 2021-01-01 PROCEDURE — 86923 COMPATIBILITY TEST ELECTRIC: CPT | Performed by: NURSE PRACTITIONER

## 2021-01-01 PROCEDURE — 20611 DRAIN/INJ JOINT/BURSA W/US: CPT | Mod: RT | Performed by: RADIOLOGY

## 2021-01-01 PROCEDURE — P9037 PLATE PHERES LEUKOREDU IRRAD: HCPCS | Performed by: INTERNAL MEDICINE

## 2021-01-01 PROCEDURE — 96365 THER/PROPH/DIAG IV INF INIT: CPT

## 2021-01-01 PROCEDURE — 86803 HEPATITIS C AB TEST: CPT | Performed by: PHYSICIAN ASSISTANT

## 2021-01-01 PROCEDURE — P9035 PLATELET PHERES LEUKOREDUCED: HCPCS

## 2021-01-01 PROCEDURE — 99443 PR PHYSICIAN TELEPHONE EVALUATION 21-30 MIN: CPT | Performed by: INTERNAL MEDICINE

## 2021-01-01 PROCEDURE — 250N000011 HC RX IP 250 OP 636: Performed by: NURSE ANESTHETIST, CERTIFIED REGISTERED

## 2021-01-01 PROCEDURE — 85384 FIBRINOGEN ACTIVITY: CPT | Performed by: INTERNAL MEDICINE

## 2021-01-01 PROCEDURE — 87075 CULTR BACTERIA EXCEPT BLOOD: CPT | Performed by: PHYSICIAN ASSISTANT

## 2021-01-01 PROCEDURE — 83735 ASSAY OF MAGNESIUM: CPT | Performed by: HOSPITALIST

## 2021-01-01 PROCEDURE — 96376 TX/PRO/DX INJ SAME DRUG ADON: CPT

## 2021-01-01 PROCEDURE — 87635 SARS-COV-2 COVID-19 AMP PRB: CPT | Performed by: EMERGENCY MEDICINE

## 2021-01-01 PROCEDURE — P9016 RBC LEUKOCYTES REDUCED: HCPCS | Mod: XU | Performed by: STUDENT IN AN ORGANIZED HEALTH CARE EDUCATION/TRAINING PROGRAM

## 2021-01-01 PROCEDURE — 88342 IMHCHEM/IMCYTCHM 1ST ANTB: CPT | Mod: TC | Performed by: STUDENT IN AN ORGANIZED HEALTH CARE EDUCATION/TRAINING PROGRAM

## 2021-01-01 PROCEDURE — 99285 EMERGENCY DEPT VISIT HI MDM: CPT | Performed by: FAMILY MEDICINE

## 2021-01-01 PROCEDURE — P9037 PLATE PHERES LEUKOREDU IRRAD: HCPCS | Performed by: STUDENT IN AN ORGANIZED HEALTH CARE EDUCATION/TRAINING PROGRAM

## 2021-01-01 PROCEDURE — 85730 THROMBOPLASTIN TIME PARTIAL: CPT | Performed by: FAMILY MEDICINE

## 2021-01-01 PROCEDURE — 73030 X-RAY EXAM OF SHOULDER: CPT | Mod: 26 | Performed by: RADIOLOGY

## 2021-01-01 PROCEDURE — 84100 ASSAY OF PHOSPHORUS: CPT | Performed by: HOSPITALIST

## 2021-01-01 PROCEDURE — 93010 ELECTROCARDIOGRAM REPORT: CPT | Performed by: FAMILY MEDICINE

## 2021-01-01 PROCEDURE — C9803 HOPD COVID-19 SPEC COLLECT: HCPCS | Performed by: FAMILY MEDICINE

## 2021-01-01 PROCEDURE — 84484 ASSAY OF TROPONIN QUANT: CPT | Performed by: FAMILY MEDICINE

## 2021-01-01 PROCEDURE — 96374 THER/PROPH/DIAG INJ IV PUSH: CPT | Performed by: EMERGENCY MEDICINE

## 2021-01-01 PROCEDURE — 38222 DX BONE MARROW BX & ASPIR: CPT

## 2021-01-01 PROCEDURE — 86923 COMPATIBILITY TEST ELECTRIC: CPT | Performed by: FAMILY MEDICINE

## 2021-01-01 PROCEDURE — 250N000009 HC RX 250: Performed by: RADIOLOGY

## 2021-01-01 PROCEDURE — 250N000009 HC RX 250: Performed by: SURGERY

## 2021-01-01 PROCEDURE — 99207 PR CDG-MDM COMPONENT: MEETS MODERATE - UP CODED: CPT | Performed by: INTERNAL MEDICINE

## 2021-01-01 PROCEDURE — 36415 COLL VENOUS BLD VENIPUNCTURE: CPT | Performed by: EMERGENCY MEDICINE

## 2021-01-01 PROCEDURE — 83605 ASSAY OF LACTIC ACID: CPT | Performed by: FAMILY MEDICINE

## 2021-01-01 PROCEDURE — 36415 COLL VENOUS BLD VENIPUNCTURE: CPT | Performed by: SURGERY

## 2021-01-01 PROCEDURE — 83605 ASSAY OF LACTIC ACID: CPT | Performed by: NURSE PRACTITIONER

## 2021-01-01 PROCEDURE — 80053 COMPREHEN METABOLIC PANEL: CPT | Performed by: SURGERY

## 2021-01-01 PROCEDURE — 82803 BLOOD GASES ANY COMBINATION: CPT | Performed by: NURSE PRACTITIONER

## 2021-01-01 PROCEDURE — 85027 COMPLETE CBC AUTOMATED: CPT | Performed by: HOSPITALIST

## 2021-01-01 PROCEDURE — 86644 CMV ANTIBODY: CPT | Performed by: PHYSICIAN ASSISTANT

## 2021-01-01 PROCEDURE — 99203 OFFICE O/P NEW LOW 30 MIN: CPT | Mod: 25 | Performed by: ORTHOPAEDIC SURGERY

## 2021-01-01 PROCEDURE — C9399 UNCLASSIFIED DRUGS OR BIOLOG: HCPCS | Performed by: PHYSICIAN ASSISTANT

## 2021-01-01 PROCEDURE — 250N000009 HC RX 250: Performed by: FAMILY MEDICINE

## 2021-01-01 PROCEDURE — 99207 PR CDG-MDM COMPONENT: MEETS HIGH - UP CODED: CPT | Performed by: NURSE PRACTITIONER

## 2021-01-01 PROCEDURE — 999N001102 HC STATISTIC DNA PROCESS AND HOLD: Performed by: PHYSICIAN ASSISTANT

## 2021-01-01 PROCEDURE — 83690 ASSAY OF LIPASE: CPT | Performed by: FAMILY MEDICINE

## 2021-01-01 PROCEDURE — 36591 DRAW BLOOD OFF VENOUS DEVICE: CPT | Performed by: INTERNAL MEDICINE

## 2021-01-01 PROCEDURE — 86850 RBC ANTIBODY SCREEN: CPT

## 2021-01-01 PROCEDURE — P9016 RBC LEUKOCYTES REDUCED: HCPCS | Performed by: HOSPITALIST

## 2021-01-01 PROCEDURE — 88264 CHROMOSOME ANALYSIS 20-25: CPT | Performed by: STUDENT IN AN ORGANIZED HEALTH CARE EDUCATION/TRAINING PROGRAM

## 2021-01-01 PROCEDURE — 86923 COMPATIBILITY TEST ELECTRIC: CPT | Performed by: SURGERY

## 2021-01-01 PROCEDURE — 87635 SARS-COV-2 COVID-19 AMP PRB: CPT | Performed by: FAMILY MEDICINE

## 2021-01-01 PROCEDURE — 73030 X-RAY EXAM OF SHOULDER: CPT | Mod: TC | Performed by: RADIOLOGY

## 2021-01-01 PROCEDURE — 96361 HYDRATE IV INFUSION ADD-ON: CPT | Performed by: FAMILY MEDICINE

## 2021-01-01 PROCEDURE — 77001 FLUOROGUIDE FOR VEIN DEVICE: CPT | Mod: 26 | Performed by: SURGERY

## 2021-01-01 PROCEDURE — 999N000104 HC STATISTIC NO CHARGE

## 2021-01-01 PROCEDURE — G0180 MD CERTIFICATION HHA PATIENT: HCPCS | Performed by: FAMILY MEDICINE

## 2021-01-01 PROCEDURE — 85652 RBC SED RATE AUTOMATED: CPT

## 2021-01-01 PROCEDURE — 96366 THER/PROPH/DIAG IV INF ADDON: CPT | Performed by: FAMILY MEDICINE

## 2021-01-01 PROCEDURE — 85014 HEMATOCRIT: CPT | Performed by: NURSE PRACTITIONER

## 2021-01-01 PROCEDURE — 85610 PROTHROMBIN TIME: CPT | Performed by: NURSE PRACTITIONER

## 2021-01-01 PROCEDURE — 81479 UNLISTED MOLECULAR PATHOLOGY: CPT | Performed by: PHYSICIAN ASSISTANT

## 2021-01-01 PROCEDURE — 88271 CYTOGENETICS DNA PROBE: CPT | Performed by: PHYSICIAN ASSISTANT

## 2021-01-01 PROCEDURE — 93010 ELECTROCARDIOGRAM REPORT: CPT | Performed by: EMERGENCY MEDICINE

## 2021-01-01 PROCEDURE — 71275 CT ANGIOGRAPHY CHEST: CPT

## 2021-01-01 PROCEDURE — 88341 IMHCHEM/IMCYTCHM EA ADD ANTB: CPT | Mod: TC | Performed by: STUDENT IN AN ORGANIZED HEALTH CARE EDUCATION/TRAINING PROGRAM

## 2021-01-01 PROCEDURE — 85025 COMPLETE CBC W/AUTO DIFF WBC: CPT | Performed by: EMERGENCY MEDICINE

## 2021-01-01 PROCEDURE — 81450 HL NEO GSAP 5-50DNA/DNA&RNA: CPT | Performed by: PHYSICIAN ASSISTANT

## 2021-01-01 PROCEDURE — 82374 ASSAY BLOOD CARBON DIOXIDE: CPT

## 2021-01-01 PROCEDURE — 89060 EXAM SYNOVIAL FLUID CRYSTALS: CPT | Performed by: PHYSICIAN ASSISTANT

## 2021-01-01 PROCEDURE — 86922 COMPATIBILITY TEST ANTIGLOB: CPT | Performed by: FAMILY MEDICINE

## 2021-01-01 PROCEDURE — 999N001109 HC STATISTIC MORPHOLOGY W/INTERP HISTOLOGY TC 85060: Performed by: INTERNAL MEDICINE

## 2021-01-01 PROCEDURE — 99223 1ST HOSP IP/OBS HIGH 75: CPT | Mod: AI | Performed by: INTERNAL MEDICINE

## 2021-01-01 PROCEDURE — 86901 BLOOD TYPING SEROLOGIC RH(D): CPT | Performed by: HOSPITALIST

## 2021-01-01 PROCEDURE — 85025 COMPLETE CBC W/AUTO DIFF WBC: CPT | Performed by: FAMILY MEDICINE

## 2021-01-01 PROCEDURE — 86696 HERPES SIMPLEX TYPE 2 TEST: CPT | Performed by: PHYSICIAN ASSISTANT

## 2021-01-01 PROCEDURE — 88341 IMHCHEM/IMCYTCHM EA ADD ANTB: CPT | Mod: 26 | Performed by: STUDENT IN AN ORGANIZED HEALTH CARE EDUCATION/TRAINING PROGRAM

## 2021-01-01 PROCEDURE — 82947 ASSAY GLUCOSE BLOOD QUANT: CPT | Performed by: NURSE PRACTITIONER

## 2021-01-01 PROCEDURE — 99223 1ST HOSP IP/OBS HIGH 75: CPT | Performed by: INTERNAL MEDICINE

## 2021-01-01 PROCEDURE — 81176 ASXL1 GENE TARGET SEQ ALYS: CPT | Performed by: PHYSICIAN ASSISTANT

## 2021-01-01 PROCEDURE — 87636 SARSCOV2 & INF A&B AMP PRB: CPT | Performed by: NURSE PRACTITIONER

## 2021-01-01 PROCEDURE — 97116 GAIT TRAINING THERAPY: CPT | Mod: GP | Performed by: PHYSICAL THERAPIST

## 2021-01-01 PROCEDURE — 36561 INSERT TUNNELED CV CATH: CPT | Performed by: SURGERY

## 2021-01-01 PROCEDURE — 97161 PT EVAL LOW COMPLEX 20 MIN: CPT | Mod: GP

## 2021-01-01 PROCEDURE — 96361 HYDRATE IV INFUSION ADD-ON: CPT

## 2021-01-01 PROCEDURE — 3E03305 INTRODUCTION OF OTHER ANTINEOPLASTIC INTO PERIPHERAL VEIN, PERCUTANEOUS APPROACH: ICD-10-PCS | Performed by: PHYSICIAN ASSISTANT

## 2021-01-01 PROCEDURE — 85004 AUTOMATED DIFF WBC COUNT: CPT | Performed by: PHYSICIAN ASSISTANT

## 2021-01-01 PROCEDURE — 88341 IMHCHEM/IMCYTCHM EA ADD ANTB: CPT | Mod: TC | Performed by: PHYSICIAN ASSISTANT

## 2021-01-01 PROCEDURE — 86850 RBC ANTIBODY SCREEN: CPT | Performed by: HOSPITALIST

## 2021-01-01 PROCEDURE — 999N001098 HC STATISTIC HLA ABY PRA SCREEN CLASS II: Performed by: STUDENT IN AN ORGANIZED HEALTH CARE EDUCATION/TRAINING PROGRAM

## 2021-01-01 PROCEDURE — 88185 FLOWCYTOMETRY/TC ADD-ON: CPT | Mod: TC | Performed by: PHYSICIAN ASSISTANT

## 2021-01-01 PROCEDURE — 93321 DOPPLER ECHO F-UP/LMTD STD: CPT | Mod: 26 | Performed by: INTERNAL MEDICINE

## 2021-01-01 PROCEDURE — 999N000127 HC STATISTIC PERIPHERAL IV START W US GUIDANCE

## 2021-01-01 PROCEDURE — G0378 HOSPITAL OBSERVATION PER HR: HCPCS

## 2021-01-01 PROCEDURE — 97535 SELF CARE MNGMENT TRAINING: CPT | Mod: GO

## 2021-01-01 PROCEDURE — 99207 PR APP CREDIT; MD BILLING SHARED VISIT: CPT | Performed by: INTERNAL MEDICINE

## 2021-01-01 PROCEDURE — 88305 TISSUE EXAM BY PATHOLOGIST: CPT | Mod: TC | Performed by: STUDENT IN AN ORGANIZED HEALTH CARE EDUCATION/TRAINING PROGRAM

## 2021-01-01 PROCEDURE — 250N000013 HC RX MED GY IP 250 OP 250 PS 637: Performed by: FAMILY MEDICINE

## 2021-01-01 PROCEDURE — 85379 FIBRIN DEGRADATION QUANT: CPT | Performed by: FAMILY MEDICINE

## 2021-01-01 PROCEDURE — 84132 ASSAY OF SERUM POTASSIUM: CPT | Performed by: INTERNAL MEDICINE

## 2021-01-01 PROCEDURE — 99203 OFFICE O/P NEW LOW 30 MIN: CPT | Performed by: SURGERY

## 2021-01-01 PROCEDURE — U0003 INFECTIOUS AGENT DETECTION BY NUCLEIC ACID (DNA OR RNA); SEVERE ACUTE RESPIRATORY SYNDROME CORONAVIRUS 2 (SARS-COV-2) (CORONAVIRUS DISEASE [COVID-19]), AMPLIFIED PROBE TECHNIQUE, MAKING USE OF HIGH THROUGHPUT TECHNOLOGIES AS DESCRIBED BY CMS-2020-01-R: HCPCS | Performed by: PHYSICIAN ASSISTANT

## 2021-01-01 PROCEDURE — 99207 PR NO CHARGE LOS: CPT | Performed by: PHARMACIST

## 2021-01-01 PROCEDURE — 87205 SMEAR GRAM STAIN: CPT | Performed by: PHYSICIAN ASSISTANT

## 2021-01-01 PROCEDURE — 86704 HEP B CORE ANTIBODY TOTAL: CPT | Performed by: PHYSICIAN ASSISTANT

## 2021-01-01 PROCEDURE — 999N000002 HC CANCELLED SURGERY UP TO 16-30 MINS: Performed by: PATHOLOGY

## 2021-01-01 PROCEDURE — 87389 HIV-1 AG W/HIV-1&-2 AB AG IA: CPT | Performed by: PHYSICIAN ASSISTANT

## 2021-01-01 PROCEDURE — 88275 CYTOGENETICS 100-300: CPT | Performed by: STUDENT IN AN ORGANIZED HEALTH CARE EDUCATION/TRAINING PROGRAM

## 2021-01-01 PROCEDURE — 36430 TRANSFUSION BLD/BLD COMPNT: CPT | Performed by: EMERGENCY MEDICINE

## 2021-01-01 PROCEDURE — 86706 HEP B SURFACE ANTIBODY: CPT | Performed by: PHYSICIAN ASSISTANT

## 2021-01-01 PROCEDURE — 99357 PR PROLONGED SERV,INPATIENT,EA ADDL 30 MIN: CPT | Performed by: INTERNAL MEDICINE

## 2021-01-01 PROCEDURE — 81229 CYTOG ALYS CHRML ABNR SNPCGH: CPT | Performed by: PHYSICIAN ASSISTANT

## 2021-01-01 PROCEDURE — 99283 EMERGENCY DEPT VISIT LOW MDM: CPT | Mod: 25 | Performed by: EMERGENCY MEDICINE

## 2021-01-01 PROCEDURE — 85025 COMPLETE CBC W/AUTO DIFF WBC: CPT | Performed by: NURSE PRACTITIONER

## 2021-01-01 PROCEDURE — G0452 MOLECULAR PATHOLOGY INTERPR: HCPCS | Mod: 26 | Performed by: MEDICAL GENETICS

## 2021-01-01 PROCEDURE — 36591 DRAW BLOOD OFF VENOUS DEVICE: CPT | Performed by: PHYSICIAN ASSISTANT

## 2021-01-01 PROCEDURE — 86900 BLOOD TYPING SEROLOGIC ABO: CPT | Performed by: HOSPITALIST

## 2021-01-01 PROCEDURE — G0463 HOSPITAL OUTPT CLINIC VISIT: HCPCS | Mod: 25

## 2021-01-01 PROCEDURE — 47562 LAPAROSCOPIC CHOLECYSTECTOMY: CPT | Mod: 22 | Performed by: SURGERY

## 2021-01-01 PROCEDURE — U0005 INFEC AGEN DETEC AMPLI PROBE: HCPCS

## 2021-01-01 PROCEDURE — 97110 THERAPEUTIC EXERCISES: CPT | Mod: GP | Performed by: PHYSICAL THERAPIST

## 2021-01-01 PROCEDURE — 99443 PR PHYSICIAN TELEPHONE EVALUATION 21-30 MIN: CPT | Performed by: STUDENT IN AN ORGANIZED HEALTH CARE EDUCATION/TRAINING PROGRAM

## 2021-01-01 PROCEDURE — 97165 OT EVAL LOW COMPLEX 30 MIN: CPT | Mod: GO

## 2021-01-01 PROCEDURE — 93325 DOPPLER ECHO COLOR FLOW MAPG: CPT | Mod: 26 | Performed by: INTERNAL MEDICINE

## 2021-01-01 PROCEDURE — 88185 FLOWCYTOMETRY/TC ADD-ON: CPT | Mod: TC | Performed by: INTERNAL MEDICINE

## 2021-01-01 PROCEDURE — 85610 PROTHROMBIN TIME: CPT | Performed by: FAMILY MEDICINE

## 2021-01-01 PROCEDURE — 38222 DX BONE MARROW BX & ASPIR: CPT | Mod: TEL | Performed by: STUDENT IN AN ORGANIZED HEALTH CARE EDUCATION/TRAINING PROGRAM

## 2021-01-01 PROCEDURE — 88184 FLOWCYTOMETRY/ TC 1 MARKER: CPT | Mod: TC | Performed by: PHYSICIAN ASSISTANT

## 2021-01-01 PROCEDURE — 81270 JAK2 GENE: CPT | Performed by: PHYSICIAN ASSISTANT

## 2021-01-01 PROCEDURE — XW0DXR5 INTRODUCTION OF VENETOCLAX ANTINEOPLASTIC INTO MOUTH AND PHARYNX, EXTERNAL APPROACH, NEW TECHNOLOGY GROUP 5: ICD-10-PCS | Performed by: PHYSICIAN ASSISTANT

## 2021-01-01 PROCEDURE — 83615 LACTATE (LD) (LDH) ENZYME: CPT | Performed by: EMERGENCY MEDICINE

## 2021-01-01 PROCEDURE — 999N000141 HC STATISTIC PRE-PROCEDURE NURSING ASSESSMENT: Performed by: SURGERY

## 2021-01-01 PROCEDURE — 99356 PR PROLONGED SERV,INPATIENT,1ST HR: CPT | Performed by: INTERNAL MEDICINE

## 2021-01-01 PROCEDURE — 84484 ASSAY OF TROPONIN QUANT: CPT | Performed by: NURSE PRACTITIONER

## 2021-01-01 PROCEDURE — 86900 BLOOD TYPING SEROLOGIC ABO: CPT | Performed by: SURGERY

## 2021-01-01 PROCEDURE — 85027 COMPLETE CBC AUTOMATED: CPT | Performed by: SURGERY

## 2021-01-01 PROCEDURE — 88161 CYTOPATH SMEAR OTHER SOURCE: CPT | Mod: TC | Performed by: STUDENT IN AN ORGANIZED HEALTH CARE EDUCATION/TRAINING PROGRAM

## 2021-01-01 PROCEDURE — 81382 HLA II TYPING 1 LOC HR: CPT | Performed by: STUDENT IN AN ORGANIZED HEALTH CARE EDUCATION/TRAINING PROGRAM

## 2021-01-01 PROCEDURE — 999N000179 XR SURGERY CARM FLUORO LESS THAN 5 MIN W STILLS

## 2021-01-01 PROCEDURE — 88261 CHROMOSOME ANALYSIS 5: CPT | Performed by: PHYSICIAN ASSISTANT

## 2021-01-01 PROCEDURE — 88189 FLOWCYTOMETRY/READ 16 & >: CPT | Performed by: STUDENT IN AN ORGANIZED HEALTH CARE EDUCATION/TRAINING PROGRAM

## 2021-01-01 PROCEDURE — 83880 ASSAY OF NATRIURETIC PEPTIDE: CPT | Performed by: NURSE PRACTITIONER

## 2021-01-01 PROCEDURE — 85025 COMPLETE CBC W/AUTO DIFF WBC: CPT

## 2021-01-01 PROCEDURE — 999N000128 HC STATISTIC PERIPHERAL IV START W/O US GUIDANCE: Performed by: NURSE PRACTITIONER

## 2021-01-01 PROCEDURE — 80048 BASIC METABOLIC PNL TOTAL CA: CPT | Performed by: HOSPITALIST

## 2021-01-01 PROCEDURE — 85049 AUTOMATED PLATELET COUNT: CPT | Performed by: INTERNAL MEDICINE

## 2021-01-01 PROCEDURE — 99024 POSTOP FOLLOW-UP VISIT: CPT | Performed by: SURGERY

## 2021-01-01 PROCEDURE — 710N000010 HC RECOVERY PHASE 1, LEVEL 2, PER MIN: Performed by: SURGERY

## 2021-01-01 PROCEDURE — 99284 EMERGENCY DEPT VISIT MOD MDM: CPT | Mod: 25 | Performed by: FAMILY MEDICINE

## 2021-01-01 PROCEDURE — 89051 BODY FLUID CELL COUNT: CPT | Performed by: PHYSICIAN ASSISTANT

## 2021-01-01 PROCEDURE — 07DR3ZX EXTRACTION OF ILIAC BONE MARROW, PERCUTANEOUS APPROACH, DIAGNOSTIC: ICD-10-PCS | Performed by: PHYSICIAN ASSISTANT

## 2021-01-01 PROCEDURE — 250N000011 HC RX IP 250 OP 636: Performed by: REGISTERED NURSE

## 2021-01-01 PROCEDURE — 99195 PHLEBOTOMY: CPT

## 2021-01-01 PROCEDURE — 76705 ECHO EXAM OF ABDOMEN: CPT | Mod: 26 | Performed by: RADIOLOGY

## 2021-01-01 PROCEDURE — 71046 X-RAY EXAM CHEST 2 VIEWS: CPT

## 2021-01-01 PROCEDURE — 88161 CYTOPATH SMEAR OTHER SOURCE: CPT | Mod: TC,XU | Performed by: PHYSICIAN ASSISTANT

## 2021-01-01 PROCEDURE — 99217 PR OBSERVATION CARE DISCHARGE: CPT | Performed by: FAMILY MEDICINE

## 2021-01-01 PROCEDURE — 88311 DECALCIFY TISSUE: CPT | Mod: 26 | Performed by: STUDENT IN AN ORGANIZED HEALTH CARE EDUCATION/TRAINING PROGRAM

## 2021-01-01 PROCEDURE — 88304 TISSUE EXAM BY PATHOLOGIST: CPT | Mod: TC | Performed by: SURGERY

## 2021-01-01 PROCEDURE — 80053 COMPREHEN METABOLIC PANEL: CPT | Performed by: EMERGENCY MEDICINE

## 2021-01-01 PROCEDURE — 85652 RBC SED RATE AUTOMATED: CPT | Performed by: STUDENT IN AN ORGANIZED HEALTH CARE EDUCATION/TRAINING PROGRAM

## 2021-01-01 PROCEDURE — 999N000111 HC STATISTIC OT IP EVAL DEFER: Performed by: OCCUPATIONAL THERAPIST

## 2021-01-01 PROCEDURE — 96365 THER/PROPH/DIAG IV INF INIT: CPT | Performed by: FAMILY MEDICINE

## 2021-01-01 PROCEDURE — 87070 CULTURE OTHR SPECIMN AEROBIC: CPT | Performed by: PHYSICIAN ASSISTANT

## 2021-01-01 PROCEDURE — 88189 FLOWCYTOMETRY/READ 16 & >: CPT | Mod: XE | Performed by: STUDENT IN AN ORGANIZED HEALTH CARE EDUCATION/TRAINING PROGRAM

## 2021-01-01 PROCEDURE — 999N001193 HC VIDEO/TELEPHONE VISIT; NO CHARGE

## 2021-01-01 PROCEDURE — 93308 TTE F-UP OR LMTD: CPT | Mod: 26 | Performed by: INTERNAL MEDICINE

## 2021-01-01 PROCEDURE — 76942 ECHO GUIDE FOR BIOPSY: CPT

## 2021-01-01 PROCEDURE — 99239 HOSP IP/OBS DSCHRG MGMT >30: CPT | Performed by: NURSE PRACTITIONER

## 2021-01-01 PROCEDURE — 85730 THROMBOPLASTIN TIME PARTIAL: CPT | Performed by: PHYSICIAN ASSISTANT

## 2021-01-01 PROCEDURE — 85018 HEMOGLOBIN: CPT | Performed by: INTERNAL MEDICINE

## 2021-01-01 PROCEDURE — 999N001022 HC STATISTIC H-SPHEME PROCESS B/S: Performed by: STUDENT IN AN ORGANIZED HEALTH CARE EDUCATION/TRAINING PROGRAM

## 2021-01-01 PROCEDURE — 97161 PT EVAL LOW COMPLEX 20 MIN: CPT | Mod: GP | Performed by: PHYSICAL THERAPIST

## 2021-01-01 DEVICE — CATH PORT POWERPORT 8FR SL W/SUTURE PLUGS 1708000: Type: IMPLANTABLE DEVICE | Site: NECK | Status: FUNCTIONAL

## 2021-01-01 RX ORDER — CYANOCOBALAMIN (VITAMIN B-12) 500 MCG
250 TABLET ORAL EVERY MORNING
Status: DISCONTINUED | OUTPATIENT
Start: 2021-01-01 | End: 2021-01-01

## 2021-01-01 RX ORDER — ALBUTEROL SULFATE 0.83 MG/ML
2.5 SOLUTION RESPIRATORY (INHALATION)
Status: CANCELLED | OUTPATIENT
Start: 2021-01-01

## 2021-01-01 RX ORDER — HEPARIN SODIUM (PORCINE) LOCK FLUSH IV SOLN 100 UNIT/ML 100 UNIT/ML
5 SOLUTION INTRAVENOUS
Status: DISCONTINUED | OUTPATIENT
Start: 2021-01-01 | End: 2021-01-01 | Stop reason: HOSPADM

## 2021-01-01 RX ORDER — ACETAMINOPHEN 325 MG/1
650 TABLET ORAL EVERY 4 HOURS PRN
COMMUNITY
Start: 2021-01-01 | End: 2021-01-01

## 2021-01-01 RX ORDER — HEPARIN SODIUM (PORCINE) LOCK FLUSH IV SOLN 100 UNIT/ML 100 UNIT/ML
5-10 SOLUTION INTRAVENOUS
Status: DISCONTINUED | OUTPATIENT
Start: 2021-01-01 | End: 2021-01-01 | Stop reason: HOSPADM

## 2021-01-01 RX ORDER — HEPARIN SODIUM (PORCINE) LOCK FLUSH IV SOLN 100 UNIT/ML 100 UNIT/ML
5 SOLUTION INTRAVENOUS
Status: CANCELLED | OUTPATIENT
Start: 2021-01-01

## 2021-01-01 RX ORDER — EPINEPHRINE 1 MG/ML
0.3 INJECTION, SOLUTION INTRAMUSCULAR; SUBCUTANEOUS EVERY 5 MIN PRN
Status: CANCELLED | OUTPATIENT
Start: 2021-01-01

## 2021-01-01 RX ORDER — NALOXONE HYDROCHLORIDE 0.4 MG/ML
0.2 INJECTION, SOLUTION INTRAMUSCULAR; INTRAVENOUS; SUBCUTANEOUS
Status: DISCONTINUED | OUTPATIENT
Start: 2021-01-01 | End: 2021-01-01 | Stop reason: HOSPADM

## 2021-01-01 RX ORDER — METHYLPREDNISOLONE SODIUM SUCCINATE 125 MG/2ML
125 INJECTION, POWDER, LYOPHILIZED, FOR SOLUTION INTRAMUSCULAR; INTRAVENOUS
Status: CANCELLED
Start: 2021-01-01

## 2021-01-01 RX ORDER — FUROSEMIDE 40 MG
40 TABLET ORAL DAILY
Status: DISCONTINUED | OUTPATIENT
Start: 2021-01-01 | End: 2021-01-01

## 2021-01-01 RX ORDER — FUROSEMIDE 10 MG/ML
40 INJECTION INTRAMUSCULAR; INTRAVENOUS ONCE
Status: COMPLETED | OUTPATIENT
Start: 2021-01-01 | End: 2021-01-01

## 2021-01-01 RX ORDER — ALBUTEROL SULFATE 90 UG/1
1-2 AEROSOL, METERED RESPIRATORY (INHALATION)
Status: CANCELLED
Start: 2021-01-01

## 2021-01-01 RX ORDER — LORAZEPAM 2 MG/ML
0.5 INJECTION INTRAMUSCULAR EVERY 4 HOURS PRN
Status: CANCELLED
Start: 2021-01-01

## 2021-01-01 RX ORDER — MORPHINE SULFATE 2 MG/ML
2 INJECTION, SOLUTION INTRAMUSCULAR; INTRAVENOUS ONCE
Status: COMPLETED | OUTPATIENT
Start: 2021-01-01 | End: 2021-01-01

## 2021-01-01 RX ORDER — AMOXICILLIN 250 MG
1 CAPSULE ORAL 2 TIMES DAILY PRN
Status: DISCONTINUED | OUTPATIENT
Start: 2021-01-01 | End: 2021-01-01

## 2021-01-01 RX ORDER — HEPARIN SODIUM,PORCINE 10 UNIT/ML
5 VIAL (ML) INTRAVENOUS
Status: CANCELLED | OUTPATIENT
Start: 2021-01-01

## 2021-01-01 RX ORDER — LIDOCAINE 40 MG/G
CREAM TOPICAL
Status: DISCONTINUED | OUTPATIENT
Start: 2021-01-01 | End: 2021-01-01 | Stop reason: HOSPADM

## 2021-01-01 RX ORDER — NALOXONE HYDROCHLORIDE 0.4 MG/ML
0.4 INJECTION, SOLUTION INTRAMUSCULAR; INTRAVENOUS; SUBCUTANEOUS
Status: DISCONTINUED | OUTPATIENT
Start: 2021-01-01 | End: 2021-01-01 | Stop reason: HOSPADM

## 2021-01-01 RX ORDER — ACETAMINOPHEN 325 MG/1
650 TABLET ORAL EVERY 4 HOURS PRN
Status: DISCONTINUED | OUTPATIENT
Start: 2021-01-01 | End: 2021-01-01 | Stop reason: HOSPADM

## 2021-01-01 RX ORDER — LIDOCAINE HYDROCHLORIDE 10 MG/ML
INJECTION, SOLUTION INFILTRATION; PERINEURAL PRN
Status: DISCONTINUED | OUTPATIENT
Start: 2021-01-01 | End: 2021-01-01

## 2021-01-01 RX ORDER — ALBUTEROL SULFATE 0.83 MG/ML
2.5 SOLUTION RESPIRATORY (INHALATION)
Status: CANCELLED | OUTPATIENT
Start: 2022-01-01

## 2021-01-01 RX ORDER — FUROSEMIDE 10 MG/ML
40 INJECTION INTRAMUSCULAR; INTRAVENOUS ONCE
Status: DISCONTINUED | OUTPATIENT
Start: 2021-01-01 | End: 2021-01-01

## 2021-01-01 RX ORDER — ONDANSETRON 2 MG/ML
INJECTION INTRAMUSCULAR; INTRAVENOUS PRN
Status: DISCONTINUED | OUTPATIENT
Start: 2021-01-01 | End: 2021-01-01

## 2021-01-01 RX ORDER — ACETAMINOPHEN 325 MG/1
975 TABLET ORAL EVERY 8 HOURS
Status: COMPLETED | OUTPATIENT
Start: 2021-01-01 | End: 2021-01-01

## 2021-01-01 RX ORDER — EPINEPHRINE 1 MG/ML
0.3 INJECTION, SOLUTION, CONCENTRATE INTRAVENOUS EVERY 5 MIN PRN
Status: CANCELLED | OUTPATIENT
Start: 2021-01-01

## 2021-01-01 RX ORDER — METOPROLOL TARTRATE 50 MG
50 TABLET ORAL 2 TIMES DAILY
Status: DISCONTINUED | OUTPATIENT
Start: 2021-01-01 | End: 2021-01-01 | Stop reason: HOSPADM

## 2021-01-01 RX ORDER — FENTANYL CITRATE-0.9 % NACL/PF 10 MCG/ML
PLASTIC BAG, INJECTION (ML) INTRAVENOUS CONTINUOUS PRN
Status: DISCONTINUED | OUTPATIENT
Start: 2021-01-01 | End: 2021-01-01

## 2021-01-01 RX ORDER — POLYETHYLENE GLYCOL 3350 17 G/17G
17 POWDER, FOR SOLUTION ORAL DAILY
Status: DISCONTINUED | OUTPATIENT
Start: 2021-01-01 | End: 2021-01-01 | Stop reason: HOSPADM

## 2021-01-01 RX ORDER — HEPARIN SODIUM (PORCINE) LOCK FLUSH IV SOLN 100 UNIT/ML 100 UNIT/ML
5 SOLUTION INTRAVENOUS
Status: CANCELLED | OUTPATIENT
Start: 2022-01-01

## 2021-01-01 RX ORDER — MEPERIDINE HYDROCHLORIDE 25 MG/ML
25 INJECTION INTRAMUSCULAR; INTRAVENOUS; SUBCUTANEOUS EVERY 30 MIN PRN
Status: CANCELLED | OUTPATIENT
Start: 2021-01-01

## 2021-01-01 RX ORDER — ALBUTEROL SULFATE 90 UG/1
1-2 AEROSOL, METERED RESPIRATORY (INHALATION)
Status: CANCELLED
Start: 2022-01-01

## 2021-01-01 RX ORDER — MEPERIDINE HYDROCHLORIDE 25 MG/ML
25 INJECTION INTRAMUSCULAR; INTRAVENOUS; SUBCUTANEOUS EVERY 30 MIN PRN
Status: CANCELLED | OUTPATIENT
Start: 2022-01-01

## 2021-01-01 RX ORDER — ALLOPURINOL 100 MG/1
300 TABLET ORAL DAILY
Status: DISCONTINUED | OUTPATIENT
Start: 2021-01-01 | End: 2021-01-01 | Stop reason: HOSPADM

## 2021-01-01 RX ORDER — METHYLPREDNISOLONE SODIUM SUCCINATE 125 MG/2ML
125 INJECTION, POWDER, LYOPHILIZED, FOR SOLUTION INTRAMUSCULAR; INTRAVENOUS
Status: DISCONTINUED | OUTPATIENT
Start: 2021-01-01 | End: 2021-01-01

## 2021-01-01 RX ORDER — FUROSEMIDE 40 MG
40 TABLET ORAL DAILY
Status: DISCONTINUED | OUTPATIENT
Start: 2021-01-01 | End: 2021-01-01 | Stop reason: HOSPADM

## 2021-01-01 RX ORDER — HEPARIN SODIUM (PORCINE) LOCK FLUSH IV SOLN 100 UNIT/ML 100 UNIT/ML
500 SOLUTION INTRAVENOUS EVERY 8 HOURS
Status: DISCONTINUED | OUTPATIENT
Start: 2021-01-01 | End: 2021-01-01 | Stop reason: HOSPADM

## 2021-01-01 RX ORDER — ALBUTEROL SULFATE 5 MG/ML
2.5 SOLUTION RESPIRATORY (INHALATION)
Status: DISCONTINUED | OUTPATIENT
Start: 2021-01-01 | End: 2021-01-01

## 2021-01-01 RX ORDER — AMOXICILLIN 250 MG
2 CAPSULE ORAL 2 TIMES DAILY PRN
Status: DISCONTINUED | OUTPATIENT
Start: 2021-01-01 | End: 2021-01-01 | Stop reason: HOSPADM

## 2021-01-01 RX ORDER — ONDANSETRON 2 MG/ML
4 INJECTION INTRAMUSCULAR; INTRAVENOUS EVERY 30 MIN PRN
Status: DISCONTINUED | OUTPATIENT
Start: 2021-01-01 | End: 2021-01-01 | Stop reason: HOSPADM

## 2021-01-01 RX ORDER — FENTANYL CITRATE 50 UG/ML
INJECTION, SOLUTION INTRAMUSCULAR; INTRAVENOUS PRN
Status: DISCONTINUED | OUTPATIENT
Start: 2021-01-01 | End: 2021-01-01

## 2021-01-01 RX ORDER — SODIUM CHLORIDE 9 MG/ML
INJECTION, SOLUTION INTRAVENOUS CONTINUOUS
Status: DISCONTINUED | OUTPATIENT
Start: 2021-01-01 | End: 2021-01-01 | Stop reason: HOSPADM

## 2021-01-01 RX ORDER — CEFAZOLIN SODIUM 2 G/100ML
2 INJECTION, SOLUTION INTRAVENOUS
Status: CANCELLED | OUTPATIENT
Start: 2021-01-01

## 2021-01-01 RX ORDER — EPINEPHRINE 1 MG/ML
0.3 INJECTION, SOLUTION, CONCENTRATE INTRAVENOUS EVERY 5 MIN PRN
Status: CANCELLED | OUTPATIENT
Start: 2022-01-01

## 2021-01-01 RX ORDER — LORAZEPAM 2 MG/ML
0.5 INJECTION INTRAMUSCULAR EVERY 4 HOURS PRN
Status: CANCELLED
Start: 2022-01-01

## 2021-01-01 RX ORDER — TRIAMCINOLONE ACETONIDE 40 MG/ML
40 INJECTION, SUSPENSION INTRA-ARTICULAR; INTRAMUSCULAR ONCE
Status: COMPLETED | OUTPATIENT
Start: 2021-01-01 | End: 2021-01-01

## 2021-01-01 RX ORDER — POTASSIUM CHLORIDE 750 MG/1
40 TABLET, EXTENDED RELEASE ORAL ONCE
Status: COMPLETED | OUTPATIENT
Start: 2021-01-01 | End: 2021-01-01

## 2021-01-01 RX ORDER — NALOXONE HYDROCHLORIDE 0.4 MG/ML
0.2 INJECTION, SOLUTION INTRAMUSCULAR; INTRAVENOUS; SUBCUTANEOUS
Status: CANCELLED | OUTPATIENT
Start: 2021-01-01

## 2021-01-01 RX ORDER — OXYCODONE HCL 5 MG/5 ML
5 SOLUTION, ORAL ORAL EVERY 4 HOURS PRN
Status: DISCONTINUED | OUTPATIENT
Start: 2021-01-01 | End: 2021-01-01

## 2021-01-01 RX ORDER — PROPOFOL 10 MG/ML
INJECTION, EMULSION INTRAVENOUS CONTINUOUS PRN
Status: DISCONTINUED | OUTPATIENT
Start: 2021-01-01 | End: 2021-01-01

## 2021-01-01 RX ORDER — FUROSEMIDE 20 MG
20 TABLET ORAL
Status: DISCONTINUED | OUTPATIENT
Start: 2021-01-01 | End: 2021-01-01

## 2021-01-01 RX ORDER — ALLOPURINOL 100 MG/1
100 TABLET ORAL DAILY
Status: DISCONTINUED | OUTPATIENT
Start: 2021-01-01 | End: 2021-01-01 | Stop reason: HOSPADM

## 2021-01-01 RX ORDER — FUROSEMIDE 10 MG/ML
40 INJECTION INTRAMUSCULAR; INTRAVENOUS EVERY 6 HOURS
Status: DISCONTINUED | OUTPATIENT
Start: 2021-01-01 | End: 2021-01-01 | Stop reason: HOSPADM

## 2021-01-01 RX ORDER — UBIDECARENONE 75 MG
200 CAPSULE ORAL EVERY MORNING
Status: DISCONTINUED | OUTPATIENT
Start: 2021-01-01 | End: 2021-01-01 | Stop reason: HOSPADM

## 2021-01-01 RX ORDER — METOPROLOL TARTRATE 50 MG
50 TABLET ORAL 2 TIMES DAILY
Qty: 30 TABLET | Refills: 3 | Status: SHIPPED | OUTPATIENT
Start: 2021-01-01 | End: 2021-01-01

## 2021-01-01 RX ORDER — METOPROLOL TARTRATE 50 MG
50 TABLET ORAL 2 TIMES DAILY
Qty: 60 TABLET | Refills: 5 | Status: SHIPPED | OUTPATIENT
Start: 2021-01-01 | End: 2022-01-01

## 2021-01-01 RX ORDER — CEFAZOLIN SODIUM 2 G/100ML
2 INJECTION, SOLUTION INTRAVENOUS SEE ADMIN INSTRUCTIONS
Status: DISCONTINUED | OUTPATIENT
Start: 2021-01-01 | End: 2021-01-01 | Stop reason: HOSPADM

## 2021-01-01 RX ORDER — LIDOCAINE HYDROCHLORIDE 10 MG/ML
8-10 INJECTION, SOLUTION EPIDURAL; INFILTRATION; INTRACAUDAL; PERINEURAL
Status: DISCONTINUED | OUTPATIENT
Start: 2021-01-01 | End: 2021-01-01 | Stop reason: HOSPADM

## 2021-01-01 RX ORDER — ROSUVASTATIN CALCIUM 5 MG/1
5 TABLET, COATED ORAL DAILY
Qty: 30 TABLET | Refills: 1 | Status: SHIPPED | OUTPATIENT
Start: 2021-01-01 | End: 2021-01-01

## 2021-01-01 RX ORDER — DIPHENHYDRAMINE HYDROCHLORIDE 50 MG/ML
50 INJECTION INTRAMUSCULAR; INTRAVENOUS
Status: CANCELLED
Start: 2021-01-01

## 2021-01-01 RX ORDER — AMOXICILLIN 250 MG
1 CAPSULE ORAL 2 TIMES DAILY PRN
Status: DISCONTINUED | OUTPATIENT
Start: 2021-01-01 | End: 2021-01-01 | Stop reason: HOSPADM

## 2021-01-01 RX ORDER — LEVOFLOXACIN 250 MG/1
250 TABLET, FILM COATED ORAL AT BEDTIME
Qty: 30 TABLET | Refills: 0 | Status: ON HOLD | OUTPATIENT
Start: 2021-01-01 | End: 2022-01-01

## 2021-01-01 RX ORDER — NITROGLYCERIN 0.4 MG/1
0.4 TABLET SUBLINGUAL EVERY 5 MIN PRN
Status: DISCONTINUED | OUTPATIENT
Start: 2021-01-01 | End: 2021-01-01 | Stop reason: HOSPADM

## 2021-01-01 RX ORDER — ONDANSETRON 2 MG/ML
8 INJECTION INTRAMUSCULAR; INTRAVENOUS EVERY 8 HOURS PRN
Status: DISCONTINUED | OUTPATIENT
Start: 2021-01-01 | End: 2021-01-01 | Stop reason: HOSPADM

## 2021-01-01 RX ORDER — EPINEPHRINE 1 MG/ML
0.3 INJECTION, SOLUTION, CONCENTRATE INTRAVENOUS EVERY 5 MIN PRN
Status: DISCONTINUED | OUTPATIENT
Start: 2021-01-01 | End: 2021-01-01

## 2021-01-01 RX ORDER — ACYCLOVIR 400 MG/1
TABLET ORAL
Qty: 60 TABLET | Refills: 11 | Status: ON HOLD | OUTPATIENT
Start: 2021-01-01 | End: 2022-01-01

## 2021-01-01 RX ORDER — ONDANSETRON 4 MG/1
8 TABLET, ORALLY DISINTEGRATING ORAL EVERY 8 HOURS PRN
Status: DISCONTINUED | OUTPATIENT
Start: 2021-01-01 | End: 2021-01-01

## 2021-01-01 RX ORDER — LORAZEPAM 2 MG/ML
.5-1 INJECTION INTRAMUSCULAR EVERY 6 HOURS PRN
Status: DISCONTINUED | OUTPATIENT
Start: 2021-01-01 | End: 2021-01-01

## 2021-01-01 RX ORDER — ALBUTEROL SULFATE 5 MG/ML
2.5 SOLUTION RESPIRATORY (INHALATION)
Status: CANCELLED | OUTPATIENT
Start: 2021-01-01

## 2021-01-01 RX ORDER — DIPHENHYDRAMINE HYDROCHLORIDE 50 MG/ML
50 INJECTION INTRAMUSCULAR; INTRAVENOUS
Status: CANCELLED
Start: 2022-01-01

## 2021-01-01 RX ORDER — FLUCONAZOLE 100 MG/1
200 TABLET ORAL DAILY
Status: DISCONTINUED | OUTPATIENT
Start: 2021-01-01 | End: 2021-01-01 | Stop reason: HOSPADM

## 2021-01-01 RX ORDER — MEPERIDINE HYDROCHLORIDE 25 MG/ML
25 INJECTION INTRAMUSCULAR; INTRAVENOUS; SUBCUTANEOUS EVERY 30 MIN PRN
Status: DISCONTINUED | OUTPATIENT
Start: 2021-01-01 | End: 2021-01-01

## 2021-01-01 RX ORDER — ALLOPURINOL 300 MG/1
300 TABLET ORAL DAILY
Status: DISCONTINUED | OUTPATIENT
Start: 2021-01-01 | End: 2021-01-01

## 2021-01-01 RX ORDER — ACETAMINOPHEN 650 MG/1
650 SUPPOSITORY RECTAL EVERY 4 HOURS PRN
Status: DISCONTINUED | OUTPATIENT
Start: 2021-01-01 | End: 2021-01-01 | Stop reason: HOSPADM

## 2021-01-01 RX ORDER — PROPOFOL 10 MG/ML
INJECTION, EMULSION INTRAVENOUS PRN
Status: DISCONTINUED | OUTPATIENT
Start: 2021-01-01 | End: 2021-01-01

## 2021-01-01 RX ORDER — DIMENHYDRINATE 50 MG/ML
12.5 INJECTION, SOLUTION INTRAMUSCULAR; INTRAVENOUS EVERY 10 MIN PRN
Status: DISCONTINUED | OUTPATIENT
Start: 2021-01-01 | End: 2021-01-01 | Stop reason: HOSPADM

## 2021-01-01 RX ORDER — FLUCONAZOLE 200 MG/1
200 TABLET ORAL DAILY
Qty: 30 TABLET | Refills: 1 | Status: SHIPPED | OUTPATIENT
Start: 2021-01-01 | End: 2021-01-01

## 2021-01-01 RX ORDER — CEFAZOLIN SODIUM 2 G/100ML
2 INJECTION, SOLUTION INTRAVENOUS SEE ADMIN INSTRUCTIONS
Status: CANCELLED | OUTPATIENT
Start: 2021-01-01

## 2021-01-01 RX ORDER — CYANOCOBALAMIN (VITAMIN B-12) 500 MCG
250 TABLET ORAL EVERY MORNING
Status: DISCONTINUED | OUTPATIENT
Start: 2021-01-01 | End: 2021-01-01 | Stop reason: HOSPADM

## 2021-01-01 RX ORDER — OXYCODONE HCL 10 MG/1
10 TABLET, FILM COATED, EXTENDED RELEASE ORAL AT BEDTIME
Status: DISCONTINUED | OUTPATIENT
Start: 2021-01-01 | End: 2021-01-01

## 2021-01-01 RX ORDER — ACYCLOVIR 400 MG/1
400 TABLET ORAL 2 TIMES DAILY
Qty: 60 TABLET | Refills: 1 | Status: SHIPPED | OUTPATIENT
Start: 2021-01-01 | End: 2021-01-01

## 2021-01-01 RX ORDER — FUROSEMIDE 10 MG/ML
20 INJECTION INTRAMUSCULAR; INTRAVENOUS DAILY
Status: DISCONTINUED | OUTPATIENT
Start: 2021-01-01 | End: 2021-01-01

## 2021-01-01 RX ORDER — NALOXONE HYDROCHLORIDE 0.4 MG/ML
0.2 INJECTION, SOLUTION INTRAMUSCULAR; INTRAVENOUS; SUBCUTANEOUS
Status: CANCELLED | OUTPATIENT
Start: 2022-01-01

## 2021-01-01 RX ORDER — POTASSIUM CHLORIDE 1500 MG/1
40 TABLET, EXTENDED RELEASE ORAL ONCE
Status: COMPLETED | OUTPATIENT
Start: 2021-01-01 | End: 2021-01-01

## 2021-01-01 RX ORDER — NALOXONE HYDROCHLORIDE 0.4 MG/ML
0.2 INJECTION, SOLUTION INTRAMUSCULAR; INTRAVENOUS; SUBCUTANEOUS
Status: DISCONTINUED | OUTPATIENT
Start: 2021-01-01 | End: 2021-01-01

## 2021-01-01 RX ORDER — LEVOFLOXACIN 250 MG/1
250 TABLET, FILM COATED ORAL AT BEDTIME
Qty: 30 TABLET | Refills: 1 | Status: SHIPPED | OUTPATIENT
Start: 2021-01-01 | End: 2021-01-01

## 2021-01-01 RX ORDER — FUROSEMIDE 10 MG/ML
40 INJECTION INTRAMUSCULAR; INTRAVENOUS DAILY
Status: DISCONTINUED | OUTPATIENT
Start: 2021-01-01 | End: 2021-01-01

## 2021-01-01 RX ORDER — PROCHLORPERAZINE MALEATE 5 MG
5 TABLET ORAL EVERY 6 HOURS PRN
Qty: 30 TABLET | Refills: 1 | Status: ON HOLD | OUTPATIENT
Start: 2021-01-01 | End: 2021-01-01

## 2021-01-01 RX ORDER — BISACODYL 10 MG
10 SUPPOSITORY, RECTAL RECTAL DAILY PRN
Status: DISCONTINUED | OUTPATIENT
Start: 2021-01-01 | End: 2021-01-01 | Stop reason: HOSPADM

## 2021-01-01 RX ORDER — ONDANSETRON 4 MG/1
4 TABLET, ORALLY DISINTEGRATING ORAL EVERY 6 HOURS PRN
Status: DISCONTINUED | OUTPATIENT
Start: 2021-01-01 | End: 2021-01-01

## 2021-01-01 RX ORDER — FUROSEMIDE 10 MG/ML
20 INJECTION INTRAMUSCULAR; INTRAVENOUS ONCE
Status: COMPLETED | OUTPATIENT
Start: 2021-01-01 | End: 2021-01-01

## 2021-01-01 RX ORDER — FERROUS SULFATE 325(65) MG
325 TABLET ORAL DAILY
Status: DISCONTINUED | OUTPATIENT
Start: 2021-01-01 | End: 2021-01-01 | Stop reason: HOSPADM

## 2021-01-01 RX ORDER — ALLOPURINOL 300 MG/1
300 TABLET ORAL DAILY
Status: DISCONTINUED | OUTPATIENT
Start: 2021-01-01 | End: 2021-01-01 | Stop reason: HOSPADM

## 2021-01-01 RX ORDER — ONDANSETRON 2 MG/ML
4 INJECTION INTRAMUSCULAR; INTRAVENOUS EVERY 30 MIN PRN
Status: DISCONTINUED | OUTPATIENT
Start: 2021-01-01 | End: 2021-01-01

## 2021-01-01 RX ORDER — TRAZODONE HYDROCHLORIDE 50 MG/1
50 TABLET, FILM COATED ORAL AT BEDTIME
Status: DISCONTINUED | OUTPATIENT
Start: 2021-01-01 | End: 2021-01-01 | Stop reason: HOSPADM

## 2021-01-01 RX ORDER — ALBUTEROL SULFATE 90 UG/1
1-2 AEROSOL, METERED RESPIRATORY (INHALATION)
Status: DISCONTINUED | OUTPATIENT
Start: 2021-01-01 | End: 2021-01-01

## 2021-01-01 RX ORDER — VITAMIN B COMPLEX
50 TABLET ORAL DAILY
Status: DISCONTINUED | OUTPATIENT
Start: 2021-01-01 | End: 2021-01-01 | Stop reason: HOSPADM

## 2021-01-01 RX ORDER — OXYCODONE HYDROCHLORIDE 5 MG/1
5 TABLET ORAL EVERY 6 HOURS PRN
COMMUNITY
End: 2021-01-01

## 2021-01-01 RX ORDER — LACTULOSE 10 G/15ML
30 SOLUTION ORAL ONCE
Status: COMPLETED | OUTPATIENT
Start: 2021-01-01 | End: 2021-01-01

## 2021-01-01 RX ORDER — OXYCODONE HYDROCHLORIDE 5 MG/1
10 TABLET ORAL EVERY 4 HOURS PRN
Status: DISCONTINUED | OUTPATIENT
Start: 2021-01-01 | End: 2021-01-01 | Stop reason: HOSPADM

## 2021-01-01 RX ORDER — HYDROMORPHONE HCL IN WATER/PF 6 MG/30 ML
0.4 PATIENT CONTROLLED ANALGESIA SYRINGE INTRAVENOUS
Status: DISCONTINUED | OUTPATIENT
Start: 2021-01-01 | End: 2021-01-01 | Stop reason: HOSPADM

## 2021-01-01 RX ORDER — OXYCODONE HYDROCHLORIDE 5 MG/1
5 TABLET ORAL EVERY 6 HOURS PRN
Qty: 10 TABLET | Refills: 0 | Status: ON HOLD | OUTPATIENT
Start: 2021-01-01 | End: 2021-01-01

## 2021-01-01 RX ORDER — OXYCODONE HYDROCHLORIDE 5 MG/1
5 TABLET ORAL EVERY 6 HOURS PRN
Qty: 2 TABLET | Refills: 0 | Status: SHIPPED | OUTPATIENT
Start: 2021-01-01 | End: 2021-01-01

## 2021-01-01 RX ORDER — METOPROLOL TARTRATE 50 MG
50 TABLET ORAL 2 TIMES DAILY
Qty: 30 TABLET | Refills: 5 | Status: SHIPPED | OUTPATIENT
Start: 2021-01-01 | End: 2021-01-01

## 2021-01-01 RX ORDER — ONDANSETRON 4 MG/1
4 TABLET, ORALLY DISINTEGRATING ORAL EVERY 6 HOURS PRN
Status: DISCONTINUED | OUTPATIENT
Start: 2021-01-01 | End: 2021-01-01 | Stop reason: HOSPADM

## 2021-01-01 RX ORDER — OXYCODONE HYDROCHLORIDE 5 MG/1
5-10 TABLET ORAL EVERY 4 HOURS PRN
Status: DISCONTINUED | OUTPATIENT
Start: 2021-01-01 | End: 2021-01-01 | Stop reason: HOSPADM

## 2021-01-01 RX ORDER — LIDOCAINE 4 G/G
1 PATCH TOPICAL EVERY 24 HOURS
COMMUNITY
End: 2021-01-01

## 2021-01-01 RX ORDER — TRAZODONE HYDROCHLORIDE 50 MG/1
50 TABLET, FILM COATED ORAL
Qty: 30 TABLET | Refills: 1 | Status: ON HOLD | OUTPATIENT
Start: 2021-01-01 | End: 2021-01-01

## 2021-01-01 RX ORDER — PROCHLORPERAZINE 25 MG
12.5 SUPPOSITORY, RECTAL RECTAL EVERY 12 HOURS PRN
Status: DISCONTINUED | OUTPATIENT
Start: 2021-01-01 | End: 2021-01-01 | Stop reason: HOSPADM

## 2021-01-01 RX ORDER — POTASSIUM CHLORIDE 1500 MG/1
20 TABLET, EXTENDED RELEASE ORAL ONCE
Status: COMPLETED | OUTPATIENT
Start: 2021-01-01 | End: 2021-01-01

## 2021-01-01 RX ORDER — HEPARIN SODIUM,PORCINE 10 UNIT/ML
5 VIAL (ML) INTRAVENOUS
Status: CANCELLED | OUTPATIENT
Start: 2022-01-01

## 2021-01-01 RX ORDER — AMPICILLIN AND SULBACTAM 2; 1 G/1; G/1
3 INJECTION, POWDER, FOR SOLUTION INTRAMUSCULAR; INTRAVENOUS ONCE
Status: COMPLETED | OUTPATIENT
Start: 2021-01-01 | End: 2021-01-01

## 2021-01-01 RX ORDER — LEVOFLOXACIN 250 MG/1
250 TABLET, FILM COATED ORAL AT BEDTIME
Status: DISCONTINUED | OUTPATIENT
Start: 2021-01-01 | End: 2021-01-01 | Stop reason: HOSPADM

## 2021-01-01 RX ORDER — LIDOCAINE 4 G/G
1 PATCH TOPICAL EVERY 24 HOURS
Qty: 30 PATCH | Refills: 1 | Status: SHIPPED | OUTPATIENT
Start: 2021-01-01 | End: 2021-01-01

## 2021-01-01 RX ORDER — METOPROLOL TARTRATE 1 MG/ML
INJECTION, SOLUTION INTRAVENOUS PRN
Status: DISCONTINUED | OUTPATIENT
Start: 2021-01-01 | End: 2021-01-01

## 2021-01-01 RX ORDER — ONDANSETRON 2 MG/ML
4 INJECTION INTRAMUSCULAR; INTRAVENOUS EVERY 6 HOURS PRN
Status: DISCONTINUED | OUTPATIENT
Start: 2021-01-01 | End: 2021-01-01

## 2021-01-01 RX ORDER — AMOXICILLIN 250 MG
1 CAPSULE ORAL 2 TIMES DAILY
Status: DISCONTINUED | OUTPATIENT
Start: 2021-01-01 | End: 2021-01-01 | Stop reason: HOSPADM

## 2021-01-01 RX ORDER — PROCHLORPERAZINE MALEATE 5 MG
5-10 TABLET ORAL EVERY 6 HOURS PRN
Status: DISCONTINUED | OUTPATIENT
Start: 2021-01-01 | End: 2021-01-01

## 2021-01-01 RX ORDER — ROSUVASTATIN CALCIUM 5 MG/1
5 TABLET, COATED ORAL DAILY
Status: DISCONTINUED | OUTPATIENT
Start: 2021-01-01 | End: 2021-01-01 | Stop reason: HOSPADM

## 2021-01-01 RX ORDER — POTASSIUM CHLORIDE 1500 MG/1
20 TABLET, EXTENDED RELEASE ORAL 2 TIMES DAILY
Qty: 30 TABLET | Refills: 4 | Status: ON HOLD | OUTPATIENT
Start: 2021-01-01 | End: 2022-01-01

## 2021-01-01 RX ORDER — OXYCODONE HYDROCHLORIDE 5 MG/1
5 TABLET ORAL EVERY 4 HOURS PRN
Status: DISCONTINUED | OUTPATIENT
Start: 2021-01-01 | End: 2021-01-01 | Stop reason: HOSPADM

## 2021-01-01 RX ORDER — ALLOPURINOL 300 MG/1
300 TABLET ORAL DAILY
DISCHARGE
Start: 2021-01-01 | End: 2021-01-01

## 2021-01-01 RX ORDER — DOCUSATE SODIUM 50MG AND SENNOSIDES 8.6MG 8.6; 5 MG/1; MG/1
TABLET, FILM COATED ORAL
Qty: 120 TABLET | Refills: 4 | Status: ON HOLD | OUTPATIENT
Start: 2021-01-01 | End: 2022-01-01

## 2021-01-01 RX ORDER — ACYCLOVIR 200 MG/1
400 CAPSULE ORAL 2 TIMES DAILY
Status: DISCONTINUED | OUTPATIENT
Start: 2021-01-01 | End: 2021-01-01 | Stop reason: HOSPADM

## 2021-01-01 RX ORDER — OXYCODONE HYDROCHLORIDE 5 MG/1
5 TABLET ORAL EVERY 6 HOURS PRN
Status: DISCONTINUED | OUTPATIENT
Start: 2021-01-01 | End: 2021-01-01 | Stop reason: HOSPADM

## 2021-01-01 RX ORDER — ONDANSETRON 2 MG/ML
4 INJECTION INTRAMUSCULAR; INTRAVENOUS EVERY 6 HOURS PRN
Status: DISCONTINUED | OUTPATIENT
Start: 2021-01-01 | End: 2021-01-01 | Stop reason: HOSPADM

## 2021-01-01 RX ORDER — METOPROLOL TARTRATE 1 MG/ML
1-2 INJECTION, SOLUTION INTRAVENOUS EVERY 5 MIN PRN
Status: DISCONTINUED | OUTPATIENT
Start: 2021-01-01 | End: 2021-01-01 | Stop reason: HOSPADM

## 2021-01-01 RX ORDER — PREDNISONE 20 MG/1
20 TABLET ORAL DAILY
Status: COMPLETED | OUTPATIENT
Start: 2021-01-01 | End: 2021-01-01

## 2021-01-01 RX ORDER — HEPARIN SODIUM 5000 [USP'U]/.5ML
5000 INJECTION, SOLUTION INTRAVENOUS; SUBCUTANEOUS EVERY 12 HOURS
Status: CANCELLED | OUTPATIENT
Start: 2021-01-01

## 2021-01-01 RX ORDER — IOPAMIDOL 755 MG/ML
100 INJECTION, SOLUTION INTRAVASCULAR ONCE
Status: COMPLETED | OUTPATIENT
Start: 2021-01-01 | End: 2021-01-01

## 2021-01-01 RX ORDER — HEPARIN SODIUM,PORCINE 10 UNIT/ML
5-10 VIAL (ML) INTRAVENOUS
Status: DISCONTINUED | OUTPATIENT
Start: 2021-01-01 | End: 2021-01-01 | Stop reason: HOSPADM

## 2021-01-01 RX ORDER — PROCHLORPERAZINE MALEATE 5 MG
5 TABLET ORAL EVERY 6 HOURS PRN
Status: DISCONTINUED | OUTPATIENT
Start: 2021-01-01 | End: 2021-01-01

## 2021-01-01 RX ORDER — HYDROMORPHONE HCL IN WATER/PF 6 MG/30 ML
0.2 PATIENT CONTROLLED ANALGESIA SYRINGE INTRAVENOUS EVERY 5 MIN PRN
Status: DISCONTINUED | OUTPATIENT
Start: 2021-01-01 | End: 2021-01-01 | Stop reason: HOSPADM

## 2021-01-01 RX ORDER — BUPIVACAINE HYDROCHLORIDE 5 MG/ML
5 INJECTION, SOLUTION EPIDURAL; INTRACAUDAL ONCE
Status: COMPLETED | OUTPATIENT
Start: 2021-01-01 | End: 2021-01-01

## 2021-01-01 RX ORDER — SODIUM CHLORIDE, SODIUM LACTATE, POTASSIUM CHLORIDE, CALCIUM CHLORIDE 600; 310; 30; 20 MG/100ML; MG/100ML; MG/100ML; MG/100ML
INJECTION, SOLUTION INTRAVENOUS CONTINUOUS
Status: DISCONTINUED | OUTPATIENT
Start: 2021-01-01 | End: 2021-01-01 | Stop reason: HOSPADM

## 2021-01-01 RX ORDER — POLYETHYLENE GLYCOL 3350 17 G/17G
17 POWDER, FOR SOLUTION ORAL DAILY PRN
Status: DISCONTINUED | OUTPATIENT
Start: 2021-01-01 | End: 2021-01-01 | Stop reason: HOSPADM

## 2021-01-01 RX ORDER — METOPROLOL TARTRATE 50 MG
50 TABLET ORAL 2 TIMES DAILY
DISCHARGE
Start: 2021-01-01 | End: 2021-01-01

## 2021-01-01 RX ORDER — TRAZODONE HYDROCHLORIDE 50 MG/1
50 TABLET, FILM COATED ORAL
Status: DISCONTINUED | OUTPATIENT
Start: 2021-01-01 | End: 2021-01-01 | Stop reason: HOSPADM

## 2021-01-01 RX ORDER — ONDANSETRON 4 MG/1
4 TABLET, ORALLY DISINTEGRATING ORAL EVERY 30 MIN PRN
Status: DISCONTINUED | OUTPATIENT
Start: 2021-01-01 | End: 2021-01-01 | Stop reason: HOSPADM

## 2021-01-01 RX ORDER — POLYETHYLENE GLYCOL 3350 17 G/17G
17 POWDER, FOR SOLUTION ORAL DAILY
Qty: 510 G | Status: ON HOLD | COMMUNITY
Start: 2021-01-01 | End: 2022-01-01

## 2021-01-01 RX ORDER — HYDROMORPHONE HYDROCHLORIDE 1 MG/ML
0.5 INJECTION, SOLUTION INTRAMUSCULAR; INTRAVENOUS; SUBCUTANEOUS
Status: COMPLETED | OUTPATIENT
Start: 2021-01-01 | End: 2021-01-01

## 2021-01-01 RX ORDER — FUROSEMIDE 10 MG/ML
20 INJECTION INTRAMUSCULAR; INTRAVENOUS EVERY 12 HOURS
Status: DISCONTINUED | OUTPATIENT
Start: 2021-01-01 | End: 2021-01-01 | Stop reason: HOSPADM

## 2021-01-01 RX ORDER — FENTANYL CITRATE 50 UG/ML
50 INJECTION, SOLUTION INTRAMUSCULAR; INTRAVENOUS EVERY 5 MIN PRN
Status: DISCONTINUED | OUTPATIENT
Start: 2021-01-01 | End: 2021-01-01 | Stop reason: HOSPADM

## 2021-01-01 RX ORDER — ATORVASTATIN CALCIUM 10 MG/1
10 TABLET, FILM COATED ORAL DAILY
Status: ON HOLD | DISCHARGE
Start: 2021-01-01 | End: 2021-01-01

## 2021-01-01 RX ORDER — OXYCODONE HYDROCHLORIDE 5 MG/1
5 TABLET ORAL
Status: COMPLETED | OUTPATIENT
Start: 2021-01-01 | End: 2021-01-01

## 2021-01-01 RX ORDER — ATORVASTATIN CALCIUM 10 MG/1
10 TABLET, FILM COATED ORAL DAILY
Status: DISCONTINUED | OUTPATIENT
Start: 2021-01-01 | End: 2021-01-01 | Stop reason: HOSPADM

## 2021-01-01 RX ORDER — HYDROMORPHONE HYDROCHLORIDE 1 MG/ML
0.5 INJECTION, SOLUTION INTRAMUSCULAR; INTRAVENOUS; SUBCUTANEOUS
Status: DISCONTINUED | OUTPATIENT
Start: 2021-01-01 | End: 2021-01-01

## 2021-01-01 RX ORDER — CHOLECALCIFEROL (VITAMIN D3) 50 MCG
1 TABLET ORAL DAILY
Status: ON HOLD | COMMUNITY
End: 2022-01-01

## 2021-01-01 RX ORDER — FLUCONAZOLE 200 MG/1
200 TABLET ORAL DAILY
Qty: 30 TABLET | Refills: 1 | Status: ON HOLD | OUTPATIENT
Start: 2021-01-01 | End: 2022-01-01

## 2021-01-01 RX ORDER — HEPARIN SODIUM (PORCINE) LOCK FLUSH IV SOLN 100 UNIT/ML 100 UNIT/ML
SOLUTION INTRAVENOUS PRN
Status: DISCONTINUED | OUTPATIENT
Start: 2021-01-01 | End: 2021-01-01 | Stop reason: HOSPADM

## 2021-01-01 RX ORDER — FUROSEMIDE 40 MG
80 TABLET ORAL
Status: DISCONTINUED | OUTPATIENT
Start: 2021-01-01 | End: 2021-01-01 | Stop reason: HOSPADM

## 2021-01-01 RX ORDER — ACETAMINOPHEN 325 MG/1
650 TABLET ORAL 3 TIMES DAILY
Status: DISCONTINUED | OUTPATIENT
Start: 2021-01-01 | End: 2021-01-01 | Stop reason: HOSPADM

## 2021-01-01 RX ORDER — AMOXICILLIN 250 MG
2 CAPSULE ORAL 2 TIMES DAILY
Status: DISCONTINUED | OUTPATIENT
Start: 2021-01-01 | End: 2021-01-01 | Stop reason: HOSPADM

## 2021-01-01 RX ORDER — ALLOPURINOL 300 MG/1
300 TABLET ORAL DAILY
Qty: 90 TABLET | Refills: 1 | Status: ON HOLD | OUTPATIENT
Start: 2021-01-01 | End: 2022-01-01

## 2021-01-01 RX ORDER — LORAZEPAM 0.5 MG/1
.5-1 TABLET ORAL EVERY 6 HOURS PRN
Status: DISCONTINUED | OUTPATIENT
Start: 2021-01-01 | End: 2021-01-01

## 2021-01-01 RX ORDER — FUROSEMIDE 20 MG
20 TABLET ORAL
Status: DISCONTINUED | OUTPATIENT
Start: 2021-01-01 | End: 2021-01-01 | Stop reason: HOSPADM

## 2021-01-01 RX ORDER — PROCHLORPERAZINE MALEATE 5 MG
5 TABLET ORAL EVERY 6 HOURS PRN
Status: DISCONTINUED | OUTPATIENT
Start: 2021-01-01 | End: 2021-01-01 | Stop reason: HOSPADM

## 2021-01-01 RX ORDER — LORAZEPAM 2 MG/ML
.5-1 INJECTION INTRAMUSCULAR EVERY 6 HOURS PRN
Status: DISCONTINUED | OUTPATIENT
Start: 2021-01-01 | End: 2021-01-01 | Stop reason: HOSPADM

## 2021-01-01 RX ORDER — DIPHENHYDRAMINE HYDROCHLORIDE 50 MG/ML
50 INJECTION INTRAMUSCULAR; INTRAVENOUS
Status: DISCONTINUED | OUTPATIENT
Start: 2021-01-01 | End: 2021-01-01

## 2021-01-01 RX ORDER — BUPIVACAINE HYDROCHLORIDE AND EPINEPHRINE 5; 5 MG/ML; UG/ML
INJECTION, SOLUTION EPIDURAL; INTRACAUDAL; PERINEURAL PRN
Status: DISCONTINUED | OUTPATIENT
Start: 2021-01-01 | End: 2021-01-01 | Stop reason: HOSPADM

## 2021-01-01 RX ORDER — ALBUTEROL SULFATE 0.83 MG/ML
2.5 SOLUTION RESPIRATORY (INHALATION) EVERY 4 HOURS PRN
Status: DISCONTINUED | OUTPATIENT
Start: 2021-01-01 | End: 2021-01-01 | Stop reason: HOSPADM

## 2021-01-01 RX ORDER — ACETAMINOPHEN 325 MG/1
650 TABLET ORAL EVERY 4 HOURS PRN
Status: DISCONTINUED | OUTPATIENT
Start: 2021-01-01 | End: 2021-01-01

## 2021-01-01 RX ORDER — HYDROMORPHONE HCL IN WATER/PF 6 MG/30 ML
0.2 PATIENT CONTROLLED ANALGESIA SYRINGE INTRAVENOUS
Status: DISCONTINUED | OUTPATIENT
Start: 2021-01-01 | End: 2021-01-01 | Stop reason: HOSPADM

## 2021-01-01 RX ORDER — AMPICILLIN AND SULBACTAM 2; 1 G/1; G/1
3 INJECTION, POWDER, FOR SOLUTION INTRAMUSCULAR; INTRAVENOUS EVERY 6 HOURS
Status: DISCONTINUED | OUTPATIENT
Start: 2021-01-01 | End: 2021-01-01 | Stop reason: HOSPADM

## 2021-01-01 RX ORDER — FUROSEMIDE 10 MG/ML
20 INJECTION INTRAMUSCULAR; INTRAVENOUS
Status: COMPLETED | OUTPATIENT
Start: 2021-01-01 | End: 2021-01-01

## 2021-01-01 RX ORDER — FUROSEMIDE 40 MG
40 TABLET ORAL DAILY
Qty: 30 TABLET | Refills: 1 | Status: ON HOLD | OUTPATIENT
Start: 2021-01-01 | End: 2022-01-01

## 2021-01-01 RX ORDER — ACYCLOVIR 400 MG/1
400 TABLET ORAL 2 TIMES DAILY
Status: DISCONTINUED | OUTPATIENT
Start: 2021-01-01 | End: 2021-01-01 | Stop reason: HOSPADM

## 2021-01-01 RX ORDER — FLUCONAZOLE 200 MG/1
200 TABLET ORAL DAILY
Status: DISCONTINUED | OUTPATIENT
Start: 2021-01-01 | End: 2021-01-01

## 2021-01-01 RX ORDER — LIDOCAINE HYDROCHLORIDE 20 MG/ML
INJECTION, SOLUTION INFILTRATION; PERINEURAL PRN
Status: DISCONTINUED | OUTPATIENT
Start: 2021-01-01 | End: 2021-01-01

## 2021-01-01 RX ORDER — METOPROLOL TARTRATE 50 MG
50 TABLET ORAL 2 TIMES DAILY
Qty: 60 TABLET | Refills: 5 | Status: CANCELLED | OUTPATIENT
Start: 2021-01-01

## 2021-01-01 RX ORDER — HEPARIN SODIUM,PORCINE 10 UNIT/ML
5 VIAL (ML) INTRAVENOUS
Status: DISCONTINUED | OUTPATIENT
Start: 2021-01-01 | End: 2021-01-01 | Stop reason: HOSPADM

## 2021-01-01 RX ORDER — HEPARIN SODIUM,PORCINE 10 UNIT/ML
5-10 VIAL (ML) INTRAVENOUS EVERY 24 HOURS
Status: DISCONTINUED | OUTPATIENT
Start: 2021-01-01 | End: 2021-01-01 | Stop reason: HOSPADM

## 2021-01-01 RX ORDER — TRAZODONE HYDROCHLORIDE 50 MG/1
50 TABLET, FILM COATED ORAL AT BEDTIME
Status: DISCONTINUED | OUTPATIENT
Start: 2021-01-01 | End: 2021-01-01

## 2021-01-01 RX ORDER — ROSUVASTATIN CALCIUM 5 MG/1
5 TABLET, COATED ORAL AT BEDTIME
Status: DISCONTINUED | OUTPATIENT
Start: 2021-01-01 | End: 2021-01-01 | Stop reason: HOSPADM

## 2021-01-01 RX ORDER — HYDROCODONE BITARTRATE AND ACETAMINOPHEN 5; 325 MG/1; MG/1
1-2 TABLET ORAL EVERY 4 HOURS PRN
Status: DISCONTINUED | OUTPATIENT
Start: 2021-01-01 | End: 2021-01-01 | Stop reason: HOSPADM

## 2021-01-01 RX ORDER — LIDOCAINE 4 G/G
1 PATCH TOPICAL
Status: DISCONTINUED | OUTPATIENT
Start: 2021-01-01 | End: 2021-01-01 | Stop reason: HOSPADM

## 2021-01-01 RX ORDER — HYDRALAZINE HYDROCHLORIDE 10 MG/1
10 TABLET, FILM COATED ORAL 4 TIMES DAILY PRN
Status: DISCONTINUED | OUTPATIENT
Start: 2021-01-01 | End: 2021-01-01 | Stop reason: HOSPADM

## 2021-01-01 RX ORDER — NITROGLYCERIN 0.4 MG/1
TABLET SUBLINGUAL
Status: ON HOLD | DISCHARGE
Start: 2021-01-01 | End: 2022-01-01

## 2021-01-01 RX ORDER — FLUCONAZOLE 200 MG/1
200 TABLET ORAL DAILY
Status: DISCONTINUED | OUTPATIENT
Start: 2021-01-01 | End: 2021-01-01 | Stop reason: HOSPADM

## 2021-01-01 RX ORDER — ROSUVASTATIN CALCIUM 5 MG/1
5 TABLET, COATED ORAL DAILY
Qty: 30 TABLET | Refills: 3 | Status: ON HOLD | OUTPATIENT
Start: 2021-01-01 | End: 2022-01-01

## 2021-01-01 RX ORDER — TRAZODONE HYDROCHLORIDE 50 MG/1
50 TABLET, FILM COATED ORAL
Qty: 30 TABLET | Refills: 0 | Status: ON HOLD | OUTPATIENT
Start: 2021-01-01 | End: 2021-01-01

## 2021-01-01 RX ORDER — BUPIVACAINE HYDROCHLORIDE AND EPINEPHRINE 2.5; 5 MG/ML; UG/ML
INJECTION, SOLUTION INFILTRATION; PERINEURAL PRN
Status: DISCONTINUED | OUTPATIENT
Start: 2021-01-01 | End: 2021-01-01

## 2021-01-01 RX ORDER — CEFAZOLIN SODIUM 2 G/100ML
2 INJECTION, SOLUTION INTRAVENOUS
Status: COMPLETED | OUTPATIENT
Start: 2021-01-01 | End: 2021-01-01

## 2021-01-01 RX ORDER — LIDOCAINE 40 MG/G
CREAM TOPICAL
Status: ACTIVE | OUTPATIENT
Start: 2021-01-01 | End: 2021-01-01

## 2021-01-01 RX ORDER — ONDANSETRON 8 MG/1
8 TABLET, ORALLY DISINTEGRATING ORAL EVERY 8 HOURS PRN
Qty: 30 TABLET | Refills: 1 | Status: ON HOLD | OUTPATIENT
Start: 2021-01-01 | End: 2021-01-01

## 2021-01-01 RX ORDER — OXYCODONE HYDROCHLORIDE 5 MG/1
5 TABLET ORAL EVERY 6 HOURS PRN
Status: DISCONTINUED | OUTPATIENT
Start: 2021-01-01 | End: 2021-01-01

## 2021-01-01 RX ORDER — AMOXICILLIN 250 MG
2 CAPSULE ORAL 2 TIMES DAILY PRN
Status: DISCONTINUED | OUTPATIENT
Start: 2021-01-01 | End: 2021-01-01

## 2021-01-01 RX ORDER — IOPAMIDOL 755 MG/ML
500 INJECTION, SOLUTION INTRAVASCULAR ONCE
Status: COMPLETED | OUTPATIENT
Start: 2021-01-01 | End: 2021-01-01

## 2021-01-01 RX ORDER — ONDANSETRON 4 MG/1
8 TABLET, ORALLY DISINTEGRATING ORAL EVERY 8 HOURS PRN
Status: DISCONTINUED | OUTPATIENT
Start: 2021-01-01 | End: 2021-01-01 | Stop reason: HOSPADM

## 2021-01-01 RX ORDER — SODIUM CHLORIDE 9 MG/ML
INJECTION, SOLUTION INTRAVENOUS CONTINUOUS
Status: DISCONTINUED | OUTPATIENT
Start: 2021-01-01 | End: 2021-01-01

## 2021-01-01 RX ORDER — DEXAMETHASONE SODIUM PHOSPHATE 10 MG/ML
INJECTION, SOLUTION INTRAMUSCULAR; INTRAVENOUS PRN
Status: DISCONTINUED | OUTPATIENT
Start: 2021-01-01 | End: 2021-01-01

## 2021-01-01 RX ORDER — OXYCODONE HCL 10 MG/1
10 TABLET, FILM COATED, EXTENDED RELEASE ORAL AT BEDTIME
Status: DISCONTINUED | OUTPATIENT
Start: 2021-01-01 | End: 2021-01-01 | Stop reason: HOSPADM

## 2021-01-01 RX ORDER — LORAZEPAM 0.5 MG/1
.5-1 TABLET ORAL EVERY 6 HOURS PRN
Status: DISCONTINUED | OUTPATIENT
Start: 2021-01-01 | End: 2021-01-01 | Stop reason: HOSPADM

## 2021-01-01 RX ORDER — ONDANSETRON 8 MG/1
8 TABLET, FILM COATED ORAL EVERY 8 HOURS PRN
Status: DISCONTINUED | OUTPATIENT
Start: 2021-01-01 | End: 2021-01-01 | Stop reason: HOSPADM

## 2021-01-01 RX ORDER — HYDROMORPHONE HYDROCHLORIDE 1 MG/ML
0.3 INJECTION, SOLUTION INTRAMUSCULAR; INTRAVENOUS; SUBCUTANEOUS
Status: DISCONTINUED | OUTPATIENT
Start: 2021-01-01 | End: 2021-01-01

## 2021-01-01 RX ORDER — HEPARIN SODIUM,PORCINE 10 UNIT/ML
2-5 VIAL (ML) INTRAVENOUS
Status: ACTIVE | OUTPATIENT
Start: 2021-01-01 | End: 2021-01-01

## 2021-01-01 RX ORDER — ACETAMINOPHEN 325 MG/1
325-650 TABLET ORAL EVERY 6 HOURS PRN
Status: ON HOLD | COMMUNITY
End: 2022-01-01

## 2021-01-01 RX ADMIN — HYDROMORPHONE HYDROCHLORIDE 0.5 MG: 1 INJECTION, SOLUTION INTRAMUSCULAR; INTRAVENOUS; SUBCUTANEOUS at 02:27

## 2021-01-01 RX ADMIN — FUROSEMIDE 40 MG: 40 TABLET ORAL at 08:47

## 2021-01-01 RX ADMIN — FUROSEMIDE 40 MG: 40 TABLET ORAL at 08:18

## 2021-01-01 RX ADMIN — SODIUM CHLORIDE 250 ML: 9 INJECTION, SOLUTION INTRAVENOUS at 12:02

## 2021-01-01 RX ADMIN — POTASSIUM CHLORIDE 40 MEQ: 750 TABLET, EXTENDED RELEASE ORAL at 08:40

## 2021-01-01 RX ADMIN — Medication 5 ML: at 14:40

## 2021-01-01 RX ADMIN — VENETOCLAX 100 MG: 100 TABLET, FILM COATED ORAL at 17:02

## 2021-01-01 RX ADMIN — PANTOPRAZOLE SODIUM 40 MG: 40 INJECTION, POWDER, FOR SOLUTION INTRAVENOUS at 06:18

## 2021-01-01 RX ADMIN — DECITABINE 40.5 MG: KIT at 14:14

## 2021-01-01 RX ADMIN — ACETAMINOPHEN 975 MG: 325 TABLET, FILM COATED ORAL at 08:38

## 2021-01-01 RX ADMIN — SODIUM CHLORIDE, PRESERVATIVE FREE 250 ML: 5 INJECTION INTRAVENOUS at 12:17

## 2021-01-01 RX ADMIN — DOCUSATE SODIUM AND SENNOSIDES 1 TABLET: 8.6; 5 TABLET ORAL at 20:29

## 2021-01-01 RX ADMIN — OXYCODONE 5 MG: 5 TABLET ORAL at 10:14

## 2021-01-01 RX ADMIN — PANTOPRAZOLE SODIUM 40 MG: 40 INJECTION, POWDER, FOR SOLUTION INTRAVENOUS at 05:32

## 2021-01-01 RX ADMIN — METOPROLOL TARTRATE 1 MG: 5 INJECTION INTRAVENOUS at 14:20

## 2021-01-01 RX ADMIN — FUROSEMIDE 40 MG: 40 TABLET ORAL at 09:07

## 2021-01-01 RX ADMIN — Medication 5 ML: at 07:11

## 2021-01-01 RX ADMIN — DOCUSATE SODIUM 50 MG AND SENNOSIDES 8.6 MG 2 TABLET: 8.6; 5 TABLET, FILM COATED ORAL at 20:38

## 2021-01-01 RX ADMIN — PREDNISONE 20 MG: 20 TABLET ORAL at 12:38

## 2021-01-01 RX ADMIN — Medication 200 MCG: at 09:07

## 2021-01-01 RX ADMIN — DECITABINE 40.5 MG: 50 INJECTION, POWDER, LYOPHILIZED, FOR SOLUTION INTRAVENOUS at 14:35

## 2021-01-01 RX ADMIN — SODIUM CHLORIDE: 9 INJECTION, SOLUTION INTRAVENOUS at 17:12

## 2021-01-01 RX ADMIN — Medication 5 ML: at 05:30

## 2021-01-01 RX ADMIN — ROSUVASTATIN CALCIUM 5 MG: 5 TABLET, FILM COATED ORAL at 08:39

## 2021-01-01 RX ADMIN — Medication 5 ML: at 05:29

## 2021-01-01 RX ADMIN — Medication 5 ML: at 12:30

## 2021-01-01 RX ADMIN — ACYCLOVIR 400 MG: 400 TABLET ORAL at 20:02

## 2021-01-01 RX ADMIN — FUROSEMIDE 40 MG: 40 TABLET ORAL at 11:37

## 2021-01-01 RX ADMIN — DECITABINE 40.5 MG: KIT at 12:08

## 2021-01-01 RX ADMIN — ACETAMINOPHEN 975 MG: 325 TABLET, FILM COATED ORAL at 00:14

## 2021-01-01 RX ADMIN — MIDAZOLAM HYDROCHLORIDE 1 MG: 1 INJECTION, SOLUTION INTRAMUSCULAR; INTRAVENOUS at 11:15

## 2021-01-01 RX ADMIN — FLUCONAZOLE 200 MG: 100 TABLET ORAL at 08:50

## 2021-01-01 RX ADMIN — ACETAMINOPHEN 650 MG: 325 TABLET, FILM COATED ORAL at 20:19

## 2021-01-01 RX ADMIN — Medication 5 ML: at 05:55

## 2021-01-01 RX ADMIN — ACETAMINOPHEN 650 MG: 325 TABLET, FILM COATED ORAL at 11:03

## 2021-01-01 RX ADMIN — SODIUM CHLORIDE 250 ML: 9 INJECTION, SOLUTION INTRAVENOUS at 11:30

## 2021-01-01 RX ADMIN — Medication 5 ML: at 13:41

## 2021-01-01 RX ADMIN — MICAFUNGIN SODIUM 50 MG: 50 INJECTION, POWDER, LYOPHILIZED, FOR SOLUTION INTRAVENOUS at 11:31

## 2021-01-01 RX ADMIN — OXYCODONE HYDROCHLORIDE 10 MG: 10 TABLET, FILM COATED, EXTENDED RELEASE ORAL at 19:49

## 2021-01-01 RX ADMIN — DOCUSATE SODIUM 50 MG AND SENNOSIDES 8.6 MG 1 TABLET: 8.6; 5 TABLET, FILM COATED ORAL at 08:53

## 2021-01-01 RX ADMIN — LEVOFLOXACIN 250 MG: 250 TABLET, FILM COATED ORAL at 22:44

## 2021-01-01 RX ADMIN — METOPROLOL TARTRATE 50 MG: 50 TABLET, FILM COATED ORAL at 20:02

## 2021-01-01 RX ADMIN — ACYCLOVIR 400 MG: 400 TABLET ORAL at 07:47

## 2021-01-01 RX ADMIN — OXYCODONE HYDROCHLORIDE 10 MG: 10 TABLET, FILM COATED, EXTENDED RELEASE ORAL at 20:39

## 2021-01-01 RX ADMIN — FUROSEMIDE 40 MG: 40 TABLET ORAL at 07:45

## 2021-01-01 RX ADMIN — Medication 5 ML: at 18:10

## 2021-01-01 RX ADMIN — Medication 5 ML: at 15:40

## 2021-01-01 RX ADMIN — LIDOCAINE 1 PATCH: 560 PATCH PERCUTANEOUS; TOPICAL; TRANSDERMAL at 19:42

## 2021-01-01 RX ADMIN — Medication 5 ML: at 03:03

## 2021-01-01 RX ADMIN — Medication 200 MCG: at 08:25

## 2021-01-01 RX ADMIN — OXYCODONE HYDROCHLORIDE 10 MG: 5 TABLET ORAL at 06:43

## 2021-01-01 RX ADMIN — Medication 5 ML: at 09:03

## 2021-01-01 RX ADMIN — ACETAMINOPHEN 975 MG: 325 TABLET, FILM COATED ORAL at 00:11

## 2021-01-01 RX ADMIN — Medication 5 ML: at 12:39

## 2021-01-01 RX ADMIN — Medication 200 MCG: at 07:39

## 2021-01-01 RX ADMIN — SODIUM CHLORIDE, PRESERVATIVE FREE 500 UNITS: 5 INJECTION INTRAVENOUS at 12:55

## 2021-01-01 RX ADMIN — Medication 50 MCG: at 08:59

## 2021-01-01 RX ADMIN — ACETAMINOPHEN 650 MG: 325 TABLET, FILM COATED ORAL at 06:41

## 2021-01-01 RX ADMIN — Medication 5 ML: at 09:12

## 2021-01-01 RX ADMIN — POLYETHYLENE GLYCOL 3350 17 G: 17 POWDER, FOR SOLUTION ORAL at 08:38

## 2021-01-01 RX ADMIN — ACETAMINOPHEN 975 MG: 325 TABLET, FILM COATED ORAL at 17:37

## 2021-01-01 RX ADMIN — Medication 200 MCG: at 09:25

## 2021-01-01 RX ADMIN — Medication 200 MCG: at 07:54

## 2021-01-01 RX ADMIN — ROCURONIUM BROMIDE 50 MG: 10 INJECTION INTRAVENOUS at 13:28

## 2021-01-01 RX ADMIN — ACYCLOVIR 400 MG: 400 TABLET ORAL at 19:43

## 2021-01-01 RX ADMIN — ACETAMINOPHEN 650 MG: 325 TABLET, FILM COATED ORAL at 21:17

## 2021-01-01 RX ADMIN — SODIUM CHLORIDE 250 ML: 9 INJECTION, SOLUTION INTRAVENOUS at 09:27

## 2021-01-01 RX ADMIN — OXYCODONE 5 MG: 5 TABLET ORAL at 21:50

## 2021-01-01 RX ADMIN — ALLOPURINOL 300 MG: 300 TABLET ORAL at 09:07

## 2021-01-01 RX ADMIN — ALLOPURINOL 300 MG: 300 TABLET ORAL at 08:34

## 2021-01-01 RX ADMIN — Medication 200 MCG: at 10:00

## 2021-01-01 RX ADMIN — LEVOFLOXACIN 250 MG: 250 TABLET, FILM COATED ORAL at 22:32

## 2021-01-01 RX ADMIN — ACETAMINOPHEN 650 MG: 325 TABLET, FILM COATED ORAL at 21:59

## 2021-01-01 RX ADMIN — OXYCODONE HYDROCHLORIDE 10 MG: 5 TABLET ORAL at 14:36

## 2021-01-01 RX ADMIN — FUROSEMIDE 40 MG: 40 TABLET ORAL at 08:25

## 2021-01-01 RX ADMIN — ONDANSETRON HYDROCHLORIDE 8 MG: 8 TABLET, FILM COATED ORAL at 13:46

## 2021-01-01 RX ADMIN — POLYETHYLENE GLYCOL 3350 17 G: 17 POWDER, FOR SOLUTION ORAL at 08:41

## 2021-01-01 RX ADMIN — OXYCODONE HYDROCHLORIDE 10 MG: 10 TABLET, FILM COATED, EXTENDED RELEASE ORAL at 22:10

## 2021-01-01 RX ADMIN — ACYCLOVIR 400 MG: 200 CAPSULE ORAL at 10:50

## 2021-01-01 RX ADMIN — FUROSEMIDE 40 MG: 10 INJECTION, SOLUTION INTRAVENOUS at 03:08

## 2021-01-01 RX ADMIN — METOPROLOL TARTRATE 50 MG: 50 TABLET, FILM COATED ORAL at 21:25

## 2021-01-01 RX ADMIN — VENETOCLAX 200 MG: 100 TABLET, FILM COATED ORAL at 08:50

## 2021-01-01 RX ADMIN — DOCUSATE SODIUM 50 MG AND SENNOSIDES 8.6 MG 1 TABLET: 8.6; 5 TABLET, FILM COATED ORAL at 09:58

## 2021-01-01 RX ADMIN — SUGAMMADEX 200 MG: 100 INJECTION, SOLUTION INTRAVENOUS at 15:23

## 2021-01-01 RX ADMIN — DOCUSATE SODIUM AND SENNOSIDES 1 TABLET: 8.6; 5 TABLET ORAL at 08:50

## 2021-01-01 RX ADMIN — OXYCODONE HYDROCHLORIDE 10 MG: 5 TABLET ORAL at 16:43

## 2021-01-01 RX ADMIN — METOPROLOL TARTRATE 50 MG: 50 TABLET, FILM COATED ORAL at 09:01

## 2021-01-01 RX ADMIN — ALLOPURINOL 300 MG: 300 TABLET ORAL at 08:18

## 2021-01-01 RX ADMIN — ACYCLOVIR 400 MG: 400 TABLET ORAL at 08:52

## 2021-01-01 RX ADMIN — DICLOFENAC SODIUM 2 G: 10 GEL TOPICAL at 07:54

## 2021-01-01 RX ADMIN — ACYCLOVIR 400 MG: 400 TABLET ORAL at 19:53

## 2021-01-01 RX ADMIN — ACETAMINOPHEN 650 MG: 325 TABLET, FILM COATED ORAL at 20:02

## 2021-01-01 RX ADMIN — DECITABINE 40.5 MG: 50 INJECTION, POWDER, LYOPHILIZED, FOR SOLUTION INTRAVENOUS at 17:05

## 2021-01-01 RX ADMIN — SODIUM CHLORIDE 60 ML: 9 INJECTION, SOLUTION INTRAVENOUS at 02:11

## 2021-01-01 RX ADMIN — PREDNISONE 20 MG: 20 TABLET ORAL at 08:34

## 2021-01-01 RX ADMIN — ACYCLOVIR 400 MG: 400 TABLET ORAL at 20:19

## 2021-01-01 RX ADMIN — FLUCONAZOLE 200 MG: 100 TABLET ORAL at 10:50

## 2021-01-01 RX ADMIN — DECITABINE 40.5 MG: KIT at 09:58

## 2021-01-01 RX ADMIN — LIDOCAINE 1 PATCH: 560 PATCH PERCUTANEOUS; TOPICAL; TRANSDERMAL at 19:53

## 2021-01-01 RX ADMIN — SODIUM CHLORIDE: 9 INJECTION, SOLUTION INTRAVENOUS at 08:46

## 2021-01-01 RX ADMIN — METOPROLOL TARTRATE 50 MG: 50 TABLET, FILM COATED ORAL at 07:54

## 2021-01-01 RX ADMIN — MICAFUNGIN SODIUM 50 MG: 50 INJECTION, POWDER, LYOPHILIZED, FOR SOLUTION INTRAVENOUS at 21:31

## 2021-01-01 RX ADMIN — TRIAMCINOLONE ACETONIDE 40 MG: 40 INJECTION, SUSPENSION INTRA-ARTICULAR; INTRAMUSCULAR at 14:19

## 2021-01-01 RX ADMIN — Medication 5 ML: at 06:39

## 2021-01-01 RX ADMIN — METOPROLOL TARTRATE 50 MG: 50 TABLET, FILM COATED ORAL at 20:38

## 2021-01-01 RX ADMIN — LIDOCAINE HYDROCHLORIDE 30 ML: 20 SOLUTION ORAL; TOPICAL at 04:49

## 2021-01-01 RX ADMIN — ACETAMINOPHEN 650 MG: 325 TABLET, FILM COATED ORAL at 20:55

## 2021-01-01 RX ADMIN — SODIUM CHLORIDE 250 ML: 9 INJECTION, SOLUTION INTRAVENOUS at 10:16

## 2021-01-01 RX ADMIN — ACYCLOVIR 400 MG: 200 CAPSULE ORAL at 20:41

## 2021-01-01 RX ADMIN — MICAFUNGIN SODIUM 50 MG: 50 INJECTION, POWDER, LYOPHILIZED, FOR SOLUTION INTRAVENOUS at 21:26

## 2021-01-01 RX ADMIN — SODIUM CHLORIDE: 9 INJECTION, SOLUTION INTRAVENOUS at 06:06

## 2021-01-01 RX ADMIN — SODIUM CHLORIDE 250 ML: 9 INJECTION, SOLUTION INTRAVENOUS at 09:41

## 2021-01-01 RX ADMIN — METOPROLOL TARTRATE 50 MG: 50 TABLET, FILM COATED ORAL at 08:15

## 2021-01-01 RX ADMIN — POLYETHYLENE GLYCOL 3350 17 G: 17 POWDER, FOR SOLUTION ORAL at 09:59

## 2021-01-01 RX ADMIN — ONDANSETRON 4 MG: 2 INJECTION INTRAMUSCULAR; INTRAVENOUS at 01:33

## 2021-01-01 RX ADMIN — HYDROMORPHONE HYDROCHLORIDE 0.3 MG: 1 INJECTION, SOLUTION INTRAMUSCULAR; INTRAVENOUS; SUBCUTANEOUS at 01:33

## 2021-01-01 RX ADMIN — DECITABINE 40.5 MG: KIT at 12:37

## 2021-01-01 RX ADMIN — METOPROLOL TARTRATE 50 MG: 50 TABLET, FILM COATED ORAL at 19:43

## 2021-01-01 RX ADMIN — ACYCLOVIR 400 MG: 400 TABLET ORAL at 20:55

## 2021-01-01 RX ADMIN — ACETAMINOPHEN 650 MG: 325 TABLET, FILM COATED ORAL at 13:45

## 2021-01-01 RX ADMIN — Medication 5 ML: at 12:06

## 2021-01-01 RX ADMIN — Medication 50 MCG: at 07:54

## 2021-01-01 RX ADMIN — ACETAMINOPHEN 650 MG: 325 TABLET, FILM COATED ORAL at 13:22

## 2021-01-01 RX ADMIN — MICAFUNGIN SODIUM 50 MG: 50 INJECTION, POWDER, LYOPHILIZED, FOR SOLUTION INTRAVENOUS at 20:19

## 2021-01-01 RX ADMIN — OXYCODONE HYDROCHLORIDE 10 MG: 5 TABLET ORAL at 21:19

## 2021-01-01 RX ADMIN — PROPOFOL 100 MCG/KG/MIN: 10 INJECTION, EMULSION INTRAVENOUS at 15:12

## 2021-01-01 RX ADMIN — DOCUSATE SODIUM AND SENNOSIDES 1 TABLET: 8.6; 5 TABLET ORAL at 20:51

## 2021-01-01 RX ADMIN — HEPARIN, PORCINE (PF) 10 UNIT/ML INTRAVENOUS SYRINGE 5 ML: at 06:17

## 2021-01-01 RX ADMIN — ACETAMINOPHEN 650 MG: 325 TABLET, FILM COATED ORAL at 05:36

## 2021-01-01 RX ADMIN — Medication 150 MG: at 08:37

## 2021-01-01 RX ADMIN — OXYCODONE HYDROCHLORIDE 10 MG: 5 TABLET ORAL at 05:59

## 2021-01-01 RX ADMIN — ROSUVASTATIN CALCIUM 5 MG: 5 TABLET, FILM COATED ORAL at 09:30

## 2021-01-01 RX ADMIN — LEVOFLOXACIN 250 MG: 250 TABLET, FILM COATED ORAL at 22:10

## 2021-01-01 RX ADMIN — DOCUSATE SODIUM 50 MG AND SENNOSIDES 8.6 MG 2 TABLET: 8.6; 5 TABLET, FILM COATED ORAL at 21:31

## 2021-01-01 RX ADMIN — METOPROLOL TARTRATE 50 MG: 50 TABLET, FILM COATED ORAL at 08:37

## 2021-01-01 RX ADMIN — MIDAZOLAM 1 MG: 1 INJECTION INTRAMUSCULAR; INTRAVENOUS at 13:28

## 2021-01-01 RX ADMIN — OXYCODONE HYDROCHLORIDE 10 MG: 5 TABLET ORAL at 10:01

## 2021-01-01 RX ADMIN — ACYCLOVIR 400 MG: 400 TABLET ORAL at 09:58

## 2021-01-01 RX ADMIN — ACETAMINOPHEN 650 MG: 325 TABLET, FILM COATED ORAL at 19:41

## 2021-01-01 RX ADMIN — Medication 200 MCG: at 09:22

## 2021-01-01 RX ADMIN — HUMAN ALBUMIN MICROSPHERES AND PERFLUTREN 6 ML: 10; .22 INJECTION, SOLUTION INTRAVENOUS at 13:33

## 2021-01-01 RX ADMIN — FUROSEMIDE 20 MG: 10 INJECTION, SOLUTION INTRAVENOUS at 20:00

## 2021-01-01 RX ADMIN — ACYCLOVIR 400 MG: 200 CAPSULE ORAL at 20:07

## 2021-01-01 RX ADMIN — Medication 5 ML: at 13:11

## 2021-01-01 RX ADMIN — Medication 50 MCG: at 09:58

## 2021-01-01 RX ADMIN — MICAFUNGIN SODIUM 50 MG: 50 INJECTION, POWDER, LYOPHILIZED, FOR SOLUTION INTRAVENOUS at 12:24

## 2021-01-01 RX ADMIN — Medication 5 ML: at 20:06

## 2021-01-01 RX ADMIN — METOPROLOL TARTRATE 50 MG: 50 TABLET, FILM COATED ORAL at 20:31

## 2021-01-01 RX ADMIN — LIDOCAINE 1 PATCH: 560 PATCH PERCUTANEOUS; TOPICAL; TRANSDERMAL at 19:58

## 2021-01-01 RX ADMIN — FUROSEMIDE 20 MG: 10 INJECTION, SOLUTION INTRAVENOUS at 09:44

## 2021-01-01 RX ADMIN — Medication 50 MCG: at 08:37

## 2021-01-01 RX ADMIN — DOCUSATE SODIUM 50 MG AND SENNOSIDES 8.6 MG 2 TABLET: 8.6; 5 TABLET, FILM COATED ORAL at 19:49

## 2021-01-01 RX ADMIN — METOPROLOL TARTRATE 50 MG: 50 TABLET, FILM COATED ORAL at 19:42

## 2021-01-01 RX ADMIN — LIDOCAINE 1 PATCH: 560 PATCH PERCUTANEOUS; TOPICAL; TRANSDERMAL at 19:39

## 2021-01-01 RX ADMIN — OXYCODONE HYDROCHLORIDE 10 MG: 5 TABLET ORAL at 12:52

## 2021-01-01 RX ADMIN — AMPICILLIN SODIUM AND SULBACTAM SODIUM 3 G: 2; 1 INJECTION, POWDER, FOR SOLUTION INTRAMUSCULAR; INTRAVENOUS at 00:12

## 2021-01-01 RX ADMIN — FUROSEMIDE 40 MG: 10 INJECTION, SOLUTION INTRAVENOUS at 11:13

## 2021-01-01 RX ADMIN — ALLOPURINOL 300 MG: 300 TABLET ORAL at 07:54

## 2021-01-01 RX ADMIN — ACYCLOVIR 400 MG: 400 TABLET ORAL at 19:39

## 2021-01-01 RX ADMIN — ACETAMINOPHEN 975 MG: 325 TABLET, FILM COATED ORAL at 16:16

## 2021-01-01 RX ADMIN — DECITABINE 40.5 MG: 50 INJECTION, POWDER, LYOPHILIZED, FOR SOLUTION INTRAVENOUS at 10:10

## 2021-01-01 RX ADMIN — METOPROLOL TARTRATE 50 MG: 50 TABLET, FILM COATED ORAL at 07:51

## 2021-01-01 RX ADMIN — ACYCLOVIR 400 MG: 400 TABLET ORAL at 09:27

## 2021-01-01 RX ADMIN — ACYCLOVIR 400 MG: 400 TABLET ORAL at 08:34

## 2021-01-01 RX ADMIN — ACYCLOVIR 400 MG: 400 TABLET ORAL at 09:00

## 2021-01-01 RX ADMIN — ACYCLOVIR 400 MG: 400 TABLET ORAL at 08:25

## 2021-01-01 RX ADMIN — PHENYLEPHRINE HYDROCHLORIDE 100 MCG: 10 INJECTION INTRAVENOUS at 14:47

## 2021-01-01 RX ADMIN — ACETAMINOPHEN 650 MG: 325 TABLET, FILM COATED ORAL at 13:50

## 2021-01-01 RX ADMIN — SODIUM CHLORIDE, POTASSIUM CHLORIDE, SODIUM LACTATE AND CALCIUM CHLORIDE: 600; 310; 30; 20 INJECTION, SOLUTION INTRAVENOUS at 14:55

## 2021-01-01 RX ADMIN — POTASSIUM CHLORIDE 40 MEQ: 1500 TABLET, EXTENDED RELEASE ORAL at 11:40

## 2021-01-01 RX ADMIN — METOPROLOL TARTRATE 50 MG: 50 TABLET, FILM COATED ORAL at 19:57

## 2021-01-01 RX ADMIN — LEVOFLOXACIN 250 MG: 250 TABLET, FILM COATED ORAL at 21:17

## 2021-01-01 RX ADMIN — Medication 5 ML: at 12:35

## 2021-01-01 RX ADMIN — METOPROLOL TARTRATE 50 MG: 50 TABLET, FILM COATED ORAL at 08:26

## 2021-01-01 RX ADMIN — ATORVASTATIN CALCIUM 10 MG: 10 TABLET, FILM COATED ORAL at 19:31

## 2021-01-01 RX ADMIN — ACYCLOVIR 400 MG: 200 CAPSULE ORAL at 08:50

## 2021-01-01 RX ADMIN — MIDAZOLAM HYDROCHLORIDE 1 MG: 1 INJECTION, SOLUTION INTRAMUSCULAR; INTRAVENOUS at 13:31

## 2021-01-01 RX ADMIN — Medication 5 ML: at 11:56

## 2021-01-01 RX ADMIN — ALLOPURINOL 100 MG: 100 TABLET ORAL at 18:32

## 2021-01-01 RX ADMIN — METOPROLOL TARTRATE 50 MG: 50 TABLET, FILM COATED ORAL at 07:43

## 2021-01-01 RX ADMIN — ACYCLOVIR 400 MG: 400 TABLET ORAL at 19:57

## 2021-01-01 RX ADMIN — DICLOFENAC SODIUM 2 G: 10 GEL TOPICAL at 12:29

## 2021-01-01 RX ADMIN — METOPROLOL TARTRATE 50 MG: 50 TABLET, FILM COATED ORAL at 08:48

## 2021-01-01 RX ADMIN — DOCUSATE SODIUM 50 MG AND SENNOSIDES 8.6 MG 2 TABLET: 8.6; 5 TABLET, FILM COATED ORAL at 09:05

## 2021-01-01 RX ADMIN — Medication 5 ML: at 14:00

## 2021-01-01 RX ADMIN — Medication 200 MCG: at 08:52

## 2021-01-01 RX ADMIN — METOPROLOL TARTRATE 50 MG: 50 TABLET, FILM COATED ORAL at 19:49

## 2021-01-01 RX ADMIN — TRAZODONE HYDROCHLORIDE 50 MG: 50 TABLET ORAL at 22:10

## 2021-01-01 RX ADMIN — METOPROLOL TARTRATE 50 MG: 50 TABLET, FILM COATED ORAL at 08:50

## 2021-01-01 RX ADMIN — OXYCODONE HYDROCHLORIDE 10 MG: 5 TABLET ORAL at 04:14

## 2021-01-01 RX ADMIN — SODIUM CHLORIDE 250 ML: 9 INJECTION, SOLUTION INTRAVENOUS at 10:56

## 2021-01-01 RX ADMIN — POLYETHYLENE GLYCOL 3350 17 G: 17 POWDER, FOR SOLUTION ORAL at 19:18

## 2021-01-01 RX ADMIN — PANTOPRAZOLE SODIUM 40 MG: 40 INJECTION, POWDER, FOR SOLUTION INTRAVENOUS at 06:49

## 2021-01-01 RX ADMIN — Medication 5 ML: at 14:34

## 2021-01-01 RX ADMIN — DECITABINE 40.5 MG: 50 INJECTION, POWDER, LYOPHILIZED, FOR SOLUTION INTRAVENOUS at 12:13

## 2021-01-01 RX ADMIN — OXYCODONE HYDROCHLORIDE 5 MG: 5 TABLET ORAL at 08:36

## 2021-01-01 RX ADMIN — LIDOCAINE 1 PATCH: 560 PATCH PERCUTANEOUS; TOPICAL; TRANSDERMAL at 19:50

## 2021-01-01 RX ADMIN — OXYCODONE HYDROCHLORIDE 10 MG: 5 TABLET ORAL at 05:14

## 2021-01-01 RX ADMIN — ALLOPURINOL 300 MG: 300 TABLET ORAL at 08:29

## 2021-01-01 RX ADMIN — ACETAMINOPHEN 650 MG: 325 TABLET, FILM COATED ORAL at 21:18

## 2021-01-01 RX ADMIN — DOCUSATE SODIUM 50 MG AND SENNOSIDES 8.6 MG 2 TABLET: 8.6; 5 TABLET, FILM COATED ORAL at 07:54

## 2021-01-01 RX ADMIN — ACETAMINOPHEN 975 MG: 325 TABLET, FILM COATED ORAL at 16:21

## 2021-01-01 RX ADMIN — OXYCODONE HYDROCHLORIDE 10 MG: 5 TABLET ORAL at 06:01

## 2021-01-01 RX ADMIN — FUROSEMIDE 40 MG: 40 TABLET ORAL at 12:23

## 2021-01-01 RX ADMIN — SODIUM CHLORIDE: 9 INJECTION, SOLUTION INTRAVENOUS at 03:46

## 2021-01-01 RX ADMIN — METOPROLOL TARTRATE 50 MG: 50 TABLET, FILM COATED ORAL at 20:18

## 2021-01-01 RX ADMIN — SODIUM CHLORIDE 250 ML: 9 INJECTION, SOLUTION INTRAVENOUS at 09:59

## 2021-01-01 RX ADMIN — Medication 5 ML: at 14:32

## 2021-01-01 RX ADMIN — ACETAMINOPHEN 650 MG: 325 TABLET, FILM COATED ORAL at 14:35

## 2021-01-01 RX ADMIN — FENTANYL CITRATE 50 MCG: 50 INJECTION, SOLUTION INTRAMUSCULAR; INTRAVENOUS at 13:28

## 2021-01-01 RX ADMIN — AMPICILLIN SODIUM AND SULBACTAM SODIUM 3 G: 2; 1 INJECTION, POWDER, FOR SOLUTION INTRAMUSCULAR; INTRAVENOUS at 17:38

## 2021-01-01 RX ADMIN — MICAFUNGIN SODIUM 50 MG: 50 INJECTION, POWDER, LYOPHILIZED, FOR SOLUTION INTRAVENOUS at 12:29

## 2021-01-01 RX ADMIN — ACETAMINOPHEN 650 MG: 325 TABLET, FILM COATED ORAL at 06:02

## 2021-01-01 RX ADMIN — OXYCODONE HYDROCHLORIDE 10 MG: 10 TABLET, FILM COATED, EXTENDED RELEASE ORAL at 19:43

## 2021-01-01 RX ADMIN — AMPICILLIN SODIUM AND SULBACTAM SODIUM 3 G: 2; 1 INJECTION, POWDER, FOR SOLUTION INTRAMUSCULAR; INTRAVENOUS at 05:32

## 2021-01-01 RX ADMIN — Medication 50 MCG: at 08:52

## 2021-01-01 RX ADMIN — TRAZODONE HYDROCHLORIDE 50 MG: 50 TABLET ORAL at 22:44

## 2021-01-01 RX ADMIN — Medication 50 MCG: at 11:13

## 2021-01-01 RX ADMIN — METOPROLOL TARTRATE 50 MG: 50 TABLET, FILM COATED ORAL at 07:47

## 2021-01-01 RX ADMIN — VENETOCLAX 100 MG: 100 TABLET, FILM COATED ORAL at 17:11

## 2021-01-01 RX ADMIN — DECITABINE 40.5 MG: KIT at 14:23

## 2021-01-01 RX ADMIN — FERROUS SULFATE TAB 325 MG (65 MG ELEMENTAL FE) 325 MG: 325 (65 FE) TAB at 09:20

## 2021-01-01 RX ADMIN — Medication 5 ML: at 12:46

## 2021-01-01 RX ADMIN — VENETOCLAX 200 MG: 100 TABLET, FILM COATED ORAL at 08:18

## 2021-01-01 RX ADMIN — FLUCONAZOLE 200 MG: 200 TABLET ORAL at 08:26

## 2021-01-01 RX ADMIN — DICLOFENAC SODIUM 2 G: 10 GEL TOPICAL at 06:03

## 2021-01-01 RX ADMIN — Medication 5 ML: at 18:13

## 2021-01-01 RX ADMIN — VENETOCLAX 20 MG: 10 TABLET, FILM COATED ORAL at 16:43

## 2021-01-01 RX ADMIN — SODIUM CHLORIDE 250 ML: 9 INJECTION, SOLUTION INTRAVENOUS at 12:33

## 2021-01-01 RX ADMIN — ROSUVASTATIN CALCIUM 5 MG: 5 TABLET, FILM COATED ORAL at 08:57

## 2021-01-01 RX ADMIN — LEVOFLOXACIN 250 MG: 250 TABLET, FILM COATED ORAL at 21:18

## 2021-01-01 RX ADMIN — METOPROLOL TARTRATE 50 MG: 50 TABLET, FILM COATED ORAL at 08:29

## 2021-01-01 RX ADMIN — FUROSEMIDE 40 MG: 10 INJECTION, SOLUTION INTRAMUSCULAR; INTRAVENOUS at 09:05

## 2021-01-01 RX ADMIN — OXYCODONE HYDROCHLORIDE 10 MG: 5 TABLET ORAL at 22:41

## 2021-01-01 RX ADMIN — HYDROMORPHONE HYDROCHLORIDE 0.2 MG: 0.2 INJECTION, SOLUTION INTRAMUSCULAR; INTRAVENOUS; SUBCUTANEOUS at 06:30

## 2021-01-01 RX ADMIN — SODIUM CHLORIDE 250 ML: 9 INJECTION, SOLUTION INTRAVENOUS at 11:36

## 2021-01-01 RX ADMIN — DOCUSATE SODIUM 50 MG AND SENNOSIDES 8.6 MG 2 TABLET: 8.6; 5 TABLET, FILM COATED ORAL at 08:52

## 2021-01-01 RX ADMIN — DOCUSATE SODIUM 50 MG AND SENNOSIDES 8.6 MG 1 TABLET: 8.6; 5 TABLET, FILM COATED ORAL at 19:42

## 2021-01-01 RX ADMIN — SENNOSIDES AND DOCUSATE SODIUM 2 TABLET: 8.6; 5 TABLET ORAL at 10:01

## 2021-01-01 RX ADMIN — FUROSEMIDE 40 MG: 40 TABLET ORAL at 07:47

## 2021-01-01 RX ADMIN — FUROSEMIDE 20 MG: 10 INJECTION, SOLUTION INTRAVENOUS at 20:07

## 2021-01-01 RX ADMIN — METOPROLOL TARTRATE 50 MG: 50 TABLET, FILM COATED ORAL at 10:00

## 2021-01-01 RX ADMIN — SODIUM CHLORIDE 250 ML: 9 INJECTION, SOLUTION INTRAVENOUS at 09:02

## 2021-01-01 RX ADMIN — OXYCODONE HYDROCHLORIDE 10 MG: 10 TABLET, FILM COATED, EXTENDED RELEASE ORAL at 19:39

## 2021-01-01 RX ADMIN — LEVOFLOXACIN 250 MG: 250 TABLET, FILM COATED ORAL at 21:26

## 2021-01-01 RX ADMIN — ACETAMINOPHEN 650 MG: 325 TABLET, FILM COATED ORAL at 19:52

## 2021-01-01 RX ADMIN — SODIUM CHLORIDE 250 ML: 9 INJECTION, SOLUTION INTRAVENOUS at 09:35

## 2021-01-01 RX ADMIN — SODIUM CHLORIDE 250 ML: 9 INJECTION, SOLUTION INTRAVENOUS at 10:58

## 2021-01-01 RX ADMIN — POLYETHYLENE GLYCOL 3350 17 G: 17 POWDER, FOR SOLUTION ORAL at 10:33

## 2021-01-01 RX ADMIN — DOCUSATE SODIUM 50 MG AND SENNOSIDES 8.6 MG 2 TABLET: 8.6; 5 TABLET, FILM COATED ORAL at 09:59

## 2021-01-01 RX ADMIN — POLYETHYLENE GLYCOL 3350 17 G: 17 POWDER, FOR SOLUTION ORAL at 17:11

## 2021-01-01 RX ADMIN — LEVOFLOXACIN 250 MG: 250 TABLET, FILM COATED ORAL at 20:48

## 2021-01-01 RX ADMIN — DECITABINE 40.5 MG: 50 INJECTION, POWDER, LYOPHILIZED, FOR SOLUTION INTRAVENOUS at 10:50

## 2021-01-01 RX ADMIN — METOPROLOL TARTRATE 50 MG: 50 TABLET, FILM COATED ORAL at 09:27

## 2021-01-01 RX ADMIN — METOPROLOL TARTRATE 50 MG: 50 TABLET, FILM COATED ORAL at 20:39

## 2021-01-01 RX ADMIN — DECITABINE 40.5 MG: 50 INJECTION, POWDER, LYOPHILIZED, FOR SOLUTION INTRAVENOUS at 11:04

## 2021-01-01 RX ADMIN — Medication 50 MCG: at 07:47

## 2021-01-01 RX ADMIN — FUROSEMIDE 20 MG: 10 INJECTION, SOLUTION INTRAVENOUS at 02:50

## 2021-01-01 RX ADMIN — DECITABINE 40.5 MG: 50 INJECTION, POWDER, LYOPHILIZED, FOR SOLUTION INTRAVENOUS at 09:14

## 2021-01-01 RX ADMIN — OXYCODONE HYDROCHLORIDE 10 MG: 5 TABLET ORAL at 08:18

## 2021-01-01 RX ADMIN — AMPICILLIN SODIUM AND SULBACTAM SODIUM 3 G: 2; 1 INJECTION, POWDER, FOR SOLUTION INTRAMUSCULAR; INTRAVENOUS at 00:13

## 2021-01-01 RX ADMIN — Medication 5 ML: at 15:23

## 2021-01-01 RX ADMIN — HYDROMORPHONE HYDROCHLORIDE 0.2 MG: 0.2 INJECTION, SOLUTION INTRAMUSCULAR; INTRAVENOUS; SUBCUTANEOUS at 13:50

## 2021-01-01 RX ADMIN — LEVOFLOXACIN 250 MG: 250 TABLET, FILM COATED ORAL at 22:13

## 2021-01-01 RX ADMIN — HYDROMORPHONE HYDROCHLORIDE 0.2 MG: 0.2 INJECTION, SOLUTION INTRAMUSCULAR; INTRAVENOUS; SUBCUTANEOUS at 02:16

## 2021-01-01 RX ADMIN — MICAFUNGIN SODIUM 50 MG: 50 INJECTION, POWDER, LYOPHILIZED, FOR SOLUTION INTRAVENOUS at 12:54

## 2021-01-01 RX ADMIN — ACETAMINOPHEN 650 MG: 325 TABLET, FILM COATED ORAL at 09:07

## 2021-01-01 RX ADMIN — Medication 5 ML: at 06:33

## 2021-01-01 RX ADMIN — Medication 5 ML: at 12:20

## 2021-01-01 RX ADMIN — METOPROLOL TARTRATE 50 MG: 50 TABLET, FILM COATED ORAL at 09:44

## 2021-01-01 RX ADMIN — OXYCODONE HYDROCHLORIDE 5 MG: 5 TABLET ORAL at 09:30

## 2021-01-01 RX ADMIN — SODIUM CHLORIDE 250 ML: 9 INJECTION, SOLUTION INTRAVENOUS at 08:45

## 2021-01-01 RX ADMIN — ALLOPURINOL 300 MG: 300 TABLET ORAL at 09:00

## 2021-01-01 RX ADMIN — SODIUM CHLORIDE 250 ML: 9 INJECTION, SOLUTION INTRAVENOUS at 10:23

## 2021-01-01 RX ADMIN — SODIUM CHLORIDE 250 ML: 9 INJECTION, SOLUTION INTRAVENOUS at 10:30

## 2021-01-01 RX ADMIN — OXYCODONE HYDROCHLORIDE 10 MG: 5 TABLET ORAL at 08:29

## 2021-01-01 RX ADMIN — ACETAMINOPHEN 650 MG: 325 TABLET, FILM COATED ORAL at 20:01

## 2021-01-01 RX ADMIN — ALLOPURINOL 300 MG: 300 TABLET ORAL at 09:27

## 2021-01-01 RX ADMIN — HYDROMORPHONE HYDROCHLORIDE 0.2 MG: 0.2 INJECTION, SOLUTION INTRAMUSCULAR; INTRAVENOUS; SUBCUTANEOUS at 10:10

## 2021-01-01 RX ADMIN — OXYCODONE HYDROCHLORIDE 10 MG: 5 TABLET ORAL at 09:07

## 2021-01-01 RX ADMIN — TRAZODONE HYDROCHLORIDE 50 MG: 50 TABLET ORAL at 19:39

## 2021-01-01 RX ADMIN — ATORVASTATIN CALCIUM 10 MG: 10 TABLET, FILM COATED ORAL at 09:20

## 2021-01-01 RX ADMIN — LIDOCAINE HYDROCHLORIDE 80 MG: 10 INJECTION, SOLUTION INFILTRATION; PERINEURAL at 13:28

## 2021-01-01 RX ADMIN — ACYCLOVIR 400 MG: 400 TABLET ORAL at 07:54

## 2021-01-01 RX ADMIN — METOPROLOL TARTRATE 50 MG: 50 TABLET, FILM COATED ORAL at 20:20

## 2021-01-01 RX ADMIN — ACETAMINOPHEN 650 MG: 325 TABLET, FILM COATED ORAL at 14:11

## 2021-01-01 RX ADMIN — DECITABINE 40.5 MG: KIT at 10:24

## 2021-01-01 RX ADMIN — OXYCODONE 5 MG: 5 TABLET ORAL at 22:50

## 2021-01-01 RX ADMIN — ACYCLOVIR 400 MG: 400 TABLET ORAL at 19:49

## 2021-01-01 RX ADMIN — OXYCODONE HYDROCHLORIDE 5 MG: 5 SOLUTION ORAL at 16:07

## 2021-01-01 RX ADMIN — ACETAMINOPHEN 650 MG: 325 TABLET, FILM COATED ORAL at 07:39

## 2021-01-01 RX ADMIN — LEVOFLOXACIN 250 MG: 250 TABLET, FILM COATED ORAL at 21:00

## 2021-01-01 RX ADMIN — HYDROMORPHONE HYDROCHLORIDE 0.2 MG: 0.2 INJECTION, SOLUTION INTRAMUSCULAR; INTRAVENOUS; SUBCUTANEOUS at 07:42

## 2021-01-01 RX ADMIN — IOPAMIDOL 80 ML: 755 INJECTION, SOLUTION INTRAVENOUS at 02:11

## 2021-01-01 RX ADMIN — Medication 200 MCG: at 08:38

## 2021-01-01 RX ADMIN — FUROSEMIDE 40 MG: 10 INJECTION, SOLUTION INTRAMUSCULAR; INTRAVENOUS at 12:34

## 2021-01-01 RX ADMIN — FUROSEMIDE 40 MG: 10 INJECTION, SOLUTION INTRAVENOUS at 12:18

## 2021-01-01 RX ADMIN — HYDROMORPHONE HYDROCHLORIDE 0.2 MG: 0.2 INJECTION, SOLUTION INTRAMUSCULAR; INTRAVENOUS; SUBCUTANEOUS at 20:56

## 2021-01-01 RX ADMIN — POLYETHYLENE GLYCOL 3350 17 G: 17 POWDER, FOR SOLUTION ORAL at 08:50

## 2021-01-01 RX ADMIN — SODIUM CHLORIDE 250 ML: 9 INJECTION, SOLUTION INTRAVENOUS at 08:50

## 2021-01-01 RX ADMIN — ACYCLOVIR 400 MG: 400 TABLET ORAL at 19:42

## 2021-01-01 RX ADMIN — METOPROLOL TARTRATE 50 MG: 50 TABLET, FILM COATED ORAL at 20:55

## 2021-01-01 RX ADMIN — DICLOFENAC SODIUM 4 G: 10 GEL TOPICAL at 16:24

## 2021-01-01 RX ADMIN — ROCURONIUM BROMIDE 10 MG: 10 INJECTION INTRAVENOUS at 14:55

## 2021-01-01 RX ADMIN — ALLOPURINOL 300 MG: 100 TABLET ORAL at 09:19

## 2021-01-01 RX ADMIN — LIDOCAINE 1 PATCH: 560 PATCH PERCUTANEOUS; TOPICAL; TRANSDERMAL at 19:43

## 2021-01-01 RX ADMIN — ACETAMINOPHEN 650 MG: 325 TABLET, FILM COATED ORAL at 08:52

## 2021-01-01 RX ADMIN — METOPROLOL TARTRATE 50 MG: 50 TABLET, FILM COATED ORAL at 20:29

## 2021-01-01 RX ADMIN — AMPICILLIN SODIUM AND SULBACTAM SODIUM 3 G: 2; 1 INJECTION, POWDER, FOR SOLUTION INTRAMUSCULAR; INTRAVENOUS at 01:04

## 2021-01-01 RX ADMIN — DOCUSATE SODIUM 50 MG AND SENNOSIDES 8.6 MG 1 TABLET: 8.6; 5 TABLET, FILM COATED ORAL at 19:38

## 2021-01-01 RX ADMIN — LIDOCAINE HYDROCHLORIDE 60 MG: 20 INJECTION, SOLUTION INFILTRATION; PERINEURAL at 15:12

## 2021-01-01 RX ADMIN — ACYCLOVIR 400 MG: 400 TABLET ORAL at 20:38

## 2021-01-01 RX ADMIN — FLUCONAZOLE 200 MG: 100 TABLET ORAL at 09:40

## 2021-01-01 RX ADMIN — AMPICILLIN SODIUM AND SULBACTAM SODIUM 3 G: 2; 1 INJECTION, POWDER, FOR SOLUTION INTRAMUSCULAR; INTRAVENOUS at 05:26

## 2021-01-01 RX ADMIN — HUMAN ALBUMIN MICROSPHERES AND PERFLUTREN 2 ML: 10; .22 INJECTION, SOLUTION INTRAVENOUS at 14:00

## 2021-01-01 RX ADMIN — AMPICILLIN SODIUM AND SULBACTAM SODIUM 3 G: 2; 1 INJECTION, POWDER, FOR SOLUTION INTRAMUSCULAR; INTRAVENOUS at 12:01

## 2021-01-01 RX ADMIN — TRAZODONE HYDROCHLORIDE 50 MG: 50 TABLET ORAL at 20:38

## 2021-01-01 RX ADMIN — AMPICILLIN SODIUM AND SULBACTAM SODIUM 3 G: 2; 1 INJECTION, POWDER, FOR SOLUTION INTRAMUSCULAR; INTRAVENOUS at 11:32

## 2021-01-01 RX ADMIN — CEFAZOLIN SODIUM 2 G: 2 INJECTION, SOLUTION INTRAVENOUS at 15:00

## 2021-01-01 RX ADMIN — DECITABINE 40.5 MG: 50 INJECTION, POWDER, LYOPHILIZED, FOR SOLUTION INTRAVENOUS at 11:06

## 2021-01-01 RX ADMIN — DECITABINE 40.5 MG: 50 INJECTION, POWDER, LYOPHILIZED, FOR SOLUTION INTRAVENOUS at 09:26

## 2021-01-01 RX ADMIN — Medication 5 ML: at 05:47

## 2021-01-01 RX ADMIN — Medication 50 MCG: at 09:07

## 2021-01-01 RX ADMIN — FLUCONAZOLE 200 MG: 200 TABLET ORAL at 12:06

## 2021-01-01 RX ADMIN — METOPROLOL TARTRATE 50 MG: 50 TABLET, FILM COATED ORAL at 08:34

## 2021-01-01 RX ADMIN — OXYCODONE HYDROCHLORIDE 5 MG: 5 TABLET ORAL at 13:23

## 2021-01-01 RX ADMIN — SODIUM CHLORIDE 250 ML: 9 INJECTION, SOLUTION INTRAVENOUS at 11:45

## 2021-01-01 RX ADMIN — MICAFUNGIN SODIUM 50 MG: 50 INJECTION, POWDER, LYOPHILIZED, FOR SOLUTION INTRAVENOUS at 12:07

## 2021-01-01 RX ADMIN — METOPROLOL TARTRATE 50 MG: 50 TABLET, FILM COATED ORAL at 08:38

## 2021-01-01 RX ADMIN — Medication 5 ML: at 09:22

## 2021-01-01 RX ADMIN — DOCUSATE SODIUM 50 MG AND SENNOSIDES 8.6 MG 2 TABLET: 8.6; 5 TABLET, FILM COATED ORAL at 20:01

## 2021-01-01 RX ADMIN — METOPROLOL TARTRATE 50 MG: 50 TABLET, FILM COATED ORAL at 20:50

## 2021-01-01 RX ADMIN — VENETOCLAX 50 MG: 50 TABLET, FILM COATED ORAL at 16:51

## 2021-01-01 RX ADMIN — PROPOFOL 100 MCG/KG/MIN: 10 INJECTION, EMULSION INTRAVENOUS at 13:28

## 2021-01-01 RX ADMIN — BUPIVACAINE HYDROCHLORIDE 25 MG: 5 INJECTION, SOLUTION EPIDURAL; INTRACAUDAL at 06:03

## 2021-01-01 RX ADMIN — VENETOCLAX 50 MG: 50 TABLET, FILM COATED ORAL at 18:24

## 2021-01-01 RX ADMIN — Medication 5 ML: at 11:46

## 2021-01-01 RX ADMIN — SENNOSIDES AND DOCUSATE SODIUM 2 TABLET: 8.6; 5 TABLET ORAL at 11:03

## 2021-01-01 RX ADMIN — VENETOCLAX 200 MG: 100 TABLET, FILM COATED ORAL at 08:27

## 2021-01-01 RX ADMIN — FUROSEMIDE 20 MG: 20 TABLET ORAL at 15:42

## 2021-01-01 RX ADMIN — ACETAMINOPHEN 650 MG: 325 TABLET, FILM COATED ORAL at 05:13

## 2021-01-01 RX ADMIN — Medication 5 ML: at 09:05

## 2021-01-01 RX ADMIN — METOPROLOL TARTRATE 1 MG: 5 INJECTION INTRAVENOUS at 13:44

## 2021-01-01 RX ADMIN — HYDROMORPHONE HYDROCHLORIDE 0.2 MG: 0.2 INJECTION, SOLUTION INTRAMUSCULAR; INTRAVENOUS; SUBCUTANEOUS at 23:33

## 2021-01-01 RX ADMIN — DICLOFENAC SODIUM 2 G: 10 GEL TOPICAL at 22:52

## 2021-01-01 RX ADMIN — DOCUSATE SODIUM 50 MG AND SENNOSIDES 8.6 MG 2 TABLET: 8.6; 5 TABLET, FILM COATED ORAL at 09:08

## 2021-01-01 RX ADMIN — ROSUVASTATIN CALCIUM 5 MG: 5 TABLET, FILM COATED ORAL at 08:50

## 2021-01-01 RX ADMIN — LEVOFLOXACIN 250 MG: 250 TABLET, FILM COATED ORAL at 20:42

## 2021-01-01 RX ADMIN — TRAZODONE HYDROCHLORIDE 50 MG: 50 TABLET ORAL at 19:49

## 2021-01-01 RX ADMIN — ACETAMINOPHEN 650 MG: 325 TABLET, FILM COATED ORAL at 21:16

## 2021-01-01 RX ADMIN — Medication 5 ML: at 16:05

## 2021-01-01 RX ADMIN — SENNOSIDES AND DOCUSATE SODIUM 2 TABLET: 8.6; 5 TABLET ORAL at 16:14

## 2021-01-01 RX ADMIN — MICAFUNGIN SODIUM 50 MG: 50 INJECTION, POWDER, LYOPHILIZED, FOR SOLUTION INTRAVENOUS at 13:28

## 2021-01-01 RX ADMIN — OXYCODONE HYDROCHLORIDE 10 MG: 10 TABLET, FILM COATED, EXTENDED RELEASE ORAL at 22:44

## 2021-01-01 RX ADMIN — ONDANSETRON 4 MG: 2 INJECTION INTRAMUSCULAR; INTRAVENOUS at 14:32

## 2021-01-01 RX ADMIN — ACYCLOVIR 400 MG: 400 TABLET ORAL at 08:37

## 2021-01-01 RX ADMIN — DOCUSATE SODIUM 50 MG AND SENNOSIDES 8.6 MG 1 TABLET: 8.6; 5 TABLET, FILM COATED ORAL at 20:18

## 2021-01-01 RX ADMIN — ALLOPURINOL 300 MG: 300 TABLET ORAL at 08:52

## 2021-01-01 RX ADMIN — DECITABINE 40.5 MG: 50 INJECTION, POWDER, LYOPHILIZED, FOR SOLUTION INTRAVENOUS at 14:42

## 2021-01-01 RX ADMIN — DECITABINE 40.5 MG: KIT at 10:17

## 2021-01-01 RX ADMIN — SODIUM CHLORIDE 250 ML: 9 INJECTION, SOLUTION INTRAVENOUS at 12:28

## 2021-01-01 RX ADMIN — Medication 5 ML: at 07:45

## 2021-01-01 RX ADMIN — LIDOCAINE 1 PATCH: 560 PATCH PERCUTANEOUS; TOPICAL; TRANSDERMAL at 20:39

## 2021-01-01 RX ADMIN — LEVOFLOXACIN 250 MG: 250 TABLET, FILM COATED ORAL at 21:19

## 2021-01-01 RX ADMIN — MICAFUNGIN SODIUM 50 MG: 50 INJECTION, POWDER, LYOPHILIZED, FOR SOLUTION INTRAVENOUS at 12:34

## 2021-01-01 RX ADMIN — SODIUM CHLORIDE 70 ML: 9 INJECTION, SOLUTION INTRAVENOUS at 04:42

## 2021-01-01 RX ADMIN — LACTULOSE 30 G: 20 SOLUTION ORAL at 18:11

## 2021-01-01 RX ADMIN — DECITABINE 40.5 MG: 50 INJECTION, POWDER, LYOPHILIZED, FOR SOLUTION INTRAVENOUS at 14:00

## 2021-01-01 RX ADMIN — DOCUSATE SODIUM AND SENNOSIDES 1 TABLET: 8.6; 5 TABLET ORAL at 20:41

## 2021-01-01 RX ADMIN — METOPROLOL TARTRATE 50 MG: 50 TABLET, FILM COATED ORAL at 07:39

## 2021-01-01 RX ADMIN — Medication 5 ML: at 17:43

## 2021-01-01 RX ADMIN — Medication 50 MCG: at 08:34

## 2021-01-01 RX ADMIN — TRAZODONE HYDROCHLORIDE 50 MG: 50 TABLET ORAL at 22:32

## 2021-01-01 RX ADMIN — LIDOCAINE 1 PATCH: 560 PATCH PERCUTANEOUS; TOPICAL; TRANSDERMAL at 20:20

## 2021-01-01 RX ADMIN — Medication 5 ML: at 13:43

## 2021-01-01 RX ADMIN — TRAZODONE HYDROCHLORIDE 50 MG: 50 TABLET ORAL at 20:01

## 2021-01-01 RX ADMIN — DECITABINE 40.5 MG: KIT at 12:35

## 2021-01-01 RX ADMIN — Medication 5 ML: at 18:51

## 2021-01-01 RX ADMIN — SODIUM CHLORIDE 250 ML: 9 INJECTION, SOLUTION INTRAVENOUS at 10:18

## 2021-01-01 RX ADMIN — FUROSEMIDE 40 MG: 40 TABLET ORAL at 07:39

## 2021-01-01 RX ADMIN — Medication 200 MCG: at 07:47

## 2021-01-01 RX ADMIN — FUROSEMIDE 40 MG: 10 INJECTION, SOLUTION INTRAMUSCULAR; INTRAVENOUS at 10:30

## 2021-01-01 RX ADMIN — POTASSIUM CHLORIDE 20 MEQ: 1500 TABLET, EXTENDED RELEASE ORAL at 09:08

## 2021-01-01 RX ADMIN — PANTOPRAZOLE SODIUM 40 MG: 40 INJECTION, POWDER, FOR SOLUTION INTRAVENOUS at 06:29

## 2021-01-01 RX ADMIN — Medication 5 ML: at 05:19

## 2021-01-01 RX ADMIN — ALLOPURINOL 300 MG: 300 TABLET ORAL at 08:47

## 2021-01-01 RX ADMIN — ALLOPURINOL 300 MG: 100 TABLET ORAL at 09:50

## 2021-01-01 RX ADMIN — ALLOPURINOL 300 MG: 300 TABLET ORAL at 09:22

## 2021-01-01 RX ADMIN — Medication 5 ML: at 17:24

## 2021-01-01 RX ADMIN — LIDOCAINE 1 PATCH: 560 PATCH PERCUTANEOUS; TOPICAL; TRANSDERMAL at 20:48

## 2021-01-01 RX ADMIN — SODIUM CHLORIDE 250 ML: 9 INJECTION, SOLUTION INTRAVENOUS at 10:01

## 2021-01-01 RX ADMIN — SODIUM CHLORIDE 250 ML: 9 INJECTION, SOLUTION INTRAVENOUS at 10:46

## 2021-01-01 RX ADMIN — ALLOPURINOL 300 MG: 100 TABLET ORAL at 11:12

## 2021-01-01 RX ADMIN — Medication 5 ML: at 05:48

## 2021-01-01 RX ADMIN — HYDROMORPHONE HYDROCHLORIDE 0.2 MG: 0.2 INJECTION, SOLUTION INTRAMUSCULAR; INTRAVENOUS; SUBCUTANEOUS at 16:13

## 2021-01-01 RX ADMIN — DOCUSATE SODIUM AND SENNOSIDES 1 TABLET: 8.6; 5 TABLET ORAL at 08:38

## 2021-01-01 RX ADMIN — METOPROLOL TARTRATE 50 MG: 50 TABLET, FILM COATED ORAL at 19:53

## 2021-01-01 RX ADMIN — CYANOCOBALAMIN TAB 500 MCG 250 MCG: 500 TAB at 08:15

## 2021-01-01 RX ADMIN — SENNOSIDES AND DOCUSATE SODIUM 2 TABLET: 8.6; 5 TABLET ORAL at 20:21

## 2021-01-01 RX ADMIN — OXYCODONE 5 MG: 5 TABLET ORAL at 02:13

## 2021-01-01 RX ADMIN — OXYCODONE HYDROCHLORIDE 5 MG: 5 TABLET ORAL at 02:41

## 2021-01-01 RX ADMIN — OXYCODONE HYDROCHLORIDE 10 MG: 5 TABLET ORAL at 21:16

## 2021-01-01 RX ADMIN — LEVOFLOXACIN 250 MG: 250 TABLET, FILM COATED ORAL at 21:16

## 2021-01-01 RX ADMIN — Medication 5 ML: at 13:29

## 2021-01-01 RX ADMIN — LIDOCAINE 1 PATCH: 560 PATCH PERCUTANEOUS; TOPICAL; TRANSDERMAL at 20:02

## 2021-01-01 RX ADMIN — LIDOCAINE 1 PATCH: 560 PATCH PERCUTANEOUS; TOPICAL; TRANSDERMAL at 20:55

## 2021-01-01 RX ADMIN — Medication 50 MCG: at 09:25

## 2021-01-01 RX ADMIN — AMPICILLIN SODIUM AND SULBACTAM SODIUM 3 G: 2; 1 INJECTION, POWDER, FOR SOLUTION INTRAMUSCULAR; INTRAVENOUS at 14:01

## 2021-01-01 RX ADMIN — METOPROLOL TARTRATE 50 MG: 50 TABLET, FILM COATED ORAL at 09:07

## 2021-01-01 RX ADMIN — FUROSEMIDE 40 MG: 40 TABLET ORAL at 08:15

## 2021-01-01 RX ADMIN — Medication 10 ML: at 12:23

## 2021-01-01 RX ADMIN — DICLOFENAC SODIUM 2 G: 10 GEL TOPICAL at 12:53

## 2021-01-01 RX ADMIN — FUROSEMIDE 40 MG: 10 INJECTION, SOLUTION INTRAMUSCULAR; INTRAVENOUS at 16:18

## 2021-01-01 RX ADMIN — ATORVASTATIN CALCIUM 10 MG: 10 TABLET, FILM COATED ORAL at 20:31

## 2021-01-01 RX ADMIN — ALLOPURINOL 300 MG: 300 TABLET ORAL at 09:59

## 2021-01-01 RX ADMIN — POLYETHYLENE GLYCOL 3350 17 G: 17 POWDER, FOR SOLUTION ORAL at 21:31

## 2021-01-01 RX ADMIN — DOCUSATE SODIUM 50 MG AND SENNOSIDES 8.6 MG 1 TABLET: 8.6; 5 TABLET, FILM COATED ORAL at 19:57

## 2021-01-01 RX ADMIN — DECITABINE 40 MG: 50 INJECTION, POWDER, LYOPHILIZED, FOR SOLUTION INTRAVENOUS at 12:42

## 2021-01-01 RX ADMIN — OXYCODONE HYDROCHLORIDE 10 MG: 10 TABLET, FILM COATED, EXTENDED RELEASE ORAL at 22:32

## 2021-01-01 RX ADMIN — ACETAMINOPHEN 975 MG: 325 TABLET, FILM COATED ORAL at 01:03

## 2021-01-01 RX ADMIN — METOPROLOL TARTRATE 50 MG: 50 TABLET, FILM COATED ORAL at 09:19

## 2021-01-01 RX ADMIN — ACYCLOVIR 400 MG: 400 TABLET ORAL at 21:26

## 2021-01-01 RX ADMIN — DICLOFENAC SODIUM 2 G: 10 GEL TOPICAL at 20:20

## 2021-01-01 RX ADMIN — ACYCLOVIR 400 MG: 200 CAPSULE ORAL at 11:13

## 2021-01-01 RX ADMIN — SODIUM CHLORIDE 250 ML: 9 INJECTION, SOLUTION INTRAVENOUS at 09:29

## 2021-01-01 RX ADMIN — TRAZODONE HYDROCHLORIDE 50 MG: 50 TABLET ORAL at 20:48

## 2021-01-01 RX ADMIN — Medication 5 ML: at 12:03

## 2021-01-01 RX ADMIN — ACETAMINOPHEN 650 MG: 325 TABLET, FILM COATED ORAL at 22:36

## 2021-01-01 RX ADMIN — PROPOFOL 20 MG: 10 INJECTION, EMULSION INTRAVENOUS at 15:12

## 2021-01-01 RX ADMIN — SODIUM CHLORIDE 250 ML: 9 INJECTION, SOLUTION INTRAVENOUS at 13:35

## 2021-01-01 RX ADMIN — Medication 200 MCG: at 08:47

## 2021-01-01 RX ADMIN — LEVOFLOXACIN 250 MG: 250 TABLET, FILM COATED ORAL at 21:36

## 2021-01-01 RX ADMIN — OXYCODONE HYDROCHLORIDE 5 MG: 5 TABLET ORAL at 18:37

## 2021-01-01 RX ADMIN — ACYCLOVIR 400 MG: 400 TABLET ORAL at 20:48

## 2021-01-01 RX ADMIN — ACETAMINOPHEN 650 MG: 325 TABLET, FILM COATED ORAL at 16:04

## 2021-01-01 RX ADMIN — METOPROLOL TARTRATE 50 MG: 50 TABLET, FILM COATED ORAL at 19:39

## 2021-01-01 RX ADMIN — OXYCODONE HYDROCHLORIDE 10 MG: 5 TABLET ORAL at 09:59

## 2021-01-01 RX ADMIN — SODIUM CHLORIDE 250 ML: 9 INJECTION, SOLUTION INTRAVENOUS at 11:59

## 2021-01-01 RX ADMIN — POLYETHYLENE GLYCOL 3350 17 G: 17 POWDER, FOR SOLUTION ORAL at 20:38

## 2021-01-01 RX ADMIN — SENNOSIDES AND DOCUSATE SODIUM 2 TABLET: 8.6; 5 TABLET ORAL at 08:41

## 2021-01-01 RX ADMIN — DOCUSATE SODIUM 50 MG AND SENNOSIDES 8.6 MG 1 TABLET: 8.6; 5 TABLET, FILM COATED ORAL at 09:25

## 2021-01-01 RX ADMIN — ACETAMINOPHEN 650 MG: 325 TABLET, FILM COATED ORAL at 13:46

## 2021-01-01 RX ADMIN — SODIUM CHLORIDE 250 ML: 9 INJECTION, SOLUTION INTRAVENOUS at 10:00

## 2021-01-01 RX ADMIN — FLUCONAZOLE 200 MG: 100 TABLET ORAL at 11:13

## 2021-01-01 RX ADMIN — AMPICILLIN SODIUM AND SULBACTAM SODIUM 3 G: 2; 1 INJECTION, POWDER, FOR SOLUTION INTRAMUSCULAR; INTRAVENOUS at 17:23

## 2021-01-01 RX ADMIN — ALLOPURINOL 300 MG: 300 TABLET ORAL at 16:12

## 2021-01-01 RX ADMIN — AMPICILLIN SODIUM AND SULBACTAM SODIUM 3 G: 2; 1 INJECTION, POWDER, FOR SOLUTION INTRAMUSCULAR; INTRAVENOUS at 11:11

## 2021-01-01 RX ADMIN — FUROSEMIDE 40 MG: 40 TABLET ORAL at 08:29

## 2021-01-01 RX ADMIN — OXYCODONE 5 MG: 5 TABLET ORAL at 06:20

## 2021-01-01 RX ADMIN — Medication 50 MCG: at 09:42

## 2021-01-01 RX ADMIN — SODIUM CHLORIDE 250 ML: 9 INJECTION, SOLUTION INTRAVENOUS at 13:56

## 2021-01-01 RX ADMIN — ACETAMINOPHEN 975 MG: 325 TABLET, FILM COATED ORAL at 08:50

## 2021-01-01 RX ADMIN — POLYETHYLENE GLYCOL 3350 17 G: 17 POWDER, FOR SOLUTION ORAL at 09:38

## 2021-01-01 RX ADMIN — Medication 50 MCG: at 08:47

## 2021-01-01 RX ADMIN — ACYCLOVIR 400 MG: 400 TABLET ORAL at 09:07

## 2021-01-01 RX ADMIN — METOPROLOL TARTRATE 25 MG: 25 TABLET, FILM COATED ORAL at 09:08

## 2021-01-01 RX ADMIN — LIDOCAINE HYDROCHLORIDE 3 ML: 10 INJECTION, SOLUTION EPIDURAL; INFILTRATION; INTRACAUDAL; PERINEURAL at 14:28

## 2021-01-01 RX ADMIN — METOPROLOL TARTRATE 50 MG: 50 TABLET, FILM COATED ORAL at 20:51

## 2021-01-01 RX ADMIN — ACETAMINOPHEN 650 MG: 325 TABLET, FILM COATED ORAL at 19:57

## 2021-01-01 RX ADMIN — ALLOPURINOL 300 MG: 300 TABLET ORAL at 07:47

## 2021-01-01 RX ADMIN — Medication 5 ML: at 17:03

## 2021-01-01 RX ADMIN — ACETAMINOPHEN 650 MG: 325 TABLET, FILM COATED ORAL at 08:59

## 2021-01-01 RX ADMIN — DOCUSATE SODIUM 50 MG AND SENNOSIDES 8.6 MG 1 TABLET: 8.6; 5 TABLET, FILM COATED ORAL at 08:26

## 2021-01-01 RX ADMIN — FUROSEMIDE 40 MG: 10 INJECTION, SOLUTION INTRAVENOUS at 15:30

## 2021-01-01 RX ADMIN — SODIUM CHLORIDE: 9 INJECTION, SOLUTION INTRAVENOUS at 09:30

## 2021-01-01 RX ADMIN — Medication 5 ML: at 11:15

## 2021-01-01 RX ADMIN — Medication 5 ML: at 08:42

## 2021-01-01 RX ADMIN — Medication 5 ML: at 13:50

## 2021-01-01 RX ADMIN — HYDROMORPHONE HYDROCHLORIDE 0.5 MG: 1 INJECTION, SOLUTION INTRAMUSCULAR; INTRAVENOUS; SUBCUTANEOUS at 05:32

## 2021-01-01 RX ADMIN — MICAFUNGIN SODIUM 50 MG: 50 INJECTION, POWDER, LYOPHILIZED, FOR SOLUTION INTRAVENOUS at 14:57

## 2021-01-01 RX ADMIN — VENETOCLAX 200 MG: 100 TABLET, FILM COATED ORAL at 09:38

## 2021-01-01 RX ADMIN — Medication 150 MG: at 08:52

## 2021-01-01 RX ADMIN — ACETAMINOPHEN 650 MG: 325 TABLET, FILM COATED ORAL at 07:46

## 2021-01-01 RX ADMIN — FUROSEMIDE 40 MG: 10 INJECTION, SOLUTION INTRAVENOUS at 07:50

## 2021-01-01 RX ADMIN — AMPICILLIN SODIUM AND SULBACTAM SODIUM 3 G: 2; 1 INJECTION, POWDER, FOR SOLUTION INTRAMUSCULAR; INTRAVENOUS at 06:29

## 2021-01-01 RX ADMIN — Medication 50 MCG: at 08:25

## 2021-01-01 RX ADMIN — ACETAMINOPHEN 650 MG: 325 TABLET, FILM COATED ORAL at 06:00

## 2021-01-01 RX ADMIN — Medication 50 MCG: at 07:39

## 2021-01-01 RX ADMIN — MORPHINE SULFATE 2 MG: 2 INJECTION, SOLUTION INTRAMUSCULAR; INTRAVENOUS at 05:17

## 2021-01-01 RX ADMIN — FUROSEMIDE 40 MG: 10 INJECTION, SOLUTION INTRAVENOUS at 09:21

## 2021-01-01 RX ADMIN — MIDAZOLAM 1 MG: 1 INJECTION INTRAMUSCULAR; INTRAVENOUS at 09:57

## 2021-01-01 RX ADMIN — ATORVASTATIN CALCIUM 10 MG: 10 TABLET, FILM COATED ORAL at 20:39

## 2021-01-01 RX ADMIN — LEVOFLOXACIN 250 MG: 250 TABLET, FILM COATED ORAL at 22:41

## 2021-01-01 RX ADMIN — OXYCODONE HYDROCHLORIDE 10 MG: 5 TABLET ORAL at 04:05

## 2021-01-01 RX ADMIN — MAGNESIUM HYDROXIDE 30 ML: 400 SUSPENSION ORAL at 14:22

## 2021-01-01 RX ADMIN — DECITABINE 40.5 MG: KIT at 11:42

## 2021-01-01 RX ADMIN — SODIUM CHLORIDE 250 ML: 9 INJECTION, SOLUTION INTRAVENOUS at 01:33

## 2021-01-01 RX ADMIN — OXYCODONE HYDROCHLORIDE 10 MG: 10 TABLET, FILM COATED, EXTENDED RELEASE ORAL at 20:47

## 2021-01-01 RX ADMIN — METOPROLOL TARTRATE 50 MG: 50 TABLET, FILM COATED ORAL at 09:20

## 2021-01-01 RX ADMIN — OXYCODONE HYDROCHLORIDE 10 MG: 5 TABLET ORAL at 04:04

## 2021-01-01 RX ADMIN — SODIUM CHLORIDE: 9 INJECTION, SOLUTION INTRAVENOUS at 15:16

## 2021-01-01 RX ADMIN — Medication 5 ML: at 16:18

## 2021-01-01 RX ADMIN — ALLOPURINOL 100 MG: 100 TABLET ORAL at 08:15

## 2021-01-01 RX ADMIN — Medication 50 MCG: at 08:16

## 2021-01-01 RX ADMIN — ALLOPURINOL 300 MG: 300 TABLET ORAL at 08:25

## 2021-01-01 RX ADMIN — LEVOFLOXACIN 250 MG: 250 TABLET, FILM COATED ORAL at 21:59

## 2021-01-01 RX ADMIN — ACETAMINOPHEN 650 MG: 325 TABLET, FILM COATED ORAL at 12:38

## 2021-01-01 RX ADMIN — MICAFUNGIN SODIUM 50 MG: 50 INJECTION, POWDER, LYOPHILIZED, FOR SOLUTION INTRAVENOUS at 12:39

## 2021-01-01 RX ADMIN — FUROSEMIDE 40 MG: 10 INJECTION, SOLUTION INTRAMUSCULAR; INTRAVENOUS at 09:00

## 2021-01-01 RX ADMIN — ALLOPURINOL 300 MG: 300 TABLET ORAL at 07:39

## 2021-01-01 RX ADMIN — OXYCODONE HYDROCHLORIDE 10 MG: 5 TABLET ORAL at 17:22

## 2021-01-01 RX ADMIN — DEXAMETHASONE SODIUM PHOSPHATE 10 MG: 10 INJECTION, SOLUTION INTRAMUSCULAR; INTRAVENOUS at 14:32

## 2021-01-01 RX ADMIN — Medication 50 MCG: at 09:20

## 2021-01-01 RX ADMIN — METOPROLOL TARTRATE 50 MG: 50 TABLET, FILM COATED ORAL at 08:52

## 2021-01-01 RX ADMIN — OXYCODONE HYDROCHLORIDE 10 MG: 5 TABLET ORAL at 12:29

## 2021-01-01 RX ADMIN — Medication 5 ML: at 15:29

## 2021-01-01 RX ADMIN — LEVOFLOXACIN 250 MG: 250 TABLET, FILM COATED ORAL at 20:07

## 2021-01-01 RX ADMIN — Medication 5 ML: at 18:11

## 2021-01-01 RX ADMIN — SODIUM CHLORIDE 250 ML: 9 INJECTION, SOLUTION INTRAVENOUS at 09:23

## 2021-01-01 RX ADMIN — Medication 500 UNITS: at 13:54

## 2021-01-01 RX ADMIN — DICLOFENAC SODIUM 2 G: 10 GEL TOPICAL at 08:18

## 2021-01-01 RX ADMIN — OXYCODONE HYDROCHLORIDE 10 MG: 10 TABLET, FILM COATED, EXTENDED RELEASE ORAL at 20:01

## 2021-01-01 RX ADMIN — OXYCODONE HYDROCHLORIDE 10 MG: 5 TABLET ORAL at 00:44

## 2021-01-01 RX ADMIN — ACYCLOVIR 400 MG: 400 TABLET ORAL at 08:47

## 2021-01-01 RX ADMIN — Medication 250 MCG: at 11:12

## 2021-01-01 RX ADMIN — TRAZODONE HYDROCHLORIDE 50 MG: 50 TABLET ORAL at 19:43

## 2021-01-01 RX ADMIN — IOPAMIDOL 70 ML: 755 INJECTION, SOLUTION INTRAVENOUS at 04:42

## 2021-01-01 RX ADMIN — ACYCLOVIR 400 MG: 400 TABLET ORAL at 07:40

## 2021-01-01 RX ADMIN — METOPROLOL TARTRATE 50 MG: 50 TABLET, FILM COATED ORAL at 21:29

## 2021-01-01 RX ADMIN — HYDROMORPHONE HYDROCHLORIDE 0.2 MG: 0.2 INJECTION, SOLUTION INTRAMUSCULAR; INTRAVENOUS; SUBCUTANEOUS at 11:13

## 2021-01-01 RX ADMIN — ACYCLOVIR 400 MG: 400 TABLET ORAL at 20:20

## 2021-01-01 RX ADMIN — OXYCODONE HYDROCHLORIDE 5 MG: 5 TABLET ORAL at 17:29

## 2021-01-01 RX ADMIN — HYDROMORPHONE HYDROCHLORIDE 0.2 MG: 0.2 INJECTION, SOLUTION INTRAMUSCULAR; INTRAVENOUS; SUBCUTANEOUS at 04:48

## 2021-01-01 RX ADMIN — ACETAMINOPHEN 650 MG: 325 TABLET, FILM COATED ORAL at 12:27

## 2021-01-01 RX ADMIN — DECITABINE 40.5 MG: 50 INJECTION, POWDER, LYOPHILIZED, FOR SOLUTION INTRAVENOUS at 14:11

## 2021-01-01 RX ADMIN — SODIUM CHLORIDE 250 ML: 9 INJECTION, SOLUTION INTRAVENOUS at 09:52

## 2021-01-01 RX ADMIN — ACETAMINOPHEN 650 MG: 325 TABLET, FILM COATED ORAL at 15:19

## 2021-01-01 RX ADMIN — OXYCODONE HYDROCHLORIDE 5 MG: 5 TABLET ORAL at 12:23

## 2021-01-01 RX ADMIN — DICLOFENAC SODIUM 2 G: 10 GEL TOPICAL at 10:49

## 2021-01-01 RX ADMIN — METOPROLOL TARTRATE 50 MG: 50 TABLET, FILM COATED ORAL at 20:41

## 2021-01-01 RX ADMIN — ATORVASTATIN CALCIUM 10 MG: 10 TABLET, FILM COATED ORAL at 20:01

## 2021-01-01 RX ADMIN — PHENYLEPHRINE HYDROCHLORIDE 100 MCG: 10 INJECTION INTRAVENOUS at 14:50

## 2021-01-01 RX ADMIN — DECITABINE 40.5 MG: 50 INJECTION, POWDER, LYOPHILIZED, FOR SOLUTION INTRAVENOUS at 12:25

## 2021-01-01 RX ADMIN — Medication 200 MCG: at 08:59

## 2021-01-01 RX ADMIN — OXYCODONE 5 MG: 5 TABLET ORAL at 02:52

## 2021-01-01 RX ADMIN — POTASSIUM CHLORIDE 40 MEQ: 1500 TABLET, EXTENDED RELEASE ORAL at 09:32

## 2021-01-01 RX ADMIN — ALLOPURINOL 300 MG: 300 TABLET ORAL at 08:16

## 2021-01-01 RX ADMIN — METOPROLOL TARTRATE 50 MG: 50 TABLET, FILM COATED ORAL at 20:07

## 2021-01-01 RX ADMIN — ACETAMINOPHEN 650 MG: 325 TABLET, FILM COATED ORAL at 09:25

## 2021-01-01 RX ADMIN — HYDROMORPHONE HYDROCHLORIDE 0.5 MG: 1 INJECTION, SOLUTION INTRAMUSCULAR; INTRAVENOUS; SUBCUTANEOUS at 09:00

## 2021-01-01 RX ADMIN — ACYCLOVIR 400 MG: 400 TABLET ORAL at 08:16

## 2021-01-01 RX ADMIN — METOPROLOL TARTRATE 50 MG: 50 TABLET, FILM COATED ORAL at 09:30

## 2021-01-01 RX ADMIN — DOCUSATE SODIUM AND SENNOSIDES 1 TABLET: 8.6; 5 TABLET ORAL at 08:55

## 2021-01-01 RX ADMIN — METOPROLOL TARTRATE 50 MG: 50 TABLET, FILM COATED ORAL at 20:01

## 2021-01-01 RX ADMIN — ONDANSETRON 4 MG: 2 INJECTION INTRAMUSCULAR; INTRAVENOUS at 02:28

## 2021-01-01 RX ADMIN — MICAFUNGIN SODIUM 50 MG: 50 INJECTION, POWDER, LYOPHILIZED, FOR SOLUTION INTRAVENOUS at 11:39

## 2021-01-01 RX ADMIN — OXYCODONE HYDROCHLORIDE 10 MG: 5 TABLET ORAL at 14:18

## 2021-01-01 RX ADMIN — Medication 250 MCG: at 09:40

## 2021-01-01 RX ADMIN — DOCUSATE SODIUM 50 MG AND SENNOSIDES 8.6 MG 1 TABLET: 8.6; 5 TABLET, FILM COATED ORAL at 20:47

## 2021-01-01 RX ADMIN — ACYCLOVIR 400 MG: 400 TABLET ORAL at 09:22

## 2021-01-01 RX ADMIN — Medication 5 ML: at 09:01

## 2021-01-01 RX ADMIN — Medication 5 ML: at 05:26

## 2021-01-01 RX ADMIN — Medication 200 MCG: at 08:34

## 2021-01-01 RX ADMIN — SODIUM CHLORIDE 250 ML: 9 INJECTION, SOLUTION INTRAVENOUS at 14:23

## 2021-01-01 RX ADMIN — VENETOCLAX 20 MG: 10 TABLET, FILM COATED ORAL at 17:59

## 2021-01-01 RX ADMIN — METOPROLOL TARTRATE 50 MG: 50 TABLET, FILM COATED ORAL at 22:19

## 2021-01-01 RX ADMIN — LEVOFLOXACIN 250 MG: 250 TABLET, FILM COATED ORAL at 19:49

## 2021-01-01 RX ADMIN — Medication 5 ML: at 15:44

## 2021-01-01 RX ADMIN — SODIUM CHLORIDE 250 ML: 9 INJECTION, SOLUTION INTRAVENOUS at 10:41

## 2021-01-01 RX ADMIN — HYDROMORPHONE HYDROCHLORIDE 0.3 MG: 1 INJECTION, SOLUTION INTRAMUSCULAR; INTRAVENOUS; SUBCUTANEOUS at 02:01

## 2021-01-01 RX ADMIN — Medication 5 ML: at 15:04

## 2021-01-01 RX ADMIN — ROSUVASTATIN CALCIUM 5 MG: 5 TABLET, FILM COATED ORAL at 20:07

## 2021-01-01 RX ADMIN — ACYCLOVIR 400 MG: 200 CAPSULE ORAL at 09:51

## 2021-01-01 RX ADMIN — FUROSEMIDE 40 MG: 10 INJECTION, SOLUTION INTRAMUSCULAR; INTRAVENOUS at 09:26

## 2021-01-01 RX ADMIN — Medication 10 ML: at 03:06

## 2021-01-01 RX ADMIN — ACETAMINOPHEN 650 MG: 325 TABLET, FILM COATED ORAL at 23:40

## 2021-01-01 RX ADMIN — MIDAZOLAM HYDROCHLORIDE 1 MG: 1 INJECTION, SOLUTION INTRAMUSCULAR; INTRAVENOUS at 13:30

## 2021-01-01 RX ADMIN — HYDROMORPHONE HYDROCHLORIDE 0.4 MG: 0.2 INJECTION, SOLUTION INTRAMUSCULAR; INTRAVENOUS; SUBCUTANEOUS at 17:37

## 2021-01-01 RX ADMIN — FUROSEMIDE 20 MG: 10 INJECTION, SOLUTION INTRAMUSCULAR; INTRAVENOUS at 14:22

## 2021-01-01 RX ADMIN — DECITABINE 40.5 MG: KIT at 10:04

## 2021-01-01 RX ADMIN — LIDOCAINE 1 PATCH: 560 PATCH PERCUTANEOUS; TOPICAL; TRANSDERMAL at 20:00

## 2021-01-01 RX ADMIN — MIDAZOLAM HYDROCHLORIDE 1 MG: 1 INJECTION, SOLUTION INTRAMUSCULAR; INTRAVENOUS at 10:49

## 2021-01-01 RX ADMIN — ALLOPURINOL 300 MG: 300 TABLET ORAL at 07:45

## 2021-01-01 RX ADMIN — ACETAMINOPHEN 650 MG: 325 TABLET, FILM COATED ORAL at 08:34

## 2021-01-01 RX ADMIN — FUROSEMIDE 40 MG: 10 INJECTION, SOLUTION INTRAMUSCULAR; INTRAVENOUS at 19:50

## 2021-01-01 RX ADMIN — OXYCODONE HYDROCHLORIDE 10 MG: 5 TABLET ORAL at 00:29

## 2021-01-01 RX ADMIN — Medication 10 MCG/MIN: at 13:44

## 2021-01-01 RX ADMIN — AMPICILLIN SODIUM AND SULBACTAM SODIUM 3 G: 2; 1 INJECTION, POWDER, FOR SOLUTION INTRAMUSCULAR; INTRAVENOUS at 17:43

## 2021-01-01 RX ADMIN — DOCUSATE SODIUM 50 MG AND SENNOSIDES 8.6 MG 1 TABLET: 8.6; 5 TABLET, FILM COATED ORAL at 08:16

## 2021-01-01 RX ADMIN — METOPROLOL TARTRATE 50 MG: 50 TABLET, FILM COATED ORAL at 08:54

## 2021-01-01 RX ADMIN — SODIUM CHLORIDE 250 ML: 9 INJECTION, SOLUTION INTRAVENOUS at 12:16

## 2021-01-01 RX ADMIN — OXYCODONE HYDROCHLORIDE 10 MG: 5 TABLET ORAL at 18:33

## 2021-01-01 RX ADMIN — AMPICILLIN SODIUM AND SULBACTAM SODIUM 3 G: 2; 1 INJECTION, POWDER, FOR SOLUTION INTRAMUSCULAR; INTRAVENOUS at 06:13

## 2021-01-01 RX ADMIN — Medication 5 ML: at 15:21

## 2021-01-01 RX ADMIN — SODIUM CHLORIDE 250 ML: 9 INJECTION, SOLUTION INTRAVENOUS at 14:13

## 2021-01-01 RX ADMIN — POLYETHYLENE GLYCOL 3350 17 G: 17 POWDER, FOR SOLUTION ORAL at 08:55

## 2021-01-01 RX ADMIN — ACETAMINOPHEN 650 MG: 325 TABLET, FILM COATED ORAL at 16:17

## 2021-01-01 RX ADMIN — Medication 5 ML: at 09:35

## 2021-01-01 RX ADMIN — Medication 200 MCG: at 08:16

## 2021-01-01 RX ADMIN — ACETAMINOPHEN 975 MG: 325 TABLET, FILM COATED ORAL at 08:54

## 2021-01-01 RX ADMIN — METOPROLOL TARTRATE 50 MG: 50 TABLET, FILM COATED ORAL at 08:18

## 2021-01-01 RX ADMIN — METOPROLOL TARTRATE 50 MG: 50 TABLET, FILM COATED ORAL at 11:13

## 2021-01-01 RX ADMIN — FLUCONAZOLE 200 MG: 200 TABLET ORAL at 08:47

## 2021-01-01 RX ADMIN — METOPROLOL TARTRATE 50 MG: 50 TABLET, FILM COATED ORAL at 20:48

## 2021-01-01 RX ADMIN — PROPOFOL 150 MG: 10 INJECTION, EMULSION INTRAVENOUS at 13:28

## 2021-01-01 SDOH — HEALTH STABILITY: PHYSICAL HEALTH: ON AVERAGE, HOW MANY MINUTES DO YOU ENGAGE IN EXERCISE AT THIS LEVEL?: 0 MIN

## 2021-01-01 SDOH — ECONOMIC STABILITY: TRANSPORTATION INSECURITY
IN THE PAST 12 MONTHS, HAS THE LACK OF TRANSPORTATION KEPT YOU FROM MEDICAL APPOINTMENTS OR FROM GETTING MEDICATIONS?: NOT ASKED

## 2021-01-01 SDOH — ECONOMIC STABILITY: FOOD INSECURITY: WITHIN THE PAST 12 MONTHS, YOU WORRIED THAT YOUR FOOD WOULD RUN OUT BEFORE YOU GOT MONEY TO BUY MORE.: NOT ASKED

## 2021-01-01 SDOH — HEALTH STABILITY: PHYSICAL HEALTH: ON AVERAGE, HOW MANY DAYS PER WEEK DO YOU ENGAGE IN MODERATE TO STRENUOUS EXERCISE (LIKE A BRISK WALK)?: 0 DAYS

## 2021-01-01 SDOH — ECONOMIC STABILITY: TRANSPORTATION INSECURITY
IN THE PAST 12 MONTHS, HAS THE LACK OF TRANSPORTATION KEPT YOU FROM MEDICAL APPOINTMENTS OR FROM GETTING MEDICATIONS?: NO

## 2021-01-01 SDOH — ECONOMIC STABILITY: TRANSPORTATION INSECURITY
IN THE PAST 12 MONTHS, HAS LACK OF TRANSPORTATION KEPT YOU FROM MEETINGS, WORK, OR FROM GETTING THINGS NEEDED FOR DAILY LIVING?: NO

## 2021-01-01 SDOH — ECONOMIC STABILITY: FOOD INSECURITY: WITHIN THE PAST 12 MONTHS, THE FOOD YOU BOUGHT JUST DIDN'T LAST AND YOU DIDN'T HAVE MONEY TO GET MORE.: NEVER TRUE

## 2021-01-01 SDOH — ECONOMIC STABILITY: INCOME INSECURITY: HOW HARD IS IT FOR YOU TO PAY FOR THE VERY BASICS LIKE FOOD, HOUSING, MEDICAL CARE, AND HEATING?: NOT ASKED

## 2021-01-01 SDOH — ECONOMIC STABILITY: TRANSPORTATION INSECURITY
IN THE PAST 12 MONTHS, HAS LACK OF TRANSPORTATION KEPT YOU FROM MEETINGS, WORK, OR FROM GETTING THINGS NEEDED FOR DAILY LIVING?: NOT ASKED

## 2021-01-01 SDOH — ECONOMIC STABILITY: FOOD INSECURITY: WITHIN THE PAST 12 MONTHS, YOU WORRIED THAT YOUR FOOD WOULD RUN OUT BEFORE YOU GOT MONEY TO BUY MORE.: NEVER TRUE

## 2021-01-01 SDOH — ECONOMIC STABILITY: FOOD INSECURITY: WITHIN THE PAST 12 MONTHS, THE FOOD YOU BOUGHT JUST DIDN'T LAST AND YOU DIDN'T HAVE MONEY TO GET MORE.: NOT ASKED

## 2021-01-01 ASSESSMENT — ACTIVITIES OF DAILY LIVING (ADL)
ADLS_ACUITY_SCORE: 16
NUMBER_OF_TIMES_PATIENT_HAS_FALLEN_WITHIN_LAST_SIX_MONTHS: 1
WEAR_GLASSES_OR_BLIND: YES
ADLS_ACUITY_SCORE: 16
ADLS_ACUITY_SCORE: 16
ADLS_ACUITY_SCORE: 14
ADLS_ACUITY_SCORE: 14
HEARING_DIFFICULTY_OR_DEAF: NO
ADLS_ACUITY_SCORE: 14
ADLS_ACUITY_SCORE: 15
ADLS_ACUITY_SCORE: 14
ADLS_ACUITY_SCORE: 15
ADLS_ACUITY_SCORE: 16
ADLS_ACUITY_SCORE: 16
ADLS_ACUITY_SCORE: 14
DIFFICULTY_COMMUNICATING: NO
DEPENDENT_IADLS:: CLEANING;COOKING;LAUNDRY;SHOPPING;MEDICATION MANAGEMENT;TRANSPORTATION
ADLS_ACUITY_SCORE: 15
ADLS_ACUITY_SCORE: 14
CONCENTRATING,_REMEMBERING_OR_MAKING_DECISIONS_DIFFICULTY: NO
ADLS_ACUITY_SCORE: 15
ADLS_ACUITY_SCORE: 14
ADLS_ACUITY_SCORE: 14
HEARING_DIFFICULTY_OR_DEAF: NO
FALL_HISTORY_WITHIN_LAST_SIX_MONTHS: YES
ADLS_ACUITY_SCORE: 14
ADLS_ACUITY_SCORE: 14
DOING_ERRANDS_INDEPENDENTLY_DIFFICULTY: YES
DIFFICULTY_COMMUNICATING: NO
ADLS_ACUITY_SCORE: 14
ADLS_ACUITY_SCORE: 16
ADLS_ACUITY_SCORE: 14
DRESSING/BATHING_DIFFICULTY: YES
ADLS_ACUITY_SCORE: 15
ADLS_ACUITY_SCORE: 14
DEPENDENT_IADLS:: CLEANING;COOKING;LAUNDRY;SHOPPING;MEDICATION MANAGEMENT;TRANSPORTATION
ADLS_ACUITY_SCORE: 16
ADLS_ACUITY_SCORE: 16
ADLS_ACUITY_SCORE: 14
ADLS_ACUITY_SCORE: 16
ADLS_ACUITY_SCORE: 15
ADLS_ACUITY_SCORE: 14
DOING_ERRANDS_INDEPENDENTLY_DIFFICULTY: NO
ADLS_ACUITY_SCORE: 14
ADLS_ACUITY_SCORE: 15
ADLS_ACUITY_SCORE: 14
FALL_HISTORY_WITHIN_LAST_SIX_MONTHS: NO
ADLS_ACUITY_SCORE: 15
ADLS_ACUITY_SCORE: 14
ADLS_ACUITY_SCORE: 15
ADLS_ACUITY_SCORE: 14
ADLS_ACUITY_SCORE: 14
ADLS_ACUITY_SCORE: 16
ADLS_ACUITY_SCORE: 14
DEPENDENT_IADLS:: CLEANING;COOKING;SHOPPING;MEDICATION MANAGEMENT;TRANSPORTATION
ADLS_ACUITY_SCORE: 14
ADLS_ACUITY_SCORE: 14
ADLS_ACUITY_SCORE: 16
DRESSING/BATHING_DIFFICULTY: NO
PREVIOUS_RESPONSIBILITIES: MEAL PREP;HOUSEKEEPING;DRIVING
ADLS_ACUITY_SCORE: 14
VISION_MANAGEMENT: GLASSES
ADLS_ACUITY_SCORE: 14
ADLS_ACUITY_SCORE: 14
WALKING_OR_CLIMBING_STAIRS_DIFFICULTY: NO
ADLS_ACUITY_SCORE: 14
DEPENDENT_IADLS:: TRANSPORTATION;MEAL PREPARATION;SHOPPING;COOKING;CLEANING
WALKING_OR_CLIMBING_STAIRS_DIFFICULTY: NO
WEAR_GLASSES_OR_BLIND: YES
ADLS_ACUITY_SCORE: 16
ADLS_ACUITY_SCORE: 15
VISION_MANAGEMENT: GLASSES
ADLS_ACUITY_SCORE: 14
ADLS_ACUITY_SCORE: 15
ADLS_ACUITY_SCORE: 16
ADLS_ACUITY_SCORE: 14
ADLS_ACUITY_SCORE: 14
WEAR_GLASSES_OR_BLIND: YES
ADLS_ACUITY_SCORE: 14
ADLS_ACUITY_SCORE: 15
ADLS_ACUITY_SCORE: 14
DOING_ERRANDS_INDEPENDENTLY_DIFFICULTY: NO
ADLS_ACUITY_SCORE: 14
HEARING_DIFFICULTY_OR_DEAF: NO
DEPENDENT_IADLS:: CLEANING;COOKING;LAUNDRY;SHOPPING;MEDICATION MANAGEMENT;TRANSPORTATION
ADLS_ACUITY_SCORE: 14
TOILETING_ISSUES: NO
ADLS_ACUITY_SCORE: 16
ADLS_ACUITY_SCORE: 14
DRESSING/BATHING_DIFFICULTY: NO
ADLS_ACUITY_SCORE: 16
ADLS_ACUITY_SCORE: 14
ADLS_ACUITY_SCORE: 13
ADLS_ACUITY_SCORE: 14
ADLS_ACUITY_SCORE: 15
ADLS_ACUITY_SCORE: 14
NUMBER_OF_TIMES_PATIENT_HAS_FALLEN_WITHIN_LAST_SIX_MONTHS: 1
CONCENTRATING,_REMEMBERING_OR_MAKING_DECISIONS_DIFFICULTY: YES
ADLS_ACUITY_SCORE: 14
WALKING_OR_CLIMBING_STAIRS_DIFFICULTY: NO
ADLS_ACUITY_SCORE: 16
ADLS_ACUITY_SCORE: 14
ADLS_ACUITY_SCORE: 16
ADLS_ACUITY_SCORE: 15
ADLS_ACUITY_SCORE: 16
ADLS_ACUITY_SCORE: 16
ADLS_ACUITY_SCORE: 14
ADLS_ACUITY_SCORE: 16
ADLS_ACUITY_SCORE: 15
ADLS_ACUITY_SCORE: 14
ADLS_ACUITY_SCORE: 16
ADLS_ACUITY_SCORE: 16
DIFFICULTY_EATING/SWALLOWING: NO
ADLS_ACUITY_SCORE: 14
WHICH_OF_THE_ABOVE_FUNCTIONAL_RISKS_HAD_A_RECENT_ONSET_OR_CHANGE?: AMBULATION
ADLS_ACUITY_SCORE: 16
ADLS_ACUITY_SCORE: 14
ADLS_ACUITY_SCORE: 16
ADLS_ACUITY_SCORE: 15
ADLS_ACUITY_SCORE: 15
ADLS_ACUITY_SCORE: 16
ADLS_ACUITY_SCORE: 14
PREVIOUS_RESPONSIBILITIES: MEAL PREP;HOUSEKEEPING;LAUNDRY;SHOPPING;MEDICATION MANAGEMENT;FINANCES;DRIVING
ADLS_ACUITY_SCORE: 15
ADLS_ACUITY_SCORE: 14
TOILETING_ISSUES: NO
TOILETING_ISSUES: NO
ADLS_ACUITY_SCORE: 16
ADLS_ACUITY_SCORE: 16
CONCENTRATING,_REMEMBERING_OR_MAKING_DECISIONS_DIFFICULTY: NO
ADLS_ACUITY_SCORE: 16
ADLS_ACUITY_SCORE: 15
ADLS_ACUITY_SCORE: 16
ADLS_ACUITY_SCORE: 14
ADLS_ACUITY_SCORE: 15
WEAR_GLASSES_OR_BLIND: NO
DEPENDENT_IADLS:: CLEANING;COOKING;LAUNDRY;SHOPPING;MEDICATION MANAGEMENT;TRANSPORTATION
ADLS_ACUITY_SCORE: 15
DIFFICULTY_COMMUNICATING: NO
ADLS_ACUITY_SCORE: 14
ADLS_ACUITY_SCORE: 16
ADLS_ACUITY_SCORE: 16
ADLS_ACUITY_SCORE: 14
ADLS_ACUITY_SCORE: 14
ADLS_ACUITY_SCORE: 16
DIFFICULTY_EATING/SWALLOWING: NO
ADLS_ACUITY_SCORE: 15
ADLS_ACUITY_SCORE: 14
ADLS_ACUITY_SCORE: 14
DIFFICULTY_EATING/SWALLOWING: NO
ADLS_ACUITY_SCORE: 15
DEPENDENT_IADLS:: CLEANING;COOKING;LAUNDRY;SHOPPING;MEDICATION MANAGEMENT;TRANSPORTATION
WALKING_OR_CLIMBING_STAIRS: OTHER (SEE COMMENTS)
ADLS_ACUITY_SCORE: 14
ADLS_ACUITY_SCORE: 14
FALL_HISTORY_WITHIN_LAST_SIX_MONTHS: YES
ADLS_ACUITY_SCORE: 14

## 2021-01-01 ASSESSMENT — MIFFLIN-ST. JEOR
SCORE: 1457.07
SCORE: 1592.69
SCORE: 1435.5
SCORE: 1458.42
SCORE: 1588.16
SCORE: 1573.19
SCORE: 1446.75
SCORE: 1549.15
SCORE: 1579.54
SCORE: 1624.5
SCORE: 1568.65
SCORE: 1536.9
SCORE: 1430.02
SCORE: 1460.69
SCORE: 1640.5
SCORE: 1430.31
SCORE: 1437.96
SCORE: 1628.98
SCORE: 1466.58
SCORE: 1469.77
SCORE: 1594.05
SCORE: 1622.5
SCORE: 1473.85
SCORE: 1580.9
SCORE: 1622.63
SCORE: 1463.87
SCORE: 1414.43
SCORE: 1531
SCORE: 1449.36
SCORE: 1595.42
SCORE: 1452.42
SCORE: 1565.48
SCORE: 1639.87
SCORE: 1432.12
SCORE: 1609.48
SCORE: 1471.58
SCORE: 1577.27
SCORE: 1448
SCORE: 1608.12
SCORE: 1472.49
SCORE: 1418.06
SCORE: 1538.26
SCORE: 1448
SCORE: 1442.88
SCORE: 1611.29
SCORE: 1579.54
SCORE: 1592.69
SCORE: 1552.32
SCORE: 1455.71
SCORE: 1417.93
SCORE: 1544.16

## 2021-01-01 ASSESSMENT — PAIN SCALES - GENERAL
PAINLEVEL: NO PAIN (0)
PAINLEVEL: NO PAIN (0)
PAINLEVEL: SEVERE PAIN (7)
PAINLEVEL: EXTREME PAIN (8)
PAINLEVEL: MILD PAIN (3)
PAINLEVEL: NO PAIN (0)
PAINLEVEL: MODERATE PAIN (4)
PAINLEVEL: NO PAIN (0)
PAINLEVEL: MODERATE PAIN (4)
PAINLEVEL: NO PAIN (0)
PAINLEVEL: NO PAIN (0)
PAINLEVEL: SEVERE PAIN (7)
PAINLEVEL: NO PAIN (0)
PAINLEVEL: NO PAIN (0)
PAINLEVEL: MILD PAIN (3)
PAINLEVEL: NO PAIN (0)
PAINLEVEL: MILD PAIN (2)
PAINLEVEL: MODERATE PAIN (4)
PAINLEVEL: NO PAIN (0)
PAINLEVEL: SEVERE PAIN (7)
PAINLEVEL: NO PAIN (0)
PAINLEVEL: MODERATE PAIN (4)
PAINLEVEL: MILD PAIN (3)
PAINLEVEL: NO PAIN (0)
PAINLEVEL: NO PAIN (0)
PAINLEVEL: MODERATE PAIN (4)
PAINLEVEL: MILD PAIN (2)
PAINLEVEL: NO PAIN (0)
PAINLEVEL: SEVERE PAIN (7)
PAINLEVEL: NO PAIN (0)
PAINLEVEL: SEVERE PAIN (6)
PAINLEVEL: NO PAIN (0)
PAINLEVEL: NO PAIN (0)
PAINLEVEL: SEVERE PAIN (6)
PAINLEVEL: NO PAIN (0)
PAINLEVEL: MODERATE PAIN (5)

## 2021-01-01 ASSESSMENT — ENCOUNTER SYMPTOMS
LIGHT-HEADEDNESS: 1
NECK STIFFNESS: 0
DIFFICULTY URINATING: 0
NECK PAIN: 0
FEVER: 0
JOINT SWELLING: 0
FEVER: 0
DIZZINESS: 1
DYSURIA: 0
SLEEP DISTURBANCE: 1
WOUND: 0
DIARRHEA: 1
CONSTIPATION: 0
ENDOCRINE NEGATIVE: 1
ABDOMINAL DISTENTION: 1
BLOOD IN STOOL: 0
NAUSEA: 0
PSYCHIATRIC NEGATIVE: 1
MUSCULOSKELETAL NEGATIVE: 1
EYES NEGATIVE: 1
CARDIOVASCULAR NEGATIVE: 1
SHORTNESS OF BREATH: 1
COLOR CHANGE: 0
ABDOMINAL PAIN: 1
NAUSEA: 1
HEADACHES: 0
FEVER: 0
ARTHRALGIAS: 1
NEUROLOGICAL NEGATIVE: 1
COUGH: 1
CHILLS: 0
MUSCULOSKELETAL NEGATIVE: 1
CHILLS: 1
RESPIRATORY NEGATIVE: 1
VOMITING: 0
ABDOMINAL PAIN: 0

## 2021-01-01 ASSESSMENT — SOCIAL DETERMINANTS OF HEALTH (SDOH)
HOW OFTEN DO YOU ATTENT MEETINGS OF THE CLUB OR ORGANIZATION YOU BELONG TO?: NEVER
DO YOU BELONG TO ANY CLUBS OR ORGANIZATIONS SUCH AS CHURCH GROUPS UNIONS, FRATERNAL OR ATHLETIC GROUPS, OR SCHOOL GROUPS?: NO
HOW OFTEN DO YOU ATTEND CHURCH OR RELIGIOUS SERVICES?: NEVER
IN A TYPICAL WEEK, HOW MANY TIMES DO YOU TALK ON THE PHONE WITH FAMILY, FRIENDS, OR NEIGHBORS?: THREE TIMES A WEEK
HOW OFTEN DO YOU GET TOGETHER WITH FRIENDS OR RELATIVES?: TWICE A WEEK

## 2021-01-01 ASSESSMENT — LIFESTYLE VARIABLES
TOBACCO_USE: 1
TOBACCO_USE: 1

## 2021-01-05 ENCOUNTER — VIRTUAL VISIT (OUTPATIENT)
Dept: ONCOLOGY | Facility: CLINIC | Age: 71
End: 2021-01-05
Payer: COMMERCIAL

## 2021-01-05 DIAGNOSIS — D47.1 MYELOPROLIFERATIVE DISORDER (H): Primary | ICD-10-CM

## 2021-01-05 DIAGNOSIS — I10 ESSENTIAL HYPERTENSION: ICD-10-CM

## 2021-01-05 DIAGNOSIS — D75.81 MYELOFIBROSIS (H): ICD-10-CM

## 2021-01-05 PROCEDURE — 99214 OFFICE O/P EST MOD 30 MIN: CPT | Mod: 95 | Performed by: INTERNAL MEDICINE

## 2021-01-05 NOTE — PROGRESS NOTES
"Renny Gannon is a 70 year old male who is being evaluated via a billable telephone visit.      What phone number would you like to be contacted at? [phone]  How would you like to obtain your AVS? Mail a copy  Oncology Rooming Note    January 5, 2021 8:23 AM   Renny Gannon is a 70 year old male who presents for:    Chief Complaint   Patient presents with     Hematology     Myeloproliferative disorder      Initial Vitals: There were no vitals taken for this visit. Estimated body mass index is 32.13 kg/m  as calculated from the following:    Height as of 12/21/20: 1.727 m (5' 8\").    Weight as of 12/22/20: 95.8 kg (211 lb 4.8 oz). There is no height or weight on file to calculate BSA.  Data Unavailable Comment: Data Unavailable   No LMP for male patient.  Allergies reviewed: Yes  Medications reviewed: Yes    Medications: Medication refills not needed today.  Pharmacy name entered into [employer]:      [address]     Clinical concerns: None. Concerns reviewed with Dr. Jonnie Zuniga, [facility]              "

## 2021-01-05 NOTE — LETTER
"    1/5/2021         RE: Renny Gannon  [address]  [place] [address]        Dear Colleague,    Thank you for referring your patient, Renny Gannon, to the [facility]. Please see a copy of my visit note below.    Renny Gannon is a 70 year old male who is being evaluated via a billable telephone visit.      What phone number would you like to be contacted at? [phone]  How would you like to obtain your AVS? Mail a copy  Oncology Rooming Note    January 5, 2021 8:23 AM   Renny Gannon is a 70 year old male who presents for:    Chief Complaint   Patient presents with     Hematology     Myeloproliferative disorder      Initial Vitals: There were no vitals taken for this visit. Estimated body mass index is 32.13 kg/m  as calculated from the following:    Height as of 12/21/20: 1.727 m (5' 8\").    Weight as of 12/22/20: 95.8 kg (211 lb 4.8 oz). There is no height or weight on file to calculate BSA.  Data Unavailable Comment: Data Unavailable   No LMP for male patient.  Allergies reviewed: Yes  Medications reviewed: Yes    Medications: Medication refills not needed today.  Pharmacy name entered into [facility]:      [facility] [address]     Clinical concerns: None. Concerns reviewed with Dr. Jonnie Zuniga, [facility]                DISCHARGE PLAN:  Next appointments: See patient instruction section  Telephone or virtual visit.    Renny Gannon had a telephone/Virtual visit for Oncology follow up.  Patient instructions completed per Dr. Ellis's post call instructions.  Patient to start Hydroxyurea.  Labs weekly with follow up in 1 month.  Appointments scheduled and called to patient by MA.  AVS mailed.  See patient instructions and Oncologist's Progress note for further details. Questions and concerns addressed to patient's satisfaction. Patient verbalized and demonstrated understanding of plan.  Contact information provided and " patient is encouraged to call with any that arise in the interim of care.    iK Lamb RN, BSN, OCN   Oncology Care Coordinator RN  [facility] [place]  [phone]  1/5/2021, 3:31 PM          Hematology/ Oncology Telephone Visit:  Jan 5, 2021    Due to the concerns around COVID-19 and adhering to social distancing we conducted this visit over the telephone.      Reason for Visit:   Chief Complaint   Patient presents with     Hematology     Myeloproliferative disorder        Oncologic History:    Myeloproliferative disorder (H)    Renny Gannon presented to primary care physician with a complaint of increasing fatigue and shortness of breath on exertion.  Further work-up including a CBC which was done on December 11, 2019 showed a total white count of 79.1 with a hemoglobin is 11.9 platelet count of 378.  Peripheral blood smear revealed leuko-erythroblastic reaction with neutrophilic left shift and rare circulating blasts without dysplasia. bone marrow biopsy from December 30, 2019 showing extremely hypercellular marrow with granulocytic and megakaryocytic proliferation, megakaryocytic clustering and atypia, marrow fibrosis and 1.5% blasts.  There is also marked peripheral leukocytosis and leuko-erythroblastic clusters and 1.5% circulating blasts.  There is mild normocytic normochromic anemia.  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       Interval History:  The patient was recently hospitalized with acute anemia with a hemoglobin of 6.6.  He received red blood cell transfusions 2 units and hemoglobin has improved to 10.  Stool for occult blood was negative.  Hydroxyurea was on hold.  the patient symptoms has been gradually improving since discharge from the hospital.  He denies any fever or chills but denies any bruising or bleeding.  He denies any weight loss.    Review of systems:  Pertinent positives have been included in HPI;  remainder of detailed complete 20-point ROS was negative.    Past medical, social, surgical, and family histories reviewed.    Allergies:  Allergies as of 01/05/2021 - Reviewed 01/05/2021   Allergen Reaction Noted     No known drug allergies  01/25/2005     Seasonal allergies  03/11/2019       Current Medications:  Current Outpatient Medications   Medication Sig Dispense Refill     furosemide (LASIX) 40 MG tablet TAKE ONE TABLET BY MOUTH DAILY IN THE MORNING AND TAKE 1/2 TABLET AT NOON (Patient taking differently: Take 40 mg by mouth daily ) 135 tablet 1     GOODSENSE ASPIRIN 325 MG tablet TAKE ONE TABLET BY MOUTH ONCE DAILY IN THE MORNING (Patient taking differently: Take 325 mg by mouth daily ) 90 tablet 3     vitamin B-12 (CYANOCOBALAMIN) 250 MCG tablet TAKE ONE TABLET BY MOUTH EVERY MORNING (Patient taking differently: Take 250 mcg by mouth daily ) 90 tablet 3     atorvastatin (LIPITOR) 40 MG tablet TAKE ONE TABLET BY MOUTH EVERY NIGHT AT BEDTIME 90 tablet 1     Ferrous Sulfate 324 (65 Fe) MG TBEC Take 1 tablet (324 mg) by mouth daily 30 tablet 0     hydroxyurea (HYDREA) 500 MG capsule Take 1 capsule (500 mg) by mouth daily       metoprolol succinate ER (TOPROL-XL) 100 MG 24 hr tablet Take 1 tablet (100 mg) by mouth 2 times daily HOLD this medication until you see your PCP 60 tablet 5     omeprazole (PRILOSEC) 20 MG DR capsule Take 1 capsule (20 mg) by mouth daily (Patient not taking: Reported on 1/5/2021) 30 capsule 1     senna-docusate (SENOKOT-S/PERICOLACE) 8.6-50 MG tablet Take 2 tablets by mouth 2 times daily 120 tablet 5        Laboratory/Imaging Studies:  Office Visit on 12/28/2020   Component Date Value Ref Range Status     WBC 12/28/2020 9.9  4.0 - 11.0 10e9/L Final     RBC Count 12/28/2020 2.08* 4.4 - 5.9 10e12/L Final     Hemoglobin 12/28/2020 7.8* 13.3 - 17.7 g/dL Final     Hematocrit 12/28/2020 23.3* 40.0 - 53.0 % Final     MCV 12/28/2020 112* 78 - 100 fl Final     MCH 12/28/2020 37.5* 26.5 - 33.0  pg Final     MCHC 12/28/2020 33.5  31.5 - 36.5 g/dL Final     RDW 12/28/2020 20.0* 10.0 - 15.0 % Final     Platelet Count 12/28/2020 66* 150 - 450 10e9/L Final     Sodium 12/28/2020 142  133 - 144 mmol/L Final     Potassium 12/28/2020 4.2  3.4 - 5.3 mmol/L Final     Chloride 12/28/2020 109  94 - 109 mmol/L Final     Carbon Dioxide 12/28/2020 27  20 - 32 mmol/L Final     Anion Gap 12/28/2020 6  3 - 14 mmol/L Final     Glucose 12/28/2020 96  70 - 99 mg/dL Final     Urea Nitrogen 12/28/2020 28  7 - 30 mg/dL Final     Creatinine 12/28/2020 1.39* 0.66 - 1.25 mg/dL Final     GFR Estimate 12/28/2020 51* >60 mL/min/[1.73_m2] Final    Comment: Non  GFR Calc  Starting 12/18/2018, serum creatinine based estimated GFR (eGFR) will be   calculated using the Chronic Kidney Disease Epidemiology Collaboration   (CKD-EPI) equation.       GFR Estimate If Black 12/28/2020 59* >60 mL/min/[1.73_m2] Final    Comment:  GFR Calc  Starting 12/18/2018, serum creatinine based estimated GFR (eGFR) will be   calculated using the Chronic Kidney Disease Epidemiology Collaboration   (CKD-EPI) equation.       Calcium 12/28/2020 9.2  8.5 - 10.1 mg/dL Final        Recent Results (from the past 744 hour(s))   XR Chest Port 1 View    Narrative    CHEST ONE VIEW PORTABLE  12/21/2020 10:24 AM     HISTORY: Short of air.    COMPARISON: Chest x-rays dated 2/18/2020.    FINDINGS:  Lungs are hypoaerated. There may be a small left pleural  fluid collection, similar to prior study. There are increased  interstitial markings in the bilateral lungs concerning for vascular  congestion. Cardiac silhouette is enlarged, even given technique. No  pneumothorax or right pleural fluid collection. No acute fracture.       Impression    IMPRESSION: Probable cardiomegaly, mild vascular congestion, and  possible left pleural fluid collection could represent congestive  heart failure. Atypical infiltrative process (such as COVID-19) is  also  possible.    LEEANNA KAUR MD   XR Chest Port 1 View    Narrative    CHEST PORTABLE ONE VIEW   1/7/2021 8:20 AM     HISTORY: Short of air.    COMPARISON: 12/21/2020.      Impression    IMPRESSION: Bilateral mildly increased vascular congestion may be  worsening edema. Stable cardiomegaly. Patchy bibasilar pulmonary  opacities persist and could be atelectasis or multifocal airspace  disease with pneumonia being a possibility. There may be trace left  pleural fluid that is stable.    ALEXIS FARIAS MD   CT Chest Pulmonary Embolism w Contrast    Narrative    CT CHEST PULMONARY EMBOLISM WITH INTRAVENOUS CONTRAST  1/7/2021 9:13  AM     HISTORY:  Shortness of breath, elevated D-dimer examination. Status  post coronary artery bypass graft surgery, hypertension.    COMPARISON: Chest x-ray dated 1/7/2021, chest CT dated 12/16/2019.    TECHNIQUE: CT of the chest was performed following the administration  of 80 cc intravenous contrast. Images are viewed in the axial and  coronal planes. Radiation dose for this scan was reduced using  automated exposure control, adjustment of the mA and/or kV according  to patient size, or iterative reconstruction technique.    FINDINGS: No definite evidence for a pulmonary embolism. Median  sternotomy changes and postsurgical changes consistent with coronary  artery bypass graft surgery. Mild cardiomegaly.    There are small bilateral pleural effusions with adjacent atelectasis  in the lower lobes. Lungs are otherwise clear.    Again identified are pleural plaques along the hemidiaphragms  bilaterally most likely secondary to previous asbestos exposure.     The upper aspects of the abdomen are included on this exam. Again  noted is mild splenomegaly. The spleen measures approximately 17 cm in  length.      Impression    IMPRESSION:  1. No definite pulmonary embolism.  2. Small bilateral pleural effusions with mild adjacent atelectasis.  3. Stable mild splenomegaly.    CLARY AN MD        Assessment and plan:    (D47.1) Myeloproliferative disorder (H)  (primary encounter diagnosis)  I reviewed with patient today most recent laboratory test.  Hemoglobin and platelet count has been improving.  I would recommend to start hydroxyurea and monitor CBC very closely.  I will see the patient again in 1 months or sooner if there are new developments or concerns.    (I10) Essential hypertension  Patient currently metoprolol 100 mg daily.  Is also on furosemide 40 mg orally daily.      The patient is ready to learn, no apparent learning barriers were identified.  Diagnosis and treatment plans were explained to the patient. The patient expressed understanding of the content. The patient asked appropriate questions. The patient questions were answered to his satisfaction.    Telephone call lasted 30 minutes.    Mani Cristina MD    Chart documentation with Dragon Voice recognition Software. Although reviewed after completion, some words and grammatical errors may remain.                                                      Again, thank you for allowing me to participate in the care of your patient.        Sincerely,        Mani Cristina MD

## 2021-01-05 NOTE — PROGRESS NOTES
DISCHARGE PLAN:  Next appointments: See patient instruction section  Telephone or virtual visit.    Renny CRAIG Jagdeep had a telephone/Virtual visit for Oncology follow up.  Patient instructions completed per Dr. Ellis's post call instructions.  Patient to start Hydroxyurea.  Labs weekly with follow up in 1 month.  Appointments scheduled and called to patient by MARITZA OQUENDO mailed.  See patient instructions and Oncologist's Progress note for further details. Questions and concerns addressed to patient's satisfaction. Patient verbalized and demonstrated understanding of plan.  Contact information provided and patient is encouraged to call with any that arise in the interim of care.    Ki Lamb, RN, BSN, OCN   Oncology Care Coordinator RN  [facility]  [phone]  1/5/2021, 3:31 PM

## 2021-01-05 NOTE — PATIENT INSTRUCTIONS
Start hydroxyurea.  Check CBC weekly.  Follow-up in 1 month    Today:  Start Hydroxyurea.      Please follow up with Dr. Cristina in 1 month.  Please schedule labs weekly.    Lab Date/Time:    Lab Date/Time:    Lab Date/Time:    Lab Date/Time:    Office (Telephone/Video) visit follow up with Dr. Cristina Date/Time:     If you have any questions or concerns please feel free to call.    If you need to reschedule please call:  Clinic or Lab Appointment - [phone]  Infusion - [phone]  Imaging - [phone]    Ki Lamb RN, BSN, OCN  [facility]   Oncology/Hematology Care Coordinator RN  [facility]  [phone]    After Hours Oncology Nurse Line - [phone]

## 2021-01-05 NOTE — LETTER
"    1/5/2021         RE: Renny Gannon  [address]  [facility] [address]        Dear Colleague,    Thank you for referring your patient, Renny Gannon, to the [facility]. Please see a copy of my visit note below.    Renny Gannon is a 70 year old male who is being evaluated via a billable telephone visit.      What phone number would you like to be contacted at? [phone]  How would you like to obtain your AVS? Mail a copy  Oncology Rooming Note    January 5, 2021 8:23 AM   Renny Gannon is a 70 year old male who presents for:    Chief Complaint   Patient presents with     Hematology     Myeloproliferative disorder      Initial Vitals: There were no vitals taken for this visit. Estimated body mass index is 32.13 kg/m  as calculated from the following:    Height as of 12/21/20: 1.727 m (5' 8\").    Weight as of 12/22/20: 95.8 kg (211 lb 4.8 oz). There is no height or weight on file to calculate BSA.  Data Unavailable Comment: Data Unavailable   No LMP for male patient.  Allergies reviewed: Yes  Medications reviewed: Yes    Medications: Medication refills not needed today.  Pharmacy name entered into [facility]:      [facility] [address]     Clinical concerns: None. Concerns reviewed with Dr. Jonnie Zuniga, [facility]                DISCHARGE PLAN:  Next appointments: See patient instruction section  Telephone or virtual visit.    Renny Gannon had a telephone/Virtual visit for Oncology follow up.  Patient instructions completed per Dr. Ellis's post call instructions.  Patient to start Hydroxyurea.  Labs weekly with follow up in 1 month.  Appointments scheduled and called to patient by MA.  AVS mailed.  See patient instructions and Oncologist's Progress note for further details. Questions and concerns addressed to patient's satisfaction. Patient verbalized and demonstrated understanding of plan.  Contact information provided and " patient is encouraged to call with any that arise in the interim of care.    Ki Lamb RN, BSN, OCN   Oncology Care Coordinator RN  [facility]  [phone]  1/5/2021, 3:31 PM          Hematology/ Oncology Telephone Visit:  Jan 5, 2021    Due to the concerns around COVID-19 and adhering to social distancing we conducted this visit over the telephone.      Reason for Visit:   Chief Complaint   Patient presents with     Hematology     Myeloproliferative disorder        Oncologic History:    Myeloproliferative disorder (H)    Renny Gannon presented to primary care physician with a complaint of increasing fatigue and shortness of breath on exertion.  Further work-up including a CBC which was done on December 11, 2019 showed a total white count of 79.1 with a hemoglobin is 11.9 platelet count of 378.  Peripheral blood smear revealed leuko-erythroblastic reaction with neutrophilic left shift and rare circulating blasts without dysplasia. bone marrow biopsy from December 30, 2019 showing extremely hypercellular marrow with granulocytic and megakaryocytic proliferation, megakaryocytic clustering and atypia, marrow fibrosis and 1.5% blasts.  There is also marked peripheral leukocytosis and leuko-erythroblastic clusters and 1.5% circulating blasts.  There is mild normocytic normochromic anemia.  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       Interval History:  The patient was recently hospitalized with acute anemia with a hemoglobin of 6.6.  He received red blood cell transfusions 2 units and hemoglobin has improved to 10.  Stool for occult blood was negative.  Hydroxyurea was on hold.  the patient symptoms has been gradually improving since discharge from the hospital.  He denies any fever or chills but denies any bruising or bleeding.  He denies any weight loss.    Review of systems:  Pertinent positives have been included in HPI;  remainder of detailed complete 20-point ROS was negative.    Past medical, social, surgical, and family histories reviewed.    Allergies:  Allergies as of 01/05/2021 - Reviewed 01/05/2021   Allergen Reaction Noted     No known drug allergies  01/25/2005     Seasonal allergies  03/11/2019       Current Medications:  Current Outpatient Medications   Medication Sig Dispense Refill     furosemide (LASIX) 40 MG tablet TAKE ONE TABLET BY MOUTH DAILY IN THE MORNING AND TAKE 1/2 TABLET AT NOON (Patient taking differently: Take 40 mg by mouth daily ) 135 tablet 1     GOODSENSE ASPIRIN 325 MG tablet TAKE ONE TABLET BY MOUTH ONCE DAILY IN THE MORNING (Patient taking differently: Take 325 mg by mouth daily ) 90 tablet 3     vitamin B-12 (CYANOCOBALAMIN) 250 MCG tablet TAKE ONE TABLET BY MOUTH EVERY MORNING (Patient taking differently: Take 250 mcg by mouth daily ) 90 tablet 3     atorvastatin (LIPITOR) 40 MG tablet TAKE ONE TABLET BY MOUTH EVERY NIGHT AT BEDTIME 90 tablet 1     Ferrous Sulfate 324 (65 Fe) MG TBEC Take 1 tablet (324 mg) by mouth daily 30 tablet 0     hydroxyurea (HYDREA) 500 MG capsule Take 1 capsule (500 mg) by mouth daily       metoprolol succinate ER (TOPROL-XL) 100 MG 24 hr tablet Take 1 tablet (100 mg) by mouth 2 times daily HOLD this medication until you see your PCP 60 tablet 5     omeprazole (PRILOSEC) 20 MG DR capsule Take 1 capsule (20 mg) by mouth daily (Patient not taking: Reported on 1/5/2021) 30 capsule 1     senna-docusate (SENOKOT-S/PERICOLACE) 8.6-50 MG tablet Take 2 tablets by mouth 2 times daily 120 tablet 5        Laboratory/Imaging Studies:  Office Visit on 12/28/2020   Component Date Value Ref Range Status     WBC 12/28/2020 9.9  4.0 - 11.0 10e9/L Final     RBC Count 12/28/2020 2.08* 4.4 - 5.9 10e12/L Final     Hemoglobin 12/28/2020 7.8* 13.3 - 17.7 g/dL Final     Hematocrit 12/28/2020 23.3* 40.0 - 53.0 % Final     MCV 12/28/2020 112* 78 - 100 fl Final     MCH 12/28/2020 37.5* 26.5 - 33.0  pg Final     MCHC 12/28/2020 33.5  31.5 - 36.5 g/dL Final     RDW 12/28/2020 20.0* 10.0 - 15.0 % Final     Platelet Count 12/28/2020 66* 150 - 450 10e9/L Final     Sodium 12/28/2020 142  133 - 144 mmol/L Final     Potassium 12/28/2020 4.2  3.4 - 5.3 mmol/L Final     Chloride 12/28/2020 109  94 - 109 mmol/L Final     Carbon Dioxide 12/28/2020 27  20 - 32 mmol/L Final     Anion Gap 12/28/2020 6  3 - 14 mmol/L Final     Glucose 12/28/2020 96  70 - 99 mg/dL Final     Urea Nitrogen 12/28/2020 28  7 - 30 mg/dL Final     Creatinine 12/28/2020 1.39* 0.66 - 1.25 mg/dL Final     GFR Estimate 12/28/2020 51* >60 mL/min/[1.73_m2] Final    Comment: Non  GFR Calc  Starting 12/18/2018, serum creatinine based estimated GFR (eGFR) will be   calculated using the Chronic Kidney Disease Epidemiology Collaboration   (CKD-EPI) equation.       GFR Estimate If Black 12/28/2020 59* >60 mL/min/[1.73_m2] Final    Comment:  GFR Calc  Starting 12/18/2018, serum creatinine based estimated GFR (eGFR) will be   calculated using the Chronic Kidney Disease Epidemiology Collaboration   (CKD-EPI) equation.       Calcium 12/28/2020 9.2  8.5 - 10.1 mg/dL Final        Recent Results (from the past 744 hour(s))   XR Chest Port 1 View    Narrative    CHEST ONE VIEW PORTABLE  12/21/2020 10:24 AM     HISTORY: Short of air.    COMPARISON: Chest x-rays dated 2/18/2020.    FINDINGS:  Lungs are hypoaerated. There may be a small left pleural  fluid collection, similar to prior study. There are increased  interstitial markings in the bilateral lungs concerning for vascular  congestion. Cardiac silhouette is enlarged, even given technique. No  pneumothorax or right pleural fluid collection. No acute fracture.       Impression    IMPRESSION: Probable cardiomegaly, mild vascular congestion, and  possible left pleural fluid collection could represent congestive  heart failure. Atypical infiltrative process (such as COVID-19) is  also  possible.    LEEANNA KAUR MD   XR Chest Port 1 View    Narrative    CHEST PORTABLE ONE VIEW   1/7/2021 8:20 AM     HISTORY: Short of air.    COMPARISON: 12/21/2020.      Impression    IMPRESSION: Bilateral mildly increased vascular congestion may be  worsening edema. Stable cardiomegaly. Patchy bibasilar pulmonary  opacities persist and could be atelectasis or multifocal airspace  disease with pneumonia being a possibility. There may be trace left  pleural fluid that is stable.    ALEXIS FARIAS MD   CT Chest Pulmonary Embolism w Contrast    Narrative    CT CHEST PULMONARY EMBOLISM WITH INTRAVENOUS CONTRAST  1/7/2021 9:13  AM     HISTORY:  Shortness of breath, elevated D-dimer examination. Status  post coronary artery bypass graft surgery, hypertension.    COMPARISON: Chest x-ray dated 1/7/2021, chest CT dated 12/16/2019.    TECHNIQUE: CT of the chest was performed following the administration  of 80 cc intravenous contrast. Images are viewed in the axial and  coronal planes. Radiation dose for this scan was reduced using  automated exposure control, adjustment of the mA and/or kV according  to patient size, or iterative reconstruction technique.    FINDINGS: No definite evidence for a pulmonary embolism. Median  sternotomy changes and postsurgical changes consistent with coronary  artery bypass graft surgery. Mild cardiomegaly.    There are small bilateral pleural effusions with adjacent atelectasis  in the lower lobes. Lungs are otherwise clear.    Again identified are pleural plaques along the hemidiaphragms  bilaterally most likely secondary to previous asbestos exposure.     The upper aspects of the abdomen are included on this exam. Again  noted is mild splenomegaly. The spleen measures approximately 17 cm in  length.      Impression    IMPRESSION:  1. No definite pulmonary embolism.  2. Small bilateral pleural effusions with mild adjacent atelectasis.  3. Stable mild splenomegaly.    CLARY AN MD        Assessment and plan:    (D47.1) Myeloproliferative disorder (H)  (primary encounter diagnosis)  I reviewed with patient today most recent laboratory test.  Hemoglobin and platelet count has been improving.  I would recommend to start hydroxyurea and monitor CBC very closely.  I will see the patient again in 1 months or sooner if there are new developments or concerns.    (I10) Essential hypertension  Patient currently metoprolol 100 mg daily.  Is also on furosemide 40 mg orally daily.      The patient is ready to learn, no apparent learning barriers were identified.  Diagnosis and treatment plans were explained to the patient. The patient expressed understanding of the content. The patient asked appropriate questions. The patient questions were answered to his satisfaction.    Telephone call lasted 30 minutes.    Mani Cristina MD    Chart documentation with Dragon Voice recognition Software. Although reviewed after completion, some words and grammatical errors may remain.                                                      Again, thank you for allowing me to participate in the care of your patient.        Sincerely,        Mani Cristina MD

## 2021-01-07 ENCOUNTER — APPOINTMENT (OUTPATIENT)
Dept: CT IMAGING | Facility: CLINIC | Age: 71
End: 2021-01-07
Attending: EMERGENCY MEDICINE
Payer: COMMERCIAL

## 2021-01-07 ENCOUNTER — HOSPITAL ENCOUNTER (OUTPATIENT)
Facility: CLINIC | Age: 71
Setting detail: OBSERVATION
Discharge: HOME | End: 2021-01-08
Attending: EMERGENCY MEDICINE | Admitting: PEDIATRICS
Payer: COMMERCIAL

## 2021-01-07 ENCOUNTER — APPOINTMENT (OUTPATIENT)
Dept: GENERAL RADIOLOGY | Facility: CLINIC | Age: 71
End: 2021-01-07
Attending: EMERGENCY MEDICINE
Payer: COMMERCIAL

## 2021-01-07 DIAGNOSIS — R79.89 ELEVATED TROPONIN: ICD-10-CM

## 2021-01-07 DIAGNOSIS — I50.32 CHRONIC DIASTOLIC HEART FAILURE (H): ICD-10-CM

## 2021-01-07 DIAGNOSIS — I25.810 CORONARY ARTERY DISEASE INVOLVING CORONARY BYPASS GRAFT OF NATIVE HEART WITHOUT ANGINA PECTORIS: ICD-10-CM

## 2021-01-07 DIAGNOSIS — C94.6 MYELODYSPLASTIC DISEASE (H): ICD-10-CM

## 2021-01-07 DIAGNOSIS — D47.1 MYELOPROLIFERATIVE DISORDER (H): ICD-10-CM

## 2021-01-07 DIAGNOSIS — Z95.1 S/P CABG (CORONARY ARTERY BYPASS GRAFT): ICD-10-CM

## 2021-01-07 DIAGNOSIS — Z11.52 ENCOUNTER FOR SCREENING LABORATORY TESTING FOR SEVERE ACUTE RESPIRATORY SYNDROME CORONAVIRUS 2 (SARS-COV-2): ICD-10-CM

## 2021-01-07 DIAGNOSIS — D63.8 ANEMIA IN OTHER CHRONIC DISEASES CLASSIFIED ELSEWHERE: ICD-10-CM

## 2021-01-07 PROBLEM — R06.09 DYSPNEA ON EXERTION: Status: ACTIVE | Noted: 2020-12-21

## 2021-01-07 PROBLEM — D64.9 ACUTE ANEMIA: Status: ACTIVE | Noted: 2020-12-21

## 2021-01-07 PROBLEM — N18.30 CKD (CHRONIC KIDNEY DISEASE) STAGE 3, GFR 30-59 ML/MIN (H): Status: ACTIVE | Noted: 2020-12-21

## 2021-01-07 LAB
ABO + RH BLD: NORMAL
ABO + RH BLD: NORMAL
ANION GAP SERPL CALCULATED.3IONS-SCNC: 7 MMOL/L (ref 3–14)
BASE EXCESS BLDA CALC-SCNC: 0.8 MMOL/L
BASOPHILS # BLD AUTO: 0.2 10E9/L (ref 0–0.2)
BASOPHILS NFR BLD AUTO: 1 %
BLD GP AB SCN SERPL QL: NORMAL
BLD PROD TYP BPU: NORMAL
BLD UNIT ID BPU: 0
BLD UNIT ID BPU: 0
BLOOD BANK CMNT PATIENT-IMP: NORMAL
BLOOD PRODUCT CODE: NORMAL
BLOOD PRODUCT CODE: NORMAL
BPU ID: NORMAL
BPU ID: NORMAL
BUN SERPL-MCNC: 29 MG/DL (ref 7–30)
CALCIUM SERPL-MCNC: 9.2 MG/DL (ref 8.5–10.1)
CHLORIDE SERPL-SCNC: 109 MMOL/L (ref 94–109)
CO2 SERPL-SCNC: 26 MMOL/L (ref 20–32)
CREAT SERPL-MCNC: 1.36 MG/DL (ref 0.66–1.25)
D DIMER PPP FEU-MCNC: 1.5 UG/ML FEU (ref 0–0.5)
DIFFERENTIAL METHOD BLD: ABNORMAL
EOSINOPHIL # BLD AUTO: 0 10E9/L (ref 0–0.7)
EOSINOPHIL NFR BLD AUTO: 0 %
ERYTHROCYTE [DISTWIDTH] IN BLOOD BY AUTOMATED COUNT: 20.6 % (ref 10–15)
FLUAV RNA RESP QL NAA+PROBE: NEGATIVE
FLUBV RNA RESP QL NAA+PROBE: NEGATIVE
GFR SERPL CREATININE-BSD FRML MDRD: 52 ML/MIN/{1.73_M2}
GLUCOSE SERPL-MCNC: 103 MG/DL (ref 70–99)
HCO3 BLD-SCNC: 25 MMOL/L (ref 21–28)
HCT VFR BLD AUTO: 20 % (ref 40–53)
HGB BLD-MCNC: 6.6 G/DL (ref 13.3–17.7)
HGB BLD-MCNC: 7.7 G/DL (ref 13.3–17.7)
HGB BLD-MCNC: 8.2 G/DL (ref 13.3–17.7)
LABORATORY COMMENT REPORT: NORMAL
LACTATE BLD-SCNC: 1.6 MMOL/L (ref 0.7–2)
LYMPHOCYTES # BLD AUTO: 4 10E9/L (ref 0.8–5.3)
LYMPHOCYTES NFR BLD AUTO: 25 %
MCH RBC QN AUTO: 36.9 PG (ref 26.5–33)
MCHC RBC AUTO-ENTMCNC: 33 G/DL (ref 31.5–36.5)
MCV RBC AUTO: 112 FL (ref 78–100)
MONOCYTES # BLD AUTO: 0.2 10E9/L (ref 0–1.3)
MONOCYTES NFR BLD AUTO: 1 %
NEUTROPHILS # BLD AUTO: 11.6 10E9/L (ref 1.6–8.3)
NEUTROPHILS NFR BLD AUTO: 73 %
NT-PROBNP SERPL-MCNC: 2427 PG/ML (ref 0–900)
NUM BPU REQUESTED: 2
O2/TOTAL GAS SETTING VFR VENT: 21 %
PCO2 BLD: 38 MM HG (ref 35–45)
PH BLD: 7.43 PH (ref 7.35–7.45)
PLATELET # BLD AUTO: 70 10E9/L (ref 150–450)
PLATELET # BLD EST: ABNORMAL 10*3/UL
PO2 BLD: 69 MM HG (ref 80–105)
POTASSIUM SERPL-SCNC: 4.1 MMOL/L (ref 3.4–5.3)
PROCALCITONIN SERPL-MCNC: 0.11 NG/ML
RBC # BLD AUTO: 1.79 10E12/L (ref 4.4–5.9)
RBC MORPH BLD: ABNORMAL
RSV RNA SPEC QL NAA+PROBE: NORMAL
SARS-COV-2 RNA RESP QL NAA+PROBE: NEGATIVE
SODIUM SERPL-SCNC: 142 MMOL/L (ref 133–144)
SPECIMEN EXP DATE BLD: NORMAL
SPECIMEN SOURCE: NORMAL
TRANSFUSION STATUS PATIENT QL: NORMAL
TROPONIN I SERPL-MCNC: 0.1 UG/L (ref 0–0.04)
TROPONIN I SERPL-MCNC: 0.1 UG/L (ref 0–0.04)
TROPONIN I SERPL-MCNC: 0.11 UG/L (ref 0–0.04)
TROPONIN I SERPL-MCNC: 0.12 UG/L (ref 0–0.04)
WBC # BLD AUTO: 15.9 10E9/L (ref 4–11)

## 2021-01-07 PROCEDURE — 96374 THER/PROPH/DIAG INJ IV PUSH: CPT | Mod: 59 | Performed by: EMERGENCY MEDICINE

## 2021-01-07 PROCEDURE — 83605 ASSAY OF LACTIC ACID: CPT | Performed by: NURSE PRACTITIONER

## 2021-01-07 PROCEDURE — 250N000009 HC RX 250: Performed by: EMERGENCY MEDICINE

## 2021-01-07 PROCEDURE — 80048 BASIC METABOLIC PNL TOTAL CA: CPT | Performed by: EMERGENCY MEDICINE

## 2021-01-07 PROCEDURE — G0378 HOSPITAL OBSERVATION PER HR: HCPCS

## 2021-01-07 PROCEDURE — 93005 ELECTROCARDIOGRAM TRACING: CPT | Performed by: EMERGENCY MEDICINE

## 2021-01-07 PROCEDURE — 71275 CT ANGIOGRAPHY CHEST: CPT

## 2021-01-07 PROCEDURE — 99207 PR CDG-CODE CATEGORY CHANGED: CPT | Performed by: NURSE PRACTITIONER

## 2021-01-07 PROCEDURE — 36415 COLL VENOUS BLD VENIPUNCTURE: CPT | Performed by: EMERGENCY MEDICINE

## 2021-01-07 PROCEDURE — 250N000011 HC RX IP 250 OP 636: Performed by: EMERGENCY MEDICINE

## 2021-01-07 PROCEDURE — 84484 ASSAY OF TROPONIN QUANT: CPT | Mod: 91 | Performed by: NURSE PRACTITIONER

## 2021-01-07 PROCEDURE — 84484 ASSAY OF TROPONIN QUANT: CPT | Mod: 91 | Performed by: EMERGENCY MEDICINE

## 2021-01-07 PROCEDURE — 86901 BLOOD TYPING SEROLOGIC RH(D): CPT | Performed by: EMERGENCY MEDICINE

## 2021-01-07 PROCEDURE — 36415 COLL VENOUS BLD VENIPUNCTURE: CPT | Performed by: NURSE PRACTITIONER

## 2021-01-07 PROCEDURE — 93010 ELECTROCARDIOGRAM REPORT: CPT | Performed by: EMERGENCY MEDICINE

## 2021-01-07 PROCEDURE — 87636 SARSCOV2 & INF A&B AMP PRB: CPT | Performed by: EMERGENCY MEDICINE

## 2021-01-07 PROCEDURE — 99220 PR INITIAL OBSERVATION CARE,LEVEL III: CPT | Performed by: NURSE PRACTITIONER

## 2021-01-07 PROCEDURE — 99285 EMERGENCY DEPT VISIT HI MDM: CPT | Mod: 25 | Performed by: EMERGENCY MEDICINE

## 2021-01-07 PROCEDURE — 86923 COMPATIBILITY TEST ELECTRIC: CPT | Performed by: EMERGENCY MEDICINE

## 2021-01-07 PROCEDURE — 36430 TRANSFUSION BLD/BLD COMPNT: CPT | Performed by: EMERGENCY MEDICINE

## 2021-01-07 PROCEDURE — P9016 RBC LEUKOCYTES REDUCED: HCPCS | Performed by: EMERGENCY MEDICINE

## 2021-01-07 PROCEDURE — 86900 BLOOD TYPING SEROLOGIC ABO: CPT | Performed by: EMERGENCY MEDICINE

## 2021-01-07 PROCEDURE — 84484 ASSAY OF TROPONIN QUANT: CPT | Performed by: EMERGENCY MEDICINE

## 2021-01-07 PROCEDURE — 250N000013 HC RX MED GY IP 250 OP 250 PS 637: Performed by: EMERGENCY MEDICINE

## 2021-01-07 PROCEDURE — 86850 RBC ANTIBODY SCREEN: CPT | Performed by: EMERGENCY MEDICINE

## 2021-01-07 PROCEDURE — 84145 PROCALCITONIN (PCT): CPT | Performed by: NURSE PRACTITIONER

## 2021-01-07 PROCEDURE — 85025 COMPLETE CBC W/AUTO DIFF WBC: CPT | Performed by: EMERGENCY MEDICINE

## 2021-01-07 PROCEDURE — 85379 FIBRIN DEGRADATION QUANT: CPT | Performed by: EMERGENCY MEDICINE

## 2021-01-07 PROCEDURE — 85018 HEMOGLOBIN: CPT | Mod: 91 | Performed by: NURSE PRACTITIONER

## 2021-01-07 PROCEDURE — 71045 X-RAY EXAM CHEST 1 VIEW: CPT

## 2021-01-07 PROCEDURE — 82803 BLOOD GASES ANY COMBINATION: CPT | Performed by: EMERGENCY MEDICINE

## 2021-01-07 PROCEDURE — C9803 HOPD COVID-19 SPEC COLLECT: HCPCS | Performed by: EMERGENCY MEDICINE

## 2021-01-07 PROCEDURE — 83880 ASSAY OF NATRIURETIC PEPTIDE: CPT | Performed by: EMERGENCY MEDICINE

## 2021-01-07 RX ORDER — ACETAMINOPHEN 325 MG/1
650 TABLET ORAL EVERY 4 HOURS PRN
Status: DISCONTINUED | OUTPATIENT
Start: 2021-01-07 | End: 2021-01-08 | Stop reason: HOSPADM

## 2021-01-07 RX ORDER — FUROSEMIDE 10 MG/ML
40 INJECTION INTRAMUSCULAR; INTRAVENOUS ONCE
Status: COMPLETED | OUTPATIENT
Start: 2021-01-07 | End: 2021-01-07

## 2021-01-07 RX ORDER — LIDOCAINE 40 MG/G
CREAM TOPICAL
Status: DISCONTINUED | OUTPATIENT
Start: 2021-01-07 | End: 2021-01-08 | Stop reason: HOSPADM

## 2021-01-07 RX ORDER — ASPIRIN 81 MG/1
324 TABLET, CHEWABLE ORAL ONCE
Status: COMPLETED | OUTPATIENT
Start: 2021-01-07 | End: 2021-01-07

## 2021-01-07 RX ORDER — IOPAMIDOL 755 MG/ML
500 INJECTION, SOLUTION INTRAVASCULAR ONCE
Status: COMPLETED | OUTPATIENT
Start: 2021-01-07 | End: 2021-01-07

## 2021-01-07 RX ORDER — AMOXICILLIN 250 MG
1-2 CAPSULE ORAL 2 TIMES DAILY PRN
Status: DISCONTINUED | OUTPATIENT
Start: 2021-01-07 | End: 2021-01-08 | Stop reason: HOSPADM

## 2021-01-07 RX ORDER — FUROSEMIDE 40 MG
40 TABLET ORAL DAILY
Status: DISCONTINUED | OUTPATIENT
Start: 2021-01-08 | End: 2021-01-08 | Stop reason: HOSPADM

## 2021-01-07 RX ORDER — FUROSEMIDE 20 MG
20 TABLET ORAL DAILY
Status: DISCONTINUED | OUTPATIENT
Start: 2021-01-08 | End: 2021-01-08 | Stop reason: HOSPADM

## 2021-01-07 RX ORDER — FERROUS SULFATE 325(65) MG
325 TABLET ORAL DAILY
Status: DISCONTINUED | OUTPATIENT
Start: 2021-01-08 | End: 2021-01-08 | Stop reason: HOSPADM

## 2021-01-07 RX ORDER — METOPROLOL SUCCINATE 100 MG/1
100 TABLET, EXTENDED RELEASE ORAL 2 TIMES DAILY
Status: DISCONTINUED | OUTPATIENT
Start: 2021-01-07 | End: 2021-01-08 | Stop reason: HOSPADM

## 2021-01-07 RX ADMIN — SODIUM CHLORIDE 70 ML: 9 INJECTION, SOLUTION INTRAVENOUS at 09:04

## 2021-01-07 RX ADMIN — FUROSEMIDE 40 MG: 10 INJECTION, SOLUTION INTRAVENOUS at 09:18

## 2021-01-07 RX ADMIN — IOPAMIDOL 80 ML: 755 INJECTION, SOLUTION INTRAVENOUS at 09:04

## 2021-01-07 RX ADMIN — ASPIRIN 81 MG 324 MG: 81 TABLET ORAL at 09:15

## 2021-01-07 ASSESSMENT — ENCOUNTER SYMPTOMS
FEVER: 0
ENDOCRINE NEGATIVE: 1
ALLERGIC/IMMUNOLOGIC NEGATIVE: 1
FATIGUE: 1
APPETITE CHANGE: 0
PSYCHIATRIC NEGATIVE: 1
WEAKNESS: 1
HEMATOLOGIC/LYMPHATIC NEGATIVE: 1
MUSCULOSKELETAL NEGATIVE: 1
PALPITATIONS: 0
WHEEZING: 0
ACTIVITY CHANGE: 1
SHORTNESS OF BREATH: 1
COUGH: 0
EYES NEGATIVE: 1
GASTROINTESTINAL NEGATIVE: 1

## 2021-01-07 ASSESSMENT — MIFFLIN-ST. JEOR: SCORE: 1703.83

## 2021-01-07 NOTE — ED PROVIDER NOTES
History     Chief Complaint   Patient presents with     Shortness of Breath     HPI     Renny Gannon is a 70 year old male with significant PMH of CKD stage III, myeloproliferative disorder(hospitalization December 21 for anemia, hemoglobin = 6.6-received packed red blood cells x2 units), coronary disease s/p CABG February 2020, hypertension, CHF with preserved EF(chronic dependent pitting edema),TIA.    Patient presents with dyspnea.  Denies fever, chills or night sweats.  No cough or chest congestion.  Very infrequent vague chest discomfort not brought on by activity or breathing.  Points substernal.  Not triggered by exertion.  Weight is up 2 pounds from his hospital admission in December.  Leg edema is stable.  Complaining of generalized weakness.  Reports dyspnea is present at rest intensifies with activity.  Describes no melena type stools.        Allergies:  Allergies   Allergen Reactions     No Known Drug Allergies      Seasonal Allergies        Problem List:    Patient Active Problem List    Diagnosis Date Noted     Dyspnea on exertion 12/21/2020     Priority: Medium     Acute anemia 12/21/2020     Priority: Medium     CKD (chronic kidney disease) stage 3, GFR 30-59 ml/min 12/21/2020     Priority: Medium     Elevated serum creatinine 11/17/2020     Priority: Medium     Vaccination refused by patient 08/20/2020     Priority: Medium     S/P CABG (coronary artery bypass graft) 02/10/2020     Priority: Medium     TIA (transient ischemic attack) 02/10/2020     Priority: Medium     Myeloproliferative disorder (H) 01/26/2020     Priority: Medium     Status post coronary angiogram 01/09/2020     Priority: Medium     Coronary artery disease involving native coronary artery of native heart with other form of angina pectoris (H) 01/02/2020     Priority: Medium     Added automatically from request for surgery [id number]       Essential hypertension 03/11/2019     Priority: Medium     Memory loss 03/11/2019      Priority: Medium        Past Medical History:    Past Medical History:   Diagnosis Date     Hypertension        Past Surgical History:    Past Surgical History:   Procedure Laterality Date     BONE MARROW BIOPSY, BONE SPECIMEN, NEEDLE/TROCAR N/A 2019    Procedure: BIOPSY, BONE MARROW;  Surgeon: Aguilar Krause MD;  Location: [facility]     BYPASS GRAFT ARTERY CORONARY N/A 2020    Procedure: CORONARY ARTERY BYPASS GRAFTING X 3 - LIMA TO LAD, SV TO OM  AND PDA; ENDOVEIN HARVEST OF BILATERAL LEGS; ON PUMP WITH SANDRA;  Surgeon: Mable Barrera MD;  Location:  OR     CV CORONARY ANGIOGRAM Left 2020    Procedure: Coronary Angiogram;  Surgeon: Devin Bentley MD;  Location:  [facility]     NO HISTORY OF SURGERY         Family History:    Family History   Problem Relation Age of Onset     No Known Problems Mother      Unknown/Adopted Father      Unknown/Adopted Maternal Grandmother      Unknown/Adopted Maternal Grandfather      Unknown/Adopted Paternal Grandmother      Unknown/Adopted Paternal Grandfather      Cancer Brother         lung cancer - smoking     No Known Problems Sister      Coronary Artery Disease Brother          of MI at 66     No Known Problems Sister        Social History:  Marital Status:  Single [1]  Social History     Tobacco Use     Smoking status: Former Smoker     Years: 30.00     Types: Cigarettes     Start date: 1961     Quit date: 2001     Years since quittin.9     Smokeless tobacco: Never Used   Substance Use Topics     Alcohol use: No     Frequency: Never     Drug use: No        Medications:         atorvastatin (LIPITOR) 40 MG tablet       Ferrous Sulfate 324 (65 Fe) MG TBEC       furosemide (LASIX) 40 MG tablet       GOODSENSE ASPIRIN 325 MG tablet       metoprolol succinate ER (TOPROL-XL) 100 MG 24 hr tablet       omeprazole (PRILOSEC) 20 MG DR capsule       senna-docusate (SENOKOT-S/PERICOLACE) 8.6-50 MG tablet        vitamin B-12 (CYANOCOBALAMIN) 250 MCG tablet          Review of Systems   Constitutional: Positive for activity change and fatigue. Negative for appetite change and fever.   HENT: Negative.    Eyes: Negative.    Respiratory: Positive for shortness of breath. Negative for cough and wheezing.    Cardiovascular: Positive for chest pain and leg swelling. Negative for palpitations.   Gastrointestinal: Negative.    Endocrine: Negative.    Genitourinary: Negative.    Musculoskeletal: Negative.    Skin: Negative.    Allergic/Immunologic: Negative.    Neurological: Positive for weakness.   Hematological: Negative.    Psychiatric/Behavioral: Negative.    All other systems reviewed and are negative.      Physical Exam   BP: 135/43  Pulse: 80  Temp: 97.5  F (36.4  C)  Resp: 20  Weight: 99.8 kg (220 lb)  SpO2: 96 %      Physical Exam  Vitals signs and nursing note reviewed.   Constitutional:       Appearance: He is obese.      Comments: Appears pale   Eyes:      Pupils: Pupils are equal, round, and reactive to light.      Comments: No icteric sclera   Neck:      Comments: No JVD  Cardiovascular:      Rate and Rhythm: Normal rate and regular rhythm.      Pulses: Normal pulses.      Heart sounds: No murmur. No friction rub.   Pulmonary:      Effort: Pulmonary effort is normal.      Comments: Not tachypneic.  Speaks in full sentence structure.  Few crackles left lung base otherwise lungs are clear to auscultation.  Abdominal:      General: Abdomen is flat.      Tenderness: There is no abdominal tenderness.   Musculoskeletal:      Right lower leg: Edema present.      Left lower leg: Edema present.      Comments: 3+ pitting edema bilateral lower extremities.   Skin:     Capillary Refill: Capillary refill takes 2 to 3 seconds.      Coloration: Skin is not jaundiced.      Findings: No rash.   Neurological:      General: No focal deficit present.      Mental Status: He is alert and oriented to person, place, and time.   Psychiatric:          Mood and Affect: Mood normal.         Behavior: Behavior normal.         ED Course        Procedures          EKG:  Interpretation by Casey Gannon DO.   Indication: Dyspnea  Rate 84 bpm  Sinus  Rhythm   Diffuse ST depression  -Nondiagnostic.     ABNORMAL          Results for orders placed or performed during the hospital encounter of 01/07/21 (from the past 24 hour(s))   Symptomatic Influenza A/B & SARS-CoV2 (COVID-19) Virus PCR Multiplex    Specimen: Nasopharyngeal   Result Value Ref Range    Flu A/B & SARS-COV-2 PCR Source Nasopharyngeal     SARS-CoV-2 PCR Result NEGATIVE     Influenza A PCR Negative NEG^Negative    Influenza B PCR Negative NEG^Negative    Respiratory Syncytial Virus PCR (Note)     Flu A/B & SARS-CoV-2 PCR Comment (Note)    CBC with platelets differential   Result Value Ref Range    WBC 15.9 (H) 4.0 - 11.0 10e9/L    RBC Count 1.79 (L) 4.4 - 5.9 10e12/L    Hemoglobin 6.6 (LL) 13.3 - 17.7 g/dL    Hematocrit 20.0 (L) 40.0 - 53.0 %     (H) 78 - 100 fl    MCH 36.9 (H) 26.5 - 33.0 pg    MCHC 33.0 31.5 - 36.5 g/dL    RDW 20.6 (H) 10.0 - 15.0 %    Platelet Count 70 (L) 150 - 450 10e9/L    Diff Method Manual Differential     % Neutrophils 73.0 %    % Lymphocytes 25.0 %    % Monocytes 1.0 %    % Eosinophils 0.0 %    % Basophils 1.0 %    Absolute Neutrophil 11.6 (H) 1.6 - 8.3 10e9/L    Absolute Lymphocytes 4.0 0.8 - 5.3 10e9/L    Absolute Monocytes 0.2 0.0 - 1.3 10e9/L    Absolute Eosinophils 0.0 0.0 - 0.7 10e9/L    Absolute Basophils 0.2 0.0 - 0.2 10e9/L    RBC Morphology Consistent with reported results     Platelet Estimate       Automated count confirmed.  Platelet morphology is normal.   Basic metabolic panel   Result Value Ref Range    Sodium 142 133 - 144 mmol/L    Potassium 4.1 3.4 - 5.3 mmol/L    Chloride 109 94 - 109 mmol/L    Carbon Dioxide 26 20 - 32 mmol/L    Anion Gap 7 3 - 14 mmol/L    Glucose 103 (H) 70 - 99 mg/dL    Urea Nitrogen 29 7 - 30 mg/dL    Creatinine 1.36 (H) 0.66 -  1.25 mg/dL    GFR Estimate 52 (L) >60 mL/min/[1.73_m2]    GFR Estimate If Black 60 (L) >60 mL/min/[1.73_m2]    Calcium 9.2 8.5 - 10.1 mg/dL   Troponin I   Result Value Ref Range    Troponin I ES 0.103 (H) 0.000 - 0.045 ug/L   Nt probnp inpatient (BNP)   Result Value Ref Range    N-Terminal Pro BNP Inpatient 2,427 (H) 0 - 900 pg/mL   D dimer quantitative   Result Value Ref Range    D Dimer 1.5 (H) 0.0 - 0.50 ug/ml FEU   ABO/Rh type and screen   Result Value Ref Range    Units Ordered 2     ABO A     RH(D) Neg     Antibody Screen Neg     Test Valid Only At [facility]        Specimen Expires 01/10/2021     Crossmatch Red Blood Cells    Blood component   Result Value Ref Range    Unit Number [id number]     Blood Component Type Red Blood Cells Leukocyte Reduced     Division Number 00     Status of Unit Ready for patient 01/07/2021 0938     Blood Product Code [id number]     Unit Status MELVI    Blood component   Result Value Ref Range    Unit Number [id number]     Blood Component Type Red Blood Cells Leukocyte Reduced     Division Number 00     Status of Unit Released to care unit 01/07/2021 1002     Blood Product Code [id number]     Unit Status ISS    XR Chest Port 1 View    Narrative    CHEST PORTABLE ONE VIEW   1/7/2021 8:20 AM     HISTORY: Short of air.    COMPARISON: 12/21/2020.      Impression    IMPRESSION: Bilateral mildly increased vascular congestion may be  worsening edema. Stable cardiomegaly. Patchy bibasilar pulmonary  opacities persist and could be atelectasis or multifocal airspace  disease with pneumonia being a possibility. There may be trace left  pleural fluid that is stable.   CT Chest Pulmonary Embolism w Contrast    Narrative    CT CHEST PULMONARY EMBOLISM WITH INTRAVENOUS CONTRAST  1/7/2021 9:13  AM     HISTORY:  Shortness of breath, elevated D-dimer examination. Status  post coronary artery bypass graft surgery, hypertension.    COMPARISON: Chest x-ray dated 1/7/2021, chest CT  dated 12/16/2019.    TECHNIQUE: CT of the chest was performed following the administration  of 80 cc intravenous contrast. Images are viewed in the axial and  coronal planes. Radiation dose for this scan was reduced using  automated exposure control, adjustment of the mA and/or kV according  to patient size, or iterative reconstruction technique.    FINDINGS: No definite evidence for a pulmonary embolism. Median  sternotomy changes and postsurgical changes consistent with coronary  artery bypass graft surgery. Mild cardiomegaly.    There are small bilateral pleural effusions with adjacent atelectasis  in the lower lobes. Lungs are otherwise clear.    Again identified are pleural plaques along the hemidiaphragms  bilaterally most likely secondary to previous asbestos exposure.     The upper aspects of the abdomen are included on this exam. Again  noted is mild splenomegaly. The spleen measures approximately 17 cm in  length.      Impression    IMPRESSION:  1. No definite pulmonary embolism.  2. Small bilateral pleural effusions with mild adjacent atelectasis.  3. Stable mild splenomegaly.    CLARY AN MD   Troponin I (now)   Result Value Ref Range    Troponin I ES 0.101 (H) 0.000 - 0.045 ug/L   Blood gas arterial   Result Value Ref Range    pH Arterial 7.43 7.35 - 7.45 pH    pCO2 Arterial 38 35 - 45 mm Hg    pO2 Arterial 69 (L) 80 - 105 mm Hg    Bicarbonate Arterial 25 21 - 28 mmol/L    Base Excess Art 0.8 mmol/L    FIO2 21        Medications - No data to display    Assessments & Plan (with Medical Decision Making)  Mr. Gannon is 70 years of age.  Presents with worsening dyspnea.  Problem list:  #1.  Myeloproliferative disorder with acute anemia hgb=6.6   Received PRBC 2 units 12/21/20- pretransfusion hemoglobin 6.6, posttransfusion hemoglobin = 10.0 has had macrocytic indices.  Uncertain if screening for folate or B12 deficiency has been completed.  Plan: Transfuse 2 has packed red blood cells  #2.  Acute  CHF decompensation.    History for diastolic heart dysfunction with preserved EF per echocardiogram 2/15/20.  Weight up 2-3 pounds.  Increased leg edema.  Elevated BNP (2427).  BNP on 12/21/2020 = 1284.  CXR= increased cardiomegaly and interstitial changes of CHF.   Plan: Patient currently on Lasix 60 mg p.o. daily.  Administered 40 mg IV prior to starting transfusion.  #3.  Elevated V-btwan-F-dimer  December 21 = 0.9 D-dimer today = 1.5.  Is having bilateral leg swelling which appears to be primarily edema though cannot rule out DVT.  GFR = 52  Plan: CT PE study  #4.  Elevated troponin.  No current chest pain.  Review of systems did note that is been having some very mild intermittent chest discomfort.  Troponin = 0.103.  EKG shows diffuse ST segment depression that is nonspecific.  No ST segment elevation.   Plan: Administer 4 baby aspirin.  Repeat troponin in 2 hours.  If stable will continue to monitor.  If increasing will discuss with cardiology.  COVID-19 PCR test negative.  9:54 AM  CT shows no PE.  No acute interstitial changes of pneumonia.  Mild cardiomegaly but no overt CHF type interstitial changes.  Discussed hospital mission with Dr. Quintana.  He would like a call back after receive second troponin.  We are hopeful that a troponin remains stable and  is reflective of tissue hypoxia secondary to severe anemia.  11:11 AM  Second troponin remained stable.  EKG shows no acute ischemia and patient is having no chest discomfort.  Patient is suitable for hospitalization at [facility].  Will contact Dr. Quintana to discuss admission.             I have reviewed the nursing notes.    I have reviewed the findings, diagnosis, plan and need for follow up with the patient.      New Prescriptions    No medications on file       Final diagnoses:   Anemia in other chronic diseases classified elsewhere   Myeloproliferative disorder (H)   Elevated troponin - Suspect secondary to myocardial strain from anemia  related tissue hypoxia   Chronic diastolic heart failure (H)   Coronary artery disease involving coronary bypass graft of native heart without angina pectoris       1/7/2021   [facility] EMERGENCY DEPT     Casey Gannon,   01/07/21 1116

## 2021-01-07 NOTE — CONSULTS
CLINICAL NUTRITION SERVICES - EDUCATION NOTE    Reason for assessment: CHF Consult for 2 gm NA Diet Education     NUTRITION HISTORY:  Information obtained from EMR:    Principle problem:   Acute anemia  Active Problems: CHF, elevated troponin, HTN, CAD  (H) S/P CABG, myeloproliferative disorder (H), CKD 3  -possible discharge as early as tomorrow ?      Information obtained from Pt:  -does not use table salt since surgery  -doesn't enjoy cooking so knows there is sodium in pre-packaged foods  -wants to follow low sodium food selections     -pt somewhat engaged, but also does not appear interested in content of discussion  ?  Previous diet instructions:  -early 2020 following CABG surgery  ?  CURRENT DIET:  Orders Placed This Encounter      2 Gram Sodium Diet    ?  NUTRITION DIAGNOSIS:  Limited adherence to nutrition-related recommendation related to low sodium nutrition therapy as evidenced by pt report of not using table salt, but choosing processed foods as they are easy to consume and require less cooking, lack of eye contact during nutrition education     INTERVENTIONS:  Provided and educated on the following handouts: Low Sodium Nutrition Therapy,     Provided instruction on above handouts, and discussed rational for limiting Na for CHF and stressed importance of following 2 gm Na guidelines.     Goals:    Patient verbalizes understanding of diet by agreeing to focus more intently on label reading for ready to eat food items to make low sodium selections despite items being shelf-stable and more processed      Follow Up:    Patient to ask any further nutrition-related questions before discharge. In addition, pt may request outpatient RD appointment.       Jeri Live MBA, RD LD  Clinical Dietitian  [facility] office [phone]  on-call weekend pager [phone]

## 2021-01-07 NOTE — UTILIZATION REVIEW
Continued stay Observation     Concurrent stay review; Secondary Review Determination         Under the authority of the Utilization Management Committee, the utilization review process indicated a secondary review on the above patient.  The review outcome is based on review of the medical records, discussions with staff, and applying clinical experience noted on the date of the review.          (x) Observation Status Appropriate - Concurrent stay review    RATIONALE FOR DETERMINATION   70-year-old male with history of myeloproliferative disorder, chronic kidney disease stage III, coronary artery disease s/p bypass graft, hypertension, CHF with preserved EF was admitted to [facility] on 1/7/2021 with dyspnea and was found to have a hemoglobin of 6.6.  No clinical concerns for bleeding.  Patient was previously admitted for similar symptoms and was found to be anemic with hemoglobin of 6.6 at that time.  He does have a history of myeloproliferative disorder.  He did receive 2 unit of PRBC today and his hemoglobin is 7.7.  Plan is to discharge home tomorrow if hemoglobin is stable and if patient is symptomatically improved.  He does not meet inpatient criteria at this time.  However if hemoglobin drops after initial improvement that requires further work-up and hospitalization then he could be changed to inpatient at that time.    Patient is clinically improving and there is no clear indication to change patient's status to inpatient. The severity of illness, intensity of service provided, expected LOS and risk for adverse outcome make the care appropriate for observation.      This document was produced using voice recognition software       The information on this document is developed by the utilization review team in order for the business office to ensure compliance.  This only denotes the appropriateness of proper admission status and does not reflect the quality of care rendered.         The  definitions of Inpatient Status and Observation Status used in making the determination above are those provided in the CMS Coverage Manual, Chapter 1 and Chapter 6, section 70.4.      Sincerely,     Jaskaran Becerril MD    Utilization Review  Physician Advisor  [facility].

## 2021-01-07 NOTE — PROGRESS NOTES
"S-(situation): Patient registered to Observation. Patient arrived to room 270 via cart from ED @1315.    B-(background): Anemia, shortness of breath    A-(assessment):patient A&Ox 4, denies chest pain, slight dyspneic on exertion. Lungs diminished all fields. Will continue blood transfusion and recheck hemoglobin per order.  /55   Pulse 84   Temp 96.1  F (35.6  C) (Oral)   Resp 28   Ht 1.727 m (5' 8\")   Wt 97.4 kg (214 lb 12.8 oz)   SpO2 96%   BMI 32.66 kg/m      R-(recommendations): Orders and observation goals reviewed with patient    Nursing Observation criteria listed below was met:    Skin issues/needs documented:Yes  Isolation needs addressed and Signage up: NA  Fall Prevention: Education given and documented: Yes  Education Assessment documented:No  Education Documented: No  OBS video/handout Reviewed & Documented: Yes  Allergies Reviewed: Yes  Medication Reconciliation Complete: Yes  New medication patient education completed and documented (Possible Side Effects of Common Medications handout): Yes  Home medications if not able to send immediately home with family stored here: NA  Reminder note placed in discharge instructions: NA  Patient has discharge needs (If yes, please explain): No              "

## 2021-01-08 VITALS
SYSTOLIC BLOOD PRESSURE: 123 MMHG | RESPIRATION RATE: 22 BRPM | OXYGEN SATURATION: 93 % | BODY MASS INDEX: 32.04 KG/M2 | HEIGHT: 68 IN | DIASTOLIC BLOOD PRESSURE: 56 MMHG | WEIGHT: 211.4 LBS | TEMPERATURE: 96.5 F | HEART RATE: 86 BPM

## 2021-01-08 PROBLEM — D72.829 LEUKOCYTOSIS: Status: ACTIVE | Noted: 2019-12-22

## 2021-01-08 LAB
ANION GAP SERPL CALCULATED.3IONS-SCNC: 7 MMOL/L (ref 3–14)
ANISOCYTOSIS BLD QL SMEAR: SLIGHT
BASOPHILS # BLD AUTO: 0 10E9/L (ref 0–0.2)
BASOPHILS NFR BLD AUTO: 0 %
BUN SERPL-MCNC: 29 MG/DL (ref 7–30)
CALCIUM SERPL-MCNC: 9.2 MG/DL (ref 8.5–10.1)
CHLORIDE SERPL-SCNC: 107 MMOL/L (ref 94–109)
CO2 SERPL-SCNC: 25 MMOL/L (ref 20–32)
CREAT SERPL-MCNC: 1.26 MG/DL (ref 0.66–1.25)
DIFFERENTIAL METHOD BLD: ABNORMAL
EOSINOPHIL # BLD AUTO: 0.2 10E9/L (ref 0–0.7)
EOSINOPHIL NFR BLD AUTO: 1 %
ERYTHROCYTE [DISTWIDTH] IN BLOOD BY AUTOMATED COUNT: 21.8 % (ref 10–15)
ERYTHROCYTE [DISTWIDTH] IN BLOOD BY AUTOMATED COUNT: 22 % (ref 10–15)
FERRITIN SERPL-MCNC: 1178 NG/ML (ref 26–388)
FOLATE SERPL-MCNC: 9.6 NG/ML
GFR SERPL CREATININE-BSD FRML MDRD: 57 ML/MIN/{1.73_M2}
GLUCOSE SERPL-MCNC: 101 MG/DL (ref 70–99)
HCT VFR BLD AUTO: 25.2 % (ref 40–53)
HCT VFR BLD AUTO: 25.3 % (ref 40–53)
HGB BLD-MCNC: 8.4 G/DL (ref 13.3–17.7)
HGB BLD-MCNC: 8.7 G/DL (ref 13.3–17.7)
LYMPHOCYTES # BLD AUTO: 4.2 10E9/L (ref 0.8–5.3)
LYMPHOCYTES NFR BLD AUTO: 23 %
MCH RBC QN AUTO: 35.1 PG (ref 26.5–33)
MCH RBC QN AUTO: 35.7 PG (ref 26.5–33)
MCHC RBC AUTO-ENTMCNC: 33.3 G/DL (ref 31.5–36.5)
MCHC RBC AUTO-ENTMCNC: 34.4 G/DL (ref 31.5–36.5)
MCV RBC AUTO: 104 FL (ref 78–100)
MCV RBC AUTO: 105 FL (ref 78–100)
MONOCYTES # BLD AUTO: 1.1 10E9/L (ref 0–1.3)
MONOCYTES NFR BLD AUTO: 6 %
NEUTROPHILS # BLD AUTO: 12.7 10E9/L (ref 1.6–8.3)
NEUTROPHILS NFR BLD AUTO: 70 %
PLATELET # BLD AUTO: 90 10E9/L (ref 150–450)
PLATELET # BLD AUTO: 97 10E9/L (ref 150–450)
PLATELET # BLD EST: ABNORMAL 10*3/UL
POTASSIUM SERPL-SCNC: 4.2 MMOL/L (ref 3.4–5.3)
RBC # BLD AUTO: 2.39 10E12/L (ref 4.4–5.9)
RBC # BLD AUTO: 2.44 10E12/L (ref 4.4–5.9)
RBC MORPH BLD: ABNORMAL
RETICS # AUTO: 17.6 10E9/L (ref 25–95)
RETICS/RBC NFR AUTO: 0.7 % (ref 0.5–2)
SODIUM SERPL-SCNC: 139 MMOL/L (ref 133–144)
TROPONIN I SERPL-MCNC: 0.09 UG/L (ref 0–0.04)
TROPONIN I SERPL-MCNC: 0.1 UG/L (ref 0–0.04)
VIT B12 SERPL-MCNC: 1543 PG/ML (ref 193–986)
WBC # BLD AUTO: 18.2 10E9/L (ref 4–11)
WBC # BLD AUTO: 18.3 10E9/L (ref 4–11)

## 2021-01-08 PROCEDURE — 250N000013 HC RX MED GY IP 250 OP 250 PS 637: Performed by: NURSE PRACTITIONER

## 2021-01-08 PROCEDURE — 82746 ASSAY OF FOLIC ACID SERUM: CPT | Performed by: NURSE PRACTITIONER

## 2021-01-08 PROCEDURE — 85045 AUTOMATED RETICULOCYTE COUNT: CPT | Performed by: NURSE PRACTITIONER

## 2021-01-08 PROCEDURE — 36415 COLL VENOUS BLD VENIPUNCTURE: CPT | Performed by: NURSE PRACTITIONER

## 2021-01-08 PROCEDURE — G0378 HOSPITAL OBSERVATION PER HR: HCPCS

## 2021-01-08 PROCEDURE — 999N001109 HC STATISTIC MORPHOLOGY W/INTERP HISTOLOGY TC 85060: Performed by: NURSE PRACTITIONER

## 2021-01-08 PROCEDURE — 85027 COMPLETE CBC AUTOMATED: CPT | Performed by: NURSE PRACTITIONER

## 2021-01-08 PROCEDURE — 85060 BLOOD SMEAR INTERPRETATION: CPT | Performed by: PATHOLOGY

## 2021-01-08 PROCEDURE — 99207 PR CDG-CODE CATEGORY CHANGED: CPT | Performed by: NURSE PRACTITIONER

## 2021-01-08 PROCEDURE — 82607 VITAMIN B-12: CPT | Performed by: NURSE PRACTITIONER

## 2021-01-08 PROCEDURE — 85025 COMPLETE CBC W/AUTO DIFF WBC: CPT | Performed by: NURSE PRACTITIONER

## 2021-01-08 PROCEDURE — 99217 PR OBSERVATION CARE DISCHARGE: CPT | Performed by: NURSE PRACTITIONER

## 2021-01-08 PROCEDURE — 82728 ASSAY OF FERRITIN: CPT | Performed by: NURSE PRACTITIONER

## 2021-01-08 PROCEDURE — 80048 BASIC METABOLIC PNL TOTAL CA: CPT | Performed by: NURSE PRACTITIONER

## 2021-01-08 PROCEDURE — 84484 ASSAY OF TROPONIN QUANT: CPT | Performed by: NURSE PRACTITIONER

## 2021-01-08 RX ORDER — HYDROXYUREA 500 MG/1
500 CAPSULE ORAL DAILY
Status: ON HOLD | COMMUNITY
Start: 2021-01-08 | End: 2021-01-01

## 2021-01-08 RX ORDER — METOPROLOL SUCCINATE 100 MG/1
100 TABLET, EXTENDED RELEASE ORAL 2 TIMES DAILY
Qty: 60 TABLET | Refills: 5 | Status: ON HOLD | COMMUNITY
Start: 2021-01-08 | End: 2021-01-01

## 2021-01-08 RX ADMIN — FUROSEMIDE 40 MG: 40 TABLET ORAL at 07:58

## 2021-01-08 RX ADMIN — FERROUS SULFATE TAB 325 MG (65 MG ELEMENTAL FE) 325 MG: 325 (65 FE) TAB at 07:59

## 2021-01-08 ASSESSMENT — MIFFLIN-ST. JEOR: SCORE: 1688.4

## 2021-01-08 ASSESSMENT — ACTIVITIES OF DAILY LIVING (ADL): DEPENDENT_IADLS:: INDEPENDENT

## 2021-01-08 NOTE — PROGRESS NOTES
"Tolerated 2nd unit of PRBC\"s without incident. /62 (BP Location: Right arm)   Pulse 85   Temp 96.7  F (35.9  C) (Oral)   Resp 20   Ht 1.727 m (5' 8\")   Wt 97.4 kg (214 lb 12.8 oz)   SpO2 96%   BMI 32.66 kg/m  pt up independently in room, reports shortness of breath is \"better\" this evening. Will continue to monitor. Awaiting repeat Hgb.  "

## 2021-01-08 NOTE — CONSULTS
Care Management Initial Consult    General Information  Assessment completed with: Patient,    Type of CM/SW Visit: Initial Assessment    Primary Care Provider verified and updated as needed: Yes   Readmission within the last 30 days:        Reason for Consult: discharge planning  Advance Care Planning: Advance Care Planning Reviewed: present on chart          Communication Assessment  Patient's communication style: spoken language (English or Bilingual)    Hearing Difficulty or Deaf: no   Wear Glasses or Blind: yes    Cognitive  Cognitive/Neuro/Behavioral: WDL                      Living Environment:   People in home: alone     Current living Arrangements: apartment      Able to return to prior arrangements: yes       Family/Social Support:  Care provided by: self  Provides care for: no one  Marital Status: Single  Sibling(s)          Description of Support System: Supportive, Involved    Support Assessment: Adequate family and caregiver support, Adequate social supports    Current Resources:   Skilled Home Care Services:    Community Resources: None  Equipment currently used at home: none  Supplies currently used at home: None    Employment/Financial:  Employment Status: retired        Financial Concerns: No concerns identified           Lifestyle & Psychosocial Needs:        Socioeconomic History     Marital status: Single     Spouse name: Not on file     Number of children: Not on file     Years of education: Not on file     Highest education level: Not on file     Tobacco Use     Smoking status: Former Smoker     Years: 30.00     Types: Cigarettes     Start date: 1961     Quit date: 2001     Years since quittin.9     Smokeless tobacco: Never Used   Substance and Sexual Activity     Alcohol use: No     Frequency: Never     Drug use: No     Sexual activity: Not Currently       Functional Status:  Prior to admission patient needed assistance:   Dependent ADLs:: Independent  Dependent IADLs::  Independent       Mental Health Status:  Mental Health Status: No Current Concerns       Chemical Dependency Status:  Chemical Dependency Status: No Current Concerns             Values/Beliefs:  Spiritual, Cultural Beliefs, Restoration Practices, Values that affect care: no               Additional Information:  Writer visited with patient regarding discharge planning.  Patient lives alone in his apartment in [place].  He still drives and states he is able to get his own groceries.  He is independent in all ADLs and IADLs.  Patient has support from his two sisters.  He stated that his sister, Mary, would be the one to transport him home.  Patient denies having any discharge needs at this time.     Handoff will be placed to clinic care coordination due to CHF diagnosis.      JUANA Heath  [facility]   [phone]

## 2021-01-08 NOTE — DISCHARGE SUMMARY
[facility]  Hospitalist Discharge Summary      Date of Admission:  1/7/2021  Date of Discharge:  1/8/2021  Discharging Provider: Delbert Orozco NP      Discharge Diagnoses   Principal Problem:    Acute anemia  Active Problems:    Essential hypertension    Leukocytosis    Coronary artery disease involving native coronary artery of native heart with other form of angina pectoris (H)    Myeloproliferative disorder (H)    S/P CABG (coronary artery bypass graft)    Dyspnea on exertion    CKD (chronic kidney disease) stage 3, GFR 30-59 ml/min    Chronic diastolic heart failure (H)    Elevated troponin    Coronary artery disease involving coronary bypass graft of native heart without angina pectoris      Follow-ups Needed After Discharge   Follow-up Appointments     Follow-up and recommended labs and tests       Follow up with primary care provider, Angus Clements Mai, within 7 days for   hospital follow- up.  The following labs/tests are recommended: CBC, blood   pressure with consideration of restarting Toprol-XL.      Follow up with Dr. Cristina, at [facility],   [phone], within 14 days for hospital follow- up. No follow up labs or   test are needed.           Unresulted Labs Ordered in the Past 30 Days of this Admission     No orders found for last 31 day(s).      These results will be followed up by N/A    Discharge Disposition   Discharged to home  Condition at discharge: Stable    Hospital Course   Renny Gannon is a 70 year old male with a medical history significant for myeloproliferative disorder (recent hospitalization 12/2020 for anemia with hemoglobin of 6.6), stage III CKD, CAD s/p CABG 2/2020, hypertension, and CHF with preserved EF, who presented to the ED 1/7/2021 with shortness of breath. Patient noted onset of symptoms approximately 4-5 days prior to admission. Noted shortness of breath with any exertion. Denied shortness of breath at rest.  Denied fever, chills, or night sweats. Denied cough, congestion. Denied chest pain, nausea, vomiting, or blood in stool. In the ED, patient weight noted to be slightly elevated by 2 pounds from previous discharge in December 2020. Patient not hypoxic but BNP noted to be elevated at over 2400. Hemoglobin again low at 6.6. Troponin elevated at 0.103 on admission but remained stable throughout hospitalization. Patient denied chest pain and EKG showed some ST segment depression likely secondary to anemia. Chest xray showed findings consistent with CHF exacerbation. D dimer elevated at 1.5 but CT negative for PE. WBC mildly elevated. Patient received 40 mg IV Lasix in the ED and transfusion of 2 units PRBC was initiated. Patient with improvement in symptoms overnight and, on the day of discharge, patient denies shortness of breath and is maintaining oxygen saturations above 90% on room air. Notes breathing feels back to baseline. Denies pain. Denies nausea and is tolerating oral intake. Patient is afebrile and hemodynamically stable. WBC slightly increased to 18.3 today. Low concern for bacterial infection as patient is asymptomatic, afebrile, and procalcitonin was 0.11. Discussed with Dr. Cristina, patient's oncologist, who noted elevated WBC likely due to underlying myeloproliferative disorder and recommended patient restart hydroxyurea 500 mg daily. Dr. Cristina also requested lab work be ordered at time of discharge to further evaluate patient's anemia.       Principal Problem:      Dyspnea on exertion    Acute anemia    Chronic diastolic heart failure (H)    Myeloproliferative disorder (H)  Assessment: Patient presented with 4-5 day history of shortness of breath with exertion. Denied shortness of breath at rest. Maintaining oxygen saturations above 90% on room air. In the ED, hemoglobin noted to be low at 6.6. Denies any black or bloody stools. Of note, patient presented to [facility] on 12/21/2020 with similar  symptoms and was found to be anemic with hemoglobin of 6.6. He was given 2 units of PRBC and symptomatically improved. At that time, it was thought anemia was due to hydroxyurea patient was taking for his myeloproliferative disorder. This was held on discharge. Per chart review, Dr. Cristina recommended restarting this medication on 1/5. Unclear if patient has resumed taking this medication but this was not listed on current admission medication reconciliation. In the ED for current admission, patient was also noted to have elevated BNP and imaging consistent with fluid overload. Per chart review, patient was prescribed furosemide 40 mg every morning and 20 mg at noon. Per chart review, patient has only been taking morning dose. D dimer elevated but chest CT negative for acute PE. WBC slightly elevated at 15. Patient received 40 mg IV Lasix in the ED and transfusion of 2 units PRBC was initiated. 1/8-Patient notes resolution of symptoms this morning. Denies shortness of breath and patient is maintaining oxygen saturations above 90% on room air. Shortness of breath likely due to anemia and CHF. Denies pain. Denies nausea and is tolerating oral intake. Patient is afebrile and hemodynamically stable. WBC slightly increased to 18.3 today. Low concern for bacterial infection as patient is asymptomatic, afebrile, and procalcitonin was 0.11. Discussed with Dr. Cristina, patient's oncologist, who noted elevated WBC likely due to underlying myeloproliferative disorder and recommended patient restart hydroxyurea 500 mg daily. Dr. Cristina also requested lab work be ordered at time of discharge to further evaluate patient's anemia.     Plan:   - Patient medically stable for hospital discharge  - Recommended restarting hydroxyurea at 500 mg once daily  - Blood work recommended by Dr. Cristina including peripheral blood smear, ferritin, B12, and folic acid ordered and drawn prior to discharge  - Dr. Cristina notes he will follow up  with patient next week  - Recommended patient follow up with PCP within next week    Active Problems:      Elevated troponin  Assessment: Patient with troponin of 0.104 upon admission. Recheck in 4 hours was 0.103. Patient denies any chest pain. EKG completed and showed some ST depression likely secondary to anemia. 1/8-Troponin remains stable   Plan:   - Follow up with PCP as above      Essential hypertension  Assessment: Patient manages with Lasix twice daily and metoprolol  mg twice daily. Has been only taking morning dose of Lasix. 1/8-Home dose of metoprolol XL held during admission due to soft blood pressures. Blood pressure remains well controlled 120's/50's and HR stable in the 80's  Plan:   - Recommended continuing Lasix 40 mg in morning and 20 mg at noon after discharge  - Recommended continuing to hold Toprol XL and follow up with PCP as above      Coronary artery disease involving native coronary artery of native heart with other form of angina pectoris (H)    S/P CABG (coronary artery bypass graft)  Assessment: Patient s/p CABG in 2/2020. Takes daily aspirin and metoprolol as above  Plan:   - Resume aspirin after discharge      CKD (chronic kidney disease) stage 3, GFR 30-59 ml/min  Assessment: Baseline creatinine appears to be 1.3-1.5. Creatinine on admission at 1.36 with GFR of 52 1/8-Creatinine 1.26  Plan:   - No further acute intervention indicated  - Outpatient follow up as above    Consultations This Hospital Stay   CARE MANAGEMENT / SOCIAL WORK IP CONSULT  NUTRITION SERVICES ADULT IP CONSULT    Code Status   No CPR- Do NOT Intubate    Time Spent on this Encounter   I, Delbert Orozco NP, personally saw the patient today and spent greater than 30 minutes discharging this patient.       Delbert Orozco NP  [facility] MEDICAL SURGICAL  [address] [place] [address]  Phone:  [phone]  ______________________________________________________________________    Physical Exam   Vital Signs: Temp: 96.5  F (35.8  C) Temp src: Oral BP: 123/56 Pulse: 86   Resp: 22 SpO2: 93 % O2 Device: Nasal cannula Oxygen Delivery: 1 LPM  Weight: 211 lbs 6.4 oz  Constitutional: awake, alert, cooperative, no apparent distress, and appears stated age  Respiratory: No increased work of breathing, good air exchange, clear to auscultation bilaterally, no crackles or wheezing  Cardiovascular: regular rate and rhythm and normal S1 and S2  GI: normal bowel sounds, non-distended and non-tender  Skin: normal skin color, texture, turgor  Musculoskeletal: 2+ edema to bilateral lower extremities, ankles, and feet  Neurologic: Awake, alert, oriented to name, place and time.  Cranial nerves II-XII are grossly intact.        Primary Care Physician   Angus Clements Mai    Discharge Orders      Care Coordination Referral      Reason for your hospital stay    You were in the hospital for shortness of breath and improved     Follow-up and recommended labs and tests     Follow up with primary care provider, Angus Clements Mai, within 7 days for hospital follow- up.  The following labs/tests are recommended: CBC, blood pressure with consideration of restarting Toprol-XL.      Follow up with Dr. Cristina, at [facility], [phone], within 14 days for hospital follow- up. No follow up labs or test are needed.     Activity    Your activity upon discharge: activity as tolerated     Diet    Follow this diet upon discharge: Orders Placed This Encounter      2 Gram Sodium Diet       Significant Results and Procedures   Most Recent 3 CBC's:  Recent Labs   Lab Test 01/08/21  0545 01/07/21  1846 01/07/21  1359 01/07/21  0810 12/28/20  1129   WBC 18.3*  --   --  15.9* 9.9   HGB 8.4* 8.2* 7.7* 6.6* 7.8*   *  --   --  112* 112*   PLT 90*  --   --  70* 66*     Most Recent 3 BMP's:  Recent Labs   Lab Test  01/08/21  0545 01/07/21  0810 12/28/20  1129    142 142   POTASSIUM 4.2 4.1 4.2   CHLORIDE 107 109 109   CO2 25 26 27   BUN 29 29 28   CR 1.26* 1.36* 1.39*   ANIONGAP 7 7 6   MINNIE 9.2 9.2 9.2   * 103* 96     Most Recent 3 Hemoglobins:  Recent Labs   Lab Test 01/08/21  0545 01/07/21  1846 01/07/21  1359   HGB 8.4* 8.2* 7.7*     Most Recent 3 Troponin's:  Recent Labs   Lab Test 01/08/21  0545 01/08/21  0005 01/07/21  1859   TROPI 0.101* 0.085* 0.110*   ,   Results for orders placed or performed during the hospital encounter of 01/07/21   XR Chest Port 1 View    Narrative    CHEST PORTABLE ONE VIEW   1/7/2021 8:20 AM     HISTORY: Short of air.    COMPARISON: 12/21/2020.      Impression    IMPRESSION: Bilateral mildly increased vascular congestion may be  worsening edema. Stable cardiomegaly. Patchy bibasilar pulmonary  opacities persist and could be atelectasis or multifocal airspace  disease with pneumonia being a possibility. There may be trace left  pleural fluid that is stable.    ALEXIS FARIAS MD   CT Chest Pulmonary Embolism w Contrast    Narrative    CT CHEST PULMONARY EMBOLISM WITH INTRAVENOUS CONTRAST  1/7/2021 9:13  AM     HISTORY:  Shortness of breath, elevated D-dimer examination. Status  post coronary artery bypass graft surgery, hypertension.    COMPARISON: Chest x-ray dated 1/7/2021, chest CT dated 12/16/2019.    TECHNIQUE: CT of the chest was performed following the administration  of 80 cc intravenous contrast. Images are viewed in the axial and  coronal planes. Radiation dose for this scan was reduced using  automated exposure control, adjustment of the mA and/or kV according  to patient size, or iterative reconstruction technique.    FINDINGS: No definite evidence for a pulmonary embolism. Median  sternotomy changes and postsurgical changes consistent with coronary  artery bypass graft surgery. Mild cardiomegaly.    There are small bilateral pleural effusions with adjacent atelectasis  in  the lower lobes. Lungs are otherwise clear.    Again identified are pleural plaques along the hemidiaphragms  bilaterally most likely secondary to previous asbestos exposure.     The upper aspects of the abdomen are included on this exam. Again  noted is mild splenomegaly. The spleen measures approximately 17 cm in  length.      Impression    IMPRESSION:  1. No definite pulmonary embolism.  2. Small bilateral pleural effusions with mild adjacent atelectasis.  3. Stable mild splenomegaly.    CLARY AN MD       Discharge Medications   Current Discharge Medication List      CONTINUE these medications which have CHANGED    Details   metoprolol succinate ER (TOPROL-XL) 100 MG 24 hr tablet Take 1 tablet (100 mg) by mouth 2 times daily HOLD this medication until you see your PCP  Qty: 60 tablet, Refills: 5    Associated Diagnoses: S/P CABG (coronary artery bypass graft)         CONTINUE these medications which have NOT CHANGED    Details   atorvastatin (LIPITOR) 40 MG tablet TAKE ONE TABLET BY MOUTH EVERY NIGHT AT BEDTIME  Qty: 90 tablet, Refills: 1    Associated Diagnoses: Coronary artery disease involving native coronary artery of native heart with other form of angina pectoris (H); Essential hypertension      Ferrous Sulfate 324 (65 Fe) MG TBEC TAKE ONE TABLET BY MOUTH ONCE DAILY AT NOON  Qty: 30 tablet, Refills: 0    Associated Diagnoses: S/P CABG (coronary artery bypass graft)      furosemide (LASIX) 40 MG tablet TAKE ONE TABLET BY MOUTH DAILY IN THE MORNING AND TAKE 1/2 TABLET AT NOON  Qty: 135 tablet, Refills: 1    Associated Diagnoses: S/P CABG (coronary artery bypass graft)      GOODSENSE ASPIRIN 325 MG tablet TAKE ONE TABLET BY MOUTH ONCE DAILY IN THE MORNING  Qty: 90 tablet, Refills: 3    Associated Diagnoses: S/P CABG (coronary artery bypass graft)      hydroxyurea (HYDREA) 500 MG capsule Take 1 capsule (500 mg) by mouth daily      senna-docusate (SENOKOT-S/PERICOLACE) 8.6-50 MG tablet Take 2 tablets by  mouth 2 times daily  Qty: 120 tablet, Refills: 5    Associated Diagnoses: S/P CABG (coronary artery bypass graft)      vitamin B-12 (CYANOCOBALAMIN) 250 MCG tablet TAKE ONE TABLET BY MOUTH EVERY MORNING  Qty: 90 tablet, Refills: 3    Associated Diagnoses: S/P CABG (coronary artery bypass graft)      omeprazole (PRILOSEC) 20 MG DR capsule Take 1 capsule (20 mg) by mouth daily  Qty: 30 capsule, Refills: 1    Associated Diagnoses: Gastroesophageal reflux disease without esophagitis           Allergies   Allergies   Allergen Reactions     No Known Drug Allergies      Seasonal Allergies

## 2021-01-08 NOTE — PROGRESS NOTES
S-(situation): Patient discharged to home via car with sister    B-(background): Observation goals met     A-(assessment): Pt is A&O.  VSS.  Afebrile.  Denies discomfort.  Understands about his CHF instructions.  No SOA noted upon discharge.  Sats are 92-94% on R/A.  Independent in mobility.    R-(recommendations): Discharge instructions reviewed with pt. Listed belongings gathered and returned to patient.  Patient Education resolved: Yes  New medications-Pt. Has been educated about reason of use and side effects NA  Home and hospital acquired medications returned to patient NA  Medication Bin checked and emptied on discharge Yes

## 2021-01-08 NOTE — PROGRESS NOTES
Patient alert and oriented.  Up independently.  Denies pain.  Lung sounds clear.  Bowel sounds audible.  Tele: NSR.  Dyspnea on exertion.  De-sats 87-88% while sleeping, 1L 02 mid 90's.  Voiding in urinal at bedside.  Will continue with plan of care, and monitor.

## 2021-01-11 ENCOUNTER — PATIENT OUTREACH (OUTPATIENT)
Dept: CARE COORDINATION | Facility: CLINIC | Age: 71
End: 2021-01-11

## 2021-01-11 LAB — COPATH REPORT: NORMAL

## 2021-01-11 ASSESSMENT — ACTIVITIES OF DAILY LIVING (ADL): DEPENDENT_IADLS:: INDEPENDENT

## 2021-01-11 NOTE — LETTER
[place] CARE COORDINATION  [facility]  [address] [place]   [address]    Tel. [phone] / Fax [phone]    January 12, 2021    Renny Gannon  [address]  [place] [address]      Dear Renny,    I am a clinic care coordinator who works with Angus Clements Mai, MD at [facility]. I have been trying to reach you recently to introduce Clinic Care Coordination and to see if there was anything I could assist you with.  Below is a description of clinic care coordination and how I can further assist you.      The clinic care coordination team is made up of a registered nurse,  and community health worker who understand the health care system. The goal of clinic care coordination is to help you manage your health and improve access to the health care system in the most efficient manner. The team can assist you in meeting your health care goals by providing education, coordinating services, strengthening the communication among your providers and supporting you with any resource needs.    Please feel free to contact the Community Health Worker at [phone] with any questions or concerns. We are focused on providing you with the highest-quality healthcare experience possible and that all starts with you.     Sincerely,     Barbara BARRAZA, RN, PHN, CCM  Primary Clinic Care Coordination    [facility]  Primary Care Clinics  [email or web address]   Office: [phone]  Employed by [facility]

## 2021-01-11 NOTE — PROGRESS NOTES
Clinic Care Coordination Contact  [facility]/Voicemail    Referral Source: IP Handoff  Clinical Data: Care Coordinator Outreach  Outreach attempted x 1.  No answer unable to leave message mailbox not set up.  Plan:  Care Coordinator will try to reach patient again in 1-2 business days.      Barbara DENNYN, RN, PHN, [facility]  Primary Clinic Care Coordination    [facility]  Primary Care Clinics  [email or web address]   Office: [phone]  Employed by [facility]    \

## 2021-01-12 NOTE — PROGRESS NOTES
Clinic Care Coordination Contact  [facility]/Voicemail    Referral Source: IP Handoff  Clinical Data: Care Coordinator Outreach  Outreach attempted x 2.  No answer No machine  Plan: Care Coordinator will send care coordination introduction letter with care coordinator contact information and explanation of care coordination services via Mail. Care Coordinator will do no further outreaches at this time.        Barbara DENNYN, RN, PHN, CCM  Primary Clinic Care Coordination    [facility]  Primary Care Clinics  [email or web address]   Office: [phone]  Employed by [facility]

## 2021-01-14 ENCOUNTER — TELEPHONE (OUTPATIENT)
Dept: ONCOLOGY | Facility: CLINIC | Age: 71
End: 2021-01-14

## 2021-01-14 PROBLEM — D75.81 MYELOFIBROSIS (H): Status: ACTIVE | Noted: 2021-01-14

## 2021-01-14 NOTE — ASSESSMENT & PLAN NOTE
Renny Gannon presented to primary care physician with a complaint of increasing fatigue and shortness of breath on exertion.  Further work-up including a CBC which was done on December 11, 2019 showed a total white count of 79.1 with a hemoglobin is 11.9 platelet count of 378.  Peripheral blood smear revealed leuko-erythroblastic reaction with neutrophilic left shift and rare circulating blasts without dysplasia. bone marrow biopsy from December 30, 2019 showing extremely hypercellular marrow with granulocytic and megakaryocytic proliferation, megakaryocytic clustering and atypia, marrow fibrosis and 1.5% blasts.  There is also marked peripheral leukocytosis and leuko-erythroblastic clusters and 1.5% circulating blasts.  There is mild normocytic normochromic anemia.  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

## 2021-01-14 NOTE — PROGRESS NOTES
Hematology/ Oncology Telephone Visit:  Jan 5, 2021    Due to the concerns around COVID-19 and adhering to social distancing we conducted this visit over the telephone.      Reason for Visit:   Chief Complaint   Patient presents with     Hematology     Myeloproliferative disorder        Oncologic History:    Myeloproliferative disorder (H)    Renny Gannon presented to primary care physician with a complaint of increasing fatigue and shortness of breath on exertion.  Further work-up including a CBC which was done on December 11, 2019 showed a total white count of 79.1 with a hemoglobin is 11.9 platelet count of 378.  Peripheral blood smear revealed leuko-erythroblastic reaction with neutrophilic left shift and rare circulating blasts without dysplasia. bone marrow biopsy from December 30, 2019 showing extremely hypercellular marrow with granulocytic and megakaryocytic proliferation, megakaryocytic clustering and atypia, marrow fibrosis and 1.5% blasts.  There is also marked peripheral leukocytosis and leuko-erythroblastic clusters and 1.5% circulating blasts.  There is mild normocytic normochromic anemia.  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       Interval History:  The patient was recently hospitalized with acute anemia with a hemoglobin of 6.6.  He received red blood cell transfusions 2 units and hemoglobin has improved to 10.  Stool for occult blood was negative.  Hydroxyurea was on hold.  the patient symptoms has been gradually improving since discharge from the hospital.  He denies any fever or chills but denies any bruising or bleeding.  He denies any weight loss.    Review of systems:  Pertinent positives have been included in HPI; remainder of detailed complete 20-point ROS was negative.    Past medical, social, surgical, and family histories reviewed.    Allergies:  Allergies as of 01/05/2021 - Reviewed 01/05/2021   Allergen  Reaction Noted     No known drug allergies  01/25/2005     Seasonal allergies  03/11/2019       Current Medications:  Current Outpatient Medications   Medication Sig Dispense Refill     furosemide (LASIX) 40 MG tablet TAKE ONE TABLET BY MOUTH DAILY IN THE MORNING AND TAKE 1/2 TABLET AT NOON (Patient taking differently: Take 40 mg by mouth daily ) 135 tablet 1     GOODSENSE ASPIRIN 325 MG tablet TAKE ONE TABLET BY MOUTH ONCE DAILY IN THE MORNING (Patient taking differently: Take 325 mg by mouth daily ) 90 tablet 3     vitamin B-12 (CYANOCOBALAMIN) 250 MCG tablet TAKE ONE TABLET BY MOUTH EVERY MORNING (Patient taking differently: Take 250 mcg by mouth daily ) 90 tablet 3     atorvastatin (LIPITOR) 40 MG tablet TAKE ONE TABLET BY MOUTH EVERY NIGHT AT BEDTIME 90 tablet 1     Ferrous Sulfate 324 (65 Fe) MG TBEC Take 1 tablet (324 mg) by mouth daily 30 tablet 0     hydroxyurea (HYDREA) 500 MG capsule Take 1 capsule (500 mg) by mouth daily       metoprolol succinate ER (TOPROL-XL) 100 MG 24 hr tablet Take 1 tablet (100 mg) by mouth 2 times daily HOLD this medication until you see your PCP 60 tablet 5     omeprazole (PRILOSEC) 20 MG DR capsule Take 1 capsule (20 mg) by mouth daily (Patient not taking: Reported on 1/5/2021) 30 capsule 1     senna-docusate (SENOKOT-S/PERICOLACE) 8.6-50 MG tablet Take 2 tablets by mouth 2 times daily 120 tablet 5        Laboratory/Imaging Studies:  Office Visit on 12/28/2020   Component Date Value Ref Range Status     WBC 12/28/2020 9.9  4.0 - 11.0 10e9/L Final     RBC Count 12/28/2020 2.08* 4.4 - 5.9 10e12/L Final     Hemoglobin 12/28/2020 7.8* 13.3 - 17.7 g/dL Final     Hematocrit 12/28/2020 23.3* 40.0 - 53.0 % Final     MCV 12/28/2020 112* 78 - 100 fl Final     MCH 12/28/2020 37.5* 26.5 - 33.0 pg Final     MCHC 12/28/2020 33.5  31.5 - 36.5 g/dL Final     RDW 12/28/2020 20.0* 10.0 - 15.0 % Final     Platelet Count 12/28/2020 66* 150 - 450 10e9/L Final     Sodium 12/28/2020 142  133 - 144  mmol/L Final     Potassium 12/28/2020 4.2  3.4 - 5.3 mmol/L Final     Chloride 12/28/2020 109  94 - 109 mmol/L Final     Carbon Dioxide 12/28/2020 27  20 - 32 mmol/L Final     Anion Gap 12/28/2020 6  3 - 14 mmol/L Final     Glucose 12/28/2020 96  70 - 99 mg/dL Final     Urea Nitrogen 12/28/2020 28  7 - 30 mg/dL Final     Creatinine 12/28/2020 1.39* 0.66 - 1.25 mg/dL Final     GFR Estimate 12/28/2020 51* >60 mL/min/[1.73_m2] Final    Comment: Non  GFR Calc  Starting 12/18/2018, serum creatinine based estimated GFR (eGFR) will be   calculated using the Chronic Kidney Disease Epidemiology Collaboration   (CKD-EPI) equation.       GFR Estimate If Black 12/28/2020 59* >60 mL/min/[1.73_m2] Final    Comment:  GFR Calc  Starting 12/18/2018, serum creatinine based estimated GFR (eGFR) will be   calculated using the Chronic Kidney Disease Epidemiology Collaboration   (CKD-EPI) equation.       Calcium 12/28/2020 9.2  8.5 - 10.1 mg/dL Final        Recent Results (from the past 744 hour(s))   XR Chest Port 1 View    Narrative    CHEST ONE VIEW PORTABLE  12/21/2020 10:24 AM     HISTORY: Short of air.    COMPARISON: Chest x-rays dated 2/18/2020.    FINDINGS:  Lungs are hypoaerated. There may be a small left pleural  fluid collection, similar to prior study. There are increased  interstitial markings in the bilateral lungs concerning for vascular  congestion. Cardiac silhouette is enlarged, even given technique. No  pneumothorax or right pleural fluid collection. No acute fracture.       Impression    IMPRESSION: Probable cardiomegaly, mild vascular congestion, and  possible left pleural fluid collection could represent congestive  heart failure. Atypical infiltrative process (such as COVID-19) is  also possible.    LEEANNA KAUR MD   XR Chest Port 1 View    Narrative    CHEST PORTABLE ONE VIEW   1/7/2021 8:20 AM     HISTORY: Short of air.    COMPARISON: 12/21/2020.      Impression    IMPRESSION:  Bilateral mildly increased vascular congestion may be  worsening edema. Stable cardiomegaly. Patchy bibasilar pulmonary  opacities persist and could be atelectasis or multifocal airspace  disease with pneumonia being a possibility. There may be trace left  pleural fluid that is stable.    ALEXIS FARIAS MD   CT Chest Pulmonary Embolism w Contrast    Narrative    CT CHEST PULMONARY EMBOLISM WITH INTRAVENOUS CONTRAST  1/7/2021 9:13  AM     HISTORY:  Shortness of breath, elevated D-dimer examination. Status  post coronary artery bypass graft surgery, hypertension.    COMPARISON: Chest x-ray dated 1/7/2021, chest CT dated 12/16/2019.    TECHNIQUE: CT of the chest was performed following the administration  of 80 cc intravenous contrast. Images are viewed in the axial and  coronal planes. Radiation dose for this scan was reduced using  automated exposure control, adjustment of the mA and/or kV according  to patient size, or iterative reconstruction technique.    FINDINGS: No definite evidence for a pulmonary embolism. Median  sternotomy changes and postsurgical changes consistent with coronary  artery bypass graft surgery. Mild cardiomegaly.    There are small bilateral pleural effusions with adjacent atelectasis  in the lower lobes. Lungs are otherwise clear.    Again identified are pleural plaques along the hemidiaphragms  bilaterally most likely secondary to previous asbestos exposure.     The upper aspects of the abdomen are included on this exam. Again  noted is mild splenomegaly. The spleen measures approximately 17 cm in  length.      Impression    IMPRESSION:  1. No definite pulmonary embolism.  2. Small bilateral pleural effusions with mild adjacent atelectasis.  3. Stable mild splenomegaly.    CLARY AN MD       Assessment and plan:    (D47.1) Myeloproliferative disorder (H)  (primary encounter diagnosis)  I reviewed with patient today most recent laboratory test.  Hemoglobin and platelet count has been  improving.  I would recommend to start hydroxyurea and monitor CBC very closely.  I will see the patient again in 1 months or sooner if there are new developments or concerns.    (I10) Essential hypertension  Patient currently metoprolol 100 mg daily.  Is also on furosemide 40 mg orally daily.      The patient is ready to learn, no apparent learning barriers were identified.  Diagnosis and treatment plans were explained to the patient. The patient expressed understanding of the content. The patient asked appropriate questions. The patient questions were answered to his satisfaction.    Telephone call lasted 30 minutes.    Mani Cristina MD    Chart documentation with Dragon Voice recognition Software. Although reviewed after completion, some words and grammatical errors may remain.

## 2021-01-14 NOTE — TELEPHONE ENCOUNTER
Noted on patient discharge from Hospital that he was instructed to follow up with Jonnie within 14 days.  Called patient to schedule who reported that he no longer has insurance for [place] since he had [employer].  Encouraged patient to call insurance to ask about Oncology or just to discuss.  Offered our  contact information and patient denied.  Patient will keep appointment in February, but will call if he has to cancel.     Ki Lamb RN, BSN, OCN  1/14/2021, 9:35 AM

## 2021-01-27 ENCOUNTER — HOSPITAL ENCOUNTER (INPATIENT)
Facility: CLINIC | Age: 71
LOS: 7 days | Discharge: NURSING, REHAB OR LONG-TERM CARE | DRG: 280 | End: 2021-02-03
Attending: EMERGENCY MEDICINE | Admitting: INTERNAL MEDICINE
Payer: COMMERCIAL

## 2021-01-27 ENCOUNTER — APPOINTMENT (OUTPATIENT)
Dept: GENERAL RADIOLOGY | Facility: CLINIC | Age: 71
DRG: 280 | End: 2021-01-27
Attending: EMERGENCY MEDICINE
Payer: COMMERCIAL

## 2021-01-27 ENCOUNTER — APPOINTMENT (OUTPATIENT)
Dept: CT IMAGING | Facility: CLINIC | Age: 71
DRG: 280 | End: 2021-01-27
Attending: EMERGENCY MEDICINE
Payer: COMMERCIAL

## 2021-01-27 DIAGNOSIS — D69.6 THROMBOCYTOPENIA (H): ICD-10-CM

## 2021-01-27 DIAGNOSIS — I10 ESSENTIAL HYPERTENSION: ICD-10-CM

## 2021-01-27 DIAGNOSIS — D64.9 ANEMIA, UNSPECIFIED TYPE: ICD-10-CM

## 2021-01-27 DIAGNOSIS — I21.4 NSTEMI (NON-ST ELEVATED MYOCARDIAL INFARCTION) (H): ICD-10-CM

## 2021-01-27 DIAGNOSIS — I50.9 CONGESTIVE HEART FAILURE, UNSPECIFIED HF CHRONICITY, UNSPECIFIED HEART FAILURE TYPE (H): ICD-10-CM

## 2021-01-27 DIAGNOSIS — D47.1 MYELOPROLIFERATIVE DISORDER (H): ICD-10-CM

## 2021-01-27 DIAGNOSIS — I25.118 CORONARY ARTERY DISEASE INVOLVING NATIVE CORONARY ARTERY OF NATIVE HEART WITH OTHER FORM OF ANGINA PECTORIS (H): ICD-10-CM

## 2021-01-27 LAB
ALBUMIN SERPL-MCNC: 3.5 G/DL (ref 3.4–5)
ALP SERPL-CCNC: 118 U/L (ref 40–150)
ALT SERPL W P-5'-P-CCNC: 23 U/L (ref 0–70)
ANION GAP SERPL CALCULATED.3IONS-SCNC: 12 MMOL/L (ref 3–14)
ANISOCYTOSIS BLD QL SMEAR: ABNORMAL
AST SERPL W P-5'-P-CCNC: 68 U/L (ref 0–45)
BASOPHILS # BLD AUTO: 0 10E9/L (ref 0–0.2)
BASOPHILS NFR BLD AUTO: 0 %
BILIRUB DIRECT SERPL-MCNC: 0.8 MG/DL (ref 0–0.2)
BILIRUB SERPL-MCNC: 2.1 MG/DL (ref 0.2–1.3)
BLASTS # BLD: 0.5 10E9/L
BLASTS BLD QL SMEAR: 3 %
BLD PROD TYP BPU: NORMAL
BLD UNIT ID BPU: 0
BLOOD PRODUCT CODE: NORMAL
BPU ID: NORMAL
BUN SERPL-MCNC: 33 MG/DL (ref 7–30)
CALCIUM SERPL-MCNC: 8.7 MG/DL (ref 8.5–10.1)
CHLORIDE SERPL-SCNC: 101 MMOL/L (ref 94–109)
CO2 SERPL-SCNC: 20 MMOL/L (ref 20–32)
CREAT SERPL-MCNC: 1.24 MG/DL (ref 0.66–1.25)
DIFFERENTIAL METHOD BLD: ABNORMAL
EOSINOPHIL # BLD AUTO: 0.5 10E9/L (ref 0–0.7)
EOSINOPHIL NFR BLD AUTO: 3 %
ERYTHROCYTE [DISTWIDTH] IN BLOOD BY AUTOMATED COUNT: 18.7 % (ref 10–15)
GFR SERPL CREATININE-BSD FRML MDRD: 58 ML/MIN/{1.73_M2}
GLUCOSE SERPL-MCNC: 172 MG/DL (ref 70–99)
HCT VFR BLD AUTO: 15.1 % (ref 40–53)
HEMOCCULT STL QL: NEGATIVE
HGB BLD-MCNC: 5.2 G/DL (ref 13.3–17.7)
HGB BLD-MCNC: 9.7 G/DL (ref 13.3–17.7)
INR PPP: 1.24 (ref 0.86–1.14)
INTERPRETATION ECG - MUSE: NORMAL
LABORATORY COMMENT REPORT: NORMAL
LACTATE BLD-SCNC: 1.5 MMOL/L (ref 0.7–2)
LYMPHOCYTES # BLD AUTO: 1.8 10E9/L (ref 0.8–5.3)
LYMPHOCYTES NFR BLD AUTO: 11 %
MCH RBC QN AUTO: 32.9 PG (ref 26.5–33)
MCHC RBC AUTO-ENTMCNC: 34.4 G/DL (ref 31.5–36.5)
MCV RBC AUTO: 96 FL (ref 78–100)
METAMYELOCYTES # BLD: 2 10E9/L
METAMYELOCYTES NFR BLD MANUAL: 12 %
MONOCYTES # BLD AUTO: 0.3 10E9/L (ref 0–1.3)
MONOCYTES NFR BLD AUTO: 2 %
MYELOCYTES # BLD: 0.2 10E9/L
MYELOCYTES NFR BLD MANUAL: 1 %
NEUTROPHILS # BLD AUTO: 11.2 10E9/L (ref 1.6–8.3)
NEUTROPHILS NFR BLD AUTO: 68 %
NRBC # BLD AUTO: 0.5 10*3/UL
NRBC BLD AUTO-RTO: 3 /100
NT-PROBNP SERPL-MCNC: 8616 PG/ML (ref 0–900)
PLATELET # BLD AUTO: 18 10E9/L (ref 150–450)
PLATELET # BLD EST: ABNORMAL 10*3/UL
POTASSIUM SERPL-SCNC: 3.9 MMOL/L (ref 3.4–5.3)
PROT SERPL-MCNC: 7.2 G/DL (ref 6.8–8.8)
RBC # BLD AUTO: 1.58 10E12/L (ref 4.4–5.9)
SARS-COV-2 RNA RESP QL NAA+PROBE: NEGATIVE
SODIUM SERPL-SCNC: 133 MMOL/L (ref 133–144)
SPECIMEN SOURCE: NORMAL
TRANSFUSION STATUS PATIENT QL: NORMAL
TRANSFUSION STATUS PATIENT QL: NORMAL
TROPONIN I SERPL-MCNC: 1.64 UG/L (ref 0–0.04)
TROPONIN I SERPL-MCNC: 11.22 UG/L (ref 0–0.04)
WBC # BLD AUTO: 16.4 10E9/L (ref 4–11)

## 2021-01-27 PROCEDURE — 210N000001 HC R&B IMCU HEART CARE

## 2021-01-27 PROCEDURE — 250N000011 HC RX IP 250 OP 636: Performed by: EMERGENCY MEDICINE

## 2021-01-27 PROCEDURE — U0003 INFECTIOUS AGENT DETECTION BY NUCLEIC ACID (DNA OR RNA); SEVERE ACUTE RESPIRATORY SYNDROME CORONAVIRUS 2 (SARS-COV-2) (CORONAVIRUS DISEASE [COVID-19]), AMPLIFIED PROBE TECHNIQUE, MAKING USE OF HIGH THROUGHPUT TECHNOLOGIES AS DESCRIBED BY CMS-2020-01-R: HCPCS | Performed by: EMERGENCY MEDICINE

## 2021-01-27 PROCEDURE — 82272 OCCULT BLD FECES 1-3 TESTS: CPT | Performed by: EMERGENCY MEDICINE

## 2021-01-27 PROCEDURE — 83605 ASSAY OF LACTIC ACID: CPT | Performed by: INTERNAL MEDICINE

## 2021-01-27 PROCEDURE — 93005 ELECTROCARDIOGRAM TRACING: CPT | Mod: 76 | Performed by: INTERNAL MEDICINE

## 2021-01-27 PROCEDURE — 80076 HEPATIC FUNCTION PANEL: CPT | Performed by: EMERGENCY MEDICINE

## 2021-01-27 PROCEDURE — 99223 1ST HOSP IP/OBS HIGH 75: CPT | Mod: AI | Performed by: INTERNAL MEDICINE

## 2021-01-27 PROCEDURE — 85018 HEMOGLOBIN: CPT | Performed by: INTERNAL MEDICINE

## 2021-01-27 PROCEDURE — 85025 COMPLETE CBC W/AUTO DIFF WBC: CPT | Performed by: EMERGENCY MEDICINE

## 2021-01-27 PROCEDURE — 94660 CPAP INITIATION&MGMT: CPT

## 2021-01-27 PROCEDURE — 96365 THER/PROPH/DIAG IV INF INIT: CPT | Mod: 59 | Performed by: INTERNAL MEDICINE

## 2021-01-27 PROCEDURE — 250N000011 HC RX IP 250 OP 636

## 2021-01-27 PROCEDURE — 84484 ASSAY OF TROPONIN QUANT: CPT | Performed by: INTERNAL MEDICINE

## 2021-01-27 PROCEDURE — 80048 BASIC METABOLIC PNL TOTAL CA: CPT | Performed by: EMERGENCY MEDICINE

## 2021-01-27 PROCEDURE — 86901 BLOOD TYPING SEROLOGIC RH(D): CPT | Performed by: EMERGENCY MEDICINE

## 2021-01-27 PROCEDURE — 86850 RBC ANTIBODY SCREEN: CPT | Performed by: EMERGENCY MEDICINE

## 2021-01-27 PROCEDURE — 85610 PROTHROMBIN TIME: CPT | Performed by: EMERGENCY MEDICINE

## 2021-01-27 PROCEDURE — C9803 HOPD COVID-19 SPEC COLLECT: HCPCS | Performed by: INTERNAL MEDICINE

## 2021-01-27 PROCEDURE — 86923 COMPATIBILITY TEST ELECTRIC: CPT | Performed by: EMERGENCY MEDICINE

## 2021-01-27 PROCEDURE — 250N000009 HC RX 250: Performed by: EMERGENCY MEDICINE

## 2021-01-27 PROCEDURE — 999N000157 HC STATISTIC RCP TIME EA 10 MIN

## 2021-01-27 PROCEDURE — 83880 ASSAY OF NATRIURETIC PEPTIDE: CPT | Performed by: EMERGENCY MEDICINE

## 2021-01-27 PROCEDURE — 71045 X-RAY EXAM CHEST 1 VIEW: CPT

## 2021-01-27 PROCEDURE — U0005 INFEC AGEN DETEC AMPLI PROBE: HCPCS | Performed by: EMERGENCY MEDICINE

## 2021-01-27 PROCEDURE — 84484 ASSAY OF TROPONIN QUANT: CPT | Performed by: EMERGENCY MEDICINE

## 2021-01-27 PROCEDURE — P9016 RBC LEUKOCYTES REDUCED: HCPCS | Performed by: EMERGENCY MEDICINE

## 2021-01-27 PROCEDURE — 250N000011 HC RX IP 250 OP 636: Performed by: INTERNAL MEDICINE

## 2021-01-27 PROCEDURE — 99292 CRITICAL CARE ADDL 30 MIN: CPT | Performed by: INTERNAL MEDICINE

## 2021-01-27 PROCEDURE — 86900 BLOOD TYPING SEROLOGIC ABO: CPT | Performed by: EMERGENCY MEDICINE

## 2021-01-27 PROCEDURE — 93005 ELECTROCARDIOGRAM TRACING: CPT | Performed by: INTERNAL MEDICINE

## 2021-01-27 PROCEDURE — 36430 TRANSFUSION BLD/BLD COMPNT: CPT | Performed by: INTERNAL MEDICINE

## 2021-01-27 PROCEDURE — 210N000002 HC R&B HEART CARE

## 2021-01-27 PROCEDURE — 99291 CRITICAL CARE FIRST HOUR: CPT | Mod: 25 | Performed by: INTERNAL MEDICINE

## 2021-01-27 PROCEDURE — 71275 CT ANGIOGRAPHY CHEST: CPT

## 2021-01-27 PROCEDURE — 5A09357 ASSISTANCE WITH RESPIRATORY VENTILATION, LESS THAN 24 CONSECUTIVE HOURS, CONTINUOUS POSITIVE AIRWAY PRESSURE: ICD-10-PCS | Performed by: HOSPITALIST

## 2021-01-27 PROCEDURE — 36415 COLL VENOUS BLD VENIPUNCTURE: CPT | Performed by: INTERNAL MEDICINE

## 2021-01-27 RX ORDER — NALOXONE HYDROCHLORIDE 0.4 MG/ML
0.4 INJECTION, SOLUTION INTRAMUSCULAR; INTRAVENOUS; SUBCUTANEOUS
Status: DISCONTINUED | OUTPATIENT
Start: 2021-01-27 | End: 2021-01-01 | Stop reason: HOSPADM

## 2021-01-27 RX ORDER — ACETAMINOPHEN 325 MG/1
650 TABLET ORAL EVERY 4 HOURS PRN
Status: DISCONTINUED | OUTPATIENT
Start: 2021-01-27 | End: 2021-01-01 | Stop reason: HOSPADM

## 2021-01-27 RX ORDER — NITROGLYCERIN 0.4 MG/1
0.4 TABLET SUBLINGUAL EVERY 5 MIN PRN
Status: DISCONTINUED | OUTPATIENT
Start: 2021-01-27 | End: 2021-01-01 | Stop reason: HOSPADM

## 2021-01-27 RX ORDER — ONDANSETRON 4 MG/1
4 TABLET, ORALLY DISINTEGRATING ORAL EVERY 6 HOURS PRN
Status: DISCONTINUED | OUTPATIENT
Start: 2021-01-27 | End: 2021-01-01 | Stop reason: HOSPADM

## 2021-01-27 RX ORDER — NITROGLYCERIN 20 MG/100ML
INJECTION INTRAVENOUS
Status: COMPLETED
Start: 2021-01-27 | End: 2021-01-27

## 2021-01-27 RX ORDER — IOPAMIDOL 755 MG/ML
73 INJECTION, SOLUTION INTRAVASCULAR ONCE
Status: COMPLETED | OUTPATIENT
Start: 2021-01-27 | End: 2021-01-27

## 2021-01-27 RX ORDER — NALOXONE HYDROCHLORIDE 0.4 MG/ML
0.2 INJECTION, SOLUTION INTRAMUSCULAR; INTRAVENOUS; SUBCUTANEOUS
Status: DISCONTINUED | OUTPATIENT
Start: 2021-01-27 | End: 2021-01-01 | Stop reason: HOSPADM

## 2021-01-27 RX ORDER — NITROGLYCERIN 20 MG/100ML
10-200 INJECTION INTRAVENOUS CONTINUOUS
Status: DISCONTINUED | OUTPATIENT
Start: 2021-01-27 | End: 2021-01-27

## 2021-01-27 RX ORDER — FUROSEMIDE 10 MG/ML
60 INJECTION INTRAMUSCULAR; INTRAVENOUS 2 TIMES DAILY
Status: DISCONTINUED | OUTPATIENT
Start: 2021-01-27 | End: 2021-01-29

## 2021-01-27 RX ORDER — LIDOCAINE 40 MG/G
CREAM TOPICAL
Status: DISCONTINUED | OUTPATIENT
Start: 2021-01-27 | End: 2021-01-01 | Stop reason: HOSPADM

## 2021-01-27 RX ORDER — FUROSEMIDE 10 MG/ML
40 INJECTION INTRAMUSCULAR; INTRAVENOUS ONCE
Status: COMPLETED | OUTPATIENT
Start: 2021-01-27 | End: 2021-01-27

## 2021-01-27 RX ORDER — CARBOXYMETHYLCELLULOSE SODIUM 5 MG/ML
1 SOLUTION/ DROPS OPHTHALMIC
Status: DISCONTINUED | OUTPATIENT
Start: 2021-01-27 | End: 2021-01-01 | Stop reason: HOSPADM

## 2021-01-27 RX ORDER — MORPHINE SULFATE 2 MG/ML
2 INJECTION, SOLUTION INTRAMUSCULAR; INTRAVENOUS
Status: DISCONTINUED | OUTPATIENT
Start: 2021-01-27 | End: 2021-01-01 | Stop reason: HOSPADM

## 2021-01-27 RX ORDER — PROCHLORPERAZINE MALEATE 5 MG
5 TABLET ORAL EVERY 6 HOURS PRN
Status: DISCONTINUED | OUTPATIENT
Start: 2021-01-27 | End: 2021-01-01 | Stop reason: HOSPADM

## 2021-01-27 RX ORDER — ONDANSETRON 2 MG/ML
4 INJECTION INTRAMUSCULAR; INTRAVENOUS EVERY 6 HOURS PRN
Status: DISCONTINUED | OUTPATIENT
Start: 2021-01-27 | End: 2021-01-01 | Stop reason: HOSPADM

## 2021-01-27 RX ORDER — LIDOCAINE 40 MG/G
CREAM TOPICAL
Status: DISCONTINUED | OUTPATIENT
Start: 2021-01-27 | End: 2021-01-27

## 2021-01-27 RX ORDER — CHOLECALCIFEROL (VITAMIN D3) 50 MCG
1 TABLET ORAL DAILY
COMMUNITY
End: 2021-01-01

## 2021-01-27 RX ORDER — NITROGLYCERIN 20 MG/100ML
10-200 INJECTION INTRAVENOUS CONTINUOUS
Status: DISCONTINUED | OUTPATIENT
Start: 2021-01-27 | End: 2021-01-29

## 2021-01-27 RX ORDER — PROCHLORPERAZINE 25 MG
12.5 SUPPOSITORY, RECTAL RECTAL EVERY 12 HOURS PRN
Status: DISCONTINUED | OUTPATIENT
Start: 2021-01-27 | End: 2021-01-01 | Stop reason: HOSPADM

## 2021-01-27 RX ADMIN — IOPAMIDOL 73 ML: 755 INJECTION, SOLUTION INTRAVENOUS at 13:51

## 2021-01-27 RX ADMIN — FUROSEMIDE 60 MG: 10 INJECTION, SOLUTION INTRAVENOUS at 21:36

## 2021-01-27 RX ADMIN — NITROGLYCERIN 10 MCG/MIN: 20 INJECTION INTRAVENOUS at 14:30

## 2021-01-27 RX ADMIN — FUROSEMIDE 40 MG: 10 INJECTION, SOLUTION INTRAVENOUS at 14:57

## 2021-01-27 RX ADMIN — SODIUM CHLORIDE 96 ML: 9 INJECTION, SOLUTION INTRAVENOUS at 13:51

## 2021-01-27 ASSESSMENT — MIFFLIN-ST. JEOR: SCORE: 1648.5

## 2021-01-27 ASSESSMENT — ENCOUNTER SYMPTOMS
DIAPHORESIS: 0
LIGHT-HEADEDNESS: 1
SHORTNESS OF BREATH: 1
BLOOD IN STOOL: 0

## 2021-01-27 NOTE — ED TRIAGE NOTES
0700 this am cp started. 911 called and helicopter brought him to [facility]. Pt has had 3 stents placed in the past and a recent blood transfusion per pt

## 2021-01-27 NOTE — ED NOTES
O2 sats dropping to 90% on 2L NC.  Blood stopped.  MD notifed.  RT called for bipap.  RR 38.  /74. .

## 2021-01-27 NOTE — ED PROVIDER NOTES
History   Chief Complaint:  Chest Pain       HPI   Renny Gannon is a 70 year old male with history of hypertension, anemia, and chronic diastolic heart failure who presents with chest pain. EMS reports that the patient comes from home where he started to experience chest pain at 0700 this morning and that upon their arrival to the scene, the patient was having a STEMI via 12 lead EKG. EMS states that family attempted to bring the patient to the hospital, but were unable to get him into the car, prompting for them to call 911. EMS notes that the patient received a dose of aspirin and Brilinta en route to the ED and had systolic blood pressures between 80/110. Patient had an oxygen saturation in the low 90s en route to the ED so he was placed on 2L of oxygen via nasal canula.     Upon arrival to the ED, the patient denies chest pain, but does endorse lightheadedness, slight shortness of breath, and nausea. Patient states that he suffers from chronic anemia with an unknown source and notes that his last transfusion for this was two weeks ago. He describes that he has experienced similar chest pain intermittently throughout the week and that the pain was present in the center of his chest. The patient denies black or bloody stool, diaphoresis, and other issues.      Review of Systems   Constitutional: Negative for diaphoresis.   Respiratory: Positive for shortness of breath.    Cardiovascular: Positive for chest pain.   Gastrointestinal: Negative for blood in stool.   Neurological: Positive for light-headedness.   All other systems reviewed and are negative.      Allergies:  Seasonal allergies     Medications:  Lipitor  Lasix  Ferrous sulfate  Hydrea  Toprol  Prilosec  Senokot  Vitamin B12     Past Medical History:    Denies history of blood clots.    Hypertension  Myelofibrosis  Chronic diastolic heart failure  Anemia  Dyspnea  CKD, stage III  TIA  Leukocytosis  Memory loss     Past Surgical History:    Bypass  "graft artery coronary - 1/31/2020  Coronary angiogram - 1/9/2020     Family History:    Cancer - brother    Social History:  Patient lives in a house.  Patient denies alcohol, tobacco, and street drug use.   Patient arrives to the ED via helicopter accompanied by EMS.     Physical Exam     Patient Vitals for the past 24 hrs:   BP Temp Temp src Pulse Resp SpO2 Height Weight   01/27/21 1440 (!) 148/74 -- -- 126 (!) 48 95 % -- --   01/27/21 1430 130/72 -- -- 124 28 97 % -- --   01/27/21 1420 117/52 -- -- 122 (!) 36 97 % -- --   01/27/21 1410 116/66 -- -- 120 11 97 % -- --   01/27/21 1403 -- 97.9  F (36.6  C) Temporal 116 25 98 % -- --   01/27/21 1400 114/56 -- -- 118 16 97 % -- --   01/27/21 1352 -- 98.2  F (36.8  C) Temporal 117 19 98 % -- --   01/27/21 1330 121/64 -- -- 117 19 98 % -- --   01/27/21 1300 120/64 -- -- 114 18 100 % -- --   01/27/21 1239 109/71 98.2  F (36.8  C) Oral 116 18 -- 1.727 m (5' 8\") 91.9 kg (202 lb 9.6 oz)     Physical Exam  SKIN:  Warm, dry.  HEMATOLOGIC/IMMUNOLOGIC/LYMPHATIC: Generalized pallor.  Edematous bilateral lower extremity.  HENT: No JVD.  EYES:  Conjunctivae pale.  CARDIOVASCULAR: Tachycardic rate with regular rhythm.  No murmur.  No rub.  Palpable equal bilateral radial pulses.  RESPIRATORY: Tachypneic, breath sounds equal and normal.  GASTROINTESTINAL:  Soft, nontender abdomen with active bowel sounds.  No distention.  No palpable mass.  MUSCULOSKELETAL: Normal body habitus.  NEUROLOGIC:  Alert, conversant.  PSYCHIATRIC:  Normal mood.    Emergency Department Course   ECG #1 (12:34:15):  Rate 118 bpm. CO interval 120. QRS duration 80. QT/QTc 350/490. P-R-T axes 32 23 125. Sinus tachycardia. Possible left atrial enlargement. ST depression, consider subendocardial injury. Abnormal QRS-T angle, consider primary T wave abnormality. Abnormal ECG. ST depression in V2-V5. Interpreted at 1238 by Roberto Lam MD.    ECG #2 (14:06:46):  Rate 118 bpm. CO interval 128. QRS duration 86. " QT/QTc 326/456. P-R-T axes 40 33 164. Sinus tachycardia. Marked ST abnormality, possible inferior subendocardial injury. Marked ST abnormality, possible anteroseptal subendocardial injury. Abnormal ECG.  Interpreted at 1410 by Roberto Lam MD.        Imaging:  CT Chest Pulmonary Embolism w Contrast   Final Result   IMPRESSION:   1.  Cardiomegaly with CABG changes and trace bilateral pleural   effusions with interlobular septal thickening probably reflecting   early changes of CHF. This appears slightly worse when compared to   prior exam.      2.  Calcification along the right and left hemidiaphragm suggesting   previous asbestos exposure.      3.  No evidence of pulmonary embolism. Thoracic aorta is unremarkable.      4.  Increasing splenomegaly of uncertain significance. No focal   splenic lesions are appreciated.      EJ MARIE MD      XR Chest Port 1 View   Final Result   IMPRESSION: Portable chest. Probable small left pleural effusion is   slightly increased since prior exam. No significant right pleural   fluid is appreciated. Pulmonary vascular congestion appears increased   since prior study. Heart remains enlarged. CABG changes. New pacer pad   overlies the left chest.      EJ MARIE MD          Laboratory:  BMP: Glucose 172 (H), Bun 33 (H), GFR 58 (L), WNL (Creatinine 1.24)  CBC: WBC 16.4 (H), HGB 5.2 (LL), PLT 18 (LL)  INR: 1.24 (H)   Troponin (1235): 1.639  Hepatic panel: Bilirubin Direct 0.8 (H), Bilirubin Total 2.1 (H), AST 68 (H)   BNP: 8,616 (H)     ABO/Rh type and screen: A Neg    Asymptomatic SARS-CoV2 (COVID-19) Virus PCR: Pending       Occult blood stool: Negative       Emergency Department Course:    Reviewed:  I reviewed nursing notes, vitals and past medical history    Assessments:  1230: I initially evaluated the patient in ED room stab 2.    1257: I reassessed the patient. Rectal exam was performed here in the emergency department. This was performed with male chaperone at bedside.      1322: Patient reassessed. Notified of transfusion need and further findings.    1430: Patient reassessed.       Consults:  1240: I spoke with Dr. Frost of cardiology.      1415: I spoke with Dr. Frost of cardiology.      1419: I spoke with Dr. Presley of cardiology. They recommended nitroglycerin drip.     1443: I spoke with Dr. Penn of the hospitalist service who agrees to accept the patient for admission.      Interventions:  1354, RBCs, 1 Unit, IV  1430, nitroglycerin drip, 10 mcg/hr, IV    Disposition:  The patient was admitted to the hospital under the care of Dr. Penn.       Impression & Plan   Medical Decision Making:  This patient presents by helicopter with concern of ST segment elevation, myocardial infarctions. EKG on arrival clearly was worrisome with precordial ST segment depression which raised the prospect of a posterior STEMI. After consultation with Dr. Frost, the interventional cardiologist, and given the patient's history, evident significant anemia, and need for other work up prior to attempting catheterization, he was not rushed to the cath lab. He did remain hemodynamically stable in the ED and his hemoglobin has dropped a significant amount since January 8th, a unit of blood was ordered and infusion was initiated during his time in the ED. Platelets also quite low, but he does not exhibit any active bleeding, so platelet infusion was not initiated. Gladly, hemoccult negative stool test and the stool was brown. Troponin elevated as expected with cardiac strain and infarct. BNP elevated and the increased leg swelling over the last raises concern for acute CHF. EKG was repeated which was worse than the original and the first EKG so I consulted Dr. Frost of interventional radiology again and she considered the patient just too ill and not a cath lab candidate given the significant anemia and other issues ongoing. She recommended general cardiology consult and I spoke with Dr. Presley who  agreed to continue current treatment with transfusion and add a nitroglycerin drip so that was initiated. The patient will be admitted to the CICU on the hospitalist service.     Critical Care Time given acute coronary syndrome requiring titrated nitroglycerin with CHF and respiratory failure was 60 minutes for this patient excluding procedures    Covid-19  Renny Gannon was evaluated during a global COVID-19 pandemic, which necessitated consideration that the patient might be at risk for infection with the SARS-CoV-2 virus that causes COVID-19.   Applicable protocols for evaluation were followed during the patient's care. COVID-19 was considered as part of the patient's evaluation. The plan for testing is: a test was obtained during this visit.     Diagnosis:    ICD-10-CM    1. Anemia, unspecified type  D64.9 CBC with platelets differential     Troponin I     Occult blood stool     ABO/Rh type and screen     Hepatic panel     Nt probnp inpatient     Blood component     Blood component   2. Myeloproliferative disorder (H)  D47.1    3. Thrombocytopenia (H)  D69.6    4. NSTEMI (non-ST elevated myocardial infarction) (H)  I21.4    5. Congestive heart failure, unspecified HF chronicity, unspecified heart failure type (H)  I50.9        Scribe Disclosure:  Karan ROSADO, am serving as a scribe at 12:39 PM on 1/27/2021 to document services personally performed by Roberto Lam MD based on my observations and the provider's statements to me.      [facility]   January 27, 2021      Roberto Lam MD  01/27/21 1523

## 2021-01-27 NOTE — ED NOTES
Applied 2L NC.  Patient reporting chest discomfort 10/10.  MD aware.  Defib pads placed on patient.

## 2021-01-27 NOTE — ED NOTES
DATE:  1/27/2021   TIME OF RECEIPT FROM LAB:  1403  LAB TEST:  hmg  LAB VALUE:  5.2  LAB TEST:  platelet  LAB VALUE:  18  RESULTS GIVEN WITH READ-BACK TO (PROVIDER):  Roberto Lam MD  TIME LAB VALUE REPORTED TO PROVIDER:   [id number]

## 2021-01-27 NOTE — ED NOTES
Bed: ST02  Expected date:   Expected time:   Means of arrival:   Comments:  [place]  4 [place]  70 M stemi  1224

## 2021-01-27 NOTE — H&P
Admitted:     01/27/2021      PRIMARY CARE PHYSICIAN:  Angus Scott MD.      CHIEF COMPLAINT:  Chest pain, shortness of breath.      HISTORY OF PRESENT ILLNESS:  Renny Gannon is a 70-year-old male with past medical history significant for coronary artery disease with history of a 3-vessel CABG in 02/2020, hypertension, heart failure with preserved ejection fraction, stage III CKD and myeloproliferative disorder with chronic anemia among other medical problems, who was brought to the emergency room today for evaluation of chest pain.  History is obtained mostly per discussions with ED staff and review of the medical record given the patient is presently on BiPAP and unable to provide much history.      The patient has a known myeloproliferative disorder and was last hospitalized earlier this month at [facility] for complications relating to his disease.  He was admitted with acute on chronic anemia (hemoglobin 6.6) and required a transfusion.  During that hospitalization, his troponin was noted to be elevated, but remained stable.  This was attributed to his anemia.  His condition improved during that stay.  He was recommended to resume treatment with hydroxyurea per his oncologist, Dr. Cristina.  Additional labs were ordered and he was advised to follow up in clinic.  It sounds as though given patient has [employer] insurance that he will no longer be able to see Dr. Cristina in the [facility] and thus he needs to establish with a new provider.  He has not been seen by any provider since that discharge.      Earlier today, it sounds as though he was speaking with his sister when he endorsed feelings of chest discomfort.  The timing of onset of his symptoms is unclear, but it was suspected that for the past several days, he has been having some shortness of breath, increased lower extremity edema, vague sensation of dizziness and lightheadedness and some vague chest pain that radiated into his  right arm.  EMS was called and on their initial assessment, they noted EKG changes concerning for STEMI.  As such, [employer] was activated and he was airlifted to [facility] Emergency Room for ongoing evaluation.  In route to the hospital, he was given a full-dose aspirin and a dose of Brilinta.      In the emergency room, he was seen and evaluated by Dr. Lam.  He was afebrile.  He was tachycardic and mildly hypotensive with O2 sats in the 90s on room air.  Basic labs were obtained, which were notable for a hemoglobin of 5.2 and a platelet count of 18.  His proBNP was elevated at 8600.  Troponin was 1.64.  Subsequent EKGs here showed ST depressions in the precordial leads, most predominantly V2 through V4, which were unchanged on serial imaging.  He did not specifically complain of chest discomfort in the emergency room but does relay feelings of increased shortness of breath, lightheadedness and dizziness.  Dr. Lam spoke with Dr. Alcantara on-call for Interventional Cardiology.  Given his anemia and thrombocytopenia, transfer to the Cath Lab was deferred and initial plans were made to manage him medically.  Additional workup was done in the emergency room including a CT of the chest with PE protocol, which was negative for PE and did show findings of some mild volume overload. Dr. Lam spoke further with Dr. Presley on-call for general Cardiology.  Recommended to defer initiation of a heparin drip at present due to his anemia and thrombocytopenia.  He was started on a nitroglycerin drip and given IV Lasix.  One unit PRBC was ordered in the emergency room shortly after the transfusion was started, he became more short of breath and was placed on BiPAP.      When I saw him, he was resting comfortably on BiPAP.  He was alert and answering questions appropriately.  He reiterated his wishes, although he was DNR/DNI during his hospitalization earlier this month, he now wants to be a full code with all  resuscitative measures done as able.   He tells me he is breathing more comfortably on BiPAP today.  He will be admitted to the cardiac ICU under IM status for ongoing evaluation and care.        PAST MEDICAL HISTORY:   1.  Coronary artery disease with history of a 3-vessel CABG on 2020.   2.  Hypertension.   3.  Heart failure with preserved ejection fraction.   4.  Stage III chronic kidney disease.  Baseline creatinine 1.3-1.5.   5.  Myeloproliferative disorder.  Baseline hemoglobin is in the 8s.  Baseline platelet count is in the 90s.      PAST SURGICAL HISTORY:     1.  Three-vessel CABG in 2020.   2.  History of a remote bone marrow biopsy.      FAMILY HISTORY:  Brother has a history of lung cancer, another brother history of coronary artery disease and  of an MI at age 66.      SOCIAL HISTORY:  He has a history of tobacco use.  He smoked for 30 years but quit smoking in .  He denies alcohol use.  He lives alone.  He is not .  His sister, Colette, helps make his medical decisions as needed.      HOME MEDICATIONS:   Awaiting completion of med rec per pharmacy but appears to be maintained on   1.  Atorvastatin 40 mg at bedtime.   2.  Ferrous sulfate 324 mg daily.   3.  Lasix 40 mg daily in the morning and 20 mg at noon.   4.  Aspirin 325 mg daily.   5.  Hydroxyurea 1000 mg daily.   6.  Metoprolol  mg twice daily.   7.  Senna docusate 2 tabs twice daily.   8.  Vitamin B-12 daily  9.  Cholecalciferol 50 mcg daily.     ALLERGIES:  SEASONAL ALLERGIES.      REVIEW OF SYSTEMS:  A full 10-point review of systems was obtained and negative unless otherwise stated per HPI.      PHYSICAL EXAMINATION:   VITAL SIGNS:  Temperature 97.3, pulse of 120, respirations 43 , blood pressure 131/60, O2 sat 100% on BiPAP.   GENERAL:  The patient is an ill-appearing male lying quietly on the gurney.  He is alert and able to answer basic questions appropriately.  He is tolerating BiPAP mask, in no acute  distress.   HEENT:  Pupils equal, round, reactive to light and accommodation.  Extraocular muscles intact.  Mucous membranes moist.   CARDIOVASCULAR:  Heart rate is tachycardic with regular rhythm.  No murmurs, gallops, rubs.  Pulses are +2 and symmetric in bilateral upper extremities.  There is +1-2 bilateral lower extremity edema to the knees.   RESPIRATORY:  There are coarse breath sounds and crackles through the lower lung fields.  No wheeze or rhonchi.  His breathing is nonlabored currently on BiPAP.   ABDOMEN:  Soft, nontender, nondistended, positive bowel sounds throughout.   RECTAL:  Exam was done per Dr. Lam in the emergency room which showed brown stool and was fecal occult negative.   INTEGUMENTARY:  Skin is pale appearing, otherwise warm and dry, no rashes, jaundice or ecchymosis.   NEUROLOGIC:  Cranial nerves II-XII are grossly intact.  No focal or sensory deficits.  Gait was not assessed.      LABORATORY DATA AND IMAGING:  BMP shows sodium 133, potassium 3.9, creatinine 1.24.  LFTs were within normal limits other than mildly elevated AST.  ProBNP was 8616.  Troponin was 1.64.  CBC showed a white count of 16.4, hemoglobin 5.2 and a platelet count of 18.  INR is 1.24.      Serial EKGs in the emergency room showed sinus tachycardia with ST depressions in V2 through V4.      A chest x-ray showed a probable small left pleural effusion and pulmonary vascular congestion.        CT of the chest with PE protocol showed cardiomegaly, trace bilateral pleural effusions of the intralobular septal thickening reflective of early changes of CHF and no evidence of PE.  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      ASSESSMENT AND PLAN:  Renny Gannon is a 70-year-old male with past medical history significant for coronary artery disease with history of a 3-vessel bypass,  heart failure with preserved ejection fraction, hypertension, stage III CKD, myeloproliferative disorder with chronic anemia, who presents to the emergency room today for evaluation of chest pain and initial concern for STEMI.  He is notably anemic and thrombocytopenic.  Given these findings, he will now be taken to the Cath Lab today.  He will be admitted to the hospital for ongoing evaluation and care.  While in the emergency room, his respiratory status has declined.  He is not requiring BiPAP.  He will be admitted under [facility] status.   1.  Non-ST elevation myocardial infarction.  Initially had endorsed some chest pain to his sister this morning.  EMS was called and given initial concerns for possible STEMI, he was airlifted to [facility] Emergency Room.  En route, he was given a full-dose aspirin and Brilinta.  He had noted lab disturbances including significant anemia and thrombocytopenia in the emergency room, thus transfer to the Cath Lab was deferred.  He was started on nitroglycerin drip.  Per further discussions with Cardiology, recommended deferring a heparin drip for now.  Will monitor on telemetry.  Trend troponins.  Will check an echocardiogram.  Cardiology has been consulted and their assistance with ongoing care is much appreciated.  Given his severe thrombocytopenia, will continue to hold aspirin and additional antiplatelet therapy.  His beta blocker and statin will be held given he is currently requiring BiPAP.   2.  Suspected exacerbation of heart failure with preserved ejection fraction.  His last echocardiogram in 02/2020 showed left ventricle was normal in structure, function and size.  He typically takes oral Lasix at home.  He did endorse worsening lower extremity edema with associated increased shortness of breath in recent days and has findings of mild pulmonary volume overload on imaging today.  He was given 60 mg of IV Lasix in the emergency room.  Give he had additionally needed a  transfusion, will continue diuresis with Lasix 60 mg IV b.i.d.  Monitor I's and O's and daily weights.  Additional evaluation with repeat echocardiogram as above.  Further titration of medications per Cardiology.   3.  Acute on chronic anemia, thrombocytopenia, myeloproliferative disorder.  He has chronic anemia with a baseline hemoglobin of 8.  His baseline platelet count seems to be in the 90s.  He was recently hospitalized earlier this month at [facility] for management of acute on chronic anemia.  At that time, his hemoglobin had been around 6.  Today, he is found to have a hemoglobin of 5.2 and a platelet count of 18.  Patient did not endorse any changes in his stools such as melena or rei hematochezia and on a rectal exam done per Dr. Lam in the emergency room, he was noted to have brown stool which was fecal occult negative. He is receiving 1 unit PRBCs in the emergency room.  Will give additional transfusions as needed to maintain a hemoglobin around 7.  Will need to monitor respiratory status if additional transfusions are needed.  He had previously followed with Dr. Cristina of [facility] and at the end of his last hospitalization was recommended to resume hydroxyurea.  It is unclear if he has resumed it.  He will ultimately need to establish with a new oncologist given insurance issues as noted elsewhere in the chart.    4.  Leukocytosis.  This is presumed secondary to myeloproliferative disorder.  Counts are stable.  He was afebrile and no other signs or symptoms suggestive of active infection at this time.   5.  Acute hypoxic respiratory failure.  Multifactorial.  Secondary to presumed volume overload.  Given the need for blood products in the emergency room, he became more short of breath and was ultimately placed on BiPAP.  Continue BiPAP.  Hold oral meds until he can be weaned to nasal cannula.   6.  Stage III chronic kidney disease.  Baseline creatinine is 1.3-1.5.  Renal function is  stable today.     DVT prophylaxis:  PCDs only.      CODE STATUS:  I discussed with the patient today.  He reiterated his wishes to be made FULL CODE.         ELOY LORENZO DO             D: 2021   T: 2021   MT: INGRIS      Name:     ABI VALDEZ   MRN:      [id number]        Account:      [id number]   :      1950        Admitted:     2021                   Document: [id number]       cc: Angus Scott MD

## 2021-01-28 ENCOUNTER — APPOINTMENT (OUTPATIENT)
Dept: CARDIOLOGY | Facility: CLINIC | Age: 71
DRG: 280 | End: 2021-01-28
Attending: INTERNAL MEDICINE
Payer: COMMERCIAL

## 2021-01-28 LAB
ANION GAP SERPL CALCULATED.3IONS-SCNC: 10 MMOL/L (ref 3–14)
BLD PROD TYP BPU: NORMAL
BLD UNIT ID BPU: 0
BLOOD PRODUCT CODE: NORMAL
BPU ID: NORMAL
BUN SERPL-MCNC: 34 MG/DL (ref 7–30)
CALCIUM SERPL-MCNC: 8.3 MG/DL (ref 8.5–10.1)
CHLORIDE SERPL-SCNC: 104 MMOL/L (ref 94–109)
CHOLEST SERPL-MCNC: 95 MG/DL
CO2 SERPL-SCNC: 23 MMOL/L (ref 20–32)
CREAT SERPL-MCNC: 1.53 MG/DL (ref 0.66–1.25)
ERYTHROCYTE [DISTWIDTH] IN BLOOD BY AUTOMATED COUNT: 17.3 % (ref 10–15)
GFR SERPL CREATININE-BSD FRML MDRD: 45 ML/MIN/{1.73_M2}
GLUCOSE SERPL-MCNC: 122 MG/DL (ref 70–99)
HCT VFR BLD AUTO: 16.1 % (ref 40–53)
HDLC SERPL-MCNC: 20 MG/DL
HGB BLD-MCNC: 5.6 G/DL (ref 13.3–17.7)
HGB BLD-MCNC: 7.1 G/DL (ref 13.3–17.7)
LACTATE BLD-SCNC: 1.8 MMOL/L (ref 0.7–2)
LDLC SERPL CALC-MCNC: 41 MG/DL
MCH RBC QN AUTO: 31.8 PG (ref 26.5–33)
MCHC RBC AUTO-ENTMCNC: 34.8 G/DL (ref 31.5–36.5)
MCV RBC AUTO: 92 FL (ref 78–100)
NONHDLC SERPL-MCNC: 75 MG/DL
PLATELET # BLD AUTO: 23 10E9/L (ref 150–450)
POTASSIUM SERPL-SCNC: 4 MMOL/L (ref 3.4–5.3)
RBC # BLD AUTO: 1.76 10E12/L (ref 4.4–5.9)
SODIUM SERPL-SCNC: 137 MMOL/L (ref 133–144)
TRANSFUSION STATUS PATIENT QL: NORMAL
TRANSFUSION STATUS PATIENT QL: NORMAL
TRIGL SERPL-MCNC: 172 MG/DL
TROPONIN I SERPL-MCNC: 8.97 UG/L (ref 0–0.04)
WBC # BLD AUTO: 19.7 10E9/L (ref 4–11)

## 2021-01-28 PROCEDURE — 80061 LIPID PANEL: CPT | Performed by: INTERNAL MEDICINE

## 2021-01-28 PROCEDURE — 99233 SBSQ HOSP IP/OBS HIGH 50: CPT | Performed by: INTERNAL MEDICINE

## 2021-01-28 PROCEDURE — 85018 HEMOGLOBIN: CPT | Performed by: INTERNAL MEDICINE

## 2021-01-28 PROCEDURE — 84484 ASSAY OF TROPONIN QUANT: CPT | Performed by: INTERNAL MEDICINE

## 2021-01-28 PROCEDURE — 999N000208 ECHOCARDIOGRAM COMPLETE

## 2021-01-28 PROCEDURE — 36415 COLL VENOUS BLD VENIPUNCTURE: CPT | Performed by: INTERNAL MEDICINE

## 2021-01-28 PROCEDURE — 83605 ASSAY OF LACTIC ACID: CPT | Performed by: INTERNAL MEDICINE

## 2021-01-28 PROCEDURE — 250N000013 HC RX MED GY IP 250 OP 250 PS 637: Performed by: INTERNAL MEDICINE

## 2021-01-28 PROCEDURE — 250N000011 HC RX IP 250 OP 636: Performed by: INTERNAL MEDICINE

## 2021-01-28 PROCEDURE — 85027 COMPLETE CBC AUTOMATED: CPT | Performed by: INTERNAL MEDICINE

## 2021-01-28 PROCEDURE — 80048 BASIC METABOLIC PNL TOTAL CA: CPT | Performed by: INTERNAL MEDICINE

## 2021-01-28 PROCEDURE — 255N000002 HC RX 255 OP 636: Performed by: INTERNAL MEDICINE

## 2021-01-28 PROCEDURE — P9016 RBC LEUKOCYTES REDUCED: HCPCS | Performed by: EMERGENCY MEDICINE

## 2021-01-28 PROCEDURE — 210N000002 HC R&B HEART CARE

## 2021-01-28 PROCEDURE — 99222 1ST HOSP IP/OBS MODERATE 55: CPT | Mod: 25 | Performed by: INTERNAL MEDICINE

## 2021-01-28 PROCEDURE — 93306 TTE W/DOPPLER COMPLETE: CPT | Mod: 26 | Performed by: INTERNAL MEDICINE

## 2021-01-28 RX ORDER — AMOXICILLIN 250 MG
1 CAPSULE ORAL DAILY
COMMUNITY
End: 2021-01-01

## 2021-01-28 RX ORDER — ATORVASTATIN CALCIUM 10 MG/1
10 TABLET, FILM COATED ORAL EVERY EVENING
Status: DISCONTINUED | OUTPATIENT
Start: 2021-01-28 | End: 2021-01-01 | Stop reason: HOSPADM

## 2021-01-28 RX ORDER — METOPROLOL TARTRATE 25 MG/1
25 TABLET, FILM COATED ORAL 2 TIMES DAILY
Status: DISCONTINUED | OUTPATIENT
Start: 2021-01-28 | End: 2021-01-01

## 2021-01-28 RX ADMIN — ATORVASTATIN CALCIUM 10 MG: 10 TABLET, FILM COATED ORAL at 19:43

## 2021-01-28 RX ADMIN — METOPROLOL TARTRATE 25 MG: 25 TABLET, FILM COATED ORAL at 21:08

## 2021-01-28 RX ADMIN — HUMAN ALBUMIN MICROSPHERES AND PERFLUTREN 9 ML: 10; .22 INJECTION, SOLUTION INTRAVENOUS at 12:00

## 2021-01-28 RX ADMIN — FUROSEMIDE 60 MG: 10 INJECTION, SOLUTION INTRAVENOUS at 08:27

## 2021-01-28 RX ADMIN — FUROSEMIDE 60 MG: 10 INJECTION, SOLUTION INTRAVENOUS at 21:08

## 2021-01-28 ASSESSMENT — ACTIVITIES OF DAILY LIVING (ADL)
PATIENT_/_FAMILY_COMMUNICATION_STYLE: SPOKEN LANGUAGE (ENGLISH OR BILINGUAL)
DRESSING/BATHING_DIFFICULTY: NO
CONCENTRATING,_REMEMBERING_OR_MAKING_DECISIONS_DIFFICULTY: NO
DOING_ERRANDS_INDEPENDENTLY_DIFFICULTY: NO
EQUIPMENT_CURRENTLY_USED_AT_HOME: WALKER, ROLLING
DIFFICULTY_COMMUNICATING: NO
WEAR_GLASSES_OR_BLIND: YES
VISION_MANAGEMENT: EYE GLASSESS
WHICH_OF_THE_ABOVE_FUNCTIONAL_RISKS_HAD_A_RECENT_ONSET_OR_CHANGE?: AMBULATION
TOILETING_ISSUES: NO
WALKING_OR_CLIMBING_STAIRS_DIFFICULTY: NO
FALL_HISTORY_WITHIN_LAST_SIX_MONTHS: NO
DIFFICULTY_EATING/SWALLOWING: NO
HEARING_DIFFICULTY_OR_DEAF: NO

## 2021-01-28 ASSESSMENT — MIFFLIN-ST. JEOR: SCORE: 1628.98

## 2021-01-28 NOTE — PROGRESS NOTES
Pt off BiPAP, currently on 4L Oxymask. sats at 99%. No respiratory distress noted. Will continue to monitor    RT Maria De Jesus on 1/27/2021 at 11:22 PM

## 2021-01-28 NOTE — PROGRESS NOTES
[facility]    Medicine Progress Note - Hospitalist Service       Date of Admission:  1/27/2021  Assessment & Plan   Renny Gannon is a 70-year-old male with past medical history significant for coronary artery disease with history of a 3-vessel bypass, heart failure with preserved ejection fraction, hypertension, stage III CKD, myeloproliferative disorder with chronic anemia, who presents to the emergency room for evaluation of chest pain and initial concern for STEMI.  Angiogram deferred due to anemia and thrombocytopenia, admitted 1/27/2021.    Non-ST elevation myocardial infarction  Dyslipidemia   Reported chest pain prior to admission, some concern for STEMI in the field therefore airlifted to [facility] and given aspirin and ticagrelor en route.  Given his significant anemia and thrombocytopenia emergent angiogram was deferred.  - Cardiology consulted, appreciate assistance  - No plan for angiogram  - Wean nitroglycerin drip  - Continue metoprolol   - Hold aspirin for now   - Echocardiogram pending   - HDL 20, LDL 41. Atorvastatin has been reduced from admission dose.   - Troponin peak of 11.224    Acute exacerbation of heart failure with preserved ejection fraction  Last echocardiogram in 2/2020 showed normal left ventricle structure, function and size. Typically takes oral furosemide at home.   - Cardiology following  - Continue IV furosemide   - BMP in AM   - Echocardiogram pending  - Strict I/O, daily weights    Myelofibrosis  Acute on chronic anemia  Acute on chronic thrombocytopenia  Leukocytosis  Chronic anemia with a baseline hemoglobin of around 8 g/dl. Baseline platelet count seems to be in the 90s.  Hospitalized earlier this month at [facility] for management of acute on chronic anemia (hemoglobin around 6 g/dl).  No clinical signs of bleeding and fecal occult negative. Previously followed with [facility], however has [employer] and requires a new oncologist  due to insurance reasons  - Continue transfusion for hemoglobin < 7 g/dl, higher goal if symptoms (chest pain or rest shortness of breath)  - Care coordinator involved to arrange follow-up with new oncologist for ongoing management. Likely will need frequent outpatient transfusions.   - Will hold on inpatient hematology consult as apparently no services available within parents insurance that can continue follow-up with patient in outpatient   - Hold hydroxyurea     Acute hypoxic respiratory failure  Multifactorial.  Secondary to volume overload, with blood products in the emergency room, he became more short of breath and was ultimately placed on BiPAP.    - Weaned from BiPAP  - Stable on 2L nasal cannula     Chronic kidney disease, stage 3  Baseline creatinine 1.3-1.5 with GFR in 40-50s. Creatinine 1.24 on admission.   - Currently at baseline  - Avoid nephrotoxins  - Monitor BMP      Diet: 2 Gram Sodium Diet    DVT Prophylaxis: Pneumatic Compression Devices  Almendarez Catheter: not present  Code Status: Full Code      Disposition Plan   Expected discharge: Anticipate discharge in 2-3 days pending weaning from oxygen, stable blood counts without transfusion needs. Transfer to [facility] if stable off of nitroglycerin gtt, transfer orders reconciled.   Entered: Tristan Schmidt MD 01/28/2021, 3:51 PM       The patient's care was discussed with the Bedside Nurse, Care Coordinator/ and Patient.    Tristan Schmidt MD  Hospitalist Service  [facility]    ______________________________________________________________________    Interval History   No acute events overnight.  Denies any chest pain.  Feels some mild shortness of breath when getting up from bed, no ahortness of breath at rest. Denies any abdominal pain, n/v, bleeding    Data reviewed today: I reviewed all medications, new labs and imaging results over the last 24 hours. I personally reviewed no images or EKG's today.    Physical  Exam   Vital Signs: Temp: 97.8  F (36.6  C) Temp src: Oral BP: 118/59 Pulse: 113   Resp: 27 SpO2: 98 % O2 Device: Nasal cannula Oxygen Delivery: 2 LPM  Weight: 198 lbs 4.8 oz    Constitutional: NAD  Respiratory: Clear to auscultation bilaterally, good air movement bilaterally  Cardiovascular: RRR. Bilateral lower extremity edema, R>L  GI: Soft, non-tender, non-distended.  .   Skin/Integumen: Warm, dry  Other:       Data   Recent Labs   Lab 01/28/21  1454 01/28/21  0532 01/28/21  0203 01/27/21  2147 01/27/21  1840 01/27/21  1235   WBC  --  19.7*  --   --   --  16.4*   HGB 7.1* 5.6*  --   --  9.7* 5.2*   MCV  --  92  --   --   --  96   PLT  --  23*  --   --   --  18*   INR  --   --   --   --   --  1.24*   NA  --  137  --   --   --  133   POTASSIUM  --  4.0  --   --   --  3.9   CHLORIDE  --  104  --   --   --  101   CO2  --  23  --   --   --  20   BUN  --  34*  --   --   --  33*   CR  --  1.53*  --   --   --  1.24   ANIONGAP  --  10  --   --   --  12   MINNIE  --  8.3*  --   --   --  8.7   GLC  --  122*  --   --   --  172*   ALBUMIN  --   --   --   --   --  3.5   PROTTOTAL  --   --   --   --   --  7.2   BILITOTAL  --   --   --   --   --  2.1*   ALKPHOS  --   --   --   --   --  118   ALT  --   --   --   --   --  23   AST  --   --   --   --   --  68*   TROPI  --   --  8.975* 11.224*  --  1.639*       No results found for this or any previous visit (from the past 24 hour(s)).  Medications     - MEDICATION INSTRUCTIONS -       nitroGLYcerin Stopped (01/28/21 3521)     - MEDICATION INSTRUCTIONS -       ACE/ARB/ARNI NOT PRESCRIBED       ASPIRIN NOT PRESCRIBED       BETA BLOCKER NOT PRESCRIBED       STATIN NOT PRESCRIBED         atorvastatin  10 mg Oral QPM     furosemide  60 mg Intravenous BID     metoprolol tartrate  25 mg Oral BID

## 2021-01-28 NOTE — PLAN OF CARE
A&O x4. VSS on 3L oxymask. Up with A1. Voiding in urinal at bedside. Tolerating regular diet. Tele ST. NTG infusing at 20 mcg/min.  Patient denies pain. Cardiology following. Plts back at 23 this morning, and Hgb came back at 5.6, conditional order for blood transfusion in place. Continue to monitor.

## 2021-01-28 NOTE — CONSULTS
Care Management Initial Consult    General Information  Assessment completed with: Kelsy Renny  Type of CM/SW Visit: Initial Assessment    Primary Care Provider verified and updated as needed: Yes   Readmission within the last 30 days: lack of support   Return Category: Exacerbation of disease     Advance Care Planning: Advance Care Planning Reviewed: present on chart          Communication Assessment  Patient's communication style: spoken language (English or Bilingual)    Hearing Difficulty or Deaf: no   Wear Glasses or Blind: yes    Cognitive  Cognitive/Neuro/Behavioral: WDL  Level of Consciousness: alert  Arousal Level: opens eyes spontaneously  Orientation: oriented x 4  Mood/Behavior: calm, cooperative  Best Language: 0 - No aphasia  Speech: clear    Living Environment:   People in home: alone     Current living Arrangements: apartment      Able to return to prior arrangements: yes       Family/Social Support:  Care provided by: self  Provides care for: no one  Marital Status: Single  Sibling(s)          Description of Support System: Supportive         Current Resources:   Skilled Home Care Services:    Community Resources: None  Equipment currently used at home: walker, rolling  Supplies currently used at home: None    Employment/Financial:  Employment Status: retired        Financial Concerns: No concerns identified           Lifestyle & Psychosocial Needs:        Socioeconomic History     Marital status: Single     Spouse name: Not on file     Number of children: Not on file     Years of education: Not on file     Highest education level: Not on file     Tobacco Use     Smoking status: Former Smoker     Years: 30.00     Types: Cigarettes     Start date: 1961     Quit date: 2001     Years since quittin.0     Smokeless tobacco: Never Used   Substance and Sexual Activity     Alcohol use: No     Frequency: Never     Drug use: No     Sexual activity: Not Currently       Functional  Status:  Prior to admission patient needed assistance:   Dependent ADLs:: Independent          Mental Health Status:          Chemical Dependency Status:                Values/Beliefs:  Spiritual, Cultural Beliefs, Taoist Practices, Values that affect care: no               Additional Information:  Patient noted to be needing new Oncologist. Writer called [employer] and had customer service help find a local Hemo/Oncologist. They provided two options: Dr. Rosalinda Bingham? at [facility] [phone]. Dr. Chin [phone] in [place]. Writer went over options. Patient will think about it and discuss with his sister. He thought [place] might be a better location for him.  Providers for Hemo/Oncology that are covered per [employer] would be:     Dr. Lela Bingham at [facility]   [phone]         Or   Dr. Chin at [facility]   [phone]     Renée Gresham RN

## 2021-01-28 NOTE — PROVIDER NOTIFICATION
MD Notification    Notified Person: MD    Notified Person Name:Mynor    Notification Date/Time:1/28 0608    Notification Interaction:web page    Purpose of Notification: Hgb came back at 5.6 and platelets at 23.    Orders Received: Condition blood orders placed    Comments:

## 2021-01-28 NOTE — PLAN OF CARE
Pt is A&Ox4, denies chest pain - Nitroglycerine gtt at 20 mcg/min, VSS and on tele SR, on BIPAP NPO, HGB after 1 unit of RBC given in ED - 9.7, Lasix 60 mg IV given, Up with assist of 1 to urinate at bedside. Cardiology consulted. Plan to monitor.

## 2021-01-28 NOTE — PLAN OF CARE
denied pain. Vitals stable on 2 liters NC. 1 unit PRBC infused. nitroglycerin gtt infusing - no c/o cp or SOB. Using urinal with st. By assist at the bedside. Echo completed, awaiting cards consult. Tele  -110's. Continue to monitor.

## 2021-01-28 NOTE — CONSULTS
[facility]    Cardiology Consultation     Date of Admission:  1/27/2021    Renny Gannon is a 70 year old male who was admitted on 1/27/2021. I was asked to see the patient for CAD, chest pain, abnormal EKG.    Assessment  1. Chest pain, troponin peaked at 11. Most consistent with type II ischemia due to underlying CAD with profound anemia.  Cannot rule out primary ischemic event, but fortunately with a relatively small trop leak of 11 it is not likely a large territory.    2. CAD h/o CABG Jan 2020.  On lipitor 40  3. Myelodysplastic disorder.    4. Anemia Hgb was 5.2, increased to 9.7 but now back to 5.6.    a. Rapid decrease in Hgb post-transfusion, consider lab error vs occult bleeding? Would prefer Hgb at least 7.   5. Thrombocytopenia. Platelets 16  6. CKD    Plan  1. Medical management of CAD.   2. Statin reordered.    3. Metoprolol tartrate reordered, but at lower dose 25mg PO BID.  Can be uptitrated as tolerated.  4. Resume aspirin when possible.  5. Echo pending.   6. Agree with lasix as needed for transfusions.   7. Monitor I/O, daily weights    Leah Presley MD    Code Status    Full Code    Reason for Consult   Reason for consult: NSTEMI    Primary Care Physician   Angus Clements Mai    Chief Complaint   Chest pain    History is obtained from the patient  And chart    History of Present Illness   Renny Gannon is a 70 year old male who presents with chest pain/tightness.  He states he has been feeling progressively tired and short of breath with exertional chest tightness for a few weeks.  Yesterday chest pain was worse and family drove him to the ED in [place].  He was transferred by helicopter for possible STEMI.  His hemoglobin was found to be 5 and platelets 18k, so he was deemed not appropriate for cath lab and EKG changes likely related to demand from severe anemia in context of underlying CAD.      He is feeling better today.  Still tired. No dyspnea, no chest pain.        Past Medical History   1.  Coronary artery disease with history of a 3-vessel CABG on 01/31/2020. LIMA graft was placed in the LAD and separate saphenous venous bypass grafts were placed to the posterior descending branch of the right coronary and to the second obtuse marginal branch of the circumflex  2.  Hypertension.   3.  Heart failure with preserved ejection fraction.   4.  Stage III chronic kidney disease.  Baseline creatinine 1.3-1.5.   5.  Myeloproliferative disorder.     Baseline hemoglobin is in the 8s.    Thrombocytopenia          Prior to Admission Medications   Prior to Admission Medications   Prescriptions Last Dose Informant Patient Reported? Taking?   Ferrous Sulfate 324 (65 Fe) MG TBEC   No Yes   Sig: Take 1 tablet (324 mg) by mouth daily   GOODSENSE ASPIRIN 325 MG tablet  Self No Yes   Sig: TAKE ONE TABLET BY MOUTH ONCE DAILY IN THE MORNING   atorvastatin (LIPITOR) 40 MG tablet  Self No Yes   Sig: TAKE ONE TABLET BY MOUTH EVERY NIGHT AT BEDTIME   furosemide (LASIX) 40 MG tablet  Self No Yes   Sig: TAKE ONE TABLET BY MOUTH DAILY IN THE MORNING AND TAKE 1/2 TABLET AT NOON   hydroxyurea (HYDREA) 500 MG capsule   Yes Yes   Sig: Take 500 mg by mouth daily    metoprolol succinate ER (TOPROL-XL) 100 MG 24 hr tablet   Yes Yes   Sig: Take 1 tablet (100 mg) by mouth 2 times daily HOLD this medication until you see your PCP   senna-docusate (SENOKOT-S/PERICOLACE) 8.6-50 MG tablet   Yes Yes   Sig: Take 1 tablet by mouth daily   vitamin B-12 (CYANOCOBALAMIN) 250 MCG tablet  Self No Yes   Sig: TAKE ONE TABLET BY MOUTH EVERY MORNING   vitamin D3 (CHOLECALCIFEROL) 50 mcg (2000 units) tablet   Yes Yes   Sig: Take 1 tablet by mouth daily      Facility-Administered Medications: None     Allergies   Allergies   Allergen Reactions     No Known Drug Allergies      Seasonal Allergies        Social History   I have reviewed this patient's social history and updated it with pertinent information if needed. Renny CRAIG  Jagdeep  reports that he quit smoking about 20 years ago. His smoking use included cigarettes. He started smoking about 60 years ago. He quit after 30.00 years of use. He has never used smokeless tobacco. He reports that he does not drink alcohol or use drugs.    Family History   Reviewed. Not pertinent to current hospitalization    Review of Systems   The 10 point Review of Systems is negative other than noted in the HPI or here.     Physical Exam   Temp: 97.8  F (36.6  C) Temp src: Oral BP: 112/60 Pulse: 106   Resp: 25 SpO2: 98 % O2 Device: Nasal cannula Oxygen Delivery: 2 LPM  Vital Signs with Ranges  Temp:  [96.9  F (36.1  C)-98.6  F (37  C)] 97.8  F (36.6  C)  Pulse:  [102-128] 106  Resp:  [11-48] 25  BP: (106-148)/(52-83) 112/60  SpO2:  [88 %-100 %] 98 %  198 lbs 4.8 oz    Constitutional: appears comfortable  Eyes: sclera mildly icteric  ENT: mucous membranes mildly tachy  Respiratory: clear, but poor effort  Cardiovascular: regular I/VI  GI: bowel sounds present  Skin: dry, no rash,   Musculoskeletal: he appears somewhat cachectic, decreased muscle bulk  Neurologic: alert, moves all ext, face symmetric  Psychiatric: normal affect    Data   All labs and imaging from hospitalization reviewed    Echo is pending    EKG 1/27/21   normal sinus rhythm diffuse ST depression, worse in anterolateral leads.  ST depression is worse than seen on 1/7/21    Lab Results   Component Value Date    TROPI 8.975 () 01/28/2021    TROPI 11.224 (HH) 01/27/2021    TROPI 1.639 () 01/27/2021    TROPI 0.101 (H) 01/08/2021    TROPI 0.085 (H) 01/08/2021

## 2021-01-28 NOTE — PHARMACY-ADMISSION MEDICATION HISTORY
Pharmacy Medication History  Admission medication history interview status for the 1/27/2021  admission is complete. See EPIC admission navigator for prior to admission medications     Location of Interview: Phone  Medication history sources: Patient, Surescripts, Pharmacy ([facility]) and Care Everywhere  Adherence assessment: Good    Significant changes made to the medication list:  Changed: Senokot-S from 2 tablets BID to 1 tablet daily    Additional medication history information:   Per [facility] the patient is on hydroxyurea 1000mg daily, but per recent discharge paperwork and patient he is only taking 500mg daily.     Medication reconciliation completed by provider prior to medication history? No    Time spent in this activity: 20 mins      Prior to Admission medications    Medication Sig Last Dose Taking? Auth Provider   atorvastatin (LIPITOR) 40 MG tablet TAKE ONE TABLET BY MOUTH EVERY NIGHT AT BEDTIME  Yes Angus Scott MD   Ferrous Sulfate 324 (65 Fe) MG TBEC Take 1 tablet (324 mg) by mouth daily  Yes Angus Scott MD   furosemide (LASIX) 40 MG tablet TAKE ONE TABLET BY MOUTH DAILY IN THE MORNING AND TAKE 1/2 TABLET AT NOON  Yes Angus Scott MD   GOODSENSE ASPIRIN 325 MG tablet TAKE ONE TABLET BY MOUTH ONCE DAILY IN THE MORNING  Yes Angus Scott MD   hydroxyurea (HYDREA) 500 MG capsule Take 500 mg by mouth daily   Yes Delbert Orozco NP   metoprolol succinate ER (TOPROL-XL) 100 MG 24 hr tablet Take 1 tablet (100 mg) by mouth 2 times daily HOLD this medication until you see your PCP  Yes Delbert Orozco NP   senna-docusate (SENOKOT-S/PERICOLACE) 8.6-50 MG tablet Take 1 tablet by mouth daily  Yes Unknown, Entered By History   vitamin B-12 (CYANOCOBALAMIN) 250 MCG tablet TAKE ONE TABLET BY MOUTH EVERY MORNING  Yes Angus Scott MD   vitamin D3 (CHOLECALCIFEROL) 50 mcg (2000 units) tablet Take 1 tablet by mouth daily  Yes Unknown, Entered By History

## 2021-01-29 ENCOUNTER — APPOINTMENT (OUTPATIENT)
Dept: PHYSICAL THERAPY | Facility: CLINIC | Age: 71
DRG: 280 | End: 2021-01-29
Attending: INTERNAL MEDICINE
Payer: COMMERCIAL

## 2021-01-29 LAB
ABO + RH BLD: NORMAL
ABO + RH BLD: NORMAL
ANION GAP SERPL CALCULATED.3IONS-SCNC: 8 MMOL/L (ref 3–14)
ANISOCYTOSIS BLD QL SMEAR: SLIGHT
BASOPHILS # BLD AUTO: 0.3 10E9/L (ref 0–0.2)
BASOPHILS NFR BLD AUTO: 2 %
BLASTS # BLD: 0.4 10E9/L
BLASTS BLD QL SMEAR: 3 %
BLD GP AB SCN SERPL QL: NORMAL
BLD PROD TYP BPU: NORMAL
BLD PROD TYP BPU: NORMAL
BLD UNIT ID BPU: 0
BLOOD BANK CMNT PATIENT-IMP: NORMAL
BLOOD PRODUCT CODE: NORMAL
BPU ID: NORMAL
BUN SERPL-MCNC: 35 MG/DL (ref 7–30)
CALCIUM SERPL-MCNC: 8.3 MG/DL (ref 8.5–10.1)
CHLORIDE SERPL-SCNC: 103 MMOL/L (ref 94–109)
CO2 SERPL-SCNC: 27 MMOL/L (ref 20–32)
CREAT SERPL-MCNC: 1.33 MG/DL (ref 0.66–1.25)
DIFFERENTIAL METHOD BLD: ABNORMAL
EOSINOPHIL # BLD AUTO: 1 10E9/L (ref 0–0.7)
EOSINOPHIL NFR BLD AUTO: 7 %
ERYTHROCYTE [DISTWIDTH] IN BLOOD BY AUTOMATED COUNT: 16.6 % (ref 10–15)
ERYTHROCYTE [DISTWIDTH] IN BLOOD BY AUTOMATED COUNT: 17 % (ref 10–15)
GFR SERPL CREATININE-BSD FRML MDRD: 53 ML/MIN/{1.73_M2}
GLUCOSE SERPL-MCNC: 107 MG/DL (ref 70–99)
HCT VFR BLD AUTO: 19.1 % (ref 40–53)
HCT VFR BLD AUTO: 24.1 % (ref 40–53)
HGB BLD-MCNC: 6.6 G/DL (ref 13.3–17.7)
HGB BLD-MCNC: 8.1 G/DL (ref 13.3–17.7)
HGB BLD-MCNC: NORMAL G/DL (ref 13.3–17.7)
LDH SERPL L TO P-CCNC: 1720 U/L (ref 85–227)
LYMPHOCYTES # BLD AUTO: 2.3 10E9/L (ref 0.8–5.3)
LYMPHOCYTES NFR BLD AUTO: 17 %
MCH RBC QN AUTO: 30.2 PG (ref 26.5–33)
MCH RBC QN AUTO: 31 PG (ref 26.5–33)
MCHC RBC AUTO-ENTMCNC: 33.6 G/DL (ref 31.5–36.5)
MCHC RBC AUTO-ENTMCNC: 34.6 G/DL (ref 31.5–36.5)
MCV RBC AUTO: 90 FL (ref 78–100)
MCV RBC AUTO: 90 FL (ref 78–100)
METAMYELOCYTES # BLD: 0.3 10E9/L
METAMYELOCYTES NFR BLD MANUAL: 2 %
MONOCYTES # BLD AUTO: 0.7 10E9/L (ref 0–1.3)
MONOCYTES NFR BLD AUTO: 5 %
MYELOCYTES # BLD: 0.6 10E9/L
MYELOCYTES NFR BLD MANUAL: 4 %
NEUTROPHILS # BLD AUTO: 8.1 10E9/L (ref 1.6–8.3)
NEUTROPHILS NFR BLD AUTO: 59 %
NRBC # BLD AUTO: 0.3 10*3/UL
NRBC BLD AUTO-RTO: 2 /100
NUM BPU REQUESTED: 3
PLATELET # BLD AUTO: 14 10E9/L (ref 150–450)
PLATELET # BLD AUTO: 15 10E9/L (ref 150–450)
PLATELET # BLD EST: ABNORMAL 10*3/UL
POTASSIUM SERPL-SCNC: 3.4 MMOL/L (ref 3.4–5.3)
POTASSIUM SERPL-SCNC: 3.9 MMOL/L (ref 3.4–5.3)
PROMYELOCYTES # BLD MANUAL: 0.1 10E9/L
PROMYELOCYTES NFR BLD MANUAL: 1 %
RBC # BLD AUTO: 2.13 10E12/L (ref 4.4–5.9)
RBC # BLD AUTO: 2.68 10E12/L (ref 4.4–5.9)
RETICS # AUTO: 17.7 10E9/L (ref 25–95)
RETICS/RBC NFR AUTO: 0.7 % (ref 0.5–2)
SODIUM SERPL-SCNC: 138 MMOL/L (ref 133–144)
SPECIMEN EXP DATE BLD: NORMAL
TRANSFUSION STATUS PATIENT QL: NORMAL
TRANSFUSION STATUS PATIENT QL: NORMAL
URATE SERPL-MCNC: 9.3 MG/DL (ref 3.5–7.2)
WBC # BLD AUTO: 13.8 10E9/L (ref 4–11)
WBC # BLD AUTO: 13.9 10E9/L (ref 4–11)

## 2021-01-29 PROCEDURE — 97110 THERAPEUTIC EXERCISES: CPT | Mod: GP

## 2021-01-29 PROCEDURE — 250N000013 HC RX MED GY IP 250 OP 250 PS 637: Performed by: INTERNAL MEDICINE

## 2021-01-29 PROCEDURE — 97161 PT EVAL LOW COMPLEX 20 MIN: CPT | Mod: GP

## 2021-01-29 PROCEDURE — P9016 RBC LEUKOCYTES REDUCED: HCPCS | Performed by: EMERGENCY MEDICINE

## 2021-01-29 PROCEDURE — 210N000002 HC R&B HEART CARE

## 2021-01-29 PROCEDURE — 84132 ASSAY OF SERUM POTASSIUM: CPT | Performed by: INTERNAL MEDICINE

## 2021-01-29 PROCEDURE — 85025 COMPLETE CBC W/AUTO DIFF WBC: CPT | Performed by: INTERNAL MEDICINE

## 2021-01-29 PROCEDURE — 84550 ASSAY OF BLOOD/URIC ACID: CPT | Performed by: INTERNAL MEDICINE

## 2021-01-29 PROCEDURE — 99232 SBSQ HOSP IP/OBS MODERATE 35: CPT | Performed by: INTERNAL MEDICINE

## 2021-01-29 PROCEDURE — 85045 AUTOMATED RETICULOCYTE COUNT: CPT | Performed by: INTERNAL MEDICINE

## 2021-01-29 PROCEDURE — 250N000011 HC RX IP 250 OP 636: Performed by: INTERNAL MEDICINE

## 2021-01-29 PROCEDURE — 99222 1ST HOSP IP/OBS MODERATE 55: CPT | Performed by: INTERNAL MEDICINE

## 2021-01-29 PROCEDURE — 36415 COLL VENOUS BLD VENIPUNCTURE: CPT | Performed by: INTERNAL MEDICINE

## 2021-01-29 PROCEDURE — 99233 SBSQ HOSP IP/OBS HIGH 50: CPT | Performed by: INTERNAL MEDICINE

## 2021-01-29 PROCEDURE — 83615 LACTATE (LD) (LDH) ENZYME: CPT | Performed by: INTERNAL MEDICINE

## 2021-01-29 PROCEDURE — 80048 BASIC METABOLIC PNL TOTAL CA: CPT | Performed by: INTERNAL MEDICINE

## 2021-01-29 PROCEDURE — 85027 COMPLETE CBC AUTOMATED: CPT | Performed by: INTERNAL MEDICINE

## 2021-01-29 PROCEDURE — 97530 THERAPEUTIC ACTIVITIES: CPT | Mod: GP

## 2021-01-29 RX ORDER — AMOXICILLIN 250 MG
2 CAPSULE ORAL 2 TIMES DAILY PRN
Status: DISCONTINUED | OUTPATIENT
Start: 2021-01-29 | End: 2021-01-01 | Stop reason: HOSPADM

## 2021-01-29 RX ORDER — BISACODYL 10 MG
10 SUPPOSITORY, RECTAL RECTAL DAILY PRN
Status: DISCONTINUED | OUTPATIENT
Start: 2021-01-29 | End: 2021-01-01

## 2021-01-29 RX ORDER — POLYETHYLENE GLYCOL 3350 17 G/17G
17 POWDER, FOR SOLUTION ORAL DAILY PRN
Status: DISCONTINUED | OUTPATIENT
Start: 2021-01-29 | End: 2021-01-01 | Stop reason: HOSPADM

## 2021-01-29 RX ORDER — AMOXICILLIN 250 MG
1 CAPSULE ORAL 2 TIMES DAILY PRN
Status: DISCONTINUED | OUTPATIENT
Start: 2021-01-29 | End: 2021-01-01 | Stop reason: HOSPADM

## 2021-01-29 RX ORDER — POTASSIUM CHLORIDE 1500 MG/1
40 TABLET, EXTENDED RELEASE ORAL ONCE
Status: COMPLETED | OUTPATIENT
Start: 2021-01-29 | End: 2021-01-29

## 2021-01-29 RX ADMIN — FUROSEMIDE 60 MG: 10 INJECTION, SOLUTION INTRAVENOUS at 08:42

## 2021-01-29 RX ADMIN — METOPROLOL TARTRATE 25 MG: 25 TABLET, FILM COATED ORAL at 08:42

## 2021-01-29 RX ADMIN — ATORVASTATIN CALCIUM 10 MG: 10 TABLET, FILM COATED ORAL at 20:15

## 2021-01-29 RX ADMIN — POTASSIUM CHLORIDE 40 MEQ: 1500 TABLET, EXTENDED RELEASE ORAL at 10:26

## 2021-01-29 RX ADMIN — METOPROLOL TARTRATE 25 MG: 25 TABLET, FILM COATED ORAL at 20:14

## 2021-01-29 RX ADMIN — DOCUSATE SODIUM 50 MG AND SENNOSIDES 8.6 MG 2 TABLET: 8.6; 5 TABLET, FILM COATED ORAL at 11:24

## 2021-01-29 ASSESSMENT — ACTIVITIES OF DAILY LIVING (ADL)
ADLS_ACUITY_SCORE: 17
ADLS_ACUITY_SCORE: 16

## 2021-01-29 ASSESSMENT — MIFFLIN-ST. JEOR: SCORE: 1614.92

## 2021-01-29 NOTE — PLAN OF CARE
CR: Orders received, discussed with RN. Noting critical hgb, currently transfusing. Not appropriate for CR Eval at this time but RN requesting check back in afternoon if able. Will continue to follow.

## 2021-01-29 NOTE — PLAN OF CARE
Pt is A&Ox4, denies chest pain, VSS except HR tachy in 110s, on tele ST, on RA and LS diminished, up with one assist, uses urinal at bedside, Lasix 60 mg IV and Metoprolol 25 mg given. Plan to transfer to [facility] when bed available, continue to monitor.

## 2021-01-29 NOTE — PROGRESS NOTES
01/29/21 1637   Living Environment   People in home alone   Current Living Arrangements apartment   Home Accessibility no concerns   Transportation Anticipated family or friend will provide   Living Environment Comments Pt reports he lives in apartment, no stairs   Self-Care   Usual Activity Tolerance fair   Current Activity Tolerance poor   Regular Exercise No   Equipment Currently Used at Home none   Activity/Exercise/Self-Care Comment Pt reports he is independent at baseline, does not use assistive device. Pt does report he has been having increasing difficulty ambulating due to SOB.   Disability/Function   Fall history within last six months no   General Information   Onset of Illness/Injury or Date of Surgery 01/27/21   Referring Physician Tristan Schmidt MD   Patient/Family Therapy Goals Statement (PT) To go home   Pertinent History of Current Problem (include personal factors and/or comorbidities that impact the POC) Pt is 70 year old male adm on 1/27/2021 via EMS with chest pain. Pt's family had attempted to bring pt to ED but pt was unable to ambulate to the car therefore family called 911. EMS concerned pt having STEMI via 12 lead EKG therefore pt transferred to [facility] via helicopter. Pt was not emergently taken to cath lab due to significant anemia and low platelet count needing further work up prior to cath. Pt given I URBC and started on nitroglycerin drip, placed on BiPAP. Pt had significant drop in hgb, underwent blood transfusion on 1/28/2021.PMH includes HTN, anemia, myelofibrosis, CKD, CAD with history of 3 stents, CABGx3 in January 2020, and CHF.   Existing Precautions/Restrictions fall   Cognition   Orientation Status (Cognition) oriented x 3   Affect/Mental Status (Cognition) flat/blunted affect   Follows Commands (Cognition) WNL   Pain Assessment   Patient Currently in Pain No   Posture    Posture Forward head position   Range of Motion (ROM)   ROM Comment B LE ROM WFL   Strength    Strength Comments B LE strength grossly 3/5, no focal weakness noted, pt generally deconditioned   Bed Mobility   Comment (Bed Mobility) SBA   Transfers   Transfer Safety Comments SBA   Gait/Stairs (Locomotion)   Comment (Gait/Stairs) Standing pre-gait activities with CGA holding onto bed rail   Balance   Balance Comments Fair- needing UE support while performing standing activities   Clinical Impression   Criteria for Skilled Therapeutic Intervention yes, treatment indicated   PT Diagnosis (PT) Impaired mobility   Influenced by the following impairments Decreased strength, decreased balance, decreased activity tolerance   Functional limitations due to impairments Decreased ability to participate in daily tasks   Clinical Presentation Evolving/Changing   Clinical Presentation Rationale Current presentation, PMH, no cardiac intervention done yet   Clinical Decision Making (Complexity) moderate complexity   Therapy Frequency (PT) Daily   Predicted Duration of Therapy Intervention (days/wks) 5 days   Planned Therapy Interventions (PT) balance training;bed mobility training;gait training;home exercise program;patient/family education;strengthening;transfer training   Risk & Benefits of therapy have been explained evaluation/treatment results reviewed;care plan/treatment goals reviewed;risks/benefits reviewed;current/potential barriers reviewed;participants voiced agreement with care plan;participants included;patient   PT Discharge Planning    PT Discharge Recommendation (DC Rec) Transitional Care Facility   PT Rationale for DC Rec Pt is currently deconditioned, unable to tolerate functional activity. Anticipate need for continued inpatient rehab prior to return home, will continue to monitor.   PT Brief overview of current status  SBA for bed mobility, SBA sit to stand transfers, CGA with standing activities   Total Evaluation Time   Total Evaluation Time (Minutes) 10

## 2021-01-29 NOTE — PROGRESS NOTES
Pt. Reports depression, anxiety, and SI without a plan. Pt. Is requesting to seek help with drinking and MH while here. Withdrawn and appears to have insight to situation. Spoke with GIAN Cadet. No sitter needed. Psych consult is pending.

## 2021-01-29 NOTE — PLAN OF CARE
Vitals: Systolic BP dropped to 88 post IV lasix during infusion. Asymptomatic. Returned to low 100s during transfusion.   Lungs: Room air. Clear. Pt. Denied SOB.   CV: NSR. Pt. Stated had brief mild episode of CP today but went away on it's own. Did not call to notify staff of event.   GI: WNL except constipation. PRN Senna-S given. Results pending.   Uro: WNL  CMS: trace cary LE edema, otherwise WNL  Neuro:  no deficits noted.   Skin: ecchymotic. Dry. Pale.   Psych: Pt. Flat. No animation during conversation. Withdrawn.   MSK: Ax-1.  Pain: Denies   Labs: K 3.4 x1 replacement to 3.9.  Hgb 6.6 to 8.1 with x1 transfusion. Recheck in AM.  Crt 1.33 stable at baseline.  Trop 11.2- med manage/demand. WBC 13.9- improving with no s/sx of infection.    Tests/Procedures: na  IVs/Drains: peripheral IV in each arm.   Other:  IV lasix given with blood transfusion. Infused slowly at 75ml/hr. No s/sx of respiratory distress with infusion.   Hem/Onc consult placed and pending.

## 2021-01-29 NOTE — PROGRESS NOTES
[facility]    Medicine Progress Note - Hospitalist Service       Date of Admission:  1/27/2021  Assessment & Plan   Renny Gannon is a 70-year-old male with past medical history significant for coronary artery disease with history of a 3-vessel bypass, heart failure with preserved ejection fraction, hypertension, stage III CKD, myeloproliferative disorder with chronic anemia, who presents to the emergency room for evaluation of chest pain and initial concern for STEMI.  Angiogram deferred due to anemia and thrombocytopenia, admitted 1/27/2021.    Non-ST elevation myocardial infarction  Dyslipidemia   Reported chest pain prior to admission, some concern for STEMI in the field therefore airlifted to [facility] and given aspirin and ticagrelor en route.  Given his significant anemia and thrombocytopenia emergent angiogram was deferred.   * No plans for angiogram, medical management per cardiology  * Troponin peak of 11.224  - Cardiology consulted, now signed off  - Stable off of nitroglycerin drip   - Continue metoprolol with hold parameters  - Hold aspirin for now given thrombocytopenia  - Echocardiogram with normal EF, apicolateral hypokinesis and possible mid to distal inferolateral hypokinesis  - HDL 20, LDL 41. Atorvastatin has been reduced from admission dose.     Acute exacerbation of heart failure with preserved ejection fraction  Last echocardiogram in 2/2020 showed normal left ventricle structure, function and size. Typically takes oral furosemide at home.   - Cardiology consulted, now signed off  - Discontinue schedule furosemide, diuresis as needed; may need additional diuretic doses around transfusions   - BMP in AM   - Echocardiogram with EF 50 to 55%, no significant valvular abnormalities   - Strict I/O, daily weights    Myelofibrosis  Acute on chronic anemia  Acute on chronic thrombocytopenia  Leukocytosis  Chronic anemia with a baseline hemoglobin of around 8 g/dl.  Baseline platelet count seems to be in the 90s previously.  Hospitalized earlier this month at [facility] for management of acute on chronic anemia (hemoglobin around 6 g/dl).  No clinical signs of bleeding and fecal occult negative. Previously followed with [facility], however has [employer] and requires a new oncologist due to insurance reasons  - Continue transfusion for hemoglobin < 7 g/dl, higher goal if symptoms   - Care coordinator involved to arrange follow-up with new oncologist for ongoing management. Likely will need frequent outpatient transfusions.   - Patient with poor understanding of disease and prognosis. Will consult [facility] (with whom he followed previously) to assist with disease education and prognosis discussion.   - Hold hydroxyurea     Acute hypoxic respiratory failure  Multifactorial.  Secondary to volume overload, with blood products in the emergency room, he became more short of breath and was ultimately placed on BiPAP, now weaned.   - Respiratory status stable on room air     Chronic kidney disease, stage 3  Baseline creatinine 1.3-1.5 with GFR in 40-50s. Creatinine 1.24 on admission.   - Currently at baseline  - Avoid nephrotoxins  - Monitor BMP      Diet: 2 Gram Sodium Diet    DVT Prophylaxis: Pneumatic Compression Devices  Almendarez Catheter: not present  Code Status: Full Code      Disposition Plan   Expected discharge: Anticipate discharge in 2-3 days pending stable blood counts.   Entered: Tristan Schmidt MD 01/29/2021, 1:51 PM       The patient's care was discussed with the Bedside Nurse, Care Coordinator/ and Patient.    Tristan Schmidt MD  Hospitalist Service  [facility]    ______________________________________________________________________    Interval History   No acute events overnight. Reports some mild chest pain and shortness of breath, now resolved. Currently receiving transfusion. No signs of bleeding. Discussed with  patient his diagnosis of myelofibrosis, he reports he does not have a good understanding of the disease or its prognosis.     Data reviewed today: I reviewed all medications, new labs and imaging results over the last 24 hours. I personally reviewed no images or EKG's today.    Physical Exam   Vital Signs: Temp: 98.6  F (37  C) Temp src: Axillary BP: 117/61 Pulse: 98   Resp: 20 SpO2: 97 % O2 Device: None (Room air) Oxygen Delivery: 2 LPM  Weight: 195 lbs 3.2 oz    Constitutional: NAD  Respiratory: Clear to auscultation bilaterally, good air movement bilaterally  Cardiovascular: RRR. Bilateral lower extremity edema, R>L  GI: Soft, non-tender, non-distended.  .   Skin/Integumen: Warm, dry  Other:       Data   Recent Labs   Lab 01/29/21  0616 01/28/21  1454 01/28/21  0532 01/28/21  0203 01/27/21  2147 01/27/21  1235 01/27/21  1235   WBC 13.9*  --  19.7*  --   --   --  16.4*   HGB 6.6* 7.1* 5.6*  --   --    < > 5.2*   MCV 90  --  92  --   --   --  96   PLT 15*  --  23*  --   --   --  18*   INR  --   --   --   --   --   --  1.24*     --  137  --   --   --  133   POTASSIUM 3.4  --  4.0  --   --   --  3.9   CHLORIDE 103  --  104  --   --   --  101   CO2 27  --  23  --   --   --  20   BUN 35*  --  34*  --   --   --  33*   CR 1.33*  --  1.53*  --   --   --  1.24   ANIONGAP 8  --  10  --   --   --  12   MINNIE 8.3*  --  8.3*  --   --   --  8.7   *  --  122*  --   --   --  172*   ALBUMIN  --   --   --   --   --   --  3.5   PROTTOTAL  --   --   --   --   --   --  7.2   BILITOTAL  --   --   --   --   --   --  2.1*   ALKPHOS  --   --   --   --   --   --  118   ALT  --   --   --   --   --   --  23   AST  --   --   --   --   --   --  68*   TROPI  --   --   --  8.975* 11.224*  --  1.639*    < > = values in this interval not displayed.       No results found for this or any previous visit (from the past 24 hour(s)).  Medications     - MEDICATION INSTRUCTIONS -       ACE/ARB/ARNI NOT PRESCRIBED       ASPIRIN NOT PRESCRIBED        BETA BLOCKER NOT PRESCRIBED       STATIN NOT PRESCRIBED         atorvastatin  10 mg Oral QPM     metoprolol tartrate  25 mg Oral BID

## 2021-01-29 NOTE — PROGRESS NOTES
[facility]  Cardiology Progress Note  Date of Service: 01/29/2021  Primary Cardiologist: Dr. Bentley    Assessment & Plan    Renny Gannon is a 70 year old male with admitted on 1/27/2021 with chest pain, abnormal EKG, found to be profoundly anemic.     Echocardiogram yesterday showed LVEF 50-55%, apicolateral hypokinesis and possible mid to distal inferolateral hypokinesis and no significant valvular disease.     Assessment:  1.  Chest pain/NSTEMI - troponin peaked at 11, likely secondary to demand ischemia in setting of profound anemia. Stable.    - Ischemia not ruled out due to low hemoglobin/thrombocytopenia. Given relatively small troponin peak it is likely not a large territory.   2. Coronary artery disease s/p CABG 1/2020 (LIMA-LAD, SVG to RPDA and SVG to second obtuse marginal). Stable.   3. Dyslipidemia  4. Myeolodysplastic disorder  5. Anemia - hemoglobin 5.2 on admission, 6.6 this morning. He has been receiving blood transfusion for hgb < 7.   6. Thrombocytopenia - platelets 16 on admission, 15 this morning.   7. CKD - stable, creatinine improved from 1.5-> 1.3 today.     Plan:   1. Continue atorvastatin 10mg daily  2. Agree with furosemide IV as needed, received 60mg BID yesterday. This will likely be needed with blood transfusions.   3. Continue metoprolol tartrate 25mg BID, titrate as possible.   4. Cardiology post hospital follow up with RAGHAVENDRA in 2-3 weeks.   5. Cardiology signing off, please contact with any additional questions/concerns.     [facility]  Pager: [phone]      Interval History   No acute events overnight. Hemoglobin continues to drop after infusion 6.6 this morning. Denies chest pain. Notes some shortness of breath. Denies dizziness, lightheadedness or other presyncopal symptoms.     Physical Exam   Temp: 97.4  F (36.3  C) Temp src: Oral BP: 125/61 Pulse: 101   Resp: 18 SpO2: 93 % O2 Device: None (Room air) Oxygen Delivery: 2  LPM  Vitals:    01/27/21 1239 01/28/21 0631 01/29/21 0412   Weight: 91.9 kg (202 lb 9.6 oz) 89.9 kg (198 lb 4.8 oz) 88.5 kg (195 lb 3.2 oz)       Constitutional alert and oriented, in no acute distress.  Skin  warm and dry to touch  ENT no pallor or cyanosis  Neck No JVP appreciated on exam.   Lungs Clear anteriorly  Cardiac Regular rate/rhythm, no murmur appreciated.   Abdomen abdomen soft, bowel sounds normoactive  Extremities and Back   no clubbing, cyanosis. Trace bilateral LE edema.   Neurological no gross motor deficits noted, affect appropriate, oriented to time, person and place.    Medications     - MEDICATION INSTRUCTIONS -       ACE/ARB/ARNI NOT PRESCRIBED       ASPIRIN NOT PRESCRIBED       BETA BLOCKER NOT PRESCRIBED       STATIN NOT PRESCRIBED         atorvastatin  10 mg Oral QPM     furosemide  60 mg Intravenous BID     metoprolol tartrate  25 mg Oral BID     potassium chloride  40 mEq Oral Once       Data   Most Recent 3 CBC's:  Recent Labs   Lab Test 01/29/21  0616 01/28/21  1454 01/28/21  0532 01/27/21  1235 01/27/21  1235   WBC 13.9*  --  19.7*  --  16.4*   HGB 6.6* 7.1* 5.6*   < > 5.2*   MCV 90  --  92  --  96   PLT 15*  --  23*  --  18*    < > = values in this interval not displayed.     Most Recent 3 BMP's:  Recent Labs   Lab Test 01/29/21  0616 01/28/21  0532 01/27/21  1235    137 133   POTASSIUM 3.4 4.0 3.9   CHLORIDE 103 104 101   CO2 27 23 20   BUN 35* 34* 33*   CR 1.33* 1.53* 1.24   ANIONGAP 8 10 12   MINNIE 8.3* 8.3* 8.7   * 122* 172*     Most Recent 3 Troponin's:  Recent Labs   Lab Test 01/28/21  0203 01/27/21  2147 01/27/21  1235   TROPI 8.975* 11.224* 1.639*     Most Recent 3 BNP's:  Recent Labs   Lab Test 01/27/21  1235 01/07/21  0810 12/21/20  0945 11/17/20  0957   NTBNPI 8,616* 2,427* 1,284*  --    NTBNP  --   --   --  479*     Last 24 hours labs reviewed     Tele: sinus rhythm    Echo: 1/28/21  Left ventricular systolic function is low normal.  The visual ejection fraction  is estimated at 50-55%.  Apicolateral hypokinesis. Possible mid to distal inferolateral hypokinesis.  Thie wall motion abnormalities appear new compared to prior study from 2/20.  The study was technically difficult.

## 2021-01-29 NOTE — CONSULTS
[facility]    Hematology/Oncology Consult Note      Date of Admission:  1/27/2021  Date of Consult:  01/29/21  Reason for Consult: myelofibrosis      ASSESSMENT/PLAN:  Renny Gannon is a 70 year old male with:    Myelofibrosis: JAK2+, has been on hydroxyurea, initially on 1000 mg a day, then decreased to 500 mg daily.  He has an acute on chronic anemia and thrombocytopenia, as well as leukocytosis.  -agree with transfusion for hemoglobin <7, platelet count <10K, or if symptoms  -hold hydroxyurea  -attempted to discuss myelofibrosis diagnosis with patient, but he does seem to have poor understanding of disease.  -will add on peripheral smear and differential  -if no improvement in counts despite holding hydroxyurea, may consider repeating bone marrow biopsy to rule out leukemic transformation  -his primary oncologist has been Dr. Cristina at [facility], but his insurance is changing, so he will be switching to a new oncologist.  Care coordinator is working on arranging follow-up with new oncologist after discharge.          HISTORY OF PRESENT ILLNESS: Renny Gannon is a 70 year old male with PMHx of CAD s/p 3-vessel bypass, heart failure with preserved ejection fraction, HTN, CKD stage III, myelofibrosis who presents with chest pain.    He is a patient of Dr. Mani Cristina at the [facility].  He had presented to primary care physician with a complaint of increasing fatigue and shortness of breath on exertion.  Further work-up including a CBC which was done on December 11, 2019, which showed a total white count of 79.1, with a hemoglobin of 11.9 and platelet count of 378.  Peripheral blood smear revealed leuko-erythroblastic reaction with neutrophilic left shift and rare circulating blasts without dysplasia.  Bone marrow biopsy from December 30, 2019 showed extremely hypercellular marrow with granulocytic and megakaryocytic proliferation,  megakaryocytic clustering and atypia, marrow fibrosis and 1.5% blasts.  There is also marked peripheral leukocytosis and leuko-erythroblastic clusters and 1.5% circulating blasts.  There is mild normocytic normochromic anemia.  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  He was started on hydroxyurea, which he has been on since 1/16/20.   He had triple bypass surgery on 1/31/20.  He had been hospitalized at [facility] in December 2020 and was found to have anemic with hemoglobin of 6.6.  He was given 2 units of PRBC's and improved.  His hydroxyurea was held on discharge.  He was resumed on it on 1/5/21 at 500 mg daily.           REVIEW OF SYSTEMS:   14 point ROS was reviewed and is negative other than as noted above in HPI.       MEDICATIONS:  Current Facility-Administered Medications   Medication     acetaminophen (TYLENOL) tablet 650 mg     atorvastatin (LIPITOR) tablet 10 mg     bisacodyl (DULCOLAX) Suppository 10 mg     carboxymethylcellulose PF (REFRESH PLUS) 0.5 % ophthalmic solution 1 drop     HOLD: nitroGLYcerin IF     lidocaine (LMX4) cream     lidocaine 1 % 0.1-1 mL     medication instruction     melatonin tablet 1 mg     metoprolol tartrate (LOPRESSOR) tablet 25 mg     morphine (PF) injection 2 mg     naloxone (NARCAN) injection 0.2 mg    Or     naloxone (NARCAN) injection 0.4 mg    Or     naloxone (NARCAN) injection 0.2 mg    Or     naloxone (NARCAN) injection 0.4 mg     nitroGLYcerin (NITROSTAT) sublingual tablet 0.4 mg     ondansetron (ZOFRAN-ODT) ODT tab 4 mg    Or     ondansetron (ZOFRAN) injection 4 mg     polyethylene glycol (MIRALAX) Packet 17 g     prochlorperazine (COMPAZINE) injection 5 mg    Or     prochlorperazine (COMPAZINE) tablet 5 mg    Or     prochlorperazine (COMPAZINE) suppository 12.5 mg     Reason ACE/ARB/ARNI order not selected     reason aspirin not prescribed (intentional)     Reason beta blocker not  prescribed     Reason statin not prescribed     senna-docusate (SENOKOT-S/PERICOLACE) 8.6-50 MG per tablet 1 tablet    Or     senna-docusate (SENOKOT-S/PERICOLACE) 8.6-50 MG per tablet 2 tablet     sodium chloride (PF) 0.9% PF flush 3 mL     sodium chloride (PF) 0.9% PF flush 3 mL         ALLERGIES:  Allergies   Allergen Reactions     No Known Drug Allergies      Seasonal Allergies          PAST MEDICAL HISTORY:  Past Medical History:   Diagnosis Date     Hypertension          PAST SURGICAL HISTORY:  Past Surgical History:   Procedure Laterality Date     BONE MARROW BIOPSY, BONE SPECIMEN, NEEDLE/TROCAR N/A 2019    Procedure: BIOPSY, BONE MARROW;  Surgeon: Aguilar Krause MD;  Location:  OR     BYPASS GRAFT ARTERY CORONARY N/A 2020    Procedure: CORONARY ARTERY BYPASS GRAFTING X 3 - LIMA TO LAD, SV TO OM  AND PDA; ENDOVEIN HARVEST OF BILATERAL LEGS; ON PUMP WITH SANDRA;  Surgeon: Mable Barrera MD;  Location:  OR     CV CORONARY ANGIOGRAM Left 2020    Procedure: Coronary Angiogram;  Surgeon: Devin Bentley MD;  Location:  HE[facility] CARDI[facility] CATH LAB     NO HISTORY OF SURGERY           SOCIAL HISTORY:  Social History     Socioeconomic History     Marital status: Single     Spouse name: Not on file     Number of children: Not on file     Years of education: Not on file     Highest education level: Not on file   Occupational History     Not on file   Social Needs     Financial resource strain: Not on file     Food insecurity     Worry: Not on file     Inability: Not on file     Transportation needs     Medical: Not on file     Non-medical: Not on file   Tobacco Use     Smoking status: Former Smoker     Years: 30.00     Types: Cigarettes     Start date: 1961     Quit date: 2001     Years since quittin.0     Smokeless tobacco: Never Used   Substance and Sexual Activity     Alcohol use: No     Frequency: Never     Drug use: No     Sexual activity: Not Currently  "  Lifestyle     Physical activity     Days per week: Not on file     Minutes per session: Not on file     Stress: Not on file   Relationships     Social connections     Talks on phone: Not on file     Gets together: Not on file     Attends Druze service: Not on file     Active member of club or organization: Not on file     Attends meetings of clubs or organizations: Not on file     Relationship status: Not on file     Intimate partner violence     Fear of current or ex partner: Not on file     Emotionally abused: Not on file     Physically abused: Not on file     Forced sexual activity: Not on file   Other Topics Concern     Parent/sibling w/ CABG, MI or angioplasty before 65F 55M? Not Asked   Social History Narrative     Not on file         FAMILY HISTORY:  Family History   Problem Relation Age of Onset     No Known Problems Mother      Unknown/Adopted Father      Unknown/Adopted Maternal Grandmother      Unknown/Adopted Maternal Grandfather      Unknown/Adopted Paternal Grandmother      Unknown/Adopted Paternal Grandfather      Cancer Brother         lung cancer - smoking     No Known Problems Sister      Coronary Artery Disease Brother          of MI at 66     No Known Problems Sister          PHYSICAL EXAM:  Vital signs:  Temp: 99.3  F (37.4  C) Temp src: Axillary BP: 104/47 Pulse: 105   Resp: 18 SpO2: 94 % O2 Device: None (Room air) Oxygen Delivery: 2 LPM Height: 172.7 cm (5' 8\") Weight: 88.5 kg (195 lb 3.2 oz)  Estimated body mass index is 29.68 kg/m  as calculated from the following:    Height as of this encounter: 1.727 m (5' 8\").    Weight as of this encounter: 88.5 kg (195 lb 3.2 oz).    GENERAL/CONSTITUTIONAL: No acute distress.  EYES: No scleral icterus.  RESPIRATORY: Clear to auscultation bilaterally.   CARDIOVASCULAR: Regular rate and rhythm.  GASTROINTESTINAL: No tenderness.  No guarding.   MUSCULOSKELETAL: Warm and well-perfused.  NEUROLOGIC: Alert, oriented, answers questions " appropriately.  INTEGUMENTARY: No jaundice.      LABS:  CBC RESULTS:   Recent Labs   Lab Test 01/29/21  1441 01/29/21  0616   WBC  --  13.9*   RBC  --  2.13*   HGB 8.1* 6.6*   HCT  --  19.1*   MCV  --  90   MCH  --  31.0   MCHC  --  34.6   RDW  --  17.0*   PLT  --  15*       Recent Labs   Lab Test 01/29/21  0616 01/28/21  0532    137   POTASSIUM 3.4 4.0   CHLORIDE 103 104   CO2 27 23   ANIONGAP 8 10   * 122*   BUN 35* 34*   CR 1.33* 1.53*   MINNIE 8.3* 8.3*         IMAGING:  CT chest PE protocol 1/27/21:  1.  Cardiomegaly with CABG changes and trace bilateral pleural  effusions with interlobular septal thickening probably reflecting  early changes of CHF. This appears slightly worse when compared to  prior exam.     2.  Calcification along the right and left hemidiaphragm suggesting  previous asbestos exposure.     3.  No evidence of pulmonary embolism. Thoracic aorta is unremarkable.     4.  Increasing splenomegaly of uncertain significance. No focal  splenic lesions are appreciated.          Thank you for the opportunity to participate in this patient's care.  Please call with any questions.    Laura Torres MD  Hematology/Oncology  [facility]    Total time spent on day of visit, including review of tests, obtaining/reviewing separately obtained history, ordering medications/tests/procedures, communicating with PCP/consultants, and documenting in electronic medical record: 60 minutes

## 2021-01-29 NOTE — PROVIDER NOTIFICATION
The patient's hemoglobin is 6.6 this morning and his platelets are 15,000. Both are critically low.   Dr. Lowe was notified via text page at 06:36.   Patient has standing orders for transfusion of Red blood for Hgb<7. Order released in epic.   Chavez Mccann RN 6:42 AM 01/29/21

## 2021-01-30 NOTE — PROGRESS NOTES
[facility]    Hematology/Oncology Follow-up Note      Today's Date: 01/30/21  Date of Admission:  1/27/2021  Reason for Consult: myelofibrosis      ASSESSMENT/PLAN:  Renny Gannon is a 70 year old male with:    Myelofibrosis: JAK2+, has been on hydroxyurea, initially on 1000 mg a day, then decreased to 500 mg daily.  He has an acute on chronic anemia and thrombocytopenia, as well as leukocytosis.    Hemoglobin 7.7.  Platelet count remains low but stable at 15.  WBC 14.7.    -agree with transfusion for hemoglobin <7, platelet count <10K, or if symptoms  -hold hydroxyurea  -attempted to discuss myelofibrosis diagnosis with patient, but he does seem to have poor understanding of disease.  -will add on peripheral smear and peripheral flow  -if no improvement in counts despite holding hydroxyurea, may consider repeating bone marrow biopsy   -his primary oncologist has been Dr. Cristina at [facility], but his insurance is changing, so he will be switching to a new oncologist.  Care coordinator is working on arranging follow-up with new oncologist after discharge.  -we are continuing to follow        INTERIM HISTORY: Patient seen in his room.  Labs and overnight events reviewed.         MEDICATIONS:  Current Facility-Administered Medications   Medication     acetaminophen (TYLENOL) tablet 650 mg     atorvastatin (LIPITOR) tablet 10 mg     bisacodyl (DULCOLAX) Suppository 10 mg     carboxymethylcellulose PF (REFRESH PLUS) 0.5 % ophthalmic solution 1 drop     furosemide (LASIX) injection 40 mg     lidocaine (LMX4) cream     lidocaine 1 % 0.1-1 mL     medication instruction     melatonin tablet 1 mg     metoprolol tartrate (LOPRESSOR) tablet 50 mg     morphine (PF) injection 2 mg     naloxone (NARCAN) injection 0.2 mg    Or     naloxone (NARCAN) injection 0.4 mg    Or     naloxone (NARCAN) injection 0.2 mg    Or     naloxone (NARCAN) injection 0.4 mg     nitroGLYcerin (NITROSTAT)  "sublingual tablet 0.4 mg     ondansetron (ZOFRAN-ODT) ODT tab 4 mg    Or     ondansetron (ZOFRAN) injection 4 mg     polyethylene glycol (MIRALAX) Packet 17 g     prochlorperazine (COMPAZINE) injection 5 mg    Or     prochlorperazine (COMPAZINE) tablet 5 mg    Or     prochlorperazine (COMPAZINE) suppository 12.5 mg     Reason ACE/ARB/ARNI order not selected     reason aspirin not prescribed (intentional)     senna-docusate (SENOKOT-S/PERICOLACE) 8.6-50 MG per tablet 1 tablet    Or     senna-docusate (SENOKOT-S/PERICOLACE) 8.6-50 MG per tablet 2 tablet     sodium chloride (PF) 0.9% PF flush 3 mL     sodium chloride (PF) 0.9% PF flush 3 mL           ALLERGIES:  Allergies   Allergen Reactions     No Known Drug Allergies      Seasonal Allergies          PHYSICAL EXAM:  Vital signs:  Temp: 97.9  F (36.6  C) Temp src: Oral BP: 115/58 Pulse: 107   Resp: 16 SpO2: 93 % O2 Device: None (Room air) Oxygen Delivery: 2 LPM Height: 172.7 cm (5' 8\") Weight: 89.3 kg (196 lb 14.4 oz)  Estimated body mass index is 29.94 kg/m  as calculated from the following:    Height as of this encounter: 1.727 m (5' 8\").    Weight as of this encounter: 89.3 kg (196 lb 14.4 oz).      GENERAL/CONSTITUTIONAL: No acute distress. Sleeping comfortably.      LABS:  CBC RESULTS:   Recent Labs   Lab Test 01/30/21  0605   WBC 14.7*   RBC 2.51*   HGB 7.7*   HCT 22.5*   MCV 90   MCH 30.7   MCHC 34.2   RDW 16.7*   PLT 15*       Recent Labs   Lab Test 01/30/21  0605 01/29/21  1441 01/29/21  0616     --  138   POTASSIUM 3.7 3.9 3.4   CHLORIDE 103  --  103   CO2 26  --  27   ANIONGAP 6  --  8   *  --  107*   BUN 29  --  35*   CR 1.21  --  1.33*   MINNIE 8.5  --  8.3*         Laura Torres MD  Hematology/Oncology  [facility]    "

## 2021-01-30 NOTE — PROGRESS NOTES
A&O x4, flat affect. VSS on room air. Tele ST. Denies CP/pain. C/o SOB. Up with A1. Recheck Hgb 8.1, stable. Will continue to monitor.

## 2021-01-30 NOTE — PLAN OF CARE
Pt VSS on RA. Tele ST. A&Ox4, very flat affect. Up with A of 1, using urinal at bedside. Denies pain. Cardiology s/o. Plan to recheck electrolytes and hgb. Continue to monitor.

## 2021-01-30 NOTE — PROGRESS NOTES
[facility]    Hospitalist Progress Note      Assessment & Plan   Renny Gannon is a 70 year old male who was admitted on 1/27/2021 with chest pain and initial concern for STEMI.  Angiogram deferred due to anemia and thrombocytopenia.     Non-ST elevation myocardial infarction  Dyslipidemia   Reported chest pain prior to admission, some concern for STEMI in the field therefore airlifted to [facility] and given aspirin and ticagrelor en route.  Given his significant anemia and thrombocytopenia emergent angiogram was deferred.   * No plans for angiogram, medical management per cardiology  * Troponin peak of 11.224  - Cardiology consulted, now signed off  - Stable off of nitroglycerin drip   - Increase metoprolol to 50mg BID with hold parameters  - Hold aspirin for now given thrombocytopenia  - Echocardiogram with normal EF, apicolateral hypokinesis and possible mid to distal inferolateral hypokinesis  - HDL 20, LDL 41. Atorvastatin has been reduced from admission dose--now at 10mg daily     Acute exacerbation of heart failure with preserved ejection fraction  Last echocardiogram in 2/2020 showed normal left ventricle structure, function and size. Typically takes oral furosemide at home. Echocardiogram with EF 50 to 55%, no significant valvular abnormalities   - Cardiology consulted, now signed off  - Resume lasix 40mg daily IV and monitor--had ongoing SOB after diuresis+transfusion on 1/29  - BMP in AM   - Strict I/O, daily weights     Myelofibrosis  Acute on chronic anemia  Acute on chronic thrombocytopenia  Leukocytosis  Chronic anemia with a baseline hemoglobin of around 8 g/dl. Baseline platelet count seems to be in the 90s previously.  Hospitalized earlier this month at [facility] for management of acute on chronic anemia (hemoglobin around 6 g/dl).  No clinical signs of bleeding and fecal occult negative. Previously followed with [facility], however has [employer] and  requires a new oncologist due to insurance reasons  - Continue transfusion for hemoglobin < 7 g/dl, higher goal if symptoms   - Care coordinator involved to arrange follow-up with new oncologist for ongoing management. Likely will need frequent outpatient transfusions.   - Patient with poor understanding of disease and prognosis. Will consult [facility] (with whom he followed previously) to assist with disease education and prognosis discussion.   - Hold hydroxyurea      Acute hypoxic respiratory failure  Multifactorial.  Secondary to volume overload, with blood products in the emergency room, he became more short of breath and was ultimately placed on BiPAP, now weaned.   - Respiratory status stable on room air --still intermittent SOB     Chronic kidney disease, stage 3  Baseline creatinine 1.3-1.5 with GFR in 40-50s. Creatinine 1.24 on admission.   - Currently at baseline  - Avoid nephrotoxins  - Monitor BMP      DVT Prophylaxis: Pneumatic Compression Devices  Code Status: Full Code  Expected discharge: 1-2 days, recommended to prior living arrangement with home care vs TCU once hgb stable with resolution of SOB    Jelena Leigh, DO  Text Page (7am - 6pm)    Interval History   Patient seen and examined. Feels okay. No complaints. Happy to hear his blood count was okay today. Worked with therapy and felt a bit short of breath while walking, has some shortness of breath while laying in bed as well.   Spent>35 minutes reviewing chart, discussing care plan with patient and nursing. Okay to transfer out of [facility] [place] [place] and to [facility]    -Data reviewed today: I reviewed all new labs and imaging results over the last 24 hours. I personally reviewed no images or EKG's today.    Physical Exam   Temp: 97.9  F (36.6  C) Temp src: Oral BP: 115/58 Pulse: 107   Resp: 16 SpO2: 93 % O2 Device: None (Room air)    Vitals:    01/28/21 0631 01/29/21 0412 01/30/21 0633   Weight: 89.9 kg (198 lb 4.8 oz) 88.5 kg (195 lb 3.2 oz) 89.3 kg  (196 lb 14.4 oz)     Vital Signs with Ranges  Temp:  [97.9  F (36.6  C)-99.3  F (37.4  C)] 97.9  F (36.6  C)  Pulse:  [] 107  Resp:  [16-18] 16  BP: (104-133)/(47-68) 115/58  SpO2:  [93 %-98 %] 93 %  I/O last 3 completed shifts:  In: 789 [P.O.:480; I.V.:9]  Out: 700 [Urine:700]    Constitutional: Awake, alert, cooperative, no apparent distress  Respiratory: Clear to auscultation bilaterally, no crackles or wheezing  Cardiovascular: Regular rate and rhythm, normal S1 and S2, and no murmur noted  GI: Normal bowel sounds, soft, non-distended, non-tender  Skin/Integumen: No rashes, no cyanosis, trace bilateral lower extremity edema.  Other:     Medications     - MEDICATION INSTRUCTIONS -       ACE/ARB/ARNI NOT PRESCRIBED       ASPIRIN NOT PRESCRIBED         atorvastatin  10 mg Oral QPM     furosemide  40 mg Intravenous Daily     metoprolol tartrate  50 mg Oral BID       Data   Recent Labs   Lab 01/30/21  0605 01/29/21  1441 01/29/21  0616 01/28/21  0532 01/28/21  0532 01/28/21  0203 01/27/21  2147 01/27/21  1235 01/27/21  1235   WBC 14.7* 13.8* 13.9*  --  19.7*  --   --   --  16.4*   HGB 7.7* 8.1*  Canceled, Test credited 6.6*   < > 5.6*  --   --    < > 5.2*   MCV 90 90 90  --  92  --   --   --  96   PLT 15* 14* 15*  --  23*  --   --   --  18*   INR  --   --   --   --   --   --   --   --  1.24*     --  138  --  137  --   --   --  133   POTASSIUM 3.7 3.9 3.4  --  4.0  --   --   --  3.9   CHLORIDE 103  --  103  --  104  --   --   --  101   CO2 26  --  27  --  23  --   --   --  20   BUN 29  --  35*  --  34*  --   --   --  33*   CR 1.21  --  1.33*  --  1.53*  --   --   --  1.24   ANIONGAP 6  --  8  --  10  --   --   --  12   MINNIE 8.5  --  8.3*  --  8.3*  --   --   --  8.7   *  --  107*  --  122*  --   --   --  172*   ALBUMIN  --   --   --   --   --   --   --   --  3.5   PROTTOTAL  --   --   --   --   --   --   --   --  7.2   BILITOTAL  --   --   --   --   --   --   --   --  2.1*   ALKPHOS  --   --   --    --   --   --   --   --  118   ALT  --   --   --   --   --   --   --   --  23   AST  --   --   --   --   --   --   --   --  68*   TROPI  --   --   --   --   --  8.975* 11.224*  --  1.639*    < > = values in this interval not displayed.       No results found for this or any previous visit (from the past 24 hour(s)).

## 2021-01-30 NOTE — PROGRESS NOTES
A&O x4. VSS on room air. Tele SR/ST. Denies CP/pain. C/o SOB, IV lasix initiated. Up with standby assist. Metoprolol dose increased. K replaced. PT transferred to station 88.

## 2021-01-31 NOTE — PROGRESS NOTES
[facility]    Hospitalist Progress Note      Assessment & Plan   Renny Gannon is a 70 year old male who was admitted on 1/27/2021 with chest pain and initial concern for STEMI.  Angiogram deferred due to anemia and thrombocytopenia.     Non-ST elevation myocardial infarction  Dyslipidemia   Reported chest pain prior to admission, some concern for STEMI in the field therefore airlifted to [facility] and given aspirin and ticagrelor en route.  Given his significant anemia and thrombocytopenia emergent angiogram was deferred.   * No plans for angiogram, medical management per cardiology  * Troponin peak of 11.224  - Cardiology consulted, now signed off  - Increased metoprolol to 50mg BID with hold parameters 1/30  - Hold aspirin given thrombocytopenia  - Echocardiogram with normal EF, apicolateral hypokinesis and possible mid to distal inferolateral hypokinesis  - HDL 20, LDL 41. Atorvastatin has been reduced from admission dose--now at 10mg daily     Acute exacerbation of heart failure with preserved ejection fraction  Last echocardiogram in 2/2020 showed normal left ventricle structure, function and size. Typically takes oral furosemide at home. Echocardiogram with EF 50 to 55%, no significant valvular abnormalities   - Cardiology consulted, now signed off  - Resume lasix 40mg daily IV on 1/30-1/31. SOB improved  - resume home dose 40mg PO in AM and 20mg PO in afternoon on 2/1  - BMP in AM   - Strict I/O, daily weights     Myelofibrosis  Acute on chronic anemia  Acute on chronic thrombocytopenia  Leukocytosis  Chronic anemia with a baseline hemoglobin of around 8 g/dl. Baseline platelet count seems to be in the 90s previously.  Hospitalized earlier this month at [facility] for management of acute on chronic anemia (hemoglobin around 6 g/dl).  No clinical signs of bleeding and fecal occult negative. Previously followed with [facility], however has [employer] and requires a  new oncologist due to insurance reasons  - Continue transfusion for hemoglobin < 7 g/dl, higher goal if symptoms   - Care coordinator involved to arrange follow-up with new oncologist for ongoing management. Likely will need frequent outpatient transfusions.   - Patient with poor understanding of disease and prognosis. Appreciate [facility] input  - Hold hydroxyurea   - LDH 1720 on 1/29-- started on allopurinol 1/31 300mg  - plan for bone marrow biopsy 2/1 per hem onc     Acute hypoxic respiratory failure  Multifactorial.  Secondary to volume overload, with blood products in the emergency room, he became more short of breath and was ultimately placed on BiPAP, now weaned.   - Respiratory status stable on room air     Chronic kidney disease, stage 3  Baseline creatinine 1.3-1.5 with GFR in 40-50s. Creatinine 1.24 on admission.   - Currently at baseline  - Monitor BMP occasionally     DVT Prophylaxis: Pneumatic Compression Devices  Code Status: Full Code  Expected discharge: 1-2 days, recommended to TCU once hgb stable    Jelena Leigh, DO  Text Page (7am - 6pm)    Interval History   Patient seen and examined. Feels okay today. Less short of breath than day prior, but still gets some shortness of breath when up moving (feels about baseline). No blood transfusion needed today, but aware he may need one next day. Discussed care plan of TCU and he is agreeable.  Discussed care plan with nursing, DANA. Spent >35 minutes in total reviewing chart, discussing care plan, seeing patient    -Data reviewed today: I reviewed all new labs and imaging results over the last 24 hours. I personally reviewed no images or EKG's today.    Physical Exam   Temp: 96.1  F (35.6  C) Temp src: Oral BP: 107/48 Pulse: 94   Resp: 17 SpO2: 96 % O2 Device: None (Room air)    Vitals:    01/29/21 0412 01/30/21 0633 01/30/21 1507   Weight: 88.5 kg (195 lb 3.2 oz) 89.3 kg (196 lb 14.4 oz) 89.5 kg (197 lb 5 oz)     Vital Signs with Ranges  Temp:  [95.1   F (35.1  C)-97.7  F (36.5  C)] 96.1  F (35.6  C)  Pulse:  [] 94  Resp:  [17-18] 17  BP: (107-115)/(48-58) 107/48  SpO2:  [91 %-98 %] 96 %  I/O last 3 completed shifts:  In: 250 [P.O.:250]  Out: 1350 [Urine:1350]    Constitutional: Awake, alert, cooperative, no apparent distress  Respiratory: Clear to auscultation bilaterally, no crackles or wheezing  Cardiovascular: Regular rate and rhythm, normal S1 and S2, and no murmur noted  GI: Normal bowel sounds, soft, non-distended, non-tender  Skin/Integumen: No rashes, no cyanosis, trace bilateral lower extremity edema.  Other:     Medications     - MEDICATION INSTRUCTIONS -       ACE/ARB/ARNI NOT PRESCRIBED       ASPIRIN NOT PRESCRIBED         atorvastatin  10 mg Oral QPM     [START ON 2/1/2021] furosemide  20 mg Oral Daily at 4 pm     [START ON 2/1/2021] furosemide  40 mg Oral Daily     metoprolol tartrate  50 mg Oral BID       Data   Recent Labs   Lab 01/31/21  0924 01/30/21  0605 01/29/21  1441 01/29/21  0616 01/28/21  0203 01/28/21  0203 01/27/21  2147 01/27/21  1235 01/27/21  1235   WBC 14.0* 14.7* 13.8* 13.9*   < >  --   --   --  16.4*   HGB 7.4* 7.7* 8.1*  Canceled, Test credited 6.6*   < >  --   --    < > 5.2*   MCV 90 90 90 90   < >  --   --   --  96   PLT 16* 15* 14* 15*   < >  --   --   --  18*   INR  --   --   --   --   --   --   --   --  1.24*    135  --  138   < >  --   --   --  133   POTASSIUM 3.9 3.7 3.9 3.4   < >  --   --   --  3.9   CHLORIDE 102 103  --  103   < >  --   --   --  101   CO2 23 26  --  27   < >  --   --   --  20   BUN 28 29  --  35*   < >  --   --   --  33*   CR 1.25 1.21  --  1.33*   < >  --   --   --  1.24   ANIONGAP 10 6  --  8   < >  --   --   --  12   MINNIE 8.6 8.5  --  8.3*   < >  --   --   --  8.7   * 119*  --  107*   < >  --   --   --  172*   ALBUMIN  --   --   --   --   --   --   --   --  3.5   PROTTOTAL  --   --   --   --   --   --   --   --  7.2   BILITOTAL  --   --   --   --   --   --   --   --  2.1*   ALKPHOS   --   --   --   --   --   --   --   --  118   ALT  --   --   --   --   --   --   --   --  23   AST  --   --   --   --   --   --   --   --  68*   TROPI  --   --   --   --   --  8.975* 11.224*  --  1.639*    < > = values in this interval not displayed.       No results found for this or any previous visit (from the past 24 hour(s)).

## 2021-01-31 NOTE — PLAN OF CARE
Pt is A&Ox4, VSS on RA. Lung sounds diminished. Tele SR. Up w/SBA, voiding in urinal. 2 gram sodium diet. CMS intact, 1+ generalized edema. No PRN pain medications given this shift. IV SL. Hemoglobin recheck in AM, potentially discharging home if hemoglobin stable. Will continue to follow plan of care.

## 2021-01-31 NOTE — PLAN OF CARE
Pt A&Ox4, forgetful. VSS on RA, denies pain. LS diminished. Tele SR. Up w/ SBA to chair, voiding in urinal. 2 gram sodium diet. CMS intact, 1+ generalized edema. IV SL. Hemoglobin 7.4, recheck am. Plan pending.

## 2021-01-31 NOTE — PROGRESS NOTES
[facility]    Hematology/Oncology Follow-up Note      Today's Date: 01/31/21  Date of Admission:  1/27/2021  Reason for Consult: myelofibrosis      ASSESSMENT/PLAN:  Renny Gannon is a 70 year old male with:    Myelofibrosis: JAK2+, has been on hydroxyurea, initially on 1000 mg a day, then decreased to 500 mg daily.  He has an acute on chronic anemia and thrombocytopenia, as well as leukocytosis.     Hemoglobin 7.4.  Platelet count remains low but stable at 15.  WBC 14.     -agree with transfusion for hemoglobin <7, platelet count <10K, or if symptoms  -hold hydroxyurea  -attempted to discuss myelofibrosis diagnosis with patient, but he does seem to have poor understanding of disease.  -will add on peripheral smear and peripheral flow  -Patient remains severely thrombocytopenic, with anemia despite holding hydroxyurea.  Uric acid and LDH are quite high.    -will start allopurinol  -repeat CBC/diff, uric acid, LDH, phosphorus tomorrow morning  -I discussed with patient regarding doing bone marrow biopsy to evaluate if progression of disease, leukemic transformation, and he is agreeable.    -bone marrow biopsy ordered  -his primary oncologist has been Dr. Cristina at [facility], but his insurance is changing, so he will be switching to a new oncologist.  Care coordinator is working on arranging follow-up with new oncologist after discharge.  -we are continuing to follow         INTERIM HISTORY: Renny was seen in his room.  He says that he feels okay.       MEDICATIONS:  Current Facility-Administered Medications   Medication     acetaminophen (TYLENOL) tablet 650 mg     allopurinol (ZYLOPRIM) tablet 300 mg     atorvastatin (LIPITOR) tablet 10 mg     bisacodyl (DULCOLAX) Suppository 10 mg     carboxymethylcellulose PF (REFRESH PLUS) 0.5 % ophthalmic solution 1 drop     [START ON 2/1/2021] furosemide (LASIX) tablet 20 mg     [START ON 2/1/2021] furosemide (LASIX) tablet 40 mg      "lidocaine (LMX4) cream     lidocaine 1 % 0.1-1 mL     medication instruction     melatonin tablet 1 mg     metoprolol tartrate (LOPRESSOR) tablet 50 mg     morphine (PF) injection 2 mg     naloxone (NARCAN) injection 0.2 mg    Or     naloxone (NARCAN) injection 0.4 mg    Or     naloxone (NARCAN) injection 0.2 mg    Or     naloxone (NARCAN) injection 0.4 mg     nitroGLYcerin (NITROSTAT) sublingual tablet 0.4 mg     ondansetron (ZOFRAN-ODT) ODT tab 4 mg    Or     ondansetron (ZOFRAN) injection 4 mg     polyethylene glycol (MIRALAX) Packet 17 g     prochlorperazine (COMPAZINE) injection 5 mg    Or     prochlorperazine (COMPAZINE) tablet 5 mg    Or     prochlorperazine (COMPAZINE) suppository 12.5 mg     Reason ACE/ARB/ARNI order not selected     reason aspirin not prescribed (intentional)     senna-docusate (SENOKOT-S/PERICOLACE) 8.6-50 MG per tablet 1 tablet    Or     senna-docusate (SENOKOT-S/PERICOLACE) 8.6-50 MG per tablet 2 tablet     sodium chloride (PF) 0.9% PF flush 3 mL     sodium chloride (PF) 0.9% PF flush 3 mL           ALLERGIES:  Allergies   Allergen Reactions     No Known Drug Allergies      Seasonal Allergies          PHYSICAL EXAM:  Vital signs:  Temp: 96.1  F (35.6  C) Temp src: Oral BP: 107/48 Pulse: 94   Resp: 17 SpO2: 96 % O2 Device: None (Room air) Oxygen Delivery: 2 LPM Height: 172.7 cm (5' 8\") Weight: 89.5 kg (197 lb 5 oz)  Estimated body mass index is 30 kg/m  as calculated from the following:    Height as of this encounter: 1.727 m (5' 8\").    Weight as of this encounter: 89.5 kg (197 lb 5 oz).    GENERAL/CONSTITUTIONAL: No acute distress.  EYES: No scleral icterus.  NEUROLOGIC: Alert, oriented, answers questions appropriately.  INTEGUMENTARY: No jaundice.      LABS:  CBC RESULTS:   Recent Labs   Lab Test 01/31/21  0924   WBC 14.0*   RBC 2.49*   HGB 7.4*   HCT 22.5*   MCV 90   MCH 29.7   MCHC 32.9   RDW 16.3*   PLT 16*       Recent Labs   Lab Test 01/31/21  0924 01/30/21  0605    135 "   POTASSIUM 3.9 3.7   CHLORIDE 102 103   CO2 23 26   ANIONGAP 10 6   * 119*   BUN 28 29   CR 1.25 1.21   MINNIE 8.6 8.5         Laura Torres MD  Hematology/Oncology  [facility]

## 2021-01-31 NOTE — PROGRESS NOTES
Care Management Follow Up    Length of Stay (days): 4    Expected Discharge Date: 02/01/21     Concerns to be Addressed: discharge planning     Patient plan of care discussed at interdisciplinary rounds: Yes    Anticipated Discharge Disposition: Transitional Care, Skilled Nursing Facilty     Anticipated Discharge Services: None  Anticipated Discharge DME: None    Patient/family educated on Medicare website which has current facility and service quality ratings: yes  Education Provided on the Discharge Plan:    Patient/Family in Agreement with the Plan: yes    Referrals Placed by CM/SW: Post Acute Facilities  Private pay costs discussed: Not applicable    Additional Information:  Writer spoke with pt about TCU. Writer explained that he is only able to go to certain facilities because he has [employer]. He was okay with a referral to [facility]. He stated he would like to go somewhere around [place] if possible. Writer stated writer can send referrals to a few facilities so see if they accept [employer]. Pt was agreeable.     3:24p Addendum: Writer spoke to provider who stated plan for an afternoon discharge. She stated that pt may need a blood transfusion, but that pt can still discharge tomorrow.     JUANA Daly

## 2021-01-31 NOTE — PLAN OF CARE
Pt A/Ox4, VSS on RA ex BP soft. TELE NSR. LA fired, back 1.9. Denies chest pain or any other pain. +2 BUE and BLE edema. Up with SBA, steady. Blanchable redness to coccyx, encouraged to lay on side. Continent, using urinal. C/o constipation, PRN senna and Miralax given this evening. Discharge pending stable Hgb, continue to monitor.

## 2021-02-01 NOTE — PROGRESS NOTES
Care Management Follow Up    Length of Stay (days): 5    Expected Discharge Date: 02/01/21     Concerns to be Addressed: discharge planning     Patient plan of care discussed at interdisciplinary rounds: Yes    Anticipated Discharge Disposition: Transitional Care, Skilled Nursing Facilty     Anticipated Discharge Services: None  Anticipated Discharge DME: None    Patient/family educated on Medicare website which has current facility and service quality ratings: yes  Education Provided on the Discharge Plan:  yes  Patient/Family in Agreement with the Plan: yes    Referrals Placed by CM/SW: Post Acute Facilities  Private pay costs discussed: transportation costs    Additional Information:  Writer received a call from [facility] stating that they can take patient. Writer followed up with [facility] in [place], [place] and they confirmed they can take patient if he is not on Bipap and would need orders to state to hold bipap on use oxygen until after stay. Writer will send PT/OT notes to [facility] when received via fax to [phone] for insurance auth. Writer called and was informed that [facility] and has no beds. Writer left a voicemail for [facility]. Writer met with patient to discuss if he prefers [facility] or [facility]. Patient stated that he wants [facility]. Writer and patient discussed transportation, and patient stated that he would prefer his sister Rose to transport and asked writer to call. Writer called and spoke to patient's sister, Rose and discussed need for transportation per Renny's request. Rose stated that she would be able to transport patient and stated that she would best be able to transport between 1100 or 1200 on 2/2. Writer explained that they need to get insurance auth but can plan for 1100 for tomorrow. Writer stated she will call to confirm and inform her if anything changes. Writer called Piyush at [facility] and informed them that patient  is going with a different TCU. Writer will continue to follow for safe discharge planning     BESSIE Stock, LSW   Social Work Intern   [phone]  [facility]

## 2021-02-01 NOTE — PLAN OF CARE
A/Ox4, VSS on RA ex tachycardic. TELE SR/ST. Denies all pain. Ambulated in [place] x1, very SPENCER, became unsteady and had to sit in wheelchair. +2 BLE edema. Voiding in urinal, linsey output. No BM in several days, PRN miralax and senna given. Plan for bone marrow biopsy tomorrow, discharge to TCU pending.

## 2021-02-01 NOTE — PROCEDURES
I met the patient, discussed the bone marrow aspirate and biopsy procedure, and informed consent was obtained.  The procedure was then subsequently canceled per anesthesiology.  No specimen was obtained.  I updated Dr. Torres via telephone of the procedure cancellation.

## 2021-02-01 NOTE — PROGRESS NOTES
Care Management Follow Up    Length of Stay (days): 5    Expected Discharge Date: 02/01/21     Concerns to be Addressed: discharge planning     Patient plan of care discussed at interdisciplinary rounds: Yes    Anticipated Discharge Disposition: Transitional Care, Skilled Nursing Facilty     Anticipated Discharge Services: None  Anticipated Discharge DME: None    Patient/family educated on Medicare website which has current facility and service quality ratings: yes  Education Provided on the Discharge Plan:    Patient/Family in Agreement with the Plan: yes    Referrals Placed by CM/SW: Post Acute Facilities  Private pay costs discussed: Not applicable    Additional Information:  Writer received a call from [facility] and was informed they are no longer accepting [employer]. Writer is waiting on [employer] auth for [facility]. Writer completed PAS for anticipated discharge 2/2 to [facility]. Writer will continue to follow for safe discharge planning.         PAS-RR    D: Per DHS regulation, SW completed and submitted PAS-RR to [place] [employer] Direct Connect via the Senior LinkAge Line.  PAS-RR confirmation # is : [id number]    I: SW spoke with patient and they are aware a PAS-RR has been submitted.  SW reviewed with patient that they may be contacted for a follow up appointment within 10 days of hospital discharge if their SNF stay is < 30 days.  Contact information for [facility] LinkAge Line was also provided.    A: Patient verbalized understanding.    P: Further questions may be directed to [facility] LinkAge Line at #[phone], option #4 for PAS-RR staff.      BESSIE Stock, LSW   Social Work Intern   [phone]  [facility]

## 2021-02-01 NOTE — PROGRESS NOTES
Care Management Follow Up    Length of Stay (days): 5    Expected Discharge Date: 02/01/21     Concerns to be Addressed: discharge planning     Patient plan of care discussed at interdisciplinary rounds: Yes    Anticipated Discharge Disposition: Transitional Care, Skilled Nursing Facilty     Anticipated Discharge Services: None  Anticipated Discharge DME: None    Patient/family educated on Medicare website which has current facility and service quality ratings: yes  Education Provided on the Discharge Plan:    Patient/Family in Agreement with the Plan: yes    Referrals Placed by CM/SW: Post Acute Facilities  Private pay costs discussed: Not applicable    Additional Information:  Writer received a voicemail from Cheli in admissions from [facility]. Writer called back at [phone] and left a voicemail inquiring about openings. Writer followed up on the other referrals and left a voicemail with Piyush in admissons at [facility]. Writer left a voicemail with Lyndsey in admissions at [facility]. Writer received a call back and was informed that [facility] does not have any openings. Write reviewed pervious social workers note and patient prefers to be around the [place] area. Writer sent referrals to [facility].  Writer will continue to follow for safe discharge planning.         BESSIE Stock, LSW   Social Work Intern   [phone]  [facility]

## 2021-02-01 NOTE — PROGRESS NOTES
Case cancelled due to increased risk of cardiovascular complications given recent NSTEMI on 1/27.    Daljit Beltran MD

## 2021-02-01 NOTE — PLAN OF CARE
A&Ox4. VSS. Plan for BMB today; unable to complete due to risk factors (see anesthesiologist note). Tele NSR. No complaints. Mouth with some dried blood, hospitalist aware. Up to chair x2 with assist of 1. Constipation; senna given. Declined miralax this afternoon. Passing gas. Plan for blood this afternoon when ready, then IV lasix x1. Likely discharge in AM to TCU. Family to transport. Continue to monitor.

## 2021-02-01 NOTE — PROGRESS NOTES
[facility]    Hospitalist Progress Note      Assessment & Plan   Renny Gannon is a 70 year old male who was admitted on 1/27/2021 with chest pain and initial concern for STEMI.  Angiogram deferred due to anemia and thrombocytopenia.     Non-ST elevation myocardial infarction  Dyslipidemia   Reported chest pain prior to admission, some concern for STEMI in the field therefore airlifted to [facility] and given aspirin and ticagrelor en route.  Given his significant anemia and thrombocytopenia emergent angiogram was deferred.   * No plans for angiogram, medical management per cardiology  * Troponin peak of 11.224  - Cardiology consulted, now signed off  - Increased metoprolol to 50mg BID with hold parameters 1/30  - Hold aspirin given thrombocytopenia  - Echocardiogram with normal EF, apicolateral hypokinesis and possible mid to distal inferolateral hypokinesis  - HDL 20, LDL 41. Atorvastatin has been reduced from admission dose--now at 10mg daily     Acute exacerbation of heart failure with preserved ejection fraction  Last echocardiogram in 2/2020 showed normal left ventricle structure, function and size. Typically takes oral furosemide at home. Echocardiogram with EF 50 to 55%, no significant valvular abnormalities   - Cardiology consulted, now signed off  - Resume lasix 40mg daily IV on 1/30-1/31. SOB improved  - resumed home dose 40mg PO in AM and 20mg PO in afternoon on 2/1  - BMP in AM   - Strict I/O, daily weights     Myelofibrosis  Acute on chronic anemia  Acute on chronic thrombocytopenia  Leukocytosis  Chronic anemia with a baseline hemoglobin of around 8 g/dl. Baseline platelet count seems to be in the 90s previously.  Hospitalized earlier this month at [facility] for management of acute on chronic anemia (hemoglobin around 6 g/dl).  No clinical signs of bleeding and fecal occult negative. Previously followed with [facility], however has [employer] and requires  a new oncologist due to insurance reasons  - Continue transfusion for hemoglobin < 7 g/dl, higher goal if symptoms   - Care coordinator involved to arrange follow-up with new oncologist for ongoing management. Likely will need frequent outpatient transfusions.   - Patient with poor understanding of disease and prognosis. Appreciate [facility] input  - Hold hydroxyurea   - LDH 1720 on 1/29-- started on allopurinol 1/31 300mg daily  - Bone marrow biopsy cancelled on 2/1 due to cardiac risk  - plan to give 1 unit PRBCs post bone marrow biopsy on 2/1 as plans for early discharge on 2/2 and has had steady decline in Hgb--give 20mg IV lasix post PRBCs  - Hgb recheck 3 days post discharge.     Acute hypoxic respiratory failure  Multifactorial.  Secondary to volume overload, with blood products in the emergency room, he became more short of breath and was ultimately placed on BiPAP, now weaned.   - Respiratory status stable on room air     Chronic kidney disease, stage 3  Baseline creatinine 1.3-1.5 with GFR in 40-50s. Creatinine 1.24 on admission.   - Currently at baseline  - Monitor BMP occasionally     Constipation  Continue PRN bowel regimen. Remove suppository due to low platelets  - Add prn milk of mag    DVT Prophylaxis: Pneumatic Compression Devices  Code Status: Full Code  Expected discharge: 1-2 days, recommended to TCU once hgb stable    Jelena Leigh, DO  Text Page (7am - 6pm)    Interval History   Patient seen and examined. Feels fine. Has plans for bone marrow biopsy today. Discussed his dried blood in his mouth--secondary to low platelet count. Hopeful for TCU tomorrow if placement found. Still no BM  Updated CC/SW that placement hopeful for 2/2 early AM.  Discussed cancelled BMB with anesthesiology  Spent >35 minutes reviewing chart, discussing care plan with staff and patient.    -Data reviewed today: I reviewed all new labs and imaging results over the last 24 hours. I personally reviewed no images or  EKG's today.    Physical Exam   Temp: 96.9  F (36.1  C) Temp src: Oral BP: 112/57 Pulse: 97   Resp: 20 SpO2: 93 % O2 Device: None (Room air)    Vitals:    01/30/21 0633 01/30/21 1507 02/01/21 0100   Weight: 89.3 kg (196 lb 14.4 oz) 89.5 kg (197 lb 5 oz) 89.9 kg (198 lb 4.8 oz)     Vital Signs with Ranges  Temp:  [95.3  F (35.2  C)-96.9  F (36.1  C)] 96.9  F (36.1  C)  Pulse:  [] 97  Resp:  [17-20] 20  BP: (107-116)/(48-60) 112/57  SpO2:  [93 %-96 %] 93 %  I/O last 3 completed shifts:  In: 740 [P.O.:740]  Out: 925 [Urine:925]    Constitutional: Awake, alert, cooperative, no apparent distress  Respiratory: Clear to auscultation bilaterally, no crackles or wheezing  Cardiovascular: Regular rate and rhythm, normal S1 and S2, and no murmur noted  GI: Normal bowel sounds, soft, non-distended, non-tender  Skin/Integumen: No rashes, no cyanosis, trace bilateral lower extremity edema.  Other:     Medications     - MEDICATION INSTRUCTIONS -       ACE/ARB/ARNI NOT PRESCRIBED       ASPIRIN NOT PRESCRIBED         allopurinol  300 mg Oral Daily     atorvastatin  10 mg Oral QPM     furosemide  20 mg Oral Daily at 4 pm     furosemide  40 mg Oral Daily     metoprolol tartrate  50 mg Oral BID       Data   Recent Labs   Lab 02/01/21  0704 01/31/21  0924 01/30/21  0605 01/29/21  0616 01/29/21  0616 01/28/21  0203 01/28/21  0203 01/27/21  2147 01/27/21  1235 01/27/21  1235   WBC 13.8* 14.0* 14.7*   < > 13.9*   < >  --   --   --  16.4*   HGB 7.2* 7.4* 7.7*   < > 6.6*   < >  --   --    < > 5.2*   MCV 90 90 90   < > 90   < >  --   --   --  96   PLT 15* 16* 15*   < > 15*   < >  --   --   --  18*   INR  --   --   --   --   --   --   --   --   --  1.24*   NA  --  135 135  --  138   < >  --   --   --  133   POTASSIUM 4.2 3.9 3.7   < > 3.4   < >  --   --   --  3.9   CHLORIDE  --  102 103  --  103   < >  --   --   --  101   CO2  --  23 26  --  27   < >  --   --   --  20   BUN  --  28 29  --  35*   < >  --   --   --  33*   CR 1.21 1.25 1.21   --  1.33*   < >  --   --   --  1.24   ANIONGAP  --  10 6  --  8   < >  --   --   --  12   MINNIE  --  8.6 8.5  --  8.3*   < >  --   --   --  8.7   GLC  --  161* 119*  --  107*   < >  --   --   --  172*   ALBUMIN  --   --   --   --   --   --   --   --   --  3.5   PROTTOTAL  --   --   --   --   --   --   --   --   --  7.2   BILITOTAL  --   --   --   --   --   --   --   --   --  2.1*   ALKPHOS  --   --   --   --   --   --   --   --   --  118   ALT  --   --   --   --   --   --   --   --   --  23   AST  --   --   --   --   --   --   --   --   --  68*   TROPI  --   --   --   --   --   --  8.975* 11.224*  --  1.639*    < > = values in this interval not displayed.       No results found for this or any previous visit (from the past 24 hour(s)).

## 2021-02-02 NOTE — PLAN OF CARE
A&Ox4.  VSS, RA.  Denies pain or SOB; dyspneic with exertion.  TELE: NSR.  PIV x 2; SL, one dose of IV Lasix given after blood transfusion completed last night.  Assist x 1 with GB and walker.  Uses urinal at the bedside; one BM this shift.  Discharge pending progress.

## 2021-02-02 NOTE — PROGRESS NOTES
[facility]    Hospitalist Progress Note      Assessment & Plan   Renny Gannon is a 70 year old male who was admitted on 1/27/2021 with chest pain and initial concern for STEMI.  Angiogram deferred due to anemia and thrombocytopenia.     Non-ST elevation myocardial infarction  Dyslipidemia   Reported chest pain prior to admission, some concern for STEMI in the field therefore airlifted to [facility] and given aspirin and ticagrelor en route.  Given his significant anemia and thrombocytopenia emergent angiogram was deferred.   * No plans for angiogram, medical management per cardiology  * Troponin peak of 11.224  - Cardiology consulted, now signed off  - Increased metoprolol to 50mg BID with stable BPs/HRs in 90s  - Hold aspirin given thrombocytopenia  - Echocardiogram with normal EF, apicolateral hypokinesis and possible mid to distal inferolateral hypokinesis  - HDL 20, LDL 41. Atorvastatin has been reduced from admission dose--now at 10mg daily     Acute exacerbation of heart failure with preserved ejection fraction  Last echocardiogram in 2/2020 showed normal left ventricle structure, function and size. Typically takes oral furosemide at home. Echocardiogram with EF 50 to 55%, no significant valvular abnormalities Intermittent IV diuresis with improved SOB.  - Cardiology consulted, now signed off  - resumed home dose 40mg PO in AM and 20mg PO in afternoon on 2/1--consider increasing to 40mg twice daily if worsening LE edema or increasing weight  - Strict I/O, daily weights     Progressive myelofibrosis  Acute on chronic anemia  Acute on chronic thrombocytopenia  Leukocytosis  Chronic anemia with a baseline hemoglobin of around 8 g/dl. Baseline platelet count seems to be in the 90s previously.  Hospitalized earlier this month at [facility] for management of acute on chronic anemia (hemoglobin around 6 g/dl).  No clinical signs of bleeding and fecal occult negative.  Previously followed with [facility], however has [employer] and requires a new oncologist due to insurance reasons  *received 4 units PRBCs so far this admission  - Continue transfusion for hemoglobin < 7 g/dl, higher goal if symptoms   - Care coordinator involved to arrange follow-up with new oncologist for ongoing management. Likely will need frequent outpatient transfusions.   - Patient with poor understanding of disease and prognosis. Appreciate [facility] input  - Stop hydroxyurea   - LDH 1720 on 1/29-- started on allopurinol 1/31 300mg daily  - Bone marrow biopsy cancelled on 2/1 due to cardiac risk  - Hgb recheck 3 days post discharge.     Acute hypoxic respiratory failure  Multifactorial.  Secondary to volume overload, with blood products in the emergency room, he became more short of breath and was ultimately placed on BiPAP, now weaned.   - Respiratory status stable on room air     Chronic kidney disease, stage 3  Baseline creatinine 1.3-1.5 with GFR in 40-50s. Creatinine 1.24 on admission.   - Currently at baseline  - Monitor BMP occasionally     Constipation- improved  Continue PRN bowel regimen. Remove suppository due to low platelets  - had BM on 2/1, continue bowel regimen as needed    DVT Prophylaxis: Pneumatic Compression Devices  Code Status: Full Code  Expected discharge: 1-2 days, recommended to TCU once insurance authorization received    Jelena Leigh, DO  Text Page (7am - 6pm)    Interval History   Patient seen and examined. Feels okay. Some shortness of breath with therapy--particularly on returning to room, but stable. No chest pain, nausea, vomiting, fevers, chills. Understands we are waiting for insurance authorization for transitional care unit.  Spent >35 minutes reviewing chart, discussing care plan with staff and patient.    -Data reviewed today: I reviewed all new labs and imaging results over the last 24 hours. I personally reviewed no images or EKG's today.    Physical Exam   Temp:  98.4  F (36.9  C) Temp src: Oral BP: 116/54 Pulse: 94   Resp: 16 SpO2: 94 % O2 Device: None (Room air)    Vitals:    01/30/21 1507 02/01/21 0100 02/02/21 0553   Weight: 89.5 kg (197 lb 5 oz) 89.9 kg (198 lb 4.8 oz) 89.3 kg (196 lb 13.9 oz)     Vital Signs with Ranges  Temp:  [97.1  F (36.2  C)-99.2  F (37.3  C)] 98.4  F (36.9  C)  Pulse:  [] 94  Resp:  [16-20] 16  BP: (110-138)/(48-57) 116/54  SpO2:  [94 %-95 %] 94 %  I/O last 3 completed shifts:  In: 1106 [P.O.:420]  Out: 875 [Urine:875]    Constitutional: Awake, alert, cooperative, no apparent distress  Respiratory: Clear to auscultation bilaterally, no crackles or wheezing  Cardiovascular: Regular rate and rhythm, normal S1 and S2, and no murmur noted  GI: Normal bowel sounds, soft, non-distended, non-tender  Skin/Integumen: No rashes, no cyanosis, trace to +1 bilateral lower extremity edema.  Other:     Medications     - MEDICATION INSTRUCTIONS -       ACE/ARB/ARNI NOT PRESCRIBED       ASPIRIN NOT PRESCRIBED         allopurinol  300 mg Oral Daily     atorvastatin  10 mg Oral QPM     furosemide  20 mg Oral Daily at 4 pm     furosemide  40 mg Oral Daily     metoprolol tartrate  50 mg Oral BID       Data   Recent Labs   Lab 02/02/21  0749 02/01/21  0704 01/31/21  0924 01/30/21  0605 01/29/21  0616 01/29/21  0616 01/28/21  0203 01/28/21  0203 01/27/21  2147 01/27/21  1235 01/27/21  1235   WBC 13.0* 13.8* 14.0* 14.7*   < > 13.9*   < >  --   --   --  16.4*   HGB 7.4* 7.2* 7.4* 7.7*   < > 6.6*   < >  --   --    < > 5.2*   MCV 89 90 90 90   < > 90   < >  --   --   --  96   PLT 16* 15* 16* 15*   < > 15*   < >  --   --   --  18*   INR  --   --   --   --   --   --   --   --   --   --  1.24*   NA  --   --  135 135  --  138   < >  --   --   --  133   POTASSIUM 4.1 4.2 3.9 3.7   < > 3.4   < >  --   --   --  3.9   CHLORIDE  --   --  102 103  --  103   < >  --   --   --  101   CO2  --   --  23 26  --  27   < >  --   --   --  20   BUN  --   --  28 29  --  35*   < >  --    --   --  33*   CR  --  1.21 1.25 1.21  --  1.33*   < >  --   --   --  1.24   ANIONGAP  --   --  10 6  --  8   < >  --   --   --  12   MINNIE  --   --  8.6 8.5  --  8.3*   < >  --   --   --  8.7   GLC  --   --  161* 119*  --  107*   < >  --   --   --  172*   ALBUMIN  --   --   --   --   --   --   --   --   --   --  3.5   PROTTOTAL  --   --   --   --   --   --   --   --   --   --  7.2   BILITOTAL  --   --   --   --   --   --   --   --   --   --  2.1*   ALKPHOS  --   --   --   --   --   --   --   --   --   --  118   ALT  --   --   --   --   --   --   --   --   --   --  23   AST  --   --   --   --   --   --   --   --   --   --  68*   TROPI  --   --   --   --   --   --   --  8.975* 11.224*  --  1.639*    < > = values in this interval not displayed.       No results found for this or any previous visit (from the past 24 hour(s)).

## 2021-02-02 NOTE — PROGRESS NOTES
Care Management Follow Up    Length of Stay (days): 6    Expected Discharge Date: 02/02/21     Concerns to be Addressed: discharge planning     Patient plan of care discussed at interdisciplinary rounds: Yes    Anticipated Discharge Disposition: Transitional Care, Skilled Nursing Facilty  Disposition Comments: [facility] [place]  Anticipated Discharge Services: None  Anticipated Discharge DME: None    Patient/family educated on Medicare website which has current facility and service quality ratings: yes  Education Provided on the Discharge Plan:    Patient/Family in Agreement with the Plan: yes    Referrals Placed by CM/SW: Post Acute Facilities  Private pay costs discussed: transportation costs    Additional Information:  Writer reviewed file and saw that patient has not been by therapy yet and updated therapy notes are needed to receive [employer] auth. Writer spoke to Cheli at [facility] regarding discharge. Cheli stated that she was unable to get patient's authorization,however therapy notes are needed with the last 24-48 hours. Writer explained that patient was not seen yesterday but is down to be seen today at 1130. Writer will fax notes to Cheli at [phone] when received. Writer called and updated patient's sister, Rose, regarding transportation. Writer explained that we will not have authorization by 11 and will need to rescheudle transportation. Rose explained that she had Chemo today and is out sick and is hoping that patient discharges tomorrow, if not thought that they could have a [employer] friend transport him. Writer stated that she is unaware if she will have [employer] auth by today but will keep her updated once she knows to discuss transport. Writer will continue to follow for safe discharge plan.         Gavi Ochoa, DENISHAW, LSW   Social Work Intern   [phone]  [facility]

## 2021-02-02 NOTE — PROGRESS NOTES
SPIRITUAL HEALTH SERVICES Progress Note  [facility]    Brief introductory visit with pt, per his lengthy hospital stay.  Pt is hopeful that he may be able to discharge to rehab today.  Provided supportive conversation.  No other needs at present.   team available for follow-up per need or request.                                                                                                                                                 Awilda Murguia M.A.  Staff   Pager [phone]  Phone [phone]

## 2021-02-02 NOTE — PROGRESS NOTES
Care Management Follow Up    Length of Stay (days): 6    Expected Discharge Date: 02/02/21     Concerns to be Addressed: discharge planning     Patient plan of care discussed at interdisciplinary rounds: Yes    Anticipated Discharge Disposition: Transitional Care, Skilled Nursing Facilty  Disposition Comments: [facility] [place]  Anticipated Discharge Services: None  Anticipated Discharge DME: None    Patient/family educated on Medicare website which has current facility and service quality ratings: yes  Education Provided on the Discharge Plan:  yes  Patient/Family in Agreement with the Plan: yes    Referrals Placed by CM/SW: Post Acute Facilities  Private pay costs discussed: transportation costs    Additional Information:  Writer reviewed file and saw that PT did see patient. Writer printed therapy notes and faxed them to Cheli in admissions at [facility] at [phone]. Writer called and informed Cheli that orders were faxed. Cheli stated she would inform writer when authorization has been received. Writer will continue to follow for safe discharge planning.       DENISHA StockW, LSW   Social Work Intern   [phone]  [facility]

## 2021-02-02 NOTE — PLAN OF CARE
0109-9678: A/Ox4. VSS on RA. Denies pain. Tele: NSR. 1 unit of PRBC infusing for Hgb 7.2, to give IV lasix after transfusion complete. Tolerating well. Ax1 + gb, up with PT ambulating halls this evening. Platelets 15. Sepsis protocol fired, Lactic 1.3. Plan for likely discharge tomorrow to TCU.

## 2021-02-02 NOTE — PROGRESS NOTES
Service Date: 02/02/2021      SUBJECTIVE:  Mr. Gannon is a 70-year-old gentleman with JAK2 mutation-positive myelofibrosis.  He has been on hydroxyurea.  The patient follows up with Dr. Cristina.      The patient's CBC lately has been getting worse.  The patient has leukocytosis, worsening anemia and thrombocytopenia.  These are indicative of progression of myelofibrosis.      A bone marrow biopsy was planned.  It was canceled by the anesthesiologist due to increased risk of cardiovascular complications.      For anemia, patient received blood transfusion yesterday.      His overall condition is stable.  He has fatigue.  No headache.  Some dizziness.  No chest pain.  No shortness of breath.  No vomiting.  No bleeding.      PHYSICAL EXAMINATION:   GENERAL:  He was alert, oriented x 3.  He is not in any acute distress.   The rest of systems not examined.      LABORATORY DATA:  Reviewed.      ASSESSMENT:   1.  A 70-year-old gentleman with JAK2 mutation mutation-positive myelofibrosis, which is progressing.   2.  Anemia and thrombocytopenia from myelofibrosis.   3.  Multiple other medical problems including NSTEMI.      PLAN:   1.  The patient has myelofibrosis, which is clinically progressing.  He had been on hydroxyurea and that has been stopped because of progression.      The patient was advised to follow-up with his primary oncologist, Dr. Cristina.  Dr. Cristina will decide regarding starting the patient on ruxolitinib.      For anemia and thrombocytopenia, patient will be transfused as needed.  He should be transfused for hemoglobin below 7 and platelets below 10.      2.  The patient had a few questions, which were all answered.  I have communicated with his primary oncologist, Dr. Cristina. Patient has an appointment to see him.      Total time spent was 25 minutes.         DAVE DE LA FUENTE MD             D: 02/02/2021   T: 02/02/2021   MT: SARAHI      Name:     ABI GANNON   MRN:      [id number]        Account:       [id number]   :      1950           Service Date: 2021      Document: [id number]

## 2021-02-03 NOTE — PLAN OF CARE
OT order received and chart reviewed.  Plan is for discharge to TCU today.  Will defer skilled OT evaluation to next level of care.  Will complete orders.

## 2021-02-03 NOTE — PLAN OF CARE
A/Ox4. VSS on RA. Tele: NSR. Denies pain. SBA + walker to BR. 1 BM today. Up in chair most of day. Voiding adequately. 2gm Na diet. Hgb 7.4. Platelets 16. Po lasix. Plan for likely discharge to TCU tomorrow once insurance auth complete.

## 2021-02-03 NOTE — DISCHARGE SUMMARY
[facility]  Hospitalist Discharge Summary      Date of Admission:  1/27/2021  Date of Discharge:  2/3/2021  Discharging Provider: Art Olsen MD  Discharge Diagnoses   1. Non-ST elevation myocardial infarction  2. Acute exacerbation of diastolic heart failure  3. Acute on chronic anemia  4. Acute on chronic thrombocytopenia  5. Leukocytosis  6. Acute hypoxic respiratory failure    History of     Dyslipidemia     Progressive myelofibrosis    Chronic kidney disease, stage 3    Follow-ups Needed After Discharge   Follow-up Appointments     Follow Up and recommended labs and tests      Oncologist 2 weeks.  CBC and BMP in 7 days         Follow-up and recommended labs and tests       Providers for Hemo/Oncology that are covered per [employer] would be:  Dr. Lela Bingham at [facility]  [phone]        Or  Dr. Chin at [facility]   [phone]             Unresulted Labs Ordered in the Past 30 Days of this Admission     No orders found from 12/28/2020 to 1/28/2021.      These results will be followed up by     Discharge Disposition   Discharged to short-term care facility  Condition at discharge: Stable    Hospital Course   Renny Gannon is a 70 year old male who was admitted on 1/27/2021 with chest pain and initial concern for STEMI.  Angiogram deferred due to anemia and thrombocytopenia.      Non-ST elevation myocardial infarction  Dyslipidemia   Reported chest pain prior to admission, some concern for STEMI in the field therefore airlifted to [facility] and given aspirin and ticagrelor en route.  Given his significant anemia and thrombocytopenia emergent angiogram was deferred.   * No plans for angiogram, medical management per cardiology  * Troponin peak of 11.224  - Cardiology consulted, now signed off  - Increased metoprolol to 50mg BID with stable BPs/HRs in 90s  - Hold aspirin given thrombocytopenia  - Echocardiogram with normal EF, apicolateral  hypokinesis and possible mid to distal inferolateral hypokinesis  - HDL 20, LDL 41. Atorvastatin has been reduced from admission dose--now at 10mg daily     Acute exacerbation of heart failure with preserved ejection fraction  Last echocardiogram in 2/2020 showed normal left ventricle structure, function and size. Typically takes oral furosemide at home. Echocardiogram with EF 50 to 55%, no significant valvular abnormalities Intermittent IV diuresis with improved SOB.  - Cardiology consulted, now signed off  - resumed home dose 40mg PO in AM and 20mg PO in afternoon on 2/1-     Progressive myelofibrosis  Acute on chronic anemia  Acute on chronic thrombocytopenia  Leukocytosis  Chronic anemia with a baseline hemoglobin of around 8 g/dl. Baseline platelet count seems to be in the 90s previously.  Hospitalized earlier this month at [facility] for management of acute on chronic anemia (hemoglobin around 6 g/dl).  No clinical signs of bleeding and fecal occult negative. Previously followed with -Oncology, however has [employer] and requires a new oncologist due to insurance reasons  *received 4 units PRBCs so far this admission  - Continue transfusion for hemoglobin < 7 g/dl, higher goal if symptoms   - Care coordinator involved to arrange follow-up with new oncologist for ongoing management. Likely will need frequent outpatient transfusions.   - Patient with poor understanding of disease and prognosis. Appreciate [facility] input  - Stop hydroxyurea   - LDH 1720 on 1/29-- started on allopurinol 1/31 300mg daily  - Bone marrow biopsy cancelled on 2/1 due to cardiac risk  -packed red blood cells transfused on 2/3 for hemoglobin 6.9g.  - Hgb recheck in a week     Acute hypoxic respiratory failure  Multifactorial.  Secondary to volume overload, with blood products in the emergency room, he became more short of breath and was ultimately placed on BiPAP, now weaned.   - Respiratory status stable on room air     Chronic  kidney disease, stage 3  Baseline creatinine 1.3-1.5 with GFR in 40-50s. Creatinine 1.24 on admission.   - Currently at baseline  - BMP in a week    Consultations This Hospital Stay   CARDIOLOGY IP CONSULT  CARDIAC REHAB IP CONSULT  CARDIOLOGY IP CONSULT  CARE MANAGEMENT / SOCIAL WORK IP CONSULT  HEMATOLOGY & ONCOLOGY IP CONSULT  OCCUPATIONAL THERAPY ADULT IP CONSULT  PHYSICAL THERAPY ADULT IP CONSULT  OCCUPATIONAL THERAPY ADULT IP CONSULT  PHYSICAL THERAPY ADULT IP CONSULT  SMOKING CESSATION PROGRAM IP CONSULT    Code Status   Full Code    Time Spent on this Encounter   I, Art Olsen MD, personally saw the patient today and spent greater than 30 minutes discharging this patient.       Art Olsen MD  [place] [address] ONCOLOGY  [facility]  [facility] [address]  Phone: [phone]  ______________________________________________________________________    Physical Exam   Vital Signs: Temp: 98.2  F (36.8  C) Temp src: Oral BP: 118/56 Pulse: 94   Resp: 18 SpO2: 94 % O2 Device: None (Room air)    Weight: 200 lbs 13.42 oz  Constitutional: awake, alert, cooperative, no apparent distress  Respiratory: No increased work of breathing, good air exchange, clear to auscultation bilaterally, no crackles or wheezing  Cardiovascular: regular rate and rhythm, normal S1 and S2, no S3 or S4, and no murmur noted  Neuropsychiatric: General: normal, calm and normal eye contact       Primary Care Physician   Angus Clements Mai    Discharge Orders      Discharge Order: F/U with Cardiac  RAGHAVENDRA      Follow-up and recommended labs and tests     Providers for Hemo/Oncology that are covered per [employer] would be:  Dr. Lela [facility] at [facility]  [phone]        Or  Dr. Chin at [facility]   [phone]     General info for SNF    Length of Stay Estimate: Short Term Care: Estimated # of Days <30  Condition at Discharge: Improving  Level of care:skilled   Rehabilitation Potential:  Good  Admission H&P remains valid and up-to-date: Yes  Recent Chemotherapy: N/A  Use Nursing Home Standing Orders: Yes     Mantoux instructions    Give two-step Mantoux (PPD) Per Facility Policy Yes     Reason for your hospital stay    Low blood counts     Activity - Up with nursing assistance     Follow Up and recommended labs and tests    Oncologist 2 weeks.  CBC and BMP in 7 days     Full Code     Occupational Therapy Adult Consult    Evaluate and treat as clinically indicated.    Reason:  Deconditioning     Physical Therapy Adult Consult    Evaluate and treat as clinically indicated.    Reason:  Deconditioning     Fall precautions     Advance Diet as Tolerated    Follow this diet upon discharge:       2 Gram Sodium Diet       Significant Results and Procedures   Most Recent 3 CBC's:  Recent Labs   Lab Test 02/03/21  0652 02/02/21  0749 02/01/21  0704   WBC 11.5* 13.0* 13.8*   HGB 6.9* 7.4* 7.2*   MCV 90 89 90   PLT 20* 16* 15*     Most Recent 3 BMP's:  Recent Labs   Lab Test 02/03/21  0652 02/02/21  0749 02/01/21  0704 01/31/21  0924 01/30/21  0605   *  --   --  135 135   POTASSIUM 4.2 4.1 4.2 3.9 3.7   CHLORIDE 100  --   --  102 103   CO2 25  --   --  23 26   BUN 25  --   --  28 29   CR 1.22  --  1.21 1.25 1.21   ANIONGAP 7  --   --  10 6   MINNIE 8.4*  --   --  8.6 8.5   *  --   --  161* 119*     Most Recent 6 Bacteria Isolates From Any Culture (See EPIC Reports for Culture Details):  Recent Labs   Lab Test 02/17/20  0925 02/15/20  1718 02/15/20  1659   CULT No growth Cultured on the 2nd day of incubation:  Micrococcus species  *  Critical Value/Significant Value, preliminary result only, called to and read back by  Jeanne Carson RN. @1322. 2.17.20. BS.     (Note)  NEGATIVE for the following: Staphylococcus spp., Staph aureus, Staph  epidermidis, Staph lugdunensis, Streptococcus spp., Strep pneumoniae,  Strep pyogenes, Strep agalactiae, Strep anginosus group, Enterococcus  faecalis, Enterococcus  faecium, and Listeria spp. by NuConomyigene  multiplex nucleic acid test. Final identification and antimicrobial  susceptibility testing will be verified by standard methods.    Critical Value/Significant Value called to and read back by  Margot Patel RN @ 1610. 2/17/20. AV   No growth     Most Recent Urinalysis:  Recent Labs   Lab Test 12/21/20  0832 02/05/20  2100   COLOR Yellow Yellow   APPEARANCE Clear Clear   URINEGLC Negative Negative   URINEBILI Negative Negative   URINEKETONE Negative Negative   SG 1.016 1.028   UBLD Negative Trace*   URINEPH 6.0 5.5   PROTEIN Negative 30*   NITRITE Negative Negative   LEUKEST Negative Negative   RBCU  --  <1   WBCU  --  1   ,   Results for orders placed or performed during the hospital encounter of 01/27/21   CT Chest Pulmonary Embolism w Contrast    Narrative    CT CHEST PULMONARY EMBOLISM WITH CONTRAST 1/27/2021 1:55 PM    CLINICAL HISTORY: Dyspnea.    TECHNIQUE: CT angiogram chest during arterial phase injection IV  contrast. 2D and 3D MIP reconstructions were performed by the CT  technologist. Dose reduction techniques were used.     CONTRAST: 73 mL Isovue-370    COMPARISON: CT chest 1/7/2021.    FINDINGS:  ANGIOGRAM CHEST: Pulmonary arteries are normal caliber and negative  for pulmonary emboli. Thoracic aorta is negative for dissection. No CT  evidence of right heart strain.    LUNGS AND PLEURA: Interlobular septal thickening is noted at the lung  apices. Trace pleural effusions are noted. Peribronchial thickening is  also present possibly reflecting early changes of CHF. Calcified  plaque along the right and left hemidiaphragms suggest previous  asbestos exposure.    MEDIASTINUM/AXILLAE: Heart is enlarged. Prior CABG. No enlarged lymph  nodes. Esophagus is unremarkable. Thyroid gland appears normal in size  where visualized.    UPPER ABDOMEN: Prominent splenomegaly with spleen measuring 19 cm in  AP dimension on image 127 of series 5. Prior exam demonstrated  spleen  measuring 17 cm in AP dimension suggesting interval growth.    MUSCULOSKELETAL: Normal.      Impression    IMPRESSION:  1.  Cardiomegaly with CABG changes and trace bilateral pleural  effusions with interlobular septal thickening probably reflecting  early changes of CHF. This appears slightly worse when compared to  prior exam.    2.  Calcification along the right and left hemidiaphragm suggesting  previous asbestos exposure.    3.  No evidence of pulmonary embolism. Thoracic aorta is unremarkable.    4.  Increasing splenomegaly of uncertain significance. No focal  splenic lesions are appreciated.    EJ MARIE MD   XR Chest Port 1 View    Narrative    CHEST ONE VIEW PORTABLE  2021 12:52 PM     HISTORY:  Chest pain.    COMPARISON: Chest x-ray 2021.      Impression    IMPRESSION: Portable chest. Probable small left pleural effusion is  slightly increased since prior exam. No significant right pleural  fluid is appreciated. Pulmonary vascular congestion appears increased  since prior study. Heart remains enlarged. CABG changes. New pacer pad  overlies the left chest.    EJ MARIE MD   Echocardiogram Complete    Narrative    [id number]  [id number]  [id number]  [id number]^MAURA^ELOY^SHAHLA           [facility]  Echocardiography Laboratory  [address] [place] [address]        Name: ABI VALDEZ  MRN: [id number]  : 1950  Study Date: 2021 10:51 AM  Age: 70 yrs  Gender: Male  Patient Location: [facility]  Reason For Study: Chest Pain  Ordering Physician: ELOY LORENZO  Referring Physician: Angus Scott  Performed By: Jamie Maria     BSA: 2.1 m2  Height: 68 in  Weight: 202 lb  HR: 109  BP: 120/64 mmHg  _____________________________________________________________________________  __        Procedure  Complete Portable Echo Adult. Optison (NDC #[id number]) given intravenously.  _____________________________________________________________________________  __         Interpretation Summary     Left ventricular systolic function is low normal.  The visual ejection fraction is estimated at 50-55%.  Apicolateral hypokinesis. Possible mid to distal inferolateral hypokinesis.  Thie wall motion abnormalities appear new compared to prior study from 2/20.  The study was technically difficult.  _____________________________________________________________________________  __        Left Ventricle  The left ventricle is normal in size. There is normal left ventricular wall  thickness. Diastolic Doppler findings (E/E' ratio and/or other parameters)  suggest left ventricular filling pressures are indeterminate. Left ventricular  systolic function is low normal. The visual ejection fraction is estimated at  50-55%. Apicolateral hypokinesis.Mid to distal inferolateral hypokinesis.     Right Ventricle  The right ventricle is normal size. The right ventricular systolic function is  normal.     Atria  The left atrium is not well visualized. The left atrium is severely dilated.  Right atrial size is normal.     Mitral Valve  There is trace mitral regurgitation.        Tricuspid Valve  There is trace tricuspid regurgitation. The right ventricular systolic  pressure is approximated at 41.4 mmHg plus the right atrial pressure. Right  ventricular systolic pressure is elevated, consistent with mild pulmonary  hypertension. IVC diameter <2.1 cm collapsing >50% with sniff suggests a  normal RA pressure of 3 mmHg.     Aortic Valve  The aortic valve is not well visualized. No aortic stenosis is present.     Pulmonic Valve  The pulmonic valve is not well visualized.     Vessels  The aortic root is normal size.     Pericardium  Trivial posterior pericardial effusion.        Rhythm  Sinus rhythm was noted.  _____________________________________________________________________________  __  MMode/2D Measurements & Calculations  IVSd: 1.1 cm     LVIDd: 4.9 cm  LVIDs: 3.6 cm  LVPWd: 1.0 cm  FS: 27.0 %  LV  mass(C)d: 188.3 grams  LV mass(C)dI: 91.8 grams/m2  Ao root diam: 3.6 cm  LA dimension: 4.3 cm  asc Aorta Diam: 3.6 cm  LA/Ao: 1.2  LVOT diam: 2.2 cm  LVOT area: 3.8 cm2  LA Volume (BP): 153.0 ml  LA Volume Index (BP): 74.6 ml/m2  RWT: 0.41           Doppler Measurements & Calculations  MV E max vlad: 94.6 cm/sec  MV A max vlad: 63.5 cm/sec  MV E/A: 1.5  MV dec slope: 482.2 cm/sec2  MV dec time: 0.20 sec  TR max vlad: 321.7 cm/sec  TR max P.4 mmHg  E/E' av.9  Lateral E/e': 7.9  Medial E/e': 9.9           _____________________________________________________________________________  __           Report approved by: Kalin Maloney 2021 05:00 PM            Discharge Medications   Current Discharge Medication List      START taking these medications    Details   allopurinol (ZYLOPRIM) 300 MG tablet Take 1 tablet (300 mg) by mouth daily  Qty:      Associated Diagnoses: Myeloproliferative disorder (H)      metoprolol tartrate (LOPRESSOR) 50 MG tablet Take 1 tablet (50 mg) by mouth 2 times daily  Qty:      Associated Diagnoses: NSTEMI (non-ST elevated myocardial infarction) (H)      nitroGLYcerin (NITROSTAT) 0.4 MG sublingual tablet For chest pain place 1 tablet under the tongue every 5 minutes for 3 doses. If symptoms persist 5 minutes after 1st dose call 911.  Qty:      Associated Diagnoses: NSTEMI (non-ST elevated myocardial infarction) (H)         CONTINUE these medications which have CHANGED    Details   atorvastatin (LIPITOR) 10 MG tablet Take 1 tablet (10 mg) by mouth daily    Associated Diagnoses: Coronary artery disease involving native coronary artery of native heart with other form of angina pectoris (H); Essential hypertension         CONTINUE these medications which have NOT CHANGED    Details   Ferrous Sulfate 324 (65 Fe) MG TBEC Take 1 tablet (324 mg) by mouth daily  Qty: 30 tablet, Refills: 0    Associated Diagnoses: S/P CABG (coronary artery bypass graft)      furosemide (LASIX) 40 MG tablet  TAKE ONE TABLET BY MOUTH DAILY IN THE MORNING AND TAKE 1/2 TABLET AT NOON  Qty: 135 tablet, Refills: 1    Associated Diagnoses: S/P CABG (coronary artery bypass graft)      senna-docusate (SENOKOT-S/PERICOLACE) 8.6-50 MG tablet Take 1 tablet by mouth daily      vitamin B-12 (CYANOCOBALAMIN) 250 MCG tablet TAKE ONE TABLET BY MOUTH EVERY MORNING  Qty: 90 tablet, Refills: 3    Associated Diagnoses: S/P CABG (coronary artery bypass graft)      vitamin D3 (CHOLECALCIFEROL) 50 mcg (2000 units) tablet Take 1 tablet by mouth daily         STOP taking these medications       GOODSENSE ASPIRIN 325 MG tablet Comments:   Reason for Stopping:         hydroxyurea (HYDREA) 500 MG capsule Comments:   Reason for Stopping:         metoprolol succinate ER (TOPROL-XL) 100 MG 24 hr tablet Comments:   Reason for Stopping:             Allergies   Allergies   Allergen Reactions     No Known Drug Allergies      Seasonal Allergies

## 2021-02-03 NOTE — DISCHARGE INSTRUCTIONS
Discharge to [facility] @ [address] [place] [address], [place], [address]       phone [phone]

## 2021-02-03 NOTE — PROGRESS NOTES
A/Ox4. VSS on RA. Hgb 6.9 this AM, 1 unit of PRBC given. Denies pain. SBA + walker. Voiding adequately. +2 edema Bilat LE. Patient discharged at 2:30 PM to [facility] TCU  IV was discontinued. Pain at time of discharge was 0/10. Belongings returned to patient.  Patient verbalized understanding and all questions were answered. At time of discharge, patient condition was stable and left the unit via wheelchair escorted by nursing assistant.

## 2021-02-03 NOTE — PROGRESS NOTES
Care Management Discharge Note    Discharge Date: 02/03/21       [facility]      Discharge Transportation: vishal Cordova will provide at 1500      PAS Confirmation Code: [id number]  Patient/family educated on Medicare website which has current facility and service quality ratings: yes     Persons Notified of Discharge Plans: Patient and sister Rose who arranged the transport with Art  Patient/Family in Agreement with the Plan: yes      Additional Information:  Cheli at [facility] confirms they have [employer] auth and can accept patient this afternoon. Patient and sister Rose are in agreement with this plan. Orders and the PAS were faxed. Cheli was provided the estimated arrival time.        YUE Anderson

## 2021-02-03 NOTE — TELEPHONE ENCOUNTER
----- Message from Mani Cristina MD sent at 2/3/2021 10:09 AM CST -----    ----- Message -----  From: Gladys Muhammad MD  Sent: 2/2/2021   2:18 PM CST  To: Mani Cristina MD    He is being discharged today. Bone marrow was planned but could not be done as anesthesia felt that he is at high risk.    He is very anemic and thrombocytopenic. Can your nurse call him and have a CBC in next few days. He will likely need transfusion.    bk

## 2021-02-03 NOTE — PROGRESS NOTES
Care Management Follow Up    Length of Stay (days): 7    Expected Discharge Date: 02/03/21(tcu pending [employer] prior auth)     Concerns to be Addressed: discharge planning     Patient plan of care discussed at interdisciplinary rounds: Yes    Anticipated Discharge Disposition: Transitional Care, Skilled Nursing Facilty  Disposition Comments: [facility]  Anticipated Discharge Services: None  Anticipated Discharge DME: None    Patient/family educated on Medicare website which has current facility and service quality ratings: yes  Education Provided on the Discharge Plan:    Patient/Family in Agreement with the Plan: yes    Referrals Placed by CM/SW: Post Acute Facilities  Private pay costs discussed: Not applicable    Additional Information:  Insurance authorization received. Writer paged the doctor to confirm patient can discharge today. Writer will continue to follow.       DENISHA StockW, LSW   Social Work Intern   [phone]  [facility]

## 2021-02-03 NOTE — PLAN OF CARE
Pt is an Ax1 GB and walker to the [facility]. On a 2 gram sodium diet. Tele is NSR, denied pain all shift, Pt was supposed to discharge yesterday but there was some miscommunication. Pt is possible discharge today. Pt was pleasant and cooperative all shift, called appropriately.

## 2021-02-03 NOTE — PROGRESS NOTES
"BRIEF NUTRITION ASSESSMENT      REASON FOR ASSESSMENT:  Renny Gannon is a 70 year old male seen by Registered Dietitian for LOS      CURRENT DIET AND INTAKE:  Diet:  2gm Na              Chart reviewed  Visited with pt this morning  Tells me that he is tolerating po well and appt is good  Has been eating ~100% meals ordered  Breakfast today - oatmeal, yogurt, juice  States that he is being mindful of Na intake at home  Note that pt saw dietitian for low Na diet education on 1/7/21 ([facility])    ANTHROPOMETRICS:  Height: 5' 8\"  Weight:(2/3) 91.1 kg /  200 lbs 13.42 oz  Body mass index is 30.54 kg/m .   Weight Status: Obesity Grade I BMI 30-34.9  IBW:  70 kg  %IBW: 130%  Weight History:   Wt Readings from Last 10 Encounters:   02/03/21 91.1 kg (200 lb 13.4 oz)   01/08/21 95.9 kg (211 lb 6.4 oz)   12/22/20 95.8 kg (211 lb 4.8 oz)   11/17/20 98.4 kg (217 lb)   08/17/20 93.9 kg (207 lb)   07/21/20 88.9 kg (196 lb)   04/22/20 79.8 kg (176 lb)   04/21/20 79.8 kg (176 lb)   03/03/20 78.7 kg (173 lb 8 oz)   03/02/20 80.7 kg (178 lb)         LABS:  Labs noted    MALNUTRITION:  Patient does not meet two of the following criteria necessary for diagnosing malnutrition.       NUTRITION INTERVENTION:  Nutrition Diagnosis:  No nutrition diagnosis at this time.    Implementation:  Nutrition Education ---> Per Provider order if indicated      FOLLOW UP/MONITORING:   Will re-evaluate in 7 - 10 days, or sooner, if re-consulted.          "

## 2021-02-04 NOTE — TELEPHONE ENCOUNTER
Called patient who reported that he was discharged to a rehab facility and won't be able to come in for labs.  Further chart review and looks like patient was referred to different Oncology location due to Health Insurance.  Plan was to schedule patient for labs and with infusion for possible blood transfusion.      Ki Lamb RN, BSN, OCN  2/4/2021, 2:02 PM

## 2021-02-04 NOTE — TELEPHONE ENCOUNTER
Patient was evaluated by cardiology while inpatient for chest pain, abnormal EKG, elevated troponin in presence of profound anemia with HGB 5.2 and thrombocytopenia with platelets of 16 -NSTEMI-probable demand ischemia from anemia. PMH: Coronary artery disease s/p CABG 1/2020 (LIMA-LAD, SVG to RPDA and SVG to second obtuse marginal), HLD, Myelodysplastic disorder, CKD. Echocardiogram showed LVEF 50-55%, apicolateral hypokinesis and possible mid to distal inferolateral hypokinesis and no significant valvular disease. Ischemia not ruled out due to low hemoglobin/thrombocytopenia. Given relatively small troponin peak it is likely not a large territory. Started on Allopurinol, NTG and Metoprolol Succinate to Tartrate. RN called [facility] to confirm the follow up plan for patient with Care Coordinator. RN confirmed with Care Coordinator that patient has a scheduled F/U OV to see RAGHAVENDRA Durand on 2/16/21 at 1315 at our [place] Office. Reminded to send last progress note, MAR, active orders, and provider order sheet to moe Cortés RN.

## 2021-02-11 NOTE — TELEPHONE ENCOUNTER
Desiree lyn from [facility] wanting information on pharmacy that patient uses. She is stating he will be discharging from their facility on Monday, 2/15 and should have a follow up with his provider in a week. She also voiced concern with patients cognitive status and is concerned with patients ability to drive.  Fax number given to fax the discharge information to the clinic to the provider, Dr. Scott and also of the concerns with driving and any other pertinent information. Bertha Lockhart LPN

## 2021-02-12 NOTE — TELEPHONE ENCOUNTER
Not sure what I am suppose to do with the pharmacy question.  Can you reach out to patient to see what pharmacy he is using.  In term of the driving concern, please be sure he has a follow-up appointment as recommended and will address at that time.  Thank you

## 2021-02-12 NOTE — TELEPHONE ENCOUNTER
Spoke to Desiree at [facility] she stated that she was wanting us to know the patient is being discharged on Monday 02/15. Desiree wanted to be sure that we contact the patient and get him scheduled with PCP.    Will route to RN pool for follow up early next week.   Enedina Barakat CMA ([facility])

## 2021-02-16 NOTE — TELEPHONE ENCOUNTER
Telephone call to patient to alert him of his hemoglobin of 5.9 and platelet count of 40. Huddled with Madeleine Durand NP and oncology nurse Jelena on patient's options due to having [employer] insurance until March 1st. Patient opted to go to an [facility] ED to receive a blood transfusion. Patient is going to get a ride there. He will keep his virtual appointment with Dr. Cristina in oncology on 3/2/21 and his primary care appointment with Dr. Scott on 3/3/21.

## 2021-02-16 NOTE — TELEPHONE ENCOUNTER
Please check - I don't think Zena is covering for our clinic so he may to need to transfer care to a different clinic.

## 2021-02-16 NOTE — TELEPHONE ENCOUNTER
Received call from Cardiology with patient's hgb of 5.9.  Patient had a telephone visit with Oncology today but cancelled and rescheduled for 3/2/21due to insurance.  Patient currently has [employer] and will be switching 3/1/21.  Unsure if patient is going to go to another ED that is within network or if patient will want to come to [place] and pay the extra cost.  Received call back from Cardiology and patient has decided to go to ED with Jax.    Ki Lamb RN, BSN, OCN  2/16/2021, 3:35 PM

## 2021-02-16 NOTE — LETTER
2/16/2021    Angus Clements Mai, MD  [address] [place] [address]    RE: Renny Gannon       Dear Colleague,    I had the pleasure of seeing Renny Gannon in the [facility].    Cardiology Clinic Progress Note  Renny Gannon MRN# [id number]   YOB: 1950 Age: 70 year old     Primary cardiologist: Dr. Bentley    Reason for visit: Discharge follow up    History of presenting illness:    Renny Gannon, a pleasant 70 year old patient who has a past medical history significant for coronary artery disease status post CABG in January 2020, hypertension, hyperlipidemia, TIA, HFpEF, myelofibrosis, chronic thrombocytopenia, chronic anemia, and coronary artery disease.     Initially he presented to [facility] on 1/27/2021 with chest pain and shortness of breath and was subsequently airlifted to [facility] with initial concern for a STEMI.  He was noted to have a hemoglobin of 5.2 and platelets of 16 with a troponin peak at 11.  Coronary angiography was deferred due to anemia and thrombocytopenia.  Metoprolol was uptitrated with an aspirin hold due to thrombocytopenia.  An echocardiogram showed LVEF 50-55%, apicolateral hypokinesis and possible mid to distal inferolateral hypokinesis and no significant valvular disease.  He was treated with intermittent IV diuresis with improved shortness of breath.  He was discharged home on his prior to admission furosemide at 40 mg in the a.m. and 20 mg in the p.m.    During his admission he received 4 units of packed red blood cells with intermittent IV diuresis and was started on allopurinol 3 mg daily for elevated LDH.  He was initially scheduled to undergo a bone marrow biopsy but this was stopped by anesthesia due to increased cardiovascular risk.    Today he reports for his post hospital follow-up.  He denies any recurrent chest discomfort but has shortness of  breath with activity and feels overall weak.  His weight is been very stable since his discharge from the hospital and from TCU and he denies any orthopnea or PND.     Of note, the patient currently has [employer] insurance and will be transferring over care at the beginning of March.         Assessment and Plan:     ASSESSMENT:    1. Chest pain/NSTEMI with known coronary artery disease    S/p CABG 1/2020 (LIMA-LAD, SVG to RPDA and SVG to second obtuse marginal)    Troponin peaked at 11 and was thought likely secondary to demand ischemia in setting of profound anemia.    Ischemia not ruled out due to profound anemia and thrombocytopenia    Denies any recurrent chest pain, but continues to be short of breath with activity.    Shortness of breath could be likely due to ongoing anemia given stable weight and lower extremity edema and no orthopnea, PND    2. HFpEF    LVEF 50-55%, apicolateral hypokinesis and possible mid to distal inferolateral hypokinesis and no significant valvular disease    discharge weight 200 lbs.     Patient states that his home weight has been ranging between 189 and 190 pounds.    He has 1+ bilateral lower extremity edema for which she wears compression stockings and does endorse shortness of breath with activity and some leg heaviness.      Hyperlipidemia    Fasting Lipids (2/16): TC 95, HDL 20, HDL 41 and triglycerides 172    Atorvastatin 10 mg daily    3. Myelofibrosis, acute on chronic anemia, leukocytosis, and an acute on chronic thrombocytopenia.      Baseline hemoglobin ~8 and baseline platelets previously in the 90s    He is following up with oncology later today    4. CKD    Creatinine was 1.22 with a GFR of 59 and BUN 25 on 2/3/2021    PLAN:     1. BMP and CBC today  2. Continue daily weights and low-sodium diet  3. Follow-up with oncology as scheduled   4. May need intermittent IV Lasix with blood transfusions  5. Return to clinic in 3 months and follow-up with Dr. Bentley             "Review of Systems:     Review of Systems:  Skin:  Negative     Eyes:  Positive for    ENT:  Negative    Respiratory:  Positive for dyspnea on exertion  Cardiovascular:  Negative for;palpitations;chest pain;syncope or near-syncope edema;Positive for;lightheadedness;dizziness  Gastroenterology: Negative    Genitourinary:  Negative    Musculoskeletal:  Negative    Neurologic:  Negative    Psychiatric:  Negative    Heme/Lymph/Imm:  Positive for allergies  Endocrine:  Negative              Physical Exam:     hysical Exam:  Vitals: /66 (BP Location: Right arm, Patient Position: Sitting, Cuff Size: Adult Regular)   Pulse 94   Ht 1.727 m (5' 8\")   Wt 91 kg (200 lb 11.2 oz)   SpO2 95%   BMI 30.52 kg/m      Constitutional:  cooperative, alert and oriented, well developed, well nourished, in no acute distress        Skin:  warm and dry to the touch, no apparent skin lesions or masses noted        Head:  normocephalic, no masses or lesions        Eyes:  pupils equal and round, conjunctivae and lids unremarkable, sclera white, no xanthalasma, EOMS intact, no nystagmus        ENT:  no pallor or cyanosis, dentition good        Neck:  carotid pulses are full and equal bilaterally, JVP normal, no carotid bruit        Chest:  normal breath sounds, clear to auscultation, normal A-P diameter, normal symmetry, normal respiratory excursion, no use of accessory muscles        Cardiac: regular rhythm, normal S1/S2, no S3 or S4, apical impulse not displaced, no murmurs, gallops or rubs                  Abdomen:  abdomen soft, non-tender, BS normoactive, no mass, no HSM, no bruits        Vascular: pulses full and equal, no bruits auscultated                                      Extremities and Back:  no deformities, clubbing, cyanosis, erythema observed   Compression stockings in place    Neurological:  no gross motor deficits   Right radial access site CDI without hematoma or tenderness           Medications:     Current " Outpatient Medications   Medication Sig Dispense Refill     allopurinol (ZYLOPRIM) 300 MG tablet Take 1 tablet (300 mg) by mouth daily       atorvastatin (LIPITOR) 10 MG tablet Take 1 tablet (10 mg) by mouth daily       Ferrous Sulfate 324 (65 Fe) MG TBEC Take 1 tablet (324 mg) by mouth daily 30 tablet 0     furosemide (LASIX) 40 MG tablet TAKE ONE TABLET BY MOUTH DAILY IN THE MORNING AND TAKE 1/2 TABLET AT NOON 135 tablet 1     metoprolol tartrate (LOPRESSOR) 50 MG tablet Take 1 tablet (50 mg) by mouth 2 times daily       senna-docusate (SENOKOT-S/PERICOLACE) 8.6-50 MG tablet Take 1 tablet by mouth daily       vitamin B-12 (CYANOCOBALAMIN) 250 MCG tablet TAKE ONE TABLET BY MOUTH EVERY MORNING 90 tablet 3     vitamin D3 (CHOLECALCIFEROL) 50 mcg (2000 units) tablet Take 1 tablet by mouth daily       nitroGLYcerin (NITROSTAT) 0.4 MG sublingual tablet For chest pain place 1 tablet under the tongue every 5 minutes for 3 doses. If symptoms persist 5 minutes after 1st dose call 911. (Patient not taking: Reported on 2021)         Family History   Problem Relation Age of Onset     No Known Problems Mother      Unknown/Adopted Father      Unknown/Adopted Maternal Grandmother      Unknown/Adopted Maternal Grandfather      Unknown/Adopted Paternal Grandmother      Unknown/Adopted Paternal Grandfather      Cancer Brother         lung cancer - smoking     No Known Problems Sister      Coronary Artery Disease Brother          of MI at 66     No Known Problems Sister        Social History     Socioeconomic History     Marital status: Single     Spouse name: Not on file     Number of children: Not on file     Years of education: Not on file     Highest education level: Not on file   Occupational History     Not on file   Social Needs     Financial resource strain: Not on file     Food insecurity     Worry: Not on file     Inability: Not on file     Transportation needs     Medical: Not on file     Non-medical: Not on file    Tobacco Use     Smoking status: Former Smoker     Years: 30.00     Types: Cigarettes     Start date: 1961     Quit date: 2001     Years since quittin.0     Smokeless tobacco: Never Used   Substance and Sexual Activity     Alcohol use: No     Frequency: Never     Drug use: No     Sexual activity: Not Currently   Lifestyle     Physical activity     Days per week: Not on file     Minutes per session: Not on file     Stress: Not on file   Relationships     Social connections     Talks on phone: Not on file     Gets together: Not on file     Attends Sikh service: Not on file     Active member of club or organization: Not on file     Attends meetings of clubs or organizations: Not on file     Relationship status: Not on file     Intimate partner violence     Fear of current or ex partner: Not on file     Emotionally abused: Not on file     Physically abused: Not on file     Forced sexual activity: Not on file   Other Topics Concern     Parent/sibling w/ CABG, MI or angioplasty before 65F 55M? Not Asked   Social History Narrative     Not on file            Past Medical History:     Past Medical History:   Diagnosis Date     Heart disease      History of blood transfusion      Hypertension               Past Surgical History:     Past Surgical History:   Procedure Laterality Date     BONE MARROW BIOPSY, BONE SPECIMEN, NEEDLE/TROCAR N/A 2019    Procedure: BIOPSY, BONE MARROW;  Surgeon: Aguilar Krause MD;  Location: [facility] OR     BYPASS GRAFT ARTERY CORONARY N/A 2020    Procedure: CORONARY ARTERY BYPASS GRAFTING X 3 - LIMA TO LAD, SV TO OM  AND PDA; ENDOVEIN HARVEST OF BILATERAL LEGS; ON PUMP WITH SANDRA;  Surgeon: Mable Barrera MD;  Location:  OR     CV CORONARY ANGIOGRAM Left 2020    Procedure: Coronary Angiogram;  Surgeon: Devin Bentley MD;  Location:  HE[facility] CARDI[facility] CATH LAB     NO HISTORY OF SURGERY                Allergies:   No known drug allergies and  Seasonal allergies       Data:   All laboratory data reviewed:    Recent Labs   Lab Test 01/28/21  0532 01/08/21  1157 12/21/20  0820 11/17/20  0957 08/17/20  1110 12/11/19  1505 12/11/19  1505 03/11/19  1102   LDL 41  --   --   --  46  --  70  --    HDL 20*  --   --   --  39*  --  29*  --    NHDL 75  --   --   --  74  --  136*  --    CHOL 95  --   --   --  113  --  165  --    TRIG 172*  --   --   --  141  --  332*  --    TSH  --   --   --  1.88  --   --  2.14 2.44   NTBNP  --   --   --  479*  --   --   --   --    IRON  --   --  234*  --   --    < >  --   --    FEB  --   --  243  --   --    < >  --   --    IRONSAT  --   --  96*  --   --    < >  --   --    SEVERINO  --  1,178* 997*  --   --    < >  --   --     < > = values in this interval not displayed.       Lab Results   Component Value Date    WBC 11.5 (H) 02/03/2021    RBC 2.29 (L) 02/03/2021    HGB 6.9 (LL) 02/03/2021    HCT 20.5 (L) 02/03/2021    MCV 90 02/03/2021    MCH 30.1 02/03/2021    MCHC 33.7 02/03/2021    RDW 15.8 (H) 02/03/2021    PLT 20 (LL) 02/03/2021       Lab Results   Component Value Date     (L) 02/03/2021    POTASSIUM 4.2 02/03/2021    CHLORIDE 100 02/03/2021    CO2 25 02/03/2021    ANIONGAP 7 02/03/2021     (H) 02/03/2021    BUN 25 02/03/2021    CR 1.22 02/03/2021    GFRESTIMATED 59 (L) 02/03/2021    GFRESTBLACK 69 02/03/2021    MINNIE 8.4 (L) 02/03/2021      Lab Results   Component Value Date    AST 68 (H) 01/27/2021    ALT 23 01/27/2021       Lab Results   Component Value Date    A1C 5.3 08/17/2020       Lab Results   Component Value Date    INR 1.24 (H) 01/27/2021    INR 1.11 12/21/2020         UNA YEE CNP  [facility]  Pager: [phone]  RN phone: [phone]      Thank you for allowing me to participate in the care of your patient.    Sincerely,     UNA YEE CNP     [facility]

## 2021-02-16 NOTE — PATIENT INSTRUCTIONS
TODAY'S RECOMMENDATIONS    1. Labs today   2. Return to clinic in 2 months with Dr. Bentley    If you have questions or concerns please call clinic at [phone].    Please call [phone] for scheduling.      It was a pleasure seeing you today!

## 2021-02-16 NOTE — PROGRESS NOTES
Cardiology Clinic Progress Note  Renny Gannon MRN# [id number]   YOB: 1950 Age: 70 year old     Primary cardiologist: Dr. Bentley    Reason for visit: Discharge follow up    History of presenting illness:    Renny Gannon, a pleasant 70 year old patient who has a past medical history significant for coronary artery disease status post CABG in January 2020, hypertension, hyperlipidemia, TIA, HFpEF, myelofibrosis, chronic thrombocytopenia, chronic anemia, and coronary artery disease.     Initially he presented to [facility] on 1/27/2021 with chest pain and shortness of breath and was subsequently airlifted to [facility] with initial concern for a STEMI.  He was noted to have a hemoglobin of 5.2 and platelets of 16 with a troponin peak at 11.  Coronary angiography was deferred due to anemia and thrombocytopenia.  Metoprolol was uptitrated with an aspirin hold due to thrombocytopenia.  An echocardiogram showed LVEF 50-55%, apicolateral hypokinesis and possible mid to distal inferolateral hypokinesis and no significant valvular disease.  He was treated with intermittent IV diuresis with improved shortness of breath.  He was discharged home on his prior to admission furosemide at 40 mg in the a.m. and 20 mg in the p.m.    During his admission he received 4 units of packed red blood cells with intermittent IV diuresis and was started on allopurinol 3 mg daily for elevated LDH.  He was initially scheduled to undergo a bone marrow biopsy but this was stopped by anesthesia due to increased cardiovascular risk.    Today he reports for his post hospital follow-up.  He denies any recurrent chest discomfort but has shortness of breath with activity and feels overall weak.  His weight is been very stable since his discharge from the hospital and from TCU and he denies any orthopnea or PND.     Of note, the patient currently has [employer] insurance and will be  transferring over care at the beginning of March.         Assessment and Plan:     ASSESSMENT:    1. Chest pain/NSTEMI with known coronary artery disease    S/p CABG 1/2020 (LIMA-LAD, SVG to RPDA and SVG to second obtuse marginal)    Troponin peaked at 11 and was thought likely secondary to demand ischemia in setting of profound anemia.    Ischemia not ruled out due to profound anemia and thrombocytopenia    Denies any recurrent chest pain, but continues to be short of breath with activity.    Shortness of breath could be likely due to ongoing anemia given stable weight and lower extremity edema and no orthopnea, PND    2. HFpEF    LVEF 50-55%, apicolateral hypokinesis and possible mid to distal inferolateral hypokinesis and no significant valvular disease    discharge weight 200 lbs.     Patient states that his home weight has been ranging between 189 and 190 pounds.    He has 1+ bilateral lower extremity edema for which she wears compression stockings and does endorse shortness of breath with activity and some leg heaviness.      Hyperlipidemia    Fasting Lipids (2/16): TC 95, HDL 20, HDL 41 and triglycerides 172    Atorvastatin 10 mg daily    3. Myelofibrosis, acute on chronic anemia, leukocytosis, and an acute on chronic thrombocytopenia.      Baseline hemoglobin ~8 and baseline platelets previously in the 90s    He is following up with oncology later today    4. CKD    Creatinine was 1.22 with a GFR of 59 and BUN 25 on 2/3/2021    PLAN:     1. BMP and CBC today  2. Continue daily weights and low-sodium diet  3. Follow-up with oncology as scheduled   4. May need intermittent IV Lasix with blood transfusions  5. Return to clinic in 3 months and follow-up with Dr. Bentley            Review of Systems:     Review of Systems:  Skin:  Negative     Eyes:  Positive for    ENT:  Negative    Respiratory:  Positive for dyspnea on exertion  Cardiovascular:  Negative for;palpitations;chest pain;syncope or near-syncope  "edema;Positive for;lightheadedness;dizziness  Gastroenterology: Negative    Genitourinary:  Negative    Musculoskeletal:  Negative    Neurologic:  Negative    Psychiatric:  Negative    Heme/Lymph/Imm:  Positive for allergies  Endocrine:  Negative              Physical Exam:     hysical Exam:  Vitals: /66 (BP Location: Right arm, Patient Position: Sitting, Cuff Size: Adult Regular)   Pulse 94   Ht 1.727 m (5' 8\")   Wt 91 kg (200 lb 11.2 oz)   SpO2 95%   BMI 30.52 kg/m      Constitutional:  cooperative, alert and oriented, well developed, well nourished, in no acute distress        Skin:  warm and dry to the touch, no apparent skin lesions or masses noted        Head:  normocephalic, no masses or lesions        Eyes:  pupils equal and round, conjunctivae and lids unremarkable, sclera white, no xanthalasma, EOMS intact, no nystagmus        ENT:  no pallor or cyanosis, dentition good        Neck:  carotid pulses are full and equal bilaterally, JVP normal, no carotid bruit        Chest:  normal breath sounds, clear to auscultation, normal A-P diameter, normal symmetry, normal respiratory excursion, no use of accessory muscles        Cardiac: regular rhythm, normal S1/S2, no S3 or S4, apical impulse not displaced, no murmurs, gallops or rubs                  Abdomen:  abdomen soft, non-tender, BS normoactive, no mass, no HSM, no bruits        Vascular: pulses full and equal, no bruits auscultated                                      Extremities and Back:  no deformities, clubbing, cyanosis, erythema observed   Compression stockings in place    Neurological:  no gross motor deficits   Right radial access site CDI without hematoma or tenderness           Medications:     Current Outpatient Medications   Medication Sig Dispense Refill     allopurinol (ZYLOPRIM) 300 MG tablet Take 1 tablet (300 mg) by mouth daily       atorvastatin (LIPITOR) 10 MG tablet Take 1 tablet (10 mg) by mouth daily       Ferrous Sulfate " 324 (65 Fe) MG TBEC Take 1 tablet (324 mg) by mouth daily 30 tablet 0     furosemide (LASIX) 40 MG tablet TAKE ONE TABLET BY MOUTH DAILY IN THE MORNING AND TAKE 1/2 TABLET AT NOON 135 tablet 1     metoprolol tartrate (LOPRESSOR) 50 MG tablet Take 1 tablet (50 mg) by mouth 2 times daily       senna-docusate (SENOKOT-S/PERICOLACE) 8.6-50 MG tablet Take 1 tablet by mouth daily       vitamin B-12 (CYANOCOBALAMIN) 250 MCG tablet TAKE ONE TABLET BY MOUTH EVERY MORNING 90 tablet 3     vitamin D3 (CHOLECALCIFEROL) 50 mcg (2000 units) tablet Take 1 tablet by mouth daily       nitroGLYcerin (NITROSTAT) 0.4 MG sublingual tablet For chest pain place 1 tablet under the tongue every 5 minutes for 3 doses. If symptoms persist 5 minutes after 1st dose call 911. (Patient not taking: Reported on 2021)         Family History   Problem Relation Age of Onset     No Known Problems Mother      Unknown/Adopted Father      Unknown/Adopted Maternal Grandmother      Unknown/Adopted Maternal Grandfather      Unknown/Adopted Paternal Grandmother      Unknown/Adopted Paternal Grandfather      Cancer Brother         lung cancer - smoking     No Known Problems Sister      Coronary Artery Disease Brother          of MI at 66     No Known Problems Sister        Social History     Socioeconomic History     Marital status: Single     Spouse name: Not on file     Number of children: Not on file     Years of education: Not on file     Highest education level: Not on file   Occupational History     Not on file   Social Needs     Financial resource strain: Not on file     Food insecurity     Worry: Not on file     Inability: Not on file     Transportation needs     Medical: Not on file     Non-medical: Not on file   Tobacco Use     Smoking status: Former Smoker     Years: 30.00     Types: Cigarettes     Start date: 1961     Quit date: 2001     Years since quittin.0     Smokeless tobacco: Never Used   Substance and Sexual Activity      Alcohol use: No     Frequency: Never     Drug use: No     Sexual activity: Not Currently   Lifestyle     Physical activity     Days per week: Not on file     Minutes per session: Not on file     Stress: Not on file   Relationships     Social connections     Talks on phone: Not on file     Gets together: Not on file     Attends [employer] service: Not on file     Active member of club or organization: Not on file     Attends meetings of clubs or organizations: Not on file     Relationship status: Not on file     Intimate partner violence     Fear of current or ex partner: Not on file     Emotionally abused: Not on file     Physically abused: Not on file     Forced sexual activity: Not on file   Other Topics Concern     Parent/sibling w/ CABG, MI or angioplasty before 65F 55M? Not Asked   Social History Narrative     Not on file            Past Medical History:     Past Medical History:   Diagnosis Date     Heart disease      History of blood transfusion      Hypertension               Past Surgical History:     Past Surgical History:   Procedure Laterality Date     BONE MARROW BIOPSY, BONE SPECIMEN, NEEDLE/TROCAR N/A 12/30/2019    Procedure: BIOPSY, BONE MARROW;  Surgeon: Aguilar Krause MD;  Location:  OR     BYPASS GRAFT ARTERY CORONARY N/A 1/31/2020    Procedure: CORONARY ARTERY BYPASS GRAFTING X 3 - LIMA TO LAD, SV TO OM  AND PDA; ENDOVEIN HARVEST OF BILATERAL LEGS; ON PUMP WITH SANDRA;  Surgeon: Mable Barrera MD;  Location:  OR     CV CORONARY ANGIOGRAM Left 1/9/2020    Procedure: Coronary Angiogram;  Surgeon: Devin Bentley MD;  Location:  HE[facility] CARDI[facility] CATH LAB     NO HISTORY OF SURGERY                Allergies:   No known drug allergies and Seasonal allergies       Data:   All laboratory data reviewed:    Recent Labs   Lab Test 01/28/21  0532 01/08/21  1157 12/21/20  0820 11/17/20  0957 08/17/20  1110 12/11/19  1505 12/11/19  1505 03/11/19  1102   LDL 41  --   --   --  46   --  70  --    HDL 20*  --   --   --  39*  --  29*  --    NHDL 75  --   --   --  74  --  136*  --    CHOL 95  --   --   --  113  --  165  --    TRIG 172*  --   --   --  141  --  332*  --    TSH  --   --   --  1.88  --   --  2.14 2.44   NTBNP  --   --   --  479*  --   --   --   --    IRON  --   --  234*  --   --    < >  --   --    FEB  --   --  243  --   --    < >  --   --    IRONSAT  --   --  96*  --   --    < >  --   --    SEVERINO  --  1,178* 997*  --   --    < >  --   --     < > = values in this interval not displayed.       Lab Results   Component Value Date    WBC 11.5 (H) 02/03/2021    RBC 2.29 (L) 02/03/2021    HGB 6.9 (LL) 02/03/2021    HCT 20.5 (L) 02/03/2021    MCV 90 02/03/2021    MCH 30.1 02/03/2021    MCHC 33.7 02/03/2021    RDW 15.8 (H) 02/03/2021    PLT 20 (LL) 02/03/2021       Lab Results   Component Value Date     (L) 02/03/2021    POTASSIUM 4.2 02/03/2021    CHLORIDE 100 02/03/2021    CO2 25 02/03/2021    ANIONGAP 7 02/03/2021     (H) 02/03/2021    BUN 25 02/03/2021    CR 1.22 02/03/2021    GFRESTIMATED 59 (L) 02/03/2021    GFRESTBLACK 69 02/03/2021    MINNIE 8.4 (L) 02/03/2021      Lab Results   Component Value Date    AST 68 (H) 01/27/2021    ALT 23 01/27/2021       Lab Results   Component Value Date    A1C 5.3 08/17/2020       Lab Results   Component Value Date    INR 1.24 (H) 01/27/2021    INR 1.11 12/21/2020         UNA YEE CNP  [facility]  Pager: [phone]  RN phone: [phone]

## 2021-02-17 NOTE — TELEPHONE ENCOUNTER
Patient stated he is getting new insurance as of March 1st so he is keeping the appts.     Dianne Olivares MA 2/17/2021

## 2021-02-21 NOTE — TELEPHONE ENCOUNTER
JACOB Patel with [facility] is requesting orders for Nursing two times this week and a PT evaluation.  Requesting that this be expedited as patient's insurance will be ending on March 1st.  Amanda can be reached at [phone].  Please phone ASAP on Monday as RN cannot fax orders in.    COVID 19 Nurse Triage Plan/Patient Instructions    Please be aware that novel coronavirus (COVID-19) may be circulating in the community. If you develop symptoms such as fever, cough, or SOB or if you have concerns about the presence of another infection including coronavirus (COVID-19), please contact your health care provider or visit [email or web address].     Disposition/Instructions    Home care recommended. Follow home care protocol based instructions.    Thank you for taking steps to prevent the spread of this virus.  o Limit your contact with others.  o Wear a simple mask to cover your cough.  o Wash your hands well and often.    Resources    [facility]: About COVID-19: [email or web address]    CDC: What to Do If You're Sick: www.cdc.gov/coronavirus/2019-ncov/about/steps-when-sick.html    CDC: Ending Home Isolation: www.cdc.gov/coronavirus/2019-ncov/hcp/disposition-in-home-patients.html     CDC: Caring for Someone: www.cdc.gov/coronavirus/2019-ncov/if-you-are-sick/care-for-someone.html     [facility]: Interim Guidance for Hospital Discharge to Home: www.health.[facility].[place].us/diseases/coronavirus/hcp/hospdischarge.pdf    [facility] clinical trials (COVID-19 research studies): clinicalaffairs.[facility]/umn-clinical-trials     Below are the COVID-19 hotlines at the [facility] of [facility] ([facility]). Interpreters are available.   o For health questions: Call 228-871-2894 or 1-238.850.5752 (7 a.m. to 7 p.m.)  o For questions about schools and childcare: Call 632-997-3879 or 1-645.213.2956 (7 a.m. to 7 p.m.)                         Reason for Disposition    Requesting regular office  appointment    Additional Information    Negative: RN needs further essential information from caller in order to complete triage    Protocols used: INFORMATION ONLY CALL - NO TRIAGE-P-AH

## 2021-02-22 NOTE — TELEPHONE ENCOUNTER
Called Amanda with homecare and gave verbal approval per Dr. Scott. She understands and agrees with the plan of care.     CHRISTI Morales, RN  [facility]

## 2021-03-02 NOTE — LETTER
"    3/2/2021         RE: Renny Gannon  [address]  [facility] [place] [address]        Dear Colleague,    Thank you for referring your patient, Renny Gannon, to the [facility]. Please see a copy of my visit note below.    Renny is a 70 year old who is being evaluated via a billable telephone visit.      What phone number would you like to be contacted at? [phone]  How would you like to obtain your AVS? Mail a copy        Oncology Rooming Note    March 2, 2021 1:12 PM   Renny Gannon is a 70 year old male who presents for:    Chief Complaint   Patient presents with     Hematology     Myeloproliferative disorder      Initial Vitals: There were no vitals taken for this visit. Estimated body mass index is 30.52 kg/m  as calculated from the following:    Height as of 2/16/21: 1.727 m (5' 8\").    Weight as of 2/16/21: 91 kg (200 lb 11.2 oz). There is no height or weight on file to calculate BSA.  Data Unavailable Comment: Data Unavailable   No LMP for male patient.  Allergies reviewed: Yes  Medications reviewed: Yes    Medications: Medication refills not needed today.  Pharmacy name entered into EPIC:    [facility] [address] [place] [address] - [address] [place] [address]  [facility] - [place], [place] - [address] [place] [address] PHARMACY [place], [place] - [address] PHARMACY [place], [place] - [address] [facility]     Clinical concerns: None. Concerns reviewed with Dr. Jonnie Zuniga, [facility]                Hematology/ Oncology Telephone Visit:  Mar 2, 2021    Due to the concerns around COVID-19 and adhering to social distancing we conducted this visit over the telephone.      Reason for Visit:   Chief Complaint   Patient presents with     Hematology     Myeloproliferative disorder        Oncologic History:    Myeloproliferative disorder (H)    Renny Gannon presented to primary care physician with a complaint of increasing fatigue and shortness of breath on " exertion.  Further work-up including a CBC which was done on December 11, 2019 showed a total white count of 79.1 with a hemoglobin is 11.9 platelet count of 378.  Peripheral blood smear revealed leuko-erythroblastic reaction with neutrophilic left shift and rare circulating blasts without dysplasia. bone marrow biopsy from December 30, 2019 showing extremely hypercellular marrow with granulocytic and megakaryocytic proliferation, megakaryocytic clustering and atypia, marrow fibrosis and 1.5% blasts.  There is also marked peripheral leukocytosis and leuko-erythroblastic clusters and 1.5% circulating blasts.  There is mild normocytic normochromic anemia.  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       Interval History:  Patient was recently hospitalized with non-ST elevation myocardial infarction with acute exacerbation of diastolic heart failure.  He continues to have transfusion dependent anemia and thrombocytopenia.  He has been receiving blood transfusions several times.  He is off hydroxyurea at this point.  Most recent laboratory test revealed low hemoglobin around 6-7 g mildly elevated white count and thrombocytopenia with a platelet count around 30,000.  Patient was scheduled for a bone marrow biopsy however it was not done because of his recent cardiac events.  He has been having increasing shortness of breath on minimal exertion.  He denies any weight loss.  He denies any bruising or bleeding.    Review of systems:  Pertinent positives have been included in HPI; remainder of detailed complete 20-point ROS was negative.    Past medical, social, surgical, and family histories reviewed.    Allergies:  Allergies as of 03/02/2021 - Reviewed 03/02/2021   Allergen Reaction Noted     No known drug allergies  01/25/2005     Seasonal allergies  03/11/2019       Current Medications:  Current Outpatient Medications   Medication Sig Dispense Refill      allopurinol (ZYLOPRIM) 300 MG tablet Take 1 tablet (300 mg) by mouth daily       atorvastatin (LIPITOR) 10 MG tablet Take 1 tablet (10 mg) by mouth daily       Ferrous Sulfate 324 (65 Fe) MG TBEC Take 1 tablet (324 mg) by mouth daily 30 tablet 0     furosemide (LASIX) 40 MG tablet TAKE ONE TABLET BY MOUTH DAILY IN THE MORNING AND TAKE 1/2 TABLET AT NOON 135 tablet 1     metoprolol tartrate (LOPRESSOR) 50 MG tablet Take 1 tablet (50 mg) by mouth 2 times daily       senna-docusate (SENOKOT-S/PERICOLACE) 8.6-50 MG tablet Take 1 tablet by mouth daily       vitamin B-12 (CYANOCOBALAMIN) 250 MCG tablet TAKE ONE TABLET BY MOUTH EVERY MORNING 90 tablet 3     vitamin D3 (CHOLECALCIFEROL) 50 mcg (2000 units) tablet Take 1 tablet by mouth daily       ACE/ARB/ARNI NOT PRESCRIBED (INTENTIONAL) Please choose reason not prescribed from choices below.       ASPIRIN NOT PRESCRIBED (INTENTIONAL) Please choose reason not prescribed from choices below.       nitroGLYcerin (NITROSTAT) 0.4 MG sublingual tablet For chest pain place 1 tablet under the tongue every 5 minutes for 3 doses. If symptoms persist 5 minutes after 1st dose call 911.       traZODone (DESYREL) 50 MG tablet Take 1 tablet (50 mg) by mouth nightly as needed for sleep 30 tablet 0        Laboratory/Imaging Studies:  No visits with results within 2 Week(s) from this visit.   Latest known visit with results is:   Office Visit on 02/16/2021   Component Date Value Ref Range Status     WBC 02/16/2021 17.3* 4.0 - 11.0 10e9/L Final     RBC Count 02/16/2021 2.05* 4.4 - 5.9 10e12/L Final     Hemoglobin 02/16/2021 5.9* 13.3 - 17.7 g/dL Final    Comment: This result has been called to AVELINO PARIKH by Chrissy Vieyra on 02 16 2021 at 1437,   and has been read back.        Hematocrit 02/16/2021 17.9* 40.0 - 53.0 % Final     MCV 02/16/2021 87  78 - 100 fl Final     MCH 02/16/2021 28.8  26.5 - 33.0 pg Final     MCHC 02/16/2021 33.0  31.5 - 36.5 g/dL Final     RDW 02/16/2021 15.9* 10.0 -  15.0 % Final     Platelet Count 02/16/2021 40* 150 - 450 10e9/L Final    Comment: This result has been called to AVELINO PARIKH by Chrissy Vieyra on 02 16 2021 at 1437,   and has been read back.        Sodium 02/16/2021 136  133 - 144 mmol/L Final     Potassium 02/16/2021 4.0  3.4 - 5.3 mmol/L Final     Chloride 02/16/2021 105  94 - 109 mmol/L Final     Carbon Dioxide 02/16/2021 25  20 - 32 mmol/L Final     Anion Gap 02/16/2021 6  3 - 14 mmol/L Final     Glucose 02/16/2021 107* 70 - 99 mg/dL Final     Urea Nitrogen 02/16/2021 28  7 - 30 mg/dL Final     Creatinine 02/16/2021 1.21  0.66 - 1.25 mg/dL Final     GFR Estimate 02/16/2021 60* >60 mL/min/[1.73_m2] Final    Comment: Non  GFR Calc  Starting 12/18/2018, serum creatinine based estimated GFR (eGFR) will be   calculated using the Chronic Kidney Disease Epidemiology Collaboration   (CKD-EPI) equation.       GFR Estimate If Black 02/16/2021 69  >60 mL/min/[1.73_m2] Final    Comment:  GFR Calc  Starting 12/18/2018, serum creatinine based estimated GFR (eGFR) will be   calculated using the Chronic Kidney Disease Epidemiology Collaboration   (CKD-EPI) equation.       Calcium 02/16/2021 7.9* 8.5 - 10.1 mg/dL Final            Assessment and plan:     (D47.1) Myeloproliferative disorder (H)  (primary encounter diagnosis)  (D75.81) Myelofibrosis (H)  (D69.6) Thrombocytopenia (H)  (D63.8) Anemia in other chronic diseases classified elsewhere  I discussed with the patient today most recent laboratory tests.  These findings could represent leukemic transformation.  I am planning to arrange for a bone marrow biopsy here soon as possible.  Meanwhile we will continue with supportive care with checking CBC weekly and offer red blood transfusion if hemoglobin less than 8 and platelet transfusion if platelet count less than 10,000 or higher if the patient has active or mucosal bleeding.  We will see the patient with results to discuss with him further  management plan.    The patient is ready to learn, no apparent learning barriers were identified.  Diagnosis and treatment plans were explained to the patient. The patient expressed understanding of the content. The patient asked appropriate questions. The patient questions were answered to his satisfaction.    Telephone call lasted 30 minutes.    Mani Cristina MD    Chart documentation with Dragon Voice recognition Software. Although reviewed after completion, some words and grammatical errors may remain.                                                      Again, thank you for allowing me to participate in the care of your patient.        Sincerely,        Mani Cristina MD

## 2021-03-02 NOTE — PROGRESS NOTES
"Renny is a 70 year old who is being evaluated via a billable telephone visit.      What phone number would you like to be contacted at? [phone]  How would you like to obtain your AVS? Mail a copy        Oncology Rooming Note    March 2, 2021 1:12 PM   Renny Gannon is a 70 year old male who presents for:    Chief Complaint   Patient presents with     Hematology     Myeloproliferative disorder      Initial Vitals: There were no vitals taken for this visit. Estimated body mass index is 30.52 kg/m  as calculated from the following:    Height as of 2/16/21: 1.727 m (5' 8\").    Weight as of 2/16/21: 91 kg (200 lb 11.2 oz). There is no height or weight on file to calculate BSA.  Data Unavailable Comment: Data Unavailable   No LMP for male patient.  Allergies reviewed: Yes  Medications reviewed: Yes    Medications: Medication refills not needed today.  Pharmacy name entered into EPIC:    [facility] - [address] [facility]  [facility] [place], [place] - [address] [place] [address] PHARMACY [place], [place] - [address] [facility]  [facility] [address] [facility]     Clinical concerns: None. Concerns reviewed with Dr. Jonnie Zuniga, [facility]              "

## 2021-03-02 NOTE — TELEPHONE ENCOUNTER
Reason for Call:  Form, our goal is to have forms completed with 72 hours, however, some forms may require a visit or additional information.    Type of letter, form or note:  Home Health Certification    Who is the form from?: Home care    Where did the form come from: form was faxed in    What clinic location was the form placed at?: [facility]    Where the form was placed: Given to MA/RN    What number is listed as a contact on the form?: [phone]       Additional comments: Dates 02/21/2021-04/21/2021    Call taken on 3/2/2021 at 4:10 PM by Susan Sorensen

## 2021-03-02 NOTE — LETTER
"    3/2/2021         RE: Renny Gannon  [address]  [facility] [address]        Dear Colleague,    Thank you for referring your patient, Renny Gannon, to the [facility]. Please see a copy of my visit note below.    Renny is a 70 year old who is being evaluated via a billable telephone visit.      What phone number would you like to be contacted at? [phone]  How would you like to obtain your AVS? Mail a copy        Oncology Rooming Note    March 2, 2021 1:12 PM   Renny Gannon is a 70 year old male who presents for:    Chief Complaint   Patient presents with     Hematology     Myeloproliferative disorder      Initial Vitals: There were no vitals taken for this visit. Estimated body mass index is 30.52 kg/m  as calculated from the following:    Height as of 2/16/21: 1.727 m (5' 8\").    Weight as of 2/16/21: 91 kg (200 lb 11.2 oz). There is no height or weight on file to calculate BSA.  Data Unavailable Comment: Data Unavailable   No LMP for male patient.  Allergies reviewed: Yes  Medications reviewed: Yes    Medications: Medication refills not needed today.  Pharmacy name entered into EPIC:    [facility] [address] [place] [address] - [address] [place] [address]  [facility] - [place], [place] - [address] [place] [address] [place]ARMACY [place], [place] - [address] [facility]ARMAC[facility] [place], [place] - [address] [facility]     Clinical concerns: None. Concerns reviewed with Dr. Jonnie Zuniga, [facility]                Hematology/ Oncology Telephone Visit:  Mar 2, 2021    Due to the concerns around COVID-19 and adhering to social distancing we conducted this visit over the telephone.      Reason for Visit:   Chief Complaint   Patient presents with     Hematology     Myeloproliferative disorder        Oncologic History:    Myeloproliferative disorder (H)    Renny Gannon presented to primary care physician with a complaint of increasing fatigue and shortness of breath on " exertion.  Further work-up including a CBC which was done on December 11, 2019 showed a total white count of 79.1 with a hemoglobin is 11.9 platelet count of 378.  Peripheral blood smear revealed leuko-erythroblastic reaction with neutrophilic left shift and rare circulating blasts without dysplasia. bone marrow biopsy from December 30, 2019 showing extremely hypercellular marrow with granulocytic and megakaryocytic proliferation, megakaryocytic clustering and atypia, marrow fibrosis and 1.5% blasts.  There is also marked peripheral leukocytosis and leuko-erythroblastic clusters and 1.5% circulating blasts.  There is mild normocytic normochromic anemia.  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       Interval History:  Patient was recently hospitalized with non-ST elevation myocardial infarction with acute exacerbation of diastolic heart failure.  He continues to have transfusion dependent anemia and thrombocytopenia.  He has been receiving blood transfusions several times.  He is off hydroxyurea at this point.  Most recent laboratory test revealed low hemoglobin around 6-7 g mildly elevated white count and thrombocytopenia with a platelet count around 30,000.  Patient was scheduled for a bone marrow biopsy however it was not done because of his recent cardiac events.  He has been having increasing shortness of breath on minimal exertion.  He denies any weight loss.  He denies any bruising or bleeding.    Review of systems:  Pertinent positives have been included in HPI; remainder of detailed complete 20-point ROS was negative.    Past medical, social, surgical, and family histories reviewed.    Allergies:  Allergies as of 03/02/2021 - Reviewed 03/02/2021   Allergen Reaction Noted     No known drug allergies  01/25/2005     Seasonal allergies  03/11/2019       Current Medications:  Current Outpatient Medications   Medication Sig Dispense Refill      allopurinol (ZYLOPRIM) 300 MG tablet Take 1 tablet (300 mg) by mouth daily       atorvastatin (LIPITOR) 10 MG tablet Take 1 tablet (10 mg) by mouth daily       Ferrous Sulfate 324 (65 Fe) MG TBEC Take 1 tablet (324 mg) by mouth daily 30 tablet 0     furosemide (LASIX) 40 MG tablet TAKE ONE TABLET BY MOUTH DAILY IN THE MORNING AND TAKE 1/2 TABLET AT NOON 135 tablet 1     metoprolol tartrate (LOPRESSOR) 50 MG tablet Take 1 tablet (50 mg) by mouth 2 times daily       senna-docusate (SENOKOT-S/PERICOLACE) 8.6-50 MG tablet Take 1 tablet by mouth daily       vitamin B-12 (CYANOCOBALAMIN) 250 MCG tablet TAKE ONE TABLET BY MOUTH EVERY MORNING 90 tablet 3     vitamin D3 (CHOLECALCIFEROL) 50 mcg (2000 units) tablet Take 1 tablet by mouth daily       ACE/ARB/ARNI NOT PRESCRIBED (INTENTIONAL) Please choose reason not prescribed from choices below.       ASPIRIN NOT PRESCRIBED (INTENTIONAL) Please choose reason not prescribed from choices below.       nitroGLYcerin (NITROSTAT) 0.4 MG sublingual tablet For chest pain place 1 tablet under the tongue every 5 minutes for 3 doses. If symptoms persist 5 minutes after 1st dose call 911.       traZODone (DESYREL) 50 MG tablet Take 1 tablet (50 mg) by mouth nightly as needed for sleep 30 tablet 0        Laboratory/Imaging Studies:  No visits with results within 2 Week(s) from this visit.   Latest known visit with results is:   Office Visit on 02/16/2021   Component Date Value Ref Range Status     WBC 02/16/2021 17.3* 4.0 - 11.0 10e9/L Final     RBC Count 02/16/2021 2.05* 4.4 - 5.9 10e12/L Final     Hemoglobin 02/16/2021 5.9* 13.3 - 17.7 g/dL Final    Comment: This result has been called to AVELINO PARIKH by Chrissy Vieyra on 02 16 2021 at 1437,   and has been read back.        Hematocrit 02/16/2021 17.9* 40.0 - 53.0 % Final     MCV 02/16/2021 87  78 - 100 fl Final     MCH 02/16/2021 28.8  26.5 - 33.0 pg Final     MCHC 02/16/2021 33.0  31.5 - 36.5 g/dL Final     RDW 02/16/2021 15.9* 10.0 -  15.0 % Final     Platelet Count 02/16/2021 40* 150 - 450 10e9/L Final    Comment: This result has been called to AVELINO PARIKH by Chrissy Vieyra on 02 16 2021 at 1437,   and has been read back.        Sodium 02/16/2021 136  133 - 144 mmol/L Final     Potassium 02/16/2021 4.0  3.4 - 5.3 mmol/L Final     Chloride 02/16/2021 105  94 - 109 mmol/L Final     Carbon Dioxide 02/16/2021 25  20 - 32 mmol/L Final     Anion Gap 02/16/2021 6  3 - 14 mmol/L Final     Glucose 02/16/2021 107* 70 - 99 mg/dL Final     Urea Nitrogen 02/16/2021 28  7 - 30 mg/dL Final     Creatinine 02/16/2021 1.21  0.66 - 1.25 mg/dL Final     GFR Estimate 02/16/2021 60* >60 mL/min/[1.73_m2] Final    Comment: Non  GFR Calc  Starting 12/18/2018, serum creatinine based estimated GFR (eGFR) will be   calculated using the Chronic Kidney Disease Epidemiology Collaboration   (CKD-EPI) equation.       GFR Estimate If Black 02/16/2021 69  >60 mL/min/[1.73_m2] Final    Comment:  GFR Calc  Starting 12/18/2018, serum creatinine based estimated GFR (eGFR) will be   calculated using the Chronic Kidney Disease Epidemiology Collaboration   (CKD-EPI) equation.       Calcium 02/16/2021 7.9* 8.5 - 10.1 mg/dL Final            Assessment and plan:     (D47.1) Myeloproliferative disorder (H)  (primary encounter diagnosis)  (D75.81) Myelofibrosis (H)  (D69.6) Thrombocytopenia (H)  (D63.8) Anemia in other chronic diseases classified elsewhere  I discussed with the patient today most recent laboratory tests.  These findings could represent leukemic transformation.  I am planning to arrange for a bone marrow biopsy here soon as possible.  Meanwhile we will continue with supportive care with checking CBC weekly and offer red blood transfusion if hemoglobin less than 8 and platelet transfusion if platelet count less than 10,000 or higher if the patient has active or mucosal bleeding.  We will see the patient with results to discuss with him further  management plan.    The patient is ready to learn, no apparent learning barriers were identified.  Diagnosis and treatment plans were explained to the patient. The patient expressed understanding of the content. The patient asked appropriate questions. The patient questions were answered to his satisfaction.    Telephone call lasted 30 minutes.    Mani Cristina MD    Chart documentation with Dragon Voice recognition Software. Although reviewed after completion, some words and grammatical errors may remain.                                                      Again, thank you for allowing me to participate in the care of your patient.        Sincerely,        Mani Cristina MD

## 2021-03-02 NOTE — PATIENT INSTRUCTIONS
Laboratory CBC weekly.  Arrange for blood transfusion as needed.  Arrange for a bone marrow biopsy.  Follow-up with the results    Today:  Plan for Bone Marrow Biopsy soon!  Weekly labs with possible blood transfusion.    Infusion will schedule labs and blood transfusions with you.    Please follow up with Oncology with results of Bone Marrow Biopsy Results.      Bone Marrow Biopsy at [facility] Date/Time:  3/12/21 - labs at 7:00 with biopsy at 7:30 - See attached for instructions.    Office visit in [place] for results and plan of care Date/Time: 3/25/21 at 8:00    If you have any questions or concerns please feel free to call.    If you need to reschedule please call:  Clinic or Lab Appointment - [phone]  Infusion - [phone]  Imaging - [phone]    Ki Lamb, RN, BSN, OCN  [facility]   Oncology/Hematology Care Coordinator RN  [facility]  [phone]    After Hours Oncology Nurse Line - [phone]

## 2021-03-03 PROBLEM — D64.9 ACUTE ANEMIA: Status: RESOLVED | Noted: 2020-12-21 | Resolved: 2021-01-01

## 2021-03-03 PROBLEM — R79.89 ELEVATED TROPONIN: Status: RESOLVED | Noted: 2021-01-07 | Resolved: 2021-01-01

## 2021-03-03 PROBLEM — D64.9 ANEMIA, UNSPECIFIED TYPE: Status: RESOLVED | Noted: 2021-01-27 | Resolved: 2021-01-01

## 2021-03-03 PROBLEM — R79.89 ELEVATED SERUM CREATININE: Status: RESOLVED | Noted: 2020-11-17 | Resolved: 2021-01-01

## 2021-03-03 NOTE — PROGRESS NOTES
Assessment & Plan       ICD-10-CM    1. Coronary artery disease involving native coronary artery of native heart with other form of angina pectoris (H)  I25.118 ASPIRIN NOT PRESCRIBED (INTENTIONAL)     ACE/ARB/ARNI NOT PRESCRIBED (INTENTIONAL)   2. Dyspnea on exertion  R06.00    3. Essential hypertension  I10 CBC with platelets     Basic metabolic panel  (Ca, Cl, CO2, Creat, Gluc, K, Na, BUN)   4. Congestive heart failure, unspecified HF chronicity, unspecified heart failure type (H)  I50.9    5. Memory loss  R41.3    6. Thrombocytopenia (H)  D69.6    7. Anemia in other chronic diseases classified elsewhere  D63.8 CBC with platelets   8. Myeloproliferative disorder (H)  D47.1    9. Psychophysiological insomnia  F51.04 traZODone (DESYREL) 50 MG tablet     Renny has a complex medical history.  He was recently discharged from the nursing home where he stayed for over a month for rehab.  Been having chronic/recurrent anemia that were thought due to myeloproliferative disorder.  he also has thrombocytopenia.  He feeling more shortness of breath today.  He has been seeing both cardiology and oncology.    Labs as ordered and CBC showed hemoglobin of 7.5 today.  She is he is also thrombocytopenic with a platelet of 81,000.  No signs of active bleeding.  I consulted with his oncologist who arranged for blood transfusion in next couple day.  His oncologist also will arrange for bone marrow biopsy.  He desires for full code.  Patient was encouraged to follow-up with his oncologist closely.    His cardiac conditions are stable - no signs of CHF exacerbation.  Medications reviewed.  Blood pressure is normal.  Encouraged to follow-up with his cardiologist as per his/her recommendation.  Symptoms that need to be seen or call in discussed.  His dyspnea is most likely due to anemia; not congestive failure.    Also will have him try trazodone for sleeping.  Potential side effect discussed.  Fall prevention also  discussed.    Return in about 3 months (around 6/3/2021) for Physical Exam.    Angus Clements Mai, MD  [facility] JUSTINA Lawson is a 70 year old who presents for the following health issues     HPI         Hospital Follow-up Visit:    Hospital/Nursing Home/ Rehab Facility: [facility]  Date of Admission:   Date of Discharge:   Reason(s) for Admission: Anemia       Was your hospitalization related to COVID-19? No   Problems taking medications regularly:  None  Medication changes since discharge: None  Problems adhering to non-medication therapy:  None    Summary of hospitalization:  CareEverywhere information obtained and reviewed  Diagnostic Tests/Treatments reviewed.  Follow up needed: CBC, BMP  Other Healthcare Providers Involved in Patient s Care:         Surgical follow-up appointment - Cardiology and oncology  Update since discharge: stable.       Post Discharge Medication Reconciliation: discharge medications reconciled, continue medications without change.  Plan of care communicated with patient              Vascular Disease Follow-up      How often do you take nitroglycerin? Never    Do you take an aspirin every day? Yes    Heart Failure Follow-up  Are you experiencing any shortness of breath? Yes, with activity only   How would you describe your shortness of breath?  Same as usual        Are you experiencing any swelling in your legs or feet?  Worse than usual    Are you using more pillows than usual? No    Do you cough at night?  Yes    Do you check your weight daily?  Yes    Have you had a weight change recently?  No    Are you having any of the following side effects from your medications? (Select all that apply)  The patient does not report symptoms of dizziness, fatigue, cough, swelling, or slow heart beat.    Since your last visit, how many times have you gone to the cardiologist, urgent care, emergency room, or hospital because of your heart failure?   More than 3  "times      How many servings of fruits and vegetables do you eat daily?  2-3    On average, how many sweetened beverages do you drink each day (Examples: soda, juice, sweet tea, etc.  Do NOT count diet or artificially sweetened beverages)?   0    How many days per week do you exercise enough to make your heart beat faster? 3 or less    How many minutes a day do you exercise enough to make your heart beat faster? 9 or less  How many days per week do you miss taking your medication? 1    What makes it hard for you to take your medications?  remembering to take    Renny has a complex medical history which includes CAD, congestive failure, history of TIA, hypertension, chronic dyspnea with myelofibrosis and chronic anemia.  He sees cardiology and oncology regularly.  He was admitted to the nursing home for over 1 month for his weakness and shortness of breath with recurrent severe anemia was thought due to his myelofibrosis.  Was getting multiple transfusions.  He discharged from the nursing home about 5 days ago.  Currently lives alone but his sister is checking on him daily.  Stated that overall he feeling is feeling pretty except of feeling tire again, similar to his amenic symptoms.  Also feels shortness of breath with minimal exertion.  No leg swelling or orthopnea.  No pain.  No weight gain.  No falling.  He would like something to help him to sleep better.  He has trouble with falling and maintaining sleep.  No nausea, vomiting, diarrhea constipation.  No pain.  No melena or hematochezia.  No hematemesis or coffee-ground emesis.  Saw his oncologist virtually yesterday.  Not feeling depressed.  Desirous for Full Code.  No other concern.    Review of Systems   Constitutional, HEENT, cardiovascular, pulmonary, gi and gu systems are negative, except as otherwise noted.      Objective    /58   Pulse 100   Temp 98  F (36.7  C)   Resp 14   Ht 1.727 m (5' 8\")   Wt 88 kg (194 lb)   SpO2 98%   BMI 29.50 kg/m  "   Body mass index is 29.5 kg/m .  Physical Exam   GENERAL: alert and no distress.  EYES: Eyes grossly normal to inspection, PERRL and conjunctivae and sclerae normal  HENT: ear canals and TM's normal.  Nares are non-congested. Oropharynx is pink and moist. No tender with palpation to the sinuses.   NECK: Supple, no lymphadenopathy or thyromegaly.  RESP: lungs clear to auscultation - no rales, rhonchi or wheezes  CV: regular rate and rhythm, 2/6 systolic murmur, no peripheral edema  ABDOMEN: soft, nontender, nondistended, no palpable masses organomegaly with normal bowel sound.  MS: no gross musculoskeletal defects noted, no edema.  General weakness but no focal weakness appreciated.  SKIN: no suspicious lesions or rashes.  No petechia or ecchymosis.  NEURO: Normal strength and tone, mentation intact and speech normally slow (his baseline).  No focal neurological deficit.  PSYCH: mentation appears normal, affect flat    Results for orders placed or performed in visit on 03/03/21   CBC with platelets     Status: Abnormal   Result Value Ref Range    WBC 18.9 (H) 4.0 - 11.0 10e9/L    RBC Count 2.59 (L) 4.4 - 5.9 10e12/L    Hemoglobin 7.5 (L) 13.3 - 17.7 g/dL    Hematocrit 23.0 (L) 40.0 - 53.0 %    MCV 89 78 - 100 fl    MCH 29.0 26.5 - 33.0 pg    MCHC 32.6 31.5 - 36.5 g/dL    RDW 15.6 (H) 10.0 - 15.0 %    Platelet Count 81 (L) 150 - 450 10e9/L   Basic metabolic panel  (Ca, Cl, CO2, Creat, Gluc, K, Na, BUN)     Status: Abnormal   Result Value Ref Range    Sodium 139 133 - 144 mmol/L    Potassium 4.3 3.4 - 5.3 mmol/L    Chloride 106 94 - 109 mmol/L    Carbon Dioxide 27 20 - 32 mmol/L    Anion Gap 6 3 - 14 mmol/L    Glucose 113 (H) 70 - 99 mg/dL    Urea Nitrogen 19 7 - 30 mg/dL    Creatinine 1.26 (H) 0.66 - 1.25 mg/dL    GFR Estimate 57 (L) >60 mL/min/[1.73_m2]    GFR Estimate If Black 66 >60 mL/min/[1.73_m2]    Calcium 8.2 (L) 8.5 - 10.1 mg/dL

## 2021-03-04 NOTE — TELEPHONE ENCOUNTER
Per infusion, 2 units is too much for his hemoglobin level. 1 unit is recommenced. Dr. Howard has already placed order for this so there is no orders needed. Patient has been scheduled to come in tomorrow for this. I discussed results and appointment with patient and he agrees to come in.     Routing to PCP as HOLLIS.   Kavitha Peraza MA     3/4/2021

## 2021-03-04 NOTE — TELEPHONE ENCOUNTER
Infusion needs a signed consent from primary care provider prior to procedure. Provider has left for the day. Fax left at his desk to do first thing in the morning. Kavitha Peraza MA     3/4/2021

## 2021-03-04 NOTE — TELEPHONE ENCOUNTER
----- Message from Angus Clements Mai, MD sent at 3/3/2021  6:40 PM CST -----  Please let patient know that his labs today showed his creatinine kidney function test is minimally elevated.  Will continue to monitor for now.  Electrolytes were normal and he does not have diabetes.  His CBC showed that he is anemic with a hemoglobin of 7.5.  I got in touch with Dr. Howard and his office will get in touch with him for further evaluation.  In the meantime, I recommend blood transfusion due to his fatigue and shortness of breath.  If he is interested, please arrange for 2 unit of blood transfusion, 10 mg of IV Lasix in between each units.    Angus Scott MD.

## 2021-03-05 NOTE — TELEPHONE ENCOUNTER
I thought that Dr. Cristina is managing it.  Also not sure why they think 2 units is too much. If it is recommended by Dr. Cristina then it is ok, I guess.  He has been having multiple blood infusion lately, his body is not producing enough blood cells.    By the way, I signed the consent anyway.  I prefer Dr. Cristina to manage it if possible

## 2021-03-05 NOTE — PROGRESS NOTES
Infusion Nursing Note:  Renny Gannon presents today for 1 unit PRBC.    Patient seen by provider today: No, pt seen by Dr Scott on 3/3   present during visit today: Not Applicable.    Note: N/A.  Patient did meet criteria for an asymptomatic covid-19 PCR test in infusion today. Patient declined the covid-19 test.    Intravenous Access:  Peripheral IV placed.    Treatment Conditions:  Hgb 7.6 on 3.3    Post Infusion Assessment:  Patient tolerated infusion without incident.  Patient observed for 15 minutes post transfusion per protocol.  Site patent and intact, free from redness, edema or discomfort.  No evidence of extravasations.  Access discontinued per protocol.       Discharge Plan:   Discharge instructions reviewed with: Patient.  Patient discharged in stable condition accompanied by: sister.  Departure Mode: Ambulatory.    Lisa Vasques RN

## 2021-03-09 NOTE — TELEPHONE ENCOUNTER
RECORDS STATUS - ALL OTHER DIAGNOSIS      RECORDS RECEIVED FROM: Bunny/Jax   DATE RECEIVED: 3/25/2021   NOTES STATUS DETAILS   OFFICE NOTE from referring provider     OFFICE NOTE from medical oncologist Complete 3/2/2021 Virtual Visit - Myeloproliferative Disorder (H) (Primary Dx)     1/5/2021 Virtual Visit -myeloproliferative disorder     Lots more office notes in EPIC   DISCHARGE SUMMARY from hospital Complete 1/27/2021 - Anemia, unspecified type, myeloproliferative disorder     1/7/2021 Anemia in other chronic diseases classified elsewhere     More in [facility]   DISCHARGE REPORT from the ER Complete 1/27/2021  Anemia, unspecified type, myeloproliferative disorder    OPERATIVE REPORT Complete 3/2/2021 Bone Marrow Biopsy    MEDICATION LIST Complete [facility]   CLINICAL TRIAL TREATMENTS TO DATE     LABS     PATHOLOGY REPORTS Complete [facility] 3/2/2021   ANYTHING RELATED TO DIAGNOSIS Complete Labs last updated on 3/5/2021   GENONOMIC TESTING     TYPE:     IMAGING (NEED IMAGES & REPORT)     CT SCANS Complete 1/27/2021 CT Chest Pulmonary     1/7/2021 CT Chest Pulmonary    Xray Chest Complete    Complete: [facility] 1/27/2021, 1/7/2021, 12/21/2020     [facility]- Xray Chest 2/17/2021   MRI     MAMMO     ULTRASOUND Complete US Renal Complete 12/11/2020   PET       Action    Action Taken 3/9/2021 1:03PM     I called Jax to have IMG pushed to PACS. Ph: [phone]

## 2021-03-09 NOTE — PROGRESS NOTES
Hematology/ Oncology Telephone Visit:  Mar 2, 2021    Due to the concerns around COVID-19 and adhering to social distancing we conducted this visit over the telephone.      Reason for Visit:   Chief Complaint   Patient presents with     Hematology     Myeloproliferative disorder        Oncologic History:    Myeloproliferative disorder (H)    Renny Gannon presented to primary care physician with a complaint of increasing fatigue and shortness of breath on exertion.  Further work-up including a CBC which was done on December 11, 2019 showed a total white count of 79.1 with a hemoglobin is 11.9 platelet count of 378.  Peripheral blood smear revealed leuko-erythroblastic reaction with neutrophilic left shift and rare circulating blasts without dysplasia. bone marrow biopsy from December 30, 2019 showing extremely hypercellular marrow with granulocytic and megakaryocytic proliferation, megakaryocytic clustering and atypia, marrow fibrosis and 1.5% blasts.  There is also marked peripheral leukocytosis and leuko-erythroblastic clusters and 1.5% circulating blasts.  There is mild normocytic normochromic anemia.  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       Interval History:  Patient was recently hospitalized with non-ST elevation myocardial infarction with acute exacerbation of diastolic heart failure.  He continues to have transfusion dependent anemia and thrombocytopenia.  He has been receiving blood transfusions several times.  He is off hydroxyurea at this point.  Most recent laboratory test revealed low hemoglobin around 6-7 g mildly elevated white count and thrombocytopenia with a platelet count around 30,000.  Patient was scheduled for a bone marrow biopsy however it was not done because of his recent cardiac events.  He has been having increasing shortness of breath on minimal exertion.  He denies any weight loss.  He denies any bruising or  bleeding.    Review of systems:  Pertinent positives have been included in HPI; remainder of detailed complete 20-point ROS was negative.    Past medical, social, surgical, and family histories reviewed.    Allergies:  Allergies as of 03/02/2021 - Reviewed 03/02/2021   Allergen Reaction Noted     No known drug allergies  01/25/2005     Seasonal allergies  03/11/2019       Current Medications:  Current Outpatient Medications   Medication Sig Dispense Refill     allopurinol (ZYLOPRIM) 300 MG tablet Take 1 tablet (300 mg) by mouth daily       atorvastatin (LIPITOR) 10 MG tablet Take 1 tablet (10 mg) by mouth daily       Ferrous Sulfate 324 (65 Fe) MG TBEC Take 1 tablet (324 mg) by mouth daily 30 tablet 0     furosemide (LASIX) 40 MG tablet TAKE ONE TABLET BY MOUTH DAILY IN THE MORNING AND TAKE 1/2 TABLET AT NOON 135 tablet 1     metoprolol tartrate (LOPRESSOR) 50 MG tablet Take 1 tablet (50 mg) by mouth 2 times daily       senna-docusate (SENOKOT-S/PERICOLACE) 8.6-50 MG tablet Take 1 tablet by mouth daily       vitamin B-12 (CYANOCOBALAMIN) 250 MCG tablet TAKE ONE TABLET BY MOUTH EVERY MORNING 90 tablet 3     vitamin D3 (CHOLECALCIFEROL) 50 mcg (2000 units) tablet Take 1 tablet by mouth daily       ACE/ARB/ARNI NOT PRESCRIBED (INTENTIONAL) Please choose reason not prescribed from choices below.       ASPIRIN NOT PRESCRIBED (INTENTIONAL) Please choose reason not prescribed from choices below.       nitroGLYcerin (NITROSTAT) 0.4 MG sublingual tablet For chest pain place 1 tablet under the tongue every 5 minutes for 3 doses. If symptoms persist 5 minutes after 1st dose call 911.       traZODone (DESYREL) 50 MG tablet Take 1 tablet (50 mg) by mouth nightly as needed for sleep 30 tablet 0        Laboratory/Imaging Studies:  No visits with results within 2 Week(s) from this visit.   Latest known visit with results is:   Office Visit on 02/16/2021   Component Date Value Ref Range Status     WBC 02/16/2021 17.3* 4.0 - 11.0  10e9/L Final     RBC Count 02/16/2021 2.05* 4.4 - 5.9 10e12/L Final     Hemoglobin 02/16/2021 5.9* 13.3 - 17.7 g/dL Final    Comment: This result has been called to AVELINO PARIKH by Chrissy Vieyra on 02 16 2021 at 1437,   and has been read back.        Hematocrit 02/16/2021 17.9* 40.0 - 53.0 % Final     MCV 02/16/2021 87  78 - 100 fl Final     MCH 02/16/2021 28.8  26.5 - 33.0 pg Final     MCHC 02/16/2021 33.0  31.5 - 36.5 g/dL Final     RDW 02/16/2021 15.9* 10.0 - 15.0 % Final     Platelet Count 02/16/2021 40* 150 - 450 10e9/L Final    Comment: This result has been called to AVELINO PARIKH by Chrissy Vieyra on 02 16 2021 at 1437,   and has been read back.        Sodium 02/16/2021 136  133 - 144 mmol/L Final     Potassium 02/16/2021 4.0  3.4 - 5.3 mmol/L Final     Chloride 02/16/2021 105  94 - 109 mmol/L Final     Carbon Dioxide 02/16/2021 25  20 - 32 mmol/L Final     Anion Gap 02/16/2021 6  3 - 14 mmol/L Final     Glucose 02/16/2021 107* 70 - 99 mg/dL Final     Urea Nitrogen 02/16/2021 28  7 - 30 mg/dL Final     Creatinine 02/16/2021 1.21  0.66 - 1.25 mg/dL Final     GFR Estimate 02/16/2021 60* >60 mL/min/[1.73_m2] Final    Comment: Non  GFR Calc  Starting 12/18/2018, serum creatinine based estimated GFR (eGFR) will be   calculated using the Chronic Kidney Disease Epidemiology Collaboration   (CKD-EPI) equation.       GFR Estimate If Black 02/16/2021 69  >60 mL/min/[1.73_m2] Final    Comment:  GFR Calc  Starting 12/18/2018, serum creatinine based estimated GFR (eGFR) will be   calculated using the Chronic Kidney Disease Epidemiology Collaboration   (CKD-EPI) equation.       Calcium 02/16/2021 7.9* 8.5 - 10.1 mg/dL Final            Assessment and plan:     (D47.1) Myeloproliferative disorder (H)  (primary encounter diagnosis)  (D75.81) Myelofibrosis (H)  (D69.6) Thrombocytopenia (H)  (D63.8) Anemia in other chronic diseases classified elsewhere  I discussed with the patient today most recent  laboratory tests.  These findings could represent leukemic transformation.  I am planning to arrange for a bone marrow biopsy here soon as possible.  Meanwhile we will continue with supportive care with checking CBC weekly and offer red blood transfusion if hemoglobin less than 8 and platelet transfusion if platelet count less than 10,000 or higher if the patient has active or mucosal bleeding.  We will see the patient with results to discuss with him further management plan.    The patient is ready to learn, no apparent learning barriers were identified.  Diagnosis and treatment plans were explained to the patient. The patient expressed understanding of the content. The patient asked appropriate questions. The patient questions were answered to his satisfaction.    Telephone call lasted 30 minutes.    Mani Cristina MD    Chart documentation with Dragon Voice recognition Software. Although reviewed after completion, some words and grammatical errors may remain.

## 2021-03-11 NOTE — PROGRESS NOTES
Infusion Nursing Note:  Renny Gannon presents today for 1 Unit PRBC's..    Patient seen by provider today: No   present during visit today: Not Applicable.    Note: N/A.  Patient  Did meet criteria for an asymptomatic covid-19 PCR test in infusion today. Patient  declined the covid-19 test.    Intravenous Access:  Peripheral IV placed.    Treatment Conditions:  Lab Results   Component Value Date    HGB 6.5 03/11/2021     Lab Results   Component Value Date    WBC 18.3 03/11/2021      Lab Results   Component Value Date    ANEU 7.7 03/11/2021     Lab Results   Component Value Date    PLT 57 03/11/2021      Results reviewed, labs MET treatment parameters, ok to proceed with treatment.  Blood transfusion consent signed 3/5/21.      Post Infusion Assessment:  Patient tolerated infusion without incident.  Site patent and intact, free from redness, edema or discomfort.  Access discontinued per protocol.   Lung sounds clear, but diminished pre/post transfusion.    Discharge Plan:   Discharge instructions reviewed with: Patient.  Patient discharged in stable condition accompanied by: self.  Departure Mode: Ambulatory.    Jalyn Gannon RN

## 2021-03-18 NOTE — PROGRESS NOTES
Infusion Nursing Note:  Renny RFAA Jagdeep presents today for 1 Unit PRBC's.    Patient seen by provider today: No   present during visit today: Not Applicable.    Note: N/A.  Patient  did meet criteria for an asymptomatic covid-19 PCR test in infusion today. Patient  accepted the covid-19 test.    Intravenous Access:  Peripheral IV placed.    Treatment Conditions:  Lab Results   Component Value Date    HGB 6.7 03/18/2021     Lab Results   Component Value Date    WBC 20.4 03/18/2021      Lab Results   Component Value Date    ANEU 10.4 03/18/2021     Lab Results   Component Value Date    PLT 67 03/18/2021      Results reviewed, labs MET treatment parameters, ok to proceed with treatment.  Blood transfusion consent signed 3/5/21      Post Infusion Assessment:  Patient tolerated infusion without incident.  Site patent and intact, free from redness, edema or discomfort.  Access discontinued per protocol.  Lung sounds clear, but diminished post transfusion. .       Discharge Plan:   Discharge instructions reviewed with: Patient.  Patient and/or family verbalized understanding of discharge instructions and all questions answered.  Patient discharged in stable condition accompanied by: self.  Departure Mode: Ambulatory. Sister Colette picking up at front entrance.     Jalyn Gannon RN

## 2021-03-25 NOTE — PROGRESS NOTES
Infusion Nursing Note:  Renny Gannon presents today for 1 unit Prbc's.    Patient seen by provider today: No   present during visit today: Not Applicable.    Note: Patient denies any new symptoms today, still pale and easily fatigued. Lungs sound clear but diminished pre and post transfusion.    Patient did not meet criteria for an asymptomatic covid-19 PCR test in infusion today. Patient had neg covid test 03/18/21.    Intravenous Access:  Peripheral IV placed.    Treatment Conditions:  Lab Results   Component Value Date    HGB 6.4 03/25/2021     Lab Results   Component Value Date    WBC 25.1 03/25/2021      Lab Results   Component Value Date    ANEU 10.4 03/18/2021     Lab Results   Component Value Date    PLT 78 03/25/2021      Results reviewed, labs MET treatment parameters, ok to proceed with treatment.  Blood transfusion consent signed 03/05/21.      Post Infusion Assessment:  Patient tolerated transfusion without incident.  Patient observed for 15 minutes post transfusion per protocol.  Blood return noted pre and post transfusion.  Site patent and intact, free from redness, edema or discomfort.  No evidence of extravasations.  Access discontinued per protocol.       Discharge Plan:   Discharge instructions reviewed with: Patient.  Patient and/or family verbalized understanding of discharge instructions and all questions answered.  Patient discharged in stable condition accompanied by: self.  Departure Mode: Ambulatory.    Mayte Chapa RN

## 2021-03-25 NOTE — PATIENT INSTRUCTIONS
Pt to return on 04/01/21 for Labs/Poss transfusion. Copies of medication list and upcoming appointments given prior to discharge.

## 2021-03-25 NOTE — PROGRESS NOTES
BMT ONC Adult Bone Marrow Biopsy Procedure Note  March 25, 2021  /67   Pulse 89   Temp 97.4  F (36.3  C)   Resp 18   Wt 88.5 kg (195 lb)   SpO2 95%   BMI 29.65 kg/m       Learning needs assessment complete within 12 months? Unknown     DIAGNOSIS: diagnostic marrow--cytopenias     PROCEDURE: Unilateral Bone Marrow Biopsy and Unilateral Aspirate    [place] [facility] [place] [facility]    Patient s identification was positively verified by verbal identification and invasive procedure safety checklist was completed. Informed consent was obtained. Following the administration of Midazolam as pre-medication, patient was placed in the prone position and prepped and draped in a sterile manner. Approximately 10 cc of 1% Lidocaine was used over the right posterior iliac spine. Following this a 3 mm incision was made. Trephine bone marrow core(s) was (were) obtained from the [facility]. Bone marrow aspirates were obtained from the [facility]. Aspirates were sent for morphology, immunophenotyping, cytogenetics and molecular diagnostics Process/HOLD. A total of approximately 12 ml of marrow was aspirated. Following this procedure a sterile dressing was applied to the bone marrow biopsy site(s). The patient was placed in the supine position to maintain pressure on the biopsy site. Post-procedure wound care instructions were given.     Complications: YES--aspirate was technically difficult to obtain--aspirate was very slow and foamy--almost dry taps. 3 different sites were used to collect bare minimum aspirate requirements. 28 mm core biopsy obtained.     Pre-procedural pain: 0 out of 10 on the numeric pain rating scale.     Procedural pain: 1 out of 10 on the numeric pain rating scale.     Post-procedural pain assessment: 0 out of 10 on the numeric pain rating scale.     Interventions: NO    Length of procedure:21 minutes to 45 minutes    Procedure performed by: Roxane Vaughan PA-C

## 2021-03-26 NOTE — NURSING NOTE
BMT Teaching Flowsheet  Teaching Topic: bone marrow biopsy  Person(s) involved in teaching: Patient  Motivation Level  Asks Questions: Yes  Eager to Learn: Yes  Cooperative: Yes  Receptive (willing/able to accept information): Yes  Patient demonstrates understanding of the following:   - Reason for the appointment, diagnosis and treatment plan: Yes  - Knowledge of proper use of medications and conditions for which they are ordered (with special attention to potential side effects or drug interactions): Yes  - Which situations necessitate calling provider and whom to contact: Yes  Teaching concerns addressed: what to expect during and post procedure including restrictions  Proper use and care of (medical equipment, care aids, etc.) NA  Pain management techniques: Yes  Patient instructed on hand hygiene: NA  How and/when to access community resources: NA  Infection Control:  Patient demonstrates understanding of the following:   Surgical procedure site care taught Yes  Signs and symptoms of infection taught Yes  Wound care taught Yes  Central venous catheter care taught NA  Instructional Materials Used/Given: post procedure instructions    Pt requests IV versed pre med  PIV placed by RN for labs and use in procedure

## 2021-03-26 NOTE — LETTER
3/26/2021         RE: Renny Gannon  [address]  [place] [address]        Dear Colleague,    Thank you for referring your patient, Renny Gannon, to the [facility]. Please see a copy of my visit note below.    BMT ONC Adult Bone Marrow Biopsy Procedure Note  March 25, 2021  /67   Pulse 89   Temp 97.4  F (36.3  C)   Resp 18   Wt 88.5 kg (195 lb)   SpO2 95%   BMI 29.65 kg/m       Learning needs assessment complete within 12 months? Unknown     DIAGNOSIS: diagnostic marrow--cytopenias     PROCEDURE: Unilateral Bone Marrow Biopsy and Unilateral Aspirate    LOCATION: [facility] [place] 2nd Floor    Patient s identification was positively verified by verbal identification and invasive procedure safety checklist was completed. Informed consent was obtained. Following the administration of Midazolam as pre-medication, patient was placed in the prone position and prepped and draped in a sterile manner. Approximately 10 cc of 1% Lidocaine was used over the right posterior iliac spine. Following this a 3 mm incision was made. Trephine bone marrow core(s) was (were) obtained from the [facility]. Bone marrow aspirates were obtained from the [facility]. Aspirates were sent for morphology, immunophenotyping, cytogenetics and molecular diagnostics Process/HOLD. A total of approximately 12 ml of marrow was aspirated. Following this procedure a sterile dressing was applied to the bone marrow biopsy site(s). The patient was placed in the supine position to maintain pressure on the biopsy site. Post-procedure wound care instructions were given.     Complications: YES--aspirate was technically difficult to obtain--aspirate was very slow and foamy--almost dry taps. 3 different sites were used to collect bare minimum aspirate requirements. 28 mm core biopsy obtained.     Pre-procedural pain: 0 out of 10 on the numeric pain rating scale.     Procedural pain: 1 out of 10 on the numeric pain rating scale.      Post-procedural pain assessment: 0 out of 10 on the numeric pain rating scale.     Interventions: NO    Length of procedure:21 minutes to 45 minutes    Procedure performed by: Roxane Vaughan PA-C       Patient supine for 30 minutes following biopsy. After 30 minutes, dressing clean, dry and intact. Vital signs stable. Post procedure care/dressing care discussed, pt confirmed understanding. Left ambulatory with family member.    Nancy Mike RN      Again, thank you for allowing me to participate in the care of your patient.      Sincerely,    Roxane Vaughan PA-C

## 2021-03-26 NOTE — PROGRESS NOTES
Patient supine for 30 minutes following biopsy. After 30 minutes, dressing clean, dry and intact. Vital signs stable. Post procedure care/dressing care discussed, pt confirmed understanding. Left ambulatory with family member.    Nancy Mike RN

## 2021-03-28 PROBLEM — J90 PLEURAL EFFUSION: Status: ACTIVE | Noted: 2021-01-01

## 2021-03-28 PROBLEM — D64.9 ACUTE ON CHRONIC ANEMIA: Status: ACTIVE | Noted: 2021-01-01

## 2021-03-28 PROBLEM — R42 LIGHTHEADEDNESS: Status: ACTIVE | Noted: 2021-01-01

## 2021-03-28 PROBLEM — R06.02 SHORTNESS OF BREATH: Status: ACTIVE | Noted: 2021-01-01

## 2021-03-28 NOTE — ED PROVIDER NOTES
History     Chief Complaint   Patient presents with     Shortness of Breath     HPI  Renny Gannon is a 70 year old male with complex medical history including hypertension, coronary artery disease s/p CABG, CKD stage 3, myelofibrosis, CHF, chronic anemia, and gout. He presented to emergency department for evaluation of shortness of breath. Baseline Hgb of 7-8. He requires frequent transfusions and was transfused with 1u Packed red blood cells on 3/25/2021. He had a bone marrow biopsy done on 3/26/2021. Today he reports increased shortness of breath and dizziness. Started after a shower at home. Denies fever or chills.  Denies chest pain. Denies nausea or vomiting.  Reports chronic diarrhea, but no black or bloody stools.  Patient lives alone.   Follows with Dr. Cristina, oncology.  Hospitalized at [facility] [place] 2/17/2021 - 2/19/2021 with acute on chronic anemia. Received 2 units PRBCs. Following with oncology with [facility], Dr. Cristina.  Non-STEMI 1/27/2021 (Treated at [facility]).    Allergies:  Allergies   Allergen Reactions     No Known Drug Allergies      Seasonal Allergies        Problem List:    Patient Active Problem List    Diagnosis Date Noted     Thrombocytopenia (H) 01/27/2021     Priority: Medium     NSTEMI (non-ST elevated myocardial infarction) (H) 01/27/2021     Priority: Medium     Congestive heart failure, unspecified HF chronicity, unspecified heart failure type (H) 01/27/2021     Priority: Medium     Myelofibrosis (H) 01/14/2021     Priority: Medium     Chronic diastolic heart failure (H) 01/07/2021     Priority: Medium     Coronary artery disease involving coronary bypass graft of native heart without angina pectoris 01/07/2021     Priority: Medium     Anemia in other chronic diseases classified elsewhere 01/07/2021     Priority: Medium     Dyspnea on exertion 12/21/2020     Priority: Medium     CKD (chronic kidney disease) stage 3, GFR 30-59 ml/min 12/21/2020     Priority: Medium      Vaccination refused by patient 2020     Priority: Medium     S/P CABG (coronary artery bypass graft) 02/10/2020     Priority: Medium     TIA (transient ischemic attack) 02/10/2020     Priority: Medium     Myeloproliferative disorder (H) 2020     Priority: Medium     Status post coronary angiogram 2020     Priority: Medium     Coronary artery disease involving native coronary artery of native heart with other form of angina pectoris (H) 2020     Priority: Medium     Added automatically from request for surgery [id number]       Leukocytosis 2019     Priority: Medium     Essential hypertension 2019     Priority: Medium     Memory loss 2019     Priority: Medium        Past Medical History:    Past Medical History:   Diagnosis Date     Heart disease      History of blood transfusion      Hypertension        Past Surgical History:    Past Surgical History:   Procedure Laterality Date     BONE MARROW BIOPSY, BONE SPECIMEN, NEEDLE/TROCAR N/A 2019    Procedure: BIOPSY, BONE MARROW;  Surgeon: Aguilar Krause MD;  Location: [facility] OR     BYPASS GRAFT ARTERY CORONARY N/A 2020    Procedure: CORONARY ARTERY BYPASS GRAFTING X 3 - LIMA TO LAD, SV TO OM  AND PDA; ENDOVEIN HARVEST OF BILATERAL LEGS; ON PUMP WITH SANDRA;  Surgeon: Mable Barrera MD;  Location:  OR     CV CORONARY ANGIOGRAM Left 2020    Procedure: Coronary Angiogram;  Surgeon: Devin Bentley MD;  Location:  HE[facility] CARDI[facility] CATH LAB     NO HISTORY OF SURGERY         Family History:    Family History   Problem Relation Age of Onset     No Known Problems Mother      Unknown/Adopted Father      Unknown/Adopted Maternal Grandmother      Unknown/Adopted Maternal Grandfather      Unknown/Adopted Paternal Grandmother      Unknown/Adopted Paternal Grandfather      Cancer Brother         lung cancer - smoking     No Known Problems Sister      Coronary Artery Disease Brother          of MI at  66     No Known Problems Sister        Social History:  Marital Status:  Single [1]  Social History     Tobacco Use     Smoking status: Former Smoker     Years: 30.00     Types: Cigarettes     Start date: 1961     Quit date: 2001     Years since quittin.1     Smokeless tobacco: Never Used   Substance Use Topics     Alcohol use: No     Frequency: Never     Drug use: No        Medications:    ACE/ARB/ARNI NOT PRESCRIBED (INTENTIONAL)  allopurinol (ZYLOPRIM) 300 MG tablet  ASPIRIN NOT PRESCRIBED (INTENTIONAL)  atorvastatin (LIPITOR) 10 MG tablet  Ferrous Sulfate 324 (65 Fe) MG TBEC  furosemide (LASIX) 40 MG tablet  metoprolol tartrate (LOPRESSOR) 50 MG tablet  nitroGLYcerin (NITROSTAT) 0.4 MG sublingual tablet  senna-docusate (SENOKOT-S/PERICOLACE) 8.6-50 MG tablet  traZODone (DESYREL) 50 MG tablet  vitamin B-12 (CYANOCOBALAMIN) 250 MCG tablet  vitamin D3 (CHOLECALCIFEROL) 50 mcg (2000 units) tablet          Review of Systems   Constitutional: Negative for chills and fever.   HENT: Negative.    Respiratory: Positive for cough (chronic) and shortness of breath.    Cardiovascular: Positive for leg swelling (chronic). Negative for chest pain.   Gastrointestinal: Positive for diarrhea (chronic). Negative for abdominal pain, blood in stool, constipation, nausea and vomiting.   Genitourinary: Negative.    Musculoskeletal: Negative.    Skin: Negative.    Neurological: Positive for dizziness and light-headedness. Negative for headaches.   All other systems reviewed and are negative.      Physical Exam   BP: 127/71  Pulse: 97  Temp: 97.6  F (36.4  C)  Resp: 30  Weight: 88.5 kg (195 lb)  SpO2: 93 %      Physical Exam  HENT:      Head: Normocephalic and atraumatic.      Mouth/Throat:      Mouth: Mucous membranes are moist.   Eyes:      General: No scleral icterus.     Conjunctiva/sclera: Conjunctivae normal.   Cardiovascular:      Rate and Rhythm: Normal rate and regular rhythm.   Pulmonary:      Effort: Tachypnea  present.      Breath sounds: Examination of the right-lower field reveals rales. Examination of the left-lower field reveals rales. Rales present.   Abdominal:      General: There is no distension.      Palpations: Abdomen is soft.      Tenderness: There is no abdominal tenderness.   Musculoskeletal:      Right lower leg: Edema (+2-3 pitting) present.      Left lower leg: Edema (+2-3 pitting) present.   Skin:     General: Skin is warm.      Coloration: Skin is pale.   Neurological:      General: No focal deficit present.      Mental Status: He is alert and oriented to person, place, and time.         ED Course        Procedures    Echocardiogram 1/27/2021--               EKG Interpretation:      Interpreted by UNA Macario CNP  Time reviewed: 1751  Symptoms at time of EKG: short of breath   Rhythm: normal sinus   Rate: normal  Axis: normal  Ectopy: none  Conduction: normal  ST Segments/ T Waves: nonspecific ST depression and T-abnormality, no acute ischemic changes  Q Waves: none  Comparison to prior: Unchanged from 01/27/2021    Clinical Impression: Sinus rhythm with nonspecific ST depression and T abnormality, no acute ischemic changes.         Results for orders placed or performed during the hospital encounter of 03/28/21 (from the past 24 hour(s))   CBC with platelets differential   Result Value Ref Range    WBC 16.9 (H) 4.0 - 11.0 10e9/L    RBC Count 2.21 (L) 4.4 - 5.9 10e12/L    Hemoglobin 6.4 (LL) 13.3 - 17.7 g/dL    Hematocrit 19.7 (L) 40.0 - 53.0 %    MCV 89 78 - 100 fl    MCH 29.0 26.5 - 33.0 pg    MCHC 32.5 31.5 - 36.5 g/dL    RDW 15.6 (H) 10.0 - 15.0 %    Platelet Count 58 (L) 150 - 450 10e9/L    Diff Method Manual Differential     % Neutrophils 59.0 %    % Lymphocytes 6.0 %    % Monocytes 3.0 %    % Eosinophils 1.0 %    % Basophils 1.0 %    % Metamyelocytes 18.0 %    % Myelocytes 10.0 %    % Promyelocytes 1.0 %    % Blasts 1.0 %    Absolute Neutrophil 10.0 (H) 1.6 - 8.3 10e9/L     Absolute Lymphocytes 1.0 0.8 - 5.3 10e9/L    Absolute Monocytes 0.5 0.0 - 1.3 10e9/L    Absolute Eosinophils 0.2 0.0 - 0.7 10e9/L    Absolute Basophils 0.2 0.0 - 0.2 10e9/L    Absolute Metamyelocytes 3.0 (H) 0 10e9/L    Absolute Myelocytes 1.7 (H) 0 10e9/L    Absolute Promyeloctyes 0.2 (H) 0 10e9/L    Absolute Blasts 0.2 (H) 0 10e9/L    Anisocytosis Slight     Toxic Granulation Present     RBC Morphology Consistent with reported results     Platelet Estimate       Automated count confirmed.  Giant platelets are present.   Basic metabolic panel   Result Value Ref Range    Sodium 140 133 - 144 mmol/L    Potassium 3.9 3.4 - 5.3 mmol/L    Chloride 109 94 - 109 mmol/L    Carbon Dioxide 25 20 - 32 mmol/L    Anion Gap 6 3 - 14 mmol/L    Glucose 134 (H) 70 - 99 mg/dL    Urea Nitrogen 20 7 - 30 mg/dL    Creatinine 1.00 0.66 - 1.25 mg/dL    GFR Estimate 76 >60 mL/min/[1.73_m2]    GFR Estimate If Black 88 >60 mL/min/[1.73_m2]    Calcium 8.1 (L) 8.5 - 10.1 mg/dL   Troponin I   Result Value Ref Range    Troponin I ES <0.015 0.000 - 0.045 ug/L   Nt probnp inpatient (BNP)   Result Value Ref Range    N-Terminal Pro BNP Inpatient 5,004 (H) 0 - 900 pg/mL   ABO/Rh type and screen   Result Value Ref Range    ABO PENDING     Antibody Screen PENDING     Test Valid Only At [facility]        Specimen Expires 03/31/2021    Symptomatic Influenza A/B & SARS-CoV2 (COVID-19) Virus PCR Multiplex    Specimen: Nasopharyngeal   Result Value Ref Range    Flu A/B & SARS-COV-2 PCR Source Nasopharyngeal     SARS-CoV-2 PCR Result NEGATIVE     Influenza A PCR Negative NEG^Negative    Influenza B PCR Negative NEG^Negative    Respiratory Syncytial Virus PCR (Note)     Flu A/B & SARS-CoV-2 PCR Comment (Note)    Blood gas venous   Result Value Ref Range    Ph Venous 7.39 7.32 - 7.43 pH    PCO2 Venous 44 40 - 50 mm Hg    PO2 Venous 25 25 - 47 mm Hg    Bicarbonate Venous 26 21 - 28 mmol/L    Base Excess Venous 1.2 mmol/L    FIO2 21    Lactic  acid whole blood   Result Value Ref Range    Lactic Acid 1.1 0.7 - 2.0 mmol/L   XR Chest Port 1 View    Narrative    CHEST ONE VIEW PORTABLE   3/28/2021 6:33 PM     HISTORY:  Shortness of breath.    COMPARISON: 1/27/2021.      Impression    IMPRESSION: Bilateral pleural effusions, small on the right and small  to moderate on the left, are new/increased since the previous exam.  There is mild associated infiltrate and/or atelectasis at both lung  bases. No pneumothorax. Sternotomy. Cardiac silhouette is partially  obscured, but does not appear significantly changed in size where  visualized. Pulmonary venous congestion has increased slightly.       Medications   0.9% sodium chloride BOLUS (has no administration in time range)   furosemide (LASIX) injection 40 mg (has no administration in time range)       Assessments & Plan (with Medical Decision Making)   70 year old male with complex medical history including hypertension, coronary artery disease s/p CABG, CKD stage 3, myelofibrosis, CHF, chronic anemia, and gout who presents with increased shortness of breath and feeling lightheaded today. Following with oncology (Dr. Cristina) and receives PRBCs routinely, last given on 3/25/2021. He had a bone marrow biopsy on 3/26/2021--results still pending.     On exam he is alert and oriented. Pale. Afebrile. No tachycardia but he is on metoprolol and HR is 97b/min. tachypnea with RR 30br/min, and working to breath when walking in to the ED. No hypoxia, but he was placed on oxygen at 2LPM here due to his work of breathing.  Lung sounds with Rales in bilateral lower fields.  +2-3 pitting edema bilateral LE up to both knees.    EKG NSR with nonspecific ST depression and nonspecific T abnormality, unchanged compared to prior and no acute obvious ischemic changes.  WBC 16.9, hemoglobin 6.4. platelets 58. Electrolytes are normal. Kidney function is normal. BNP is 5,004.  Troponin is normal.  Chest xray reveals bilateral pleural  effusions, small on right and moderate on the left.  There is mild associated infiltrate versus atelectasis at both lung bases.  Pulmonary venous congestion has increased slightly.  I suspect his increased shortness of breath is related to his pleural effusions which are new finding today.  Echo done in January shows EF of 50 to 55%.  Hemoglobin on 3/25 was 6.4 and came up to 7.3 after his transfusion.  I recommend admission to the hospital since he lives alone, no pleural effusions, and he is symptomatic with hemoglobin of 6.4.  I discussed my recommendation with patient and he agrees with admission to the hospital.  Obtained written consent for blood transfusion.  Patient was given 1 unit of packed red blood cells and  IV Lasix 40 mg here in the emergency department.  His work of breathing has improved slightly at rest and since placed on oxygen.  I spoke with the on-call hospitalist, Dr. Jenkins, who agrees to assume care of patient on admission.  Patient be observation status.    Consulted with Dr. Amador, emergency physician regarding the work-up and management of this patient.    I have reviewed the nursing notes.    I have reviewed the findings, diagnosis, plan and need for follow up with the patient.      New Prescriptions    No medications on file       Final diagnoses:   Acute on chronic anemia   Shortness of breath   Lightheadedness   Pleural effusion   Congestive heart failure, unspecified HF chronicity, unspecified heart failure type (H)       3/28/2021   [facility] EMERGENCY DEPT     Arianna Bloom APRN CNP  03/28/21 2017

## 2021-03-28 NOTE — ED TRIAGE NOTES
Pt presents with shortness of breath that started this morning. Pt states it is continuing. Pt states started after shower. Pt tachypneic at 32.  Appears pt is struggling with breathing. 02 sat 95 percent.Pt mentions head cold for one month. Pt also mentions he needs blood transfusions for anemia. Complex history. Pt had similar symptoms last week and was tested for covid which was negative. Denies pain. Pt states legs feel stiff.

## 2021-03-29 PROBLEM — I50.33 ACUTE ON CHRONIC DIASTOLIC CONGESTIVE HEART FAILURE (H): Status: ACTIVE | Noted: 2021-01-07

## 2021-03-29 NOTE — PROGRESS NOTES
Patient has ongoing 2 L NC oxygen needs with tachypnea.  Given Lasix IV at 0300 with only minimal improvement noted.  Chest x-ray repeated this morning and shows increasing vascular congestion, consistent with picture.  Will hold PO Lasix and change to Lasix 40 mg IV every 6 hours and give first dose now.  If patient's respiratory status improves with this, will proceed with another 1 unit PRBC given ongoing hemoglobin of 7.2, with goal per oncology note to transfuse to above 8.0.  Closely monitor respiratory tolerance of infusion and continue with scheduled Lasix.      Nursing is also reporting concerns with patient's mobility and patient admits having more difficulty in the home environment.  Will get physical therapy evaluation to assist in disposition planning.      Electronically Signed:  Sophie Lowry MD

## 2021-03-29 NOTE — CONSULTS
"CLINICAL NUTRITION SERVICES - ASSESSMENT NOTE     Nutrition Prescription    RECOMMENDATIONS FOR MDs/PROVIDERS TO ORDER:  None at this time    Malnutrition Status:    Patient does not meet two of the established criteria necessary for diagnosing malnutrition but is at risk for malnutrition    Recommendations already ordered by Registered Dietitian (RD):  None at this time    Future/Additional Recommendations:  Continue to follow a low sodium diet  Continue to monitor PO intake and encourage intake of meals      REASON FOR ASSESSMENT  Renny Gannon is a/an 70 year old male assessed by the dietitian for Provider Order - \"Patient scored a 2 on admission assessment\" and Provider Order - Nutrition Education    NUTRITION HISTORY  -Pt obs for anemia and assumed to have myelofibrosis per MD note 3/28  -HF and on Lasix. Received IV Lasix in ED  -Hx CAD, HTN    Pt reports that he has had a poor appetite for about a year and his intake was reduced about a year ago as well. Gets MOW delivered - one frozen meal per day.  -Breakfast: honey nut cheerios w/1% milk  L: Ham or chicken & cheese sandwich, 1% milk  D: Frozen MOW  Fluids: 5-6 8oz glasses of water per day  -Does not use the salt shaker  -Uses fresh or frozen veggies, no canned  -Some fresh fruit    CURRENT NUTRITION ORDERS  Diet: 2 g Sodium  Intake/Tolerance: 75% breakfast    LABS  Ca: 8.4 (L)  B (H)  Hgb 7.2 (L)    MEDICATIONS  Lipitor  Ferrous Sulfate  Lasix  Lopressor    ANTHROPOMETRICS  Height: 172.7 cm (5' 8\")  Most Recent Weight: 83.9 kg (185 lb)    IBW: 70 kg  % IBW: 120%  BMI: Overweight BMI 25-29.9  Weight History:   Wt Readings from Last 20 Encounters:   21 83.9 kg (185 lb)   21 88.5 kg (195 lb)   21 88 kg (194 lb)   21 91 kg (200 lb 11.2 oz)   21 91.1 kg (200 lb 13.4 oz)   21 95.9 kg (211 lb 6.4 oz)   20 95.8 kg (211 lb 4.8 oz)   20 98.4 kg (217 lb)   20 93.9 kg (207 lb)   20 88.9 kg (196 lb) "   04/22/20 79.8 kg (176 lb)   04/21/20 79.8 kg (176 lb)   03/03/20 78.7 kg (173 lb 8 oz)   03/02/20 80.7 kg (178 lb)   02/27/20 78.9 kg (174 lb)   02/25/20 78.9 kg (174 lb)   02/24/20 78.7 kg (173 lb 8 oz)   02/14/20 88.5 kg (195 lb)   02/11/20 89.4 kg (197 lb)   02/10/20 89.5 kg (197 lb 5 oz)   -Pt has lost 26 lb/12.3% over the past two months, which is mostly r/t fluid loss (on diuretic Lasix). Pt believes his UBW dry is 190 lb, which is 5 lb less than current BW.     Dosing Weight: 84 kg (CBW)    ASSESSED NUTRITION NEEDS  Estimated Energy Needs: 2970-7628 kcals/day (25 - 30 kcals/kg)  Justification: Maintenance  Estimated Protein Needs:  grams protein/day (1 - 1.2 grams of pro/kg)  Justification: Maintenance  Estimated Fluid Needs: 1 mL/kcal  Justification: Maintenance and Per provider pending fluid status    PHYSICAL FINDINGS  No abnormal nutrition-related physical findings observed.   +2 edema BLE    MALNUTRITION  % Intake: < 75% for >/= 3 months (non-severe)  % Weight Loss: Weight loss does not meet criteria (about 5 lb/2.6% over the past 2-3 months)  Subcutaneous Fat Loss: None observed  Muscle Loss: None observed  Fluid Accumulation/Edema: Moderate r/t HF  Malnutrition Diagnosis: Patient does not meet two of the established criteria necessary for diagnosing malnutrition but is at risk for malnutrition    NUTRITION DIAGNOSIS  Decreased nutrient needs (sodium) related to decline in heart function as evidenced by usual dietary intake excessive in sodium, dx HF, 2+ pitting edema BLE, and need for low (2 gm) sodium diet      INTERVENTIONS  Implementation  Nutrition Education: Provided education on low sodium diet. Pt reported that he has already received this education and knew which foods were high in sodium. He was able to identify that frozen meals are high in sodium, but he needs MOW due to low income. Declined handouts at this time.    -Discussed starting nutrition supplements if PO intake is poor in  the hospital. If he's unable to consume at least 75% of meals, Boost Breeze would be his choice.     Goals  Patient to consume % of nutritionally adequate meal trays TID, or the equivalent with supplements/snacks.     Monitoring/Evaluation  Progress toward goals will be monitored and evaluated per protocol.      Nancy Valenzuela RD, LD  Clinical Dietitian  [facility]: [phone]  [facility]: [phone]

## 2021-03-29 NOTE — PLAN OF CARE
Vital signs:  Temp: 96  F (35.6  C) Temp src: Oral BP: 112/51 Pulse: 89   Resp: (!) 34 SpO2: 93 % O2 Device: None (Room air)   A&Ox4. Patient denies pain all of shift. Lungs are diminished with fine crackles to the bases. SOA continues to improve. SBA and up in chair for lunch. Hemoglobin 7.2, 1 unit of blood given and hemoglobin improved to 8.8. IV SL. IV lasix 40mg given this morning. Weaned off of oxygen this shift. Sacral dressing over scabbing to left buttock. Tolerating diet. No complaints at this time. Will continue to monitor.

## 2021-03-29 NOTE — CONSULTS
Care Management Initial Consult    General Information  Assessment completed with: Renny Tierney  Type of CM/SW Visit: Initial Assessment    Primary Care Provider verified and updated as needed: Yes   Readmission within the last 30 days: no previous admission in last 30 days      Reason for Consult: discharge planning  Advance Care Planning: Advance Care Planning Reviewed: present on chart          Communication Assessment  Patient's communication style: spoken language (English or Bilingual)    Hearing Difficulty or Deaf: no   Wear Glasses or Blind: yes    Cognitive  Cognitive/Neuro/Behavioral: WDL                      Living Environment:   People in home: alone     Current living Arrangements: apartment      Able to return to prior arrangements: yes       Family/Social Support:  Care provided by: self, other (see comments)(sisters)  Provides care for: no one  Marital Status: Single  Sibling(s)          Description of Support System: Supportive, Involved    Support Assessment: Adequate family and caregiver support    Current Resources:   Patient receiving home care services: No     Community Resources: None  Equipment currently used at home: none  Supplies currently used at home: None    Employment/Financial:  Employment Status: retired        Financial Concerns: No concerns identified   Referral to Financial Counselor: No       Lifestyle & Psychosocial Needs:        Socioeconomic History     Marital status: Single     Spouse name: Not on file     Number of children: Not on file     Years of education: Not on file     Highest education level: Not on file     Tobacco Use     Smoking status: Former Smoker     Years: 30.00     Types: Cigarettes     Start date: 1961     Quit date: 2001     Years since quittin.1     Smokeless tobacco: Never Used   Substance and Sexual Activity     Alcohol use: No     Frequency: Never     Drug use: No     Sexual activity: Not Currently       Functional Status:  Prior to  "admission patient needed assistance:   Dependent ADLs:: Independent  Dependent IADLs:: Transportation, Meal Preparation, Shopping, Cooking, Cleaning  Assesssment of Functional Status: At functional baseline    Mental Health Status:  Mental Health Status: No Current Concerns       Chemical Dependency Status:  Chemical Dependency Status: No Current Concerns             Values/Beliefs:  Spiritual, Cultural Beliefs, Mosque Practices, Values that affect care: no               Additional Information:  Spoke with patient via phone, introduced self and role.  Patient currently lives alone in a home in [place].  He has no in home services currently.  His sisters, Colette and Mary live locally and support any needs patient has.  He is independent with ADLs at baseline.  He does not use assistive devices at baseline for ambulation assist.  Patient does not wear home oxygen.  He does NOT drive.  Colette or Mary help with transportation needs, cooking, meal prep, shopping.      Patient was most recently hospitalized at [facility], 2/17-19 for anemia.  Weekly, patient visits [facility] for PACKED RED BLOOD CELLS infusion.  He recently (3/25) completed at bone marrow biopsy (results pending).      Discussed discharge plans and home safety.  Physical therapy evaluation pending.  Patient states he feels safe at home.  He feels his inability to manage over the last few days is related to \"fluid on his lungs.\"  He feels safe to return home with continue help of his sisters.  He reports weighing himself daily and monitoring for heart failure symptoms at home.      Discussed home care services.  He is in agreement.  Patient chooses [facility] (Phone: [phone]), RN & PT.  Confirmed with Mayela @ [facility] ([phone]), home care is able to work in tandem with infusion appointments.      Patient is expected to discharge home yet tonight.  Patient states his sister will " provide transportation upon discharge.      PLAN:  Return home with [facility] (Phone: [phone]), RN and PT.      Caty DENNYN RN  Inpatient Care Coordinator  [facility] [phone]  [facility] [phone]    HOME CARE HAND OFF  Patient Name: Renny Gannon    MRN: [id number]    : 1950    Patient Zip Code: [address]    Admit Diagnosis: Shortness of breath [R06.02]  Lightheadedness [R42]  Pleural effusion [J90]  Acute on chronic anemia [D64.9]  Congestive heart failure, unspecified HF chronicity, unspecified heart failure type (H) [I50.9]      Services Pt Needs at Home: RN and PT    Discharge Support: family    Living Arrangements: Alone     or Address Other Than Pt: No    Wound Care: No    Anticipate DC Date: 3/29/2021

## 2021-03-29 NOTE — UTILIZATION REVIEW
"Concurrent stay review; Secondary Review Determination     Under the authority of the Utilization Management Committee, the utilization review process indicated a secondary review on the above patient.  The review outcome is based on review of the medical records, discussions with staff, and applying clinical experience noted on the date of the review.          (x) Observation Status Appropriate - Concurrent stay review    RATIONALE FOR DETERMINATION   69 yo man with presumed myelofibrosis and is undergoing outpatient work-up of anemia, hypertension, CAD who presented with rapid onset of shortness of breath, increased leg swelling, paroxysmal nocturnal dyspnea. Hemoglobin 6.4. Respiratory rate is sustained in the 30s despite 2 doses IV lasix and supplemental oxygen. Chest xray consistent with pulmonary venous congestion.     Per Dr. Lowry's assessment this morning: \"Patient has ongoing 2 L NC oxygen needs with tachypnea.  Given Lasix IV at 0300 with only minimal improvement noted.  Chest x-ray repeated this morning and shows increasing vascular congestion, consistent with picture.  Will hold PO Lasix and change to Lasix 40 mg IV every 6 hours and give first dose now.  If patient's respiratory status improves with this, will proceed with another 1 unit PRBC given ongoing hemoglobin of 7.2, with goal per oncology note to transfuse to above 8.0.  Closely monitor respiratory tolerance of infusion and continue with scheduled Lasix.\"      Given ongoing need for blood transfusion and apparent acute systolic CHF, will need close monitoring of respiratory status and scheduled IV diuretic.     Per Dr. Lowry, the patient improved greatly after diuresis. Will give additional dose later this afternoon, about 6 hours post transfusion. He is getting physical therapy assessment given frailty and weakness. Patient would like to discharge home later today if possible. Dr. Lowry will keep him as observation status for now but " have low threshold to change to inpatient if he decompensates again, is unable to wean from supplemental oxygen, needs additional IV diuretics and will need another night in the hospital    At the time of admission with the information available to the attending physician more than 2 nights hospital complex care was anticipated, based on patient risk of adverse outcome if treated as outpatient and complex care required. Inpatient admission is appropriate based on the Medicare guidelines. Paged Dr. Lowry to update on UR recommendation.       Patient is clinically improving and there is no clear indication to change patient's status to inpatient. The severity of illness, intensity of service provided, expected LOS and risk for adverse outcome make the care appropriate for observation.    This document was produced using voice recognition software     The information on this document is developed by the utilization review team in order for the business office to ensure compliance.  This only denotes the appropriateness of proper admission status and does not reflect the quality of care rendered.         The definitions of Inpatient Status and Observation Status used in making the determination above are those provided in the CMS Coverage Manual, Chapter 1 and Chapter 6, section 70.4.      Sincerely,   Yolanda Penn MD  Utilization Review  Physician Advisor  [facility].

## 2021-03-29 NOTE — PLAN OF CARE
S-(situation): Patient discharged to home via car with sister.    B-(background): SOB r/t anemia    A-(assessment): SOB improved and hgb 8.8.    R-(recommendations): Discharge instructions reviewed with patient. Listed belongings gathered and returned to patient.          Discharge Nursing Criteria:     Care Plan and Patient education resolved: Yes    New Medications- pt has been educated about purpose and side effects: Not Applicable    Vaccines  Influenza status verified at discharge:  No    MISC  Prescriptions if needed, hard copies sent with patient  NA  Home medications returned to patient: NA  Medication Bin checked and emptied on discharge Yes  Patient reports post-discharge pain management plan is effective: Yes

## 2021-03-29 NOTE — PROGRESS NOTES
S-(situation): Patient registered to Observation. Patient arrived to room 252 via cart from ED    B-(background): Patient experienced sudden SO A while in the shower today. This is similar to when he has had low hemoglobin in the past, currently hemoglobin is 6.4. Patient lives alone but has family support near by.     A-(assessment): Patient appears pale and fatigued.  Patient offers no complaints and needs information kind of pulled out of him.  Temp is 96.2 (reported to MD) and RR is 32 bpm (have been since ED). Skin is intact, but fragile, blanchable redness on sacrum. Skin is bruised but intact.     R-(recommendations): Orders and observation goals reviewed with patient    Nursing Observation criteria listed below was met:    Skin issues/needs documented:Yes  Isolation needs addressed and Signage up: NA  Fall Prevention: Education given and documented: Yes  Education Assessment documented:Yes  Admission Education Documented: Yes  New medication patient education completed and documented (Possible Side Effects of Common Medications handout): None at this time  OBS video/handout Reviewed & Documented: Yes, reviewed on MS  Allergies Reviewed: Yes  Medication Reconciliation Complete: Yes  Home medications if not able to send immediately home with family stored here: NA  Reminder note placed in discharge instructions of home meds: NA  Patient has discharge needs (If yes, please explain): Yes  Sticky note left for provider to consider PT as patient is quite weak per his report.   already consulted by provider for CHF needs.  Patient discharge preferences addressed and charted on white board:  Yes

## 2021-03-29 NOTE — PROGRESS NOTES
03/29/21 1430   Quick Adds   Type of Visit Initial PT Evaluation       Present no   Living Environment   People in home alone   Current Living Arrangements apartment   Home Accessibility wheelchair accessible   Transportation Anticipated family or friend will provide  (sister)   Living Environment Comments mainlevel apt., walk in shower, flat bed.   Self-Care   Usual Activity Tolerance fair   Current Activity Tolerance poor   Regular Exercise No  (TCU in [place] until mid February)   Equipment Currently Used at Home grab bar, toilet;grab bar, tub/shower;shower chair;walker, rolling  (2WW)   Activity/Exercise/Self-Care Comment sisters run errands, transportation. patient IND in apartment up keep, medication management,   Disability/Function   Hearing Difficulty or Deaf no   Wear Glasses or Blind yes   Vision Management glasses   Concentrating, Remembering or Making Decisions Difficulty no   Difficulty Communicating no   Difficulty Eating/Swallowing no   Walking or Climbing Stairs Difficulty yes   Mobility Management admits to difficulty   Dressing/Bathing Difficulty yes   Dressing/Bathing Management admits to difficulty   Toileting issues no   Doing Errands Independently Difficulty (such as shopping) no   Fall history within last six months no   General Information   Onset of Illness/Injury or Date of Surgery 03/28/21   Referring Physician Dr. Lowry   Patient/Family Therapy Goals Statement (PT) home    Pertinent History of Current Problem (include personal factors and/or comorbidities that impact the POC) Patient is a 70 year old male, registered OBSERVATION status from the ED due to normocytic anemia acute on chronic. Patient with a previous medical history of HTN, CKD, CAD S/P CABG, chronic anemia-transufsino dependent, myelofibrosis, CHF, gout, TIA.    Existing Precautions/Restrictions fall   Weight-Bearing Status - LUE full weight-bearing   Weight-Bearing Status - RUE full  weight-bearing   Weight-Bearing Status - LLE full weight-bearing   Weight-Bearing Status - RLE full weight-bearing   General Observations PT orders: eval and treat generalized weakness, increased difficulty with getting arounnd at home, assess safe disposition plan. activity orders: up with assist   Cognition   Orientation Status (Cognition) oriented x 4   Affect/Mental Status (Cognition) WFL   Follows Commands (Cognition) WFL   Pain Assessment   Patient Currently in Pain No   Integumentary/Edema   Integumentary/Edema Comments thin frail in appearance   Posture    Posture Forward head position   Range of Motion (ROM)   ROM Quick Adds ROM WFL   Strength   Manual Muscle Testing Quick Adds Able to perform R SLR;Able to perform L SLR   Strength Comments generalized deconditioned, good break strength with poor endurance   Bed Mobility   Bed Mobility rolling left;rolling right;scooting/bridging;supine-sit;sit-supine   Rolling Left [place] (Bed Mobility) independent   Rolling Right [place] (Bed Mobility) independent   Scooting/Bridging [place] (Bed Mobility) independent   Supine-Sit [place] (Bed Mobility) independent   Sit-Supine [place] (Bed Mobility) independent   Transfers   Transfers sit-stand transfer   Sit-Stand Transfer   Sit-Stand [place] (Transfers) independent   Gait/Stairs (Locomotion)   [place] Level (Gait) supervision   Distance in Feet (Required for LE Total Joints) 80'   Pattern (Gait) step-through   Deviations/Abnormal Patterns (Gait) gait speed decreased;amadeo decreased   Balance   Balance Comments MIN assist for static heel raises to assist with balance control.    Sensory Examination   Sensory Perception patient reports no sensory changes   Coordination   Coordination no deficits were identified   Muscle Tone   Muscle Tone no deficits were identified   Clinical Impression   Criteria for Skilled Therapeutic Intervention current level of function same as previous level  of function;no significant expected improvement in functional status   PT Diagnosis (PT) muscle weakness, impaired balance   Influenced by the following impairments chronic medical status   Functional limitations due to impairments decreased activity tolerance, increased risk of falling   Clinical Presentation Stable/Uncomplicated   Clinical Presentation Rationale clinical judgement, medical status   Clinical Decision Making (Complexity) low complexity   Therapy Frequency (PT) Evaluation only   Predicted Duration of Therapy Intervention (days/wks) 1   Anticipated Equipment Needs at Discharge (PT)   (walker from home)   Risk & Benefits of therapy have been explained care plan/treatment goals reviewed;risks/benefits reviewed;participants included;patient   Clinical Impression Comments Patient presents with signs and symptoms consistent with anemia and chronic deconditioning. Patient moving near baseline. Although he is home alone he reports established support system. He would benefit from HHPT to address deconditioning, balance and strength in order to progress to greater safety in the home.    PT Discharge Planning    PT Discharge Recommendation (DC Rec) home with home care physical therapy   PT Rationale for DC Rec Patient moving near baseline. Although he is home alone he reports established support system. He would benefit from HHPT to address deconditioning, balance and strength in order to progress to greater safety in the home.    PT Brief overview of current status  IND bed mobility. SBA ambulation. moving at baseline per patient.    Total Evaluation Time   Total Evaluation Time (Minutes) 20     Thank you for your referral.  Nancy Paulson, PT, DPT, ATC    [facility]  O: [phone]  E: [email or web address]

## 2021-03-29 NOTE — PROGRESS NOTES
S-(situation): shift note    B-(background): anemia, CHF    A-(assessment): pt finished one unit PRBC at 2311 last evening. vss except for RR of 32-38. Using 2L nc with O2 sats 91-96%. Pt has had tachypnea since arrival to ED yesterday. Lungs are diminished with fine crackles. Dr. Jenkins notified of continued tachypnea and SOA, new order for lasix 40mg IV and was given. Pt has voided 675mL since, still with RR 30s. Weight is down 1.1kg from last evening. BLE 2+ to legs, 1+ to ankles and feet. Pt denies pain. Tele is NSR with occasional PACs.     R-(recommendations): monitor I and O, respiratory status, labs, fluid restriction, tele.

## 2021-03-29 NOTE — H&P
[facility]    History and Physical - Hospitalist Service       Date of Admission:  3/28/2021    Assessment & Plan   1. Normocytic anemia acute on chronic present on admission: Patients undergoing hematological workup at this time.  [place] is that patient has myelofibrosis.  Patient is status post bone marrow biopsy on 3/26/21.  Patient s last transfusion was 3/25/21.  At this time, acute anemia workup is not indicated.  Patient will be transfused to achieve a hemoglobin above at least 7.  Patient has cardiac disease so we ll might even consider going above 8.  Repeat labs in the morning and make further recommendations thereafter.  Discussed with hematology tomorrow in regards to results of bone marrow biopsy and further plans.  2. Heart failure acute present on admission: Patient was transfused approximately 3 days ago.  Patient s x-ray and exam is consistent with heart failure.  Echocardiogram performed January of this year had preserved EF.  Patient given a dose of IV Lasix in the ER.  I have increased patient s oral Lasix.  Monitor on telemetry.  Serial troponins.  Most likely this is relationship of anemia with fluid challenge.  As patient s had a recent echocardiogram, would not recommend repeating echocardiogram at this time.  3. Hypertension chronic present on admission: Continue with beta blocker.  Diuretics.  Adjust as indicated.  4. Coronary artery disease chronic present on admission: Aspirin has been held with anemia ongoing workup.  Beta blocker continues.  Statin continues.  As noted above, telemetry, serial troponins.  Currently appears stable.  5. DVT prophylaxis: SCD only     Diet: 2 Gram Sodium Diet Other - please comment    DVT Prophylaxis: SCD  Almendarez Catheter: not present  Code Status: Full Code           Disposition Plan   Expected discharge: Tomorrow, recommended to prior living arrangement once hemoglobin stable.  Entered: Bernabe Jenkins MD 03/28/2021, 9:47  PM     The patient's care was discussed with the Patient.    Bernabe Jenkins MD  [facility]  Contact information available via [facility] Paging/Directory      ______________________________________________________________________    Chief Complaint   Short of breath    History of Present Illness   This is a very nice 70-year-old male with a previous history of coronary artery disease, anemia, hypertension.  Patient is currently being worked up for ongoing anemia.  Patient curiously potential diagnosis of myelofibrosis.  Patient had a bone marrow biopsy performed 3/26/21.  Results pending.  Patient was transfused on the 25th of this month.  Patient reports a rather sudden increasing shortness of breath today.  Patient denies any fevers chills or sweats.  Patient denies sore throat.  Patient denies neck or jaw pain.  Patient denies chest pain.  Patient reports shortness of breath is worse with activity.  Patient denies orthopnea.  Patient does have PND.  Patient reports increasing swelling to his legs.  Because of ongoing shortness of breath patient presents to the emergency department.  In the ER patient s vital signs are stable.  Patient is not hypoxic.  Chest x-ray shows bilateral pleural effusions.  There is a slight suggestion of his infiltrate.  Patient exam demonstrates increasing edema to his lower extremities.  Patient was given Lasix IV.  Lab work shows a hemoglobin of 6.4 which is approximately what it was 5 days ago.  At this time patient is to be admitted for further evaluation of anemia.  Probable ongoing heart failure with preserved EF.  Patient had echocardiogram performed January this year with an EF of 50-55%.  Does have regional wall motion abnormalities consistent with previous MI.    Review of Systems    10 point ROS negative except for outlined above.     Past Medical History    I have reviewed this patient's medical history and updated it with pertinent information if  needed.   Past Medical History:   Diagnosis Date     Heart disease      History of blood transfusion      Hypertension        Past Surgical History   I have reviewed this patient's surgical history and updated it with pertinent information if needed.  Past Surgical History:   Procedure Laterality Date     BONE MARROW BIOPSY, BONE SPECIMEN, NEEDLE/TROCAR N/A 2019    Procedure: BIOPSY, BONE MARROW;  Surgeon: Aguilar Krause MD;  Location: [facility] OR     BYPASS GRAFT ARTERY CORONARY N/A 2020    Procedure: CORONARY ARTERY BYPASS GRAFTING X 3 - LIMA TO LAD, SV TO OM  AND PDA; ENDOVEIN HARVEST OF BILATERAL LEGS; ON PUMP WITH SANDRA;  Surgeon: Mable Barrera MD;  Location:  OR     CV CORONARY ANGIOGRAM Left 2020    Procedure: Coronary Angiogram;  Surgeon: Devin Bentley MD;  Location:  HE[facility] CARDI[facility] CATH LAB     NO HISTORY OF SURGERY         Social History   I have reviewed this patient's social history and updated it with pertinent information if needed.  Social History     Tobacco Use     Smoking status: Former Smoker     Years: 30.00     Types: Cigarettes     Start date: 1961     Quit date: 2001     Years since quittin.1     Smokeless tobacco: Never Used   Substance Use Topics     Alcohol use: No     Frequency: Never     Drug use: No       Family History   I have reviewed this patient's family history and updated it with pertinent information if needed.  Family History   Problem Relation Age of Onset     No Known Problems Mother      Unknown/Adopted Father      Unknown/Adopted Maternal Grandmother      Unknown/Adopted Maternal Grandfather      Unknown/Adopted Paternal Grandmother      Unknown/Adopted Paternal Grandfather      Cancer Brother         lung cancer - smoking     No Known Problems Sister      Coronary Artery Disease Brother          of MI at 66     No Known Problems Sister        Prior to Admission Medications   Prior to Admission Medications    Prescriptions Last Dose Informant Patient Reported? Taking?   ACE/ARB/ARNI NOT PRESCRIBED (INTENTIONAL) Unknown at Unknown time  No No   Sig: Please choose reason not prescribed from choices below.   Patient not taking: Reported on 3/25/2021   ASPIRIN NOT PRESCRIBED (INTENTIONAL) Unknown at Unknown time  No No   Sig: Please choose reason not prescribed from choices below.   Patient not taking: Reported on 3/25/2021   Ferrous Sulfate 324 (65 Fe) MG TBEC 3/28/2021 at 0800  No Yes   Sig: Take 1 tablet (324 mg) by mouth daily   allopurinol (ZYLOPRIM) 300 MG tablet 3/28/2021 at 0800  No Yes   Sig: Take 1 tablet (300 mg) by mouth daily   atorvastatin (LIPITOR) 10 MG tablet 3/28/2021 at 0800  No Yes   Sig: Take 1 tablet (10 mg) by mouth daily   furosemide (LASIX) 40 MG tablet 3/28/2021 at Took 1.2 tab at noon Self No Yes   Sig: TAKE ONE TABLET BY MOUTH DAILY IN THE MORNING AND TAKE 1/2 TABLET AT NOON   metoprolol tartrate (LOPRESSOR) 50 MG tablet 3/28/2021 at 0800  No Yes   Sig: Take 1 tablet (50 mg) by mouth 2 times daily   nitroGLYcerin (NITROSTAT) 0.4 MG sublingual tablet Unknown at has not had to take   No No   Sig: For chest pain place 1 tablet under the tongue every 5 minutes for 3 doses. If symptoms persist 5 minutes after 1st dose call 911.   Patient not taking: Reported on 3/26/2021   senna-docusate (SENOKOT-S/PERICOLACE) 8.6-50 MG tablet 3/28/2021 at 0800  Yes Yes   Sig: Take 1 tablet by mouth daily   traZODone (DESYREL) 50 MG tablet   No Yes   Sig: Take 1 tablet (50 mg) by mouth nightly as needed for sleep   vitamin B-12 (CYANOCOBALAMIN) 250 MCG tablet 3/28/2021 at 0800 Self No Yes   Sig: TAKE ONE TABLET BY MOUTH EVERY MORNING   vitamin D3 (CHOLECALCIFEROL) 50 mcg (2000 units) tablet 3/28/2021 at 0800  Yes Yes   Sig: Take 1 tablet by mouth daily      Facility-Administered Medications: None     Allergies   Allergies   Allergen Reactions     No Known Drug Allergies      Seasonal Allergies Other (See Comments)      Stuffy head and nose       Physical Exam   Vital Signs: Temp: 96.2  F (35.7  C) Temp src: Oral BP: 135/55 Pulse: 92   Resp: (!) 32 SpO2: 96 % O2 Device: Nasal cannula Oxygen Delivery: 1 LPM  Weight: 187 lbs 6.4 oz    Well-developed well-nourished male alert and oriented to person place and time and mild distress.  Patient is lying at approximately 15  angle.  Patient speaks in complete sentences.  Minimal dyspnea appreciated.  Normocephalic atraumatic, sclerae nonicteric, extraocular muscles are intact  Mouth is moist  Neck is supple  Lungs are diminished in the bases.  There are rhonchi scattered bilaterally.  Heart is regular rate and rhythm, tachycardia, S3 is appreciated  Abdomen is soft, nontender to palpation per nursing,  Extremities with 2-3 mm pitting edema bilaterally  Neurologically no focal motor deficits  Integument without rash, appears pale    Data   Data reviewed today: I reviewed all medications, new labs and imaging results over the last 24 hours.    EKG:  Sinus , no acute ischemia.  Old inf MI    pCXR with atelectasis rule out infiltrate with pulm vascular congestion        Most Recent 3 CBC's:  Recent Labs   Lab Test 03/28/21  1737 03/26/21  1020 03/25/21  0927   WBC 16.9* 18.8* 25.1*   HGB 6.4* 7.3* 6.4*   MCV 89 88 87   PLT 58* 63* 78*     Visit/Communication Style   Virtual (Video) communication was used to evaluate Renny.  Renny consented to the use of video communication: yes  Video START time: 2200, 3/28/2021  Video STOP time:2210 3/28/2021   Patient's location: [facility]   Provider's location during the visit: [facility] Tele-medicine site

## 2021-03-29 NOTE — DISCHARGE SUMMARY
[facility]  Hospitalist Discharge Summary      Date of Admission:  3/28/2021  Date of Discharge:  3/29/2021  Discharging Provider: Sophie Lowry MD  Discharge Diagnoses   Principal Problem:    Anemia in other chronic diseases classified elsewhere  Active Problems:    Essential hypertension    Coronary artery disease involving native coronary artery of native heart with other form of angina pectoris (H)    Myeloproliferative disorder (H)    TIA (transient ischemic attack)    CKD (chronic kidney disease) stage 3, GFR 30-59 ml/min    Myelofibrosis (H)    Congestive heart failure, unspecified HF chronicity, unspecified heart failure type (H)    Shortness of breath    Lightheadedness    Pleural effusion    Acute on chronic anemia    Follow-ups Needed After Discharge   Follow-up Appointments     Follow-up and recommended labs and tests       Follow up with primary care provider, Angus Clements Mai, within 7 days for   hospital follow- up.  The following labs/tests are recommended: BMP and   CBC on 3/31/21 performed by home care.  Follow up with oncology team as previously planned             Unresulted Labs Ordered in the Past 30 Days of this Admission     Date and Time Order Name Status Description    3/26/2021 1031 BONE MARROW BIOPSY In process     3/26/2021 1031 CHROMOSOME BONE MARROW In process     3/26/2021 1031 FISH In process     3/5/2021 1235 RED BLOOD CELL PREPARE ORDER UNIT In process       These results will be followed up by oncology team    Discharge Disposition   Discharged to home with [facility] [place] services  Condition at discharge: Stable    Hospital Course    Patient is an 80 blood transfusion dependent 70-year-old with underlying myelofibrosis currently undergoing further work-up on an outpatient basis to see if evidence of transformation is occurring with additional past medical history of coronary artery disease, chronic diastolic CHF, hypertension who  presented to the emergency room with worsening shortness of breath and weakness.  Work-up revealed significant anemia with hemoglobin of 6.4 with chest x-ray also showing concern for possible vascular congestion.  Patient was registered to observation status.  He required 2 units of packed red blood cells to transfuse to oncology recommended hemoglobin above 8.0 and received several doses of IV Lasix with resolution of his shortness of breath, hypoxemia.  Patient had significant improvement of his weakness following transfusion and diuresis was evaluated by physical therapy and is discharging home with home care services and close clinical follow-up.    Principal Problem:    Anemia in other chronic diseases classified elsewhere;   Myelofibrosis (H); Myeloproliferative disorder (H)    Assessment: Transfusion dependent, currently undergoing work-up to see if leukemic transformation occurred    Plan: Patient will discharge home with home care.  Will have nurse check a CBC later this week.  Patient to follow-up with oncology regarding results of recent bone marrow biopsy and further treatment that may be recommended.    Active Problems:    Acute on chronic diastolic CHF exacerbation; pleural effusions    Assessment: Likely secondary to recent increased frequency and blood transfusion needs.  Patient has had significant improvement following IV Lasix diuresis    Plan: We will discharge without change to his Lasix 40 mg in the morning and 20 in the afternoon, however did discuss that patient should either have IV Lasix following each blood transfusion versus having a more aggressive oral regimen on the days blood transfusions are needed in order to avoid recurrence exacerbation going forward.      Shortness of breath    Assessment: Likely secondary to a combination of the anemia and CHF exacerbation as above, now resolved    Plan: Proceed with plan as above      Lightheadedness    Assessment: Secondary to anemia, now  resolved    Plan: Discharge with plan as above      Essential hypertension    Assessment: Well-controlled with metoprolol and Lasix, tolerated more aggressive diuresis without difficulty    Plan: Discharge home without change to home regimen      Coronary artery disease involving native coronary artery of native heart with other form of angina pectoris (H)    Assessment: Previous history, no symptoms concerning for angina during this hospitalization    Plan: Discharge without change to metoprolol, atorvastatin      TIA (transient ischemic attack)    Assessment: Previous history without residual deficit    Plan: No anticoagulation at this time due to myeloproliferative disorder as above      CKD (chronic kidney disease) stage 3, GFR 30-59 ml/min    Assessment: Chronic and stable, tolerating diuresis without difficulty    Plan: We will recheck BMP in 3 days to ensure ongoing stability of renal function and electrolytes.    Consultations This Hospital Stay   CARE MANAGEMENT / SOCIAL WORK IP CONSULT  NUTRITION SERVICES ADULT IP CONSULT  ADVANCE DIRECTIVE IP CONSULT  PHYSICAL THERAPY ADULT IP CONSULT    Code Status   Full Code    Time Spent on this Encounter   I, Sophie Lowry MD, personally saw the patient today and spent greater than 30 minutes discharging this patient.       Sophie Lowry MD  [address] MEDICAL SURGICAL  [address] [place] [address]  Phone: [phone]  ______________________________________________________________________    Physical Exam   Vital Signs: Temp: 96  F (35.6  C) Temp src: Oral BP: 110/56 Pulse: 89   Resp: 28 SpO2: 93 % O2 Device: None (Room air) Oxygen Delivery: 1 LPM  Weight: 185 lbs 0 oz  Constitutional: awake, alert, cooperative, no apparent distress, and appears stated age  Respiratory: No increased work of breathing, good air exchange, clear to auscultation bilaterally, no crackles or wheezing  Cardiovascular: Normal apical  impulse, regular rate and rhythm, normal S1 and S2, no S3 or S4, and no murmur noted  GI: bowel sounds present, abdomen soft and non-tender  Skin: no redness, warmth, or swelling and no rashes  Musculoskeletal: 1+ pitting edema in bilateral lower legs - much improved from earlier today  Neurologic: Awake, alert, oriented to name, place and situation        Primary Care Physician   Angus Clements Mai    Discharge Orders      Care Coordination Referral      HOME CARE NURSING REFERRAL      Reason for your hospital stay    1.  Low hemoglobin - you have received 2 units of blood and your hemoglobin is now much improved and at the goal recommended by oncology  2.  Breathing difficulty - caused by extra fluid in your lungs, improved following extra Lasix medication     Follow-up and recommended labs and tests     Follow up with primary care provider, Angus Clements Mai, within 7 days for hospital follow- up.  The following labs/tests are recommended: BMP and CBC on 3/31/21 performed by home care.  Follow up with oncology team as previously planned     Activity    Your activity upon discharge: activity as tolerated     Diet    Follow this diet upon discharge: Regular diet       Significant Results and Procedures   Results for orders placed or performed during the hospital encounter of 03/28/21   XR Chest Port 1 View    Narrative    CHEST ONE VIEW PORTABLE   3/28/2021 6:33 PM     HISTORY:  Shortness of breath.    COMPARISON: 1/27/2021.      Impression    IMPRESSION: Bilateral pleural effusions, small on the right and small  to moderate on the left, are new/increased since the previous exam.  There is mild associated infiltrate and/or atelectasis at both lung  bases. No pneumothorax. Sternotomy. Cardiac silhouette is partially  obscured, but does not appear significantly changed in size where  visualized. Pulmonary venous congestion has increased slightly.    MELIA QUEEN MD   XR Chest Port 1 View    Narrative    CHEST PORTABLE ONE  VIEW March 29, 2021 7:34 AM     HISTORY: Worsening hypoxia/respiratory distress following blood  transfusion, not improved post Lasix. Re-evaluate lung status.    COMPARISON: Chest x-rays dated 3/28/2021.    FINDINGS: There is pulmonary venous congestion. There are bilateral  pleural fluid collections with associated bibasilar atelectasis.  Basilar infiltrates are difficult to exclude. Cardiac silhouette  appears enlarged, even given technique. Postop changes status post  CABG again noted. No pneumothorax is seen. No obvious acute osseous  fracture. Linear radiopaque densities in the right base could  represent pleural plaquing.      Impression    IMPRESSION: Findings concerning for congestive heart failure, slightly  worsened since the prior study dated 3/20/2021. Infiltrates are  difficult to exclude in the bases.       Discharge Medications   Current Discharge Medication List      CONTINUE these medications which have NOT CHANGED    Details   allopurinol (ZYLOPRIM) 300 MG tablet Take 1 tablet (300 mg) by mouth daily  Qty:      Associated Diagnoses: Myeloproliferative disorder (H)      atorvastatin (LIPITOR) 10 MG tablet Take 1 tablet (10 mg) by mouth daily    Associated Diagnoses: Coronary artery disease involving native coronary artery of native heart with other form of angina pectoris (H); Essential hypertension      Ferrous Sulfate 324 (65 Fe) MG TBEC Take 1 tablet (324 mg) by mouth daily  Qty: 30 tablet, Refills: 0    Associated Diagnoses: S/P CABG (coronary artery bypass graft)      furosemide (LASIX) 40 MG tablet TAKE ONE TABLET BY MOUTH DAILY IN THE MORNING AND TAKE 1/2 TABLET AT NOON  Qty: 135 tablet, Refills: 1    Associated Diagnoses: S/P CABG (coronary artery bypass graft)      metoprolol tartrate (LOPRESSOR) 50 MG tablet Take 1 tablet (50 mg) by mouth 2 times daily  Qty:      Associated Diagnoses: NSTEMI (non-ST elevated myocardial infarction) (H)      senna-docusate (SENOKOT-S/PERICOLACE) 8.6-50 MG  tablet Take 1 tablet by mouth daily      traZODone (DESYREL) 50 MG tablet Take 1 tablet (50 mg) by mouth nightly as needed for sleep  Qty: 30 tablet, Refills: 0    Associated Diagnoses: Psychophysiological insomnia      vitamin B-12 (CYANOCOBALAMIN) 250 MCG tablet TAKE ONE TABLET BY MOUTH EVERY MORNING  Qty: 90 tablet, Refills: 3    Associated Diagnoses: S/P CABG (coronary artery bypass graft)      vitamin D3 (CHOLECALCIFEROL) 50 mcg (2000 units) tablet Take 1 tablet by mouth daily      ACE/ARB/ARNI NOT PRESCRIBED (INTENTIONAL) Please choose reason not prescribed from choices below.  Qty:      Associated Diagnoses: Coronary artery disease involving native coronary artery of native heart with other form of angina pectoris (H)      ASPIRIN NOT PRESCRIBED (INTENTIONAL) Please choose reason not prescribed from choices below.  Qty:      Associated Diagnoses: Coronary artery disease involving native coronary artery of native heart with other form of angina pectoris (H)      nitroGLYcerin (NITROSTAT) 0.4 MG sublingual tablet For chest pain place 1 tablet under the tongue every 5 minutes for 3 doses. If symptoms persist 5 minutes after 1st dose call 911.  Qty:      Associated Diagnoses: NSTEMI (non-ST elevated myocardial infarction) (H)           Allergies   Allergies   Allergen Reactions     No Known Drug Allergies      Seasonal Allergies Other (See Comments)     Stuffy head and nose

## 2021-03-30 NOTE — LETTER
[facility] CARE COORDINATION  [address]   [place] [address]    March 30, 2021    Renny Gannon  [address]  [place] [address]      Dear Renny,    I am a clinic care coordinator who works with Angus Clements Mai, MD at [facility]. I recently tried to call and was unable to reach you. Below is a description of clinic care coordination and how I can further assist you.      The clinic care coordination team is made up of a registered nurse,  and community health worker who understand the health care system. The goal of clinic care coordination is to help you manage your health and improve access to the health care system in the most efficient manner. The team can assist you in meeting your health care goals by providing education, coordinating services, strengthening the communication among your providers and supporting you with any resource needs.    Please feel free to contact the Community Health Worker at [phone] with any questions or concerns. We are focused on providing you with the highest-quality healthcare experience possible and that all starts with you.     Sincerely,     Bethany Coughlin RN, CCM - Primary Care Clinic RN Coordinator  [facility]

## 2021-03-30 NOTE — TELEPHONE ENCOUNTER
Care coordination is following patient see today's care coordination note. Patient does have hospital follow up appointment scheduled.        Alondra Erwin RN  [facility]

## 2021-03-30 NOTE — PROGRESS NOTES
Clinic Care Coordination Contact  [facility]/Voicemail    Referral Source: IP Handoff    Clinical Data: Care Coordinator Outreach    Outreach attempted x 2.  No voicemail set up.     Plan: Care Coordinator will send care coordination introduction letter with care coordinator contact information and explanation of care coordination services via mail. Care Coordinator will do no further outreaches at this time.    Patient's primary care is currently with oncology.     Bethany Coughlin RN, [facility] - Primary Care Clinic RN Coordinator  [facility]   3/30/2021    [phone]

## 2021-03-30 NOTE — TELEPHONE ENCOUNTER
Patient called to schedule an appointment for a hospital follow-up or appeared on a report showing that they were recently discharged from the hospital.    Patient was admitted to [facility]:  3/28/2021  Discharged date: 3/29/2021  Reason for hospital admission:  Acute On Chronic Diastolic Congestive Heart Failure (H), Acute On Chronic Anemia  Does patient have future appointment scheduled with provider? Yes Dr. Scott  Date of future appointment:  4/7/2021      This information will be used to help the care team plan for the patients upcoming visit.  The triage RN may determine that a follow up call is necessary and reach out to the patient via the phone number listed in the chart.     Please route this message on routine priority to the Triage RN pool.

## 2021-03-31 NOTE — TELEPHONE ENCOUNTER
[facility] now requests orders and shares plan of care/discharge summaries for some patients through [employer].  Please REPLY TO THIS MESSAGE OR ROUTE BACK TO THE AUTHOR in order to give authorization for orders when needed.  This is considered a verbal order, you will still receive a faxed copy of orders for signature.  Thank you for your assistance in improving collaboration for our patients.    ORDER REQUEST TO MD:  1- Clinician order requests  SN 2w3, 1w1, 3 PRNs    2- Wounds  Stage 2 pressure ulcer on left side of butt - Q3days and PRN, clean w/ mild soap and water, apply a foam dressing. Pt to perform on non-nursing days.     Home Care Admission SBAR    S- Pt admitted to home care services post hospitalization for CHF exacerbation.     B- Pt lives alone in apartment. Has had several ER/Hospitalizations in past 6months related to chronic anemia, myelofibrosis, CHF and other chronic conditions. Most recently was due to CHF exacerbation. Pt reports he couldn t catch his breath at home and went to ER. Pt was diuresed in the hospital and had 2 blood transfusion and was D/C d home. PMH;  myelofibrosis, CHF, CKD3, hx of TIA, CAD, HTN, chronic anemia. Pt has several F/U appts scheduled including PCP, blood transfusions, cardiology, and oncology.     A - A&Ox5, pt appeared to be a good historian of recent hospitalization event and his chronic condition. Pt had no medication changes. With med review pt is knowledgeable of his medicaitons, when to take and what they are fo. He sets them up in a med box 1x/week. He keeps his nitro in his pocket of his clothing at all times but denies ever having to use it. No chest pain reported. VSS, lungs clear, no coughing noted. SOB w/ minmal exertion. He reports occasional SOB at rest. Pt moves very slowly to help keep his breath. He is not using his walker, edu that pt should be using walker w/ all ambulation due to his extreme fatigue and weakness making his ambulation  unsafe w/o it. Pt needs to frequently rest w/ activity. Pt reports he weighs himself daily but then couldn t recall what his weight have been or if [facility] He repots he keeps losing weight, more than 10lbs in past 6months. Edu on importance of weighing daily and keeping a log to monitor for fluid retention. He is refusiong PT eval as ordered in referall and hs refusing an OT eval although he would benefit form both. Pt reports a poor appetite, but drinking ensure 1x/day. Reports a 1500ml fluid restriction that he follows. Edu on trying to eat small snacks several times a day to help get more food down. Pt has a stage 2 pressure ulcer on butt that was found w/ skin assessment. He hasn t been dressing it. He reports he thinks he got this in the hospital but this is unclear. No s/s of infection. Edu on importance of frequent repos and to try to keep pressure off of this area to help promote healing. Pt verbalized understanding of all education today. Pt had orders of labs CBC/diff and CMP, this was obtained today and layla brought this to [facility] lab. Pt is at risk for falls, infection, skin breakdown, poor nutrition, CHF exacerbation, re hospitalization r/t fragile health status.     R- Recommend SN for mental and physical assessments, labs as ordered by provider, and Edu on: fall prevention, wound care, infection, skin care, nutrition, CHF. Pt refused PT and OT services.

## 2021-04-01 NOTE — PROGRESS NOTES
I      I  Post Infusion Assessment:  Patient tolerated infusion without incident. Lung fields clear bilaterally post transfusion.   Site patent and intact, free from redness, edema or discomfort.  No evidence of extravasations.  Access discontinued per protocol.       Discharge Plan:   Discharge instructions reviewed with: Patient.  Patient discharged in stable condition accompanied by: self.  Departure Mode: Ambulatory.    Lisa Vasques RN                    Infusion Nursing Note:  Renny Gannon presents today for one unit of packed red blood cells.    Patient seen by provider today: No   present during visit today: Not Applicable.    Note: hemoglobin was 7.9 and platelets were 40.  Patient didmeet criteria for an asymptomatic covid-19 PCR test in infusion today. Patient declined the covid-19 test.    Intravenous Access:  Peripheral IV placed.    Treatment Conditions:  Lab Results   Component Value Date    HGB 7.9 03/31/2021     Lab Results   Component Value Date    WBC 14.6 03/31/2021      Lab Results   Component Value Date    ANEU 6.7 03/31/2021     Lab Results   Component Value Date    PLT 40 03/31/2021      Results reviewed, labs MET treatment parameters, ok to proceed with treatment.  Blood transfusion consent signed yes.      Demetrice Hall RN

## 2021-04-01 NOTE — TELEPHONE ENCOUNTER
Writing nurse calling patient to coordinate moving his follow up for BMBX results to a provider at the [facility].  Patient will call his sister to see if they can get him down there.    Ki Lamb RN, BSN, OCN  4/1/2021, 8:50 AM

## 2021-04-01 NOTE — PATIENT INSTRUCTIONS
Pt to return on 4-8-21 for labs/possible infusion. Copies of medication list and upcoming appointments given prior to discharge.

## 2021-04-01 NOTE — TELEPHONE ENCOUNTER
Patient agreeable to change.  No questions or concern sat this time.    Ki Lamb, RN, BSN, OCN  4/1/2021, 9:02 AM

## 2021-04-06 NOTE — NURSING NOTE
"Oncology Rooming Note    April 6, 2021 10:57 AM   Renny Gannon is a 70 year old male who presents for:    Chief Complaint   Patient presents with     Oncology Clinic Visit     myeloproliferative disorder     Initial Vitals: /67   Pulse 89   Temp 97.3  F (36.3  C) (Tympanic)   Wt 84.9 kg (187 lb 3.2 oz)   SpO2 98%   BMI 28.46 kg/m   Estimated body mass index is 28.46 kg/m  as calculated from the following:    Height as of 3/28/21: 1.727 m (5' 8\").    Weight as of this encounter: 84.9 kg (187 lb 3.2 oz). Body surface area is 2.02 meters squared.  No Pain (0) Comment: Data Unavailable   No LMP for male patient.  Allergies reviewed: Yes  Medications reviewed: Yes    Medications: Medication refills not needed today.  Pharmacy name entered into EPIC:    [facility] [address] [place] [address] - [address]  [facility] [place], [place] - [address]  [facility] [place], [place] - [address] [facility]  [facility] [place], [place] - [address] [facility]     Clinical concerns: none       Tianna Chang CMA            "

## 2021-04-06 NOTE — PROGRESS NOTES
[facility] New Patient Visit    Date: Apr 6, 2021    Referring Physician: Dr. Cristina     Reason For Visit: Review of bone marrow biopsy with history of Myelofibrosis    ID:  Renny Gannon is a 70 year old male with a history of CAD s/p CABG, HFpEF, CKD3, HTN, and JAK2+ myelofibrosis (patient of Dr. Cristina's), who presents for follow-up after recent BMBx..      Brief Heme/Onc History:  - 12/2019: presented to primary care physician with a complaint of increasing fatigue and shortness of breath on exertion. Further work-up including a CBC which was done on December 11, 2019 showed a total white count of 79.1 with a hemoglobin is 11.9 platelet count of 378.  Peripheral blood smear revealed leuko-erythroblastic reaction with neutrophilic left shift and rare circulating blasts without dysplasia. Bone marrow biopsy from December 30, 2019 showing extremely hypercellular marrow with granulocytic and megakaryocytic proliferation, megakaryocytic clustering and atypia, marrow fibrosis and 1.5% blasts.  There is also marked peripheral leukocytosis and leuko-erythroblastic clusters and 1.5% circulating blasts.  There is mild normocytic normochromic anemia.  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   - Started on Hydrea 500mg every day 1/2020  - 12/2020: admitted for anemia, requiring 2 units of blood. Hydrea held  - Restart on Hydrea 1/2021 with improving counts  - Stopped hydrea later January 2021 due to persistent pancytopenia  - BMBx 3/26/21: Increased fibrosis, cellularity 40-50%, 5% blasts on morphology and flow      Interval History:  Mr. Gannon was hospitalized again since seeing Dr. Cristina in March. He has had 4 hospitalizations this year, 3 due to heart failure/cardiac issues and one additional one for dyspnea when his Hgb was 6.6. He has been getting frequent transfusions, and taking iron supplementation as well. The iron pills have  been giving him constipation.     With all the recent hospitalizations, he has been feeling pretty fatigued, but oral intake is okay. No early satiety, night sweats or adenopathy. Some recent weight loss, primarily in the context of diuresis. He has not been taking his aspirin recently.     ROS: Pertinent positive and negative systems described in HPI; the remainder of the 14 systems are negative      Medications:  Current Outpatient Medications   Medication     ACE/ARB/ARNI NOT PRESCRIBED (INTENTIONAL)     allopurinol (ZYLOPRIM) 300 MG tablet     ASPIRIN NOT PRESCRIBED (INTENTIONAL)     atorvastatin (LIPITOR) 10 MG tablet     Ferrous Sulfate 324 (65 Fe) MG TBEC     furosemide (LASIX) 40 MG tablet     metoprolol tartrate (LOPRESSOR) 50 MG tablet     nitroGLYcerin (NITROSTAT) 0.4 MG sublingual tablet     senna-docusate (SENOKOT-S/PERICOLACE) 8.6-50 MG tablet     traZODone (DESYREL) 50 MG tablet     vitamin B-12 (CYANOCOBALAMIN) 250 MCG tablet     vitamin D3 (CHOLECALCIFEROL) 50 mcg (2000 units) tablet     No current facility-administered medications for this visit.        Allergies:     Allergies   Allergen Reactions     No Known Drug Allergies      Seasonal Allergies Other (See Comments)     Stuffy head and nose       Physical exam  There were no vitals taken for this visit.  Gen: Well appearing, in NAD  HEENT: EOMI, PERRL. Mask in place. No adenopathy.   CV: Normal rate, regular rhythm. No m/r/g  Pulm: CTAB, no wheezing, normal work of breathing  Abd: Soft, protuberant. NT/ND. No rebound/guarding. No obvious splenomegaly.  Ext: Warm and well perfused. No lower extremity edema  Skin: No rash, cyanosis or petechial lesion  Neuro: Alert and answering questions appropriately. CNII-XII grossly intact.       Labs, pathology and other diagnostics reviewed  CBC  Recent Labs   Lab Test 03/31/21  1015 03/29/21  1424 03/29/21  0528 03/28/21  1737 03/26/21  1020   WBC 14.6*  --  19.3* 16.9* 18.8*   RBC 2.83*  --  2.55* 2.21*  2.55*   HGB 7.9* 8.8* 7.2* 6.4* 7.3*   HCT 24.6*  --  21.6* 19.7* 22.4*   MCV 87  --  85 89 88   MCH 27.9  --  28.2 29.0 28.6   MCHC 32.1  --  33.3 32.5 32.6   RDW 16.2*  --  16.1* 15.6* 15.5*   PLT 40*  --  56* 58* 63*     CMP  Recent Labs   Lab Test 03/31/21  1015 03/29/21  0528 03/28/21  1737 03/03/21  1456 02/01/21  0704 02/01/21  0704 01/31/21  0924 01/27/21  1235 01/27/21  1235 12/21/20  0945 12/21/20  0945 12/21/20  0820 08/17/20  1110 08/17/20  1110 02/21/20  0736 02/21/20  0736 02/16/20  0011 02/16/20  0011 02/15/20  1612 02/07/20  0808 02/07/20  0808    139 140 139   < >  --  135   < > 133   < > 140 140   < > 139   < > 139   < >  --  138   < >  --    POTASSIUM 4.0 4.0 3.9 4.3   < > 4.2 3.9   < > 3.9   < > 4.7 4.2   < > 4.8   < > 4.5   < >  --  4.5   < > 4.6   CHLORIDE 106 107 109 106   < >  --  102   < > 101   < > 108 108   < > 107   < > 107   < >  --  104   < >  --    CO2 29 26 25 27   < >  --  23   < > 20   < > 27 26   < > 27   < > 29   < >  --  27   < >  --    ANIONGAP 4 6 6 6   < >  --  10   < > 12   < > 5 6   < > 5   < > 3   < >  --  7   < >  --    * 102* 134* 113*   < >  --  161*   < > 172*   < > 115* 131*   < > 92   < > 100*   < >  --  112*   < >  --    BUN 25 17 20 19   < >  --  28   < > 33*   < > 43* 41*   < > 32*   < > 24   < >  --  22   < >  --    CR 1.07 1.05 1.00 1.26*   < > 1.21 1.25   < > 1.24   < > 1.50* 1.59*   < > 1.31*   < > 1.22   < >  --  1.13   < >  --    GFRESTIMATED 69 71 76 57*   < > 60* 57*   < > 58*   < > 46* 43*   < > 54*   < > 60*   < >  --  65   < >  --    GFRESTBLACK 80 82 88 66   < > 69 67   < > 67   < > 53* 50*   < > 63   < > 69   < >  --  76   < >  --    MINNIE 8.2* 8.4* 8.1* 8.2*   < >  --  8.6   < > 8.7   < > 8.8 8.7   < > 9.4   < > 9.3   < >  --  8.7   < >  --    MAG  --   --   --   --   --   --  2.6*  --   --   --   --  2.1  --   --   --  2.4*  --   --   --   --  2.6*   PHOS  --   --   --   --   --  3.8 3.9  --   --   --   --  3.6  --   --   --   --   --   --    --   --  2.8   PROTTOTAL  --   --   --   --   --   --   --   --  7.2  --  6.3*  --   --  7.5  --   --   --  7.2 7.2   < >  --    ALBUMIN  --   --   --   --   --   --   --   --  3.5  --  3.5 3.7  --  3.8  --   --   --   --  3.0*   < >  --    BILITOTAL  --   --   --   --   --   --   --   --  2.1*  --  0.6  --   --  0.5  --   --   --   --  0.8   < >  --    ALKPHOS  --   --   --   --   --   --   --   --  118  --  60  --   --  59  --   --   --   --  105   < >  --    AST  --   --   --   --   --   --   --   --  68*  --  26  --   --  17  --   --   --   --  34   < >  --    ALT  --   --   --   --   --   --   --   --  23  --  29  --   --  26  --   --   --   --  28   < >  --     < > = values in this interval not displayed.     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     Assessment/Plan  Renny Gannon is a 70 year old male with a history of CAD s/p CABG, HFpEF, CKD3, HTN, and JAK2+ myelofibrosis (patient of Dr. Dewey), who presents for follow-up after recent BMBx.    #Myelofibrosis  #Anemia, thrombocytopenia    Previously on Hydrea, stopped in February due to acute anemia hospitalization and dropping counts. Recent BMBx for dropping counts showed flow/morph with 5% myeloid blasts, and 3+ fibrosis; FISH/Chromosomal  analysis pending. Cellularity 40-50%, improved from original marrow. Overall, marrow does not show evidence of leukemia, and is grossly stable compared to prior, although some increased fibrosis, which seems likely to be the cause of his dropping counts. His CKD and HFpEF not helping either. No obvious symptoms or splenomegaly at this time that would prompt treatment.    Notably, he has been taking PO iron, and ferritin is nearly 1200. With his transfusions he is getting plenty of iron, so he does not need to be on PO iron. Could consider iron chelation in the future, but will hold off for now given his other co-morbidities and medications.      - Stop PO iron   - Weekly CBC checks, transfusions PRN   - Will plan to follow-up with Dr. Cristina when he returns.     #CAD  Previously on ASA 325mg. He does not think he's taking it currently. Given his platelet counts, would hold if plts <30k. Defer to cardiology about restarting, but would ideally change to 81mg if restarts to help reduce bleeding risk as much as possible.   - Hold ASA if plts <30k   - Okay to restart ASA 81mg as long as plts are above that threshold    RTC: 3 months with Dr. Cristina    Patient seen and staffed with Dr. Grier.    Say Mckeon MD PhD  Heme/Onc/Transplant Fellow  Pgr #[id number]    Attending Addendum:  The patient was seen and evaluated. All medical records, testing results were reviewed and the plan was discussed with the fellow. The note above has been edited to reflect my findings.    Additional comments:  Mr. aGnnon is a new patient to me, previously followed by Dr. Cristina, who underwent recent bone marrow biopsy and I am seeing him as a new patient to review the results with him.    He has had significant issues with the heart and admissions associated with that recently. Otherwise, no new issues with bleeding, fevers, night sweats, weight loss.     Plan:    Reviewed results of the bone marrow biopsy with he and his two  sisters who accompanied him today. Reviewed to him what a normal marrow should look like and reviewed the fibrosis in his marrow. Reviewed the reasons for his low counts and transfusion needs. Reviewed some of the treatments that can be used for myelofibrosis but the concern that some of those treatments may make his other counts worse and with his other medical comorbidities and recent numerous admissions I would be wary of adding new drugs with new side effects and complications. He was in aggreement with this plan to continue with supportive transfusions. At this point there is no sign of progression to AML which is great news for him and he was happy to hear that. The plan going forward will be weekly labs and possible transfusions up at [place] and follow-up with Dr. cristina in 3 months.    We recommended holding aspirin (which it sounds like he already is) if platelets 30k or less and stopping iron due to iron overload. He is not a candidate for oral iron chelation and could be a candidate for desferal infusions with transfusions in the future should Dr. Cristina decide to do that.    He was happy to be able to continue at [facility] and follow with Dr. Cristina.    15 minutes spent in chart review prior to visit, 15 minutes with patient, 10 minutes in documentation. 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 In Clone 1 (35% of metaphases), this was the sole karyotypic    abnormality. Clone 2 (65% of   metaphases) had, in addition, a marker chromosome of unknown origin.     These findings are consistent with the reported pathologic diagnosis of   recurrent/persistent myeloid neoplasm.   Monosomy 7 is a well-documented abnormality in myeloid neoplasms that is   typically associated  with aggressive   disease.     -- Recommendation to continue regular lab checks and transfusions given no morphologic progression and follow -up with Dr. Cristina in 3 months as planned    Shanta Grier MD

## 2021-04-06 NOTE — LETTER
4/6/2021         RE: Renny Gannon  [address]  [facility] [address]        Dear Colleague,    Thank you for referring your patient, Renny Gannon, to the [facility] BLOOD AND MARROW TRANSPLANT PROGRAM [place]. Please see a copy of my visit note below.    [facility] New Patient Visit    Date: Apr 6, 2021    Referring Physician: Dr. Cristina     Reason For Visit: Review of bone marrow biopsy with history of Myelofibrosis    ID:  Renny Gannon is a 70 year old male with a history of CAD s/p CABG, HFpEF, CKD3, HTN, and JAK2+ myelofibrosis (patient of Dr. Cristina's), who presents for follow-up after recent BMBx..      Brief Heme/Onc History:  - 12/2019: presented to primary care physician with a complaint of increasing fatigue and shortness of breath on exertion. Further work-up including a CBC which was done on December 11, 2019 showed a total white count of 79.1 with a hemoglobin is 11.9 platelet count of 378.  Peripheral blood smear revealed leuko-erythroblastic reaction with neutrophilic left shift and rare circulating blasts without dysplasia. Bone marrow biopsy from December 30, 2019 showing extremely hypercellular marrow with granulocytic and megakaryocytic proliferation, megakaryocytic clustering and atypia, marrow fibrosis and 1.5% blasts.  There is also marked peripheral leukocytosis and leuko-erythroblastic clusters and 1.5% circulating blasts.  There is mild normocytic normochromic anemia.  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   - Started on Hydrea 500mg every day 1/2020  - 12/2020: admitted for anemia, requiring 2 units of blood. Hydrea held  - Restart on Hydrea 1/2021 with improving counts  - Stopped hydrea later January 2021 due to persistent pancytopenia  - BMBx 3/26/21: Increased fibrosis, cellularity 40-50%, 5% blasts on morphology and flow      Interval History:  Mr. Gannon was hospitalized  again since seeing Dr. Cristina in March. He has had 4 hospitalizations this year, 3 due to heart failure/cardiac issues and one additional one for dyspnea when his Hgb was 6.6. He has been getting frequent transfusions, and taking iron supplementation as well. The iron pills have been giving him constipation.     With all the recent hospitalizations, he has been feeling pretty fatigued, but oral intake is okay. No early satiety, night sweats or adenopathy. Some recent weight loss, primarily in the context of diuresis. He has not been taking his aspirin recently.     ROS: Pertinent positive and negative systems described in HPI; the remainder of the 14 systems are negative      Medications:  Current Outpatient Medications   Medication     ACE/ARB/ARNI NOT PRESCRIBED (INTENTIONAL)     allopurinol (ZYLOPRIM) 300 MG tablet     ASPIRIN NOT PRESCRIBED (INTENTIONAL)     atorvastatin (LIPITOR) 10 MG tablet     Ferrous Sulfate 324 (65 Fe) MG TBEC     furosemide (LASIX) 40 MG tablet     metoprolol tartrate (LOPRESSOR) 50 MG tablet     nitroGLYcerin (NITROSTAT) 0.4 MG sublingual tablet     senna-docusate (SENOKOT-S/PERICOLACE) 8.6-50 MG tablet     traZODone (DESYREL) 50 MG tablet     vitamin B-12 (CYANOCOBALAMIN) 250 MCG tablet     vitamin D3 (CHOLECALCIFEROL) 50 mcg (2000 units) tablet     No current facility-administered medications for this visit.        Allergies:     Allergies   Allergen Reactions     No Known Drug Allergies      Seasonal Allergies Other (See Comments)     Stuffy head and nose       Physical exam  There were no vitals taken for this visit.  Gen: Well appearing, in NAD  HEENT: EOMI, PERRL. Mask in place. No adenopathy.   CV: Normal rate, regular rhythm. No m/r/g  Pulm: CTAB, no wheezing, normal work of breathing  Abd: Soft, protuberant. NT/ND. No rebound/guarding. No obvious splenomegaly.  Ext: Warm and well perfused. No lower extremity edema  Skin: No rash, cyanosis or petechial lesion  Neuro: Alert and  answering questions appropriately. CNII-XII grossly intact.       Labs, pathology and other diagnostics reviewed  CBC  Recent Labs   Lab Test 03/31/21  1015 03/29/21  1424 03/29/21  0528 03/28/21  1737 03/26/21  1020   WBC 14.6*  --  19.3* 16.9* 18.8*   RBC 2.83*  --  2.55* 2.21* 2.55*   HGB 7.9* 8.8* 7.2* 6.4* 7.3*   HCT 24.6*  --  21.6* 19.7* 22.4*   MCV 87  --  85 89 88   MCH 27.9  --  28.2 29.0 28.6   MCHC 32.1  --  33.3 32.5 32.6   RDW 16.2*  --  16.1* 15.6* 15.5*   PLT 40*  --  56* 58* 63*     CMP  Recent Labs   Lab Test 03/31/21  1015 03/29/21  0528 03/28/21  1737 03/03/21  1456 02/01/21  0704 02/01/21  0704 01/31/21  0924 01/27/21  1235 01/27/21  1235 12/21/20  0945 12/21/20  0945 12/21/20  0820 08/17/20  1110 08/17/20  1110 02/21/20  0736 02/21/20  0736 02/16/20  0011 02/16/20  0011 02/15/20  1612 02/07/20  0808 02/07/20  0808    139 140 139   < >  --  135   < > 133   < > 140 140   < > 139   < > 139   < >  --  138   < >  --    POTASSIUM 4.0 4.0 3.9 4.3   < > 4.2 3.9   < > 3.9   < > 4.7 4.2   < > 4.8   < > 4.5   < >  --  4.5   < > 4.6   CHLORIDE 106 107 109 106   < >  --  102   < > 101   < > 108 108   < > 107   < > 107   < >  --  104   < >  --    CO2 29 26 25 27   < >  --  23   < > 20   < > 27 26   < > 27   < > 29   < >  --  27   < >  --    ANIONGAP 4 6 6 6   < >  --  10   < > 12   < > 5 6   < > 5   < > 3   < >  --  7   < >  --    * 102* 134* 113*   < >  --  161*   < > 172*   < > 115* 131*   < > 92   < > 100*   < >  --  112*   < >  --    BUN 25 17 20 19   < >  --  28   < > 33*   < > 43* 41*   < > 32*   < > 24   < >  --  22   < >  --    CR 1.07 1.05 1.00 1.26*   < > 1.21 1.25   < > 1.24   < > 1.50* 1.59*   < > 1.31*   < > 1.22   < >  --  1.13   < >  --    GFRESTIMATED 69 71 76 57*   < > 60* 57*   < > 58*   < > 46* 43*   < > 54*   < > 60*   < >  --  65   < >  --    GFRESTBLACK 80 82 88 66   < > 69 67   < > 67   < > 53* 50*   < > 63   < > 69   < >  --  76   < >  --    MINNIE 8.2* 8.4* 8.1* 8.2*   < >   --  8.6   < > 8.7   < > 8.8 8.7   < > 9.4   < > 9.3   < >  --  8.7   < >  --    MAG  --   --   --   --   --   --  2.6*  --   --   --   --  2.1  --   --   --  2.4*  --   --   --   --  2.6*   PHOS  --   --   --   --   --  3.8 3.9  --   --   --   --  3.6  --   --   --   --   --   --   --   --  2.8   PROTTOTAL  --   --   --   --   --   --   --   --  7.2  --  6.3*  --   --  7.5  --   --   --  7.2 7.2   < >  --    ALBUMIN  --   --   --   --   --   --   --   --  3.5  --  3.5 3.7  --  3.8  --   --   --   --  3.0*   < >  --    BILITOTAL  --   --   --   --   --   --   --   --  2.1*  --  0.6  --   --  0.5  --   --   --   --  0.8   < >  --    ALKPHOS  --   --   --   --   --   --   --   --  118  --  60  --   --  59  --   --   --   --  105   < >  --    AST  --   --   --   --   --   --   --   --  68*  --  26  --   --  17  --   --   --   --  34   < >  --    ALT  --   --   --   --   --   --   --   --  23  --  29  --   --  26  --   --   --   --  28   < >  --     < > = values in this interval not displayed.     INR  Recent Labs   Lab Test 01/27/21  1235 12/21/20  0945 02/15/20  1612 01/31/20  2334   INR 1.24* 1.11 1.16* 0.91       BMBx 3/26/21    - Recurrent/persistent myeloid neoplasm with the following features:     - Marrow cellularity of 40-50%, with trilineage hematopoiesis (but with markedly diminished erythropoiesis), atypical megakaryocytes, and less than 5% blasts (see comments)     - Increased reticulin fibrosis (MF3)     - Peripheral blood showing marked normochromic, normocytic anemia;   moderate leukocytosis; neutrophilia with left shift; eosinophilia; 4.5% circulating blasts; moderate thrombocytopenia     COMMENT:   Concurrent flow cytometry was performed ([id number]) and abnormal myeloid   blasts were detected, comprising 5% of leukocytes.     Assessment/Plan  Renny Gannon is a 70 year old male with a history of CAD s/p CABG, HFpEF, CKD3, HTN, and JAK2+ myelofibrosis (patient of Dr. Dewey), who presents for  follow-up after recent BMBx.    #Myelofibrosis  #Anemia, thrombocytopenia    Previously on Hydrea, stopped in February due to acute anemia hospitalization and dropping counts. Recent BMBx for dropping counts showed flow/morph with 5% myeloid blasts, and 3+ fibrosis; FISH/Chromosomal analysis pending. Cellularity 40-50%, improved from original marrow. Overall, marrow does not show evidence of leukemia, and is grossly stable compared to prior, although some increased fibrosis, which seems likely to be the cause of his dropping counts. His CKD and HFpEF not helping either. No obvious symptoms or splenomegaly at this time that would prompt treatment.    Notably, he has been taking PO iron, and ferritin is nearly 1200. With his transfusions he is getting plenty of iron, so he does not need to be on PO iron. Could consider iron chelation in the future, but will hold off for now given his other co-morbidities and medications.      - Stop PO iron   - Weekly CBC checks, transfusions PRN   - Will plan to follow-up with Dr. Cristina when he returns.     #CAD  Previously on ASA 325mg. He does not think he's taking it currently. Given his platelet counts, would hold if plts <30k. Defer to cardiology about restarting, but would ideally change to 81mg if restarts to help reduce bleeding risk as much as possible.   - Hold ASA if plts <30k   - Okay to restart ASA 81mg as long as plts are above that threshold    RTC: 3 months with Dr. Cristina    Patient seen and staffed with Dr. Grier.    Say Mckeon MD PhD  Heme/Onc/Transplant Fellow  Pgr #[id number]    Attending Addendum:  The patient was seen and evaluated. All medical records, testing results were reviewed and the plan was discussed with the fellow. The note above has been edited to reflect my findings.    Additional comments:  Mr. Gannon is a new patient to me, previously followed by Dr. Cristina, who underwent recent bone marrow biopsy and I am seeing him as a new  patient to review the results with him.    He has had significant issues with the heart and admissions associated with that recently. Otherwise, no new issues with bleeding, fevers, night sweats, weight loss.     Plan:    Reviewed results of the bone marrow biopsy with he and his two sisters who accompanied him today. Reviewed to him what a normal marrow should look like and reviewed the fibrosis in his marrow. Reviewed the reasons for his low counts and transfusion needs. Reviewed some of the treatments that can be used for myelofibrosis but the concern that some of those treatments may make his other counts worse and with his other medical comorbidities and recent numerous admissions I would be wary of adding new drugs with new side effects and complications. He was in aggreement with this plan to continue with supportive transfusions. At this point there is no sign of progression to AML which is great news for him and he was happy to hear that. The plan going forward will be weekly labs and possible transfusions up at [facility] and follow-up with Dr. cristina in 3 months.    We recommended holding aspirin (which it sounds like he already is) if platelets 30k or less and stopping iron due to iron overload. He is not a candidate for oral iron chelation and could be a candidate for desferal infusions with transfusions in the future should Dr. Cristina decide to do that.    He was happy to be able to continue at [facility] and follow with Dr. Cristina.    15 minutes spent in chart review prior to visit, 15 minutes with patient, 10 minutes in documentation. 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 In Clone 1 (35% of metaphases), this was the sole karyotypic    abnormality. Clone 2 (65% of    metaphases) had, in addition, a marker chromosome of unknown origin.     These findings are consistent with the reported pathologic diagnosis of   recurrent/persistent myeloid neoplasm.   Monosomy 7 is a well-documented abnormality in myeloid neoplasms that is   typically associated with aggressive   disease.     -- Recommendation to continue regular lab checks and transfusions given no morphologic progression and follow -up with Dr. Cristina in 3 months as planned    Shanta Grier MD        Again, thank you for allowing me to participate in the care of your patient.        Sincerely,        Shanta Grier MD

## 2021-04-06 NOTE — PATIENT INSTRUCTIONS
Please schedule the following at [facility] lab and infusion: Dr. cristina patient    Labs and possible PRBC or platelets on 4/15, 4/22, 4/29, 5/6, 5/13, 5/20, 5/27      Schedule visit with Dr. Cristina in June for follow-up

## 2021-04-07 PROBLEM — I50.33 ACUTE ON CHRONIC DIASTOLIC CONGESTIVE HEART FAILURE (H): Status: RESOLVED | Noted: 2021-01-07 | Resolved: 2021-01-01

## 2021-04-07 PROBLEM — R06.02 SHORTNESS OF BREATH: Status: RESOLVED | Noted: 2021-01-01 | Resolved: 2021-01-01

## 2021-04-07 PROBLEM — D72.829 LEUKOCYTOSIS: Status: RESOLVED | Noted: 2019-12-22 | Resolved: 2021-01-01

## 2021-04-07 NOTE — PROGRESS NOTES
See patient instructions.  Follow up plan after appointment with Oncologist.  Infusion to schedule labs and blood transfusions.    Ki Lamb RN, BSN, OCN  4/7/2021, 12:55 PM

## 2021-04-07 NOTE — PROGRESS NOTES
Assessment & Plan       ICD-10-CM    1. Hospital discharge follow-up  Z09    2. Myeloproliferative disorder (H)  D47.1    3. Anemia in other chronic diseases classified elsewhere  D63.8    4. Thrombocytopenia (H)  D69.6    5. Congestive heart failure, unspecified HF chronicity, unspecified heart failure type (H)  I50.9 ACE/ARB/ARNI NOT PRESCRIBED (INTENTIONAL)   6. Dyspnea on exertion  R06.00    7. Coronary artery disease involving coronary bypass graft of native heart without angina pectoris  I25.810    8. Essential hypertension  I10      I reviewed the medical record extensively.  He did not look acutely sick today, but he rather looked pale.  His last hemoglobin a week ago was 7.9.  He is known to have chronic anemia and thrombocytopenia due to myeloproliferative disorder and he has been getting blood transfusion frequently.  Since he is scheduled to get the blood transfusion in the couple days, will hold off on the lab today as per his request.  No signs of active bleeding identified.  Vital signs stable and normal.    His aspirin has been held in the last week.  Last platelet is 40,000.  Per hematology's recommendation, aspirin should be held to be less than 30,000.  He was taking aspirin 325 mg daily for CAD.  I recommended to restart the aspirin but at 81 mg dose instead.  Also recommended follow-up with his cardiologist as per his/her recommendation.    I discussed with him about future care plan and he is interested in comfort care - DNI/DNR.  He is okay however with a blood transfusion.  He feels safe at home - does not want home health care and is not considering assisted living setting.    Follow-up if any concerns or question.    Return in about 3 months (around 7/7/2021) for folow up general.    Angus Clements Mai, MD  [facility]    Nely Lawson is a 70 year old who presents for the following health issues     HPI       Hospital Follow-up Visit:    Hospital/Nursing Home/IP  Rehab Facility: [facility]  Date of Admission: 03/28/21  Date of Discharge: 03/29/21  Reason(s) for Admission:     Principal Problem:    Anemia in other chronic diseases classified elsewhere  Active Problems:    Essential hypertension    Coronary artery disease involving native coronary artery of native heart with other form of angina pectoris (H)    Myeloproliferative disorder (H)    TIA (transient ischemic attack)    CKD (chronic kidney disease) stage 3, GFR 30-59 ml/min    Myelofibrosis (H)    Congestive heart failure, unspecified HF chronicity, unspecified heart failure type (H)    Shortness of breath    Lightheadedness    Pleural effusion    Acute on chronic anemia  Was your hospitalization related to COVID-19? No   Problems taking medications regularly:  None  Medication changes since discharge: Patient was taken off of iron supplement  Problems adhering to non-medication therapy:  None    Summary of hospitalization:  [facility] discharge summary reviewed  Diagnostic Tests/Treatments reviewed.  Follow up needed: CBC, BMP  Other Healthcare Providers Involved in Patient s Care:         Homecare and Specialist appointment - Onoclogy and cardiology  Update since discharge: stable.       Post Discharge Medication Reconciliation: discharge medications reconciled, continue medications without change.  Plan of care communicated with patient              Renny has a very complex medical history which include myeloma fibrosis with chronic anemia and thrombocytopenia who is here today for hospital follow-up.  He was admitted to the hospital on March 28 for shortness of breath and was discharged the next day.  Chronic medical conditions also include CAD, chronic diastolic CHF, hypertension.  He presented to the ER on the day admission for significant shortness of breath and weakness.  Work-up showed his hemoglobin of 6.4 and the x-ray showed possible vascular congestion.  It was admitted for close  "monitoring and management.    He received 2 units of blood that was recommended by oncology to maintain the hemoglobin above 8.0.  He was also given IV Lasix.  He was feeling better with the treatment.  His shortness of breath was resolving.  His weakness also improved.    He has working closely with oncology and has been getting blood transfusion regularly.  He is in fact scheduled to get one tomorrow.  The last labs showed that he is thrombocytopenic with a platelet of 40,000.  He was seeing the oncologist last week who recommend to hold off on aspirin if his platelet is less 30,000.  He was taking 325 mg aspirin daily for CAD.  His aspirin has been held since his last oncology appointment.  No melena or hematochezia.  Dyspnea is at baseline.  Leg swelling is also at baseline; no orthopnea.  Eating and drinking well.  No fever or chills.  No pain.    He lives alone. But hhis sisters check on him frequently.  His sisters also provide transportation for his appointments.  Not interested in home health care.    Review of Systems   Constitutional, HEENT, cardiovascular, pulmonary, gi and gu systems are negative, except as otherwise noted.      Objective    /66   Pulse 92   Temp 97.3  F (36.3  C) (Temporal)   Resp 24   Ht 1.727 m (5' 8\")   Wt 85 kg (187 lb 6.4 oz)   SpO2 96%   BMI 28.49 kg/m    Body mass index is 28.49 kg/m .  Physical Exam   GENERAL: alert and no distress - look weak  EYES: Eyes grossly normal to inspection, PERRL and conjunctivae and sclerae normal.    HENT: ear canals and TM's normal, nose and mouth without ulcers or lesions.  Nares are non-congested. Oropharynx is pink and moist. No tender with palpation to the sinuses.   NECK: Supple, no lymphadenopathy or thyromegaly.  RESP: lungs clear to auscultation - no rales, rhonchi or wheezes -good respiratory effort throughout  CV: regular rate and rhythm, 2/6 systolic murmur, trace peripheral edema  ABDOMEN: soft, nontender, nondistended, no " palpable masses organomegaly with normal exam.  MS: no gross musculoskeletal defects noted.  General weakness without focal weakness.  SKIN: no suspicious lesions or rashes.  Very pale looking  NEURO: Normal strength and tone, mentation intact and speech normal.  No focal neurological deficit  PSYCH: mentation appears normal, baseline flat affect    No results found for any visits on 04/07/21.

## 2021-04-07 NOTE — PATIENT INSTRUCTIONS
Today:  Continue with weekly labs and blood transfusion if needed.    Please follow up with Dr. Cristina.      Office visit follow up with Dr. Cristina Date/Time:   6/15/21 at 12:30    If you have any questions or concerns please feel free to call.    If you need to reschedule please call:  Clinic or Lab Appointment - [phone]  Infusion - [phone]  Imaging - [phone]    Ki Lamb RN, BSN, OCN  [facility]   Oncology/Hematology Care Coordinator RN  [facility]  [phone]    After Hours Oncology Nurse Line - [phone]

## 2021-04-08 NOTE — PROGRESS NOTES
Infusion Nursing Note:  Renny Gannon presents today for labs, possible blood/plt transfusions.    Patient seen by provider today: No   present during visit today: Not Applicable.    Note: N/A.  Patient  did meet criteria for an asymptomatic covid-19 PCR test in infusion today. Patient  declined the covid-19 test.    Intravenous Access:  No Intravenous access at this visit.    Treatment Conditions:  Lab Results   Component Value Date    HGB 8.4 04/08/2021     Lab Results   Component Value Date    WBC 17.8 04/08/2021      Lab Results   Component Value Date    ANEU 9.8 04/08/2021     Lab Results   Component Value Date    PLT 74 04/08/2021      Results reviewed, labs did NOT meet treatment parameters: HGB/PLT.      Post Infusion Assessment:  NA.       Discharge Plan:   Discharge instructions reviewed with: Patient.  Patient and/or family verbalized understanding of discharge instructions and all questions answered.  Patient discharged in stable condition accompanied by: self and sister.  Departure Mode: Ambulatory.    Jalyn Gannon RN

## 2021-04-15 NOTE — PATIENT INSTRUCTIONS
Pt to return on 04/22/21 for Labs/Poss Transfusion. Copies of medication list and upcoming appointments given prior to discharge.

## 2021-04-22 NOTE — PROGRESS NOTES
Infusion Nursing Note:  Renny Gannon presents today for 1 unit prbc's.    Patient seen by provider today: No   present during visit today: Not Applicable.    Note: Patient denies any new complaints today, no pain. Lungs sound clear but diminished pre and post transfusion. Patient has some sores on bottom, slowly getting better and not open but gets uncomfortable sitting in our chairs.      Patient  did meet criteria for an asymptomatic covid-19 PCR test in infusion today. Patient declined the covid-19 test.    Intravenous Access:  Peripheral IV placed.    Treatment Conditions:  Lab Results   Component Value Date    HGB 7.4 04/22/2021     Lab Results   Component Value Date    WBC 17.5 04/22/2021      Lab Results   Component Value Date    ANEU 7.4 04/22/2021     Lab Results   Component Value Date    PLT 44 04/22/2021      Results reviewed, labs MET treatment parameters, ok to proceed with treatment.  Blood transfusion consent signed 03/05/21.      Post Infusion Assessment:  Patient tolerated transfusion without incident.  Patient observed for 15 minutes post transfusion per protocol.  Blood return noted pre and post infusion.  Site patent and intact, free from redness, edema or discomfort.  No evidence of extravasations.  Access discontinued per protocol.       Discharge Plan:   Discharge instructions reviewed with: Patient and Family.  Patient and/or family verbalized understanding of discharge instructions and all questions answered.  Patient discharged in stable condition accompanied by: sister.  Departure Mode: Ambulatory.    Mayte Chapa RN

## 2021-04-22 NOTE — PATIENT INSTRUCTIONS
Pt to return on 04/29/21 for Labs/Poss Transfusion. Copies of medication list and upcoming appointments given prior to discharge.

## 2021-04-29 NOTE — PROGRESS NOTES
Infusion Nursing Note:  Renny Gannon presents today for Labs/Poss Transfusion.    Patient seen by provider today: No   present during visit today: Not Applicable.    Note: Patient denies any new symptoms, still pale but denies shortness of breath even when ambulating. Tolerated infusion well, lungs sound clear pre and post infusion.    Patient  did meet criteria for an asymptomatic covid-19 PCR test in infusion today. Patient declined the covid-19 test.    Intravenous Access:  Peripheral IV placed.    Treatment Conditions:  Lab Results   Component Value Date    HGB 7.3 04/29/2021     Lab Results   Component Value Date    WBC 17.2 04/29/2021      Lab Results   Component Value Date    ANEU 8.3 04/29/2021     Lab Results   Component Value Date    PLT 37 04/29/2021      Results reviewed, labs MET treatment parameters, ok to proceed with treatment.  Blood transfusion consent signed 03/05/21.      Post Infusion Assessment:  Patient tolerated transfusion without incident.  Patient observed for 15 minutes post transfusion per protocol.  Blood return noted pre and post infusion.  Site patent and intact, free from redness, edema or discomfort.  No evidence of extravasations.  Access discontinued per protocol.       Discharge Plan:   Discharge instructions reviewed with: Patient.  Patient and/or family verbalized understanding of discharge instructions and all questions answered.  Patient discharged in stable condition accompanied by: self.  Departure Mode: Ambulatory, drove self today.    Mayte Chapa RN

## 2021-04-29 NOTE — PATIENT INSTRUCTIONS
Pt to return on 05/06/21 for Labs/Poss Transfusion. Copies of medication list and upcoming appointments given prior to discharge.

## 2021-05-06 NOTE — PROGRESS NOTES
Infusion Nursing Note:  Renny Gannon presents today for 1 Unit PRBC's.    Patient seen by provider today: No   present during visit today: Not Applicable.    Note: Patient reports very mild fatigue. Denies SOA at rest or with minimal exertion. RR 24-26/min. Denies dizziness. Lungs clear, Heart rhythm regular prior to transfusion.   Patient did meet criteria for an asymptomatic covid-19 PCR test in infusion today. Patient declined the covid-19 test.    Intravenous Access:  Peripheral IV placed.    Treatment Conditions:  Lab Results   Component Value Date    HGB 7.1 05/06/2021     Lab Results   Component Value Date    WBC 17.7 05/06/2021      Lab Results   Component Value Date    ANEU 6.0 05/06/2021     Lab Results   Component Value Date    PLT 32 05/06/2021      Results reviewed, labs MET treatment parameters, ok to proceed with treatment.  Blood transfusion consent signed 3/5/21.  Hgb level 7.1 g/dL today.      Post Infusion Assessment:  Patient tolerated infusion without incident.  Blood return noted pre and post infusion.  Site patent and intact, free from redness, edema or discomfort.  No evidence of extravasations.  PIV access discontinued per protocol.       Discharge Plan:   Copy of AVS reviewed with patient. Patient will return 5/13 for next appointment.  Patient discharged in stable condition accompanied by: self.  Departure Mode: Ambulatory.    Kimberley Faust RN

## 2021-05-13 NOTE — PROGRESS NOTES
Infusion Nursing Note:  Renny Gannon presents today for 1 Unit PRBC's.    Patient seen by provider today: No   present during visit today: Not Applicable.    Note: Patient has mild fatigue, is not feeling worse or having increased symptoms this week. Denies pain or dizziness. SOA with exertion. Lungs clear all fields prior to transfusion. Heart rhythm regular. Gums bleed easily which is not new. He denies any other active sources of bleeding. VSS, afebrile.   Patient did meet criteria for an asymptomatic covid-19 PCR test in infusion today. Patient declined the covid-19 test.    Intravenous Access:  Peripheral IV placed.    Treatment Conditions:  Lab Results   Component Value Date    HGB 7.2 05/13/2021     Lab Results   Component Value Date    WBC 18.3 05/13/2021      Lab Results   Component Value Date    ANEU 3.7 05/13/2021     Lab Results   Component Value Date    PLT 31 05/13/2021      Results reviewed, labs MET treatment parameters, ok to proceed with treatment.  Blood transfusion consent signed 3/5/21.      Post Infusion Assessment:  Patient tolerated infusion without incident. VSS, asymptomatic. Lungs clear all fields post transfusion. Heart rhythm irregular a few  beats on auscultation.   Blood return noted pre and post infusion.  Site patent and intact, free from redness, edema or discomfort.  No evidence of extravasations.  PIV access discontinued per protocol.       Discharge Plan:   Copy of AVS reviewed with patient.Patient will return 5/20 for next appointment.  Patient discharged in stable condition accompanied by: self.  Departure Mode: Ambulatory.    Kimberley Faust RN

## 2021-05-14 NOTE — TELEPHONE ENCOUNTER
Home Health Certification and Plan of Care forms received.     Certification Period from 03/31/2021 to 05/29/2021.    [place] Rn

## 2021-05-14 NOTE — TELEPHONE ENCOUNTER
Medications reconciled on [facility] form, changes noted on form.  Form to PCP for signature.    Alondra Erwin RN  [facility]

## 2021-05-20 NOTE — PROGRESS NOTES
Infusion Nursing Note:  Renny Gannon presents today for 1 unit .    Patient seen by provider today: No   present during visit today: Not Applicable.    Note: Lungs clear pre and post transfusion.  Patient did meet criteria for an asymptomatic covid-19 PCR test in infusion today. Patient declined the covid-19 test.    Intravenous Access:  Peripheral IV placed.    Treatment Conditions:  Lab Results   Component Value Date    HGB 6.8 05/20/2021     Lab Results   Component Value Date    WBC 30.2 05/20/2021      Lab Results   Component Value Date    ANEU 5.1 05/20/2021     Lab Results   Component Value Date    PLT 25 05/20/2021      Results reviewed, labs MET treatment parameters, ok to proceed with treatment.      Post Infusion Assessment:  Patient tolerated infusion without incident.  Patient observed for 15 minutes post transfusion.  Site patent and intact, free from redness, edema or discomfort.  No evidence of extravasations.  Access discontinued per protocol.       Discharge Plan:   Discharge instructions reviewed with: Patient.  Patient discharged in stable condition accompanied by: sister.  Departure Mode: Ambulatory.      Lisa Vasques RN

## 2021-05-27 NOTE — PROGRESS NOTES
Clinic Administered Medication Documentation      Injection Documentation     Injection was administered by provider (please see MAR for given by information). Please see MAR and medication order for additional information.     Site: Joint injection   Medication Used: Kenalog 40mg   Expiration Date:  11/2022      The following medication was given by UNA Schwartz, CNP, DNP:     MEDICATION: Kenalog 40mg/1ml  ROUTE: Joint Injection  SITE: right shoulder  DOSE: 1 mL  LOT #: [id number]  : [employer]   EXPIRATION DATE:  11/2022  NDC: [address]-4

## 2021-05-27 NOTE — PROGRESS NOTES
Infusion Nursing Note:  Renny Gannon presents today for 1 Unit PRBC's.    Patient seen by provider today: No   present during visit today: Not Applicable.    Note: Patient states he feels more tired this week, moderate fatigue. SOA more easily. Denies sx of dizziness/lightheadedness. Has 8/10 L shoulder pain, has been worsening lately. He has an appt with Orthopedic DrEdward this afternoon. Lungs clear all fields prior to transfusion. Heart rhythm regular.   Patient did criteria for an asymptomatic covid-19 PCR test in infusion today. Patient declined the covid-19 test.    Intravenous Access:  Peripheral IV placed.    Treatment Conditions:  Lab Results   Component Value Date    HGB 6.8 05/20/2021     Lab Results   Component Value Date    WBC 30.2 05/20/2021      Lab Results   Component Value Date    ANEU 5.1 05/20/2021     Lab Results   Component Value Date    PLT 25 05/20/2021      Results reviewed, labs MET treatment parameters, ok to proceed with treatment.  Blood transfusion consent signed 3/5/21.      Post Infusion Assessment:  Patient tolerated infusion without incident. VSS, afebrile. Lungs clear all fields post transfusion. Heart rhythm irregular at times, Rate WNL.  Blood return noted pre and post infusion.  Site patent and intact, free from redness, edema or discomfort.  No evidence of extravasations.  PIV access discontinued per protocol.       Discharge Plan:   Copy of AVS reviewed with patient.Patient will return 6/2 for next appointment.  Patient discharged in stable condition accompanied by: self.  Departure Mode: Ambulatory.      Kimberley Faust RN

## 2021-05-27 NOTE — LETTER
5/27/2021         RE: Renny Gannon  [address]  [facility] [address]        Dear Colleague,    Thank you for referring your patient, Rneny Gannon, to the [facility]. Please see a copy of my visit note below.    ORTHOPEDIC CONSULT      Chief Complaint: Renny Gannon is a 70 year old male who is being seen for   Chief Complaint   Patient presents with     Shoulder Pain     right shoulder pain     Consult       History of Present Illness:   Complains of 2 weeks worth of anterolateral shoulder pain.  No specific injuries or traumas.  No new activities.  He has never had pain like this before.  He is tried some Advil with no improvement.  Pain is constant but worse with any motion to the shoulder.  He has had no injections.  No previous surgeries.      Patient's past medical, surgical, social and family histories reviewed.     Past Medical History:   Diagnosis Date     Heart disease      History of blood transfusion      Hypertension        Past Surgical History:   Procedure Laterality Date     BONE MARROW BIOPSY, BONE SPECIMEN, NEEDLE/TROCAR N/A 12/30/2019    Procedure: BIOPSY, BONE MARROW;  Surgeon: Aguilar Krause MD;  Location:  OR     BYPASS GRAFT ARTERY CORONARY N/A 1/31/2020    Procedure: CORONARY ARTERY BYPASS GRAFTING X 3 - LIMA TO LAD, SV TO OM  AND PDA; ENDOVEIN HARVEST OF BILATERAL LEGS; ON PUMP WITH SANDRA;  Surgeon: Mable Barrera MD;  Location:  OR     CV CORONARY ANGIOGRAM Left 1/9/2020    Procedure: Coronary Angiogram;  Surgeon: Devin Bentley MD;  Location:  HE[facility] CARDI[facility] CATH LAB     NO HISTORY OF SURGERY         Medications:  ACE/ARB/ARNI NOT PRESCRIBED (INTENTIONAL), Please choose reason not prescribed from choices below.  allopurinol (ZYLOPRIM) 300 MG tablet, Take 1 tablet (300 mg) by mouth daily  ASPIRIN NOT PRESCRIBED (INTENTIONAL), Please choose reason not prescribed from choices below.  atorvastatin (LIPITOR) 10 MG tablet,  Take 1 tablet (10 mg) by mouth daily  furosemide (LASIX) 40 MG tablet, TAKE ONE TABLET BY MOUTH DAILY IN THE MORNING AND TAKE 1/2 TABLET AT NOON  metoprolol tartrate (LOPRESSOR) 50 MG tablet, Take 1 tablet (50 mg) by mouth 2 times daily  senna-docusate (SENOKOT-S/PERICOLACE) 8.6-50 MG tablet, Take 1 tablet by mouth 2 times daily as needed for constipation Per [facility] form takes 1/2 tabs BID/PRN  traZODone (DESYREL) 50 MG tablet, Take 1 tablet (50 mg) by mouth nightly as needed for sleep  vitamin B-12 (CYANOCOBALAMIN) 250 MCG tablet, TAKE ONE TABLET BY MOUTH EVERY MORNING  nitroGLYcerin (NITROSTAT) 0.4 MG sublingual tablet, For chest pain place 1 tablet under the tongue every 5 minutes for 3 doses. If symptoms persist 5 minutes after 1st dose call 911. (Patient not taking: Reported on 2021)    No current facility-administered medications on file prior to visit.       Allergies   Allergen Reactions     No Known Drug Allergies      Seasonal Allergies Other (See Comments)     Stuffy head and nose       Social History     Occupational History     Not on file   Tobacco Use     Smoking status: Former Smoker     Years: 30.00     Types: Cigarettes     Start date: 1961     Quit date: 2001     Years since quittin.3     Smokeless tobacco: Never Used   Substance and Sexual Activity     Alcohol use: No     Frequency: Never     Drug use: No     Sexual activity: Not Currently       Family History   Problem Relation Age of Onset     No Known Problems Mother      Unknown/Adopted Father      Unknown/Adopted Maternal Grandmother      Unknown/Adopted Maternal Grandfather      Unknown/Adopted Paternal Grandmother      Unknown/Adopted Paternal Grandfather      Cancer Brother         lung cancer - smoking     No Known Problems Sister      Coronary Artery Disease Brother          of MI at 66     No Known Problems Sister        REVIEW OF SYSTEMS  10 point review systems performed otherwise negative as noted as per history  "of present illness.    Physical Exam:  Vitals: /62   Ht 1.727 m (5' 8\")   Wt 83.6 kg (184 lb 4.8 oz)   BMI 28.02 kg/m    BMI= Body mass index is 28.02 kg/m .  Constitutional: healthy, alert and no acute distress   Psychiatric: mentation appears normal and affect normal/bright  NEURO: no focal deficits  RESP: Normal with easy respirations and no use of accessory muscles to breathe, no audible wheezing or retractions  CV: RUE:  no edema         Regular rate and rhythm by palpation  SKIN: No erythema, rashes, excoriation, or breakdown. No evidence of infection.   JOINT/EXTREMITIES:right shoulder: He has a generalized increased thoracic kyphosis.  Associated with shoulder protraction.  Active motion to approximately 140/140.  He has pain with resisted rotator cuff testing.  No focal findings.  No focal areas of tenderness.  No overlying skin changes.  No erythema.  No soft tissue swelling ecchymosis or erythema.  Positive Jay.     GAIT: not tested     Diagnostic Modalities:  Right shoulder X-ray: No fractures or dislocations.  Glenohumeral joint is well-preserved.  There is some AC joint degenerative changes.  Subtle reabsorption changes along the greater tuberosity.  Independent visualization of the images was performed.      Impression: Right shoulder subacromial impingement/bursitis/tendinitis    Plan:  All of the above pertinent physical exam and imaging modalities findings was reviewed with Renny.                                          INJECTION PROCEDURE:  The patient was counseled about an  injection, including discussion of risks (including infection), contents of the injection, rationale for performing the injection, and expected benefits of the injection. The skin was prepped with alcohol and betadine and then utilizing sterile technique an injection of the {RIGHT/LEFT:16} {LORGE INJECTION SITES:551579} was performed. The injection consisted {LORGE INJECTIONS MEDS VERSION 2:454165}. The patient " tolerated the injection well, and there were no complications. The injection site was covered with a Band-Aid. The injection was performed by {jclstaff:947926}    He presents with 2 weeks worth of right shoulder pain.  He has had no specific injuries or traumas.  No pre-existing issues.  Given his exam and onset recommend conservative care.  Anti-inflammatories have not been helpful as well provided a subacromial steroid injection today.    Return to clinic 2, week(s), PRN, or sooner as needed for changes.  Re-x-ray on return: Shelley Larkin D.O.    Clinic Administered Medication Documentation      Injection Documentation     Injection was administered by provider (please see MAR for given by information). Please see MAR and medication order for additional information.     Site: Joint injection   Medication Used: Kenalog 40mg   Expiration Date:  11/2022      The following medication was given by Art Curiel, UNA, CNP, DNP:     MEDICATION: Kenalog 40mg/1ml  ROUTE: Joint Injection  SITE: right shoulder  DOSE: 1 mL  LOT #: [id number]  : [employer]   EXPIRATION DATE:  11/2022  NDC: [address]-9                    Again, thank you for allowing me to participate in the care of your patient.        Sincerely,        Roberto Larkin, DO

## 2021-05-27 NOTE — PROGRESS NOTES
ORTHOPEDIC CONSULT      Chief Complaint: Renny Gannon is a 70 year old male who is being seen for   Chief Complaint   Patient presents with     Shoulder Pain     right shoulder pain     Consult       History of Present Illness:   Complains of 2 weeks worth of anterolateral shoulder pain.  No specific injuries or traumas.  No new activities.  He has never had pain like this before.  He is tried some Advil with no improvement.  Pain is constant but worse with any motion to the shoulder.  He has had no injections.  No previous surgeries.      Patient's past medical, surgical, social and family histories reviewed.     Past Medical History:   Diagnosis Date     Heart disease      History of blood transfusion      Hypertension        Past Surgical History:   Procedure Laterality Date     BONE MARROW BIOPSY, BONE SPECIMEN, NEEDLE/TROCAR N/A 12/30/2019    Procedure: BIOPSY, BONE MARROW;  Surgeon: Aguilar Krause MD;  Location:  OR     BYPASS GRAFT ARTERY CORONARY N/A 1/31/2020    Procedure: CORONARY ARTERY BYPASS GRAFTING X 3 - LIMA TO LAD, SV TO OM  AND PDA; ENDOVEIN HARVEST OF BILATERAL LEGS; ON PUMP WITH SANDRA;  Surgeon: Mable Barrera MD;  Location:  OR     CV CORONARY ANGIOGRAM Left 1/9/2020    Procedure: Coronary Angiogram;  Surgeon: Devin Bentley MD;  Location:  HE[facility] CARDI[facility] CATH LAB     NO HISTORY OF SURGERY         Medications:  ACE/ARB/ARNI NOT PRESCRIBED (INTENTIONAL), Please choose reason not prescribed from choices below.  allopurinol (ZYLOPRIM) 300 MG tablet, Take 1 tablet (300 mg) by mouth daily  ASPIRIN NOT PRESCRIBED (INTENTIONAL), Please choose reason not prescribed from choices below.  atorvastatin (LIPITOR) 10 MG tablet, Take 1 tablet (10 mg) by mouth daily  furosemide (LASIX) 40 MG tablet, TAKE ONE TABLET BY MOUTH DAILY IN THE MORNING AND TAKE 1/2 TABLET AT NOON  metoprolol tartrate (LOPRESSOR) 50 MG tablet, Take 1 tablet (50 mg) by mouth 2 times  "daily  senna-docusate (SENOKOT-S/PERICOLACE) 8.6-50 MG tablet, Take 1 tablet by mouth 2 times daily as needed for constipation Per [facility] form takes 1/2 tabs BID/PRN  traZODone (DESYREL) 50 MG tablet, Take 1 tablet (50 mg) by mouth nightly as needed for sleep  vitamin B-12 (CYANOCOBALAMIN) 250 MCG tablet, TAKE ONE TABLET BY MOUTH EVERY MORNING  nitroGLYcerin (NITROSTAT) 0.4 MG sublingual tablet, For chest pain place 1 tablet under the tongue every 5 minutes for 3 doses. If symptoms persist 5 minutes after 1st dose call 911. (Patient not taking: Reported on 2021)    No current facility-administered medications on file prior to visit.       Allergies   Allergen Reactions     No Known Drug Allergies      Seasonal Allergies Other (See Comments)     Stuffy head and nose       Social History     Occupational History     Not on file   Tobacco Use     Smoking status: Former Smoker     Years: 30.00     Types: Cigarettes     Start date: 1961     Quit date: 2001     Years since quittin.3     Smokeless tobacco: Never Used   Substance and Sexual Activity     Alcohol use: No     Frequency: Never     Drug use: No     Sexual activity: Not Currently       Family History   Problem Relation Age of Onset     No Known Problems Mother      Unknown/Adopted Father      Unknown/Adopted Maternal Grandmother      Unknown/Adopted Maternal Grandfather      Unknown/Adopted Paternal Grandmother      Unknown/Adopted Paternal Grandfather      Cancer Brother         lung cancer - smoking     No Known Problems Sister      Coronary Artery Disease Brother          of MI at 66     No Known Problems Sister        REVIEW OF SYSTEMS  10 point review systems performed otherwise negative as noted as per history of present illness.    Physical Exam:  Vitals: /62   Ht 1.727 m (5' 8\")   Wt 83.6 kg (184 lb 4.8 oz)   BMI 28.02 kg/m    BMI= Body mass index is 28.02 kg/m .  Constitutional: healthy, alert and no acute distress "   Psychiatric: mentation appears normal and affect normal/bright  NEURO: no focal deficits  RESP: Normal with easy respirations and no use of accessory muscles to breathe, no audible wheezing or retractions  CV: RUE:  no edema         Regular rate and rhythm by palpation  SKIN: No erythema, rashes, excoriation, or breakdown. No evidence of infection.   JOINT/EXTREMITIES:right shoulder: He has a generalized increased thoracic kyphosis.  Associated with shoulder protraction.  Active motion to approximately 140/140.  He has pain with resisted rotator cuff testing.  No focal findings.  No focal areas of tenderness.  No overlying skin changes.  No erythema.  No soft tissue swelling ecchymosis or erythema.  Positive Jay.     GAIT: not tested     Diagnostic Modalities:  Right shoulder X-ray: No fractures or dislocations.  Glenohumeral joint is well-preserved.  There is some AC joint degenerative changes.  Subtle reabsorption changes along the greater tuberosity.  Independent visualization of the images was performed.      Impression: Right shoulder subacromial impingement/bursitis/tendinitis    Plan:  All of the above pertinent physical exam and imaging modalities findings was reviewed with Renny.                                          INJECTION PROCEDURE:  The patient was counseled about an  injection, including discussion of risks (including infection), contents of the injection, rationale for performing the injection, and expected benefits of the injection. The skin was prepped with alcohol and betadine and then utilizing sterile technique an injection of the right shoulder subacromial space from the posterolateral approach  was performed. The injection consisted 1ml of Kenalog (40mg per 1 ml) mixed with 3ml of 0.5% Marcaine. The patient tolerated the injection well, and there were no complications. The injection site was covered with a Band-Aid. The injection was performed by Juwan Curiel, APRN, CNP, DNP    He  presents with 2 weeks worth of right shoulder pain.  He has had no specific injuries or traumas.  No pre-existing issues.  Given his exam and onset recommend conservative care.  Anti-inflammatories have not been helpful as well provided a subacromial steroid injection today.    Return to clinic 2, week(s), PRN, or sooner as needed for changes.  Re-x-ray on return: No    Too Larkin D.O.

## 2021-05-30 NOTE — ED PROVIDER NOTES
History     Chief Complaint   Patient presents with     Shoulder Pain     HPI  Renny Gannon is a 70 year old male who presents emergency room today secondary to concerns of pain to his right shoulder.  Patient states he cannot move his shoulder or sleep because of the pain.  Patient was seen by the orthopedic clinic 3 days ago and was told that he has a impingement syndrome in his shoulder.  He states he received a shoulder injection but has had no improvement in his pain to this point.  He is unable to sleep because of the pain so comes in for help.  He denied any fall or injury.  He states this started several weeks ago.  He denies any numbness or tingling into the arm.  He points to the area of pain is to the upper shoulder.  He denies any fever or chills.  He denies any redness to the skin of the shoulder.  Patient states that he has a history for coronary disease and chronic kidney failure.  He states he receives blood transfusions weekly.  His next scheduled blood transfusion is on Tuesday.  He rates the pain in his right shoulder currently at a 9 out of 10.    I reviewed a recent orthopedic consultation note from 2 days ago and copied excerpts from the note below:  ORTHOPEDIC CONSULT        Chief Complaint: Renny Gannon is a 70 year old male who is being seen for        Chief Complaint   Patient presents with     Shoulder Pain       right shoulder pain     Consult         History of Present Illness:   Complains of 2 weeks worth of anterolateral shoulder pain.  No specific injuries or traumas.  No new activities.  He has never had pain like this before.  He is tried some Advil with no improvement.  Pain is constant but worse with any motion to the shoulder.  He has had no injections.  No previous surgeries.    JOINT/EXTREMITIES:right shoulder: He has a generalized increased thoracic kyphosis.  Associated with shoulder protraction.  Active motion to approximately 140/140.  He has pain with resisted  rotator cuff testing.  No focal findings.  No focal areas of tenderness.  No overlying skin changes.  No erythema.  No soft tissue swelling ecchymosis or erythema.  Positive Jay.                GAIT: not tested      Diagnostic Modalities:  Right shoulder X-ray: No fractures or dislocations.  Glenohumeral joint is well-preserved.  There is some AC joint degenerative changes.  Subtle reabsorption changes along the greater tuberosity.  Independent visualization of the images was performed.        Impression: Right shoulder subacromial impingement/bursitis/tendinitis     Plan:  All of the above pertinent physical exam and imaging modalities findings was reviewed with Renny.                                           INJECTION PROCEDURE:  The patient was counseled about an  injection, including discussion of risks (including infection), contents of the injection, rationale for performing the injection, and expected benefits of the injection. The skin was prepped with alcohol and betadine and then utilizing sterile technique an injection of the right shoulder subacromial space from the posterolateral approach  was performed. The injection consisted 1ml of Kenalog (40mg per 1 ml) mixed with 3ml of 0.5% Marcaine. The patient tolerated the injection well, and there were no complications. The injection site was covered with a Band-Aid. The injection was performed by Juwan Curiel, UNA, CNP, DNP     He presents with 2 weeks worth of right shoulder pain.  He has had no specific injuries or traumas.  No pre-existing issues.  Given his exam and onset recommend conservative care.  Anti-inflammatories have not been helpful as well provided a subacromial steroid injection today.     Return to clinic 2, week(s), PRN, or sooner as needed for changes.  Re-x-ray on return: No     Too Larkin D.O.    Allergies:  Allergies   Allergen Reactions     No Known Drug Allergies      Seasonal Allergies Other (See Comments)     Stuffy head and  nose       Problem List:    Patient Active Problem List    Diagnosis Date Noted     Lightheadedness 03/28/2021     Priority: Medium     Pleural effusion 03/28/2021     Priority: Medium     Acute on chronic anemia 03/28/2021     Priority: Medium     Thrombocytopenia (H) 01/27/2021     Priority: Medium     NSTEMI (non-ST elevated myocardial infarction) (H) 01/27/2021     Priority: Medium     Congestive heart failure, unspecified HF chronicity, unspecified heart failure type (H) 01/27/2021     Priority: Medium     Myelofibrosis (H) 01/14/2021     Priority: Medium     Coronary artery disease involving coronary bypass graft of native heart without angina pectoris 01/07/2021     Priority: Medium     Anemia in other chronic diseases classified elsewhere 01/07/2021     Priority: Medium     Dyspnea on exertion 12/21/2020     Priority: Medium     CKD (chronic kidney disease) stage 3, GFR 30-59 ml/min 12/21/2020     Priority: Medium     Vaccination refused by patient 08/20/2020     Priority: Medium     S/P CABG (coronary artery bypass graft) 02/10/2020     Priority: Medium     TIA (transient ischemic attack) 02/10/2020     Priority: Medium     Myeloproliferative disorder (H) 01/26/2020     Priority: Medium     Status post coronary angiogram 01/09/2020     Priority: Medium     Coronary artery disease involving native coronary artery of native heart with other form of angina pectoris (H) 01/02/2020     Priority: Medium     Added automatically from request for surgery [id number]       Essential hypertension 03/11/2019     Priority: Medium     Memory loss 03/11/2019     Priority: Medium        Past Medical History:    Past Medical History:   Diagnosis Date     Heart disease      History of blood transfusion      Hypertension        Past Surgical History:    Past Surgical History:   Procedure Laterality Date     BONE MARROW BIOPSY, BONE SPECIMEN, NEEDLE/TROCAR N/A 12/30/2019    Procedure: BIOPSY, BONE MARROW;  Surgeon: Aguilar Krause  Elayne Vail MD;  Location: [facility]     BYPASS GRAFT ARTERY CORONARY N/A 2020    Procedure: CORONARY ARTERY BYPASS GRAFTING X 3 - LIMA TO LAD, SV TO OM  AND PDA; ENDOVEIN HARVEST OF BILATERAL LEGS; ON PUMP WITH SANDRA;  Surgeon: Mable Barrera MD;  Location:  OR     CV CORONARY ANGIOGRAM Left 2020    Procedure: Coronary Angiogram;  Surgeon: Devin Bentley MD;  Location:  HE[facility] CARDI[facility] CATH LAB     NO HISTORY OF SURGERY         Family History:    Family History   Problem Relation Age of Onset     No Known Problems Mother      Unknown/Adopted Father      Unknown/Adopted Maternal Grandmother      Unknown/Adopted Maternal Grandfather      Unknown/Adopted Paternal Grandmother      Unknown/Adopted Paternal Grandfather      Cancer Brother         lung cancer - smoking     No Known Problems Sister      Coronary Artery Disease Brother          of MI at 66     No Known Problems Sister        Social History:  Marital Status:  Single [1]  Social History     Tobacco Use     Smoking status: Former Smoker     Years: 30.00     Types: Cigarettes     Start date: 1961     Quit date: 2001     Years since quittin.3     Smokeless tobacco: Never Used   Substance Use Topics     Alcohol use: No     Frequency: Never     Drug use: No        Medications:    allopurinol (ZYLOPRIM) 300 MG tablet  atorvastatin (LIPITOR) 10 MG tablet  furosemide (LASIX) 40 MG tablet  metoprolol tartrate (LOPRESSOR) 50 MG tablet  nitroGLYcerin (NITROSTAT) 0.4 MG sublingual tablet  oxyCODONE (ROXICODONE) 5 MG tablet  oxyCODONE (ROXICODONE) 5 MG tablet  senna-docusate (SENOKOT-S/PERICOLACE) 8.6-50 MG tablet  traZODone (DESYREL) 50 MG tablet  vitamin B-12 (CYANOCOBALAMIN) 250 MCG tablet  ACE/ARB/ARNI NOT PRESCRIBED (INTENTIONAL)  ASPIRIN NOT PRESCRIBED (INTENTIONAL)      Review of Systems   Constitutional: Negative for fever.   Musculoskeletal: Positive for arthralgias (Right shoulder pain. Increased with any movement.).  "Negative for joint swelling, neck pain and neck stiffness.   Skin: Negative for color change, rash and wound.   Psychiatric/Behavioral: Positive for sleep disturbance (Secondary to shoulder pain.).   All other systems reviewed and are negative.      Physical Exam   BP: 132/67  Pulse: 92  Temp: 97.8  F (36.6  C)  Resp: 20  Height: 172.7 cm (5' 8\")  Weight: 80.7 kg (178 lb)  SpO2: 97 %      Physical Exam  Vitals signs and nursing note reviewed.   Constitutional:       General: He is in acute distress.   Musculoskeletal:      Right shoulder: He exhibits decreased range of motion, tenderness and pain. He exhibits no swelling, no effusion, no crepitus, no deformity, no laceration and normal pulse.        Arms:       Comments: Patient with significant tenderness with any motion to the right shoulder suggestive of impingement syndrome.  No significant deformity noted.  No swelling noted.  No erythema noted.   Skin:     Coloration: Skin is pale.      Findings: No erythema or rash.   Neurological:      Mental Status: He is alert.         ED Course        Procedures        Patient offered injection to the right shoulder with local anesthetic/Marcaine in hopes of controlling his pain.  We discussed potential risk of the injection including allergic reaction, infection, or worsening pain.  Using 0.5% Marcaine a total of 4 cc was injected into the subacromial space to the lateral right shoulder.  This injection was performed following cleansing of the skin with Betadine solution.  Sterile procedure performed.  Patient tolerated well.    A second injection placed more anteriorly along the lateral right shoulder was performed as the patient had only mild improvement in his pain with the first injection.  Same technique used with a total of 2 cc of Marcaine injected.  Patient had more significant improvement of his pain with this injection.    Critical Care time:  none               Medications   oxyCODONE (ROXICODONE) tablet 5 mg " (has no administration in time range)       Assessments & Plan (with Medical Decision Making)  70-year-old male to the ER secondary to severe pain in his right shoulder with movement and inability to sleep this morning secondary to the pain.  Patient was seen in the Ortho clinic a few days ago and diagnosed with a impingement syndrome involving the right shoulder.  He had a steroid injection placed.  Patient with continued and increased pain to that shoulder especially with any attempt of range of motion to the shoulder.  Due to the severity of his pain I did offer a second injection with plain Marcaine in hopes of getting some control of the pain to allow him additional sleep this morning.  Additional oral pain medication also prescribed.  Joint injection performed x2 as documented above.  Patient did have improvement of his symptoms specially after the second injection.  Patient is encouraged to contact his orthopedic surgeon on Tuesday to notify him on how the shoulder is doing.  To return to the ER for increase or worsening symptoms.     I have reviewed the nursing notes.    I have reviewed the findings, diagnosis, plan and need for follow up with the patient.       New Prescriptions    OXYCODONE (ROXICODONE) 5 MG TABLET    Take 1 tablet (5 mg) by mouth every 6 hours as needed for pain    OXYCODONE (ROXICODONE) 5 MG TABLET    Take 1 tablet (5 mg) by mouth every 6 hours as needed for pain            I verbally discussed the findings of the evaluation today in the ER. I have verbally discussed with Renny the suggested treatment(s) as described in the discharge instructions and handouts. I have prescribed the above listed medications and instructed him on appropriate use of these medications.      I have verbally suggested he follow-up in his clinic or return to the ER for increased symptoms. See the follow-up recommendations documented  in the after visit summary in this visit's EPIC chart.    Final diagnoses:    Impingement syndrome, shoulder, right   Nontraumatic shoulder pain, right       5/30/2021   [facility] EMERGENCY DEPT     Indra Chaidez,   05/30/21 0794

## 2021-05-30 NOTE — DISCHARGE INSTRUCTIONS
Please read and follow the handout(s) instructions. Return, if needed, for increased or worsening symptoms and as directed by the handout(s).    I sent your new pain medication script to the [facility] pharmacy.

## 2021-06-02 NOTE — ED TRIAGE NOTES
Pt presents from infusion therapy. Pt was to get one unit of blood there however they told this RN that patients WBC was 80,000 approx. They stated they were sending slide to [facility] for further processing. Pt is pale.

## 2021-06-02 NOTE — PROGRESS NOTES
Renny arrived today per ambulatory for labs and possible transfusion.  Rec'd TC from Rachel in lab stating critical lab results:  WBC = 71.9  Hgb = 6.4  Platelets - 18,000.  Pt denies any fever or chills at home.  States was in ED over the weekend d/t pain in his rt shoulder/ upper arm, which he is rating at a 7 today.   Dr Grier called and discussed pt lab results. She stated that she did not know patient well, that she had only seen him once to discuss his bone marrow bx results.  It was noted that his WBC has gone from 18.3 on 5/13 to 71.9 today 6/2.  Lab called during conversation to stated that pt had a lot of immature WBC and blasts.  They are sending a blood slide to [facility] stat.    Dr Grier ordered that pt be sent to ED .Pt taken to ED via w/chr.  Report of above given to Noelle Paulson RN triage.   Rec'd call from Dr Amador after he had been waiting in ED asking why we sent the patient. He requested to speak to Dr Grier.  Dr Grier paged to the ED for Dr Amador.

## 2021-06-03 PROBLEM — C95.00 ACUTE LEUKEMIA (H): Status: ACTIVE | Noted: 2021-01-01

## 2021-06-03 PROBLEM — D72.829 LEUKOCYTOSIS: Status: ACTIVE | Noted: 2021-01-01

## 2021-06-03 NOTE — ED NOTES
Placement called with placement.  He will be going to [address] room 823 call after [phone]

## 2021-06-03 NOTE — ED PROVIDER NOTES
History     Chief Complaint   Patient presents with     Abnormal Labs     Sent from infusion WBC elevated. Slide sent to the [facility]. Pt was to get blood today.      HPI  Renny Gannon is a 70 year old male who is sent down from infusion center.  He went there to have a transfusion as hemoglobin is down to 6.  He has known significant myeloproliferative disorder.  He was noted to have a white count that was quite elevated for him.  He denies any recent fever or chills.  Anticipation from oncology was that he needed admission for acute bone marrow biopsy.  He has some noted chronic weakness.    Allergies:  Allergies   Allergen Reactions     No Known Drug Allergies      Seasonal Allergies Other (See Comments)     Stuffy head and nose       Problem List:    Patient Active Problem List    Diagnosis Date Noted     Lightheadedness 03/28/2021     Priority: Medium     Pleural effusion 03/28/2021     Priority: Medium     Acute on chronic anemia 03/28/2021     Priority: Medium     Thrombocytopenia (H) 01/27/2021     Priority: Medium     NSTEMI (non-ST elevated myocardial infarction) (H) 01/27/2021     Priority: Medium     Congestive heart failure, unspecified HF chronicity, unspecified heart failure type (H) 01/27/2021     Priority: Medium     Myelofibrosis (H) 01/14/2021     Priority: Medium     Coronary artery disease involving coronary bypass graft of native heart without angina pectoris 01/07/2021     Priority: Medium     Anemia in other chronic diseases classified elsewhere 01/07/2021     Priority: Medium     Dyspnea on exertion 12/21/2020     Priority: Medium     CKD (chronic kidney disease) stage 3, GFR 30-59 ml/min 12/21/2020     Priority: Medium     Vaccination refused by patient 08/20/2020     Priority: Medium     S/P CABG (coronary artery bypass graft) 02/10/2020     Priority: Medium     TIA (transient ischemic attack) 02/10/2020     Priority: Medium     Myeloproliferative disorder (H) 01/26/2020     Priority:  Medium     Status post coronary angiogram 2020     Priority: Medium     Coronary artery disease involving native coronary artery of native heart with other form of angina pectoris (H) 2020     Priority: Medium     Added automatically from request for surgery [id number]       Essential hypertension 2019     Priority: Medium     Memory loss 2019     Priority: Medium        Past Medical History:    Past Medical History:   Diagnosis Date     Heart disease      History of blood transfusion      Hypertension        Past Surgical History:    Past Surgical History:   Procedure Laterality Date     BONE MARROW BIOPSY, BONE SPECIMEN, NEEDLE/TROCAR N/A 2019    Procedure: BIOPSY, BONE MARROW;  Surgeon: Aguilar Krause MD;  Location: [facility] OR     BYPASS GRAFT ARTERY CORONARY N/A 2020    Procedure: CORONARY ARTERY BYPASS GRAFTING X 3 - LIMA TO LAD, SV TO OM  AND PDA; ENDOVEIN HARVEST OF BILATERAL LEGS; ON PUMP WITH SANDRA;  Surgeon: Mable Barrera MD;  Location:  OR     CV CORONARY ANGIOGRAM Left 2020    Procedure: Coronary Angiogram;  Surgeon: Devin Bentley MD;  Location:  HE[facility] CARDI[facility] CATH LAB     NO HISTORY OF SURGERY         Family History:    Family History   Problem Relation Age of Onset     No Known Problems Mother      Unknown/Adopted Father      Unknown/Adopted Maternal Grandmother      Unknown/Adopted Maternal Grandfather      Unknown/Adopted Paternal Grandmother      Unknown/Adopted Paternal Grandfather      Cancer Brother         lung cancer - smoking     No Known Problems Sister      Coronary Artery Disease Brother          of MI at 66     No Known Problems Sister        Social History:  Marital Status:  Single [1]  Social History     Tobacco Use     Smoking status: Former Smoker     Years: 30.00     Types: Cigarettes     Start date: 1961     Quit date: 2001     Years since quittin.3     Smokeless tobacco: Never Used   Substance Use  Topics     Alcohol use: No     Frequency: Never     Drug use: No        Medications:    allopurinol (ZYLOPRIM) 300 MG tablet  atorvastatin (LIPITOR) 10 MG tablet  furosemide (LASIX) 40 MG tablet  metoprolol tartrate (LOPRESSOR) 50 MG tablet  oxyCODONE (ROXICODONE) 5 MG tablet  oxyCODONE (ROXICODONE) 5 MG tablet  senna-docusate (SENOKOT-S/PERICOLACE) 8.6-50 MG tablet  traZODone (DESYREL) 50 MG tablet  vitamin B-12 (CYANOCOBALAMIN) 250 MCG tablet  ACE/ARB/ARNI NOT PRESCRIBED (INTENTIONAL)  ASPIRIN NOT PRESCRIBED (INTENTIONAL)  nitroGLYcerin (NITROSTAT) 0.4 MG sublingual tablet          Review of Systems  All other systems are reviewed and are negative    Physical Exam   BP: 115/58  Pulse: 77  Temp: 97  F (36.1  C)  Resp: 18  Weight: 80.7 kg (178 lb)(stated)  SpO2: 95 %      Physical Exam  Vitals signs and nursing note reviewed.   Constitutional:       General: He is not in acute distress.     Appearance: He is well-developed. He is ill-appearing. He is not toxic-appearing or diaphoretic.   HENT:      Head: Normocephalic and atraumatic.      Nose: Nose normal.      Mouth/Throat:      Mouth: Mucous membranes are moist.      Pharynx: Oropharynx is clear.   Eyes:      General: No scleral icterus.        Right eye: No discharge.         Left eye: No discharge.      Conjunctiva/sclera: Conjunctivae normal.   Neck:      Musculoskeletal: Normal range of motion and neck supple.   Cardiovascular:      Rate and Rhythm: Normal rate and regular rhythm.      Heart sounds: Normal heart sounds. No murmur.   Pulmonary:      Effort: Pulmonary effort is normal. No respiratory distress.      Breath sounds: Normal breath sounds. No stridor.   Abdominal:      Palpations: Abdomen is soft.      Tenderness: There is no abdominal tenderness.   Musculoskeletal: Normal range of motion.      Right lower leg: Edema (1+) present.      Left lower leg: Edema (1+) present.   Skin:     General: Skin is warm and dry.      Coloration: Skin is pale.       Findings: No erythema or rash.   Neurological:      Mental Status: He is alert.      Cranial Nerves: No cranial nerve deficit.      Motor: No abnormal muscle tone.         ED Course        Procedures               Critical Care time:  none               Results for orders placed or performed during the hospital encounter of 06/02/21 (from the past 24 hour(s))   Comprehensive metabolic panel   Result Value Ref Range    Sodium 134 133 - 144 mmol/L    Potassium 3.7 3.4 - 5.3 mmol/L    Chloride 101 94 - 109 mmol/L    Carbon Dioxide 27 20 - 32 mmol/L    Anion Gap 6 3 - 14 mmol/L    Glucose 99 70 - 99 mg/dL    Urea Nitrogen 35 (H) 7 - 30 mg/dL    Creatinine 1.75 (H) 0.66 - 1.25 mg/dL    GFR Estimate 38 (L) >60 mL/min/[1.73_m2]    GFR Estimate If Black 44 (L) >60 mL/min/[1.73_m2]    Calcium 7.8 (L) 8.5 - 10.1 mg/dL    Bilirubin Total 0.5 0.2 - 1.3 mg/dL    Albumin 3.3 (L) 3.4 - 5.0 g/dL    Protein Total 7.0 6.8 - 8.8 g/dL    Alkaline Phosphatase 124 40 - 150 U/L    ALT 16 0 - 70 U/L    AST 26 0 - 45 U/L   Lactate Dehydrogenase   Result Value Ref Range    Lactate Dehydrogenase 1,150 (H) 85 - 227 U/L   Uric acid   Result Value Ref Range    Uric Acid 5.9 3.5 - 7.2 mg/dL   Asymptomatic SARS-CoV-2 COVID-19 Virus (Coronavirus) by PCR    Specimen: Nasopharyngeal   Result Value Ref Range    SARS-CoV-2 Virus Specimen Source Nasopharyngeal     SARS-CoV-2 PCR Result NEGATIVE     SARS-CoV-2 PCR Comment (Note)        Medications   allopurinol (ZYLOPRIM) tablet 100 mg (100 mg Oral Given 6/2/21 1832)       Assessments & Plan (with Medical Decision Making)  70-year-old male with known myeloproliferative disorder who was sent from [facility] by oncology for transfer where he can obtain more acute management and bone marrow biopsy.  He is remained stable here.  No fever or chills.  There were no beds at the [facility].  He was eventually accepted at [facility].  He was given a dose of allopurinol at the recommendation of  hematology.     I have reviewed the nursing notes.    I have reviewed the findings, diagnosis, plan and need for follow up with the patient.       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

## 2021-06-03 NOTE — PROGRESS NOTES
Received critical lab: WBC 72.4, Hgb 6.2 and plt 18, MD aware of low results, pt came in with low numbers, are deferring to hem/onc to round prior to initiating replacement.

## 2021-06-03 NOTE — PROGRESS NOTES
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  Discussed with Dr. Muhammad, he is not on call today, Dr. Torres would see the patient, he did recommend to transfuse.  I first ordered irradiated blood, I was contacted by the pathology department, regarding changing it into nonirradiated blood.  They will talk to the on-call pathologist and do the needful.  Patient seen and examined today, he denies any chest pain, shortness of breath, generalized weakness noted.

## 2021-06-03 NOTE — PROGRESS NOTES
A/Ox4. VSS on RA. Hgb 6.2 this AM, 1 unit PRBC transfused and recheck = 7.2. R shoulder pain managed with ice pack/tylenol. SBA. Voiding adequately. C/o slight constipation, PRN senna given - 1 small BM today. PIV infusing NS 75ml/hr. Transferring to [facility] per Oncology, needs BMBx. Patient transferred at 1900 to [facility]. IVF infusing. Pain at time of discharge was 0/10. Belongings returned to patient. Patient verbalized understanding and all questions were answered. At time of discharge, patient condition was stable and left the unit via stretcher escorted by [facility] transport.  Report given to receiving nurse Worley on 7D.

## 2021-06-03 NOTE — H&P
"[facility]    History and Physical  Hematology / Oncology     Date of Admission:  06/03/21  Date of Service (when I saw the patient): 06/03/21    Assessment & Plan   Renny Gannon is a 70 year old male who presents with past medical history of CAD s/p CABG (Jan 2020), HFpEF, CKD3 ( BL Cr. 1.3-1.5), HTN, and JAK2+ myelofibrosis. He was transferred from outside hospital for concern of progression to AML with leukocytosis (WBC = 72.4) on admission.     HEME  # LIVIER 2+ Myelofibrosis   # Concern for AML  Follow by Dr. Cristina. He initially was seen in Dec. 2019 by his PCP for increased fatigue and SOB w/ exertion. CBC showed WBC 79.1, Hgb 11.9 and Plt 378. Peripheral smear showed leuko-erythroblastic reaction w/ neutrophilic left shift. BMBx (12/30/19) hypercellular marrow w/ granulocytic and megakaryocytic proliferation, megakaryocytic clustering and atypia and 1.5% circulating blasts. 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 He was on and off Hydrea from Jan 2020 - Jan 2021. BMBx 3/26/21 increased fibrosis, cellularity 40-50%, 5% blasts on morphology and flow. He most recently has just been receiving transfusion support for his anemia and thrombocytopenia. He presented to clinic for labs and possible transfusion and was found to have a WBC 71.9 Hgb 6.4 and Plt 18. Lab noted \"a lot of immature WBC and blasts\". He was then directed to the ED for admission and further work up.  - BMBx scheduled 6/4 @ 930     # Anemia   # Thrombocytopenia   Transfuse to keep hgb> 8 (hx CAD and HFpEF) and platelets >10k     # Risk TLS and DIC   - Continue renally adjust allopurinol 100 mg daily   - monitor TLS/DIC labs     CARDS   # CAD s/p CABG (Jan.2020)  # HFpEF   Last saw cardiology 2/16/21. Last echo 1/27/21 show left ventricular function is low normal. EF 50-55%. Apicolateral hypokensis. Possible mid to distal inferolateral hypokinesis.     # HTN "   - continue PTA Metoprolol     # HLD   - will hold PTA statin while inpatient.     RENAL   # CKD3   Baseline creatinine ~1.3-1.5   - will monitor with daily CMP     FEN:  -Bolus IVF as needed   -PRN lyte replacement  -RDAT    Prophy/Misc:  -VTE: None d/t thrombocytopenia   -GI/PUD: None indicated   -Bowels: PRN Senna and Miralax     Anna Marques MD  Internal Medicine  P: [phone]      Code Status   DNR / DNI    Primary Care Physician   Angus Clements Mai    Chief Complaint   Abnormal labs    History is obtained from the patient    History of Present Illness   Renny Gannon is a 70 year old male with PMH of myelofibrosis requiring frequent transfusions, HFpEF, CAD s/p CABG, HTN, HLD, and CKD3 who presented to OSH with worsening labs concerning for possible conversion to leukemia. Noted at OSH to have WBC 71.9 as well as worsened anemia. Noted to have no fever, chills, chest pain, SOB during his initial presentation. Noted stable edema. Only particular complaint r/t gum bleeding.    Admitted to [facility] for initial stabilization and now being transferred to [facility] for hematology evaluation.     On evaluation by this provider, reiterates history. Notes overall feeling ok at present time. No chest pain, SOB, abdominal pain, N/V/D. States he feels like he has a reasonable understanding of next steps in his care.     A comprehensive review of systems was obtained and is negative other than noted here or in the HPI.      Past Medical History    I have reviewed this patient's medical history and updated it with pertinent information if needed.   Past Medical History:   Diagnosis Date     Heart disease      History of blood transfusion      Hypertension        Past Surgical History   I have reviewed this patient's surgical history and updated it with pertinent information if needed.  Past Surgical History:   Procedure Laterality Date     BONE MARROW BIOPSY, BONE SPECIMEN, NEEDLE/TROCAR N/A 12/30/2019    Procedure:  BIOPSY, BONE MARROW;  Surgeon: Aguilar Krause MD;  Location: [facility] OR     BYPASS GRAFT ARTERY CORONARY N/A 1/31/2020    Procedure: CORONARY ARTERY BYPASS GRAFTING X 3 - LIMA TO LAD, SV TO OM  AND PDA; ENDOVEIN HARVEST OF BILATERAL LEGS; ON PUMP WITH SANDRA;  Surgeon: Mable Barrera MD;  Location:  OR     CV CORONARY ANGIOGRAM Left 1/9/2020    Procedure: Coronary Angiogram;  Surgeon: Devin Bentley MD;  Location:  HE[facility] CARDI[facility] CATH LAB     NO HISTORY OF SURGERY         Prior to Admission Medications   Prior to Admission Medications   Prescriptions Last Dose Informant Patient Reported? Taking?   ACE/ARB/ARNI NOT PRESCRIBED (INTENTIONAL)  Self No No   Sig: Please choose reason not prescribed from choices below.   ASPIRIN NOT PRESCRIBED (INTENTIONAL)   No No   Sig: Please choose reason not prescribed from choices below.   allopurinol (ZYLOPRIM) 300 MG tablet 6/3/2021 at AM Self No Yes   Sig: Take 1 tablet (300 mg) by mouth daily   atorvastatin (LIPITOR) 10 MG tablet 6/2/2021 Self No No   Sig: Take 1 tablet (10 mg) by mouth daily   metoprolol tartrate (LOPRESSOR) 50 MG tablet 6/3/2021 at AM Self No Yes   Sig: Take 1 tablet (50 mg) by mouth 2 times daily   nitroGLYcerin (NITROSTAT) 0.4 MG sublingual tablet  Self No No   Sig: For chest pain place 1 tablet under the tongue every 5 minutes for 3 doses. If symptoms persist 5 minutes after 1st dose call 911.   oxyCODONE (ROXICODONE) 5 MG tablet  Self No No   Sig: Take 1 tablet (5 mg) by mouth every 6 hours as needed for pain   senna-docusate (SENOKOT-S/PERICOLACE) 8.6-50 MG tablet 6/3/2021 at AM Self Yes Yes   Sig: Take 1 tablet by mouth 2 times daily as needed for constipation Per [facility] form takes 1/2 tabs BID/PRN   vitamin B-12 (CYANOCOBALAMIN) 250 MCG tablet 6/3/2021 at AM Self No Yes   Sig: TAKE ONE TABLET BY MOUTH EVERY MORNING   vitamin D3 (CHOLECALCIFEROL) 50 mcg (2000 units) tablet 6/2/2021 Self Yes No   Sig: Take 1 tablet by mouth daily       Facility-Administered Medications: None     Allergies   Allergies   Allergen Reactions     No Known Drug Allergies      Seasonal Allergies Other (See Comments)     Stuffy head and nose       Social History   I have reviewed this patient's social history and updated it with pertinent information if needed. Renny Gannon  reports that he quit smoking about 20 years ago. His smoking use included cigarettes. He started smoking about 60 years ago. He quit after 30.00 years of use. He has never used smokeless tobacco. He reports that he does not drink alcohol or use drugs.    Family History   I have reviewed this patient's family history and updated it with pertinent information if needed.   Family History   Problem Relation Age of Onset     No Known Problems Mother      Unknown/Adopted Father      Unknown/Adopted Maternal Grandmother      Unknown/Adopted Maternal Grandfather      Unknown/Adopted Paternal Grandmother      Unknown/Adopted Paternal Grandfather      Cancer Brother         lung cancer - smoking     No Known Problems Sister      Coronary Artery Disease Brother          of MI at 66     No Known Problems Sister        Review of Systems   A comprehensive review of symptoms was obtained and negative unless noted above.    Physical Exam   Temp: 95.9  F (35.5  C) Temp src: Oral BP: 137/64 Pulse: 94   Resp: 20 SpO2: 98 % O2 Device: None (Room air)    Vital Signs with Ranges  Temp:  [95.9  F (35.5  C)-96.6  F (35.9  C)] 95.9  F (35.5  C)  Pulse:  [] 94  Resp:  [16-20] 20  BP: (100-137)/(54-70) 137/64  SpO2:  [94 %-98 %] 98 %  175 lbs 1.6 oz    Constitutional: Pleasant elderly male seen resting comfortably in bed in NAD. Alert and interactive.   HEENT: NCAT. PERRL, EOMI, anicteric sclera. Oral mucosa pink and moist. Slightly pale appearing  Hematologic / Lymphatic: No overt bleeding. No cervical or clavicular adenopathy.  Respiratory: Non-labored breathing, good air exchange, lungs clear to auscultation  bilaterally. No cough or wheeze noted.   Cardiovascular: Regular rate and rhythm. S1, s2  GI: Normoactive bowel sounds. Soft, nt/nd  Skin: Warm and dry. No concerning lesions or rash on exposed surfaces. Some scattered ecchymoses and scabbing  Musculoskeletal: Extremities grossly normal, non-tender, 1+ bilateral LE edema. Strong peripheral pulses. Good strength and ROM in bed.   Neurologic: A&O x 3, CNs 2-12 grossly intact, speech normal, gait normal, sensation to light touch grossly WNL. Grossly non-focal.  Neuropsychiatric: Mentation and affect appear normal/appropriate.    Data   I personally reviewed no images or EKG's today.  Results for orders placed or performed during the hospital encounter of 06/02/21 (from the past 24 hour(s))   Hematology & Oncology IP Consult: leukocytosis, concern for AML transformation- please see in am asap; Consultant may enter orders: Yes; Patient to be seen: Routine - within 24 hours; Requested Clinic/Group: [facility]; Requesting provider?...    Narrative    Laura Torres MD     6/3/2021  1:15 PM  [facility]    Hematology/Oncology Consult Note      Date of Admission:  6/2/2021  Date of Consult:  06/03/21  Reason for Consult: myelofibrosis, concern for transformation to   AML      ASSESSMENT/PLAN:  eRnny Gannon is a 70 year old male with:    Leukocytosis, anemia, thrombocytopenia: Patient has history of   myelofibrosis, now with acute changes on his CBC/diff, concerning   for transformation to acute leukemia.  Patient currently denies   new symptoms, other than right shoulder pain.  Uric acid was   normal.  LDH 1150.  LFT's okay.  Creatinine elevated.      Patient presented to [facility] ED, was going to be   transferred to [facility], but due to lack of beds,   he was transferred to [facility].  Now he is about to be   transferred to [facility] shortly.      -transfuse as needed for hemoglobin  <7, platelet <10K, or if   bleeding  -he is getting blood transfusion now  -he has no bleeding symptoms currently.  He has no respiratory   symptoms.  -he is on allopurinol renally dosed  -he needs bone marrow biopsy as soon as possible.  Since he is   getting transferred shortly, it can be done at [facility].  If he stays here overnight for whatever reason, the   bone marrow biopsy can be done here.  -will continue to follow here until he gets transferred          HISTORY OF PRESENT ILLNESS: Renny Gannon is a 70 year old male   with PMHx of CAD s/p CABG, HFpEF, CKD3, HTN, and JAK2+   myelofibrosis (patient of Dr. Cristina's),  who presents with   leukocytosis.  He had lab work done, found to have WBC of 71.9   and sent to the ED.       He sees Dr. Cristina at the [facility] oncology clinic, but he has   been on leave, so he did see Dr. Grier in April 2021.      Brief Heme/Onc History:  - 12/2019: presented to primary care physician with a complaint   of increasing fatigue and shortness of breath on exertion.   Further work-up including a CBC which was done on December 11, 2019 showed a total white count of 79.1 with a hemoglobin is 11.9   platelet count of 378.  Peripheral blood smear revealed   leuko-erythroblastic reaction with neutrophilic left shift and   rare circulating blasts without dysplasia. Bone marrow biopsy   from December 30, 2019 showing extremely hypercellular marrow   with granulocytic and megakaryocytic proliferation,   megakaryocytic clustering and atypia, marrow fibrosis and 1.5%   blasts.  There is also marked peripheral leukocytosis and   leuko-erythroblastic clusters and 1.5% circulating blasts.  There   is mild normocytic normochromic anemia.  The picture is   consistent with myelofibrosis.  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   - Started on Hydrea 500mg every day 1/2020  - 12/2020: admitted for anemia, requiring 2 units of blood.   Hydrea  held  - Restart on Hydrea 1/2021 with improving counts  - Stopped hydrea later January 2021 due to persistent   pancytopenia  - BMBx 3/26/21: Increased fibrosis, cellularity 40-50%, 5% blasts   on morphology and flow    He was recommended to continue regular lab checks and   transfusions with Dr. Cristina when he returns.      REVIEW OF SYSTEMS:   14 point ROS was reviewed and is negative other than as noted   above in HPI.       MEDICATIONS:  Current Facility-Administered Medications   Medication     acetaminophen (TYLENOL) tablet 650 mg     allopurinol (ZYLOPRIM) tablet 100 mg     cyanocobalamin (VITAMIN B-12) half-tab 250 mcg     lidocaine (LMX4) cream     lidocaine 1 % 0.1-1 mL     magnesium hydroxide (MILK OF MAGNESIA) suspension 30 mL     melatonin tablet 1 mg     metoprolol tartrate (LOPRESSOR) tablet 50 mg     naloxone (NARCAN) injection 0.2 mg    Or     naloxone (NARCAN) injection 0.4 mg    Or     naloxone (NARCAN) injection 0.2 mg    Or     naloxone (NARCAN) injection 0.4 mg     ondansetron (ZOFRAN-ODT) ODT tab 4 mg    Or     ondansetron (ZOFRAN) injection 4 mg     oxyCODONE (ROXICODONE) tablet 5 mg     senna-docusate (SENOKOT-S/PERICOLACE) 8.6-50 MG per tablet 1   tablet    Or     senna-docusate (SENOKOT-S/PERICOLACE) 8.6-50 MG per tablet 2   tablet     sodium chloride (PF) 0.9% PF flush 3 mL     sodium chloride (PF) 0.9% PF flush 3 mL     sodium chloride 0.9% infusion     traZODone (DESYREL) tablet 50 mg         ALLERGIES:  Allergies   Allergen Reactions     No Known Drug Allergies      Seasonal Allergies Other (See Comments)     Stuffy head and nose         PAST MEDICAL HISTORY:  Past Medical History:   Diagnosis Date     Heart disease      History of blood transfusion      Hypertension          PAST SURGICAL HISTORY:  Past Surgical History:   Procedure Laterality Date     BONE MARROW BIOPSY, BONE SPECIMEN, NEEDLE/TROCAR N/A 12/30/2019      Procedure: BIOPSY, BONE MARROW;  Surgeon: Aguilar Krause    MD Yared;  Location: [facility] OR     BYPASS GRAFT ARTERY CORONARY N/A 2020    Procedure: CORONARY ARTERY BYPASS GRAFTING X 3 - LIMA TO LAD, SV   TO OM  AND PDA; ENDOVEIN HARVEST OF BILATERAL LEGS; ON PUMP WITH   SANDRA;  Surgeon: Mable Barrera MD;  Location:  OR     CV CORONARY ANGIOGRAM Left 2020    Procedure: Coronary Angiogram;  Surgeon: Devin Bentley MD;  Location:  HE[facility] CARDI[facility] CATH LAB     NO HISTORY OF SURGERY           SOCIAL HISTORY:  Social History     Socioeconomic History     Marital status: Single     Spouse name: Not on file     Number of children: Not on file     Years of education: Not on file     Highest education level: Not on file   Occupational History     Not on file   Social Needs     Financial resource strain: Not on file     Food insecurity     Worry: Not on file     Inability: Not on file     Transportation needs     Medical: Not on file     Non-medical: Not on file   Tobacco Use     Smoking status: Former Smoker     Years: 3000     Types: Cigarettes     Start date: 1961     Quit date: 2001     Years since quittin.3     Smokeless tobacco: Never Used   Substance and Sexual Activity     Alcohol use: No     Frequency: Never     Drug use: No     Sexual activity: Not Currently   Lifestyle     Physical activity     Days per week: Not on file     Minutes per session: Not on file     Stress: Not on file   Relationships     Social connections     Talks on phone: Not on file     Gets together: Not on file     Attends Voodoo service: Not on file     Active member of club or organization: Not on file     Attends meetings of clubs or organizations: Not on file     Relationship status: Not on file     Intimate partner violence     Fear of current or ex partner: Not on file     Emotionally abused: Not on file     Physically abused: Not on file     Forced sexual activity: Not on file   Other Topics Concern     Parent/sibling w/ CABG, MI or angioplasty before  "65F 55M? Not   Asked   Social History Narrative     Not on file         FAMILY HISTORY:  Family History   Problem Relation Age of Onset     No Known Problems Mother      Unknown/Adopted Father      Unknown/Adopted Maternal Grandmother      Unknown/Adopted Maternal Grandfather      Unknown/Adopted Paternal Grandmother      Unknown/Adopted Paternal Grandfather      Cancer Brother         lung cancer - smoking     No Known Problems Sister      Coronary Artery Disease Brother          of MI at 66     No Known Problems Sister          PHYSICAL EXAM:  Vital signs:  Temp: 96.5  F (35.8  C) Temp src: Axillary BP: 100/54 Pulse: 83     Resp: 16 SpO2: 96 % O2 Device: None (Room air)     Weight: 83.4   kg (183 lb 12.8 oz)  Estimated body mass index is 27.95 kg/m  as calculated from the   following:    Height as of 21: 1.727 m (5' 8\").    Weight as of this encounter: 83.4 kg (183 lb 12.8 oz).    GENERAL/CONSTITUTIONAL: No acute distress.  EYES: No scleral icterus.  RESPIRATORY: Clear to auscultation bilaterally.  CARDIOVASCULAR: Regular rate and rhythm.  GASTROINTESTINAL: No tenderness. No guarding.    MUSCULOSKELETAL: Warm and well-perfused.  NEUROLOGIC: Alert, oriented, answers questions appropriately.  INTEGUMENTARY: No jaundice.        LABS:  CBC RESULTS:   Recent Labs   Lab Test 21  0614   WBC 72.4*   RBC 2.15*   HGB 6.2*   HCT 18.9*   MCV 88   MCH 28.8   MCHC 32.8   RDW 15.6*   PLT 18*       Recent Labs   Lab Test 21  0614 21  1629    134   POTASSIUM 3.7 3.7   CHLORIDE 103 101   CO2 24 27   ANIONGAP 9 6   * 99   BUN 40* 35*   CR 1.86* 1.75*   MINNIE 8.2* 7.8*         IMAGING:  Right shoulder x-ray 21:  1.  Normal right glenohumeral joint spacing and alignment.  2.  Narrowed subacromial space. Moderate-sized subacromial  enthesophyte.  3.  Mild acromioclavicular arthrosis.  4.  No fracture.  5.  Sternotomy.        Thank you for the opportunity to participate in this patient's "   care.  Please call with any questions.    Laura Torres MD  Hematology/Oncology  [facility]   CBC with platelets   Result Value Ref Range    WBC 72.4 (HH) 4.0 - 11.0 10e9/L    RBC Count 2.15 (L) 4.4 - 5.9 10e12/L    Hemoglobin 6.2 (LL) 13.3 - 17.7 g/dL    Hematocrit 18.9 (L) 40.0 - 53.0 %    MCV 88 78 - 100 fl    MCH 28.8 26.5 - 33.0 pg    MCHC 32.8 31.5 - 36.5 g/dL    RDW 15.6 (H) 10.0 - 15.0 %    Platelet Count 18 (LL) 150 - 450 10e9/L   Basic metabolic panel   Result Value Ref Range    Sodium 136 133 - 144 mmol/L    Potassium 3.7 3.4 - 5.3 mmol/L    Chloride 103 94 - 109 mmol/L    Carbon Dioxide 24 20 - 32 mmol/L    Anion Gap 9 3 - 14 mmol/L    Glucose 133 (H) 70 - 99 mg/dL    Urea Nitrogen 40 (H) 7 - 30 mg/dL    Creatinine 1.86 (H) 0.66 - 1.25 mg/dL    GFR Estimate 35 (L) >60 mL/min/[1.73_m2]    GFR Estimate If Black 41 (L) >60 mL/min/[1.73_m2]    Calcium 8.2 (L) 8.5 - 10.1 mg/dL   ABO/Rh type and screen   Result Value Ref Range    Units Ordered 1     ABO A     RH(D) Neg     Antibody Screen Neg     Test Valid Only At [facility]        Specimen Expires 06/06/2021     Crossmatch Red Blood Cells    Lactic acid level STAT   Result Value Ref Range    Lactate for Sepsis Protocol 0.7 0.7 - 2.0 mmol/L   Blood component   Result Value Ref Range    Unit Number [id number]     Blood Component Type Red Blood Cells Leukocyte Reduced     Division Number 00     Status of Unit Released to care unit 06/03/2021 1031     Blood Product Code [id number]     Unit Status ISS    CBC with platelets   Result Value Ref Range    WBC 62.9 (HH) 4.0 - 11.0 10e9/L    RBC Count 2.49 (L) 4.4 - 5.9 10e12/L    Hemoglobin 7.2 (L) 13.3 - 17.7 g/dL    Hematocrit 22.0 (L) 40.0 - 53.0 %    MCV 88 78 - 100 fl    MCH 28.9 26.5 - 33.0 pg    MCHC 32.7 31.5 - 36.5 g/dL    RDW 15.3 (H) 10.0 - 15.0 %    Platelet Count 16 (LL) 150 - 450 10e9/L

## 2021-06-03 NOTE — H&P
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 With h/o myeloproliferative disorder requiring transfusions prn. WBC climbing late 5/2021 to 46.2k. Found to have WBC 71.9, hgb 6.4 and platelets of 18. Denies bleeding. Was NOT transfused prior to arrival. Vitals stable, afebrile. Creatinine 1.75. LDH 1150. Uric acid normal. Concern for transformation to AML.  - oncology consult  - NPO for likely urgent BMBx  - BM biopsy as per oncology  - allopurinol 100 mg daily (renal dose) for TLS  - cautious IV fluids with h/o CHF  - transfusions as per oncology (? Need for special preparation), repeat labs in am.   - Blood consent signed and in chart    Chronic HFpEF  CAD s/p CABG 1/2020  HTN  HLD  [reportedly atorvastatin 10 mg daily, furosemide 40 mg am/ 20 mg pm, metoprolol 50 mg BID]  Here in 2/2021 out of concern for STEMI. Med management recommended. Has h/o recurrent hospitalizations for acute CHF.   - hold furosemide, atorvastatin  - continue metoprolol 50 mg BID    KRIS  CKD-3  Baseline creatinine ~1.0. on presentation creatinine at 1.75. uric acid is normal. Suspect dehydration but possible 2/2 leukocytosis as well.   - IV fluids  - avoid nephrotoxins  - monitor creatinine    Right shoulder pain  He is complaining of right shoulder pain.  Of note he was seen by orthopedic surgery and diagnosed with right shoulder subacromial impingement/bursitis/tendinitis.  He was given an injection.  -Physical therapy eventually    COVID-19 negative on admission    DVT Prophylaxis: Pneumatic Compression Devices  Code Status: DNR / DNI    Disposition: Expected discharge in  3-5 days     Joel Lowe MD  [phone] (P)  Text Page     Primary Care Physician   Angus Clements Mai    Chief Complaint   Abnormal labs    History is obtained from the patient and medical records    History of Present Illness   Renny Gannon is a 70 year old male who presents with the labs.  Patient has a history of myelofibrosis and is transfusion dependent.  He follows with oncology.  He also has a history of heart failure and has been hospitalized several times.  He had lab work done prior to transfusion on the day of presentation.  He was found to have a white blood cell count of 71.9.  He currently denies symptoms.  He denies any fevers or chills.  He states his breathing is okay.  Denies any chest pain.  He states his edema is stable.  Denies any bleeding other than from gums occasionally.  Denies abdominal pain.  He has no nausea or vomiting.  Denies diaphoresis.    Past Medical History    I have reviewed this patient's medical history and updated it with pertinent information if needed.   Past Medical History:   Diagnosis Date     Heart disease      History of blood transfusion      Hypertension        Past Surgical History   I have reviewed this patient's surgical history and updated it with pertinent information if needed.  Past Surgical History:   Procedure Laterality Date     BONE MARROW BIOPSY, BONE SPECIMEN, NEEDLE/TROCAR N/A 12/30/2019    Procedure: BIOPSY, BONE MARROW;  Surgeon: Aguilar Krause MD;  Location:  OR     BYPASS GRAFT ARTERY CORONARY N/A 1/31/2020    Procedure: CORONARY ARTERY BYPASS GRAFTING X 3 - LIMA TO LAD, SV TO OM  AND PDA; ENDOVEIN HARVEST OF BILATERAL LEGS; ON PUMP WITH SANDRA;  Surgeon: Mable Barrera MD;  Location:  OR     CV CORONARY ANGIOGRAM Left 1/9/2020    Procedure: Coronary Angiogram;  Surgeon: Devin Bentley MD;  Location:  HE[facility] CARDI[facility] CATH LAB     NO HISTORY OF SURGERY         Prior to Admission Medications   Prior to Admission  Medications   Prescriptions Last Dose Informant Patient Reported? Taking?   ACE/ARB/ARNI NOT PRESCRIBED (INTENTIONAL)   No No   Sig: Please choose reason not prescribed from choices below.   ASPIRIN NOT PRESCRIBED (INTENTIONAL)   No No   Sig: Please choose reason not prescribed from choices below.   allopurinol (ZYLOPRIM) 300 MG tablet   No No   Sig: Take 1 tablet (300 mg) by mouth daily   atorvastatin (LIPITOR) 10 MG tablet   No No   Sig: Take 1 tablet (10 mg) by mouth daily   furosemide (LASIX) 40 MG tablet  Self No No   Sig: TAKE ONE TABLET BY MOUTH DAILY IN THE MORNING AND TAKE 1/2 TABLET AT NOON   metoprolol tartrate (LOPRESSOR) 50 MG tablet   No No   Sig: Take 1 tablet (50 mg) by mouth 2 times daily   nitroGLYcerin (NITROSTAT) 0.4 MG sublingual tablet   No No   Sig: For chest pain place 1 tablet under the tongue every 5 minutes for 3 doses. If symptoms persist 5 minutes after 1st dose call 911.   oxyCODONE (ROXICODONE) 5 MG tablet   No No   Sig: Take 1 tablet (5 mg) by mouth every 6 hours as needed for pain   oxyCODONE (ROXICODONE) 5 MG tablet   No No   Sig: Take 1 tablet (5 mg) by mouth every 6 hours as needed for pain   senna-docusate (SENOKOT-S/PERICOLACE) 8.6-50 MG tablet   Yes No   Sig: Take 1 tablet by mouth 2 times daily as needed for constipation Per [facility] form takes 1/2 tabs BID/PRN   traZODone (DESYREL) 50 MG tablet   No No   Sig: Take 1 tablet (50 mg) by mouth nightly as needed for sleep   vitamin B-12 (CYANOCOBALAMIN) 250 MCG tablet  Self No No   Sig: TAKE ONE TABLET BY MOUTH EVERY MORNING      Facility-Administered Medications: None     Allergies   Allergies   Allergen Reactions     No Known Drug Allergies      Seasonal Allergies Other (See Comments)     Stuffy head and nose       Social History   I have reviewed this patient's social history and updated it with pertinent information if needed. Renny Gannon  reports that he quit smoking about 20 years ago. His smoking use included cigarettes. He  started smoking about 60 years ago. He quit after 30.00 years of use. He has never used smokeless tobacco. He reports that he does not drink alcohol or use drugs.    Family History   I have reviewed this patient's family history and updated it with pertinent information if needed.   Family History   Problem Relation Age of Onset     No Known Problems Mother      Unknown/Adopted Father      Unknown/Adopted Maternal Grandmother      Unknown/Adopted Maternal Grandfather      Unknown/Adopted Paternal Grandmother      Unknown/Adopted Paternal Grandfather      Cancer Brother         lung cancer - smoking     No Known Problems Sister      Coronary Artery Disease Brother          of MI at 66     No Known Problems Sister        Review of Systems   The 10 point Review of Systems is negative other than noted in the HPI or here.     Physical Exam   Temp: 96.6  F (35.9  C) Temp src: Oral BP: 127/64 Pulse: 98   Resp: 18 SpO2: 95 % O2 Device: None (Room air)    Vital Signs with Ranges  183 lbs 12.8 oz    Constitutional: alert, oriented and in no acute distress  Eyes: EOMI, PERRL  HEENT: OP clear  Respiratory: CTA B without w/c  Cardiovascular: RRR no murmur. 2+ edema.  GI: soft, nontender, distended, BS present  Lymph/Hematologic: no cervical LAD  Genitourinary: deferred  Skin: no rashes or lesions grossly  Musculoskeletal: no deformities or arthritis  Neurologic: CN II-XII, RASHID  Psychiatric: mood and affect wnl    Data   Data reviewed today:  I personally reviewed no images or EKG's today.  Recent Labs   Lab 21  1629 21  1234 21  1034   WBC  --  71.9* 46.2*   HGB  --  6.4* 6.7*   MCV  --  89 90   PLT  --  18* 21*     --   --    POTASSIUM 3.7  --   --    CHLORIDE 101  --   --    CO2 27  --   --    BUN 35*  --   --    CR 1.75*  --   --    ANIONGAP 6  --   --    MINNIE 7.8*  --   --    GLC 99  --   --    ALBUMIN 3.3*  --   --    PROTTOTAL 7.0  --   --    BILITOTAL 0.5  --   --    ALKPHOS 124  --   --    ALT  16  --   --    AST 26  --   --        No results found for this or any previous visit (from the past 24 hour(s)).

## 2021-06-03 NOTE — PROVIDER NOTIFICATION
MD Notification    Notified Person: MD    Notified Person Name: Dr. Muhammad    Notification Date/Time: 6/3/21 0900    Notification Interaction: Page    Purpose of Notification: FYI Hospitalist hoping you can round as early as possible. Likely needs BMB. Hgb 6.2 and MD waiting for hem/onc to round before ordering blood    Orders Received:    Comments: Dr. Muhammad was not actually in today

## 2021-06-03 NOTE — PLAN OF CARE
Physical Therapy Discharge Summary    Reason for therapy discharge:    All goals and outcomes met, no further needs identified.    Progress towards therapy goal(s). See goals on Care Plan in [facility] electronic health record for goal details.  Goals met    Therapy recommendation(s):    No further therapy is recommended.

## 2021-06-03 NOTE — CONSULTS
[facility]    Hematology/Oncology Consult Note      Date of Admission:  6/2/2021  Date of Consult:  06/03/21  Reason for Consult: myelofibrosis, concern for transformation to AML      ASSESSMENT/PLAN:  Renny Gannon is a 70 year old male with:    Leukocytosis, anemia, thrombocytopenia: Patient has history of myelofibrosis, now with acute changes on his CBC/diff, concerning for transformation to acute leukemia.  Patient currently denies new symptoms, other than right shoulder pain.  Uric acid was normal.  LDH 1150.  LFT's okay.  Creatinine elevated.      Patient presented to [facility] ED, was going to be transferred to [facility], but due to lack of beds, he was transferred to [facility].  Now he is about to be transferred to [facility] shortly.      -transfuse as needed for hemoglobin <7, platelet <10K, or if bleeding  -he is getting blood transfusion now  -he has no bleeding symptoms currently.  He has no respiratory symptoms.  -he is on allopurinol renally dosed  -he needs bone marrow biopsy as soon as possible.  Since he is getting transferred shortly, it can be done at [facility].  If he stays here overnight for whatever reason, the bone marrow biopsy can be done here.  -will continue to follow here until he gets transferred          HISTORY OF PRESENT ILLNESS: Renny Gannon is a 70 year old male with PMHx of CAD s/p CABG, HFpEF, CKD3, HTN, and JAK2+ myelofibrosis (patient of Dr. Cristina's),  who presents with leukocytosis.  He had lab work done, found to have WBC of 71.9 and sent to the ED.       He sees Dr. Cristina at the [facility] oncology clinic, but he has been on leave, so he did see Dr. Grier in April 2021.      Brief Heme/Onc History:  - 12/2019: presented to primary care physician with a complaint of increasing fatigue and shortness of breath on exertion. Further work-up including a CBC which was done on December 11, 2019  showed a total white count of 79.1 with a hemoglobin is 11.9 platelet count of 378.  Peripheral blood smear revealed leuko-erythroblastic reaction with neutrophilic left shift and rare circulating blasts without dysplasia. Bone marrow biopsy from December 30, 2019 showing extremely hypercellular marrow with granulocytic and megakaryocytic proliferation, megakaryocytic clustering and atypia, marrow fibrosis and 1.5% blasts.  There is also marked peripheral leukocytosis and leuko-erythroblastic clusters and 1.5% circulating blasts.  There is mild normocytic normochromic anemia.  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   - Started on Hydrea 500mg every day 1/2020  - 12/2020: admitted for anemia, requiring 2 units of blood. Hydrea held  - Restart on Hydrea 1/2021 with improving counts  - Stopped hydrea later January 2021 due to persistent pancytopenia  - BMBx 3/26/21: Increased fibrosis, cellularity 40-50%, 5% blasts on morphology and flow    He was recommended to continue regular lab checks and transfusions with Dr. Cristina when he returns.      REVIEW OF SYSTEMS:   14 point ROS was reviewed and is negative other than as noted above in HPI.       MEDICATIONS:  Current Facility-Administered Medications   Medication     acetaminophen (TYLENOL) tablet 650 mg     allopurinol (ZYLOPRIM) tablet 100 mg     cyanocobalamin (VITAMIN B-12) half-tab 250 mcg     lidocaine (LMX4) cream     lidocaine 1 % 0.1-1 mL     magnesium hydroxide (MILK OF MAGNESIA) suspension 30 mL     melatonin tablet 1 mg     metoprolol tartrate (LOPRESSOR) tablet 50 mg     naloxone (NARCAN) injection 0.2 mg    Or     naloxone (NARCAN) injection 0.4 mg    Or     naloxone (NARCAN) injection 0.2 mg    Or     naloxone (NARCAN) injection 0.4 mg     ondansetron (ZOFRAN-ODT) ODT tab 4 mg    Or     ondansetron (ZOFRAN) injection 4 mg     oxyCODONE (ROXICODONE) tablet 5 mg     senna-docusate  (SENOKOT-S/PERICOLACE) 8.6-50 MG per tablet 1 tablet    Or     senna-docusate (SENOKOT-S/PERICOLACE) 8.6-50 MG per tablet 2 tablet     sodium chloride (PF) 0.9% PF flush 3 mL     sodium chloride (PF) 0.9% PF flush 3 mL     sodium chloride 0.9% infusion     traZODone (DESYREL) tablet 50 mg         ALLERGIES:  Allergies   Allergen Reactions     No Known Drug Allergies      Seasonal Allergies Other (See Comments)     Stuffy head and nose         PAST MEDICAL HISTORY:  Past Medical History:   Diagnosis Date     Heart disease      History of blood transfusion      Hypertension          PAST SURGICAL HISTORY:  Past Surgical History:   Procedure Laterality Date     BONE MARROW BIOPSY, BONE SPECIMEN, NEEDLE/TROCAR N/A 2019    Procedure: BIOPSY, BONE MARROW;  Surgeon: Aguilar Krause MD;  Location:  OR     BYPASS GRAFT ARTERY CORONARY N/A 2020    Procedure: CORONARY ARTERY BYPASS GRAFTING X 3 - LIMA TO LAD, SV TO OM  AND PDA; ENDOVEIN HARVEST OF BILATERAL LEGS; ON PUMP WITH SANDRA;  Surgeon: Mable Barrera MD;  Location:  OR     CV CORONARY ANGIOGRAM Left 2020    Procedure: Coronary Angiogram;  Surgeon: Devin Bentley MD;  Location:  HE[facility] CARDI[facility] CATH LAB     NO HISTORY OF SURGERY           SOCIAL HISTORY:  Social History     Socioeconomic History     Marital status: Single     Spouse name: Not on file     Number of children: Not on file     Years of education: Not on file     Highest education level: Not on file   Occupational History     Not on file   Social Needs     Financial resource strain: Not on file     Food insecurity     Worry: Not on file     Inability: Not on file     Transportation needs     Medical: Not on file     Non-medical: Not on file   Tobacco Use     Smoking status: Former Smoker     Years: 3000     Types: Cigarettes     Start date: 1961     Quit date: 2001     Years since quittin.3     Smokeless tobacco: Never Used   Substance and Sexual  "Activity     Alcohol use: No     Frequency: Never     Drug use: No     Sexual activity: Not Currently   Lifestyle     Physical activity     Days per week: Not on file     Minutes per session: Not on file     Stress: Not on file   Relationships     Social connections     Talks on phone: Not on file     Gets together: Not on file     Attends Faith service: Not on file     Active member of club or organization: Not on file     Attends meetings of clubs or organizations: Not on file     Relationship status: Not on file     Intimate partner violence     Fear of current or ex partner: Not on file     Emotionally abused: Not on file     Physically abused: Not on file     Forced sexual activity: Not on file   Other Topics Concern     Parent/sibling w/ CABG, MI or angioplasty before 65F 55M? Not Asked   Social History Narrative     Not on file         FAMILY HISTORY:  Family History   Problem Relation Age of Onset     No Known Problems Mother      Unknown/Adopted Father      Unknown/Adopted Maternal Grandmother      Unknown/Adopted Maternal Grandfather      Unknown/Adopted Paternal Grandmother      Unknown/Adopted Paternal Grandfather      Cancer Brother         lung cancer - smoking     No Known Problems Sister      Coronary Artery Disease Brother          of MI at 66     No Known Problems Sister          PHYSICAL EXAM:  Vital signs:  Temp: 96.5  F (35.8  C) Temp src: Axillary BP: 100/54 Pulse: 83   Resp: 16 SpO2: 96 % O2 Device: None (Room air)     Weight: 83.4 kg (183 lb 12.8 oz)  Estimated body mass index is 27.95 kg/m  as calculated from the following:    Height as of 21: 1.727 m (5' 8\").    Weight as of this encounter: 83.4 kg (183 lb 12.8 oz).    GENERAL/CONSTITUTIONAL: No acute distress.  EYES: No scleral icterus.  RESPIRATORY: Clear to auscultation bilaterally.  CARDIOVASCULAR: Regular rate and rhythm.  GASTROINTESTINAL: No tenderness. No guarding.    MUSCULOSKELETAL: Warm and " well-perfused.  NEUROLOGIC: Alert, oriented, answers questions appropriately.  INTEGUMENTARY: No jaundice.        LABS:  CBC RESULTS:   Recent Labs   Lab Test 06/03/21  0614   WBC 72.4*   RBC 2.15*   HGB 6.2*   HCT 18.9*   MCV 88   MCH 28.8   MCHC 32.8   RDW 15.6*   PLT 18*       Recent Labs   Lab Test 06/03/21  0614 06/02/21  1629    134   POTASSIUM 3.7 3.7   CHLORIDE 103 101   CO2 24 27   ANIONGAP 9 6   * 99   BUN 40* 35*   CR 1.86* 1.75*   MINNIE 8.2* 7.8*         IMAGING:  Right shoulder x-ray 5/27/21:  1.  Normal right glenohumeral joint spacing and alignment.  2.  Narrowed subacromial space. Moderate-sized subacromial  enthesophyte.  3.  Mild acromioclavicular arthrosis.  4.  No fracture.  5.  Sternotomy.        Thank you for the opportunity to participate in this patient's care.  Please call with any questions.    Laura Torres MD  Hematology/Oncology  [facility]

## 2021-06-03 NOTE — PLAN OF CARE
Transfer from [facility]. A& O X 4, VSS. C/o tenderness in R shoulder, declined interventions. LS dim. BS hypoactive, per pt has BM about every other day. NPO w/ice chips. PIV infusing NS @75 mL/hr. +2 edema RLE and +1 LLE. Hem onc to see, possibly BMBx today.

## 2021-06-03 NOTE — DISCHARGE SUMMARY
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    Hospital Course   Renny Gannon was admitted on 6/2/2021.  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 With h/o myeloproliferative disorder requiring transfusions prn. WBC climbing late 5/2021 to 46.2k. Found to have WBC 71.9, hgb 6.4 and platelets of 18. Denies bleeding. Was NOT transfused prior to arrival. Vitals stable, afebrile. Creatinine 1.75. LDH 1150. Uric acid normal. Concern for transformation to AML.  Following admission oncology was consulted, he was kept n.p.o. for urgent bone marrow biopsy, prior to that transfer was arranged, patient could to have his bone marrow biopsy at [facility],  for which 1 unit PRBC transfusion was initiated, IV fluids were continued.  Patient was accepted at the [facility] oncology team for further management, this was discussed with the patient, nursing team here.       Chronic HFpEF  CAD s/p CABG 1/2020  HTN  HLD  [reportedly atorvastatin 10 mg daily, furosemide 40 mg am/ 20 mg pm, metoprolol 50 mg BID]  Here in 2/2021 out of concern for STEMI. Med management recommended. Has h/o recurrent hospitalizations for acute CHF.  His Lasix was on hold due to reduced renal function, continue atorvastatin and metoprolol.       KRIS  CKD-3  Baseline creatinine ~1.0. on presentation creatinine at 1.75. uric acid is normal. Suspect dehydration but possible 2/2 leukocytosis as well.  He was continued  on IV fluids during his stay here, monitor creatinine, avoid nephrotoxins.       Right shoulder pain  He is complaining of right shoulder pain.  Of note he was seen by orthopedic surgery and diagnosed with right shoulder subacromial impingement/bursitis/tendinitis.  He was given an injection.  -Physical therapy eventually     COVID-19 negative on admission      Wanda Russo MD    Significant Results and Procedures       Pending Results     Unresulted Labs Ordered in the Past 30 Days of this Admission     No orders found for last 31 day(s).          Code Status   DNR / DNI       Primary Care Physician   Angus Clements Mai    Physical Exam   Temp: 96.5  F (35.8  C) Temp src: Axillary BP: 121/59 Pulse: 81   Resp: 16 SpO2: 98 % O2 Device: None (Room air)    Vitals:    06/02/21 2340   Weight: 83.4 kg (183 lb 12.8 oz)     Vital Signs with Ranges  Temp:  [96.1  F (35.6  C)-97  F (36.1  C)] 96.5  F (35.8  C)  Pulse:  [] 81  Resp:  [16-18] 16  BP: (100-127)/(46-70) 121/59  SpO2:  [92 %-98 %] 98 %  No intake/output data recorded.    The patient was examined on the day of discharge.    Discharge Disposition   Discharged to [facility] oncology department  Condition at discharge: Fair    Consultations This Hospital Stay   PHYSICAL THERAPY ADULT IP CONSULT  HEMATOLOGY & ONCOLOGY IP CONSULT    Time Spent on this Encounter   I, Wanda Russo MD, personally saw the patient today and spent greater than 30 minutes discharging this patient.    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     Activity - Up with nursing assistance     Follow Up and recommended labs and tests    Patient is transferred to [facility] for further management regarding concern of  AML     No CPR- Do NOT Intubate     Fall precautions     Advance Diet as Tolerated     Follow this diet upon discharge: Orders Placed This Encounter      NPO for Medical/Clinical Reasons Except for: Meds, Ice Chips     Discharge Medications   Current Discharge Medication List      CONTINUE these medications which have NOT CHANGED    Details   allopurinol (ZYLOPRIM) 300 MG tablet Take 1 tablet (300 mg) by mouth daily  Qty:      Associated Diagnoses: Myeloproliferative disorder (H)      atorvastatin (LIPITOR) 10 MG tablet Take 1 tablet (10 mg) by mouth daily    Associated Diagnoses: Coronary artery disease involving native coronary artery of native heart with other form of angina pectoris (H); Essential hypertension      metoprolol tartrate (LOPRESSOR) 50 MG tablet Take 1 tablet (50 mg) by mouth 2 times daily  Qty:      Associated Diagnoses: NSTEMI (non-ST elevated myocardial infarction) (H)      vitamin B-12 (CYANOCOBALAMIN) 250 MCG tablet TAKE ONE TABLET BY MOUTH EVERY MORNING  Qty: 90 tablet, Refills: 3    Associated Diagnoses: S/P CABG (coronary artery bypass graft)      vitamin D3 (CHOLECALCIFEROL) 50 mcg (2000 units) tablet Take 1 tablet by mouth daily      ACE/ARB/ARNI NOT PRESCRIBED (INTENTIONAL) Please choose reason not prescribed from choices below.  Qty:      Associated Diagnoses: Congestive heart failure, unspecified HF chronicity, unspecified heart failure type (H)      ASPIRIN NOT PRESCRIBED (INTENTIONAL) Please choose reason not prescribed from choices below.  Qty:      Associated Diagnoses: Coronary artery disease involving native coronary artery of native heart with other form of angina pectoris (H)      nitroGLYcerin (NITROSTAT) 0.4 MG sublingual tablet For chest pain place 1 tablet under the tongue every 5 minutes for 3 doses. If symptoms persist 5 minutes after 1st dose call 911.  Qty:      Associated Diagnoses: NSTEMI (non-ST elevated myocardial infarction) (H)      oxyCODONE (ROXICODONE) 5 MG tablet Take 1 tablet (5 mg) by mouth every 6 hours as needed for pain  Qty: 2 tablet,  Refills: 0      senna-docusate (SENOKOT-S/PERICOLACE) 8.6-50 MG tablet Take 1 tablet by mouth 2 times daily as needed for constipation Per [facility] form takes 1/2 tabs BID/PRN         STOP taking these medications       furosemide (LASIX) 40 MG tablet Comments:   Reason for Stopping:         traZODone (DESYREL) 50 MG tablet Comments:   Reason for Stopping:             Allergies   Allergies   Allergen Reactions     No Known Drug Allergies      Seasonal Allergies Other (See Comments)     Stuffy head and nose     Data   Most Recent 3 CBC's:  Recent Labs   Lab Test 06/03/21  0614 06/02/21  1234 05/27/21  1034   WBC 72.4* 71.9* 46.2*   HGB 6.2* 6.4* 6.7*   MCV 88 89 90   PLT 18* 18* 21*      Most Recent 3 BMP's:  Recent Labs   Lab Test 06/03/21  0614 06/02/21  1629 03/31/21  1015    134 139   POTASSIUM 3.7 3.7 4.0   CHLORIDE 103 101 106   CO2 24 27 29   BUN 40* 35* 25   CR 1.86* 1.75* 1.07   ANIONGAP 9 6 4   MINNIE 8.2* 7.8* 8.2*   * 99 119*     Most Recent 2 LFT's:  Recent Labs   Lab Test 06/02/21  1629 01/27/21  1235   AST 26 68*   ALT 16 23   ALKPHOS 124 118   BILITOTAL 0.5 2.1*     Most Recent INR's and Anticoagulation Dosing History:  Anticoagulation Dose History     Recent Dosing and Labs Latest Ref Rng & Units 1/31/2020 1/31/2020 1/31/2020 1/31/2020 2/15/2020 12/21/2020 1/27/2021    INR 0.86 - 1.14 1.57(H) 1.74(H) 0.79(L) 0.91 1.16(H) 1.11 1.24(H)        Most Recent 3 Troponin's:  Recent Labs   Lab Test 03/29/21  0211 03/28/21  2158 03/28/21  1737   TROPI <0.015 <0.015 <0.015     Most Recent Cholesterol Panel:  Recent Labs   Lab Test 01/28/21  0532   CHOL 95   LDL 41   HDL 20*   TRIG 172*     Most Recent 6 Bacteria Isolates From Any Culture (See EPIC Reports for Culture Details):  Recent Labs   Lab Test 02/17/20  0925 02/15/20  1718 02/15/20  1659   CULT No growth Cultured on the 2nd day of incubation:  Micrococcus species  *  Critical Value/Significant Value, preliminary result only, called to and read back  by  Jeanne Carson RN. @1322. 2.17.20. BS.     (Note)  NEGATIVE for the following: Staphylococcus spp., Staph aureus, Staph  epidermidis, Staph lugdunensis, Streptococcus spp., Strep pneumoniae,  Strep pyogenes, Strep agalactiae, Strep anginosus group, Enterococcus  faecalis, Enterococcus faecium, and Listeria spp. by [employer]  multiplex nucleic acid test. Final identification and antimicrobial  susceptibility testing will be verified by standard methods.    Critical Value/Significant Value called to and read back by  Margot Patel RN @ 3630. 2/17/20. AV   No growth     Most Recent TSH, T4 and A1c Labs:  Recent Labs   Lab Test 11/17/20  0957 08/17/20  1110   TSH 1.88  --    A1C  --  5.3     Results for orders placed or performed in visit on 05/27/21   XR Shoulder Right G/E 3 Views    Narrative    XR RIGHT SHOULDER THREE OR MORE VIEWS  5/27/2021 2:03 PM     INDICATION: Right-sided shoulder pain.   COMPARISON: None available.      Impression    IMPRESSION:  1.  Normal right glenohumeral joint spacing and alignment.  2.  Narrowed subacromial space. Moderate-sized subacromial  enthesophyte.  3.  Mild acromioclavicular arthrosis.  4.  No fracture.  5.  Sternotomy.    KYE HUANG MD

## 2021-06-03 NOTE — PROVIDER NOTIFICATION
MD Notification    Notified Person: MD    Notified Person Name: Dr. Russo    Notification Date/Time: 6/3/21 0945    Notification Interaction: Page / In-person    Purpose of Notification: FYI paged Dr. Muhammad and have not heard back on when he will round. Hgb 6.2, consent/type &screen done, do we still want to hold off on ordering blood until hem/onc rounds?     Orders Received: Soon after, spoke to Dr. Russo in person who notified me she spoke with Dr Muhammad (He is not actually on-call today, Dr. Torres to see patient). Blood ordered.     Comments:

## 2021-06-03 NOTE — PHARMACY-ADMISSION MEDICATION HISTORY
Pharmacy Medication History  Admission medication history interview status for the 6/2/2021  admission is complete. See EPIC admission navigator for prior to admission medications     Location of Interview: Patient room  Medication history sources: Patient and Surescripts    Significant changes made to the medication list:  Deleted trazodone  Added vit D    In the past week, patient estimated taking medication this percent of the time: 50-90% due to falling asleep    Additional medication history information:   none    Medication reconciliation completed by provider prior to medication history? No    Time spent in this activity: 15 minutes    Prior to Admission medications    Medication Sig Last Dose Taking? Auth Provider   allopurinol (ZYLOPRIM) 300 MG tablet Take 1 tablet (300 mg) by mouth daily 6/2/2021 at Unknown time Yes Art Olsen MD   atorvastatin (LIPITOR) 10 MG tablet Take 1 tablet (10 mg) by mouth daily 6/2/2021 at Unknown time Yes Art Olsen MD   furosemide (LASIX) 40 MG tablet TAKE ONE TABLET BY MOUTH DAILY IN THE MORNING AND TAKE 1/2 TABLET AT NOON 6/2/2021 at Unknown time Yes Angus Scott MD   metoprolol tartrate (LOPRESSOR) 50 MG tablet Take 1 tablet (50 mg) by mouth 2 times daily 6/2/2021 at Unknown time Yes Art Olsen MD   vitamin B-12 (CYANOCOBALAMIN) 250 MCG tablet TAKE ONE TABLET BY MOUTH EVERY MORNING 6/2/2021 at Unknown time Yes Angus Scott MD   vitamin D3 (CHOLECALCIFEROL) 50 mcg (2000 units) tablet Take 1 tablet by mouth daily 6/2/2021 at Unknown time Yes Unknown, Entered By History   ACE/ARB/ARNI NOT PRESCRIBED (INTENTIONAL) Please choose reason not prescribed from choices below.   Angus Scott MD   ASPIRIN NOT PRESCRIBED (INTENTIONAL) Please choose reason not prescribed from choices below.   Angus Scott MD   nitroGLYcerin (NITROSTAT) 0.4 MG sublingual tablet For chest pain place 1 tablet under the tongue every 5 minutes for 3 doses. If symptoms  persist 5 minutes after 1st dose call 911. prn  Art Olsen MD   oxyCODONE (ROXICODONE) 5 MG tablet Take 1 tablet (5 mg) by mouth every 6 hours as needed for pain Unknown at Unknown time  Indra Chaidez,    senna-docusate (SENOKOT-S/PERICOLACE) 8.6-50 MG tablet Take 1 tablet by mouth 2 times daily as needed for constipation Per [facility] form takes 1/2 tabs BID/PRN prAngus geronimo Mai, MD       The information provided in this note is only as accurate as the sources available at the time of update(s)

## 2021-06-04 NOTE — PLAN OF CARE
Pt afebrile with stable vs. BmBx done. Site bleeding. Dressing replaced. No further bleeding. Received 1 unit PRBCs for hgb 7.6. Potassium level 3.4 replaced per protocol. Redraw 4.2. C/o quite severe right shoulder pain. Unable to sit and concentrate for very long without being distracted by the pain and needing to change positions. Received oxycodone 5mg with some relief, then received 5mg more but was not able to stay in position for BmBx and received dilaudid 0.2mg IV in order to tolerate shoulder pain during BmBx (despite versed 1mg as well). Had no complaints no BmBx site pain. Right shoulder Xrays ordered. Pt ate breakfast but declined lunch. Stated that he was not hungry, only thirsty and had a sprite.

## 2021-06-04 NOTE — PHARMACY-ADMISSION MEDICATION HISTORY
Admission Medication History Completed by Pharmacy    See [facility] Admission Navigator for allergy information, preferred outpatient pharmacy, prior to admission medications and immunization status.      Medication History Sources:     MAR and med hx done at [facility]      Prior to Admission medications    Medication Sig Last Dose Taking? Auth Provider   allopurinol (ZYLOPRIM) 300 MG tablet Take 1 tablet (300 mg) by mouth daily 6/3/2021 at AM Yes Art Olsen MD   metoprolol tartrate (LOPRESSOR) 50 MG tablet Take 1 tablet (50 mg) by mouth 2 times daily 6/3/2021 at AM Yes Art Olsen MD   senna-docusate (SENOKOT-S/PERICOLACE) 8.6-50 MG tablet Take 1 tablet by mouth 2 times daily as needed for constipation Per [facility] form takes 1/2 tabs BID/PRN 6/3/2021 at AM Yes Angus Scott MD   vitamin B-12 (CYANOCOBALAMIN) 250 MCG tablet TAKE ONE TABLET BY MOUTH EVERY MORNING 6/3/2021 at AM Yes Angus Scott MD   ACE/ARB/ARNI NOT PRESCRIBED (INTENTIONAL) Please choose reason not prescribed from choices below.   Angus Scott MD   ASPIRIN NOT PRESCRIBED (INTENTIONAL) Please choose reason not prescribed from choices below.   Angus Scott MD   atorvastatin (LIPITOR) 10 MG tablet Take 1 tablet (10 mg) by mouth daily 6/2/2021  Art Olsen MD   nitroGLYcerin (NITROSTAT) 0.4 MG sublingual tablet For chest pain place 1 tablet under the tongue every 5 minutes for 3 doses. If symptoms persist 5 minutes after 1st dose call 911.   Art Olsen MD   oxyCODONE (ROXICODONE) 5 MG tablet Take 1 tablet (5 mg) by mouth every 6 hours as needed for pain   Indra Chaidez,    vitamin D3 (CHOLECALCIFEROL) 50 mcg (2000 units) tablet Take 1 tablet by mouth daily 6/2/2021  Unknown, Entered By History       Date completed: 06/03/21    Medication history completed by: Luli Herrera [facility]

## 2021-06-04 NOTE — PLAN OF CARE
[id number]    AVSS on RA. Transfused one unit PRBC this AM for a hgb of 6.8. Transfused 120ml/hr given heart history. No transfusion reaction. Uses bedside urinal. Up in chair most of the night, did not want to be in bed, claims he only sleeps from 3 or 4 to 6am. Brought in a recliner to elevate pt feet as they are edematous. Continue with POC.

## 2021-06-04 NOTE — PROCEDURES
Bone Marrow Biopsy Procedure Note  Patient's Name: Renny Gannon  Date of Procedure: 6/4/21     PROCEDURE:  Unilateral bone marrow biopsy and unilateral aspirate      INDICATION: Hyperleukocytosis, Myelofibrosis, Concern for AML transformation    PERFORMED BY:  Vinita Tolbert PA-C ([place])     CONSENT:  Informed consent was obtained from the patient. The risks and benefits of the procedure were explained. The patient agreed to undergo the procedure. The consent form was signed and placed in the paper chart.      PREMEDICATION: 1 mg Versed IV     PROCEDURE SUMMARY:  The patient's identification was positively verified by ID band. Patient was laid in the left lateral decubitus position. Premedication with 1 mg Versed IV. The right posterior iliac crest (RPIC) was prepped and draped in a sterile manner. The skin, deeper tissues, and periosteum of the RPIC were anesthetized with approximately 10 mL 1% lidocaine. Following this, a 3mm incision was made. The trephine needle was advanced into the RPIC bone cavity and a 8 mm core biopsy + 8mm core biopsy was obtained. Next, I attempted to obtain bone marrow aspirates. Multiple attempts were made. No aspiration obtained. An additional 10 mm core was obtained. Unfortunately, patient was no longer able to tolerate the procedure further. Following the procedure, a sterile pressure dressing was applied to the bone marrow biopsy site.      COMPLICATIONS:  None. The patient tolerated the procedure very well with minimal discomfort.      RECOMMENDATIONS:  The patient was placed in the supine position to maintain pressure on the biopsy site.   The patient was instructed to lie flat for 45-60 minutes and not to remove the dressing or get it wet for 24 hours post-procedure.      TESTS ORDERED:  Morphology  Flow cytometry  Chromosomes  FISH   Molecular (hold)    If unable to perform additional studies on the samples collected. Will perform on peripheral blood. Patient has 46% blasts in  peripheral blood currently    Vinita Tolbert ([place]) PA-CHERI  Hematology/Oncology  Pager: [phone]

## 2021-06-04 NOTE — PROGRESS NOTES
"[facility]    Hematology / Oncology Progress Note    Date of Service (when I saw the patient): 06/04/2021     Assessment & Plan   Renny Gannon is a 70 year old male who presents with past medical history of CAD s/p CABG (Jan 2020), HFpEF, CKD3 ( BL Cr. 1.3-1.5), HTN, and JAK2+ myelofibrosis. He was transferred from outside hospital for concern of progression to AML with leukocytosis (WBC = 72.4) on admission.     HEME  # LIVIER 2+ Myelofibrosis   # Concern for AML  Follow by Dr. Cristina. He initially was seen in Dec. 2019 by his PCP for increased fatigue and SOB w/ exertion. CBC showed WBC 79.1, Hgb 11.9 and Plt 378. Peripheral smear showed leuko-erythroblastic reaction w/ neutrophilic left shift. BMBx (12/30/19) hypercellular marrow w/ granulocytic and megakaryocytic proliferation, megakaryocytic clustering and atypia and 1.5% circulating blasts. 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 He was on and off Hydrea from Jan 2020 - Jan 2021. BMBx 3/26/21 increased fibrosis, cellularity 40-50%, 5% blasts on morphology and flow. He most recently has just been receiving transfusion support for his anemia and thrombocytopenia. He presented to clinic for labs and possible transfusion and was found to have a WBC 71.9 Hgb 6.4 and Plt 18. Lab noted \"a lot of immature WBC and blasts\". He was then directed to the ED for admission and further work up.  - BMBx completed today 6/4, unfortunately only core was obtained. Unable to get aspiration, likely due to fibrosis and possibly packed with blasts.   - Morph and Flow sent on bone marrow   - Cytogenetics and Molecular sent on peripheral blood  - 23.2% blasts on peripheral blood today  - Flow cytometry on peripheral blood shows 28% myeloid blasts  - Will likely offer treatment with Azacitidine and Venetoclax induction for his possible new AML. With his significant heart history, he would unlikely " be able to tolerate aggressive chemotherapy induction.   - Awaiting pending results   - Will plan to discuss with patient tomorrow  - Prior auth approved for Venetoclax for this patient. Co pay is $1378.62. Charitable ivan available. Will plan to pursue financial assistance after discussion of treatment options.      # Anemia   # Thrombocytopenia   - Patient is transfusion dependent prior to admission due to previous myelofibrosis  Transfuse to keep hgb> 8 (hx CAD and HFpEF) and platelets >10k      # Risk TLS and DIC   - Continue renally adjust allopurinol 100 mg daily   - monitor TLS/DIC labs       ID  COVID test negative 6/2    # ID prophylaxis  - Viral Serologies: HSV 1 positive, HSV 2 negative, EBV positive, HBV Surface Ag negative, HBV surface Ab negative, HBV core Ag negative, HIV negative, CMV negative  -  BID  - Plan to start anti bacterial and anti fungal when ANC <1.0      MSK  # Right Shoulder Pain  # Right shoulder subacromial impingement/bursitis/tendinitis  Patient has had 2-3 weeks of right shoulder pain. Was seen in ortho clinic on 5/27. Has not had any specific injuries or traumas. Pain is worse with motion but can not get comfortable. Was diagnosed with Right shoulder subacromial impingement/bursitis/tendinitis. Has not been able to move his shoulder or sleep due to the pain. Differential includes previous diagnosis of right shoulder subacromial impingement/bursitis/tendinitis, fracture, adhesive capsulitis, septic arthritis  - Received a Kenalog and Marcaine injection on 5/27 in ortho clinic. Received additional injection on 5/30 in ED when he present with worsened pain  - Ortho surg consulted, appreciate recs   - Want to ensure there is no fracture or septic arthritis.    - Repeat imaging per ortho  - Joint is slightly swollen. No erythema, redness or warmth. He is not fevering. Though his immune system is compromised, he is not showing typical signs of infection   - , likely  related to poss acute leukemia  - ESR 76, likely related to poss acute leukemia  - Pain Regimen, so far has had little relief   - Dilaudid 0.2mg q3h   - Oxycodone 5-10mg q4h   - Tylenol    CARDS   # CAD s/p CABG (Jan.2020)  # HFpEF   Last saw cardiology 2/16/21. Last echo 1/27/21 show left ventricular function is low normal. EF 50-55%. Apicolateral hypokensis. Possible mid to distal inferolateral hypokinesis.   - Previous on furosemide at home, HOLD    # HTN   - continue PTA Metoprolol      # HLD   - will hold PTA statin while inpatient.      RENAL   # CKD3   Baseline creatinine ~1.3-1.5   - will monitor with daily CMP       Fluids/Electrolytes/Nutrition   - Bolus as needed  - PRN lyte replacement per standing protocol  - Regular diet as tolerated     Lines: PIV, may need PICC if we proceed with chemotherapy    PPX  VTE: none due to thrombocytopenia  GI: n/a  Bowels: Senna and Miralax PRN  Activity: Up as Tolerated    Code  DNR, Intubation OK    Dispo: Here fore >2midnights, work up for new acute leukemia, may proceed with treatment.     Patient is seen and examined by Dr. Patel and ASHLEE.  Assessment and plan are discussed and delivered to the patient.    Vinita Tolbert (Lan) PA   Hematology/Oncology  Pager: [phone]    Interval History   No acute events overnight.   Renny is having significant pain in his right shoulder. Cannot get comfortable. Constantly moving around due to pain.  Briefly discussed our concern for leukemia. Discussed how we diagnose this disease. Will have further discussion as his formal diagnosis returns.    Has been transfusion depended for his myelofibrosis.   Appetite low. Energy low.   Denies fever, chills, mouth sores, SOB, cough, abdominal pain, diarrhea, constipation, nausea, vomiting, dysuria, hematuria, numbness, tingling, swelling    Complete and Comprehensive review of systems review and negative other than noted here or in the HPI.     Physical Exam   Temp: 98  F (36.7  C) Temp src:  Oral BP: 112/57 Pulse: 86   Resp: 16 SpO2: 96 % O2 Device: None (Room air)    Vitals:    06/03/21 1933 06/04/21 0700   Weight: 79.4 kg (175 lb 1.6 oz) 82.2 kg (181 lb 4.8 oz)     Vital Signs with Ranges  Temp:  [95.4  F (35.2  C)-98.1  F (36.7  C)] 98  F (36.7  C)  Pulse:  [76-94] 86  Resp:  [16-20] 16  BP: (105-137)/(48-64) 112/57  SpO2:  [95 %-98 %] 96 %  I/O last 3 completed shifts:  In: 328   Out: 200 [Urine:200]    Constitutional: Awake and conversational. Non- toxic appearing. No acute distress.   HEENT: Normocephalic, atraumatic. Sclerae anicteric. PERRLA. EOM intact. Moist mucus membranes   Respiratory: Breathing comfortable with no increased work on room air. Good air exchange. No signs of respiratory distress or accessory muscle use. Lungs clear to auscultation bilaterally, no crackles or wheezing noted.  Cardiovascular: Regular rate and rhythm. Normal S1 and S2. No murmurs, rubs, or gallops. No peripheral edema.    GI: Abdomen is soft, non-distended, non-tender. Bowel sounds present and normoactive.   Skin: Skin is clean, dry, intact. No jaundice appreciated.   Musculoskeletal/ Extremities: No redness, warmth of the joints appreciated. Some swelling to his right shoulder. Pain with movement of his right shoulder. Distal pulses palpable. Nailbeds pink and without cyanosis or clubbing.   Neurologic: Alert and oriented. Speech normal. Grossly nonfocal. Memory and thought process preserved. Motor function normal in lower extremities. Right arm range of motion limited. Left arm wnl. Sensation intact.   Neuropsychiatric: Calm, affect congruent to situation. Appropriate mood and affect. Good judgment and insight. No visual/auditory hallucinations.         Medications     - MEDICATION INSTRUCTIONS -         acyclovir  400 mg Oral BID     allopurinol  150 mg Oral Daily     vitamin B-12  200 mcg Oral QAM     levofloxacin  250 mg Oral At Bedtime     metoprolol tartrate  50 mg Oral BID     micafungin  50 mg  Intravenous Q24H     vitamin D3  50 mcg Oral Daily       Data   Results for orders placed or performed during the hospital encounter of 06/03/21 (from the past 24 hour(s))   Leukemia Lymphoma Evaluation Non CSF   Result Value Ref Range    Copath Report       Patient Name: ABI VALDEZ  MR#: [id number]  Specimen #: [id number]  Collected: 6/3/2021 20:56  Received: 6/4/2021 08:00  Reported: 6/4/2021 13:53  Ordering Phy(s): FERNANDO LOWE    For improved result formatting, select 'View Enhanced Report Format' under   Linked Documents section.  _________________________________________    SPECIMEN(S):  Blood    INTERPRETATION:  Blood:       Increased abnormal myeloid blasts (28%)       See comment    COMMENT:  The immunophenotypic findings are supporting progression of myeloid   neoplasm diagnosed previously in this  patient, the current peripheral blood findings are consistent with  blast   crisis. Please correlate with  findings on concurrent bone marrow biopsy ([id number]) and results of   other ancillary studies.  Dr NESTOR Harrington has notified Dr JULI Patel through EPIC mail on 6/4/21 at   1:50 PM.    RESULTS:  Unless otherwise indicated, percentages reported below are based on the   total number of CD45 positive viable  leukocytes. If applic able, percentage of plasma cells is from total viable   nucleated cells.    28% blasts with the following immunophenotype:  Positive for CD13, CD15 (partial), CD33, CD34, CD38 (partial), CD45 (dim),   CD56 (partial), CD64 (partial),   (partial), and HLA-DR.  CD7 is predominantly negative, with small subset (approximately 10%)   showing dim staining .  Negative for CD3, CD10, CD11b, CD14, CD16, and CD19.  26% CD34 positive blasts    98% of the blasts are positive for CD33 (clone P67.6 in APC-R700, relative   to background lymphocytes).    ANTIBODIES:  Multi-color flow analysis is performed for the following markers: CD3,   CD7, CD10, CD11b, CD13, CD14, CD15,  CD16, CD19, CD33,  CD34, CD38, CD45, CD56, CD64, , and HLA-DR. Cells   are gated to isolate populations  (CD45 versus side scatter and forward scatter versus side scatter), to   exclude debris (forward scatter versus  side scatter) and to exclude cell doublets (forward scatter height versus   forward scatter width and side   scatter height versus side scatter width). Forward scatter varies with   cell size. Side scatter varies with the  amount of cytoplasmic granules. Intensity for CD45 usually increases as   hematolymphoid cells mature.    CLINICAL HISTORY:  70 year old male with prior diagnosis of myelofibrosis who presented with   increased leukocytosis    I have personally reviewed all specimens and/or slides, including the   listed special stains, and used them  with my medical judgment to determine the final diagnosis.    Electronically signed out by:    Leonard Harrington M.D.,[facility]    This test was developed and its performance characteristics determined by   [facility]. It has not been cleared or   approved by the US Food and Drug  Administration.  FDA does not require this test to go through premarket   FDA review. This test is used for  clinical purposes and should not be regarded as investigational or for   research.  This lab oratory is certified  under the Clinical Laboratory Improvement Amendments (CLIA) as qualified   to perform high complexity clinical  laboratory testing.    CPT Codes:  A: 78267-SK, 56232-DBQTYMH, 98186-05-TVRV50(15), 51625-HFLC>15    TESTING LAB LOCATION:  [address] [address] [phone]    COLLECTION SITE:  Client:  [facility]  Location:  [id number] (B)     CBC with platelets differential   Result Value Ref Range    WBC 71.1 (HH) 4.0 - 11.0 10e9/L    RBC Count 2.37 (L) 4.4 - 5.9 10e12/L    Hemoglobin 6.8  (LL) 13.3 - 17.7 g/dL    Hematocrit 20.6 (L) 40.0 - 53.0 %    MCV 87 78 - 100 fl    MCH 28.7 26.5 - 33.0 pg    MCHC 33.0 31.5 - 36.5 g/dL    RDW 15.6 (H) 10.0 - 15.0 %    Platelet Count 17 (LL) 150 - 450 10e9/L    Diff Method Manual Differential     % Neutrophils 13.9 %    % Lymphocytes 6.6 %    % Monocytes 13.1 %    % Eosinophils 3.3 %    % Basophils 4.1 %    % Metamyelocytes 0.8 %    % Myelocytes 7.4 %    % Promyelocytes 3.3 %    % Blasts 47.5 %    Absolute Neutrophil 9.9 (H) 1.6 - 8.3 10e9/L    Absolute Lymphocytes 4.7 0.8 - 5.3 10e9/L    Absolute Monocytes 9.3 (H) 0.0 - 1.3 10e9/L    Absolute Eosinophils 2.3 (H) 0.0 - 0.7 10e9/L    Absolute Basophils 2.9 (H) 0.0 - 0.2 10e9/L    Absolute Metamyelocytes 0.6 (H) 0 10e9/L    Absolute Myelocytes 5.3 (H) 0 10e9/L    Absolute Promyeloctyes 2.3 (H) 0 10e9/L    Absolute Blasts 33.8 (H) 0 10e9/L    Anisocytosis Slight     Poikilocytosis Slight     Polychromasia Slight     Microcytes Present     Platelet Estimate Confirming automated cell count    Comprehensive metabolic panel   Result Value Ref Range    Sodium 137 133 - 144 mmol/L    Potassium 3.3 (L) 3.4 - 5.3 mmol/L    Chloride 106 94 - 109 mmol/L    Carbon Dioxide 23 20 - 32 mmol/L    Anion Gap 8 3 - 14 mmol/L    Glucose 124 (H) 70 - 99 mg/dL    Urea Nitrogen 39 (H) 7 - 30 mg/dL    Creatinine 1.66 (H) 0.66 - 1.25 mg/dL    GFR Estimate 41 (L) >60 mL/min/[1.73_m2]    GFR Estimate If Black 47 (L) >60 mL/min/[1.73_m2]    Calcium 8.2 (L) 8.5 - 10.1 mg/dL    Bilirubin Total 0.4 0.2 - 1.3 mg/dL    Albumin 3.0 (L) 3.4 - 5.0 g/dL    Protein Total 6.9 6.8 - 8.8 g/dL    Alkaline Phosphatase 135 40 - 150 U/L    ALT 18 0 - 70 U/L    AST 27 0 - 45 U/L   Magnesium   Result Value Ref Range    Magnesium 2.4 (H) 1.6 - 2.3 mg/dL   Phosphorus   Result Value Ref Range    Phosphorus 3.6 2.5 - 4.5 mg/dL   Uric acid   Result Value Ref Range    Uric Acid 5.9 3.5 - 7.2 mg/dL   Lactate Dehydrogenase   Result Value Ref Range    Lactate  Dehydrogenase 1,121 (H) 85 - 227 U/L   Herpes Simplex Virus 1 and 2 IgG   Result Value Ref Range    Herpes Simplex Virus Type 1 IgG 6.2 (H) 0.0 - 0.8 AI    Herpes Simplex Virus Type 2 IgG <0.2 0.0 - 0.8 AI   CMV Antibody IgG   Result Value Ref Range    CMV Antibody IgG 0.3 0.0 - 0.8 AI   EBV Capsid Antibody IgG   Result Value Ref Range    EBV Capsid Antibody IgG >8.0 (H) 0.0 - 0.8 AI   HIV Antigen Antibody Combo   Result Value Ref Range    HIV Antigen Antibody Combo Nonreactive NR^Nonreactive       Hepatitis B Surface Antibody   Result Value Ref Range    Hepatitis B Surface Antibody 0.08 <8.00 m[IU]/mL   Hepatitis B surface antigen   Result Value Ref Range    Hep B Surface Agn Nonreactive NR^Nonreactive   Hepatitis B core antibody   Result Value Ref Range    Hepatitis B Core Zeina Nonreactive NR^Nonreactive   Hepatitis C antibody   Result Value Ref Range    Hepatitis C Antibody Nonreactive NR^Nonreactive   INR   Result Value Ref Range    INR 1.32 (H) 0.86 - 1.14   Fibrinogen activity   Result Value Ref Range    Fibrinogen 575 (H) 200 - 420 mg/dL   Partial thromboplastin time   Result Value Ref Range    PTT 45 (H) 22 - 37 sec   ABO/Rh type and screen   Result Value Ref Range    Units Ordered 2     ABO A     RH(D) Neg     Antibody Screen Neg     Test Valid Only At          [facility]    Specimen Expires 06/06/2021     Crossmatch Red Blood Cells    Blood component   Result Value Ref Range    Unit Number [id number]     Blood Component Type Red Blood Cells Leukocyte Reduced     Division Number 00     Status of Unit Released to care unit 06/04/2021 0136     Blood Product Code [id number]     Unit Status ISS    Blood component   Result Value Ref Range    Unit Number [id number]     Blood Component Type Red Blood Cells Leukocyte Reduced     Division Number 00     Status of Unit Released to care unit 06/04/2021 1103     Blood Product Code [id number]     Unit Status ISS    XR Chest  2 Views    Narrative    EXAM: XR CHEST 2 VW  6/3/2021 9:50 PM     HISTORY:  71 yo M with concern for acute leukemia       COMPARISON:  3/29/2021    FINDINGS:   PA and lateral views of the chest. Postoperative changes of catheter  age with intact median sternotomy wires. Unchanged slight rightward  tracheal deviation. Stable enlargement of the cardiac silhouette..  Small left pleural effusion and trace right pleural effusion with  adjacent bibasilar opacities. The visualized upper abdomen is  unremarkable. Degenerative changes of the spine.      Impression    IMPRESSION:   Small left and trace right pleural effusions with adjacent bibasilar  opacities, favored to represent atelectasis.    I have personally reviewed the examination and initial interpretation  and I agree with the findings.    YARA LANGFORD MD   EKG 12-lead, tracing only   Result Value Ref Range    Interpretation ECG Click View Image link to view waveform and result    CBC with platelets differential   Result Value Ref Range    WBC 66.3 (HH) 4.0 - 11.0 10e9/L    RBC Count 2.62 (L) 4.4 - 5.9 10e12/L    Hemoglobin 7.6 (L) 13.3 - 17.7 g/dL    Hematocrit 23.0 (L) 40.0 - 53.0 %    MCV 88 78 - 100 fl    MCH 29.0 26.5 - 33.0 pg    MCHC 33.0 31.5 - 36.5 g/dL    RDW 15.5 (H) 10.0 - 15.0 %    Platelet Count 15 (LL) 150 - 450 10e9/L    Diff Method Manual Differential     % Neutrophils 13.3 %    % Lymphocytes 4.5 %    % Monocytes 42.9 %    % Eosinophils 10.7 %    % Basophils 2.7 %    % Myelocytes 2.7 %    % Blasts 23.2 %    Nucleated RBCs 1 (H) 0 /100    Absolute Neutrophil 8.8 (H) 1.6 - 8.3 10e9/L    Absolute Lymphocytes 3.0 0.8 - 5.3 10e9/L    Absolute Monocytes 28.4 (H) 0.0 - 1.3 10e9/L    Absolute Eosinophils 7.1 (H) 0.0 - 0.7 10e9/L    Absolute Basophils 1.8 (H) 0.0 - 0.2 10e9/L    Absolute Myelocytes 1.8 (H) 0 10e9/L    Absolute Blasts 15.4 (H) 0 10e9/L    Absolute Nucleated RBC 0.6     RBC Morphology Normal     Platelet Estimate Confirming automated cell  count    Basic metabolic panel   Result Value Ref Range    Sodium 135 133 - 144 mmol/L    Potassium 3.4 3.4 - 5.3 mmol/L    Chloride 105 94 - 109 mmol/L    Carbon Dioxide 22 20 - 32 mmol/L    Anion Gap 8 3 - 14 mmol/L    Glucose 114 (H) 70 - 99 mg/dL    Urea Nitrogen 35 (H) 7 - 30 mg/dL    Creatinine 1.45 (H) 0.66 - 1.25 mg/dL    GFR Estimate 48 (L) >60 mL/min/[1.73_m2]    GFR Estimate If Black 56 (L) >60 mL/min/[1.73_m2]    Calcium 8.4 (L) 8.5 - 10.1 mg/dL   INR   Result Value Ref Range    INR 1.30 (H) 0.86 - 1.14   Fibrinogen activity   Result Value Ref Range    Fibrinogen 549 (H) 200 - 420 mg/dL   Uric acid   Result Value Ref Range    Uric Acid 5.5 3.5 - 7.2 mg/dL   Magnesium   Result Value Ref Range    Magnesium 2.4 (H) 1.6 - 2.3 mg/dL   Phosphorus   Result Value Ref Range    Phosphorus 3.3 2.5 - 4.5 mg/dL   Lactate Dehydrogenase   Result Value Ref Range    Lactate Dehydrogenase 1,016 (H) 85 - 227 U/L   Care Management / Social Work IP Consult    Narrative    Angie Maria RN     6/4/2021  1:36 PM  Care Management Initial Consult    General Information  Assessment completed with: Patient, Care Team Member    Type of CM/SW Visit: Initial Assessment    Primary Care Provider verified and updated as needed: Yes   Readmission within the last 30 days: No previous admission in   last 30 days      Reason for Consult: Elevated Risk Score   Advance Care Planning: Reviewed: Present on chart        Communication Assessment  Patient's communication style: Spoken language (English or   Bilingual)    Hearing Difficulty or Deaf: no   Wear Glasses or Blind: yes    Cognitive  Cognitive/Neuro/Behavioral: WDL                      Living Environment:   People in home: Alone     Current living Arrangements: Apartment      Able to return to prior arrangements: Yes     Family/Social Support:  Care provided by: Self, siblings  Provides care for: No one     Description of Support System: Supportive, Involved      Current Resources:    Patient receiving home care services: No  Community Resources: OP Infusion  Equipment currently used at home: Grab bar, toilet, grab bar,   tub/shower, shower chair, walker, standard  Supplies currently used at home: None    Employment/Financial:  Employment Status: Retired        Financial Concerns: No concerns identified     Lifestyle & Psychosocial Needs:        Socioeconomic History     Marital status: Single     Spouse name: Not on file     Number of children: Not on file     Years of education: Not on file     Highest education level: Not on file   Occupational History     Occupation: Retired     Tobacco Use     Smoking status: Former Smoker     Years: 30.00     Types: Cigarettes     Start date: 1961     Quit date: 2001     Years since quittin.3     Smokeless tobacco: Never Used   Substance and Sexual Activity     Alcohol use: No     Frequency: Never     Drug use: No     Sexual activity: Not Currently       Functional Status:  Prior to admission patient needed assistance:   Dependent ADLs: Independent (has a walker at home, uses as   needed)  Dependent IADLs: Transportation, Shopping, Cooking, Cleaning  Assesssment of Functional Status: At functional baseline    Mental Health Status:  Mental Health Status: No Current Concerns       Chemical Dependency Status:  Chemical Dependency Status: No Current Concerns       Values/Beliefs:  Spiritual, Cultural Beliefs, [employer] Practices, Values that   affect care: No               Additional Information:    Patient with a history of JAK2+ myelofibrosis was transferred   from an outside hospital 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 BMBx   today. Per team, pending patient's decision, will likely start   chemotherapy tomorrow, with prolonged admission, ~4 weeks.    CM assessment being completed due to elevated unplanned   readmission score.     Per chart review, patient was previously open to [facility]. Per [facility], patient was  discharged from   services on 21.    Patient is independent at baseline and does not receive any   in-home supports or services. Patient does receives OP Infusion   services at [facility]. Patient's sister's,   Megan, assist him with transportation, cooking,   cleaning, shopping and meal prep, as needed.     RNCC/SW will continue to follow and assist with discharge   planning as needed.    Angie Maria, RN, BSN, PHN  Care Coordinator   P: [phone], [facility]                  Potassium   Result Value Ref Range    Potassium 4.2 3.4 - 5.3 mmol/L   CRP inflammation   Result Value Ref Range    CRP Inflammation 120.0 (H) 0.0 - 8.0 mg/L   Erythrocyte sedimentation rate auto   Result Value Ref Range    Sed Rate 76 (H) 0 - 20 mm/h   Echocardiogram Complete    Narrative    [id number]  [id number]  [id number]  [id number]^MYNOR^FERNANDO     [facility],[place]  Echocardiography Laboratory  [address] [place], [place] [address]     Name: ABI VALDEZ  MRN: [id number]  : 1950  Study Date: 2021 01:16 PM  Age: 70 yrs  Gender: Male  Patient Location: [facility] [place]  Reason For Study: Chemo  Ordering Physician: FERNANDO LOWE  Performed By: REY Garcia     BSA: 2.0 m2  Height: 69 in  Weight: 181 lb  BP: 124/62 mmHg  ______________________________________________________________________________  Procedure  Complete Portable Echo Adult. Contrast Optison. Poor acoustic windows.  Technically difficult study. Optison (NDC #2045-5302-57) given intravenously.  Patient was given 6 ml mixture of 3 ml Optison and 6 ml saline. 3 ml wasted.  IV start location R Forearm .  ______________________________________________________________________________  Interpretation Summary  Technically difficult study.  Poor acoustic windows.     Mildly (EF 45-50%) reduced left ventricular function is present. Inferolateral  (posterior) wall hypokinesis is present.  Pulmonary  hypertension is present. SPAP is 34+15=49 mmHg.  The right ventricle cannot be assessed.  Dilation of the inferior vena cava is present with abnormal respiratory  variation in diameter.  No pericardial effusion is present.     This study was compared with the study from 1/28/2021 .IVC is dilated, RA  pressure is higher.     ______________________________________________________________________________  Left Ventricle  Left ventricular size is normal. Relative wall thickness is increased  consistent with concentric remodeling. Mildly (EF 45-50%) reduced left  ventricular function is present. Left ventricular diastolic function is  indeterminate. Inferolateral (posterior) wall hypokinesis is present.     Right Ventricle  The right ventricle cannot be assessed.     Atria  Both atria appear normal.     Mitral Valve  The mitral valve is normal.     Aortic Valve  Trileaflet aortic sclerosis without stenosis. On Doppler interrogation, there  is no significant stenosis or regurgitation.     Tricuspid Valve  The tricuspid valve is normal. Trace tricuspid insufficiency is present. The  right ventricular systolic pressure is approximated at 33.6 mmHg plus the  right atrial pressure. Pulmonary hypertension is present.     Pulmonic Valve  The pulmonic valve cannot be assessed.     Vessels  The aorta root cannot be assessed. Dilation of the inferior vena cava is  present with abnormal respiratory variation in diameter. IVC diameter >2.1 cm  collapsing <50% with sniff suggests a high RA pressure estimated at 15 mmHg or  greater.     Pericardium  No pericardial effusion is present.     Compared to Previous Study  This study was compared with the study from 1/28/2021 . IVC is dilated, RA  pressure is higher.  ______________________________________________________________________________  MMode/2D Measurements & Calculations  IVSd: 1.2 cm  LVIDd: 4.6 cm  LVIDs: 3.9 cm  LVPWd: 1.0 cm  FS: 16.0 %  LV mass(C)d: 186.0 grams  LV  mass(C)dI: 93.9 grams/m2     EF(MOD-bp): 44.5 %  LA Volume (BP): 92.6 ml  LA Volume Index (BP): 46.8 ml/m2  RWT: 0.45     Doppler Measurements & Calculations  MV E max vlad: 98.3 cm/sec  MV A max vlad: 40.0 cm/sec  MV E/A: 2.5  MV dec slope: 631.0 cm/sec2  PA V2 max: 64.4 cm/sec  PA max P.7 mmHg  PA acc time: 0.13 sec  TR max vlad: 290.0 cm/sec  TR max P.6 mmHg  E/E' av.6  Lateral E/e': 8.5  Medial E/e': 24.7     ______________________________________________________________________________  Report approved by: Nan SOARES 2021 02:53 PM         XR Shoulder Right G/E 3 Views    Narrative    3 views right shoulder radiographs 2021 2:38 PM    History: right shoulder pain     Comparison: 2021    Findings:    AP internal, external rotation , transscapular Y views of the right  shoulder were obtained.     No acute osseous abnormality. Glenohumeral and acromioclavicular  joints are congruent.    Moderate degenerative changes of the acromioclavicular joint.  Subacromial spurring. No substantial degenerative change of the  glenohumeral joint.    Soft tissue is unremarkable. The visualized lung is clear.      Impression    Impression:  1. No acute osseous abnormality.  2. Moderate acromioclavicular degenerative change.    REJI SARKAR MD (Joe)     Recent Labs   Lab 21  1341 21  0659 21  2058 21  1623 21  0614 21  1629   WBC  --  66.3* 71.1* 62.9* 72.4*  --    HGB  --  7.6* 6.8* 7.2* 6.2*  --    MCV  --  88 87 88 88  --    PLT  --  15* 17* 16* 18*  --    INR  --  1.30* 1.32*  --   --   --    NA  --  135 137  --  136 134   POTASSIUM 4.2 3.4 3.3*  --  3.7 3.7   CHLORIDE  --  105 106  --  103 101   CO2  --  22 23  --  24 27   BUN  --  35* 39*  --  40* 35*   CR  --  1.45* 1.66*  --  1.86* 1.75*   ANIONGAP  --  8 8  --  9 6   MINNIE  --  8.4* 8.2*  --  8.2* 7.8*   GLC  --  114* 124*  --  133* 99   ALBUMIN  --   --  3.0*  --   --  3.3*   PROTTOTAL  --   --  6.9   --   --  7.0   BILITOTAL  --   --  0.4  --   --  0.5   ALKPHOS  --   --  135  --   --  124   ALT  --   --  18  --   --  16   AST  --   --  27  --   --  26

## 2021-06-04 NOTE — PLAN OF CARE
[id number]    Nursing Focus: Admission  D: Arrived at 1930 from [facility] via transport. Patient accompanied by self. Admitted for Acute Leukemia.      I: Admission process began.  Patient oriented to room, enviroment, call light.  Md. notified of patients arrival on unit.     A: Vital signs stable, afebrile.  Patient stable at this time.  Complaining of Right shoulder pain.     P: Implement plan of care when available. Continue to monitor patient. Nursing interventions as appropriate. Notify md with changes in pt status.       NEURO: Alert and oriented x4.      RESPIRATORY: Room Air, denies dizziness, and SOB. Lungs sound, clear, equal bilateral.     CARDIO: VSS, denies dizziness, and extremity pain.      GI/:  Denies N/V, BS active, BM x 1.      SKIN: Intact.      ACTIVITY: Independent.      PAIN: R shoulder pain, ICE pack helped.     DLA: PIV, TKO.     BG: No     PLAN: BMB in the morning at 0930, Blood transfusion after consent signed. Continue monitoring.

## 2021-06-04 NOTE — PLAN OF CARE
[id number]  No acute event, Afebrile, VSS.   C/O left shoulder pain, PRN oxy and IV dilaudid given.   Pt slept on recliner, decline laying on the bed.     NEURO: Alert and oriented x4.      RESPIRATORY: Room Air, denies dizziness, and SOB. Lungs sound, clear, equal bilateral.     CARDIO: VSS, denies dizziness, and extremity pain.      GI/:  Denies N/V, BS active, No BM, AUOP.        SKIN: Intact.      ACTIVITY: Stand by assist.      PAIN: Yes, Left shoulder.     DLA: PIV, NS locked.     BG: No     PLAN: Continue monitoring.

## 2021-06-04 NOTE — CONSULTS
Care Management Initial Consult    General Information  Assessment completed with: Patient, Care Team Member    Type of CM/SW Visit: Initial Assessment    Primary Care Provider verified and updated as needed: Yes   Readmission within the last 30 days: No previous admission in last 30 days      Reason for Consult: Elevated Risk Score   Advance Care Planning: Reviewed: Present on chart        Communication Assessment  Patient's communication style: Spoken language (English or Bilingual)    Hearing Difficulty or Deaf: no   Wear Glasses or Blind: yes    Cognitive  Cognitive/Neuro/Behavioral: WDL                      Living Environment:   People in home: Alone     Current living Arrangements: Apartment      Able to return to prior arrangements: Yes     Family/Social Support:  Care provided by: Self, siblings  Provides care for: No one     Description of Support System: Supportive, Involved      Current Resources:   Patient receiving home care services: No  Community Resources: OP Infusion  Equipment currently used at home: Grab bar, toilet, grab bar, tub/shower, shower chair, walker, standard  Supplies currently used at home: None    Employment/Financial:  Employment Status: Retired        Financial Concerns: No concerns identified     Lifestyle & Psychosocial Needs:        Socioeconomic History     Marital status: Single     Spouse name: Not on file     Number of children: Not on file     Years of education: Not on file     Highest education level: Not on file   Occupational History     Occupation: Retired     Tobacco Use     Smoking status: Former Smoker     Years: 30.00     Types: Cigarettes     Start date: 1961     Quit date: 2001     Years since quittin.3     Smokeless tobacco: Never Used   Substance and Sexual Activity     Alcohol use: No     Frequency: Never     Drug use: No     Sexual activity: Not Currently       Functional Status:  Prior to admission patient needed assistance:   Dependent ADLs:  Independent (has a walker at home, uses as needed)  Dependent IADLs: Transportation, Shopping, Cooking, Cleaning  Assesssment of Functional Status: At functional baseline    Mental Health Status:  Mental Health Status: No Current Concerns       Chemical Dependency Status:  Chemical Dependency Status: No Current Concerns       Values/Beliefs:  Spiritual, Cultural Beliefs, Congregation Practices, Values that affect care: No               Additional Information:    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 BMBx today. Per team, pending patient's decision, will likely start chemotherapy tomorrow, with prolonged admission, ~4 weeks.    CM assessment being completed due to elevated unplanned readmission score.     Per chart review, patient was previously open to [facility]. Per [facility], patient was discharged from services on 4/23/21.    Patient is independent at baseline and does not receive any in-home supports or services. Patient does receives OP Infusion services at [facility]. Patient's sister's, Megan, assist him with transportation, cooking, cleaning, shopping and meal prep, as needed.     RNCC/SW will continue to follow and assist with discharge planning as needed.    Angie Maria, RN, BSN, PHN  Care Coordinator   P: [phone], [facility]

## 2021-06-04 NOTE — PROVIDER NOTIFICATION
#[id number]    Renny Gannon; Pt's Hgb 6.8, WBC 71.1 and Plts 17.  Pt need consent signed for blood, and updated code status.    Thank you   Brunilda [id number]

## 2021-06-05 NOTE — PLAN OF CARE
[id number]    Nursing Focus: Chemotherapy  D: Positive blood return via PICC. Insertion site is clean/dry/intact, dressing intact with no complaints of pain.  Urine output is recorded in intake in Doc Flowsheet.    I: Premedications given per order (see electronic medical administration record). Dose #1 of decitabine infused over one hour. Reviewed pt teaching on chemotherapy side effects.  Pt denies need for further teaching. Chemotherapy double checked per protocol by two chemotherapy competent RN's.   A: Tolerating procedure well. Denies nausea and or pain.   P: Continue to monitor urine output and symptoms of nausea. Screen for symptoms of toxicity.    No acute event, Afebrile, VSS.  Decitabine #1 given without incident.    NEURO: Alert and oriented x4.      RESPIRATORY: Room Air, denies dizziness, and SOB.     CARDIO: VSS, denies dizziness, and extremity pain.      GI/:  Denies N/V, BS active, AUOP.       SKIN: Intact.      ACTIVITY: Stand by assist/independent.      PAIN: Yes, Right shoulder pain.     DLA: PICC, double lumen, purple hep locked.     BG: No     PLAN: Continue monitoring.

## 2021-06-05 NOTE — PLAN OF CARE
Pt afebrile with stable vs. BmBx site dressing intact. No further bleeding. 1 unit PRBCs for hgb 7.9, ordered, but deferred by blood bank and provider because blood shortage and pt not symptomatic. Continues to c/o right shoulder pain. Received oxycodone 10mg ~q4hrs with some relief. Cold packs also helpful and prednisone initiated today to decrease inflammation. Chemo orders released. Chemo cards given. Teaching done. Left floor for PICC placement at 1500.

## 2021-06-05 NOTE — PROCEDURES
[facility]    Double Lumen PICC Placement    Date/Time: 6/5/2021 3:45 PM  Performed by: Prieto Elizondo RN  Authorized by: Jeane Patel MD   Indications: vascular access    UNIVERSAL PROTOCOL   Site Marked: Yes  Prior Images Obtained and Reviewed:  Yes  Required items: Required blood products, implants, devices and special equipment available    Patient identity confirmed:  Verbally with patient and arm band  NA - No sedation, light sedation, or local anesthesia  Confirmation Checklist:  Patient's identity using two indicators, relevant allergies, procedure was appropriate and matched the consent or emergent situation and correct equipment/implants were available  Time out: Immediately prior to the procedure a time out was called    Universal Protocol: the Joint Commission Universal Protocol was followed    Preparation: Patient was prepped and draped in usual sterile fashion           ANESTHESIA    Anesthesia: Local infiltration  Local Anesthetic:  Lidocaine 1% without epinephrine  Anesthetic Total (mL):  5      SEDATION    Patient Sedated: No        Preparation: skin prepped with ChloraPrep  Skin prep agent: skin prep agent completely dried prior to procedure  Sterile barriers: maximum sterile barriers were used: cap, mask, sterile gown, sterile gloves, and large sterile sheet  Hand hygiene: hand hygiene performed prior to central venous catheter insertion  Type of line used: PICC and Power PICC  Catheter type: double lumen  Lumen type: non-valved  Catheter size: 5 Fr  Brand: Bard  Lot number: [id number]  Placement method: venipuncture, MST, ultrasound and tip confirmation system  Number of attempts: 1  Successful placement: yes  Orientation: right  Location: basilic vein (vd 0.78 cm)  Arm circumference: adults 10 cm  Extremity circumference: 28  Visible catheter length: 1  Total catheter length: 38  Dressing and securement: blood cleaned with CHG,  chlorhexidine disc applied, dressing applied, glue, line secured, securement device, site cleaned, statlock and sterile dressing applied  Post procedure assessment: blood return through all ports and free fluid flow (3CG technology)  PROCEDURE   Patient Tolerance:  Patient tolerated the procedure well with no immediate complications  Describe Procedure: PICC ok to use

## 2021-06-05 NOTE — PROGRESS NOTES
"[facility]    Hematology / Oncology Progress Note    Date of Service (when I saw the patient): 06/05/2021     Assessment & Plan   Renny Gannon is a 70 year old male who presents with past medical history of CAD s/p CABG (Jan 2020), HFpEF, CKD3 ( BL Cr. 1.3-1.5), HTN, and JAK2+ myelofibrosis. He was transferred from outside hospital for concern of progression to AML with leukocytosis (WBC = 72.4) on admission.     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 Patient agreeable to treatment  - Will plan to initiate Decitabine with plan to start Venetoclax once WBC count <25K   - PICC line ordered to be placed today   - Will plan to start chemo today  - Pain still present in right shoulder, will trial prednisone 20mg for a couple days to help with shoulder pain   - Continue Oxycodone 5-10mg q4h prn, tylenol TID shceduled and dilaudid prn for pain control    HEME  # LIVIER 2+ Myelofibrosis   # Concern for AML  Follow by Dr. Cristina. He initially was seen in Dec. 2019 by his PCP for increased fatigue and SOB w/ exertion. CBC showed WBC 79.1, Hgb 11.9 and Plt 378. Peripheral smear showed leuko-erythroblastic reaction w/ neutrophilic left shift. BMBx (12/30/19) hypercellular marrow w/ granulocytic and megakaryocytic proliferation, megakaryocytic clustering and atypia and 1.5% circulating blasts. 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 He was on and off Hydrea from Jan 2020 - Jan 2021. BMBx 3/26/21 increased fibrosis, cellularity 40-50%, 5% blasts on morphology and flow. He most recently has just been receiving transfusion support for his anemia and thrombocytopenia. He presented to clinic for labs and possible transfusion and was found to have a WBC 71.9 Hgb 6.4 and Plt 18. Lab noted \"a lot of immature " "WBC and blasts\". He was then directed to the ED for admission and further work up.  - BMBx completed today 6/4, unfortunately only core was obtained. Unable to get aspiration, likely due to fibrosis and possibly packed with blasts.   - Morph and Flow sent on bone marrow   - Cytogenetics and Molecular sent on peripheral blood  - 23.2% blasts on peripheral blood today  - Flow cytometry on peripheral blood shows 28% myeloid blasts  - Discussed diagnosis, treatment options and prognosis with patient and sister. Patient agreeable to treatment  - Will plan to initiate Decitabine with plan to start Venetoclax once WBC count <25K. With his significant heart history, he would unlikely be able to tolerate aggressive chemotherapy induction.   - PICC line ordered to be placed today   - Will plan to start chemo today  - Prior auth approved for Venetoclax for this patient. Co pay is $1378.62. Charitable ivan available. Will plan to pursue financial assistance after discussion of treatment options.    - Discussed with Demetrice Galarza, Financial Liason. In process      Treatment Plan: Decitabine (5 days) / Venetoclax (C1D1: 6/5/21)   - Decitabine 20mg/m2 - D1-D5   - Venetoclax Ramp up - Plan to initiate when WBC <25K   - Supportive meds: PRN anti- emetics, bowl regimen, pain control    # Anemia   # Thrombocytopenia   - Patient is transfusion dependent prior to admission due to previous myelofibrosis  Transfuse to keep hgb> 8 (hx CAD and HFpEF) and platelets >10k      # Risk TLS and DIC   - Continue renally adjust allopurinol 100 mg daily   - monitor TLS/DIC labs       ID  COVID test negative 6/2    # ID prophylaxis  - Viral Serologies: HSV 1 positive, HSV 2 negative, EBV positive, HBV Surface Ag negative, HBV surface Ab negative, HBV core Ag negative, HIV negative, CMV negative  -  BID  - Plan to start anti bacterial and anti fungal when ANC <1.0      MSK  # Right Shoulder Pain  # Right shoulder subacromial " impingement/bursitis/tendinitis  # Adhesive Capsulitis  Patient has had 2-3 weeks of right shoulder pain. Was seen in ortho clinic on 5/27. Has not had any specific injuries or traumas. Pain is worse with motion but can not get comfortable. Was diagnosed with Right shoulder subacromial impingement/bursitis/tendinitis. Has not been able to move his shoulder or sleep due to the pain. Differential includes previous diagnosis of right shoulder subacromial impingement/bursitis/tendinitis, fracture, adhesive capsulitis, septic arthritis  - Received a Kenalog and Marcaine injection on 5/27 in ortho clinic. Received additional injection on 5/30 in ED when he present with worsened pain  - Ortho surg consulted, appreciate recs   - Want to ensure there is no fracture or septic arthritis.    - Repeat imaging per ortho  - Joint is slightly swollen. No erythema, redness or warmth. He is not fevering. Though his immune system is compromised, he is not showing typical signs of infection   - , likely related to poss acute leukemia  - ESR 76, likely related to poss acute leukemia  - Pain still present in right shoulder, will trial prednisone 20mg for a couple days to help with shoulder pain  - Pain Regimen, so far has had little relief   - Dilaudid 0.2mg q3h prn   - Oxycodone 5-10mg q4h prn   - Tylenol 650mg TID scheduled    CARDS   # CAD s/p CABG (Jan.2020)  # HFpEF   Last saw cardiology 2/16/21. Last echo 1/27/21 show left ventricular function is low normal. EF 50-55%. Apicolateral hypokensis. Possible mid to distal inferolateral hypokinesis.   - Previous on furosemide at home, HOLD    # HTN   - continue PTA Metoprolol      # HLD   - will hold PTA statin while inpatient.      RENAL   # CKD3   Baseline creatinine ~1.3-1.5   - will monitor with daily CMP       Fluids/Electrolytes/Nutrition   - Bolus as needed  - PRN lyte replacement per standing protocol  - Regular diet as tolerated     Lines: PIV, may need PICC if we proceed  with chemotherapy    PPX  VTE: none due to thrombocytopenia  GI: n/a  Bowels: Senna and Miralax PRN  Activity: Up as Tolerated    Code  DNR, Intubation OK    Dispo: Here fore >2midnights, work up for new acute leukemia, may proceed with treatment.     Patient is seen and examined by Dr. Murrell.  Assessment and plan are discussed and delivered to the patient.    Vinita Tolbert (Lan) PA   Hematology/Oncology  Pager: [phone]    Interval History   No acute events overnight.   Renny is having significant pain in his right shoulder. A little more comfortable today.  Constantly moving around due to pain.  Discussed diagnosis, treatment options and prognosis with patient and sister. Patient agreeable to treatment  Discussed with Renny and Sister. All questions answered at bedside  Appetite low. Energy low.   Denies fever, chills, mouth sores, SOB, cough, abdominal pain, diarrhea, constipation, nausea, vomiting, dysuria, hematuria, numbness, tingling, swelling    Complete and Comprehensive review of systems review and negative other than noted here or in the HPI.     Physical Exam   Temp: 95.7  F (35.4  C) Temp src: Oral BP: 127/60 Pulse: 87   Resp: 24 SpO2: 93 % O2 Device: None (Room air)    Vitals:    06/03/21 1933 06/04/21 0700   Weight: 79.4 kg (175 lb 1.6 oz) 82.2 kg (181 lb 4.8 oz)     Vital Signs with Ranges  Temp:  [95.7  F (35.4  C)-98.1  F (36.7  C)] 95.7  F (35.4  C)  Pulse:  [] 87  Resp:  [16-24] 24  BP: (111-134)/(52-60) 127/60  SpO2:  [93 %-96 %] 93 %  I/O last 3 completed shifts:  In: 1120 [P.O.:820]  Out: 1675 [Urine:1675]    Constitutional: Pleasant male sitting up in chair. Awake and conversational. Non- toxic appearing. No acute distress.   HEENT: Normocephalic, atraumatic. Sclerae anicteric. EOM intact. Moist mucus membranes   Respiratory: Breathing comfortable with no increased work on room air. Good air exchange. No signs of respiratory distress or accessory muscle use. Lungs clear to  auscultation bilaterally, no crackles or wheezing noted.  Cardiovascular: Regular rate and rhythm. Normal S1 and S2. No murmurs, rubs, or gallops. No peripheral edema.    GI: Abdomen is soft, non-distended, non-tender. Bowel sounds present and normoactive.   Skin: Skin is clean, dry, intact. No jaundice appreciated.   Musculoskeletal/ Extremities: No redness, warmth of the joints appreciated. Some swelling to his right shoulder. Pain with movement of his right shoulder. Distal pulses palpable. Nailbeds pink and without cyanosis or clubbing.   Neurologic: Alert and oriented. Speech normal. Grossly nonfocal. Memory and thought process preserved. Motor function normal in lower extremities. Right arm range of motion limited. Left arm wnl. Sensation intact.   Neuropsychiatric: Calm, affect congruent to situation. Appropriate mood and affect. Good judgment and insight. No visual/auditory hallucinations.         Medications     - MEDICATION INSTRUCTIONS -         acyclovir  400 mg Oral BID     allopurinol  150 mg Oral Daily     vitamin B-12  200 mcg Oral QAM     levofloxacin  250 mg Oral At Bedtime     lidocaine  1 patch Transdermal Q24h    And     lidocaine   Transdermal Q8H     metoprolol tartrate  50 mg Oral BID     micafungin  50 mg Intravenous Q24H     vitamin D3  50 mcg Oral Daily       Data   Results for orders placed or performed during the hospital encounter of 06/03/21 (from the past 24 hour(s))   Care Management / Social Work IP Consult    Narrative    Angie Maria RN     6/4/2021  1:36 PM  Care Management Initial Consult    General Information  Assessment completed with: Patient, Care Team Member    Type of CM/SW Visit: Initial Assessment    Primary Care Provider verified and updated as needed: Yes   Readmission within the last 30 days: No previous admission in   last 30 days      Reason for Consult: Elevated Risk Score   Advance Care Planning: Reviewed: Present on chart        Communication  Assessment  Patient's communication style: Spoken language (English or   Bilingual)    Hearing Difficulty or Deaf: no   Wear Glasses or Blind: yes    Cognitive  Cognitive/Neuro/Behavioral: WDL                      Living Environment:   People in home: Alone     Current living Arrangements: Apartment      Able to return to prior arrangements: Yes     Family/Social Support:  Care provided by: Self, siblings  Provides care for: No one     Description of Support System: Supportive, Involved      Current Resources:   Patient receiving home care services: No  Community Resources: OP Infusion  Equipment currently used at home: Grab bar, toilet, grab bar,   tub/shower, shower chair, walker, standard  Supplies currently used at home: None    Employment/Financial:  Employment Status: Retired        Financial Concerns: No concerns identified     Lifestyle & Psychosocial Needs:        Socioeconomic History     Marital status: Single     Spouse name: Not on file     Number of children: Not on file     Years of education: Not on file     Highest education level: Not on file   Occupational History     Occupation: Retired     Tobacco Use     Smoking status: Former Smoker     Years: 30.00     Types: Cigarettes     Start date: 1961     Quit date: 2001     Years since quittin.3     Smokeless tobacco: Never Used   Substance and Sexual Activity     Alcohol use: No     Frequency: Never     Drug use: No     Sexual activity: Not Currently       Functional Status:  Prior to admission patient needed assistance:   Dependent ADLs: Independent (has a walker at home, uses as   needed)  Dependent IADLs: Transportation, Shopping, Cooking, Cleaning  Assesssment of Functional Status: At functional baseline    Mental Health Status:  Mental Health Status: No Current Concerns       Chemical Dependency Status:  Chemical Dependency Status: No Current Concerns       Values/Beliefs:  Spiritual, Cultural Beliefs, Buddhist Practices, Values  that   affect care: No               Additional Information:    Patient with a history of JAK2+ myelofibrosis was transferred   from an outside hospital 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 BMBx   today. Per team, pending patient's decision, will likely start   chemotherapy tomorrow, with prolonged admission, ~4 weeks.    CM assessment being completed due to elevated unplanned   readmission score.     Per chart review, patient was previously open to [facility]. Per [facility], patient was discharged from   services on 4/23/21.    Patient is independent at baseline and does not receive any   in-home supports or services. Patient does receives OP Infusion   services at [facility]. Patient's sister's,   Rose and Mary, assist him with transportation, cooking,   cleaning, shopping and meal prep, as needed.     RNCC/SW will continue to follow and assist with discharge   planning as needed.    Angie Maria RN, BSN, PHN  Care Coordinator   P: [phone], [facility]                  Orthopaedic Surgery Adult/Peds IP Consult: Patient to be seen: Routine within 24 hrs; Call back #: *[phone]; 69 yo M with hyperleukocytosis, concern for transformation to AML. New right shoulder pain for 1-2 weeks. Was previously seen by ortho surge...    Fredi Reyes MD     6/4/2021  9:20 PM  [facility]  ORTHOPAEDIC SURGERY CONSULT - HISTORY AND PHYSICAL    DATE OF CONSULT: 6/4/2021 9:09 PM    REQUESTING PROVIDER: Jeane Patel MD, MD - [facility] Staff.    CC: Right shoulder pain    DATE OF INJURY: None    HISTORY OF PRESENT ILLNESS:   The orthopaedic surgery service was consulted by Jeane Reyna MD for evaluation and treatment recommendations of   right shoulder pain.    Renny Gannon is a 70 year old male who was admitted to the   hospital for abnormal labs and unfortunately diagnosed with AML   today.  He has been complaining of right shoulder  pain since his   admission, so the orthopedic surgery service was consulted to   evaluate him.  I discussed with the patient when this pain   started he states that it has been going on for a month or 2, he   has seen an outside orthopedic provider and was diagnosed with   subacromial impingement and underwent intra-articular steroid   injection which provided some but not long-lasting relief.  He   was also seen in the ED for this and states that he had a local   lidocaine injection into the shoulder for pain relief which was   not helpful.  Denies any trauma to the cervical shoulder.  Denies   any prior surgery to the shoulder.  Denies any fever or chills of   recent.  Has not noted any significant swelling or redness about   the right shoulder.      PAST MEDICAL HISTORY:   Past Medical History:   Diagnosis Date     Heart disease      History of blood transfusion      Hypertension        PAST SURGICAL HISTORY:    Past Surgical History:   Procedure Laterality Date     BONE MARROW BIOPSY, BONE SPECIMEN, NEEDLE/TROCAR N/A 12/30/2019      Procedure: BIOPSY, BONE MARROW;  Surgeon: Aguilar Krause MD;  Location: [facility] OR     BYPASS GRAFT ARTERY CORONARY N/A 1/31/2020    Procedure: CORONARY ARTERY BYPASS GRAFTING X 3 - LIMA TO LAD, SV   TO OM  AND PDA; ENDOVEIN HARVEST OF BILATERAL LEGS; ON PUMP WITH   SANDRA;  Surgeon: Mable Barrera MD;  Location:  OR     CV CORONARY ANGIOGRAM Left 1/9/2020    Procedure: Coronary Angiogram;  Surgeon: Devin Bentley MD;  Location:  HE[facility] CARDI[facility] CATH LAB     NO HISTORY OF SURGERY         MEDICATIONS:   Prior to Admission medications    Medication Sig Last Dose Taking? Auth Provider   allopurinol (ZYLOPRIM) 300 MG tablet Take 1 tablet (300 mg) by   mouth daily 6/3/2021 at AM Yes Art Olsen MD   metoprolol tartrate (LOPRESSOR) 50 MG tablet Take 1 tablet (50   mg) by mouth 2 times daily 6/3/2021 at AM Yes Art Olsen MD    senna-docusate (SENOKOT-S/PERICOLACE) 8.6-50 MG tablet Take 1   tablet by mouth 2 times daily as needed for constipation Per [facility]   form takes 1/2 tabs BID/PRN 6/3/2021 at AM Yes Angus Scott MD   vitamin B-12 (CYANOCOBALAMIN) 250 MCG tablet TAKE ONE TABLET BY   MOUTH EVERY MORNING 6/3/2021 at AM Yes Angus Scott MD   ACE/ARB/ARNI NOT PRESCRIBED (INTENTIONAL) Please choose reason   not prescribed from choices below.   Angus Scott MD   ASPIRIN NOT PRESCRIBED (INTENTIONAL) Please choose reason not   prescribed from choices below.   Angus Scott MD   atorvastatin (LIPITOR) 10 MG tablet Take 1 tablet (10 mg) by   mouth daily 2021  Art Olsen MD   nitroGLYcerin (NITROSTAT) 0.4 MG sublingual tablet For chest pain   place 1 tablet under the tongue every 5 minutes for 3 doses. If   symptoms persist 5 minutes after 1st dose call 911.   Art Olsen MD   oxyCODONE (ROXICODONE) 5 MG tablet Take 1 tablet (5 mg) by mouth   every 6 hours as needed for pain   Indra Chaidez,    vitamin D3 (CHOLECALCIFEROL) 50 mcg (2000 units) tablet Take 1   tablet by mouth daily 2021  Unknown, Entered By History       ALLERGIES:   No known drug allergies and Seasonal allergies    SOCIAL HISTORY:   Social History     Socioeconomic History     Marital status: Single     Spouse name: Not on file     Number of children: Not on file     Years of education: Not on file     Highest education level: Not on file   Occupational History     Occupation: Retired   Social Needs     Financial resource strain: Not on file     Food insecurity     Worry: Not on file     Inability: Not on file     Transportation needs     Medical: Not on file     Non-medical: Not on file   Tobacco Use     Smoking status: Former Smoker     Years: 30.00     Types: Cigarettes     Start date: 1961     Quit date: 2001     Years since quittin.3     Smokeless tobacco: Never Used   Substance and Sexual Activity     Alcohol  use: No     Frequency: Never     Drug use: No     Sexual activity: Not Currently   Lifestyle     Physical activity     Days per week: Not on file     Minutes per session: Not on file     Stress: Not on file   Relationships     Social connections     Talks on phone: Not on file     Gets together: Not on file     Attends Islam service: Not on file     Active member of club or organization: Not on file     Attends meetings of clubs or organizations: Not on file     Relationship status: Not on file     Intimate partner violence     Fear of current or ex partner: Not on file     Emotionally abused: Not on file     Physically abused: Not on file     Forced sexual activity: Not on file   Other Topics Concern     Parent/sibling w/ CABG, MI or angioplasty before 65F 55M? Not   Asked   Social History Narrative     Not on file       FAMILY HISTORY:  Family History   Problem Relation Age of Onset     No Known Problems Mother      Unknown/Adopted Father      Unknown/Adopted Maternal Grandmother      Unknown/Adopted Maternal Grandfather      Unknown/Adopted Paternal Grandmother      Unknown/Adopted Paternal Grandfather      Cancer Brother         lung cancer - smoking     No Known Problems Sister      Coronary Artery Disease Brother          of MI at 66     No Known Problems Sister          REVIEW OF SYSTEMS:   Positive for right shoulder pain. Otherwise a 10-point reviews of   systems was negative except as noted above in the HPI.     PHYSICAL EXAM:   Vitals:    21 1533 21 1932 21 2000 21 2032   BP: 111/54 134/60 125/54 123/52   BP Location: Left arm Left arm Left arm Left arm   Pulse: 89 102 97 88   Resp: 16 16 16 16   Temp: 96.5  F (35.8  C) 96.8  F (36  C) 96.9  F (36.1  C) 96.8  F   (36  C)   TempSrc: Oral Oral Oral Oral   SpO2: 96% 94% 94% 94%   Weight:       Height:         General: Awake, alert, appropriate, following commands, NAD.  Neuro: CN II-XII grossly intact.   Skin: No rashes,  skin  color normal.  HEENT: Normal.   Lungs: Breathing comfortably and nonlabored, no wheezes or   stridor noted.  Heart/Cardiovascular: Regular pulse, no peripheral cyanosis.  Left Upper Extremity: No deformity, skin intact.  Mildly tender   to palpation over the anterior shoulder.  No significant   tenderness to palpation over clavicle, AC joint,  arm, elbow,   forearm, wrist.  He is able to tolerate a small passive circumduction movement of   the right shoulder without pain.  Range of motion at the shoulder   is very limited, he is not able to forward flex more than 10   degrees actively or abduct more than 10 degrees actively, this is   painful and he appears to have significant shoulder weakness.    Passive range of motion is also extremely limited forward flexion   to 30 abduction to 10 external rotation to 0.  There is appears   to be a a physical block to these movements on my exam.  Positive   Neer's impingement sign.  Motor intact distally with finger   flexion/extension/intrinsics/EPL, OK sign. SILT ax/m/r/u nerve   distributions. Radial pulse palpable, 2+.     LABS:  Hemoglobin   Date Value Ref Range Status   06/04/2021 7.6 (L) 13.3 - 17.7 g/dL Final   06/03/2021 6.8 (LL) 13.3 - 17.7 g/dL Final     Comment:     This result has been called to IVANA DAVIS RN by Rajesh Toussaint on 06 03 2021 at 2146, and has been read back.        WBC   Date Value Ref Range Status   06/04/2021 66.3 (HH) 4.0 - 11.0 10e9/L Final     Comment:     .   Critical result, provider not notified due to previous critical   result   notification.       Platelet Count   Date Value Ref Range Status   06/04/2021 15 (LL) 150 - 450 10e9/L Final     Comment:     .   Critical result, provider not notified due to previous critical   result   notification.       INR   Date Value Ref Range Status   06/04/2021 1.30 (H) 0.86 - 1.14 Final     Creatinine   Date Value Ref Range Status   06/04/2021 1.45 (H) 0.66 - 1.25 mg/dL Final     Glucose   Date  Value Ref Range Status   06/04/2021 114 (H) 70 - 99 mg/dL Final       CRP Inflammation   Date Value Ref Range Status   06/04/2021 120.0 (H) 0.0 - 8.0 mg/L Final     Sed Rate   Date Value Ref Range Status   06/04/2021 76 (H) 0 - 20 mm/h Final         IMAGING:  Radiographs of the right shoulder demonstrate acromioclavicular   arthritis but otherwise no traumatic changes.  The glenohumeral   joint is incongruity.    IMPRESSION:   Renny Gannon is a 70 year old  male with a new diagnosis of AML   with known chronic right shoulder pain which has become more   bothersome in the hospital setting.  At this time I have a low   suspicion for septic arthritis, I did discuss his elevated ESR   and CRP along with the elevated white count with the heme-onc PA   who felt that these could be attributed to his new diagnosis.    Otherwise his exam findings are consistent with subacromial   impingement and likely adhesive capsulitis.    RECOMMENDATIONS:   -Monitor of the right shoulder for the weekend, if his pain   worsens or he has increased redness or swelling or other signs of   infection please contact the orthopedics on-call provider   urgently as we will reevaluate and can perform an aspiration if   there is concern for infection.  -Otherwise I will plan to reevaluate the patient on Monday and   confirm that he has not had a significant increase in pain   concerning for infection.  -As for his shoulder unfortunately the mainstays of treatment are   conservative at this time would recommend that physical therapy   work with them, this will be painful but is necessary to treat   adhesive capsulitis.  -Pain control with Tylenol, NSAIDs as appropriate given his   medical issues, and can use opioids as needed although recommend   nonopioid pain control.  Could also use lidocaine patches,   menthol patches as these can be helpful as well.    - X-rays/Imaging: Complete   - Activity: Up ad conchita.  - Weight bearing: WBAT RUE  - Pain  control: Per primary  - Diet: Per primary  - Follow-up: Can follow-up outpatient with his previous   orthopedic provider, or if he is interested he is welcome to   follow-up with one of our shoulder surgeons to discuss these   problems in the outpatient setting.  - Disposition: Per primary    Assessment and Plan discussed with Dr. Vigil, Orthopaedic   Surgery PGY4. Staff for this patient is Dr. Rudd.     Varun Gant MD  Orthopaedic Surgery PGY-1  Pager: [phone]    If it is the weekend or after 5 PM please page the orthopedic   surgery resident on-call.  Otherwise please page me directly   about this patient.         Potassium   Result Value Ref Range    Potassium 4.2 3.4 - 5.3 mmol/L   CRP inflammation   Result Value Ref Range    CRP Inflammation 120.0 (H) 0.0 - 8.0 mg/L   Erythrocyte sedimentation rate auto   Result Value Ref Range    Sed Rate 76 (H) 0 - 20 mm/h   Echocardiogram Complete    Narrative    [id number]  [id number]  [id number]  [id number]^MYNOR^FERNANDO     [facility]  Echocardiography Laboratory  [address] [place] [id number]     Name: ABI VALDEZ  MRN: [id number]  : 1950  Study Date: 2021 01:16 PM  Age: 70 yrs  Gender: Male  Patient Location: [facility]  Reason For Study: Chemo  Ordering Physician: FERNANDO LOWE  Performed By: REY Garcia     BSA: 2.0 m2  Height: 69 in  Weight: 181 lb  BP: 124/62 mmHg  ______________________________________________________________________________  Procedure  Complete Portable Echo Adult. Contrast Optison. Poor acoustic windows.  Technically difficult study. Optison (NDC #4284-2226-63) given intravenously.  Patient was given 6 ml mixture of 3 ml Optison and 6 ml saline. 3 ml wasted.  IV start location R Forearm .  ______________________________________________________________________________  Interpretation Summary  Technically difficult study.  Poor acoustic windows.     Mildly (EF 45-50%)  reduced left ventricular function is present. Inferolateral  (posterior) wall hypokinesis is present.  Pulmonary hypertension is present. SPAP is 34+15=49 mmHg.  The right ventricle cannot be assessed.  Dilation of the inferior vena cava is present with abnormal respiratory  variation in diameter.  No pericardial effusion is present.     This study was compared with the study from 1/28/2021 .IVC is dilated, RA  pressure is higher.     ______________________________________________________________________________  Left Ventricle  Left ventricular size is normal. Relative wall thickness is increased  consistent with concentric remodeling. Mildly (EF 45-50%) reduced left  ventricular function is present. Left ventricular diastolic function is  indeterminate. Inferolateral (posterior) wall hypokinesis is present.     Right Ventricle  The right ventricle cannot be assessed.     Atria  Both atria appear normal.     Mitral Valve  The mitral valve is normal.     Aortic Valve  Trileaflet aortic sclerosis without stenosis. On Doppler interrogation, there  is no significant stenosis or regurgitation.     Tricuspid Valve  The tricuspid valve is normal. Trace tricuspid insufficiency is present. The  right ventricular systolic pressure is approximated at 33.6 mmHg plus the  right atrial pressure. Pulmonary hypertension is present.     Pulmonic Valve  The pulmonic valve cannot be assessed.     Vessels  The aorta root cannot be assessed. Dilation of the inferior vena cava is  present with abnormal respiratory variation in diameter. IVC diameter >2.1 cm  collapsing <50% with sniff suggests a high RA pressure estimated at 15 mmHg or  greater.     Pericardium  No pericardial effusion is present.     Compared to Previous Study  This study was compared with the study from 1/28/2021 . IVC is dilated, RA  pressure is higher.  ______________________________________________________________________________  MMode/2D Measurements &  Calculations  IVSd: 1.2 cm  LVIDd: 4.6 cm  LVIDs: 3.9 cm  LVPWd: 1.0 cm  FS: 16.0 %  LV mass(C)d: 186.0 grams  LV mass(C)dI: 93.9 grams/m2     EF(MOD-bp): 44.5 %  LA Volume (BP): 92.6 ml  LA Volume Index (BP): 46.8 ml/m2  RWT: 0.45     Doppler Measurements & Calculations  MV E max vlad: 98.3 cm/sec  MV A max vlad: 40.0 cm/sec  MV E/A: 2.5  MV dec slope: 631.0 cm/sec2  PA V2 max: 64.4 cm/sec  PA max P.7 mmHg  PA acc time: 0.13 sec  TR max vlad: 290.0 cm/sec  TR max P.6 mmHg  E/E' av.6  Lateral E/e': 8.5  Medial E/e': 24.7     ______________________________________________________________________________  Report approved by: Nan SOARES 2021 02:53 PM         XR Shoulder Right G/E 3 Views    Narrative    3 views right shoulder radiographs 2021 2:38 PM    History: right shoulder pain     Comparison: 2021    Findings:    AP internal, external rotation , transscapular Y views of the right  shoulder were obtained.     No acute osseous abnormality. Glenohumeral and acromioclavicular  joints are congruent.    Moderate degenerative changes of the acromioclavicular joint.  Subacromial spurring. No substantial degenerative change of the  glenohumeral joint.    Soft tissue is unremarkable. The visualized lung is clear.      Impression    Impression:  1. No acute osseous abnormality.  2. Moderate acromioclavicular degenerative change.    REJI SARKAR MD (Joe)   XR Shoulder Right 1 View    Narrative    EXAM: XR SHOULDER RIGHT 1 VIEW  LOCATION: [facility]  DATE/TIME: 2021 5:10 PM    INDICATION: Assess alignment  COMPARISON: 2021 performed earlier in the day.      Impression    IMPRESSION: Normal glenohumeral alignment. Hypertrophic changes in the glenohumeral joint.    Lactic acid level STAT   Result Value Ref Range    Lactate for Sepsis Protocol 1.0 0.7 - 2.0 mmol/L   CBC with platelets differential   Result Value Ref Range    WBC 69.3 (HH) 4.0 - 11.0 10e9/L    RBC Count  2.75 (L) 4.4 - 5.9 10e12/L    Hemoglobin 7.9 (L) 13.3 - 17.7 g/dL    Hematocrit 24.2 (L) 40.0 - 53.0 %    MCV 88 78 - 100 fl    MCH 28.7 26.5 - 33.0 pg    MCHC 32.6 31.5 - 36.5 g/dL    RDW 16.1 (H) 10.0 - 15.0 %    Platelet Count 12 (LL) 150 - 450 10e9/L    Diff Method PENDING    Basic metabolic panel   Result Value Ref Range    Sodium 137 133 - 144 mmol/L    Potassium 3.7 3.4 - 5.3 mmol/L    Chloride 106 94 - 109 mmol/L    Carbon Dioxide 22 20 - 32 mmol/L    Anion Gap 9 3 - 14 mmol/L    Glucose 105 (H) 70 - 99 mg/dL    Urea Nitrogen 24 7 - 30 mg/dL    Creatinine 1.15 0.66 - 1.25 mg/dL    GFR Estimate 63 >60 mL/min/[1.73_m2]    GFR Estimate If Black 73 >60 mL/min/[1.73_m2]    Calcium 8.2 (L) 8.5 - 10.1 mg/dL   INR   Result Value Ref Range    INR 1.27 (H) 0.86 - 1.14   Fibrinogen activity   Result Value Ref Range    Fibrinogen 562 (H) 200 - 420 mg/dL   Uric acid   Result Value Ref Range    Uric Acid 4.8 3.5 - 7.2 mg/dL   Magnesium   Result Value Ref Range    Magnesium 2.3 1.6 - 2.3 mg/dL   Phosphorus   Result Value Ref Range    Phosphorus 2.6 2.5 - 4.5 mg/dL     Recent Labs   Lab 06/05/21  0739 06/04/21  1341 06/04/21  0659 06/03/21 2058 06/02/21  1629 06/02/21  1629   WBC 69.3*  --  66.3* 71.1*   < >  --    HGB 7.9*  --  7.6* 6.8*   < >  --    MCV 88  --  88 87   < >  --    PLT 12*  --  15* 17*   < >  --    INR 1.27*  --  1.30* 1.32*  --   --      --  135 137   < > 134   POTASSIUM 3.7 4.2 3.4 3.3*   < > 3.7   CHLORIDE 106  --  105 106   < > 101   CO2 22  --  22 23   < > 27   BUN 24  --  35* 39*   < > 35*   CR 1.15  --  1.45* 1.66*   < > 1.75*   ANIONGAP 9  --  8 8   < > 6   MINNIE 8.2*  --  8.4* 8.2*   < > 7.8*   *  --  114* 124*   < > 99   ALBUMIN  --   --   --  3.0*  --  3.3*   PROTTOTAL  --   --   --  6.9  --  7.0   BILITOTAL  --   --   --  0.4  --  0.5   ALKPHOS  --   --   --  135  --  124   ALT  --   --   --  18  --  16   AST  --   --   --  27  --  26    < > = values in this interval not displayed.

## 2021-06-05 NOTE — PROVIDER NOTIFICATION
DATE/TIME  (DOT-TD, DOT-NOW) CHEMO CHECK ACTIVITY (REGIMEN & DOSE CHECK, DAY, DOSE #, NAME OF CHEMO #1)  CHEMO DRUG #2  CHEMO DRUG #3 NAME OF RN #1 (USE DOT-ME HERE) NAME OF RN#2 (2ND RN TO LOG IN SEPARATELY)   6/5/2021  12:57 PM   Protocol check decitabine only, venetoclax not released    JACOB Abbott RN     6/5/2021  4:51 PM   Dose #1 Decitabine   JACOB Quezada RN     6/6/2021  1:55 PM   Dose #2 Decitabine   JACOB Abbott RN     06/07/21  1:30 PM   Dose #3 Decitabine   JACOB Viera, JACOB     06/08/21  2:52 PM   Dose #4 Decitabine   JACOB Viera)   06/09/21  2:05 PM   Dose #5 Decitabine   AJCOB Viera RN     06/09/21  2:44 PM   Adjusted Venetoclax Protocol    JACOB Viera, RN

## 2021-06-05 NOTE — CONSULTS
[facility]  ORTHOPAEDIC SURGERY CONSULT - HISTORY AND PHYSICAL    DATE OF CONSULT: 6/4/2021 9:09 PM    REQUESTING PROVIDER: Jeane Patel MD, MD - [facility] Staff.    CC: Right shoulder pain    DATE OF INJURY: None    HISTORY OF PRESENT ILLNESS:   The orthopaedic surgery service was consulted by Jeane Reyna MD for evaluation and treatment recommendations of right shoulder pain.    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  He has been complaining of right shoulder pain since his admission, so the orthopedic surgery service was consulted to evaluate him.  I discussed with the patient when this pain started he states that it has been going on for a month or 2, he has seen an outside orthopedic provider and was diagnosed with subacromial impingement and underwent intra-articular steroid injection which provided some but not long-lasting relief.  He was also seen in the ED for this and states that he had a local lidocaine injection into the shoulder for pain relief which was not helpful.  Denies any trauma to the cervical shoulder.  Denies any prior surgery to the shoulder.  Denies any fever or chills of recent.  Has not noted any significant swelling or redness about the right shoulder.      PAST MEDICAL HISTORY:   Past Medical History:   Diagnosis Date     Heart disease      History of blood transfusion      Hypertension        PAST SURGICAL HISTORY:    Past Surgical History:   Procedure Laterality Date     BONE MARROW BIOPSY, BONE SPECIMEN, NEEDLE/TROCAR N/A 12/30/2019    Procedure: BIOPSY, BONE MARROW;  Surgeon: Aguilar Krause MD;  Location:  OR     BYPASS GRAFT ARTERY CORONARY N/A 1/31/2020    Procedure: CORONARY ARTERY BYPASS GRAFTING X 3 - LIMA TO LAD, SV TO OM  AND PDA; ENDOVEIN HARVEST OF BILATERAL LEGS; ON PUMP WITH SANDRA;  Surgeon: Mable Barrera MD;  Location:  OR      CV CORONARY ANGIOGRAM Left 1/9/2020    Procedure: Coronary Angiogram;  Surgeon: Devin Bentley MD;  Location:  HE[facility] CARDI[facility] CATH LAB     NO HISTORY OF SURGERY         MEDICATIONS:   Prior to Admission medications    Medication Sig Last Dose Taking? Auth Provider   allopurinol (ZYLOPRIM) 300 MG tablet Take 1 tablet (300 mg) by mouth daily 6/3/2021 at AM Yes Art Olsen MD   metoprolol tartrate (LOPRESSOR) 50 MG tablet Take 1 tablet (50 mg) by mouth 2 times daily 6/3/2021 at AM Yes Art Olsen MD   senna-docusate (SENOKOT-S/PERICOLACE) 8.6-50 MG tablet Take 1 tablet by mouth 2 times daily as needed for constipation Per [facility] form takes 1/2 tabs BID/PRN 6/3/2021 at AM Yes Angus Scott MD   vitamin B-12 (CYANOCOBALAMIN) 250 MCG tablet TAKE ONE TABLET BY MOUTH EVERY MORNING 6/3/2021 at AM Yes Angus Scott MD   ACE/ARB/ARNI NOT PRESCRIBED (INTENTIONAL) Please choose reason not prescribed from choices below.   Angus Scott MD   ASPIRIN NOT PRESCRIBED (INTENTIONAL) Please choose reason not prescribed from choices below.   Angus Scott MD   atorvastatin (LIPITOR) 10 MG tablet Take 1 tablet (10 mg) by mouth daily 6/2/2021  Art Olsen MD   nitroGLYcerin (NITROSTAT) 0.4 MG sublingual tablet For chest pain place 1 tablet under the tongue every 5 minutes for 3 doses. If symptoms persist 5 minutes after 1st dose call 911.   Art Olsen MD   oxyCODONE (ROXICODONE) 5 MG tablet Take 1 tablet (5 mg) by mouth every 6 hours as needed for pain   Indra Chaidez,    vitamin D3 (CHOLECALCIFEROL) 50 mcg (2000 units) tablet Take 1 tablet by mouth daily 6/2/2021  Unknown, Entered By History       ALLERGIES:   No known drug allergies and Seasonal allergies    SOCIAL HISTORY:   Social History     Socioeconomic History     Marital status: Single     Spouse name: Not on file     Number of children: Not on file     Years of education: Not on file     Highest education level:  Not on file   Occupational History     Occupation: Retired   Social Needs     Financial resource strain: Not on file     Food insecurity     Worry: Not on file     Inability: Not on file     Transportation needs     Medical: Not on file     Non-medical: Not on file   Tobacco Use     Smoking status: Former Smoker     Years: 30.00     Types: Cigarettes     Start date: 1961     Quit date: 2001     Years since quittin.3     Smokeless tobacco: Never Used   Substance and Sexual Activity     Alcohol use: No     Frequency: Never     Drug use: No     Sexual activity: Not Currently   Lifestyle     Physical activity     Days per week: Not on file     Minutes per session: Not on file     Stress: Not on file   Relationships     Social connections     Talks on phone: Not on file     Gets together: Not on file     Attends Anabaptism service: Not on file     Active member of club or organization: Not on file     Attends meetings of clubs or organizations: Not on file     Relationship status: Not on file     Intimate partner violence     Fear of current or ex partner: Not on file     Emotionally abused: Not on file     Physically abused: Not on file     Forced sexual activity: Not on file   Other Topics Concern     Parent/sibling w/ CABG, MI or angioplasty before 65F 55M? Not Asked   Social History Narrative     Not on file       FAMILY HISTORY:  Family History   Problem Relation Age of Onset     No Known Problems Mother      Unknown/Adopted Father      Unknown/Adopted Maternal Grandmother      Unknown/Adopted Maternal Grandfather      Unknown/Adopted Paternal Grandmother      Unknown/Adopted Paternal Grandfather      Cancer Brother         lung cancer - smoking     No Known Problems Sister      Coronary Artery Disease Brother          of MI at 66     No Known Problems Sister          REVIEW OF SYSTEMS:   Positive for right shoulder pain. Otherwise a 10-point reviews of systems was negative except as noted above in  the HPI.     PHYSICAL EXAM:   Vitals:    06/04/21 1533 06/04/21 1932 06/04/21 2000 06/04/21 2032   BP: 111/54 134/60 125/54 123/52   BP Location: Left arm Left arm Left arm Left arm   Pulse: 89 102 97 88   Resp: 16 16 16 16   Temp: 96.5  F (35.8  C) 96.8  F (36  C) 96.9  F (36.1  C) 96.8  F (36  C)   TempSrc: Oral Oral Oral Oral   SpO2: 96% 94% 94% 94%   Weight:       Height:         General: Awake, alert, appropriate, following commands, NAD.  Neuro: CN II-XII grossly intact.   Skin: No rashes,  skin color normal.  HEENT: Normal.   Lungs: Breathing comfortably and nonlabored, no wheezes or stridor noted.  Heart/Cardiovascular: Regular pulse, no peripheral cyanosis.  Left Upper Extremity: No deformity, skin intact.  Mildly tender to palpation over the anterior shoulder.  No significant tenderness to palpation over clavicle, AC joint,  arm, elbow, forearm, wrist.  He is able to tolerate a small passive circumduction movement of the right shoulder without pain.  Range of motion at the shoulder is very limited, he is not able to forward flex more than 10 degrees actively or abduct more than 10 degrees actively, this is painful and he appears to have significant shoulder weakness.  Passive range of motion is also extremely limited forward flexion to 30 abduction to 10 external rotation to 0.  There is appears to be a a physical block to these movements on my exam.  Positive Neer's impingement sign.  Motor intact distally with finger flexion/extension/intrinsics/EPL, OK sign. SILT ax/m/r/u nerve distributions. Radial pulse palpable, 2+.     LABS:  Hemoglobin   Date Value Ref Range Status   06/04/2021 7.6 (L) 13.3 - 17.7 g/dL Final   06/03/2021 6.8 (LL) 13.3 - 17.7 g/dL Final     Comment:     This result has been called to IVANA DAVIS RN by Rajesh Toussaint on 06 03 2021 at 2146, and has been read back.        WBC   Date Value Ref Range Status   06/04/2021 66.3 (HH) 4.0 - 11.0 10e9/L Final     Comment:     .   Critical  result, provider not notified due to previous critical result   notification.       Platelet Count   Date Value Ref Range Status   06/04/2021 15 (LL) 150 - 450 10e9/L Final     Comment:     .   Critical result, provider not notified due to previous critical result   notification.       INR   Date Value Ref Range Status   06/04/2021 1.30 (H) 0.86 - 1.14 Final     Creatinine   Date Value Ref Range Status   06/04/2021 1.45 (H) 0.66 - 1.25 mg/dL Final     Glucose   Date Value Ref Range Status   06/04/2021 114 (H) 70 - 99 mg/dL Final       CRP Inflammation   Date Value Ref Range Status   06/04/2021 120.0 (H) 0.0 - 8.0 mg/L Final     Sed Rate   Date Value Ref Range Status   06/04/2021 76 (H) 0 - 20 mm/h Final         IMAGING:  Radiographs of the right shoulder demonstrate acromioclavicular arthritis but otherwise no traumatic changes.  The glenohumeral joint is incongruity.    IMPRESSION:   Renny Gannon is a 70 year old  male with a new diagnosis of AML with known chronic right shoulder pain which has become more bothersome in the hospital setting.  At this time I have a low suspicion for septic arthritis, I did discuss his elevated ESR and CRP along with the elevated white count with the heme-onc PA who felt that these could be attributed to his new diagnosis.  Otherwise his exam findings are consistent with subacromial impingement and likely adhesive capsulitis.    RECOMMENDATIONS:   -Monitor of the right shoulder for the weekend, if his pain worsens or he has increased redness or swelling or other signs of infection please contact the orthopedics on-call provider urgently as we will reevaluate and can perform an aspiration if there is concern for infection.  -Otherwise I will plan to reevaluate the patient on Monday and confirm that he has not had a significant increase in pain concerning for infection.  -As for his shoulder unfortunately the mainstays of treatment are conservative at this time would recommend that  physical therapy work with them, this will be painful but is necessary to treat adhesive capsulitis.  -Pain control with Tylenol, NSAIDs as appropriate given his medical issues, and can use opioids as needed although recommend nonopioid pain control.  Could also use lidocaine patches, menthol patches as these can be helpful as well.    - X-rays/Imaging: Complete   - Activity: Up ad conchita.  - Weight bearing: WBAT RUE  - Pain control: Per primary  - Diet: Per primary  - Follow-up: Can follow-up outpatient with his previous orthopedic provider, or if he is interested he is welcome to follow-up with one of our shoulder surgeons to discuss these problems in the outpatient setting.  - Disposition: Per primary    Assessment and Plan discussed with Dr. Vigil, Orthopaedic Surgery PGY4. Staff for this patient is Dr. Rudd.     Varun Gant MD  Orthopaedic Surgery PGY-1  Pager: [phone]    If it is the weekend or after 5 PM please page the orthopedic surgery resident on-call.  Otherwise please page me directly about this patient.

## 2021-06-06 NOTE — PLAN OF CARE
Alert and oriented x4, AVSS, denied nausea, vomiting and pain. Lidocaine patch in place on right shoulder pain. Decitabine started yesterday. PICC is currently heparin lock.Will continue to monitor

## 2021-06-06 NOTE — PLAN OF CARE
[id number]    No acute event, Afebrile, VSS.      NEURO: Alert and oriented x4.      RESPIRATORY: Room Air, denies dizziness, and SOB. Lungs sound, clear, equal bilateral.     CARDIO: VSS, denies dizziness, and extremity pain.      GI/:  Denies N/V, BS active,        SKIN: Intact.      ACTIVITY: Stand by assist.      PAIN: Left shoulder pain, 1x PRN oxycodone given.    DLA: PICC, double lumen.     BG: No.      PLAN: Continue monitoring.

## 2021-06-06 NOTE — PROGRESS NOTES
"[facility]    Hematology / Oncology Progress Note    Date of Service (when I saw the patient): 06/06/2021     Assessment & Plan   Renny Gannon is a 70 year old male who presents with past medical history of CAD s/p CABG (Jan 2020), HFpEF, CKD3 ( BL Cr. 1.3-1.5), HTN, and JAK2+ myelofibrosis. He was transferred from outside hospital for concern of progression to AML with leukocytosis (WBC = 72.4) on admission.     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 Discussed diagnosis, treatment options and prognosis with patient and sister on 6/5. Patient agreeable to treatment.  - Pain still present in right shoulder, though improving.   - Continue with prednisone 20mg. Consider stopping tomorrow (6/7)   - Pain control regimen: Oxycodone 5-10mg q4h prn, Tylenol TID scheduled and Dilaudid prn     HEME  # LIVIER 2+ Myelofibrosis   # Concern for AML  Follow by Dr. Cristina. He initially was seen in Dec. 2019 by his PCP for increased fatigue and SOB w/ exertion. CBC showed WBC 79.1, Hgb 11.9 and Plt 378. Peripheral smear showed leuko-erythroblastic reaction w/ neutrophilic left shift. BMBx (12/30/19) hypercellular marrow w/ granulocytic and megakaryocytic proliferation, megakaryocytic clustering and atypia and 1.5% circulating blasts. 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 He was on and off Hydrea from Jan 2020 - Jan 2021. BMBx 3/26/21 increased fibrosis, cellularity 40-50%, 5% blasts on morphology and flow. He most recently has just been receiving transfusion support for his anemia and thrombocytopenia. He presented to clinic for labs and possible transfusion and was found to have a WBC 71.9 Hgb 6.4 and Plt 18. Lab noted \"a lot of immature WBC " "and blasts\". He was then directed to the ED for admission and further work up.  - BMBx completed today 6/4, unfortunately only core was obtained. Unable to get aspiration, likely due to fibrosis and possibly packed with blasts.   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 Discussed diagnosis, treatment options and prognosis with patient and sister on 6/5. Patient agreeable to treatment.  - Plan to start Venetoclax once WBC count <25K. With his significant heart history, he would unlikely be able to tolerate aggressive chemotherapy induction.  - PICC line in place  - Prior auth approved for Venetoclax for this patient. Co pay is $1378.62. Charitable ivan available. Will plan to pursue financial assistance after discussion of treatment options.    - Discussed with Demetrice Galarza, Financial Liason. Message sent on 6/5      Treatment Plan: Decitabine (5 days) / Venetoclax (C1D1: 6/5/21)   - Decitabine 20mg/m2 - D1-D5   - Venetoclax Ramp up - Plan to initiate when WBC <25K   - Supportive meds: PRN anti- emetics, bowl regimen, pain control    # Anemia   # Thrombocytopenia   - Patient is transfusion dependent prior to admission due to previous myelofibrosis  Transfuse to keep hgb> 8 (hx CAD and HFpEF) and platelets >10k      # Risk TLS and DIC   - Renal function greatly improved. Changed allopurinol to 300 mg daily   - Follow TLS labs BID  - Follow DIC labs  daily     ID  COVID test negative 6/2    # ID prophylaxis  - Viral Serologies: HSV 1 positive, HSV 2 negative, EBV positive, HBV Surface Ag negative, HBV surface Ab negative, HBV core Ag negative, HIV negative, CMV negative  -  BID  - Plan to start anti bacterial and anti fungal when ANC <1.0      MSK  # Right Shoulder " Pain  # Right shoulder subacromial impingement/bursitis/tendinitis  # Adhesive Capsulitis  Patient has had 2-3 weeks of right shoulder pain. Was seen in ortho clinic on 5/27. Has not had any specific injuries or traumas. Pain is worse with motion but can not get comfortable. Was diagnosed with Right shoulder subacromial impingement/bursitis/tendinitis. Has not been able to move his shoulder or sleep due to the pain. Differential includes previous diagnosis of right shoulder subacromial impingement/bursitis/tendinitis, fracture, adhesive capsulitis, septic arthritis  - Received a Kenalog and Marcaine injection on 5/27 in ortho clinic. Received additional injection on 5/30 in ED when he present with worsened pain  - Ortho surg consulted, appreciate recs   - Want to ensure there is no fracture or septic arthritis.    - Repeat imaging per ortho   - plan to evaluate patient again on Monday 6/7  - Joint is slightly swollen. No erythema, redness or warmth. He is not fevering. Though his immune system is compromised, he is not showing typical signs of infection   - , likely related to poss acute leukemia  - ESR 76, likely related to poss acute leukemia  - Pain still present in right shoulder, though improving.   - Continue with prednisone 20mg. Consider stopping tomorrow (6/7)  - Pain Regimen   - Dilaudid 0.2mg q3h prn   - Oxycodone 5-10mg q4h prn   - Tylenol 650mg TID scheduled    CARDS   # CAD s/p CABG (Jan.2020)  # HFpEF   Last saw cardiology 2/16/21. Last echo 1/27/21 show left ventricular function is low normal. EF 50-55%. Apicolateral hypokensis. Possible mid to distal inferolateral hypokinesis.   - Previous on furosemide at home, was previously on lasix 40mg am + 20mg pm. Concerned that his lasix may have induced an KRIS  - Restart lasix 40mg daily     # HTN   - continue PTA Metoprolol      # HLD   - will hold PTA statin while inpatient.      RENAL   # CKD3   - Creatinine significantly improved. May have had  lasix induced Cr elevation  - Lasix has been on hole for 1-3 days. Will plan to re-introduce lasix today as patient has had increased BLE edema.  - Continue to monitor with daily CMP     # Bilateral Lower Extremity Edema  - 2-3+ bilateral lower extremity edema  - Chronic history of heart failure and BLE edema  - Was previously on lasix 40mg am + 20mg pm. Concerned that his lasix may have induced an KRIS  - Will start lasix 40mg daily   - Lymphedema consulted  - Was previously wearing compression stockings      Fluids/Electrolytes/Nutrition   - Bolus as needed  - PRN lyte replacement per standing protocol  - Regular diet as tolerated     Lines: PICC in place    PPX  VTE: none due to thrombocytopenia  GI: n/a  Bowels: Senna and Miralax PRN  Activity: Up as Tolerated    Code  DNR, Intubation OK    Dispo: Plan for prolonged stay, likely +2 weeks. Started induction chemotherapy with decitabine.     Patient is seen and examined by Dr. Murrell.  Assessment and plan are discussed and delivered to the patient.    Vinita Tolbert (Lan) PA   Hematology/Oncology  Pager: [phone]    Interval History   No acute events overnight.   Renny is feeling ok today. Improved pain in his right shoulder. Still having some pain. More comfortable today. Having increased swelling in his legs. Was previously wearing compression stockings.  All questions answered at bedside  Appetite improving. Energy low.   Denies fever, chills, mouth sores, SOB, cough, abdominal pain, diarrhea, constipation, nausea, vomiting, dysuria, hematuria, numbness, tingling, swelling    Complete and Comprehensive review of systems review and negative other than noted here or in the HPI.     Physical Exam   Temp: 96.8  F (36  C) Temp src: Oral BP: 115/58 Pulse: 83   Resp: 16 SpO2: 94 % O2 Device: None (Room air)    Vitals:    06/04/21 0700 06/05/21 1009 06/06/21 0738   Weight: 82.2 kg (181 lb 4.8 oz) 83.3 kg (183 lb 11.2 oz) 82.6 kg (182 lb 1.6 oz)     Vital Signs with  Ranges  Temp:  [95.8  F (35.4  C)-97.4  F (36.3  C)] 96.8  F (36  C)  Pulse:  [68-96] 83  Resp:  [16-24] 16  BP: (110-135)/(52-64) 115/58  SpO2:  [90 %-96 %] 94 %  I/O last 3 completed shifts:  In: 1328 [P.O.:1210; IV Piggyback:118]  Out: 1550 [Urine:1550]    Constitutional: Pleasant male sitting up in chair. Awake and conversational. Non- toxic appearing. No acute distress.   HEENT: Normocephalic, atraumatic. Sclerae anicteric. EOM intact. Moist mucus membranes   Respiratory: Breathing comfortable with no increased work on room air. Good air exchange. No signs of respiratory distress or accessory muscle use. Lungs clear to auscultation bilaterally, no crackles or wheezing noted.  Cardiovascular: Regular rate and rhythm. Normal S1 and S2. No murmurs, rubs, or gallops. 2-3+ BLE edema  GI: Abdomen is soft, non-distended, non-tender. Bowel sounds present and normoactive.   Skin: Skin is clean, dry, intact. No jaundice appreciated.   Musculoskeletal/ Extremities: No redness, warmth of the joints appreciated. Some swelling to his right shoulder. Pain with movement of his right shoulder. Distal pulses palpable. Nailbeds pink and without cyanosis or clubbing.   Neurologic: Alert and oriented. Speech normal. Grossly nonfocal. Memory and thought process preserved. Motor function normal in lower extremities. Right arm range of motion limited. Left arm wnl. Sensation intact.   Neuropsychiatric: Calm, affect congruent to situation. Appropriate mood and affect. Good judgment and insight. No visual/auditory hallucinations.       Medications     - MEDICATION INSTRUCTIONS -         acetaminophen  650 mg Oral TID     acyclovir  400 mg Oral BID     allopurinol  300 mg Oral Daily     vitamin B-12  200 mcg Oral QAM     decitabine  20 mg/m2 (Treatment Plan Recorded) Intravenous Q24H     heparin lock flush  5-10 mL Intracatheter Q24H     levofloxacin  250 mg Oral At Bedtime     lidocaine  1 patch Transdermal Q24h    And     lidocaine    Transdermal Q8H     metoprolol tartrate  50 mg Oral BID     micafungin  50 mg Intravenous Q24H     vitamin D3  50 mcg Oral Daily       Data   Results for orders placed or performed during the hospital encounter of 06/03/21 (from the past 24 hour(s))   Double Lumen PICC Placement    Narrative    Prieto Elizondo RN     6/5/2021  3:47 PM  [facility]    Double Lumen PICC Placement    Date/Time: 6/5/2021 3:45 PM  Performed by: Prieto Elizondo RN  Authorized by: Jeane Patel MD   Indications: vascular access    UNIVERSAL PROTOCOL   Site Marked: Yes  Prior Images Obtained and Reviewed:  Yes  Required items: Required blood products, implants, devices and special   equipment available    Patient identity confirmed:  Verbally with patient and arm band  NA - No sedation, light sedation, or local anesthesia  Confirmation Checklist:  Patient's identity using two indicators, relevant   allergies, procedure was appropriate and matched the consent or emergent   situation and correct equipment/implants were available  Time out: Immediately prior to the procedure a time out was called    Universal Protocol: the Joint Commission Universal Protocol was followed    Preparation: Patient was prepped and draped in usual sterile fashion           ANESTHESIA    Anesthesia: Local infiltration  Local Anesthetic:  Lidocaine 1% without epinephrine  Anesthetic Total (mL):  5      SEDATION    Patient Sedated: No        Preparation: skin prepped with ChloraPrep  Skin prep agent: skin prep agent completely dried prior to procedure  Sterile barriers: maximum sterile barriers were used: cap, mask, sterile   gown, sterile gloves, and large sterile sheet  Hand hygiene: hand hygiene performed prior to central venous catheter   insertion  Type of line used: PICC and Power PICC  Catheter type: double lumen  Lumen type: non-valved  Catheter size: 5 Fr  Brand: KlickThru  Lot  number: [id number]  Placement method: venipuncture, MST, ultrasound and tip confirmation   system  Number of attempts: 1  Successful placement: yes  Orientation: right  Location: basilic vein (vd 0.78 cm)  Arm circumference: adults 10 cm  Extremity circumference: 28  Visible catheter length: 1  Total catheter length: 38  Dressing and securement: blood cleaned with CHG, chlorhexidine disc   applied, dressing applied, glue, line secured, securement device, site   cleaned, statlock and sterile dressing applied  Post procedure assessment: blood return through all ports and free fluid   flow (3CG technology)  PROCEDURE   Patient Tolerance:  Patient tolerated the procedure well with no immediate   complications  Describe Procedure: PICC ok to use   CBC with platelets differential   Result Value Ref Range    WBC 65.2 (HH) 4.0 - 11.0 10e9/L    RBC Count 2.58 (L) 4.4 - 5.9 10e12/L    Hemoglobin 7.4 (L) 13.3 - 17.7 g/dL    Hematocrit 22.5 (L) 40.0 - 53.0 %    MCV 87 78 - 100 fl    MCH 28.7 26.5 - 33.0 pg    MCHC 32.9 31.5 - 36.5 g/dL    RDW 16.2 (H) 10.0 - 15.0 %    Platelet Count 11 (LL) 150 - 450 10e9/L    Diff Method Manual Differential     % Neutrophils 14.0 %    % Lymphocytes 11.3 %    % Monocytes 42.6 %    % Eosinophils 2.6 %    % Basophils 4.3 %    % Myelocytes 2.6 %    % Promyelocytes 1.7 %    % Other Cells 20.9 %    Nucleated RBCs 1 (H) 0 /100    Absolute Neutrophil 9.1 (H) 1.6 - 8.3 10e9/L    Absolute Lymphocytes 7.4 (H) 0.8 - 5.3 10e9/L    Absolute Monocytes 27.8 (H) 0.0 - 1.3 10e9/L    Absolute Eosinophils 1.7 (H) 0.0 - 0.7 10e9/L    Absolute Basophils 2.8 (H) 0.0 - 0.2 10e9/L    Absolute Myelocytes 1.7 (H) 0 10e9/L    Absolute Promyeloctyes 1.1 (H) 0 10e9/L    Absolute Other Cells 13.6 (H) 0 10e9/L    Absolute Nucleated RBC 0.6     Anisocytosis Slight     Poikilocytosis Slight     Ovalocytes Slight     Microcytes Present     Platelet Estimate Confirming automated cell count    Basic metabolic panel   Result Value  Ref Range    Sodium 137 133 - 144 mmol/L    Potassium 4.0 3.4 - 5.3 mmol/L    Chloride 108 94 - 109 mmol/L    Carbon Dioxide 23 20 - 32 mmol/L    Anion Gap 6 3 - 14 mmol/L    Glucose 103 (H) 70 - 99 mg/dL    Urea Nitrogen 25 7 - 30 mg/dL    Creatinine 1.10 0.66 - 1.25 mg/dL    GFR Estimate 67 >60 mL/min/[1.73_m2]    GFR Estimate If Black 78 >60 mL/min/[1.73_m2]    Calcium 8.1 (L) 8.5 - 10.1 mg/dL   INR   Result Value Ref Range    INR 1.32 (H) 0.86 - 1.14   Fibrinogen activity   Result Value Ref Range    Fibrinogen 567 (H) 200 - 420 mg/dL   Uric acid   Result Value Ref Range    Uric Acid 4.8 3.5 - 7.2 mg/dL   Magnesium   Result Value Ref Range    Magnesium 2.2 1.6 - 2.3 mg/dL   Phosphorus   Result Value Ref Range    Phosphorus 3.6 2.5 - 4.5 mg/dL   Lactate Dehydrogenase   Result Value Ref Range    Lactate Dehydrogenase 1,247 (H) 85 - 227 U/L   Hepatic panel   Result Value Ref Range    Bilirubin Direct 0.1 0.0 - 0.2 mg/dL    Bilirubin Total 0.4 0.2 - 1.3 mg/dL    Albumin 2.7 (L) 3.4 - 5.0 g/dL    Protein Total 6.3 (L) 6.8 - 8.8 g/dL    Alkaline Phosphatase 156 (H) 40 - 150 U/L    ALT 19 0 - 70 U/L    AST 37 0 - 45 U/L     Recent Labs   Lab 06/06/21  0613 06/05/21  0739 06/04/21  1341 06/04/21  0659 06/03/21 2058   WBC 65.2* 69.3*  --  66.3* 71.1*   HGB 7.4* 7.9*  --  7.6* 6.8*   MCV 87 88  --  88 87   PLT 11* 12*  --  15* 17*   INR 1.32* 1.27*  --  1.30* 1.32*    137  --  135 137   POTASSIUM 4.0 3.7 4.2 3.4 3.3*   CHLORIDE 108 106  --  105 106   CO2 23 22  --  22 23   BUN 25 24  --  35* 39*   CR 1.10 1.15  --  1.45* 1.66*   ANIONGAP 6 9  --  8 8   MINNIE 8.1* 8.2*  --  8.4* 8.2*   * 105*  --  114* 124*   ALBUMIN 2.7*  --   --   --  3.0*   PROTTOTAL 6.3*  --   --   --  6.9   BILITOTAL 0.4  --   --   --  0.4   ALKPHOS 156*  --   --   --  135   ALT 19  --   --   --  18   AST 37  --   --   --  27

## 2021-06-06 NOTE — PLAN OF CARE
Pt afebrile with stable vs. Received 1 unit PRBCs for hgb 7.4. Right shoulder pain much improved after starting prednisone yesterday. Still unable to move the arm without assistance from his left hand, but reports comfort while shoulder is still and and able to sleep and move about room comfortably. Declined oxycodone. Wt elevated and edema in legs/feet. Lasix (prior home med) 40mg po initiated today. Good po intake. Good urine output. BM x1 today after being constipated.   Received day 2 Decitabine (see note).

## 2021-06-07 NOTE — PLAN OF CARE
Alert and oriented x4, AVSS,afebrile and on RA writer gave 10 mg of oxycodone for right shoulder pain. Denied nausea, numbness and tingling.Bilateral lower extremity edema 3+  and right hand edema 2+. Stand by assist x1. Last Bm 06/06/2021. Will continue to monitor

## 2021-06-07 NOTE — PHARMACY-RX INSURANCE COVERAGE
Patient Assistance Enrollment    Pt's expected copay for venclexta is over $2800/mo. Pharmacy liaison submitted application for [employer]. If approved, pt will receive 10,000 to help with venclexta copays. Application confirmation number: [id number].    Update 10:20am: Pt was approved the [employer]. Jose Roberto information was preloaded into pt's pharmacy profile.           Demetrice Galarza  [facility] Liaison  Ph: [phone] Page: [phone]

## 2021-06-07 NOTE — PROGRESS NOTES
06/07/21 1600   Quick Adds   Type of Visit Initial PT Evaluation   Living Environment   People in home alone   Current Living Arrangements apartment   Home Accessibility no concerns   Transportation Anticipated car, drives self;family or friend will provide   Living Environment Comments Some family present to assist PRN   Self-Care   Usual Activity Tolerance good   Current Activity Tolerance moderate   Regular Exercise No   Equipment Currently Used at Home none   Activity/Exercise/Self-Care Comment Owns a walker at home. Doesn't do much for hobbies. Watches TV.   Disability/Function   Fall history within last six months no   Change in Functional Status Since Onset of Current Illness/Injury yes   General Information   Onset of Illness/Injury or Date of Surgery 06/03/21   Patient/Family Therapy Goals Statement (PT) Wants to get up and move   Pertinent History of Current Problem (include personal factors and/or comorbidities that impact the POC) Renny Gannon is a 70 year old male who presents with past medical history of CAD s/p CABG (Jan 2020), HFpEF, CKD3 ( BL Cr. 1.3-1.5), HTN, and JAK2+ myelofibrosis. He was transferred from outside hospital for concern of progression to AML with leukocytosis (WBC = 72.4) on admission. Initiated induction therapy with Decitabine + Venetoclax; (D1= 6/5/21). Venetoclax iniation was delayed until WBC <25K.    Existing Precautions/Restrictions immunosuppressed   Edema General Information   Onset of Edema 06/03/21   Affected Body Part(s) Left LE;Right LE   Edema Etiology Chemo;Surgery   Etiology Comments Long standing edema since Feb 2020 and CABG. Wears garments (sounds like FINESSE hose) at home. Hypoalbuminemia, reduced mobility and chronic donor vein site for CABG B LE   Edema Precautions Active Cancer   Edema Examination/Assessment   Skin Condition Pitting;Dryness   Skin Condition Comments Shiny/taut skin, 3+ pittin B LE MTP to knee crease   Scar Yes   Radial Pulse Symmetrical    Cognition   Orientation Status (Cognition) oriented x 4   Affect/Mental Status (Cognition) WFL   Follows Commands (Cognition) follows one-step commands   Safety Deficit (Cognition) minimal deficit   Cognitive Status Comments Didn't think he needed to mobilize while inpatient   Pain Assessment   Patient Currently in Pain No   Posture    Posture Forward head position;Protracted shoulders;Kyphosis   Range of Motion (ROM)   ROM Comment B LE AROM WFL; R LE PROM moderately limited to about 80 degrees shoulder abd/flexion. AROM limited to 5-10 degrees to shoulder flexion on right.   Strength   Strength Comments B LE strength grossly at least 3+/5. Didn't formally assess R LE due to injury. Grossly 2/5 strength R shoulder   Bed Mobility   Comment (Bed Mobility) Not assessed   Transfers   Transfer Safety Comments Sit<>stand SBA   Gait/Stairs (Locomotion)   Comment (Gait/Stairs) Amb 15' with FWW/SBA. NO LOB. Forward flexed and slow amadeo.   Balance   Balance Comments SBA for standing static/dynamic balance   Sensory Examination   Sensory Perception patient reports no sensory changes   Clinical Impression   Criteria for Skilled Therapeutic Intervention yes, treatment indicated   PT Diagnosis (PT) Impaired functional mobility   Edema: Patient Presentation Edema   Influenced by the following impairments R shoulder pain, weakness, reduced ROM, edema, deconditioning   Functional limitations due to impairments Bed mobility, transfers, gait, balance, endurance   Clinical Presentation Stable/Uncomplicated   Clinical Presentation Rationale Clinical judgement   Clinical Decision Making (Complexity) low complexity   Therapy Frequency (PT) 3x/week   Predicted Duration of Therapy Intervention (days/wks) 2 weeks   Planned Therapy Interventions (PT) balance training;bed mobility training;gait training;home exercise program;neuromuscular re-education;patient/family education;postural re-education;ROM (range of  motion);strengthening;stretching;transfer training   Edema: Planned Interventions Gradient compression bandaging;Fit for compression garment;Edema exercises;Precautions to prevent infection/exacerbation;Education;Manual therapy;ADL training   Risk & Benefits of therapy have been explained evaluation/treatment results reviewed;care plan/treatment goals reviewed;risks/benefits reviewed;current/potential barriers reviewed;participants voiced agreement with care plan;participants included;patient   PT Discharge Planning    PT Discharge Recommendation (DC Rec) home with assist;home with home care physical therapy   PT Rationale for DC Rec Reduced mobility/deconditioning, mildly impaired safety, R shoulder RCT/adhesive capsulitis   PT Brief overview of current status  SBA for mobility   Total Evaluation Time   Total Evaluation Time (Minutes) 10

## 2021-06-07 NOTE — PLAN OF CARE
Nursing Focus: Chemotherapy  D: Positive blood return via PICC. Insertion site is clean/dry/intact, dressing intact with no complaints of pain.  Urine output is recorded in intake in Doc Flowsheet.    I: Premedications given per order (see electronic medical administration record). Dose #3 of Decitabine started to infuse over 1 hour. Reviewed pt teaching on chemotherapy side effects.  Pt denies need for further teaching. Chemotherapy double checked per protocol by two chemotherapy competent RN's.   A: Tolerating procedure well. Denies nausea and or pain.   P: Continue to monitor urine output and symptoms of nausea. Screen for symptoms of toxicity.     Continues w/ R. Shoulder pain. Some relief provided w/ oxycodone given x2, scheduled tylenol, and voltaren gel x1. 1 unit of platelets infused for level of 8. 1 unit of RBCs infused for level of 8.0. Continue to monitor & w/ POC.

## 2021-06-08 NOTE — PLAN OF CARE
0599-3025  Afebrile, VSS. No acute event.   Right shoulder pain continue, PRN oxycodone, and IV dilaudid given at night shift.     NEURO: Alert and oriented x4.      RESPIRATORY: Room Air, denies dizziness, and SOB. Lungs sound, clear, equal bilateral.     CARDIO: VSS, denies dizziness, and extremity pain.      GI/:  Denies N/V, BS active, AUOP.        SKIN: Intact.      ACTIVITY: Stand by assist,      PAIN: Yes, left shoulder pain.     DLA: PICC double lumen heparin locked.     BG: No      PLAN: Continue monitoring.

## 2021-06-08 NOTE — PLAN OF CARE
Nursing Focus: Chemotherapy  D: Positive blood return via PICC. Insertion site is clean/dry/intact, dressing intact with no complaints of pain.  Urine output is recorded in intake in Doc Flowsheet.    I: Premedications given per order (see electronic medical administration record). Dose #4 of Decitabine started to infuse over 1 hour. Reviewed pt teaching on chemotherapy side effects.  Pt denies need for further teaching. Chemotherapy double checked per protocol by two chemotherapy competent RN's.   A: Tolerating procedure well. Denies nausea and or pain.   P: Continue to monitor urine output and symptoms of nausea. Screen for symptoms of toxicity.    VSS, afebrile. Denies nausea & SOB. Continues w/ R. Arm pain, dilaudid given x1, oxycodone given x2, voltaren gel given x2, scheduled tylenol; some relief provided. R. Arm swelling, negative for DVT. R. Arm Xray ordered. Continue to monitor & w/ POC.

## 2021-06-08 NOTE — PROGRESS NOTES
Orthopedic Note    S: Right shoulder pain similar to previous, unsure if steroids helped over the weekend. Did PT as well which was uncomfortable.   O: Right shoulder   PROM: FF 80, Abduction 80, ER 15, IR back pocket   AROM: Fires delt, 5 degrees FF, 5 degrees abduction.   Pain with Neer's impingement test.   Some RUE swelling in hand and forearm.  A/P: Shoulder exam remains consistent with adhesive capusilitis/impingement, no concern for septic shoulder.  - WBAT RUE  - Continue PT  - Discussed with patient that this is a painful problem and will be sore with therapy. This often takes months of therapy to improve.   - Do not think hand swelling is related to his shoulder, given placement of PICC line agree with RUE US ordered by primary team.    Varun Gant MD  Orthopaedic Surgery PGY-1  Pager: [phone]

## 2021-06-08 NOTE — PROGRESS NOTES
"[facility]    Hematology / Oncology Progress Note    Date of Service (when I saw the patient): 06/08/2021     Assessment & Plan   Renny Gannon is a 70 year old male who presents with past medical history of CAD s/p CABG (Jan 2020), HFpEF, CKD3 ( BL Cr. 1.3-1.5), HTN, and JAK2+ myelofibrosis. He was transferred from outside hospital for concern of progression to AML with leukocytosis (WBC = 72.4) on admission. Initiated induction therapy with Decitabine + Venetoclax; (D1= 6/5/21). Venetoclax iniation was delayed until WBC <25K.     Today:  - Day 4 of Decitabine 5 day induction; plan to start Venetoclax once WBC count <25K. WBC today 49K.  - US to assess RUE swelling w/o evidence of DVT    - Persistent right shoulder pain; continue Oxycodone 5-10 mg q4h PRN, Tylenol TID      HEME  # LIVIER 2+ Myelofibrosis   # Concern for AML  Follow by Dr. Cristina. He initially was seen in Dec. 2019 by his PCP for increased fatigue and SOB w/ exertion. CBC showed WBC 79.1, Hgb 11.9 and Plt 378. Peripheral smear showed leuko-erythroblastic reaction w/ neutrophilic left shift. BMBx (12/30/19) hypercellular marrow w/ granulocytic and megakaryocytic proliferation, megakaryocytic clustering and atypia and 1.5% circulating blasts. 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 He was on and off Hydrea from Jan 2020 - Jan 2021. BMBx 3/26/21 increased fibrosis, cellularity 40-50%, 5% blasts on morphology and flow. He most recently has just been receiving transfusion support for his anemia and thrombocytopenia. He presented to clinic for labs and possible transfusion and was found to have a WBC 71.9 Hgb 6.4 and Plt 18. Lab noted \"a lot of immature WBC and blasts\". He was then directed to the ED for admission and further work up.  - BMBx 6/4, unfortunately only core was obtained. Unable to get aspiration, likely due to fibrosis and possibly packed with blasts. " BMBx Morphology consistent with blast crisis arising from previously diagnosed myeloproliferative neoplasm. The diagnostic classification drive by increase peripheral blast (23%) d/t blast percentage in bone marrow is difficult to establish d/t bone marrow fibrosis and lack of aspirates. BMBx Flow from core shows increased abnormal myeloid blasts (12%). FLT3 negative.   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 Discussed diagnosis, treatment options and prognosis with patient and sister on 6/5. Patient agreeable to treatment.  - Plan to start Venetoclax once WBC count <25K. With his significant heart history, he would unlikely be able to tolerate aggressive chemotherapy induction.  - PICC line in place  - Prior auth approved for Venetoclax for this patient. [facility]2,882.25[facility]       Treatment Plan: Decitabine (5 days) / Venetoclax (C1D1: 6/5/21)   - Decitabine 20mg/m2 - D1-D5   - Venetoclax Ramp up - Plan to initiate when WBC <25K   - Supportive meds: PRN anti- emetics, bowl regimen, pain control    # Anemia   # Thrombocytopenia   - Patient is transfusion dependent prior to admission due to previous myelofibrosis  Transfuse to keep hgb> 8 (hx CAD and HFpEF) and platelets >10k      # Risk TLS and DIC   - Renal function greatly improved. Changed allopurinol to 300 mg daily   - Follow TLS labs BID  - Follow DIC labs  daily     ID  COVID test negative 6/2    # ID prophylaxis  - Viral Serologies: HSV 1 positive, HSV 2 negative, EBV positive, HBV Surface Ag negative, HBV surface Ab negative, HBV core Ag negative, HIV negative, CMV negative  - Acyclovir 400 BID  - Levaquin 250 mg daily   - Micafungin 50 mg daily     MSK  # Right Shoulder Pain  # Right shoulder subacromial impingement/bursitis/tendinitis  # Adhesive  Capsulitis  Patient has had 2-3 weeks of right shoulder pain. Was seen in ortho clinic on 5/27. Has not had any specific injuries or traumas. Pain is worse with motion but can not get comfortable. Was diagnosed with Right shoulder subacromial impingement/bursitis/tendinitis. Has not been able to move his shoulder or sleep due to the pain. Received a Kenalog and Marcaine injection on 5/27 in ortho clinic. Received additional injection on 5/30 in ED when he present with worsened pain Differential includes previous diagnosis of right shoulder subacromial impingement/bursitis/tendinitis, fracture, adhesive capsulitis, septic arthritis  - Ortho surg consulted, appreciate recs   - Want to ensure there is no fracture or septic arthritis.    - Repeat right should xray 6/4 shows no acute osseous abnormalities, degenerative changes.    - Ortho re-evaluated right shoulder and has low concern for septic joint. Recommend continue conservative management w/ PT.   - Current Pain Regimen   - Dilaudid 0.2mg q3h prn   - Oxycodone 5-10mg q4h prn   - Tylenol 650mg TID scheduled   - Voltaren Topical QID PRN     #RUE Swelling   RUE swelling was noted on 6/8. RUE US unremarkable for DVT. Suspect dependent edema as patient lets arm rest d/t right shoulder pain.   - will closely monitor     CARDS   # CAD s/p CABG (Jan.2020)  # HFpEF   Last saw cardiology 2/16/21. Last echo 1/27/21 show left ventricular function is low normal. EF 50-55%. Apicolateral hypokensis. Possible mid to distal inferolateral hypokinesis.   - Previous on furosemide at home, was previously on lasix 40mg am + 20mg pm. Concerned that his lasix may have induced an KRIS  - Restart lasix 40mg daily     # HTN   - continue PTA Metoprolol      # HLD   - will hold PTA statin while inpatient.      RENAL   # CKD3   - Creatinine significantly improved. May have had lasix induced Cr elevation  - Continue to monitor with daily CMP     # Bilateral Lower Extremity Edema  - 2-3+  bilateral lower extremity edema  - Chronic history of heart failure and BLE edema  - Will start lasix 40mg daily   - Lymphedema consulted; appreciate recs     Fluids/Electrolytes/Nutrition   - Bolus as needed  - PRN lyte replacement per standing protocol  - Regular diet as tolerated     Lines: PICC in place    PPX  VTE: none due to thrombocytopenia  GI: n/a  Bowels: Senna and Miralax PRN  Activity: Up as Tolerated    Code  DNR, Intubation OK    Dispo: Plan for prolonged stay, likely +2 weeks. Started induction chemotherapy with decitabine.     Patient is seen and examined by Dr. Murrell.  Assessment and plan are discussed and delivered to the patient.    Milady Gannon PA-C  Hematology/Oncology  Pager #[phone]    Interval History   No acute events overnight. Nursing notes reviewed. Afebrile and HD stable.   Renny is feeling tired this morning. Continues to have difficulty sleeping at night. He continues to have right shoulder pain but no other side effects. Noticed that his right hand is more swollen. No tenderness around his PICC. Denies headaches or vision changes, N/V, abdominal pain or discomfort, diarrhea.     Complete and Comprehensive review of systems review and negative other than noted here or in the HPI.     Physical Exam   Temp: 96.8  F (36  C) Temp src: Oral BP: 134/64 Pulse: 86   Resp: 20 SpO2: 94 % O2 Device: None (Room air)    Vitals:    06/06/21 0738 06/07/21 0736 06/08/21 0742   Weight: 82.6 kg (182 lb 1.6 oz) 84.1 kg (185 lb 4.8 oz) 85.3 kg (188 lb 1.6 oz)     Vital Signs with Ranges  Temp:  [95  F (35  C)-97.6  F (36.4  C)] 96.8  F (36  C)  Pulse:  [75-88] 86  Resp:  [18-20] 20  BP: (112-134)/(46-70) 134/64  SpO2:  [90 %-96 %] 94 %  I/O last 3 completed shifts:  In: 1485 [P.O.:640; IV Piggyback:118]  Out: 1610 [Urine:1610]    Constitutional: Pleasant male sitting up in chair. Awake and conversational. Non- toxic appearing. No acute distress.   HEENT: Normocephalic, atraumatic. Sclerae  anicteric. EOM intact. Moist mucus membranes   Respiratory: Breathing comfortable with no increased work on room air. Good air exchange. No signs of respiratory distress or accessory muscle use. Lungs clear to auscultation bilaterally, no crackles or wheezing noted.  Cardiovascular: Regular rate and rhythm. Normal S1 and S2. No murmurs, rubs, or gallops. 2-3+ BLE edema  GI: Abdomen is soft, non-distended, non-tender. Bowel sounds present and normoactive.   Skin: Skin is clean, dry, intact. No jaundice appreciated.   Musculoskeletal/ Extremities: No redness, warmth of the joints appreciated. Some swelling to his right shoulder. Pain with movement of his right shoulder.   Neurologic: Alert and oriented. Speech normal. Grossly nonfocal. Memory and thought process preserved. Motor function normal in lower extremities. Right arm range of motion limited. Left arm wnl. Sensation intact.   Neuropsychiatric: Calm, affect congruent to situation. Appropriate mood and affect. Good judgment and insight. No visual/auditory hallucinations.       Medications     - MEDICATION INSTRUCTIONS -         acetaminophen  650 mg Oral TID     acyclovir  400 mg Oral BID     allopurinol  300 mg Oral Daily     vitamin B-12  200 mcg Oral QAM     decitabine  20 mg/m2 (Treatment Plan Recorded) Intravenous Q24H     furosemide  40 mg Oral Daily     heparin lock flush  5-10 mL Intracatheter Q24H     levofloxacin  250 mg Oral At Bedtime     lidocaine  1 patch Transdermal Q24h    And     lidocaine   Transdermal Q8H     metoprolol tartrate  50 mg Oral BID     micafungin  50 mg Intravenous Q24H     vitamin D3  50 mcg Oral Daily       Data   Results for orders placed or performed during the hospital encounter of 06/03/21 (from the past 24 hour(s))   Phosphorus   Result Value Ref Range    Phosphorus 3.5 2.5 - 4.5 mg/dL   Uric acid   Result Value Ref Range    Uric Acid 4.7 3.5 - 7.2 mg/dL   Basic metabolic panel   Result Value Ref Range    Sodium 136 133 -  144 mmol/L    Potassium 3.9 3.4 - 5.3 mmol/L    Chloride 104 94 - 109 mmol/L    Carbon Dioxide 27 20 - 32 mmol/L    Anion Gap 5 3 - 14 mmol/L    Glucose 130 (H) 70 - 99 mg/dL    Urea Nitrogen 24 7 - 30 mg/dL    Creatinine 1.13 0.66 - 1.25 mg/dL    GFR Estimate 65 >60 mL/min/[1.73_m2]    GFR Estimate If Black 75 >60 mL/min/[1.73_m2]    Calcium 8.0 (L) 8.5 - 10.1 mg/dL   CBC with platelets differential   Result Value Ref Range    WBC 49.0 (H) 4.0 - 11.0 10e9/L    RBC Count 3.00 (L) 4.4 - 5.9 10e12/L    Hemoglobin 8.7 (L) 13.3 - 17.7 g/dL    Hematocrit 26.7 (L) 40.0 - 53.0 %    MCV 89 78 - 100 fl    MCH 29.0 26.5 - 33.0 pg    MCHC 32.6 31.5 - 36.5 g/dL    RDW 16.2 (H) 10.0 - 15.0 %    Platelet Count 20 (LL) 150 - 450 10e9/L    Diff Method Manual Differential     % Neutrophils 13.5 %    % Lymphocytes 22.6 %    % Monocytes 18.9 %    % Eosinophils 12.6 %    % Basophils 6.3 %    % Blasts 26.1 %    Absolute Neutrophil 6.6 1.6 - 8.3 10e9/L    Absolute Lymphocytes 11.1 (H) 0.8 - 5.3 10e9/L    Absolute Monocytes 9.3 (H) 0.0 - 1.3 10e9/L    Absolute Eosinophils 6.2 (H) 0.0 - 0.7 10e9/L    Absolute Basophils 3.1 (H) 0.0 - 0.2 10e9/L    Absolute Blasts 12.8 (H) 0 10e9/L    Anisocytosis Slight     Poikilocytosis Slight     Elliptocytes Slight     Microcytes Present     Platelet Estimate Confirming automated cell count    INR   Result Value Ref Range    INR 1.20 (H) 0.86 - 1.14   Fibrinogen activity   Result Value Ref Range    Fibrinogen 515 (H) 200 - 420 mg/dL   Magnesium   Result Value Ref Range    Magnesium 2.0 1.6 - 2.3 mg/dL   Lactate Dehydrogenase   Result Value Ref Range    Lactate Dehydrogenase 950 (H) 85 - 227 U/L   Phosphorus   Result Value Ref Range    Phosphorus 3.6 2.5 - 4.5 mg/dL   Uric acid   Result Value Ref Range    Uric Acid 4.5 3.5 - 7.2 mg/dL   Basic metabolic panel   Result Value Ref Range    Sodium 136 133 - 144 mmol/L    Potassium 4.2 3.4 - 5.3 mmol/L    Chloride 104 94 - 109 mmol/L    Carbon Dioxide 27 20 -  32 mmol/L    Anion Gap 5 3 - 14 mmol/L    Glucose 96 70 - 99 mg/dL    Urea Nitrogen 25 7 - 30 mg/dL    Creatinine 1.05 0.66 - 1.25 mg/dL    GFR Estimate 71 >60 mL/min/[1.73_m2]    GFR Estimate If Black 82 >60 mL/min/[1.73_m2]    Calcium 8.1 (L) 8.5 - 10.1 mg/dL   Hepatic panel   Result Value Ref Range    Bilirubin Direct 0.2 0.0 - 0.2 mg/dL    Bilirubin Total 0.7 0.2 - 1.3 mg/dL    Albumin 2.8 (L) 3.4 - 5.0 g/dL    Protein Total 6.5 (L) 6.8 - 8.8 g/dL    Alkaline Phosphatase 263 (H) 40 - 150 U/L    ALT 36 0 - 70 U/L    AST 42 0 - 45 U/L     Recent Labs   Lab 06/08/21  0531 06/07/21  1809 06/07/21  0533 06/06/21  0613 06/06/21  0613   WBC 49.0*  --  57.1*  --  65.2*   HGB 8.7*  --  8.0*  --  7.4*   MCV 89  --  89  --  87   PLT 20*  --  8*  --  11*   INR 1.20*  --  1.22*  --  1.32*    136 137   < > 137   POTASSIUM 4.2 3.9 4.0   < > 4.0   CHLORIDE 104 104 106   < > 108   CO2 27 27 27   < > 23   BUN 25 24 27   < > 25   CR 1.05 1.13 1.04   < > 1.10   ANIONGAP 5 5 4   < > 6   MINNIE 8.1* 8.0* 8.3*   < > 8.1*   GLC 96 130* 92   < > 103*   ALBUMIN 2.8*  --  2.7*  --  2.7*   PROTTOTAL 6.5*  --  6.3*  --  6.3*   BILITOTAL 0.7  --  0.4  --  0.4   ALKPHOS 263*  --  178*  --  156*   ALT 36  --  26  --  19   AST 42  --  34  --  37    < > = values in this interval not displayed.

## 2021-06-09 NOTE — PROGRESS NOTES
"[facility]    Hematology / Oncology Progress Note    Date of Service (when I saw the patient): 06/09/2021     Assessment & Plan   Renny Gannon is a 70 year old male who presents with past medical history of CAD s/p CABG (Jan 2020), HFpEF, CKD3 ( BL Cr. 1.3-1.5), HTN, and JAK2+ myelofibrosis. He was transferred from outside hospital for concern of progression to AML with leukocytosis (WBC = 72.4) on admission. Initiated induction therapy with Decitabine + Venetoclax; (D1= 6/5/21). Venetoclax iniation was delayed until WBC <25K.     Today:  - Day 5 of Decitabine 5 day induction; plan to start Venetoclax today per Dr. Johnson   - Raised platelet threshold to >20K; concern that he may have hemarthrosis in the R shoulder   - Persistent right shoulder pain; started long-acting Oxycontin 10 mg at bedtime, continue Oxycodone 5-10 mg q4h PRN while patient is awake, Tylenol TID  - Scheduled Trazodone at bedtime for insomnia   - Will give one time dose IV Lasix 40 mg for worsening bilateral LE edema       HEME  # LIVIER 2+ Myelofibrosis   # Concern for AML  Follow by Dr. Cristina. He initially was seen in Dec. 2019 by his PCP for increased fatigue and SOB w/ exertion. CBC showed WBC 79.1, Hgb 11.9 and Plt 378. Peripheral smear showed leuko-erythroblastic reaction w/ neutrophilic left shift. BMBx (12/30/19) hypercellular marrow w/ granulocytic and megakaryocytic proliferation, megakaryocytic clustering and atypia and 1.5% circulating blasts. 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 He was on and off Hydrea from Jan 2020 - Jan 2021. BMBx 3/26/21 increased fibrosis, cellularity 40-50%, 5% blasts on morphology and flow. He most recently has just been receiving transfusion support for his anemia and thrombocytopenia. He presented to clinic for labs and possible transfusion and was found to have a WBC 71.9 Hgb 6.4 and Plt 18. Lab noted \"a lot of " "immature WBC and blasts\". He was then directed to the ED for admission and further work up.  - BMBx 6/4, unfortunately only core was obtained. Unable to get aspiration, likely due to fibrosis and possibly packed with blasts. BMBx Morphology consistent with blast crisis arising from previously diagnosed myeloproliferative neoplasm. The diagnostic classification drive by increase peripheral blast (23%) d/t blast percentage in bone marrow is difficult to establish d/t bone marrow fibrosis and lack of aspirates. BMBx Flow from core shows increased abnormal myeloid blasts (12%). FLT3 negative.   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 Discussed diagnosis, treatment options and prognosis with patient and sister on 6/5. Patient agreeable to treatment.  - Per Dr. Johnson will start Venetoclax ramp up today (6/9) despite having a WBC of 46.8   - PICC line in place  - Prior auth approved for Venetoclax for this patient. [facility] pay is [facility]2,882.25[facility]   - Sent request for inpatient BMT consult 6/8       Treatment Plan: Decitabine (5 days) / Venetoclax (C1D1: 6/5/21)   - Decitabine 20mg/m2 - D1-D5   - Venetoclax Ramp up - Plan to initiate when WBC <25K   - Supportive meds: PRN anti- emetics, bowl regimen, pain control    # Anemia   # Thrombocytopenia   - Patient is transfusion dependent prior to admission due to previous myelofibrosis  Transfuse to keep hgb> 8 (hx CAD and HFpEF) and platelets >10k      # Risk TLS and DIC   - Renal function greatly improved. Changed allopurinol to 300 mg daily   - Follow TLS labs BID  - Follow DIC labs  daily     ID  COVID test negative 6/2    # ID prophylaxis  - Viral Serologies: HSV 1 positive, HSV 2 negative, EBV positive, HBV Surface Ag negative, HBV surface Ab negative, HBV core Ag negative, HIV " negative, CMV negative  - Acyclovir 400 BID  - Levaquin 250 mg daily   - Micafungin 50 mg daily     MSK  # Right Shoulder Pain  # Right shoulder subacromial impingement/bursitis/tendinitis  # Adhesive Capsulitis  Patient has had 2-3 weeks of right shoulder pain. Was seen in ortho clinic on 5/27. Has not had any specific injuries or traumas. Pain is worse with motion but can not get comfortable. Was diagnosed with right shoulder subacromial impingement/bursitis/tendinitis. Has not been able to move his shoulder or sleep due to the pain. Received a Kenalog and Marcaine injection on 5/27 in ortho clinic. Received additional injection on 5/30 in ED when he presented with worsened pain Differential includes previous diagnosis of right shoulder subacromial impingement/bursitis/tendinitis, fracture, septic arthritis, vs hemarthrosis   - Ortho surg consulted, appreciate recs   - Repeat right should xray 6/4 shows no acute osseous abnormalities, degenerative changes. Though there is significant increase in size of his glenohumeral joint concern for fluid accumulation vs hemarthrosis vs septic joint. -- Will plan for 1-2 days conservative management but have low threshold to tap joint if signs of sepsis or worsening pain.    conservative management w/ PT.   - Current Pain Regimen   - Oxycontin 10 mg PO at bedtime (x6/9)   - Tylenol 650mg TID scheduled    - Dilaudid 0.2mg q3h prn   - Oxycodone 5-10mg q4h prn   - Voltaren Topical QID PRN     #RUE Swelling   RUE swelling was noted on 6/8. RUE US unremarkable for DVT. Suspect dependent edema as patient lets arm rest to the side d/t right shoulder pain.   - will closely monitor     CARDS   # CAD s/p CABG (Jan.2020)  # HFpEF   Last saw cardiology 2/16/21. Last echo 1/27/21 show left ventricular function is low normal. EF 50-55%. Apicolateral hypokensis. Possible mid to distal inferolateral hypokinesis.   - Previous on furosemide at home, was previously on lasix 40mg am + 20mg pm.  Concerned that his lasix may have induced an KRIS  - Restart lasix 40mg daily     # HTN   - continue PTA Metoprolol      # HLD   - will hold PTA statin while inpatient.      RENAL   # CKD3   - Creatinine significantly improved. May have had lasix induced Cr elevation  - Continue to monitor with daily CMP     # Bilateral Lower Extremity Edema  - 2-3+ bilateral lower extremity edema  - Chronic history of heart failure and BLE edema  - Will start lasix 40mg daily   - Lymphedema consulted; appreciate recs     Fluids/Electrolytes/Nutrition   - Bolus as needed  - PRN lyte replacement per standing protocol  - Regular diet as tolerated     Lines: PICC in place    PPX  VTE: none due to thrombocytopenia  GI: n/a  Bowels: Senna and Miralax PRN  Activity: Up as Tolerated    Code  DNR, Intubation OK    Dispo: Plan for prolonged stay, likely +2 weeks. Started induction chemotherapy with decitabine.     Patient is seen and examined by Dr. Murrell.  Assessment and plan are discussed and delivered to the patient.    Milady Gannon PA-C  Hematology/Oncology  Pager #[phone]    Interval History   No acute events overnight. Nursing notes reviewed. Afebrile and HD stable.   Renny is feeling tired this morning. Continues to have difficulty sleeping at night. He continues to have right shoulder pain but no other side effects. Noticed that his right hand is more swollen. No tenderness around his PICC. Denies headaches or vision changes, N/V, abdominal pain or discomfort, diarrhea.     Complete and Comprehensive review of systems review and negative other than noted here or in the HPI.     Physical Exam   Temp: 97.3  F (36.3  C) Temp src: Oral BP: 134/56 Pulse: 94   Resp: 20 SpO2: 94 % O2 Device: None (Room air)    Vitals:    06/07/21 0736 06/08/21 0742 06/09/21 0755   Weight: 84.1 kg (185 lb 4.8 oz) 85.3 kg (188 lb 1.6 oz) 85.6 kg (188 lb 12.8 oz)     Vital Signs with Ranges  Temp:  [95.2  F (35.1  C)-98.4  F (36.9  C)] 97.3  F (36.3   C)  Pulse:  [79-96] 94  Resp:  [16-20] 20  BP: (110-134)/(46-63) 134/56  SpO2:  [92 %-95 %] 94 %  I/O last 3 completed shifts:  In: 2158 [P.O.:1940; I.V.:100; IV Piggyback:118]  Out: 1950 [Urine:1950]    Constitutional: Pleasant male sitting up in chair. Awake and conversational. Non- toxic appearing. No acute distress.   HEENT: Normocephalic, atraumatic. Sclerae anicteric. EOM intact. Moist mucus membranes   Respiratory: Breathing comfortable with no increased work on room air. Good air exchange. No signs of respiratory distress or accessory muscle use. Lungs clear to auscultation bilaterally, no crackles or wheezing noted.  Cardiovascular: Regular rate and rhythm. Normal S1 and S2. No murmurs, rubs, or gallops. 2+ RUE 3+ BLE edema  GI: Abdomen is soft, non-distended, non-tender. Bowel sounds present and normoactive.   Skin: Skin is clean, dry, intact. No jaundice appreciated.   Musculoskeletal/ Extremities: No redness, warmth of the joints appreciated. Some swelling to his right shoulder. Pain with movement of his right shoulder.   Neurologic: Alert and oriented. Speech normal. Grossly nonfocal. Memory and thought process preserved. Motor function normal in lower extremities. Right arm range of motion limited. Left arm wnl. Sensation intact.   Neuropsychiatric: Calm, affect flat. Appropriate mood and affect. Good judgment and insight. No visual/auditory hallucinations.       Medications     - MEDICATION INSTRUCTIONS -         acetaminophen  650 mg Oral TID     acyclovir  400 mg Oral BID     allopurinol  300 mg Oral Daily     vitamin B-12  200 mcg Oral QAM     decitabine  20 mg/m2 (Treatment Plan Recorded) Intravenous Q24H     furosemide  40 mg Oral Daily     heparin lock flush  5-10 mL Intracatheter Q24H     levofloxacin  250 mg Oral At Bedtime     lidocaine  1 patch Transdermal Q24h    And     lidocaine   Transdermal Q8H     metoprolol tartrate  50 mg Oral BID     micafungin  50 mg Intravenous Q24H     vitamin  D3  50 mcg Oral Daily       Data   Results for orders placed or performed during the hospital encounter of 06/03/21 (from the past 24 hour(s))   XR Shoulder Right G/E 3 Views    Narrative    4 views right shoulder radiographs 6/8/2021 4:04 PM    History: include grashey, axillary, AP, and Y view to eval  glunohumeral congruity - continued right shoulder pain, previous films  from 6/4 with weird angle     Comparison: 6/4/2021    Findings:    AP, Grashey, transscapular Y, axillary  views of the right shoulder  were obtained.     No acute osseous abnormality. Glenohumeral and acromioclavicular  joints are congruent.    Moderate degenerative changes of the acromioclavicular joint with  undersurface spurring. Mild degenerative change of the glenohumeral  joint.    Soft tissue is unremarkable. The visualized lung is clear. Partially  visualized right upper extremity PICC catheter.      Impression    Impression:  1. No acute osseous abnormality.  2. Mild glenohumeral degenerative change.    REJI SARKAR MD (Joe)   Phosphorus   Result Value Ref Range    Phosphorus 3.7 2.5 - 4.5 mg/dL   Uric acid   Result Value Ref Range    Uric Acid 4.2 3.5 - 7.2 mg/dL   Basic metabolic panel   Result Value Ref Range    Sodium 135 133 - 144 mmol/L    Potassium 3.9 3.4 - 5.3 mmol/L    Chloride 103 94 - 109 mmol/L    Carbon Dioxide 27 20 - 32 mmol/L    Anion Gap 5 3 - 14 mmol/L    Glucose 106 (H) 70 - 99 mg/dL    Urea Nitrogen 25 7 - 30 mg/dL    Creatinine 1.06 0.66 - 1.25 mg/dL    GFR Estimate 70 >60 mL/min/[1.73_m2]    GFR Estimate If Black 81 >60 mL/min/[1.73_m2]    Calcium 7.8 (L) 8.5 - 10.1 mg/dL   CBC with platelets differential   Result Value Ref Range    WBC 46.8 (H) 4.0 - 11.0 10e9/L    RBC Count 3.09 (L) 4.4 - 5.9 10e12/L    Hemoglobin 9.1 (L) 13.3 - 17.7 g/dL    Hematocrit 28.3 (L) 40.0 - 53.0 %    MCV 92 78 - 100 fl    MCH 29.4 26.5 - 33.0 pg    MCHC 32.2 31.5 - 36.5 g/dL    RDW 16.1 (H) 10.0 - 15.0 %    Platelet Count 17  (LL) 150 - 450 10e9/L    Diff Method Manual Differential     % Neutrophils 18.2 %    % Lymphocytes 4.5 %    % Monocytes 35.5 %    % Eosinophils 15.5 %    % Basophils 3.6 %    % Metamyelocytes 1.8 %    % Myelocytes 1.8 %    % Blasts 19.1 %    Absolute Neutrophil 8.5 (H) 1.6 - 8.3 10e9/L    Absolute Lymphocytes 2.1 0.8 - 5.3 10e9/L    Absolute Monocytes 16.6 (H) 0.0 - 1.3 10e9/L    Absolute Eosinophils 7.3 (H) 0.0 - 0.7 10e9/L    Absolute Basophils 1.7 (H) 0.0 - 0.2 10e9/L    Absolute Metamyelocytes 0.8 (H) 0 10e9/L    Absolute Myelocytes 0.8 (H) 0 10e9/L    Absolute Blasts 8.9 (H) 0 10e9/L    Anisocytosis Slight     Poikilocytosis Slight     Platelet Estimate Confirming automated cell count    INR   Result Value Ref Range    INR 1.19 (H) 0.86 - 1.14   Fibrinogen activity   Result Value Ref Range    Fibrinogen 549 (H) 200 - 420 mg/dL   Magnesium   Result Value Ref Range    Magnesium 2.1 1.6 - 2.3 mg/dL   Lactate Dehydrogenase   Result Value Ref Range    Lactate Dehydrogenase 883 (H) 85 - 227 U/L   Phosphorus   Result Value Ref Range    Phosphorus 3.7 2.5 - 4.5 mg/dL   Uric acid   Result Value Ref Range    Uric Acid 3.9 3.5 - 7.2 mg/dL   Basic metabolic panel   Result Value Ref Range    Sodium 133 133 - 144 mmol/L    Potassium 4.2 3.4 - 5.3 mmol/L    Chloride 103 94 - 109 mmol/L    Carbon Dioxide 26 20 - 32 mmol/L    Anion Gap 4 3 - 14 mmol/L    Glucose 94 70 - 99 mg/dL    Urea Nitrogen 20 7 - 30 mg/dL    Creatinine 0.97 0.66 - 1.25 mg/dL    GFR Estimate 78 >60 mL/min/[1.73_m2]    GFR Estimate If Black >90 >60 mL/min/[1.73_m2]    Calcium 8.1 (L) 8.5 - 10.1 mg/dL   Hepatic panel   Result Value Ref Range    Bilirubin Direct 0.2 0.0 - 0.2 mg/dL    Bilirubin Total 0.6 0.2 - 1.3 mg/dL    Albumin 2.8 (L) 3.4 - 5.0 g/dL    Protein Total 6.7 (L) 6.8 - 8.8 g/dL    Alkaline Phosphatase 246 (H) 40 - 150 U/L    ALT 26 0 - 70 U/L    AST 28 0 - 45 U/L     Recent Labs   Lab 06/09/21  0558 06/08/21  1751 06/08/21  0531 06/07/21  0533  06/07/21  0533   WBC 46.8*  --  49.0*  --  57.1*   HGB 9.1*  --  8.7*  --  8.0*   MCV 92  --  89  --  89   PLT 17*  --  20*  --  8*   INR 1.19*  --  1.20*  --  1.22*    135 136   < > 137   POTASSIUM 4.2 3.9 4.2   < > 4.0   CHLORIDE 103 103 104   < > 106   CO2 26 27 27   < > 27   BUN 20 25 25   < > 27   CR 0.97 1.06 1.05   < > 1.04   ANIONGAP 4 5 5   < > 4   MINNIE 8.1* 7.8* 8.1*   < > 8.3*   GLC 94 106* 96   < > 92   ALBUMIN 2.8*  --  2.8*  --  2.7*   PROTTOTAL 6.7*  --  6.5*  --  6.3*   BILITOTAL 0.6  --  0.7  --  0.4   ALKPHOS 246*  --  263*  --  178*   ALT 26  --  36  --  26   AST 28  --  42  --  34    < > = values in this interval not displayed.

## 2021-06-09 NOTE — PLAN OF CARE
[id number]: AVSS. Reported pain in R shoulder with movement. Voltaren gel given x 1. Lidocaine patch on R shoulder. Refused other interventions. Reported feeling constipated, senna given x 1. Received first dose of PO venetoclax this evening, tolerated well. Encouraging pt to elevate RUE and BLE when in chair. Continue with POC.

## 2021-06-09 NOTE — PLAN OF CARE
Afebrile, VSS. Unable to sleep d/t pain. Sitting up in chair all night. Right shoulder and arm pain continue, PRN oxycodone, and IV dilaudid given per MAR. +3 pitting edema to lower extremities and right arm.     NEURO: Alert and oriented x4.      RESPIRATORY: Room Air, denies dizziness, and SOB. Lungs sound, clear, equal bilateral.     CARDIO: Denies chest pain or dizziness.      GI/:  Denies N/V, BS active. Voiding spontaneously in urinal.     SKIN: Intact.       ACTIVITY: Stand by assist,      PAIN: Right arm and shoulder pain. Medicated per MAR.     IV Access: PICC double lumen heparin locked.      BG: No      PLAN: Continue monitoring.

## 2021-06-09 NOTE — PLAN OF CARE
Nursing Focus: Chemotherapy  D: Positive blood return via PICC. Insertion site is clean/dry/intact, dressing intact with no complaints of pain.  Urine output is recorded in intake in Doc Flowsheet.    I: Premedications given per order (see electronic medical administration record). Dose #5 of Decitabine started to infuse over 1 hour. Reviewed pt teaching on chemotherapy side effects.  Pt denies need for further teaching. Chemotherapy double checked per protocol by two chemotherapy competent RN's.   A: Tolerating procedure well. Denies nausea and or pain.   P: Continue to monitor urine output and symptoms of nausea. Screen for symptoms of toxicity.     VSS, afebrile. Denies nausea & SOB. Continues w/ R. Arm pain, dilaudid given x1, oxycodone given x2, voltaren gel given x1, scheduled tylenol; some relief provided.    -Venetoclax will need to be released this evening, awaiting final sign off by Provider.    Continue to monitor & w/ POC.

## 2021-06-09 NOTE — PLAN OF CARE
1500 - 2330: D# 4 decitabine complete with no complaints, tolerate well via picc. A&O x 4, AVSS, R shoulder pain managed w/ oxycodone 10mg q4 hrs, applied lidocaine patch on R upper arm.  Pt been up on the chair, walked in hallway x 1, lower extremities & R arm edematous w/ +2 to +3 pitting edema. Pt hydrating self well, good appetite w/ 100% completed from supper.  Voiding spontaneously, saving in urinal, last bm 06/07.  Continue with poc...

## 2021-06-10 NOTE — IR NOTE
Patient Name: Renny Gannon  Medical Record Number: [id number]  Today's Date: 6/10/2021    Procedure: right shoulder aspiration  Proceduralist: Sajan Gill and Sim    Procedure Start: 1424  Procedure end: 1440  Sedation medications administered: local anesthetic only     Report given to: Homa SCHUMACHER RN    Other Notes: Pt arrived to IR room 1  from 7D. Consent reviewed. Pt denies any questions or concerns regarding procedure. Pt positioned supine  and monitored per protocol. Pt tolerated procedure without any noted complications. Pt transferred back to 7D.  Old blood like aspirate , 7ml.

## 2021-06-10 NOTE — PROGRESS NOTES
CLINICAL NUTRITION SERVICES - BRIEF NOTE    Chart reviewed for nutrition risk factors due to LOS. Pt is tolerating diet, eating well per nursing documentation. No nutrition issues identified at this time. RD will follow via rounds at this time, unless consulted.    Yuni Guajardo RD,LD  6A/7D pager [phone]

## 2021-06-10 NOTE — PROGRESS NOTES
S: Renny Gannon was seen today at the [facility], Room 0624-01 to be fit with a Shoulder Immobilizer.  A device was needed that would elevate the right hand and reduce the pain from the right shoulder.    Dx:  Edema of Right Upper Extremity    O: The patient was fit with a universal sized immobilizer on his right side.     A:  The patient stated that the brace felt comfortable to wear. I reviewed donning, doffing and how  to adjust the orthosis with the patient.  I also provided the patient with written instructions from the .    P: The patient should use the orthosis as directed by the physician.  Please contact [facility] if there are any questions or concerns.    G: The goal of this orthosis is to elevate the right hand in order to reduce the swelling and pain the right upper extremity.     Electronically signed by Oswaldo Montenegro CPO, NIKKO

## 2021-06-10 NOTE — PLAN OF CARE
Renny has been afe this am with IVAB.  Eating well and UAL. No stools for several days - Miralax was given.  Right shoulder remains painful and pt does not move it much.  IR ordered to do an aspirate of that shoulder.  Ortho here for a brace/support to right arm.  Right hand swollen from dependent position - pt declines to put arm up on pillow.  IV Lasix given for edema.     Pt returned from IR with right shoulder site CDI - Ice applied to site

## 2021-06-10 NOTE — PLAN OF CARE
[id number]    VSS on RA. C/o pain in right shoulder, prn IV dilaudid given x1 with some relief. ROM in RUE significantly impaired with edema. Denies SOB and N/V. PICC heparin locked. No BM overnight, abdominal fullness continues. Up independently, calling for help with recliner. Able to make needs known. Continue to monitor.

## 2021-06-10 NOTE — CONSULTS
"    Interventional Radiology Consult Service Note    Patient is on IR schedule 6/10 for a image guided right shoulder joint aspiration, dx labs.   Labs WNL for procedure. COVID neg.   No NPO required.  Consent will be done prior to procedure.     Please [place] the [place] RN [place] [phone] for estimated time of procedure.     Case discussed with Milady Gannon PA-C, ortho and Dr. Gill from IR. This is a 70 year old male with medical history of CAD s/p CABG (Jan 2020), HFpEF, CKD3 ( BL Cr. 1.3-1.5), HTN, and JAK2+ myelofibrosis. He was transferred from outside hospital 6/3 for concern of progression to AML with leukocytosis (WBC = 72.4) on admission. Initiated induction therapy with Decitabine + Venetoclax; (D1= 6/5/21). Patient has reportedly complained of right shoulder pain since admission. Ongoing x2 months with intra-articular steroid injection about 1 month ago now pain has returned and worsened. Orthopedic surgery was consulted 6/4. Low suspicion for septic arthritis and exam findings consistent with subacromial impingement and likely adhesive capsulitis. Recommended continued monitoring and reaching back out to re-evaluate for aspiration if concern for infection. Oncology has continued concern for septic joint vs hemarthrosis and ortho recommended IR consult for right shoulder joint aspiration given thrombocytopenia.     Dx labs to be entered by Abida Ganonn PA-C.    Expected date of discharge: TBD    Vitals:   /59 (BP Location: Left arm)   Pulse 76   Temp 97.1  F (36.2  C) (Oral)   Resp 18   Ht 1.768 m (5' 9.6\")   Wt 83.9 kg (185 lb)   SpO2 95%   BMI 26.85 kg/m      Pertinent Labs:     Lab Results   Component Value Date    WBC 34.7 (H) 06/10/2021    WBC 46.8 (H) 06/09/2021    WBC 49.0 (H) 06/08/2021       Lab Results   Component Value Date    HGB 8.9 06/10/2021    HGB 9.1 06/09/2021    HGB 8.7 06/08/2021       Lab Results   Component Value Date    PLT 24 06/10/2021    PLT 17 06/09/2021    " PLT 20 06/08/2021       Lab Results   Component Value Date    INR 1.20 (H) 06/10/2021    PTT 45 (H) 06/03/2021       Lab Results   Component Value Date    POTASSIUM 4.2 06/10/2021        UNA Cardona CNP  Interventional Radiology  Pager: [phone]

## 2021-06-10 NOTE — PROGRESS NOTES
"[facility]    Hematology / Oncology Progress Note    Date of Service (when I saw the patient): 06/10/2021     Assessment & Plan   Renny Gannon is a 70 year old male who presents with past medical history of CAD s/p CABG (Jan 2020), HFpEF, CKD3 ( BL Cr. 1.3-1.5), HTN, and JAK2+ myelofibrosis. He was transferred from outside hospital for concern of progression to AML with leukocytosis (WBC = 72.4) on admission. Initiated induction therapy with Decitabine + Venetoclax; (D1= 6/5/21). Venetoclax iniation was delayed until WBC <25K.     Today: D6   - Completed 5 days of Decitabine; continue Veneteoclax ramp - up   - Will give another dose of Lasix 40 mg IV ongoing bilateral LE edema   - IR consult placed for R should aspiration   - Scheduled Senna 1-2 tabs BID for constipation; Miralax PRN   - Persistent right shoulder pain; started long-acting Oxycontin 10 mg at bedtime, continue Oxycodone 5-10 mg q4h PRN while patient is awake, Tylenol TID  - Orthosis IP consult; for brace to improve RUE dependent edema    HEME  # LIVIER 2+ Myelofibrosis   # Concern for AML  Follow by Dr. Cristina. He initially was seen in Dec. 2019 by his PCP for increased fatigue and SOB w/ exertion. CBC showed WBC 79.1, Hgb 11.9 and Plt 378. Peripheral smear showed leuko-erythroblastic reaction w/ neutrophilic left shift. BMBx (12/30/19) hypercellular marrow w/ granulocytic and megakaryocytic proliferation, megakaryocytic clustering and atypia and 1.5% circulating blasts. 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 He was on and off Hydrea from Jan 2020 - Jan 2021. BMBx 3/26/21 increased fibrosis, cellularity 40-50%, 5% blasts on morphology and flow. He most recently has just been receiving transfusion support for his anemia and thrombocytopenia. He presented to clinic for labs and possible transfusion and was found to have a WBC 71.9 Hgb 6.4 and Plt 18. Lab noted \"a " "lot of immature WBC and blasts\". He was then directed to the ED for admission and further work up.  - BMBx 6/4, unfortunately only core was obtained. Unable to get aspiration, likely due to fibrosis and possibly packed with blasts. BMBx Morphology consistent with blast crisis arising from previously diagnosed myeloproliferative neoplasm. The diagnostic classification drive by increase peripheral blast (23%) d/t blast percentage in bone marrow is difficult to establish d/t bone marrow fibrosis and lack of aspirates. BMBx Flow from core shows increased abnormal myeloid blasts (12%). FLT3 negative.   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 Discussed diagnosis, treatment options and prognosis with patient and sister on 6/5. Patient agreeable to treatment.  - Per Dr. Johnson will start Venetoclax ramp up (6/9) despite having a WBC of 46.8   - PICC line in place  - Prior auth approved for Venetoclax for this patient. Co pay is $2,882.25. Iconfinder approved.   - Sent request for inpatient BMT consult 6/8       Treatment Plan: Decitabine (5 days) / Venetoclax (C1D1: 6/5/21)   - Decitabine 20mg/m2 - D1-D5   - Venetoclax Ramp up - Plan to initiate when WBC <25K   - Supportive meds: PRN anti- emetics, bowl regimen, pain control    # Anemia   # Thrombocytopenia   - Patient is transfusion dependent prior to admission due to previous myelofibrosis  Transfuse to keep hgb> 8 (hx CAD and HFpEF) and platelets >10k      # Risk TLS and DIC   - Renal function greatly improved. Changed allopurinol to 300 mg daily   - Follow TLS labs BID  - Follow DIC labs  daily     ID  COVID test negative 6/2    # ID prophylaxis  - Viral Serologies: HSV 1 positive, HSV 2 negative, EBV positive, HBV Surface Ag negative, HBV surface Ab negative, HBV core Ag negative, HIV " negative, CMV negative  - Acyclovir 400 BID  - Levaquin 250 mg daily   - Micafungin 50 mg daily     MSK  # Right Shoulder Pain  # Right shoulder subacromial impingement/bursitis/tendinitis  # Adhesive Capsulitis  Patient has had 2-3 weeks of right shoulder pain. Was seen in ortho clinic on 5/27. Has not had any specific injuries or traumas. Pain is worse with motion but can not get comfortable. Was diagnosed with right shoulder subacromial impingement/bursitis/tendinitis. Has not been able to move his shoulder or sleep due to the pain. Received a Kenalog and Marcaine injection on 5/27 in ortho clinic. Received additional injection on 5/30 in ED when he presented with worsened pain Differential includes previous diagnosis of right shoulder subacromial impingement/bursitis/tendinitis, fracture, septic arthritis, vs hemarthrosis   - Ortho surg consulted, appreciate recs   - Repeat right should xray 6/4 shows no acute osseous abnormalities, degenerative changes. Though there is significant increase in size of his glenohumeral joint concern for fluid accumulation vs hemarthrosis vs septic joint.    - Current Pain Regimen   - Oxycontin 10 mg PO at bedtime (x6/9)   - Tylenol 650mg TID scheduled    - Dilaudid 0.2mg q3h prn   - Oxycodone 5-10mg q4h prn   - Voltaren Topical QID PRN   - IR Consult placed for R shoulder tap    #RUE Swelling   RUE swelling was noted on 6/8. RUE US unremarkable for DVT. Suspect dependent edema as patient lets arm rest to the side d/t right shoulder pain.   - will closely monitor   - Orthotics IP consult for brace to help keep hand elevated     CARDS   # CAD s/p CABG (Jan.2020)  # HFpEF   Last saw cardiology 2/16/21. Last echo 1/27/21 show left ventricular function is low normal. EF 50-55%. Apicolateral hypokensis. Possible mid to distal inferolateral hypokinesis.   - Previous on furosemide at home, was previously on lasix 40mg am + 20mg pm. Concerned that his lasix may have induced an KRIS  -  Restart lasix 40mg daily     # HTN   - continue PTA Metoprolol      # HLD   - will hold PTA statin while inpatient.      RENAL   # CKD3   - Creatinine significantly improved. May have had lasix induced Cr elevation  - Continue to monitor with daily CMP     # Bilateral Lower Extremity Edema  - 2-3+ bilateral lower extremity edema  - Chronic history of heart failure and BLE edema  - Will start lasix 40mg daily   - Lymphedema consulted; appreciate recs     MISC   # Insomnia   Reports difficulty sleeping for the last 2 years.   - Trial Trazodone at bedtime    Fluids/Electrolytes/Nutrition   - Bolus as needed  - PRN lyte replacement per standing protocol  - Regular diet as tolerated     Lines: PICC in place    PPX  VTE: none due to thrombocytopenia  GI: n/a  Bowels: Senna and Miralax PRN  Activity: Up as Tolerated    Code  DNR, Intubation OK    Dispo: Plan for prolonged stay, likely +2 weeks. Started induction chemotherapy with decitabine.     Patient is seen and examined by Dr. Johnson and ASHLEE.  Assessment and plan are discussed and delivered to the patient.    Milady Gannon PA-C  Hematology/Oncology  Pager #[phone]    Interval History   No acute events overnight. Nursing notes reviewed. Afebrile and HD stable.   Renny continues to have right arm pain. He reports pain meds help but it is very tender. Encouraged patient to try and elevate his right hand as he is having swelling. He endorses constipation and is willing to take scheduled medications. He has not noticed any side effects from the chemotherapy. He continues to eat well. All questions answered at this time.     Complete and Comprehensive review of systems review and negative other than noted here or in the HPI.     Physical Exam   Temp: 95.6  F (35.3  C) Temp src: Oral BP: 133/64 Pulse: 87   Resp: 18 SpO2: 94 % O2 Device: None (Room air)    Vitals:    06/08/21 0742 06/09/21 0755 06/10/21 0737   Weight: 85.3 kg (188 lb 1.6 oz) 85.6 kg (188 lb 12.8 oz) 83.9 kg (185  lb)     Vital Signs with Ranges  Temp:  [95.6  F (35.3  C)-97.2  F (36.2  C)] 95.6  F (35.3  C)  Pulse:  [75-94] 87  Resp:  [16-20] 18  BP: (111-138)/(51-68) 133/64  SpO2:  [92 %-95 %] 94 %  I/O last 3 completed shifts:  In: 1255 [P.O.:830; I.V.:20; IV Piggyback:118]  Out: 3500 [Urine:3500]    Constitutional: Pleasant male sitting up in chair. Awake and conversational. Non- toxic appearing. No acute distress.   HEENT: Normocephalic, atraumatic. Sclerae anicteric. EOM intact. Moist mucus membranes   Respiratory: Breathing comfortable with no increased work on room air. Good air exchange. No signs of respiratory distress or accessory muscle use. Lungs clear to auscultation bilaterally, no crackles or wheezing noted.  Cardiovascular: Regular rate and rhythm. Normal S1 and S2. No murmurs, rubs, or gallops. 2+ RUE 3+ BLE edema  GI: Abdomen is soft, non-distended, non-tender. Bowel sounds present and normoactive.   Skin: Skin is clean, dry, intact. No jaundice appreciated.   Musculoskeletal/ Extremities: No redness, warmth of the joints appreciated. Some swelling to his right shoulder. Pain with movement of his right shoulder.   Neurologic: Alert and oriented. Speech normal. Grossly nonfocal. Memory and thought process preserved. Motor function normal in lower extremities. Right arm range of motion limited. Left arm wnl. Sensation intact.   Neuropsychiatric: Calm, affect flat. Appropriate mood and affect. Good judgment and insight. No visual/auditory hallucinations.       Medications     - MEDICATION INSTRUCTIONS -         acetaminophen  650 mg Oral TID     acyclovir  400 mg Oral BID     allopurinol  300 mg Oral Daily     vitamin B-12  200 mcg Oral QAM     furosemide  40 mg Intravenous Once     [Held by provider] furosemide  40 mg Oral Daily     heparin lock flush  5-10 mL Intracatheter Q24H     levofloxacin  250 mg Oral At Bedtime     lidocaine  1 patch Transdermal Q24h    And     lidocaine   Transdermal Q8H      metoprolol tartrate  50 mg Oral BID     micafungin  50 mg Intravenous Q24H     oxyCODONE  10 mg Oral At Bedtime     traZODone  50 mg Oral At Bedtime     [START ON 6/13/2021] venetoclax  100 mg Oral Daily with supper     venetoclax  20 mg Oral Daily with supper     [START ON 6/15/2021] venetoclax  200 mg Oral Daily with breakfast     [START ON 6/17/2021] venetoclax  400 mg Oral Daily with breakfast     [START ON 6/11/2021] venetoclax  50 mg Oral Daily with supper     vitamin D3  50 mcg Oral Daily       Data   Results for orders placed or performed during the hospital encounter of 06/03/21 (from the past 24 hour(s))   Platelets prepare order unit conditional   Result Value Ref Range    Blood Component Type PLT Pheresis     Units Ordered 1    Blood component   Result Value Ref Range    Unit Number [id number]     Blood Component Type PlateletPheresis LeukoReduced Irradiated     Division Number 00     Status of Unit Released to care unit     Blood Product Code [id number]     Unit Status ISS    Phosphorus   Result Value Ref Range    Phosphorus 4.3 2.5 - 4.5 mg/dL   Uric acid   Result Value Ref Range    Uric Acid 4.0 3.5 - 7.2 mg/dL   Basic metabolic panel   Result Value Ref Range    Sodium 134 133 - 144 mmol/L    Potassium 4.1 3.4 - 5.3 mmol/L    Chloride 100 94 - 109 mmol/L    Carbon Dioxide 27 20 - 32 mmol/L    Anion Gap 7 3 - 14 mmol/L    Glucose 135 (H) 70 - 99 mg/dL    Urea Nitrogen 25 7 - 30 mg/dL    Creatinine 1.08 0.66 - 1.25 mg/dL    GFR Estimate 68 >60 mL/min/[1.73_m2]    GFR Estimate If Black 79 >60 mL/min/[1.73_m2]    Calcium 8.0 (L) 8.5 - 10.1 mg/dL   CBC with platelets differential   Result Value Ref Range    WBC 34.7 (H) 4.0 - 11.0 10e9/L    RBC Count 3.00 (L) 4.4 - 5.9 10e12/L    Hemoglobin 8.9 (L) 13.3 - 17.7 g/dL    Hematocrit 27.4 (L) 40.0 - 53.0 %    MCV 91 78 - 100 fl    MCH 29.7 26.5 - 33.0 pg    MCHC 32.5 31.5 - 36.5 g/dL    RDW 16.1 (H) 10.0 - 15.0 %    Platelet Count 24 (LL) 150 - 450 10e9/L     Diff Method Manual Differential     % Neutrophils 19.1 %    % Lymphocytes 9.1 %    % Monocytes 11.8 %    % Eosinophils 3.6 %    % Basophils 6.4 %    % Metamyelocytes 2.7 %    % Myelocytes 0.9 %    % Blasts 46.4 %    Absolute Neutrophil 6.6 1.6 - 8.3 10e9/L    Absolute Lymphocytes 3.2 0.8 - 5.3 10e9/L    Absolute Monocytes 4.1 (H) 0.0 - 1.3 10e9/L    Absolute Eosinophils 1.2 (H) 0.0 - 0.7 10e9/L    Absolute Basophils 2.2 (H) 0.0 - 0.2 10e9/L    Absolute Metamyelocytes 0.9 (H) 0 10e9/L    Absolute Myelocytes 0.3 (H) 0 10e9/L    Absolute Blasts 16.1 (H) 0 10e9/L    Poikilocytosis Slight     RBC Fragments Slight     Ovalocytes Slight    INR   Result Value Ref Range    INR 1.20 (H) 0.86 - 1.14   Fibrinogen activity   Result Value Ref Range    Fibrinogen 645 (H) 200 - 420 mg/dL   Magnesium   Result Value Ref Range    Magnesium 2.1 1.6 - 2.3 mg/dL   Lactate Dehydrogenase   Result Value Ref Range    Lactate Dehydrogenase 871 (H) 85 - 227 U/L   Phosphorus   Result Value Ref Range    Phosphorus 4.4 2.5 - 4.5 mg/dL   Uric acid   Result Value Ref Range    Uric Acid 4.5 3.5 - 7.2 mg/dL   Basic metabolic panel   Result Value Ref Range    Sodium 133 133 - 144 mmol/L    Potassium 4.2 3.4 - 5.3 mmol/L    Chloride 100 94 - 109 mmol/L    Carbon Dioxide 26 20 - 32 mmol/L    Anion Gap 8 3 - 14 mmol/L    Glucose 102 (H) 70 - 99 mg/dL    Urea Nitrogen 24 7 - 30 mg/dL    Creatinine 0.98 0.66 - 1.25 mg/dL    GFR Estimate 77 >60 mL/min/[1.73_m2]    GFR Estimate If Black 89 >60 mL/min/[1.73_m2]    Calcium 8.3 (L) 8.5 - 10.1 mg/dL   Hepatic panel   Result Value Ref Range    Bilirubin Direct 0.2 0.0 - 0.2 mg/dL    Bilirubin Total 0.6 0.2 - 1.3 mg/dL    Albumin 2.9 (L) 3.4 - 5.0 g/dL    Protein Total 6.9 6.8 - 8.8 g/dL    Alkaline Phosphatase 230 (H) 40 - 150 U/L    ALT 23 0 - 70 U/L    AST 25 0 - 45 U/L     Recent Labs   Lab 06/10/21  0638 06/09/21  1813 06/09/21  0558 06/08/21  0531 06/08/21  0531   WBC 34.7*  --  46.8*  --  49.0*   HGB 8.9*   --  9.1*  --  8.7*   MCV 91  --  92  --  89   PLT 24*  --  17*  --  20*   INR 1.20*  --  1.19*  --  1.20*    134 133   < > 136   POTASSIUM 4.2 4.1 4.2   < > 4.2   CHLORIDE 100 100 103   < > 104   CO2 26 27 26   < > 27   BUN 24 25 20   < > 25   CR 0.98 1.08 0.97   < > 1.05   ANIONGAP 8 7 4   < > 5   MINNIE 8.3* 8.0* 8.1*   < > 8.1*   * 135* 94   < > 96   ALBUMIN 2.9*  --  2.8*  --  2.8*   PROTTOTAL 6.9  --  6.7*  --  6.5*   BILITOTAL 0.6  --  0.6  --  0.7   ALKPHOS 230*  --  246*  --  263*   ALT 23  --  26  --  36   AST 25  --  28  --  42    < > = values in this interval not displayed.

## 2021-06-11 NOTE — PLAN OF CARE
[id number]    VSS on RA. Afebrile, A&Ox4. C/o pain in right shoulder, 7/10, prn oxy given x1 with some relief, continues on adams tylenol, prn Voltaren gel applied. Continue to use ortho brace to keep hand elevated to reduce edema. Rt shoulder fluid biopsied today, results pending, site c/d/i. Denies SOB and N/V. PICC heparin locked. PO 20mg venetoclax given this evening. Up ad conchita, voiding spontaneously with good UOP. BM x1 this evening, 1 tab senna given per patient request. Able to make needs known.

## 2021-06-11 NOTE — PLAN OF CARE
"/55   Pulse 79   Temp 96.7  F (35.9  C) (Oral)   Resp 16   Ht 1.768 m (5' 9.6\")   Wt 83.8 kg (184 lb 11.2 oz)   SpO2 95%   BMI 26.81 kg/m      Neuro: A&Ox4.   Cardiac: AVSS.   Respiratory: Sating 95% on RA.  GI/: Good urine output. Got IV lasix 40mg x1. BM X1  Diet/appetite: Tolerating regular diet. Eating well.  Activity: Up in room independently   Pain: Oxycodone 5mg given for R shoulder pain  Skin: No new deficits noted. Black brace on R arm . BLE edema 3+ lymph wraps on   LDA's: Picc heplocked    Platelet count 19. Got 1 dose of platelet transfusion without any problem     Plan: Continue with POC. Notify primary team with changes.    "

## 2021-06-11 NOTE — PLAN OF CARE
[id number]: VSS on RA. Afebrile. Denies nausea and SOB. Right shoulder pain controled with PRN Oxy. Ortho brace on. Biopsy site clean dry and intact. Slept well between cares. Up in recliner this AM. Transfers independently. Voiding spontaneously. Continue with POC.

## 2021-06-11 NOTE — PROGRESS NOTES
[facility]    Hematology / Oncology Progress Note    Date of Service (when I saw the patient): 06/11/2021     Assessment & Plan   Renny Gannon is a 70 year old male who presents with past medical history of CAD s/p CABG (Jan 2020), HFpEF, CKD3 ( BL Cr. 1.3-1.5), HTN, and JAK2+ myelofibrosis. He was transferred from outside hospital for concern of progression to AML with leukocytosis (WBC = 72.4) on admission. Initiated induction therapy with Decitabine + Venetoclax; (D1= 6/5/21). Venetoclax iniation was delayed until WBC <25K.     Today: D7   - Completed 5 days of Decitabine; continue Veneteoclax ramp - up   - OT consult placed, appreciate recs   - Will follow cultures from R should joint aspiration   - Will give another dose of Lasix 40 mg IV ongoing bilateral LE edema   - Scheduled Senna 1-2 tabs BID for constipation; Miralax PRN   - Persistent right shoulder pain; started long-acting Oxycontin 10 mg at bedtime, continue Oxycodone 5-10 mg q4h PRN while patient is awake, Tylenol TID  - WOC consult, appreciate cares     HEME  # LIVIER 2+ Myelofibrosis   # Concern for AML  Follow by Dr. Cristina. He initially was seen in Dec. 2019 by his PCP for increased fatigue and SOB w/ exertion. CBC showed WBC 79.1, Hgb 11.9 and Plt 378. Peripheral smear showed leuko-erythroblastic reaction w/ neutrophilic left shift. BMBx (12/30/19) hypercellular marrow w/ granulocytic and megakaryocytic proliferation, megakaryocytic clustering and atypia and 1.5% circulating blasts. 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 He was on and off Hydrea from Jan 2020 - Jan 2021. BMBx 3/26/21 increased fibrosis, cellularity 40-50%, 5% blasts on morphology and flow. He most recently has just been receiving transfusion support for his anemia and thrombocytopenia. He presented to clinic for labs and possible transfusion and was found to have a WBC 71.9 Hgb 6.4 and  "Plt 18. Lab noted \"a lot of immature WBC and blasts\". He was then directed to the ED for admission and further work up.  - BMBx 6/4, unfortunately only core was obtained. Unable to get aspiration, likely due to fibrosis and possibly packed with blasts. BMBx Morphology consistent with blast crisis arising from previously diagnosed myeloproliferative neoplasm. The diagnostic classification drive by increase peripheral blast (23%) d/t blast percentage in bone marrow is difficult to establish d/t bone marrow fibrosis and lack of aspirates. BMBx Flow from core shows increased abnormal myeloid blasts (12%). FLT3 negative.   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 Discussed diagnosis, treatment options and prognosis with patient and sister on 6/5. Patient agreeable to treatment.  - Per Dr. Johnson will start Venetoclax ramp up (6/9) despite having a WBC of 46.8   - PICC line in place  - Prior auth approved for Venetoclax for this patient. Co pay is $2,882.25. [email or web address]   - BMT consult scheduled for 6/17 with Dr. Óscar Muniz       Treatment Plan: Decitabine (5 days) / Venetoclax (C1D1: 6/5/21)   - Decitabine 20mg/m2 - D1-D5   - Venetoclax Ramp up - Plan to initiate when WBC <25K   - Supportive meds: PRN anti- emetics, bowl regimen, pain control    # Anemia   # Thrombocytopenia   - Patient is transfusion dependent prior to admission due to previous myelofibrosis  Transfuse to keep hgb> 8 (hx CAD and HFpEF) and platelets >10k      # Risk TLS and DIC   - Renal function greatly improved. Changed allopurinol to 300 mg daily   - Follow TLS labs BID  - Follow DIC labs  daily     ID  COVID test negative 6/2    # ID prophylaxis  - Viral Serologies: HSV 1 positive, HSV 2 negative, EBV positive, HBV Surface Ag negative, HBV surface Ab negative, " HBV core Ag negative, HIV negative, CMV negative  - Acyclovir 400 BID  - Levaquin 250 mg daily   - Micafungin 50 mg daily     MSK  # Right Shoulder Pain  # Right shoulder subacromial impingement/bursitis/tendinitis  # Adhesive Capsulitis  Patient has had 2-3 weeks of right shoulder pain. Was seen in ortho clinic on 5/27. Has not had any specific injuries or traumas. Pain is worse with motion but can not get comfortable. Was diagnosed with right shoulder subacromial impingement/bursitis/tendinitis. Has not been able to move his shoulder or sleep due to the pain. Received a Kenalog and Marcaine injection on 5/27 in ortho clinic. Received additional injection on 5/30 in ED when he presented with worsened pain Differential includes previous diagnosis of right shoulder subacromial impingement/bursitis/tendinitis, fracture, septic arthritis, vs hemarthrosis   - Ortho surg consulted, appreciate recs   - Repeat right should xray 6/4 shows no acute osseous abnormalities, degenerative changes. Though there is significant increase in size of his glenohumeral joint concern for fluid accumulation vs hemarthrosis vs septic joint.    - Current Pain Regimen   - Oxycontin 10 mg PO at bedtime (x6/9)   - Tylenol 650mg TID scheduled    - Dilaudid 0.2mg q3h prn   - Oxycodone 5-10mg q4h prn   - Voltaren Topical QID PRN   - IR Consult placed for R shoulder tap 6/10 -- removed 7 mL of brown cloudy fluid,  cultures pending     #RUE Swelling   RUE swelling was noted on 6/8. RUE US unremarkable for DVT. Suspect dependent edema as patient lets arm rest to the side d/t right shoulder pain.   - will closely monitor   - Orthotics IP consult for brace to help keep hand elevated     CARDS   # CAD s/p CABG (Jan.2020)  # HFpEF   Last saw cardiology 2/16/21. Last echo 1/27/21 show left ventricular function is low normal. EF 50-55%. Apicolateral hypokensis. Possible mid to distal inferolateral hypokinesis.   - Previous on furosemide at home, was  previously on lasix 40mg am + 20mg pm. Concerned that his lasix may have induced an KRIS  - Restart lasix 40mg daily     # HTN   - continue PTA Metoprolol      # HLD   - will hold PTA statin while inpatient.      RENAL   # CKD3   - Creatinine significantly improved. May have had lasix induced Cr elevation  - Continue to monitor with daily CMP     # Bilateral Lower Extremity Edema  - 2-3+ bilateral lower extremity edema  - Chronic history of heart failure and BLE edema  - Will start lasix 40mg daily   - Lymphedema consulted; appreciate recs     MISC   # Insomnia   Reports difficulty sleeping for the last 2 years.   - Trial Trazodone at bedtime    Fluids/Electrolytes/Nutrition   - Bolus as needed  - PRN lyte replacement per standing protocol  - Regular diet as tolerated     Lines: PICC in place    PPX  VTE: none due to thrombocytopenia  GI: n/a  Bowels: Senna and Miralax PRN  Activity: Up as Tolerated    Code  DNR, Intubation OK    Dispo: Plan for prolonged stay, likely +2 weeks. Started induction chemotherapy with decitabine.     Patient is seen and examined by Dr. Johnson and ASHLEE.  Assessment and plan are discussed and delivered to the patient.    Milady Gannon PA-C  Hematology/Oncology  Pager #[phone]    Interval History   No acute events overnight. Nursing notes reviewed. Afebrile and HD stable.   Renny continues to have right arm pain. He thinks that it was slightly better after removing some of the fluid. He is wearing his new brace to help support and stabilize his shoulder. He continues to have some constipation so we will continue the bowel meds as they currently are. Continues to have bilateral LE edema so will diuresis ponce today. All questions answered at this time.     Complete and Comprehensive review of systems review and negative other than noted here or in the HPI.     Physical Exam   Temp: 96.7  F (35.9  C) Temp src: Oral BP: 114/55 Pulse: 79   Resp: 16 SpO2: 95 % O2 Device: None (Room air)    Vitals:     06/09/21 0755 06/10/21 0737 06/11/21 0758   Weight: 85.6 kg (188 lb 12.8 oz) 83.9 kg (185 lb) 83.8 kg (184 lb 11.2 oz)     Vital Signs with Ranges  Temp:  [96.1  F (35.6  C)-98.7  F (37.1  C)] 96.7  F (35.9  C)  Pulse:  [76-91] 79  Resp:  [16-20] 16  BP: (114-135)/(52-70) 114/55  SpO2:  [93 %-97 %] 95 %  I/O last 3 completed shifts:  In: 520 [P.O.:500; I.V.:20]  Out: 1730 [Urine:1730]    Constitutional: Pleasant male sitting up in chair. Awake and conversational. Non- toxic appearing. No acute distress.   HEENT: Normocephalic, atraumatic. Sclerae anicteric. EOM intact. Moist mucus membranes   Respiratory: Breathing comfortable with no increased work on room air. Good air exchange. No signs of respiratory distress or accessory muscle use. Lungs clear to auscultation bilaterally, no crackles or wheezing noted.  Cardiovascular: Regular rate and rhythm. Normal S1 and S2. No murmurs, rubs, or gallops. 3+ RUE 3+ BLE edema  GI: Abdomen is soft, non-distended, non-tender. Bowel sounds present and normoactive.   Skin: Skin is clean, dry, intact. No jaundice appreciated.   Musculoskeletal/ Extremities: No redness, warmth of the joints appreciated. Some swelling to his right shoulder. Pain with movement of his right shoulder.   Neurologic: Alert and oriented. Speech normal. Grossly nonfocal. Memory and thought process preserved. Motor function normal in lower extremities. Right arm range of motion limited. Left arm wnl. Sensation intact.   Neuropsychiatric: Calm, affect flat. Appropriate mood and affect. Good judgment and insight. No visual/auditory hallucinations.       Medications     - MEDICATION INSTRUCTIONS -         acetaminophen  650 mg Oral TID     acyclovir  400 mg Oral BID     allopurinol  300 mg Oral Daily     vitamin B-12  200 mcg Oral QAM     [Held by provider] furosemide  40 mg Oral Daily     heparin lock flush  5-10 mL Intracatheter Q24H     levofloxacin  250 mg Oral At Bedtime     lidocaine  1 patch  Transdermal Q24h    And     lidocaine   Transdermal Q8H     metoprolol tartrate  50 mg Oral BID     micafungin  50 mg Intravenous Q24H     oxyCODONE  10 mg Oral At Bedtime     senna-docusate  1 tablet Oral BID    Or     senna-docusate  2 tablet Oral BID     traZODone  50 mg Oral At Bedtime     [START ON 6/13/2021] venetoclax  100 mg Oral Daily with supper     [START ON 6/15/2021] venetoclax  200 mg Oral Daily with breakfast     [START ON 6/17/2021] venetoclax  400 mg Oral Daily with breakfast     venetoclax  50 mg Oral Daily with supper     vitamin D3  50 mcg Oral Daily       Data   Results for orders placed or performed during the hospital encounter of 06/03/21 (from the past 24 hour(s))   Cell count with differential fluid   Result Value Ref Range    Body Fluid Analysis Source Synovial fluid     Color Fluid Brown     Appearance Fluid Cloudy     WBC Fluid 7439 /uL    % Neutrophils Fluid 38 %    % Lymphocytes Fluid 3 %    % Mono/Macro Fluid 59 %   Gram stain    Specimen: Synovial fluid    RIGHT SHOULDER ASPIRATE   Result Value Ref Range    Specimen Description Synovial fluid RIGHT SHOULDER ASPIRATE     Gram Stain No organisms seen     Gram Stain Moderate  WBC'S seen  predominantly PMN's      Anaerobic bacterial culture    Specimen: Synovial fluid    RIGHT SHOULDER ASPIRATE   Result Value Ref Range    Specimen Description Synovial fluid RIGHT SHOULDER ASPIRATE     Special Requests Received in anaerobic tubes.     Culture Micro Culture negative monitoring continues    Fluid Culture Aerobic Bacterial    Specimen: Synovial fluid    RIGHT SHOULDER ASPIRATE   Result Value Ref Range    Specimen Description Synovial fluid RIGHT SHOULDER ASPIRATE     Special Requests Received in anaerobic tubes.     Culture Micro Culture negative monitoring continues    Crystal ID Synovial Fluid   Result Value Ref Range    Synovial Fluid Source Synovial fluid     Crystal Analysis No clincally significant crystals seen.  NOCRYS^No clincally  significant crystals seen.   IR Fine Needle Aspiration w Imaging    Narrative    Procedure: 6/10/2021  1. Ultrasound guided aspiration of right shoulder joint collection.    History: 70-year-old male with right shoulder joint effusion. Request  is for aspiration of the right shoulder on collection.    Comparison: X-ray right shoulder 6/8/2021.     Staff: ZARIA Gill M.D.    Fellow: Montrell Montemayor M.D.    Monitoring: Patient was placed on continuous monitoring by the IR  nursing staff and supervised by the IR attending. No intravenous  sedation was administered. Patient remained stable throughout the  procedure.     Medications: 3 mL 1% lidocaine for local anesthesia.    Procedure:   The patient understood the limitations, alternatives, and risks of the  procedure and requested the procedure be performed. Both written and  oral consent were obtained.    Limited pre-procedural scan performed demonstrated multiloculated  hypoechoic collection in the right shoulder joint and around the  biceps tendon.    The right shoulder was prepped and draped in the usual sterile  fashion. 1% lidocaine was used for local anesthesia. Under ultrasound  guidance, 20-gauge spinal needle was advanced into the collection and  7 mL of dark brownish serosanguineous fluid aspirated. Sample sent to  the lab as requested.      Impression    IMPRESSION:   Successful aspiration of right shoulder joint collection. 7 mL of dark  brownish serosanguineous fluid aspirated and sent to the lab as  requested..     Phosphorus   Result Value Ref Range    Phosphorus 5.0 (H) 2.5 - 4.5 mg/dL   Uric acid   Result Value Ref Range    Uric Acid 4.2 3.5 - 7.2 mg/dL   Basic metabolic panel   Result Value Ref Range    Sodium 134 133 - 144 mmol/L    Potassium 4.6 3.4 - 5.3 mmol/L    Chloride 101 94 - 109 mmol/L    Carbon Dioxide 27 20 - 32 mmol/L    Anion Gap 6 3 - 14 mmol/L    Glucose 105 (H) 70 - 99 mg/dL    Urea Nitrogen 24 7 - 30 mg/dL    Creatinine 1.01 0.66 -  1.25 mg/dL    GFR Estimate 74 >60 mL/min/[1.73_m2]    GFR Estimate If Black 86 >60 mL/min/[1.73_m2]    Calcium 8.2 (L) 8.5 - 10.1 mg/dL   Platelets prepare order unit conditional   Result Value Ref Range    Blood Component Type PLT Pheresis     Units Ordered 1    Blood component   Result Value Ref Range    Unit Number [id number]     Blood Component Type PlateletPheresis,LeukoRed Irrad (Part 2)     Division Number 00     Status of Unit Released to care unit 06/11/2021 1036     Blood Product Code [id number]     Unit Status ISS    CBC with platelets differential   Result Value Ref Range    WBC 23.0 (H) 4.0 - 11.0 10e9/L    RBC Count 3.07 (L) 4.4 - 5.9 10e12/L    Hemoglobin 8.9 (L) 13.3 - 17.7 g/dL    Hematocrit 28.2 (L) 40.0 - 53.0 %    MCV 92 78 - 100 fl    MCH 29.0 26.5 - 33.0 pg    MCHC 31.6 31.5 - 36.5 g/dL    RDW 15.9 (H) 10.0 - 15.0 %    Platelet Count 19 (LL) 150 - 450 10e9/L    Diff Method Manual Differential     % Neutrophils 25.3 %    % Lymphocytes 8.1 %    % Monocytes 22.5 %    % Eosinophils 5.4 %    % Basophils 2.7 %    % Myelocytes 0.9 %    % Promyelocytes 0.9 %    % Blasts 34.2 %    Nucleated RBCs 3 (H) 0 /100    Absolute Neutrophil 5.8 1.6 - 8.3 10e9/L    Absolute Lymphocytes 1.9 0.8 - 5.3 10e9/L    Absolute Monocytes 5.2 (H) 0.0 - 1.3 10e9/L    Absolute Eosinophils 1.2 (H) 0.0 - 0.7 10e9/L    Absolute Basophils 0.6 (H) 0.0 - 0.2 10e9/L    Absolute Myelocytes 0.2 (H) 0 10e9/L    Absolute Promyeloctyes 0.2 (H) 0 10e9/L    Absolute Blasts 7.9 (H) 0 10e9/L    Absolute Nucleated RBC 0.6     RBC Morphology Normal     Platelet Estimate Confirming automated cell count    INR   Result Value Ref Range    INR 1.17 (H) 0.86 - 1.14   Fibrinogen activity   Result Value Ref Range    Fibrinogen 659 (H) 200 - 420 mg/dL   Magnesium   Result Value Ref Range    Magnesium 2.3 1.6 - 2.3 mg/dL   Lactate Dehydrogenase   Result Value Ref Range    Lactate Dehydrogenase 774 (H) 85 - 227 U/L   Phosphorus   Result Value Ref Range     Phosphorus 4.3 2.5 - 4.5 mg/dL   Uric acid   Result Value Ref Range    Uric Acid 4.4 3.5 - 7.2 mg/dL   Basic metabolic panel   Result Value Ref Range    Sodium 133 133 - 144 mmol/L    Potassium 4.3 3.4 - 5.3 mmol/L    Chloride 101 94 - 109 mmol/L    Carbon Dioxide 27 20 - 32 mmol/L    Anion Gap 4 3 - 14 mmol/L    Glucose 92 70 - 99 mg/dL    Urea Nitrogen 27 7 - 30 mg/dL    Creatinine 0.99 0.66 - 1.25 mg/dL    GFR Estimate 76 >60 mL/min/[1.73_m2]    GFR Estimate If Black 88 >60 mL/min/[1.73_m2]    Calcium 8.3 (L) 8.5 - 10.1 mg/dL   Hepatic panel   Result Value Ref Range    Bilirubin Direct 0.2 0.0 - 0.2 mg/dL    Bilirubin Total 0.5 0.2 - 1.3 mg/dL    Albumin 2.8 (L) 3.4 - 5.0 g/dL    Protein Total 6.7 (L) 6.8 - 8.8 g/dL    Alkaline Phosphatase 213 (H) 40 - 150 U/L    ALT 20 0 - 70 U/L    AST 23 0 - 45 U/L     Recent Labs   Lab 06/11/21  0536 06/10/21  1735 06/10/21  0638 06/09/21  0558 06/09/21  0558   WBC 23.0*  --  34.7*  --  46.8*   HGB 8.9*  --  8.9*  --  9.1*   MCV 92  --  91  --  92   PLT 19*  --  24*  --  17*   INR 1.17*  --  1.20*  --  1.19*    134 133   < > 133   POTASSIUM 4.3 4.6 4.2   < > 4.2   CHLORIDE 101 101 100   < > 103   CO2 27 27 26   < > 26   BUN 27 24 24   < > 20   CR 0.99 1.01 0.98   < > 0.97   ANIONGAP 4 6 8   < > 4   MINNIE 8.3* 8.2* 8.3*   < > 8.1*   GLC 92 105* 102*   < > 94   ALBUMIN 2.8*  --  2.9*  --  2.8*   PROTTOTAL 6.7*  --  6.9  --  6.7*   BILITOTAL 0.5  --  0.6  --  0.6   ALKPHOS 213*  --  230*  --  246*   ALT 20  --  23  --  26   AST 23  --  25  --  28    < > = values in this interval not displayed.

## 2021-06-11 NOTE — CONSULTS
[facility] Nurse Inpatient Wound Assessment   Reason for consultation: Evaluate and treat  L) buttock wounds    Assessment  L) buttock wounds due to Friction and Shear  Status: initial assessment  Per pt it has been recurrent issue  Treatment Plan  L) buttock wounds: Every 3 days     Cleanse the area with NS and pat dry.    Apply No sting film barrier to periwound skin.    Cover wound with Mepilex.     Change dressing Q 3 days.    Turn and reposition Q 2hrs.    Ensure pt has Subhash-cushion while sitting up in the chair.  FYI- If pt has constant incontinent loose stools needing dressing changes Q shift please discontinue the Mepilex dressing and apply criticaid barrier paste BID and PRN.   Orders Written  Recommended provider order: None, at this time  WO Nurse follow-up plan:weekly  Nursing to notify the Provider(s) and re-consult the WO Nurse if wound(s) deteriorates or new skin concern.    Patient History  According to provider note(s):  Renny Gannon is a 70 year old male who presents with past medical history of CAD s/p CABG (Jan 2020), HFpEF, CKD3 ( BL Cr. 1.3-1.5), HTN, and JAK2+ myelofibrosis. He was transferred from outside hospital for concern of progression to AML with leukocytosis (WBC = 72.4) on admission. Initiated induction therapy with Decitabine + Venetoclax; (D1= 6/5/21). Venetoclax iniation was delayed until WBC <25K.     Objective Data  Containment of urine/stool: Continent of bladder and Continent of bowel    Active Diet Order  Orders Placed This Encounter      Regular Diet Adult      Output:   I/O last 3 completed shifts:  In: 520 [P.O.:500; I.V.:20]  Out: 1730 [Urine:1730]    Risk Assessment:   Sensory Perception: 4-->no impairment  Moisture: 4-->rarely moist  Activity: 3-->walks occasionally  Mobility: 3-->slightly limited  Nutrition: 3-->adequate  Friction and Shear: 3-->no apparent problem  Andrea Score: 20                          Labs:   Recent Labs   Lab 06/11/21  0536 06/04/21  1341  06/04/21  1341   ALBUMIN 2.8*   < >  --    HGB 8.9*   < >  --    INR 1.17*   < >  --    WBC 23.0*   < >  --    CRP  --   --  120.0*    < > = values in this interval not displayed.       Physical Exam  Areas of skin assessed: focused Coccyx, sacrum, BL buttocks    Wound Location:  L) buttock      Date of last photo 6/11  Wound History: Per pt he has had recurrent issue on this area. Pt able to turn and reposition independently. Continent of B&B.    Wound Base: 100 % thick raised epidermis with pale white tissue     Palpation of the wound bed: firm      Drainage: none     Description of drainage: none     Measurements (length x width x depth, in cm) 1  x 4  x  0.0 cm      Tunneling N/A     Undermining N/A  Periwound skin: intact and superficial erosion healing      Color: pink      Temperature: normal   Odor: none  Pain: mild, aching      Interventions  Visual inspection and assessment completed   Wound Care Rationale Protect periwound skin, Provide protection  and Pain reduction  Wound Care: done per plan of care  Supplies: floor stock and discussed with RN ordered geomatt cushion  Current off-loading measures: Foam padding and Pillows  Current support surface: Standard  Atmos Air mattress  Education provided to: importance of repositioning, plan of care, wound progress and Off-loading pressure  Discussed plan of care with Patient and Nurse    Laina Chin RN

## 2021-06-12 NOTE — PROGRESS NOTES
"[facility]    Hematology / Oncology Progress Note    Date of Service (when I saw the patient): 06/12/2021     Assessment & Plan   Renny Gannon is a 70 year old male who presents with past medical history of CAD s/p CABG (Jan 2020), HFpEF, CKD3 ( BL Cr. 1.3-1.5), HTN, and JAK2+ myelofibrosis. He was transferred from outside hospital for concern of progression to AML with leukocytosis (WBC = 72.4) on admission. Initiated induction therapy with Decitabine + Venetoclax; (D1= 6/5/21). Venetoclax iniation was delayed until WBC <25K.     Today: D8   - Completed 5 days of Decitabine; continue Veneteoclax ramp - up (outlined below)   - Continue diuresis with IV Lasix today, pt +9lbs since admission   - Scheduled Senna 1-2 tabs BID for constipation; Miralax PRN   - Persistent right shoulder pain; started long-acting Oxycontin 10 mg at bedtime, continue Oxycodone 5-10 mg q4h PRN while patient is awake, Tylenol TID    HEME  # LIVIER 2+ Myelofibrosis   # Concern for AML  Follow by Dr. Cristina. He initially was seen in Dec. 2019 by his PCP for increased fatigue and SOB w/ exertion. CBC showed WBC 79.1, Hgb 11.9 and Plt 378. Peripheral smear showed leuko-erythroblastic reaction w/ neutrophilic left shift. BMBx (12/30/19) hypercellular marrow w/ granulocytic and megakaryocytic proliferation, megakaryocytic clustering and atypia and 1.5% circulating blasts. 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 He was on and off Hydrea from Jan 2020 - Jan 2021. BMBx 3/26/21 increased fibrosis, cellularity 40-50%, 5% blasts on morphology and flow. He most recently has just been receiving transfusion support for his anemia and thrombocytopenia. He presented to clinic for labs and possible transfusion and was found to have a WBC 71.9 Hgb 6.4 and Plt 18. Lab noted \"a lot of immature WBC and blasts\". He was then directed to the ED for admission and further work " up.  - BMBx 6/4, unfortunately only core was obtained. Unable to get aspiration, likely due to fibrosis and possibly packed with blasts. BMBx Morphology consistent with blast crisis arising from previously diagnosed myeloproliferative neoplasm. The diagnostic classification drive by increase peripheral blast (23%) d/t blast percentage in bone marrow is difficult to establish d/t bone marrow fibrosis and lack of aspirates. BMBx Flow from core shows increased abnormal myeloid blasts (12%). FLT3 negative.   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 Discussed diagnosis, treatment options and prognosis with patient and sister on 6/5. Patient agreeable to treatment.  - Per Dr. Johnson will start Venetoclax ramp up (6/9) despite having a WBC of 46.8   - PICC line in place  - Prior auth approved for Venetoclax for this patient. Co pay is $2,882.25. [employer] approved.   - BMT consult scheduled for 6/17 with Dr. Óscar Muniz       Treatment Plan: Decitabine (5 days) / Venetoclax (C1D1: 6/5/21)   - Decitabine 20mg/m2 - D1-D5   - Venetoclax Ramp up - will give a prolonged ramp up since starting with WBC of 46.8 (6/9)    - Venetoclax 20 mg daily (6/9-6/10), then 50 mg daily (6/11-6/12), then 100 mg daily   (6/13-6/14), then 200 mg daily (6/15-6/16), then 400 mg daily (6/17-6/18)    - Supportive meds: PRN anti- emetics, bowl regimen, pain control    # Anemia   # Thrombocytopenia   - Patient is transfusion dependent prior to admission due to previous myelofibrosis  Transfuse to keep hgb> 8 (hx CAD and HFpEF) and platelets >10k      # Risk TLS and DIC   - Renal function greatly improved. Changed allopurinol to 300 mg daily   - Follow TLS labs BID  - Follow DIC labs  daily     ID  COVID test negative 6/2    # ID prophylaxis  - Viral Serologies: HSV 1  positive, HSV 2 negative, EBV positive, HBV Surface Ag negative, HBV surface Ab negative, HBV core Ag negative, HIV negative, CMV negative  - Acyclovir 400 BID  - Levaquin 250 mg daily   - Micafungin 50 mg daily    - Copay for Posaconazole $2,882.25 with refills being $2519.17.   - Copay for Voriconazole $246.14 with refills being $12.95    - Of note, patient is approved for $10,000 ivan to assist with Venetoclax costs. This might be able to cover co-pay for anti-fungal but will rapidly deplete his ivan funds quickly.    MSK  # Right Shoulder Pain  # Right shoulder subacromial impingement/bursitis/tendinitis  # Adhesive Capsulitis  Patient has had 2-3 weeks of right shoulder pain. Was seen in ortho clinic on 5/27. Has not had any specific injuries or traumas. Pain is worse with motion but can not get comfortable. Was diagnosed with right shoulder subacromial impingement/bursitis/tendinitis. Has not been able to move his shoulder or sleep due to the pain. Received a Kenalog and Marcaine injection on 5/27 in ortho clinic. Received additional injection on 5/30 in ED when he presented with worsened pain Differential includes previous diagnosis of right shoulder subacromial impingement/bursitis/tendinitis, fracture, septic arthritis, vs hemarthrosis   - Ortho surg consulted, appreciate recs   - Repeat right should xray 6/4 shows no acute osseous abnormalities, degenerative changes. Though there is significant increase in size of his glenohumeral joint concern for fluid accumulation vs hemarthrosis vs septic joint.    - Current Pain Regimen   - Oxycontin 10 mg PO at bedtime (x6/9)   - Tylenol 650mg TID scheduled    - Dilaudid 0.2mg q3h prn   - Oxycodone 5-10mg q4h prn   - Voltaren Topical QID PRN   - IR Consult placed for R shoulder tap 6/10 -- removed 7 mL of brown cloudy fluid,  cultures pending     #RUE Swelling   RUE swelling was noted on 6/8. RUE US unremarkable for DVT. Suspect dependent edema as patient lets arm  rest to the side d/t right shoulder pain.   - will closely monitor   - Orthotics IP consult for brace to help keep hand elevated     CARDS   # CAD s/p CABG (Jan.2020)  # HFpEF   Last saw cardiology 2/16/21. Last echo 1/27/21 show left ventricular function is low normal. EF 50-55%. Apicolateral hypokensis. Possible mid to distal inferolateral hypokinesis.   - Previous on furosemide at home, was previously on lasix 40mg am + 20mg pm. Concerned that his lasix may have induced an KRIS  - Restart lasix 40mg daily     # HTN   - continue PTA Metoprolol      # HLD   - will hold PTA statin while inpatient.      RENAL   # CKD3   - Creatinine significantly improved. May have had lasix induced Cr elevation  - Continue to monitor with daily CMP     # Bilateral Lower Extremity Edema  - 2-3+ bilateral lower extremity edema  - Chronic history of heart failure and BLE edema  - Will start lasix 40mg daily   - Lymphedema consulted; appreciate recs     MISC   # Insomnia   Reports difficulty sleeping for the last 2 years.   - Trial Trazodone at bedtime    Fluids/Electrolytes/Nutrition   - Bolus as needed  - PRN lyte replacement per standing protocol  - Regular diet as tolerated     Lines: PICC in place    PPX  VTE: none due to thrombocytopenia  GI: n/a  Bowels: Senna and Miralax PRN  Activity: Up as Tolerated    Code  DNR, Intubation OK    Dispo: Anticipate possible discharge after Venetoclax ramp up; ~ 6/18. Currently PT recommending home with assist. Patient will follow with Dr. Patel, but would like to have follow infusions at [place].    Follow up: Follow up has not been requested yet, as discharge date is still tenuous      Patient is seen and examined by Dr. Chance.  Assessment and plan are discussed and delivered to the patient.    Milady Gannon PA-C  Hematology/Oncology  Pager #[phone]    Interval History   No acute events overnight. Nursing notes reviewed. Afebrile and HD stable.   Renny continues to have right shoulder  pain though the swelling in his right hand looks improved. Encouraged patient to keep using shoulder brace and elevating the right hand. He continues to eat 2-3 meals a days. Had a bowel movement yesterday, but will continue bowel regimen. Denies any other side effects from the chemotherapy.     Complete and Comprehensive review of systems review and negative other than noted here or in the HPI.     Physical Exam   Temp: 97.7  F (36.5  C) Temp src: Temporal BP: 127/57 Pulse: 75   Resp: 18 SpO2: 94 % O2 Device: None (Room air)    Vitals:    06/10/21 0737 06/11/21 0758 06/12/21 0749   Weight: 83.9 kg (185 lb) 83.8 kg (184 lb 11.2 oz) 83.8 kg (184 lb 11.2 oz)     Vital Signs with Ranges  Temp:  [96.7  F (35.9  C)-98.1  F (36.7  C)] 97.7  F (36.5  C)  Pulse:  [75-81] 75  Resp:  [16-20] 18  BP: (113-127)/(50-65) 127/57  SpO2:  [94 %-97 %] 94 %  I/O last 3 completed shifts:  In: 790 [P.O.:480; I.V.:100]  Out: 2545 [Urine:2545]    Constitutional: Pleasant male sitting up in chair. Awake and conversational. Non- toxic appearing. No acute distress.   HEENT: Normocephalic, atraumatic. Sclerae anicteric. EOM intact. Moist mucus membranes   Respiratory: Breathing comfortable with no increased work on room air. Good air exchange. No signs of respiratory distress or accessory muscle use. Lungs clear to auscultation bilaterally, no crackles or wheezing noted.  Cardiovascular: Regular rate and rhythm. Normal S1 and S2. No murmurs, rubs, or gallops. 2+ RUE 3+ BLE edema  GI: Abdomen is soft, non-distended, non-tender. Bowel sounds present and normoactive.   Skin: Skin is clean, dry, intact. No jaundice appreciated.   Musculoskeletal/ Extremities: No redness, warmth of the joints appreciated. Some swelling to his right shoulder. Pain with movement of his right shoulder.   Neurologic: Alert and oriented. Speech normal. Grossly nonfocal. Memory and thought process preserved. Motor function normal in lower extremities. Right arm range of  motion not assessed as he has brace on.   Neuropsychiatric: Calm, affect flat. Appropriate mood and affect. Good judgment and insight. No visual/auditory hallucinations.       Medications     - MEDICATION INSTRUCTIONS -         acetaminophen  650 mg Oral TID     acyclovir  400 mg Oral BID     allopurinol  300 mg Oral Daily     vitamin B-12  200 mcg Oral QAM     [Held by provider] furosemide  40 mg Oral Daily     heparin lock flush  5-10 mL Intracatheter Q24H     levofloxacin  250 mg Oral At Bedtime     lidocaine  1 patch Transdermal Q24h    And     lidocaine   Transdermal Q8H     metoprolol tartrate  50 mg Oral BID     micafungin  50 mg Intravenous Q24H     oxyCODONE  10 mg Oral At Bedtime     senna-docusate  1 tablet Oral BID    Or     senna-docusate  2 tablet Oral BID     traZODone  50 mg Oral At Bedtime     [START ON 6/13/2021] venetoclax  100 mg Oral Daily with supper     [START ON 6/15/2021] venetoclax  200 mg Oral Daily with breakfast     [START ON 6/17/2021] venetoclax  400 mg Oral Daily with breakfast     venetoclax  50 mg Oral Daily with supper     vitamin D3  50 mcg Oral Daily       Data   Results for orders placed or performed during the hospital encounter of 06/03/21 (from the past 24 hour(s))   Basic metabolic panel   Result Value Ref Range    Sodium 132 (L) 133 - 144 mmol/L    Potassium 4.6 3.4 - 5.3 mmol/L    Chloride 102 94 - 109 mmol/L    Carbon Dioxide 26 20 - 32 mmol/L    Anion Gap 4 3 - 14 mmol/L    Glucose 105 (H) 70 - 99 mg/dL    Urea Nitrogen 26 7 - 30 mg/dL    Creatinine 1.00 0.66 - 1.25 mg/dL    GFR Estimate 75 >60 mL/min/[1.73_m2]    GFR Estimate If Black 87 >60 mL/min/[1.73_m2]    Calcium 7.9 (L) 8.5 - 10.1 mg/dL   Phosphorus   Result Value Ref Range    Phosphorus 4.6 (H) 2.5 - 4.5 mg/dL   Uric acid   Result Value Ref Range    Uric Acid 4.1 3.5 - 7.2 mg/dL   Asymptomatic SARS-CoV-2 COVID-19 Virus (Coronavirus) by PCR    Specimen: Nasopharyngeal   Result Value Ref Range    SARS-CoV-2 Virus  Specimen Source Nasopharyngeal     SARS-CoV-2 PCR Result NEGATIVE     SARS-CoV-2 PCR Comment       Testing was performed using the Xpert Xpress SARS-CoV-2 Assay on the Cepheid Gene-Xpert   Instrument Systems. Additional information about this Emergency Use Authorization (EUA)   assay can be found via the Lab Guide.     Magnesium   Result Value Ref Range    Magnesium 2.3 1.6 - 2.3 mg/dL   Phosphorus   Result Value Ref Range    Phosphorus 4.4 2.5 - 4.5 mg/dL   Basic metabolic panel   Result Value Ref Range    Sodium 134 133 - 144 mmol/L    Potassium 4.2 3.4 - 5.3 mmol/L    Chloride 101 94 - 109 mmol/L    Carbon Dioxide 27 20 - 32 mmol/L    Anion Gap 5 3 - 14 mmol/L    Glucose 93 70 - 99 mg/dL    Urea Nitrogen 21 7 - 30 mg/dL    Creatinine 0.88 0.66 - 1.25 mg/dL    GFR Estimate 86 >60 mL/min/[1.73_m2]    GFR Estimate If Black >90 >60 mL/min/[1.73_m2]    Calcium 8.4 (L) 8.5 - 10.1 mg/dL   CBC with platelets differential   Result Value Ref Range    WBC 15.9 (H) 4.0 - 11.0 10e9/L    RBC Count 2.84 (L) 4.4 - 5.9 10e12/L    Hemoglobin 8.2 (L) 13.3 - 17.7 g/dL    Hematocrit 25.9 (L) 40.0 - 53.0 %    MCV 91 78 - 100 fl    MCH 28.9 26.5 - 33.0 pg    MCHC 31.7 31.5 - 36.5 g/dL    RDW 16.1 (H) 10.0 - 15.0 %    Platelet Count 28 (LL) 150 - 450 10e9/L    Diff Method Manual Differential     % Neutrophils 33.6 %    % Lymphocytes 10.6 %    % Monocytes 28.3 %    % Eosinophils 5.3 %    % Basophils 2.7 %    % Metamyelocytes 1.8 %    % Promyelocytes 0.9 %    % Blasts 16.8 %    Absolute Neutrophil 5.3 1.6 - 8.3 10e9/L    Absolute Lymphocytes 1.7 0.8 - 5.3 10e9/L    Absolute Monocytes 4.5 (H) 0.0 - 1.3 10e9/L    Absolute Eosinophils 0.8 (H) 0.0 - 0.7 10e9/L    Absolute Basophils 0.4 (H) 0.0 - 0.2 10e9/L    Absolute Metamyelocytes 0.3 (H) 0 10e9/L    Absolute Promyeloctyes 0.1 (H) 0 10e9/L    Absolute Blasts 2.7 (H) 0 10e9/L    Anisocytosis Slight     Poikilocytosis Slight     Ovalocytes Slight     Platelet Estimate Confirming automated  cell count    Fibrinogen activity   Result Value Ref Range    Fibrinogen 650 (H) 200 - 420 mg/dL   Hepatic panel   Result Value Ref Range    Bilirubin Direct 0.2 0.0 - 0.2 mg/dL    Bilirubin Total 0.5 0.2 - 1.3 mg/dL    Albumin 2.8 (L) 3.4 - 5.0 g/dL    Protein Total 6.8 6.8 - 8.8 g/dL    Alkaline Phosphatase 193 (H) 40 - 150 U/L    ALT 19 0 - 70 U/L    AST 19 0 - 45 U/L   INR   Result Value Ref Range    INR 1.15 (H) 0.86 - 1.14   Lactate Dehydrogenase   Result Value Ref Range    Lactate Dehydrogenase 640 (H) 85 - 227 U/L   Uric acid   Result Value Ref Range    Uric Acid 4.3 3.5 - 7.2 mg/dL     Recent Labs   Lab 06/12/21  0541 06/11/21  1707 06/11/21  0536 06/10/21  0638 06/10/21  0638   WBC 15.9*  --  23.0*  --  34.7*   HGB 8.2*  --  8.9*  --  8.9*   MCV 91  --  92  --  91   PLT 28*  --  19*  --  24*   INR 1.15*  --  1.17*  --  1.20*    132* 133   < > 133   POTASSIUM 4.2 4.6 4.3   < > 4.2   CHLORIDE 101 102 101   < > 100   CO2 27 26 27   < > 26   BUN 21 26 27   < > 24   CR 0.88 1.00 0.99   < > 0.98   ANIONGAP 5 4 4   < > 8   MINNIE 8.4* 7.9* 8.3*   < > 8.3*   GLC 93 105* 92   < > 102*   ALBUMIN 2.8*  --  2.8*  --  2.9*   PROTTOTAL 6.8  --  6.7*  --  6.9   BILITOTAL 0.5  --  0.5  --  0.6   ALKPHOS 193*  --  213*  --  230*   ALT 19  --  20  --  23   AST 19  --  23  --  25    < > = values in this interval not displayed.

## 2021-06-12 NOTE — PLAN OF CARE
Renny has been afe and other VVS. Continues with pain in right shoulder -relief with Tylenol  Right hand /arm in sling and swelling is decreased. LE swelling also down.  Lasix IV x2 for weight gain.  Eating well and UAL.

## 2021-06-12 NOTE — PLAN OF CARE
"./65 (BP Location: Left arm)   Pulse 77   Temp 96.7  F (35.9  C) (Oral)   Resp 20   Ht 1.768 m (5' 9.6\")   Wt 83.8 kg (184 lb 11.2 oz)   SpO2 97%   BMI 26.81 kg/m      Pt afebrile. Ovss. Denies nausea. C/o rt shoulder pain, prn Oxycodone x1. Sleeping between recliner and bed. Pt wearing brace on right arm for support. Cont to monitor and follow poc.     Problem: Adult Inpatient Plan of Care  Goal: Plan of Care Review  Outcome: No Change     Problem: Anemia (Chemotherapy Effects)  Goal: Anemia Symptom Improvement  Outcome: No Change     Problem: Urinary Bleeding Risk or Actual (Chemotherapy Effects)  Goal: Absence of Hematuria  Outcome: No Change     Problem: Nausea and Vomiting (Chemotherapy Effects)  Goal: Fluid and Electrolyte Balance  Outcome: No Change     "

## 2021-06-12 NOTE — PLAN OF CARE
VSS. Afebrile. Up independently. No pain or nausea. Covid swab collected and sent to lab. On TLS labs BID. No complaints. Left buttocks dressing intact.

## 2021-06-13 NOTE — PLAN OF CARE
Given Tylenol,  Voltaren cream, and a Lidocaine patch to his right shoulder with some relief. He had a hard stool and was given Senna S. He declined Des lax. Afebrile. Denied nausea.

## 2021-06-13 NOTE — PLAN OF CARE
[id number]: VSS on RA. Afebrile. Denies nausea and SOB. Right shoulder pain managed with PRN Oxy and scheduled Tylenol. Pt states voiding frequently after one time dose of IV Lasix. Not saving urine. No BM this shift. Slept well between cares. Pt up in recliner with feet elevated. Compression stockings on. Continue with POC.

## 2021-06-13 NOTE — PLAN OF CARE
"/54 (BP Location: Left arm)   Pulse 88   Temp 97.5  F (36.4  C) (Temporal)   Resp 16   Ht 1.768 m (5' 9.6\")   Wt 81.8 kg (180 lb 6.4 oz)   SpO2 95%   BMI 26.18 kg/m      1530 - 1930    Neuro: A&Ox4.   Cardiac: SR. VSS.   Respiratory: Sating 95% on RA.  GI/: No bm.  Adequate urine output.  Diet/appetite: Tolerating current diet.   Activity:  Independent.  Pain: At acceptable level on current regimen.   Skin: No new deficits noted.  LDA's:CVA.  New this shift: Miralax given for constipation.    Plan: Continue with POC. Notify primary team with changes.  "

## 2021-06-13 NOTE — PROGRESS NOTES
"[facility] [place]    Hematology / Oncology Progress Note    Date of Service (when I saw the patient): 06/13/2021     Assessment & Plan   Renny Gannon is a 70 year old male who presents with past medical history of CAD s/p CABG (Jan 2020), HFpEF, CKD3 ( BL Cr. 1.3-1.5), HTN, and JAK2+ myelofibrosis. He was transferred from outside hospital for concern of progression to AML with leukocytosis (WBC = 72.4) on admission. Initiated induction therapy with Decitabine + Venetoclax; (D1= 6/5/21). Venetoclax iniation was delayed until WBC <25K.     Today: D9   - Completed 5 days of Decitabine; continue Veneteoclax ramp - up (outlined below)   - Continue diuresis with IV Lasix today  - Scheduled Senna 1-2 tabs BID for constipation; Miralax PRN   - Continue current pain regimen for R shoulder, currently has Oxycontin 10 mg at bedtime as pain improves will likely be able to discontinue.     HEME  # LIVIER 2+ Myelofibrosis   # Concern for AML  Follow by Dr. Cristina. He initially was seen in Dec. 2019 by his PCP for increased fatigue and SOB w/ exertion. CBC showed WBC 79.1, Hgb 11.9 and Plt 378. Peripheral smear showed leuko-erythroblastic reaction w/ neutrophilic left shift. BMBx (12/30/19) hypercellular marrow w/ granulocytic and megakaryocytic proliferation, megakaryocytic clustering and atypia and 1.5% circulating blasts. 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 He was on and off Hydrea from Jan 2020 - Jan 2021. BMBx 3/26/21 increased fibrosis, cellularity 40-50%, 5% blasts on morphology and flow. He most recently has just been receiving transfusion support for his anemia and thrombocytopenia. He presented to clinic for labs and possible transfusion and was found to have a WBC 71.9 Hgb 6.4 and Plt 18. Lab noted \"a lot of immature WBC and blasts\". He was then directed to the ED for admission and further work up.  - BMBx 6/4, unfortunately only " core was obtained. Unable to get aspiration, likely due to fibrosis and possibly packed with blasts. BMBx Morphology consistent with blast crisis arising from previously diagnosed myeloproliferative neoplasm. The diagnostic classification drive by increase peripheral blast (23%) d/t blast percentage in bone marrow is difficult to establish d/t bone marrow fibrosis and lack of aspirates. BMBx Flow from core shows increased abnormal myeloid blasts (12%). FLT3 negative.   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 Discussed diagnosis, treatment options and prognosis with patient and sister on 6/5. Patient agreeable to treatment.  - Per Dr. Johnson will start Venetoclax ramp up (6/9) despite having a WBC of 46.8   - PICC line in place  - Prior auth approved for Venetoclax for this patient. Co pay is $2,882.25. [employer] approved.   - BMT consult scheduled for 6/17 with Dr. Óscar Muniz       Treatment Plan: Decitabine (5 days) / Venetoclax (C1D1: 6/5/21)   - Decitabine 20mg/m2 - D1-D5   - Venetoclax Ramp up - will give a prolonged ramp up since starting with WBC of 46.8 (6/9)    - Venetoclax 20 mg daily (6/9-6/10), then 50 mg daily (6/11-6/12), then 100 mg daily   (6/13-6/14), then 200 mg daily (6/15-6/16), then 400 mg daily (6/17-6/18)    - Supportive meds: PRN anti- emetics, bowl regimen, pain control    # Anemia   # Thrombocytopenia   - Patient is transfusion dependent prior to admission due to previous myelofibrosis  Transfuse to keep hgb> 8 (hx CAD and HFpEF) and platelets >10k      # Risk TLS and DIC   - Renal function greatly improved. Changed allopurinol to 300 mg daily   - Follow TLS labs BID  - Follow DIC labs  daily     ID  COVID test negative 6/2    # ID prophylaxis  - Viral Serologies: HSV 1 positive, HSV 2 negative, EBV positive,  HBV Surface Ag negative, HBV surface Ab negative, HBV core Ag negative, HIV negative, CMV negative  - Acyclovir 400 BID  - Levaquin 250 mg daily   - Micafungin 50 mg daily    - Copay for Posaconazole $2,882.25 with refills being $2519.17.   - Copay for Voriconazole $246.14 with refills being $12.95    - Of note, patient is approved for $10,000 ivan to assist with Venetoclax costs. This might be able to cover co-pay for anti-fungal but this would rapidly deplete his ivan funds quickly.   - After conversation w/ Dr. Johnson regarding costs of anti-fungals it was decided that we will discharge patient on Fluconazole 200 mg daily.     MSK  # Right Shoulder Pain  # Right shoulder subacromial impingement/bursitis/tendinitis  # Adhesive Capsulitis  Patient has had 2-3 weeks of right shoulder pain. Was seen in ortho clinic on 5/27. Has not had any specific injuries or traumas. Pain is worse with motion but can not get comfortable. Was diagnosed with right shoulder subacromial impingement/bursitis/tendinitis. Has not been able to move his shoulder or sleep due to the pain. Received a Kenalog and Marcaine injection on 5/27 in ortho clinic. Received additional injection on 5/30 in ED when he presented with worsened pain Differential includes previous diagnosis of right shoulder subacromial impingement/bursitis/tendinitis, fracture, septic arthritis, vs hemarthrosis   - Ortho surg consulted, appreciate recs   - Repeat right should xray 6/4 shows no acute osseous abnormalities, degenerative changes. Though there is significant increase in size of his glenohumeral joint concern for fluid accumulation vs hemarthrosis vs septic joint.    - Current Pain Regimen   - Oxycontin 10 mg PO at bedtime (x6/9), likely discontinue as pain improves    - Tylenol 650mg TID scheduled    - Dilaudid 0.2mg q3h prn   - Oxycodone 5-10mg q4h prn   - Voltaren Topical QID PRN   - IR Consult placed for R shoulder tap 6/10 -- removed 7 mL of brown cloudy  fluid, Aerobic and Anerobic cultures - NGTD     #RUE Swelling, improving  RUE swelling was noted on 6/8. RUE US unremarkable for DVT. Suspect dependent edema as patient lets arm rest to the side d/t right shoulder pain.   - will closely monitor   - Orthotics IP consult for brace to help keep hand elevated     CARDS   # CAD s/p CABG (Jan.2020)  # HFpEF   Last saw cardiology 2/16/21. Last echo 1/27/21 show left ventricular function is low normal. EF 50-55%. Apicolateral hypokensis. Possible mid to distal inferolateral hypokinesis.   - Previous on furosemide at home, was previously on lasix 40mg am + 20mg pm. Concerned that his lasix may have induced an KRIS  - Restart lasix 40mg daily     # HTN   - continue PTA Metoprolol      # HLD   - will hold PTA statin while inpatient.      RENAL   # CKD3   - Creatinine significantly improved. May have had lasix induced Cr elevation  - Continue to monitor with daily CMP     # Bilateral Lower Extremity Edema  - 2-3+ bilateral lower extremity edema  - Chronic history of heart failure and BLE edema  - Will start lasix 40mg daily   - Lymphedema consulted; appreciate recs     MISC   # Insomnia   Reports difficulty sleeping for the last 2 years.   - Trial Trazodone at bedtime    Fluids/Electrolytes/Nutrition   - Bolus as needed  - PRN lyte replacement per standing protocol  - Regular diet as tolerated     Lines: PICC in place    PPX  VTE: none due to thrombocytopenia  GI: n/a  Bowels: Senna and Miralax PRN  Activity: Up as Tolerated    Code  DNR, Intubation OK    Dispo: Anticipate possible discharge after Venetoclax ramp up; ~ 6/18. Currently PT recommending home with assist. Patient will follow with Dr. Patel, but would like to have follow infusions at [facility].    Follow up: Follow up has not been requested yet, as discharge date is still tenuous      Patient is seen and examined by Dr. Johnson and ASHLEE.  Assessment and plan are discussed and delivered to the patient.    Milady  Jagdeep GARCIA  Hematology/Oncology  Pager #[phone]    Interval History   No acute events overnight. Nursing notes reviewed. Afebrile and HD stable.   Renny repots for the first time that the right shoulder pain is improving. It continues to be bothersome when he moves it but states it is improved from when he came in. Endorses constipation is improving last BM was yesterday. Continues to eat 2-3 meals. Asked Renny what he thought about going back his apartment after getting out of the hospital and he is really looking forward to that. He believes he could live on his own ok with his R shoulder. Informed him he would likely need to go to doctors appointments and he said he would be willing to. All questions answered at this time.     Complete and Comprehensive review of systems review and negative other than noted here or in the HPI.     Physical Exam   Temp: 97.8  F (36.6  C) Temp src: Temporal BP: 119/52 Pulse: 77   Resp: 16 SpO2: 92 % O2 Device: None (Room air)    Vitals:    06/11/21 0758 06/12/21 0749 06/13/21 0906   Weight: 83.8 kg (184 lb 11.2 oz) 83.8 kg (184 lb 11.2 oz) 81.8 kg (180 lb 6.4 oz)     Vital Signs with Ranges  Temp:  [96  F (35.6  C)-97.8  F (36.6  C)] 97.8  F (36.6  C)  Pulse:  [74-84] 77  Resp:  [16-18] 16  BP: (113-132)/(50-72) 119/52  SpO2:  [92 %-96 %] 92 %  I/O last 3 completed shifts:  In: 120 [I.V.:120]  Out: 1980 [Urine:1980]    Constitutional: Pleasant male sitting up in chair. Awake and conversational. Non- toxic appearing. No acute distress.   HEENT: Normocephalic, atraumatic. Sclerae anicteric. EOM intact. Moist mucus membranes   Respiratory: Breathing comfortable with no increased work on room air. Good air exchange. No signs of respiratory distress or accessory muscle use. Lungs clear to auscultation bilaterally, no crackles or wheezing noted.  Cardiovascular: Regular rate and rhythm. Normal S1 and S2. No murmurs, rubs, or gallops. 1+ RUE 2+ BLE edema  GI: Abdomen is soft, non-distended,  non-tender. Bowel sounds present and normoactive.   Skin: Skin is clean, dry, intact. No jaundice appreciated.   Musculoskeletal/ Extremities: No redness, warmth of the joints appreciated. Some swelling to his right shoulder. Pain with movement of his right shoulder.   Neurologic: Alert and oriented. Speech normal. Grossly nonfocal. Memory and thought process preserved. Motor function normal in lower extremities. Right arm range of motion not assessed as he has brace on.   Neuropsychiatric: Calm, affect flat. Appropriate mood and affect. Good judgment and insight. No visual/auditory hallucinations.       Medications     - MEDICATION INSTRUCTIONS -         acetaminophen  650 mg Oral TID     acyclovir  400 mg Oral BID     allopurinol  300 mg Oral Daily     vitamin B-12  200 mcg Oral QAM     [Held by provider] furosemide  40 mg Oral Daily     heparin lock flush  5-10 mL Intracatheter Q24H     levofloxacin  250 mg Oral At Bedtime     lidocaine  1 patch Transdermal Q24h    And     lidocaine   Transdermal Q8H     metoprolol tartrate  50 mg Oral BID     micafungin  50 mg Intravenous Q24H     oxyCODONE  10 mg Oral At Bedtime     senna-docusate  1 tablet Oral BID    Or     senna-docusate  2 tablet Oral BID     traZODone  50 mg Oral At Bedtime     venetoclax  100 mg Oral Daily with supper     [START ON 6/15/2021] venetoclax  200 mg Oral Daily with breakfast     [START ON 6/17/2021] venetoclax  400 mg Oral Daily with breakfast     vitamin D3  50 mcg Oral Daily       Data   Results for orders placed or performed during the hospital encounter of 06/03/21 (from the past 24 hour(s))   Basic metabolic panel   Result Value Ref Range    Sodium 134 133 - 144 mmol/L    Potassium 4.2 3.4 - 5.3 mmol/L    Chloride 100 94 - 109 mmol/L    Carbon Dioxide 30 20 - 32 mmol/L    Anion Gap 4 3 - 14 mmol/L    Glucose 109 (H) 70 - 99 mg/dL    Urea Nitrogen 24 7 - 30 mg/dL    Creatinine 0.99 0.66 - 1.25 mg/dL    GFR Estimate 76 >60 mL/min/[1.73_m2]     GFR Estimate If Black 88 >60 mL/min/[1.73_m2]    Calcium 8.4 (L) 8.5 - 10.1 mg/dL   Phosphorus   Result Value Ref Range    Phosphorus 4.6 (H) 2.5 - 4.5 mg/dL   Uric acid   Result Value Ref Range    Uric Acid 4.1 3.5 - 7.2 mg/dL   Basic metabolic panel   Result Value Ref Range    Sodium 132 (L) 133 - 144 mmol/L    Potassium 4.1 3.4 - 5.3 mmol/L    Chloride 101 94 - 109 mmol/L    Carbon Dioxide 27 20 - 32 mmol/L    Anion Gap 4 3 - 14 mmol/L    Glucose 94 70 - 99 mg/dL    Urea Nitrogen 23 7 - 30 mg/dL    Creatinine 0.93 0.66 - 1.25 mg/dL    GFR Estimate 82 >60 mL/min/[1.73_m2]    GFR Estimate If Black >90 >60 mL/min/[1.73_m2]    Calcium 8.7 8.5 - 10.1 mg/dL   CBC with platelets differential   Result Value Ref Range    WBC 12.3 (H) 4.0 - 11.0 10e9/L    RBC Count 2.92 (L) 4.4 - 5.9 10e12/L    Hemoglobin 8.5 (L) 13.3 - 17.7 g/dL    Hematocrit 27.1 (L) 40.0 - 53.0 %    MCV 93 78 - 100 fl    MCH 29.1 26.5 - 33.0 pg    MCHC 31.4 (L) 31.5 - 36.5 g/dL    RDW 15.9 (H) 10.0 - 15.0 %    Platelet Count 24 (LL) 150 - 450 10e9/L    Diff Method Manual Differential     % Neutrophils 31.6 %    % Lymphocytes 14.4 %    % Monocytes 14.4 %    % Eosinophils 6.3 %    % Basophils 3.6 %    % Metamyelocytes 4.5 %    % Blasts 25.2 %    Absolute Neutrophil 3.9 1.6 - 8.3 10e9/L    Absolute Lymphocytes 1.8 0.8 - 5.3 10e9/L    Absolute Monocytes 1.8 (H) 0.0 - 1.3 10e9/L    Absolute Eosinophils 0.8 (H) 0.0 - 0.7 10e9/L    Absolute Basophils 0.4 (H) 0.0 - 0.2 10e9/L    Absolute Metamyelocytes 0.6 (H) 0 10e9/L    Absolute Blasts 3.1 (H) 0 10e9/L    Anisocytosis Slight     Platelet Estimate Confirming automated cell count    Fibrinogen activity   Result Value Ref Range    Fibrinogen 659 (H) 200 - 420 mg/dL   Hepatic panel   Result Value Ref Range    Bilirubin Direct 0.1 0.0 - 0.2 mg/dL    Bilirubin Total 0.5 0.2 - 1.3 mg/dL    Albumin 2.9 (L) 3.4 - 5.0 g/dL    Protein Total 6.9 6.8 - 8.8 g/dL    Alkaline Phosphatase 190 (H) 40 - 150 U/L    ALT 19 0 -  70 U/L    AST 18 0 - 45 U/L   INR   Result Value Ref Range    INR 1.19 (H) 0.86 - 1.14   Lactate Dehydrogenase   Result Value Ref Range    Lactate Dehydrogenase 567 (H) 85 - 227 U/L   Magnesium   Result Value Ref Range    Magnesium 2.3 1.6 - 2.3 mg/dL   Phosphorus   Result Value Ref Range    Phosphorus 4.2 2.5 - 4.5 mg/dL   Uric acid   Result Value Ref Range    Uric Acid 4.1 3.5 - 7.2 mg/dL     Recent Labs   Lab 06/13/21  0525 06/12/21  1730 06/12/21  0541 06/11/21  0536 06/11/21  0536   WBC 12.3*  --  15.9*  --  23.0*   HGB 8.5*  --  8.2*  --  8.9*   MCV 93  --  91  --  92   PLT 24*  --  28*  --  19*   INR 1.19*  --  1.15*  --  1.17*   * 134 134   < > 133   POTASSIUM 4.1 4.2 4.2   < > 4.3   CHLORIDE 101 100 101   < > 101   CO2 27 30 27   < > 27   BUN 23 24 21   < > 27   CR 0.93 0.99 0.88   < > 0.99   ANIONGAP 4 4 5   < > 4   MINNIE 8.7 8.4* 8.4*   < > 8.3*   GLC 94 109* 93   < > 92   ALBUMIN 2.9*  --  2.8*  --  2.8*   PROTTOTAL 6.9  --  6.8  --  6.7*   BILITOTAL 0.5  --  0.5  --  0.5   ALKPHOS 190*  --  193*  --  213*   ALT 19  --  19  --  20   AST 18  --  19  --  23    < > = values in this interval not displayed.

## 2021-06-13 NOTE — PLAN OF CARE
Renny has been afe this am.  Eating well and UAL.  Right shoulder sore - pain controlled this am with Tylenol.

## 2021-06-14 NOTE — PROGRESS NOTES
1915 Report received from chloe rn.   2000 Patient received in bed aaox4. 0 complaints. Brace to right arm noted. VSS. Scheduled meds administered. After administration of meds, patient up ambulating in room. Gait steady. 0 distress noted.   2120 Meds administered. Patient reports pain decrease in right arm. Patient left in bed resting. Call light in reach.   2230 Patient in bed resting. 0 distress noted. Call light in reach.  2250 Report endorsed to JACOB Cadet.

## 2021-06-14 NOTE — PROGRESS NOTES
[facility]    Hematology / Oncology Progress Note    Date of Service (when I saw the patient): 06/14/2021     Assessment & Plan   Renny Gannon is a 70 year old male who presents with past medical history significant for CAD s/p CABG (Jan 2020), HFpEF, CKD stage III (baseline Cr. 1.3-1.5), HTN, and JAK2+ myelofibrosis who was admitted as a transfer from an OSH with leukocytosis (WBC=72.4) concerning for progression to AML. He was started on induction therapy with Decitabine (Day 1=6/5/21) + Venetoclax (begun on 6/9/21). Hospital course has been complicated by development of a hemorrhagic right shoulder effusion with associated pain and limitations on mobility.    Today: D10   - Completed 5 days of Decitabine (6/5-6/9); continue veneteoclax ramp-up (x6/9, 100 mg today).  - Continue diuresis with IV Lasix today; will give lower 20 mg dose, as patient appears to be back to his admission weight. Follow I/Os, daily weights to determine future doses.  - Back off on TLS labs to daily given stability.   - Continue scheduled Senna-S, Miralax PRN; add Milk of Magnesia PRN.     HEME  # LIVIER 2+ myelofibrosis   # Concern for AML  Follow by Dr. Cristina. He initially was seen in Dec. 2019 by his PCP for increased fatigue and SOB w/ exertion. CBC showed WBC 79.1, Hgb 11.9 and Plt 378. Peripheral smear showed leuko-erythroblastic reaction w/ neutrophilic left shift. BMBx (12/30/19) hypercellular marrow w/ granulocytic and megakaryocytic proliferation, megakaryocytic clustering and atypia and 1.5% circulating blasts. 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 He was on and off Hydrea from Jan 2020 - Jan 2021. BMBx 3/26/21 increased fibrosis, cellularity 40-50%, 5% blasts on morphology and flow. He most recently has just been receiving transfusion support for his anemia and thrombocytopenia. He presented to clinic for labs and possible transfusion  "and was found to have a WBC 71.9 Hgb 6.4 and Plt 18. Lab noted \"a lot of immature WBC and blasts\". He was then directed to the ED for admission and further work up.  - BMBx done x6/4; unfortunately only core was obtained. No aspirate obtained, likely due to fibrosis and possibly packed with blasts. BMBx morphology reveals marrow hypercellularity (70-80%) with left-shifted myeloid predominant maturation with approximately 5-10% myeloid blasts, markedly increased marrow fibrosis (MF3); findings consistent with blast crisis arising from previously diagnosed myeloproliferative neoplasm. Diagnosis made on the basis of increased peripheral blast percentage (23%); blast percentage in bone marrow is difficult to establish d/t bone marrow fibrosis and lack of aspirates. BMBx flow from core shows increased abnormal myeloid blasts (12%). FLT3 negative.    Cytogenetics and NGS sent on peripheral blood; pending.  - Flow cytometry on peripheral blood shows 28% myeloid blasts.  - Discussion had with patient and sister on 6/5 re: diagnosis, treatment options, and prognosis.  - Started on decitabine 20 mg/m2 on 6/5.  - Venetoclax started on 6/9; see below for ramp-up.    - PICC line in place.  - Prior auth approved for Venetoclax for this patient. Copay is $2,882.25. [facility]   - BMT consult scheduled for 6/17 with Dr. Óscar Muniz.      Treatment Plan: Decitabine (5 days) / Venetoclax (C1D1: 6/5/21)   - Decitabine 20 mg/m2 -- D1-5   - Venetoclax ramp-up -- will give a prolonged ramp-up given moderate leukocytosis on initiation (WBC=46.8)    Venetoclax 20 mg daily (6/9-6/10), then 50 mg daily (6/11-6/12), then 100 mg daily (6/13-6/14), then 200 mg daily (6/15-6/16), then 400 mg daily (6/17-x)   - Supportive meds: PRN anti- emetics, bowl regimen, pain control    # Anemia   # Thrombocytopenia   Patient was transfusion dependent prior to admission due to underlying myelofibrosis.  - Transfuse to keep Hgb > 8 (hx CAD and " HFpEF) and platelets >10K     # Risk for TLS and DIC   Renal function greatly improved.  - Continue allopurinol 300 mg daily  - Follow TLS labs daily  - Follow DIC labs daily     ID  # ID prophylaxis  - Viral serologies: HSV 1+/2-, EBV IgG positive, HBV Surface Ag negative, HBV surface Ab negative, HBV core Ag negative, HIV negative, CMV IgG negative  - Acyclovir 400 mg BID  - Levofloxacin 250 mg daily   - Micafungin 50 mg daily    Copay for Posaconazole $2,882.25 with refills being $2519.17    Copay for Voriconazole $246.14 with refills being $12.95     Of note, patient is approved for $10,000 ivan to assist with venetoclax costs. This might be able to cover co-pay for anti-fungal, but this would deplete his ivan funds quickly.    Given the cost of these broader-spectrum antifungal agents, will instead plan to use fluconazole for antifungal ppx in this patient.    MSK  # Right shoulder pain  # Presumed hemarthrosis  Patient had 2-3 weeks of right shoulder pain PTA. No preceding trauma or injury. Was seen in ortho clinic on 5/27 and was diagnosed with right shoulder subacromial impingement/bursitis/tendinitis. He received a Kenalog and Marcaine injection into the glenohumeral joint without relief; this was repeated on 5/30 when he presented to the ED with similar symptoms. Pain worsened PTA, and by the time of admission, patient as unable to move the joint or sleep due to pain. Differential includes previous diagnosis of right shoulder subacromial impingement/bursitis/tendinitis, fracture, septic arthritis, hemarthrosis, adhesive capsulitis, etc.  - Ortho surg consulted, appreciate recs.    Repeat right shoulder x-ray 6/4 shows no acute osseous abnormalities, notable for degenerative changes. Increase in size of the GH joint space raises concern for effusion, septic vs. hemorrhagic.   - Pain management:    Oxycontin 10 mg PO at bedtime (x6/9), likely discontinue as pain improves     Tylenol 650 mg TID  scheduled     Dilaudid 0.2mg Q3H IV PRN    Oxycodone 5-10 mg Q4H PRN    Voltaren Topical QID PRN   - Status-post IR-guided diagnostic right shoulder arthrocentesis; 7 mL brown cloudy fluid drained, ~7500 WBC with 59% monos/macros and 38% neutrophils, gram stain with no organisms, aerobic/anaerobic cx NGTD.    # RUE edema, improving  RUE swelling was noted on 6/8. RUE US unremarkable for DVT. Suspect dependent edema due to limited RUE mobility, likely with some contribution from HFpEF/generalized volume overload.  - Diuresis as below  - Monitor  - Continue shoulder immobilizer for comfort per orthotics; encourage ROM activities and elevation as tolerated    CARDS   # CAD s/p CABG (Jan.2020)  # HFpEF   # Bilateral LE edema  Last saw cardiology 2/16/21. Last echo 1/27/21 demonstrating left ventricular function is low-normal (EF 50-55%). Apicolateral hypokinesis and possible mid to distal inferolateral hypokinesis also noted. On exam, patient with 2-3+ bilateral LE edema (chronic, per previous chart notes).  - Continue IV Lasix; daily dose adjustment PRN  - Follow strict I/Os, daily weights  - Lymphedema consulted    # HTN   - Continue PTA Metoprolol      # HLD   - Hold PTA atorvastatin while inpatient; discussed with Rx and will plan to change to rosuvastatin (fewer drug-drug interactions)     RENAL   # CKD stage III   # KRIS on CKD  Creatinine significantly improved. Peaked at 1.86 on 6/3, now down-trended back to (and below) baseline (Cr 1.3-1.5).  - Follow daily CMP    MISC   # Insomnia   Reports difficulty sleeping for the last 2 years.   - Continue trazodone 50 mg at bedtime    Fluids/Electrolytes/Nutrition   - Bolus as needed  - PRN lyte replacement per standing protocol  - Regular diet as tolerated     Lines: PICC in place    PPX  -VTE: none due to thrombocytopenia  -GI: n/a  -Bowels: Senna and Miralax PRN  -Activity: PT/OT    Code  DNR, pre-arrest intubation OK    Dispo: Anticipate possible discharge after  Venetoclax ramp up; ~ 6/18. Currently PT recommending home with assist.    Follow up: Will require 2x weekly labs/transfusions, preferably at [facility], on discharge. Will also need RAGHAVENDRA/MD follow-up arranged.    Discussed with Dr. Johnson.    Laura Montenegro PA-C  Hematology/Oncology  Pager: #[phone]    Interval History   No acute events overnight. Nursing notes reviewed. Afebrile and HD stable. Renny is doing well this morning. He denies fevers, chills, chest pain, or SOB. No dizziness or lightheadedness. Still feeling constipated, last BM 2-3 days ago. Took some senna this AM. Still passing gas. Denies abdominal pain or distension. No N/V. LE edema stable-improved. No rashes or skin concerns. Has a couple of sores on his left cheek, not particularly bothersome. Not doing any mouth cares. Occasional bleeding, apparently spontaneous, from the gums. No epistaxis. Reviewed interval labs and plan of care for the day. All questions answered.    Comprehensive review of systems review and negative other than noted here or in the HPI.     Physical Exam   Temp: 98.5  F (36.9  C) Temp src: Temporal BP: 121/65 Pulse: 71   Resp: 16 SpO2: 97 % O2 Device: None (Room air)    Vitals:    06/12/21 0749 06/13/21 0906 06/14/21 0734   Weight: 83.8 kg (184 lb 11.2 oz) 81.8 kg (180 lb 6.4 oz) 79.7 kg (175 lb 12.8 oz)     Vital Signs with Ranges  Temp:  [96.9  F (36.1  C)-98.5  F (36.9  C)] 98.5  F (36.9  C)  Pulse:  [71-96] 71  Resp:  [16-18] 16  BP: (113-135)/(53-70) 121/65  SpO2:  [93 %-97 %] 97 %  I/O last 3 completed shifts:  In: 170 [P.O.:50; I.V.:120]  Out: 2850 [Urine:2850]    Constitutional: Pleasant male sitting up in chair. Awake and conversational. Non- toxic appearing. No acute distress.   HEENT: Normocephalic, atraumatic. Sclerae anicteric. EOM intact. Moist mucus membranes. Two shallow ulcerative lesions to the left buccal mucosae.  Respiratory: Breathing comfortable with no increased work on room air. Good air exchange.  No signs of respiratory distress or accessory muscle use. Lungs clear to auscultation bilaterally, no crackles or wheezing noted.  Cardiovascular: Regular rate and rhythm. Normal S1 and S2. No murmurs, rubs, or gallops. 2+ RUE, 2-3+ BLE edema.  GI: Abdomen is soft, non-distended, non-tender. Bowel sounds present and normoactive.   Skin: Skin is clean, dry, intact. No jaundice appreciated.   Musculoskeletal/ Extremities: No redness, warmth of the joints appreciated. In a shoulder immobilizer. 2+ edema of the right forearm extending down into the fingers. Patient unable to make a full composite fist due to swelling. Intact sensation to the radial, median, and ulnar nerve dermatomes.  Neurologic: Alert and oriented. Speech normal. Grossly nonfocal. Memory and thought process preserved. Motor function normal in lower extremities. Right arm range of motion not assessed as he has brace on.   Neuropsychiatric: Calm, affect flat. Appropriate mood and affect. Good judgment and insight. No visual/auditory hallucinations.       Medications     - MEDICATION INSTRUCTIONS -         acetaminophen  650 mg Oral TID     acyclovir  400 mg Oral BID     allopurinol  300 mg Oral Daily     vitamin B-12  200 mcg Oral QAM     [Held by provider] furosemide  40 mg Oral Daily     heparin lock flush  5-10 mL Intracatheter Q24H     levofloxacin  250 mg Oral At Bedtime     lidocaine  1 patch Transdermal Q24h    And     lidocaine   Transdermal Q8H     metoprolol tartrate  50 mg Oral BID     micafungin  50 mg Intravenous Q24H     oxyCODONE  10 mg Oral At Bedtime     senna-docusate  1 tablet Oral BID    Or     senna-docusate  2 tablet Oral BID     traZODone  50 mg Oral At Bedtime     venetoclax  100 mg Oral Daily with supper     [START ON 6/15/2021] venetoclax  200 mg Oral Daily with breakfast     [START ON 6/17/2021] venetoclax  400 mg Oral Daily with breakfast     vitamin D3  50 mcg Oral Daily       Data   Results for orders placed or performed  during the hospital encounter of 06/03/21 (from the past 24 hour(s))   Basic metabolic panel   Result Value Ref Range    Sodium 135 133 - 144 mmol/L    Potassium 4.2 3.4 - 5.3 mmol/L    Chloride 102 94 - 109 mmol/L    Carbon Dioxide 27 20 - 32 mmol/L    Anion Gap 6 3 - 14 mmol/L    Glucose 116 (H) 70 - 99 mg/dL    Urea Nitrogen 28 7 - 30 mg/dL    Creatinine 1.06 0.66 - 1.25 mg/dL    GFR Estimate 70 >60 mL/min/[1.73_m2]    GFR Estimate If Black 81 >60 mL/min/[1.73_m2]    Calcium 8.4 (L) 8.5 - 10.1 mg/dL   Phosphorus   Result Value Ref Range    Phosphorus 4.3 2.5 - 4.5 mg/dL   Uric acid   Result Value Ref Range    Uric Acid 3.9 3.5 - 7.2 mg/dL   Basic metabolic panel   Result Value Ref Range    Sodium 136 133 - 144 mmol/L    Potassium 4.3 3.4 - 5.3 mmol/L    Chloride 104 94 - 109 mmol/L    Carbon Dioxide 26 20 - 32 mmol/L    Anion Gap 6 3 - 14 mmol/L    Glucose 88 70 - 99 mg/dL    Urea Nitrogen 26 7 - 30 mg/dL    Creatinine 0.92 0.66 - 1.25 mg/dL    GFR Estimate 82 >60 mL/min/[1.73_m2]    GFR Estimate If Black >90 >60 mL/min/[1.73_m2]    Calcium 8.9 8.5 - 10.1 mg/dL   CBC with platelets differential   Result Value Ref Range    WBC 9.8 4.0 - 11.0 10e9/L    RBC Count 2.94 (L) 4.4 - 5.9 10e12/L    Hemoglobin 8.4 (L) 13.3 - 17.7 g/dL    Hematocrit 26.8 (L) 40.0 - 53.0 %    MCV 91 78 - 100 fl    MCH 28.6 26.5 - 33.0 pg    MCHC 31.3 (L) 31.5 - 36.5 g/dL    RDW 15.9 (H) 10.0 - 15.0 %    Platelet Count 16 (LL) 150 - 450 10e9/L    Diff Method Manual Differential     % Neutrophils 32.7 %    % Lymphocytes 11.5 %    % Monocytes 13.3 %    % Eosinophils 8.0 %    % Basophils 4.4 %    % Metamyelocytes 0.9 %    % Blasts 29.2 %    Absolute Neutrophil 3.2 1.6 - 8.3 10e9/L    Absolute Lymphocytes 1.1 0.8 - 5.3 10e9/L    Absolute Monocytes 1.3 0.0 - 1.3 10e9/L    Absolute Eosinophils 0.8 (H) 0.0 - 0.7 10e9/L    Absolute Basophils 0.4 (H) 0.0 - 0.2 10e9/L    Absolute Metamyelocytes 0.1 (H) 0 10e9/L    Absolute Blasts 2.9 (H) 0 10e9/L     Anisocytosis Slight     Poikilocytosis Slight     Ovalocytes Slight     Platelet Estimate Confirming automated cell count    Fibrinogen activity   Result Value Ref Range    Fibrinogen 633 (H) 200 - 420 mg/dL   Hepatic panel   Result Value Ref Range    Bilirubin Direct 0.1 0.0 - 0.2 mg/dL    Bilirubin Total 0.6 0.2 - 1.3 mg/dL    Albumin 2.8 (L) 3.4 - 5.0 g/dL    Protein Total 7.1 6.8 - 8.8 g/dL    Alkaline Phosphatase 184 (H) 40 - 150 U/L    ALT 19 0 - 70 U/L    AST 20 0 - 45 U/L   INR   Result Value Ref Range    INR 1.18 (H) 0.86 - 1.14   Lactate Dehydrogenase   Result Value Ref Range    Lactate Dehydrogenase 499 (H) 85 - 227 U/L   Magnesium   Result Value Ref Range    Magnesium 2.3 1.6 - 2.3 mg/dL   Phosphorus   Result Value Ref Range    Phosphorus 4.4 2.5 - 4.5 mg/dL   Uric acid   Result Value Ref Range    Uric Acid 3.9 3.5 - 7.2 mg/dL   Platelets prepare order unit conditional   Result Value Ref Range    Blood Component Type PLT Pheresis     Units Ordered 1    Blood component   Result Value Ref Range    Unit Number [id number]     Blood Component Type PlateletPheresis LR Irrad (Part 2), Day 6     Division Number 00     Status of Unit Released to care unit 06/14/2021 1022     Blood Product Code [id number]     Unit Status ISS      Recent Labs   Lab 06/14/21  0524 06/13/21  1816 06/13/21  0525 06/12/21  0541 06/12/21  0541   WBC 9.8  --  12.3*  --  15.9*   HGB 8.4*  --  8.5*  --  8.2*   MCV 91  --  93  --  91   PLT 16*  --  24*  --  28*   INR 1.18*  --  1.19*  --  1.15*    135 132*   < > 134   POTASSIUM 4.3 4.2 4.1   < > 4.2   CHLORIDE 104 102 101   < > 101   CO2 26 27 27   < > 27   BUN 26 28 23   < > 21   CR 0.92 1.06 0.93   < > 0.88   ANIONGAP 6 6 4   < > 5   MINNIE 8.9 8.4* 8.7   < > 8.4*   GLC 88 116* 94   < > 93   ALBUMIN 2.8*  --  2.9*  --  2.8*   PROTTOTAL 7.1  --  6.9  --  6.8   BILITOTAL 0.6  --  0.5  --  0.5   ALKPHOS 184*  --  190*  --  193*   ALT 19  --  19  --  19   AST 20  --  18  --  19    < > =  values in this interval not displayed.

## 2021-06-14 NOTE — PROGRESS NOTES
Patient doing well today. Platelets low, requiring transfusion. Tolerated well.  VSS, pain managed well with prn Oxy and frequent repositioning. Main pain complaints are of right shoulder and buttock. Pt quite uncomfortable with constipation, Miralax, senna, maalox given -no results. Lactulose ordered, and given, awaiting response. UAL in room, showered today.

## 2021-06-14 NOTE — PROGRESS NOTES
06/14/21 1416   Quick Adds   Type of Visit Initial Occupational Therapy Evaluation       Present no   Living Environment   People in home alone   Current Living Arrangements apartment   Home Accessibility no concerns   Transportation Anticipated car, drives self;family or friend will provide   Living Environment Comments Pt lives alone in apartment, reports family nearby to assist if needed. Pt has walk in shower, grab bars and shower chair   Self-Care   Usual Activity Tolerance good   Current Activity Tolerance moderate   Regular Exercise No   Equipment Currently Used at Home walker, standard   Activity/Exercise/Self-Care Comment Pt reports being IND in ADLs PTA   Disability/Function   Hearing Difficulty or Deaf no   Wear Glasses or Blind yes   Vision Management glasses   Concentrating, Remembering or Making Decisions Difficulty no   Difficulty Communicating no   Difficulty Eating/Swallowing no   Walking or Climbing Stairs Difficulty no   Dressing/Bathing Difficulty no   Toileting issues no   Doing Errands Independently Difficulty (such as shopping) no   Fall history within last six months no   Change in Functional Status Since Onset of Current Illness/Injury yes   General Information   Onset of Illness/Injury or Date of Surgery 06/03/21   Referring Physician Jeane Patel MD   Patient/Family Therapy Goal Statement (OT) None stated   Additional Occupational Profile Info/Pertinent History of Current Problem Renny Gannon is a 70 year old male who presents with past medical history significant for CAD s/p CABG (Jan 2020), HFpEF, CKD stage III (baseline Cr. 1.3-1.5), HTN, and JAK2+ myelofibrosis who was admitted as a transfer from an OSH with leukocytosis (WBC=72.4) concerning for progression to AML. He was started on induction therapy with Decitabine (Day 1=6/5/21) + Venetoclax (begun on 6/9/21). Hospital course has been complicated by development of a hemorrhagic right shoulder  effusion with associated pain and limitations on mobility.   Existing Precautions/Restrictions fall;immunosuppressed   Left Upper Extremity (Weight-bearing Status) full weight-bearing (FWB)   Right Upper Extremity (Weight-bearing Status) weight-bearing as tolerated (WBAT)   Left Lower Extremity (Weight-bearing Status) full weight-bearing (FWB)   Right Lower Extremity (Weight-bearing Status) full weight-bearing (FWB)   General Observations and Info Activity: Up ad conchita   Cognitive Status Examination   Orientation Status orientation to person, place and time   Affect/Mental Status (Cognitive) flat/blunted affect   Safety Deficit minimal deficit   Memory Deficit minimal deficit   Attention Deficit minimal deficit   Executive Function Deficit minimal deficit   Cognitive Status Comments Pt reporting no changes from baseline cognition. Pt presents to OT eval with flat affect, deficits noted in memory, attention and executive function. See daily note 6/14 for details. Will continue to monitor   Visual Perception   Visual Impairment/Limitations corrective lenses for reading   Pain Assessment   Patient Currently in Pain Yes, see Vital Sign flowsheet   Integumentary/Edema   Integumentary/Edema other (describe)   Integumentary/Edema Comments BLE/RUE edema   Posture   Posture not impaired   Range of Motion Comprehensive   Comment, General Range of Motion LUE AROM WFL, RUE AROM limited, see PT note for further information   Strength Comprehensive (MMT)   Comment, General Manual Muscle Testing (MMT) Assessment Defer formal MMT 2/2 shoulder injury, RUE appears grossly weak and weak hand strength noted. L hand strength WNL   Coordination   Upper Extremity Coordination Right UE impaired   Functional Limitations Impaired ability to perform bilateral tasks   Instrumental Activities of Daily Living (IADL)   Previous Responsibilities meal prep;housekeeping;laundry;shopping;medication management;finances;driving   IADL Comments Pt reports  family is able to assist PRN   Clinical Impression   Criteria for Skilled Therapeutic Interventions Met (OT) yes;meets criteria;skilled treatment is necessary   OT Diagnosis Decreased ADL/IADL I   OT Problem List-Impairments impacting ADL problems related to;activity tolerance impaired;cognition;strength;range of motion (ROM)   Assessment of Occupational Performance 3-5 Performance Deficits   Identified Performance Deficits Dressing, bathing, toileting, g/h, higher level IADLs   Planned Therapy Interventions (OT) ADL retraining;IADL retraining;cognition;strengthening   Clinical Decision Making Complexity (OT) low complexity   Therapy Frequency (OT) 4x/week   Predicted Duration of Therapy 2 weeks   Anticipated Equipment Needs Upon Discharge (OT) other (see comments)  (TBD)   Risk & Benefits of therapy have been explained evaluation/treatment results reviewed;care plan/treatment goals reviewed;risks/benefits reviewed;current/potential barriers reviewed;participants voiced agreement with care plan;participants included;patient   Comment-Clinical Impression Pt will benefit from skilled OT services to progress ADL/IADL I and support safe discharge plan   OT Discharge Planning    OT Discharge Recommendation (DC Rec) Home with assist;home with home care occupational therapy   OT Rationale for DC Rec Pt is below baseline, limited by deconditioning and limited RUE functional use. Pt reports he will have assist from family for heavy ADL/IADL if needed. Recommend  OT services upon discharge to assess home safety and progress ADL/IADL I in the setting of limited RUE functional use. Will continue to monitor and update recs as indicated.     OT Brief overview of current status  SBA w/o AD   Total Evaluation Time (Minutes)   Total Evaluation Time (Minutes) 5

## 2021-06-14 NOTE — PLAN OF CARE
[id number]: VSS on RA. Afebrile. Denies nausea and SOB. PRN Oxy given this morning for 6/10 right shoulder pain. Wearing arm brace. Up independently. Voiding spontaneously. Slept well between cares. Continue with POC.

## 2021-06-15 NOTE — PLAN OF CARE
"[id number]    /59 (BP Location: Left arm)   Pulse 86   Temp 98  F (36.7  C) (Temporal)   Resp 18   Ht 1.768 m (5' 9.6\")   Wt 79.7 kg (175 lb 12.8 oz)   SpO2 95%   BMI 25.52 kg/m      Day 11    VSS. Afebrile. Denies nausea and SOB. Right shoulder pain fluctuates with activity, remains in sling. Managing with Lidocaine patch, scheduled meds and PRN Oxy x1. Had 1 large BM, feels much better and can sit down comfortably. Mepilex on coccyx. Mild BLE edema. Able to make needs known. Continue with POC.   "

## 2021-06-15 NOTE — PROGRESS NOTES
Care Management Follow Up    Length of Stay (days): 12    Expected Discharge Date: 06/18/21     Concerns to be Addressed: Discharge Planning      Patient plan of care discussed at interdisciplinary rounds: Yes    Anticipated Discharge Disposition: Home     Anticipated Discharge Services: Home Care  Anticipated Discharge DME: None    Patient/family educated on Medicare website which has current facility and service quality ratings: Yes  Education Provided on the Discharge Plan: Yes   Patient/Family in Agreement with the Plan: Yes    Referrals Placed by CM/SW: [facility]  Private pay costs discussed: Not applicable    Additional Information:    Per chart review, PT/OT are recommending home with HH PT/OT upon discharge.     Writer met with the patient to discuss above recommendations. Patient agreeable to home care referral for HH PT/OT services. Patient declined home nursing services and report that his sister's assist him with his medications and this has been working well at home. Writer offered choice and patient would like referral made to [facility] as he has used them before. Patient had no further questions or concerns at this time.    Referral made to [facility]. [facility] updated. AVS updated. RNCC will follow.    Angie Maria, RN, BSN, PHN  Care Coordinator   P: [phone], [facility]

## 2021-06-15 NOTE — PROGRESS NOTES
[facility]    Hematology / Oncology Progress Note    Date of Service (when I saw the patient): 06/15/2021     Assessment & Plan   Renny Gannon is a 70 year old male who presents with past medical history significant for CAD s/p CABG (Jan 2020), HFpEF, CKD stage III (baseline Cr. 1.3-1.5), HTN, and JAK2+ myelofibrosis who was admitted as a transfer from an OSH with leukocytosis (WBC=72.4) concerning for progression to AML. He was started on induction therapy with Decitabine (Day 1=6/5/21) + Venetoclax (begun on 6/9/21). Hospital course has been complicated by development of a hemorrhagic right shoulder effusion with associated pain and limitations on mobility, now improving with PT and supportive cares.    Today: D11   - Completed 5 days of Decitabine (6/5-6/9); continue veneteoclax ramp-up (x6/9, 200 mg today).  - Plan to start fluconazole 200 mg daily (ppx) on 6/17; will keep venetoclax dose at 200 going forward (requires 50% dose-reduction when administered concomitantly with fluconazole).   - Restart PTA furosemide 40 mg PO daily. Monitor I/Os, daily weights.    HEME  # LIVIER 2+ myelofibrosis   # Concern for progression to AML  Follow by Dr. Cristina. He initially was seen in Dec. 2019 by his PCP for increased fatigue and SOB w/ exertion. CBC showed WBC 79.1, Hgb 11.9 and Plt 378. Peripheral smear showed leuko-erythroblastic reaction w/ neutrophilic left shift. BMBx (12/30/19) hypercellular marrow w/ granulocytic and megakaryocytic proliferation, megakaryocytic clustering and atypia and 1.5% circulating blasts. 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 He was on and off Hydrea from Jan 2020 - Jan 2021. BMBx 3/26/21 increased fibrosis, cellularity 40-50%, 5% blasts on morphology and flow. He most recently has just been receiving transfusion support for his anemia and thrombocytopenia. He presented to clinic for labs and  "possible transfusion and was found to have a WBC 71.9 Hgb 6.4 and Plt 18. Lab noted \"a lot of immature WBC and blasts\". He was then directed to the ED for admission and further work up.  - BMBx done x6/4; unfortunately only core was obtained. No aspirate obtained, likely due to fibrosis and possibly packed with blasts. BMBx morphology reveals marrow hypercellularity (70-80%) with left-shifted myeloid predominant maturation with approximately 5-10% myeloid blasts, markedly increased marrow fibrosis (MF3); findings consistent with blast crisis arising from previously diagnosed myeloproliferative neoplasm. Diagnosis made on the basis of increased peripheral blast percentage (23%); blast percentage in bone marrow is difficult to establish d/t bone marrow fibrosis and lack of aspirates. BMBx flow from core shows increased abnormal myeloid blasts (12%). FLT3 negative.    Cytogenetics and NGS sent on peripheral blood; pending.  - Flow cytometry on peripheral blood shows 28% myeloid blasts.  - Discussion had with patient and sister on 6/5 re: diagnosis, treatment options, and prognosis.  - Started on decitabine 20 mg/m2 on 6/5.  - Venetoclax started on 6/9; see below for ramp-up.    - PICC line in place.  - Follow TLS, DIC labs daily; stable thus far.  - Prior auth approved for Venetoclax for this patient. Copay is $2,882.25. [facility]   - BMT consult scheduled for 6/17 with Dr. Óscar Muniz.      Treatment Plan: Decitabine (5 days) / Venetoclax (C1D1: 6/5/21)   - Decitabine 20 mg/m2 -- D1-5   - Venetoclax ramp-up -- elected to use a prolonged ramp-up given moderate leukocytosis on initiation (WBC=46.8)    Venetoclax 20 mg daily (6/9-6/10), then 50 mg daily (6/11-6/12), then 100 mg daily (6/13-6/14), then 200 mg daily (6/15-x)    Fluconazole 200 mg ppx to start on 6/17; max venetoclax dose with fluconazole is 200 mg daily   - Supportive meds: PRN anti- emetics, bowl regimen, pain control    # Anemia   # " Thrombocytopenia   Patient was transfusion dependent prior to admission due to underlying myelofibrosis.  - Transfuse to keep Hgb > 8 (hx CAD and HFpEF) and platelets >10K     ID  # ID prophylaxis  - Viral serologies: HSV 1+/2-, EBV IgG positive, HBV surface Ag negative, HBV surface Ab negative, HBV core Ag negative, HIV negative, CMV IgG negative  - Acyclovir 400 mg BID  - Levofloxacin 250 mg daily   - Micafungin 50 mg daily    Copay for Posaconazole $2,882.25 with refills being $2519.17    Copay for Voriconazole $246.14 with refills being $12.95     Of note, patient is approved for $10,000 ivan to assist with venetoclax costs. This might be able to cover co-pay for anti-fungal, but this would deplete his ivan funds quickly.    Given the cost of these broader-spectrum antifungal agents, will instead plan to use fluconazole for antifungal ppx in this patient.    MSK  # Right shoulder pain  # Presumed hemarthrosis  Patient had 2-3 weeks of right shoulder pain PTA. No preceding trauma or injury. Was seen in ortho clinic on 5/27 and was diagnosed with right shoulder subacromial impingement/bursitis/tendinitis. He received a Kenalog and Marcaine injection into the glenohumeral joint without relief; this was repeated on 5/30 when he presented to the ED with similar symptoms. Pain worsened PTA, and by the time of admission, patient as unable to move the joint or sleep due to pain. Differential includes previous diagnosis of right shoulder subacromial impingement/bursitis/tendinitis, fracture, septic arthritis, hemarthrosis, adhesive capsulitis, etc.  - Ortho surg consulted, appreciate recs.    Repeat right shoulder x-ray 6/4 shows no acute osseous abnormalities, notable for degenerative changes. Increase in size of the GH joint space raises concern for effusion, septic vs. hemorrhagic.   - Pain management:    Oxycontin 10 mg PO at bedtime (x6/9), likely discontinue as pain improves     Tylenol 650 mg TID  scheduled     Dilaudid 0.2mg Q3H IV PRN    Oxycodone 5-10 mg Q4H PRN    Voltaren Topical QID PRN   - Status-post IR-guided diagnostic right shoulder arthrocentesis; 7 mL brown cloudy fluid drained, ~7500 WBC with 59% monos/macros and 38% neutrophils, gram stain with no organisms, aerobic/anaerobic cx NGTD.    # RUE edema, improving  RUE swelling was noted on 6/8. RUE US unremarkable for DVT. Suspect dependent edema due to limited RUE mobility, likely with some contribution from HFpEF/generalized volume overload.  - Diuresis as below  - Monitor  - Continue shoulder immobilizer for comfort per orthotics; encourage ROM activities and elevation as tolerated    CARDS   # CAD s/p CABG (Jan.2020)  # HFpEF   # Bilateral LE edema  Last saw cardiology 2/16/21. Last echo 1/27/21 demonstrating left ventricular function is low-normal (EF 50-55%). Apicolateral hypokinesis and possible mid to distal inferolateral hypokinesis also noted. On exam, patient with 2-3+ bilateral LE edema (chronic, per previous chart notes).  - Continue IV Lasix; daily dose adjustment PRN  - Follow strict I/Os, daily weights  - Lymphedema consulted    # HTN   - Continue PTA Metoprolol      # HLD   - Hold PTA atorvastatin while inpatient; discussed with Rx and will plan to change to rosuvastatin on discharge (fewer drug-drug interactions)     RENAL   # CKD stage III   # KRIS on CKD  Creatinine significantly improved. Peaked at 1.86 on 6/3, now down-trended back to (and below) baseline (baseline Cr 1.3-1.5).  - Follow daily CMP  - Caution to maintain euvolemia    MISC   # Insomnia   Reports difficulty sleeping for the last 2 years.   - Continue trazodone 50 mg at bedtime    Fluids/Electrolytes/Nutrition:  - No mIVF; bolus PRN  - PRN lyte replacement per standing protocol  - Regular diet as tolerated     Lines: PICC in place    PPX  -VTE: none due to thrombocytopenia  -GI: No current indication for stress ulcer ppx  -Bowels: Senna and Miralax PRN  -Activity:  PT/OT    Code  DNR, pre-arrest intubation OK    Dispo: Anticipate possible discharge after Venetoclax ramp up; ~ 6/18. Currently PT recommending home with home PT/OT.    Follow up: Will require 2x weekly labs/transfusions, preferably at [place], on discharge. Will also need RAGHAVENDRA/MD follow-up arranged.    Discussed with Dr. Johnson.    Laura Montenegro PA-C  Hematology/Oncology  Pager: #[phone]    Interval History   No acute events overnight. Nursing notes reviewed. Patient remains stable. Pain in his right shoulder is much better-controlled. Was out of the shoulder immobilizer yesterday for a bit and did some stretching exercises with PT; patient educated on the risks of adhesive capsulitis and voiced understanding. He otherwise is feeling well. Denies worsening mucositis, nausea, vomiting. Had a large BM yesterday. Now stools a bit loose after several laxatives. No GI bleeding. Leg swelling is improved, back to about his baseline.     Comprehensive review of systems review and negative other than noted here or in the HPI.     Physical Exam   Temp: 98.6  F (37  C) Temp src: Oral BP: 108/56 Pulse: 79   Resp: 16 SpO2: 95 % O2 Device: None (Room air)    Vitals:    06/12/21 0749 06/13/21 0906 06/14/21 0734   Weight: 83.8 kg (184 lb 11.2 oz) 81.8 kg (180 lb 6.4 oz) 79.7 kg (175 lb 12.8 oz)     Vital Signs with Ranges  Temp:  [97.5  F (36.4  C)-98.6  F (37  C)] 98.6  F (37  C)  Pulse:  [78-86] 79  Resp:  [16-18] 16  BP: (106-132)/(53-62) 108/56  SpO2:  [95 %-97 %] 95 %  I/O last 3 completed shifts:  In: 300   Out: 1100 [Urine:1100]    Constitutional: Pleasant male sitting up in chair. Awake and conversational. Non- toxic appearing. No acute distress.  HEENT: Normocephalic, atraumatic. Sclerae anicteric. EOM intact. Moist mucus membranes. Two shallow ulcerative lesions to the left buccal mucosae.  Respiratory: Breathing comfortable with no increased work on room air. Good air exchange. No signs of respiratory distress or  accessory muscle use. Lungs clear to auscultation bilaterally, no crackles or wheezing noted.  Cardiovascular: Regular rate and rhythm. Normal S1 and S2. No murmurs, rubs, or gallops. 1-2+ RUE, 2+ BLE edema. No calf tenderness.  GI: Abdomen is soft, non-distended, non-tender. Bowel sounds present and normoactive.   Skin: Skin is clean, dry, intact. No jaundice appreciated.  Musculoskeletal/ Extremities: No redness, warmth of the joints appreciated. In a shoulder immobilizer; shoulder ROM not assessed. 1-2+ edema of the right forearm extending down into the fingers, improved from previous.  Neurologic: Alert and oriented. Speech normal. Grossly nonfocal. Memory and thought process preserved. Motor function normal in lower extremities.  Neuropsychiatric: Calm, pleasant, engaged. Normal mood and affect.       Medications     - MEDICATION INSTRUCTIONS -         acetaminophen  650 mg Oral TID     acyclovir  400 mg Oral BID     allopurinol  300 mg Oral Daily     vitamin B-12  200 mcg Oral QAM     [START ON 6/17/2021] fluconazole  200 mg Oral Daily     furosemide  40 mg Oral Daily     heparin lock flush  5-10 mL Intracatheter Q24H     levofloxacin  250 mg Oral At Bedtime     lidocaine  1 patch Transdermal Q24h    And     lidocaine   Transdermal Q8H     metoprolol tartrate  50 mg Oral BID     micafungin  50 mg Intravenous Q24H     oxyCODONE  10 mg Oral At Bedtime     senna-docusate  1 tablet Oral BID    Or     senna-docusate  2 tablet Oral BID     traZODone  50 mg Oral At Bedtime     [START ON 6/16/2021] venetoclax  200 mg Oral Daily with breakfast     vitamin D3  50 mcg Oral Daily       Data   Results for orders placed or performed during the hospital encounter of 06/03/21 (from the past 24 hour(s))   CBC with platelets differential   Result Value Ref Range    WBC 6.7 4.0 - 11.0 10e9/L    RBC Count 2.65 (L) 4.4 - 5.9 10e12/L    Hemoglobin 7.7 (L) 13.3 - 17.7 g/dL    Hematocrit 24.4 (L) 40.0 - 53.0 %    MCV 92 78 - 100 fl     MCH 29.1 26.5 - 33.0 pg    MCHC 31.6 31.5 - 36.5 g/dL    RDW 15.8 (H) 10.0 - 15.0 %    Platelet Count 16 (LL) 150 - 450 10e9/L    Diff Method Manual Differential     % Neutrophils 30.0 %    % Lymphocytes 25.0 %    % Monocytes 3.0 %    % Eosinophils 4.0 %    % Basophils 2.0 %    % Promyelocytes 1.0 %    % Blasts 35.0 %    Absolute Neutrophil 2.0 1.6 - 8.3 10e9/L    Absolute Lymphocytes 1.7 0.8 - 5.3 10e9/L    Absolute Monocytes 0.2 0.0 - 1.3 10e9/L    Absolute Eosinophils 0.3 0.0 - 0.7 10e9/L    Absolute Basophils 0.1 0.0 - 0.2 10e9/L    Absolute Promyeloctyes 0.1 (H) 0 10e9/L    Absolute Blasts 2.3 (H) 0 10e9/L    Anisocytosis Slight     Poikilocytosis Slight     Ovalocytes Slight     Microcytes Present     Reactive Lymphs Present    Fibrinogen activity   Result Value Ref Range    Fibrinogen 603 (H) 200 - 420 mg/dL   Hepatic panel   Result Value Ref Range    Bilirubin Direct 0.2 0.0 - 0.2 mg/dL    Bilirubin Total 0.7 0.2 - 1.3 mg/dL    Albumin 3.0 (L) 3.4 - 5.0 g/dL    Protein Total 7.0 6.8 - 8.8 g/dL    Alkaline Phosphatase 172 (H) 40 - 150 U/L    ALT 21 0 - 70 U/L    AST 18 0 - 45 U/L   INR   Result Value Ref Range    INR 1.17 (H) 0.86 - 1.14   Lactate Dehydrogenase   Result Value Ref Range    Lactate Dehydrogenase 447 (H) 85 - 227 U/L   Magnesium   Result Value Ref Range    Magnesium 2.4 (H) 1.6 - 2.3 mg/dL   Basic metabolic panel   Result Value Ref Range    Sodium 136 133 - 144 mmol/L    Potassium 4.3 3.4 - 5.3 mmol/L    Chloride 104 94 - 109 mmol/L    Carbon Dioxide 26 20 - 32 mmol/L    Anion Gap 5 3 - 14 mmol/L    Glucose 91 70 - 99 mg/dL    Urea Nitrogen 18 7 - 30 mg/dL    Creatinine 0.88 0.66 - 1.25 mg/dL    GFR Estimate 86 >60 mL/min/[1.73_m2]    GFR Estimate If Black >90 >60 mL/min/[1.73_m2]    Calcium 8.9 8.5 - 10.1 mg/dL   Uric acid   Result Value Ref Range    Uric Acid 3.6 3.5 - 7.2 mg/dL   Phosphorus   Result Value Ref Range    Phosphorus 3.7 2.5 - 4.5 mg/dL   ABO/Rh type and screen   Result Value  Ref Range    Units Ordered 1     ABO A     RH(D) Neg     Antibody Screen Neg     Test Valid Only At          [facility]    Specimen Expires 06/18/2021     Crossmatch Red Blood Cells    Blood component   Result Value Ref Range    Unit Number [id number]     Blood Component Type Red Blood Cells LeukoReduced (Part 2)     Division Number 00     Status of Unit Released to care unit 06/15/2021 0930     Blood Product Code [id number]     Unit Status ISS    Platelets prepare order unit conditional   Result Value Ref Range    Blood Component Type PLT Pheresis     Units Ordered 1    Blood component   Result Value Ref Range    Unit Number [id number]     Blood Component Type PlateletPheresis LeukoReduced Irradiated     Division Number 00     Status of Unit Released to care unit 06/15/2021 0916     Blood Product Code [id number]     Unit Status ISS      Recent Labs   Lab 06/15/21  0714 06/14/21  0524 06/13/21  1816 06/13/21  0525   WBC 6.7 9.8  --  12.3*   HGB 7.7* 8.4*  --  8.5*   MCV 92 91  --  93   PLT 16* 16*  --  24*   INR 1.17* 1.18*  --  1.19*    136 135 132*   POTASSIUM 4.3 4.3 4.2 4.1   CHLORIDE 104 104 102 101   CO2 26 26 27 27   BUN 18 26 28 23   CR 0.88 0.92 1.06 0.93   ANIONGAP 5 6 6 4   MINNIE 8.9 8.9 8.4* 8.7   GLC 91 88 116* 94   ALBUMIN 3.0* 2.8*  --  2.9*   PROTTOTAL 7.0 7.1  --  6.9   BILITOTAL 0.7 0.6  --  0.5   ALKPHOS 172* 184*  --  190*   ALT 21 19  --  19   AST 18 20  --  18

## 2021-06-15 NOTE — PROGRESS NOTES
Labs and Transfusion Orders:  Date: Shikha 15, 2021    Patient: Renny Gannon  : 1950    Diagnosis: Myelofibrosis, AML    LABS:  [ x ] Check CBC with differential and CMP twice a week (fax labs to [facility] at [phone]) on the following dates: 21, 21  [ x ] Type and cross PRN    TRANSFUSION PARAMETERS:   [ x ] Please transfuse 1 (one) unit pRBCs if Hgb is less than or equal to 8.  [ x ] Please transfuse 1 (one) unit platelets if plt count is less than or equal to 10,000.   [ x ] If patient experiences transfusion reaction during transfusion:   [ x ] 25-50 mg benadryl IV x once PRN   [ x ] Tylenol 1000 mg PO x once PRN   [ x ] Hydrocortisone 100 mg IV x once PRN   [ x ] Pepcid 40 mg IV x once PRN   [ x ] Notify provider covering infusion clinic per protocol      Please call the [facility] at [phone] for any questions, and ask to speak with the care coordinator for Jenae Patel (patient's primary oncologist).    Thank you,         Laura Montenegro PA-C    [facility]  Department of Hematology/Oncology  [address] [place] [address]  Pager: [phone]

## 2021-06-15 NOTE — PROGRESS NOTES
SPIRITUAL HEALTH SERVICES Progress Note  Unit 7D Referral initiated by SH    Introduced SH to pt. Shared brief visit and pt stated he had no SH needs at this time.    Plan:  will remain available for any pt, family, staff requests.    John Paul Moran   Intern  Pager [phone]

## 2021-06-16 NOTE — PROGRESS NOTES
[facility]    Hematology / Oncology Progress Note    Date of Service (when I saw the patient): 06/16/2021     Assessment & Plan   Renny Gannon is a 70 year old male who presents with past medical history significant for CAD s/p CABG (Jan 2020), HFpEF, CKD stage III (baseline Cr. 1.3-1.5), HTN, and JAK2+ myelofibrosis who was admitted as a transfer from an OSH with leukocytosis (WBC=72.4) concerning for progression to AML. He was started on induction therapy with Decitabine (Day 1=6/5/21) + Venetoclax (begun on 6/9/21). Hospital course has been complicated by development of a hemorrhagic right shoulder effusion with associated pain and limitations on mobility, now improving with PT and supportive cares.    Today: D12   - Completed 5 days of Decitabine (6/5-6/9); continue veneteoclax ramp-up (x6/9, 200 mg today).  - Start fluconazole 200 mg daily for fungal ppx.  - Continue PTA furosemide 40 mg PO daily. Monitor I/Os, daily weights.  - Plan for RUQ U/S to reassess hepatomegaly tomorrow AM, 6/16.   - Continue PT/OT.  - Anticipate discharge to home as soon as tomorrow, 6/16, provided appropriate follow-up can be arranged.    HEME  # JAK2+ myelofibrosis, in blast crisis  Follow by Dr. Cristina. He initially was seen in Dec. 2019 by his PCP for increased fatigue and SOB w/ exertion. CBC showed WBC 79.1, Hgb 11.9 and Plt 378. Peripheral smear showed leuko-erythroblastic reaction w/ neutrophilic left shift. BMBx (12/30/19) revealed extremely hypercellular (100%) marrow with granulocytic and megakaryocytic proliferation, megakaryocytic clustering and atypia and 1.5% circulating blasts as well as moderate-severe reticulin fibrosis (MF 2-3). 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 He was on and off Hydrea from Jan 2020 - Jan 2021. BMBx 3/26/21 revealed increased fibrosis (MF-3),  "cellularity 40-50%, 5% blasts by morphology and flow. 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 He presented to clinic for routine laboratory monitoring and was found to have a WBC of 71.9, Hgb 6.4, and plt 18K with \"a lot of immature WBC and blasts\" on laboratory review. He was then directed to the ED for admission and further work-up.  - BMBx done x6/4; unfortunately only core was obtained. No aspirate obtained, likely due to fibrosis and possibly excess blasts. BMBx morphology reveals marrow hypercellularity (70-80%) with left-shifted myeloid predominant maturation with approximately 5-10% myeloid blasts, markedly increased marrow fibrosis (MF3); findings consistent with blast crisis arising from previously diagnosed myeloproliferative neoplasm. Diagnosis made on the basis of increased peripheral blast percentage (23%); blast percentage in bone marrow is difficult to establish d/t bone marrow fibrosis and lack of aspirates. BMBx flow from core shows increased abnormal myeloid blasts (12%). FLT3 negative.    Cytogenetics and NGS sent on peripheral blood; pending.    DIPSS score 5 for age, WBC >25, Hgb <10, peripheral blasts on differential.  - Flow cytometry on peripheral blood shows 28% myeloid blasts.  - Discussion had with patient and sister on 6/5 re: diagnosis, treatment options, and prognosis.  - Started on decitabine 20 mg/m2 on 6/5.  - Venetoclax started on 6/9; see below for ramp-up.    - PICC line in place.  - Follow TLS, DIC labs daily; stable thus far.  - Repeat RUQ U/S to reassess liver size.  - Prior auth approved for Venetoclax for this patient. Copay is $2,882.25. [employer] ivan approved.   - BMT consult scheduled for 6/17 with Dr. Óscar Muniz.      Treatment Plan: Decitabine (5 days) / Venetoclax (C1D1: 6/5/21)   - Decitabine 20 mg/m2 -- D1-5   - Venetoclax " ramp-up -- elected to use a prolonged ramp-up given moderate leukocytosis on initiation (WBC=46.8)    Venetoclax 20 mg daily (6/9-6/10), then 50 mg daily (6/11-6/12), then 100 mg daily (6/13-6/14), then 200 mg daily (6/15-x)    Fluconazole 200 mg ppx added on 6/16; max venetoclax dose with fluconazole is 200 mg daily   - Supportive meds: PRN anti- emetics, bowl regimen, pain control    # Anemia   # Thrombocytopenia   Patient was transfusion dependent prior to admission due to underlying myelofibrosis.  - Transfuse to keep Hgb > 8 (hx CAD and HFpEF) and platelets >10K     ID  # ID prophylaxis  - Viral serologies: HSV 1+/2-, EBV IgG positive, HBV surface Ag negative, HBV surface Ab negative, HBV core Ag negative, HIV negative, CMV IgG negative  - Acyclovir 400 mg BID  - Levofloxacin 250 mg daily   - Fluconazole 200 mg daily    Would have preferred either posa or voriconazole; however, both were cost-prohibitive.    MSK  # Right shoulder pain  # Presumed hemarthrosis  Patient had 2-3 weeks of right shoulder pain PTA. No preceding trauma or injury. Was seen in ortho clinic on 5/27 and was diagnosed with right shoulder subacromial impingement/bursitis/tendinitis. He received a Kenalog and Marcaine injection into the glenohumeral joint without relief; this was repeated on 5/30 when he presented to the ED with similar symptoms. Pain worsened PTA, and by the time of admission, patient as unable to move the joint or sleep due to pain. Differential includes previous diagnosis of right shoulder subacromial impingement/bursitis/tendinitis, fracture, septic arthritis, hemarthrosis, adhesive capsulitis, etc.  - Ortho surg consulted, appreciate recs.    Repeat right shoulder x-ray 6/4 shows no acute osseous abnormalities, notable for degenerative changes. Increase in size of the GH joint space raises concern for effusion, septic vs. hemorrhagic.   - Pain management:    Oxycontin 10 mg PO at bedtime (x6/9). Insurance only covering  Xtampza for home use; equivalent dose is 9 mg Q12H.     Tylenol 650 mg TID scheduled; change to PRN on discharge to avoid masking fevers.    Oxycodone 5-10 mg Q4H PRN.    Voltaren topical QID PRN.   - Status-post IR-guided diagnostic right shoulder arthrocentesis; 7 mL brown cloudy fluid drained, ~7500 WBC with 59% monos/macros and 38% neutrophils, gram stain with no organisms, aerobic/anaerobic cx NGTD.    # RUE edema, improving  RUE swelling was noted on 6/8. RUE US unremarkable for DVT. Suspect dependent edema due to limited RUE mobility, likely with some contribution from HFpEF/generalized volume overload.  - Diuresis as below  - Monitor  - Continue shoulder immobilizer for comfort per ortho; encourage ROM activities and elevation as tolerated    CARDS   # CAD s/p CABG (Jan.2020)  # HFrEF (EF 45-50%)  # Pulmonary hypertension  # Bilateral LE edema  Last saw cardiology 2/16/21. Last echo 1/27/21 demonstrating left ventricular function is low-normal (EF 50-55%). Apicolateral hypokinesis and possible mid to distal inferolateral hypokinesis also noted. On admission, patient with further reduction in EF to 45-50%, inferolateral (posterior wall) hypokinesis, and pulmonary hypertension. By exam, patient with 2-3+ bilateral LE edema (chronic, per previous chart notes).  - Continue PO Lasix; daily dose adjustment PRN  - Follow strict I/Os, daily weights  - Lymphedema consulted  - Was due for follow-up in May 2021 with Dr. Bentley; it does not appear that this ever happened. Will request expedited follow-up for after discharge.    # HTN   - Continue PTA metoprolol      # HLD   - Hold PTA atorvastatin while inpatient; discussed with Rx and will plan to change to rosuvastatin on discharge (fewer drug-drug interactions)     RENAL   # CKD stage III   # KRIS on CKD  Creatinine significantly improved. Peaked at 1.86 on 6/3, now down-trended back to (and below) baseline (baseline Cr 1.3-1.5).  - Follow daily CMP  - Caution to  maintain euvolemia    MISC   # Insomnia   Reports difficulty sleeping for the last 2 years.   - Continue trazodone 50 mg at bedtime    Fluids/Electrolytes/Nutrition:  - No mIVF; bolus PRN  - PRN lyte replacement per standing protocol  - Regular diet as tolerated     Lines: PICC in place; will remove on discharge    PPX  -VTE: none due to thrombocytopenia  -GI: No current indication for stress ulcer ppx  -Bowels: Senna and Miralax PRN  -Activity: PT/OT    Code  DNR, pre-arrest intubation OK    Dispo: Anticipate possible discharge after Venetoclax ramp up; ~ 6/18. Currently PT recommending home with home PT/OT. Will also arrange in-home nursing.    Follow up: 2x weekly labs/transfusions arranged at [facility] beginning the week of 6/28. RAGHAVENDRA appointment 6/25. BMBx 7/7. C2 decitabine due to start on 7/12.    Discussed with Dr. Martinez.    ANAMARIA AlvarezC  Hematology/Oncology  Pager: #[phone]    Interval History   No acute events overnight. Nursing notes reviewed. Patient complains of a couple of episodes of diarrhea yesterday in the setting of recent laxative use, bu this seems to have resolved overnight. No bloody or dark, tarry stools. Right shoulder pain continues to improve. Edema is back to baseline. No nausea/vomiting. No mouth sores; ones noted previously seem to have resolved. Renny continues to feel safe and comfortable going home. I stressed again that he absolutely cannot drive while he is on opiates, and reiterated to him the frequency with which he will need to go to clinic appointments. He is comfortable that his sister will be able to accommodate this and denies additional needs. After further discussion, though, he did agree to home nursing (previously refused); referral placed. Denies other questions/concerns today.    Comprehensive review of systems review and negative other than noted here or in the HPI.     Physical Exam   Temp: 97.8  F (36.6  C) Temp src: Temporal BP: 114/47 Pulse: 77   Resp:  28 SpO2: 96 % O2 Device: None (Room air)    Vitals:    06/13/21 0906 06/14/21 0734 06/16/21 0727   Weight: 81.8 kg (180 lb 6.4 oz) 79.7 kg (175 lb 12.8 oz) 78.9 kg (174 lb)     Vital Signs with Ranges  Temp:  [97.2  F (36.2  C)-98.6  F (37  C)] 97.8  F (36.6  C)  Pulse:  [72-93] 77  Resp:  [16-28] 28  BP: (103-129)/(44-63) 114/47  SpO2:  [92 %-98 %] 96 %  I/O last 3 completed shifts:  In: 262 [I.V.:40]  Out: 350 [Urine:350]    Constitutional: Pleasant male sitting up in chair. Awake and conversational. Non- toxic appearing. No acute distress.  HEENT: Normocephalic, atraumatic. Sclerae anicteric. EOM intact. Moist mucus membranes. Two shallow ulcerative lesions to the left buccal mucosae.  Respiratory: Breathing comfortable with no increased work on room air. Good air exchange. No signs of respiratory distress or accessory muscle use. Lungs clear to auscultation bilaterally, no crackles or wheezing noted.  Cardiovascular: Regular rate and rhythm. 1-2+ RUE, 2+ pitting BLE edema. No calf tenderness.  GI: Abdomen is soft, non-distended, non-tender. Bowel sounds present and normoactive.   Skin: Skin is clean, dry, intact. No jaundice appreciated.  Musculoskeletal/ Extremities: No redness, warmth of the joints appreciated. In a shoulder immobilizer; shoulder ROM not assessed. 1+ edema of the right forearm extending down into the fingers, improving.  Neurologic: Alert and oriented. Speech normal. Grossly nonfocal. Memory and thought process preserved. Motor function normal in lower extremities.  Neuropsychiatric: Calm, pleasant, engaged. Normal mood and affect.       Medications     - MEDICATION INSTRUCTIONS -         acetaminophen  650 mg Oral TID     acyclovir  400 mg Oral BID     allopurinol  300 mg Oral Daily     vitamin B-12  200 mcg Oral QAM     fluconazole  200 mg Oral Daily     furosemide  40 mg Oral Daily     heparin lock flush  5-10 mL Intracatheter Q24H     levofloxacin  250 mg Oral At Bedtime     lidocaine  1  patch Transdermal Q24h    And     lidocaine   Transdermal Q8H     metoprolol tartrate  50 mg Oral BID     oxyCODONE  10 mg Oral At Bedtime     senna-docusate  1 tablet Oral BID    Or     senna-docusate  2 tablet Oral BID     traZODone  50 mg Oral At Bedtime     venetoclax  200 mg Oral Daily with breakfast     vitamin D3  50 mcg Oral Daily       Data   Results for orders placed or performed during the hospital encounter of 06/03/21 (from the past 24 hour(s))   CBC with platelets differential   Result Value Ref Range    WBC 5.4 4.0 - 11.0 10e9/L    RBC Count 2.64 (L) 4.4 - 5.9 10e12/L    Hemoglobin 7.6 (L) 13.3 - 17.7 g/dL    Hematocrit 23.8 (L) 40.0 - 53.0 %    MCV 90 78 - 100 fl    MCH 28.8 26.5 - 33.0 pg    MCHC 31.9 31.5 - 36.5 g/dL    RDW 15.5 (H) 10.0 - 15.0 %    Platelet Count 23 (LL) 150 - 450 10e9/L    Diff Method Manual Differential     % Neutrophils 19.2 %    % Lymphocytes 19.3 %    % Monocytes 12.3 %    % Eosinophils 1.8 %    % Basophils 2.6 %    % Myelocytes 0.9 %    % Blasts 43.9 %    Absolute Neutrophil 1.0 (L) 1.6 - 8.3 10e9/L    Absolute Lymphocytes 1.0 0.8 - 5.3 10e9/L    Absolute Monocytes 0.7 0.0 - 1.3 10e9/L    Absolute Eosinophils 0.1 0.0 - 0.7 10e9/L    Absolute Basophils 0.1 0.0 - 0.2 10e9/L    Absolute Myelocytes 0.0 0 10e9/L    Absolute Blasts 2.4 (H) 0 10e9/L    Anisocytosis Slight     Platelet Estimate Confirming automated cell count    Fibrinogen activity   Result Value Ref Range    Fibrinogen 583 (H) 200 - 420 mg/dL   Hepatic panel   Result Value Ref Range    Bilirubin Direct 0.1 0.0 - 0.2 mg/dL    Bilirubin Total 0.5 0.2 - 1.3 mg/dL    Albumin 2.9 (L) 3.4 - 5.0 g/dL    Protein Total 6.8 6.8 - 8.8 g/dL    Alkaline Phosphatase 156 (H) 40 - 150 U/L    ALT 20 0 - 70 U/L    AST 19 0 - 45 U/L   INR   Result Value Ref Range    INR 1.09 0.86 - 1.14   Lactate Dehydrogenase   Result Value Ref Range    Lactate Dehydrogenase 409 (H) 85 - 227 U/L   Magnesium   Result Value Ref Range    Magnesium 2.4  (H) 1.6 - 2.3 mg/dL   Basic metabolic panel   Result Value Ref Range    Sodium 137 133 - 144 mmol/L    Potassium 4.2 3.4 - 5.3 mmol/L    Chloride 104 94 - 109 mmol/L    Carbon Dioxide 26 20 - 32 mmol/L    Anion Gap 6 3 - 14 mmol/L    Glucose 86 70 - 99 mg/dL    Urea Nitrogen 23 7 - 30 mg/dL    Creatinine 1.08 0.66 - 1.25 mg/dL    GFR Estimate 68 >60 mL/min/[1.73_m2]    GFR Estimate If Black 79 >60 mL/min/[1.73_m2]    Calcium 8.8 8.5 - 10.1 mg/dL   Uric acid   Result Value Ref Range    Uric Acid 3.5 3.5 - 7.2 mg/dL   Phosphorus   Result Value Ref Range    Phosphorus 3.8 2.5 - 4.5 mg/dL     Recent Labs   Lab 06/16/21  0551 06/15/21  0714 06/14/21  0524   WBC 5.4 6.7 9.8   HGB 7.6* 7.7* 8.4*   MCV 90 92 91   PLT 23* 16* 16*   INR 1.09 1.17* 1.18*    136 136   POTASSIUM 4.2 4.3 4.3   CHLORIDE 104 104 104   CO2 26 26 26   BUN 23 18 26   CR 1.08 0.88 0.92   ANIONGAP 6 5 6   MINNIE 8.8 8.9 8.9   GLC 86 91 88   ALBUMIN 2.9* 3.0* 2.8*   PROTTOTAL 6.8 7.0 7.1   BILITOTAL 0.5 0.7 0.6   ALKPHOS 156* 172* 184*   ALT 20 21 19   AST 19 18 20

## 2021-06-16 NOTE — PLAN OF CARE
[id number]    Day 12     VSS. Afebrile. Denies nausea and SOB. Right shoulder pain fluctuates with activity, remains in sling. Managing with Lidocaine patch, scheduled meds and PRN Oxy x1. Reported having loose stools earlier in the day, held laxatives on evenings. Mepilex on coccyx. Mild BLE edema. Continue with POC.

## 2021-06-16 NOTE — PLAN OF CARE
"/54 (BP Location: Left arm)   Pulse 82   Temp 97.2  F (36.2  C) (Temporal)   Resp 18   Ht 1.768 m (5' 9.6\")   Wt 78.9 kg (174 lb)   SpO2 96%   BMI 25.25 kg/m      6493-2969: VSS on RA. Afebrile. Up independently. Denies nausea and SOB. R shoulder pain, not requiring intervention. On Scheduled tylenol. Good appetite this evening, pt to be NPO at 0000 for liver US tomorrow. PICC heparin locked. Plan for BMT consult tomorrow at 0900. Up in chair, resting between cares. Continue to monitor.   "

## 2021-06-16 NOTE — PLAN OF CARE
0603-7885 hours: Afebrile, vital signs stable. On room air with adequate oxygenation. Denies pain or nausea. Patient has compression stockings on edematous bilateral lower extremities. CMS intact. Ate 100% of turkey and gravy dinner. Up in room independently.     Complaints that he has had diarrhea x 2 today. Instructed to notify staff if he has another bowel movement. Continue to monitor.

## 2021-06-16 NOTE — PROGRESS NOTES
Care Management Follow Up    Length of Stay (days): 13    Expected Discharge Date: 06/17/21     Concerns to be Addressed: Discharge Planning     Patient plan of care discussed at interdisciplinary rounds: Yes    Anticipated Discharge Disposition: Home     Anticipated Discharge Services: Home Care  Anticipated Discharge DME: None    Patient/family educated on Medicare website which has current facility and service quality ratings: Yes  Education Provided on the Discharge Plan: Yes   Patient/Family in Agreement with the Plan: Yes    Referrals Placed by CM/SW: [facility]  Private pay costs discussed: Not applicable    Additional Information:    Writer asked by RAGHAVENDRA to arrange twice weekly labs (CBC with differential and CMP) on 6/21 and 6/24 at [facility] per patient's request as [facility] is booked on those dates.     Writer called [facility] (Phn: [phone]) and left a message inquiring if they would be able to schedule patient for labs on above dates and transfusions as able. Orders faxed to lab department ([phone]).     RAGHAVENDRA updated. RNCC will follow.    Angie Maria RN, BSN, PHN  Care Coordinator   P: [phone], [facility]

## 2021-06-16 NOTE — PLAN OF CARE
Pt afebrile with stable vs. Received 1 unit PRBCs for hgb 7.6. Reports right shoulder pain is tolerable at rest. No further diarrhea after laxatives yesterday.  NPO after MN for ABD US tomorrow morning. Hoping to discharge later this week.

## 2021-06-17 NOTE — PROGRESS NOTES
Care Coordination     Patient was scheduled for BMT NT evaluation today with BMT RNCC. Per Dr. Óscar Muniz, patient will not be moving forward with transplant. No RNCC visit completed.      Shelbie Espinoza, RN, BSN  RN Care Coordinator - Inpatient BMT, Unit [address]C  Ph [phone]  Pg [phone]

## 2021-06-17 NOTE — PROGRESS NOTES
BMT Clinical Social Work New Transplant Evaluation    Pt was scheduled for a BMT NT today. Per Dr. Óscar Muniz pt has decided that he does not want to move forward with allo BMT at this time. No SW visit completed at this time.     INCANOR Felix, [facility]  Phone [phone]  Pager [phone]

## 2021-06-17 NOTE — PLAN OF CARE
Pt afebrile with stable vs. Received plt tx for plt count 18k. Reports right shoulder pain is tolerable at rest. Having regular stools. ABD US done. Hoping to discharge soon.

## 2021-06-17 NOTE — PROGRESS NOTES
[facility]    Hematology / Oncology Progress Note    Date of Service (when I saw the patient): 06/17/2021     Assessment & Plan   Renny Gannon is a 70 year old male who presents with past medical history significant for CAD s/p CABG (Jan 2020), HFpEF, CKD stage III (baseline Cr. 1.3-1.5), HTN, and JAK2+ myelofibrosis who was admitted as a transfer from an OSH with leukocytosis (WBC=72.4) concerning for progression to AML. He was started on induction therapy with Decitabine (Day 1=6/5/21) + Venetoclax (begun on 6/9/21). Hospital course has been complicated by development of a hemorrhagic right shoulder effusion with associated pain and limitations on mobility, now improving with PT and supportive cares.    Today: D12   - Completed 5 days of Decitabine (6/5-6/9); continue veneteoclax 200 mg daily. Dose-appropriate for concomitant fluconazole administration.  - Continue PTA furosemide 40 mg PO daily. Monitor I/Os, daily weights.  - Appreciate RNCC assistance with arranging home PT/OT, home nursing, and follow-up care.  - Anticipate discharge to home tomorrow, 6/18 AM.    HEME  # JAK2+ myelofibrosis, in blast crisis  Follow by Dr. Cristina. He initially was seen in Dec. 2019 by his PCP for increased fatigue and SOB w/ exertion. CBC showed WBC 79.1, Hgb 11.9 and Plt 378. Peripheral smear showed leuko-erythroblastic reaction w/ neutrophilic left shift. BMBx (12/30/19) revealed extremely hypercellular (100%) marrow with granulocytic and megakaryocytic proliferation, megakaryocytic clustering and atypia and 1.5% circulating blasts as well as moderate-severe reticulin fibrosis (MF 2-3). 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 He was on and off Hydrea from Jan 2020 - Jan 2021. BMBx 3/26/21 revealed increased fibrosis (MF-3), cellularity 40-50%, 5% blasts by morphology and flow.  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 He presented to clinic for routine laboratory monitoring and was found to have a WBC of 71.9, Hgb 6.4, and plt 18K with \"a lot of immature WBC and blasts\" on laboratory review. He was then directed to the ED for admission and further work-up.  - BMBx done x6/4; unfortunately only core was obtained. No aspirate obtained, likely due to fibrosis and possibly excess blasts. BMBx morphology reveals marrow hypercellularity (70-80%) with left-shifted myeloid predominant maturation with approximately 5-10% myeloid blasts, markedly increased marrow fibrosis (MF3); findings consistent with blast crisis arising from previously diagnosed myeloproliferative neoplasm. Diagnosis made on the basis of increased peripheral blast percentage (23%); blast percentage in bone marrow is difficult to establish d/t bone marrow fibrosis and lack of aspirates. BMBx flow from core shows increased abnormal myeloid blasts (12%). FLT3 negative.    Cytogenetics and NGS sent on peripheral blood; pending.    DIPSS score 5 for age, WBC >25, Hgb <10, peripheral blasts on differential.  - Flow cytometry on peripheral blood shows 28% myeloid blasts.  - Discussion had with patient and sister on 6/5 re: diagnosis, treatment options, and prognosis.  - Started on decitabine 20 mg/m2 on 6/5.  - Venetoclax started on 6/9; see below for ramp-up.    - PICC line in place; will remove on discharge.  - Follow TLS, DIC labs daily; stable thus far.  - Repeat RUQ U/S to reassess liver size.  - Prior auth approved for Venetoclax for this patient. Copay is $2,882.25. [employer] ivan approved.   - BMT consult scheduled for 6/17 with Dr. Óscar Muniz.      Treatment Plan: Decitabine (5 days) / Venetoclax (C1D1: 6/5/21)   - Decitabine 20 mg/m2 -- D1-5   - Venetoclax ramp-up -- elected to use a " prolonged ramp-up given moderate leukocytosis on initiation (WBC=46.8)    Venetoclax 20 mg daily (6/9-6/10), then 50 mg daily (6/11-6/12), then 100 mg daily (6/13-6/14), then 200 mg daily (6/15-x)    Fluconazole 200 mg ppx added on 6/16; max venetoclax dose with fluconazole is 200 mg daily   - Supportive meds: PRN anti- emetics, bowl regimen, pain control    # Anemia   # Thrombocytopenia   Patient was transfusion dependent prior to admission due to underlying myelofibrosis.  - Transfuse to keep Hgb > 8 (hx CAD and HFpEF) and platelets >10K     ID  # ID prophylaxis  - Viral serologies: HSV 1+/2-, EBV IgG positive, HBV surface Ag negative, HBV surface Ab negative, HBV core Ag negative, HIV negative, CMV IgG negative  - Acyclovir 400 mg BID  - Levofloxacin 250 mg daily   - Fluconazole 200 mg daily    Would have preferred either posa or voriconazole; however, both were cost-prohibitive.    MSK  # Right shoulder pain  # Presumed hemarthrosis  Patient had 2-3 weeks of right shoulder pain PTA. No preceding trauma or injury. Was seen in ortho clinic on 5/27 and was diagnosed with right shoulder subacromial impingement/bursitis/tendinitis. He received a Kenalog and Marcaine injection into the glenohumeral joint without relief; this was repeated on 5/30 when he presented to the ED with similar symptoms. Pain worsened PTA, and by the time of admission, patient as unable to move the joint or sleep due to pain. Differential includes previous diagnosis of right shoulder subacromial impingement/bursitis/tendinitis, fracture, septic arthritis, hemarthrosis, adhesive capsulitis, etc.  - Ortho surg consulted, appreciate recs.    Repeat right shoulder x-ray 6/4 shows no acute osseous abnormalities, notable for degenerative changes. Increase in size of the GH joint space raises concern for effusion, septic vs. hemorrhagic.   - Pain management:    Oxycontin 10 mg PO at bedtime (x6/9). Insurance only covering Xtampza for home use;  equivalent dose is 9 mg Q12H. Unfortunately, patient has not yet met his deductible for the year, which would make the first fill $352 (the remainder of the deductible); after that, refills are $47/month.    Tylenol 650 mg TID scheduled; change to PRN on discharge to avoid masking fevers.    Oxycodone 5-10 mg Q4H PRN.    Voltaren topical QID PRN.   - Status-post IR-guided diagnostic right shoulder arthrocentesis; 7 mL brown cloudy fluid drained, ~7500 WBC with 59% monos/macros and 38% neutrophils, gram stain with no organisms, aerobic/anaerobic cx NGTD.  - Home PT/OT arranged.    # RUE edema, improving  RUE swelling was noted on 6/8. RUE US unremarkable for DVT. Suspect dependent edema due to limited RUE mobility, likely with some contribution from HFpEF/generalized volume overload.  - Diuresis as below  - Monitor  - Continue shoulder immobilizer for comfort per ortho; encourage ROM activities and elevation as tolerated.    CARDS   # CAD s/p CABG (Jan.2020)  # HFrEF (EF 45-50%)  # Pulmonary hypertension  # Bilateral LE edema  Last saw cardiology 2/16/21. Last echo 1/27/21 demonstrating left ventricular function is low-normal (EF 50-55%). Apicolateral hypokinesis and possible mid to distal inferolateral hypokinesis also noted. On admission, patient with further reduction in EF to 45-50%, inferolateral (posterior wall) hypokinesis, and pulmonary hypertension. By exam, patient with 2-3+ bilateral LE edema (chronic, per previous chart notes).  - Continue PO Lasix; daily dose adjustment PRN  - Follow strict I/Os, daily weights  - Lymphedema consulted  - Was due for follow-up in May 2021 with Dr. Bentley; it does not appear that this ever happened. Request sent for expedited follow-up after discharge.    # HTN   - Continue PTA metoprolol      # HLD   - Hold PTA atorvastatin while inpatient; discussed with Rx and will plan to change to rosuvastatin on discharge (fewer drug-drug interactions)     RENAL   # CKD stage III   #  "KRIS on CKD  Creatinine significantly improved. Peaked at 1.86 on 6/3, now down-trended back to (and below) baseline (baseline Cr 1.3-1.5).  - Follow daily CMP  - Caution to maintain euvolemia    MISC   # Insomnia   Reports difficulty sleeping for the last 2 years.   - Continue trazodone 50 mg at bedtime    Fluids/Electrolytes/Nutrition:  - No mIVF; bolus PRN  - PRN lyte replacement per standing protocol  - Regular diet as tolerated     Lines: PICC in place; will remove on discharge    PPX  -VTE: none due to thrombocytopenia  -GI: No current indication for stress ulcer ppx  -Bowels: Senna and Miralax PRN  -Activity: PT/OT    Code  DNR, pre-arrest intubation OK    Dispo: Anticipate discharge to home tomorrow, 6/18. Cleared for home by PT/OT.    Follow up: 2x weekly labs/transfusions at [facility] week of 6/21. 2x weekly labs/transfusions arranged at [place] beginning the week of 6/28. RAGHAVENDRA appointment 6/25. BMBx 7/7. C2 decitabine due to start on 7/12.    Discussed with Dr. Martinez.    Laura Montenegro PASukiC  Hematology/Oncology  Pager: #[phone]    Interval History   No acute events overnight. Renny continues to feel well overall. He had his ultrasound this morning and has his BMT conference later. He denies any fevers, chills, mouth sores, cough, shortness of breath, nausea, vomiting. Has had some loose stools, but not watery. No associated abdominal cramping. No bloody stools. Has some petechiae on his chest. Denies other rashes or skin concerns. Legs stably swollen, no longer wearing compression socks. Was out of his sling yesterday to take a shower. Reviewed again all that he will resume responsibility for going home: dressing, bathing, cooking, cleaning, shopping, medication management, etc. He continues to state that he feels he'll be able to manage this. Reiterated that our concern is for his safety, nothing more, and he reassures me that \"nothing is going to happen.\" Is, however, a bit apprehensive " about the idea of going home. Encouraged him that we feel that he's medically ready, reminding him that he's on all oral meds and that everything we're doing for him now can be continued at home. He voices understanding. Doesn't think his sister can pick him up today, but will plan on tomorrow. Denies other questions/concerns today.    Comprehensive review of systems review and negative other than noted here or in the HPI.     Physical Exam   Temp: 98.7  F (37.1  C) Temp src: Temporal BP: 118/58 Pulse: 76   Resp: 18 SpO2: 94 % O2 Device: None (Room air)    Vitals:    06/14/21 0734 06/16/21 0727 06/17/21 0800   Weight: 79.7 kg (175 lb 12.8 oz) 78.9 kg (174 lb) 78.3 kg (172 lb 11.2 oz)     Vital Signs with Ranges  Temp:  [97.2  F (36.2  C)-98.7  F (37.1  C)] 98.7  F (37.1  C)  Pulse:  [67-82] 76  Resp:  [18] 18  BP: (107-124)/(54-62) 118/58  SpO2:  [94 %-96 %] 94 %  I/O last 3 completed shifts:  In: 1270 [P.O.:960; I.V.:10]  Out: 1650 [Urine:1650]    Constitutional: Pleasant male sitting up in chair. Awake and conversational. Non- toxic appearing. No acute distress.  HEENT: Normocephalic, atraumatic. Sclerae anicteric. EOM intact. Moist mucus membranes. No discrete oral sores. No thrush.  Respiratory: Breathing comfortable with no increased work on room air. Good air exchange. No signs of respiratory distress or accessory muscle use. Lungs clear to auscultation bilaterally, no crackles or wheezing noted.  Cardiovascular: Regular rate and rhythm. Trace-1+ RUE, 2+ pitting BLE edema. No calf tenderness.  GI: Abdomen is soft, non-distended, non-tender. Bowel sounds present and normoactive.   Skin: Skin is clean, dry, intact. No jaundice appreciated.  Musculoskeletal/ Extremities: No redness, warmth of the joints appreciated. In a shoulder immobilizer; shoulder ROM not assessed. 1+ edema of the right forearm extending down into the fingers, improving.  Neurologic: Alert and oriented. Speech normal. Grossly nonfocal. Memory  and thought process preserved. Motor function normal in lower extremities.  Neuropsychiatric: Calm, pleasant, engaged. Normal mood and affect.       Medications     - MEDICATION INSTRUCTIONS -         acetaminophen  650 mg Oral TID     acyclovir  400 mg Oral BID     allopurinol  300 mg Oral Daily     vitamin B-12  200 mcg Oral QAM     fluconazole  200 mg Oral Daily     furosemide  40 mg Oral Daily     heparin lock flush  5-10 mL Intracatheter Q24H     levofloxacin  250 mg Oral At Bedtime     lidocaine  1 patch Transdermal Q24h    And     lidocaine   Transdermal Q8H     metoprolol tartrate  50 mg Oral BID     oxyCODONE  10 mg Oral At Bedtime     senna-docusate  1 tablet Oral BID    Or     senna-docusate  2 tablet Oral BID     traZODone  50 mg Oral At Bedtime     venetoclax  200 mg Oral Daily with breakfast     vitamin D3  50 mcg Oral Daily       Data   Results for orders placed or performed during the hospital encounter of 06/03/21 (from the past 24 hour(s))   CBC with platelets differential   Result Value Ref Range    WBC 5.6 4.0 - 11.0 10e9/L    RBC Count 2.92 (L) 4.4 - 5.9 10e12/L    Hemoglobin 8.5 (L) 13.3 - 17.7 g/dL    Hematocrit 26.5 (L) 40.0 - 53.0 %    MCV 91 78 - 100 fl    MCH 29.1 26.5 - 33.0 pg    MCHC 32.1 31.5 - 36.5 g/dL    RDW 15.6 (H) 10.0 - 15.0 %    Platelet Count 18 (LL) 150 - 450 10e9/L    Diff Method Manual Differential     % Neutrophils 23.6 %    % Lymphocytes 16.7 %    % Monocytes 33.3 %    % Eosinophils 0.9 %    % Basophils 0.9 %    % Blasts 24.6 %    Absolute Neutrophil 1.3 (L) 1.6 - 8.3 10e9/L    Absolute Lymphocytes 0.9 0.8 - 5.3 10e9/L    Absolute Monocytes 1.9 (H) 0.0 - 1.3 10e9/L    Absolute Eosinophils 0.1 0.0 - 0.7 10e9/L    Absolute Basophils 0.1 0.0 - 0.2 10e9/L    Absolute Blasts 1.4 (H) 0 10e9/L    RBC Morphology Normal     Platelet Estimate Confirming automated cell count    Fibrinogen activity   Result Value Ref Range    Fibrinogen 576 (H) 200 - 420 mg/dL   Hepatic panel    Result Value Ref Range    Bilirubin Direct 0.1 0.0 - 0.2 mg/dL    Bilirubin Total 0.5 0.2 - 1.3 mg/dL    Albumin 3.0 (L) 3.4 - 5.0 g/dL    Protein Total 7.0 6.8 - 8.8 g/dL    Alkaline Phosphatase 160 (H) 40 - 150 U/L    ALT 22 0 - 70 U/L    AST 20 0 - 45 U/L   INR   Result Value Ref Range    INR 1.12 0.86 - 1.14   Lactate Dehydrogenase   Result Value Ref Range    Lactate Dehydrogenase 425 (H) 85 - 227 U/L   Magnesium   Result Value Ref Range    Magnesium 2.3 1.6 - 2.3 mg/dL   Basic metabolic panel   Result Value Ref Range    Sodium 134 133 - 144 mmol/L    Potassium 4.1 3.4 - 5.3 mmol/L    Chloride 102 94 - 109 mmol/L    Carbon Dioxide 27 20 - 32 mmol/L    Anion Gap 5 3 - 14 mmol/L    Glucose 85 70 - 99 mg/dL    Urea Nitrogen 18 7 - 30 mg/dL    Creatinine 0.92 0.66 - 1.25 mg/dL    GFR Estimate 83 >60 mL/min/[1.73_m2]    GFR Estimate If Black >90 >60 mL/min/[1.73_m2]    Calcium 8.8 8.5 - 10.1 mg/dL   Uric acid   Result Value Ref Range    Uric Acid 3.1 (L) 3.5 - 7.2 mg/dL   Phosphorus   Result Value Ref Range    Phosphorus 3.9 2.5 - 4.5 mg/dL   Platelets prepare order unit conditional   Result Value Ref Range    Blood Component Type PLT Pheresis     Units Ordered 1    Blood component   Result Value Ref Range    Unit Number [id number]     Blood Component Type PlateletPheresis LeukoReduced Irradiated     Division Number 00     Status of Unit Released to care unit 06/17/2021 1028     Blood Product Code [id number]     Unit Status ISS    US Abdomen Limited ([place] only)    Narrative    EXAMINATION: Limited Abdominal Ultrasound, 6/17/2021 8:19 AM     COMPARISON: 12/11/2020, 1/20/2020    HISTORY: history of MPN, hepatomegaly, reassess liver size    FINDINGS:   Fluid: No evidence of ascites or pleural effusions.    Liver: The liver demonstrates normal echotexture, measuring 21 cm in  craniocaudal dimension. There is no focal mass. Main portal vein is  patent with antegrade flow.    Gallbladder: Nondistended with normal  wall thickness. No  pericholecystic fluid. Multiple mobile shadowing stones. Sonographer  reports negative sonographic Figueroa sign.    Bile Ducts: Both the intra- and extrahepatic biliary system are of  normal caliber.  The common bile duct measures 4 mm in diameter.    Pancreas: Visualized portions are unremarkable.    Kidney: The right kidney measures 10.3 cm long. There is no  hydronephrosis or hydroureter, no shadowing renal calculi, cystic  lesion or mass.       Impression    IMPRESSION:   1. Mild hepatomegaly measuring up to 21 cm craniocaudal with an  otherwise unremarkable sonographic appearance of the liver.  2. Cholelithiasis.    I have personally reviewed the examination and initial interpretation  and I agree with the findings.    ASAD DAI MD     Recent Labs   Lab 06/17/21  0555 06/16/21  0551 06/15/21  0714   WBC 5.6 5.4 6.7   HGB 8.5* 7.6* 7.7*   MCV 91 90 92   PLT 18* 23* 16*   INR 1.12 1.09 1.17*    137 136   POTASSIUM 4.1 4.2 4.3   CHLORIDE 102 104 104   CO2 27 26 26   BUN 18 23 18   CR 0.92 1.08 0.88   ANIONGAP 5 6 5   MINNIE 8.8 8.8 8.9   GLC 85 86 91   ALBUMIN 3.0* 2.9* 3.0*   PROTTOTAL 7.0 6.8 7.0   BILITOTAL 0.5 0.5 0.7   ALKPHOS 160* 156* 172*   ALT 22 20 21   AST 20 19 18

## 2021-06-17 NOTE — PLAN OF CARE
[id number]    VSS. Afebrile. Denies nausea and SOB. Right shoulder pain fluctuates with activity, remains in sling. Managing with Lidocaine patch and PRN Oxy x1. Mild BLE edema and R arm. Bruising and Petechiae on arm and chest. Continue with POC.

## 2021-06-17 NOTE — PROGRESS NOTES
Care Management Follow Up    Length of Stay (days): 14    Expected Discharge Date: 06/18/21     Concerns to be Addressed: Discharge Planning     Patient plan of care discussed at interdisciplinary rounds: Yes     Anticipated Discharge Disposition: Home     Anticipated Discharge Services: Home Care  Anticipated Discharge DME: None     Patient/family educated on Medicare website which has current facility and service quality ratings: Yes  Education Provided on the Discharge Plan: Yes   Patient/Family in Agreement with the Plan: Yes     Referrals Placed by CM/SW: [facility]  Private pay costs discussed: Not applicable    Additional Information:    Writer received a call back from Jade at [facility] (Phn: [phone], Fax: [phone]) regarding lab and transfusions appointments that were requested on 6/21 and 6/24.    Per aJde, they are able to do these and will see patient on both dates at 10am. Writer asked to fax blood consent form and last progress note as he is not a current [facility] patient and they would like a general overview of patient. Blood consent and last provider progress note faxed.     RAGHAVENDRA updated. AVS updated. RNCC will follow.    Per team, patient will discharge home tomorrow, 6/18. [facility] liaison updated.    Angie Maria RN, BSN, PHN  Care Coordinator   P: [phone], [facility]

## 2021-06-17 NOTE — PLAN OF CARE
[id number]: VSS on RA. Afebrile. Denies nausea and SOB. Right shoulder pain controlled with scheduled Tylenol. Up independently. No acute events overnight. Continue with POC.

## 2021-06-18 PROBLEM — D61.810 ANTINEOPLASTIC CHEMOTHERAPY INDUCED PANCYTOPENIA (H): Status: ACTIVE | Noted: 2021-01-01

## 2021-06-18 PROBLEM — I50.20 HEART FAILURE WITH REDUCED EJECTION FRACTION (H): Status: ACTIVE | Noted: 2021-01-01

## 2021-06-18 PROBLEM — T45.1X5A ANTINEOPLASTIC CHEMOTHERAPY INDUCED PANCYTOPENIA (H): Status: ACTIVE | Noted: 2021-01-01

## 2021-06-18 NOTE — PLAN OF CARE
Occupational Therapy Discharge Summary    Reason for therapy discharge:    Discharged to home with home therapy.    Progress towards therapy goal(s). See goals on Care Plan in [facility] electronic health record for goal details.  Goals partially met.  Barriers to achieving goals:   discharge from facility.    Therapy recommendation(s):    Continue home exercise program.

## 2021-06-18 NOTE — PROGRESS NOTES
[facility] now requests orders and shares plan of care/discharge summaries for some patients through [facility].  Please REPLY TO THIS MESSAGE OR ROUTE BACK TO THE AUTHOR in order to give authorization for orders when needed.  This is considered a verbal order, you will still receive a faxed copy of orders for signature.  Thank you for your assistance in improving collaboration for our patients.    ORDER  Patient has home care services ordered and is requesting a visit on Wednesday 6/23.  Requesting VO for a verbal start of care date.     Alix Barbosa RN  [phone]

## 2021-06-18 NOTE — DISCHARGE SUMMARY
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 1.3-1.5), HTN, and JAK2+ myelofibrosis who was admitted as a transfer from an OSH with leukocytosis (WBC=72.4) concerning for progression to AML. He was started on induction therapy with Decitabine (Day 1=6/5/21) + Venetoclax (begun on 6/9/21). He tolerated this very well, with no significant side effects and no signs of TLS. His hospital course was complicated by significant right shoulder pain. Patient was evaluated by orthopedics and had plain films and an IR-guided right shoulder arthrocentesis. Cell count/diff not consistent with septic arthritis; cultures negative. He ultimately improved with immobilization, PT, and a pain medication regimen as detailed below. Patient overall remained stable throughout his hospitalization, and he was cleared by PT/OT to return home. We reviewed at length his new medication regimen, follow-up schedule, and plan of care going forward, and he voiced understanding. I also spoke to his sister and reviewed this information as well. He understands strict neutropenic precautions and reasons to call clinic triage or go to the ED. On the day of discharge, patient was well-appearing, hemodynamically stable, and felt safe and comfortable with the plans for discharge and follow-up.    Outpatient follow-up issues:  - Due for cardiology follow-up; previously seen  by Dr. Bentley at [place]. Re-referral placed on discharge.  - Consider repeat LUQ U/S as outpatient to reassess spleen size (measured 17 cm in 01/2020).    New discharge medications:  - Acyclovir 400 mg BID  - Fluconazole 200 mg daily  - Furosemide 40 mg daily  - Oxycodone (Xtampza) 9 mg Q12H  - Oxycodone IR 5 mg Q6H PRN  - Trazodone 50 mg at bedtime  - Rosuvastatin 5 mg daily (to take the place of atorvastatin; fewer DDIs)  - Venetoclax 200 mg daily  PRN:  - Zofran, compazine, senna-colace, Miralax    Next follow-up:  - 6/21: 10a labs/possible transfusion at [facility]  - 6/24: 10a labs/possible transfusion at [facility]  - 6/25: Virtual follow-up with ETHAN Abbott  - 6/28: 10a labs/possible transfusion at [place]  - 7/2: 8a labs/possible transfusion at [place]  - 7/5: 8a labs/possible transfusion at [place]  - 7/7: 10:45a, labs + BMBx at [facility]  - 7/8: 8a labs/possible transfusion at [place]  - 7/12: C2D1 decitabine/venetoclax    Hospital Course   Renny Gannon was admitted on 6/3/2021.  The following problems were addressed during his hospitalization:    HEME  # JAK2+ myelofibrosis, in blast crisis  Follow by Dr. Cristina. He initially was seen in Dec. 2019 by his PCP for increased fatigue and SOB w/ exertion. CBC showed WBC 79.1, Hgb 11.9 and Plt 378. Peripheral smear showed leuko-erythroblastic reaction w/ neutrophilic left shift. BMBx (12/30/19) revealed extremely hypercellular (100%) marrow with granulocytic and megakaryocytic proliferation, megakaryocytic clustering and atypia and 1.5% circulating blasts as well as moderate-severe reticulin fibrosis (MF 2-3). 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 He was on and off Hydrea from Jan 2020 - Jan 2021. BMBx 3/26/21 revealed increased fibrosis (MF-3), cellularity 40-50%, 5% blasts by morphology and flow. 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 He presented to clinic for routine laboratory monitoring and was found to have a WBC of 71.9, Hgb 6.4, and plt 18K with \"a lot of immature WBC and blasts\" on laboratory review. He was then directed to the ED for admission and further work-up.  - BMBx done x6/4; unfortunately only core was obtained. No aspirate obtained, likely due to fibrosis and possibly excess blasts. BMBx morphology reveals marrow hypercellularity (70-80%) with left-shifted myeloid predominant maturation with approximately 5-10% myeloid blasts, markedly increased marrow fibrosis (MF3); findings consistent with blast crisis arising from previously diagnosed myeloproliferative neoplasm. Diagnosis made on the basis of increased peripheral blast percentage (23%); blast percentage in bone marrow is difficult to establish d/t bone marrow fibrosis and lack of aspirates. BMBx flow from core shows increased abnormal myeloid blasts (12%). FLT3 negative.  ? Cytogenetics and NGS sent on peripheral blood; pending.  ? DIPSS score 5 for age, WBC >25, Hgb <10, peripheral blasts on differential.  - Flow cytometry on peripheral blood shows 28% myeloid blasts.  - Discussion had with patient and sister on 6/5 re: diagnosis, treatment options, and prognosis.  - Started on decitabine 20 mg/m2 on 6/5.  - Venetoclax started on 6/9; see below for ramp-up.    - PICC line in place; removed on discharge.  - Prior auth approved for Venetoclax for this patient. Copay is $2,882.25. [facility] ivan approved.   - BMT consult scheduled for 6/17 with Dr. Óscar Muniz. Patient has declined to proceed to transplant at this time.                  Treatment Plan: Decitabine (5 days) / Venetoclax (C1D1: 6/5/21)               - Decitabine 20 mg/m2 -- D1-5               - Venetoclax ramp-up -- elected to use a prolonged ramp-up given moderate " leukocytosis on initiation (WBC=46.8)    Venetoclax 20 mg daily (6/9-6/10), then 50 mg daily (6/11-6/12), then 100 mg daily (6/13-6/14), then 200 mg daily (6/15-x)    Fluconazole 200 mg ppx added on 6/16; max venetoclax dose with fluconazole is 200 mg daily               - Supportive meds: PRN anti- emetics, bowl regimen, pain control     # Anemia   # Thrombocytopenia   Patient was transfusion dependent prior to admission due to underlying myelofibrosis. Likely a component of sequestration as well, given known splenomegaly.  - Transfuse to keep Hgb > 8 (hx CAD and HFpEF) and platelets >10K  - Twice weekly labs/transfusions; increase frequency PRN     ID  # ID prophylaxis  - Viral serologies: HSV 1+/2-, EBV IgG positive, HBV surface Ag negative, HBV surface Ab negative, HBV core Ag negative, HIV negative, CMV IgG negative  - Acyclovir 400 mg BID  - Levofloxacin 250 mg daily   - Fluconazole 200 mg daily  ? Would have preferred either posa or voriconazole; however, both were cost-prohibitive. See previous notes for details.     MSK  # Right shoulder pain  # Presumed hemarthrosis  Patient had 2-3 weeks of right shoulder pain PTA. No preceding trauma or injury. Was seen in ortho clinic on 5/27 and was diagnosed with right shoulder subacromial impingement/bursitis/tendinitis. He received a Kenalog and Marcaine injection into the glenohumeral joint without relief; this was repeated on 5/30 when he presented to the ED with similar symptoms. Pain worsened PTA, and by the time of admission, patient as unable to move the joint or sleep due to pain. Differential includes previous diagnosis of right shoulder subacromial impingement/bursitis/tendinitis, fracture, septic arthritis, hemarthrosis, adhesive capsulitis, leukemic effusion, etc.  - Ortho surg consulted, appreciate recs.  ? Repeat right shoulder x-ray 6/4 shows no acute osseous abnormalities, notable for degenerative changes. Increase in size of the GH joint space raises  concern for effusion, septic vs. hemorrhagic.   - Pain management:  ? Oxycontin 10 mg PO at bedtime (x6/9). Insurance only covering Xtampza for home use; equivalent dose is 9 mg Q12H. Unfortunately, patient has not yet met his deductible for the year, which would make the first fill $352 (the remainder of the deductible); after that, refills are $47/month.  ? Tylenol 650 mg TID scheduled while inpatient; changed to PRN on discharge to avoid masking fevers.  ? Oxycodone 5-10 mg Q4H PRN.  ? Voltaren topical QID PRN.   - Status-post IR-guided diagnostic right shoulder arthrocentesis; 7 mL brown cloudy fluid drained, ~7500 WBC with 59% monos/macros and 38% neutrophils, peripheral blood elements present, few blasts present, gram stain with no organisms, aerobic/anaerobic cx NGTD. Unfortunately, no flow cytometry was sent.   - Home PT/OT arranged.     # RUE edema, improving  RUE swelling was noted on 6/8. RUE US unremarkable for DVT. Suspect dependent edema due to limited RUE mobility, likely with some contribution from HFpEF/generalized volume overload.  - Continue shoulder immobilizer for comfort per ortho; encourage ROM activities and elevation as tolerated.  - Home PT/OT arranged.     CARDS   # CAD s/p CABG (Jan.2020)  # HFrEF (EF 45-50%)  # Pulmonary hypertension  # Bilateral LE edema  Last saw cardiology 2/16/21. Last echo 1/27/21 demonstrating left ventricular function is low-normal (EF 50-55%). Apicolateral hypokinesis and possible mid to distal inferolateral hypokinesis also noted. Appears secondary to ischemic cardiomyopathy in the setting of WMA and known history of ischemia/CAD. On admission, patient with further reduction in EF to 45-50%, inferolateral (posterior wall) hypokinesis, and pulmonary hypertension. By exam, patient with 2+ bilateral LE pitting edema (chronic, per previous chart notes).  - Resume PTA Lasix 40 mg daily on discharge  - Was due for follow-up in May 2021 with Dr. Bentley; it does not  appear that this ever happened. Request sent for expedited follow-up after discharge.     # HTN   - Continue PTA metoprolol      # HLD   - Start rosuvastatin 5 mg daily on discharge (changed from PTA atorvastatin due to fewer DDIs)     RENAL   # CKD stage III   # KRIS on CKD  Creatinine significantly improved. Peaked at 1.86 on 6/3, now down-trended back to (and below) baseline (baseline Cr 1.3-1.5).  - Follow daily CMP  - Caution to maintain euvolemia     MISC   # Insomnia   Reports difficulty sleeping for the last 2 years.   - Continue trazodone 50 mg at bedtime     Discussed with Dr. Martinez.     Laura Montenegro PA-C  Hematology/Oncology  Pager: #[phone]    Code Status   DNR; pre-arrest intubation osmar    Primary Care Physician   Angus Clements Mai    Physical Exam   Vital Signs with Ranges  Temp:  [96.2  F (35.7  C)-98.1  F (36.7  C)] 96.2  F (35.7  C)  Pulse:  [79-90] 90  Resp:  [16-18] 16  BP: (117-127)/(53-64) 127/57  SpO2:  [94 %-96 %] 96 %  171 lbs 1.6 oz    Constitutional: Pleasant male sitting up in chair. Awake and conversational. Non- toxic appearing. No acute distress.  HEENT: Normocephalic, atraumatic. Sclerae anicteric. EOM intact. Moist mucus membranes. No discrete oral sores. No thrush.  Respiratory: Breathing comfortable with no increased work on room air. Good air exchange. No signs of respiratory distress or accessory muscle use. Lungs clear to auscultation bilaterally, no crackles or wheezing noted.  Cardiovascular: Regular rate and rhythm. Trace-1+ RUE, 2+ pitting BLE edema. No calf tenderness.  GI: Abdomen is soft, non-distended, non-tender. Bowel sounds present and normoactive.   Skin: Skin is clean, dry, intact. No jaundice appreciated.  Musculoskeletal/ Extremities: No redness, warmth of the joints appreciated. In a shoulder immobilizer; shoulder ROM not assessed. 1+ edema of the right forearm extending down into the fingers, improving.  Neurologic: Alert and oriented. Speech normal. Grossly  nonfocal. Memory and thought process preserved. Motor function normal in lower extremities.  Neuropsychiatric: Calm, pleasant, engaged. Normal mood and affect.    Discharge Disposition   Discharged to home  Condition at discharge: Stable    Consultations This Hospital Stay   VASCULAR ACCESS CARE ADULT IP CONSULT  CARE MANAGEMENT / SOCIAL WORK IP CONSULT  ORTHOPAEDIC SURGERY ADULT/PEDS IP CONSULT  VASCULAR ACCESS CARE ADULT IP CONSULT  PHYSICAL THERAPY ADULT IP CONSULT  VASCULAR ACCESS FOR PICC PLACEMENT ADULT IP CONSULT  LYMPHEDEMA THERAPY IP CONSULT  INTERVENTIONAL RADIOLOGY ADULT/PEDS IP CONSULT  ORTHOSIS BRACE IP CONSULT  WOUND OSTOMY CONTINENCE NURSE  IP CONSULT  OCCUPATIONAL THERAPY ADULT IP CONSULT    Discharge Orders      Oncology Clinical Pharmacist Referral      LYMPHEDEMA THERAPY REFERRAL      Home Care PT Referral for Hospital Discharge      Home Care OT Referral for Hospital Discharge      CARDIOLOGY EVAL ADULT REFERRAL      Home care nursing referral      CARE COORDINATION REFERRAL      MD face to face encounter    Documentation of Face to Face and Certification for Home Health Services    I certify that patient: Renny Gannon is under my care and that I, or a nurse practitioner or physician's assistant working with me, had a face-to-face encounter that meets the physician face-to-face encounter requirements with this patient on: Shikha 15, 2021.    This encounter with the patient was in whole, or in part, for the following medical condition, which is the primary reason for home health care:    Renny Gannon is a 70 year old male who presents with past medical history significant for CAD s/p CABG (Jan 2020), HFpEF, CKD stage III (baseline Cr. 1.3-1.5), HTN, and JAK2+ myelofibrosis who was admitted as a transfer from an OSH with leukocytosis (WBC=72.4) concerning for progression to AML. He was started on induction therapy with Decitabine (Day 1=6/5/21) + Venetoclax (begun on 6/9/21). Hospital course has  been complicated by development of a hemorrhagic right shoulder effusion with associated pain and limitations on mobility, now improving with PT and supportive cares.    I certify that, based on my findings, the following services are medically necessary home health services: Nursing Care, Occupational Therapy and Physical Therapy.    My clinical findings support the need for the above services because: Occupational Therapy Services are needed to assess and treat cognitive ability and address ADL safety due to impairment in medical status and Physical Therapy Services are needed to assess and treat the following functional impairments: medical status, deconditioning and decreased R shoulder mobility.    Further, I certify that my clinical findings support that this patient is homebound (i.e. absences from home require considerable and taxing effort and are for medical reasons or Holiness services or infrequently or of short duration when for other reasons) because: Requires assistance of another person or specialized equipment to access medical services because patient: Requires supervision of another for safe transfer.    Based on the above findings. I certify that this patient is confined to the home and needs intermittent skilled nursing care, physical therapy and/or speech therapy.  The patient is under my care, and I have initiated the establishment of the plan of care.  This patient will be followed by a physician who will periodically review the plan of care.  Physician/Provider to provide follow up care: Angus Scott    Attending hospital physician (the Medicare certified [place] provider): Severiano Johnson MD  Physician Signature: See electronic signature associated with these discharge orders.  Date: 6/15/2021     Reason for your hospital stay    You were admitted for an elevated white blood cell count and found to have progression to acute leukemia. You were started on treatment with venetoclax (pills) and  decitabine (injection). You will continue to take the venetoclax (pills) every day on discharge.     Follow Up and recommended labs and tests    An appointment for hospital follow up was requested for you. If it is not scheduled by the time you discharge you will be contacted with the date and time. You may call clinic to makes changes to this appointment if needed.    Already scheduled appointments are listed below.     Activity    Your activity upon discharge: activity as tolerated.  No driving while taking opioid pain medications (i.e. oxycodone).     When to contact your care team    Please call the [facility] at [phone] if you develop temperature above 100.4, shortness of breath, chest pain, headaches, vision changes, bleeding, uncontrolled nausea, vomiting, diarrhea, pain, or any other signs or symptoms of concern. If you are concerned that your symptoms are life-threatening, don't hesitate to call 911 or go to the nearest Emergency Room.     Discharge Instructions    - Take acyclovir to prevent viral infections, fluconazole to prevent fungal infections, and levofloxacin to prevent bacterial infections.  - Take allopurinol to prevent tumor lysis syndrome.  - Start taking Crestor (rosuvastatin) instead of atorvastatin. This new medication also works to prevent plaque build-up in the arteries, but has fewer interactions with your other new medications.  - Take oxycodone as needed for severe right shoulder pain. There is a long-acting medicine (Xtampza) that is designed to be taken twice daily as well as a short-acting version (oxycodone) to be taken as needed. Caution: this medication can cause sedation, and it is not safe to drive while taking it.    - Your white blood cells are low. This puts you at risk for infections. You have been prescribed antibiotics to try to prevent infection, but these can't prevent all infections. Make sure to take precautions such  "as washing your hands, wearing a mask, and limiting your risk of exposure whenever possible. Call the clinic at [phone] and ask to speak to the triage nurse if you have an fevers = 100.4 F or if you develop chills, cough, shortness of breath, vomiting, or any other signs or symptoms of concern.  - Your platelets are low. This puts you at risk for bleeding. Call the clinic triage number if you have any bleeding concerns. Do not hesitate to call 911 and/or go to the nearest emergency department if you have sudden, severe headache or any major bleeding.     Follow Up and recommended labs and tests    The team would like you to get a lab draw (CBC w/diff and CMP) with possible transfusion. The following has been arranged for you:    [address] [address]  Phn: [phone]    -Enter through Door [address]10 on the Southwest corner of the hospital, Near the \"Surgery and OP Services\" door.     6/21: 10am- Labs & possible transfusion  6/24: 10am- Labs & possible transfusion     Diet    Follow this diet upon discharge: Regular     Discharge Medications   Discharge Medication List as of 6/18/2021 10:08 AM      START taking these medications    Details   acetaminophen (TYLENOL) 325 MG tablet Take 2 tablets (650 mg) by mouth every 4 hours as needed for mild pain, Historical      acyclovir (ZOVIRAX) 400 MG tablet Take 1 tablet (400 mg) by mouth 2 times daily, Disp-60 tablet, R-1, E-Prescribe      diclofenac (VOLTAREN) 1 % topical gel Apply 2-4 g topically 4 times daily as needed for moderate pain, Disp-50 g, R-1, E-Prescribe      fluconazole (DIFLUCAN) 200 MG tablet Take 1 tablet (200 mg) by mouth daily, Disp-30 tablet, R-1, E-Prescribe      furosemide (LASIX) 40 MG tablet Take 1 tablet (40 mg) by mouth daily, Disp-30 tablet, R-1, E-Prescribe      levofloxacin (LEVAQUIN) 250 MG tablet Take 1 tablet (250 mg) by mouth At Bedtime, Disp-30 tablet, R-1, " E-Prescribe      Lidocaine (LIDOCARE) 4 % Patch Place 1 patch onto the skin every 24 hours . Leave on for 12 hours, then remove for 12 hours.Disp-30 patch, [id number]      ondansetron (ZOFRAN-ODT) 8 MG ODT tab Take 1 tablet (8 mg) by mouth every 8 hours as needed for nausea or vomiting, Disp-30 tablet, R-1, E-Prescribe      oxyCODONE (XTAMPZA) 9 MG 12 hr tablet Take 1 tablet (9 mg) by mouth every 12 hours . DO NOT drive or operate machinery while taking this medication., Disp-60 tablet, R-0, Local Print      polyethylene glycol (MIRALAX) 17 GM/Dose powder Take 17 g by mouth daily, Disp-510 g, OTC      prochlorperazine (COMPAZINE) 5 MG tablet Take 1 tablet (5 mg) by mouth every 6 hours as needed for nausea or vomiting, Disp-30 tablet, R-1, E-Prescribe      rosuvastatin (CRESTOR) 5 MG tablet Take 1 tablet (5 mg) by mouth daily, Disp-30 tablet, R-1, E-Prescribe      traZODone (DESYREL) 50 MG tablet Take 1 tablet (50 mg) by mouth nightly as needed for sleep, Disp-30 tablet, R-1, E-Prescribe      venetoclax (VENCLEXTA) 100 MG tablet Take 2 tablets (200 mg) by mouth daily (with breakfast), Disp-60 tablet, R-0, E-Prescribe         CONTINUE these medications which have NOT CHANGED    Details   ACE/ARB/ARNI NOT PRESCRIBED (INTENTIONAL) Reason ACE/ARB was Not Prescribed: Symptomatic hypotension not due to excessive diuresisNo Print Out      allopurinol (ZYLOPRIM) 300 MG tablet Take 1 tablet (300 mg) by mouth daily, Transitional      metoprolol tartrate (LOPRESSOR) 50 MG tablet Take 1 tablet (50 mg) by mouth 2 times daily, Transitional      nitroGLYcerin (NITROSTAT) 0.4 MG sublingual tablet For chest pain place 1 tablet under the tongue every 5 minutes for 3 doses. If symptoms persist 5 minutes after 1st dose call 911., Transitional      oxyCODONE (ROXICODONE) 5 MG tablet Take 1 tablet (5 mg) by mouth every 6 hours as needed for pain, Disp-2 tablet, R-0, Local Print      senna-docusate (SENOKOT-S/PERICOLACE) 8.6-50 MG  tablet Take 1 tablet by mouth 2 times daily as needed for constipation Per [facility] form takes 1/2 tabs BID/PRN, Historical      vitamin B-12 (CYANOCOBALAMIN) 250 MCG tablet TAKE ONE TABLET BY MOUTH EVERY MORNING, Disp-90 tablet, R-3, E-Prescribe      vitamin D3 (CHOLECALCIFEROL) 50 mcg (2000 units) tablet Take 1 tablet by mouth daily, Historical         STOP taking these medications       ASPIRIN NOT PRESCRIBED (INTENTIONAL) Comments:   Reason for Stopping:         atorvastatin (LIPITOR) 10 MG tablet Comments:   Reason for Stopping:             Allergies   Allergies   Allergen Reactions     No Known Drug Allergies      Seasonal Allergies Other (See Comments)     Stuffy head and nose       Data   Most Recent 3 CBC's:  Recent Labs   Lab Test 06/18/21  0644 06/17/21  0555 06/16/21  0551   WBC 5.7 5.6 5.4   HGB 8.2* 8.5* 7.6*   MCV 90 91 90   PLT 24* 18* 23*      Most Recent 3 BMP's:  Recent Labs   Lab Test 06/18/21  0644 06/17/21  0555 06/16/21  0551    134 137   POTASSIUM 4.0 4.1 4.2   CHLORIDE 103 102 104   CO2 27 27 26   BUN 18 18 23   CR 0.99 0.92 1.08   ANIONGAP 7 5 6   MINNIE 9.1 8.8 8.8   GLC 87 85 86     Most Recent 2 LFT's:  Recent Labs   Lab Test 06/18/21  0644 06/17/21  0555   AST 22 20   ALT 21 22   ALKPHOS 155* 160*   BILITOTAL 0.4 0.5     Most Recent INR's and Anticoagulation Dosing History:  Anticoagulation Dose History     Recent Dosing and Labs Latest Ref Rng & Units 6/12/2021 6/13/2021 6/14/2021 6/15/2021 6/16/2021 6/17/2021 6/18/2021    INR 0.86 - 1.14 1.15(H) 1.19(H) 1.18(H) 1.17(H) 1.09 1.12 1.14        Most Recent 3 Troponin's:  Recent Labs   Lab Test 03/29/21  0211 03/28/21  2158 03/28/21  1737   TROPI <0.015 <0.015 <0.015     Most Recent Cholesterol Panel:  Recent Labs   Lab Test 01/28/21  0532   CHOL 95   LDL 41   HDL 20*   TRIG 172*     Most Recent 6 Bacteria Isolates From Any Culture (See EPIC Reports for Culture Details):  Recent Labs   Lab Test 06/10/21  1430 02/17/20  0925 02/15/20  1713  02/15/20  1659   CULT No growth  Culture negative monitoring continues No growth Cultured on the 2nd day of incubation:  Micrococcus species  *  Critical Value/Significant Value, preliminary result only, called to and read back by  Jeanne Carson RN. @1322. 2.17.20. BS.     (Note)  NEGATIVE for the following: Staphylococcus spp., Staph aureus, Staph  epidermidis, Staph lugdunensis, Streptococcus spp., Strep pneumoniae,  Strep pyogenes, Strep agalactiae, Strep anginosus group, Enterococcus  faecalis, Enterococcus faecium, and Listeria spp. by hopTo  multiplex nucleic acid test. Final identification and antimicrobial  susceptibility testing will be verified by standard methods.    Critical Value/Significant Value called to and read back by  Margot Patel RN @ 1610. 2/17/20. AV   No growth     Most Recent TSH, T4 and A1c Labs:  Recent Labs   Lab Test 11/17/20  0957 08/17/20  1110   TSH 1.88  --    A1C  --  5.3     Results for orders placed or performed during the hospital encounter of 06/03/21   XR Chest 2 Views    Narrative    EXAM: XR CHEST 2 VW  6/3/2021 9:50 PM     HISTORY:  71 yo M with concern for acute leukemia       COMPARISON:  3/29/2021    FINDINGS:   PA and lateral views of the chest. Postoperative changes of catheter  age with intact median sternotomy wires. Unchanged slight rightward  tracheal deviation. Stable enlargement of the cardiac silhouette..  Small left pleural effusion and trace right pleural effusion with  adjacent bibasilar opacities. The visualized upper abdomen is  unremarkable. Degenerative changes of the spine.      Impression    IMPRESSION:   Small left and trace right pleural effusions with adjacent bibasilar  opacities, favored to represent atelectasis.    I have personally reviewed the examination and initial interpretation  and I agree with the findings.    YARA LANGFORD MD   XR Shoulder Right G/E 3 Views    Narrative    3 views right shoulder radiographs 6/4/2021 2:38  PM    History: right shoulder pain     Comparison: 5/27/2021    Findings:    AP internal, external rotation , transscapular Y views of the right  shoulder were obtained.     No acute osseous abnormality. Glenohumeral and acromioclavicular  joints are congruent.    Moderate degenerative changes of the acromioclavicular joint.  Subacromial spurring. No substantial degenerative change of the  glenohumeral joint.    Soft tissue is unremarkable. The visualized lung is clear.      Impression    Impression:  1. No acute osseous abnormality.  2. Moderate acromioclavicular degenerative change.    REJI SARKAR MD (Joe)   XR Shoulder Right 1 View    Narrative    EXAM: XR SHOULDER RIGHT 1 VIEW  LOCATION: [facility]  DATE/TIME: 6/4/2021 5:10 PM    INDICATION: Assess alignment  COMPARISON: 6/4/2021 performed earlier in the day.      Impression    IMPRESSION: Normal glenohumeral alignment. Hypertrophic changes in the glenohumeral joint.    US Upper Extremity Venous Duplex Right Portable    Narrative    EXAMINATION: DOPPLER VENOUS ULTRASOUND OF THE RIGHT UPPER EXTREMITY,  6/8/2021 10:38 AM     COMPARISON: None.    HISTORY: Acute swelling in right upper extremity, assess for DVT.    TECHNIQUE: Gray-scale evaluation with compression, spectral flow, and  color Doppler assessment of the deep venous system of the right upper  extremity.    FINDINGS:  Right: Normal blood flow and waveforms are demonstrated in the  internal jugular, innominate, subclavian, and axillary veins. There is  normal compressibility of the brachial, basilic and cephalic veins.  Partially imaged PICC seen in the right basilic vein.        Impression    IMPRESSION:  No evidence of right upper extremity deep venous thrombosis.    VADIM GOMEZ MD   XR Shoulder Right G/E 3 Views    Narrative    4 views right shoulder radiographs 6/8/2021 4:04 PM    History: include grashey, axillary, AP, and Y view to eval  glunohumeral congruity - continued  right shoulder pain, previous films  from 6/4 with weird angle     Comparison: 6/4/2021    Findings:    AP, Grashey, transscapular Y, axillary  views of the right shoulder  were obtained.     No acute osseous abnormality. Glenohumeral and acromioclavicular  joints are congruent.    Moderate degenerative changes of the acromioclavicular joint with  undersurface spurring. Mild degenerative change of the glenohumeral  joint.    Soft tissue is unremarkable. The visualized lung is clear. Partially  visualized right upper extremity PICC catheter.      Impression    Impression:  1. No acute osseous abnormality.  2. Mild glenohumeral degenerative change.    REJI SARKAR MD (Joe)   IR Fine Needle Aspiration w Imaging    Narrative    Procedure: 6/10/2021  1. Ultrasound guided aspiration of right shoulder joint collection.    History: 70-year-old male with right shoulder joint effusion. Request  is for aspiration of the right shoulder on collection.    Comparison: X-ray right shoulder 6/8/2021.     Staff: ZARIA Gill M.D.    Fellow: Montrell Montemayor M.D.    Monitoring: Patient was placed on continuous monitoring by the IR  nursing staff and supervised by the IR attending. No intravenous  sedation was administered. Patient remained stable throughout the  procedure.     Medications: 3 mL 1% lidocaine for local anesthesia.    Procedure:   The patient understood the limitations, alternatives, and risks of the  procedure and requested the procedure be performed. Both written and  oral consent were obtained.    Limited pre-procedural scan performed demonstrated multiloculated  hypoechoic collection in the right shoulder joint and around the  biceps tendon.    The right shoulder was prepped and draped in the usual sterile  fashion. 1% lidocaine was used for local anesthesia. Under ultrasound  guidance, 20-gauge spinal needle was advanced into the collection and  7 mL of dark brownish serosanguineous fluid aspirated. Sample sent  to  the lab as requested.      Impression    IMPRESSION:   Successful aspiration of right shoulder joint collection. 7 mL of dark  brownish serosanguineous fluid aspirated and sent to the lab as  requested..     US Abdomen Limited ([facility] only)    Narrative    EXAMINATION: Limited Abdominal Ultrasound, 2021 8:19 AM     COMPARISON: 2020, 2020    HISTORY: history of MPN, hepatomegaly, reassess liver size    FINDINGS:   Fluid: No evidence of ascites or pleural effusions.    Liver: The liver demonstrates normal echotexture, measuring 21 cm in  craniocaudal dimension. There is no focal mass. Main portal vein is  patent with antegrade flow.    Gallbladder: Nondistended with normal wall thickness. No  pericholecystic fluid. Multiple mobile shadowing stones. Sonographer  reports negative sonographic Figueroa sign.    Bile Ducts: Both the intra- and extrahepatic biliary system are of  normal caliber.  The common bile duct measures 4 mm in diameter.    Pancreas: Visualized portions are unremarkable.    Kidney: The right kidney measures 10.3 cm long. There is no  hydronephrosis or hydroureter, no shadowing renal calculi, cystic  lesion or mass.       Impression    IMPRESSION:   1. Mild hepatomegaly measuring up to 21 cm craniocaudal with an  otherwise unremarkable sonographic appearance of the liver.  2. Cholelithiasis.    I have personally reviewed the examination and initial interpretation  and I agree with the findings.    ASAD DAI MD   Echocardiogram Complete    Narrative    [id number]  [id number]  [id number]  [id number]^MYNOR^FERNANDO     [facility],[place]  Echocardiography Laboratory  [address] [place] [id number]     Name: ABI VALDEZ  MRN: [id number]  : 1950  Study Date: 2021 01:16 PM  Age: 70 yrs  Gender: Male  Patient Location: [facility]  Reason For Study: Chemo  Ordering Physician: FERNANDO LOWE  Performed By: REY Garcia     BSA: 2.0  m2  Height: 69 in  Weight: 181 lb  BP: 124/62 mmHg  ______________________________________________________________________________  Procedure  Complete Portable Echo Adult. Contrast Optison. Poor acoustic windows.  Technically difficult study. Optison (NDC #8165-3216-58) given intravenously.  Patient was given 6 ml mixture of 3 ml Optison and 6 ml saline. 3 ml wasted.  IV start location R Forearm .  ______________________________________________________________________________  Interpretation Summary  Technically difficult study.  Poor acoustic windows.     Mildly (EF 45-50%) reduced left ventricular function is present. Inferolateral  (posterior) wall hypokinesis is present.  Pulmonary hypertension is present. SPAP is 34+15=49 mmHg.  The right ventricle cannot be assessed.  Dilation of the inferior vena cava is present with abnormal respiratory  variation in diameter.  No pericardial effusion is present.     This study was compared with the study from 1/28/2021 .IVC is dilated, RA  pressure is higher.     ______________________________________________________________________________  Left Ventricle  Left ventricular size is normal. Relative wall thickness is increased  consistent with concentric remodeling. Mildly (EF 45-50%) reduced left  ventricular function is present. Left ventricular diastolic function is  indeterminate. Inferolateral (posterior) wall hypokinesis is present.     Right Ventricle  The right ventricle cannot be assessed.     Atria  Both atria appear normal.     Mitral Valve  The mitral valve is normal.     Aortic Valve  Trileaflet aortic sclerosis without stenosis. On Doppler interrogation, there  is no significant stenosis or regurgitation.     Tricuspid Valve  The tricuspid valve is normal. Trace tricuspid insufficiency is present. The  right ventricular systolic pressure is approximated at 33.6 mmHg plus the  right atrial pressure. Pulmonary hypertension is present.     Pulmonic Valve  The  pulmonic valve cannot be assessed.     Vessels  The aorta root cannot be assessed. Dilation of the inferior vena cava is  present with abnormal respiratory variation in diameter. IVC diameter >2.1 cm  collapsing <50% with sniff suggests a high RA pressure estimated at 15 mmHg or  greater.     Pericardium  No pericardial effusion is present.     Compared to Previous Study  This study was compared with the study from 2021 . IVC is dilated, RA  pressure is higher.  ______________________________________________________________________________  MMode/2D Measurements & Calculations  IVSd: 1.2 cm  LVIDd: 4.6 cm  LVIDs: 3.9 cm  LVPWd: 1.0 cm  FS: 16.0 %  LV mass(C)d: 186.0 grams  LV mass(C)dI: 93.9 grams/m2     EF(MOD-bp): 44.5 %  LA Volume (BP): 92.6 ml  LA Volume Index (BP): 46.8 ml/m2  RWT: 0.45     Doppler Measurements & Calculations  MV E max vlad: 98.3 cm/sec  MV A max vlad: 40.0 cm/sec  MV E/A: 2.5  MV dec slope: 631.0 cm/sec2  PA V2 max: 64.4 cm/sec  PA max P.7 mmHg  PA acc time: 0.13 sec  TR max vlad: 290.0 cm/sec  TR max P.6 mmHg  E/E' av.6  Lateral E/e': 8.5  Medial E/e': 24.7     ______________________________________________________________________________  Report approved by: Nan SOARES 2021 02:53 PM

## 2021-06-18 NOTE — PLAN OF CARE
Discharge  D: Pt afebrile, vital signs stable, Up ad conchita, Reports comfort, except for sore right shoulder. Orders for discharge and outpatient medications written.  I: Home medications and return to clinic schedule reviewed with patient and sister. Discharge instructions and parameters for calling Health Care Provider reviewed. Patient left at 1150 accompanied by sister.   A: Patient/family verbalized understanding and was ready for discharge.   P: Medications retrieved from Pharmacy and reviewed with pt and sister. First follow up appointment as scheduled at [facility] [address] [address]   Phn: [phone]   6/21: 10am- Labs & possible transfusion   6/24: 10am- Labs & possible transfusion.   Remainder of follow up at [facility].

## 2021-06-18 NOTE — CONSULTS
[facility] Nurse Inpatient Wound Assessment   Reason for consultation: Evaluate and treat  L) buttock wounds    Assessment  L) buttock wounds due to Friction and Shear  Status: healing and follow up  Per pt it has been recurrent issue  Treatment Plan  L) buttock wounds: Every 3 days     Cleanse the area with NS and pat dry.    Apply No sting film barrier to periwound skin.    Cover wound with Mepilex.     Change dressing Q 3 days.    Turn and reposition Q 2hrs.    Ensure pt has Subhash-cushion while sitting up in the chair.  FYI- If pt has constant incontinent loose stools needing dressing changes Q shift please discontinue the Mepilex dressing and apply criticaid barrier paste BID and PRN.   Orders Written  Recommended provider order: None, at this time  WO Nurse follow-up plan:weekly  Nursing to notify the Provider(s) and re-consult the WO Nurse if wound(s) deteriorates or new skin concern.    Patient History  According to provider note(s):  Renny Gannon is a 70 year old male who presents with past medical history of CAD s/p CABG (Jan 2020), HFpEF, CKD3 ( BL Cr. 1.3-1.5), HTN, and JAK2+ myelofibrosis. He was transferred from outside hospital for concern of progression to AML with leukocytosis (WBC = 72.4) on admission. Initiated induction therapy with Decitabine + Venetoclax; (D1= 6/5/21). Venetoclax iniation was delayed until WBC <25K.     Objective Data  Containment of urine/stool: Continent of bladder and Continent of bowel    Active Diet Order  Orders Placed This Encounter      Diet      Regular Diet Adult      Output:   I/O last 3 completed shifts:  In: 1587 [P.O.:1320; I.V.:20]  Out: 1080 [Urine:1080]    Risk Assessment:   Sensory Perception: 4-->no impairment  Moisture: 4-->rarely moist  Activity: 3-->walks occasionally  Mobility: 4-->no limitation  Nutrition: 3-->adequate  Friction and Shear: 3-->no apparent problem  Andrea Score: 21                          Labs:   Recent Labs   Lab 06/18/21  0644   ALBUMIN 3.0*    HGB 8.2*   INR 1.14   WBC 5.7       Physical Exam  Areas of skin assessed: focused Coccyx, sacrum, BL buttocks    Wound Location:  L) buttock      Date of last photo 6/11  Wound History: Per pt he has had recurrent issue on this area. Pt able to turn and reposition independently. Continent of B&B.    Wound Base: 100 % thick raised epidermis with pale white tissue- dry and peeling at the edges     Palpation of the wound bed: firm      Drainage: none     Description of drainage: none     Measurements (length x width x depth, in cm) 1  x 4  x  0.0 cm      Tunneling N/A     Undermining N/A  Periwound skin: intact and superficial erosion healed      Color: pink      Temperature: normal   Odor: none  Pain: mild, aching      Interventions  Visual inspection and assessment completed   Wound Care Rationale Protect periwound skin, Provide protection  and Pain reduction  Wound Care: done per plan of care  Supplies: floor stock and discussed with RN ordered geomatt cushion  Current off-loading measures: Foam padding and Pillows  Current support surface: Standard  Atmos Air mattress  Education provided to: importance of repositioning, plan of care, wound progress and Off-loading pressure  Discussed plan of care with Patient and Nurse    Laina Chin RN

## 2021-06-18 NOTE — PROGRESS NOTES
Care Management Discharge Note    Discharge Date: 06/18/21    Discharge Disposition: Home    Discharge Services: Home Care    Discharge DME: None    Discharge Transportation: Car, family or friend will provide    Private pay costs discussed: Not applicable    PAS Confirmation Code: N/A  Patient/family educated on Medicare website which has current facility and service quality ratings: Yes    Education Provided on the Discharge Plan: Yes  Persons Notified of Discharge Plans: Patient, bedside RN, SW, [facility]  Patient/Family in Agreement with the Plan: Yes    Handoff Referral Completed: Yes    Additional Information:    Per team, patient will discharge home today.    OP follow-up arranged. Writer updated [facility] liaison.     Angie Maria, RN, BSN, PHN  Care Coordinator   P: [phone], [facility]

## 2021-06-18 NOTE — PLAN OF CARE
1730-9940. A/Ox4. Afebrile. OVSS on RA. R-shoulder pain managed with lidocaine patch and scheduled Tylenol. Denies SOB and N/V. Pt voiding spontaneously with adequate UOP. Bmx1. R-PICC HL. UAL. Continue to monitor.

## 2021-06-18 NOTE — PLAN OF CARE
Alert and oriented X 4. AVSS. Denies SOB or nausea. C/o mild right shoulder pain. Has lidocaine patch on. Declines other pain interventions at this time. Sleeping intermittently.

## 2021-06-19 NOTE — DISCHARGE INSTRUCTIONS
Follow-up on Monday at scheduled lab draw.  Today, your platelets were 13, your hemoglobin was 7.6.    You can continue to ice the painful areas as needed.    I recommend using your walker at all times.    If you have any concerns return at any time.    I hope that you heal quickly!!

## 2021-06-19 NOTE — ED TRIAGE NOTES
Pt presents with right arm pain bruising and swelling near elbow. Right hip pain swelling and bruising. Abrasion to left head and swelling and bruising right forehead. Pt fell out of bed hitting small table. Pt was just released from [facility] yesterday. Pt states lost balance in the dark. Occurred at 0300. No LOC

## 2021-06-19 NOTE — ED PROVIDER NOTES
"  History     Chief Complaint   Patient presents with     Fall     Right arm pain. Right hip pain. Head swelling. Fell out of bed at 0300. No LOC     The history is provided by the patient and medical records.     Renny Gannon is a 70 year old male who with a history significant for hypertension, chronic kidney disease, TIA, thrombocytopenia, leukemia, coronary artery disease, congestive heart failure, NSTEMI with coronary angiogram (01/09/2021) and CABG (01/31/2021). He got out of bed at 0300 this morning. He was going to sit in his living room because he was having difficulty sleeping. He was feeling weak and lost his balance, causing him to fall onto his right side. He landed onto a wooden table which he reports \"smashing\". He now has pain in his right elbow, right hip, right side of head. There is bruising to the right elbow and right hip. A small scabbed over wound is present to the right side of the head. He denies a headache currently. No loss of consciousness. He has been ambulatory since falling. He was able to crawl to a chair and get himself up. The patient lives alone. His sister came over today and noticed his injuries, and brought him to the ED. The patient denies any other injuries. No pain or injuries to the left side. He has frozen shoulder in his right shoulder, but no new injury. Denies knee pain, ankle pain, chest pain, abdominal pain. He has a baseline amount of swelling in his lower extremities and is on lasix. The patient is not on blood thinners. He takes daily pain medications, which he notes taking PTA. He receives platelets for thrombocytopenia. He notes receiving platelets 1-2 days ago.     The patient was discharged yesterday after being admitted to the [facility] from 06/03/2021 to 06/18/2021 for complications related to his acute leukemia.     06/02/2021-06/03/2021 admission for abnormal labs (anemia, thrombocytopenia, " leukocytosis).  03/28/2021-03/29/2021 admission to [facility] for acute anemia.  01/27/2021-02/03/2021 admission to [facility] for chest pain related to NSTEMI.  01/07/2021-01/08/2021 admission to [facility] for acute anemia.    Allergies:  Allergies   Allergen Reactions     No Known Drug Allergies      Seasonal Allergies Other (See Comments)     Stuffy head and nose       Problem List:    Patient Active Problem List    Diagnosis Date Noted     Antineoplastic chemotherapy induced pancytopenia (H) 06/18/2021     Priority: Medium     Heart failure with reduced ejection fraction (H) 06/18/2021     Priority: Medium     Leukocytosis 06/03/2021     Priority: Medium     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) 06/03/2021     Priority: Medium     Lightheadedness 03/28/2021     Priority: Medium     Pleural effusion 03/28/2021     Priority: Medium     Acute on chronic anemia 03/28/2021     Priority: Medium     Thrombocytopenia (H) 01/27/2021     Priority: Medium     NSTEMI (non-ST elevated myocardial infarction) (H) 01/27/2021     Priority: Medium     Congestive heart failure, unspecified HF chronicity, unspecified heart failure type (H) 01/27/2021     Priority: Medium     Myelofibrosis (H) 01/14/2021     Priority: Medium     Coronary artery disease involving coronary bypass graft of native heart without angina pectoris 01/07/2021     Priority: Medium     Anemia in other chronic diseases classified elsewhere 01/07/2021     Priority: Medium     Dyspnea on exertion 12/21/2020     Priority: Medium     CKD (chronic kidney disease) stage 3, GFR 30-59 ml/min 12/21/2020     Priority: Medium     Vaccination refused by patient 08/20/2020     Priority: Medium     S/P CABG (coronary artery bypass graft) 02/10/2020     Priority: Medium     TIA (transient ischemic attack) 02/10/2020     Priority: Medium     Myeloproliferative neoplasm (H)  2020     Priority: Medium     Status post coronary angiogram 2020     Priority: Medium     Coronary artery disease involving native coronary artery of native heart with other form of angina pectoris (H) 2020     Priority: Medium     Added automatically from request for surgery [id number]       Essential hypertension 2019     Priority: Medium     Memory loss 2019     Priority: Medium        Past Medical History:    Past Medical History:   Diagnosis Date     Heart disease      History of blood transfusion      Hypertension        Past Surgical History:    Past Surgical History:   Procedure Laterality Date     BONE MARROW BIOPSY, BONE SPECIMEN, NEEDLE/TROCAR N/A 2019    Procedure: BIOPSY, BONE MARROW;  Surgeon: Aguilar Krause MD;  Location: [facility] OR     BYPASS GRAFT ARTERY CORONARY N/A 2020    Procedure: CORONARY ARTERY BYPASS GRAFTING X 3 - LIMA TO LAD, SV TO OM  AND PDA; ENDOVEIN HARVEST OF BILATERAL LEGS; ON PUMP WITH SANDRA;  Surgeon: Mable Barrera MD;  Location:  OR     CV CORONARY ANGIOGRAM Left 2020    Procedure: Coronary Angiogram;  Surgeon: Devin Bentley MD;  Location:  HE[facility] CARDI[facility] CATH LAB     NO HISTORY OF SURGERY         Family History:    Family History   Problem Relation Age of Onset     No Known Problems Mother      Unknown/Adopted Father      Unknown/Adopted Maternal Grandmother      Unknown/Adopted Maternal Grandfather      Unknown/Adopted Paternal Grandmother      Unknown/Adopted Paternal Grandfather      Cancer Brother         lung cancer - smoking     No Known Problems Sister      Coronary Artery Disease Brother          of MI at 66     No Known Problems Sister        Social History:  Marital Status:  Single [1]  Social History     Tobacco Use     Smoking status: Former Smoker     Years: 30.00     Types: Cigarettes     Start date: 1961     Quit date: 2001     Years since quittin.3     Smokeless tobacco:  Never Used   Substance Use Topics     Alcohol use: No     Frequency: Never     Drug use: No        Medications:    ACE/ARB/ARNI NOT PRESCRIBED (INTENTIONAL)  acetaminophen (TYLENOL) 325 MG tablet  acyclovir (ZOVIRAX) 400 MG tablet  allopurinol (ZYLOPRIM) 300 MG tablet  diclofenac (VOLTAREN) 1 % topical gel  fluconazole (DIFLUCAN) 200 MG tablet  furosemide (LASIX) 40 MG tablet  levofloxacin (LEVAQUIN) 250 MG tablet  Lidocaine (LIDOCARE) 4 % Patch  metoprolol tartrate (LOPRESSOR) 50 MG tablet  nitroGLYcerin (NITROSTAT) 0.4 MG sublingual tablet  ondansetron (ZOFRAN-ODT) 8 MG ODT tab  oxyCODONE (ROXICODONE) 5 MG tablet  oxyCODONE (XTAMPZA) 9 MG 12 hr tablet  polyethylene glycol (MIRALAX) 17 GM/Dose powder  prochlorperazine (COMPAZINE) 5 MG tablet  rosuvastatin (CRESTOR) 5 MG tablet  senna-docusate (SENOKOT-S/PERICOLACE) 8.6-50 MG tablet  traZODone (DESYREL) 50 MG tablet  venetoclax (VENCLEXTA) 100 MG tablet  vitamin B-12 (CYANOCOBALAMIN) 250 MCG tablet  vitamin D3 (CHOLECALCIFEROL) 50 mcg (2000 units) tablet          Review of Systems    All other ROS reviewed and are negative or non-contributory except as stated in HPI.    Physical Exam   BP: 136/57  Pulse: 91  Temp: 98.6  F (37  C)  Resp: 16  SpO2: 99 %      Physical Exam  Vitals signs and nursing note reviewed.   Constitutional:       Appearance: He is normal weight.   HENT:      Head: Abrasion and contusion present.        Comments: Contusion/abrasion to the right side of the head.   Eyes:      Extraocular Movements: Extraocular movements intact.      Pupils: Pupils are equal, round, and reactive to light.   Neck:      Musculoskeletal: Normal range of motion and neck supple. No muscular tenderness.   Cardiovascular:      Rate and Rhythm: Normal rate and regular rhythm.      Pulses: Normal pulses.      Heart sounds: Normal heart sounds.   Pulmonary:      Effort: Pulmonary effort is normal.      Breath sounds: Normal breath sounds.   Chest:      Chest wall: No  tenderness.   Abdominal:      Tenderness: There is no abdominal tenderness.   Musculoskeletal:        Arms:       Right lower leg: Edema present.      Left lower leg: Edema present.        Legs:    Neurological:      Mental Status: He is alert.         ED Course (with Medical Decision Making)    Pt seen and examined by me.  RN and EPIC notes reviewed.       Patient with recent hospitalization, just released after a prolonged stay at [facility] for induction of chemotherapy secondary to blast crisis, presenting after a fall at home.  Patient notes that he has been weak and lost his balance.  He has injuries to his right side including his head, elbow, hip.  He has right shoulder pain but has been evaluated recently by orthopedics.  Most concerning is the fact that he has significant thrombocytopenia with his last platelet count 6/18/2021 at 24.    I am going to CT the patient's head and x-ray his shoulder, elbow, hip.  He took his home pain medications and declines any at this point.    Head CT does not show any acute bleed or abnormality.  He has soft tissue swelling of the elbow but no fracture or joint effusion.  Right shoulder shows some subluxation as noted, no acute fracture.  The right hip is noted to have degenerative changes but no fracture.  I checked patient's platelet count and it is quite low at 13.  I reviewed the plans from [facility].  He is scheduled for repeat platelet count on Monday.  They want to keep his platelets above 10.  His hemoglobin is 7.6 today and was 8.2 yesterday.    Patient would like to return home.  At this point, I recommend that he ice the painful areas, continue his pain medications at home, and I strongly recommended that he use his walker at all times.  He has follow-up scheduled.  However, if he has any significant concerns he should return promptly to the ED at any time.  Patient comfortable with this plan.  His sister was informed and will be picking him  up.        Procedures    Results for orders placed or performed during the hospital encounter of 06/19/21 (from the past 24 hour(s))   CBC with platelets differential   Result Value Ref Range    WBC 6.8 4.0 - 11.0 10e9/L    RBC Count 2.63 (L) 4.4 - 5.9 10e12/L    Hemoglobin 7.6 (L) 13.3 - 17.7 g/dL    Hematocrit 23.7 (L) 40.0 - 53.0 %    MCV 90 78 - 100 fl    MCH 28.9 26.5 - 33.0 pg    MCHC 32.1 31.5 - 36.5 g/dL    RDW 15.5 (H) 10.0 - 15.0 %    Platelet Count 13 (LL) 150 - 450 10e9/L    Diff Method Slide to be reviewed by Pathologist    Head CT w/o contrast    Narrative    CT OF THE HEAD WITHOUT CONTRAST   6/19/2021 2:00 PM     COMPARISON: Head CT 2/1/2020    HISTORY:  Traumatic head injury. Thrombocytopenia     TECHNIQUE:  Axial CT images of the head from the skull base to the  vertex were acquired without IV contrast.    FINDINGS:   INTRACRANIAL CONTENTS: No intracranial hemorrhage, extraaxial  collection, or mass effect.  No CT evidence of acute infarct.   Normal  parenchymal density for age. The ventricles and sulci are normal for  age.    VISUALIZED ORBITS/SINUSES/MASTOIDS: No significant orbital  abnormality.  No significant paranasal sinus mucosal disease. No  significant middle ear or mastoid effusion.    OSSEOUS STRUCTURES/SOFT TISSUES: No significant abnormality.      Impression    IMPRESSION:  1.  Normal for age.  2.  No change.    Radiation dose for this scan was reduced using automated exposure  control, adjustment of the mA and/or kV according to patient size, or  iterative reconstruction technique    PEDRO CONTRERAS MD   XR Elbow Right G/E 3 Views    Narrative    EXAM: XR ELBOW RT G/E 3 VIEWS  LOCATION: [facility]  DATE/TIME: 6/19/2021 1:32 PM    INDICATION: Fall, pain.  COMPARISON: None.      Impression    IMPRESSION: Soft tissue swelling. There is no evidence of an elbow fracture. No joint effusion. Mild degenerative change.   XR Shoulder Right G/E 3 Views    Narrative    EXAM: XR  SHOULDER RIGHT G/E 3 VIEWS  LOCATION: [facility]  DATE/TIME: 6/19/2021 1:32 PM    INDICATION: Fall, pain.  COMPARISON: 6/8/2021.      Impression    IMPRESSION: There is nonspecific inferior subluxation of the glenohumeral joint which can be seen with a joint effusion or rotator cuff insufficiency. This was also seen on the 6/8/2021 radiograph but appears more pronounced on the current study.    No acute fracture. Subacromial spur formation and mild AC joint arthrosis. Median sternotomy wires.   XR Pelvis w Hip Right 1 View    Narrative    EXAM: XR PELVIS AND HIP RIGHT 1 VIEW  LOCATION: [facility]  DATE/TIME: 6/19/2021 1:31 PM    INDICATION: Fall, pain.  COMPARISON: None.      Impression    IMPRESSION: Mild right hip degenerative change. No acute fracture or dislocation. Small surgical clips in the inner thighs bilaterally.    Degenerative changes bilateral SI joints and lower lumbar spine.       Medications - No data to display    Assessments & Plan      I have reviewed the findings, diagnosis, plan and need for follow up with the patient.    Discharge Medication List as of 6/19/2021  3:13 PM          Final diagnoses:   Contusion of right elbow, initial encounter   Contusion of right hip, initial encounter   Contusion of scalp, initial encounter   Right shoulder pain, unspecified chronicity   Fall in home, initial encounter     Disposition: Patient discharged home in stable condition.  Plan as above.  Return for concerns.      This document serves as a record of services personally performed by Neris Bourgeois MD*. It was created on their behalf by Janki Mckeon, a trained medical scribe. The creation of this record is based on the provider's personal observations and the statements of the patient. This document has been checked and approved by the attending provider.    Note: Chart documentation done in part with Dragon Voice Recognition software. Although reviewed after  completion, some word and grammatical errors may remain.    6/19/2021   [facility] EMERGENCY DEPT     Neris Bourgeois MD  06/20/21 0056     hard copy

## 2021-06-19 NOTE — TELEPHONE ENCOUNTER
Information only note    Caller name: Mary  Relation to patient: sibling - conference call to Renny to discuss    Patient fell around 3am this morning. He states he's having right elbow swelling and painful to move elbow. R-leg swelling from fall as well. He's also noticed swelling in the right cheek. He said he has lots of bruising, and a possible laceration on his head.     Patient currently gets platelet infusions and has AML. Patient's sister is wondering if patient needs to be evaluated.         Disposition: per protocol, go to ED now.  Patient verbalized his understanding and agrees with plan. Sister Mary will drive him to the ED      Betzaida Zamarripa RN  [facility] Nurse Advisor          COVID 19 Nurse Triage Plan/Patient Instructions    Please be aware that novel coronavirus (COVID-19) may be circulating in the community. If you develop symptoms such as fever, cough, or SOB or if you have concerns about the presence of another infection including coronavirus (COVID-19), please contact your health care provider or visit [email or web address].     Disposition/Instructions    ED Visit recommended. Follow protocol based instructions.     Bring Your Own Device:  Please also bring your smart device(s) (smart phones, tablets, laptops) and their charging cables for your personal use and to communicate with your care team during your visit.    Thank you for taking steps to prevent the spread of this virus.  o Limit your contact with others.  o Wear a simple mask to cover your cough.  o Wash your hands well and often.    Resources    [facility]: About COVID-19: [email or web address]    CDC: What to Do If You're Sick: www.cdc.gov/coronavirus/2019-ncov/about/steps-when-sick.html    CDC: Ending Home Isolation: www.cdc.gov/coronavirus/2019-ncov/hcp/disposition-in-home-patients.html     CDC: Caring for Someone: www.cdc.gov/coronavirus/2019-ncov/if-you-are-sick/care-for-someone.html     [facility]:  Interim Guidance for Hospital Discharge to Home: www.health.[facility].[place].us/diseases/coronavirus/hcp/hospdischarge.pdf    [facility] clinical trials (COVID-19 research studies): clinicalaffairs.[facility]/umn-clinical-trials     Below are the COVID-19 hotlines at the [place] Department of Health ([facility]). Interpreters are available.   o For health questions: Call 531-742-8466 or 1-969.854.5788 (7 a.m. to 7 p.m.)  o For questions about schools and childcare: Call 979-917-1161 or 1-368.334.6180 (7 a.m. to 7 p.m.)            Reason for Disposition    Multiple types of skin trauma    Sounds like a serious injury to the triager    Additional Information    Negative: Serious injury with multiple fractures (broken bones)    Negative: [1] Major bleeding (e.g., actively dripping or spurting) AND [2] can't be stopped    Negative: Bullet wound, stabbed by knife, or other serious penetrating wound    Negative: Sounds like a life-threatening emergency to the triager    Negative: Looks like a broken bone or dislocated joint (e.g., crooked or deformed)    Negative: Skin is split open or gaping (or length > 1/2 inch or 12 mm)    Negative: [1] Bleeding AND [2] won't stop after 10 minutes of direct pressure (using correct technique)    Negative: [1] Dirt in the wound AND [2] not removed with 15 minutes of scrubbing    Negative: Wound looks infected    Negative: Elbow pain from overuse (work, exercise, gardening) OR from self-induced lifting injury    Negative: Elbow pain not from an injury    Negative: Can't bend injured elbow at all    Negative: [1] Numbness (i.e., loss of sensation) in fingers AND [2] present now    Protocols used: INJURY - MULTIPLE SITES - GUIDELINE [id number], ELBOW INJURY-A-

## 2021-06-21 NOTE — PROGRESS NOTES
Clinic Care Coordination Contact    Clinic Care Coordination Contact  OUTREACH    Referral Information:  Referral Source: IP Handoff    Primary Diagnosis: Oncology    Chief Complaint   Patient presents with     Clinic Care Coordination - Post Hospital     RNCC assessment-Hospital discharge        Universal Utilization:   Clinic Utilization  Difficulty keeping appointments:: No  Compliance Concerns: No  No-Show Concerns: No  No PCP office visit in Past Year: No  Utilization    Last refreshed: 6/21/2021  9:05 AM: Hospital Admissions 6           Last refreshed: 6/21/2021  9:05 AM: ED Visits 7           Last refreshed: 6/21/2021  9:05 AM: No Show Count (past year) 4              Current as of: 6/21/2021  9:05 AM            Clinical Concerns:  Current Medical Concerns:  Called and spoke with pt, introduced self and role. Pt states he is doing ok, his sister is there now helping him get to [place] for lab work. Pt states he did take a fall since being home and has several lumps, one on his elbow, head, and leg.  Pt was x-rayed and no broken bones. States he is sore but doing ok. Pt does have a follow up appt with oncology and said he will call back and schedule an appt with PCP. Pt does have home care coming out Thursday.   Current Behavioral Concerns: no current concerns    Education Provided to patient: call with any questions or concerns.      Health Maintenance Reviewed: Not assessed  Clinical Pathway: None    Medication Management:  Not reviewed due to time restraint pt needed to leave for appt.  States his sister does help him with them.      Functional Status:  Dependent ADLs:: Independent(has a walker at home, uses as needed)  Dependent IADLs:: Transportation, Shopping, Cooking, Cleaning  Fallen 2 or more times in the past year?: Yes  Any fall with injury in the past year?: Yes    Living Situation:  Current living arrangement:: I live alone  Type of residence:: Apartment - handicap accessible    Lifestyle &  Psychosocial Needs:           Transportation means:: Regular car, Family     Informal Support system:: Family   Socioeconomic History     Marital status: Single     Spouse name: Not on file     Number of children: Not on file     Years of education: Not on file     Highest education level: Not on file   Occupational History     Occupation: Retired     Tobacco Use     Smoking status: Former Smoker     Years: 30.00     Types: Cigarettes     Start date: 1961     Quit date: 2001     Years since quittin.4     Smokeless tobacco: Never Used   Substance and Sexual Activity     Alcohol use: No     Frequency: Never     Drug use: No     Sexual activity: Not Currently      Resources and Interventions:  Current Resources:   Skilled Home Care Services: Skilled Nursing  Community Resources: OP Infusion  Supplies used at home:: None  Equipment Currently Used at Home: grab bar, toilet, grab bar, tub/shower, shower chair, walker, standard  Employment Status: retired)    Advance Care Plan/Directive  Advanced Care Plans/Directives on file:: Yes  Type Advanced Care Plans/Directives: Advanced Directive - On File  Advanced Care Plan/Directive Status: Not Applicable     Goals:   na    Patient/Caregiver understanding: Pt verbalizes understanding of follow up instructions and when scheduled appt date and times.     Future Appointments              In 4 days Milena Fajardo PA-C [facility], [facility]    In 1 week NL LAB PM[facility]    In 1 week PH INFUSION NURSE; PH INFUSION CHAIR 9 [facility] Infusion [facility]    In 1 week Daljit Gannon PT; NL LYMPHEDEMA REHAB ROOM [facility] Rehabilitation [facility]    In 1 week NL LAB [facility]    In 1 week PH INFUSION NURSE; PH INFUSION CHAIR 8 [facility] Infusion [facility]     In 2 weeks NL LAB PM[facility]    In 2 weeks PH INFUSION NURSE; PH INFUSION CHAIR 9 [facility] Infusion [facility]    In 2 weeks [facility] DRAW [facility]    In 2 weeks Milena Fajardo PA-C; UU BONE MARROW BIOPSY [facility]    In 2 weeks NL LAB PMC [facility]    In 2 weeks PH INFUSION CHAIR 1; PH INFUSION NURSE [facility]    In 3 weeks NL LAB PMC [facility]    In 3 weeks PH INFUSION NURSE; PH INFUSION CHAIR 10 [facility] Infusion [facility]    In 3 weeks PH INFUSION NURSE; PH INFUSION CHAIR 9 [facility] Infusion [facility]    In 3 weeks PH INFUSION NURSE; PH INFUSION CHAIR 9 [facility] Infusion [facility]    In 3 weeks PH INFUSION CHAIR 3; PH INFUSION NURSE [facility] Infusion [facility]    In 3 weeks PH INFUSION CHAIR 1; PH INFUSION NURSE [facility]          Plan:   No further overage by care coordination patient declined any additional needs.      Barbara DENNYN, RN, PHN, [facility]  Primary Clinic Care Coordination    [facility]  Primary Care Clinics  [email or web address]   Office: [phone]  Employed by [facility]

## 2021-06-23 NOTE — TELEPHONE ENCOUNTER
[facility], [place]   Psychiatric Discharge Summary      Misty Parham MRN# [id number]   Age: 33 year old YOB: 1984     Date of Admission:  3/22/2018  Date of Discharge:  03/27/18  Admitting Physician:  Gerardo Madden MD  Discharge Physician:  Gerardo Madden MD         Summary/Hospital Course/Disposition:   Reason for Hospitalization: Misty is a 33 year old female with history of Major Depressive Disorder, Anxiety Disorder, Borderline Personality Disorder traits, who was admitted on a 72 hour hold for worsening suicidal ideation and plan.       Hospital Course:   (Initial Assessment 3/23): Misty exhibits depressive symptoms, particularly the affective components, with increased suicidal ideation. She appears to have recentely been hospitalized only about a month ago for similar reasons and was started on antidepressants. Unclear how compliant she was but claims to have been taking medications, although denies any significant benefit. She has been taking large doses of Tylenol PM to help with her fractured sleep. Her hepatic panel and acetaminophen level were unremarkable. We discussed augmenting her SSRI with Abilify with has mood stabilization components along with providing more activation. She was open to starting it. She appears to have been told that Viibryd would be a good addition, although due to her current level of increased depression, she would benefit from something that has quicker onset of action , along with a different pharmacology compared to a serotinergic agent.      (Follow Up 3/26): There is some subjective improvement in her mood with lesser intense suicidal thinking, denies any current SI. Her affect remains blunt, and her behavior is withdrawn. 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 We discussed having another day in the hospital, and recommended going to groups and doing theraputic  [facility] now requests orders and shares plan of care/discharge summaries for some patients through [facility].  Please REPLY TO THIS MESSAGE OR ROUTE BACK TO THE AUTHOR in order to give authorization for orders when needed.  This is considered a verbal order, you will still receive a faxed copy of orders for signature.  Thank you for your assistance in improving collaboration for our patients.    VERBAL ORDER     PT eval and treat  OT eval and treat  SW eval   SN 1w1, 2w2, 1w6, 3 PRN    Wound care to right elbow; cleanse with wound cleanser, soap and water, or NS. Pat dry. Cover with band-aid. Change PRN. Ok to discontinue when scabbed/healed.     Ok for multiple med interactions including diflucan with levaquin, zofran, oxycodone, trazodone, venclexta, xtampza.     levaquin interacts with zoftan.     Chichi Santana RN, BSN  [phone]     calming activities.     Day of Discharge Assessment (3/27):  Patient was compliant with her Abilify addition, and endorsed some subjective benefit to her depressive moods. She denied any continued suicidal ideation or intent. She did continue to be isolative in her room. She was future oriented. She plans on starting her job in April (when her leave of absence is over).        DIagnoses:   Major Depressive Disorder, recurrent, severe without psychosis  Generalized Anxiety Disorder  Traits of Borderline Personality Disorder         Labs:   No results found for this or any previous visit (from the past 24 hour(s)).         Consults:   None            Discharge Medications:        Review of your medicines      START taking       Dose / Directions    ARIPiprazole 10 MG tablet   Commonly known as:  ABILIFY   Used for:  Recurrent major depressive disorder, in remission (H)        Dose:  10 mg   Take 1 tablet (10 mg) by mouth daily   Quantity:  30 tablet   Refills:  1       mirtazapine 15 MG tablet   Commonly known as:  REMERON   Used for:  Recurrent major depressive disorder, in remission (H)        Dose:  7.5 mg   Take 0.5 tablets (7.5 mg) by mouth At Bedtime   Quantity:  15 tablet   Refills:  1         CONTINUE these medicines which have NOT CHANGED       Dose / Directions    cetirizine 10 MG tablet   Commonly known as:  zyrTEC        Dose:  10 mg   Take 10 mg by mouth daily as needed for allergies   Refills:  0       MECLIZINE HCL PO        Dose:  25 mg   Take 25 mg by mouth 3 times daily as needed for dizziness   Refills:  0       TRAZODONE HCL PO        Dose:  100 mg   Take 100 mg by mouth At Bedtime   Refills:  0       ZOFRAN ODT PO        Dose:  4 mg   Take 4 mg by mouth every 8 hours as needed for nausea   Refills:  0       ZOLOFT PO        Dose:  150 mg   Take 150 mg by mouth daily   Refills:  0         STOP taking          VIIBRYD PO                Where to get your medicines      These medications were sent to  [facility] [place], [place] - [address]  [address] [facility] [address], [facility] [address]     Phone:  [phone]      ARIPiprazole 10 MG tablet     mirtazapine 15 MG tablet                  Mental Status Examination:   Appearance:  female, overweight appearance. Less tearful compared to before.   Attitude:  Clamer. Cooperative   Eye Contact:  Improved   Mood:  Improved   Affect:  Mood congruent   Speech:  Soft in tone, slow in rate   Language: fluent and intact in English  Psychomotor, Gait, Musculoskeletal:  Unremarkable   Throught Process:  Linear   Associations:  Intact   Thought Content:  No SI, no intent or plan. No HI/AH/VH.   Insight:  Fair   Judgement:  Poor-Fair   Oriented to:  Person, place and time   Attention Span and Concentration:  Intact   Recent and Remote Memory:  Fair   Fund of Knowledge:  Average          Discharge Plan:   No discharge procedures on file.    - Patient was discharged back home with medications.     Gerardo Madden MD  [facility]

## 2021-06-25 NOTE — TELEPHONE ENCOUNTER
Oral Chemotherapy Monitoring Program    Subjective/Objective:  Renny Gannon is a 70 year old male contacted by phone for a follow-up visit for oral chemotherapy. He was started on Venclexta therapy while he was admitted. Today we reviewed the medication information on Venclexta therapy and discussed how things have been going since starting.     He details that he has a lot of medications that he takes and it is hard for him to differentiate what causes different side effects. Discussed typical side effects of Venclexta (fatigue, nausea/vomiting, diarrhea/constipation). He shares that he does tend to be more constipated and that he does have medication he takes to help with that. However, he was unable to provide me with the medication names. I urged him to continue to monitor his bowels and to let us know if his medications are not providing adequate relief.     ORAL CHEMOTHERAPY 6/25/2021   Assessment Type New Teach;Initial Follow up   Diagnosis Code Acute Myeloid Leukemia (AML)   Providers Dr. Patel   Clinic Name/Location [facility]   Drug Name Venclexta (venetoclax)   Dose 200 mg   Current Schedule Daily   Cycle Details Continuous   Doses missed in last 2 weeks 0   Adherence Assessment Adherent   Adverse Effects Constipation   Constipation Grade 1   Pharmacist Intervention(constipation) No   Any new drug interactions? Yes   Pharmacist Intervention? No   Is the dose as ordered appropriate for the patient? Yes   Is the patient currently in pain? No   Has the patient been assessed within the past 6 months for depression? Yes   Has the patient missed any days of school, work, or other routine activity? No   Since the last time we talked, have you been hospitalized or used the emergency room? No       Last PHQ-2 Score on record:   PHQ-2 ( 1999 [employer]) 4/7/2021 11/17/2020   Q1: Little interest or pleasure in doing things 0 0   Q2: Feeling down, depressed or hopeless 0 0   PHQ-2 Score 0 0   Q1: Little interest or  "pleasure in doing things - Not at all   Q2: Feeling down, depressed or hopeless - Not at all   PHQ-2 Score - 0       Vitals:  BP:   BP Readings from Last 1 Encounters:   06/19/21 134/60     Wt Readings from Last 1 Encounters:   06/18/21 77.6 kg (171 lb 1.6 oz)     Estimated body surface area is 1.95 meters squared as calculated from the following:    Height as of 6/3/21: 1.768 m (5' 9.6\").    Weight as of 6/18/21: 77.6 kg (171 lb 1.6 oz).    Labs:  _  Result Component Current Result Ref Range   Sodium 137 (6/18/2021) 133 - 144 mmol/L     _  Result Component Current Result Ref Range   Potassium 4.0 (6/18/2021) 3.4 - 5.3 mmol/L     _  Result Component Current Result Ref Range   Calcium 9.1 (6/18/2021) 8.5 - 10.1 mg/dL     _  Result Component Current Result Ref Range   Magnesium 2.1 (6/18/2021) 1.6 - 2.3 mg/dL     _  Result Component Current Result Ref Range   Phosphorus 3.7 (6/18/2021) 2.5 - 4.5 mg/dL     _  Result Component Current Result Ref Range   Albumin 3.0 (L) (6/18/2021) 3.4 - 5.0 g/dL     _  Result Component Current Result Ref Range   Urea Nitrogen 18 (6/18/2021) 7 - 30 mg/dL     _  Result Component Current Result Ref Range   Creatinine 0.99 (6/18/2021) 0.66 - 1.25 mg/dL     _  Result Component Current Result Ref Range   AST 22 (6/18/2021) 0 - 45 U/L     _  Result Component Current Result Ref Range   ALT 21 (6/18/2021) 0 - 70 U/L     _  Result Component Current Result Ref Range   Bilirubin Total 0.4 (6/18/2021) 0.2 - 1.3 mg/dL     _  Result Component Current Result Ref Range   WBC 6.8 (6/19/2021) 4.0 - 11.0 10e9/L     _  Result Component Current Result Ref Range   Hemoglobin 7.6 (L) (6/19/2021) 13.3 - 17.7 g/dL     _  Result Component Current Result Ref Range   Platelet Count 13 (LL) (6/19/2021) 150 - 450 10e9/L     _  Result Component Current Result Ref Range   Absolute Neutrophil 1.2 (L) (6/19/2021) 1.6 - 8.3 10e9/L       Assessment/Plan:  Renny is tolerating the start of therapy. Continue Venclexta therapy " as planned.    Follow-Up:  6/28: jesu Cartwright PharmD, BCACP  Oral Chemotherapy Monitoring Program  [facility]  [phone]

## 2021-06-25 NOTE — TELEPHONE ENCOUNTER
Reason for Call:  Home Health Care    Desiree with [facility] called regarding (reason for call):     Orders are needed for this patient.     PT:     OT: 1 time per week time 4 weeks    Skilled Nursing:     Pt Provider: Dr Scott    Phone Number Homecare Nurse can be reached at: [phone]    Can we leave a detailed message on this number? YES    Phone number patient can be reached at: Home number on file [phone] (home)    Best Time:     Call taken on 6/25/2021 at 12:05 PM by Fernanda Fernandez

## 2021-06-28 NOTE — PROGRESS NOTES
Infusion Nursing Note:  Renny Gannon presents today for Blood transfusion.    Patient seen by provider today: No   present during visit today: Not Applicable.    Note: N/A.  Patient  did meet criteria for an asymptomatic covid-19 PCR test in infusion today. Patient  declined the covid-19 test.    Intravenous Access:  Labs drawn without difficulty.  Peripheral IV placed.    Treatment Conditions:  Lab Results   Component Value Date    HGB 7.5 06/28/2021     Lab Results   Component Value Date    WBC 16.9 06/28/2021      Lab Results   Component Value Date    ANEU 5.6 06/28/2021     Lab Results   Component Value Date    PLT 20 06/28/2021      Blood transfusion consent signed 3/5/21.  Lungs clear pre transfusion, but diminished on left A/P..      Post Infusion Assessment:  Patient tolerated infusion without incident.  Blood return noted pre and post infusion.  Site patent and intact, free from redness, edema or discomfort.  No evidence of extravasations.  Access discontinued per protocol.  Post transfusion-Lungs sounds clear, but diminished in left. Unchanged from pre transfusion.       Discharge Plan:   Discharge instructions reviewed with: Patient.  Patient discharged in stable condition accompanied by: self and sister.  Departure Mode: Ambulatory.      Jalyn Gannon RN

## 2021-06-28 NOTE — PROGRESS NOTES
Infusion Nursing Note:  Renny Gannon presents today for Blood transfusion.    Patient seen by provider today: No   present during visit today: Not Applicable.    Note: N/A.  Patient  did meet criteria for an asymptomatic covid-19 PCR test in infusion today. Patient  declined the covid-19 test.    Intravenous Access:  Labs drawn without difficulty.  Peripheral IV placed.    Treatment Conditions:  Lab Results   Component Value Date    HGB 7.5 06/28/2021     Lab Results   Component Value Date    WBC 16.9 06/28/2021      Lab Results   Component Value Date    ANEU 5.6 06/28/2021     Lab Results   Component Value Date    PLT 20 06/28/2021      Blood transfusion consent signed 3/5/21.  Lungs clear pre transfusion, but diminished on left A/P..      Post Infusion Assessment:  Patient tolerated infusion without incident.  Blood return noted pre and post infusion.  Site patent and intact, free from redness, edema or discomfort.  No evidence of extravasations.  Access discontinued per protocol.       Discharge Plan:   Discharge instructions reviewed with: Patient.  Patient discharged in stable condition accompanied by: sister.  Departure Mode: Ambulatory.      Jalyn Gannon RN

## 2021-06-28 NOTE — CONSULTS
"       BMT Consultation      Renny Gannon is a 70 male referred by Dr. Martinez for discussion and consideration of allogeneic hematopoietic cell transplantation    Hematologic history per inpatient team patient not able to give detailed history.    # JAK2+ myelofibrosis, in blast crisis  Follow by Dr. Cristina. He initially was seen in Dec. 2019 by his PCP for increased fatigue and SOB w/ exertion. CBC showed WBC 79.1, Hgb 11.9 and Plt 378. Peripheral smear showed leuko-erythroblastic reaction w/ neutrophilic left shift. BMBx (12/30/19) revealed extremely hypercellular (100%) marrow with granulocytic and megakaryocytic proliferation, megakaryocytic clustering and atypia and 1.5% circulating blasts as well as moderate-severe reticulin fibrosis (MF 2-3). 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 He was on and off Hydrea from Jan 2020 - Jan 2021. BMBx 3/26/21 revealed increased fibrosis (MF-3), cellularity 40-50%, 5% blasts by morphology and flow. 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 He presented to clinic for routine laboratory monitoring and was found to have a WBC of 71.9, Hgb 6.4, and plt 18K with \"a lot of immature WBC and blasts\" on laboratory review. He was then directed to the ED for admission and further work-up.  - BMBx done x6/4; unfortunately only core was obtained. No aspirate obtained, likely due to fibrosis and possibly excess blasts. BMBx morphology reveals marrow hypercellularity (70-80%) with left-shifted myeloid predominant maturation with approximately 5-10% myeloid blasts, markedly increased marrow fibrosis (MF3); findings consistent with blast crisis arising from previously diagnosed myeloproliferative neoplasm. Diagnosis made on the basis of increased " peripheral blast percentage (23%); blast percentage in bone marrow is difficult to establish d/t bone marrow fibrosis and lack of aspirates. BMBx flow from core shows increased abnormal myeloid blasts (12%). FLT3 negative.  ? Cytogenetics and NGS sent on peripheral blood; pending.  ? DIPSS score 5 for age, WBC >25, Hgb <10, peripheral blasts on differential.  - Flow cytometry on peripheral blood shows 28% myeloid blasts.  - Discussion had with patient and sister on 6/5 re: diagnosis, treatment options, and prognosis.  - Started on decitabine 20 mg/m2 on 6/5.  - Venetoclax started on 6/9; see below for ramp-up.    - PICC line in place; will remove on discharge.  - Follow TLS, DIC labs daily; stable thus far.  - Repeat RUQ U/S to reassess liver size.  - Prior auth approved for Venetoclax for this patient. Copay is $2,882.25. [employer] approved.   - BMT consult scheduled for 6/17 with Dr. Óscar Muniz.                  Treatment Plan: Decitabine (5 days) / Venetoclax (C1D1: 6/5/21)      HPI:  Please see my entry above for hematologic history.      I saw the patient on the malignancy an inpatient unit in the presence of his nurse.  Offered the patient that we call his sister however she was not available as she was at work.  She requested that the nurse to be present in the room and help debriefing with the patient.  Patient tells me that he is generally bleeding and inactive life to start with.  He says he is rather independent in his ADLs.  He overall is tolerating treatment well so far.  Per his nurse he sometimes goes on short walks in the hallway however overall it seems that he is at his baseline level of physical inactivity, and spends more than half of the day even prior to admission sitting watching TV or napping.            ROS:    10 point ROS neg other than the symptoms noted above in the HPI.        Past Medical History:   Diagnosis Date     Heart disease      History of blood transfusion       Hypertension        Past Surgical History:   Procedure Laterality Date     BONE MARROW BIOPSY, BONE SPECIMEN, NEEDLE/TROCAR N/A 2019    Procedure: BIOPSY, BONE MARROW;  Surgeon: Aguilar Krause MD;  Location: [facility]     BYPASS GRAFT ARTERY CORONARY N/A 2020    Procedure: CORONARY ARTERY BYPASS GRAFTING X 3 - LIMA TO LAD, SV TO OM  AND PDA; ENDOVEIN HARVEST OF BILATERAL LEGS; ON PUMP WITH SANDRA;  Surgeon: Mable Barrera MD;  Location:  OR     CV CORONARY ANGIOGRAM Left 2020    Procedure: Coronary Angiogram;  Surgeon: Devin Bentley MD;  Location:  [facility]ATH LAB     NO HISTORY OF SURGERY         Family History   Problem Relation Age of Onset     No Known Problems Mother      Unknown/Adopted Father      Unknown/Adopted Maternal Grandmother      Unknown/Adopted Maternal Grandfather      Unknown/Adopted Paternal Grandmother      Unknown/Adopted Paternal Grandfather      Cancer Brother         lung cancer - smoking     No Known Problems Sister      Coronary Artery Disease Brother          of MI at 66     No Known Problems Sister        Social History     Tobacco Use     Smoking status: Former Smoker     Years: 30.00     Types: Cigarettes     Start date: 1961     Quit date: 2001     Years since quittin.4     Smokeless tobacco: Never Used   Substance Use Topics     Alcohol use: No     Frequency: Never     Drug use: No         Allergies   Allergen Reactions     No Known Drug Allergies      Seasonal Allergies Other (See Comments)     Stuffy head and nose        Current Outpatient Medications   Medication Sig Dispense Refill     acetaminophen (TYLENOL) 325 MG tablet Take 2 tablets (650 mg) by mouth every 4 hours as needed for mild pain       acyclovir (ZOVIRAX) 400 MG tablet Take 1 tablet (400 mg) by mouth 2 times daily 60 tablet 1     allopurinol (ZYLOPRIM) 300 MG tablet Take 1 tablet (300 mg) by mouth daily       diclofenac (VOLTAREN) 1 % topical gel  Apply 2-4 g topically 4 times daily as needed for moderate pain 50 g 1     fluconazole (DIFLUCAN) 200 MG tablet Take 1 tablet (200 mg) by mouth daily 30 tablet 1     furosemide (LASIX) 40 MG tablet Take 1 tablet (40 mg) by mouth daily 30 tablet 1     levofloxacin (LEVAQUIN) 250 MG tablet Take 1 tablet (250 mg) by mouth At Bedtime 30 tablet 1     Lidocaine (LIDOCARE) 4 % Patch Place 1 patch onto the skin every 24 hours . Leave on for 12 hours, then remove for 12 hours. 30 patch 1     metoprolol tartrate (LOPRESSOR) 50 MG tablet Take 1 tablet (50 mg) by mouth 2 times daily       ondansetron (ZOFRAN-ODT) 8 MG ODT tab Take 1 tablet (8 mg) by mouth every 8 hours as needed for nausea or vomiting 30 tablet 1     oxyCODONE (XTAMPZA) 9 MG 12 hr tablet Take 1 tablet (9 mg) by mouth every 12 hours . DO NOT drive or operate machinery while taking this medication. 60 tablet 0     polyethylene glycol (MIRALAX) 17 GM/Dose powder Take 17 g by mouth daily 510 g      prochlorperazine (COMPAZINE) 5 MG tablet Take 1 tablet (5 mg) by mouth every 6 hours as needed for nausea or vomiting 30 tablet 1     rosuvastatin (CRESTOR) 5 MG tablet Take 1 tablet (5 mg) by mouth daily 30 tablet 1     senna-docusate (SENOKOT-S/PERICOLACE) 8.6-50 MG tablet Take 1 tablet by mouth 2 times daily as needed for constipation Per [facility] form takes 1/2 tabs BID/PRN       traZODone (DESYREL) 50 MG tablet Take 1 tablet (50 mg) by mouth nightly as needed for sleep 30 tablet 1     venetoclax (VENCLEXTA) 100 MG tablet Take 2 tablets (200 mg) by mouth daily (with breakfast) 60 tablet 0     vitamin B-12 (CYANOCOBALAMIN) 250 MCG tablet TAKE ONE TABLET BY MOUTH EVERY MORNING 90 tablet 3     ACE/ARB/ARNI NOT PRESCRIBED (INTENTIONAL) Please choose reason not prescribed from choices below.       ASPIRIN NOT PRESCRIBED (INTENTIONAL) Please choose reason not prescribed from choices below.       nitroGLYcerin (NITROSTAT) 0.4 MG sublingual tablet For chest pain place 1 tablet  "under the tongue every 5 minutes for 3 doses. If symptoms persist 5 minutes after 1st dose call 911.       oxyCODONE (ROXICODONE) 5 MG tablet Take 1 tablet (5 mg) by mouth every 6 hours as needed for pain 2 tablet 0     vitamin D3 (CHOLECALCIFEROL) 50 mcg (2000 units) tablet Take 1 tablet by mouth daily           Physical Exam:     Vital Signs: /57 (BP Location: Left arm)   Pulse 90   Temp 96.2  F (35.7  C) (Oral)   Resp 16   Ht 1.768 m (5' 9.6\")   Wt 77.6 kg (171 lb 1.6 oz)   SpO2 96%   BMI 24.83 kg/m      KPS: 60    ASSESSMENT AND PLAN:  This is a very pleasant 70-year-old male with significant cardiovascular history including CAD status post CABG heart failure with preserved ejection fraction, pulmonary hypertension, hypertension, CKD, and other comorbidities as detailed above.  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    Had a detailed discussion with the patient about the overall diagnosis and prognosis of his disease.  And indications for allogeneic hematopoietic cell transplantation from a disease standpoint.  I also discussed with him the toxicities associated with transplantation both short and long-term in addition to how we evaluate patients both performance status wise as well as organ wise to further if they are good transplant candidates, their treatment-related mortality and morbidity.    Patient expressed very clearly and strongly that he does not want to consider an allogeneic hematopoietic cell transplantation as part of his treatment course or consolidation for his disease.  He emphasized that his overall goals of care would be to go closer to home and proceed with whatever treatment requires minimal interventions and minimal toxicity so he can spend most of his time at home and with his family members rather than going through intense therapy.    The nurse present in the room also brought to my attention in front of the patient that that is what he has discussed with his sister " regarding his overall goals of care, the nurse stating that with the patient expressed to me he is in agreement with what she has heard him discussed with his sister previously.      Renny understood the above assessment and recommendations.  Multiple questions answered.  No barriers to learning identified.       Known issues that I take into account for medical decisions, with salient changes to the plan considering these complexities noted above.    Patient Active Problem List   Diagnosis     Essential hypertension     Memory loss     Coronary artery disease involving native coronary artery of native heart with other form of angina pectoris (H)     Status post coronary angiogram     Myeloproliferative neoplasm (H)     S/P CABG (coronary artery bypass graft)     TIA (transient ischemic attack)     Vaccination refused by patient     Dyspnea on exertion     CKD (chronic kidney disease) stage 3, GFR 30-59 ml/min     Coronary artery disease involving coronary bypass graft of native heart without angina pectoris     Anemia in other chronic diseases classified elsewhere     Myelofibrosis (H)     Thrombocytopenia (H)     NSTEMI (non-ST elevated myocardial infarction) (H)     Congestive heart failure, unspecified HF chronicity, unspecified heart failure type (H)     Lightheadedness     Pleural effusion     Acute on chronic anemia     Leukocytosis     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)     Antineoplastic chemotherapy induced pancytopenia (H)     Heart failure with reduced ejection fraction (H)         I spent 90 minutes in the care of this patient , which included time necessary for preparation for the visit, obtaining history, ordering medications/tests/procedures as medically indicated, review of pertinent medical literature, counseling of the patient, communication of recommendations to the care team, and documentation time.    William Cantrell  Flavio      ------------------------------------------------------------------------------------------------------------------------------------------------    Patient Care Team       Relationship Specialty Notifications Start End    Angus Scott MD PCP - General Family Practice  2/22/19     Phone: [phone] Fax: [phone] [address] [place] [address]    Angus Scott MD Assigned PCP   1/24/19     Phone: [phone] Fax: [phone] [address] [place] [address]    Ki Lamb, RN Specialty Care Coordinator Oncology Admissions 1/8/20     Phone: [phone]         Mani Cristina MD MD Hematology & Oncology Admissions 1/8/20     Phone: [phone] Fax: [phone] [address] [place] [address]    Mani Cristina MD Assigned Cancer Care Provider   10/23/20     Phone: [phone] Fax: [phone] [address] [place] [address]    Shanta Grier MD MD Hematology & Oncology  4/1/21     Assigned Oncologist for Dr. Jonnie DELEON    Phone: [phone] Fax: [phone]         [address] [place] [id number]    Roberto Larkin DO Assigned Musculoskeletal Provider   5/30/21     Phone: [phone] Fax: [phone] [address] [place]            [place] [address]    Madeleine Durand, APRN CNP Assigned Heart and Vascular Provider   6/13/21     Phone: [phone] Fax: [phone]         [facility] [address] [place] [address]

## 2021-06-29 NOTE — TELEPHONE ENCOUNTER
Please call patient with verbal orders for PT for 1 time a week for 3 weeks.  Please call Nadja with a detailed message that it is ok at [phone]

## 2021-07-01 NOTE — TELEPHONE ENCOUNTER
Harriet from [facility] called and said it's a new policy of [facility] to tell all providers when they are discharged from social work.  This patient is now discharged.

## 2021-07-01 NOTE — TELEPHONE ENCOUNTER
"Requested Prescriptions   Pending Prescriptions Disp Refills     allopurinol (ZYLOPRIM) 300 MG tablet       Sig: Take 1 tablet (300 mg) by mouth daily       Gout Agents Protocol Passed - 7/1/2021  2:54 PM        Passed - CBC on file in past 12 months     Recent Labs   Lab Test 06/28/21  1007   WBC 16.9*   RBC 2.55*   HGB 7.5*   HCT 22.3*   PLT 20*                 Passed - ALT on file in past 12 months     Recent Labs   Lab Test 06/28/21  1055   ALT 13             Passed - Has Uric Acid on file in past 12 months and value is less than 6     Recent Labs   Lab Test 06/18/21  0644   URIC 3.2*     If level is 6mg/dL or greater, ok to refill one time and refer to provider.           Passed - Recent (12 mo) or future (30 days) visit within the authorizing provider's specialty     Patient has had an office visit with the authorizing provider or a provider within the authorizing providers department within the previous 12 mos or has a future within next 30 days. See \"Patient Info\" tab in inbasket, or \"Choose Columns\" in Meds & Orders section of the refill encounter.              Passed - Medication is active on med list        Passed - Patient is age 18 or older        Passed - Normal serum creatinine on file in the past 12 months     Recent Labs   Lab Test 06/28/21  1055   CR 0.97       Ok to refill medication if creatinine is low             Routing refill request to provider for review/approval because:  Medication is reported/historical        Alondra Erwin RN  [facility]                   "

## 2021-07-02 NOTE — TELEPHONE ENCOUNTER
Called patient to get him scheduled with Dr. Cristina.  Reviewed to make sure that he didn't want to stay with the  of  providers.  Patient requests coming back to [place].  Right now patient is scheduled for Bone Marrow Biopsy 7/7/21 which was rescheduled by patient.  Patient was to see Dr. Patel on 7/9/21 for results.  Will not have results by then but patient will need to be seen prior to treatment days 1-5 which start 7/12/21.  There is no availability for providers in [place] to see patient prior.  Patient reports that he can't go to [place] for follow up appointment to see Dr. Cristina.  Scheduled next available on 7/29/21.  Will watch for Bone Marrow Biopsy results and communicate with Dr. Cristina or Dr. Patel to determine if patient will need to be seen prior to 7/29/21.    Ki Lamb RN, BSN, OCN  7/2/2021, 4:41 PM

## 2021-07-02 NOTE — PROGRESS NOTES
Infusion Nursing Note:  Renny Gannon presents today for Labs, PLT transfusion needed today..    Patient seen by provider today: No   present during visit today: Not Applicable.    Note: Patient denies any bleeding except gums. Lung sounds clear pre transfusion..  Patient  Did meet criteria for an asymptomatic covid-19 PCR test in infusion today. Patient  declined the covid-19 test.    Intravenous Access:  Peripheral IV placed.    Treatment Conditions:  Lab Results   Component Value Date    HGB 8.5 07/02/2021     Lab Results   Component Value Date    WBC 22.6 07/02/2021      Lab Results   Component Value Date    ANEU 7.2 07/02/2021     Lab Results   Component Value Date    PLT 6 07/02/2021      Results reviewed, labs MET treatment parameters, ok to proceed with treatment.  Blood transfusion consent signed 3/5/21.      Post Infusion Assessment:  Patient tolerated infusion without incident.  Site patent and intact, free from redness, edema or discomfort.  No evidence of extravasations.  Access discontinued per protocol.  Lung sounds clear post transfusion..       Discharge Plan:   Discharge instructions reviewed with: Patient.  Patient and/or family verbalized understanding of discharge instructions and all questions answered.  Patient discharged in stable condition accompanied by: self.  Departure Mode: Ambulatory w/walker..      Jalyn Gannon RN

## 2021-07-05 NOTE — PROGRESS NOTES
Infusion Nursing Note:  Renny Gannon presents today for 1 Unit PRBC's.    Patient seen by provider today: No   present during visit today: Not Applicable.    Note: Patient arrives ambulatory with walker. Mild fatigue, SOA easily, tachypnea. Denies dizziness. Has moderate level of pain in R elbow and L lower hip. States he does not take his pain medication routinely because he is worried about constipation. He does have Miralax at home and takes as needed. Positioned with foam pad under buttocks during visit to alleviate pressure. B/P 103/48, HR 90, RR 28, afebrile. Lungs clear throughout prior to transfusion. Heart rhythm regular.   Patient did meet criteria for an asymptomatic covid-19 PCR test in infusion today. Patient declined the covid-19 test.    Intravenous Access:  Peripheral IV placed.    Treatment Conditions:  Lab Results   Component Value Date    HGB 7.8 07/05/2021     Lab Results   Component Value Date    WBC 25.1 07/05/2021      Lab Results   Component Value Date    ANEU 8.8 07/05/2021     Lab Results   Component Value Date    PLT 15 07/05/2021      Results reviewed, labs MET treatment parameters, ok to proceed with treatment.  Blood transfusion consent signed 3/5/21.      Post Infusion Assessment:  Patient tolerated infusion without incident. VSS, afebrile. Asymptomatic. Lungs clear all fields. Heart rhythm regular.   Blood return noted pre and post infusion.  Site patent and intact, free from redness, edema or discomfort.  No evidence of extravasations.  PIV access discontinued per protocol.       Discharge Plan:   Copy of AVS reviewed with patient. Patient will return 7/8 for next appointment.  Patient discharged in stable condition accompanied by: self.  Departure Mode: Ambulatory w/ walker.      Kimberley Faust RN

## 2021-07-07 NOTE — LETTER
7/7/2021         RE: Renny Gannon  [address]  [facility] [address]      BMT ONC Adult Bone Marrow Biopsy Procedure Note  July 7, 2021  /64   Pulse 110   Temp 97.7  F (36.5  C) (Oral)   Resp 16   Wt 71.3 kg (157 lb 3.2 oz)   SpO2 98%   BMI 22.82 kg/m       Learning needs assessment complete within 12 months? YES    DIAGNOSIS: JAK2+ MF in blast crisis    PROCEDURE: Unilateral Bone Marrow Biopsy    LOCATION: [facility] 2nd [facility]    Patient s identification was positively verified by verbal identification and invasive procedure safety checklist was completed. Informed consent was obtained. Following the administration of Midazolam 1 mg as pre-medication, patient was placed in the left lateral decubitus position and prepped and draped in a sterile manner. Approximately 8 cc of 1% Lidocaine was used over the left posterior iliac spine. Following this a 3 mm incision was made. Trephine bone marrow core(s) was (were) obtained from the [facility]. Bone marrow aspirates were obtained from the [facility]. Aspirates were sent for morphology. A total of approximately 2 ml of marrow was aspirated. Attempted twice more; knew positioning was correct though very little aspirate. Noted that this has happened in the past. Decided to take more core, collected about 50 mm in total.     Following this procedure a sterile dressing was applied to the bone marrow biopsy site(s). The patient was placed in the supine position to maintain pressure on the biopsy site. Post-procedure wound care instructions were given.     Complications: NO    Post-procedural pain assessment: 0 out of 10 on the numeric pain rating scale.     Interventions: YES, when patient arrived, plt 12. Need to obtain pack of plt since he did not have a transfusion appt today (took over an hour to obtain). Was able to give in clinic. Held pressure for about 2-3 minutes until bleeding ceased    Length of procedure>:46 minutes to 1 hour    Procedure performed by: Milena  Scotty Fajardo PA-C

## 2021-07-07 NOTE — PROGRESS NOTES
BMT ONC Adult Bone Marrow Biopsy Procedure Note  July 7, 2021  /64   Pulse 110   Temp 97.7  F (36.5  C) (Oral)   Resp 16   Wt 71.3 kg (157 lb 3.2 oz)   SpO2 98%   BMI 22.82 kg/m       Learning needs assessment complete within 12 months? YES    DIAGNOSIS: JAK2+ MF in blast crisis    PROCEDURE: Unilateral Bone Marrow Biopsy    LOCATION: [facility] [place] 2nd [facility]    Patient s identification was positively verified by verbal identification and invasive procedure safety checklist was completed. Informed consent was obtained. Following the administration of Midazolam 1 mg as pre-medication, patient was placed in the left lateral decubitus position and prepped and draped in a sterile manner. Approximately 8 cc of 1% Lidocaine was used over the left posterior iliac spine. Following this a 3 mm incision was made. Trephine bone marrow core(s) was (were) obtained from the [facility]. Bone marrow aspirates were obtained from the [facility]. Aspirates were sent for morphology. A total of approximately 2 ml of marrow was aspirated. Attempted twice more; knew positioning was correct though very little aspirate. Noted that this has happened in the past. Decided to take more core, collected about 50 mm in total.     Following this procedure a sterile dressing was applied to the bone marrow biopsy site(s). The patient was placed in the supine position to maintain pressure on the biopsy site. Post-procedure wound care instructions were given.     Complications: NO    Post-procedural pain assessment: 0 out of 10 on the numeric pain rating scale.     Interventions: YES, when patient arrived, plt 12. Need to obtain pack of plt since he did not have a transfusion appt today (took over an hour to obtain). Was able to give in clinic. Held pressure for about 2-3 minutes until bleeding ceased    Length of procedure>:46 minutes to 1 hour    Procedure performed by: Milena Fajardo PAC

## 2021-07-07 NOTE — NURSING NOTE
Infusion Nursing Note:  Renny Gannon presents today for add-on infusion.  Patient seen by provider today: Yes: Milena   present during visit today: Not Applicable.    Note: Pt received platelets per provider's request prior to BMBx.    Intravenous Access:  Peripheral IV placed.    Treatment Conditions:  Results reviewed, labs MET treatment parameters, ok to proceed with treatment.      Post Infusion Assessment:  Patient tolerated infusion without incident.       Discharge Plan:   Patient and/or family verbalized understanding of discharge instructions and all questions answered.      Albertina Farris RN

## 2021-07-07 NOTE — NURSING NOTE
Hx myelofibrosis here for BMBx. VSS, A&Ox4. RN and provider explained procedure, no questions at his time. Provider to obtain consents prior to procedure. Pt lying prone, call light within reach. Provider ordered versed prior to procedure. Pt verbally consents to the use of versed. 1 mg versed administered prior to procedure. Hem tech and provider at bedside.     Medication: versed  Dose administered: 1 mg  Dose wasted: 1 mg  NDC 6225-8444-87  Exp Jan 1, 2022

## 2021-07-07 NOTE — NURSING NOTE
BMBx completed. Procedural consents witnessed. Pt laid supine for 30 minutes. VSS, A&Ox4. Pt denies procedural pain. Discharge teaching provided. Dressing CDI.

## 2021-07-08 NOTE — TELEPHONE ENCOUNTER
Medications reconciled on [facility] form, changes noted on form.  Form to PCP for signature.    Alondra Erwin RN  [facility]

## 2021-07-08 NOTE — TELEPHONE ENCOUNTER
Reason for Call:  Form, our goal is to have forms completed with 72 hours, however, some forms may require a visit or additional information.    Type of letter, form or note:  Home Health Certification    Who is the form from?: Home care    Where did the form come from: form was faxed in    What clinic location was the form placed at?: [facility]     Where the form was placed: Given to MA/JACOB Land    What number is listed as a contact on the form?: [phone]

## 2021-07-09 NOTE — PROGRESS NOTES
"Renny is a 70 year old who is being evaluated via a billable telephone visit.      What phone number would you like to be contacted at? [phone]  How would you like to obtain your AVS? 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 He initially was seen in Dec. 2019 by his PCP for increased fatigue and SOB w/ exertion. CBC showed WBC 79.1, Hgb 11.9 and Plt 378. Peripheral smear showed leuko-erythroblastic reaction w/ neutrophilic left shift. BMBx (12/30/19) revealed extremely hypercellular (100%) marrow with granulocytic and megakaryocytic proliferation, megakaryocytic clustering and atypia and 1.5% circulating blasts as well as moderate-severe reticulin fibrosis (MF 2-3). 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 He was on and off Hydrea from Jan 2020 - Jan 2021. BMBx 3/26/21 revealed increased fibrosis (MF-3), cellularity 40-50%, 5% blasts by morphology and flow. 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    - He presented to clinic for routine laboratory monitoring and was found to have a WBC of 71.9, Hgb 6.4, and plt 18K with \"a lot of immature WBC and blasts\" on laboratory review. He was then directed to the ED for admission and further work-up.    - BMBx  6/4; unfortunately only core was obtained. No aspirate obtained, likely due to fibrosis and possibly excess blasts. BMBx morphology reveals marrow hypercellularity (70-80%) with left-shifted myeloid predominant maturation with " approximately 5-10% myeloid blasts, markedly increased marrow fibrosis (MF3); findings consistent with blast crisis arising from previously diagnosed myeloproliferative neoplasm. Diagnosis made on the basis of increased peripheral blast percentage (23%); blast percentage in bone marrow is difficult to establish d/t bone marrow fibrosis and lack of aspirates. BMBx flow from core shows increased abnormal myeloid blasts (12%). FLT3 negative. Flow cytometry on peripheral blood shows 28% myeloid blasts.  ? Cytogenetics and NGS sent on peripheral blood- copy number changes along the long arm of chromosome 21 present in approximately 75% of cells. 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  NGS shows ASXL1 p.G646fs, VAF=42%, JAK2 p.V617F, VAF=96%. Also loss of one copy of chromosome 7  ? DIPSS score 5 for age, WBC >25, Hgb <10, peripheral blasts on differential.    - Started on decitabine 20 mg/m2 on 6/5.     - Venetoclax started on 6/9; see below for ramp-up.     -Got  right shoulder arthrocentesis; 7 mL brown cloudy fluid drained, ~7500 WBC with 59% monos/macros and 38% neutrophils, peripheral blood elements present, few blasts present     - BM bx 7/7/2021 after 1 dose of decitabine + venetoclax. Hypercellular marrow (90%) with left-shift and myeloid-predominance; 13% blasts; rare small dysplastic   megakaryocytes Markedly increased marrow fibrosis (MF-3) Peripheral blood showing 19% circulating blasts      Interval Hx: I discussed with Renny the results of his bone marrow biopsy. He is feeling well right now and tolerating his current treatment with no symptoms except fatigue. No bleeding or bruising.     A comprehensive review of systems was completed and negative except as described above.     Pertinent PMH reviewed:   CHF with reduced EF    Physical Exam:   Resp:  no cough  Neuro: alert, conversant  Psych: flat affect    The rest of a comprehensive physical  examination is deferred due to Public Health Emergency telephone visit restrictions    KPS=70    Assessment and Plan: Renny's MPN with excess blasts is approximately the same as prior to cycle 1.He has multiple high risk features. He is not currently requiring transfusions so we will continue with decitabine + venetoclax. We again discussed a transplant consult but he is not interested and prefers to continue with the current plan. Will plan for BM bx in 4 weeks to reassess. Continue weekly CBC and CMP. Continue ppx with fluconazole, acyclovir, and levofloxacin. Continue allopurinol.    Jeane Patel MD PhD

## 2021-07-09 NOTE — LETTER
"    7/9/2021         RE: Renny Gannon  [address]  [facility] [place] [facility] [address]        Dear Colleague,    Thank you for referring your patient, Renny Gannon, to the [facility]. Please see a copy of my visit note below.    Renny is a 70 year old who is being evaluated via a billable telephone visit.      What phone number would you like to be contacted at? [phone]  How would you like to obtain your AVS? 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 He initially was seen in Dec. 2019 by his PCP for increased fatigue and SOB w/ exertion. CBC showed WBC 79.1, Hgb 11.9 and Plt 378. Peripheral smear showed leuko-erythroblastic reaction w/ neutrophilic left shift. BMBx (12/30/19) revealed extremely hypercellular (100%) marrow with granulocytic and megakaryocytic proliferation, megakaryocytic clustering and atypia and 1.5% circulating blasts as well as moderate-severe reticulin fibrosis (MF 2-3). 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 He was on and off Hydrea from Jan 2020 - Jan 2021. BMBx 3/26/21 revealed increased fibrosis (MF-3), cellularity 40-50%, 5% blasts by morphology and flow. 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    - He presented to clinic for routine laboratory monitoring and was found to have a WBC of 71.9, Hgb 6.4, and plt 18K with \"a lot of immature WBC and blasts\" on laboratory review. He was then directed to the ED for admission and " further work-up.    - BMBx  6/4; unfortunately only core was obtained. No aspirate obtained, likely due to fibrosis and possibly excess blasts. BMBx morphology reveals marrow hypercellularity (70-80%) with left-shifted myeloid predominant maturation with approximately 5-10% myeloid blasts, markedly increased marrow fibrosis (MF3); findings consistent with blast crisis arising from previously diagnosed myeloproliferative neoplasm. Diagnosis made on the basis of increased peripheral blast percentage (23%); blast percentage in bone marrow is difficult to establish d/t bone marrow fibrosis and lack of aspirates. BMBx flow from core shows increased abnormal myeloid blasts (12%). FLT3 negative. Flow cytometry on peripheral blood shows 28% myeloid blasts.  ? Cytogenetics and NGS sent on peripheral blood- copy number changes along the long arm of chromosome 21 present in approximately 75% of cells. 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  NGS shows ASXL1 p.G646fs, VAF=42%, JAK2 p.V617F, VAF=96%. Also loss of one copy of chromosome 7  ? DIPSS score 5 for age, WBC >25, Hgb <10, peripheral blasts on differential.    - Started on decitabine 20 mg/m2 on 6/5.     - Venetoclax started on 6/9; see below for ramp-up.     -Got  right shoulder arthrocentesis; 7 mL brown cloudy fluid drained, ~7500 WBC with 59% monos/macros and 38% neutrophils, peripheral blood elements present, few blasts present     - BM bx 7/7/2021 after 1 dose of decitabine + venetoclax. Hypercellular marrow (90%) with left-shift and myeloid-predominance; 13% blasts; rare small dysplastic   megakaryocytes Markedly increased marrow fibrosis (MF-3) Peripheral blood showing 19% circulating blasts      Interval Hx: I discussed with Renny the results of his bone marrow biopsy. He is feeling well right now and tolerating his current treatment with no symptoms except fatigue. No bleeding or bruising.     A  comprehensive review of systems was completed and negative except as described above.     Pertinent PMH reviewed:   CHF with reduced EF    Physical Exam:   Resp:  no cough  Neuro: alert, conversant  Psych: flat affect    The rest of a comprehensive physical examination is deferred due to Public [facility] Emergency telephone visit restrictions    KPS=70    Assessment and Plan: Renny's MPN with excess blasts is approximately the same as prior to cycle 1.He has multiple high risk features. He is not currently requiring transfusions so we will continue with decitabine + venetoclax. We again discussed a transplant consult but he is not interested and prefers to continue with the current plan. Will plan for BM bx in 4 weeks to reassess. Continue weekly CBC and CMP. Continue ppx with fluconazole, acyclovir, and levofloxacin. Continue allopurinol.    Jeane Patel MD PhD                       Again, thank you for allowing me to participate in the care of your patient.        Sincerely,        Jeane Patel MD     attending Psychiatrist without NP/Trainee

## 2021-07-12 NOTE — PROGRESS NOTES
Infusion Nursing Note:  Renny Gannon presents today for chemotherapy.    Patient seen by provider today: No   present during visit today: Not Applicable.    Note: N/A.  Patient did meet criteria for an asymptomatic covid-19 PCR test in infusion today. Patient declined the covid-19 test.    Intravenous Access:  Peripheral IV placed.    Treatment Conditions:  Lab Results   Component Value Date    HGB 7.4 07/12/2021    HGB 8.6 07/07/2021     Lab Results   Component Value Date    WBC 20.0 07/12/2021    WBC 31.7 07/07/2021      Lab Results   Component Value Date    ANEU 7.2 07/12/2021    ANEU 6.6 07/07/2021     Lab Results   Component Value Date    PLT 11 07/12/2021    PLT 12 07/07/2021      Lab Results   Component Value Date     07/12/2021     07/05/2021                   Lab Results   Component Value Date    POTASSIUM 3.7 07/12/2021    POTASSIUM 3.8 07/05/2021           Lab Results   Component Value Date    MAG 2.1 06/18/2021            Lab Results   Component Value Date    CR 0.99 07/12/2021    CR 0.96 07/05/2021                   Lab Results   Component Value Date    MINNIE 9.4 07/12/2021    MINNIE 8.6 07/05/2021                Lab Results   Component Value Date    BILITOTAL 0.4 07/12/2021    BILITOTAL 0.4 07/05/2021           Lab Results   Component Value Date    ALBUMIN 3.3 07/12/2021    ALBUMIN 3.2 07/05/2021                    Lab Results   Component Value Date    ALT 19 07/12/2021    ALT 18 07/05/2021           Lab Results   Component Value Date    AST 16 07/12/2021    AST 17 07/05/2021       Results reviewed, labs MET treatment parameters, ok to proceed with treatment.      Post Infusion Assessment:  Patient tolerated infusion without incident.  Patient observed for 15 minutes post transfusion per protocol.  Site patent and intact, free from redness, edema or discomfort.  No evidence of extravasations.  Access discontinued per protocol.       Discharge Plan:   Discharge instructions  reviewed with: Patient.  Patient discharged in stable condition accompanied by: sister.  Departure Mode: Ambulatory.      Lisa Vasques RN

## 2021-07-13 NOTE — PROGRESS NOTES
Infusion Nursing Note:  Renny Gannno presents today for Day 2 Decitabine.    Patient seen by provider today: No   present during visit today: Not Applicable.    Note: N/A.  Intravenous Access:  Peripheral IV placed.    Treatment Conditions:  Lab Results   Component Value Date    HGB 7.4 07/12/2021    HGB 8.6 07/07/2021     Lab Results   Component Value Date    WBC 20.0 07/12/2021    WBC 31.7 07/07/2021      Lab Results   Component Value Date    ANEU 7.2 07/12/2021    ANEU 6.6 07/07/2021     Lab Results   Component Value Date    PLT 11 07/12/2021    PLT 12 07/07/2021      Results reviewed, labs MET treatment parameters, ok to proceed with treatment.      Post Infusion Assessment:  Patient tolerated infusion without incident.  Patient observed for 15 minutes post Decitabine.  Site patent and intact, free from redness, edema or discomfort.  No evidence of extravasations.  Access discontinued per protocol.       Discharge Plan:   Discharge instructions reviewed with: Patient.  Patient discharged in stable condition accompanied by: sister.  Departure Mode: Ambulatory.      Lisa Vasques RN

## 2021-07-14 NOTE — PROGRESS NOTES
Infusion Nursing Note:  Renny Gannon presents today for C2 D3 Decitabine.    Patient seen by provider today: No   present during visit today: Not Applicable.    Note: Patient denies any new symptoms today. Does complain of a sinus headache but has had it for 2 days and scab on coccyx is getting sore again when sitting on it. Patient repositions frequently.      Patient  did meet criteria for an asymptomatic covid-19 PCR test in infusion today. Patient declined the covid-19 test.    Intravenous Access:  Peripheral IV placed.  Blood return pre, during and post infusion.    Treatment Conditions:  Lab Results   Component Value Date    HGB 7.4 07/12/2021    HGB 8.6 07/07/2021     Lab Results   Component Value Date    WBC 20.0 07/12/2021    WBC 31.7 07/07/2021      Lab Results   Component Value Date    ANEU 7.2 07/12/2021    ANEU 6.6 07/07/2021     Lab Results   Component Value Date    PLT 11 07/12/2021    PLT 12 07/07/2021      Lab Results   Component Value Date     07/12/2021     07/05/2021                   Lab Results   Component Value Date    POTASSIUM 3.7 07/12/2021    POTASSIUM 3.8 07/05/2021           Lab Results   Component Value Date    MAG 2.1 06/18/2021            Lab Results   Component Value Date    CR 0.99 07/12/2021    CR 0.96 07/05/2021                   Lab Results   Component Value Date    MINNIE 9.4 07/12/2021    MINNIE 8.6 07/05/2021                Lab Results   Component Value Date    BILITOTAL 0.4 07/12/2021    BILITOTAL 0.4 07/05/2021           Lab Results   Component Value Date    ALBUMIN 3.3 07/12/2021    ALBUMIN 3.2 07/05/2021                    Lab Results   Component Value Date    ALT 19 07/12/2021    ALT 18 07/05/2021           Lab Results   Component Value Date    AST 16 07/12/2021    AST 17 07/05/2021           Post Infusion Assessment:  Patient tolerated infusion without incident.  Patient observed for 15 minutes post infusion per protocol.  Blood return noted pre and  post infusion.  Site patent and intact, free from redness, edema or discomfort.  No evidence of extravasations.  Access discontinued per protocol.       Discharge Plan:   Discharge instructions reviewed with: Patient.  Patient and/or family verbalized understanding of discharge instructions and all questions answered.  Patient discharged in stable condition accompanied by: self.  Departure Mode: Ambulatory.      Mayte Chapa RN

## 2021-07-15 NOTE — PROGRESS NOTES
Infusion Nursing Note:  Renny Gannon presents today for C2D4 Decitabine.    Patient seen by provider today: No   present during visit today: Not Applicable.    Note: Patient tired today.Denies any dizziness or lightheaded. No palpitaions in chest.  Has bleeding gums only. Encouraged good oral care and salt/soda rinses. Handout given to patient on this.  Use soft to very soft toothbrush. Continues with sinus headache x 3 days. Using OTC Afrin nasal spray that helps congestion/stuffiness, but not headache. He felt tylenol didn't help , but will try again tonight.   Patient  did meet criteria for an asymptomatic covid-19 PCR test in infusion today. Patient  declined the covid-19 test.    Intravenous Access:  Peripheral IV placed.    Treatment Conditions:  Not Applicable.      Post Infusion Assessment:  Patient tolerated infusion without incident.  Blood return noted pre and post infusion.  Site patent and intact, free from redness, edema or discomfort.  No evidence of extravasations.  Access discontinued per protocol.       Discharge Plan:   Discharge instructions reviewed with: Patient.  Patient discharged in stable condition accompanied by: self and sister-Mary picking up at front entrance. Returning tomorrow for C2D5.  Departure Mode: Ambulatory.      Jalyn Gnanon RN

## 2021-07-16 NOTE — PROGRESS NOTES
Infusion Nursing Note:  Renny Gannon presents today for Decitabine.    Patient seen by provider today: No   present during visit today: Not Applicable.    Note: Very uncomfortable to sit today. Restless as sits on pillow.  Patient did meet criteria for an asymptomatic covid-19 PCR test in infusion today. Patient declined the covid-19 test.    Intravenous Access:  Peripheral IV placed.    Treatment Conditions:  Not Applicable.      Post Infusion Assessment:  Patient tolerated infusion without incident.  Patient observed for 15 minutes post Decitabine per protocol.  Blood return noted pre and post infusion.  Site patent and intact, free from redness, edema or discomfort.  No evidence of extravasations.  Access discontinued per protocol.       Discharge Plan:   Discharge instructions reviewed with: Patient.  Patient and/or family verbalized understanding of discharge instructions and all questions answered.  Copy of AVS reviewed with patient and/or family.  Patient will return 8-9-21 for next appointment.  Patient discharged in stable condition accompanied by: self.  Departure Mode: Ambulatory.      Demetrice Hall RN

## 2021-07-16 NOTE — PATIENT INSTRUCTIONS
Pt to return on 8-9-21 for Decitabine. Copies of medication list and upcoming appointments given prior to discharge.

## 2021-07-20 PROBLEM — I24.89 DEMAND ISCHEMIA (H): Status: ACTIVE | Noted: 2021-01-01

## 2021-07-20 PROBLEM — D64.9 ANEMIA, UNSPECIFIED TYPE: Status: ACTIVE | Noted: 2021-01-01

## 2021-07-20 NOTE — ED NOTES
Patient reports new swelling in the right lower leg. This is new in the las 2 days. Lower leg appears taught and shiny.

## 2021-07-20 NOTE — ED NOTES
"Patient called out c/o worsening chest \"burning\" radiating to R shoulder through to his back. MD notified, IV Morphine given.   "

## 2021-07-20 NOTE — ED PROVIDER NOTES
History     Chief Complaint   Patient presents with     Chest Pain     HPI  Renny Gannon is a 70 year old male who presents with chest pain that started about 5 hours ago.  Describes as a burning in his chest.  He does have a history of some heart issues and it kind of feels similar.  He does feel little short of breath and does have some new back pain.  Patient is also noticed some increasing swelling of his right lower extremity which is new over the last 2 days.  Patient is currently being treated for acute leukemia.  Denies any nausea any vomiting.  Denies any belly pain.  Patient does not have a history of acid reflux.    Allergies:  Allergies   Allergen Reactions     No Known Drug Allergies      Seasonal Allergies Other (See Comments)     Stuffy head and nose       Problem List:    Patient Active Problem List    Diagnosis Date Noted     Antineoplastic chemotherapy induced pancytopenia (H) 06/18/2021     Priority: Medium     Heart failure with reduced ejection fraction (H) 06/18/2021     Priority: Medium     Leukocytosis 06/03/2021     Priority: Medium     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) 06/03/2021     Priority: Medium     Lightheadedness 03/28/2021     Priority: Medium     Pleural effusion 03/28/2021     Priority: Medium     Acute on chronic anemia 03/28/2021     Priority: Medium     Thrombocytopenia (H) 01/27/2021     Priority: Medium     NSTEMI (non-ST elevated myocardial infarction) (H) 01/27/2021     Priority: Medium     Congestive heart failure, unspecified HF chronicity, unspecified heart failure type (H) 01/27/2021     Priority: Medium     Myelofibrosis (H) 01/14/2021     Priority: Medium     Coronary artery disease involving coronary bypass graft of native heart without angina pectoris 01/07/2021     Priority: Medium     Anemia in other chronic diseases classified elsewhere 01/07/2021     Priority: Medium     Dyspnea on exertion 12/21/2020     Priority: Medium     CKD (chronic  kidney disease) stage 3, GFR 30-59 ml/min 12/21/2020     Priority: Medium     Vaccination refused by patient 08/20/2020     Priority: Medium     S/P CABG (coronary artery bypass graft) 02/10/2020     Priority: Medium     TIA (transient ischemic attack) 02/10/2020     Priority: Medium     Myeloproliferative neoplasm (H) 01/26/2020     Priority: Medium     Status post coronary angiogram 01/09/2020     Priority: Medium     Coronary artery disease involving native coronary artery of native heart with other form of angina pectoris (H) 01/02/2020     Priority: Medium     Added automatically from request for surgery [id number]       Essential hypertension 03/11/2019     Priority: Medium     Memory loss 03/11/2019     Priority: Medium        Past Medical History:    Past Medical History:   Diagnosis Date     Heart disease      History of blood transfusion      Hypertension        Past Surgical History:    Past Surgical History:   Procedure Laterality Date     BONE MARROW BIOPSY, BONE SPECIMEN, NEEDLE/TROCAR N/A 12/30/2019    Procedure: BIOPSY, BONE MARROW;  Surgeon: Aguilar Krause MD;  Location: [facility]     BYPASS GRAFT ARTERY CORONARY N/A 1/31/2020    Procedure: CORONARY ARTERY BYPASS GRAFTING X 3 - LIMA TO LAD, SV TO OM  AND PDA; ENDOVEIN HARVEST OF BILATERAL LEGS; ON PUMP WITH SANDRA;  Surgeon: Mable Barrera MD;  Location:  OR     CV CORONARY ANGIOGRAM Left 1/9/2020    Procedure: Coronary Angiogram;  Surgeon: Dvein Bentley MD;  Location:  [facility]     IR FINE NEEDLE ASPIRATION W ULTRASOUND  6/10/2021     NO HISTORY OF SURGERY         Family History:    Family History   Problem Relation Age of Onset     No Known Problems Mother      Unknown/Adopted Father      Unknown/Adopted Maternal Grandmother      Unknown/Adopted Maternal Grandfather      Unknown/Adopted Paternal Grandmother      Unknown/Adopted Paternal Grandfather      Cancer Brother         lung cancer - smoking     No  Known Problems Sister      Coronary Artery Disease Brother          of MI at 66     No Known Problems Sister        Social History:  Marital Status:  Single [1]  Social History     Tobacco Use     Smoking status: Former Smoker     Years: 30.00     Types: Cigarettes     Start date: 1961     Quit date: 2001     Years since quittin.4     Smokeless tobacco: Never Used   Substance Use Topics     Alcohol use: No     Drug use: No        Medications:    ACE/ARB/ARNI NOT PRESCRIBED (INTENTIONAL)  acetaminophen (TYLENOL) 325 MG tablet  acyclovir (ZOVIRAX) 400 MG tablet  allopurinol (ZYLOPRIM) 300 MG tablet  ASPIRIN NOT PRESCRIBED (INTENTIONAL)  diclofenac (VOLTAREN) 1 % topical gel  fluconazole (DIFLUCAN) 200 MG tablet  furosemide (LASIX) 40 MG tablet  levofloxacin (LEVAQUIN) 250 MG tablet  Lidocaine (LIDOCARE) 4 % Patch  metoprolol tartrate (LOPRESSOR) 50 MG tablet  nitroGLYcerin (NITROSTAT) 0.4 MG sublingual tablet  ondansetron (ZOFRAN-ODT) 8 MG ODT tab  oxyCODONE (ROXICODONE) 5 MG tablet  oxyCODONE (XTAMPZA) 9 MG 12 hr tablet  polyethylene glycol (MIRALAX) 17 GM/Dose powder  prochlorperazine (COMPAZINE) 5 MG tablet  rosuvastatin (CRESTOR) 5 MG tablet  senna-docusate (SENOKOT-S/PERICOLACE) 8.6-50 MG tablet  traZODone (DESYREL) 50 MG tablet  venetoclax (VENCLEXTA) 100 MG tablet  venetoclax (VENCLEXTA) 100 MG tablet  vitamin B-12 (CYANOCOBALAMIN) 250 MCG tablet  vitamin D3 (CHOLECALCIFEROL) 50 mcg (2000 units) tablet          Review of Systems   All other systems reviewed and are negative.      Physical Exam   BP: 132/65  Pulse: 104  Temp: 98  F (36.7  C)  Resp: 22  Weight: 71.2 kg (157 lb)  SpO2: 98 %      Physical Exam  Vitals and nursing note reviewed.   Constitutional:       General: He is not in acute distress.     Appearance: He is well-developed. He is not diaphoretic.   HENT:      Head: Normocephalic and atraumatic.      Nose: Nose normal.      Mouth/Throat:      Pharynx: No oropharyngeal exudate.    Eyes:      General: No scleral icterus.     Conjunctiva/sclera: Conjunctivae normal.      Pupils: Pupils are equal, round, and reactive to light.   Cardiovascular:      Rate and Rhythm: Normal rate and regular rhythm.      Heart sounds: Normal heart sounds. No murmur heard.   No friction rub.   Pulmonary:      Effort: Pulmonary effort is normal. No respiratory distress.      Breath sounds: Normal breath sounds. No wheezing or rales.   Abdominal:      General: Bowel sounds are normal. There is no distension.      Palpations: Abdomen is soft. There is no mass.      Tenderness: There is no abdominal tenderness. There is no guarding or rebound.   Musculoskeletal:         General: No tenderness. Normal range of motion.      Cervical back: Normal range of motion.   Skin:     General: Skin is warm.      Findings: No rash.   Neurological:      Mental Status: He is alert and oriented to person, place, and time.   Psychiatric:         Judgment: Judgment normal.         ED Course        Procedures            EKG Interpretation:      Interpreted by Alfonso Oshea  Time reviewed: now   Symptoms at time of EKG: now   Rhythm: normal sinus   Rate: normal  Axis: NORMAL  Ectopy: none  Conduction: normal  ST Segments/ T Waves: No ST-T wave changes  Q Waves: none  Comparison to prior: No old EKG available    Clinical Impression: normal EKG      Results for orders placed or performed during the hospital encounter of 07/20/21 (from the past 24 hour(s))   CBC with platelets differential    Narrative    The following orders were created for panel order CBC with platelets differential.  Procedure                               Abnormality         Status                     ---------                               -----------         ------                     CBC with platelets and d...[[id number]]  Abnormal            Final result               Manual Differential[[id number]]          Abnormal            Final result                  Please view results for these tests on the individual orders.   Comprehensive metabolic panel   Result Value Ref Range    Sodium 136 133 - 144 mmol/L    Potassium 4.0 3.4 - 5.3 mmol/L    Chloride 102 94 - 109 mmol/L    Carbon Dioxide (CO2) 26 20 - 32 mmol/L    Anion Gap 8 3 - 14 mmol/L    Urea Nitrogen 28 7 - 30 mg/dL    Creatinine 0.89 0.66 - 1.25 mg/dL    Calcium 8.7 8.5 - 10.1 mg/dL    Glucose 115 (H) 70 - 99 mg/dL    Alkaline Phosphatase 104 40 - 150 U/L    AST 20 0 - 45 U/L    ALT 16 0 - 70 U/L    Protein Total 7.1 6.8 - 8.8 g/dL    Albumin 3.1 (L) 3.4 - 5.0 g/dL    Bilirubin Total 0.8 0.2 - 1.3 mg/dL    GFR Estimate 86 >60 mL/min/1.73m2   Troponin I   Result Value Ref Range    Troponin I 0.052 (H) 0.000 - 0.045 ug/L   Nt probnp inpatient (BNP)   Result Value Ref Range    N terminal Pro BNP Inpatient 1,396 (H) 0 - 900 pg/mL   D dimer quantitative   Result Value Ref Range    D-Dimer Quantitative 6.24 (H) 0.00 - 0.50 ug/mL FEU    Narrative    This D-dimer assay is intended for use in conjunction with a clinical pretest probability assessment model to exclude pulmonary embolism (PE) and deep venous thrombosis (DVT) in outpatients suspected of PE or DVT. The cut-off value is 0.50 ug/mL FEU.   CBC with platelets and differential   Result Value Ref Range    WBC Count 2.9 (L) 4.0 - 11.0 10e3/uL    RBC Count 1.68 (L) 4.40 - 5.90 10e6/uL    Hemoglobin 4.8 (LL) 13.3 - 17.7 g/dL    Hematocrit 14.6 (L) 40.0 - 53.0 %    MCV 87 78 - 100 fL    MCH 28.6 26.5 - 33.0 pg    MCHC 32.9 31.5 - 36.5 g/dL    RDW 14.6 10.0 - 15.0 %    Platelet Count 1 (LL) 150 - 450 10e3/uL   [place] Draw    Narrative    The following orders were created for panel order [place] Draw.  Procedure                               Abnormality         Status                     ---------                               -----------         ------                     Extra Red Top Tube[[id number]]                                                             Please view results for these tests on the individual orders.   ABO/Rh type and screen    Narrative    The following orders were created for panel order ABO/Rh type and screen.  Procedure                               Abnormality         Status                     ---------                               -----------         ------                     Adult Type and Screen[[id number]]                            In process                   Please view results for these tests on the individual orders.   Manual Differential   Result Value Ref Range    % Neutrophils 51 %    % Lymphocytes 15 %    % Monocytes 34 %    % Eosinophils 0 %    % Basophils 0 %    Absolute Neutrophils 1.5 (L) 1.6 - 8.3 10e3/uL    Absolute Lymphocytes 0.4 (L) 0.8 - 5.3 10e3/uL    Absolute Monocytes 1.0 0.0 - 1.3 10e3/uL    Absolute Eosinophils 0.0 0.0 - 0.7 10e3/uL    Absolute Basophils 0.0 0.0 - 0.2 10e3/uL    RBC Morphology Confirmed RBC Indices     Platelet Assessment  Automated Count Confirmed. Platelet morphology is normal.     Automated Count Confirmed. Platelet morphology is normal.   CT Chest Pulmonary Embolism w Contrast    Narrative    EXAM: CT CHEST PULMONARY EMBOLISM W CONTRAST  LOCATION: [facility]   DATE/TIME: 7/20/2021 4:33 AM    INDICATION: PE suspected, high prob chest pain. Leukemia.  COMPARISON: None..   TECHNIQUE: CT chest pulmonary angiogram during arterial phase injection of IV contrast. Multiplanar reformats and MIP reconstructions were performed. Dose reduction techniques were used.   CONTRAST: 70 mL-ISovue 370    FINDINGS:  ANGIOGRAM CHEST: Pulmonary arteries are normal caliber and negative for pulmonary emboli. Thoracic aorta is not well opacified and is  indeterminate for dissection. No CT evidence of right heart strain.    LUNGS AND PLEURA: Trace left effusion. Mild basilar atelectasis. Calcified pleural plaques.    MEDIASTINUM/AXILLAE: No adenopathy or significant pericardial effusion.  Median sternotomy.    CORONARY ARTERY CALCIFICATION: Postsurgical change.    UPPER ABDOMEN: Automatically. Cholelithiasis.    MUSCULOSKELETAL: Degenerative change osseous structures. Median sternotomy. Heterogeneity of osseous structures.      Impression    IMPRESSION:  1.  No pulmonary embolism.  2.  Trace left pleural effusion.  3.  Splenomegaly.  4.  Heterogeneity of osseous structures. Uncertain if this is related to known leukemia.  5.  Calcified pleural plaques.  6.  Cholelithiasis.       Medications   sodium chloride (PF) 0.9% PF flush 3 mL (3 mLs Intracatheter Given 7/20/21 0442)   sodium chloride 0.9 % bag 100mL for CT scan flush use (70 mLs Intravenous Given 7/20/21 0442)   iopamidol (ISOVUE-370) solution 500 mL (70 mLs Intravenous Given 7/20/21 0442)   morphine (PF) injection 2 mg (2 mg Intravenous Given 7/20/21 0517)       This is a 70-year-old male with myeloproliferative disorder, currently undergoing chemo, last round was done 4 days ago.  Upon arrival patient was a little tachycardic but normal blood pressures.  Labs are obtained and patient has a significantly low hemoglobin of 4.8 and a platelet count of 1.  This is most likely from a the recent chemo.  I consulted hematology at the [facility] and they agreed with this and would recommend transfusing the patient.  Patient's angina is most likely from this low hemoglobin from demand ischemia.  I was worried about a possible PE and so a CT scan was done but this was negative.  Hematology also recommended replacing the platelets with 4 units and the hemoglobin with 3 units.  Would recommend talking with the malignant hematology department later on the day about any changes that may need to be done to his chemo.  Patient is otherwise stable and I discussed the case with the hospitalist and they agree with this plan and will accept the patient.  1 other thing I am concerned about is patient did have an elevated D-dimer but did not have a PE but patient  does have a new right swollen leg.  Would recommend further evaluation with an ultrasound maybe later on today.    Assessments & Plan (with Medical Decision Making)  Anemia, demand ischemia, thrombocytopenia     I have reviewed the nursing notes.    I have reviewed the findings, diagnosis, plan and need for follow up with the patient.          Final diagnoses:   Anemia, unspecified type   Thrombocytopenia (H)   Demand ischemia (H)       7/20/2021   [facility] EMERGENCY DEPT     Alfonso Oshea MD  07/20/21 0660

## 2021-07-20 NOTE — H&P
[facility]    History and Physical - Hospitalist Service       Date of Admission:  7/20/2021    Assessment & Plan           Renny Gannon is a 70 year old male admitted on 7/20/2021. He has a  H/o Myeloproliferative disorder - refractory CLL - diagnosed about a year back, on chemotherapy and last chemo was about 4 days back.  On acyclovir 400 mg daily, diflucan 200 mg daily, on allopurinol 300 mg daily, levaquin 250 mg daily, on venetoclax.       He has been admitted with cp and sob. He was found to be severely Pancytopenic  2/2 chemo for CLL. Last chemo was 4 days ago. He was found to have  Severe Anemia, acute on chronic and 2/2 chemo,  hb 4.8 today, baseline 7-8, will transfuse 2 units prbc, keep hb > 7-8 and transfuse prn, no evidence of bleeding.  Also has severe thrombocytopenia, acute on chronic  And 2/2 chemo, platelet 1000 today, baseline around 10-15,000, no evidence of bleeding, will transfuse 4 units of platelets, keep platelets > 10-20,000 and transfuse prn.  Also found to have elevated troponin which seems  likely from demand ischemia 2/2 severe anemia, unlikely ACS. Will trend troponin, no CP at this time, EKG : no ischemic changes, will check echo. Will do iv diuresis for fluid overload.          A/p :             Chest discomfort + sob : 2/2 severe anemia      H/o Myeloproliferative disorder - refractory CLL - diagnosed about a year back, on chemotherapy and last chemo was about 4 days back.  On acyclovir 400 mg daily, diflucan 200 mg daily, on allopurinol 300 mg daily, levaquin 250 mg daily, on venetoclax      Pancytopenia : 2/2 chemo, monitor.      Leucopenia : 2/2 chemo, ANC 1500, no evidence of infection.      Anemia, acute on chronic  : 2/2 chemo,  hb 4.8 today, baseline 7-8, will transfuse 2 units prbc, keep hb > 7-8 and transfuse prn, no evidence of bleeding.      Thrombocytopenia, acute on chronic  : 2/2 chemo, platelet 1000 today, baseline around 10-15,000,  no evidence of bleeding, will transfuse 4 units of platelets, keep platelets > 10-20,000 and transfuse prn.      Acute on chronic systolic CHF : on lasix 40 mg daily - at home, iv diuresis. Last echo 6/4/21 : EF of 45-50%.      Elevated troponin : likely demand ischemia from severe anemia, unlikely ACS, will trend troponin, no CP at this time, EKG : no ischemic changes, will check echo.      CAD/ CABG  About a yr ago : on statin, bb, not on blood thinners.      Swelling in legs b/l : on lasix 40 mg daily - at home.      HTN, Essential : on metoprolol 50 mg bid - at home.      Chronic pain issues : on oxycodone 5 mg every 6 hourly prn, oxycodone - 9 mg - 12 hr tab bid      HLD :on statin           Diet: Regular Diet Adult    DVT Prophylaxis: Pneumatic Compression Devices  Almendarez Catheter: Not present  Central Lines: None  Code Status:   DNR/DNI    Risk Factors Present on Admission              # Platelet Defect: home medication list includes an antiplatelet medication      Disposition Plan   Expected discharge: in 1-2 days,  recommended to disposition to be decided once hb and platelets stable.     The patient's care was discussed with the Patient.    Haja Snyder MD  [facility]  Securely message with the [employer] Web Console (learn more here)  Text page via [employer] Paging/Directory      ______________________________________________________________________    Chief Complaint   Cp, sob    History is obtained from the patient    History of Present Illness         Renny Gannon is a 70 year old male admitted on 7/20/2021. He has a  H/o Myeloproliferative disorder - refractory CLL - diagnosed about a year back, on chemotherapy and last chemo was about 4 days back.  On acyclovir 400 mg daily, diflucan 200 mg daily, on allopurinol 300 mg daily, levaquin 250 mg daily, on venetoclax.       He has been admitted with cp and sob. He had burning sensation in his chest last night at 10 PM and he was  unable to sleep. The chest discomfort was non radiating, around 7-8/10 in intensity, constant. He denies any chest pain at this time. He is also sob with activity and at rest.      He was found to be severely Pancytopenic  2/2 chemo for CLL. Last chemo was 4 days ago. He was found to have  Severe Anemia, acute on chronic and 2/2 chemo,  hb 4.8 today, baseline 7-8, will transfuse 2 units prbc, keep hb > 7-8 and transfuse prn, no evidence of bleeding.  Also has severe thrombocytopenia, acute on chronic  And 2/2 chemo, platelet 1000 today, baseline around 10-15,000, no evidence of bleeding, will transfuse 4 units of platelets, keep platelets > 10-20,000 and transfuse prn.  Also found to have elevated troponin which seems  likely from demand ischemia 2/2 severe anemia, unlikely ACS. Will trend troponin, no CP at this time, EKG : no ischemic changes, will check echo. Will do iv diuresis for fluid overload.          Review of Systems        No fever or chills  No cough or phlegm  No abdominal symptoms  No urinary  Symptoms  No neuro symptoms    Past Medical History    I have reviewed this patient's medical history and updated it with pertinent information if needed.   Past Medical History:   Diagnosis Date     Heart disease      History of blood transfusion      Hypertension        Past Surgical History   I have reviewed this patient's surgical history and updated it with pertinent information if needed.  Past Surgical History:   Procedure Laterality Date     BONE MARROW BIOPSY, BONE SPECIMEN, NEEDLE/TROCAR N/A 12/30/2019    Procedure: BIOPSY, BONE MARROW;  Surgeon: Aguilar Krause MD;  Location: [facility] OR     BYPASS GRAFT ARTERY CORONARY N/A 1/31/2020    Procedure: CORONARY ARTERY BYPASS GRAFTING X 3 - LIMA TO LAD, SV TO OM  AND PDA; ENDOVEIN HARVEST OF BILATERAL LEGS; ON PUMP WITH SANDRA;  Surgeon: Mable Barrera MD;  Location: [facility] OR     CV CORONARY ANGIOGRAM Left 1/9/2020    Procedure: Coronary  Angiogram;  Surgeon: Devin Bentley MD;  Location:  [facility]ATH LAB     IR FINE NEEDLE ASPIRATION W ULTRASOUND  6/10/2021     NO HISTORY OF SURGERY         Social History   I have reviewed this patient's social history and updated it with pertinent information if needed.  Social History     Tobacco Use     Smoking status: Former Smoker     Years: 30.00     Types: Cigarettes     Start date: 1961     Quit date: 2001     Years since quittin.4     Smokeless tobacco: Never Used   Substance Use Topics     Alcohol use: No     Drug use: No       Lives in a his apartment in [place], [place]  Single  No children  Denies smoking, denies alcohol and drugs  Functionally independent  Retired      Family History   I have reviewed this patient's family history and updated it with pertinent information if needed.  Family History   Problem Relation Age of Onset     No Known Problems Mother      Unknown/Adopted Father      Unknown/Adopted Maternal Grandmother      Unknown/Adopted Maternal Grandfather      Unknown/Adopted Paternal Grandmother      Unknown/Adopted Paternal Grandfather      Cancer Brother         lung cancer - smoking     No Known Problems Sister      Coronary Artery Disease Brother          of MI at 66     No Known Problems Sister        Prior to Admission Medications   Prior to Admission Medications   Prescriptions Last Dose Informant Patient Reported? Taking?   ACE/ARB/ARNI NOT PRESCRIBED (INTENTIONAL)  Self No No   Sig: Please choose reason not prescribed from choices below.   ASPIRIN NOT PRESCRIBED (INTENTIONAL)   No No   Sig: Please choose reason not prescribed from choices below.   Lidocaine (LIDOCARE) 4 % Patch Past Week at Unknown time  No Yes   Sig: Place 1 patch onto the skin every 24 hours . Leave on for 12 hours, then remove for 12 hours.   acetaminophen (TYLENOL) 325 MG tablet Past Week at Unknown time  Yes Yes   Sig: Take 2 tablets (650 mg) by mouth every 4 hours as needed  for mild pain   acyclovir (ZOVIRAX) 400 MG tablet 7/19/2021 at 2100  No Yes   Sig: Take 1 tablet (400 mg) by mouth 2 times daily   allopurinol (ZYLOPRIM) 300 MG tablet 7/19/2021 at 0800  No Yes   Sig: Take 1 tablet (300 mg) by mouth daily   diclofenac (VOLTAREN) 1 % topical gel   No No   Sig: Apply 2-4 g topically 4 times daily as needed for moderate pain   fluconazole (DIFLUCAN) 200 MG tablet 7/19/2021 at 0800  No Yes   Sig: Take 1 tablet (200 mg) by mouth daily   furosemide (LASIX) 40 MG tablet 7/19/2021 at 0800  No Yes   Sig: Take 1 tablet (40 mg) by mouth daily   levofloxacin (LEVAQUIN) 250 MG tablet 7/19/2021 at 2100  No Yes   Sig: Take 1 tablet (250 mg) by mouth At Bedtime   metoprolol tartrate (LOPRESSOR) 50 MG tablet 7/19/2021 at 2100  No Yes   Sig: Take 1 tablet (50 mg) by mouth 2 times daily   nitroGLYcerin (NITROSTAT) 0.4 MG sublingual tablet Unknown at Unknown time Self No No   Sig: For chest pain place 1 tablet under the tongue every 5 minutes for 3 doses. If symptoms persist 5 minutes after 1st dose call 911.   Patient not taking: Reported on 7/12/2021   ondansetron (ZOFRAN-ODT) 8 MG ODT tab Unknown at Unknown time  No No   Sig: Take 1 tablet (8 mg) by mouth every 8 hours as needed for nausea or vomiting   oxyCODONE (ROXICODONE) 5 MG tablet 7/19/2021 at Unknown time Self No Yes   Sig: Take 1 tablet (5 mg) by mouth every 6 hours as needed for pain   oxyCODONE (XTAMPZA) 9 MG 12 hr tablet 7/19/2021 at Unknown time  No Yes   Sig: Take 1 tablet (9 mg) by mouth every 12 hours . DO NOT drive or operate machinery while taking this medication.   polyethylene glycol (MIRALAX) 17 GM/Dose powder Past Week at Unknown time  No Yes   Sig: Take 17 g by mouth daily   prochlorperazine (COMPAZINE) 5 MG tablet Unknown at Unknown time  No No   Sig: Take 1 tablet (5 mg) by mouth every 6 hours as needed for nausea or vomiting   rosuvastatin (CRESTOR) 5 MG tablet 7/19/2021 at 2100  No Yes   Sig: Take 1 tablet (5 mg) by mouth  daily   senna-docusate (SENOKOT-S/PERICOLACE) 8.6-50 MG tablet Unknown at Unknown time Self Yes No   Sig: Take 1 tablet by mouth 2 times daily as needed for constipation Per [facility] form takes 1/2 tabs BID/PRN   traZODone (DESYREL) 50 MG tablet Unknown at Unknown time  No No   Sig: Take 1 tablet (50 mg) by mouth nightly as needed for sleep   venetoclax (VENCLEXTA) 100 MG tablet 7/19/2021 at 0800  No Yes   Sig: Take 2 tablets (200 mg) by mouth daily (with breakfast)   venetoclax (VENCLEXTA) 100 MG tablet Unknown at Unknown time  No No   Sig: Take 2 tablets (200 mg) by mouth daily for 28 days   vitamin B-12 (CYANOCOBALAMIN) 250 MCG tablet 7/19/2021 at 0800 Self No Yes   Sig: TAKE ONE TABLET BY MOUTH EVERY MORNING   vitamin D3 (CHOLECALCIFEROL) 50 mcg (2000 units) tablet 7/19/2021 at 0800 Self Yes Yes   Sig: Take 1 tablet by mouth daily      Facility-Administered Medications: None     Allergies   Allergies   Allergen Reactions     No Known Drug Allergies      Seasonal Allergies Other (See Comments)     Stuffy head and nose       Physical Exam   Vital Signs: Temp: 96.8  F (36  C) Temp src: Oral BP: 121/58 Pulse: 90   Resp: 20 SpO2: 100 % O2 Device: None (Room air)    Weight: 157 lbs 0 oz       GENERAL: The patient is not in any acute distressed. Awake and alert.  HEENT: Nonicteric sclerae, PERRLA, EOMI. Oropharynx clear. Moist mucous membranes. Conjunctivae appear well perfused.  HEART: Regular rate and rhythm without murmurs.  LUNGS: Clear to auscultation bilaterally. No wheezing or crackles.  ABDOMEN: Soft, positive bowel sounds, nontender.  SKIN: No rash, no excessive bruising, petechiae, or purpura.  EXTREMITIES : no rashes, b/l swelling in legs present.  NEUROLOGIC: AxO x 3.  Cranial nerves II-XII intact without motor/sensory deficit.  ROS: All other systems negative       Data   Data reviewed today: I reviewed all medications, new labs and imaging results over the last 24 hours. I personally reviewed the EKG tracing  showing NSR.    Recent Labs   Lab 07/20/21  1010 07/20/21  0357   WBC  --  2.9*   HGB 5.8* 4.8*   MCV  --  87   PLT  --  1*   NA  --  136   POTASSIUM  --  4.0   CHLORIDE  --  102   CO2  --  26   BUN  --  28   CR  --  0.89   ANIONGAP  --  8   MINNIE  --  8.7   GLC  --  115*   ALBUMIN  --  3.1*   PROTTOTAL  --  7.1   BILITOTAL  --  0.8   ALKPHOS  --  104   ALT  --  16   AST  --  20   TROPONIN 0.391* 0.052*

## 2021-07-20 NOTE — PLAN OF CARE
DATE:  7/20/2021   TIME OF RECEIPT FROM LAB:  1040  LAB TEST:  Troponin  LAB VALUE:  0.391  RESULTS GIVEN WITH READ-BACK TO (PROVIDER):  Haja Snyder MD  TIME LAB VALUE REPORTED TO PROVIDER:   1042    Betzaida Foss RN on 7/20/2021 at 10:42 AM

## 2021-07-20 NOTE — PROGRESS NOTES
Oral Chemotherapy Monitoring Program    Patient was admitted early this morning, will check for updates as appropriate.    Kim Kirk  Pharmacy Intern  [facility]  [phone]

## 2021-07-20 NOTE — ED NOTES
Blood transfusion started at this time.  Unable to complete scanning in Epic due to issues with the lab computer system.  Downtime blood transfusion record started.      Transfusion started at 0625  Double check with writer and Stacey EMANUEL RN    VS: 97.5 oral, 20 resp rate, HR 98, /62

## 2021-07-20 NOTE — ED NOTES
No blood transfusion reaction at this time.  Blood rate increased to 150 ml/hr.  Pt resting at this time.  Spoke with med/surg charge- plan for admission and report at 0700.

## 2021-07-20 NOTE — PROGRESS NOTES
Informal Recommendations Note    I was called by Dr. Snyder on 07/20/21 at 5:14 PM to provide input for Renny Gannon. The nature of this request for input does not permit comprehensive review of health records or patient interview.  I was not requested or am not able to personally examine the patient at this time.    Renny is a 70 year old male with history of CAD s/p 3vCABG (LIMA-LAD SVG-OM2 SVG-RPDA) 1/2020 with myelofibrosis on chemotherapy admitted with chest pain/dyspnea and found to be profoundly anemic (Hgb 4.8) and thrombocytopenic (PLT 1000.) Chest pain reportedly resolved shortly after presentation with transfusion and he has reportedly remained chest pain-free. Labs were notable for troponin 0.052 on presentation, uptrending to 0.391 and 0.603.     Notably, he was admitted to [facility] 1/27/21-2/3/21 with NSTEMI (peak troponin 11.22) and deemed not to be a candidate for invasive management due to significant and recurrent anemia/thrombocytopenia and angiogram was not performed (see note dated 1/28/21 by Dr. Presley.)      ECG shows nonspecific ST segment abnormality, unchanged from prior ECG on 6/4/21.    TTE shows LVEF~50% with inferolateral akinesis, unchanged from prior studies 6/4/21 and 1/28/21.     Based on the information provided, my recommendations are as follows:   -mild troponin elevation (0.603) can be explained by demand ischemia due to severe anemia, supported by resolution of chest pain with transfusion  -risks of invasive management appear to outweigh any potential benefit at this point in any case given profound and recurrent anemia/thrombocytopenia     These recommendations are not intended to take the place of the care team's clinical judgement, which should always be utilized to provide the most appropriate care to meet the unique needs of each patient.     Jj Ramos MD

## 2021-07-20 NOTE — CONSULTS
"CLINICAL NUTRITION SERVICES - ASSESSMENT NOTE     Nutrition Prescription    RECOMMENDATIONS FOR MDs/PROVIDERS TO ORDER:  none    Malnutrition Status:    severe    Recommendations already ordered by Registered Dietitian (RD):  Vanilla Ensure daily b/n meals for patient to try    Future/Additional Recommendations:  Increase nutrition supplements pending patient intake/preference     REASON FOR ASSESSMENT  Renny Gannon is a/an 70 year old male assessed by the dietitian for Admission Nutrition Risk Screen for positive and Provider Order - Nutrition Education - 2 gm Na diet    NUTRITION HISTORY  Patient stated he follows a low sodium diet and doesn't use it at all. Patient also stated he reads labels and knows to pay attention to serving sizes.     Patient stated he has been eating < 50% usual intake for > 1 year with no noticed change in weight.    UBW: At first sounded like patient said 315lbs then after telling him current weight stated it was 151lbs     CURRENT NUTRITION ORDERS  Diet: 2 g Sodium  Intake/Tolerance: None documented per nursing    LABS  Labs reviewed: alb 3.1    MEDICATIONS  Medications reviewed: zovirax, vitamin b-12, diflucan, lasix, cholecalciferol     ANTHROPOMETRICS  Height: 172.7 cm (5' 8\")  Most Recent Weight: 70.3 kg (154 lb 14.4 oz)    IBW: 70 kg  BMI: Normal BMI  Weight History:   Wt Readings from Last 30 Encounters:   07/20/21 70.3 kg (154 lb 14.4 oz)   07/16/21 71.4 kg (157 lb 4.8 oz)   07/15/21 71 kg (156 lb 9.6 oz)   07/14/21 71.8 kg (158 lb 6.4 oz)   07/12/21 71.4 kg (157 lb 8 oz)   07/07/21 71.3 kg (157 lb 3.2 oz)   06/28/21 75.1 kg (165 lb 9.6 oz)   06/18/21 77.6 kg (171 lb 1.6 oz)             9.4% wt loss in 1 month   06/02/21 83.4 kg (183 lb 12.8 oz)   06/02/21 80.7 kg (178 lb)   05/30/21 80.7 kg (178 lb)   05/27/21 83.6 kg (184 lb 4.8 oz)   05/20/21 83.6 kg (184 lb 4.8 oz)   04/08/21 82.6 kg (182 lb)                     14.9% wt loss in 3 months   04/07/21 85 kg (187 lb 6.4 oz) "   04/06/21 84.9 kg (187 lb 3.2 oz)   03/29/21 83.9 kg (185 lb)   03/26/21 88.5 kg (195 lb)   03/03/21 88 kg (194 lb)   02/16/21 91 kg (200 lb 11.2 oz)   02/03/21 91.1 kg (200 lb 13.4 oz)   01/08/21 95.9 kg (211 lb 6.4 oz)          26.7% wt loss in 6 months   12/22/20 95.8 kg (211 lb 4.8 oz)   11/17/20 98.4 kg (217 lb)   08/17/20 93.9 kg (207 lb)   07/21/20 88.9 kg (196 lb)                     20.1% wt loss in 1 year   04/22/20 79.8 kg (176 lb)   04/21/20 79.8 kg (176 lb)   03/03/20 78.7 kg (173 lb 8 oz)   03/02/20 80.7 kg (178 lb)       osing Weight: 70.3 kg current    ASSESSED NUTRITION NEEDS  Estimated Energy Needs: 8253-0021 kcals/day (25 - 30 kcals/kg)  Justification: Maintenance  Estimated Protein Needs:  grams protein/day (1.2 - 1.5 grams of pro/kg)  Justification: Hypercatabolism with critical illness and Repletion  Estimated Fluid Needs: 1373-4993 mL/day (25 - 30 mL/kg)   Justification: Maintenance    PHYSICAL FINDINGS  See malnutrition section below.  No abnormal nutrition-related physical findings observed.     MALNUTRITION  % Intake: </=50% for >/= 1 month (severe)  % Weight Loss: > 10% in 6 months (severe)  Subcutaneous Fat Loss: Unable to assess  Muscle Loss: Unable to assess  Fluid Accumulation/Edema: Unable to assess  Malnutrition Diagnosis: Severe malnutrition in the context of chronic illness    NUTRITION DIAGNOSIS  Malnutrition related to myeloproliferative disorder as evidenced by prolonged poor intake of < 50% estimated nutrition needs for > 1 month and significant weight loss of 26.7% in 6 months.       INTERVENTIONS  Implementation  Nutrition Education: Provided education on 2gm sodium diet for the undernourished and Nutrition education for nutrition relationship to health/disease   Medical food supplement therapy     Goals  Patient to consume % of nutritionally adequate meal trays three times a day, or the equivalent with supplements/snacks.     Monitoring/Evaluation  Progress  toward goals will be monitored and evaluated per protocol. PO intake, wt, labs, need for additional/alternate nutrition supplements.

## 2021-07-20 NOTE — PROVIDER NOTIFICATION
DATE:  7/20/2021   TIME OF RECEIPT FROM LAB:  1030  LAB TEST:  Hgb  LAB VALUE:  5.9  RESULTS GIVEN WITH READ-BACK TO (PROVIDER):  Dr. Snyder  TIME LAB VALUE REPORTED TO PROVIDER:   1036

## 2021-07-20 NOTE — ED NOTES
ED Nursing criteria listed below was addressed during verbal handoff:     Abnormal vitals: Yes  Abnormal results: Yes  Med Reconciliation completed: Yes  Meds given in ED: Yes  Any Overdue Meds: No  Core Measures: No  Isolation: No  Special needs: No  Skin assessment: Yes    Observation Patient  Education provided: N/A

## 2021-07-20 NOTE — ED TRIAGE NOTES
"Presents to ED with midsternal \"burning\" sensation that radiates through his chest. Started around 2200 last night. Patient has not been able to sleep. Has history of stent placement last year. Last had IV chemo last week. Also reports some shortness of breath.   "

## 2021-07-20 NOTE — PROVIDER NOTIFICATION
DATE:  7/20/2021   TIME OF RECEIPT FROM LAB:  1950  LAB TEST:  Hgb, Platelet, Troponin  LAB VALUE:  Hgb - 5.6, Platelet 22, Troponin 0.603  RESULTS GIVEN WITH READ-BACK TO (PROVIDER):  Haja Snyder MD  TIME LAB VALUE REPORTED TO PROVIDER:   5018

## 2021-07-21 NOTE — PROVIDER NOTIFICATION
Text Sent to Dr. Meesala regarding Pathologist's request.    Per Dr. Pamela Guthrie [facility] Pathologist there needs to be a discussion about the PRBC order. I guess due to Covid/shortages this has become common practice. Provider was given Pathologist's contact information.

## 2021-07-21 NOTE — DOWNTIME EVENT NOTE
The Blood Product Scanning was down on 7/21/2021.    MONIKA Jones was responsible for completing the paper charting during this time period.     The following information was re-entered into the system by Brigid Jones RN: Downtime Transfusion and Vital Signs Record    The following information will remain in the paper chart: [place] Transfusion and Vital Signs Record    Brigid Jones RN  7/21/2021

## 2021-07-21 NOTE — PLAN OF CARE
Problem: Adult Inpatient Plan of Care  Goal: Plan of Care Review  Outcome: Adequate for Discharge  Flowsheets (Taken 7/21/2021 1010)  Plan of Care Reviewed With: patient     Problem: Adult Inpatient Plan of Care  Goal: Optimal Comfort and Wellbeing  Outcome: Adequate for Discharge     Problem: Adult Inpatient Plan of Care  Goal: Readiness for Transition of Care  Outcome: Adequate for Discharge     Problem: Risk for Delirium  Goal: Improved Attention and Thought Clarity  Outcome: Adequate for Discharge    Patient overall feeling better, alert and oriented x 4, steady on feet, appetite fair, on a fluid restriction but has only taken 120 for breakfast and 240 with Miralax and meds this morning. IV lasix given as ordered. At edge of bed brushing his teeth and washing his face this morning, no complaints, other than talking about his dry flakey skin on his right arm where it appears to be peeling, he states that has been that way since he slid out of bed about a month ago and has an abrasion on the right elbow area that is currently dressed with a foam dressing.  States he feels ready to go home and his sister would transport.

## 2021-07-21 NOTE — PLAN OF CARE
Patient received 1 unit of platelets and 1 unit of PRBC overnight. Recheck labs pending. He has been ambulating to the bathroom with stand by assist. No complaints of pain. Pt feels that his shortness of breath has improved. Vitals have been stable. Tele has been SR with rates in the 70's-80's.

## 2021-07-21 NOTE — DISCHARGE SUMMARY
[facility]  Hospitalist Discharge Summary      Date of Admission:  7/20/2021  Date of Discharge:  7/21/2021  Discharging Provider: Haja Snyder MD      Discharge Diagnoses                 Chest discomfort + sob : 2/2 severe anemia, resolved with blood transfusion.       H/o Myeloproliferative disorder - refractory CLL - diagnosed about a year back, on chemotherapy and last chemo was about 4 days back.  On acyclovir 400 mg daily, diflucan 200 mg daily, on allopurinol 300 mg daily, levaquin 250 mg daily, on venetoclax       Pancytopenia : 2/2 chemo, monitor.       Leucopenia : 2/2 chemo, ANC 1500, no evidence of infection.       Anemia, acute on chronic  : 2/2 chemo,  hb 4.8 today, baseline 7-8, s/p 3 units prbc, stable hb, no evidence of bleeding.       Thrombocytopenia, acute on chronic  : 2/2 chemo, platelet 1000 today, baseline around 10-15,000, no evidence of bleeding, s/p 2 units of platelets and platelets are stable.       Acute on chronic systolic CHF : on lasix 40 mg daily - at home, iv diuresis. Last echo 6/4/21 : EF of 45-50%.       Type 2 MI : likely demand ischemia from severe anemia, unlikely ACS, will trend troponin, no CP at this time, EKG : no ischemic changes, echo : no new changes.       CAD/ CABG  About a yr ago : on statin, bb, not on blood thinners.       Swelling in legs b/l : on lasix 40 mg daily - at home.       HTN, Essential : on metoprolol 50 mg bid - at home.       Chronic pain issues : on oxycodone 5 mg every 6 hourly prn, oxycodone - 9 mg - 12 hr tab bid       HLD :on statin          Follow-ups Needed After Discharge   Follow-up Appointments     Follow-up and recommended labs and tests       Follow up with primary care provider, Angus Clements Mai, within 7 days to   follow up on results.  The following labs/tests are recommended: CBC.    Follow up with   Cardiology     As advised in  1-2 weeks  Follow up with    Oncology    As advised         {Additional  follow-up instructions/to-do's for PCP    : BMP    Unresulted Labs Ordered in the Past 30 Days of this Admission     Date and Time Order Name Status Description    7/7/2021  1:29 PM FISH In process     7/7/2021  1:29 PM CHROMOSOME BONE MARROW In process     7/2/2021  8:19 AM Platelets prepare order unit In process       These results will be followed up by pcp    Discharge Disposition   Discharged to home  Condition at discharge: Stable        Hospital Course       Renny Gannon is a 70 year old male admitted on 7/20/2021. He has a  H/o Myeloproliferative disorder - refractory CLL - diagnosed about a year back, on chemotherapy and last chemo was about 4 days back.  On acyclovir 400 mg daily, diflucan 200 mg daily, on allopurinol 300 mg daily, levaquin 250 mg daily, on venetoclax.         He was admitted with cp and sob. He was found to be severely Pancytopenic  2/2 chemo for CLL. Last chemo was 4 days ago. He was found to have  Severe Anemia, acute on chronic and 2/2 chemo,  hb 4.8 at admission, baseline 7-8. He was transfused 3 units prbc following which hb is up to 7.6.  No evidence of bleeding.        Also had severe thrombocytopenia, acute on chronic  And 2/2 chemo, platelet 1000 at admission, baseline around 10-15,000, no evidence of bleeding. He was transfused 2 units of platelets following which counts came up to 34,000.      Also found to have elevated troponin which seems  likely from demand ischemia 2/2 severe anemia, unlikely ACS.  Troponin did show a upward trend, no CP at this time, EKG : no ischemic changes,  Echo : stable with no new changes compared to previous echo. Managed with iv diuresis for fluid overload. He can have f/u with cardiology as outpatient in 1-2 weeks.    Patient has shown significant clinical improvement at this time. He  will be discharged home and will f/u PCP and Oncology team as outpatient.         Consultations This Hospital Stay   CORE CLINIC EVALUATION IP  CONSULT  OCCUPATIONAL THERAPY ADULT IP CONSULT  NUTRITION SERVICES ADULT IP CONSULT  CARE MANAGEMENT / SOCIAL WORK IP CONSULT    Code Status   No CPR- Do NOT Intubate    Time Spent on this Encounter   I, Haja Snyder MD, personally saw the patient today and spent greater than 30 minutes discharging this patient.       Haja Snyder MD  [facility] 2A MEDICAL SURGICAL  [address] [place] [address]  Phone: [phone]  ______________________________________________________________________    Physical Exam   Vital Signs: Temp: 97.3  F (36.3  C) Temp src: Oral BP: 113/54 Pulse: 83   Resp: 16 SpO2: 98 % O2 Device: None (Room air)    Weight: 151 lbs 12.8 oz         GENERAL: The patient is not in any acute distressed. Awake and alert.  HEENT: Nonicteric sclerae, PERRLA, EOMI. Oropharynx clear. Moist mucous membranes. Conjunctivae appear well perfused.  HEART: Regular rate and rhythm without murmurs.  LUNGS: Clear to auscultation bilaterally. No wheezing or crackles.  ABDOMEN: Soft, positive bowel sounds, nontender.  SKIN: No rash, no excessive bruising, petechiae, or purpura.  EXTREMITIES : no rashes, b/l swelling in legs present.  NEUROLOGIC: AxO x 3.  Cranial nerves II-XII intact without motor/sensory deficit.  ROS: All other systems negative             Primary Care Physician   Angus Clements Mai    Discharge Orders      Adult Cardiology Eval Referral      Reason for your hospital stay    sob     Follow-up and recommended labs and tests     Follow up with primary care provider, Angus Clements Mai, within 7 days to follow up on results.  The following labs/tests are recommended: CBC.    Follow up with   Cardiology     As advised in  1-2 weeks  Follow up with    Oncology    As advised     Activity    Your activity upon discharge: activity as tolerated     No CPR- Do NOT Intubate     Diet    Follow this diet upon discharge: Orders Placed This Encounter      Snacks/Supplements Adult: Ensure Enlive;  Between Meals      2 Gram Sodium Diet Other - please comment     CBC with Platelets & Differential       Significant Results and Procedures   Most Recent 3 CBC's:Recent Labs   Lab Test 07/21/21  0602 07/20/21 2000 07/20/21  1557 07/20/21 0357 07/12/21  0938   WBC 2.9*  --   --  2.9* 20.0*   HGB 7.6* 6.6* 5.6* 4.8* 7.4*   MCV 88  --   --  87 89   PLT 34*  --  22* 1* 11*     Most Recent 3 BMP's:Recent Labs   Lab Test 07/21/21  0602 07/20/21  0357 07/12/21  0938    136 136   POTASSIUM 4.2 4.0 3.7   CHLORIDE 105 102 102   CO2 26 26 29   BUN 24 28 24   CR 0.88 0.89 0.99   ANIONGAP 6 8 5   MINNIE 8.7 8.7 9.4   GLC 99 115* 161*       Discharge Medications   Current Discharge Medication List      CONTINUE these medications which have NOT CHANGED    Details   acetaminophen (TYLENOL) 325 MG tablet Take 2 tablets (650 mg) by mouth every 4 hours as needed for mild pain  Qty:        acyclovir (ZOVIRAX) 400 MG tablet Take 1 tablet (400 mg) by mouth 2 times daily  Qty: 60 tablet, Refills: 1    Associated Diagnoses: 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); Myelofibrosis (H)      allopurinol (ZYLOPRIM) 300 MG tablet Take 1 tablet (300 mg) by mouth daily  Qty: 90 tablet, Refills: 1    Associated Diagnoses: Myeloproliferative disorder (H)      fluconazole (DIFLUCAN) 200 MG tablet Take 1 tablet (200 mg) by mouth daily  Qty: 30 tablet, Refills: 1    Associated Diagnoses: 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 Leave on for 12 hours, then remove for 12 hours.  Qty: 30 patch, Refills: 1    Associated Diagnoses: Right shoulder pain, unspecified chronicity      metoprolol tartrate (LOPRESSOR) 50 MG tablet Take 1 tablet (50 mg) by mouth 2 times daily  Qty: 30 tablet, Refills: 3    Associated Diagnoses: NSTEMI (non-ST elevated myocardial infarction) (H)      oxyCODONE (ROXICODONE) 5 MG tablet Take 1 tablet (5 mg) by mouth every 6 hours as needed for pain  Qty: 2 tablet, Refills: 0      oxyCODONE (XTAMPZA) 9 MG 12 hr tablet Take 1 tablet (9 mg) by mouth every 12 hours . DO NOT drive or operate machinery while taking this medication.  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  Qty:      Associated Diagnoses: Congestive heart failure, unspecified HF chronicity, unspecified heart failure type  (H)      ASPIRIN NOT PRESCRIBED (INTENTIONAL) Please choose reason not prescribed from choices below.  Qty:      Associated Diagnoses: Coronary artery disease of native artery of native heart with stable angina pectoris (H)      diclofenac (VOLTAREN) 1 % topical gel Apply 2-4 g topically 4 times daily as needed for moderate pain  Qty: 50 g, Refills: 1    Associated Diagnoses: Right shoulder pain, unspecified chronicity      nitroGLYcerin (NITROSTAT) 0.4 MG sublingual tablet For chest pain place 1 tablet under the tongue every 5 minutes for 3 doses. If symptoms persist 5 minutes after 1st dose call 911.  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 - Potential duplicate medications found. Please discuss with provider.        Allergies   Allergies   Allergen Reactions     No Known Drug Allergies      Seasonal Allergies Other (See  Comments)     Stuffy head and nose

## 2021-07-21 NOTE — PROGRESS NOTES
Dr. Meesala discussed additional unit of blood for patient with Blood Bank Pathologist. Will infuse 1 more unit. Order placed by MD, lab received order but unable to see in Epic or complete blood scanning process in at this time due to recent upgrade. Verified with MD that she does indeed want pt to receive another unit of blood. Will proceed with transfusion.

## 2021-07-21 NOTE — DOWNTIME EVENT NOTE
The Blood Scanning was down on 7/21/2021.    MONIKA Jones was responsible for completing the paper charting during this time period.     The following information was re-entered into the system by Brigid Jones RN: Downtime Transfusion and Vital Signs Record    The following information will remain in the paper chart: Downtime Transfusion and Vital Signs Record    Brigid Jones RN  7/21/2021

## 2021-07-21 NOTE — PROGRESS NOTES
07/21/21 0800   Quick Adds   Type of Visit Initial Occupational Therapy Evaluation       Present no   Living Environment   People in home alone   Current Living Arrangements apartment   Home Accessibility no concerns   Living Environment Comments No stairs to enter the apartment, elevator inside. Has grab bars in the bathroom and walker at home to use if needed for mobility.    Self-Care   Usual Activity Tolerance good   Current Activity Tolerance moderate   Regular Exercise No   Equipment Currently Used at Home none   Instrumental Activities of Daily Living (IADL)   Previous Responsibilities meal prep;housekeeping;driving   IADL Comments recently self-limiting driving, shopping, house hold tasks due to fatigue. Reports sister helps manage medications as needed.    Disability/Function   Hearing Difficulty or Deaf no   Wear Glasses or Blind yes   Vision Management glasses   Concentrating, Remembering or Making Decisions Difficulty no   Difficulty Communicating no   Difficulty Eating/Swallowing no   Walking or Climbing Stairs Difficulty no   Dressing/Bathing Difficulty no   Toileting issues no   Doing Errands Independently Difficulty (such as shopping) yes   Errands Management sister assists as needed   Fall history within last six months yes   Number of times patient has fallen within last six months 1   Change in Functional Status Since Onset of Current Illness/Injury no   General Information   Onset of Illness/Injury or Date of Surgery 07/20/21   Referring Physician Haja Snyder MD   Patient/Family Therapy Goal Statement (OT) home if able    Existing Precautions/Restrictions no known precautions/restrictions   Left Upper Extremity (Weight-bearing Status) full weight-bearing (FWB)   Right Upper Extremity (Weight-bearing Status) full weight-bearing (FWB)   Left Lower Extremity (Weight-bearing Status) full weight-bearing (FWB)   Right Lower Extremity (Weight-bearing Status) full  weight-bearing (FWB)   Heart Disease Risk Factors Medical history   General Observations and Info Renny Gannon is a 70 year old male admitted on 7/20/2021 for SOB and chest pain. PMH of myeloproliferative disorder - refractory CLL and anemia.    Cognitive Status Examination   Orientation Status orientation to person, place and time   Affect/Mental Status (Cognitive) WNL   Follows Commands WNL   Cognitive Status Comments baseline per clinical judgement    Visual Perception   Visual Impairment/Limitations WNL   Sensory   Sensory Quick Adds No deficits were identified   Pain Assessment   Patient Currently in Pain No   Integumentary/Edema   Integumentary/Edema no deficits were identifed   Posture   Posture not impaired   Range of Motion Comprehensive   Comment, General Range of Motion  R hand ROM impaired d/t fall- pain limiting.    Strength Comprehensive (MMT)   Comment, General Manual Muscle Testing (MMT) Assessment UB MMT testing 4/5 L side- denied R side to to ongoing shoulder pain   Muscle Tone Assessment   Muscle Tone Quick Adds No deficits were identified   Coordination   Upper Extremity Coordination No deficits were identified   Bed Mobility   Bed Mobility supine-sit;sit-supine   Supine-Sit [place] (Bed Mobility) supervision   Sit-Supine [place] (Bed Mobility) supervision   Assistive Device (Bed Mobility) bed rails   Transfers   Transfers sit-stand transfer   Sit-Stand Transfer   Sit-Stand [place] (Transfers) supervision   Activities of Daily Living   BADL Assessment upper body dressing;lower body dressing;grooming   Upper Body Dressing Assessment   [place] Level (Upper Body Dressing) modified independence   Position (Upper Body Dressing) unsupported sitting   Lower Body Dressing Assessment   [place] Level (Lower Body Dressing) modified independence   Position (Lower Body Dressing) unsupported sitting   Grooming Assessment   Comment (Grooming) Per pt report, washed self up after using  the bathroom this morning. Denied further completion of ADLs   Clinical Impression   Criteria for Skilled Therapeutic Interventions Met (OT) no;skilled treatment is necessary   ADL comments/analysis Pt is supervision for ADLs and functional mobility at this time. ROM and MMT WFL/baseline. No cognitive changes present.    Intervention Comments Evaluation only    Clinical Decision Making Complexity (OT) low complexity   Anticipated Equipment Needs Upon Discharge (OT)   (none )   Risk & Benefits of therapy have been explained evaluation/treatment results reviewed;care plan/treatment goals reviewed;risks/benefits reviewed;patient   OT Discharge Planning    OT Discharge Recommendation (DC Rec) Home with assist  (as needed, continue [facility] services)   OT Rationale for DC Rec Pt comes from home alone, reports sister is able to provide assistance as needed with IADL tasks. Pt demonstrating functional IND with ADLS during OT evaluation. SBA for mobility. No cognitive concerns present. Pt educated on pursed lip breathing and energy conservation strategies to utilize during episodes of fatigue and SOB. Verbalized and demonstrated understanding. Pt would benefit from continued  services that were received prior to admission.    OT Brief overview of current status  Functional IND with ADLs, strength and ROM baseline. No cognitive changes per clinical judgement.  Educated on energy conservation strategies and pursed lip breathing.        Thank you for the referral.   Kim Abdullahi, OTR/L

## 2021-07-21 NOTE — PROGRESS NOTES
DATE:  7/21/2021   TIME OF RECEIPT FROM LAB:  0716  LAB TEST:  platelets  LAB VALUE:  34 (previous 22)  RESULTS GIVEN WITH READ-BACK TO (PROVIDER): Claudio VASQUEZ LAB VALUE REPORTED TO PROVIDER:   0720

## 2021-07-21 NOTE — PROGRESS NOTES
S-(situation): Patient discharged to home via family vehicle with sister    B-(background): anemia possibly related to chemo med for CLL    A-(assessment): patient feeling well overall, no complaints of pain, steady on feet.    R-(recommendations): Discharge instructions reviewed with . Listed belongings gathered and returned to patient.         Discharge Nursing Criteria:     Care Plan and Patient education resolved: Yes    New Medications- pt has been educated about purpose and side effects: NA    Vaccines  Influenza status verified at discharge:  Not Applicable      MISC  Prescriptions if needed, hard copies sent with patient  NA  Home medications returned to patient: NA  Medication Bin checked and emptied on discharge Yes  Patient reports post-discharge pain management plan is effective: Yes

## 2021-07-21 NOTE — PROGRESS NOTES
Care Management Discharge Note    Discharge Date: 07/21/2021       Dis[facility] Dis[facility]    Discharge Services: RN, PT and OT services by home care     Discharge Transportation: family or friend will provide    Private pay costs discussed: Not applicable      Education Provided on the Discharge Plan:  Yes     Persons Notified of Discharge Plans: Patient     Patient/Family in Agreement with the Plan: yes    Handoff Referral Completed: Yes      JUANA Heath  [facility]   [phone]

## 2021-07-21 NOTE — PLAN OF CARE
Patient is alert and oriented, vitally stable and on room air. Received total of 2 PRBC and 1 Platelet on shift. Downtime paperwork completed for blood products. Looked over by charge nurse Shree. Plan to give one more platelet, ready for  at blood bank. Patient stated he would not like to be part of clinical trial. Continue to trend trop, hgl, platelet, MD aware of results.     Betzaida Foss RN on 7/20/2021 at 7:06 PM      Problem: Adult Inpatient Plan of Care  Goal: Plan of Care Review  Outcome: No Change  Goal: Patient-Specific Goal (Individualized)  Outcome: No Change  Goal: Absence of Hospital-Acquired Illness or Injury  Outcome: No Change  Intervention: Identify and Manage Fall Risk  Recent Flowsheet Documentation  Taken 7/20/2021 1600 by Betzaida Foss RN  Safety Promotion/Fall Prevention:   bed alarm on   assistive device/personal items within reach   activity supervised   clutter free environment maintained   fall prevention program maintained   increased rounding and observation   room door open   room organization consistent   safety round/check completed  Taken 7/20/2021 0900 by Betzaida Foss RN  Safety Promotion/Fall Prevention:   bed alarm on   assistive device/personal items within reach   activity supervised   clutter free environment maintained   fall prevention program maintained   increased rounding and observation   room door open   room organization consistent   safety round/check completed  Intervention: Prevent Skin Injury  Recent Flowsheet Documentation  Taken 7/20/2021 1600 by Betzaida Foss RN  Body Position: position changed independently  Taken 7/20/2021 0900 by Betzaida Foss RN  Body Position: position changed independently  Intervention: Prevent and Manage VTE (Venous Thromboembolism) Risk  Recent Flowsheet Documentation  Taken 7/20/2021 1600 by Betzaida Foss RN  VTE Prevention/Management: pneumatic compression device  Taken 7/20/2021  0900 by Betzaida Foss, RN  VTE Prevention/Management: pneumatic compression device  Goal: Optimal Comfort and Wellbeing  Outcome: No Change  Goal: Readiness for Transition of Care  Outcome: No Change  Intervention: Mutually Develop Transition Plan  Recent Flowsheet Documentation  Taken 7/20/2021 0900 by Betzaida Foss, RN  Equipment Currently Used at Home: none     Problem: Risk for Delirium  Goal: Optimal Coping  Outcome: No Change  Goal: Improved Behavioral Control  Outcome: No Change  Goal: Improved Attention and Thought Clarity  Outcome: No Change  Goal: Improved Sleep  Outcome: No Change     Problem: Hypertension Comorbidity  Goal: Blood Pressure in Desired Range  Outcome: No Change     Problem: Fall Injury Risk  Goal: Absence of Fall and Fall-Related Injury  Outcome: No Change  Intervention: Promote Injury-Free Environment  Recent Flowsheet Documentation  Taken 7/20/2021 1600 by Betzaida Foss, RN  Safety Promotion/Fall Prevention:   bed alarm on   assistive device/personal items within reach   activity supervised   clutter free environment maintained   fall prevention program maintained   increased rounding and observation   room door open   room organization consistent   safety round/check completed  Taken 7/20/2021 0900 by Betzaida Foss, RN  Safety Promotion/Fall Prevention:   bed alarm on   assistive device/personal items within reach   activity supervised   clutter free environment maintained   fall prevention program maintained   increased rounding and observation   room door open   room organization consistent   safety round/check completed

## 2021-07-21 NOTE — CONSULTS
Care Management Initial Consult    General Information  Assessment completed with: Patient,    Type of CM/SW Visit: Initial Assessment    Primary Care Provider verified and updated as needed: Yes   Readmission within the last 30 days:        Reason for Consult: discharge planning, other (see comments) (High Risk for hospital readmission )  Advance Care Planning: Advance Care Planning Reviewed: present on chart          Communication Assessment  Patient's communication style: spoken language (English or Bilingual)    Hearing Difficulty or Deaf: no   Wear Glasses or Blind: yes    Cognitive  Cognitive/Neuro/Behavioral: WDL                      Living Environment:   People in home: alone     Current living Arrangements: apartment      Able to return to prior arrangements: yes       Family/Social Support:  Care provided by: self  Provides care for: no one  Marital Status: Single  Sibling(s)          Description of Support System: Supportive, Involved    Support Assessment: Adequate family and caregiver support, Adequate social supports    Current Resources:   Patient receiving home care services: Yes  Skilled Home Care Services: Skilled Nursing, Physicial Therapy, Occupational Therapy  Community Resources: OP Infusion  Equipment currently used at home: none  Supplies currently used at home: None    Employment/Financial:  Employment Status: retired        Financial Concerns: No concerns identified           Lifestyle & Psychosocial Needs:  Social Determinants of Health     Tobacco Use: Medium Risk     Smoking Tobacco Use: Former Smoker     Smokeless Tobacco Use: Never Used   Alcohol Use:      Frequency of Alcohol Consumption:      Average Number of Drinks:      Frequency of Binge Drinking:    Financial Resource Strain:      Difficulty of Paying Living Expenses:    Food Insecurity:      Worried About Running Out of Food in the Last Year:      Ran Out of Food in the Last Year:    Transportation Needs:      Lack of  Transportation (Medical):      Lack of Transportation (Non-Medical):    Physical Activity:      Days of Exercise per Week:      Minutes of Exercise per Session:    Stress:      Feeling of Stress :    Social Connections:      Frequency of Communication with Friends and Family:      Frequency of Social Gatherings with Friends and Family:      Attends Mandaen Services:      Active Member of Clubs or Organizations:      Attends Club or Organization Meetings:      Marital Status:    Intimate Partner Violence:      Fear of Current or Ex-Partner:      Emotionally Abused:      Physically Abused:      Sexually Abused:    Depression: Not at risk     PHQ-2 Score: 0   Housing Stability:      Unable to Pay for Housing in the Last Year:      Number of Places Lived in the Last Year:      Unstable Housing in the Last Year:        Functional Status:  Prior to admission patient needed assistance:   Dependent ADLs:: Ambulation-walker  Dependent IADLs:: Cleaning, Cooking, Shopping, Medication Management, Transportation       Mental Health Status:  Mental Health Status: No Current Concerns       Chemical Dependency Status:  Chemical Dependency Status: No Current Concerns             Values/Beliefs:  Spiritual, Cultural Beliefs, Mandaen Practices, Values that affect care:    Unknown              Additional Information:  Care Management visited with patient regarding discharge planning and patient is high risk for hospital readmission.  Patient lives alone in his apartment in [place].  He is independent with ADLs and receives assistance with transportation and shopping from his sister.      Patient plans to continue with his [facility] services of RN- for medication management, PT, and OT services.  Home Care has been notified of patient's hospitalization and discharge back to home today.      Discussed follow up appointment scheduled with patient's primary doctor.  Also high risk for hospital readmission and  follow up by clinic care coordination.  Patient is OK with this.  Handoff being sent.      Patient also attends outpatient infusion services.  He reports that his sister, Mary, transports him to these appointments.      Patient denies having any other discharge planning needs at this time.  Sister, aren to transport him home today.        JUANA Heath  [facility]   [phone]

## 2021-07-22 NOTE — PROGRESS NOTES
Clinic Care Coordination Contact    Clinic Care Coordination Contact  OUTREACH    Referral Information:  Referral Source: IP Handoff    Primary Diagnosis: Oncology    Chief Complaint   Patient presents with     Clinic Care Coordination - Post Hospital     Clinic Care Coordination RN         Universal Utilization:   [facility]  Hospitalist Discharge Summary       Date of Admission:  7/20/2021  Date of Discharge:  7/21/2021  Discharging Provider: Haja Snyder MD           Discharge Diagnoses                      Chest discomfort + sob : 2/2 severe anemia, resolved with blood transfusion.       H/o Myeloproliferative disorder - refractory CLL - diagnosed about a year back, on chemotherapy and last chemo was about 4 days back.  On acyclovir 400 mg daily, diflucan 200 mg daily, on allopurinol 300 mg daily, levaquin 250 mg daily, on venetoclax       Pancytopenia : 2/2 chemo, monitor.       Leucopenia : 2/2 chemo, ANC 1500, no evidence of infection.       Anemia, acute on chronic  : 2/2 chemo,  hb 4.8 today, baseline 7-8, s/p 3 units prbc, stable hb, no evidence of bleeding.       Thrombocytopenia, acute on chronic  : 2/2 chemo, platelet 1000 today, baseline around 10-15,000, no evidence of bleeding, s/p 2 units of platelets and platelets are stable.       Acute on chronic systolic CHF : on lasix 40 mg daily - at home, iv diuresis. Last echo 6/4/21 : EF of 45-50%.       Type 2 MI : likely demand ischemia from severe anemia, unlikely ACS, will trend troponin, no CP at this time, EKG : no ischemic changes, echo : no new changes.       CAD/ CABG  About a yr ago : on statin, bb, not on blood thinners.       Swelling in legs b/l : on lasix 40 mg daily - at home.       HTN, Essential : on metoprolol 50 mg bid - at home.       Chronic pain issues : on oxycodone 5 mg every 6 hourly prn, oxycodone - 9 mg - 12 hr tab bid     HLD :on statin      Clinic Utilization  Difficulty keeping appointments::  No  Compliance Concerns: No  No-Show Concerns: No  No PCP office visit in Past Year: No  Utilization    Hospital Admissions  7             ED Visits  8             No Show Count (past year)  4                Current as of: 7/22/2021  8:35 AM            Clinical Concerns:  Current Medical Concerns:    Patient reports his SOB episodes have decreased and leg swelling has gone done.  Patient checks his weight daily and today is 149#  Patient reports he has had Chemotherapy for approximately 3 weeks and has future infusions scheduled   PCP visit scheduled 7/28 and Oncologist in August     Patient states his Cardiologist retired and so will get a referral from PCP at his visit 7/26/2021    Patient reports the pain in his shoulder and elbow is much better     Patient reports his sister assists with driving and medication set up   Current Behavioral Concerns: No    Education Provided to patient: CC Introductory letter and are plan mailed    Pain  Pain (GOAL):: No  Health Maintenance Reviewed: Due/Overdue   Health Maintenance Due   Topic Date Due     HF ACTION PLAN  Never done     Pneumococcal Vaccine: 65+ Years (1 of 4 - PCV13) Never done     COVID-19 Vaccine (1) Never done     DTAP/TDAP/TD IMMUNIZATION (1 - Tdap) Never done     ZOSTER IMMUNIZATION (1 of 2) Never done     AORTIC ANEURYSM SCREENING (SYSTEM ASSIGNED)  Never done   Patient will discuss at PCP visit 7/26/2021  Clinical Pathway: None    Medication Management:  Medication review status: Medications reviewed.  Changes noted per patient report.       Functional Status:  Dependent ADLs:: Independent, Ambulation-walker (has a walker at home, uses as needed)  Dependent IADLs:: Transportation, Shopping, Cooking, Cleaning  Bed or wheelchair confined:: No  Mobility Status: Independent w/Device    Living Situation:  Current living arrangement:: I live alone  Type of residence:: Apartment - handicap accessible    Lifestyle & Psychosocial Needs:    Social Determinants of  Health     Tobacco Use: Medium Risk     Smoking Tobacco Use: Former Smoker     Smokeless Tobacco Use: Never Used   Alcohol Use:      Frequency of Alcohol Consumption:      Average Number of Drinks:      Frequency of Binge Drinking:    Financial Resource Strain:      Difficulty of Paying Living Expenses:    Food Insecurity: No Food Insecurity     Worried About Running Out of Food in the Last Year: Never true     Ran Out of Food in the Last Year: Never true   Transportation Needs: No Transportation Needs     Lack of Transportation (Medical): No     Lack of Transportation (Non-Medical): No   Physical Activity: Inactive     Days of Exercise per Week: 0 days     Minutes of Exercise per Session: 0 min   Stress:      Feeling of Stress :    Social Connections: Unknown     Frequency of Communication with Friends and Family: Three times a week     Frequency of Social Gatherings with Friends and Family: Twice a week     Attends Restoration Services: Never     Active Member of Clubs or Organizations: No     Attends Club or Organization Meetings: Never     Marital Status: Not on file   Intimate Partner Violence:      Fear of Current or Ex-Partner:      Emotionally Abused:      Physically Abused:      Sexually Abused:    Depression: Not at risk     PHQ-2 Score: 0   Housing Stability:      Unable to Pay for Housing in the Last Year:      Number of Places Lived in the Last Year:      Unstable Housing in the Last Year:      Diet:: Regular  Inadequate nutrition (GOAL):: No  Tube Feeding: No  Inadequate activity/exercise (GOAL):: No  Significant changes in sleep pattern (GOAL): No  Transportation means:: Regular car, Family     Restoration or spiritual beliefs that impact treatment:: No  Mental health DX:: No  Mental health management concern (GOAL):: No  Chemical Dependency Status: No Current Concerns  Informal Support system:: Family        Resources and Interventions:  Current Resources:   Skilled Home Care Services: Skilled  Nursing  Community Resources: OP Infusion  Supplies used at home:: Compression Stockings  Equipment Currently Used at Home: grab bar, toilet, grab bar, tub/shower, shower chair, walker, standard  Employment Status: retired         Advance Care Plan/Directive  Advanced Care Plans/Directives on file:: Yes  Type Advanced Care Plans/Directives: Advanced Directive - On File  Advanced Care Plan/Directive Status: Not Applicable    Referrals Placed: None     Goals:   Goals        General     Medical (pt-stated)      Notes - Note created  7/22/2021 10:28 AM by Myla Vasquez RN     Goal Statement: I will seek medical help if I am experiencing reoccurrence of shortness of breath episodes   Date Goal set:7/22/2021  Barriers: Deconditioned   Strengths: Supportive sister   Date to Achieve By:10/22/2021  Patient expressed understanding of goal: Yes  Action steps to achieve this goal:  1. I will follow the congested hear future plan   2. I will keep future chemotherapy infusion and Oncology appointment s  3. I will check my weight daily and call if weight gain is 2-3# in a day or 5# in a week   4. I will take extra steps throughout the day to increase my strength   5. Care coordinator will follow up in 1-2 weeks             Patient/Caregiver understanding: Expresses good understanding of discharge insructions     Outreach Frequency: weekly  Future Appointments              In 4 days PH LAB [facility]    In 4 days PH INFUSION CHAIR 6; PH INFUSION NURSE [facility]fusion [facility]    In 6 days Angus Scott MD [facility]    In 1 week PH LAB [facility]    In 1 week PH INFUSION CHAIR 1; PH INFUSION NURSE [facility]fusion [facility]    In 1 week Luli Lipscomb MD [facility]    In 1 week PH  LAB [facility]    In 1 week PH INFUSION NURSE; PH INFUSION CHAIR 14 [facility]    In 1 week Alla Lopez PA-C [facility]    In 2 weeks PH LAB [facility]    In 2 weeks PH INFUSION NURSE; PH INFUSION CHAIR 14 [facility]    In 2 weeks Emani Camahco APRN CNP; U[facility] BONE MARROW BIOPSY [facility]    In 2 weeks PH LAB [facility]    In 2 weeks PH INFUSION NURSE; PH INFUSION CHAIR 9 [facility]    In 2 weeks PH INFUSION NURSE; PH INFUSION CHAIR 10 [facility]    In 2 weeks Jeane Patel MD [facility]    In 2 weeks PH INFUSION NURSE; PH INFUSION CHAIR 10 [facility]    In 3 weeks PH LAB [facility]    In 3 weeks PH INFUSION NURSE; PH INFUSION CHAIR 10 [facility]fusion [facility]    In 3 weeks PH INFUSION NURSE; PH INFUSION CHAIR 10 [facility]    In 3 weeks PH LAB [facility]    In 3 weeks PH INFUSION CHAIR 5; PH INFUSION NURSE [facility]    In 4 weeks PH LAB [facility]    In 4 weeks PH INFUSION CHAIR 5; PH INFUSION NURSE [facility]    In 1 month PH LAB [facility]    In 1 month PH INFUSION CHAIR 6; PH INFUSION NURSE [facility]fusion [facility]    In 1 month PH LAB [facility]    In 1 month PH INFUSION CHAIR 5; PH INFUSION NURSE [facility]    In 1 month PH LAB [facility]    In 1 month PH INFUSION CHAIR 6; PH INFUSION NURSE [facility]    In 1 month PH LAB [facility]    In 1 month PH INFUSION CHAIR 6; PH INFUSION NURSE [facility]          Plan:   1. Patient will keep future Chemotherapy infusions,PCP and Oncology appointments  2. Patient will continue to check weight daily and call with concerns  3. Patient will monitor leg edema and SOB reoccurrence call with  concerns   4. CC RN will mail a CHF Action Plan   5. CC RN will follow up in 3-5 business days     [facility]   Myla Vasquez RN, Care Coordinator   [facility]'s   E-mail [email or web address]   [phone]

## 2021-07-22 NOTE — PROGRESS NOTES
Oncology RN Care Coordination Note:     Incoming Call:  [facility] called stating patient was discharged from the hospital and she wanted to confirm it was ok for him to resume his venetoclax.      This writer has sent a message to Dr. Patel and patient's RNCC Ki Lamb, his platelets remain low at 34 yesterday.  Parameters for treatment are for platelets to be at or above 30K and ANC to at or above 600.     Awaiting confirmation from Dr. Laguerre at this time.     Outgoing Call:  This writer attempted to reach [facility], but she didn't answer.  Writer left a detailed voicemail letting her know yes, Renny should resume his Venetoclax, his counts will always be low due to his myelofibrosis.  Writer left contact info for any further questions.     Zena Lopez RN BSN   [facility]  Nurse Coordinator

## 2021-07-22 NOTE — TELEPHONE ENCOUNTER
Routing refill request to provider for review/approval because:  Medication is reported/historical    [place] RN

## 2021-07-22 NOTE — LETTER
[facility]  Complex Care Plan  About Me:    Patient Name:  Renny Gannon    YOB: 1950  Age:         70 year old   [place] MRN:    [id number] Telephone Information:  Home Phone [phone]   Mobile [phone]       Address:  [address]  [place] [address] Email address:  No e-mail address on record      Emergency Contact(s)    Name Relationship Lgl Grd Work Phone Home Phone Mobile Phone   1. AUDELIA GANNON Sister No  [phone]   2. UMBERTO GRAY Sister   [phone]            Primary language:  English     needed? No   [employer]:  [phone] op. 1  Other communication barriers: None  Preferred Method of Communication:     Current living arrangement: I live alone  Mobility Status/ Medical Equipment: Independent w/Device    Health Maintenance  Health Maintenance Reviewed: Due/Overdue   Health Maintenance Due   Topic Date Due     HF ACTION PLAN  Never done     Pneumococcal Vaccine: 65+ Years (1 of 4 - PCV13) Never done     COVID-19 Vaccine (1) Never done     DTAP/TDAP/TD IMMUNIZATION (1 - Tdap) Never done     ZOSTER IMMUNIZATION (1 of 2) Never done     AORTIC ANEURYSM SCREENING (SYSTEM ASSIGNED)  Never done   Discuss with your provider in next 1-3 months to complete     My Access Plan  Medical Emergency 911   Primary Clinic Line [facility] - [phone]   24 Hour Appointment Line [phone] or  [phone]) (toll-free)   24 Hour Nurse Line [phone] (toll-free)   Preferred Urgent Care [facility], [phone]   Preferred Hospital [facility]  [phone]   Preferred Pharmacy [facility] - [place], [place] - [address]     Behavioral Health Crisis Line The National Suicide Prevention Lifeline at 1-318.656.1758 or 911             My Care Team Members  Patient Care Team       Relationship Specialty Notifications Start End     Angus Scott MD PCP - General Family Practice  2/22/19     Phone: [phone] Fax: [phone]         [address] [place] [address]    Angus Scott MD Assigned PCP   1/24/19     Phone: [phone] Fax: [phone] [address] [place] [address]    Ki Lamb RN Specialty Care Coordinator Oncology Admissions 1/8/20     Phone: [phone]         Mani Cristina MD MD Hematology & Oncology Admissions 1/8/20     Phone: [phone] Fax: [phone]         [address] [place] [address]    Mani Cristina MD Assigned Cancer Care Provider   10/23/20     Phone: [phone] Fax: [phone] [address] [place] [address]    Shanta Grier MD MD Hematology & Oncology  4/1/21     Assigned Oncologist for Dr. Jonnie DELEON    Phone: [phone] Fax: [phone]         [address] [place] [address] [address] [facility] [address]    Roberto Larkin DO Assigned Musculoskeletal Provider   5/30/21     Phone: [phone] Fax: [phone]         [address] [facility] [address]    Madeleine Durand APRN CNP Assigned Heart and Vascular Provider   6/13/21     Phone: [phone] Fax: [phone]         [facility] [address] [place] [address]            My Care Plans  Self Management and Treatment Plan  Goals and (Comments)  Goals        General     Medical (pt-stated)      Notes - Note created  7/22/2021 10:28 AM by Myla Vasquez, JACOB     Goal Statement: I will seek medical help if I am experiencing reoccurrence of shortness of breath episodes   Date Goal set:7/22/2021  Barriers: Deconditioned   Strengths: Supportive sister   Date to Achieve By:10/22/2021  Patient expressed understanding of goal: Yes  Action steps to achieve this goal:  1. I will follow the congested hear future plan   2. I will keep future chemotherapy infusion and Oncology appointment s  3. I will check my weight daily and call if weight gain is 2-3# in a day or 5# in a  week   4. I will take extra steps throughout the day to increase my strength   5. Care coordinator will follow up in 1-2 weeks              Action Plans on File: CHF       Advance Care Plans/Directives Type:   Type Advanced Care Plans/Directives: Advanced Directive - On File    My Medical and Care Information  Problem List   Patient Active Problem List   Diagnosis     Essential hypertension     Memory loss     Coronary artery disease involving native coronary artery of native heart with other form of angina pectoris (H)     Status post coronary angiogram     Myeloproliferative neoplasm (H)     S/P CABG (coronary artery bypass graft)     TIA (transient ischemic attack)     Vaccination refused by patient     Dyspnea on exertion     CKD (chronic kidney disease) stage 3, GFR 30-59 ml/min     Coronary artery disease involving coronary bypass graft of native heart without angina pectoris     Anemia in other chronic diseases classified elsewhere     Myelofibrosis (H)     Thrombocytopenia (H)     NSTEMI (non-ST elevated myocardial infarction) (H)     Congestive heart failure, unspecified HF chronicity, unspecified heart failure type (H)     Lightheadedness     Pleural effusion     Acute on chronic anemia     Leukocytosis     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)     Antineoplastic chemotherapy induced pancytopenia (H)     Heart failure with reduced ejection fraction (H)     Demand ischemia (H)     Anemia, unspecified type      Current Medications and Allergies:    Current Outpatient Medications   Medication     acetaminophen (TYLENOL) 325 MG tablet     acyclovir (ZOVIRAX) 400 MG tablet     allopurinol (ZYLOPRIM) 300 MG tablet     diclofenac (VOLTAREN) 1 % topical gel     fluconazole (DIFLUCAN) 200 MG tablet     furosemide (LASIX) 40 MG tablet     levofloxacin (LEVAQUIN) 250 MG tablet     Lidocaine (LIDOCARE) 4 % Patch     metoprolol tartrate (LOPRESSOR) 50 MG tablet     oxyCODONE (ROXICODONE) 5 MG tablet      oxyCODONE (XTAMPZA) 9 MG 12 hr tablet     polyethylene glycol (MIRALAX) 17 GM/Dose powder     rosuvastatin (CRESTOR) 5 MG tablet     senna-docusate (SENOKOT-S/PERICOLACE) 8.6-50 MG tablet     traZODone (DESYREL) 50 MG tablet     venetoclax (VENCLEXTA) 100 MG tablet     venetoclax (VENCLEXTA) 100 MG tablet     vitamin B-12 (CYANOCOBALAMIN) 250 MCG tablet     vitamin D3 (CHOLECALCIFEROL) 50 mcg (2000 units) tablet     ACE/ARB/ARNI NOT PRESCRIBED (INTENTIONAL)     ASPIRIN NOT PRESCRIBED (INTENTIONAL)     nitroGLYcerin (NITROSTAT) 0.4 MG sublingual tablet     ondansetron (ZOFRAN-ODT) 8 MG ODT tab     prochlorperazine (COMPAZINE) 5 MG tablet     No current facility-administered medications for this visit.       Care Coordination Start Date: 7/22/2021   Frequency of Care Coordination: weekly   Form Last Updated: 07/22/2021

## 2021-07-22 NOTE — LETTER
[facility] CARE COORDINATION  [address]   [place] [address]    July 22, 2021    Renny Gannon  [address]   [place] [address]      Dear Renny,    I am a clinic care coordinator who works with Angus Clements Mai, MD at [facility] . I wanted to thank you for spending the time to talk with me.  Below is a description of clinic care coordination and how I can further assist you.      The clinic care coordination team is made up of a registered nurse,  and community health worker who understand the health care system. The goal of clinic care coordination is to help you manage your health and improve access to the health care system in the most efficient manner. The team can assist you in meeting your health care goals by providing education, coordinating services, strengthening the communication among your providers and supporting you with any resource needs.    Please feel free to contact me at [phone] with any questions or concerns. We are focused on providing you with the highest-quality healthcare experience possible and that all starts with you.     Sincerely,     [facility]   Myla Vasquez RN, Care Coordinator   [facility]'s   E-mail [email or web address]   [phone]    Enclosed: I have enclosed a copy of the Complex Care Plan and CHF Action Plan . This has helpful information and goals that we have talked about. Please keep this in an easy to access place to use as needed.

## 2021-07-22 NOTE — TELEPHONE ENCOUNTER
Bedside and Verbal shift change report given to Hamilton Garcia LPN (oncoming nurse) by Otoniel Galo LPN (offgoing nurse). Report included the following information SBAR, Kardex and MAR. Reason for Call:  Home Health Care    Chichi with [facility] called regarding (reason for call): verbal orders    Orders are needed for this patient. Skilled nursed 2 times a week for 1 week, 1 times a week for 3 weeks    PT: eval and treat    OT: eval and treat    Skilled Nursing:     Pt Provider:     Phone Number Homecare Nurse can be reached at: [phone]    Can we leave a detailed message on this number? YES    Phone number patient can be reached at:     Best Time: any    Call taken on 7/22/2021 at 2:47 PM by Scarlet Christopher

## 2021-07-26 NOTE — PROGRESS NOTES
Infusion Nursing Note:  Renny Gannon presents today for 1 unit prbc's and 1 unit platelets.    Patient seen by provider today: No   present during visit today: Not Applicable.    Note: Patient tolerated transfusions well, lungs sound clear pre, during and post both units.    Patient  did meet criteria for an asymptomatic covid-19 PCR test in infusion today. Patient declined the covid-19 test.    Intravenous Access:  Peripheral IV placed.    Treatment Conditions:  Lab Results   Component Value Date    HGB 6.5 07/26/2021    HGB 8.6 07/07/2021     Lab Results   Component Value Date    WBC 2.3 07/26/2021    WBC 31.7 07/07/2021      Lab Results   Component Value Date    ANEU 0.7 07/26/2021    ANEU 6.6 07/07/2021     Lab Results   Component Value Date    PLT 5 07/26/2021    PLT 12 07/07/2021      Results reviewed, labs MET treatment parameters, ok to proceed with treatment.  Blood transfusion consent signed 03/2021.      Post Infusion Assessment:  Patient tolerated transfusion without incident.  Patient observed for 15 minutes post transfusion per protocol.  Blood return noted pre and post infusion.  Site patent and intact, free from redness, edema or discomfort.  No evidence of extravasations.  Access discontinued per protocol.       Discharge Plan:   Discharge instructions reviewed with: Patient.  Patient and/or family verbalized understanding of discharge instructions and all questions answered.  Patient discharged in stable condition accompanied by: self.  Departure Mode: Ambulatory.      Mayte Chapa RN

## 2021-07-27 NOTE — ORAL ONC MGMT
"Oral Chemotherapy Monitoring Program  Lab Follow Up    Reviewed lab results from 7/26/21.    Labs:  _  Result Component Current Result Ref Range   Sodium 136 (7/26/2021) 133 - 144 mmol/L     _  Result Component Current Result Ref Range   Potassium 3.7 (7/26/2021) 3.4 - 5.3 mmol/L     _  Result Component Current Result Ref Range   Calcium 9.4 (7/26/2021) 8.5 - 10.1 mg/dL     No results found for Mag within last 30 days.     No results found for Phos within last 30 days.     _  Result Component Current Result Ref Range   Albumin 3.3 (L) (7/26/2021) 3.4 - 5.0 g/dL     _  Result Component Current Result Ref Range   Urea Nitrogen 18 (7/26/2021) 7 - 30 mg/dL     _  Result Component Current Result Ref Range   Creatinine 0.89 (7/26/2021) 0.66 - 1.25 mg/dL       _  Result Component Current Result Ref Range   AST 16 (7/26/2021) 0 - 45 U/L     _  Result Component Current Result Ref Range   ALT 18 (7/26/2021) 0 - 70 U/L     _  Result Component Current Result Ref Range   Bilirubin Total 0.7 (7/26/2021) 0.2 - 1.3 mg/dL       _  Result Component Current Result Ref Range   WBC Count 2.3 (L) (7/26/2021) 4.0 - 11.0 10e3/uL     _  Result Component Current Result Ref Range   Hemoglobin 6.5 (LL) (7/26/2021) 13.3 - 17.7 g/dL     _  Result Component Current Result Ref Range   Platelet Count 5 (LL) (7/26/2021) 150 - 450 10e3/uL     _  Result Component Current Result Ref Range   Absolute Neutrophils 0.7 (L) (7/26/2021) 1.6 - 8.3 10e3/uL       Assessment & Plan:  Results are concerning for platelets dropped to 5 and ANC 0.7    Per Dr. Jorge steel 7/27 \"Please hold venetoclax from now until the start of his next cycle (approx 8/9)\"    Placed call to patient. Spoke to Renny, to communicate plan of care. Renny verbally confirmed understating he will HOLD venetoclax starting today 7/27 until the next cycle starts and will not start until instructed by clinic. Questions answered to patient's satisfaction.    Follow-Up:  7/29: Judy Beltran" Heikkinen, PharmD  Hematology/Oncology Clinical Pharmacist  [facility]  [facility]

## 2021-07-28 PROBLEM — R42 LIGHTHEADEDNESS: Status: RESOLVED | Noted: 2021-01-01 | Resolved: 2021-01-01

## 2021-07-28 PROBLEM — D64.9 ANEMIA, UNSPECIFIED TYPE: Status: RESOLVED | Noted: 2021-01-01 | Resolved: 2021-01-01

## 2021-07-28 PROBLEM — D72.829 LEUKOCYTOSIS: Status: RESOLVED | Noted: 2021-01-01 | Resolved: 2021-01-01

## 2021-07-28 NOTE — PROGRESS NOTES
Renny is a 70 year old who is being evaluated via a billable telephone visit.      What phone number would you like to be contacted at? [phone]  How would you like to obtain your AVS? Mail a copy    Assessment & Plan       ICD-10-CM    1. Hospital discharge follow-up  Z09    2. Acute on chronic anemia  D64.9    3. Myeloproliferative neoplasm (H)  D47.1    4. Antineoplastic chemotherapy induced pancytopenia (H)  D61.810     T45.1X5A    5. Thrombocytopenia (H)  D69.6      Renny has a complex medical history who was recently admitted to the hospital for chest pain and severe anemia.  I reviewed the medical record from the hospital in details.  He is known to have myeloproliferative neoplasm with chemotherapy induced pancytopenia.  He has a problem with recurrent anemia and has had frequent blood transfusions.  He was admitted for severe anemia with demand ischemia.  Has been feeling better after the blood transfusion.  Lab 2 days ago showed worsening of pancytopenia with a platelet of 5000, hemoglobin 6.5 and white blood cell of 2.3.  Patient however stated that he is feeling good - no chest pain, shortness of breath, orthopnea or dyspnea.  He is scheduled for blood transfusion tomorrow.  No signs of bleeding.  Unable to exam due to virtual visit.    Overall he sounds doing well over the phone.  Recommended him to get the class transfusion as scheduled.  Also encouraged him to follow-up with the oncologist as per his/her recommendation.  Reviewed medications and encouraged him to take them as prescribed.  Fall prevention discussed.  Been getting home health care couple times a week and he feels it is adequate.  Not interested to move in a supervised living arrangements at an assisted living or nursing home.    He was also encouraged to follow-up with his cardiologist at his/her recommendation.  Symptoms that need to be seen or call in discussed.    36 minutes spent on the date of the encounter doing chart review,  review of outside records, patient visit and documentation       Return in about 1 day (around 7/29/2021) for blood transfusion.    Angus Clements Mai, MD  [facility]    Nely Lawson is a 70 year old who presents for the following health issues - telephone visit    HPI       Hospital Follow-up Visit:    Hospital/Nursing Home/IP Rehab Facility: [facility]  Date of Admission: 07/20/2021  Date of Discharge: 07/21/2021  Reason(s) for Admission: Heart Failure      Was your hospitalization related to COVID-19? No   Problems taking medications regularly:  None  Medication changes since discharge: yes  Problems adhering to non-medication therapy:  None    Summary of hospitalization:  [facility] discharge summary reviewed  Diagnostic Tests/Treatments reviewed.  Follow up needed: CBC  Other Healthcare Providers Involved in Patient s Care:         Homecare and Specialist appointment - Oncology and cardiology  Update since discharge: improved. Post Discharge Medication Reconciliation: discharge medications reconciled, continue medications without change.  Plan of care communicated with patient          Renny is a very complex medical history who was admitted to the hospital 7/20/21 overnight for severe anemia with chest discomfort.  He is known to have a myeloproliferative disorder with refractory CLL and chemotherapy induced pancytopenia.  Been seeing oncologist regularly and it has had frequent blood transfusions.  Has had hospitalized multiple times for severe anemia.  On the day of admission, she presented ER with shortness of breath, diaphoresis, dyspnea and chest discomfort.  Work-up in the ER showed hemoglobin a of 4.8, baseline hemoglobin is about 7-8.  He also has a complex cardiac history which include CAD with history of CABG and congestive heart failure, hypertension, NSTEMI and history of TIA.  His troponin was slightly elevated it was thought due to  demand ischemia.  With chest pain resolved quickly with oxygen and blood transfusion.    He was admitted to the hospital overnight.  He received 3 units of blood and was feeling better.  Chest pain resolved at the time of discharge.  Vital sign was also normal and stable at time of discharge    Had a CBC couple days ago, hemoglobin dropped down to 6.5 from 7.6 at  Discharge 5 days earlier.  He is scheduled for repeat CBC and blood transfusion tomorrow which is arranged by his hematologist/oncologist.  The CBS 2 days ago also showed pancytopenia with a white blood count 2.3 and platelets of 5000 - all are slightly worse as compared to a week ago at hospital discharge    Patient states that he is feeling much better and has no concern today.  His breathing and energy are pretty back to his baseline.  Breathing ok - denied of shortness of breath, orthopnea or dyspnea.  Been eating and drinking well.  No nausea or vomiting.  No diarrhea or constipation.  No melena or hematochezia.  No headache or dizziness.  He lives alone but his sisters check on him frequently.  Has home health care couple times a week.  Been taking the medication as prescribed.  No significant leg swelling.  Not checking his weight.  No other concern.    Review of Systems   Constitutional, HEENT, cardiovascular, pulmonary, gi and gu systems are negative, except as otherwise noted.      Objective           Vitals:  No vitals were obtained today due to virtual visit.    Physical Exam   healthy, alert and no distress  PSYCH: Alert and oriented times 3; coherent speech, normal   rate and volume, able to articulate logical thoughts, able   to abstract reason, no tangential thoughts, no hallucinations   or delusions  His affect is normal  RESP: No cough, no audible wheezing, able to talk in full sentences  Remainder of exam unable to be completed due to telephone visits    No results found for any visits on 07/28/21.     0 Result Notes    Ref Range & Units  2 d ago    % Neutrophils % 32     % Lymphocytes % 51     % Monocytes % 10     % Eosinophils % 1     % Basophils % 0     % Myelocytes % 5     % Promyelocytes % 1     Absolute Neutrophils 1.6 - 8.3 10e3/uL 0.7Low      Absolute Lymphocytes 0.8 - 5.3 10e3/uL 1.2     Absolute Monocytes 0.0 - 1.3 10e3/uL 0.2     Absolute Eosinophils 0.0 - 0.7 10e3/uL 0.0     Absolute Basophils 0.0 - 0.2 10e3/uL 0.0     Absolute Myelocytes <=0.0 10e3/uL 0.1High      Absolute Promyelocytes <=0.0 10e3/uL 0.0     RBC Morphology  Confirmed RBC Indices     Platelet Assessment Automated Count Confirmed. Platelet morphology is normal. Automated Count Confirmed. Platelet morphology is normal.    Resulting [facility]         Specimen Collected: 07/26/21  9:03 AM Last Resulted: 07/26/21  9:43 AM        Lab Flowsheet     Order Details     View Encounter     Lab and Collection Details     Routing     Result History              Contains critical data CBC with platelets and differential  Order: [id number] - Part of Panel Order [id number]  Status:  Final result   Visible to patient:  No (inaccessible in [employer]) Dx:  Myeloproliferative disorder (H)   0 Result Notes    Ref Range & Units 2 d ago    WBC Count 4.0 - 11.0 10e3/uL 2.3Low      RBC Count 4.40 - 5.90 10e6/uL 2.20Low      Hemoglobin 13.3 - 17.7 g/dL 6.5Low Panic      Hematocrit 40.0 - 53.0 % 19.4Low      MCV 78 - 100 fL 88     MCH 26.5 - 33.0 pg 29.5     MCHC 31.5 - 36.5 g/dL 33.5     RDW 10.0 - 15.0 % 14.9     Platelet Count 150 - 450 10e3/uL 5Low Panic     Resulting [facility]         Specimen Collected: 07/26/21  9:03 AM Last Resulted: 07/26/21  9:43 AM                 Phone call duration: 12 minutes  Started time: 9:25 AM  Ended time: 9:37 AM

## 2021-07-29 NOTE — PROGRESS NOTES
Hematology/Oncology Visit    Care Team:  - Oncologist: Dr. Cristina, most recently Dr. Patel  - PCP: Angus Clements Mai, MD    Reason for visit: post hospitalization follow up and planned C3 dec+venetoclax    Diagnosis: JAK2+ myelofibosis with blast crisis    Cancer and Treatment Hx:  Dec 2019: presented with fatigue and dyspnea on exertion. CBC showed WBC 79.1, Hgb 11.9, platelet count of 378. Peripheral blood smear revealed leuko-erythroblastic reaction with neutrophilic left shift and rare circulating blasts without dysplasia. BMBx 12/30 showing hypercellular marrow with granulocytic and megakaryocytic proliferation, megakaryocytic clustering and atypia, marrow fibrosis and 1.5% blasts consistent with myelofibrosis. 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   Jan 2020: started on Hydrea 500mg every day  Dec 2020: admitted for profound anemia, requiring 2 units of blood, Hydrea held  Jan 2021: restarted Hydrea with improving counts, held late Jan 2021 for persistent pancytopenia  March 26, 2021: BMBx demonstrated 40-50% cellularity with increased fibrosis and 5% blasts  - Numerous hospitalizations for heart failure/cardiac issues and dyspnea, requiring frequent blood transfusions  Shikha 3-18, 2021: hospitalized for leukocytosis and concern for acute leukemia. BMBx 6/4 (core only, unable to obtain aspirate) demonstrated hypercellular marrow with 5-10% myeloid blasts and marked marrow fibrosis.  Findings consistent with blast crisis arising from previously diagnosed myeloproliferative neoplasm. Diagnosis made on the basis of increased peripheral blast percentage (23%); blast percentage in bone marrow is difficult to establish d/t bone marrow fibrosis and lack of aspirates. BMBx flow from core shows increased abnormal myeloid blasts (12%). FISH with monosomy 7. FLT3 negative.   June 6, 2021: Initiated cycle 1 decitabine, venetoclax ramp up initiated 6/9 while inpatient  July 7, 2021: BMBx after cycle 1 demonstrated  hypercellular marrow with 13% blasts and marked fibrosis (MF3), peripheral blood with 19% circulating blasts.  June 27, 2021: held venetoclax for pancytopenia with plans to continue at start of next cycle (patient reported on 8/4 that he had not been holding, however)    Interval History:  Renny was hospitalized 7/20-7/21 for chest pain secondary to demand ischemia with a history of profound anemia secondary to myelofibrosis. Improved after blood transfusions. Today Renny reports he is doing generally well. He has noticed gum bleeding every morning for the past month and is using a toothbrush to clean teeth. Petechial rash BUE otherwise denies any other signs/symptoms of bleeding. No large bruises or headaches. No recent chest pain or shortness of breath with activities or at rest. He received a unit of RBC and platelets yesterday. He notes a decreased appetite in the mornings but is able to eat a small lunch and dinner. Watery stool once per day, no abdominal pain. No urinary symptoms. Denies any pain today.    He lives alone, sister Mary comes in couple times per week to assist with any cares he may need. He is performing ADLs independently, no ambulatory devices. He has no questions or concerns about      Reports not being aware that he was supposed to stop his venetoclax so he has been taking daily.    Medications:  Current Outpatient Medications   Medication     allopurinol (ZYLOPRIM) 300 MG tablet     fluconazole (DIFLUCAN) 200 MG tablet     furosemide (LASIX) 40 MG tablet     levofloxacin (LEVAQUIN) 250 MG tablet     metoprolol tartrate (LOPRESSOR) 50 MG tablet     oxyCODONE (XTAMPZA) 9 MG 12 hr tablet     polyethylene glycol (MIRALAX) 17 GM/Dose powder     prochlorperazine (COMPAZINE) 5 MG tablet     rosuvastatin (CRESTOR) 5 MG tablet     SENEXON-S 8.6-50 MG tablet     traZODone (DESYREL) 50 MG tablet     venetoclax (VENCLEXTA) 100 MG tablet     venetoclax (VENCLEXTA) 100 MG tablet     vitamin B-12  "(CYANOCOBALAMIN) 250 MCG tablet     vitamin D3 (CHOLECALCIFEROL) 50 mcg (2000 units) tablet     ACE/ARB/ARNI NOT PRESCRIBED (INTENTIONAL)     acyclovir (ZOVIRAX) 400 MG tablet     ASPIRIN NOT PRESCRIBED (INTENTIONAL)     Lidocaine (LIDOCARE) 4 % Patch     nitroGLYcerin (NITROSTAT) 0.4 MG sublingual tablet     ondansetron (ZOFRAN-ODT) 8 MG ODT tab     No current facility-administered medications for this visit.     Physical Exam:   GEN: pleasantly conversant male no acute distress  SKIN: petechial rash BUE, no large areas of echhymosis  ENT: eyes non-icteric, no notable oral sores, no wet purpura, single petechia soft palate  LYMPH: no notable cervical, supraclavicular, or axillary lymphadenopathy  RESP: clear to auscultation bilaterally, on room air  CARDS: regular rate and rhythm, no notable murmurs   GI: abd soft without notable hepatosplenomegaly, non-tender to palpation  MSK: trace edema bilateral ankles  NEURO: alert and oriented without obvious focal deficit    /58 (BP Location: Right arm, Patient Position: Sitting, Cuff Size: Adult Small)   Pulse 78   Temp 97.1  F (36.2  C) (Temporal)   Resp 14   Ht 1.727 m (5' 8\")   Wt 70.8 kg (156 lb 3 oz)   SpO2 98%   BMI 23.75 kg/m       Wt Readings from Last 4 Encounters:   08/04/21 70.8 kg (156 lb 3 oz)   08/03/21 70.2 kg (154 lb 12.8 oz)   08/02/21 70.1 kg (154 lb 8 oz)   07/29/21 70.9 kg (156 lb 6.4 oz)     Labs:  No results found for any visits on 08/04/21.    Imaging:  Reviewed CT chest from 7/20/21:   IMPRESSION:  1.  No pulmonary embolism.  2.  Trace left pleural effusion.  3.  Splenomegaly.  4.  Heterogeneity of osseous structures. Uncertain if this is related to known leukemia.  5.  Calcified pleural plaques.  6.  Cholelithiasis.    Assessment and Plan:  Myelofibrosis with excess blasts  Profound anemia secondary to neoplastic disease  Profound thrombocytopenia secondary to neoplastic disease  - Completed 2 cycles of decitabine and venetoclax, " currently day 24 of cycle 2   - Shortened course of decitabine, 5 days   - Venetoclax days 1-28, instructed to hold 7/27 given cytopenias but pt has been taking    - Venetoclax max dose given concurrent fluconazole   - Recently hospitalized 7/20-7/21 for demand ischemia secondary to profound anemia   - Due for cycle 3 on 8/9, discussed with Dr. Patel, will plan to delay given ongoing cytopenias  - Repeat BMBx scheduled 8/6, discussed with Dr. Patel 8/4, will plan to delay given ongoing cytopenias  - Continues on neutropenia prophylaxis with acyclovir, fluconazole, and levofloxacin  - Currently requiring significant transfusion support, lab monitoring 3x/wk with transfusion PRN  - Next lab draw and transfusion support 8/5, may need weekend support depending on labs tomorrow  - Previously declined BMT consult    Gum bleeding  - Discussed using mouthwash and gentle cleaning with a cloth until platelets have recovered    Social support  - No current 24/7 caregiver, sister comes in 1-2x/wk to help  - Has been managing his medications but had been taking meds he was instructed to stop  - Has a nurse come in his home occasionally through [facility] care, called and they will plan a visit soon as well as PT/OT for home safety evaluation.       Future Appointments   Date Time Provider Department Center   8/4/2021 11:00 AM Alla Lopez PA-C [facility]   8/5/2021  9:00 AM PH LAB [facility]   8/5/2021  9:30 AM PH INFUSION CHAIR 14 [facility]   8/6/2021 11:00 AM Emani Camacho APRN [facility]   8/9/2021 10:15 AM PH LAB [facility]3]   8/9/2021 10:30 AM PH INFUSION CHAIR 9 [facility]   8/10/2021 11:00 AM PH INFUSION CHAIR 10 [facility]   8/11/2021 12:00 PM Jeane Patel MD [facility]   8/11/2021  2:00 PM PH INFUSION CHAIR 10 [facility]   8/12/2021  1:30 PM PH LAB [facility]3]   8/12/2021  2:00 PM PH INFUSION CHAIR 10 [facility]    8/13/2021 10:30 AM PH INFUSION CHAIR 10 [facility]   8/16/2021 11:00 AM [facility]   8/16/2021 11:30 AM PH INFUSION NURSE [facility]   8/19/2021 10:30 AM PH LAB [facility]   8/19/2021 11:00 AM PH INFUSION NURSE [facility]   8[facility]23/2021  9:00 [facility]   8/23/2021  9:30 AM PH INFUSION NURSE [facility]   8/26/2021  9:00 AM PH LAB [facility]   8/26/2021  9:30 AM PH INFUSION NURSE [facility]   8/30/2021  9:00 [facility]   8/30/2021  9:30 AM PH INFUSION NURSE [facility]   9/2/2021 10:30 AM PH LAB [facility]   9/2/2021 11:00 AM PH INFUSION NURSE [facility]     The total time of this encounter amounted to 40 minutes today. This time includes face-to-face time spent with the patient, prep work, ordering tests, and performing post-visit documentation.    Harriet Gutierrez CNP      I have seen the patient and agree with written evaluation, assessment and plan presented by this NP in training.     Alla Lopez PA-C

## 2021-07-29 NOTE — PROGRESS NOTES
Infusion Nursing Note:  Renny Gannon presents today for labs, transfusion of PRBC and platelets.    Patient seen by provider today: No   present during visit today: Not Applicable.    Note: Lungs clear pre and post transfusion of units. .  Patient  did meet criteria for an asymptomatic covid-19 PCR test in infusion today. Patient declined the covid-19 test.    Intravenous Access:  Peripheral IV placed.    Treatment Conditions:  Lab Results   Component Value Date    HGB 7.0 07/29/2021    HGB 8.6 07/07/2021     Lab Results   Component Value Date    WBC 3.1 07/29/2021    WBC 31.7 07/07/2021      Lab Results   Component Value Date    ANEU 1.4 07/29/2021    ANEU 6.6 07/07/2021     Lab Results   Component Value Date    PLT 7 07/29/2021    PLT 12 07/07/2021      Results reviewed, labs MET treatment parameters, ok to proceed with treatment.  Blood transfusion consent signed 3/5/2021.      Post Infusion Assessment:  Patient tolerated infusion without incident.  Patient observed for 15 minutes post blood transfusion.  Site patent and intact, free from redness, edema or discomfort.  No evidence of extravasations.  Access discontinued per protocol.       Discharge Plan:   Discharge instructions reviewed with: Patient.  Patient discharged in stable condition accompanied by: sister.  Departure Mode: Ambulatory.      Lisa Vasques RN

## 2021-08-02 NOTE — PROGRESS NOTES
HPI:     This is a 70 year old patient who has a past medical history significant for coronary artery disease status post CABG in January 2020, hypertension, hyperlipidemia, TIA, HFpEF, myelofibrosis, chronic thrombocytopenia, chronic anemia, and coronary artery disease.     He was seen last by Madeleine in February 2021. Per records from Dr. Bentley, he presented on 01/02/2020 with a history of progressive angina pectoris and a markedly abnormal nuclear stress test.  Diagnostic coronary angiography demonstrated severe 3-vessel disease.  A CBC, however suggested a myeloproliferative disorder.  Bone marrow biopsy and peripheral studies confirmed the presence of a myeloproliferative disorder. Proper therapy was initiated for his blood disorder and medical therapy for CAD was intensified. The patient continued to experience angina with physical exertion. He underwent bypass surgery by Dr. Mable Barrera on 01/31/2020 at which time a LIMA graft was placed in the LAD and separate saphenous venous bypass grafts were placed to the posterior descending branch of the right coronary and to the second obtuse marginal branch of the circumflex.  The patient was discharged on 02/10/2020.     He was hospitalized in July and overall breathing has been ok, no LE edema and no more angina. His blood counts are stable on chem regimen and his echocardiogram reviewed which is overall consistent with mildly reduced LV function. Inferolateral posterior wall hypokinesis present and moderate pulmonary hypertension. He was started on lasix and his breathing is stable.     ASSESSMENT/PLAN:    1. CAD s/p CABG 1/2020:  -no angina  -LVEF mildly reduced from ischemic CMY  -on beta blocker, no DAPT due to blood dyscrasia     2. HFrEF: stable, euvolemic today  -on lasix 40 mg daily   -echocardiogram stable, yearly assessment   -beta blocker    3. HLD:   -on statin, LDL goal < 70 mg/dL     4. HTN: controlled  -beta blocker      6 month follow up with  me    Luli Lipscomb MD MSc  [facility]       PAST MEDICAL HISTORY  Past Medical History:   Diagnosis Date     Heart disease      History of blood transfusion      Hypertension      Leukocytosis 6/3/2021       CURRENT MEDICATIONS  Current Outpatient Medications   Medication Sig Dispense Refill     ACE/ARB/ARNI NOT PRESCRIBED (INTENTIONAL) Please choose reason not prescribed from choices below.       acetaminophen (TYLENOL) 325 MG tablet Take 2 tablets (650 mg) by mouth every 4 hours as needed for mild pain       acyclovir (ZOVIRAX) 400 MG tablet Take 1 tablet (400 mg) by mouth 2 times daily 60 tablet 1     allopurinol (ZYLOPRIM) 300 MG tablet Take 1 tablet (300 mg) by mouth daily 90 tablet 1     ASPIRIN NOT PRESCRIBED (INTENTIONAL) Please choose reason not prescribed from choices below.       fluconazole (DIFLUCAN) 200 MG tablet Take 1 tablet (200 mg) by mouth daily 30 tablet 1     furosemide (LASIX) 40 MG tablet Take 1 tablet (40 mg) by mouth daily 30 tablet 1     levofloxacin (LEVAQUIN) 250 MG tablet Take 1 tablet (250 mg) by mouth At Bedtime 30 tablet 1     Lidocaine (LIDOCARE) 4 % Patch Place 1 patch onto the skin every 24 hours . Leave on for 12 hours, then remove for 12 hours. 30 patch 1     metoprolol tartrate (LOPRESSOR) 50 MG tablet Take 1 tablet (50 mg) by mouth 2 times daily 30 tablet 3     ondansetron (ZOFRAN-ODT) 8 MG ODT tab Take 1 tablet (8 mg) by mouth every 8 hours as needed for nausea or vomiting 30 tablet 1     oxyCODONE (XTAMPZA) 9 MG 12 hr tablet Take 1 tablet (9 mg) by mouth every 12 hours . DO NOT drive or operate machinery while taking this medication. 60 tablet 0     polyethylene glycol (MIRALAX) 17 GM/Dose powder Take 17 g by mouth daily 510 g      prochlorperazine (COMPAZINE) 5 MG tablet Take 1 tablet (5 mg) by mouth every 6 hours as needed for nausea or vomiting 30 tablet 1     rosuvastatin (CRESTOR) 5 MG tablet Take 1 tablet (5 mg) by mouth daily 30 tablet 1     SENEXON-S  8.6-50 MG tablet TAKE TWO TABLETS BY MOUTH TWO TIMES A  tablet 4     traZODone (DESYREL) 50 MG tablet Take 1 tablet (50 mg) by mouth nightly as needed for sleep 30 tablet 1     venetoclax (VENCLEXTA) 100 MG tablet Take 2 tablets (200 mg) by mouth daily for 28 days 56 tablet 0     venetoclax (VENCLEXTA) 100 MG tablet Take 2 tablets (200 mg) by mouth daily (with breakfast) 60 tablet 0     vitamin B-12 (CYANOCOBALAMIN) 250 MCG tablet TAKE ONE TABLET BY MOUTH EVERY MORNING 90 tablet 3     vitamin D3 (CHOLECALCIFEROL) 50 mcg (2000 units) tablet Take 1 tablet by mouth daily       nitroGLYcerin (NITROSTAT) 0.4 MG sublingual tablet For chest pain place 1 tablet under the tongue every 5 minutes for 3 doses. If symptoms persist 5 minutes after 1st dose call 911. (Patient not taking: Reported on 7/12/2021)         PAST SURGICAL HISTORY:  Past Surgical History:   Procedure Laterality Date     BONE MARROW BIOPSY, BONE SPECIMEN, NEEDLE/TROCAR N/A 12/30/2019    Procedure: BIOPSY, BONE MARROW;  Surgeon: Aguilar Krause MD;  Location: [facility] OR     BYPASS GRAFT ARTERY CORONARY N/A 1/31/2020    Procedure: CORONARY ARTERY BYPASS GRAFTING X 3 - LIMA TO LAD, SV TO OM  AND PDA; ENDOVEIN HARVEST OF BILATERAL LEGS; ON PUMP WITH SANDRA;  Surgeon: Mable Barrera MD;  Location:  OR     CV CORONARY ANGIOGRAM Left 1/9/2020    Procedure: Coronary Angiogram;  Surgeon: Devin Bentley MD;  Location:  HE[facility] CARDI[facility] CATH LAB     IR FINE NEEDLE ASPIRATION W ULTRASOUND  6/10/2021     NO HISTORY OF SURGERY         ALLERGIES     Allergies   Allergen Reactions     No Known Drug Allergies      Seasonal Allergies Other (See Comments)     Stuffy head and nose       FAMILY HISTORY  Family History   Problem Relation Age of Onset     No Known Problems Mother      Unknown/Adopted Father      Unknown/Adopted Maternal Grandmother      Unknown/Adopted Maternal Grandfather      Unknown/Adopted Paternal Grandmother       Unknown/Adopted Paternal Grandfather      Cancer Brother         lung cancer - smoking     No Known Problems Sister      Coronary Artery Disease Brother          of MI at 66     No Known Problems Sister          SOCIAL HISTORY  Social History     Socioeconomic History     Marital status: Single     Spouse name: Not on file     Number of children: Not on file     Years of education: Not on file     Highest education level: Not on file   Occupational History     Occupation: Retired   Tobacco Use     Smoking status: Former Smoker     Years: 30.00     Types: Cigarettes     Start date: 1961     Quit date: 2001     Years since quittin.5     Smokeless tobacco: Never Used   Substance and Sexual Activity     Alcohol use: No     Drug use: No     Sexual activity: Not Currently   Other Topics Concern     Parent/sibling w/ CABG, MI or angioplasty before 65F 55M? Not Asked   Social History Narrative     Not on file     Social Determinants of Health     Financial Resource Strain:      Difficulty of Paying Living Expenses:    Food Insecurity: No Food Insecurity     Worried About Running Out of Food in the Last Year: Never true     Ran Out of Food in the Last Year: Never true   Transportation Needs: No Transportation Needs     Lack of Transportation (Medical): No     Lack of Transportation (Non-Medical): No   Physical Activity: Inactive     Days of Exercise per Week: 0 days     Minutes of Exercise per Session: 0 min   Stress:      Feeling of Stress :    Social Connections: Unknown     Frequency of Communication with Friends and Family: Three times a week     Frequency of Social Gatherings with Friends and Family: Twice a week     Attends Congregational Services: Never     Active Member of Clubs or Organizations: No     Attends Club or Organization Meetings: Never     Marital Status: Not on file   Intimate Partner Violence:      Fear of Current or Ex-Partner:      Emotionally Abused:      Physically Abused:       "Sexually Abused:        ROS:   Constitutional: No fever, chills, or sweats. No weight gain/loss   ENT: No visual disturbance, ear ache, epistaxis, sore throat  Allergies/Immunologic: Negative  Respiratory: No cough, hemoptysia  Cardiovascular: As per HPI  GI: No nausea, vomiting, hematemesis, melena, or hematochezia  : No urinary frequency, dysuria, or hematuria  Integument: Negative  Psychiatric: Negative  Neuro: Negative  Endocrinology: Negative   Musculoskeletal: Negative  Vascular: No walking impairment, claudication, ischemic rest pain or nonhealing wounds    EXAM:  /62 (BP Location: Right arm, Patient Position: Sitting, Cuff Size: Adult Regular)   Pulse 98   Ht 1.727 m (5' 8\")   Wt 70.1 kg (154 lb 8 oz)   SpO2 99%   BMI 23.49 kg/m    In general, the patient is a pleasant male in no apparent distress.    HEENT: NC/AT.  PERRLA.  EOMI.  Sclerae white, not injected.   Neck: No adenopathy.  No thyromegaly. Carotids +2/2 bilaterally without bruits.  No jugular venous distension.   Heart: RRR. Normal S1, S2 splits physiologically. No murmur, rub, click, or gallop.     Lungs: CTA.  No ronchi, wheezes, rales.    Abdomen: Soft, nontender, nondistended.   Extremities: No clubbing, cyanosis, 1+ edema  Vascular: No bruits are noted.    Labs:  LIPID RESULTS:  Lab Results   Component Value Date    CHOL 95 01/28/2021    HDL 20 (L) 01/28/2021    LDL 41 01/28/2021    TRIG 172 (H) 01/28/2021    NHDL 75 01/28/2021       LIVER ENZYME RESULTS:  Lab Results   Component Value Date    AST 12 07/29/2021    AST 17 07/05/2021    ALT 18 07/29/2021    ALT 18 07/05/2021       CBC RESULTS:  Lab Results   Component Value Date    WBC 3.1 (L) 07/29/2021    WBC 31.7 (H) 07/07/2021    RBC 2.35 (L) 07/29/2021    RBC 2.96 (L) 07/07/2021    HGB 7.0 (L) 07/29/2021    HGB 8.6 (L) 07/07/2021    HCT 20.4 (L) 07/29/2021    HCT 26.1 (L) 07/07/2021    MCV 87 07/29/2021    MCV 88 07/07/2021    MCH 29.8 07/29/2021    MCH 29.1 07/07/2021    MCHC " 34.3 07/29/2021    MCHC 33.0 07/07/2021    RDW 14.8 07/29/2021    RDW 14.7 07/07/2021    PLT 7 (LL) 07/29/2021    PLT 12 (LL) 07/07/2021       BMP RESULTS:  Lab Results   Component Value Date     07/29/2021     07/05/2021    POTASSIUM 4.3 07/29/2021    POTASSIUM 3.8 07/05/2021    CHLORIDE 106 07/29/2021    CHLORIDE 104 07/05/2021    CO2 28 07/29/2021    CO2 25 07/05/2021    ANIONGAP 5 07/29/2021    ANIONGAP 8 07/05/2021     (H) 07/29/2021     (H) 07/05/2021    BUN 18 07/29/2021    BUN 20 07/05/2021    CR 0.94 07/29/2021    CR 0.96 07/05/2021    GFRESTIMATED 81 07/29/2021    GFRESTIMATED 78 07/05/2021    GFRESTBLACK >90 07/05/2021    MINNIE 9.0 07/29/2021    MINNIE 8.6 07/05/2021        A1C RESULTS:  Lab Results   Component Value Date    A1C 5.3 08/17/2020

## 2021-08-02 NOTE — LETTER
8/2/2021    Angus Clements Mai, MD  [address] [place] [address]    RE: Renny Gannon       Dear Colleague,    I had the pleasure of seeing Renny Gannon in the [facility].        HPI:     This is a 70 year old patient who has a past medical history significant for coronary artery disease status post CABG in January 2020, hypertension, hyperlipidemia, TIA, HFpEF, myelofibrosis, chronic thrombocytopenia, chronic anemia, and coronary artery disease.     He was seen last by Madeleine in February 2021. Per records from Dr. Bentley, he presented on 01/02/2020 with a history of progressive angina pectoris and a markedly abnormal nuclear stress test.  Diagnostic coronary angiography demonstrated severe 3-vessel disease.  A CBC, however suggested a myeloproliferative disorder.  Bone marrow biopsy and peripheral studies confirmed the presence of a myeloproliferative disorder. Proper therapy was initiated for his blood disorder and medical therapy for CAD was intensified. The patient continued to experience angina with physical exertion. He underwent bypass surgery by Dr. Mable Barrera on 01/31/2020 at which time a LIMA graft was placed in the LAD and separate saphenous venous bypass grafts were placed to the posterior descending branch of the right coronary and to the second obtuse marginal branch of the circumflex.  The patient was discharged on 02/10/2020.     He was hospitalized in July and overall breathing has been ok, no LE edema and no more angina. His blood counts are stable on chem regimen and his echocardiogram reviewed which is overall consistent with mildly reduced LV function. Inferolateral posterior wall hypokinesis present and moderate pulmonary hypertension. He was started on lasix and his breathing is stable.     ASSESSMENT/PLAN:    1. CAD s/p CABG 1/2020:  -no angina  -LVEF mildly reduced from ischemic CMY  -on beta blocker, no DAPT due to blood  dyscrasia     2. HFrEF: stable, euvolemic today  -on lasix 40 mg daily   -echocardiogram stable, yearly assessment   -beta blocker    3. HLD:   -on statin, LDL goal < 70 mg/dL     4. HTN: controlled  -beta blocker      6 month follow up with me    Luli Lipscomb MD MSc  [facility]       PAST MEDICAL HISTORY  Past Medical History:   Diagnosis Date     Heart disease      History of blood transfusion      Hypertension      Leukocytosis 6/3/2021       CURRENT MEDICATIONS  Current Outpatient Medications   Medication Sig Dispense Refill     ACE/ARB/ARNI NOT PRESCRIBED (INTENTIONAL) Please choose reason not prescribed from choices below.       acetaminophen (TYLENOL) 325 MG tablet Take 2 tablets (650 mg) by mouth every 4 hours as needed for mild pain       acyclovir (ZOVIRAX) 400 MG tablet Take 1 tablet (400 mg) by mouth 2 times daily 60 tablet 1     allopurinol (ZYLOPRIM) 300 MG tablet Take 1 tablet (300 mg) by mouth daily 90 tablet 1     ASPIRIN NOT PRESCRIBED (INTENTIONAL) Please choose reason not prescribed from choices below.       fluconazole (DIFLUCAN) 200 MG tablet Take 1 tablet (200 mg) by mouth daily 30 tablet 1     furosemide (LASIX) 40 MG tablet Take 1 tablet (40 mg) by mouth daily 30 tablet 1     levofloxacin (LEVAQUIN) 250 MG tablet Take 1 tablet (250 mg) by mouth At Bedtime 30 tablet 1     Lidocaine (LIDOCARE) 4 % Patch Place 1 patch onto the skin every 24 hours . Leave on for 12 hours, then remove for 12 hours. 30 patch 1     metoprolol tartrate (LOPRESSOR) 50 MG tablet Take 1 tablet (50 mg) by mouth 2 times daily 30 tablet 3     ondansetron (ZOFRAN-ODT) 8 MG ODT tab Take 1 tablet (8 mg) by mouth every 8 hours as needed for nausea or vomiting 30 tablet 1     oxyCODONE (XTAMPZA) 9 MG 12 hr tablet Take 1 tablet (9 mg) by mouth every 12 hours . DO NOT drive or operate machinery while taking this medication. 60 tablet 0     polyethylene glycol (MIRALAX) 17 GM/Dose powder Take 17 g by mouth daily 510  g      prochlorperazine (COMPAZINE) 5 MG tablet Take 1 tablet (5 mg) by mouth every 6 hours as needed for nausea or vomiting 30 tablet 1     rosuvastatin (CRESTOR) 5 MG tablet Take 1 tablet (5 mg) by mouth daily 30 tablet 1     SENEXON-S 8.6-50 MG tablet TAKE TWO TABLETS BY MOUTH TWO TIMES A  tablet 4     traZODone (DESYREL) 50 MG tablet Take 1 tablet (50 mg) by mouth nightly as needed for sleep 30 tablet 1     venetoclax (VENCLEXTA) 100 MG tablet Take 2 tablets (200 mg) by mouth daily for 28 days 56 tablet 0     venetoclax (VENCLEXTA) 100 MG tablet Take 2 tablets (200 mg) by mouth daily (with breakfast) 60 tablet 0     vitamin B-12 (CYANOCOBALAMIN) 250 MCG tablet TAKE ONE TABLET BY MOUTH EVERY MORNING 90 tablet 3     vitamin D3 (CHOLECALCIFEROL) 50 mcg (2000 units) tablet Take 1 tablet by mouth daily       nitroGLYcerin (NITROSTAT) 0.4 MG sublingual tablet For chest pain place 1 tablet under the tongue every 5 minutes for 3 doses. If symptoms persist 5 minutes after 1st dose call 911. (Patient not taking: Reported on 7/12/2021)         PAST SURGICAL HISTORY:  Past Surgical History:   Procedure Laterality Date     BONE MARROW BIOPSY, BONE SPECIMEN, NEEDLE/TROCAR N/A 12/30/2019    Procedure: BIOPSY, BONE MARROW;  Surgeon: Aguilar Krause MD;  Location:  OR     BYPASS GRAFT ARTERY CORONARY N/A 1/31/2020    Procedure: CORONARY ARTERY BYPASS GRAFTING X 3 - LIMA TO LAD, SV TO OM  AND PDA; ENDOVEIN HARVEST OF BILATERAL LEGS; ON PUMP WITH SANDRA;  Surgeon: Mable Barrera MD;  Location:  OR     CV CORONARY ANGIOGRAM Left 1/9/2020    Procedure: Coronary Angiogram;  Surgeon: Devin Bentley MD;  Location:  HE[facility] CARDI[facility] CATH LAB     IR FINE NEEDLE ASPIRATION W ULTRASOUND  6/10/2021     NO HISTORY OF SURGERY         ALLERGIES     Allergies   Allergen Reactions     No Known Drug Allergies      Seasonal Allergies Other (See Comments)     Stuffy head and nose       FAMILY HISTORY  Family  History   Problem Relation Age of Onset     No Known Problems Mother      Unknown/Adopted Father      Unknown/Adopted Maternal Grandmother      Unknown/Adopted Maternal Grandfather      Unknown/Adopted Paternal Grandmother      Unknown/Adopted Paternal Grandfather      Cancer Brother         lung cancer - smoking     No Known Problems Sister      Coronary Artery Disease Brother          of MI at 66     No Known Problems Sister          SOCIAL HISTORY  Social History     Socioeconomic History     Marital status: Single     Spouse name: Not on file     Number of children: Not on file     Years of education: Not on file     Highest education level: Not on file   Occupational History     Occupation: Retired   Tobacco Use     Smoking status: Former Smoker     Years: 30.00     Types: Cigarettes     Start date: 1961     Quit date: 2001     Years since quittin.5     Smokeless tobacco: Never Used   Substance and Sexual Activity     Alcohol use: No     Drug use: No     Sexual activity: Not Currently   Other Topics Concern     Parent/sibling w/ CABG, MI or angioplasty before 65F 55M? Not Asked   Social History Narrative     Not on file     Social Determinants of Health     Financial Resource Strain:      Difficulty of Paying Living Expenses:    Food Insecurity: No Food Insecurity     Worried About Running Out of Food in the Last Year: Never true     Ran Out of Food in the Last Year: Never true   Transportation Needs: No Transportation Needs     Lack of Transportation (Medical): No     Lack of Transportation (Non-Medical): No   Physical Activity: Inactive     Days of Exercise per Week: 0 days     Minutes of Exercise per Session: 0 min   Stress:      Feeling of Stress :    Social Connections: Unknown     Frequency of Communication with Friends and Family: Three times a week     Frequency of Social Gatherings with Friends and Family: Twice a week     Attends Confucianist Services: Never     Active Member of Clubs  "or Organizations: No     Attends Club or Organization Meetings: Never     Marital Status: Not on file   Intimate Partner Violence:      Fear of Current or Ex-Partner:      Emotionally Abused:      Physically Abused:      Sexually Abused:        ROS:   Constitutional: No fever, chills, or sweats. No weight gain/loss   ENT: No visual disturbance, ear ache, epistaxis, sore throat  Allergies/Immunologic: Negative  Respiratory: No cough, hemoptysia  Cardiovascular: As per HPI  GI: No nausea, vomiting, hematemesis, melena, or hematochezia  : No urinary frequency, dysuria, or hematuria  Integument: Negative  Psychiatric: Negative  Neuro: Negative  Endocrinology: Negative   Musculoskeletal: Negative  Vascular: No walking impairment, claudication, ischemic rest pain or nonhealing wounds    EXAM:  /62 (BP Location: Right arm, Patient Position: Sitting, Cuff Size: Adult Regular)   Pulse 98   Ht 1.727 m (5' 8\")   Wt 70.1 kg (154 lb 8 oz)   SpO2 99%   BMI 23.49 kg/m    In general, the patient is a pleasant male in no apparent distress.    HEENT: NC/AT.  PERRLA.  EOMI.  Sclerae white, not injected.   Neck: No adenopathy.  No thyromegaly. Carotids +2/2 bilaterally without bruits.  No jugular venous distension.   Heart: RRR. Normal S1, S2 splits physiologically. No murmur, rub, click, or gallop.     Lungs: CTA.  No ronchi, wheezes, rales.    Abdomen: Soft, nontender, nondistended.   Extremities: No clubbing, cyanosis, 1+ edema  Vascular: No bruits are noted.    Labs:  LIPID RESULTS:  Lab Results   Component Value Date    CHOL 95 01/28/2021    HDL 20 (L) 01/28/2021    LDL 41 01/28/2021    TRIG 172 (H) 01/28/2021    NHDL 75 01/28/2021       LIVER ENZYME RESULTS:  Lab Results   Component Value Date    AST 12 07/29/2021    AST 17 07/05/2021    ALT 18 07/29/2021    ALT 18 07/05/2021       CBC RESULTS:  Lab Results   Component Value Date    WBC 3.1 (L) 07/29/2021    WBC 31.7 (H) 07/07/2021    RBC 2.35 (L) 07/29/2021    RBC " 2.96 (L) 07/07/2021    HGB 7.0 (L) 07/29/2021    HGB 8.6 (L) 07/07/2021    HCT 20.4 (L) 07/29/2021    HCT 26.1 (L) 07/07/2021    MCV 87 07/29/2021    MCV 88 07/07/2021    MCH 29.8 07/29/2021    MCH 29.1 07/07/2021    MCHC 34.3 07/29/2021    MCHC 33.0 07/07/2021    RDW 14.8 07/29/2021    RDW 14.7 07/07/2021    PLT 7 (LL) 07/29/2021    PLT 12 (LL) 07/07/2021       BMP RESULTS:  Lab Results   Component Value Date     07/29/2021     07/05/2021    POTASSIUM 4.3 07/29/2021    POTASSIUM 3.8 07/05/2021    CHLORIDE 106 07/29/2021    CHLORIDE 104 07/05/2021    CO2 28 07/29/2021    CO2 25 07/05/2021    ANIONGAP 5 07/29/2021    ANIONGAP 8 07/05/2021     (H) 07/29/2021     (H) 07/05/2021    BUN 18 07/29/2021    BUN 20 07/05/2021    CR 0.94 07/29/2021    CR 0.96 07/05/2021    GFRESTIMATED 81 07/29/2021    GFRESTIMATED 78 07/05/2021    GFRESTBLACK >90 07/05/2021    MINNIE 9.0 07/29/2021    MINNIE 8.6 07/05/2021        A1C RESULTS:  Lab Results   Component Value Date    A1C 5.3 08/17/2020             Thank you for allowing me to participate in the care of your patient.      Sincerely,     Luli Lipscomb MD     [facility]  cc:   No referring provider defined for this encounter.

## 2021-08-03 NOTE — PROGRESS NOTES
Infusion Nursing Note:  Renny Gannon presents today for labs, possible transfusion.    Patient seen by provider today: No   present during visit today: Not Applicable.    Note: Patient will be getting 1 unit PRBC'S and PLT today. Lung sounds clear pre transfusion. Consent signed previously. Patient states gums bleed with brushing, no other bleeding reported. .  Patient states he has sore on butt, feels it's getting better. Restless sitting or reclined in chair.     Intravenous Access:  Peripheral IV placed.    Treatment Conditions:  Lab Results   Component Value Date    HGB 6.2 08/03/2021    HGB 8.6 07/07/2021     Lab Results   Component Value Date    WBC 4.6 08/03/2021    WBC 31.7 07/07/2021      Lab Results   Component Value Date    ANEU 3.2 08/03/2021    ANEU 1.4 07/29/2021    ANEU 6.6 07/07/2021     Lab Results   Component Value Date    PLT 6 08/03/2021    PLT 12 07/07/2021      Results reviewed, labs MET treatment parameters, ok to proceed with treatment.  Blood transfusion consent signed 3/5/21.      Post Infusion Assessment:  Patient tolerated infusion without incident.  Blood return noted pre and post infusion.  Site patent and intact, free from redness, edema or discomfort.  No evidence of extravasations.  Access discontinued per protocol.  Lung sounds clear post transfusion..       Discharge Plan:   Discharge instructions reviewed with: Patient.  Patient discharged in stable condition accompanied by: self.  Departure Mode: Ambulatory. His sister-Mary is picking him up at front entrance.    Jalyn Gannon RN

## 2021-08-04 NOTE — LETTER
8/4/2021         RE: Renny Gannon  [address]  [place] [address]      Hematology/Oncology Visit    Care Team:  - Oncologist: Dr. Cristina, most recently Dr. Patel  - PCP: Angus Clements Mai, MD    Reason for visit: post hospitalization follow up and planned C3 dec+venetoclax    Diagnosis: JAK2+ myelofibosis with blast crisis    Cancer and Treatment Hx:  Dec 2019: presented with fatigue and dyspnea on exertion. CBC showed WBC 79.1, Hgb 11.9, platelet count of 378. Peripheral blood smear revealed leuko-erythroblastic reaction with neutrophilic left shift and rare circulating blasts without dysplasia. BMBx 12/30 showing hypercellular marrow with granulocytic and megakaryocytic proliferation, megakaryocytic clustering and atypia, marrow fibrosis and 1.5% blasts consistent with myelofibrosis. 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   Jan 2020: started on Hydrea 500mg every day  Dec 2020: admitted for profound anemia, requiring 2 units of blood, Hydrea held  Jan 2021: restarted Hydrea with improving counts, held late Jan 2021 for persistent pancytopenia  March 26, 2021: BMBx demonstrated 40-50% cellularity with increased fibrosis and 5% blasts  - Numerous hospitalizations for heart failure/cardiac issues and dyspnea, requiring frequent blood transfusions  Shikha 3-18, 2021: hospitalized for leukocytosis and concern for acute leukemia. BMBx 6/4 (core only, unable to obtain aspirate) demonstrated hypercellular marrow with 5-10% myeloid blasts and marked marrow fibrosis.  Findings consistent with blast crisis arising from previously diagnosed myeloproliferative neoplasm. Diagnosis made on the basis of increased peripheral blast percentage (23%); blast percentage in bone marrow is difficult to establish d/t bone marrow fibrosis and lack of aspirates. BMBx flow from core shows increased abnormal myeloid blasts (12%). FISH with monosomy 7. FLT3 negative.   June 6, 2021: Initiated cycle 1 decitabine, venetoclax ramp up  initiated 6/9 while inpatient  July 7, 2021: BMBx after cycle 1 demonstrated hypercellular marrow with 13% blasts and marked fibrosis (MF3), peripheral blood with 19% circulating blasts.  June 27, 2021: held venetoclax for pancytopenia with plans to continue at start of next cycle (patient reported on 8/4 that he had not been holding, however)    Interval History:  Renny was hospitalized 7/20-7/21 for chest pain secondary to demand ischemia with a history of profound anemia secondary to myelofibrosis. Improved after blood transfusions. Today Renny reports he is doing generally well. He has noticed gum bleeding every morning for the past month and is using a toothbrush to clean teeth. Petechial rash BUE otherwise denies any other signs/symptoms of bleeding. No large bruises or headaches. No recent chest pain or shortness of breath with activities or at rest. He received a unit of RBC and platelets yesterday. He notes a decreased appetite in the mornings but is able to eat a small lunch and dinner. Watery stool once per day, no abdominal pain. No urinary symptoms. Denies any pain today.    He lives alone, sister Mary comes in couple times per week to assist with any cares he may need. He is performing ADLs independently, no ambulatory devices. He has no questions or concerns about      Reports not being aware that he was supposed to stop his venetoclax so he has been taking daily.    Medications:  Current Outpatient Medications   Medication     allopurinol (ZYLOPRIM) 300 MG tablet     fluconazole (DIFLUCAN) 200 MG tablet     furosemide (LASIX) 40 MG tablet     levofloxacin (LEVAQUIN) 250 MG tablet     metoprolol tartrate (LOPRESSOR) 50 MG tablet     oxyCODONE (XTAMPZA) 9 MG 12 hr tablet     polyethylene glycol (MIRALAX) 17 GM/Dose powder     prochlorperazine (COMPAZINE) 5 MG tablet     rosuvastatin (CRESTOR) 5 MG tablet     SENEXON-S 8.6-50 MG tablet     traZODone (DESYREL) 50 MG tablet     venetoclax (VENCLEXTA) 100  "MG tablet     venetoclax (VENCLEXTA) 100 MG tablet     vitamin B-12 (CYANOCOBALAMIN) 250 MCG tablet     vitamin D3 (CHOLECALCIFEROL) 50 mcg (2000 units) tablet     ACE/ARB/ARNI NOT PRESCRIBED (INTENTIONAL)     acyclovir (ZOVIRAX) 400 MG tablet     ASPIRIN NOT PRESCRIBED (INTENTIONAL)     Lidocaine (LIDOCARE) 4 % Patch     nitroGLYcerin (NITROSTAT) 0.4 MG sublingual tablet     ondansetron (ZOFRAN-ODT) 8 MG ODT tab     No current facility-administered medications for this visit.     Physical Exam:   GEN: pleasantly conversant male no acute distress  SKIN: petechial rash BUE, no large areas of echhymosis  ENT: eyes non-icteric, no notable oral sores, no wet purpura, single petechia soft palate  LYMPH: no notable cervical, supraclavicular, or axillary lymphadenopathy  RESP: clear to auscultation bilaterally, on room air  CARDS: regular rate and rhythm, no notable murmurs   GI: abd soft without notable hepatosplenomegaly, non-tender to palpation  MSK: trace edema bilateral ankles  NEURO: alert and oriented without obvious focal deficit    /58 (BP Location: Right arm, Patient Position: Sitting, Cuff Size: Adult Small)   Pulse 78   Temp 97.1  F (36.2  C) (Temporal)   Resp 14   Ht 1.727 m (5' 8\")   Wt 70.8 kg (156 lb 3 oz)   SpO2 98%   BMI 23.75 kg/m       Wt Readings from Last 4 Encounters:   08/04/21 70.8 kg (156 lb 3 oz)   08/03/21 70.2 kg (154 lb 12.8 oz)   08/02/21 70.1 kg (154 lb 8 oz)   07/29/21 70.9 kg (156 lb 6.4 oz)     Labs:  No results found for any visits on 08/04/21.    Imaging:  Reviewed CT chest from 7/20/21:   IMPRESSION:  1.  No pulmonary embolism.  2.  Trace left pleural effusion.  3.  Splenomegaly.  4.  Heterogeneity of osseous structures. Uncertain if this is related to known leukemia.  5.  Calcified pleural plaques.  6.  Cholelithiasis.    Assessment and Plan:  Myelofibrosis with excess blasts  Profound anemia secondary to neoplastic disease  Profound thrombocytopenia secondary to " neoplastic disease  - Completed 2 cycles of decitabine and venetoclax, currently day 24 of cycle 2   - Shortened course of decitabine, 5 days   - Venetoclax days 1-28, instructed to hold 7/27 given cytopenias but pt has been taking    - Venetoclax max dose given concurrent fluconazole   - Recently hospitalized 7/20-7/21 for demand ischemia secondary to profound anemia   - Due for cycle 3 on 8/9, discussed with Dr. Patel, will plan to delay given ongoing cytopenias  - Repeat BMBx scheduled 8/6, discussed with Dr. Patel 8/4, will plan to delay given ongoing cytopenias  - Continues on neutropenia prophylaxis with acyclovir, fluconazole, and levofloxacin  - Currently requiring significant transfusion support, lab monitoring 3x/wk with transfusion PRN  - Next lab draw and transfusion support 8/5, may need weekend support depending on labs tomorrow  - Previously declined BMT consult    Gum bleeding  - Discussed using mouthwash and gentle cleaning with a cloth until platelets have recovered    Social support  - No current 24/7 caregiver, sister comes in 1-2x/wk to help  - Has been managing his medications but had been taking meds he was instructed to stop  - Has a nurse come in his home occasionally through [facility], called and they will plan a visit soon as well as PT/OT for home safety evaluation.       Future Appointments   Date Time Provider Department Center   8/4/2021 11:00 AM Alla Lopez PA-C [facility]   8/5/2021  9:00 AM PH LAB [facility]   8/5/2021  9:30 AM PH INFUSION CHAIR 14 [facility]   8/6/2021 11:00 AM Emani Camacho APRN [facility]   8/9/2021 10:15 AM [facility]   8/9/2021 10:30 AM PH INFUSION CHAIR 9 [facility]   8/10/2021 11:00 AM PH INFUSION CHAIR 10 [facility]   8/11/2021 12:00 PM Jeane Patel MD [facility]   8/11/2021  2:00 PM PH INFUSION CHAIR 10 [facility]   8/12/2021  1:30 PM PH LAB [facility]   8/12/2021  2:00 PM PH INFUSION CHAIR 10 [facility]   8/13/2021 10:30 AM PH INFUSION CHAIR 10 [facility]   8/16/2021 11:00 AM [facility]   8/16/2021 11:30 AM PH INFUSION NURSE [facility]   8/19/2021 10:30 AM [facility]   8/19/2021 11:00 AM PH INFUSION NURSE [facility]   8/23/2021  9:00 AM [facility]   8/23/2021  9:30 AM PH INFUSION NURSE [facility]   8/26/2021  9:00 AM [facility] LAB [facility]   8/26/2021  9:30 AM PH INFUSION NURSE [facility]   8/30/2021  9:00 AM [facility] LAB [facility]   8/30/2021  9:30 AM PH INFUSION NURSE [facility]   9/2/2021 10:30 AM [facility]   9/2/2021 11:00 AM PH INFUSION NURSE [facility]     The total time of this encounter amounted to 40 minutes today. This time includes face-to-face time spent with the patient, prep work, ordering tests, and performing post-visit documentation.    Harriet Gutierrez CNP      I have seen the patient and agree with written evaluation, assessment and plan presented by this NP in training.     Alla Lopez PA-C

## 2021-08-05 NOTE — PROGRESS NOTES
Infusion Nursing Note:  Renny Gannon presents today for 1 Unit PRBC's/1 Unit Plts.    Patient seen by provider today: No, Had office visit with Alla Lopez PA-C yesterday.   present during visit today: Not Applicable.    Note: Patient denies pain, dizziness and any increase in sx today. Lungs clear prior to transfusion. Tachypnea which is not new for patient. B/P 103/49, HR 82, afebrile.       Intravenous Access:  Peripheral IV placed.    Treatment Conditions:  Lab Results   Component Value Date    HGB 6.6 08/05/2021    HGB 8.6 07/07/2021     Lab Results   Component Value Date    WBC 4.3 08/05/2021    WBC 31.7 07/07/2021      Lab Results   Component Value Date    ANEU 2.8 08/05/2021    ANEU 6.6 07/07/2021     Lab Results   Component Value Date    PLT 12 08/05/2021    PLT 12 07/07/2021      Results reviewed, labs MET treatment parameters, ok to proceed with treatment.  Blood transfusion consent signed 3/5/21.  Platelet parameters were changed by UNA Patterson CNP today. Patient received both PRBC's and Plts.       Post Infusion Assessment:  Patient tolerated infusion without incident. VSS, asymptomatic. Lungs clear throughout.   Blood return noted pre and post infusion.  Site patent and intact, free from redness, edema or discomfort.  No evidence of extravasations.  PIV access discontinued per protocol.       Discharge Plan:   Copy of AVS reviewed with patient. Also reviewed with patient to call or come in to the ER if he is experiencing progressive concerns/symptoms r/t SOA, lightheadedness, tinnitus, bleeding gums/blood in urine, or extreme fatigue. Patient states understanding.  Patient will return 8/9 for next appointment.  Patient discharged in stable condition accompanied by: self. Sister providing transportation.  Departure Mode: Ambulatory.      Kimberley Faust RN

## 2021-08-06 NOTE — TELEPHONE ENCOUNTER
Reason for Call:  Home Health Care      Colette RN with [facility] called regarding (reason for call): Verbal orders     Orders are needed for this patient.  PT    PT: Assess & Treat  See patient once per week for 2 weeks    Message sent to Dr. Sonia REBOLLEDO [id number].    Edna Espinoza, RN MAN   Triage Nurse Advisor [facility]

## 2021-08-09 NOTE — TELEPHONE ENCOUNTER
Called pt and scheduled him for 08/11 labs at 12:15 and appt with Harriet at 12:30. He has appt for infusion scheduled at 2:00.  He is currently in infusion and will have them print out the schedule of appts for him.  Angelica Alex, [facility]          Per Harriet Gutierrez- pt needs to follow up with her in 1 week (08/11). He needs labs 3x wk. Lab appts M, W, and Fri. Before infusion. He needs labs completed before his appt with Harriet on Wednesday.   Call and make appts when her schedule opens. Should be open by Monday

## 2021-08-09 NOTE — TELEPHONE ENCOUNTER
"Caller is [facility] RN Supervisor -> Colette.  Calling re prior request for PT orders, originally requested 8/6/21.    Needs:  Orders for PT -> Assess and Treat.    See patient once per week for 2 weeks.    Please call verbal order to \"Colette-RN with [facility]\" -> [phone]    Thank you-    [facility] Nurse Advisor     Reason for Disposition    [1] Follow-up call from patient regarding patient's clinical status AND [2] information urgent     [facility] (Colette[facility]    Protocols used: PCP CALL - NO TRIAGE-A-AH      "

## 2021-08-09 NOTE — PROGRESS NOTES
Infusion Nursing Note:  Renny Gannon presents today for 1 unit PRBC's and platelets.    Patient seen by provider today: No   present during visit today: Not Applicable.    Note: tolerated well.      Intravenous Access:  Peripheral IV placed.    Treatment Conditions:  Lab Results   Component Value Date    HGB 7.4 08/09/2021    HGB 8.6 07/07/2021     Lab Results   Component Value Date    WBC 6.4 08/09/2021    WBC 31.7 07/07/2021      Lab Results   Component Value Date    ANEU 4.2 08/09/2021    ANEU 2.8 08/05/2021    ANEU 6.6 07/07/2021     Lab Results   Component Value Date    PLT 12 08/09/2021    PLT 12 07/07/2021      Lab Results   Component Value Date     08/09/2021     07/05/2021                   Lab Results   Component Value Date    POTASSIUM 4.2 08/09/2021    POTASSIUM 3.8 07/05/2021           Lab Results   Component Value Date    MAG 2.1 06/18/2021            Lab Results   Component Value Date    CR 0.82 08/09/2021    CR 0.96 07/05/2021                   Lab Results   Component Value Date    MINNIE 8.9 08/09/2021    MINNIE 8.6 07/05/2021                Lab Results   Component Value Date    BILITOTAL 0.6 08/09/2021    BILITOTAL 0.4 07/05/2021           Lab Results   Component Value Date    ALBUMIN 3.4 08/09/2021    ALBUMIN 3.2 07/05/2021                    Lab Results   Component Value Date    ALT 15 08/09/2021    ALT 18 07/05/2021           Lab Results   Component Value Date    AST 11 08/09/2021    AST 17 07/05/2021       Results reviewed, labs MET treatment parameters, ok to proceed with treatment.      Post Infusion Assessment:  Patient tolerated infusion without incident.  Site patent and intact, free from redness, edema or discomfort.  No evidence of extravasations.  Access discontinued per protocol.       Discharge Plan:   Discharge instructions reviewed with: Patient.  Patient and/or family verbalized understanding of discharge instructions and all questions answered.  Patient  discharged in stable condition accompanied by: self.  Departure Mode: Ambulatory.      Richa Gudino RN

## 2021-08-10 NOTE — PROGRESS NOTES
Hematology/Oncology Visit    Care Team:  - Oncologist: Dr. Patel  - PCP: Angus Clements Mai, MD    Reason for visit: follow-up pancytopenia, exam post C2 decitabine + venetoclax    Diagnosis: JAK2+ myelofibosis with blast crisis    Cancer and Treatment Hx:  Dec 2019: presented with fatigue and dyspnea on exertion. CBC showed WBC 79.1, Hgb 11.9, platelet count of 378. Peripheral blood smear revealed leuko-erythroblastic reaction with neutrophilic left shift and rare circulating blasts without dysplasia. BMBx 12/30 showing hypercellular marrow with granulocytic and megakaryocytic proliferation, megakaryocytic clustering and atypia, marrow fibrosis and 1.5% blasts consistent with myelofibrosis. 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   Jan 2020: started on Hydrea 500mg every day  Dec 2020: admitted for profound anemia, requiring 2 units of blood, Hydrea held  Jan 2021: restarted Hydrea with improving counts, held late Jan 2021 for persistent pancytopenia  March 26, 2021: BMBx demonstrated 40-50% cellularity with increased fibrosis and 5% blasts  - Numerous hospitalizations for heart failure/cardiac issues and dyspnea, requiring frequent blood transfusions  Shikha 3-18, 2021: hospitalized for leukocytosis and concern for acute leukemia. BMBx 6/4 (core only, unable to obtain aspirate) demonstrated hypercellular marrow with 5-10% myeloid blasts and marked marrow fibrosis.  Findings consistent with blast crisis arising from previously diagnosed myeloproliferative neoplasm. Diagnosis made on the basis of increased peripheral blast percentage (23%); blast percentage in bone marrow is difficult to establish d/t bone marrow fibrosis and lack of aspirates. BMBx flow from core shows increased abnormal myeloid blasts (12%). FISH with monosomy 7. FLT3 negative.   June 6, 2021: Initiated cycle 1 decitabine, venetoclax ramp up initiated 6/9 while inpatient  July 7, 2021: BMBx after cycle 1 demonstrated hypercellular marrow with 13%  blasts and marked fibrosis (MF3), peripheral blood with 19% circulating blasts.  June 27, 2021: held venetoclax for pancytopenia with plans to continue at start of next cycle (patient reported on 8/4 that he had not been holding, however)    Interval History:  Renny has been feeling well since his appointment last week. No new signs of bleeding. Gum bleeding is improved with use of a soft tooth brush but still intermittently occurs. His foot fell off of a foot stool the other day and his left heel has been a little sore. Chest pain has not recurred. No rectal bleeding or gross hematuria. No large bruises. Confirmed that he stopped venetoclax after our last discussion on 8/4. We discussed plan for transfusions and bone marrow biopsy on 8/13. We called his sister, Colette, together and confirmed that she is able to provide transportation downtown for him 8/13. He has no questions or concerns.    Medications:  Current Outpatient Medications   Medication     acyclovir (ZOVIRAX) 400 MG tablet     allopurinol (ZYLOPRIM) 300 MG tablet     fluconazole (DIFLUCAN) 200 MG tablet     furosemide (LASIX) 40 MG tablet     levofloxacin (LEVAQUIN) 250 MG tablet     metoprolol tartrate (LOPRESSOR) 50 MG tablet     oxyCODONE (XTAMPZA) 9 MG 12 hr tablet     polyethylene glycol (MIRALAX) 17 GM/Dose powder     rosuvastatin (CRESTOR) 5 MG tablet     SENEXON-S 8.6-50 MG tablet     vitamin B-12 (CYANOCOBALAMIN) 250 MCG tablet     vitamin D3 (CHOLECALCIFEROL) 50 mcg (2000 units) tablet     ACE/ARB/ARNI NOT PRESCRIBED (INTENTIONAL)     ASPIRIN NOT PRESCRIBED (INTENTIONAL)     Lidocaine (LIDOCARE) 4 % Patch     nitroGLYcerin (NITROSTAT) 0.4 MG sublingual tablet     ondansetron (ZOFRAN-ODT) 8 MG ODT tab     prochlorperazine (COMPAZINE) 5 MG tablet     traZODone (DESYREL) 50 MG tablet     venetoclax (VENCLEXTA) 100 MG tablet     No current facility-administered medications for this visit.     Physical Exam:   GEN: pleasantly conversant male no  "acute distress  SKIN: petechial rash BUE, L>R, large scab right elbow without surrounding erythema, no ecchymosis on left heel  ENT: eyes non-icteric, single petechia soft palate  RESP: clear to auscultation bilaterally, on room air  CARDS: regular rate and rhythm, no notable murmurs   GI: abd soft without notable hepatosplenomegaly, non-tender to palpation  MSK: +1 edema bilateral ankles  NEURO: alert and oriented without obvious focal deficit, gait stable    /56 (BP Location: Right arm, Patient Position: Sitting, Cuff Size: Adult Regular)   Pulse 89   Temp 97  F (36.1  C) (Temporal)   Ht 1.727 m (5' 8\")   Wt 71.7 kg (158 lb 2 oz)   SpO2 98%   BMI 24.04 kg/m       Wt Readings from Last 4 Encounters:   08/11/21 71.7 kg (158 lb 2 oz)   08/05/21 71.6 kg (157 lb 14.4 oz)   08/04/21 70.8 kg (156 lb 3 oz)   08/03/21 70.2 kg (154 lb 12.8 oz)     Labs:  CBC with platelets and differential     Status: Abnormal   Result Value Ref Range    WBC Count 5.8 4.0 - 11.0 10e3/uL    RBC Count 2.59 (L) 4.40 - 5.90 10e6/uL    Hemoglobin 7.8 (L) 13.3 - 17.7 g/dL    Hematocrit 23.4 (L) 40.0 - 53.0 %    MCV 90 78 - 100 fL    MCH 30.1 26.5 - 33.0 pg    MCHC 33.3 31.5 - 36.5 g/dL    RDW 15.5 (H) 10.0 - 15.0 %    Platelet Count 30 (LL) 150 - 450 10e3/uL   Manual Differential     Status: Abnormal   Result Value Ref Range    Absolute Neutrophils 3.4 1.6 - 8.3 10e3/uL    Absolute Lymphocytes 1.6 0.8 - 5.3 10e3/uL    Absolute Monocytes 0.2 0.0 - 1.3 10e3/uL    Absolute Eosinophils 0.0 0.0 - 0.7 10e3/uL    Absolute Basophils 0.3 (H) 0.0 - 0.2 10e3/uL    Absolute Myelocytes 0.3 (H) <=0.0 10e3/uL    RBC Morphology Confirmed RBC Indices     Platelet Assessment  Automated Count Confirmed. Platelet morphology is normal.     Automated Count Confirmed. Platelet morphology is normal.     Imaging:  Reviewed CT chest from 7/20/21:   IMPRESSION:  1.  No pulmonary embolism.  2.  Trace left pleural effusion.  3.  Splenomegaly.  4.  Heterogeneity " of osseous structures. Uncertain if this is related to known leukemia.  5.  Calcified pleural plaques.  6.  Cholelithiasis.    Assessment and Plan:  Myelofibrosis with excess blasts  Profound anemia secondary to neoplastic disease and chemotherapy  Profound thrombocytopenia secondary to neoplastic disease and chemotherapy  - Completed 2 cycles of decitabine and venetoclax, currently day 30 of cycle 2       - Shortened course of decitabine, 5 days       - Venetoclax days 1-28, instructed to hold on day 16 given cytopenias but continued to take through day 24       - Venetoclax max dose given concurrent fluconazole  - Recently hospitalized 7/20-7/21 for demand ischemia secondary to profound anemia  - Due for cycle 3 on 8/9, discussed with Dr. Patel, will plan to delay given ongoing cytopenias (infusion appts for 8/23-8/27)  - BMBx planned for 8/13 and follow up with Dr. Patel on 8/18  - Continues on neutropenia prophylaxis with acyclovir, fluconazole, and levofloxacin  - Currently requiring significant transfusion support, lab monitoring 3x/wk with transfusion PRN  - Next lab draw and transfusion support 8/13 with post platelet draw prior to BMBx, goal >20     Gum bleeding  - Improved, discussed using a soft toothbrush for gentle cleaning given thrombocytopenia     Social support  - No current 24/7 caregiver, sister comes in 1-2x/wk to help  - Has been managing his medications but had been taking meds he was instructed to stop  - Has a nurse come in his home occasionally through [facility] as well as home PT/OT      Future Appointments   Date Time Provider Department Center   8/11/2021 12:15 PM PH LAB [facility]   8/11/2021 12:30 PM Harriet Gutierrez APRN CNP [facility]   8/11/2021  2:00 PM PH INFUSION CHAIR 10 [facility]   8/12/2021  1:30 PM PH LAB [facility]   8/13/2021 10:30 AM PH INFUSION CHAIR 10 [facility] [place] [facility]   8/13/2021 12:45 PM [facility] DRAW [facility]    8/13/2021  1:15 PM Demetrice Rodriguez PA-C [facility]   8/16/2021 11:00 AM [facility]   8/16/2021 11:30 AM PH INFUSION NURSE [facility]   8/18/2021 12:00 PM Jeane Patel MD [facility]   8/19/2021 10:30 AM [facility]   8/19/2021 11:00 AM PH INFUSION NURSE [facility]   8/23/2021  9:00 AM PH LAB [facility]   8/23/2021  9:30 AM PH INFUSION NURSE [facility]   8/24/2021 11:00 AM PH INFUSION CHAIR 13 [facility]   8/25/2021 10:30 AM [facility]   8/25/2021 11:00 AM PH INFUSION NURSE [facility]   8/26/2021  9:00 [facility]   8/26/2021  9:30 AM PH INFUSION NURSE [facility]   8/27/2021 12:30 PM [facility]   8/27/2021  1:00 PM PH INFUSION NURSE [facility]   8/30/2021  9:00 AM PH [facility]   8/30/2021  9:30 AM PH INFUSION NURSE [facility]     The total time of this encounter amounted to 30 minutes today. This time includes face-to-face time spent with the patient, prep work, ordering tests, and performing post-visit documentation.    Harriet Gutierrez, CNP

## 2021-08-11 NOTE — TELEPHONE ENCOUNTER
OT with [facility] calling to request an order for OT treatment for this patient: One more visit (1 time per week x1) for this patient to be done next radha.  Dr Scott @ [facility]    Message will be forwarded to provider.  Kaylan Simons RN Triage Nurse Advisor 4:14 PM 8/11/2021

## 2021-08-11 NOTE — LETTER
8/11/2021         RE: Renny Gannon  [address]  [place] [address]        Dear Colleague,    Thank you for referring your patient, Renny Gannon, to the [facility]. Please see a copy of my visit note below.    Hematology/Oncology Visit    Care Team:  - Oncologist: Dr. Patel  - PCP: Angus Clements Mai, MD    Reason for visit: follow-up pancytopenia, exam post C2 decitabine + venetoclax    Diagnosis: JAK2+ myelofibosis with blast crisis    Cancer and Treatment Hx:  Dec 2019: presented with fatigue and dyspnea on exertion. CBC showed WBC 79.1, Hgb 11.9, platelet count of 378. Peripheral blood smear revealed leuko-erythroblastic reaction with neutrophilic left shift and rare circulating blasts without dysplasia. BMBx 12/30 showing hypercellular marrow with granulocytic and megakaryocytic proliferation, megakaryocytic clustering and atypia, marrow fibrosis and 1.5% blasts consistent with myelofibrosis. 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   Jan 2020: started on Hydrea 500mg every day  Dec 2020: admitted for profound anemia, requiring 2 units of blood, Hydrea held  Jan 2021: restarted Hydrea with improving counts, held late Jan 2021 for persistent pancytopenia  March 26, 2021: BMBx demonstrated 40-50% cellularity with increased fibrosis and 5% blasts  - Numerous hospitalizations for heart failure/cardiac issues and dyspnea, requiring frequent blood transfusions  Shikha 3-18, 2021: hospitalized for leukocytosis and concern for acute leukemia. BMBx 6/4 (core only, unable to obtain aspirate) demonstrated hypercellular marrow with 5-10% myeloid blasts and marked marrow fibrosis.  Findings consistent with blast crisis arising from previously diagnosed myeloproliferative neoplasm. Diagnosis made on the basis of increased peripheral blast percentage (23%); blast percentage in bone marrow is difficult to establish d/t bone marrow fibrosis and lack of aspirates. BMBx flow from core shows  increased abnormal myeloid blasts (12%). FISH with monosomy 7. FLT3 negative.   June 6, 2021: Initiated cycle 1 decitabine, venetoclax ramp up initiated 6/9 while inpatient  July 7, 2021: BMBx after cycle 1 demonstrated hypercellular marrow with 13% blasts and marked fibrosis (MF3), peripheral blood with 19% circulating blasts.  June 27, 2021: held venetoclax for pancytopenia with plans to continue at start of next cycle (patient reported on 8/4 that he had not been holding, however)    Interval History:  Renny has been feeling well since his appointment last week. No new signs of bleeding. Gum bleeding is improved with use of a soft tooth brush but still intermittently occurs. His foot fell off of a foot stool the other day and his left heel has been a little sore. Chest pain has not recurred. No rectal bleeding or gross hematuria. No large bruises. Confirmed that he stopped venetoclax after our last discussion on 8/4. We discussed plan for transfusions and bone marrow biopsy on 8/13. We called his sister, Colette, together and confirmed that she is able to provide transportation downtown for him 8/13. He has no questions or concerns.    Medications:  Current Outpatient Medications   Medication     acyclovir (ZOVIRAX) 400 MG tablet     allopurinol (ZYLOPRIM) 300 MG tablet     fluconazole (DIFLUCAN) 200 MG tablet     furosemide (LASIX) 40 MG tablet     levofloxacin (LEVAQUIN) 250 MG tablet     metoprolol tartrate (LOPRESSOR) 50 MG tablet     oxyCODONE (XTAMPZA) 9 MG 12 hr tablet     polyethylene glycol (MIRALAX) 17 GM/Dose powder     rosuvastatin (CRESTOR) 5 MG tablet     SENEXON-S 8.6-50 MG tablet     vitamin B-12 (CYANOCOBALAMIN) 250 MCG tablet     vitamin D3 (CHOLECALCIFEROL) 50 mcg (2000 units) tablet     ACE/ARB/ARNI NOT PRESCRIBED (INTENTIONAL)     ASPIRIN NOT PRESCRIBED (INTENTIONAL)     Lidocaine (LIDOCARE) 4 % Patch     nitroGLYcerin (NITROSTAT) 0.4 MG sublingual tablet     ondansetron (ZOFRAN-ODT) 8 MG ODT  "tab     prochlorperazine (COMPAZINE) 5 MG tablet     traZODone (DESYREL) 50 MG tablet     venetoclax (VENCLEXTA) 100 MG tablet     No current facility-administered medications for this visit.     Physical Exam:   GEN: pleasantly conversant male no acute distress  SKIN: petechial rash BUE, L>R, large scab right elbow without surrounding erythema, no ecchymosis on left heel  ENT: eyes non-icteric, single petechia soft palate  RESP: clear to auscultation bilaterally, on room air  CARDS: regular rate and rhythm, no notable murmurs   GI: abd soft without notable hepatosplenomegaly, non-tender to palpation  MSK: +1 edema bilateral ankles  NEURO: alert and oriented without obvious focal deficit, gait stable    /56 (BP Location: Right arm, Patient Position: Sitting, Cuff Size: Adult Regular)   Pulse 89   Temp 97  F (36.1  C) (Temporal)   Ht 1.727 m (5' 8\")   Wt 71.7 kg (158 lb 2 oz)   SpO2 98%   BMI 24.04 kg/m       Wt Readings from Last 4 Encounters:   08/11/21 71.7 kg (158 lb 2 oz)   08/05/21 71.6 kg (157 lb 14.4 oz)   08/04/21 70.8 kg (156 lb 3 oz)   08/03/21 70.2 kg (154 lb 12.8 oz)     Labs:  CBC with platelets and differential     Status: Abnormal   Result Value Ref Range    WBC Count 5.8 4.0 - 11.0 10e3/uL    RBC Count 2.59 (L) 4.40 - 5.90 10e6/uL    Hemoglobin 7.8 (L) 13.3 - 17.7 g/dL    Hematocrit 23.4 (L) 40.0 - 53.0 %    MCV 90 78 - 100 fL    MCH 30.1 26.5 - 33.0 pg    MCHC 33.3 31.5 - 36.5 g/dL    RDW 15.5 (H) 10.0 - 15.0 %    Platelet Count 30 (LL) 150 - 450 10e3/uL   Manual Differential     Status: Abnormal   Result Value Ref Range    Absolute Neutrophils 3.4 1.6 - 8.3 10e3/uL    Absolute Lymphocytes 1.6 0.8 - 5.3 10e3/uL    Absolute Monocytes 0.2 0.0 - 1.3 10e3/uL    Absolute Eosinophils 0.0 0.0 - 0.7 10e3/uL    Absolute Basophils 0.3 (H) 0.0 - 0.2 10e3/uL    Absolute Myelocytes 0.3 (H) <=0.0 10e3/uL    RBC Morphology Confirmed RBC Indices     Platelet Assessment  Automated Count Confirmed. " Platelet morphology is normal.     Automated Count Confirmed. Platelet morphology is normal.     Imaging:  Reviewed CT chest from 7/20/21:   IMPRESSION:  1.  No pulmonary embolism.  2.  Trace left pleural effusion.  3.  Splenomegaly.  4.  Heterogeneity of osseous structures. Uncertain if this is related to known leukemia.  5.  Calcified pleural plaques.  6.  Cholelithiasis.    Assessment and Plan:  Myelofibrosis with excess blasts  Profound anemia secondary to neoplastic disease and chemotherapy  Profound thrombocytopenia secondary to neoplastic disease and chemotherapy  - Completed 2 cycles of decitabine and venetoclax, currently day 30 of cycle 2       - Shortened course of decitabine, 5 days       - Venetoclax days 1-28, instructed to hold on day 16 given cytopenias but continued to take through day 24       - Venetoclax max dose given concurrent fluconazole  - Recently hospitalized 7/20-7/21 for demand ischemia secondary to profound anemia  - Due for cycle 3 on 8/9, discussed with Dr. Patel, will plan to delay given ongoing cytopenias (infusion appts for 8/23-8/27)  - BMBx planned for 8/13 and follow up with Dr. Patel on 8/18  - Continues on neutropenia prophylaxis with acyclovir, fluconazole, and levofloxacin  - Currently requiring significant transfusion support, lab monitoring 3x/wk with transfusion PRN  - Next lab draw and transfusion support 8/13 with post platelet draw prior to BMBx, goal >20     Gum bleeding  - Improved, discussed using a soft toothbrush for gentle cleaning given thrombocytopenia     Social support  - No current 24/7 caregiver, sister comes in 1-2x/wk to help  - Has been managing his medications but had been taking meds he was instructed to stop  - Has a nurse come in his home occasionally through [facility] as well as home PT/OT      Future Appointments   Date Time Provider Department Center   8/11/2021 12:15 PM PH LAB [facility]   8/11/2021 12:30 PM Harriet Gutierrez,  UNA CNP [facility]   8/11/2021  2:00 PM PH INFUSION CHAIR 10 [facility]   [facility]/12[facility]2021  1:30 [facility]   8/13/2021 10:30 AM PH INFUSION CHAIR 10 [facility]   8/13/2021 12:45 PM [facility]   8/13/2021  1:15 PM Demetrice Rodriguez PA-C [facility]   8/16/2021 11:00 AM [facility]   8/16/2021 11:30 AM PH INFUSION NURSE [facility]   8/18/2021 12:00 PM Jeane Patel MD [facility]   8/19/2021 10:30 AM [facility]   8/19/2021 11:00 AM PH INFUSION NURSE [facility]   8/23/2021  9:00 AM [facility]   8/23/2021  9:30 AM PH INFUSION NURSE [facility]   8/24/2021 11:00 AM PH INFUSION CHAIR 13 [facility]   8/25/2021 10:30 AM [facility]   8/25/2021 11:00 AM PH INFUSION NURSE [facility]   8/26/2021  9:00 AM [facility]   8/26/2021  9:30 AM PH INFUSION NURSE [facility]   8/27/2021 12:30 PM [facility]   8/27/2021  1:00 PM PH INFUSION NURSE [facility]   8/30/2021  9:00 AM [facility]   8/30/2021  9:30 AM PH INFUSION NURSE [facility]     The total time of this encounter amounted to 30 minutes today. This time includes face-to-face time spent with the patient, prep work, ordering tests, and performing post-visit documentation.    Harriet Gutierrez CNP        Again, thank you for allowing me to participate in the care of your patient.        Sincerely,        Harriet Gutierrez, APRN CNP

## 2021-08-11 NOTE — PROGRESS NOTES
Infusion Nursing Note:  Renny Gannon presents today for 1 unit Prbc's.    Patient seen by provider today: Yes: UNA Patterson CNP   present during visit today: Not Applicable.    Note: Patient still complains of a sore coccyx when sitting too long. States its healed and not open, uses the square black pillow while he is here. Lungs sound clear pre and post transfusion. Denies any signs or symptoms of reaction.      Intravenous Access:  Peripheral IV placed.    Treatment Conditions:  Lab Results   Component Value Date    HGB 7.8 08/11/2021    HGB 8.6 07/07/2021     Lab Results   Component Value Date    WBC 5.8 08/11/2021    WBC 31.7 07/07/2021      Lab Results   Component Value Date    ANEU 3.4 08/11/2021    ANEU 6.6 07/07/2021     Lab Results   Component Value Date    PLT 30 08/11/2021    PLT 12 07/07/2021      Results reviewed, labs MET treatment parameters, ok to proceed with treatment.  Blood transfusion consent signed 03/05/21 with Infusion Therapy, 07/07/21 with Milena Fajardo Onc Office Visit.      Post Infusion Assessment:  Patient tolerated transfusion without incident.  Blood return noted pre and post transfusion.  Site patent and intact, free from redness, edema or discomfort.  No evidence of extravasations.  Access discontinued per protocol.       Discharge Plan:   Discharge instructions reviewed with: Patient.  Patient and/or family verbalized understanding of discharge instructions and all questions answered.  Patient discharged in stable condition accompanied by: self.  Departure Mode: Ambulatory.      Mayte Chapa RN

## 2021-08-12 NOTE — TELEPHONE ENCOUNTER
I called Colette with home care and informed her of the below msg and she will let Desiree know.  Shelia Molina MA

## 2021-08-12 NOTE — PROGRESS NOTES
"Renny Gannon  Gender: male  : 1950  [address]   [place] [address]  [phone] (home)     Medical Record: [id number]  Pharmacy:    [facility] - [place], [place] - [address]  [facility] [place], [place] - [address]  [facility] [place] [address] [facility]  [facility] [address] [facility] - [place], [place] - [address]  Primary Care Provider: Angus Scott    Parent's names are: Data Unavailable (mother) and Data Unavailable (father).      [facility]  2021     Discharge Phone Call:  Key Words/Key Times      Introduction - AIDET (Acknowledge, Introduce, Duration, Explanation)      Empathy-   We are calling to see how you are since your recent stay in the hospital?     Call back COMMENTS: been doing ok      Clinical Questions -  (f/u appts, medication side effects/purpose, ability to care for self at home) \"For your safety, it is important to us that you understand the purpose and side effects of your medications, can you tell me what your new medications are?\"     Call back COMMENTS: no questions, appointments are made      Staff Recognition -  We like to recognize staff and physicians who have done an excellent job.  Do you remember any people from your care team that you would like recognize?     Call back COMMENTS: cannot think of names      Very Good Care -  We want to provide very good care to all patients.  How was your care?     Call back COMMENTS: care was good      Opportunities for Improvement -  Our goal is to be the best.  Do you have any suggestions for things that we could improve upon?     Call back COMMENTS: none      Thank You       For CHF Patients Only:  Teach Back Questions Answered Correctly:    1. What is the name of your  water pill ? lasix  2. What weight gain should you report to your doctor? 3#/day or 5# week  3. What foods should " you avoid? Salty or processed foods  4. What symptoms would you report to your doctor? Short of breath or swelling  CHF COMMENTS:

## 2021-08-13 NOTE — PROGRESS NOTES
"BMT ONC Adult Bone Marrow Biopsy Procedure Note  August 13, 2021  /48   Pulse 77   Temp 98.1  F (36.7  C) (Temporal)   Resp 18   Ht 1.727 m (5' 8\")   Wt 71.8 kg (158 lb 6.4 oz)   SpO2 100%   BMI 24.08 kg/m       Learning needs assessment complete within 12 months? YES    DIAGNOSIS:  JAK2+ myelofibosis with blast crisis     PROCEDURE: Unilateral Bone Marrow Biopsy and Unilateral Aspirate    LOCATION: [facility] [place] 2nd Floor    Patient s identification was positively verified by verbal identification and invasive procedure safety checklist was completed. Informed consent was obtained. Following the administration of 1 mg Midazolam as pre-medication, patient was placed in the prone position and prepped and draped in a sterile manner. Approximately 10 cc of 1% Lidocaine was used over the left posterior iliac spine. Following this a 3 mm incision was made. Trephine bone marrow core(s) was (were) obtained from the [facility]. Bone marrow aspirates were obtained from the [facility]. Aspirates were sent for morphology, immunophenotyping and cytogenetics. A total of approximately 10 ml of marrow was aspirated. Following this procedure a sterile dressing was applied to the bone marrow biopsy site(s). The patient was placed in the supine position to maintain pressure on the biopsy site. Post-procedure wound care instructions were given.     Complications: NO    Post-procedural pain assessment: 0 out of 10 on the numeric pain rating scale.     Interventions: NO    Length of procedure:20 minutes or less      Procedure performed by:   Demetrice Rodriguez PA-C  [facility] [address] [address]  [phone]      "

## 2021-08-13 NOTE — PROGRESS NOTES
Infusion Nursing Note:  Renny Gannon presents today for 1 unit platelets.    Patient seen by provider today: No, has bone marrow bx after infusion   present during visit today: Not Applicable.    Note: Patient denies any new symptoms today. Called Dr. Rodriguez's office, triage nurse regarding post transfusion platelets result.      Intravenous Access:  Peripheral IV placed.  Saline Locked for bone marrow bx appt per request.    Treatment Conditions:  Lab Results   Component Value Date    HGB 8.4 08/13/2021    HGB 8.6 07/07/2021     Lab Results   Component Value Date    WBC 5.8 08/13/2021    WBC 31.7 07/07/2021      Lab Results   Component Value Date    ANEU 2.6 08/13/2021    ANEU 6.6 07/07/2021     Lab Results   Component Value Date    PLT 37 08/13/2021    PLT 12 07/07/2021      Was 21,00 but per request for procedure today gave 1 unit platelets.      Post Infusion Assessment:  Patient tolerated transfusion without incident.  Patient observed for 20 minutes post transfusion per protocol.  Blood return noted pre and post infusion.  Site patent and intact, free from redness, edema or discomfort.  No evidence of extravasations.       Discharge Plan:   Discharge instructions reviewed with: Patient.  Patient and/or family verbalized understanding of discharge instructions and all questions answered.  Patient discharged in stable condition accompanied by: self.  Departure Mode: Ambulatory.      Mayte Chapa RN

## 2021-08-13 NOTE — NURSING NOTE
BMT Teaching Flowsheet  Teaching Topic: bone marrow biopsy  Person(s) involved in teaching: Patient  Motivation Level  Asks Questions: Yes  Eager to Learn: Yes  Cooperative: Yes  Receptive (willing/able to accept information): Yes  Patient demonstrates understanding of the following:   - Reason for the appointment, diagnosis and treatment plan: Yes  - Knowledge of proper use of medications and conditions for which they are ordered (with special attention to potential side effects or drug interactions): Yes  - Which situations necessitate calling provider and whom to contact: Yes  Teaching concerns addressed: what to expect during and post procedure including restrictions  Proper use and care of (medical equipment, care aids, etc.) NA  Pain management techniques: Yes  Patient instructed on hand hygiene: NA  How and/when to access community resources: NA  Infection Control:  Patient demonstrates understanding of the following:   Surgical procedure site care taught Yes  Signs and symptoms of infection taught Yes  Wound care taught Yes  Central venous catheter care taught NA  Instructional Materials Used/Given: post procedure instructions    Pt requests IV versed pre med

## 2021-08-13 NOTE — NURSING NOTE
"Oncology Rooming Note    August 13, 2021 1:11 PM   Renny Gannno is a 70 year old male who presents for:    Chief Complaint   Patient presents with     Oncology Clinic Visit     Return: BMBX- Thrombocytopeni     Initial Vitals: /48   Pulse 77   Temp 98.1  F (36.7  C) (Temporal)   Resp 18   Ht 1.727 m (5' 8\")   Wt 71.8 kg (158 lb 6.4 oz)   SpO2 100%   BMI 24.08 kg/m   Estimated body mass index is 24.08 kg/m  as calculated from the following:    Height as of this encounter: 1.727 m (5' 8\").    Weight as of this encounter: 71.8 kg (158 lb 6.4 oz). Body surface area is 1.86 meters squared.  No Pain (0) Comment: Data Unavailable   No LMP for male patient.  Allergies reviewed: Yes  Medications reviewed: Yes    Medications: Medication refills not needed today.  Pharmacy name entered into EPIC:    [facility] [address] [place] [address] - [address]  [facility] - [place], [place] - [address]  [facility] [place] - [address] [facility]  [facility] [place], [place] - [address] [facility] - [place], [place] - [address]    Clinical concerns: N/A       Kimberlyn Hung CMA              "

## 2021-08-13 NOTE — NURSING NOTE
Drug Administration Record    Drug Name: versed  Dose: 1mg  Route Administered: IV  NDC#: [id number]  Amount of waste (mL): 1  Reason for waste: single use vial

## 2021-08-13 NOTE — LETTER
"    8/13/2021         RE: Renny Gannon  [address]  [facility] [address]      BMT ONC Adult Bone Marrow Biopsy Procedure Note  August 13, 2021  /48   Pulse 77   Temp 98.1  F (36.7  C) (Temporal)   Resp 18   Ht 1.727 m (5' 8\")   Wt 71.8 kg (158 lb 6.4 oz)   SpO2 100%   BMI 24.08 kg/m       Learning needs assessment complete within 12 months? YES    DIAGNOSIS:  JAK2+ myelofibosis with blast crisis     PROCEDURE: Unilateral Bone Marrow Biopsy and Unilateral Aspirate    LOCATION: [facility] [place] 2nd Floor    Patient s identification was positively verified by verbal identification and invasive procedure safety checklist was completed. Informed consent was obtained. Following the administration of 1 mg Midazolam as pre-medication, patient was placed in the prone position and prepped and draped in a sterile manner. Approximately 10 cc of 1% Lidocaine was used over the left posterior iliac spine. Following this a 3 mm incision was made. Trephine bone marrow core(s) was (were) obtained from the [facility]. Bone marrow aspirates were obtained from the [facility]. Aspirates were sent for morphology, immunophenotyping and cytogenetics. A total of approximately 10 ml of marrow was aspirated. Following this procedure a sterile dressing was applied to the bone marrow biopsy site(s). The patient was placed in the supine position to maintain pressure on the biopsy site. Post-procedure wound care instructions were given.     Complications: NO    Post-procedural pain assessment: 0 out of 10 on the numeric pain rating scale.     Interventions: NO    Length of procedure:20 minutes or less      Procedure performed by:   Demetrice Rodriguez PA-C  [facility] [address] [address]  [phone]            Demetrice Rodriguez PA-C  "

## 2021-08-15 PROBLEM — K81.0 ACUTE CHOLECYSTITIS: Status: ACTIVE | Noted: 2021-01-01

## 2021-08-15 PROBLEM — D61.818 PANCYTOPENIA (H): Status: ACTIVE | Noted: 2021-01-01

## 2021-08-15 PROBLEM — D46.9 MDS (MYELODYSPLASTIC SYNDROME) (H): Chronic | Status: ACTIVE | Noted: 2021-01-01

## 2021-08-15 PROBLEM — R10.11 RIGHT UPPER QUADRANT ABDOMINAL PAIN: Status: ACTIVE | Noted: 2021-01-01

## 2021-08-15 NOTE — ED PROVIDER NOTES
History     Chief Complaint   Patient presents with     Abdominal Pain     HPI  Renny Gannon is a 70 year old male who presents to the ER with concerns about abd pain and distension.  Patient states that his pain started about 930 last evening.  He has had some nausea but no vomiting to this point.  He states that he is currently undergoing treatment for the cancer to his low back region.  He is not sure of the specific type of cancer.  He states that they recently had to stop his chemotherapy because of low blood counts.  Patient denies any cough or chest pain.  He denies any bloody stools or bloody emesis.  He denies any change or problems with his urination.  Denies any blood in his urine.  He states that he noticed some chills after the pain in the abdomen started but denies any fever that he is aware of.    I reviewed the procedural note from 8/13/2021 in which the patient underwent a bone marrow biopsy procedure.  No complications of the procedure reported in review of the note.    I reviewed the patient's most recent oncology clinic clinician note and copied that note below:    Care Team:  - Oncologist: Dr. Patel  - PCP: Angus Clements Mai, MD     Reason for visit: follow-up pancytopenia, exam post C2 decitabine + venetoclax     Diagnosis: JAK2+ myelofibosis with blast crisis     Cancer and Treatment Hx:  Dec 2019: presented with fatigue and dyspnea on exertion. CBC showed WBC 79.1, Hgb 11.9, platelet count of 378. Peripheral blood smear revealed leuko-erythroblastic reaction with neutrophilic left shift and rare circulating blasts without dysplasia. BMBx 12/30 showing hypercellular marrow with granulocytic and megakaryocytic proliferation, megakaryocytic clustering and atypia, marrow fibrosis and 1.5% blasts consistent with myelofibrosis. 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   Jan 2020: started on Hydrea 500mg every day  Dec 2020: admitted for profound anemia, requiring 2 units of blood, Hydrea held  Jan  2021: restarted Hydrea with improving counts, held late Jan 2021 for persistent pancytopenia  March 26, 2021: BMBx demonstrated 40-50% cellularity with increased fibrosis and 5% blasts  - Numerous hospitalizations for heart failure/cardiac issues and dyspnea, requiring frequent blood transfusions  Shikha 3-18, 2021: hospitalized for leukocytosis and concern for acute leukemia. BMBx 6/4 (core only, unable to obtain aspirate) demonstrated hypercellular marrow with 5-10% myeloid blasts and marked marrow fibrosis.  Findings consistent with blast crisis arising from previously diagnosed myeloproliferative neoplasm. Diagnosis made on the basis of increased peripheral blast percentage (23%); blast percentage in bone marrow is difficult to establish d/t bone marrow fibrosis and lack of aspirates. BMBx flow from core shows increased abnormal myeloid blasts (12%). FISH with monosomy 7. FLT3 negative.   June 6, 2021: Initiated cycle 1 decitabine, venetoclax ramp up initiated 6/9 while inpatient  July 7, 2021: BMBx after cycle 1 demonstrated hypercellular marrow with 13% blasts and marked fibrosis (MF3), peripheral blood with 19% circulating blasts.  June 27, 2021: held venetoclax for pancytopenia with plans to continue at start of next cycle (patient reported on 8/4 that he had not been holding, however)     Interval History:  Renny has been feeling well since his appointment last week. No new signs of bleeding. Gum bleeding is improved with use of a soft tooth brush but still intermittently occurs. His foot fell off of a foot stool the other day and his left heel has been a little sore. Chest pain has not recurred. No rectal bleeding or gross hematuria. No large bruises. Confirmed that he stopped venetoclax after our last discussion on 8/4. We discussed plan for transfusions and bone marrow biopsy on 8/13. We called his sister, Colette, together and confirmed that she is able to provide transportation downtown for him 8/13. He has  "no questions or concerns.     Medications:      Current Outpatient Medications   Medication     acyclovir (ZOVIRAX) 400 MG tablet     allopurinol (ZYLOPRIM) 300 MG tablet     fluconazole (DIFLUCAN) 200 MG tablet     furosemide (LASIX) 40 MG tablet     levofloxacin (LEVAQUIN) 250 MG tablet     metoprolol tartrate (LOPRESSOR) 50 MG tablet     oxyCODONE (XTAMPZA) 9 MG 12 hr tablet     polyethylene glycol (MIRALAX) 17 GM/Dose powder     rosuvastatin (CRESTOR) 5 MG tablet     SENEXON-S 8.6-50 MG tablet     vitamin B-12 (CYANOCOBALAMIN) 250 MCG tablet     vitamin D3 (CHOLECALCIFEROL) 50 mcg (2000 units) tablet     ACE/ARB/ARNI NOT PRESCRIBED (INTENTIONAL)     ASPIRIN NOT PRESCRIBED (INTENTIONAL)     Lidocaine (LIDOCARE) 4 % Patch     nitroGLYcerin (NITROSTAT) 0.4 MG sublingual tablet     ondansetron (ZOFRAN-ODT) 8 MG ODT tab     prochlorperazine (COMPAZINE) 5 MG tablet     traZODone (DESYREL) 50 MG tablet     venetoclax (VENCLEXTA) 100 MG tablet      No current facility-administered medications for this visit.      Physical Exam:   GEN: pleasantly conversant male no acute distress  SKIN: petechial rash BUE, L>R, large scab right elbow without surrounding erythema, no ecchymosis on left heel  ENT: eyes non-icteric, single petechia soft palate  RESP: clear to auscultation bilaterally, on room air  CARDS: regular rate and rhythm, no notable murmurs   GI: abd soft without notable hepatosplenomegaly, non-tender to palpation  MSK: +1 edema bilateral ankles  NEURO: alert and oriented without obvious focal deficit, gait stable     /56 (BP Location: Right arm, Patient Position: Sitting, Cuff Size: Adult Regular)   Pulse 89   Temp 97  F (36.1  C) (Temporal)   Ht 1.727 m (5' 8\")   Wt 71.7 kg (158 lb 2 oz)   SpO2 98%   BMI 24.04 kg/m            Wt Readings from Last 4 Encounters:   08/11/21 71.7 kg (158 lb 2 oz)   08/05/21 71.6 kg (157 lb 14.4 oz)   08/04/21 70.8 kg (156 lb 3 oz)   08/03/21 70.2 kg (154 lb 12.8 oz) "      Labs:         CBC with platelets and differential     Status: Abnormal   Result Value Ref Range     WBC Count 5.8 4.0 - 11.0 10e3/uL     RBC Count 2.59 (L) 4.40 - 5.90 10e6/uL     Hemoglobin 7.8 (L) 13.3 - 17.7 g/dL     Hematocrit 23.4 (L) 40.0 - 53.0 %     MCV 90 78 - 100 fL     MCH 30.1 26.5 - 33.0 pg     MCHC 33.3 31.5 - 36.5 g/dL     RDW 15.5 (H) 10.0 - 15.0 %     Platelet Count 30 (LL) 150 - 450 10e3/uL   Manual Differential     Status: Abnormal   Result Value Ref Range     Absolute Neutrophils 3.4 1.6 - 8.3 10e3/uL     Absolute Lymphocytes 1.6 0.8 - 5.3 10e3/uL     Absolute Monocytes 0.2 0.0 - 1.3 10e3/uL     Absolute Eosinophils 0.0 0.0 - 0.7 10e3/uL     Absolute Basophils 0.3 (H) 0.0 - 0.2 10e3/uL     Absolute Myelocytes 0.3 (H) <=0.0 10e3/uL     RBC Morphology Confirmed RBC Indices       Platelet Assessment   Automated Count Confirmed. Platelet morphology is normal.       Automated Count Confirmed. Platelet morphology is normal.      Imaging:  Reviewed CT chest from 7/20/21:   IMPRESSION:  1.  No pulmonary embolism.  2.  Trace left pleural effusion.  3.  Splenomegaly.  4.  Heterogeneity of osseous structures. Uncertain if this is related to known leukemia.  5.  Calcified pleural plaques.  6.  Cholelithiasis.     Assessment and Plan:  Myelofibrosis with excess blasts  Profound anemia secondary to neoplastic disease and chemotherapy  Profound thrombocytopenia secondary to neoplastic disease and chemotherapy  - Completed 2 cycles of decitabine and venetoclax, currently day 30 of cycle 2       - Shortened course of decitabine, 5 days       - Venetoclax days 1-28, instructed to hold on day 16 given cytopenias but continued to take through day 24       - Venetoclax max dose given concurrent fluconazole  - Recently hospitalized 7/20-7/21 for demand ischemia secondary to profound anemia  - Due for cycle 3 on 8/9, discussed with Dr. Patel, will plan to delay given ongoing cytopenias (infusion appts for  8/23-8/27)  - BMBx planned for 8/13 and follow up with Dr. Patel on 8/18  - Continues on neutropenia prophylaxis with acyclovir, fluconazole, and levofloxacin  - Currently requiring significant transfusion support, lab monitoring 3x/wk with transfusion PRN  - Next lab draw and transfusion support 8/13 with post platelet draw prior to BMBx, goal >20     Gum bleeding  - Improved, discussed using a soft toothbrush for gentle cleaning given thrombocytopenia     Social support  - No current 24/7 caregiver, sister comes in 1-2x/wk to help  - Has been managing his medications but had been taking meds he was instructed to stop  - Has a nurse come in his home occasionally through [facility] care as well as home PT/OT    The total time of this encounter amounted to 30 minutes today. This time includes face-to-face time spent with the patient, prep work, ordering tests, and performing post-visit documentation.     Harriet Gutierrez CNP    Allergies:  Allergies   Allergen Reactions     No Known Drug Allergies      Seasonal Allergies Other (See Comments)     Stuffy head and nose       Problem List:    Patient Active Problem List    Diagnosis Date Noted     Right upper quadrant abdominal pain 08/15/2021     Priority: Medium     Acute cholecystitis 08/15/2021     Priority: Medium     Pancytopenia (H) 08/15/2021     Priority: Medium     MDS (myelodysplastic syndrome) (H) 08/15/2021     Priority: Medium     Demand ischemia (H) 07/20/2021     Priority: Medium     Antineoplastic chemotherapy induced pancytopenia (H) 06/18/2021     Priority: Medium     Heart failure with reduced ejection fraction (H) 06/18/2021     Priority: Medium     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) 06/03/2021     Priority: Medium     Pleural effusion 03/28/2021     Priority: Medium     Acute on chronic anemia 03/28/2021     Priority: Medium     Thrombocytopenia (H) 01/27/2021     Priority: Medium     NSTEMI (non-ST elevated myocardial infarction) (H)  01/27/2021     Priority: Medium     Congestive heart failure, unspecified HF chronicity, unspecified heart failure type (H) 01/27/2021     Priority: Medium     Myelofibrosis (H) 01/14/2021     Priority: Medium     Coronary artery disease involving coronary bypass graft of native heart without angina pectoris 01/07/2021     Priority: Medium     Anemia in other chronic diseases classified elsewhere 01/07/2021     Priority: Medium     Dyspnea on exertion 12/21/2020     Priority: Medium     CKD (chronic kidney disease) stage 3, GFR 30-59 ml/min 12/21/2020     Priority: Medium     Vaccination refused by patient 08/20/2020     Priority: Medium     S/P CABG (coronary artery bypass graft) 02/10/2020     Priority: Medium     TIA (transient ischemic attack) 02/10/2020     Priority: Medium     Myeloproliferative neoplasm (H) 01/26/2020     Priority: Medium     Status post coronary angiogram 01/09/2020     Priority: Medium     Coronary artery disease involving native coronary artery of native heart with other form of angina pectoris (H) 01/02/2020     Priority: Medium     Added automatically from request for surgery [id number]       Essential hypertension 03/11/2019     Priority: Medium     Memory loss 03/11/2019     Priority: Medium        Past Medical History:    Past Medical History:   Diagnosis Date     Heart disease      History of blood transfusion      Hypertension      Leukocytosis 6/3/2021       Past Surgical History:    Past Surgical History:   Procedure Laterality Date     BONE MARROW BIOPSY, BONE SPECIMEN, NEEDLE/TROCAR N/A 12/30/2019    Procedure: BIOPSY, BONE MARROW;  Surgeon: Aguilar Krause MD;  Location: [facility] OR     BYPASS GRAFT ARTERY CORONARY N/A 1/31/2020    Procedure: CORONARY ARTERY BYPASS GRAFTING X 3 - LIMA TO LAD, SV TO OM  AND PDA; ENDOVEIN HARVEST OF BILATERAL LEGS; ON PUMP WITH SANDRA;  Surgeon: Mable Barrera MD;  Location: [facility] OR     CV CORONARY ANGIOGRAM Left 1/9/2020    Procedure:  "Coronary Angiogram;  Surgeon: Devin Bentley MD;  Location:  [facility]     IR FINE NEEDLE ASPIRATION W ULTRASOUND  6/10/2021     NO HISTORY OF SURGERY         Family History:    Family History   Problem Relation Age of Onset     No Known Problems Mother      Unknown/Adopted Father      Unknown/Adopted Maternal Grandmother      Unknown/Adopted Maternal Grandfather      Unknown/Adopted Paternal Grandmother      Unknown/Adopted Paternal Grandfather      Cancer Brother         lung cancer - smoking     No Known Problems Sister      Coronary Artery Disease Brother          of MI at 66     No Known Problems Sister        Social History:  Marital Status:  Single [1]  Social History     Tobacco Use     Smoking status: Former Smoker     Years: 30.00     Types: Cigarettes     Start date: 1961     Quit date: 2001     Years since quittin.5     Smokeless tobacco: Never Used   Substance Use Topics     Alcohol use: No     Drug use: No        Medications:    No current outpatient medications on file.        Review of Systems   Constitutional: Positive for chills. Negative for fever.   HENT: Negative.    Eyes: Negative.    Respiratory: Negative.    Cardiovascular: Negative.    Gastrointestinal: Positive for abdominal distention, abdominal pain and nausea.   Endocrine: Negative.    Genitourinary: Negative.  Negative for difficulty urinating and dysuria.   Musculoskeletal: Negative.    Skin: Negative.    Neurological: Negative.    Psychiatric/Behavioral: Negative.    All other systems reviewed and are negative.      Physical Exam   BP: 121/62  Pulse: 80  Temp: 98.5  F (36.9  C)  Resp: 16  Height: 172.7 cm (5' 8\")  Weight: 68.5 kg (151 lb)  SpO2: 99 %      Physical Exam  Vitals and nursing note reviewed.   Constitutional:       General: He is in acute distress.      Appearance: He is ill-appearing.   HENT:      Head: Atraumatic.      Mouth/Throat:      Comments: Dry oral mucosa " noted.  Cardiovascular:      Rate and Rhythm: Normal rate.      Heart sounds: Normal heart sounds.   Pulmonary:      Effort: Pulmonary effort is normal. No respiratory distress.   Abdominal:      General: Abdomen is protuberant. Bowel sounds are increased. There is distension.      Palpations: Abdomen is soft. There is pulsatile mass.      Tenderness: There is abdominal tenderness in the right upper quadrant and epigastric area.       Skin:     Capillary Refill: Capillary refill takes 2 to 3 seconds.      Coloration: Skin is pale.      Findings: No rash.   Neurological:      Mental Status: He is alert and oriented to person, place, and time.   Psychiatric:         Mood and Affect: Mood normal.         Behavior: Behavior normal.         ED Course        Procedures         Critical Care time:  none            Results for orders placed or performed during the hospital encounter of 08/15/21 (from the past 24 hour(s))   Lactic acid whole blood   Result Value Ref Range    Lactic Acid 1.0 0.7 - 2.0 mmol/L   [place] Draw    Narrative    The following orders were created for panel order [place] Draw.  Procedure                               Abnormality         Status                     ---------                               -----------         ------                     Extra Blue Top Tube[[id number]]                              Final result               Extra Red Top Tube[[id number]]                               Final result               Extra Green Top (Lithium...[[id number]]                                                 Extra Green Top (Lithium...[[id number]]                      Final result               Extra Purple Top Tube[[id number]]                                                         Please view results for these tests on the individual orders.   Extra Blue Top Tube   Result Value Ref Range    Hold Specimen [facility]    Extra Red Top Tube   Result Value Ref Range    Hold Specimen [facility]    Extra Green Top (Lithium  Heparin) Tube   Result Value Ref Range    Hold Specimen [facility]    CBC with platelets differential    Narrative    The following orders were created for panel order CBC with platelets differential.  Procedure                               Abnormality         Status                     ---------                               -----------         ------                     CBC with platelets and d...[[id number]]  Abnormal            Final result               Manual Differential[[id number]]          Abnormal            Final result                 Please view results for these tests on the individual orders.   Comprehensive metabolic panel   Result Value Ref Range    Sodium 137 133 - 144 mmol/L    Potassium 4.0 3.4 - 5.3 mmol/L    Chloride 103 94 - 109 mmol/L    Carbon Dioxide (CO2) 28 20 - 32 mmol/L    Anion Gap 6 3 - 14 mmol/L    Urea Nitrogen 25 7 - 30 mg/dL    Creatinine 0.98 0.66 - 1.25 mg/dL    Calcium 9.3 8.5 - 10.1 mg/dL    Glucose 128 (H) 70 - 99 mg/dL    Alkaline Phosphatase 101 40 - 150 U/L    AST 9 0 - 45 U/L    ALT 16 0 - 70 U/L    Protein Total 7.2 6.8 - 8.8 g/dL    Albumin 3.5 3.4 - 5.0 g/dL    Bilirubin Total 0.4 0.2 - 1.3 mg/dL    GFR Estimate 77 >60 mL/min/1.73m2   CBC with platelets and differential   Result Value Ref Range    WBC Count 6.1 4.0 - 11.0 10e3/uL    RBC Count 2.67 (L) 4.40 - 5.90 10e6/uL    Hemoglobin 8.1 (L) 13.3 - 17.7 g/dL    Hematocrit 24.6 (L) 40.0 - 53.0 %    MCV 92 78 - 100 fL    MCH 30.3 26.5 - 33.0 pg    MCHC 32.9 31.5 - 36.5 g/dL    RDW 16.2 (H) 10.0 - 15.0 %    Platelet Count 23 (LL) 150 - 450 10e3/uL   Manual Differential   Result Value Ref Range    % Neutrophils 55 %    % Lymphocytes 13 %    % Monocytes 30 %    % Eosinophils 0 %    % Basophils 2 %    Absolute Neutrophils 3.4 1.6 - 8.3 10e3/uL    Absolute Lymphocytes 0.8 0.8 - 5.3 10e3/uL    Absolute Monocytes 1.8 (H) 0.0 - 1.3 10e3/uL    Absolute Eosinophils 0.0 0.0 - 0.7 10e3/uL    Absolute Basophils 0.1 0.0 - 0.2 10e3/uL    RBC  Morphology Confirmed RBC Indices     Platelet Assessment  Automated Count Confirmed. Platelet morphology is normal.     Automated Count Confirmed. Platelet morphology is normal.   Lipase   Result Value Ref Range    Lipase 71 (L) 73 - 393 U/L   CT Abdomen Pelvis w Contrast    Narrative    EXAM: CT ABDOMEN PELVIS W CONTRAST  LOCATION: [facility]  DATE/TIME: 8/15/2021 2:04 AM    INDICATION: Abdominal pain, acute, nonlocalized  COMPARISON: Ultrasound from 06/17/2021. CT chest from 07/20/2021.  TECHNIQUE: CT scan of the abdomen and pelvis was performed following injection of IV contrast. Multiplanar reformats were obtained. Dose reduction techniques were used.  CONTRAST: 80mLs Isovue 370    FINDINGS:   LOWER CHEST: Small residual left pleural effusion with adjacent atelectasis or infiltrate. Right basilar subpulmonic pleural calcifications. Mild cardiomegaly. Fluid-filled lower thoracic esophagus.    HEPATOBILIARY: Gallstones layer dependently in the gallbladder. Gallbladder is borderline distended but without pericholecystic inflammation. Normal caliber common bile duct. Liver measures 21 cm craniocaudal.    PANCREAS: Normal.    SPLEEN: Spleen is moderately enlarged, measuring 17 cm anteroposterior. This is stable.    ADRENAL GLANDS: Normal.    KIDNEYS/BLADDER: Lobulated cortical contours. No hydronephrosis. Bladder is normal.    BOWEL: Colonic diverticulosis. Normal appendix. No free air.    LYMPH NODES: Normal.    VASCULATURE: Moderate atherosclerotic changes.    PELVIC ORGANS: No free fluid.    MUSCULOSKELETAL: Lumbar facet arthropathy. Partially imaged changes of sternotomy. Mild, diffusely sclerotic appearance of the osseous structures.      Impression    IMPRESSION:   1.  Colonic diverticulosis.    2.  Borderline distended gallbladder with multiple gallstones.    3.  Hepatosplenomegaly.    4.  Cardiomegaly.    5.  Fluid-filled lower thoracic esophagus consistent with reflux.    6.   Chronic left pleural effusion with adjacent atelectasis or infiltrate.   US Abdomen Limited (RUQ)    Narrative    EXAM: US ABDOMEN LIMITED  LOCATION: [facility]  DATE/TIME: 8/15/2021 3:36 AM    INDICATION: Abd pain RUQ, Nausea  COMPARISON: 08/15/2021  TECHNIQUE: Limited abdominal ultrasound.    FINDINGS:    GALLBLADDER: Cholelithiasis, sludge and gallbladder wall thickening. Sonographic Figueroa's sign negative. Trace amount of pericholecystic fluid. Gallbladder is distended.    BILE DUCTS: No biliary dilatation. The common duct measures 4 mm.    LIVER: Prominent hepatic size.    RIGHT KIDNEY: No hydronephrosis.    PANCREAS: Partial obscuration by bowel gas.    No ascites.      Impression    IMPRESSION:  1.  Sludge and cholelithiasis within the gallbladder. The gallbladder is distended and the wall is thickened.  2.  Sonographic Figueroa's sign negative.  3.  No biliary dilatation.       UA with Microscopic reflex to Culture    Specimen: Urine, Midstream   Result Value Ref Range    Color Urine Yellow Colorless, Straw, Light Yellow, Yellow    Appearance Urine Clear Clear    Glucose Urine Negative Negative mg/dL    Bilirubin Urine Negative Negative    Ketones Urine Negative Negative mg/dL    Specific Gravity Urine 1.010 1.003 - 1.035    Blood Urine Negative Negative    pH Urine 6.0 5.0 - 7.0    Protein Albumin Urine Negative Negative mg/dL    Urobilinogen Urine Normal Normal, 2.0 mg/dL    Nitrite Urine Negative Negative    Leukocyte Esterase Urine Negative Negative    Mucus Urine Present (A) None Seen /LPF    Amorphous Crystals Urine Few (A) None Seen /HPF    RBC Urine 1 <=2 /HPF    WBC Urine 3 <=5 /HPF    Narrative    Urine Culture not indicated   Asymptomatic COVID-19 Virus (Coronavirus) by PCR Nasopharyngeal    Specimen: Nasopharyngeal; Swab   Result Value Ref Range    SARS CoV2 PCR Negative Negative    Narrative    Testing was performed using the ebony  SARS-CoV-2 & Influenza A/B Assay  on the ebony  [employer]  System.  This test should be ordered for the detection of SARS-COV-2 in individuals who meet SARS-CoV-2 clinical and/or epidemiological criteria. Test performance is unknown in asymptomatic patients.  This test is for in vitro diagnostic use under the FDA EUA for laboratories certified under CLIA to perform moderate and/or high complexity testing. This test has not been FDA cleared or approved.  A negative test does not rule out the presence of PCR inhibitors in the specimen or target RNA in concentration below the limit of detection for the assay. The possibility of a false negative should be considered if the patient's recent exposure or clinical presentation suggests COVID-19.  [facility] are certified under the Clinical Laboratory Improvement Amendments of 1988 (CLIA-88) as qualified to perform moderate and/or high complexity laboratory testing.       Medications   0.9% sodium chloride BOLUS (0 mLs Intravenous Stopped 8/15/21 0148)     Followed by   sodium chloride 0.9% infusion ( Intravenous Rate/Dose Change 8/15/21 0148)   ondansetron (ZOFRAN) injection 4 mg (4 mg Intravenous Given 8/15/21 0228)   pantoprazole (PROTONIX) IV push injection 40 mg (0 mg Intravenous Hold 8/15/21 0730)   rosuvastatin (CRESTOR) tablet 5 mg (has no administration in time range)   metoprolol tartrate (LOPRESSOR) tablet 50 mg (has no administration in time range)   iopamidol (ISOVUE-370) solution 100 mL (80 mLs Intravenous Given 8/15/21 0211)   Sodium Chloride 0.9 % bag 100mL for CT scan (60 mLs Intravenous Given 8/15/21 0211)   sodium chloride (PF) 0.9% PF flush 3 mL (3 mLs Intracatheter Given 8/15/21 0210)   HYDROmorphone (PF) (DILAUDID) injection 0.5 mg (0.5 mg Intravenous Given 8/15/21 0532)   lidocaine (XYLOCAINE) 2 % 15 mL, alum & mag hydroxide-simethicone (MAALOX) 15 mL GI Cocktail (30 mLs Oral Given 8/15/21 0449)   ampicillin-sulbactam (UNASYN) 3 g vial to attach to  mL bag (0 g  Intravenous Stopped 8/15/21 0547)       Assessments & Plan (with Medical Decision Making)  70-year-old male to the ER secondary to concerns of abdominal pain and nausea symptoms.  Symptoms started about 930 last evening.  Patient states that he ate a chicken and mashed potatoes supper last night.  The patient states that he is not had similar symptoms in the past.  Patient is currently receiving oncology care secondary to myelodysplastic syndrome with significant anemia and thrombocytopenia.  He underwent a bone marrow biopsy to his coccyx area 2 days ago - results pending.  Patient recently had his chemotherapy stopped secondary to pancytopenia.  Patient has not notice any bloody emesis or blood in his stools.  Patient's exam findings with tenderness to the upper abdomen more specifically right upper abdomen.  Patient distended and concern for possible bowel obstruction entertained.  CT scan did not show evidence for obstruction.  Scan consistent with esophageal reflux as well as cholelithiasis.  Ultrasound of the right upper quadrant revealed distended gallbladder with gallstones and thickened gallbladder wall at 0.6 cm.  I suspect this is the reason for the patient's upper abdominal pain symptoms.  I contacted Dr. Benavides, surgeon, and discussed the situation with him.  He plans to see the patient later today in the hospital setting.  We plan to admit the patient to the hospital with IV antibiotics with further recommendations pending Dr. Benavides's evaluation/examination.  I spoke to Dr. Escalera who agreed except the patient to his hospitalist service and place admission orders for the patient.     I have reviewed the nursing notes.    I have reviewed the findings, diagnosis, plan and need for admission with the patient.         Final diagnoses:   RUQ abdominal pain   Nausea   Acute cholecystitis   Antineoplastic chemotherapy induced pancytopenia (H)   Acute on chronic anemia   Stage 3 chronic kidney disease,  unspecified whether stage 3a or 3b CKD   MDS (myelodysplastic syndrome) (H)   Thrombocytopenia (H)       8/15/2021   [facility] EMERGENCY DEPT     Indra Chaidez,   08/15/21 0758

## 2021-08-15 NOTE — PROVIDER NOTIFICATION
"Pre Operative Clearance.     Dr Escalera on H&P : \"Note patient does have low to moderate cardiac risk but is stable for surgery if needed.\"    Allan Score:  0.1 %.   Risk of myocardial infarction or cardiac arrest, intraoperatively or up to 30 days post-op.     Revised Cardiac Risk Index:    Class III Risk    CXR: Clear  EKG: Normal, stable  Echo done on 7/20/21 stable from June  Patient last saw Cardiology on 8/2/2021, recommended follow up in 6 months     Patient medically cleared for surgical intervention    July Dela Cruz, CNP    "

## 2021-08-15 NOTE — CONSULTS
Care Management Initial Consult    General Information  Assessment completed with:  Amy  Type of CM/SW Visit: Offer D/C Planning    Primary Care Provider verified and updated as needed: Yes   Readmission within the last 30 days:  No      Reason for Consult: discharge planning  Advance Care Planning:          Communication Assessment  Patient's communication style: spoken language (English or Bilingual)    Hearing Difficulty or Deaf: no   Wear Glasses or Blind: no    Cognitive  Cognitive/Neuro/Behavioral: WDL                      Living Environment:   People in home: alone     Current living Arrangements: apartment      Able to return to prior arrangements:  TBD-needs further assessment       Family/Social Support:  Care provided by: self, homecare agency, Sisters: Rose Hernandez  Provides care for: no one  Marital Status:            Description of Support System: Supportive, Involved    Support Assessment: Adequate family and caregiver support, Adequate social supports    Current Resources:   Patient receiving home care services: Yes  Skilled Home Care Services: Skilled Nursing, Physicial Therapy  Community Resources: Home Care, Meals on Wheels, OP Infusion  Equipment currently used at home: none  Supplies currently used at home: Compression Stockings    Employment/Financial:  Employment Status: retired        Financial Concerns: No concerns identified      Lifestyle & Psychosocial Needs:  Social Determinants of Health     Tobacco Use: Medium Risk     Smoking Tobacco Use: Former Smoker     Smokeless Tobacco Use: Never Used   Alcohol Use:      Frequency of Alcohol Consumption:      Average Number of Drinks:      Frequency of Binge Drinking:    Financial Resource Strain:      Difficulty of Paying Living Expenses:    Food Insecurity: No Food Insecurity     Worried About Running Out of Food in the Last Year: Never true     Ran Out of Food in the Last Year: Never true   Transportation Needs: No  Transportation Needs     Lack of Transportation (Medical): No     Lack of Transportation (Non-Medical): No   Physical Activity: Inactive     Days of Exercise per Week: 0 days     Minutes of Exercise per Session: 0 min   Stress:      Feeling of Stress :    Social Connections: Unknown     Frequency of Communication with Friends and Family: Three times a week     Frequency of Social Gatherings with Friends and Family: Twice a week     Attends Hindu Services: Never     Active Member of Clubs or Organizations: No     Attends Club or Organization Meetings: Never     Marital Status: Not on file   Intimate Partner Violence:      Fear of Current or Ex-Partner:      Emotionally Abused:      Physically Abused:      Sexually Abused:    Depression: Not at risk     PHQ-2 Score: 0   Housing Stability:      Unable to Pay for Housing in the Last Year:      Number of Places Lived in the Last Year:      Unstable Housing in the Last Year:        Functional Status:  Prior to admission patient needed assistance:   Dependent ADLs:: Independent, Ambulation-walker (has a walker at home-uses as needed)  Dependent IADLs:: Cleaning, Cooking, Laundry, Shopping, Medication Management, Transportation       Mental Health Status:  Mental Health Status: No Current Concerns       Chemical Dependency Status:  Chemical Dependency Status: No Current Concerns             Values/Beliefs:  Spiritual, Cultural Beliefs, Hindu Practices, Values that affect care: no               Additional Information:  CM referral received for discharge planning due to Pt's high risk score and being a current home care recipient.     CM unable to meet with Pt today to assess as pt was meeting with providers for surgery consult. CM called Pt's sister -Rose introduce self/role, perform assessment, and discuss resources.    Rose stated the Pt does live alone in his own apartment. He is currently receiving [facility]-RN, PT. He attends Outpatient Infusion  "therapy. Sister Rose states that he has been at the Infusion center almost daily.     Pt receives a lot of support from his Sisters-Rose and Mary. Sisters provide assistance with groceries, laundry, cleaning, medications and transportation to appointments. Pt has not been driving lately.   Pt typically has not been using an assistive device to ambulate. A friend in his building has been picking up frozen Meals-on-wheels in [place], which Rose states have been working well for the pt.     Rose reports concerns with the Pt's showers. Reports he does not prefer to receive assistance from his sisters with personal cares.      Family feels the Pt has been managing \"fairly well\" at home given his diagnosis. Rose would like to see him return back home and with his sister's support - can continue to do well at home. Rose stated \"beween the three of us we do okay.\"    Plan: CM to continue to follow as Pt's condition evolves.     Anticipate Pt can return home with family and resume [facility].  -may want to consider increasing services --HHA? for showers       JUANA Guadalupe      "

## 2021-08-15 NOTE — ANESTHESIA CARE TRANSFER NOTE
Patient: Renny Gannon    Procedure(s):  Laparoscopic cholecystectomy    Diagnosis: Acute cholecystitis [K81.0]  Diagnosis Additional Information: No value filed.    Anesthesia Type:   General     Note:    Oropharynx: spontaneously breathing  Level of Consciousness: awake  Oxygen Supplementation: face mask  Level of Supplemental Oxygen (L/min / FiO2): 8 L/M  Independent Airway: airway patency satisfactory and stable  Dentition: dentition unchanged  Vital Signs Stable: post-procedure vital signs reviewed and stable  Report to RN Given: handoff report given  Patient transferred to: PACU    Handoff Report: Identifed the Patient, Identified the Reponsible Provider, Reviewed the pertinent medical history, Discussed the surgical course, Reviewed Intra-OP anesthesia mangement and issues during anesthesia, Set expectations for post-procedure period and Allowed opportunity for questions and acknowledgement of understanding      Vitals:  Vitals Value Taken Time   BP 98/53 08/15/21 1550   Temp     Pulse 93 08/15/21 1552   Resp 37 08/15/21 1552   SpO2 95 % 08/15/21 1552   Vitals shown include unvalidated device data.    Electronically Signed By: UNA [facility]  August 15, 2021  3:53 PM

## 2021-08-15 NOTE — ED TRIAGE NOTES
Constant lower sharp abdominal pain. Onset 1730 Saturday evening while trying to lay down. Denies nausea, vomiting, fevers, chills, abdominal surgical hx. 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 Also hx of heart and kidney disease.

## 2021-08-15 NOTE — PLAN OF CARE
"S- Transfer to 252 from PACU.    B- laparoscopic cholecystectomy     A- Brief systems assessment: pain 6/10 medications administered, oozing from upper midline incision and from BYRON site (expected with platelet count) lethargic but able to answer all questions, vitals stable    R- Transfer to 252 per physician orders. Continue to monitor pt and update physician as needed.      Code status: Full Code  Skin: oozing from incisions  Fall Risk: Yes- Department fall risk interventions implemented.  Isolation and Signage: None  Medication drips upon transfer: NS at 100cc/hr  Blue Bin checked and medications transfer out with patientYesABLE\"}  "

## 2021-08-15 NOTE — PROGRESS NOTES
OR crew called at 1058. Spoke with Germain, anesthesia, OR RN and scrub tech. Anticipate OR once infusing platelets, platelets in route from [facility] at this time ETA 12-12:30.  Elias El RN

## 2021-08-15 NOTE — PLAN OF CARE
S-(situation): Patient arrives to room 252 via cart from ED    B-(background): Abdominal pain    A-(assessment): Pain 7/10 burning    R-(recommendations): Orders reviewed with patient. Will monitor patient per MD orders.     Inpatient nursing criteria listed below were met:    Health care directives status obtained and documented: Yes  SCD's Documented: Yes  Skin issues/needs documented:Yes  Isolation addressed and Signage used: NA  Fall Prevention: Care plan updated Yes Education given and documented Yes  Care Plan initiated and Co-Morbidities added: Yes  Education Assessment documented:Yes  Admission Education Documented: Yes  If present CAUTI/CLABI Education done: NA  New medication patient education completed and documented (Possible Side Effects of Common Medications handout): Yes  Allergies Reviewed: Yes  Admission Medication Reconciliation completed: Yes  Home medications if not able to send immediately home with family stored here: NA  Reminder note placed in discharge instructions regarding home meds: NA  Individualized care needs/preferences addressed and charted: Yes

## 2021-08-15 NOTE — ANESTHESIA PREPROCEDURE EVALUATION
Anesthesia Pre-Procedure Evaluation    Patient: Renny Gannon   MRN: [id number] : 1950        Preoperative Diagnosis: Acute cholecystitis [K81.0]   Procedure : Procedure(s):  Laparoscopic cholecystectomy possible open     Past Medical History:   Diagnosis Date     Heart disease      History of blood transfusion      Hypertension      Leukocytosis 6/3/2021      Past Surgical History:   Procedure Laterality Date     BONE MARROW BIOPSY, BONE SPECIMEN, NEEDLE/TROCAR N/A 2019    Procedure: BIOPSY, BONE MARROW;  Surgeon: Aguilar Krause MD;  Location:  OR     BYPASS GRAFT ARTERY CORONARY N/A 2020    Procedure: CORONARY ARTERY BYPASS GRAFTING X 3 - LIMA TO LAD, SV TO OM  AND PDA; ENDOVEIN HARVEST OF BILATERAL LEGS; ON PUMP WITH SANDRA;  Surgeon: Mable Barrera MD;  Location:  OR     CV CORONARY ANGIOGRAM Left 2020    Procedure: Coronary Angiogram;  Surgeon: Devin Bentley MD;  Location:  HE[facility] CARDI[facility] CATH LAB     IR FINE NEEDLE ASPIRATION W ULTRASOUND  6/10/2021     NO HISTORY OF SURGERY        Allergies   Allergen Reactions     No Known Drug Allergies      Seasonal Allergies Other (See Comments)     Stuffy head and nose      Social History     Tobacco Use     Smoking status: Former Smoker     Years: 30.00     Types: Cigarettes     Start date: 1961     Quit date: 2001     Years since quittin.5     Smokeless tobacco: Never Used   Substance Use Topics     Alcohol use: No      Wt Readings from Last 1 Encounters:   08/15/21 68.5 kg (151 lb)        Anesthesia Evaluation   Pt has had prior anesthetic. Type: General and MAC.    History of anesthetic complications       ROS/MED HX  ENT/Pulmonary: Comment: Quit smoking 2001  Pleural effusion hx    (+) allergic rhinitis, tobacco use, Past use, 1 packs/day, 30  Pack-Year Hx,      Neurologic:     (+) TIA, features: memory loss- denies dysphagia.     Cardiovascular:     (+) hypertension--CAD angina-past MI  CABG (2020)-date: 2020. -CHF etiology: unspecified Last EF: 50-55% SPENCER. Previous cardiac testing   Echo: Date: 2021 Results:  [facility]  Echocardiography Laboratory  [address] [place] [address]     Name: ABI VALDEZ  MRN: [id number]  : 1950  Study Date: 2021 01:39 PM  Age: 70 yrs  Gender: Male  Patient Location: [facility]  Reason For Study: Chest Pain  Ordering Physician: ELAINE CALVERT  Referring Physician: ANA LUISA MONSIVAIS  Performed By: Yolanda Marte RDCS     BSA: 1.8 m2  Height: 68 in  Weight: 157 lb  HR: 80  BP: 121/58 mmHg  ______________________________________________________________________________  Procedure  Limited Portable Echo Adult. Optison (NDC #5541-8080) given intravenously.  ______________________________________________________________________________  Interpretation Summary     Left ventricular systolic function is low normal.  The visual ejection fraction is 50-55%.  There is severe inferior wall hypokinesis.  The left ventricle is normal in size.  There is moderate concentric left ventricular hypertrophy.  A limited Doppler study did not demonstrate signifcant stenosis or  insufficiency involving cardiac valves.     No change since last study 2021  ______________________________________________________________________________  Left Ventricle  The left ventricle is normal in size. There is moderate concentric left  ventricular hypertrophy. Left ventricular systolic function is low normal. The  visual ejection fraction is 50-55%. There is severe inferior wall hypokinesis.  Septal motion is consistent with post-operative state. There is no thrombus  seen in the left ventricle.     Right Ventricle  The right ventricle is normal in structure, function and size. There is no  mass or thrombus in the right ventricle.     Atria  Normal left atrial size. Right atrial size is normal. There is no atrial shunt  seen. The left atrial  appendage is not well visualized.     Mitral Valve  The mitral valve leaflets appear normal. There is no evidence of stenosis,  fluttering, or prolapse. There is no mitral regurgitation noted. There is no  mitral valve stenosis.     Tricuspid Valve  Normal tricuspid valve. The right ventricular systolic pressure is  approximated at 28.9 mmHg plus the right atrial pressure. Right ventricle  systolic pressure estimate normal. There is mild (1+) tricuspid regurgitation.  There is no tricuspid stenosis.     Aortic Valve  There is moderate trileaflet aortic sclerosis. No aortic regurgitation is  present. No aortic stenosis is present.     Pulmonic Valve  The pulmonic valve is not well visualized. There is no pulmonic valvular  regurgitation. There is no pulmonic valvular stenosis.     Vessels  The aortic root is normal size. Normal size ascending aorta. Inferior vena  cava not well visualized for estimation of right atrial pressure. The  pulmonary artery is normal size.     Pericardium  The pericardium appears normal. There is no pleural effusion.     Rhythm  Sinus rhythm was noted.  ______________________________________________________________________________  Doppler Measurements & Calculations  MV E max vlad: 81.0 cm/sec  MV dec slope: 418.0 cm/sec2  MV dec time: 0.19 sec  TR max vlad: 269.0 cm/sec  TR max P.9 mmHg  E/E' av.2  Lateral E/e': 6.3  Medial E/e': 12.0     ______________________________________________________________________________  Stress Test: Date: Results:    ECG Reviewed: Date: 8/15/2021 Results:  Sinus Rhythm   -RSR(V1) -nondiagnostic.     PROBABLY NORMAL    Cath: Date: Results:      METS/Exercise Tolerance:     Hematologic: Comments: Hx leukemia with chemo (on hold due to thrombocytopenia(PLTS 23 K) / anemia (HGB 8.1) - to get pooled platelets before surgery.    (+) anemia, history of blood transfusion, no previous transfusion reaction,     Musculoskeletal: Comment: Muscle wasting       GI/Hepatic:       Renal/Genitourinary: Comment: Stage III CRI    (+) renal disease, type: CRI,     Endo:  - neg endo ROS     Psychiatric/Substance Use:  - neg psychiatric ROS     Infectious Disease:  - neg infectious disease ROS     Malignancy:   (+) Malignancy, History of Lymphoma/Leukemia.Lymph CA Active status post Chemo.        Other:  - neg other ROS          Physical Exam    Airway  airway exam normal    Comment: Bleeding gums - uses mouth wash only - does not brush teeth to prevent trauma to gum line    Mallampati: III   TM distance: > 3 FB   Neck ROM: full   Mouth opening: > 3 cm    Respiratory Devices and Support         Dental  no notable dental history     (+) loose    B=Bridge, C=Chipped, L=Loose, M=Missing    Cardiovascular   cardiovascular exam normal       Rhythm and rate: regular and normal     Pulmonary   pulmonary exam normal        breath sounds clear to auscultation           OUTSIDE LABS:  CBC:   Lab Results   Component Value Date    WBC 6.1 08/15/2021    WBC 5.8 08/13/2021    HGB 8.1 (L) 08/15/2021    HGB 8.4 (L) 08/13/2021    HCT 24.6 (L) 08/15/2021    HCT 25.1 (L) 08/13/2021    PLT 23 (LL) 08/15/2021    PLT 37 (LL) 08/13/2021     BMP:   Lab Results   Component Value Date     08/15/2021     08/12/2021    POTASSIUM 4.0 08/15/2021    POTASSIUM 4.0 08/12/2021    CHLORIDE 103 08/15/2021    CHLORIDE 103 08/12/2021    CO2 28 08/15/2021    CO2 29 08/12/2021    BUN 25 08/15/2021    BUN 25 08/12/2021    CR 0.98 08/15/2021    CR 0.92 08/12/2021     (H) 08/15/2021     (H) 08/12/2021     COAGS:   Lab Results   Component Value Date    PTT 45 (H) 06/03/2021    INR 1.14 06/18/2021    FIBR 574 (H) 06/18/2021     POC:   Lab Results   Component Value Date     (H) 01/31/2021     HEPATIC:   Lab Results   Component Value Date    ALBUMIN 3.5 08/15/2021    PROTTOTAL 7.2 08/15/2021    ALT 16 08/15/2021    AST 9 08/15/2021    ALKPHOS 101 08/15/2021    BILITOTAL 0.4 08/15/2021      OTHER:   Lab Results   Component Value Date    PH 7.43 01/07/2021    LACT 1.0 08/15/2021    A1C 5.3 08/17/2020    MINNIE 9.3 08/15/2021    PHOS 3.7 06/18/2021    MAG 2.1 06/18/2021    LIPASE 71 (L) 08/15/2021    TSH 1.88 11/17/2020    .0 (H) 06/04/2021    SED 76 (H) 06/04/2021       Anesthesia Plan    ASA Status:  3, emergent    NPO Status:  ELEVATED Aspiration Risk/Unknown    Anesthesia Type: General.     - Airway: ETT   Induction: Intravenous, Propofol.   Maintenance: Inhalation.   Techniques and Equipment:     - Airway: Video-Laryngoscope       - Drips/Meds: Phenylephrine     Consents    Anesthesia Plan(s) and associated risks, benefits, and realistic alternatives discussed. Questions answered and patient/representative(s) expressed understanding.     - Discussed with:  Patient      - Extended Intubation/Ventilatory Support Discussed: No.      - Patient is DNR/DNI Status: No    Use of blood products discussed: No .     Postoperative Care    Pain management: IV analgesics, Oral pain medications, Postop Epidural.   PONV prophylaxis: Ondansetron (or other 5HT-3), Dexamethasone or Solumedrol     Comments:    The risks and benefits of anesthesia, and the alternatives where applicable, have been discussed with the patient, and they wish to proceed.            UNA [place] CRNA

## 2021-08-15 NOTE — CONSULTS
[facility] Surgery Consultation    Renny Gannon MRN# [id number]   Age: 70 year old YOB: 1950     Date of Admission:  8/15/2021    Reason for consult: Cholecystitis       Requesting physician: Dm       Level of consult: Consult, follow and place orders           Assessment and Recommendation:   Assessment:   Acute cholecystitis with ongoing RUQ pain, gallbladder wall thickening  Pancytopenia from myelofibrosis though WBC 6k this admit. Hg 8, Plt 23  History of CABG      Recommendations:   Cholecystectomy, laproscopic   Transfuse unit of platelets  Discussed his higher bleeding risk from thrombocytopenia. With his immune status still recommend cholecystectomy to prevent possible life threatening infection/sepsis. Perc drainage would still come with increased bleeding risk. Other risks such as intra-abdominal injury, conversion to open, infection, transfusion reactions or blood borne infection also discussed.  Heart status at this time appears stable, cleared by hospitalists for surgery.  Patient agreeable to proceed with cholecystectomy. Consent for surgery and blood products signed.          Chief Complaint:   Abdominal pain, right upper quadrant     History is obtained from the patient         History of Present Illness:   This patient is a 70 year old  male with a significant past medical history of anemia, coronary artery disease, hypertension and malignancy who presents with the following condition requiring a hospital admission:    Acute cholecystitis:    He has been experiencing acute RUQ abdominal pain for the past 13 hours associated with nausea.  These symptoms have been about the same to slightly improved compared to ER presentation.   A right upper quadrant ultrasound confirms gallstones and a thickened gallbladder wall. CT had similar results.  No previous abdominal surgery.  No current bleeding noted. Has had gum bleeding previously so now only uses mouth wash  rinse.  Chemotherapy reportedly has been held due to his low blood counts. He has received transfusions in the past.              Past Medical History:     Past Medical History:   Diagnosis Date     Heart disease      History of blood transfusion      Hypertension      Leukocytosis 6/3/2021     Patient Active Problem List   Diagnosis     Essential hypertension     Memory loss     Coronary artery disease involving native coronary artery of native heart with other form of angina pectoris (H)     Status post coronary angiogram     Myeloproliferative neoplasm (H)     S/P CABG (coronary artery bypass graft)     TIA (transient ischemic attack)     Vaccination refused by patient     Dyspnea on exertion     CKD (chronic kidney disease) stage 3, GFR 30-59 ml/min     Coronary artery disease involving coronary bypass graft of native heart without angina pectoris     Anemia in other chronic diseases classified elsewhere     Myelofibrosis (H)     Thrombocytopenia (H)     NSTEMI (non-ST elevated myocardial infarction) (H)     Congestive heart failure, unspecified HF chronicity, unspecified heart failure type (H)     Pleural effusion     Acute on chronic anemia     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)     Antineoplastic chemotherapy induced pancytopenia (H)     Heart failure with reduced ejection fraction (H)     Demand ischemia (H)     Right upper quadrant abdominal pain     Acute cholecystitis     Pancytopenia (H)     MDS (myelodysplastic syndrome) (H)             Past Surgical History:     Past Surgical History:   Procedure Laterality Date     BONE MARROW BIOPSY, BONE SPECIMEN, NEEDLE/TROCAR N/A 12/30/2019    Procedure: BIOPSY, BONE MARROW;  Surgeon: Aguilar Krause MD;  Location: [facility] OR     BYPASS GRAFT ARTERY CORONARY N/A 1/31/2020    Procedure: CORONARY ARTERY BYPASS GRAFTING X 3 - LIMA TO LAD, SV TO OM  AND PDA; ENDOVEIN HARVEST OF BILATERAL LEGS; ON PUMP WITH SANDRA;  Surgeon: Mable Barrera,  MD;  Location:  OR     CV CORONARY ANGIOGRAM Left 2020    Procedure: Coronary Angiogram;  Surgeon: Devin Bentley MD;  Location:  [facility] CATH LAB     IR FINE NEEDLE ASPIRATION W ULTRASOUND  6/10/2021     NO HISTORY OF SURGERY               Social History:     Social History     Tobacco Use     Smoking status: Former Smoker     Years: 30.00     Types: Cigarettes     Start date: 1961     Quit date: 2001     Years since quittin.5     Smokeless tobacco: Never Used   Substance Use Topics     Alcohol use: No             Family History:     Family History   Problem Relation Age of Onset     No Known Problems Mother      Unknown/Adopted Father      Unknown/Adopted Maternal Grandmother      Unknown/Adopted Maternal Grandfather      Unknown/Adopted Paternal Grandmother      Unknown/Adopted Paternal Grandfather      Cancer Brother         lung cancer - smoking     No Known Problems Sister      Coronary Artery Disease Brother          of MI at 66     No Known Problems Sister      Family history reviewed            Allergies:     Allergies   Allergen Reactions     No Known Drug Allergies      Seasonal Allergies Other (See Comments)     Stuffy head and nose             Medications:     Medications Prior to Admission   Medication Sig Dispense Refill Last Dose     acyclovir (ZOVIRAX) 400 MG tablet Take 1 tablet (400 mg) by mouth 2 times daily 60 tablet 1 2021 at PM     allopurinol (ZYLOPRIM) 300 MG tablet Take 1 tablet (300 mg) by mouth daily 90 tablet 1 2021 at AM     fluconazole (DIFLUCAN) 200 MG tablet Take 1 tablet (200 mg) by mouth daily 30 tablet 1 2021 at AM     metoprolol tartrate (LOPRESSOR) 50 MG tablet Take 1 tablet (50 mg) by mouth 2 times daily 30 tablet 3 2021 at PM     oxyCODONE (XTAMPZA) 9 MG 12 hr tablet Take 1 tablet (9 mg) by mouth every 12 hours . DO NOT drive or operate machinery while taking this medication. 60 tablet 0 Past Month at Unknown time      rosuvastatin (CRESTOR) 5 MG tablet Take 1 tablet (5 mg) by mouth daily 30 tablet 1 8/14/2021 at AM     SENEXON-S 8.6-50 MG tablet TAKE TWO TABLETS BY MOUTH TWO TIMES A  tablet 4 8/14/2021 at PM     vitamin B-12 (CYANOCOBALAMIN) 250 MCG tablet TAKE ONE TABLET BY MOUTH EVERY MORNING 90 tablet 3 8/14/2021 at AM     vitamin D3 (CHOLECALCIFEROL) 50 mcg (2000 units) tablet Take 1 tablet by mouth daily   8/14/2021 at AM     ACE/ARB/ARNI NOT PRESCRIBED (INTENTIONAL) Please choose reason not prescribed from choices below. (Patient not taking: Reported on 8/4/2021)        ASPIRIN NOT PRESCRIBED (INTENTIONAL) Please choose reason not prescribed from choices below. (Patient not taking: Reported on 8/4/2021)        furosemide (LASIX) 40 MG tablet Take 1 tablet (40 mg) by mouth daily 30 tablet 1      levofloxacin (LEVAQUIN) 250 MG tablet Take 1 tablet (250 mg) by mouth At Bedtime 30 tablet 1      Lidocaine (LIDOCARE) 4 % Patch Place 1 patch onto the skin every 24 hours . Leave on for 12 hours, then remove for 12 hours. (Patient not taking: Reported on 8/4/2021) 30 patch 1      nitroGLYcerin (NITROSTAT) 0.4 MG sublingual tablet For chest pain place 1 tablet under the tongue every 5 minutes for 3 doses. If symptoms persist 5 minutes after 1st dose call 911.        ondansetron (ZOFRAN-ODT) 8 MG ODT tab Take 1 tablet (8 mg) by mouth every 8 hours as needed for nausea or vomiting (Patient not taking: Reported on 8/4/2021) 30 tablet 1      polyethylene glycol (MIRALAX) 17 GM/Dose powder Take 17 g by mouth daily 510 g  Unknown at Unknown time     prochlorperazine (COMPAZINE) 5 MG tablet Take 1 tablet (5 mg) by mouth every 6 hours as needed for nausea or vomiting (Patient not taking: Reported on 8/11/2021) 30 tablet 1      traZODone (DESYREL) 50 MG tablet Take 1 tablet (50 mg) by mouth nightly as needed for sleep (Patient not taking: Reported on 8/11/2021) 30 tablet 1      venetoclax (VENCLEXTA) 100 MG tablet Take 2 tablets (200  mg) by mouth daily for 28 days (Patient not taking: Reported on 8/11/2021) 56 tablet 0              Review of Systems:   The Review of Systems is negative other than noted in the HPI          Physical Exam:   Temp: 96.8  F (36  C) Temp src: Oral BP: 122/61 Pulse: 100   Resp: 18 SpO2: 100 % O2 Device: None (Room air) Oxygen Delivery: 2 LPM  Abdomen:   scars noted epigastric from cardiac chest tubes, soft, mildly distended, tenderness noted in the right upper quadrant Figueroa's sign is present, involuntary guarding absent and hernia absent     Constitutional:   awake, alert, cooperative and mild distress     Lungs:   No increased work of breathing, good air exchange, clear to auscultation bilaterally, no crackles or wheezing     Cardiovascular:   Tachycardic (mild) with regular rhythm     Musculoskeletal:   Muscle wasting otherwise grossly normal     Skin:   Few small bruises, no bleeding from IV site             Data:   All laboratory data reviewed  Lab Results   Component Value Date    WBC 6.1 08/15/2021    HGB 8.1 (L) 08/15/2021    HCT 24.6 (L) 08/15/2021    PLT 23 (LL) 08/15/2021     08/15/2021    POTASSIUM 4.0 08/15/2021    CHLORIDE 103 08/15/2021    CO2 28 08/15/2021    BUN 25 08/15/2021    CR 0.98 08/15/2021     (H) 08/15/2021    SED 76 (H) 06/04/2021    DD 6.24 (H) 07/20/2021    NTBNPI 1,396 (H) 07/20/2021    NTBNP 479 (H) 11/17/2020    TROPONIN 0.603 (HH) 07/20/2021    TROPI <0.015 03/29/2021    AST 9 08/15/2021    ALT 16 08/15/2021    ALKPHOS 101 08/15/2021    BILITOTAL 0.4 08/15/2021    INR 1.14 06/18/2021     All imaging studies reviewed by me.  CT scan of the abdomen:   Gallbladder: distended, cholelithiasis. Splenomegaly.  CT scan interpreted by radiologist     Abdominal ultrasound:   Gallbladder: distended, sludge and multiple stones. Wall measured at 6mm in one area though appears to not be diffusely thickened        Attestation:  I have reviewed today's vital signs, notes, medications,  labs and imaging.  Amount of time performed on this consult: 50 minutes.    Severiano De La Rosa MD

## 2021-08-15 NOTE — OP NOTE
Date of Service: 8/15/2021     STAFF SURGEON:  Severiano De La Rosa MD     ASSISTANT:  None.     PREOPERATIVE DIAGNOSIS:  acute cholecystitis     POSTOPERATIVE DIAGNOSIS:  Same     NAME OF PROCEDURE(S):  laparoscopic cholecystectomy, modifier 22 due to extra difficulty and time required from the acute inflammation and for hemostasis with his thrombocytopenia     INDICATIONS FOR PROCEDURE:  The patient is a 69 yo male whom I had been consulted on for acute cholecystitis. He had ongoing RUQ pain and gallbladder thickening with sludge and stones on US and CT. I offered cholecystectomy. He was transfused a unit of platelets immediately prior to OR. The risks, benefits and alternatives of the procedure were discussed preoperatively and consent obtained.     EBL: 200 cc    ANESTHESIA:  General    COMPLICATIONS: None, though there was extra bleeding from liver secondary to his thrombocytopenia and acute inflammation.     DRAINS:  15fr BYRON from right side port     SPECIMENS:  Gallbladder     IMPLANTS:none     OPERATIVE FINDINGS:  Gallbladder tensely distended and acutely inflamed. Aspirated bile was dark green, non-purulent. Gallbladder was not gangrenous. Cystic duct and artery identified with critical view. There was anticipated extra bleeding from incisions and inflamed tissues as well as the raw liver after gallbladder was removed. Hemostasis obtained with cautery and 3 strips of surgicel.     PROCEDURE DETAIL:  Following consent, the patient was brought from the preoperative holding area to the operating suite and laid in supine position. Patient underwent general anesthesia and endotracheal intubation without difficulty. Abdomen was prepped and draped in usual fashion and time out performed. Patient had been getting therapeutic antibiotics and had SCDs for DVT prophylaxis. Following the time out I made a 2 cm infraumbilical incision and dissected to abdominal fascia. This was elevated between Kochers and divided with curved  Jaime gaining peritoneal access. I placed 2 0 vicryl sutures on either side of the defect then placed the Nori. Pneumoperitoneum achieved. I inserted the scope and placed 3 5mm ports under direct vision one subxiphoid, one RUQ and the other in the right lateral abdomen. Gallbladder was tensely distended so it was aspirated with laparoscopic needle.    The gallbladder fundus was elevated and the infundibulum then retracted laterally. I dissected through the tissue in the triangle of Calot with cautery as well as with maryland grasper circumferentially dissecting the cystic duct and artery. Critical view of safety obtained. The duct and artery were then clipped and divided. Gallbladder then dissected from the liver with cautery until free. This was placed in an Endocatch bag and removed from the abdomen and passed off for pathology. I used cautery on the liver but there was still general ooze of blood. He was forming clots. I used a larger suction to help remove the clotted blood in the RUQ and down the right side. I placed 3 strips of surgicel in the gallbladder fossa which appeared to stop the bleeding. I placed a 15fr BYRON through the rightmost port and positioned under the liver. Ports were removed under direct vision then the abdomen desufflated and the Nori removed. The fasical defect closed with the previous sutures and an additional figure of eight 0 vicryl placed between. Skin incisions closed with 4-0 monocryl and covered with dermabond and injected with the local anesthetic. The BYRON was secured with 2-0 silk drain suture. Patient tolerated the procedure well and was discharged from the OR to recovery in stable condition. Final counts complete. He will then transfer back to the hospital [facility] for ongoing cares.            Severiano De La Rosa MD

## 2021-08-15 NOTE — ANESTHESIA PROCEDURE NOTES
Airway       Patient location during procedure: OR       Procedure Start/Stop Times: 8/15/2021 1:46 PM  Staff -        CRNA: Germain Simmons APRN CRNA       Performed By: CRNA  Consent for Airway        Urgency: emergent  Indications and Patient Condition       Indications for airway management: fili-procedural and airway protection       Induction type:RSI       Mask difficulty assessment: 0 - not attempted    Final Airway Details       Final airway type: endotracheal airway       Successful airway: ETT - single  Endotracheal Airway Details        ETT size (mm): 7.5       Cuffed: yes       Successful intubation technique: direct laryngoscopy and video laryngoscopy       VL Blade Size: Glidescope 4       Grade View of Cords: 1       Adjucts: stylet       Position: Right       Measured from: lips       Secured at (cm): 22       Bite block used: Oral Airway    Post intubation assessment        Placement verified by: capnometry, equal breath sounds and chest rise        Number of attempts at approach: 1       Number of other approaches attempted: 0       Secured with: silk tape       Ease of procedure: easy       Dentition: Intact and Unchanged

## 2021-08-15 NOTE — H&P
[facility]    History and Physical - Hospitalist Service       Date of Admission:  8/15/2021    Assessment & Plan      Renny Gannon is a 70 year old male admitted on 8/15/2021 for acute cholecystitis and pancytopenia (history of MDS)        Acute cholecystis.  Admit the patient to medical floor.  Note patient does have cholelithiasis.  General surgery consulted and recommended admission, IVF and NPO.  General surgery will see the patient this morning.  IV Unasyn per general surgery.  Pain control.  Note bilirubin and LFTs are normal.  Note patient does have low to moderate cardiac risk but is stable for surgery if needed.    Pancytopenia with history of MDS (chemotherapy on hold due to severe pancytopenia).  Monitor CBC daily.  Transfuse pRBC and platelets (per surgery recommendation if surgery is needed).  Also will need to verify patient's prophylactic home medication (Acyclvir, Fluconazole) and restart them accordingly.    HLD. Resume home Statin.    HTN.  Resume home metoprolol.    Gout.  Hold allopurinol for now.    DVT PPx SCDs    Code status full code    Disposition home in 2-3 days    The patient's care was discussed with the Bedside Nurse.    Bernabe Escaelra MD  [facility]  Securely message with the [email or web address] Web Console (learn more here)  Text page via [employer] Paging/Directory      ______________________________________________________________________    Chief Complaint   Abdominal pain    History is obtained from the patient    History of Present Illness   Renny Gannon is a 70 year old male with past medical history of HTN, Myelodysplastic syndrome (recent chemotherapy held due to severe thrombocytopenia), HLD and gout presents with complaint of abdominal pain.  Per the patient's report, the patient had a bone marrow biopsy at his coccyx area two days ago (pending result).  The patient started to have upper right sided abdominal pain that  started last night around 9 PM.  The patient states this came on shortly after he has his dinner.   The patient also admit to have some chills but denies any subjective fever.  The patient has nausea but no vomiting.  The patient states his pain in the right upper quadrant is sharp, intermittent and non-radiating.  Otherwise the patient denies any GIB or diarrhea.  The patient also denies any chest pain, shortness of breath, coughing or headache.    In our ER, the patient had sign of cholelithiasis and cholecystitis on abdominal imaging.  LFTs and bilirubin however are normal.  The patient does have anemia and thrombocytopenia but no leukocytosis or other sign of sepsis (normal lactic acid level).  The patient was given pain medication as well as IV Unasyn.  General surgery was consulted by our ER physician and will see the patient this morning.  Review of Systems    The 10 point Review of Systems is negative other than noted in the HPI or here.     Past Medical History    I have reviewed this patient's medical history and updated it with pertinent information if needed.   Past Medical History:   Diagnosis Date     Heart disease      History of blood transfusion      Hypertension      Leukocytosis 6/3/2021       Past Surgical History   I have reviewed this patient's surgical history and updated it with pertinent information if needed.  Past Surgical History:   Procedure Laterality Date     BONE MARROW BIOPSY, BONE SPECIMEN, NEEDLE/TROCAR N/A 12/30/2019    Procedure: BIOPSY, BONE MARROW;  Surgeon: Aguilar Krause MD;  Location: [facility]     BYPASS GRAFT ARTERY CORONARY N/A 1/31/2020    Procedure: CORONARY ARTERY BYPASS GRAFTING X 3 - LIMA TO LAD, SV TO OM  AND PDA; ENDOVEIN HARVEST OF BILATERAL LEGS; ON PUMP WITH SANDRA;  Surgeon: Mable Barrera MD;  Location:  OR     CV CORONARY ANGIOGRAM Left 1/9/2020    Procedure: Coronary Angiogram;  Surgeon: Devin Bentley MD;  Location:  HE[facility]  CARDI[facility] CATH LAB     IR FINE NEEDLE ASPIRATION W ULTRASOUND  6/10/2021     NO HISTORY OF SURGERY         Social History   I have reviewed this patient's social history and updated it with pertinent information if needed.  Social History     Tobacco Use     Smoking status: Former Smoker     Years: 30.00     Types: Cigarettes     Start date: 1961     Quit date: 2001     Years since quittin.5     Smokeless tobacco: Never Used   Substance Use Topics     Alcohol use: No     Drug use: No       Family History   I have reviewed this patient's family history and updated it with pertinent information if needed.  Family History   Problem Relation Age of Onset     No Known Problems Mother      Unknown/Adopted Father      Unknown/Adopted Maternal Grandmother      Unknown/Adopted Maternal Grandfather      Unknown/Adopted Paternal Grandmother      Unknown/Adopted Paternal Grandfather      Cancer Brother         lung cancer - smoking     No Known Problems Sister      Coronary Artery Disease Brother          of MI at 66     No Known Problems Sister        Prior to Admission Medications   Prior to Admission Medications   Prescriptions Last Dose Informant Patient Reported? Taking?   ACE/ARB/ARNI NOT PRESCRIBED (INTENTIONAL)  Self No No   Sig: Please choose reason not prescribed from choices below.   Patient not taking: Reported on 2021   ASPIRIN NOT PRESCRIBED (INTENTIONAL)   No No   Sig: Please choose reason not prescribed from choices below.   Patient not taking: Reported on 2021   Lidocaine (LIDOCARE) 4 % Patch   No No   Sig: Place 1 patch onto the skin every 24 hours . Leave on for 12 hours, then remove for 12 hours.   Patient not taking: Reported on 2021   SENEXON-S 8.6-50 MG tablet 2021 at PM  No Yes   Sig: TAKE TWO TABLETS BY MOUTH TWO TIMES A DAY   acyclovir (ZOVIRAX) 400 MG tablet 2021 at PM  No Yes   Sig: Take 1 tablet (400 mg) by mouth 2 times daily   allopurinol (ZYLOPRIM) 300 MG  tablet 8/14/2021 at AM  No Yes   Sig: Take 1 tablet (300 mg) by mouth daily   fluconazole (DIFLUCAN) 200 MG tablet 8/14/2021 at AM  No Yes   Sig: Take 1 tablet (200 mg) by mouth daily   furosemide (LASIX) 40 MG tablet   No No   Sig: Take 1 tablet (40 mg) by mouth daily   levofloxacin (LEVAQUIN) 250 MG tablet   No No   Sig: Take 1 tablet (250 mg) by mouth At Bedtime   metoprolol tartrate (LOPRESSOR) 50 MG tablet 8/14/2021 at PM  No Yes   Sig: Take 1 tablet (50 mg) by mouth 2 times daily   nitroGLYcerin (NITROSTAT) 0.4 MG sublingual tablet  Self No No   Sig: For chest pain place 1 tablet under the tongue every 5 minutes for 3 doses. If symptoms persist 5 minutes after 1st dose call 911.   ondansetron (ZOFRAN-ODT) 8 MG ODT tab   No No   Sig: Take 1 tablet (8 mg) by mouth every 8 hours as needed for nausea or vomiting   Patient not taking: Reported on 8/4/2021   oxyCODONE (XTAMPZA) 9 MG 12 hr tablet Past Month at Unknown time  No Yes   Sig: Take 1 tablet (9 mg) by mouth every 12 hours . DO NOT drive or operate machinery while taking this medication.   polyethylene glycol (MIRALAX) 17 GM/Dose powder Unknown at Unknown time  No No   Sig: Take 17 g by mouth daily   prochlorperazine (COMPAZINE) 5 MG tablet   No No   Sig: Take 1 tablet (5 mg) by mouth every 6 hours as needed for nausea or vomiting   Patient not taking: Reported on 8/11/2021   rosuvastatin (CRESTOR) 5 MG tablet 8/14/2021 at AM  No Yes   Sig: Take 1 tablet (5 mg) by mouth daily   traZODone (DESYREL) 50 MG tablet   No No   Sig: Take 1 tablet (50 mg) by mouth nightly as needed for sleep   Patient not taking: Reported on 8/11/2021   venetoclax (VENCLEXTA) 100 MG tablet   No No   Sig: Take 2 tablets (200 mg) by mouth daily for 28 days   Patient not taking: Reported on 8/11/2021   vitamin B-12 (CYANOCOBALAMIN) 250 MCG tablet 8/14/2021 at AM Self No Yes   Sig: TAKE ONE TABLET BY MOUTH EVERY MORNING   vitamin D3 (CHOLECALCIFEROL) 50 mcg (2000 units) tablet 8/14/2021  at AM Self Yes Yes   Sig: Take 1 tablet by mouth daily      Facility-Administered Medications: None     Allergies   Allergies   Allergen Reactions     No Known Drug Allergies      Seasonal Allergies Other (See Comments)     Stuffy head and nose       Physical Exam   Vital Signs: Temp: 98.5  F (36.9  C) Temp src: Oral BP: 128/64 Pulse: 85   Resp: 16 SpO2: 97 % O2 Device: Nasal cannula Oxygen Delivery: 2 LPM  Weight: 151 lbs 0 oz    GENERAL: The patient is not in any acute distressed. Awake and alert.  HEENT: Nonicteric sclerae, PERRLA, EOMI. Oropharynx clear. Moist mucous membranes. Conjunctivae appear well perfused.  HEART: Regular rate and rhythm without murmurs. No lower extremities edema.  LUNGS: Clear to auscultation bilaterally. No wheezing, crackles or rhonchi  ABDOMEN: Soft, positive bowel sounds, right upper quadrant tenderness.  SKIN: No rash, no excessive bruising, petechiae, or purpura.  NEUROLOGIC: AxO x 3.  Cranial nerves II-XII intact without motor/sensory deficit.    Data   Data reviewed today: I reviewed all medications, new labs and imaging results over the last 24 hours.    Recent Labs   Lab 08/15/21  0031 08/13/21  1041 08/12/21  1340 08/09/21  1031   WBC 6.1 5.8 6.3 6.4   HGB 8.1* 8.4* 8.8* 7.4*   MCV 92 91 90 90   PLT 23* 37* 21* 12*     --  137 136   POTASSIUM 4.0  --  4.0 4.2   CHLORIDE 103  --  103 103   CO2 28  --  29 28   BUN 25  --  25 20   CR 0.98  --  0.92 0.82   ANIONGAP 6  --  5 5   MINNIE 9.3  --  9.3 8.9   *  --  116* 123*   ALBUMIN 3.5  --  3.5 3.4   PROTTOTAL 7.2  --  7.3 7.5   BILITOTAL 0.4  --  0.5 0.6   ALKPHOS 101  --  100 102   ALT 16  --  17 15   AST 9  --  10 11   LIPASE 71*  --   --   --      Recent Results (from the past 24 hour(s))   CT Abdomen Pelvis w Contrast    Narrative    EXAM: CT ABDOMEN PELVIS W CONTRAST  LOCATION: [facility]  DATE/TIME: 8/15/2021 2:04 AM    INDICATION: Abdominal pain, acute, nonlocalized  COMPARISON:  Ultrasound from 06/17/2021. CT chest from 07/20/2021.  TECHNIQUE: CT scan of the abdomen and pelvis was performed following injection of IV contrast. Multiplanar reformats were obtained. Dose reduction techniques were used.  CONTRAST: 80mLs Isovue 370    FINDINGS:   LOWER CHEST: Small residual left pleural effusion with adjacent atelectasis or infiltrate. Right basilar subpulmonic pleural calcifications. Mild cardiomegaly. Fluid-filled lower thoracic esophagus.    HEPATOBILIARY: Gallstones layer dependently in the gallbladder. Gallbladder is borderline distended but without pericholecystic inflammation. Normal caliber common bile duct. Liver measures 21 cm craniocaudal.    PANCREAS: Normal.    SPLEEN: Spleen is moderately enlarged, measuring 17 cm anteroposterior. This is stable.    ADRENAL GLANDS: Normal.    KIDNEYS/BLADDER: Lobulated cortical contours. No hydronephrosis. Bladder is normal.    BOWEL: Colonic diverticulosis. Normal appendix. No free air.    LYMPH NODES: Normal.    VASCULATURE: Moderate atherosclerotic changes.    PELVIC ORGANS: No free fluid.    MUSCULOSKELETAL: Lumbar facet arthropathy. Partially imaged changes of sternotomy. Mild, diffusely sclerotic appearance of the osseous structures.      Impression    IMPRESSION:   1.  Colonic diverticulosis.    2.  Borderline distended gallbladder with multiple gallstones.    3.  Hepatosplenomegaly.    4.  Cardiomegaly.    5.  Fluid-filled lower thoracic esophagus consistent with reflux.    6.  Chronic left pleural effusion with adjacent atelectasis or infiltrate.   US Abdomen Limited (RUQ)    Narrative    EXAM: US ABDOMEN LIMITED  LOCATION: [facility]  DATE/TIME: 8/15/2021 3:36 AM    INDICATION: Abd pain RUQ, Nausea  COMPARISON: 08/15/2021  TECHNIQUE: Limited abdominal ultrasound.    FINDINGS:    GALLBLADDER: Cholelithiasis, sludge and gallbladder wall thickening. Sonographic Figueroa's sign negative. Trace amount of  "pericholecystic fluid. Gallbladder is distended.    BILE DUCTS: No biliary dilatation. The common duct measures 4 mm.    LIVER: Prominent hepatic size.    RIGHT KIDNEY: No hydronephrosis.    PANCREAS: Partial obscuration by bowel gas.    No ascites.      Impression    IMPRESSION:  1.  Sludge and cholelithiasis within the gallbladder. The gallbladder is distended and the wall is thickened.  2.  Sonographic Figueroa's sign negative.  3.  No biliary dilatation.       Telemed statement : The patient was evaluated via telemedicine and was in agreement with the plan.     The nurse was at the bedside during the entire encounter which took place virtually from  [place] .     The patient was evaluated at  [facility]      "

## 2021-08-15 NOTE — ED NOTES
ED Nursing criteria listed below was addressed during verbal handoff:     Abnormal vitals: Yes  Abnormal results: Yes  Med Reconciliation completed: Yes  Meds given in ED: Yes  Any Overdue Meds: N/A  Core Measures: N/A  Isolation: N/A  Special needs: Yes  Skin assessment: N/A    Observation Patient  Education provided: N/A

## 2021-08-15 NOTE — PROGRESS NOTES
Transfer from PACU to Room 252  Transferred to bed via bed    S: 69 y/o male  S/P cholecystectomy       Anesthesia Type: general       Surgeon:  Dr. De La Rosa       Allergies:  See Medication Reconciliation Record       DNR: No  (Yes,No)    B:  Pertinent Medical History:   Past Medical History:   Diagnosis Date     Heart disease      History of blood transfusion      Hypertension      Leukocytosis 6/3/2021        (CHF; Heart Disease; Lung Disease; Chronic Pain; Diabetes; Other (Comment)          Surgical History:    Past Surgical History:   Procedure Laterality Date     BONE MARROW BIOPSY, BONE SPECIMEN, NEEDLE/TROCAR N/A 12/30/2019    Procedure: BIOPSY, BONE MARROW;  Surgeon: Aguilar Krause MD;  Location: [facility]     BYPASS GRAFT ARTERY CORONARY N/A 1/31/2020    Procedure: CORONARY ARTERY BYPASS GRAFTING X 3 - LIMA TO LAD, SV TO OM  AND PDA; ENDOVEIN HARVEST OF BILATERAL LEGS; ON PUMP WITH SANDRA;  Surgeon: Mable Barrera MD;  Location:  OR     CV CORONARY ANGIOGRAM Left 1/9/2020    Procedure: Coronary Angiogram;  Surgeon: Devin Bentley MD;  Location:  HE[facility] CARDI[facility] CATH LAB     IR FINE NEEDLE ASPIRATION W ULTRASOUND  6/10/2021     NO HISTORY OF SURGERY           A:  EBL: 300 ml        IVF:  650 ml        UOP: voided pre op        NPO:  ___Yes _x__No         Vomiting:  ___Yes __x_No         Drainage:oozing from umbilical incision and BYRON site, new dressing applied        Skin Integrity: multiple bruising and wound right elbow noted pre op (Normal; Pressure Ulcer (Location)             See PACU record for ongoing assessment, vital signs and pain assessment.    R: Post-Op vitals and assessments as ordered/indicated per patient's condition.       Follow Post-Op orders and notify Physician prn.       Continue to involve patient/family in plan of care and discharge planning.       Reinforce Pre-Operative education.       Implement skin safety interventions as appropriate.  Report given to  Med Surg RN    Name: Kia Win RN, BSN, CCRN

## 2021-08-16 NOTE — UTILIZATION REVIEW
Admission Status; Secondary Review Determination         Under the authority of the Utilization Management Committee, the utilization review process indicated a secondary review on the above patient.  The review outcome is based on review of the medical records, discussions with staff, and applying clinical experience noted on the date of the review.        (x)      Inpatient Status Appropriate - This patient's medical care is consistent with medical management for inpatient care and reasonable inpatient medical practice.        RATIONALE FOR DETERMINATION   The patient is a 70-year-old male admitted on 8/15/2021.  Patient presented to the emergency department with right upper quadrant pain and signs and symptoms were consistent with cholecystitis.  The patient did have a cholecystectomy laparoscopically yesterday.  Patient has significant underlying medical conditions including a myelodysplastic disorder and is on chemotherapy and is immunosuppressed.  He is followed by oncology.  Patient also has severe thrombocytopenia with a platelet count of 15 this morning.  His hemoglobin is quite low at 6.5.  Patient is being followed both surgically and medically.  Patient's hemoglobin is 6.3 2 in the morning resulted in an emergent transfusion of 2 units of packed red cells.  Platelet transfusion is also occurring.  Based on medical complexity complicating acute cholecystitis with subsequent cholecystectomy, inpatient status is appropriate.  The patient will take additional days in the hospital to stabilize because of medical issues.      The severity of illness, intensity of service provided, expected LOS and risk for adverse outcome make the care complex, high risk and appropriate for hospital admission.        The information on this document is developed by the utilization review team in order for the business office to ensure compliance.  This only denotes the appropriateness of proper admission status and does not  reflect the quality of care rendered.         The definitions of Inpatient Status and Observation Status used in making the determination above are those provided in the CMS Coverage Manual, Chapter 1 and Chapter 6, section 70.4.      Sincerely,     Sourav Hernandes MD  Physician Advisor  Utilization Review/ Case Management  [facility].

## 2021-08-16 NOTE — PLAN OF CARE
Neuro: sedated from surgery, oriented x4  CMS: denies numbness/tingling  Pulmonary: lung sounds clear throughout on room air  CV: blood pressures soft but stable post-op  GI: denies any nausea, incisions with minimal to no oozing. BYRON to right lower abdomen-60cc output with large clots. Stripped at 1830 and removed a clot the length of the tubing. Tolerated clear liquid diet  : due to void since surgery.   Skin: x3 trochar sites. Scattered bruising to bilateral arms, right shoulder, right elbow.   Lines/Tubes/Drains: peripheral IV x1  Drips: NS @100/hr    Plan: monitor vitals, administer pain medications as needed

## 2021-08-16 NOTE — PLAN OF CARE
S-(situation): shift note    B-(background): Lap leonidas    A-(assessment): Pt is A&O.   VSS.   Afebrile.  Denies pain at this time, does get scheduled tylenol and this seems to be effective.  Has BYRON drain.  Good amount of bloody substance noted in BYRON.  Line stripped per order.  Lap leonidas sites are glued and intact.  Some bruising noted on abdomen.  Positive BS.  LS clear but diminished.      R-(recommendations): Will cont to monitor the above.

## 2021-08-16 NOTE — ANESTHESIA POSTPROCEDURE EVALUATION
Patient: Renny Gannon    Procedure(s):  Laparoscopic cholecystectomy    Diagnosis:Acute cholecystitis [K81.0]  Diagnosis Additional Information: No value filed.    Anesthesia Type:  General    Note:  Disposition: Outpatient   Postop Pain Control: Uneventful            Sign Out: Well controlled pain   PONV: No   Neuro/Psych: Uneventful            Sign Out: Acceptable/Baseline neuro status   Airway/Respiratory: Uneventful            Sign Out: Acceptable/Baseline resp. status   CV/Hemodynamics: Uneventful            Sign Out: Acceptable CV status   Other NRE: NONE   DID A NON-ROUTINE EVENT OCCUR? No    Event details/Postop Comments:  Pt was happy with anesthesia care.  No complications.  I will follow up with the pt if needed.           Last vitals:  Vitals Value Taken Time   /57 08/15/21 1615   Temp     Pulse 93 08/15/21 1615   Resp 16 08/15/21 1615   SpO2 97 % 08/15/21 1615       Electronically Signed By: UNA Fregoso CRNA  August 16, 2021  11:56 AM

## 2021-08-16 NOTE — PLAN OF CARE
Pt received two units PRBCs for hgb of 6.3 last evening. Finished blood transfusion at 0550, next hgb check scheduled at 0800 today. Pt is pale, alert and oriented x4, in good spirits. Has stood at edge of bed to void. Vss except BP in /50s range, RR 24. Has fine crackles to bilateral lower lobes, Dr. Allan notified and 20mg IV lasix given between first and second unit of PRBCs. POD#1 laparoscopic leonidas, abdomen slightly distended, tender at lap sites. Ice pack to abdomen and has had 5mg oxycodone x2, pt rates pain to abdomen 3-4/10. Scant sanguinous drainage to upper abd lap site and reinforced with 2x2 gauze and bandaid. RLQ abdomen with 4x4 gauze dressing over BYRON drain site, moderate amount of dried drainage. Total of BYRON drainage 170mL overnight, 80mL and 90mL, last evening had 60mL and 60mL,  see I and O flowsheet. Will continue to monitor hgb closely, I and O, vs, respiratory and post-op status, IV fluids, IV abx.

## 2021-08-16 NOTE — PROGRESS NOTES
"General Surgery    S: Feeling ok. Tolerating a diet without increased pain, nausea or bloating. Passing some flatus. Still sore but not terrible. VSS with decreased BPs but not worsening.  No fevers.    O:  Vital signs:  Temp: 97.3  F (36.3  C) Temp src: Oral BP: 104/49 Pulse: 78   Resp: 24 SpO2: 94 % O2 Device: None (Room air) Oxygen Delivery: 8 LPM Height: 172.7 cm (5' 8\") Weight: 72 kg (158 lb 11.2 oz)  Estimated body mass index is 24.13 kg/m  as calculated from the following:    Height as of this encounter: 1.727 m (5' 8\").    Weight as of this encounter: 72 kg (158 lb 11.2 oz).      Gen: AAOx3, NAD  Heart: RRR  Lungs: CTA  Abd: Soft, mildly-distended upper abdomen.  MILD TTP as expected in upper abdomen      Labs/Imaging  Results for orders placed or performed during the hospital encounter of 08/15/21 (from the past 24 hour(s))   ABO/Rh type and screen *Canceled*    Narrative    The following orders were created for panel order ABO/Rh type and screen.  Procedure                               Abnormality         Status                     ---------                               -----------         ------                       Please view results for these tests on the individual orders.   Hemoglobin   Result Value Ref Range    Hemoglobin 6.3 (LL) 13.3 - 17.7 g/dL   Glucose by meter   Result Value Ref Range    GLUCOSE BY METER POCT 124 (H) 70 - 99 mg/dL   Comprehensive metabolic panel   Result Value Ref Range    Sodium 137 133 - 144 mmol/L    Potassium 4.2 3.4 - 5.3 mmol/L    Chloride 106 94 - 109 mmol/L    Carbon Dioxide (CO2) 27 20 - 32 mmol/L    Anion Gap 4 3 - 14 mmol/L    Urea Nitrogen 23 7 - 30 mg/dL    Creatinine 0.78 0.66 - 1.25 mg/dL    Calcium 8.7 8.5 - 10.1 mg/dL    Glucose 113 (H) 70 - 99 mg/dL    Alkaline Phosphatase 76 40 - 150 U/L    AST 24 0 - 45 U/L    ALT 30 0 - 70 U/L    Protein Total 6.1 (L) 6.8 - 8.8 g/dL    Albumin 2.8 (L) 3.4 - 5.0 g/dL    Bilirubin Total 0.7 0.2 - 1.3 mg/dL    GFR Estimate " >90 >60 mL/min/1.73m2   CBC with platelets   Result Value Ref Range    WBC Count 6.4 4.0 - 11.0 10e3/uL    RBC Count 2.51 (L) 4.40 - 5.90 10e6/uL    Hemoglobin 7.4 (L) 13.3 - 17.7 g/dL    Hematocrit 22.3 (L) 40.0 - 53.0 %    MCV 89 78 - 100 fL    MCH 29.5 26.5 - 33.0 pg    MCHC 33.2 31.5 - 36.5 g/dL    RDW 16.0 (H) 10.0 - 15.0 %    Platelet Count 15 (LL) 150 - 450 10e3/uL   Hemoglobin   Result Value Ref Range    Hemoglobin 7.4 (L) 13.3 - 17.7 g/dL           A: POD #1 s/p lap leonidas for acute cholecystitis  Coagulopathy 2/2 myleodysplastic syndrome which is contributing to post-op blood loss      P: Platelet and pRBC transfusions per protocol. Even if PLTs at baseline would continue to transfuse them until hgb stabilizes and he is no longer requiring pRBCs.  Ok for diet.  IS and Activity as tolerated  PO pain medication with tylenol and oxycodone.

## 2021-08-16 NOTE — SIGNIFICANT EVENT
Significant Event Note    Was called by RN re patient's hemoglobin at 6.3    Description of event:  Patient post po choley today,   MDS wirh pan cytopenia: Hb 6.3    Plan:  Transfuse 2 unit PRBC and given hx of CHF and on lasix will give Lasix 20mg Iv x1 in between 1st and 2nd unit blood transfusion, BP soft, monitor closely.  Give second slow over 4 hrs    Discussed with: bedside nurse    Burak Allan MD

## 2021-08-16 NOTE — PROGRESS NOTES
[facility]    Medicine Progress Note - Hospitalist Service       Date of Admission:  8/15/2021    Assessment & Plan           Acute cholecystitis    Right upper quadrant abdominal pain  Assessment: Patient POD #1, feeling well. Afebrile.   Plan: Per surgery.       Pancytopenia (H)    MDS (myelodysplastic syndrome) (H)  Assessment: Patient with a history for MDS, requires weekly transfusions. Patient has platelets of 15 today, hgb 7.4 after blood transfusion. Bleeding with bloody output in BYRON drain. No dizziness or headache.   Plan:   - Monitor CBC every 6 hours  - Transfuse for platelets < 40 or patient has ongoing bleeding  - Transfuse for HGB < 7.0         Diet: Advance Diet as Tolerated: Full Liquid Diet    DVT Prophylaxis: Pneumatic Compression Devices  Almendarez Catheter: Not present  Central Lines: None  Code Status: Full Code      Disposition Plan   Expected discharge: 08/19/2021 recommended to prior living arrangement once stable postop and cleared for discharge.     The patient's care was discussed with the Attending Physician, Dr. Johnson, Dr Lowry, Bedside Nurse, Care Coordinator/ and Patient.    July Dela Cruz, [facility]  Hospitalist Service  [facility]  Securely message with the Vocera Web Console (learn more here)  Text page via [facility] Paging/Directory      Clinically Significant Risk Factors Present on Admission               ______________________________________________________________________    Interval History   Patient overall stable, afebrile, hemodynamically stable. Tolerating oral intake, advance     Data reviewed today: I reviewed all medications, new labs and imaging results over the last 24 hours.     Physical Exam   Vital Signs: Temp: 97.3  F (36.3  C) Temp src: Oral BP: 104/49 Pulse: 78   Resp: 24 SpO2: 94 % O2 Device: None (Room air) Oxygen Delivery: 8 LPM  Weight: 158 lbs 11.2 oz    Constitutional: awake,  alert, cooperative, no apparent distress. BYRON drain in place.   Respiratory:  No respiratory distress, lungs clear  Cardiovascular:  Normal apical impulse, regular rate and rhythm   GI: Normal bowel sounds, soft, non-distended, non-tender   Skin: normal skin color, texture, turgor  Musculoskeletal: There is no redness, warmth, or swelling of the joints.  Full range of motion noted   Neurologic: Awake, alert, oriented to name, place and time.  Cranial nerves II-XII are grossly intact. No focal findings.    Psychiatric: Judgment intact.       Data   Recent Labs   Lab 08/16/21  1225 08/16/21  0811 08/16/21  0635 08/15/21  2152 08/15/21  0031 08/12/21  1340   WBC 6.8 6.4  --   --  6.1 6.3   HGB 7.5* 7.4*  7.4*  --  6.3* 8.1* 8.8*   MCV 89 89  --   --  92 90   PLT 17* 15*  --   --  23* 21*   NA  --  137  --   --  137 137   POTASSIUM  --  4.2  --   --  4.0 4.0   CHLORIDE  --  106  --   --  103 103   CO2  --  27  --   --  28 29   BUN  --  23  --   --  25 25   CR  --  0.78  --   --  0.98 0.92   ANIONGAP  --  4  --   --  6 5   MINNIE  --  8.7  --   --  9.3 9.3   GLC  --  113* 124*  --  128* 116*   ALBUMIN  --  2.8*  --   --  3.5 3.5   PROTTOTAL  --  6.1*  --   --  7.2 7.3   BILITOTAL  --  0.7  --   --  0.4 0.5   ALKPHOS  --  76  --   --  101 100   ALT  --  30  --   --  16 17   AST  --  24  --   --  9 10   LIPASE  --   --   --   --  71*  --

## 2021-08-17 NOTE — PROGRESS NOTES
"General Surgery    S: Stable, slow improvement. Tolerating regular diet without increased pain or nausea. He does complain of some abdominal bloating. BYRON with thin sanguinous output. Decreased from yesterday. 60ml overnight. Passing flatus.    O:  Vital signs:  Temp: 97.9  F (36.6  C) Temp src: Oral BP: 109/55 Pulse: 84   Resp: 18 SpO2: 91 % O2 Device: None (Room air) Oxygen Delivery: 8 LPM Height: 172.7 cm (5' 8\") Weight: 74.4 kg (164 lb 1.6 oz)  Estimated body mass index is 24.95 kg/m  as calculated from the following:    Height as of this encounter: 1.727 m (5' 8\").    Weight as of this encounter: 74.4 kg (164 lb 1.6 oz).      Gen: AAOx3, NAD  Heart: RRR  Lungs: CTA  Abd: Soft, mildly-distended upper abdomen, tympanic to percussio.  MILD TTP as expected in upper abdomen, non-acute.      Labs/Imaging  Results for orders placed or performed during the hospital encounter of 08/15/21 (from the past 24 hour(s))   CBC with platelets   Result Value Ref Range    WBC Count 6.7 4.0 - 11.0 10e3/uL    RBC Count 2.58 (L) 4.40 - 5.90 10e6/uL    Hemoglobin 7.6 (L) 13.3 - 17.7 g/dL    Hematocrit 23.2 (L) 40.0 - 53.0 %    MCV 90 78 - 100 fL    MCH 29.5 26.5 - 33.0 pg    MCHC 32.8 31.5 - 36.5 g/dL    RDW 16.5 (H) 10.0 - 15.0 %    Platelet Count 31 (LL) 150 - 450 10e3/uL   CBC with platelets   Result Value Ref Range    WBC Count 6.1 4.0 - 11.0 10e3/uL    RBC Count 2.21 (L) 4.40 - 5.90 10e6/uL    Hemoglobin 6.5 (LL) 13.3 - 17.7 g/dL    Hematocrit 19.9 (L) 40.0 - 53.0 %    MCV 90 78 - 100 fL    MCH 29.4 26.5 - 33.0 pg    MCHC 32.7 31.5 - 36.5 g/dL    RDW 16.5 (H) 10.0 - 15.0 %    Platelet Count 44 (LL) 150 - 450 10e3/uL   Prepare red blood cells (unit)   Result Value Ref Range    CROSSMATCH Compatible     UNIT ABO/RH A Neg     Unit Number [id number]     UNIT STATUS Issued     Blood Component Type Red Blood Cells     Product Code T6043U27     CODING SYSTEM [id number]     UNIT TYPE ISBT 0600     ISSUE DATE AND TIME [id number]  "   CBC with platelets   Result Value Ref Range    WBC Count 6.9 4.0 - 11.0 10e3/uL    RBC Count 2.57 (L) 4.40 - 5.90 10e6/uL    Hemoglobin 7.7 (L) 13.3 - 17.7 g/dL    Hematocrit 23.2 (L) 40.0 - 53.0 %    MCV 90 78 - 100 fL    MCH 30.0 26.5 - 33.0 pg    MCHC 33.2 31.5 - 36.5 g/dL    RDW 16.0 (H) 10.0 - 15.0 %    Platelet Count 42 (LL) 150 - 450 10e3/uL   Glucose   Result Value Ref Range    Glucose 96 70 - 99 mg/dL    Patient Fasting > 8hrs? Yes            A: POD #2 s/p lap leonidas for acute cholecystitis  polycythemic 2/2 myleodysplastic syndrome which is contributing to post-op blood loss, stable to improving      P: Platelet and pRBC transfusions per protocol. Even if PLTs at baseline would continue to transfuse them until hgb stabilizes and he is no longer requiring pRBCs.  Ok for diet.  IS and Activity as tolerated  PO pain medication with tylenol and oxycodone.  Await return of bowel function  Continue drain until output decreases to <40ml/shift, could possibly discharge home with drain if necessary

## 2021-08-17 NOTE — PLAN OF CARE
Problem: Bleeding (Cholecystectomy)  Goal: Absence of Bleeding  Outcome: Improving     Problem: Bowel Motility Impaired (Cholecystectomy)  Goal: Effective Bowel Elimination  Outcome: Improving     Problem: Infection (Cholecystectomy)  Goal: Absence of Infection Signs and Symptoms  Outcome: Improving     Problem: Ongoing Anesthesia Effects (Cholecystectomy)  Goal: Anesthesia/Sedation Recovery  Outcome: Improving  Intervention: Optimize Anesthesia Recovery  Recent Flowsheet Documentation  Taken 8/17/2021 0800 by Noelle Emmanuel, RN  Safety Promotion/Fall Prevention:   bed alarm on   chair alarm on   fall prevention program maintained   nonskid shoes/slippers when out of bed  Administration (IS): self-administered  Patient Tolerance (IS): fair     Problem: Pain (Cholecystectomy)  Goal: Acceptable Pain Control  Outcome: Improving  Intervention: Prevent or Manage Pain  Recent Flowsheet Documentation  Taken 8/17/2021 0838 by Noelle Emmanuel, RN  Pain Management Interventions: medication (see MAR)     Problem: Postoperative Nausea and Vomiting (Cholecystectomy)  Goal: Nausea and Vomiting Relief  Outcome: Improving

## 2021-08-17 NOTE — PLAN OF CARE
Neuro: A/O x4  Pulm: lung sounds diminished in all lobes, fine crackles noted in bilateral lower lobes.   CV: normal sinus rhythm  GI: bowel sounds hypoactive x4,   : adequate urine output  Skin: bruising noted to bilateral arms.   Lines/Tubes/Drains: peripheral IV x2  Drips: saline locked and 1 infusing at 100 ml/hr      Writer will continue to monitor pain, VS, labs, and surgical sites.  Patient c/o pain at 6/10 in his abdomen, PRN pain pill given. Upon reassessment patients pain was worse 7/10, scheduled tylenol given and ice applied to abdomen. Patients surgical sites have redness noted around them. No drainage coming from sites. Patients hansel-moreno drain is draining dark red blood. Writer will continue to strip line q4h. Patient got a second unit of platelets which he tolerated fine. Patient also got 1 unit of blood this shift and he tolerated that fine as well. No adverse reactions noted. Call light within reach, bed in lowest position breaks locked.

## 2021-08-17 NOTE — PROGRESS NOTES
[facility]    Medicine Progress Note - Hospitalist Service       Date of Admission:  8/15/2021    Assessment & Plan               Acute cholecystitis    Right upper quadrant abdominal pain  Assessment: Patient POD #2, feeling well. Afebrile.   Plan: Per surgery.  BYRON noted, may discharge home with drain in place.       Pancytopenia (H)    MDS (myelodysplastic syndrome) (H)  Assessment: Patient with a history for MDS, requires weekly transfusions. Patient has platelets of 42 today, hgb 7.7 . Bleeding with bloody output in BYRON drain but this has improved from > 250 ml per shift to only 60 ml / shift. No dizziness or headache.  Patient has received PRBC on 8/15, 8/15, 8/17 and PLT on 8/15, 8/16, 8/16.    Plan:   - Monitor CBC every 8 hours  - Transfuse for platelets < 40 or patient has ongoing bleeding  - Transfuse for HGB < 7.0      Chronic diastolic congestive heart failure (H)  Assessment: Stable, will need to monitor closely with ongoing blood products. Lasix given once.   Plan: Monitor closely.        Diet: Advance Diet as Tolerated: Regular Diet Adult    DVT Prophylaxis: Pneumatic Compression Devices  Almendarez Catheter: Not present  Central Lines: None  Code Status: Full Code      Disposition Plan   Expected discharge: 08/19/2021 recommended to prior living arrangement once bleeding resolved and cleared for surgical discharge. .     The patient's care was discussed with the Attending Physician, Dr. Johnson, Dr Snyder, Bedside Nurse, Care Coordinator/ and Patient.    July Dela Cruz, [facility]  Hospitalist Service  [facility]  Securely message with the Vocera Web Console (learn more here)  Text page via [employer] Paging/Directory      Clinically Significant Risk Factors Present on Admission               ______________________________________________________________________    Interval History   Patient seen sitting up in bed, alert, oriented,  with no new complaints. Patient is passing gas, slight abdominal distention. Pain well managed. Up with nursing assist. Remains stable. Denies shortness of breath and patient is maintaining oxygen saturations above 90% on room air.  Denies nausea and is tolerating oral intake. Afebrile and hemodynamically stable.       Data reviewed today: I reviewed all medications, new labs and imaging results over the last 24 hours.     Physical Exam   Vital Signs: Temp: 97.9  F (36.6  C) Temp src: Oral BP: 109/55 Pulse: 84   Resp: 18 SpO2: 91 % O2 Device: None (Room air)    Weight: 164 lbs 1.6 oz  Constitutional: awake, alert, cooperative, no apparent distress. BYRON drain in place.   Respiratory:  No respiratory distress, lungs clear  Cardiovascular:  Normal apical impulse, regular rate and rhythm   GI: Normal bowel sounds, soft, non-distended, non-tender   Skin: normal skin color, texture, turgor  Musculoskeletal: There is no redness, warmth, or swelling of the joints.  Full range of motion noted   Neurologic: Awake, alert, oriented to name, place and time.  Cranial nerves II-XII are grossly intact. No focal findings.    Psychiatric: Judgment intact.     Data   Recent Labs   Lab 08/17/21  0614 08/16/21  2354 08/16/21  1756 08/16/21  0811 08/16/21  0635 08/15/21  0031 08/15/21  0031 08/12/21  1340   WBC 6.9 6.1 6.7 6.4  --   --  6.1 6.3   HGB 7.7* 6.5* 7.6* 7.4*  7.4*  --    < > 8.1* 8.8*   MCV 90 90 90 89  --   --  92 90   PLT 42* 44* 31* 15*  --   --  23* 21*   NA  --   --   --  137  --   --  137 137   POTASSIUM  --   --   --  4.2  --   --  4.0 4.0   CHLORIDE  --   --   --  106  --   --  103 103   CO2  --   --   --  27  --   --  28 29   BUN  --   --   --  23  --   --  25 25   CR  --   --   --  0.78  --   --  0.98 0.92   ANIONGAP  --   --   --  4  --   --  6 5   MINNIE  --   --   --  8.7  --   --  9.3 9.3   GLC 96  --   --  113* 124*  --  128* 116*   ALBUMIN  --   --   --  2.8*  --   --  3.5 3.5   PROTTOTAL  --   --   --  6.1*  --    --  7.2 7.3   BILITOTAL  --   --   --  0.7  --   --  0.4 0.5   ALKPHOS  --   --   --  76  --   --  101 100   ALT  --   --   --  30  --   --  16 17   AST  --   --   --  24  --   --  9 10   LIPASE  --   --   --   --   --   --  71*  --     < > = values in this interval not displayed.     No results found for this or any previous visit (from the past 24 hour(s)).  Medications     Another Antibiotic has been ordered.       sodium chloride Stopped (08/15/21 1534)     [Held by provider] sodium chloride 100 mL/hr at 08/16/21 2028       acetaminophen  975 mg Oral Q8H     acyclovir  400 mg Oral BID     ampicillin-sulbactam (UNASYN) IV  3 g Intravenous Q6H     fluconazole  200 mg Oral Daily     levofloxacin  250 mg Oral At Bedtime     metoprolol tartrate  50 mg Oral BID     pantoprazole (PROTONIX) IV  40 mg Intravenous QAM AC     polyethylene glycol  17 g Oral Daily     rosuvastatin  5 mg Oral Daily     senna-docusate  1 tablet Oral BID     sodium chloride (PF)  3 mL Intracatheter Q8H     sodium chloride (PF)  3 mL Intracatheter Q8H

## 2021-08-18 NOTE — DISCHARGE SUMMARY
[facility]  Hospitalist Discharge Summary      Date of Admission:  8/15/2021  Date of Discharge:  8/18/2021  Discharging Provider: Delbert Orozco NP      Discharge Diagnoses   Principal Problem:    Acute cholecystitis  Active Problems:    Chronic diastolic congestive heart failure (H)    Right upper quadrant abdominal pain    Pancytopenia (H)    MDS (myelodysplastic syndrome) (H)      Follow-ups Needed After Discharge   Follow-up Appointments     Follow-up and recommended labs and tests       Follow up with primary care provider, Angus Clements Mai, within 7 days for   hospital follow- up.  No follow up labs or test are needed.    Follow up with Dr. Johnson with general surgery at [facility] next week for hospital follow up and possible drain   removal.    Follow up with previously scheduled infusion appointment 8/19             Unresulted Labs Ordered in the Past 30 Days of this Admission     Date and Time Order Name Status Description    8/13/2021  1:19 PM CHROMOSOME BONE MARROW In process     8/13/2021  1:19 PM FISH In process     8/12/2021  9:20 PM PREPARE PHERESED PLATELETS (UNIT) Preliminary       These results will be followed up by PCP    Discharge Disposition   Discharged to home  Condition at discharge: Stable    Hospital Course   Renny Gannon is a 70 year old male with past medical history of HTN, Myelodysplastic syndrome (recent chemotherapy held due to severe thrombocytopenia), HLD and gout who presented to the ED 8/15/21 with complaint of abdominal pain.  Per the patient's report, the patient had a bone marrow biopsy at his coccyx area two days prior to admission. The patient started to have upper right sided abdominal pain that started the evening prior to admission. In our ER, the patient had sign of cholelithiasis and cholecystitis on abdominal imaging.  LFTs and bilirubin however were normal. The patient was admitted and started on IV Unasyn  with plans to see general surgery. Patient underwent laparoscopic cholecystectomy on 8/15 and had BYRON drain placed at that time. Postoperatively, patient had some anemia and thrombocytopenia which required transfusion. On the day of discharge, hemoglobin and platelet count remained stable. Patient denies pain. Denies shortness of breath and patient is maintaining oxygen saturations above 90% on room air.  Denies nausea and is tolerating oral intake. Patient medically stable for hospital discharge. Discussed patient with Dr. Sahu, on call for general surgery, who was in agreement with patient discharge with BYRON drain in place and plans to follow up with general surgery next week. Referral placed to general surgery. Dr. Sahu did not think antibiotic treatment was needed after discharge as source was controlled and patient received more than 3 days of IV antibiotics. Patient was instructed on BYRON drain care by nursing staff prior to discharge. Prior to admission medications were continued after discharge. He has an appointment with infusion clinic tomorrow for regularly scheduled platelet infusion. Order placed for home RN, PT, OT, and HHA after discharge. Patient to follow up with PCP within 7 days for hospital follow up and will follow up with oncology as previously scheduled.     Consultations This Hospital Stay   SURGERY GENERAL IP CONSULT  CARE MANAGEMENT / SOCIAL WORK IP CONSULT    Code Status   Full Code    Time Spent on this Encounter   I, Delbert Orozco NP, personally saw the patient today and spent greater than 30 minutes discharging this patient.       Delbert Orozco NP  [address] SURGICAL  [address] [place] [address]  Phone: [phone]  ______________________________________________________________________    Physical Exam   Vital Signs: Temp: 98.4  F (36.9  C) Temp src: Oral BP: 114/57 Pulse: 78   Resp: 20 SpO2: 97 % O2 Device: None (Room air)    Weight: 163 lbs 3.2  oz  Constitutional: awake, alert, cooperative, no apparent distress, and appears stated age  Respiratory: No increased work of breathing, good air exchange, clear to auscultation bilaterally, no crackles or wheezing  Cardiovascular: regular rate and rhythm and normal S1 and S2  GI: normal bowel sounds, non-distended and non-tender  Skin: normal skin color, texture, turgor; BYRON in place with small amount serosanguinous drainage  Musculoskeletal: there is no redness, warmth, or swelling of the joints  Neurologic: Awake, alert, oriented to name, place and time.         Primary Care Physician   Angus Clements Mai    Discharge Orders      Medication Therapy Management Referral      Adult General Surg Referral      Home Care Nursing Referral      Reason for your hospital stay    You were in the hospital for abdominal pain and improved     Follow-up and recommended labs and tests     Follow up with primary care provider, Angus Clements Mai, within 7 days for hospital follow- up.  No follow up labs or test are needed.    Follow up with Dr. Johnson with general surgery at [facility] next week for hospital follow up and possible drain removal.    Follow up with previously scheduled infusion appointment 8/19     Activity    Your activity upon discharge: activity as tolerated     Tubes and drains    You are going home with the following tubes or drains: BYRON.  Tube cares per hospital or home care instructions     Diet    Follow this diet upon discharge: Orders Placed This Encounter      Advance Diet as Tolerated: Regular Diet Adult       Significant Results and Procedures   Most Recent 3 CBC's:Recent Labs   Lab Test 08/18/21  1401 08/18/21  0606 08/17/21  2147   WBC 5.9 5.5 6.2   HGB 7.9* 8.4* 7.6*   MCV 92 91 90   PLT 33* 37*  37* 34*     Most Recent 3 BMP's:Recent Labs   Lab Test 08/18/21  0606 08/17/21  0614 08/16/21  0811 08/15/21  0031     --  137 137   POTASSIUM 4.4  --  4.2 4.0   CHLORIDE 109  --  106  103   CO2 27  --  27 28   BUN 16  --  23 25   CR 0.82  --  0.78 0.98   ANIONGAP 5  --  4 6   MINNIE 8.6  --  8.7 9.3   GLC 90 96 113* 128*   ,   Results for orders placed or performed during the hospital encounter of 08/15/21   CT Abdomen Pelvis w Contrast    Narrative    EXAM: CT ABDOMEN PELVIS W CONTRAST  LOCATION: [facility]  DATE/TIME: 8/15/2021 2:04 AM    INDICATION: Abdominal pain, acute, nonlocalized  COMPARISON: Ultrasound from 06/17/2021. CT chest from 07/20/2021.  TECHNIQUE: CT scan of the abdomen and pelvis was performed following injection of IV contrast. Multiplanar reformats were obtained. Dose reduction techniques were used.  CONTRAST: 80mLs Isovue 370    FINDINGS:   LOWER CHEST: Small residual left pleural effusion with adjacent atelectasis or infiltrate. Right basilar subpulmonic pleural calcifications. Mild cardiomegaly. Fluid-filled lower thoracic esophagus.    HEPATOBILIARY: Gallstones layer dependently in the gallbladder. Gallbladder is borderline distended but without pericholecystic inflammation. Normal caliber common bile duct. Liver measures 21 cm craniocaudal.    PANCREAS: Normal.    SPLEEN: Spleen is moderately enlarged, measuring 17 cm anteroposterior. This is stable.    ADRENAL GLANDS: Normal.    KIDNEYS/BLADDER: Lobulated cortical contours. No hydronephrosis. Bladder is normal.    BOWEL: Colonic diverticulosis. Normal appendix. No free air.    LYMPH NODES: Normal.    VASCULATURE: Moderate atherosclerotic changes.    PELVIC ORGANS: No free fluid.    MUSCULOSKELETAL: Lumbar facet arthropathy. Partially imaged changes of sternotomy. Mild, diffusely sclerotic appearance of the osseous structures.      Impression    IMPRESSION:   1.  Colonic diverticulosis.    2.  Borderline distended gallbladder with multiple gallstones.    3.  Hepatosplenomegaly.    4.  Cardiomegaly.    5.  Fluid-filled lower thoracic esophagus consistent with reflux.    6.  Chronic left  pleural effusion with adjacent atelectasis or infiltrate.   US Abdomen Limited (RUQ)    Narrative    EXAM: US ABDOMEN LIMITED  LOCATION: [facility]  DATE/TIME: 8/15/2021 3:36 AM    INDICATION: Abd pain RUQ, Nausea  COMPARISON: 08/15/2021  TECHNIQUE: Limited abdominal ultrasound.    FINDINGS:    GALLBLADDER: Cholelithiasis, sludge and gallbladder wall thickening. Sonographic Figueroa's sign negative. Trace amount of pericholecystic fluid. Gallbladder is distended.    BILE DUCTS: No biliary dilatation. The common duct measures 4 mm.    LIVER: Prominent hepatic size.    RIGHT KIDNEY: No hydronephrosis.    PANCREAS: Partial obscuration by bowel gas.    No ascites.      Impression    IMPRESSION:  1.  Sludge and cholelithiasis within the gallbladder. The gallbladder is distended and the wall is thickened.  2.  Sonographic Figueroa's sign negative.  3.  No biliary dilatation.       XR Chest Port 1 View    Narrative    CHEST PORTABLE ONE VIEW   8/15/2021 10:42 AM     HISTORY: Preop.    COMPARISON: Chest x-ray 6/3/2021.      Impression    IMPRESSION:  Portable chest. Lungs are slightly hypoaerated but  otherwise clear. Interstitial changes are stable and likely chronic.  Calcified pleural plaques along the hemidiaphragms are also unchanged.  Sternotomy sutures likely from prior CABG again noted. No evidence of  pneumothorax or significant pleural fluid.     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); Myelofibrosis (H)      allopurinol (ZYLOPRIM) 300 MG tablet Take 1 tablet (300 mg) by mouth daily  Qty: 90 tablet, Refills: 1    Associated Diagnoses: Myeloproliferative disorder (H)       fluconazole (DIFLUCAN) 200 MG tablet Take 1 tablet (200 mg) by mouth daily  Qty: 30 tablet, Refills: 1    Associated Diagnoses: 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 DO NOT drive or operate machinery while taking this medication.  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); Myelofibrosis (H)      rosuvastatin (CRESTOR) 5 MG tablet Take 1 tablet (5 mg) by mouth daily  Qty: 30 tablet, Refills: 1    Associated Diagnoses: Coronary artery disease involving coronary bypass graft of native heart without angina pectoris; Congestive heart failure, unspecified HF chronicity, unspecified heart failure type (H)      SENEXON-S 8.6-50 MG tablet TAKE TWO TABLETS BY MOUTH TWO TIMES A DAY  Qty: 120 tablet, Refills: 4    Associated Diagnoses: Constipation, unspecified constipation type      vitamin B-12 (CYANOCOBALAMIN) 250 MCG tablet TAKE ONE TABLET BY MOUTH EVERY MORNING  Qty: 90 tablet, Refills: 3    Associated Diagnoses: S/P CABG (coronary artery bypass graft)      vitamin D3 (CHOLECALCIFEROL) 50 mcg (2000 units) tablet Take 1 tablet by mouth daily      ACE/ARB/ARNI NOT PRESCRIBED (INTENTIONAL) Please choose reason not prescribed from choices below.  Qty:      Associated Diagnoses: Congestive heart failure, unspecified HF chronicity, unspecified heart failure type (H)      ASPIRIN NOT PRESCRIBED (INTENTIONAL) Please choose reason not prescribed from choices below.  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 Leave on for 12 hours, then remove for 12 hours.  Qty: 30 patch, Refills: 1    Associated Diagnoses: Right shoulder pain, unspecified chronicity      nitroGLYcerin (NITROSTAT) 0.4 MG sublingual tablet For chest pain place 1 tablet under the tongue every 5 minutes for 3 doses. If symptoms persist 5 minutes after 1st dose call 911.  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

## 2021-08-18 NOTE — PLAN OF CARE
Problem: Adult Inpatient Plan of Care  Goal: Plan of Care Review  Outcome: Improving  Goal: Absence of Hospital-Acquired Illness or Injury  Outcome: Improving  Intervention: Identify and Manage Fall Risk  Recent Flowsheet Documentation  Taken 8/18/2021 0230 by Cruz Boone RN  Safety Promotion/Fall Prevention:   activity supervised   bed alarm on   clutter free environment maintained   fall prevention program maintained   nonskid shoes/slippers when out of bed   patient and family education   room organization consistent   safety round/check completed   supervised activity  Taken 8/17/2021 2130 by Cruz Boone RN  Safety Promotion/Fall Prevention:   activity supervised   bed alarm on   clutter free environment maintained   fall prevention program maintained   nonskid shoes/slippers when out of bed   patient and family education   room organization consistent   safety round/check completed   supervised activity  Intervention: Prevent Skin Injury  Recent Flowsheet Documentation  Taken 8/18/2021 0230 by Cruz Boone RN  Body Position: position changed independently  Taken 8/17/2021 2130 by Cruz Boone RN  Body Position: position changed independently  Intervention: Prevent and Manage VTE (Venous Thromboembolism) Risk  Recent Flowsheet Documentation  Taken 8/18/2021 0230 by Cruz Boone RN  VTE Prevention/Management: pneumatic compression device  Taken 8/17/2021 2130 by Cruz Boone RN  VTE Prevention/Management: pneumatic compression device  Goal: Optimal Comfort and Wellbeing  Outcome: Improving  Goal: Readiness for Transition of Care  Outcome: Improving     Problem: Discharge Planning  Goal: Discharge Planning (Adult, OB, Behavioral, Peds)  Outcome: Improving     Problem: Pain Acute  Goal: Acceptable Pain Control and Functional Ability  Outcome: Improving  Intervention: Develop Pain Management Plan  Recent Flowsheet Documentation  Taken 8/17/2021 2151 by Cruz Boone RN  Pain Management  Interventions: medication (see MAR)     Problem: Bleeding (Cholecystectomy)  Goal: Absence of Bleeding  Outcome: Improving     Problem: Bowel Motility Impaired (Cholecystectomy)  Goal: Effective Bowel Elimination  Outcome: Improving     Problem: Infection (Cholecystectomy)  Goal: Absence of Infection Signs and Symptoms  Outcome: Improving     Problem: Pain (Cholecystectomy)  Goal: Acceptable Pain Control  Outcome: Improving  Intervention: Prevent or Manage Pain  Recent Flowsheet Documentation  Taken 8/17/2021 2151 by Cruz Boone RN  Pain Management Interventions: medication (see MAR)     Problem: Postoperative Nausea and Vomiting (Cholecystectomy)  Goal: Nausea and Vomiting Relief  Outcome: Improving     Problem: Postoperative Urinary Retention (Cholecystectomy)  Goal: Effective Urinary Elimination  Outcome: Improving     Problem: Heart Failure Comorbidity  Goal: Maintenance of Heart Failure Symptom Control  Outcome: Improving     Problem: Hypertension Comorbidity  Goal: Blood Pressure in Desired Range  Outcome: Improving     Problem: Ongoing Anesthesia Effects (Cholecystectomy)  Goal: Anesthesia/Sedation Recovery  Intervention: Optimize Anesthesia Recovery  Recent Flowsheet Documentation  Taken 8/18/2021 0230 by Cruz Boone RN  Safety Promotion/Fall Prevention:   activity supervised   bed alarm on   clutter free environment maintained   fall prevention program maintained   nonskid shoes/slippers when out of bed   patient and family education   room organization consistent   safety round/check completed   supervised activity  Taken 8/17/2021 2130 by Cruz Boone RN  Safety Promotion/Fall Prevention:   activity supervised   bed alarm on   clutter free environment maintained   fall prevention program maintained   nonskid shoes/slippers when out of bed   patient and family education   room organization consistent   safety round/check completed   supervised activity     Pt remains alert and oriented, steady  "stand by assist of one, Lungs are clear and diminished, BYRON drain has 25ml bright red output thus far this shift, oxycodone given for surgical site pain x1, incision sites are CDI, vitals are stable, /55 (BP Location: Left arm)   Pulse 80   Temp (!) 95.8  F (35.4  C) (Oral)   Resp 18   Ht 1.727 m (5' 8\")   Wt 74 kg (163 lb 3.2 oz)   SpO2 95%   BMI 24.81 kg/m    Will continue to monitor surgical site, BYRON output and follow plan of care.  "

## 2021-08-18 NOTE — PROGRESS NOTES
S: Patient discharged to home via w/c with sister    B: Acute cholecystitis,     A: Pain controlled with oxycodone. VSS, afebrile. Abdomen soft, non tender, active bowel sounds. BYRON drain in place, small amount serosanguinous drainage.     R: Discharge instructions reviewed with patient. BYRON drain teaching done, patient verbalized understanding.  Listed belongings gathered and returned to patient.          Discharge Nursing Criteria:     Care Plan and Patient education resolved: Yes    New Medications- pt has been educated about purpose and side effects: Not Applicable    Intentionally Retained Items (examples: Drains, Wound packing, ureteral stents, hightower, PICC line or IV)  Patient was sent home with BYRON drain left in place.   Follow up appointment made for removal: Yes    MISC  Prescriptions if needed, hard copies sent with patient  NA  Home medications returned to patient: NA  Medication Bin checked and emptied on discharge Yes  Patient reports post-discharge pain management plan is effective: Yes

## 2021-08-19 NOTE — PROGRESS NOTES
Care Management Discharge Note    Discharge Date: 08/18/2021       Discharge Disposition: Home, Home Care, Outpatient Infusion Services    Discharge Services: Meals on Wheels    Discharge Transportation:  Family     Private pay costs discussed: Not applicable     Patient/family educated on Medicare website which has current facility and service quality ratings: no    Education Provided on the Discharge Plan:  Yes     Persons Notified of Discharge Plans: Patient     Patient/Family in Agreement with the Plan:  Yes     Handoff Referral Completed: Yes    Additional Information:  Patient discharged to home on 8/18/21, after Care Management left for the day. Patient will resume his current [facility] services of RN, PT, OT and add an aide.  Writer had informed home care of anticipated discharge.      Hand off to clinic care coordination due to patient being high risk for hospital readmission.         JUANA Heath  [facility]   [phone]

## 2021-08-20 NOTE — PROGRESS NOTES
Clinic Care Coordination Contact    Clinic Care Coordination Contact  OUTREACH    Referral Information:  Referral Source: IP Handoff    Primary Diagnosis: Oncology    Chief Complaint   Patient presents with     Clinic Care Coordination - Post Hospital     RNCC assessment-Hospital Discharge        Universal Utilization:   Clinic Utilization  Difficulty keeping appointments:: No  Compliance Concerns: No  No-Show Concerns: No  No PCP office visit in Past Year: No  Utilization    Hospital Admissions  8             ED Visits  9             No Show Count (past year)  5                Current as of: 8/20/2021  1:55 AM            Clinical Concerns:  Current Medical Concerns:  Called and spoke with pt, introduced self and role. Pt was recently hospitalized for a gallbladder attack and had it removed. Pt states he is doing pretty well at home.  Pt states he goes to outpt infusion therapy not at home.  Reviewed discharge avs and pt had no questions.  He is aware of his appt and his sister will drive him to that appt.   Current Behavioral Concerns: no current concerns    Education Provided to patient: call with any questions or concerns   Pain  Pain (GOAL):: No  Health Maintenance Reviewed: Not assessed  Clinical Pathway: None    Medication Management:  Medication review status:reviewed medications no questions    Functional Status:  Dependent ADLs:: Independent, Ambulation-walker (has a walker at home-uses as needed)  Dependent IADLs:: Cleaning, Cooking, Laundry, Shopping, Medication Management, Transportation  Mobility Status: Independent w/Device  Fallen 2 or more times in the past year?: Yes  Any fall with injury in the past year?: Yes    Living Situation:  Current living arrangement:: I live alone    Lifestyle & Psychosocial Needs:    Social Determinants of Health     Tobacco Use: Medium Risk     Smoking Tobacco Use: Former Smoker     Smokeless Tobacco Use: Never Used   Alcohol Use:      Frequency of Alcohol Consumption:       Average Number of Drinks:      Frequency of Binge Drinking:    Financial Resource Strain:      Difficulty of Paying Living Expenses:    Food Insecurity: No Food Insecurity     Worried About Running Out of Food in the Last Year: Never true     Ran Out of Food in the Last Year: Never true   Transportation Needs: No Transportation Needs     Lack of Transportation (Medical): No     Lack of Transportation (Non-Medical): No   Physical Activity: Inactive     Days of Exercise per Week: 0 days     Minutes of Exercise per Session: 0 min   Stress:      Feeling of Stress :    Social Connections: Unknown     Frequency of Communication with Friends and Family: Three times a week     Frequency of Social Gatherings with Friends and Family: Twice a week     Attends Orthodoxy Services: Never     Active Member of Clubs or Organizations: No     Attends Club or Organization Meetings: Never     Marital Status: Not on file   Intimate Partner Violence:      Fear of Current or Ex-Partner:      Emotionally Abused:      Physically Abused:      Sexually Abused:    Depression: Not at risk     PHQ-2 Score: 0   Housing Stability:      Unable to Pay for Housing in the Last Year:      Number of Places Lived in the Last Year:      Unstable Housing in the Last Year:      Diet:: Regular  Inadequate nutrition (GOAL):: No  Tube Feeding: No  Inadequate activity/exercise (GOAL):: No  Significant changes in sleep pattern (GOAL): No  Transportation means:: Regular car, Family     Informal Support system:: Family      Resources and Interventions:  Current Resources:   Skilled Home Care Services: Skilled Nursing, Physical Therapy  Community Resources: Home Care, Meals on Wheels, OP Infusion  Supplies used at home:: Compression Stockings  Equipment Currently Used at Home: none  Employment Status: retired    Advance Care Plan/Directive  Advanced Care Plans/Directives on file:: Yes  Type Advanced Care Plans/Directives: Advanced Directive - On File  Advanced  Care Plan/Directive Status: Not Applicable    Referrals Placed: None     Goals        General     Medical (pt-stated)      Goal Statement: I will seek medical help if I am experiencing reoccurrence of shortness of breath episodes   Date Goal set:7/22/2021  Barriers: Deconditioned   Strengths: Supportive sister   Date to Achieve By:10/22/2021  Patient expressed understanding of goal: Yes  Action steps to achieve this goal:  1. I will follow the congested hear future plan   2. I will keep future chemotherapy infusion and Oncology appointment s  3. I will check my weight daily and call if weight gain is 2-3# in a day or 5# in a week   4. I will take extra steps throughout the day to increase my strength   5. Care coordinator will follow up in 1-2 weeks             Patient/Caregiver understanding: Pt verbalizes understanding of follow up instructions and when scheduled appt date and times.          Future Appointments              In 3 days Angus Scott MD [facility]    In 3 days Kavin Tinajero RPH [facility]    In 4 days PH LAB [facility]    In 4 days Harriet Gutierrez APRN CNP [facility]    In 4 days PH INFUSION CHAIR 5; PH INFUSION NURSE [facility] Infusion [facility]    In 6 days PH LAB [facility]    In 6 days PH INFUSION CHAIR 5; PH INFUSION NURSE [facility] Infusion [facility]    In 6 days Faisal Gonzalez DO [facility]    In 1 week PH LAB [facility]    In 1 week PH INFUSION CHAIR 6; PH INFUSION NURSE [facility] Infusion [facility]    In 1 week PH INFUSION CHAIR 1; PH INFUSION NURSE [facility] Infusion  Services [facility]    In 1 week PH INFUSION NURSE; PH INFUSION CHAIR 11 M [facility] Infusion Services [facility]    In 1 week PH LAB [facility]    In 1 week PH INFUSION CHAIR 6; PH INFUSION NURSE [facility] Infusion [facility]    In 2 weeks PH INFUSION CHAIR 7; PH INFUSION NURSE [facility] Infusion [facility]    In 2 weeks PH LAB [facility]    In 2 weeks PH INFUSION NURSE; PH INFUSION CHAIR 13 [facility] Infusion [facility]    In 3 weeks PH LAB [facility]    In 3 weeks PH INFUSION CHAIR 5; PH INFUSION NURSE [facility] Infusion [facility]    In 3 weeks PH LAB [facility]    In 3 weeks PH INFUSION NURSE; PH INFUSION CHAIR 9 [facility] Infusion [facility]    In 4 weeks PH LAB [facility]    In 4 weeks PH INFUSION NURSE; PH INFUSION CHAIR 8 [facility] Infusion Services [facility]    In 1 month PH LAB [facility]    In 1 month PH INFUSION CHAIR 4; PH INFUSION NURSE [facility] Infusion [facility]    In 1 month PH LAB [facility]    In 1 month PH INFUSION NURSE; PH INFUSION CHAIR 8 [facility] Infusion [facility]    In 1 month PH LAB [facility]    In 1 month PH INFUSION NURSE; PH INFUSION CHAIR 8 [facility] Infusion [facility]    In 1 month PH LAB [facility]    In 1 month PH INFUSION NURSE; PH INFUSION CHAIR 8 M  [facility] Services [facility]          Plan:   Pt's lead RNCC will follow up in 5-10 business days.       Barbara DENYNN, RN, PHN, CCM  Primary Clinic Care Coordination    [facility]  Primary Care Clinics  [email or web address]   Office: [phone]  Employed by [facility]

## 2021-08-20 NOTE — TELEPHONE ENCOUNTER
Call from Edwin Wang, who needs verbal orders today for home visit tomorrow    Skilled nurse visits for BYRON care and med management.  Daily for 10 days  2 times per week for 8 weeks  3 prn visit      Advised will route message to clinic who can call her back to give verbal orders.    Héctor Parada RN/[facility]        Reason for Disposition    [1] Follow-up call from patient regarding patient's clinical status AND [2] information NON-URGENT    Additional Information    Negative: ED call to PCP    Negative: Physician call to PCP    Negative: Call about patient who is currently hospitalized    Negative: Lab or radiology calling with CRITICAL test results    Negative: [1] Prescription not at pharmacy AND [2] was prescribed today by PCP    Negative: [1] Follow-up call from patient regarding patient's clinical status AND [2] information urgent    Negative: [1] Caller requests to speak ONLY to PCP AND [2] urgent question    Negative: [1] Caller requests to speak to PCP now AND [2] won't tell us reason for call  (Exception: if 10 pm to 6 am, caller must first discuss reason for the call)    Negative: Notification of hospital admission    Negative: Notification of death    Negative: Lab or radiology calling with test results    Negative: Caller requesting lab results    Protocols used: PCP CALL - NO TRIAGE-A-

## 2021-08-23 PROBLEM — R10.11 RIGHT UPPER QUADRANT ABDOMINAL PAIN: Status: RESOLVED | Noted: 2021-01-01 | Resolved: 2021-01-01

## 2021-08-23 NOTE — LETTER
[place] [id number] [facility] [address]  [phone]      August 23, 2021    Renny Gannon                                                                                                                     [phone] [address]      Dear Renny,    It was so nice to speak with you on 8/23/2021.  I hope I was able to give you some useful information during our Medication Therapy Management (MTM) visit. The purpose of this visit with a clinical pharmacist was to review the medicines you received when discharged from the hospital. We want to make sure that you know which medicines to take and what they are for. We also want to make sure all your medicines are working, safe, and as easy to take as possible.    Based upon our conversation continue to take your medications as prescribed and attend your upcoming appointments.    Feel free to call if you have any questions or concerns. By working together with you and your doctor, I hope to help you feel confident managing your medicines and improving your quality of life.       Best wishes,         Augusto Tinajero, Consuelo, [facility] Center[facility]  Medication Therapy Management Pharmacist  Pager: [phone]

## 2021-08-23 NOTE — PROGRESS NOTES
Hematology/Oncology Visit    Care Team:  - Oncologist: Dr. Patel  - PCP: Angus Clements Mai, MD    Reason for visit: post-hospitalization follow-up and exam prior to C3D1 Dec+carlos    Diagnosis: JAK2+ myelofibosis with blast crisis    Cancer and Treatment Hx:  Dec 2019: presented with fatigue and dyspnea on exertion. CBC showed WBC 79.1, Hgb 11.9, platelet count of 378. Peripheral blood smear revealed leuko-erythroblastic reaction with neutrophilic left shift and rare circulating blasts without dysplasia. BMBx 12/30 showing hypercellular marrow with granulocytic and megakaryocytic proliferation, megakaryocytic clustering and atypia, marrow fibrosis and 1.5% blasts consistent with myelofibrosis. 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   Jan 2020: started on Hydrea 500mg every day  Dec 2020: admitted for profound anemia, requiring 2 units of blood, Hydrea held  Jan 2021: restarted Hydrea with improving counts, held late Jan 2021 for persistent pancytopenia  March 26, 2021: BMBx demonstrated 40-50% cellularity with increased fibrosis and 5% blasts  - Numerous hospitalizations for heart failure/cardiac issues and dyspnea, requiring frequent blood transfusions  Shikha 3-18, 2021: hospitalized for leukocytosis and concern for acute leukemia. BMBx 6/4 (core only, unable to obtain aspirate) demonstrated hypercellular marrow with 5-10% myeloid blasts and marked marrow fibrosis.  Findings consistent with blast crisis arising from previously diagnosed myeloproliferative neoplasm. Diagnosis made on the basis of increased peripheral blast percentage (23%); blast percentage in bone marrow is difficult to establish d/t bone marrow fibrosis and lack of aspirates. BMBx flow from core shows increased abnormal myeloid blasts (12%). FISH with monosomy 7. FLT3 negative.   June 6, 2021: Initiated cycle 1 decitabine, venetoclax ramp up initiated 6/9 while inpatient  July 7, 2021: BMBx after cycle 1 demonstrated hypercellular marrow with 13%  blasts and marked fibrosis (MF3), peripheral blood with 19% circulating blasts.  June 27, 2021: held venetoclax for pancytopenia with plans to continue at start of next cycle (patient reported on 8/4 that he had not been holding, however)  Aug 13, 2021: BMBx: hypercellular with marrow fibrosis (MF2-3) and 7.7% morphologic blasts  Aug 15, 2021: admitted for acute cholecystitis and underwent cholecystectomy with drain placement on 8/15    Interval History:  Renny has been feeling well post-surgically. Has only needed to empty his BYRON drain once daily. No abdominal pain or notable bleeding anywhere. He notes 2-3 small loose stools for the past 3 days without associated cramping. No blood in stool. Eating and drinking well. No nausea. Discussed plan for treatment next week and he has no questions or concerns.     Medications:  Current Outpatient Medications   Medication     acyclovir (ZOVIRAX) 400 MG tablet     furosemide (LASIX) 40 MG tablet     levofloxacin (LEVAQUIN) 250 MG tablet     metoprolol tartrate (LOPRESSOR) 50 MG tablet     polyethylene glycol (MIRALAX) 17 GM/Dose powder     rosuvastatin (CRESTOR) 5 MG tablet     SENEXON-S 8.6-50 MG tablet     vitamin B-12 (CYANOCOBALAMIN) 250 MCG tablet     vitamin D3 (CHOLECALCIFEROL) 50 mcg (2000 units) tablet     ACE/ARB/ARNI NOT PRESCRIBED (INTENTIONAL)     allopurinol (ZYLOPRIM) 300 MG tablet     ASPIRIN NOT PRESCRIBED (INTENTIONAL)     fluconazole (DIFLUCAN) 200 MG tablet     nitroGLYcerin (NITROSTAT) 0.4 MG sublingual tablet     oxyCODONE (ROXICODONE) 5 MG tablet     oxyCODONE (XTAMPZA) 9 MG 12 hr tablet     No current facility-administered medications for this visit.     Physical Exam:  GEN: pleasantly conversant elderly male no acute distress  SKIN: petechial rash BUE and LUQ of abdomen, faint yellow ecchymosis on abdomen. Three small laporoscopic incisions approximated with surgical glue, no erythema. BYRON drain with sanguinous drainage in bulb, insertion site RLQ  "without erythema but surrounding dressing is saturated with serosanguinous fluid.  ENT: eyes non-icteric, two small wet purpura soft palate and left buccal mucosa  LYMPH: no notable cervical, supraclavicular, or axillary lymphadenopathy  RESP: clear to auscultation bilaterally, on room air  CARDS: regular rate and rhythm, no notable murmurs   GI: abd soft, hepatomegaly noted, tender to gentle palpation of RUQ  MSK: trace edema BLE  NEURO: alert and oriented without obvious focal deficit    /58 (BP Location: Left arm, Patient Position: Sitting, Cuff Size: Adult Regular)   Pulse 87   Temp 97.3  F (36.3  C) (Temporal)   Resp 12   Ht 1.727 m (5' 8\")   Wt 70.1 kg (154 lb 7 oz)   SpO2 96%   BMI 23.48 kg/m       Wt Readings from Last 4 Encounters:   08/24/21 70.1 kg (154 lb 7 oz)   08/23/21 70 kg (154 lb 6 oz)   08/18/21 74 kg (163 lb 3.2 oz)   08/13/21 71.8 kg (158 lb 6.4 oz)     Labs:  Results for orders placed or performed in visit on 08/24/21   Comprehensive metabolic panel     Status: Abnormal   Result Value Ref Range    Sodium 139 133 - 144 mmol/L    Potassium 4.3 3.4 - 5.3 mmol/L    Chloride 107 94 - 109 mmol/L    Carbon Dioxide (CO2) 32 20 - 32 mmol/L    Anion Gap <1 (L) 3 - 14 mmol/L    Urea Nitrogen 18 7 - 30 mg/dL    Creatinine 0.82 0.66 - 1.25 mg/dL    Calcium 8.8 8.5 - 10.1 mg/dL    Glucose 116 (H) 70 - 99 mg/dL    Alkaline Phosphatase 104 40 - 150 U/L    AST 9 0 - 45 U/L    ALT 20 0 - 70 U/L    Protein Total 7.3 6.8 - 8.8 g/dL    Albumin 3.2 (L) 3.4 - 5.0 g/dL    Bilirubin Total 0.4 0.2 - 1.3 mg/dL    GFR Estimate 89 >60 mL/min/1.73m2   CBC with platelets and differential     Status: Abnormal   Result Value Ref Range    WBC Count 5.3 4.0 - 11.0 10e3/uL    RBC Count 2.92 (L) 4.40 - 5.90 10e6/uL    Hemoglobin 8.7 (L) 13.3 - 17.7 g/dL    Hematocrit 25.8 (L) 40.0 - 53.0 %    MCV 88 78 - 100 fL    MCH 29.8 26.5 - 33.0 pg    MCHC 33.7 31.5 - 36.5 g/dL    RDW 15.9 (H) 10.0 - 15.0 %    Platelet Count 8 " (LL) 150 - 450 10e3/uL    Absolute Neutrophils 2.5 1.6 - 8.3 10e3/uL     Imaging:  Reviewed CT A/P from 8/15/21:  IMPRESSION:   1.  Colonic diverticulosis.  2.  Borderline distended gallbladder with multiple gallstones.  3.  Hepatosplenomegaly.  4.  Cardiomegaly.  5.  Fluid-filled lower thoracic esophagus consistent with reflux.  6.  Chronic left pleural effusion with adjacent atelectasis or infiltrate.    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 Patel)  - Continues on neutropenia prophylaxis with acyclovir, fluconazole, and levofloxacin  - Currently requiring significant transfusion support, lab monitoring 3x/wk with transfusion PRN     Acute cholecystitis s/p cholecystectomy  Diarrhea  - Recently hospitalized for acute cholecystitis and underwent cholecystectomy on 8/15  - Discharged with a BYRON drain, has not been measuring but has emptied sanguinous fluid daily   - Recommended emptying same time daily and measuring   - Dressing change done today, surrounding skin without signs of infection   - Follow up with Gen Surg 8/26 for likely drain removal  - No abdominal pain, has had 2 days of small loose stools, will monitor closely     Social support  - No current 24/7 caregiver, sister comes in 1-2x/wk to help  - At times having difficulties managing his medications, Med Therapy Management following  -  Has a nurse come in his home occasionally through [facility] as well as home PT/OT      Future Appointments   Date Time Provider Department Center   8/24/2021 12:00 PM PH LAB [facility]   8/24/2021 12:30 PM Harriet Gutierrez, APRN CNP [facility]   8/24/2021  1:30 PM PH INFUSION NURSE [facility] [place]   8/26/2021  9:00 AM PH LAB [facility]   8/26/2021  9:30 AM PH INFUSION NURSE [facility] [place] [facility]   8/26/2021  3:00 PM Kieran Johnson, DO [facility]   8/30/2021  9:00 [facility]   8/30/2021  9:30 AM PH INFUSION NURSE [facility] [place] [facility]   8/31/2021 12:30 PM PH INFUSION NURSE [facility] [place] [facility]   9/1/2021 11:00 AM PH INFUSION CHAIR 11 [facility] [place] [facility]   9/2/2021 10:30 AM PH LAB [facility]   9/2/2021 11:00 AM PH INFUSION NURSE [facility] [place] [facility]   9/3/2021 11:30 AM PH INFUSION NURSE [facility] [place] [facility]   9/8/2021  8:30 AM PH LAB [facility]   9/8/2021  9:00 AM PH INFUSION CHAIR 13 [facility] [place] [facility]   9/10/2021 11:30 AM PH LAB [facility]   9/10/2021 12:00 PM PH INFUSION NURSE [facility] [place] [facility]   9/14/2021 10:30 AM PH LAB [facility]   9/14/2021 11:00 AM PH INFUSION CHAIR 9 [facility] [place] [facility]   9/17/2021  9:30 AM [facility]   9/17/2021 10:00 AM PH INFUSION CHAIR 8 [facility] [place] [facility]   9/21/2021  9:00 AM PH LAB [facility]   9/21/2021  9:30 AM PH INFUSION NURSE [facility] [place] [facility]   9/24/2021  9:00 AM [facility]   9/24/2021  9:30 AM PH INFUSION CHAIR 8 [facility] [place] [facility]   9/28/2021  8:30 AM [facility]   9/28/2021  9:00 AM [facility]   10/1/2021  9:00 AM PH LAB [facility]   10/1/2021  9:30 AM PH INFUSION CHAIR 8 [facility] [place] [facility]     The total time of this encounter amounted to 30 minutes today. This time includes face-to-face time spent with the patient, prep work, ordering tests, and performing post-visit  documentation.    Harriet Gutierrez, CNP

## 2021-08-23 NOTE — PROGRESS NOTES
Medication Therapy Management (MTM) Encounter    ASSESSMENT:                            Medication Adherence/Access: No issues identified    Leukemia: Plan in place for follow-up.     HFpEF/CAD: Stable per patient.    Hyperlipidemia: Stable.    Supplements: Stable.    PLAN:                            1. Continue your current medications    Follow-up: 2-4 weeks if needed    SUBJECTIVE/OBJECTIVE:                          Renny Gannon is a 70 year old male called for a transitions of care visit. He was discharged from [facility] on 8/18/2021 for acute cholecystitis.      Reason for visit: Hospital Follow-Up.    Allergies/ADRs: Reviewed in chart  Past Medical History: Reviewed in chart  Tobacco: He reports that he quit smoking about 20 years ago. His smoking use included cigarettes. He started smoking about 60 years ago. He quit after 30.00 years of use. He has never used smokeless tobacco.  Alcohol: none      Medication Adherence/Access: Patient uses pill box(es).  Patient takes medications 2 time(s) per day.   Per patient, misses medication 0 times per week.   Medication barriers: none.   The patient fills medications at [facility]: YES.    Leukemia: Currently taking acyclovir 400 mg twice daily, allopurinol 300 mg daily, fluconazole 200 mg daily, and levofloxacin.  Has Xtampza (oxycodone) 9 mg every 12 hours and oxycodone IR 5 mg as needed, but is not currently using. Patient did stop Venclexta, ondansetron, and prochlorperazine. Pt reports no current issues or side effects. Has follow-up in place with his oncology team tomorrow. Does have Miralax and Senna-S available for when taking opioids, but has backed off right now to avoid diarrhea.     HFpEF/CAD: Current medications include metoprolol tartrate 50 mg twice daily, furosemide 40 mg daily, and nitroglycerin SL as needed (has not needed). Patient reports no current medication side effects.     ECHO:  Date 7/20/2021, EF 50-55%  Patient is not complaining of HF  symptoms.    Patient is measuring daily weights.   Patient does not self-monitor blood pressure.   Patient is following a low sodium diet, is avoiding EtOH.    Hyperlipidemia: Current therapy includes rosuvastatin 5 mg daily.  Patient reports no significant myalgias or other side effects.  Recent Labs   Lab Test 01/28/21  0532 08/17/20  1110   CHOL 95 113   HDL 20* 39*   LDL 41 46   TRIG 172* 141       Supplements: Patient is taking vitamin B12 250 mcg daily and vitamin D3 50 mcg daily. Denies any issues.     Today's Vitals: There were no vitals taken for this visit.     BP Readings from Last 3 Encounters:   08/23/21 112/64   08/19/21 125/74   08/18/21 114/57     Wt Readings from Last 5 Encounters:   08/23/21 154 lb 6 oz (70 kg)   08/18/21 163 lb 3.2 oz (74 kg)   08/13/21 158 lb 6.4 oz (71.8 kg)   08/11/21 158 lb 2 oz (71.7 kg)   08/05/21 157 lb 14.4 oz (71.6 kg)     ----------------  Post Discharge Medication Reconciliation Status: discharge medications reconciled, continue medications without change.    I spent 15 minutes with this patient today. A copy of the visit note was provided to the patient's primary care provider.    The patient was mailed a summary of these recommendations.     Augusto Tinajero, CarloD, BCACP  Medication Therapy Management Pharmacist  Pager: [phone]    Telemedicine Visit Details  Type of service:  Telephone visit  Start Time: 2:00 PM  End Time: 2:15 PM  Originating Location (patient location): [place]  Distant Location (provider location):  [facility]     Medication Therapy Recommendations  No medication therapy recommendations to display

## 2021-08-23 NOTE — PROGRESS NOTES
Assessment & Plan       ICD-10-CM    1. Hospital discharge follow-up  Z09 Adult General Surg Referral   2. Acute cholecystitis  K81.0 Adult General Surg Referral   3. S/P cholecystectomy  Z90.49 Adult General Surg Referral     Renny has a complex medical history who recently had a laparoscopic cholelithiasis cholecystitis secondary to acute cholecystitis with no complication.  He is overall doing well.  Pain is controlled, tolerate oral intake well with no nausea or vomiting.  No diarrhea or constipation.  No fever or chills.  Minimal draining was noted on the drainage, less than 10 cc of maroon bloody color drainage in the bulb (changed last night per his report).  The wounds are healing well, no sign of infection.    Will have him follow-up with the surgery this week for potential removal of the drainage.  Continue with wound care.  Symptoms the need to be seen/call in discussed.  He is scheduled to have weekly blood transfusion tomorrow.  No lab is needed today.    His other medical conditions are overall stable.  He is seeing the oncologist for myeloproliferative neoplasm.         Return in about 3 days (around 8/26/2021) for folow up with surgery.    Angus Clements Mai, MD  [facility]   Renny is a 70 year old who presents for the following health issues     HPI       Hospital Follow-up Visit:    Hospital/Nursing Home/IP Rehab Facility: [facility]  Date of Admission: 08/15/2021  Date of Discharge: 08/18/2021  Reason(s) for Admission: Acute Cholecystitis      Was your hospitalization related to COVID-19? No   Problems taking medications regularly:  None  Medication changes since discharge: Medications that were stopped was Ondansetron 8mg, Prochlorperazine 5mg, trazodone 50mg and venetoclax 100mg   Problems adhering to non-medication therapy:  None    Summary of hospitalization:  [facility] discharge summary reviewed  Diagnostic  Tests/Treatments reviewed.  Follow up needed: none  Other Healthcare Providers Involved in Patient s Care:         Specialist appointment - surgery and oncology  Update since discharge: improved. Post Discharge Medication Reconciliation: discharge medications reconciled, continue medications without change.  Plan of care communicated with patient          Renny is here today for hospital follow-up.  He has a complex medical history which include CAD, CHF, high blood pressure, and myeloproliferative neoplasm with chronic anemia that required blood transfusions pretty much weekly.  He was admitted to the hospital on 8/15/21 for acute cholecystitis and was discharged on 8/1/21.  He had a laparoscopic cholecystectomy with no complications and uneventful recovery.    Stated overall he is feeling pretty good.  No pain, has not felt the need to take the pain medication for a while.  No nausea or vomiting, tolerate oral intake well.  No diarrhea or constipation.  No melena or hematochezia.  No fever or chills.  He does not have a follow appointment with the surgeon set up yet.  He has been changing the drain on his own, minimal drainage.    He is scheduled for weekly blood transfusion tomorrow.  He lives alone but his sisters come to check on him daily.  His sister also cook food for him.  Not interested in assisted living or nursing home at this time.      Review of Systems   Constitutional, HEENT, cardiovascular, pulmonary, gi and gu systems are negative, except as otherwise noted.      Objective    /64   Pulse 86   Temp 97.4  F (36.3  C) (Temporal)   Resp 22   Wt 70 kg (154 lb 6 oz)   SpO2 97%   BMI 23.47 kg/m    Body mass index is 23.47 kg/m .  Physical Exam   GENERAL: alert and no distress.  Speaking in full sentences.  RESP: lungs clear to auscultation - no rales, rhonchi or wheezes  CV: regular rate and rhythm, normal S1 S2, no S3 or S4, no murmur  ABDOMEN: soft, nondistended, no palpable masses  organomegaly with normal bowel sound.  No tender or discomfort with palpation to the abdomen.  MS: no gross musculoskeletal defects noted, no edema.  SKIN: no suspicious lesions or rashes.  The incisions are healing expected.  Drain is clean and dry, about 10 cc of maroon bloody drainage in the bulb - last change as before bedtime last night, more than 12 hrs ago.    No results found for any visits on 08/23/21.

## 2021-08-23 NOTE — PATIENT INSTRUCTIONS
Recommendations from today's MTM visit:                                                    MTM (medication therapy management) is a service provided by a clinical pharmacist designed to help you get the most of out of your medicines.   Today we reviewed what your medicines are for, how to know if they are working, that your medicines are safe and how to make your medicine regimen as easy as possible.      1. Continue your current medications    Follow-up: 2-4 weeks if needed    It was great to speak with you today.  I value your experience and would be very thankful for your time with providing feedback on our clinic survey. You may receive a survey via email or text message in the next few days.     To schedule another MTM appointment, please call the clinic directly or you may call the MTM scheduling line at [phone] or toll-free at [phone].     My Clinical Pharmacist's contact information:                                                      Please feel free to contact me with any questions or concerns you have.      Augusto Tinajero, Consuelo, [facility]  Medication Therapy Management Pharmacist  Pager: [phone]

## 2021-08-24 NOTE — LETTER
8/24/2021         RE: Renny Gannon  [address]  [place] [address]        Dear Colleague,    Thank you for referring your patient, Renny Gannon, to the [facility]. Please see a copy of my visit note below.    Hematology/Oncology Visit    Care Team:  - Oncologist: Dr. Patel  - PCP: Angus Clements Mai, MD    Reason for visit: post-hospitalization follow-up and exam prior to C3D1 Dec+carlos    Diagnosis: JAK2+ myelofibosis with blast crisis    Cancer and Treatment Hx:  Dec 2019: presented with fatigue and dyspnea on exertion. CBC showed WBC 79.1, Hgb 11.9, platelet count of 378. Peripheral blood smear revealed leuko-erythroblastic reaction with neutrophilic left shift and rare circulating blasts without dysplasia. BMBx 12/30 showing hypercellular marrow with granulocytic and megakaryocytic proliferation, megakaryocytic clustering and atypia, marrow fibrosis and 1.5% blasts consistent with myelofibrosis. 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   Jan 2020: started on Hydrea 500mg every day  Dec 2020: admitted for profound anemia, requiring 2 units of blood, Hydrea held  Jan 2021: restarted Hydrea with improving counts, held late Jan 2021 for persistent pancytopenia  March 26, 2021: BMBx demonstrated 40-50% cellularity with increased fibrosis and 5% blasts  - Numerous hospitalizations for heart failure/cardiac issues and dyspnea, requiring frequent blood transfusions  Shikha 3-18, 2021: hospitalized for leukocytosis and concern for acute leukemia. BMBx 6/4 (core only, unable to obtain aspirate) demonstrated hypercellular marrow with 5-10% myeloid blasts and marked marrow fibrosis.  Findings consistent with blast crisis arising from previously diagnosed myeloproliferative neoplasm. Diagnosis made on the basis of increased peripheral blast percentage (23%); blast percentage in bone marrow is difficult to establish d/t bone marrow fibrosis and lack of aspirates. BMBx flow from core  shows increased abnormal myeloid blasts (12%). FISH with monosomy 7. FLT3 negative.   June 6, 2021: Initiated cycle 1 decitabine, venetoclax ramp up initiated 6/9 while inpatient  July 7, 2021: BMBx after cycle 1 demonstrated hypercellular marrow with 13% blasts and marked fibrosis (MF3), peripheral blood with 19% circulating blasts.  June 27, 2021: held venetoclax for pancytopenia with plans to continue at start of next cycle (patient reported on 8/4 that he had not been holding, however)  Aug 13, 2021: BMBx: hypercellular with marrow fibrosis (MF2-3) and 7.7% morphologic blasts  Aug 15, 2021: admitted for acute cholecystitis and underwent cholecystectomy with drain placement on 8/15    Interval History:  Renny has been feeling well post-surgically. Has only needed to empty his BYRON drain once daily. No abdominal pain or notable bleeding anywhere. He notes 2-3 small loose stools for the past 3 days without associated cramping. No blood in stool. Eating and drinking well. No nausea. Discussed plan for treatment next week and he has no questions or concerns.     Medications:  Current Outpatient Medications   Medication     acyclovir (ZOVIRAX) 400 MG tablet     furosemide (LASIX) 40 MG tablet     levofloxacin (LEVAQUIN) 250 MG tablet     metoprolol tartrate (LOPRESSOR) 50 MG tablet     polyethylene glycol (MIRALAX) 17 GM/Dose powder     rosuvastatin (CRESTOR) 5 MG tablet     SENEXON-S 8.6-50 MG tablet     vitamin B-12 (CYANOCOBALAMIN) 250 MCG tablet     vitamin D3 (CHOLECALCIFEROL) 50 mcg (2000 units) tablet     ACE/ARB/ARNI NOT PRESCRIBED (INTENTIONAL)     allopurinol (ZYLOPRIM) 300 MG tablet     ASPIRIN NOT PRESCRIBED (INTENTIONAL)     fluconazole (DIFLUCAN) 200 MG tablet     nitroGLYcerin (NITROSTAT) 0.4 MG sublingual tablet     oxyCODONE (ROXICODONE) 5 MG tablet     oxyCODONE (XTAMPZA) 9 MG 12 hr tablet     No current facility-administered medications for this visit.     Physical Exam:  GEN: pleasantly conversant  "elderly male no acute distress  SKIN: petechial rash BUE and LUQ of abdomen, faint yellow ecchymosis on abdomen. Three small laporoscopic incisions approximated with surgical glue, no erythema. BYRON drain with sanguinous drainage in bulb, insertion site RLQ without erythema but surrounding dressing is saturated with serosanguinous fluid.  ENT: eyes non-icteric, two small wet purpura soft palate and left buccal mucosa  LYMPH: no notable cervical, supraclavicular, or axillary lymphadenopathy  RESP: clear to auscultation bilaterally, on room air  CARDS: regular rate and rhythm, no notable murmurs   GI: abd soft, hepatomegaly noted, tender to gentle palpation of RUQ  MSK: trace edema BLE  NEURO: alert and oriented without obvious focal deficit    /58 (BP Location: Left arm, Patient Position: Sitting, Cuff Size: Adult Regular)   Pulse 87   Temp 97.3  F (36.3  C) (Temporal)   Resp 12   Ht 1.727 m (5' 8\")   Wt 70.1 kg (154 lb 7 oz)   SpO2 96%   BMI 23.48 kg/m       Wt Readings from Last 4 Encounters:   08/24/21 70.1 kg (154 lb 7 oz)   08/23/21 70 kg (154 lb 6 oz)   08/18/21 74 kg (163 lb 3.2 oz)   08/13/21 71.8 kg (158 lb 6.4 oz)     Labs:  Results for orders placed or performed in visit on 08/24/21   Comprehensive metabolic panel     Status: Abnormal   Result Value Ref Range    Sodium 139 133 - 144 mmol/L    Potassium 4.3 3.4 - 5.3 mmol/L    Chloride 107 94 - 109 mmol/L    Carbon Dioxide (CO2) 32 20 - 32 mmol/L    Anion Gap <1 (L) 3 - 14 mmol/L    Urea Nitrogen 18 7 - 30 mg/dL    Creatinine 0.82 0.66 - 1.25 mg/dL    Calcium 8.8 8.5 - 10.1 mg/dL    Glucose 116 (H) 70 - 99 mg/dL    Alkaline Phosphatase 104 40 - 150 U/L    AST 9 0 - 45 U/L    ALT 20 0 - 70 U/L    Protein Total 7.3 6.8 - 8.8 g/dL    Albumin 3.2 (L) 3.4 - 5.0 g/dL    Bilirubin Total 0.4 0.2 - 1.3 mg/dL    GFR Estimate 89 >60 mL/min/1.73m2   CBC with platelets and differential     Status: Abnormal   Result Value Ref Range    WBC Count 5.3 4.0 - 11.0 " 10e3/uL    RBC Count 2.92 (L) 4.40 - 5.90 10e6/uL    Hemoglobin 8.7 (L) 13.3 - 17.7 g/dL    Hematocrit 25.8 (L) 40.0 - 53.0 %    MCV 88 78 - 100 fL    MCH 29.8 26.5 - 33.0 pg    MCHC 33.7 31.5 - 36.5 g/dL    RDW 15.9 (H) 10.0 - 15.0 %    Platelet Count 8 (LL) 150 - 450 10e3/uL    Absolute Neutrophils 2.5 1.6 - 8.3 10e3/uL     Imaging:  Reviewed CT A/P from 8/15/21:  IMPRESSION:   1.  Colonic diverticulosis.  2.  Borderline distended gallbladder with multiple gallstones.  3.  Hepatosplenomegaly.  4.  Cardiomegaly.  5.  Fluid-filled lower thoracic esophagus consistent with reflux.  6.  Chronic left pleural effusion with adjacent atelectasis or infiltrate.    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 Patel)  - Continues on neutropenia prophylaxis with acyclovir, fluconazole, and levofloxacin  - Currently requiring significant transfusion support, lab monitoring 3x/wk with transfusion PRN     Acute cholecystitis s/p cholecystectomy  Diarrhea  - Recently hospitalized for acute cholecystitis and underwent cholecystectomy on 8/15  - Discharged with a BYRON drain, has not been measuring but has emptied sanguinous fluid daily   - Recommended emptying same time daily and measuring   - Dressing change done today, surrounding skin without signs of infection   - Follow up with Gen Surg 8/26 for likely drain removal  -  No abdominal pain, has had 2 days of small loose stools, will monitor closely     Social support  - No current 24/7 caregiver, sister comes in 1-2x/wk to help  - At times having difficulties managing his medications, Med Therapy Management following  - Has a nurse come in his home occasionally through [facility] as well as home PT/OT      Future Appointments   Date Time Provider Department Center   8/24/2021 12:00 PM PH LAB [facility]   8/24/2021 12:30 PM Harriet Gutierrez APRN [facility]   8/24/2021  1:30 PM PH INFUSION NURSE [facility]   8/26/2021  9:00 AM PH LAB [facility]   8/26/2021  9:30 AM PH INFUSION NURSE [facility]   8/26/2021  3:00 PM Kieran Johnson, DO [facility]   8/30/2021  9:00 AM PH LAB [facility]   8/30/2021  9:30 AM PH INFUSION NURSE [facility]   8/31/2021 12:30 PM PH INFUSION NURSE [facility]   9/1/2021 11:00 AM PH INFUSION CHAIR 11 [facility]   9/2/2021 10:30 AM PH LAB [facility]   9/2/2021 11:00 AM PH INFUSION NURSE [facility]   9/3/2021 11:30 AM PH INFUSION NURSE [facility]   9/8/2021  8:30 AM PH LAB [facility]   9/8/2021  9:00 AM PH INFUSION CHAIR 13 [facility]   9/10/2021 11:30 AM PH LAB [facility]   9/10/2021 12:00 PM PH INFUSION NURSE [facility]   9/14/2021 10:30 AM PH LAB [facility]   9/14/2021 11:00 AM PH INFUSION CHAIR 9 [facility]   9/17/2021  9:30 AM PH LAB [facility]   9/17/2021 10:00 AM PH INFUSION CHAIR 8 [facility]   9/21/2021  9:00 AM PH LAB [facility]   9/21/2021  9:30 AM PH INFUSION NURSE [facility]   9/24/2021  9:00 AM PH LAB [facility]   9/24/2021  9:30 AM PH INFUSION CHAIR 8 [facility]   9/28/2021  8:30 AM PH LAB [facility]   9/28/2021  9:00 AM PH INFUSION CHAIR 8 [facility]   10/1/2021  9:00 AM PH LAB [facility]    10/1/2021  9:30 AM PH INFUSION CHAIR 8 [facility]     The total time of this encounter amounted to 30 minutes today. This time includes face-to-face time spent with the patient, prep work, ordering tests, and performing post-visit documentation.    Harriet Gutierrez CNP      DISCHARGE PLAN:  Next appointments: See patient instruction section  Departure Mode: Ambulatory  Accompanied by: self  3 minutes for nursing discharge (face to face time)     Renny RAFA Jagdeep is here today for Oncology follow up.  Writing nurse seen patient after Medical Oncology appointment to address questions/concerns/coordinate care. Patient to continue treatment next week.  Follow up in 2 weeks.  Appointments scheduled.  Provided copy of calendar.  See patient instructions and Oncologist's Progress note for further details. Questions and concerns addressed to patient's satisfaction. Patient verbalized and demonstrated understanding of plan.  Contact information provided and patient is encouraged to call with any that arise in the interim of care.    Ki Lamb, RN, BSN, OCN   Oncology Care Coordinator RN  [facility]  [phone]  8/24/2021, 1:28 PM            Again, thank you for allowing me to participate in the care of your patient.        Sincerely,        UNA Falcon CNP

## 2021-08-24 NOTE — PROGRESS NOTES
DISCHARGE PLAN:  Next appointments: See patient instruction section  Departure Mode: Ambulatory  Accompanied by: self  3 minutes for nursing discharge (face to face time)     Renny Gannon is here today for Oncology follow up.  Writing nurse seen patient after Medical Oncology appointment to address questions/concerns/coordinate care. Patient to continue treatment next week.  Follow up in 2 weeks.  Appointments scheduled.  Provided copy of calendar.  See patient instructions and Oncologist's Progress note for further details. Questions and concerns addressed to patient's satisfaction. Patient verbalized and demonstrated understanding of plan.  Contact information provided and patient is encouraged to call with any that arise in the interim of care.    Ki Lamb, RN, BSN, OCN   Oncology Care Coordinator RN  [facility]  [phone]  8/24/2021, 1:28 PM

## 2021-08-24 NOTE — PROGRESS NOTES
Infusion Nursing Note:  Renny Gannon presents today for 1 Unit Platelets.    Patient seen by provider today: Yes: ALEXIS Gutierrez, JUMA   present during visit today: Not Applicable.    Note: Patient denies new or increase of symptoms. See office visit note from Harriet Gutierrez. VSS, afebrile. Denies pain, dizziness, or increase in SOA/fatigue. Patient says his gums bleed on occasion but that has improved lately. Lungs clear all fields prior to transfusion.       Intravenous Access:  Peripheral IV placed.    Treatment Conditions:  Lab Results   Component Value Date    HGB 8.7 08/24/2021    HGB 8.6 07/07/2021     Lab Results   Component Value Date    WBC 5.3 08/24/2021    WBC 31.7 07/07/2021      Lab Results   Component Value Date    ANEU 2.5 08/24/2021    ANEU 6.6 07/07/2021     Lab Results   Component Value Date    PLT 8 08/24/2021    PLT 12 07/07/2021      Plts 8,000 today. Results reviewed, labs MET treatment parameters, ok to proceed with treatment.  Blood transfusion consent signed 3/5/21.      Post Infusion Assessment:  Patient tolerated infusion without incident. VSS, afebrile. Lungs clear with exception to faint crackles in anterior lobes. No increase SOA.  Blood return noted pre and post infusion.  Site patent and intact, free from redness, edema or discomfort.  No evidence of extravasations. Small pea sized bruise at site, pt denies pain, has blood return.  PIV access discontinued per protocol.       Discharge Plan:   Copy of AVS reviewed with patient. Patient will return 8/26 for next appointment.  Patient discharged in stable condition accompanied by: family member at main entrance.  Departure Mode: Ambulatory.      Kimberley Faust RN

## 2021-08-24 NOTE — PROGRESS NOTES
CLINICAL NUTRITION SERVICES - BRIEF NOTE    Patient was contacted by phone due to reporting concerns about ability to eat on the Oncology Distress Screening tool. RD unable to leave voicemail with patient as there was no voicemail box set-up. RD to attempt to reach patient at a later date.     Bryn Muñoz RDN, LD  Clinical Dietitian   Office: [phone]

## 2021-08-24 NOTE — PATIENT INSTRUCTIONS
Today:  Continue with treatment as planned    Please follow up with Harriet Gutierrez NP after in 2 weeks.     Office visit follow up with Harriet Gutierrez NP Date/Time: 9/7/21 at 3:30    If you have any questions or concerns please feel free to call.    If you need to reschedule please call:  Clinic or Lab Appointment - [phone]  Infusion - [phone]  Imaging - [phone]    Ki Lamb, RN, BSN, OCN  [facility]   Oncology/Hematology Care Coordinator RN  [facility]  [phone]    After Hours Oncology Nurse Line - [phone]

## 2021-08-26 NOTE — PROGRESS NOTES
General Surgery Follow Up    Pt returns for follow up visit s/p lap leonidas and drain placement by Dr De La Rosa    HPI:  Doing pretty well. Drain with minimal output, not even enough to quantify per the patient. He has been eating, but appetite not back to normal yet. Having bowel movements and urinating. No fevers. Continues to get his outpatient infusions for his MDS.      Past Medical History:   Diagnosis Date     Heart disease      History of blood transfusion      Hypertension      Leukocytosis 6/3/2021       Past Surgical History:   Procedure Laterality Date     BONE MARROW BIOPSY, BONE SPECIMEN, NEEDLE/TROCAR N/A 2019    Procedure: BIOPSY, BONE MARROW;  Surgeon: Aguilar Krause MD;  Location: [facility] OR     BYPASS GRAFT ARTERY CORONARY N/A 2020    Procedure: CORONARY ARTERY BYPASS GRAFTING X 3 - LIMA TO LAD, SV TO OM  AND PDA; ENDOVEIN HARVEST OF BILATERAL LEGS; ON PUMP WITH SANDRA;  Surgeon: Mable Barrera MD;  Location:  OR     CV CORONARY ANGIOGRAM Left 2020    Procedure: Coronary Angiogram;  Surgeon: Devin Bentley MD;  Location:  HE[facility] CARDI[facility] CATH LAB     IR FINE NEEDLE ASPIRATION W ULTRASOUND  6/10/2021     LAPAROSCOPIC CHOLECYSTECTOMY N/A 8/15/2021    Procedure: Laparoscopic cholecystectomy;  Surgeon: Seveirano De La Rosa MD;  Location:  OR     NO HISTORY OF SURGERY         Social History     Socioeconomic History     Marital status: Single     Spouse name: Not on file     Number of children: Not on file     Years of education: Not on file     Highest education level: Not on file   Occupational History     Occupation: Retired   Tobacco Use     Smoking status: Former Smoker     Years: 30.00     Types: Cigarettes     Start date: 1961     Quit date: 2001     Years since quittin.5     Smokeless tobacco: Never Used   Vaping Use     Vaping Use: Never used   Substance and Sexual Activity     Alcohol use: No     Drug use: No     Sexual activity: Not  Currently   Other Topics Concern     Parent/sibling w/ CABG, MI or angioplasty before 65F 55M? Not Asked   Social History Narrative     Not on file     Social Determinants of Health     Financial Resource Strain:      Difficulty of Paying Living Expenses:    Food Insecurity: No Food Insecurity     Worried About Running Out of Food in the Last Year: Never true     Ran Out of Food in the Last Year: Never true   Transportation Needs: No Transportation Needs     Lack of Transportation (Medical): No     Lack of Transportation (Non-Medical): No   Physical Activity: Inactive     Days of Exercise per Week: 0 days     Minutes of Exercise per Session: 0 min   Stress:      Feeling of Stress :    Social Connections: Unknown     Frequency of Communication with Friends and Family: Three times a week     Frequency of Social Gatherings with Friends and Family: Twice a week     Attends Christianity Services: Never     Active Member of Clubs or Organizations: No     Attends Club or Organization Meetings: Never     Marital Status: Not on file   Intimate Partner Violence:      Fear of Current or Ex-Partner:      Emotionally Abused:      Physically Abused:      Sexually Abused:        Current Outpatient Medications   Medication Sig Dispense Refill     ACE/ARB/ARNI NOT PRESCRIBED (INTENTIONAL) Please choose reason not prescribed from choices below. (Patient not taking: Reported on 8/24/2021)       acyclovir (ZOVIRAX) 400 MG tablet Take 1 tablet (400 mg) by mouth 2 times daily 60 tablet 1     allopurinol (ZYLOPRIM) 300 MG tablet Take 1 tablet (300 mg) by mouth daily (Patient not taking: Reported on 8/24/2021) 90 tablet 1     ASPIRIN NOT PRESCRIBED (INTENTIONAL) Please choose reason not prescribed from choices below. (Patient not taking: Reported on 8/24/2021)       fluconazole (DIFLUCAN) 200 MG tablet Take 1 tablet (200 mg) by mouth daily (Patient not taking: Reported on 8/24/2021) 30 tablet 1     furosemide (LASIX) 40 MG tablet Take 1  tablet (40 mg) by mouth daily 30 tablet 1     levofloxacin (LEVAQUIN) 250 MG tablet Take 1 tablet (250 mg) by mouth At Bedtime (Patient not taking: Reported on 8/26/2021) 30 tablet 1     metoprolol tartrate (LOPRESSOR) 50 MG tablet Take 1 tablet (50 mg) by mouth 2 times daily 30 tablet 3     nitroGLYcerin (NITROSTAT) 0.4 MG sublingual tablet For chest pain place 1 tablet under the tongue every 5 minutes for 3 doses. If symptoms persist 5 minutes after 1st dose call 911. (Patient not taking: Reported on 8/24/2021)       oxyCODONE (ROXICODONE) 5 MG tablet Take 5 mg by mouth every 6 hours as needed for severe pain (Patient not taking: Reported on 8/23/2021)       oxyCODONE (XTAMPZA) 9 MG 12 hr tablet Take 1 tablet (9 mg) by mouth every 12 hours . DO NOT drive or operate machinery while taking this medication. (Patient not taking: Reported on 8/23/2021) 60 tablet 0     polyethylene glycol (MIRALAX) 17 GM/Dose powder Take 17 g by mouth daily 510 g      rosuvastatin (CRESTOR) 5 MG tablet Take 1 tablet (5 mg) by mouth daily 30 tablet 1     SENEXON-S 8.6-50 MG tablet TAKE TWO TABLETS BY MOUTH TWO TIMES A  tablet 4     vitamin B-12 (CYANOCOBALAMIN) 250 MCG tablet TAKE ONE TABLET BY MOUTH EVERY MORNING 90 tablet 3     vitamin D3 (CHOLECALCIFEROL) 50 mcg (2000 units) tablet Take 1 tablet by mouth daily         Medications and history reviewed    Physical exam:  Vitals: /60   Temp 97.5  F (36.4  C) (Temporal)   Wt 70.4 kg (155 lb 1.6 oz)   BMI 23.58 kg/m    BMI= Body mass index is 23.58 kg/m .    HEART: RRR, no new murmurs  LUNGS: CTAB, equal chest rise, good effort  ABD: soft, non tender, non distended  INCISIONS: c/d/i. Drain with minimal scant dark clotted blood. No bile  EXT: RASHID, no deformities    PATHOLOGY:  Acute calculous cholecystitis with necrosis    Assessment:     ICD-10-CM    1. Acute cholecystitis  K81.0 Adult General Surg Referral     Adult General Surg Referral   2. Hospital discharge follow-up   Z09 Adult General Surg Referral   3. S/P cholecystectomy  Z90.49 Adult General Surg Referral     Plan: Drain removed today. Path reviewed and questions answered. He may call or return prn.    Kieran Johnson, DO

## 2021-08-26 NOTE — LETTER
8/26/2021         RE: Renny Gannon  [address]  [facility] [address]        Dear Colleague,    Thank you for referring your patient, Renny Gannon, to the [facility]. Please see a copy of my visit note below.    General Surgery Follow Up    Pt returns for follow up visit s/p lap leonidas and drain placement by Dr De La Rosa    HPI:  Doing pretty well. Drain with minimal output, not even enough to quantify per the patient. He has been eating, but appetite not back to normal yet. Having bowel movements and urinating. No fevers. Continues to get his outpatient infusions for his MDS.      Past Medical History:   Diagnosis Date     Heart disease      History of blood transfusion      Hypertension      Leukocytosis 6/3/2021       Past Surgical History:   Procedure Laterality Date     BONE MARROW BIOPSY, BONE SPECIMEN, NEEDLE/TROCAR N/A 12/30/2019    Procedure: BIOPSY, BONE MARROW;  Surgeon: Aguilar Krause MD;  Location: [facility] OR     BYPASS GRAFT ARTERY CORONARY N/A 1/31/2020    Procedure: CORONARY ARTERY BYPASS GRAFTING X 3 - LIMA TO LAD, SV TO OM  AND PDA; ENDOVEIN HARVEST OF BILATERAL LEGS; ON PUMP WITH SANDRA;  Surgeon: Mable Barrera MD;  Location:  OR     CV CORONARY ANGIOGRAM Left 1/9/2020    Procedure: Coronary Angiogram;  Surgeon: Devin Bentley MD;  Location:  HE[facility] CARDI[facility] CATH LAB     IR FINE NEEDLE ASPIRATION W ULTRASOUND  6/10/2021     LAPAROSCOPIC CHOLECYSTECTOMY N/A 8/15/2021    Procedure: Laparoscopic cholecystectomy;  Surgeon: Severiano De La Rosa MD;  Location:  OR     NO HISTORY OF SURGERY         Social History     Socioeconomic History     Marital status: Single     Spouse name: Not on file     Number of children: Not on file     Years of education: Not on file     Highest education level: Not on file   Occupational History     Occupation: Retired   Tobacco Use     Smoking status: Former Smoker     Years: 30.00     Types: Cigarettes      Start date: 1961     Quit date: 2001     Years since quittin.5     Smokeless tobacco: Never Used   Vaping Use     Vaping Use: Never used   Substance and Sexual Activity     Alcohol use: No     Drug use: No     Sexual activity: Not Currently   Other Topics Concern     Parent/sibling w/ CABG, MI or angioplasty before 65F 55M? Not Asked   Social History Narrative     Not on file     Social Determinants of Health     Financial Resource Strain:      Difficulty of Paying Living Expenses:    Food Insecurity: No Food Insecurity     Worried About Running Out of Food in the Last Year: Never true     Ran Out of Food in the Last Year: Never true   Transportation Needs: No Transportation Needs     Lack of Transportation (Medical): No     Lack of Transportation (Non-Medical): No   Physical Activity: Inactive     Days of Exercise per Week: 0 days     Minutes of Exercise per Session: 0 min   Stress:      Feeling of Stress :    Social Connections: Unknown     Frequency of Communication with Friends and Family: Three times a week     Frequency of Social Gatherings with Friends and Family: Twice a week     Attends Voodoo Services: Never     Active Member of Clubs or Organizations: No     Attends Club or Organization Meetings: Never     Marital Status: Not on file   Intimate Partner Violence:      Fear of Current or Ex-Partner:      Emotionally Abused:      Physically Abused:      Sexually Abused:        Current Outpatient Medications   Medication Sig Dispense Refill     ACE/ARB/ARNI NOT PRESCRIBED (INTENTIONAL) Please choose reason not prescribed from choices below. (Patient not taking: Reported on 2021)       acyclovir (ZOVIRAX) 400 MG tablet Take 1 tablet (400 mg) by mouth 2 times daily 60 tablet 1     allopurinol (ZYLOPRIM) 300 MG tablet Take 1 tablet (300 mg) by mouth daily (Patient not taking: Reported on 2021) 90 tablet 1     ASPIRIN NOT PRESCRIBED (INTENTIONAL) Please choose reason not prescribed  from choices below. (Patient not taking: Reported on 8/24/2021)       fluconazole (DIFLUCAN) 200 MG tablet Take 1 tablet (200 mg) by mouth daily (Patient not taking: Reported on 8/24/2021) 30 tablet 1     furosemide (LASIX) 40 MG tablet Take 1 tablet (40 mg) by mouth daily 30 tablet 1     levofloxacin (LEVAQUIN) 250 MG tablet Take 1 tablet (250 mg) by mouth At Bedtime (Patient not taking: Reported on 8/26/2021) 30 tablet 1     metoprolol tartrate (LOPRESSOR) 50 MG tablet Take 1 tablet (50 mg) by mouth 2 times daily 30 tablet 3     nitroGLYcerin (NITROSTAT) 0.4 MG sublingual tablet For chest pain place 1 tablet under the tongue every 5 minutes for 3 doses. If symptoms persist 5 minutes after 1st dose call 911. (Patient not taking: Reported on 8/24/2021)       oxyCODONE (ROXICODONE) 5 MG tablet Take 5 mg by mouth every 6 hours as needed for severe pain (Patient not taking: Reported on 8/23/2021)       oxyCODONE (XTAMPZA) 9 MG 12 hr tablet Take 1 tablet (9 mg) by mouth every 12 hours . DO NOT drive or operate machinery while taking this medication. (Patient not taking: Reported on 8/23/2021) 60 tablet 0     polyethylene glycol (MIRALAX) 17 GM/Dose powder Take 17 g by mouth daily 510 g      rosuvastatin (CRESTOR) 5 MG tablet Take 1 tablet (5 mg) by mouth daily 30 tablet 1     SENEXON-S 8.6-50 MG tablet TAKE TWO TABLETS BY MOUTH TWO TIMES A  tablet 4     vitamin B-12 (CYANOCOBALAMIN) 250 MCG tablet TAKE ONE TABLET BY MOUTH EVERY MORNING 90 tablet 3     vitamin D3 (CHOLECALCIFEROL) 50 mcg (2000 units) tablet Take 1 tablet by mouth daily         Medications and history reviewed    Physical exam:  Vitals: /60   Temp 97.5  F (36.4  C) (Temporal)   Wt 70.4 kg (155 lb 1.6 oz)   BMI 23.58 kg/m    BMI= Body mass index is 23.58 kg/m .    HEART: RRR, no new murmurs  LUNGS: CTAB, equal chest rise, good effort  ABD: soft, non tender, non distended  INCISIONS: c/d/i. Drain with minimal scant dark clotted blood. No  bile  EXT: RASHID, no deformities    PATHOLOGY:  Acute calculous cholecystitis with necrosis    Assessment:     ICD-10-CM    1. Acute cholecystitis  K81.0 Adult General Surg Referral     Adult General Surg Referral   2. Hospital discharge follow-up  Z09 Adult General Surg Referral   3. S/P cholecystectomy  Z90.49 Adult General Surg Referral     Plan: Drain removed today. Path reviewed and questions answered. He may call or return prn.    Kieran Johnson, DO        Again, thank you for allowing me to participate in the care of your patient.        Sincerely,        Kieran Johnson DO

## 2021-08-27 NOTE — PROGRESS NOTES
Infusion Nursing Note:  Renny Gannon presents today for 1 unit Platelets.    Patient seen by provider today: No   present during visit today: Not Applicable.    Note: Patients lungs sound diminished but clear pre and post transfusion.      Intravenous Access:  Peripheral IV placed.    Treatment Conditions:  Lab Results   Component Value Date    HGB 8.7 08/26/2021    HGB 8.6 07/07/2021     Lab Results   Component Value Date    WBC 5.9 08/26/2021    WBC 31.7 07/07/2021      Lab Results   Component Value Date    ANEU 2.2 08/26/2021    ANEU 2.5 08/24/2021    ANEU 6.6 07/07/2021     Lab Results   Component Value Date    PLT 11 08/26/2021    PLT 12 07/07/2021      Results reviewed, labs MET treatment parameters, ok to proceed with treatment.  Platelets 11,000.      Post Infusion Assessment:  Patient tolerated transfusion without incident.  Blood return noted pre and post infusion.  Site patent and intact, free from redness, edema or discomfort.  No evidence of extravasations.  Access discontinued per protocol.       Discharge Plan:   Discharge instructions reviewed with: Patient.  Patient and/or family verbalized understanding of discharge instructions and all questions answered.  Patient discharged in stable condition accompanied by: self.  Departure Mode: Ambulatory.      Mayte Chapa RN

## 2021-08-30 NOTE — PROGRESS NOTES
Infusion Nursing Note:  Renny Gannon presents today for Labs/ Decitabine/ transfusion as needed.    Patient seen by provider today: No   present during visit today: Not Applicable.    Note: Hgb= 7.8, Plt = 15,000. Pt rec'd 1 unit PRBC and 1 unit platelets, tolerated well.       Intravenous Access:  Peripheral IV placed.    Treatment Conditions:  Lab Results   Component Value Date    HGB 7.8 08/30/2021    HGB 8.6 07/07/2021     Lab Results   Component Value Date    WBC 4.5 08/30/2021    WBC 31.7 07/07/2021      Lab Results   Component Value Date    ANEU 3.2 08/30/2021    ANEU 6.6 07/07/2021     Lab Results   Component Value Date    PLT 15 08/30/2021    PLT 12 07/07/2021      Lab Results   Component Value Date     08/26/2021     07/05/2021                   Lab Results   Component Value Date    POTASSIUM 3.9 08/26/2021    POTASSIUM 3.8 07/05/2021           Lab Results   Component Value Date    MAG 2.1 06/18/2021            Lab Results   Component Value Date    CR 0.84 08/26/2021    CR 0.96 07/05/2021                   Lab Results   Component Value Date    MINNIE 9.2 08/26/2021    MINNIE 8.6 07/05/2021                Lab Results   Component Value Date    BILITOTAL 0.5 08/26/2021    BILITOTAL 0.4 07/05/2021           Lab Results   Component Value Date    ALBUMIN 3.3 08/26/2021    ALBUMIN 3.2 07/05/2021                    Lab Results   Component Value Date    ALT 20 08/26/2021    ALT 18 07/05/2021           Lab Results   Component Value Date    AST 11 08/26/2021    AST 17 07/05/2021       Results reviewed, labs did NOT meet treatment parameters: However, Dr Patel called and it was decided that she would change his parameters for chemotherapy and that we should proceed with Decitabine , aware of the fact that we are giving platelets today.  Pharmacy notified of decision to proceed in spite of platelet count      Post Infusion Assessment:  Patient tolerated infusion without incident.  Patient observed  for 15 minutes post chemotherapy per protocol.  Site patent and intact, free from redness, edema or discomfort.  No evidence of extravasations.  Access discontinued per protocol.       Discharge Plan:   Discharge instructions reviewed with: Patient.  Patient discharged in stable condition accompanied by: sister.  Departure Mode: Ambulatory.      Lisa Vasques RN

## 2021-08-31 NOTE — PROGRESS NOTES
Infusion Nursing Note:  Renny Gannon presents today for C3D2 Decitabine.    Patient seen by provider today: No   present during visit today: Not Applicable.    Note:1331- Patient has had 15.4% weight loss. 70.5kg today and 83.3kg on 6/5 when plan started. Talked to DR. Patel about this. OK to proceed at current dose this cycle. .      Intravenous Access:  Peripheral IV placed.    Treatment Conditions:  Not Applicable.      Post Infusion Assessment:  Patient tolerated infusion without incident.  Blood return noted pre and post infusion.  Site patent and intact, free from redness, edema or discomfort.  No evidence of extravasations.  Access discontinued per protocol.       Discharge Plan:   Discharge instructions reviewed with: Patient.  Patient and/or family verbalized understanding of discharge instructions and all questions answered.  Patient discharged in stable condition accompanied by: self.  Departure Mode: Ambulatory. Sister Mary picking him up at front entrance.       Jalyn Gannon RN

## 2021-09-01 NOTE — PROGRESS NOTES
"Infusion Nursing Note:  Renny Gannon presents today for C3D3 Decitabine.    Patient seen by provider today: No   present during visit today: Not Applicable.    Note: Patient states he is feeling \"ok\" today. Denies pain or new symptoms. VSS, afebrile.       Intravenous Access:  Peripheral IV placed.    Treatment Conditions:  Not Applicable.      Post Infusion Assessment:  Patient tolerated infusion without incident.  Blood return noted pre and post infusion.  Site patent and intact, free from redness, edema or discomfort.  No evidence of extravasations.  PIV access discontinued per protocol.       Discharge Plan:   Copy of AVS reviewed with patient. Patient will return 9/2 for next appointment.  Patient discharged in stable condition accompanied by: self. Family picking up pt at main entrance.  Departure Mode: Ambulatory.      Kimberley Faust RN                    "

## 2021-09-01 NOTE — PATIENT INSTRUCTIONS
September 2021 Sunday Monday Tuesday Wednesday Thursday Friday Saturday          Start Venetoclax Day 1     Venetoclax Day 2   1  Venetoclax Day 3    INFUSION 2 HR (120 MIN)  11:00 AM   (120 min.)   PH INFUSION CHAIR 11   [facility] 2  Venetoclax Day 4    LAB  10:30 AM   (15 min.)   PH LAB   [facility]    INFUSION 2 HR (120 MIN)  11:00 AM   (120 min.)   PH INFUSION NURSE   [facility] 3  Venetoclax Day 5    INFUSION 2 HR (120 MIN)  11:30 AM   (120 min.)   PH INFUSION NURSE   [facility] 4  Venetoclax Day 6       5  Venetoclax Day 7   6  Venetoclax Day 8   7  Venetoclax Day 9    RETURN   3:30 PM   (60 min.)   Harriet Gutierrez M, UNA DENNISON   [facility] 8  Venetoclax Day 10    LAB   8:30 AM   (15 min.)   PH LAB   [facility]    INFUSION 2 HR (120 MIN)   9:00 AM   (120 min.)   PH INFUSION CHAIR 13   [facility] 9  Venetoclax Day 11     10  Venetoclax Day 12    LAB  11:30 AM   (15 min.)   PH LAB   [facility]    INFUSION 2 HR (120 MIN)  12:00 PM   (120 min.)   PH INFUSION NURSE   [facility] 11  Venetoclax Day 13     12  Venetoclax Day 14   13  Venetoclax Day 15   14  Venetoclax Day 16    LAB  10:30 AM   (15 min.)   PH LAB   [facility]    INFUSION 2 HR (120 MIN)  11:00 AM   (120 min.)   PH INFUSION CHAIR 9   [facility] 15  Venetoclax Day 17   16  Venetoclax Day 18   17  Venetoclax Day 19    LAB   9:30 AM   (15 min.)   PH LAB   [facility]    INFUSION 2 HR (120 MIN)  10:00 AM   (120 min.)   PH INFUSION CHAIR 8   [facility] 18  Venetoclax Day 20     19    Venetoclax Day 21  20    Hold Venetoclax 21  Hold    LAB   9:00 AM   (15 min.)   PH LAB   [facility]    INFUSION 2 HR (120 MIN)   9:30 AM   (120 min.)   PH INFUSION NURSE   [facility] 22  Hold   23  Hold    RETURN   2:30 PM   (60 min.)   Harriet Gutierrez M, UNA DENNISON   [facility] 24  Hold    LAB   9:00 AM   (15 min.)   PH LAB   [facility]    INFUSION 2 HR (120 MIN)   9:30 AM   (120 min.)   PH INFUSION CHAIR 8   [facility] 25  Hold     26  Hold   27  Restart Venetoclax Day 1    LAB   9:00 AM   (15 min.)   PH LAB   [facility]    INFUSION 2 HR (120 MIN)   9:30 AM   (120 min.)   PH INFUSION NURSE   [facility] 28  Venetoclax Day 2    LAB   8:30 AM   (15 min.)   PH LAB   [facility]    INFUSION 2 HR (120 MIN)   9:00 AM   (120 min.)   PH INFUSION CHAIR 8   [facility] 29  Venetoclax Day 3    INFUSION 2 HR (120 MIN)   9:00 AM   (120 min.)   PH INFUSION CHAIR 13   [facility] 30  Venetoclax Day 4    INFUSION 2 HR (120 MIN)  11:30 AM   (120 min.)   PH INFUSION NURSE   [facility]                       October 2021 Sunday Monday Tuesday Wednesday Thursday Friday Saturday                            1  Venetoclax Day 5    LAB   9:00 AM   (15 min.)   PH LAB   [facility]    INFUSION 2 HR (120 MIN)   9:30 AM   (120 min.)   PH INFUSION CHAIR 8   [facility] 2  Venetoclax Day 6       3  Venetoclax Day 7   4  Venetoclax Day 8   5  Venetoclax Day 9   6  Venetoclax Day 10   7  Venetoclax Day 11   8  Venetoclax Day 12   9  Venetoclax Day 13     10  Venetoclax Day 14   11  Venetoclax Day 15   12  Venetoclax Day 16    13  Venetoclax Day 17   14  Venetoclax Day 18   15  Venetoclax Day 19   16  Venetoclax Day 20     17  Venetoclax Day 21   18  Hold Venetoclax   19  Hold   20  Hold   21  Hold   22  Hold   23  Hold     24  Hold   25  Restart Venetoclax Day 1    LAB  10:00 AM   (15 min.)   PH LAB   [facility]    INFUSION 2 HR (120 MIN)  10:30 AM   (120 min.)   PH INFUSION NURSE   [facility] 26    INFUSION 2 HR (120 MIN)  10:30 AM   (120 min.)   PH INFUSION NURSE   [facility] 27    INFUSION 2 HR (120 MIN)  10:00 AM   (120 min.)   PH INFUSION NURSE   [facility]fusion [facility] 28    INFUSION 2 HR (120 MIN)  10:00 AM   (120 min.)   PH INFUSION NURSE   [facility]fusion [facility] 29    INFUSION 2 HR (120 MIN)  10:30 AM   (120 min.)   PH INFUSION NURSE   [facility] 30    Plan for follow up with Dr. Patel prior to this week.     31

## 2021-09-01 NOTE — PROGRESS NOTES
Calendar for patients oral chemotherapy plan.  Provided to patient in infusion.    Ki Lamb RN, BSN, OCN  9/1/2021, 11:45 AM

## 2021-09-02 NOTE — PROGRESS NOTES
Infusion Nursing Note:  Renny Gannon presents today for C3 D4 Decitabine and Platelets.    Patient seen by provider today: No   present during visit today: Not Applicable.    Note: Patient platelet level 14,000 and Hgb-8.3 so needs 1 unit platelets today with Decitabine. Patient complains of right shoulder pain, had an old injury about 2 years ago and it acts up. Placed heat on shoulder and it helped. Patient states there is no other new complaints or symptoms. Lungs sound diminished but clear pre and post transfusion.      Intravenous Access:  Peripheral IV placed.    Treatment Conditions:  Lab Results   Component Value Date    HGB 8.3 (L) 09/02/2021    WBC 3.9 (L) 09/02/2021    ANEU 1.8 09/02/2021    ANEUTAUTO 2.0 08/26/2021    PLT 14 (LL) 09/02/2021      Lab Results   Component Value Date     09/02/2021    POTASSIUM 4.7 09/02/2021    MAG 2.1 06/18/2021    CR 0.85 09/02/2021    MINNIE 9.3 09/02/2021    BILITOTAL 0.5 09/02/2021    ALBUMIN 3.3 (L) 09/02/2021    ALT 19 09/02/2021    AST 14 09/02/2021     Results reviewed, labs MET treatment parameters, ok to proceed with treatment.  Blood transfusion consent signed 03/05/21.      Post Infusion Assessment:  Patient tolerated infusion/transfusion without incident.  Patient observed for 15 minutes post transfusion/infusion per protocol.  Blood return noted pre and post infusion.  Site patent and intact, free from redness, edema or discomfort.  No evidence of extravasations.  Access discontinued per protocol.       Discharge Plan:   Discharge instructions reviewed with: Patient.  Patient and/or family verbalized understanding of discharge instructions and all questions answered.  Patient discharged in stable condition accompanied by: self.  Departure Mode: Ambulatory.      Mayte Chapa RN

## 2021-09-03 NOTE — PROGRESS NOTES
Infusion Nursing Note:  Renny Gannon presents today for Day 5 Decitabine.    Patient seen by provider today: No   present during visit today: Not Applicable.    Note: N/A.      Intravenous Access:  Peripheral IV placed.    Treatment Conditions:  Lab Results   Component Value Date    HGB 8.3 (L) 09/02/2021    WBC 3.9 (L) 09/02/2021    ANEU 1.8 09/02/2021    ANEUTAUTO 2.0 08/26/2021    PLT 14 (LL) 09/02/2021          Post Infusion Assessment:  Patient tolerated infusion without incident.  Patient observed for 15 minutes post chemotherapy   per protocol.  Site patent and intact, free from redness, edema or discomfort.  No evidence of extravasations.  Access discontinued per protocol.       Discharge Plan:   Discharge instructions reviewed with: Patient.  Patient discharged in stable condition accompanied by: sister.  Departure Mode: Ambulatory.      Lisa Vasques RN

## 2021-09-07 NOTE — PROGRESS NOTES
Hematology/Oncology Visit    Care Team:  - Oncologist: Dr. Patel  - PCP: Angus Clements Mai, MD    Reason for visit: post-hospitalization follow-up and exam prior to C3D1 Dec+carlos    Diagnosis: JAK2+ myelofibosis with blast crisis    Cancer and Treatment Hx:  Dec 2019: presented with fatigue and dyspnea on exertion. CBC showed WBC 79.1, Hgb 11.9, platelet count of 378. Peripheral blood smear revealed leuko-erythroblastic reaction with neutrophilic left shift and rare circulating blasts without dysplasia. BMBx 12/30 showing hypercellular marrow with granulocytic and megakaryocytic proliferation, megakaryocytic clustering and atypia, marrow fibrosis and 1.5% blasts consistent with myelofibrosis. 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   Jan 2020: started on Hydrea 500mg every day  Dec 2020: admitted for profound anemia, requiring 2 units of blood, Hydrea held  Jan 2021: restarted Hydrea with improving counts, held late Jan 2021 for persistent pancytopenia  March 26, 2021: BMBx demonstrated 40-50% cellularity with increased fibrosis and 5% blasts  - Numerous hospitalizations for heart failure/cardiac issues and dyspnea, requiring frequent blood transfusions  Shikha 3-18, 2021: hospitalized for leukocytosis and concern for acute leukemia. BMBx 6/4 (core only, unable to obtain aspirate) demonstrated hypercellular marrow with 5-10% myeloid blasts and marked marrow fibrosis.  Findings consistent with blast crisis arising from previously diagnosed myeloproliferative neoplasm. Diagnosis made on the basis of increased peripheral blast percentage (23%); blast percentage in bone marrow is difficult to establish d/t bone marrow fibrosis and lack of aspirates. BMBx flow from core shows increased abnormal myeloid blasts (12%). FISH with monosomy 7. FLT3 negative.   June 6, 2021: Initiated cycle 1 decitabine, venetoclax ramp up initiated 6/9 while inpatient  July 7, 2021: BMBx after cycle 1 demonstrated hypercellular marrow with 13%  "blasts and marked fibrosis (MF3), peripheral blood with 19% circulating blasts.  June 27, 2021: held venetoclax for pancytopenia with plans to continue at start of next cycle (patient reported on 8/4 that he had not been holding, however)  Aug 13, 2021: BMBx: hypercellular with marrow fibrosis (MF2-3) and 7.7% morphologic blasts  Aug 15, 2021: admitted for acute cholecystitis and underwent cholecystectomy with drain placement on 8/15    Interval History:  Renny has generally been feeling well since his last oncology appt. He had his surgical drain removed without issue. He has noticed intermittent sharp pains on his bilateral sides if he tries to lie on either side at night so he has found it most helpful to lie on his back or sleep in a chair. No abdominal pain at any other time. He noticed intermittent gum bleeding that restarted a few days ago. No other signs of bruising/bleeding anywhere. No chest pains recently. Palpitations only if he performs extensive activity. He is having one loose/soft stool per day, no diarrhea. Feels like treatment is going well so far this cycle. No issues with IV decitabine and has tolerated oral venetoclax well thus far. Knows it will be a shorter cycle of venetoclax this time and notes that he started it two days late. When asked how he feels like things are going overall he says he doesn't know. He didn't think he would be requiring so many appts and transfusions. We discussed that that may continue despite treatment and he mentioned \"I guess it's better than the alternative.\"    Medications:  Current Outpatient Medications   Medication     furosemide (LASIX) 40 MG tablet     levofloxacin (LEVAQUIN) 250 MG tablet     metoprolol tartrate (LOPRESSOR) 50 MG tablet     polyethylene glycol (MIRALAX) 17 GM/Dose powder     rosuvastatin (CRESTOR) 5 MG tablet     SENEXON-S 8.6-50 MG tablet     vitamin B-12 (CYANOCOBALAMIN) 250 MCG tablet     vitamin D3 (CHOLECALCIFEROL) 50 mcg (2000 units) " "tablet     ACE/ARB/ARNI NOT PRESCRIBED (INTENTIONAL)     acyclovir (ZOVIRAX) 400 MG tablet     allopurinol (ZYLOPRIM) 300 MG tablet     ASPIRIN NOT PRESCRIBED (INTENTIONAL)     fluconazole (DIFLUCAN) 200 MG tablet     nitroGLYcerin (NITROSTAT) 0.4 MG sublingual tablet     oxyCODONE (ROXICODONE) 5 MG tablet     oxyCODONE (XTAMPZA) 9 MG 12 hr tablet     No current facility-administered medications for this visit.     Physical Exam:  GEN: pleasantly conversant elderly male no acute distress  SKIN: petechial rash BUE, three small laporoscopic incisions approximated with surgical glue, no erythema.   ENT: eyes non-icteric, no notable oral sores or wet purpura, gingivitis with no notable gum bleeding or dried blood  LYMPH: no notable cervical, supraclavicular, or axillary lymphadenopathy  RESP: clear to auscultation bilaterally, on room air  CARDS: tachycardic, no notable murmurs   GI: abd soft, hepatomegaly noted, non-tender to palpation  MSK: trace edema BLE  NEURO: alert and oriented without obvious focal deficit    /56 (BP Location: Right arm, Patient Position: Sitting, Cuff Size: Adult Regular)   Pulse 111   Temp 96.9  F (36.1  C) (Temporal)   Resp 15   Ht 1.727 m (5' 8\")   Wt 72.3 kg (159 lb 6 oz)   SpO2 100%   BMI 24.23 kg/m       Wt Readings from Last 4 Encounters:   09/07/21 72.3 kg (159 lb 6 oz)   09/02/21 70.6 kg (155 lb 10.3 oz)   09/01/21 70.6 kg (155 lb 9.6 oz)   08/31/21 70.5 kg (155 lb 8 oz)     Labs:  No results found for any visits on 09/07/21. Labs will be drawn tomorrow.    Imaging:  Reviewed CT A/P from 8/15/21:  IMPRESSION:   1.  Colonic diverticulosis.  2.  Borderline distended gallbladder with multiple gallstones.  3.  Hepatosplenomegaly.  4.  Cardiomegaly.  5.  Fluid-filled lower thoracic esophagus consistent with reflux.  6.  Chronic left pleural effusion with adjacent atelectasis or infiltrate.    Assessment and Plan:  Myelofibrosis with excess blasts  Pancytopenia secondary to " neoplastic disease and chemotherapy  - Undergoing cycle 3 of decitabine and venetoclax       - C3 was delayed d/t profound cytopenias and cholecystectomy       - 5 day course of decitabine       - Venetoclax days 1-21 (reduced given cytopenias)       - Venetoclax max dose given concurrent fluconazole  - BMBx 8/13 after cycle 2 demonstrated hypercellular marrow with marrow fibrosis (MF2-3) and 7.7% morphologic blasts (down from 13% after cycle 1)  - Continues on neutropenia prophylaxis with acyclovir, fluconazole, and levofloxacin  - Currently requiring significant transfusion support, lab monitoring 3x/wk with transfusion PRN     Acute cholecystitis s/p cholecystectomy  Diarrhea, Resolved  - Recently hospitalized for acute cholecystitis and underwent cholecystectomy on 8/15  -  surgical drain removed 8/26  - No abdominal pain aside from when he lies on his sides     Social support  - No current 24/7 caregiver, sister comes in 1-2x/wk to help  - At times having difficulties managing his medications, Med Therapy Management following  - Has a nurse come in his home occasionally through [facility] as well as home PT/OT      Future Appointments   Date Time Provider Department Center   9/7/2021  3:30 PM Harriet Gutierrez APRN CNP [facility]   9/8/2021  8:30 AM PH LAB [facility]   9/8/2021  9:00 AM PH INFUSION CHAIR 13 [facility] [place] [facility]   9/10/2021 11:30 AM PH LAB [facility]   9/10/2021 12:00 PM PH INFUSION NURSE [facility] [place] [facility]   9/14/2021 10:30 AM PH LAB [facility]   9/14/2021 11:00 AM PH INFUSION CHAIR 9 [facility] [place] [facility]   9/17/2021  9:30 AM PH LAB [facility]   9/17/2021 10:00 AM [facility]   9/21/2021  9:00 AM PH LAB [facility]   9/21/2021  9:30 AM PH INFUSION NURSE [facility] [place] [facility]   9/23/2021  2:30 PM Harriet Gutierrez APRN CNP [facility]   9/24/2021  9:00 AM PH LAB [facility]   9/24/2021  9:30 AM PH  INFUSION CHAIR 8 [facility]   9/27/2021  9:00 AM PH LAB [facility]   9/27/2021  9:30 AM PH INFUSION NURSE [facility]   9/28/2021  8:30 AM PH LAB [facility]   9/28/2021  9:00 AM PH INFUSION CHAIR 8 [facility]   9/29/2021  9:00 AM PH INFUSION CHAIR 13 [facility]   9/30/2021 11:30 AM PH INFUSION NURSE [facility]   10/1/2021  9:00 AM PH LAB [facility]   10/1/2021  9:30 AM PH INFUSION CHAIR 8 [facility]   10/25/2021 10:00 AM PH LAB [facility]   10/25/2021 10:30 AM PH INFUSION NURSE [facility]   10/26/2021 10:30 AM PH INFUSION NURSE [facility]   10/27/2021 10:00 AM PH INFUSION NURSE [facility]   10/28/2021 10:00 AM PH INFUSION NURSE [facility]   10/29/2021 10:30 AM PH INF[facility]ION NURSE [facility]   The total time of this encounter amounted to 30 minutes today. This time includes face-to-face time spent with the patient, prep work, ordering tests, and performing post-visit documentation.    Harriet Gutierrez, CNP

## 2021-09-07 NOTE — LETTER
9/7/2021         RE: Renny Gannon  [address]  [place] [address]        Dear Colleague,    Thank you for referring your patient, Renny Gannon, to the [facility]. Please see a copy of my visit note below.    Hematology/Oncology Visit    Care Team:  - Oncologist: Dr. Patel  - PCP: Angus Clements Mai, MD    Reason for visit: post-hospitalization follow-up and exam prior to C3D1 Dec+carlos    Diagnosis: JAK2+ myelofibosis with blast crisis    Cancer and Treatment Hx:  Dec 2019: presented with fatigue and dyspnea on exertion. CBC showed WBC 79.1, Hgb 11.9, platelet count of 378. Peripheral blood smear revealed leuko-erythroblastic reaction with neutrophilic left shift and rare circulating blasts without dysplasia. BMBx 12/30 showing hypercellular marrow with granulocytic and megakaryocytic proliferation, megakaryocytic clustering and atypia, marrow fibrosis and 1.5% blasts consistent with myelofibrosis. 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   Jan 2020: started on Hydrea 500mg every day  Dec 2020: admitted for profound anemia, requiring 2 units of blood, Hydrea held  Jan 2021: restarted Hydrea with improving counts, held late Jan 2021 for persistent pancytopenia  March 26, 2021: BMBx demonstrated 40-50% cellularity with increased fibrosis and 5% blasts  - Numerous hospitalizations for heart failure/cardiac issues and dyspnea, requiring frequent blood transfusions  Shikha 3-18, 2021: hospitalized for leukocytosis and concern for acute leukemia. BMBx 6/4 (core only, unable to obtain aspirate) demonstrated hypercellular marrow with 5-10% myeloid blasts and marked marrow fibrosis.  Findings consistent with blast crisis arising from previously diagnosed myeloproliferative neoplasm. Diagnosis made on the basis of increased peripheral blast percentage (23%); blast percentage in bone marrow is difficult to establish d/t bone marrow fibrosis and lack of aspirates. BMBx flow from core shows  "increased abnormal myeloid blasts (12%). FISH with monosomy 7. FLT3 negative.   June 6, 2021: Initiated cycle 1 decitabine, venetoclax ramp up initiated 6/9 while inpatient  July 7, 2021: BMBx after cycle 1 demonstrated hypercellular marrow with 13% blasts and marked fibrosis (MF3), peripheral blood with 19% circulating blasts.  June 27, 2021: held venetoclax for pancytopenia with plans to continue at start of next cycle (patient reported on 8/4 that he had not been holding, however)  Aug 13, 2021: BMBx: hypercellular with marrow fibrosis (MF2-3) and 7.7% morphologic blasts  Aug 15, 2021: admitted for acute cholecystitis and underwent cholecystectomy with drain placement on 8/15    Interval History:  Renny has generally been feeling well since his last oncology appt. He had his surgical drain removed without issue. He has noticed intermittent sharp pains on his bilateral sides if he tries to lie on either side at night so he has found it most helpful to lie on his back or sleep in a chair. No abdominal pain at any other time. He noticed intermittent gum bleeding that restarted a few days ago. No other signs of bruising/bleeding anywhere. No chest pains recently. Palpitations only if he performs extensive activity. He is having one loose/soft stool per day, no diarrhea. Feels like treatment is going well so far this cycle. No issues with IV decitabine and has tolerated oral venetoclax well thus far. Knows it will be a shorter cycle of venetoclax this time and notes that he started it two days late. When asked how he feels like things are going overall he says he doesn't know. He didn't think he would be requiring so many appts and transfusions. We discussed that that may continue despite treatment and he mentioned \"I guess it's better than the alternative.\"    Medications:  Current Outpatient Medications   Medication     furosemide (LASIX) 40 MG tablet     levofloxacin (LEVAQUIN) 250 MG tablet     metoprolol tartrate " "(LOPRESSOR) 50 MG tablet     polyethylene glycol (MIRALAX) 17 GM/Dose powder     rosuvastatin (CRESTOR) 5 MG tablet     SENEXON-S 8.6-50 MG tablet     vitamin B-12 (CYANOCOBALAMIN) 250 MCG tablet     vitamin D3 (CHOLECALCIFEROL) 50 mcg (2000 units) tablet     ACE/ARB/ARNI NOT PRESCRIBED (INTENTIONAL)     acyclovir (ZOVIRAX) 400 MG tablet     allopurinol (ZYLOPRIM) 300 MG tablet     ASPIRIN NOT PRESCRIBED (INTENTIONAL)     fluconazole (DIFLUCAN) 200 MG tablet     nitroGLYcerin (NITROSTAT) 0.4 MG sublingual tablet     oxyCODONE (ROXICODONE) 5 MG tablet     oxyCODONE (XTAMPZA) 9 MG 12 hr tablet     No current facility-administered medications for this visit.     Physical Exam:  GEN: pleasantly conversant elderly male no acute distress  SKIN: petechial rash BUE, three small laporoscopic incisions approximated with surgical glue, no erythema.   ENT: eyes non-icteric, no notable oral sores or wet purpura, gingivitis with no notable gum bleeding or dried blood  LYMPH: no notable cervical, supraclavicular, or axillary lymphadenopathy  RESP: clear to auscultation bilaterally, on room air  CARDS: tachycardic, no notable murmurs   GI: abd soft, hepatomegaly noted, non-tender to palpation  MSK: trace edema BLE  NEURO: alert and oriented without obvious focal deficit    /56 (BP Location: Right arm, Patient Position: Sitting, Cuff Size: Adult Regular)   Pulse 111   Temp 96.9  F (36.1  C) (Temporal)   Resp 15   Ht 1.727 m (5' 8\")   Wt 72.3 kg (159 lb 6 oz)   SpO2 100%   BMI 24.23 kg/m       Wt Readings from Last 4 Encounters:   09/07/21 72.3 kg (159 lb 6 oz)   09/02/21 70.6 kg (155 lb 10.3 oz)   09/01/21 70.6 kg (155 lb 9.6 oz)   08/31/21 70.5 kg (155 lb 8 oz)     Labs:  No results found for any visits on 09/07/21. Labs will be drawn tomorrow.    Imaging:  Reviewed CT A/P from 8/15/21:  IMPRESSION:   1.  Colonic diverticulosis.  2.  Borderline distended gallbladder with multiple gallstones.  3.  " Hepatosplenomegaly.  4.  Cardiomegaly.  5.  Fluid-filled lower thoracic esophagus consistent with reflux.  6.  Chronic left pleural effusion with adjacent atelectasis or infiltrate.    Assessment and Plan:  Myelofibrosis with excess blasts  Pancytopenia secondary to neoplastic disease and chemotherapy  - Undergoing cycle 3 of decitabine and venetoclax       - C3 was delayed d/t profound cytopenias and cholecystectomy       - 5 day course of decitabine       - Venetoclax days 1-21 (reduced given cytopenias)       - Venetoclax max dose given concurrent fluconazole  - BMBx 8/13 after cycle 2 demonstrated hypercellular marrow with marrow fibrosis (MF2-3) and 7.7% morphologic blasts (down from 13% after cycle 1)  - Continues on neutropenia prophylaxis with acyclovir, fluconazole, and levofloxacin  - Currently requiring significant transfusion support, lab monitoring 3x/wk with transfusion PRN     Acute cholecystitis s/p cholecystectomy  Diarrhea, Resolved  - Recently hospitalized for acute cholecystitis and underwent cholecystectomy on 8/15  -  surgical drain removed 8/26  - No abdominal pain aside from when he lies on his sides     Social support  - No current 24/7 caregiver, sister comes in 1-2x/wk to help  - At times having difficulties managing his medications, Med Therapy Management following  - Has a nurse come in his home occasionally through [facility] as well as home PT/OT      Future Appointments   Date Time Provider Department Center   9/7/2021  3:30 PM Harriet Gutierrez APRN CNP [facility]   9/8/2021  8:30 AM PH LAB [facility]   9/8/2021  9:00 AM PH INFUSION CHAIR 13 [facility]   9/10/2021 11:30 AM PH LAB [facility]   9/10/2021 12:00 PM PH INFUSION NURSE [facility]   9/14/2021 10:30 AM [facility]   9/14/2021 11:00 AM PH INFUSION CHAIR 9 [facility]   9/17/2021  9:30 AM PH LAB [facility]   9/17/2021 10:00 AM PH INFUSION CHAIR 8  [facility]   9[facility]21/2021  [facility]   9/21/2021  9:30 AM PH INFUSION NURSE [facility]   9/23/2021  2:30 PM Harriet Gutierrez APRN CNP [facility]   9/24/2021  9:00 [facility]   9/24/2021  9:30 AM PH INFUSION CHAIR 8 [facility]   9/27/2021  9:00 AM [facility]   9/27/2021  9:30 AM PH INFUSION NURSE [facility]   9/28/2021  8:30 AM [facility]   9/28/2021  9:00 AM PH INFUSION CHAIR 8 [facility]   9/29/2021  9:00 AM PH INFUSION CHAIR 13 [facility]   9/30/2021 11:30 AM PH INFUSION NURSE [facility]   10/1/2021  9:00 AM [facility]   10/1/2021  9:30 AM PH INFUSION CHAIR 8 [facility]   10/25/2021 10:00 AM [facility]   10/25/2021 10:30 AM PH INFUSION NURSE [facility]   10/26/2021 10:30 AM PH INFUSION NURSE [facility]   10/27/2021 10:00 AM PH INFUSION NURSE [facility]   10/28/2021 10:00 AM PH INFUSION NURSE [facility]   10/29/2021 10:30 AM PH INFUSION NURSE [facility]   The total time of this encounter amounted to 30 minutes today. This time includes face-to-face time spent with the patient, prep work, ordering tests, and performing post-visit documentation.    Harriet Gutierrez CNP        Again, thank you for allowing me to participate in the care of your patient.        Sincerely,        UNA Falcon CNP

## 2021-09-08 NOTE — PROGRESS NOTES
Clinic Care Coordination Contact    Follow Up Progress Note      Assessment: Patient reports he has just a few more home care visits.  Continues to check his weight and is  stable at 152#  Patient denies any ankle edema or chest discomfort, cough  or SOB     Care Gaps:    Health Maintenance Due   Topic Date Due     Pneumococcal Vaccine: 65+ Years (1 of 4 - PCV13) Never done     COVID-19 Vaccine (1) Never done     DTAP/TDAP/TD IMMUNIZATION (1 - Tdap) Never done     ZOSTER IMMUNIZATION (1 of 2) Never done     INFLUENZA VACCINE (1) 09/01/2021       Care Gap Goal set: Yes    Goals addressed this encounter:   Goals Addressed                    This Visit's Progress       Medical (pt-stated)   60%      Goal Statement: I will seek medical help if I am experiencing reoccurrence of shortness of breath episodes   Date Goal set:7/22/2021  Barriers: Deconditioned   Strengths: Supportive sister   Date to Achieve By:10/22/2021  Patient expressed understanding of goal: Yes  Action steps to achieve this goal:  1. I will follow the congested hear future plan   2. I will keep future chemotherapy infusion and Oncology appointment s  3. I will check my weight daily and call if weight gain is 2-3# in a day or 5# in a week   4. I will take extra steps throughout the day to increase my strength   5. Care coordinator will follow up in 1-2 weeks             Intervention/Education provided during outreach: CC reviewed the CHF Action Plan and patient agrees to call with increased symptoms of concern     Outreach Frequency: 2 weeks    Plan:   Patient will follow the Heart Action Plan and call with any increased symptoms of concern  [facility]   Myla Vasquez RN, Care Coordinator   [facility]   E-mail [email or web address]   [phone]  Care Coordinator will follow up in 1-2 weeks

## 2021-09-08 NOTE — PROGRESS NOTES
Infusion Nursing Note:  Renny Gannon presents today for labs/Blood/plt transfusions..    Patient seen by provider today: No   present during visit today: Not Applicable.    Note: Patient has bleeding gums, sore in mouth and corners of lips. Encouraged to do salt/soda rinses after eating and as needed. No complaints with swallowing. Appetite down, drinking 1 can ensure daily.       Intravenous Access:  Peripheral IV placed.    Treatment Conditions:  Lab Results   Component Value Date    HGB 6.3 (LL) 09/08/2021    WBC 1.2 (L) 09/08/2021    ANEU 0.6 (L) 09/08/2021    ANEUTAUTO 2.0 08/26/2021    PLT 2 (LL) 09/08/2021      Results reviewed, labs MET treatment parameters, ok to proceed with treatment.  Blood transfusion consent signed 3/5/21.  Lung sounds clear pre transfuions..      Post Infusion Assessment:  Patient tolerated infusion without incident.  Blood return noted pre and post infusion.  Site patent and intact, free from redness, edema or discomfort.  No evidence of extravasations.  Access discontinued per protocol.       Discharge Plan:   Discharge instructions reviewed with: Patient.  Patient and/or family verbalized understanding of discharge instructions and all questions answered.  Patient discharged in stable condition accompanied by: self.  Departure Mode: Ambulatory. Sister- Mary picking her up at front entrance.  Lungs clear post transfusions. No complaints.       Jalyn Gannon RN

## 2021-09-08 NOTE — TELEPHONE ENCOUNTER
Reason for Call:  Form, our goal is to have forms completed with 72 hours, however, some forms may require a visit or additional information.    Type of letter, form or note:  Home Health Certification    Who is the form from?: Home care    Where did the form come from: form was faxed in    What clinic location was the form placed at?: [facility]     Where the form was placed: Given to MA/JACOB ESPINOZA    What number is listed as a contact on the form?: [phone]       Additional comments:

## 2021-09-08 NOTE — TELEPHONE ENCOUNTER
Medication reconciliation completed by RN. Form and chart forwarded to PCP for signatures.  For MD Nicolasa Escobar Mai, RN

## 2021-09-10 NOTE — PROGRESS NOTES
Infusion Nursing Note:  Renny Gannon presents today for blood and Platelet  transfusions.    Patient seen by provider today: No   present during visit today: Not Applicable.    Note: N/A.      Intravenous Access:  Peripheral IV placed.    Treatment Conditions:  Lab Results   Component Value Date    HGB 6.5 (LL) 09/10/2021    WBC 0.7 (LL) 09/10/2021    ANEU 0.2 (LL) 09/10/2021    ANEUTAUTO 2.0 08/26/2021    PLT 10 (LL) 09/10/2021      Blood transfusion consent signed previously.  Lung sounds clear pre transfusion.      Post Infusion Assessment:  Patient tolerated infusion without incident.  Blood return noted pre and post infusion.  Site patent and intact, free from redness, edema or discomfort.  No evidence of extravasations.  Access discontinued per protocol.  Lung sound remain clear after each unit. .       Discharge Plan:   Discharge instructions reviewed with: Patient.  Patient and/or family verbalized understanding of discharge instructions and all questions answered.  Patient discharged in stable condition accompanied by: self.  Departure Mode: Ambulatory.      Jalyn Gannon RN

## 2021-09-14 NOTE — ORAL ONC MGMT
Oral Chemotherapy Monitoring Program     Call placed to Renny to discuss his resumption of Venclexta after being on hold for low counts. Renny stated it wasn't a good time to talk as he was getting a transfusion ([place]) and wanted a return call tomorrow. I told him we would try and call him tomorrow.    Noelle Ga, Pharm.D., [facility]  [phone]  09/14/21

## 2021-09-14 NOTE — PROGRESS NOTES
Infusion Nursing Note:  Renny Gannon presents today for 1 unit prbc's and 1 unit plts.    Patient seen by provider today: No   present during visit today: Not Applicable.    Note: Patient denies any new symptoms today. Lungs sound clear pre and post both units.      Intravenous Access:  Peripheral IV placed.    Treatment Conditions:  Lab Results   Component Value Date    HGB 6.5 (LL) 09/14/2021    WBC 1.3 (L) 09/14/2021    ANEU 0.3 (LL) 09/14/2021    ANEUTAUTO 2.0 08/26/2021    PLT 9 (LL) 09/14/2021      Results reviewed, labs MET treatment parameters, ok to proceed with treatment.      Post Infusion Assessment:  Patient tolerated transfusion without incident.  Patient observed for 15 minutes post transfusion per protocol.  Blood return noted pre and post infusion.  Site patent and intact, free from redness, edema or discomfort.  No evidence of extravasations.  Access discontinued per protocol.       Discharge Plan:   Discharge instructions reviewed with: Patient.  Patient and/or family verbalized understanding of discharge instructions and all questions answered.  Patient discharged in stable condition accompanied by: sister.  Departure Mode: Ambulatory.      Mayte Chapa RN

## 2021-09-15 NOTE — ORAL ONC MGMT
Oral Chemotherapy Monitoring Program    Subjective/Objective:  Renny Gannon is a 70 year old male contacted by phone for a follow-up visit for oral chemotherapy. I reviewed labs with Renny and assessed if Renny had any new side effects or concerns. He expressed that he was doing ok and no new concerns.     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 Jorge Patel   [facility]   Drug Name Venclexta (venetoclax) Venclexta (venetoclax) Venclexta (venetoclax) Venclexta (venetoclax) Venclexta (venetoclax) Venclexta (venetoclax) Venclexta (venetoclax)   Dose 200 mg 200 mg 200 mg 200 mg 200 mg 200 mg 200 mg   Current Schedule Daily Daily Daily Daily Daily Daily Daily   Cycle Details Drug on Hold - - 3 weeks on, 1 week off 3 weeks on, 1 week off 3 weeks on, 1 week off 3 weeks on, 1 week off   Doses missed in last 2 weeks - - - - - - -   Adherence Assessment - - - - - - Adherent   Adverse Effects - - - - - - No AE identified during assessment   Constipation - - - - - - -   Pharmacist Intervention(constipation) - - - - - - -   Any new drug interactions? - - - - - - -   Pharmacist Intervention? - - - - - - -   Is the dose as ordered appropriate for the patient? - - - - - - -   Is the patient currently in pain? - - - - - - -   Has the patient been assessed within the past 6 months for depression? - - - - - - -   Has the patient missed any days of school, work, or other routine activity? - - - - - - -   Since the last time we talked, have  "you been hospitalized or used the emergency room? - - - - - - -       Last PHQ-2 Score on record:   PHQ-2 ( 1999 [employer]) 8/23/2021 4/7/2021   Q1: Little interest or pleasure in doing things 0 0   Q2: Feeling down, depressed or hopeless 0 0   PHQ-2 Score 0 0   Q1: Little interest or pleasure in doing things - -   Q2: Feeling down, depressed or hopeless - -   PHQ-2 Score - -       Vitals:  BP:   BP Readings from Last 1 Encounters:   09/14/21 114/53     Wt Readings from Last 1 Encounters:   09/07/21 72.3 kg (159 lb 6 oz)     Estimated body surface area is 1.86 meters squared as calculated from the following:    Height as of 9/7/21: 1.727 m (5' 8\").    Weight as of 9/7/21: 72.3 kg (159 lb 6 oz).    Labs:  _  Result Component Current Result Ref Range   Sodium 141 (9/14/2021) 133 - 144 mmol/L     _  Result Component Current Result Ref Range   Potassium 4.1 (9/14/2021) 3.4 - 5.3 mmol/L     _  Result Component Current Result Ref Range   Calcium 9.2 (9/14/2021) 8.5 - 10.1 mg/dL     No results found for Mag within last 30 days.     No results found for Phos within last 30 days.     _  Result Component Current Result Ref Range   Albumin 3.6 (9/14/2021) 3.4 - 5.0 g/dL     _  Result Component Current Result Ref Range   Urea Nitrogen 19 (9/14/2021) 7 - 30 mg/dL     _  Result Component Current Result Ref Range   Creatinine 0.84 (9/14/2021) 0.66 - 1.25 mg/dL     _  Result Component Current Result Ref Range   AST 13 (9/14/2021) 0 - 45 U/L     _  Result Component Current Result Ref Range   ALT 23 (9/14/2021) 0 - 70 U/L     _  Result Component Current Result Ref Range   Bilirubin Total 0.8 (9/14/2021) 0.2 - 1.3 mg/dL     _  Result Component Current Result Ref Range   WBC Count 1.3 (L) (9/14/2021) 4.0 - 11.0 10e3/uL     _  Result Component Current Result Ref Range   Hemoglobin 6.5 (LL) (9/14/2021) 13.3 - 17.7 g/dL     _  Result Component Current Result Ref Range   Platelet Count 9 (LL) (9/14/2021) 150 - 450 10e3/uL     _  Result " Component Current Result Ref Range   Absolute Neutrophils 0.3 (LL) (9/14/2021) 1.6 - 8.3 10e3/uL     Assessment/Plan:  Continue Venetoclax 200 mg daily for 21 days, then off for 7 days. Continue Decitabine infusions daily x 5 days, repeated every 28 days.    Follow-Up:  Labs on 9/17    Refill Due:  10/6    Oral Chemotherapy Monitoring Program  Feliciano Katz PharmD  [facility]  [phone]  September 15, 2021

## 2021-09-17 NOTE — PROGRESS NOTES
Infusion Nursing Note:  Renny Gannon presents today for 1 unit prbc's and 1 unit platelets.    Patient seen by provider today: No   present during visit today: Not Applicable.    Note: Patient reports feeling more tired yesterday and slept most of day. Patient went to bathroom and when was sitting in chair noticed arm bleeding. Had a skin tear and didn't know and doesn't remember hitting arm. Wrapped with gauze and coban.      Intravenous Access:  Peripheral IV placed.    Treatment Conditions:  Lab Results   Component Value Date    HGB 6.8 (LL) 09/17/2021    WBC 1.8 (L) 09/17/2021    ANEU 0.8 (L) 09/17/2021    ANEUTAUTO 2.0 08/26/2021    PLT 10 (LL) 09/17/2021      Results reviewed, labs MET treatment parameters, ok to proceed with treatment.      Post Infusion Assessment:  Patient tolerated transfusion without incident.  Blood return noted pre and post infusion.  Site patent and intact, free from redness, edema or discomfort.  No evidence of extravasations.  Access discontinued per protocol.       Discharge Plan:   Discharge instructions reviewed with: Patient.  Patient and/or family verbalized understanding of discharge instructions and all questions answered.  Patient discharged in stable condition accompanied by: self.  Departure Mode: Ambulatory.      Mayte Chapa RN

## 2021-09-21 NOTE — PROGRESS NOTES
Oral Chemotherapy Monitoring Program  Lab Follow Up    Reviewed lab results from 9/21/21.    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 Jorge Patel   [facility]   Drug Name Venclexta (venetoclax) Venclexta (venetoclax) Venclexta (venetoclax) Venclexta (venetoclax) Venclexta (venetoclax) Venclexta (venetoclax) Venclexta (venetoclax)   Dose 200 mg 200 mg 200 mg 200 mg 200 mg 200 mg 200 mg   Current Schedule Daily Daily Daily Daily Daily Daily Daily   Cycle Details - - 3 weeks on, 1 week off 3 weeks on, 1 week off 3 weeks on, 1 week off 3 weeks on, 1 week off 3 weeks on, 1 week off   Doses missed in last 2 weeks - - - - - - -   Adherence Assessment - - - - - Adherent -   Adverse Effects - - - - - No AE identified during assessment Anemia;Thrombocytopenia;Neutropenia   Constipation - - - - - - -   Pharmacist Intervention(constipation) - - - - - - -   Anemia - - - - - - Grade 3   Pharmacist Intervention(anemia) - - - - - - No   Neutropenia - - - - - - Grade 3   Pharmacist Intervention(neutropenia) - - - - - - No   Thrombocytopenia - - - - - - Grade 4   Pharmacist Intervention(thrombocytopenia) - - - - - - No   Any new drug interactions? - - - - - - -   Pharmacist Intervention? - - - - - - -   Is the dose as ordered appropriate for the patient? - - - - - - -   Is the patient currently in pain? - - - - - - -   Has the patient been assessed within the past 6 months for depression? - - - - - - -   Has the patient missed any days  of school, work, or other routine activity? - - - - - - -   Since the last time we talked, have you been hospitalized or used the emergency room? - - - - - - -       Labs:  _  Result Component Current Result Ref Range   Sodium 139 (9/21/2021) 133 - 144 mmol/L     _  Result Component Current Result Ref Range   Potassium 3.8 (9/21/2021) 3.4 - 5.3 mmol/L     _  Result Component Current Result Ref Range   Calcium 9.0 (9/21/2021) 8.5 - 10.1 mg/dL     No results found for Mag within last 30 days.     No results found for Phos within last 30 days.     _  Result Component Current Result Ref Range   Albumin 3.7 (9/21/2021) 3.4 - 5.0 g/dL     _  Result Component Current Result Ref Range   Urea Nitrogen 16 (9/21/2021) 7 - 30 mg/dL     _  Result Component Current Result Ref Range   Creatinine 0.85 (9/21/2021) 0.66 - 1.25 mg/dL     _  Result Component Current Result Ref Range   AST 10 (9/21/2021) 0 - 45 U/L     _  Result Component Current Result Ref Range   ALT 22 (9/21/2021) 0 - 70 U/L     _  Result Component Current Result Ref Range   Bilirubin Total 0.7 (9/21/2021) 0.2 - 1.3 mg/dL     _  Result Component Current Result Ref Range   WBC Count 1.7 (L) (9/21/2021) 4.0 - 11.0 10e3/uL     _  Result Component Current Result Ref Range   Hemoglobin 7.1 (L) (9/21/2021) 13.3 - 17.7 g/dL     _  Result Component Current Result Ref Range   Platelet Count 10 (LL) (9/21/2021) 150 - 450 10e3/uL     _  Result Component Current Result Ref Range   Absolute Neutrophils 0.8 (L) (9/21/2021) 1.6 - 8.3 10e3/uL       Assessment & Plan:  Results are concerning for cytopenias which is suspected with his regimen. He is on appropriate antimicrobials. He received platelets and RBC transfusions today. No intervention needed at this time.     Follow-Up:  9/28: weekly spot check of labs    Reilly Dia PharmD, MS  Hematology/Oncology Clinical Pharmacist  [facility]  [phone] (oral chemotherapy)  [phone] (infusion)

## 2021-09-21 NOTE — PROGRESS NOTES
Infusion Nursing Note:  Renny Gannon presents today for labs/blood/Plt transfusions.    Patient seen by provider today: No   present during visit today: Not Applicable.    Note: N/A.      Intravenous Access:  Peripheral IV placed.    Treatment Conditions:  Lab Results   Component Value Date    HGB 7.1 (L) 09/21/2021    WBC 1.7 (L) 09/21/2021    ANEU 0.8 (L) 09/21/2021    ANEUTAUTO 2.0 08/26/2021    PLT 10 (LL) 09/21/2021      Lab Results   Component Value Date     09/21/2021    POTASSIUM 3.8 09/21/2021    MAG 2.1 06/18/2021    CR 0.85 09/21/2021    MINNIE 9.0 09/21/2021    BILITOTAL 0.7 09/21/2021    ALBUMIN 3.7 09/21/2021    ALT 22 09/21/2021    AST 10 09/21/2021     Results reviewed, labs MET treatment parameters, ok to proceed with treatment.  Blood transfusion consent signed 3/5/21.      Post Infusion Assessment:  Patient tolerated infusion without incident.  Blood return noted pre and post infusion.  Site patent and intact, free from redness, edema or discomfort.  No evidence of extravasations.  Access discontinued per protocol.  Lung sounds clear pre/post each unit. .       Discharge Plan:   Discharge instructions reviewed with: Patient.  Patient and/or family verbalized understanding of discharge instructions and all questions answered.  Patient discharged in stable condition accompanied by: self.  Departure Mode: Ambulatory.      Jalyn Gannon RN

## 2021-09-23 PROBLEM — Z90.49 HISTORY OF CHOLECYSTECTOMY: Status: ACTIVE | Noted: 2021-01-01

## 2021-09-23 NOTE — PATIENT INSTRUCTIONS
Today:  Continue with treatment, labs, infusion, and follow ups as scheduled. Venetoclax will change to 14 days from 21 days. See Calendar.    If you have any questions or concerns please feel free to call.    If you need to reschedule please call:  Clinic or Lab Appointment - [phone]  Infusion - [phone]  Imaging - [phone]    Ki Lamb, RN, BSN, OCN  [facility]   Oncology/Hematology Care Coordinator RN  [facility]  [phone]    After Hours Oncology Nurse [facility] - [phone]        September 2021 Sunday Monday Tuesday Wednesday Thursday Friday Saturday                  1    INFUSION 2 HR (120 MIN)  11:00 AM   (120 min.)   PH INFUSION CHAIR 11   [facility] 2    LAB  10:30 AM   (15 min.)   PH LAB   [facility]    INFUSION 2 HR (120 MIN)  11:00 AM   (120 min.)   PH INFUSION NURSE   [facility] 3    INFUSION 2 HR (120 MIN)  11:30 AM   (120 min.)   PH INFUSION NURSE   [facility] 4       5     6     7    RETURN   3:30 PM   (60 min.)   Harriet Gutierrez M, UNA CNP   [facility] 8    LAB   8:30 AM   (15 min.)   PH LAB   [facility]    INFUSION 2 HR (120 MIN)   9:00 AM   (120 min.)   PH INFUSION CHAIR 13   [facility]    CCC PHONE VISIT   1:30 PM   (60 min.)   [facility] [facility] RN   [facility] 9     10    LAB  11:30 AM   (15 min.)   PH LAB   [facility]    INFUSION 2 HR (120 MIN)  12:00 PM   (120 min.)   PH INFUSION NURSE   [facility] 11       12     13     14    LAB   9:30 AM   (15 min.)   PH LAB   [facility]    INFUSION 2 HR (120 MIN)  10:00 AM   (120 min.)   PH INFUSION CHAIR 9   [facility] 15      16     17    LAB   9:30 AM   (15 min.)   PH LAB   [facility]    INFUSION 2 HR (120 MIN)  10:00 AM   (120 min.)   PH INFUSION CHAIR 8   [facility] 18       19     20     21    LAB   9:00 AM   (15 min.)   PH LAB   [facility]    INFUSION 2 HR (120 MIN)   9:30 AM   (120 min.)   PH INFUSION NURSE   [facility] 22     23    RETURN   2:30 PM   (60 min.)   Harriet Gutierrez M, UNA DENNISON   [facility] 24    LAB   9:00 AM   (15 min.)   PH LAB   [facility]    INFUSION 2 HR (120 MIN)   9:30 AM   (120 min.)   PH INFUSION CHAIR 8   [facility] 25       26     27  Start Venetoclax Day 1    LAB   9:00 AM   (15 min.)   PH LAB   [facility]    INFUSION 2 HR (120 MIN)   9:30 AM   (120 min.)   PH INFUSION NURSE   [facility] 28  Venetoclax Day 2    LAB   8:30 AM   (15 min.)   PH LAB   [facility]    INFUSION 2 HR (120 MIN)   9:00 AM   (120 min.)   PH INFUSION CHAIR 8   [facility] 29  Venetoclax Day 3    INFUSION 2 HR (120 MIN)   9:00 AM   (120 min.)   PH INFUSION CHAIR 13   [facility] 30  Venetoclax Day 4    INFUSION 2 HR (120 MIN)  11:30 AM   (120 min.)   PH INFUSION NURSE   [facility]                       October 2021 Sunday Monday Tuesday Wednesday Thursday Friday Saturday                            1  Venetoclax Day 5    LAB   9:00 AM   (15 min.)   PH LAB   [facility]    INFUSION 2 HR (120 MIN)   9:30 AM   (120 min.)   PH INFUSION CHAIR 8   [facility] 2  Venetoclax Day 6     3  Venetoclax Day 7   4  Venetoclax Day 8    5  Venetoclax Day 9   6  Venetoclax Day 10   7  Venetoclax Day 11   8  Venetoclax Day 12   9  Venetoclax Day 13     10  Venetoclax Day 14   11  Hold Venetoclax   12  Hold   13  Hold   14  Hold   15  Hold   16  Hold     17  Hold   18  Hold   19  Hold   20  Hold  RETURN  12:15 PM   (30 min.)   Jeane Patel MD   [facility] 21  Hold   22  Hold   23  Hold     24  Hold   25  Plan to restart Venetoclax    LAB  10:00 AM   (15 min.)   PH LAB   [facility]    INFUSION 2 HR (120 MIN)  10:30 AM   (120 min.)   PH INFUSION NURSE   [facility] 26    INFUSION 2 HR (120 MIN)  10:30 AM   (120 min.)   PH INFUSION NURSE   [facility] 27    INFUSION 2 HR (120 MIN)  10:00 AM   (120 min.)   PH INFUSION NURSE   [facility] 28    INFUSION 2 HR (120 MIN)  10:00 AM   (120 min.)   PH INFUSION NURSE   [facility] 29    INFUSION 2 HR (120 MIN)  10:30 AM   (120 min.)   PH INFUSION NURSE   [facility] 30       31

## 2021-09-23 NOTE — PROGRESS NOTES
Hematology/Oncology Visit    Care Team:  - Oncologist: Dr. Patel  - PCP: Angus Clements Mai, MD    Reason for visit: post-hospitalization follow-up and exam prior to C3D1 Dec+carlos    Diagnosis: JAK2+ myelofibosis with blast crisis    Cancer and Treatment Hx:  Dec 2019: presented with fatigue and dyspnea on exertion. CBC showed WBC 79.1, Hgb 11.9, platelet count of 378. Peripheral blood smear revealed leuko-erythroblastic reaction with neutrophilic left shift and rare circulating blasts without dysplasia. BMBx 12/30 showing hypercellular marrow with granulocytic and megakaryocytic proliferation, megakaryocytic clustering and atypia, marrow fibrosis and 1.5% blasts consistent with myelofibrosis. 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   Jan 2020: started on Hydrea 500mg every day  Dec 2020: admitted for profound anemia, requiring 2 units of blood, Hydrea held  Jan 2021: restarted Hydrea with improving counts, held late Jan 2021 for persistent pancytopenia  March 26, 2021: BMBx demonstrated 40-50% cellularity with increased fibrosis and 5% blasts  - Numerous hospitalizations for heart failure/cardiac issues and dyspnea, requiring frequent blood transfusions  Shikha 3-18, 2021: hospitalized for leukocytosis and concern for acute leukemia. BMBx 6/4 (core only, unable to obtain aspirate) demonstrated hypercellular marrow with 5-10% myeloid blasts and marked marrow fibrosis.  Findings consistent with blast crisis arising from previously diagnosed myeloproliferative neoplasm. Diagnosis made on the basis of increased peripheral blast percentage (23%); blast percentage in bone marrow is difficult to establish d/t bone marrow fibrosis and lack of aspirates. BMBx flow from core shows increased abnormal myeloid blasts (12%). FISH with monosomy 7. FLT3 negative.   June 6, 2021: Initiated cycle 1 decitabine, venetoclax ramp up initiated 6/9 while inpatient  July 7, 2021: BMBx after cycle 1 demonstrated hypercellular marrow with 13%  blasts and marked fibrosis (MF3), peripheral blood with 19% circulating blasts.  June 27, 2021: held venetoclax for pancytopenia with plans to continue at start of next cycle (patient reported on 8/4 that he had not been holding, however)  Aug 13, 2021: BMBx: hypercellular with marrow fibrosis (MF2-3) and 7.7% morphologic blasts  Aug 15, 2021: admitted for acute cholecystitis and underwent cholecystectomy with drain placement on 8/15    Interval History:  Renny has generally been feeling well since his last oncology appt. He has no acute complaints. No signs of bruising or bleeding that he has noticed. No recent fevers, shortness of breath, or chest pain. Tolerated venetoclax well and stopped on the appropriate date. Reports getting blood twice weekly and platelets usually once per week. He feels this has been manageable and he has good quality of life. Eating and drinking well. no further abdominal post-op pain. Regular BMs. We discussed reaching out to Dr. Patel about starting treatment on Monday and maybe reducing venetoclax course for this cycle. No questions or concerns today.    Medications:  Current Outpatient Medications   Medication     acyclovir (ZOVIRAX) 400 MG tablet     allopurinol (ZYLOPRIM) 300 MG tablet     fluconazole (DIFLUCAN) 200 MG tablet     furosemide (LASIX) 40 MG tablet     levofloxacin (LEVAQUIN) 250 MG tablet     metoprolol tartrate (LOPRESSOR) 50 MG tablet     nitroGLYcerin (NITROSTAT) 0.4 MG sublingual tablet     oxyCODONE (ROXICODONE) 5 MG tablet     oxyCODONE (XTAMPZA) 9 MG 12 hr tablet     polyethylene glycol (MIRALAX) 17 GM/Dose powder     rosuvastatin (CRESTOR) 5 MG tablet     SENEXON-S 8.6-50 MG tablet     [START ON 9/27/2021] venetoclax (VENCLEXTA) 100 MG tablet     vitamin B-12 (CYANOCOBALAMIN) 250 MCG tablet     vitamin D3 (CHOLECALCIFEROL) 50 mcg (2000 units) tablet     ACE/ARB/ARNI NOT PRESCRIBED (INTENTIONAL)     ASPIRIN NOT PRESCRIBED (INTENTIONAL)     No current  "facility-administered medications for this visit.     Physical Exam:  GEN: pleasantly conversant elderly male no acute distress  SKIN: petechial rash BUE, three small laporoscopic incisions approximated, no erythema.   ENT: eyes non-icteric, no notable oral sores, wet purpura right buccal mucosa  LYMPH: no notable cervical, supraclavicular, or axillary lymphadenopathy  RESP: clear to auscultation bilaterally, on room air  CARDS: tachycardic, no notable murmurs   GI: abd soft, hepatomegaly noted, non-tender to palpation  MSK: trace edema BLE  NEURO: alert and oriented without obvious focal deficit    /56 (BP Location: Right arm, Patient Position: Sitting, Cuff Size: Adult Regular)   Pulse 89   Temp (!) 96.5  F (35.8  C) (Temporal)   Ht 1.727 m (5' 8\")   Wt 72.6 kg (160 lb 1.6 oz)   SpO2 99%   BMI 24.34 kg/m       Wt Readings from Last 4 Encounters:   09/23/21 72.6 kg (160 lb 1.6 oz)   09/07/21 72.3 kg (159 lb 6 oz)   09/02/21 70.6 kg (155 lb 10.3 oz)   09/01/21 70.6 kg (155 lb 9.6 oz)     Labs:  Reviewed last set of labs from 9/21. Labs will be drawn again tomorrow.    Imaging:  Reviewed CT A/P from 8/15/21:  IMPRESSION:   1.  Colonic diverticulosis.  2.  Borderline distended gallbladder with multiple gallstones.  3.  Hepatosplenomegaly.  4.  Cardiomegaly.  5.  Fluid-filled lower thoracic esophagus consistent with reflux.  6.  Chronic left pleural effusion with adjacent atelectasis or infiltrate.    Assessment and Plan:  Myelofibrosis with excess blasts  Pancytopenia secondary to neoplastic disease and chemotherapy  - Has completed 3 cycles of decitabine and venetoclax       - C3 was delayed d/t profound cytopenias and cholecystectomy       - 5 day course of decitabine       - Venetoclax days 1-21 (reduced given cytopenias)       - Venetoclax max dose given concurrent fluconazole  - BMBx 8/13 after cycle 2 demonstrated marrow fibrosis and 8% morphologic blasts (13% after cycle 1)  - Due for cycle 4 " decitabine + venetoclax on 9/27, discussed with Dr. Patel given ongoing profound cytopenias and we will plan to continue start date but reduce venetoclax course to 14 days  - Continues on neutropenia prophylaxis with acyclovir, fluconazole, and levofloxacin  - Currently requiring significant transfusion support, lab monitoring 3x/wk with transfusion PRN     Acute cholecystitis s/p cholecystectomy  - Cholecystectomy on 8/15, BYRON surgical drain removed 8/26  - No abdominal pain and incisions healing appropriately     Social support  - No current 24/7 caregiver, sister comes in 1-2x/wk to help  - At times having difficulties managing his medications, Med Therapy Management following  - Has a nurse come in his home occasionally through [place] [facility] as well as home PT/OT      Future Appointments   Date Time Provider Department Center   9/23/2021  2:30 PM Harriet Gutierrez APRN [facility]   9/24/2021  9:00 AM PH LAB [facility]   9/24/2021  9:30 AM PH INFUSION CHAIR 8 [facility]   9/27/2021  9:00 AM PH LAB [facility]   9/27/2021  9:30 AM PH INFUSION NURSE [facility]   9/28/2021  8:30 AM [facility] LAB [facility]   9/28/2021  9:00 AM PH INFUSION CHAIR 8 [facility]   9/29/2021  9:00 AM PH INFUSION CHAIR 13 [facility]   9/30/2021 11:30 AM PH INFUSION NURSE [facility]   10/1/2021  9:00 AM PH LAB [facility]   10/1/2021  9:30 AM PH INFUSION CHAIR 8 [facility]   10/20/2021 12:30 PM Jeane Patel MD [facility]   10/25/2021 10:00 AM PH LAB [facility]   10/25/2021 10:30 AM PH INFUSION NURSE [facility]   10/26/2021 10:30 AM PH INFUSION NURSE [facility]   10/27/2021 10:00 AM PH INFUSION NURSE [facility]   10/28/2021 10:00 AM PH INFUSION NURSE [facility]   10/29/2021 10:30 AM PH INFUSION NURSE [facility]   The total time of this encounter amounted to 30 minutes today. This time includes  face-to-face time spent with the patient, prep work, ordering tests, and performing post-visit documentation.    Harriet Gutierrez, CNP

## 2021-09-23 NOTE — PROGRESS NOTES
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    Weight as of this encounter: 72.6 kg (160 lb 1.6 oz). Body surface area is 1.87 meters squared.  Data Unavailable Comment: Data Unavailable   No LMP for male patient.  Allergies reviewed: Yes  Medications reviewed: Yes    Medications: Medication refills not needed today.  Pharmacy name entered into EPIC:    [facility] [address] [place] [address] - [address]  [facility] [place] [place] - [address]  [facility] [place] [address] [facility]  [facility] [place], [place] - [address] [facility] [place], [place] - [address]    Clinical concerns: None at this time. Concerns reviewed with Harriet Bowser MA    "

## 2021-09-23 NOTE — PROGRESS NOTES
Clinic Care Coordination Contact  [facility]/Voicemail    Referral Source: IP Handoff  [facility]  Hospitalist Discharge Summary       Date of Admission:  8/15/2021  Date of Discharge:  8/18/2021  Discharging Provider: Delbert Orozco NP           Discharge Diagnoses     Principal Problem:    Acute cholecystitis  Active Problems:    Chronic diastolic congestive heart failure (H)    Right upper quadrant abdominal pain    Pancytopenia (H)    MDS (myelodysplastic syndrome) (H)     Clinical Data: Care Coordinator Outreach  Outreach attempted x 1.  Voice mial not set up  Unable to leave a message   Plan:  Care Coordinator will try to reach patient again in 3-5 business days.  [facility]   Myla Vasquez RN, Care Coordinator   [facility]'s   E-mail [email or web address]   [phone]

## 2021-09-23 NOTE — LETTER
9/23/2021         RE: Renny Gannon  [address]  [facility] [address]      Hematology/Oncology Visit    Care Team:  - Oncologist: Dr. Patel  - PCP: Angus Clements Mai, MD    Reason for visit: post-hospitalization follow-up and exam prior to C3D1 Dec+carlos    Diagnosis: JAK2+ myelofibosis with blast crisis    Cancer and Treatment Hx:  Dec 2019: presented with fatigue and dyspnea on exertion. CBC showed WBC 79.1, Hgb 11.9, platelet count of 378. Peripheral blood smear revealed leuko-erythroblastic reaction with neutrophilic left shift and rare circulating blasts without dysplasia. BMBx 12/30 showing hypercellular marrow with granulocytic and megakaryocytic proliferation, megakaryocytic clustering and atypia, marrow fibrosis and 1.5% blasts consistent with myelofibrosis. 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   Jan 2020: started on Hydrea 500mg every day  Dec 2020: admitted for profound anemia, requiring 2 units of blood, Hydrea held  Jan 2021: restarted Hydrea with improving counts, held late Jan 2021 for persistent pancytopenia  March 26, 2021: BMBx demonstrated 40-50% cellularity with increased fibrosis and 5% blasts  - Numerous hospitalizations for heart failure/cardiac issues and dyspnea, requiring frequent blood transfusions  Shikha 3-18, 2021: hospitalized for leukocytosis and concern for acute leukemia. BMBx 6/4 (core only, unable to obtain aspirate) demonstrated hypercellular marrow with 5-10% myeloid blasts and marked marrow fibrosis.  Findings consistent with blast crisis arising from previously diagnosed myeloproliferative neoplasm. Diagnosis made on the basis of increased peripheral blast percentage (23%); blast percentage in bone marrow is difficult to establish d/t bone marrow fibrosis and lack of aspirates. BMBx flow from core shows increased abnormal myeloid blasts (12%). FISH with monosomy 7. FLT3 negative.   June 6, 2021: Initiated cycle 1 decitabine, venetoclax ramp up initiated 6/9 while  inpatient  July 7, 2021: BMBx after cycle 1 demonstrated hypercellular marrow with 13% blasts and marked fibrosis (MF3), peripheral blood with 19% circulating blasts.  June 27, 2021: held venetoclax for pancytopenia with plans to continue at start of next cycle (patient reported on 8/4 that he had not been holding, however)  Aug 13, 2021: BMBx: hypercellular with marrow fibrosis (MF2-3) and 7.7% morphologic blasts  Aug 15, 2021: admitted for acute cholecystitis and underwent cholecystectomy with drain placement on 8/15    Interval History:  Renny has generally been feeling well since his last oncology appt. He has no acute complaints. No signs of bruising or bleeding that he has noticed. No recent fevers, shortness of breath, or chest pain. Tolerated venetoclax well and stopped on the appropriate date. Reports getting blood twice weekly and platelets usually once per week. He feels this has been manageable and he has good quality of life. Eating and drinking well. no further abdominal post-op pain. Regular BMs. We discussed reaching out to Dr. Patel about starting treatment on Monday and maybe reducing venetoclax course for this cycle. No questions or concerns today.    Medications:  Current Outpatient Medications   Medication     acyclovir (ZOVIRAX) 400 MG tablet     allopurinol (ZYLOPRIM) 300 MG tablet     fluconazole (DIFLUCAN) 200 MG tablet     furosemide (LASIX) 40 MG tablet     levofloxacin (LEVAQUIN) 250 MG tablet     metoprolol tartrate (LOPRESSOR) 50 MG tablet     nitroGLYcerin (NITROSTAT) 0.4 MG sublingual tablet     oxyCODONE (ROXICODONE) 5 MG tablet     oxyCODONE (XTAMPZA) 9 MG 12 hr tablet     polyethylene glycol (MIRALAX) 17 GM/Dose powder     rosuvastatin (CRESTOR) 5 MG tablet     SENEXON-S 8.6-50 MG tablet     [START ON 9/27/2021] venetoclax (VENCLEXTA) 100 MG tablet     vitamin B-12 (CYANOCOBALAMIN) 250 MCG tablet     vitamin D3 (CHOLECALCIFEROL) 50 mcg (2000 units) tablet     ACE/ARB/ARNI NOT  "PRESCRIBED (INTENTIONAL)     ASPIRIN NOT PRESCRIBED (INTENTIONAL)     No current facility-administered medications for this visit.     Physical Exam:  GEN: pleasantly conversant elderly male no acute distress  SKIN: petechial rash BUE, three small laporoscopic incisions approximated, no erythema.   ENT: eyes non-icteric, no notable oral sores, wet purpura right buccal mucosa  LYMPH: no notable cervical, supraclavicular, or axillary lymphadenopathy  RESP: clear to auscultation bilaterally, on room air  CARDS: tachycardic, no notable murmurs   GI: abd soft, hepatomegaly noted, non-tender to palpation  MSK: trace edema BLE  NEURO: alert and oriented without obvious focal deficit    /56 (BP Location: Right arm, Patient Position: Sitting, Cuff Size: Adult Regular)   Pulse 89   Temp (!) 96.5  F (35.8  C) (Temporal)   Ht 1.727 m (5' 8\")   Wt 72.6 kg (160 lb 1.6 oz)   SpO2 99%   BMI 24.34 kg/m       Wt Readings from Last 4 Encounters:   09/23/21 72.6 kg (160 lb 1.6 oz)   09/07/21 72.3 kg (159 lb 6 oz)   09/02/21 70.6 kg (155 lb 10.3 oz)   09/01/21 70.6 kg (155 lb 9.6 oz)     Labs:  Reviewed last set of labs from 9/21. Labs will be drawn again tomorrow.    Imaging:  Reviewed CT A/P from 8/15/21:  IMPRESSION:   1.  Colonic diverticulosis.  2.  Borderline distended gallbladder with multiple gallstones.  3.  Hepatosplenomegaly.  4.  Cardiomegaly.  5.  Fluid-filled lower thoracic esophagus consistent with reflux.  6.  Chronic left pleural effusion with adjacent atelectasis or infiltrate.    Assessment and Plan:  Myelofibrosis with excess blasts  Pancytopenia secondary to neoplastic disease and chemotherapy  - Has completed 3 cycles of decitabine and venetoclax       - C3 was delayed d/t profound cytopenias and cholecystectomy       - 5 day course of decitabine       - Venetoclax days 1-21 (reduced given cytopenias)       - Venetoclax max dose given concurrent fluconazole  - BMBx 8/13 after cycle 2 demonstrated marrow " fibrosis and 8% morphologic blasts (13% after cycle 1)  - Due for cycle 4 decitabine + venetoclax on 9/27, discussed with Dr. Patel given ongoing profound cytopenias and we will plan to continue start date but reduce venetoclax course to 14 days  - Continues on neutropenia prophylaxis with acyclovir, fluconazole, and levofloxacin  - Currently requiring significant transfusion support, lab monitoring 3x/wk with transfusion PRN     Acute cholecystitis s/p cholecystectomy  - Cholecystectomy on 8/15, BYRON surgical drain removed 8/26  - No abdominal pain and incisions healing appropriately     Social support  - No current 24/7 caregiver, sister comes in 1-2x/wk to help  - At times having difficulties managing his medications, Med Therapy Management following  - Has a nurse come in his home occasionally through [place] [facility] as well as home PT/OT      Future Appointments   Date Time Provider Department Center   9/23/2021  2:30 PM Harriet Gutierrez APRN [facility]   9/24/2021  9:00 AM [facility] LAB [facility]   9/24/2021  9:30 AM PH INFUSION CHAIR 8 [facility]   9/27/2021  9:00 AM PH LAB [facility]   9/27/2021  9:30 AM PH INFUSION NURSE [facility]   9/28/2021  8:30 AM PH LAB [facility]   9/28/2021  9:00 AM PH INFUSION CHAIR 8 [facility]   9/29/2021  9:00 AM PH INFUSION CHAIR 13 [facility]   9/30/2021 11:30 AM PH INFUSION NURSE [facility]   10/1/2021  9:00 AM PH LAB [facility]   10/1/2021  9:30 AM PH INFUSION CHAIR 8 [facility]   10/20/2021 12:30 PM Jeane Patel MD [facility]   10/25/2021 10:00 AM PH LAB [facility]   10/25/2021 10:30 AM PH INFUSION NURSE [facility]   10/26/2021 10:30 AM PH INFUSION NURSE [facility]   10/27/2021 10:00 AM PH INFUSION NURSE [facility]   10/28/2021 10:00 AM PH INFUSION NURSE [facility]   10/29/2021 10:30 AM PH INFUSION NURSE [facility]   The  "total time of this encounter amounted to 30 minutes today. This time includes face-to-face time spent with the patient, prep work, ordering tests, and performing post-visit documentation.    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    Weight as of this encounter: 72.6 kg (160 lb 1.6 oz). Body surface area is 1.87 meters squared.  Data Unavailable Comment: Data Unavailable   No LMP for male patient.  Allergies reviewed: Yes  Medications reviewed: Yes    Medications: Medication refills not needed today.  Pharmacy name entered into EPIC:    [facility] [address] [place] [address] - [address]  [facility] [place], [place] - [address]  [facility] [place] - [address] [facility]  [facility] [place], [place] - [address] [place] [address] DR  [facility] [place] [place] - [address]    Clinical concerns: None at this time. Concerns reviewed with ANA ROSA Christie CNP, APRN [facility]  "

## 2021-09-24 NOTE — PROGRESS NOTES
Infusion Nursing Note:  Renny Gannon presents today for 1 unit Prbc and 1 unit platelets.    Patient seen by provider today: No   present during visit today: Not Applicable.    Note: Patient has no complaints today.      Intravenous Access:  Peripheral IV placed.    Treatment Conditions:  Lab Results   Component Value Date    HGB 7.5 (L) 09/24/2021    WBC 2.3 (L) 09/24/2021    ANEU 1.1 (L) 09/24/2021    ANEUTAUTO 2.0 08/26/2021    PLT 13 (LL) 09/24/2021      Results reviewed, labs MET treatment parameters, ok to proceed with treatment.  Blood transfusion consent signed 03/05/21.      Post Infusion Assessment:  Patient tolerated transfusion without incident.  Blood return noted pre and post infusion.  Site patent and intact, free from redness, edema or discomfort.  No evidence of extravasations.  Access discontinued per protocol.       Discharge Plan:   Discharge instructions reviewed with: Patient.  Patient and/or family verbalized understanding of discharge instructions and all questions answered.  Patient discharged in stable condition accompanied by: self.  Departure Mode: Ambulatory.      Mayte Chapa RN

## 2021-09-27 NOTE — PROGRESS NOTES
DISCHARGE PLAN:  Next appointments: See patient instruction section  Departure Mode: Ambulatory  Accompanied by: self  3 minutes for nursing discharge (face to face time)     Renny Gannon is here today for Oncology follow up.  Writing nurse seen patient after Medical Oncology appointment to address questions/concerns/coordinate care. Patient will start cycle on Monday. Venetoclax decreased to 14 days from 21 days per Dr. Patel. Calendar with updated appointments and med schedule to reflect.  See patient instructions and Oncologist's Progress note for further details. Questions and concerns addressed to patient's satisfaction. Patient verbalized and demonstrated understanding of plan.  Contact information provided and patient is encouraged to call with any that arise in the interim of care.    Ki Lamb, RN, BSN, OCN   Oncology Care Coordinator RN  [facility]  [phone]  9/27/2021, 9:26 AM

## 2021-09-27 NOTE — PROGRESS NOTES
Infusion Nursing Note:  Renny Gannon presents today for labs/ Decitabine.    Patient seen by provider today: No   present during visit today: Not Applicable.    Note: Hgb 7.6 - rec'd 1 unit PRBC following chemo. Lungs clear pre and post transfusion      Intravenous Access:  Peripheral IV placed.    Treatment Conditions:  Lab Results   Component Value Date    HGB 7.6 (L) 09/27/2021    WBC 1.4 (L) 09/27/2021    ANEU 0.6 (L) 09/27/2021    ANEUTAUTO 2.0 08/26/2021    PLT 15 (LL) 09/27/2021      Lab Results   Component Value Date     09/27/2021    POTASSIUM 4.0 09/27/2021    MAG 2.1 06/18/2021    CR 0.89 09/27/2021    MINNIE 8.8 09/27/2021    BILITOTAL 0.7 09/27/2021    ALBUMIN 3.6 09/27/2021    ALT 26 09/27/2021    AST 16 09/27/2021     Results reviewed, labs MET treatment parameters, ok to proceed with treatment.      Post Infusion Assessment:  Patient tolerated infusion without incident.  Patient observed for 2 hrs minutes post Decitabine and 10 mins post transfusion.  Site patent and intact, free from redness, edema or discomfort.  No evidence of extravasations.  Access discontinued per protocol.       Discharge Plan:   Discharge instructions reviewed with: Patient.  Patient discharged in stable condition accompanied by: sister.  Departure Mode: Ambulatory.      Lisa Vasques RN

## 2021-09-28 NOTE — PROGRESS NOTES
Infusion Nursing Note:  Renny Gannon presents today for C4D2 Decitabine.    Patient seen by provider today: No   present during visit today: Not Applicable.    Note: N/A.      Intravenous Access:  Peripheral IV placed.    Treatment Conditions:  Results reviewed, labs MET treatment parameters, ok to proceed with treatment.      Post Infusion Assessment:  Patient tolerated infusion without incident.  Patient observed for 15 minutes post Decitabine.  Site patent and intact, free from redness, edema or discomfort.  No evidence of extravasations.  Access discontinued per protocol.       Discharge Plan:   Discharge instructions reviewed with: Patient.  Patient discharged in stable condition accompanied by: sister.  Departure Mode: Ambulatory.      Lisa Vasques RN

## 2021-09-29 NOTE — PROGRESS NOTES
Infusion Nursing Note:  Renny Gannon presents today for C4 D3 Decitabine.    Patient seen by provider today: No   present during visit today: Not Applicable.    Note: Patient denies any new symptoms or complaints today.      Intravenous Access:  Peripheral IV placed.    Treatment Conditions:  Lab Results   Component Value Date    HGB 7.6 (L) 09/27/2021    WBC 1.4 (L) 09/27/2021    ANEU 0.6 (L) 09/27/2021    ANEUTAUTO 2.0 08/26/2021    PLT 15 (LL) 09/27/2021      Lab Results   Component Value Date     09/27/2021    POTASSIUM 4.0 09/27/2021    MAG 2.1 06/18/2021    CR 0.89 09/27/2021    MINNIE 8.8 09/27/2021    BILITOTAL 0.7 09/27/2021    ALBUMIN 3.6 09/27/2021    ALT 26 09/27/2021    AST 16 09/27/2021     Results reviewed, labs MET treatment parameters, ok to proceed with treatment.      Post Infusion Assessment:  Patient tolerated infusion without incident.  Blood return noted pre and post infusion.  Site patent and intact, free from redness, edema or discomfort.  No evidence of extravasations.  Access discontinued per protocol.       Discharge Plan:   Discharge instructions reviewed with: Patient.  Patient and/or family verbalized understanding of discharge instructions and all questions answered.  Patient discharged in stable condition accompanied by: daughter.  Departure Mode: Ambulatory.      Mayte Chapa RN

## 2021-09-29 NOTE — ORAL ONC MGMT
Oral Chemotherapy Monitoring Program  Lab Follow Up     Patient currently on Venetoclax 200 mg daily x 14 days, then 14 days off. Patient confirmed new dosing for this cycle which began on 9/27/21.  Reviewed lab results from today.    Assessment & Plan:  Contacted patient by telephone to review labs. Plt = 15 and ANC = 0.6. Meets MD treatment conditions of ANC >/= 500 and Plt >/= 10,000. Continue Decitabine infusions w/ Venetoclax 200 mg orally daily x 14 days which started on 9/27/21. Instructed patient to continue therapy and call with any questions.      Follow-Up:  10/5 need pill count on hand to help minimize extra supply at home. Then update refill date and review any labs drawn (weekly).    Feliciano Katz, PharmD.  Oral Chemotherapy Monitoring Program  [phone]

## 2021-09-30 NOTE — PROGRESS NOTES
Infusion Nursing Note:  Renny Gannon presents today for C4D4 Decitabine..    Patient seen by provider today: No   present during visit today: Not Applicable.    Note: Patient had CBC done today. Needs 1 Unit Platelets and 1 Unit PRBC'S. Denies any bleeding.       Intravenous Access:  Peripheral IV placed.    Treatment Conditions:  Lab Results   Component Value Date    HGB 6.9 (LL) 09/30/2021    WBC 1.0 (L) 09/30/2021    ANEU 0.2 (LL) 09/30/2021    ANEUTAUTO 2.0 08/26/2021    PLT 5 (LL) 09/30/2021      Lab Results   Component Value Date     09/30/2021    POTASSIUM 4.7 09/30/2021    MAG 2.1 06/18/2021    CR 0.86 09/30/2021    MINNIE 8.7 09/30/2021    BILITOTAL 0.8 09/30/2021    ALBUMIN 3.3 (L) 09/30/2021    ALT 27 09/30/2021    AST 15 09/30/2021     Results reviewed, labs MET treatment parameters, ok to proceed with treatment.  Blood transfusion consent signed previously.      Post Infusion Assessment:  Patient tolerated infusion without incident.  Blood return noted pre and post infusion.  Site patent and intact, free from redness, edema or discomfort.  No evidence of extravasations.  Access discontinued per protocol.  Lungs clear pre/post each unit.       Discharge Plan:   Discharge instructions reviewed with: Patient.  Patient and/or family verbalized understanding of discharge instructions and all questions answered.  Patient discharged in stable condition accompanied by: self.  Departure Mode: Ambulatory.      Jalyn Gannon RN

## 2021-09-30 NOTE — PROGRESS NOTES
Social Work Intervention  [facility]    Data/Intervention:    Patient Name:  Renny Gannon  /Age:  1950 (70 year old)    Visit Type: in person  Referral Source: Infusion RN  Reason for Referral:  Psychosocial introduction    Collaborated With:    Renny    Psychosocial Information/Concerns:  Renny is here today for decitabine.     Intervention/Education/Resources Provided:  SW met with Renny in the infusion center this afternoon. SW introduced self and briefly spoke about SW role. Renny states he is doing well. He is not new to treatment and has no specific related concerns. Renny lives alone, but states his sister is a big support. His sister provides transportation to appointments. SW asked about help around the house, meal delivery, etc., but Renny was not interested at this time. He feels he is coping well. Denies any insurance or financial needs. SW provided Renny with her card and encouraged Renny to reach out with any needs moving forward.     Assessment/Plan:  No needs indicated at this time, but Renny is aware of SW services and SW will remain available if needs arise.     Provided patient/family with contact information and availability.    NICANOR [place], [facility]  Clinical , Adult Oncology  Phone: [phone]

## 2021-10-01 NOTE — PROGRESS NOTES
Infusion Nursing Note:  Renny Gannon presents today for C4 D5 Decitabine.    Patient seen by provider today: No   present during visit today: Not Applicable.    Note: Patient denies any new problems or any changes to assessment this week. Is tolerating infusions well.      Intravenous Access:  Peripheral IV placed.    Treatment Conditions:  Lab Results   Component Value Date    HGB 6.9 (LL) 09/30/2021    WBC 1.0 (L) 09/30/2021    ANEU 0.2 (LL) 09/30/2021    ANEUTAUTO 2.0 08/26/2021    PLT 5 (LL) 09/30/2021      Lab Results   Component Value Date     09/30/2021    POTASSIUM 4.7 09/30/2021    MAG 2.1 06/18/2021    CR 0.86 09/30/2021    MINNIE 8.7 09/30/2021    BILITOTAL 0.8 09/30/2021    ALBUMIN 3.3 (L) 09/30/2021    ALT 27 09/30/2021    AST 15 09/30/2021     Results reviewed, labs MET treatment parameters, ok to proceed with treatment.      Post Infusion Assessment:  Patient tolerated infusion without incident.  Patient observed for 15 minutes post infusion per protocol.  Blood return noted pre and post infusion.  Site patent and intact, free from redness, edema or discomfort.  No evidence of extravasations.  Access discontinued per protocol.       Discharge Plan:   Discharge instructions reviewed with: Patient.  Patient and/or family verbalized understanding of discharge instructions and all questions answered.  Patient discharged in stable condition accompanied by: self.  Departure Mode: Ambulatory.      Mayte Chapa RN

## 2021-10-04 NOTE — LETTER
[facility]  [address] [place] [address]    October 4, 2021    Renny Gannon  [address]   [place] [address]    Dear Renny,  Your Care Team congratulates you on your journey to maintain wellness. This document will help guide you on your journey to maintain a healthy lifestyle.  You can use this to help you overcome any barriers you may encounter.  If you should have any questions or concerns, you can contact the members of your Care Team or contact your Primary Care Clinic for assistance.     Health Maintenance  Health Maintenance Reviewed: Due/Overdue   Health Maintenance Due   Topic Date Due     Pneumococcal Vaccine: 65+ Years (1 of 4 - PCV13) Never done     COVID-19 Vaccine (1) Never done     DTAP/TDAP/TD IMMUNIZATION (1 - Tdap) Never done     ZOSTER IMMUNIZATION (1 of 2) Never done     INFLUENZA VACCINE (1) 09/01/2021       My Access Plan  Medical Emergency 911   Primary Clinic Line [facility] - [phone]   24 Hour Appointment Line [phone] or  [phone]) (toll-free)   24 Hour Nurse Line [phone] (toll-free)   Preferred Urgent Care     Preferred Hospital [facility]  [phone]   Preferred Pharmacy [facility] - [place], [place] - [address]     Behavioral Health Crisis Line The National Suicide Prevention Lifeline at 1-388.633.1220 or 911     My Care Team Members  Patient Care Team       Relationship Specialty Notifications Start End    Angus Scott MD PCP - General Family Practice  2/22/19     Phone: [phone] Fax: [phone]         [address] [place] [address]    Angus Scott MD Assigned PCP   1/24/19     Phone: [phone] Fax: [phone] [address] [place] [address]    Ki Lamb, RN Specialty Care Coordinator Oncology Admissions 1/8/20     Phone: [phone]         Mani Cristina MD MD Hematology & Oncology Admissions 1/8/20      Phone: [phone] Fax: [phone]         [address]  [facility] [address]    Shanta Grier MD MD Hematology & Oncology  4/1/21     Assigned Oncologist for Dr. Jonnie DELEON    Phone: [phone] Fax: [phone]         [address] [facility] [address]    Roberto Larkin DO Assigned Musculoskeletal Provider   5/30/21     Phone: [phone] Fax: [phone]         [address] [address]    Luli Lipscomb MD Assigned Heart and Vascular Provider   8/8/21     Phone: [phone] Pager: [phone] Fax: [phone]        [address] [address]    Kavin Tinajero [facility] Pharmacist Pharmacist Clinician- Clinical Pharmacy Specialist  8/23/21     Phone: [phone] Fax: [phone] [address] [place] [address]    Kieran Johnson DO Assigned Surgical Provider   8/29/21     Phone: [phone] Fax: [phone]         [address] [facility] [address]    Harriet Gutierrez APRN CNP Assigned Cancer Care Provider   9/26/21     Phone: [phone] Fax: [phone]         [address] [facility] [address]              Goals        COMPLETED: Medical (pt-stated)       Goal Statement: I will seek medical help if I am experiencing reoccurrence of shortness of breath episodes   Date Goal set:7/22/2021  Barriers: Deconditioned   Strengths: Supportive sister   Date to Achieve By:10/22/2021  Patient expressed understanding of goal: Yes  Action steps to achieve this goal:  1. I will follow the congested hear future plan   2. I will keep future chemotherapy infusion and Oncology appointment s  3. I will check my weight daily and call if weight gain is 2-3# in a day or 5# in a week   4. I will take extra steps throughout the day to increase my strength   5. Care coordinator will follow up in 1-2 weeks              Advance Care Plans/Directives Type:   Type Advanced Care Plans/Directives: Advanced Directive - On  File  Thank you for providing us with your Advance Directive. We will keep this on file and recommend that it be updated every 2 years, if your health situation changes, if there is a death of one of your health care agents, and/or if there are changes in your marital status.    It has been your Clinic Care Team's pleasure to work with you on your goals.    Regards,  Your Clinic Care Team  [facility]   Myla Vasquez RN, Care Coordinator   [facility]'s   E-mail [email or web address]   [phone]

## 2021-10-04 NOTE — PROGRESS NOTES
Clinic Care Coordination Contact    Follow Up Progress Note      Assessment:   Patient continue infusion therapy appointments and is tolerating the treatments well .    Patient had an appointment with the infusion therapy SW and reports no needs due to sister is able to assist with needs .  Declines any further CC RN outreaches at this time  Care Gaps:    Health Maintenance Due   Topic Date Due     Pneumococcal Vaccine: 65+ Years (1 of 4 - PCV13) Never done     COVID-19 Vaccine (1) Never done     DTAP/TDAP/TD IMMUNIZATION (1 - Tdap) Never done     ZOSTER IMMUNIZATION (1 of 2) Never done     INFLUENZA VACCINE (1) 09/01/2021       Patient will discuss with PCP at next appointment     Goals addressed this encounter:   Goals Addressed                    This Visit's Progress       COMPLETED: Medical (pt-stated)   100%      Goal Statement: I will seek medical help if I am experiencing reoccurrence of shortness of breath episodes   Date Goal set:7/22/2021  Barriers: Deconditioned   Strengths: Supportive sister   Date to Achieve By:10/22/2021  Patient expressed understanding of goal: Yes  Action steps to achieve this goal:  1. I will follow the congested hear future plan   2. I will keep future chemotherapy infusion and Oncology appointment s  3. I will check my weight daily and call if weight gain is 2-3# in a day or 5# in a week   4. I will take extra steps throughout the day to increase my strength   5. Care coordinator will follow up in 1-2 weeks             Intervention/Education provided during outreach: CC available in the future for any needs  Patient agrees with the plan           Plan:   Patient will keep future infusion appointments  and Cardiology appointment 10/20/2021  No unmet needs, no further care coordination needed at this time   Graduation letter mailed to the patient   [facility]   Myla Vasquez RN, Care Coordinator   [facility]'s   E-mail  [email or web address]   [phone]

## 2021-10-05 NOTE — ORAL ONC MGMT
Oral Chemotherapy Monitoring Program    Subjective/Objective:  Renny Gannon is a 70 year old male contacted by phone for a follow-up visit for oral chemotherapy. He confirmed the correct dose and schedule of venclexta. Patient denies any new/worsening side effects, missed doses, or medication changes.       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 Jorge Patel   [facility]   Drug Name Venclexta (venetoclax) Venclexta (venetoclax) Venclexta (venetoclax) Venclexta (venetoclax) Venclexta (venetoclax) Venclexta (venetoclax) Venclexta (venetoclax)   Dose 200 mg 200 mg 200 mg 200 mg 200 mg 200 mg 200 mg   Current Schedule Daily Daily Daily Daily Daily Daily Daily   Cycle Details 3 weeks on, 1 week off 3 weeks on, 1 week off 3 weeks on, 1 week off 3 weeks on, 1 week off 3 weeks on, 1 week off 2 weeks on, 2 weeks off 2 weeks on, 2 weeks off   Start Date of Last Cycle - - - - - 9/27/2021 -   Planned next cycle start date - - - - - 10/25/2021 -   Doses missed in last 2 weeks - - - - - - -   Adherence Assessment - - - Adherent - - -   Adverse Effects - - - No AE identified during assessment Anemia;Thrombocytopenia;Neutropenia Anemia;Thrombocytopenia;Neutropenia -   Constipation - - - - - - -   Pharmacist Intervention(constipation) - - - - - - -   Anemia - - - - Grade 3 Grade 3 -   Pharmacist Intervention(anemia) - - - - No No -   Neutropenia - - - - Grade 3 Grade 3 -   Pharmacist Intervention(neutropenia) - - - -  "No No -   Thrombocytopenia - - - - Grade 4 Grade 4 -   Pharmacist Intervention(thrombocytopenia) - - - - No No -   Any new drug interactions? - - - - - - -   Pharmacist Intervention? - - - - - - -   Is the dose as ordered appropriate for the patient? - - - - - - -   Is the patient currently in pain? - - - - - - -   Has the patient been assessed within the past 6 months for depression? - - - - - - -   Has the patient missed any days of school, work, or other routine activity? - - - - - - -   Since the last time we talked, have you been hospitalized or used the emergency room? - - - - - - -       Last PHQ-2 Score on record:   PHQ-2 ( 1999 [employer]) 8/23/2021 4/7/2021   Q1: Little interest or pleasure in doing things 0 0   Q2: Feeling down, depressed or hopeless 0 0   PHQ-2 Score 0 0   Q1: Little interest or pleasure in doing things - -   Q2: Feeling down, depressed or hopeless - -   PHQ-2 Score - -       Vitals:  BP:   BP Readings from Last 1 Encounters:   10/05/21 121/58     Wt Readings from Last 1 Encounters:   10/05/21 74.3 kg (163 lb 12.8 oz)     Estimated body surface area is 1.89 meters squared as calculated from the following:    Height as of 10/1/21: 1.727 m (5' 8\").    Weight as of an earlier encounter on 10/5/21: 74.3 kg (163 lb 12.8 oz).    Labs:  _  Result Component Current Result Ref Range   Sodium 139 (10/5/2021) 133 - 144 mmol/L     _  Result Component Current Result Ref Range   Potassium 3.8 (10/5/2021) 3.4 - 5.3 mmol/L     _  Result Component Current Result Ref Range   Calcium 9.2 (10/5/2021) 8.5 - 10.1 mg/dL     No results found for Mag within last 30 days.     No results found for Phos within last 30 days.     _  Result Component Current Result Ref Range   Albumin 3.6 (10/5/2021) 3.4 - 5.0 g/dL     _  Result Component Current Result Ref Range   Urea Nitrogen 21 (10/5/2021) 7 - 30 mg/dL     _  Result Component Current Result Ref Range   Creatinine 0.84 (10/5/2021) 0.66 - 1.25 mg/dL     _  Result " Component Current Result Ref Range   AST 8 (10/5/2021) 0 - 45 U/L     _  Result Component Current Result Ref Range   ALT 23 (10/5/2021) 0 - 70 U/L     _  Result Component Current Result Ref Range   Bilirubin Total 1.0 (10/5/2021) 0.2 - 1.3 mg/dL     _  Result Component Current Result Ref Range   WBC Count 0.2 (LL) (10/5/2021) 4.0 - 11.0 10e3/uL     _  Result Component Current Result Ref Range   Hemoglobin 6.7 (LL) (10/5/2021) 13.3 - 17.7 g/dL     _  Result Component Current Result Ref Range   Platelet Count 4 (LL) (10/5/2021) 150 - 450 10e3/uL     _  Result Component Current Result Ref Range   Absolute Neutrophils 0.2 (LL) (9/30/2021) 1.6 - 8.3 10e3/uL         Assessment/Plan:  Tolerating therapy well. Knows to take for 2 weeks and then off for 2 weeks.     Follow-Up:  10/18: review appt with Dr. Patel    Refill Due:  Has new supply being delivered on 10/7 per [place].     Reilly Dia, PharmD, MS  Hematology/Oncology Clinical Pharmacist  [facility]  [phone] (oral chemotherapy)  [phone] (infusion)

## 2021-10-05 NOTE — PROGRESS NOTES
Infusion Nursing Note:  Renny RAFA Jagdeep presents today for blood/plt transfusions. .    Patient seen by provider today: No   present during visit today: Not Applicable.    Note: Patient denies any bleeding, fever or shills. Feels more fatigued.       Intravenous Access:  Peripheral IV placed.    Treatment Conditions:  Lab Results   Component Value Date    HGB 6.7 (LL) 10/05/2021    WBC 0.2 (LL) 10/05/2021    ANEU 0.2 (LL) 09/30/2021    ANEUTAUTO 2.0 08/26/2021    PLT 4 (LL) 10/05/2021      Lab Results   Component Value Date     10/05/2021    POTASSIUM 3.8 10/05/2021    MAG 2.1 06/18/2021    CR 0.84 10/05/2021    MINNIE 9.2 10/05/2021    BILITOTAL 1.0 10/05/2021    ALBUMIN 3.6 10/05/2021    ALT 23 10/05/2021    AST 8 10/05/2021     Results reviewed, labs MET treatment parameters, ok to proceed with treatment.  Blood transfusion consent signed .      Post Infusion Assessment:  Patient tolerated infusion without incident.  Site patent and intact, free from redness, edema or discomfort.  No evidence of extravasations.  Access discontinued per protocol.  Lung sounds clear pr/post each unit. .       Discharge Plan:   Discharge instructions reviewed with: Patient.  Patient discharged in stable condition accompanied by: self.  Departure Mode: Ambulatory.      Jalyn Gannon RN

## 2021-10-06 NOTE — TELEPHONE ENCOUNTER
Received message from infusion that patient is requesting port placement. Scheduled with Dr. Johnson on Friday after labs and prior to infusion. Called patient with update who agrees with plan.    Ki Lamb RN, BSN, OCN  10/6/2021, 1:42 PM

## 2021-10-08 NOTE — H&P (VIEW-ONLY)
Patient seen in consultation for port placement    HPI:  Patient is a 70 year old male with myelodysplastic syndrome. He requires frequent blood draws and blood product transfusions. He recently underwent lap leonidas with platelets around 22 and did fine with this. He does not take any blood thinners and denies any trauma, surgery or radiation to the head or neck. He's had CABG.    Review Of Systems    Skin: negative  Ears/Nose/Throat: negative  Respiratory: No shortness of breath, dyspnea on exertion, cough, or hemoptysis  Cardiovascular: negative  Gastrointestinal: negative  Genitourinary: negative  Musculoskeletal: negative  Neurologic: negative  Hematologic/Lymphatic/Immunologic: as above  Endocrine: negative      Past Medical History:   Diagnosis Date     Heart disease      History of blood transfusion      Hypertension      Leukocytosis 6/3/2021       Past Surgical History:   Procedure Laterality Date     BONE MARROW BIOPSY, BONE SPECIMEN, NEEDLE/TROCAR N/A 12/30/2019    Procedure: BIOPSY, BONE MARROW;  Surgeon: Aguilar Krause MD;  Location: [facility]     BYPASS GRAFT ARTERY CORONARY N/A 1/31/2020    Procedure: CORONARY ARTERY BYPASS GRAFTING X 3 - LIMA TO LAD, SV TO OM  AND PDA; ENDOVEIN HARVEST OF BILATERAL LEGS; ON PUMP WITH SANDRA;  Surgeon: Mable Barrera MD;  Location:  OR     CV CORONARY ANGIOGRAM Left 1/9/2020    Procedure: Coronary Angiogram;  Surgeon: Devin Bentley MD;  Location:  [facility]     IR FINE NEEDLE ASPIRATION W ULTRASOUND  6/10/2021     LAPAROSCOPIC CHOLECYSTECTOMY N/A 8/15/2021    Procedure: Laparoscopic cholecystectomy;  Surgeon: Severiano De La Rosa MD;  Location:  OR     NO HISTORY OF SURGERY         Family History   Problem Relation Age of Onset     No Known Problems Mother      Unknown/Adopted Father      Unknown/Adopted Maternal Grandmother      Unknown/Adopted Maternal Grandfather      Unknown/Adopted Paternal Grandmother       Unknown/Adopted Paternal Grandfather      Cancer Brother         lung cancer - smoking     No Known Problems Sister      Coronary Artery Disease Brother          of MI at 66     No Known Problems Sister        Social History     Socioeconomic History     Marital status: Single     Spouse name: Not on file     Number of children: Not on file     Years of education: Not on file     Highest education level: Not on file   Occupational History     Occupation: Retired   Tobacco Use     Smoking status: Former Smoker     Years: 30.00     Types: Cigarettes     Start date: 1961     Quit date: 2001     Years since quittin.6     Smokeless tobacco: Never Used   Vaping Use     Vaping Use: Never used   Substance and Sexual Activity     Alcohol use: No     Drug use: No     Sexual activity: Not Currently   Other Topics Concern     Parent/sibling w/ CABG, MI or angioplasty before 65F 55M? Not Asked   Social History Narrative     Not on file     Social Determinants of Health     Financial Resource Strain:      Difficulty of Paying Living Expenses:    Food Insecurity: No Food Insecurity     Worried About Running Out of Food in the Last Year: Never true     Ran Out of Food in the Last Year: Never true   Transportation Needs: No Transportation Needs     Lack of Transportation (Medical): No     Lack of Transportation (Non-Medical): No   Physical Activity: Inactive     Days of Exercise per Week: 0 days     Minutes of Exercise per Session: 0 min   Stress:      Feeling of Stress :    Social Connections: Unknown     Frequency of Communication with Friends and Family: Three times a week     Frequency of Social Gatherings with Friends and Family: Twice a week     Attends Buddhist Services: Never     Active Member of Clubs or Organizations: No     Attends Club or Organization Meetings: Never     Marital Status: Not on file   Intimate Partner Violence:      Fear of Current or Ex-Partner:      Emotionally Abused:       Physically Abused:      Sexually Abused:        Current Outpatient Medications   Medication Sig Dispense Refill     acyclovir (ZOVIRAX) 400 MG tablet Take 1 tablet (400 mg) by mouth 2 times daily 60 tablet 1     allopurinol (ZYLOPRIM) 300 MG tablet Take 1 tablet (300 mg) by mouth daily 90 tablet 1     fluconazole (DIFLUCAN) 200 MG tablet Take 1 tablet (200 mg) by mouth daily 30 tablet 1     furosemide (LASIX) 40 MG tablet Take 1 tablet (40 mg) by mouth daily 30 tablet 1     levofloxacin (LEVAQUIN) 250 MG tablet Take 1 tablet (250 mg) by mouth At Bedtime 30 tablet 1     metoprolol tartrate (LOPRESSOR) 50 MG tablet Take 1 tablet (50 mg) by mouth 2 times daily 30 tablet 3     nitroGLYcerin (NITROSTAT) 0.4 MG sublingual tablet For chest pain place 1 tablet under the tongue every 5 minutes for 3 doses. If symptoms persist 5 minutes after 1st dose call 911.       oxyCODONE (ROXICODONE) 5 MG tablet Take 5 mg by mouth every 6 hours as needed for severe pain       oxyCODONE (XTAMPZA) 9 MG 12 hr tablet Take 1 tablet (9 mg) by mouth every 12 hours . DO NOT drive or operate machinery while taking this medication. 60 tablet 0     polyethylene glycol (MIRALAX) 17 GM/Dose powder Take 17 g by mouth daily 510 g      rosuvastatin (CRESTOR) 5 MG tablet Take 1 tablet (5 mg) by mouth daily 30 tablet 1     SENEXON-S 8.6-50 MG tablet TAKE TWO TABLETS BY MOUTH TWO TIMES A  tablet 4     venetoclax (VENCLEXTA) 100 MG tablet Take 2 tablets (200 mg) by mouth daily for 21 days 42 tablet 0     vitamin B-12 (CYANOCOBALAMIN) 250 MCG tablet TAKE ONE TABLET BY MOUTH EVERY MORNING 90 tablet 3     vitamin D3 (CHOLECALCIFEROL) 50 mcg (2000 units) tablet Take 1 tablet by mouth daily       ACE/ARB/ARNI NOT PRESCRIBED (INTENTIONAL) Please choose reason not prescribed from choices below. (Patient not taking: Reported on 8/24/2021)       ASPIRIN NOT PRESCRIBED (INTENTIONAL) Please choose reason not prescribed from choices below. (Patient not taking:  "Reported on 8/24/2021)         Medications and history reviewed    Physical exam:  Vitals: /64   Temp 97.6  F (36.4  C) (Temporal)   Ht 1.727 m (5' 8\")   Wt 73.4 kg (161 lb 14.4 oz)   BMI 24.62 kg/m    BMI= Body mass index is 24.62 kg/m .    Constitutional: Healthy, alert, non-distressed   Head: Normo-cephalic, atraumatic, no lesions, masses or tenderness   Cardiovascular: RRR, no new murmurs, +S1, +S2 heart sounds, no clicks, rubs or gallops   Respiratory: CTAB, no rales, rhonchi or wheezing, equal chest rise, good respiratory effort   Gastrointestinal: Soft, non-tender, non distended, no rebound rigidity or guarding, no masses or hernias palpated   : Deferred  Musculoskeletal: Moves all extremities, normal  strength, no deformities noted   Skin: No suspicious lesions or rashes   Psychiatric: Mentation appears normal, affect appropriate   Hematologic/Lymphatic/Immunologic: Normal cervical and supraclavicular lymph nodes   Patient able to get up on table with mild difficulty.    Labs show:  No results found for this or any previous visit (from the past 24 hour(s)).    Imaging shows:  No results found for this or any previous visit (from the past 744 hour(s)).      Assessment:     ICD-10-CM    1. Myelodysplastic syndrome (H)  D46.9      Plan: I recommend US guided right internal jugular venous access port placement with flouroscopy, possible left. He will also need platelet transfusion the day of the procedure prior to start. We will coordinate this for him. We discussed the procedure in detail. We also discussed the risks, benefits, alternatives and post-op care and restrictions. After our informed discussion we decided to proceed with the proposed surgery. He recently tolerated general anesthesia without difficulty and is cleared for anesthesia for this procedure.    Kieran Johnson, DO    "

## 2021-10-08 NOTE — PROGRESS NOTES
Infusion Nursing Note:  Renny Gannon presents today for labs/ transfusion of PRBCs and Platelets.    Patient seen by provider today: No   present during visit today: Not Applicable.    Note: Rec'd 1 PRBC and 1 Pk platelets, tolerated well. Lungs clear pre and post transfusion, each unit.       Intravenous Access:  Peripheral IV placed.    Treatment Conditions:  Lab Results   Component Value Date    HGB 6.3 (LL) 10/08/2021    WBC 0.2 (LL) 10/08/2021    ANEU 0.2 (LL) 09/30/2021    ANEUTAUTO 2.0 08/26/2021    PLT 6 (LL) 10/08/2021      Results reviewed, labs MET treatment parameters, ok to proceed with treatment.      Post Infusion Assessment:  Patient observed for 15 minutes post transfusion.  Site patent and intact, free from redness, edema or discomfort.  No evidence of extravasations.  Access discontinued per protocol.       Discharge Plan:   Discharge instructions reviewed with: Patient.  Patient discharged in stable condition accompanied by: sister.  Departure Mode: Ambulatory.      Lisa Vasques RN

## 2021-10-08 NOTE — PROGRESS NOTES
Patient seen in consultation for port placement    HPI:  Patient is a 70 year old male with myelodysplastic syndrome. He requires frequent blood draws and blood product transfusions. He recently underwent lap leonidas with platelets around 22 and did fine with this. He does not take any blood thinners and denies any trauma, surgery or radiation to the head or neck. He's had CABG.    Review Of Systems    Skin: negative  Ears/Nose/Throat: negative  Respiratory: No shortness of breath, dyspnea on exertion, cough, or hemoptysis  Cardiovascular: negative  Gastrointestinal: negative  Genitourinary: negative  Musculoskeletal: negative  Neurologic: negative  Hematologic/Lymphatic/Immunologic: as above  Endocrine: negative      Past Medical History:   Diagnosis Date     Heart disease      History of blood transfusion      Hypertension      Leukocytosis 6/3/2021       Past Surgical History:   Procedure Laterality Date     BONE MARROW BIOPSY, BONE SPECIMEN, NEEDLE/TROCAR N/A 12/30/2019    Procedure: BIOPSY, BONE MARROW;  Surgeon: Aguilar Krause MD;  Location: [facility]     BYPASS GRAFT ARTERY CORONARY N/A 1/31/2020    Procedure: CORONARY ARTERY BYPASS GRAFTING X 3 - LIMA TO LAD, SV TO OM  AND PDA; ENDOVEIN HARVEST OF BILATERAL LEGS; ON PUMP WITH SANDRA;  Surgeon: Mable Barrera MD;  Location:  OR     CV CORONARY ANGIOGRAM Left 1/9/2020    Procedure: Coronary Angiogram;  Surgeon: Devin Bentley MD;  Location:  [facility]     IR FINE NEEDLE ASPIRATION W ULTRASOUND  6/10/2021     LAPAROSCOPIC CHOLECYSTECTOMY N/A 8/15/2021    Procedure: Laparoscopic cholecystectomy;  Surgeon: Severiano De La Rosa MD;  Location:  OR     NO HISTORY OF SURGERY         Family History   Problem Relation Age of Onset     No Known Problems Mother      Unknown/Adopted Father      Unknown/Adopted Maternal Grandmother      Unknown/Adopted Maternal Grandfather      Unknown/Adopted Paternal Grandmother       Unknown/Adopted Paternal Grandfather      Cancer Brother         lung cancer - smoking     No Known Problems Sister      Coronary Artery Disease Brother          of MI at 66     No Known Problems Sister        Social History     Socioeconomic History     Marital status: Single     Spouse name: Not on file     Number of children: Not on file     Years of education: Not on file     Highest education level: Not on file   Occupational History     Occupation: Retired   Tobacco Use     Smoking status: Former Smoker     Years: 30.00     Types: Cigarettes     Start date: 1961     Quit date: 2001     Years since quittin.6     Smokeless tobacco: Never Used   Vaping Use     Vaping Use: Never used   Substance and Sexual Activity     Alcohol use: No     Drug use: No     Sexual activity: Not Currently   Other Topics Concern     Parent/sibling w/ CABG, MI or angioplasty before 65F 55M? Not Asked   Social History Narrative     Not on file     Social Determinants of Health     Financial Resource Strain:      Difficulty of Paying Living Expenses:    Food Insecurity: No Food Insecurity     Worried About Running Out of Food in the Last Year: Never true     Ran Out of Food in the Last Year: Never true   Transportation Needs: No Transportation Needs     Lack of Transportation (Medical): No     Lack of Transportation (Non-Medical): No   Physical Activity: Inactive     Days of Exercise per Week: 0 days     Minutes of Exercise per Session: 0 min   Stress:      Feeling of Stress :    Social Connections: Unknown     Frequency of Communication with Friends and Family: Three times a week     Frequency of Social Gatherings with Friends and Family: Twice a week     Attends Confucianism Services: Never     Active Member of Clubs or Organizations: No     Attends Club or Organization Meetings: Never     Marital Status: Not on file   Intimate Partner Violence:      Fear of Current or Ex-Partner:      Emotionally Abused:       Physically Abused:      Sexually Abused:        Current Outpatient Medications   Medication Sig Dispense Refill     acyclovir (ZOVIRAX) 400 MG tablet Take 1 tablet (400 mg) by mouth 2 times daily 60 tablet 1     allopurinol (ZYLOPRIM) 300 MG tablet Take 1 tablet (300 mg) by mouth daily 90 tablet 1     fluconazole (DIFLUCAN) 200 MG tablet Take 1 tablet (200 mg) by mouth daily 30 tablet 1     furosemide (LASIX) 40 MG tablet Take 1 tablet (40 mg) by mouth daily 30 tablet 1     levofloxacin (LEVAQUIN) 250 MG tablet Take 1 tablet (250 mg) by mouth At Bedtime 30 tablet 1     metoprolol tartrate (LOPRESSOR) 50 MG tablet Take 1 tablet (50 mg) by mouth 2 times daily 30 tablet 3     nitroGLYcerin (NITROSTAT) 0.4 MG sublingual tablet For chest pain place 1 tablet under the tongue every 5 minutes for 3 doses. If symptoms persist 5 minutes after 1st dose call 911.       oxyCODONE (ROXICODONE) 5 MG tablet Take 5 mg by mouth every 6 hours as needed for severe pain       oxyCODONE (XTAMPZA) 9 MG 12 hr tablet Take 1 tablet (9 mg) by mouth every 12 hours . DO NOT drive or operate machinery while taking this medication. 60 tablet 0     polyethylene glycol (MIRALAX) 17 GM/Dose powder Take 17 g by mouth daily 510 g      rosuvastatin (CRESTOR) 5 MG tablet Take 1 tablet (5 mg) by mouth daily 30 tablet 1     SENEXON-S 8.6-50 MG tablet TAKE TWO TABLETS BY MOUTH TWO TIMES A  tablet 4     venetoclax (VENCLEXTA) 100 MG tablet Take 2 tablets (200 mg) by mouth daily for 21 days 42 tablet 0     vitamin B-12 (CYANOCOBALAMIN) 250 MCG tablet TAKE ONE TABLET BY MOUTH EVERY MORNING 90 tablet 3     vitamin D3 (CHOLECALCIFEROL) 50 mcg (2000 units) tablet Take 1 tablet by mouth daily       ACE/ARB/ARNI NOT PRESCRIBED (INTENTIONAL) Please choose reason not prescribed from choices below. (Patient not taking: Reported on 8/24/2021)       ASPIRIN NOT PRESCRIBED (INTENTIONAL) Please choose reason not prescribed from choices below. (Patient not taking:  "Reported on 8/24/2021)         Medications and history reviewed    Physical exam:  Vitals: /64   Temp 97.6  F (36.4  C) (Temporal)   Ht 1.727 m (5' 8\")   Wt 73.4 kg (161 lb 14.4 oz)   BMI 24.62 kg/m    BMI= Body mass index is 24.62 kg/m .    Constitutional: Healthy, alert, non-distressed   Head: Normo-cephalic, atraumatic, no lesions, masses or tenderness   Cardiovascular: RRR, no new murmurs, +S1, +S2 heart sounds, no clicks, rubs or gallops   Respiratory: CTAB, no rales, rhonchi or wheezing, equal chest rise, good respiratory effort   Gastrointestinal: Soft, non-tender, non distended, no rebound rigidity or guarding, no masses or hernias palpated   : Deferred  Musculoskeletal: Moves all extremities, normal  strength, no deformities noted   Skin: No suspicious lesions or rashes   Psychiatric: Mentation appears normal, affect appropriate   Hematologic/Lymphatic/Immunologic: Normal cervical and supraclavicular lymph nodes   Patient able to get up on table with mild difficulty.    Labs show:  No results found for this or any previous visit (from the past 24 hour(s)).    Imaging shows:  No results found for this or any previous visit (from the past 744 hour(s)).      Assessment:     ICD-10-CM    1. Myelodysplastic syndrome (H)  D46.9      Plan: I recommend US guided right internal jugular venous access port placement with flouroscopy, possible left. He will also need platelet transfusion the day of the procedure prior to start. We will coordinate this for him. We discussed the procedure in detail. We also discussed the risks, benefits, alternatives and post-op care and restrictions. After our informed discussion we decided to proceed with the proposed surgery. He recently tolerated general anesthesia without difficulty and is cleared for anesthesia for this procedure.    Kieran Johnson, DO    "

## 2021-10-08 NOTE — LETTER
10/8/2021         RE: Renny Gannon  [address]  [facility] [address]        Dear Colleague,    Thank you for referring your patient, Renny Gannon, to the [facility]. Please see a copy of my visit note below.    Patient seen in consultation for port placement    HPI:  Patient is a 70 year old male with myelodysplastic syndrome. He requires frequent blood draws and blood product transfusions. He recently underwent lap leonidas with platelets around 22 and did fine with this. He does not take any blood thinners and denies any trauma, surgery or radiation to the head or neck. He's had CABG.    Review Of Systems    Skin: negative  Ears/Nose/Throat: negative  Respiratory: No shortness of breath, dyspnea on exertion, cough, or hemoptysis  Cardiovascular: negative  Gastrointestinal: negative  Genitourinary: negative  Musculoskeletal: negative  Neurologic: negative  Hematologic/Lymphatic/Immunologic: as above  Endocrine: negative      Past Medical History:   Diagnosis Date     Heart disease      History of blood transfusion      Hypertension      Leukocytosis 6/3/2021       Past Surgical History:   Procedure Laterality Date     BONE MARROW BIOPSY, BONE SPECIMEN, NEEDLE/TROCAR N/A 12/30/2019    Procedure: BIOPSY, BONE MARROW;  Surgeon: Aguilar Krause MD;  Location: [facility] OR     BYPASS GRAFT ARTERY CORONARY N/A 1/31/2020    Procedure: CORONARY ARTERY BYPASS GRAFTING X 3 - LIMA TO LAD, SV TO OM  AND PDA; ENDOVEIN HARVEST OF BILATERAL LEGS; ON PUMP WITH SANDRA;  Surgeon: Mable Barrera MD;  Location:  OR     CV CORONARY ANGIOGRAM Left 1/9/2020    Procedure: Coronary Angiogram;  Surgeon: Devin Bentley MD;  Location:  HE[facility] CARDI[facility] CATH LAB     IR FINE NEEDLE ASPIRATION W ULTRASOUND  6/10/2021     LAPAROSCOPIC CHOLECYSTECTOMY N/A 8/15/2021    Procedure: Laparoscopic cholecystectomy;  Surgeon: Severiano De La Rosa MD;  Location:  OR     NO HISTORY OF SURGERY          Family History   Problem Relation Age of Onset     No Known Problems Mother      Unknown/Adopted Father      Unknown/Adopted Maternal Grandmother      Unknown/Adopted Maternal Grandfather      Unknown/Adopted Paternal Grandmother      Unknown/Adopted Paternal Grandfather      Cancer Brother         lung cancer - smoking     No Known Problems Sister      Coronary Artery Disease Brother          of MI at 66     No Known Problems Sister        Social History     Socioeconomic History     Marital status: Single     Spouse name: Not on file     Number of children: Not on file     Years of education: Not on file     Highest education level: Not on file   Occupational History     Occupation: Retired   Tobacco Use     Smoking status: Former Smoker     Years: 30.00     Types: Cigarettes     Start date: 1961     Quit date: 2001     Years since quittin.6     Smokeless tobacco: Never Used   Vaping Use     Vaping Use: Never used   Substance and Sexual Activity     Alcohol use: No     Drug use: No     Sexual activity: Not Currently   Other Topics Concern     Parent/sibling w/ CABG, MI or angioplasty before 65F 55M? Not Asked   Social History Narrative     Not on file     Social Determinants of Health     Financial Resource Strain:      Difficulty of Paying Living Expenses:    Food Insecurity: No Food Insecurity     Worried About Running Out of Food in the Last Year: Never true     Ran Out of Food in the Last Year: Never true   Transportation Needs: No Transportation Needs     Lack of Transportation (Medical): No     Lack of Transportation (Non-Medical): No   Physical Activity: Inactive     Days of Exercise per Week: 0 days     Minutes of Exercise per Session: 0 min   Stress:      Feeling of Stress :    Social Connections: Unknown     Frequency of Communication with Friends and Family: Three times a week     Frequency of Social Gatherings with Friends and Family: Twice a week     Attends [employer]  Services: Never     Active Member of Clubs or Organizations: No     Attends Club or Organization Meetings: Never     Marital Status: Not on file   Intimate Partner Violence:      Fear of Current or Ex-Partner:      Emotionally Abused:      Physically Abused:      Sexually Abused:        Current Outpatient Medications   Medication Sig Dispense Refill     acyclovir (ZOVIRAX) 400 MG tablet Take 1 tablet (400 mg) by mouth 2 times daily 60 tablet 1     allopurinol (ZYLOPRIM) 300 MG tablet Take 1 tablet (300 mg) by mouth daily 90 tablet 1     fluconazole (DIFLUCAN) 200 MG tablet Take 1 tablet (200 mg) by mouth daily 30 tablet 1     furosemide (LASIX) 40 MG tablet Take 1 tablet (40 mg) by mouth daily 30 tablet 1     levofloxacin (LEVAQUIN) 250 MG tablet Take 1 tablet (250 mg) by mouth At Bedtime 30 tablet 1     metoprolol tartrate (LOPRESSOR) 50 MG tablet Take 1 tablet (50 mg) by mouth 2 times daily 30 tablet 3     nitroGLYcerin (NITROSTAT) 0.4 MG sublingual tablet For chest pain place 1 tablet under the tongue every 5 minutes for 3 doses. If symptoms persist 5 minutes after 1st dose call 911.       oxyCODONE (ROXICODONE) 5 MG tablet Take 5 mg by mouth every 6 hours as needed for severe pain       oxyCODONE (XTAMPZA) 9 MG 12 hr tablet Take 1 tablet (9 mg) by mouth every 12 hours . DO NOT drive or operate machinery while taking this medication. 60 tablet 0     polyethylene glycol (MIRALAX) 17 GM/Dose powder Take 17 g by mouth daily 510 g      rosuvastatin (CRESTOR) 5 MG tablet Take 1 tablet (5 mg) by mouth daily 30 tablet 1     SENEXON-S 8.6-50 MG tablet TAKE TWO TABLETS BY MOUTH TWO TIMES A  tablet 4     venetoclax (VENCLEXTA) 100 MG tablet Take 2 tablets (200 mg) by mouth daily for 21 days 42 tablet 0     vitamin B-12 (CYANOCOBALAMIN) 250 MCG tablet TAKE ONE TABLET BY MOUTH EVERY MORNING 90 tablet 3     vitamin D3 (CHOLECALCIFEROL) 50 mcg (2000 units) tablet Take 1 tablet by mouth daily       ACE/ARB/ARNI NOT  "PRESCRIBED (INTENTIONAL) Please choose reason not prescribed from choices below. (Patient not taking: Reported on 8/24/2021)       ASPIRIN NOT PRESCRIBED (INTENTIONAL) Please choose reason not prescribed from choices below. (Patient not taking: Reported on 8/24/2021)         Medications and history reviewed    Physical exam:  Vitals: /64   Temp 97.6  F (36.4  C) (Temporal)   Ht 1.727 m (5' 8\")   Wt 73.4 kg (161 lb 14.4 oz)   BMI 24.62 kg/m    BMI= Body mass index is 24.62 kg/m .    Constitutional: Healthy, alert, non-distressed   Head: Normo-cephalic, atraumatic, no lesions, masses or tenderness   Cardiovascular: RRR, no new murmurs, +S1, +S2 heart sounds, no clicks, rubs or gallops   Respiratory: CTAB, no rales, rhonchi or wheezing, equal chest rise, good respiratory effort   Gastrointestinal: Soft, non-tender, non distended, no rebound rigidity or guarding, no masses or hernias palpated   : Deferred  Musculoskeletal: Moves all extremities, normal  strength, no deformities noted   Skin: No suspicious lesions or rashes   Psychiatric: Mentation appears normal, affect appropriate   Hematologic/Lymphatic/Immunologic: Normal cervical and supraclavicular lymph nodes   Patient able to get up on table with mild difficulty.    Labs show:  No results found for this or any previous visit (from the past 24 hour(s)).    Imaging shows:  No results found for this or any previous visit (from the past 744 hour(s)).      Assessment:     ICD-10-CM    1. Myelodysplastic syndrome (H)  D46.9      Plan: I recommend US guided right internal jugular venous access port placement with flouroscopy, possible left. He will also need platelet transfusion the day of the procedure prior to start. We will coordinate this for him. We discussed the procedure in detail. We also discussed the risks, benefits, alternatives and post-op care and restrictions. After our informed discussion we decided to proceed with the proposed surgery. He " recently tolerated general anesthesia without difficulty and is cleared for anesthesia for this procedure.    Kieran Johnson, DO        Again, thank you for allowing me to participate in the care of your patient.        Sincerely,        Kieran Johnson, DO

## 2021-10-09 NOTE — PROGRESS NOTES
"Renny Gannon  Gender: male  : 1950  [address]   [facility] [address]  [phone] (home)     Medical Record: [id number]  Pharmacy:    [facility] [address] [place] [address] - [address]  [facility] - [place], [place] - [address]  [facility] - [place], [place] - [address] [facility]  [facility] [address] [facility] - [place], [place] - [address]  Primary Care Provider: Angus Scott    Parent's names are: Data Unavailable (mother) and Data Unavailable (father).      [facility]  2021     Discharge Phone Call:  Key Words/Key Times      Introduction - AIDET (Acknowledge, Introduce, Duration, Explanation)      Empathy-   We are calling to see how you are since your recent stay in the hospital?     Call back COMMENTS:       Clinical Questions -  (f/u appts, medication side effects/purpose, ability to care for self at home) \"For your safety, it is important to us that you understand the purpose and side effects of your medications, can you tell me what your new medications are?\"     Call back COMMENTS:       Staff Recognition -  We like to recognize staff and physicians who have done an excellent job.  Do you remember any people from your care team that you would like recognize?     Call back COMMENTS: That was awhile age so I can't remember.       Very Good Care -  We want to provide very good care to all patients.  How was your care?     Call back COMMENTS:       Opportunities for Improvement -  Our goal is to be the best.  Do you have any suggestions for things that we could improve upon?     Call back COMMENTS:           "

## 2021-10-11 NOTE — TELEPHONE ENCOUNTER
Type of surgery: ultrasound guided right internal jugular venous access port placement with flouroscopy, possible left (Right)     Location of surgery: [facility] OR  Date and time of surgery: 10/15  Surgeon: Alex  Pre-Op Appt Date: NA  Post-Op Appt Date: NA   Packet sent out: No  Pre-cert/Authorization completed:  Not Applicable  Date: na

## 2021-10-12 NOTE — PROGRESS NOTES
Infusion Nursing Note:  Renny Gannon presents today for labs with RBC and platelet infusions..    Patient seen by provider today: No   present during visit today: Not Applicable.    Note: N/A.      Intravenous Access:  Peripheral IV placed.    Treatment Conditions:  Lab Results   Component Value Date    HGB 6.0 (LL) 10/12/2021    WBC 0.2 (LL) 10/12/2021    ANEU 0.2 (LL) 09/30/2021    ANEUTAUTO 2.0 08/26/2021    PLT 7 (LL) 10/12/2021          Post Infusion Assessment:  Patient tolerated infusion without incident.       Discharge Plan:   Patient discharged in stable condition accompanied by: sister.  Departure Mode: Ambulatory.      Anna Mederos RN

## 2021-10-13 NOTE — TELEPHONE ENCOUNTER
Homecare nurse calls and states the patients pulse was elevated today at 112 apical and B/P 140/68. He has not had his metoprolol since Saturday his sister will  today and he will restart on it. If any advice or med changes please call nurse back otherwise just an HOLLIS  Ph. [phone]

## 2021-10-14 NOTE — TELEPHONE ENCOUNTER
Crestor Prescription approved per [facility] Refill Protocol.  JACOB Vergara

## 2021-10-14 NOTE — TELEPHONE ENCOUNTER
Pending Prescriptions:                       Disp   Refills    levofloxacin (LEVAQUIN) 250 MG tablet      30 tab*1        Sig: Take 1 tablet (250 mg) by mouth At Bedtime    Routing refill request to provider for review/approval because:  Drug not on the [facility] refill protocol

## 2021-10-14 NOTE — TELEPHONE ENCOUNTER
"Diflucan  Requested Prescriptions   Pending Prescriptions Disp Refills    fluconazole (DIFLUCAN) 200 MG tablet 30 tablet 1     Sig: Take 1 tablet (200 mg) by mouth daily        Antifungal Agents Failed - 10/14/2021 10:49 AM        Failed - Not Fluconazole or Terconazole      If oral Fluconazole or Terconazole, may refill if indicated in progress notes.           Passed - Recent (12 mo) or future (30 days) visit within the authorizing provider's specialty     Patient has had an office visit with the authorizing provider or a provider within the authorizing providers department within the previous 12 mos or has a future within next 30 days. See \"Patient Info\" tab in inbasket, or \"Choose Columns\" in Meds & Orders section of the refill encounter.              Passed - Medication is active on med list            Routing refill request to provider for review/approval because:  Just had 2 consecutive refills. Needs MD approval  JACOB Vergara        "

## 2021-10-15 NOTE — ANESTHESIA CARE TRANSFER NOTE
Patient: Renny Gannon    Procedure: Procedure(s):  ultrasound guided right internal jugular venous access port placement with flouroscopy, right side       Diagnosis: Myelodysplastic syndrome (H) [D46.9]  Diagnosis Additional Information: No value filed.    Anesthesia Type:   MAC     Note:    Oropharynx: oropharynx clear of all foreign objects and spontaneously breathing  Level of Consciousness: drowsy  Oxygen Supplementation: face mask  Level of Supplemental Oxygen (L/min / FiO2): 6  Independent Airway: airway patency satisfactory and stable  Dentition: dentition unchanged  Vital Signs Stable: post-procedure vital signs reviewed and stable  Report to RN Given: handoff report given  Patient transferred to: PACU    Handoff Report: Identifed the Patient, Identified the Reponsible Provider, Reviewed the pertinent medical history, Discussed the surgical course, Reviewed Intra-OP anesthesia mangement and issues during anesthesia, Set expectations for post-procedure period and Allowed opportunity for questions and acknowledgement of understanding      Vitals:  Vitals Value Taken Time   /66 10/15/21 1606   Temp 100.9  F (38.3  C) 10/15/21 1605   Pulse 102 10/15/21 1606   Resp 16 10/15/21 1605   SpO2 98 % 10/15/21 1610   Vitals shown include unvalidated device data.    Electronically Signed By: UNA Yun CRNA  October 15, 2021  4:11 PM

## 2021-10-15 NOTE — DISCHARGE INSTRUCTIONS
[facility] Same-Day Surgery   Adult Discharge Orders & Instructions - After Anesthesia    For 24 hours after surgery    1. Get plenty of rest.  A responsible adult must stay with you for at least 24 hours after you leave the hospital.   2. Do not drive or use heavy equipment.  If you have weakness or tingling, don't drive or use heavy equipment until this feeling goes away.  3. Do not drink alcohol.  4. Avoid strenuous or risky activities.  Ask for help when climbing stairs.   5. You may feel lightheaded.  If so, sit for a few minutes before standing.  Have someone help you get up.   6. You may have a slight fever. Call the doctor if your fever is over 100 F (37.7 C) (taken under the tongue) or lasts longer than 24 hours.  7. You may have a dry mouth, a sore throat, muscle aches or trouble sleeping.  These should go away after 24 hours.  8. Do not make important or legal decisions.  We don t expect you to have any problems from the surgery or treatment you had today. Just in case, here s what to do if you have pain, upset stomach (nausea), bleeding or infection:  Pain:  Take medicines your doctor has prescribed or over-the-counter medicine they have suggested. Resting and using ice packs can help, too. For surgery on an arm or leg, raise it on a pillow to ease swelling. Call your doctor if these methods don t work.  Copyright Jabier Zabala, Licensed under [employer]  Upset stomach (nausea):  Take anti-nausea medicine approved by your doctor. Drink clear liquids like apple juice, ginger ale, broth or 7-Up. Be sure to drink enough fluids. Rest can help, too. Move to normal foods when you re ready. Bleeding:  In the first 24 hours, you may see a little blood on your dressing, about the size of a quarter. You don t need to worry about this much blood, but if the blood spot keeps getting bigger:    Put pressure on the wound if you can, AND    Call your doctor.  Copyright [employer], Licensed under [employer]  International  Fever/Infection: Please call your doctor if you have any of these signs:    Redness    Swelling    Wound feels warm    Pain gets worse    Bad-smelling fluid leaks from wound    Fever or chills  Call your doctor for any of the followin.  It has been over 8 to 10 hours since surgery and you are still not able to urinate (pass water).    2.  Headache for over 24 hours.    3.  Numbness, tingling or weakness in your legs the day after surgery (if you had spinal anesthesia).  Nurse advice line: [phone]

## 2021-10-15 NOTE — OP NOTE
Procedure Date: 10/15/2021    PROCEDURE:  Ultrasound-guided right internal jugular venous access port placement with fluoroscopy.    PREOPERATIVE DIAGNOSIS:  Myelodysplastic syndrome.    POSTOPERATIVE DIAGNOSIS:  Myelodysplastic syndrome.    SURGEON:  Kieran Johnson D.O.    ASSISTANT:  None.    ANESTHESIA:  Monitored anesthesia care with local.    SPECIMENS:  None.    ESTIMATED BLOOD LOSS:  5 mL.    COMPLICATIONS:  None immediately apparent.    INDICATIONS FOR PROCEDURE:  Renny is a 70-year-old male whom I met in the Surgical Clinic with a diagnosis of myelodysplastic syndrome.  He gets frequent blood-product transfusions every week due to his hematologic disorder.  It is getting harder and harder to find venous access, per his infusion center.  He has very low platelets, a very low hemoglobin and white blood cell count.  We did transfuse him preoperatively today, both with packed red blood cells and platelets.  We discussed in depth of his increased risk of bleeding and also infection prior to proceeding with the surgery.  The patient does understand the increased risk and wishes to proceed.    DESCRIPTION OF PROCEDURE:  After informed consent was obtained, the patient was brought from the preoperative holding area to the Operating Room and placed in supine position.  Anesthesia was induced.  He was prepped and draped in normal sterile fashion.  Timeout was performed.  After the correct patient and correct procedure were verified, we began by instilling 0.25% Marcaine with epinephrine for local anesthesia in the skin and subcutaneous tissue in the neck area overlying the right internal jugular vein.  A small nick incision was made.  Using ultrasound guidance and an access needle, we accessed the right internal jugular vein with good venous blood return.  Using Seldinger technique, a guidewire was then placed into the right internal jugular vein.  We removed the needle.  We confirmed the wire placement into the  right-sided venous system all the way down to the inferior vena cava using fluoroscopy.  We then turned our attention to the chest wall.  We instilled 0.25% Marcaine with epinephrine for local anesthesia in the skin and subcutaneous tissue in the right chest.  Approximately a 2.5 cm incision was made to make the pocket for the port.  Mostly blunt dissection was used to create this pocket.  Using a tunneling device, the catheter was then tunneled from the chest up into the neck incision.  Under direct fluoroscopic guidance, a dilator and sheath were placed into the right internal jugular vein using Seldinger technique over the guidewire.  The dilator and guidewire were removed.  The catheter was then threaded into the sheath.  Sheath was removed under direct fluoroscopic guidance.  The catheter tip was then retracted until the tip was approximately at the level of the brett.  The catheter was then cut at the chest level.  Port was placed, hub was secured, one final fluoroscopic image was taken.  We secured the port into place using 2-0 Vicryl suture.  The port was tested using heparin and saline solution, and aspirated and flushed without any difficulties.  The port was then locked using 5 mL of heparin solution.  The incision was closed with inverted interrupted 3-0 Vicryl suture and a running subcuticular 4-0 Monocryl suture.  Exofin dressing was then applied.  The patient was then awoken from anesthesia and brought to the recovery room in stable condition.  At the end of the procedure, the instruments and needles and sponges were accounted for after a correct count.    Kieran Johnson DO        D: 10/15/2021   T: 10/15/2021   MT: [id number]    Name:     JOSÉ MIGUEL ABI CRAIGEdward  MRN:      [id number]        Account:        [id number]   :      1950           Procedure Date: 10/15/2021     Document: [id number]

## 2021-10-15 NOTE — BRIEF OP NOTE
[facility] Brief Operative Note    Pre-operative diagnosis: Myelodysplastic syndrome (H) [D46.9]   Post-operative diagnosis myelodysplastic syndrome     Procedure: Procedure(s):  ultrasound guided right internal jugular venous access port placement with flouroscopy, right side   Surgeon(s): Surgeon(s) and Role:     * Kieran Johnson, DO - Primary   Estimated blood loss: 5ml    Specimens: * No specimens in log *   Findings: Technically successful RIJ port placement

## 2021-10-15 NOTE — ANESTHESIA POSTPROCEDURE EVALUATION
Patient: Renny Gannon    Procedure: Procedure(s):  ultrasound guided right internal jugular venous access port placement with flouroscopy, right side       Diagnosis:Myelodysplastic syndrome (H) [D46.9]  Diagnosis Additional Information: No value filed.    Anesthesia Type:  MAC    Note:  Disposition: Outpatient   Postop Pain Control: Uneventful            Sign Out: Well controlled pain   PONV: No   Neuro/Psych: Uneventful            Sign Out: Acceptable/Baseline neuro status   Airway/Respiratory: Uneventful            Sign Out: Acceptable/Baseline resp. status   CV/Hemodynamics: Uneventful            Sign Out: Acceptable CV status   Other NRE: NONE   DID A NON-ROUTINE EVENT OCCUR? No    Event details/Postop Comments:  Pt was happy with anesthesia care.  No complications.  He did have some shivering but was febrile prior to the procedure.  I will follow up with the pt if needed.           Last vitals:  Vitals Value Taken Time   /63 10/15/21 1645   Temp 100.9  F (38.3  C) 10/15/21 1605   Pulse 108 10/15/21 1645   Resp 16 10/15/21 1645   SpO2 100 % 10/15/21 1650   Vitals shown include unvalidated device data.    Electronically Signed By: UNA Boyd CRNA  October 15, 2021  6:19 PM

## 2021-10-15 NOTE — ANESTHESIA PREPROCEDURE EVALUATION
Anesthesia Pre-Procedure Evaluation    Patient: Renny Gannon   MRN: [id number] : 1950        Preoperative Diagnosis: Acute cholecystitis [K81.0]   Procedure : Procedure(s):  Laparoscopic cholecystectomy possible open     Past Medical History:   Diagnosis Date     Heart disease      History of blood transfusion      Hypertension      Leukocytosis 6/3/2021      Past Surgical History:   Procedure Laterality Date     BONE MARROW BIOPSY, BONE SPECIMEN, NEEDLE/TROCAR N/A 2019    Procedure: BIOPSY, BONE MARROW;  Surgeon: Aguilar Krause MD;  Location:  OR     BYPASS GRAFT ARTERY CORONARY N/A 2020    Procedure: CORONARY ARTERY BYPASS GRAFTING X 3 - LIMA TO LAD, SV TO OM  AND PDA; ENDOVEIN HARVEST OF BILATERAL LEGS; ON PUMP WITH SANDRA;  Surgeon: Mable Barrera MD;  Location:  OR     CV CORONARY ANGIOGRAM Left 2020    Procedure: Coronary Angiogram;  Surgeon: Devin Bentley MD;  Location:  HE[facility] CARDI[facility] CATH LAB     IR FINE NEEDLE ASPIRATION W ULTRASOUND  6/10/2021     LAPAROSCOPIC CHOLECYSTECTOMY N/A 8/15/2021    Procedure: Laparoscopic cholecystectomy;  Surgeon: Severiano De La Rosa MD;  Location: [facility]     NO HISTORY OF SURGERY        Allergies   Allergen Reactions     No Known Drug Allergies      Seasonal Allergies Other (See Comments)     Stuffy head and nose      Social History     Tobacco Use     Smoking status: Former Smoker     Years: 30.00     Types: Cigarettes     Start date: 1961     Quit date: 2001     Years since quittin.7     Smokeless tobacco: Never Used   Substance Use Topics     Alcohol use: No      Wt Readings from Last 1 Encounters:   10/08/21 73.4 kg (161 lb 14.4 oz)        Anesthesia Evaluation   Pt has had prior anesthetic. Type: General and MAC.    History of anesthetic complications       ROS/MED HX  ENT/Pulmonary: Comment: Quit smoking 2001  Pleural effusion hx    (+) allergic rhinitis, tobacco use, Past use, 1 packs/day,  30  Pack-Year Hx,      Neurologic:     (+) TIA, features: memory loss- denies dysphagia.     Cardiovascular:     (+) hypertension--CAD angina-past MI CABG (2020)-date: 2020. -CHF etiology: unspecified Last EF: 50-55% SPENCER. Previous cardiac testing   Echo: Date: 2021 Results:  [facility]  Echocardiography Laboratory  [address] [place] [address]     Name: ABI VALDEZ  MRN: [id number]  : 1950  Study Date: 2021 01:39 PM  Age: 70 yrs  Gender: Male  Patient Location: [facility]  Reason For Study: Chest Pain  Ordering Physician: ELAINE CALVERT  Referring Physician: ANA LUISA MONSIVAIS  Performed By: Yolanda Marte RDCS     BSA: 1.8 m2  Height: 68 in  Weight: 157 lb  HR: 80  BP: 121/58 mmHg  ______________________________________________________________________________  Procedure  Limited Portable Echo Adult. Optison (NDC #3642-9236) given intravenously.  ______________________________________________________________________________  Interpretation Summary     Left ventricular systolic function is low normal.  The visual ejection fraction is 50-55%.  There is severe inferior wall hypokinesis.  The left ventricle is normal in size.  There is moderate concentric left ventricular hypertrophy.  A limited Doppler study did not demonstrate signifcant stenosis or  insufficiency involving cardiac valves.     No change since last study 2021  ______________________________________________________________________________  Left Ventricle  The left ventricle is normal in size. There is moderate concentric left  ventricular hypertrophy. Left ventricular systolic function is low normal. The  visual ejection fraction is 50-55%. There is severe inferior wall hypokinesis.  Septal motion is consistent with post-operative state. There is no thrombus  seen in the left ventricle.     Right Ventricle  The right ventricle is normal in structure, function and size. There is no  mass or  thrombus in the right ventricle.     Atria  Normal left atrial size. Right atrial size is normal. There is no atrial shunt  seen. The left atrial appendage is not well visualized.     Mitral Valve  The mitral valve leaflets appear normal. There is no evidence of stenosis,  fluttering, or prolapse. There is no mitral regurgitation noted. There is no  mitral valve stenosis.     Tricuspid Valve  Normal tricuspid valve. The right ventricular systolic pressure is  approximated at 28.9 mmHg plus the right atrial pressure. Right ventricle  systolic pressure estimate normal. There is mild (1+) tricuspid regurgitation.  There is no tricuspid stenosis.     Aortic Valve  There is moderate trileaflet aortic sclerosis. No aortic regurgitation is  present. No aortic stenosis is present.     Pulmonic Valve  The pulmonic valve is not well visualized. There is no pulmonic valvular  regurgitation. There is no pulmonic valvular stenosis.     Vessels  The aortic root is normal size. Normal size ascending aorta. Inferior vena  cava not well visualized for estimation of right atrial pressure. The  pulmonary artery is normal size.     Pericardium  The pericardium appears normal. There is no pleural effusion.     Rhythm  Sinus rhythm was noted.  ______________________________________________________________________________  Doppler Measurements & Calculations  MV E max vlad: 81.0 cm/sec  MV dec slope: 418.0 cm/sec2  MV dec time: 0.19 sec  TR max vlad: 269.0 cm/sec  TR max P.9 mmHg  E/E' av.2  Lateral E/e': 6.3  Medial E/e': 12.0     ______________________________________________________________________________  Stress Test: Date: Results:    ECG Reviewed: Date: 8/15/2021 Results:  Sinus Rhythm   -RSR(V1) -nondiagnostic.     PROBABLY NORMAL    Cath: Date: Results:      METS/Exercise Tolerance:     Hematologic: Comments: - myelodysplastic syndrome    (+) anemia, history of blood transfusion, no previous transfusion reaction,      Musculoskeletal: Comment: Muscle wasting      GI/Hepatic:       Renal/Genitourinary: Comment: Stage III CRI    (+) renal disease, type: CRI,     Endo:  - neg endo ROS     Psychiatric/Substance Use:  - neg psychiatric ROS     Infectious Disease:  - neg infectious disease ROS     Malignancy:   (+) Malignancy, History of Lymphoma/Leukemia.Lymph CA Active status post Chemo.        Other:  - neg other ROS          Physical Exam    Airway  airway exam normal    Comment: Bleeding gums - uses mouth wash only - does not brush teeth to prevent trauma to gum line    Mallampati: III   TM distance: > 3 FB   Neck ROM: full   Mouth opening: > 3 cm    Respiratory Devices and Support         Dental  no notable dental history     (+) loose    B=Bridge, C=Chipped, L=Loose, M=Missing    Cardiovascular   cardiovascular exam normal       Rhythm and rate: regular and normal     Pulmonary   pulmonary exam normal        breath sounds clear to auscultation           OUTSIDE LABS:  CBC:   Lab Results   Component Value Date    WBC 0.2 (LL) 10/15/2021    WBC 0.2 (LL) 10/12/2021    HGB 6.0 (LL) 10/15/2021    HGB 6.0 (LL) 10/12/2021    HCT 17.8 (L) 10/15/2021    HCT 17.9 (L) 10/12/2021    PLT 6 (LL) 10/15/2021    PLT 7 (LL) 10/12/2021     BMP:   Lab Results   Component Value Date     10/15/2021     10/12/2021    POTASSIUM 3.3 (L) 10/15/2021    POTASSIUM 4.0 10/12/2021    CHLORIDE 101 10/15/2021    CHLORIDE 104 10/12/2021    CO2 27 10/12/2021    CO2 28 10/08/2021    BUN 26 10/12/2021    BUN 23 10/08/2021    CR 0.98 10/12/2021    CR 0.87 10/08/2021     (H) 10/12/2021     (H) 10/08/2021     COAGS:   Lab Results   Component Value Date    PTT 45 (H) 08/18/2021    INR 1.10 08/18/2021    FIBR 574 (H) 06/18/2021     POC:   Lab Results   Component Value Date     (H) 01/31/2021     HEPATIC:   Lab Results   Component Value Date    ALBUMIN 3.2 (L) 10/12/2021    PROTTOTAL 7.4 10/12/2021    ALT 20 10/12/2021    AST 11  10/12/2021    ALKPHOS 85 10/12/2021    BILITOTAL 0.9 10/12/2021     OTHER:   Lab Results   Component Value Date    PH 7.43 01/07/2021    LACT 1.0 08/15/2021    A1C 5.3 08/17/2020    MINNIE 8.9 10/12/2021    PHOS 3.7 06/18/2021    MAG 2.1 06/18/2021    LIPASE 71 (L) 08/15/2021    TSH 1.88 11/17/2020    .0 (H) 06/04/2021    SED 76 (H) 06/04/2021       Anesthesia Plan    ASA Status:  4      Anesthesia Type: MAC.     - Reason for MAC: immobility needed   Induction: Intravenous, Propofol.   Maintenance: TIVA.        Consents         - Extended Intubation/Ventilatory Support Discussed: No.      - Patient is DNR/DNI Status: No    Use of blood products discussed: Yes.     - Discussed with: Patient.     Postoperative Care            Comments:    The risks and benefits of anesthesia, and the alternatives where applicable, have been discussed with the patient, and they wish to proceed.    Extensive discussion regarding pt risk of bleeding and interventions to mitigate if this concern arises during the procedure. Pt stated understanding and would like to proceed with the procedure.                 UNA Yun CRNA

## 2021-10-15 NOTE — INTERVAL H&P NOTE
After transfusions today his PLT is 39 and Hgb is 6.3. He does not appear to be symptomatic from the low Hgb. His WBC is 0.2. Renny and I discussed this increased risks of both bleeding and infection given his low blood counts. He understand that should there be any signs of infection at port site or his blood stream he will need to have the port removed. He is understanding of both the increased infection risk and increased bleeding risk and wishes to proceed.

## 2021-10-15 NOTE — PROGRESS NOTES
Infusion Nursing Note:  Renny Gannon presents today for platelets in 1 unit PRBC's.    Patient seen by provider today: Pt is scheduled to have port placed today in same day surgery. Surgeon consulted regarding labs, vitals.    present during visit today: Not Applicable.    Note: Pt platelets at 20 after first unit of PRBC's.  Second unit ordered and started after the PRBC's were done.  Will collect cbc with platelets after second bag of platelets is completed.  Dr Johnson notified via [facility] staff that pt had elevated temp.  Temps are currently trending down with last temp being 99.1.  Will continue to monitor.  Reported by nursing staff that the occasional elevated temp in not unusual for Renny.       Intravenous Access:  Peripheral IV placed.    Treatment Conditions:  Lab Results   Component Value Date    HGB 6.0 (LL) 10/15/2021    WBC 0.2 (LL) 10/15/2021    ANEU 0.2 (LL) 09/30/2021    ANEUTAUTO 2.0 08/26/2021    PLT 20 (LL) 10/15/2021          Post Infusion Assessment:  Patient tolerated infusion without incident.  Site patent and intact, free from redness, edema or discomfort.  No evidence of extravasations.       Discharge Plan:   Pt discharged in care of [facility] RN to same day surgery for port placement. .      Richa Gudino RN

## 2021-10-18 NOTE — PROGRESS NOTES
Infusion Nursing Note:  Renny Gannon presents today for labs/ transfusion of PRBC and platelets .    Patient seen by provider today: No   present during visit today: Not Applicable.    Note: Pt hgb - 5.9 today.  Dr Patel paged and order given to give 2nd unit of PRBC today. .      Intravenous Access:  Implanted Port.    Treatment Conditions:  Lab Results   Component Value Date    HGB 5.9 (LL) 10/18/2021    WBC 0.2 (LL) 10/18/2021    ANEU 0.2 (LL) 09/30/2021    ANEUTAUTO 2.0 08/26/2021    PLT 16 (LL) 10/18/2021      Lab Results   Component Value Date     10/18/2021    POTASSIUM 2.8 (L) 10/18/2021    MAG 2.1 06/18/2021    CR 1.02 10/18/2021    MINNIE 8.4 (L) 10/18/2021    BILITOTAL 1.0 10/18/2021    ALBUMIN 2.7 (L) 10/18/2021    ALT 39 10/18/2021    AST 18 10/18/2021     Results reviewed, labs MET treatment parameters, ok to proceed with treatment.      Post Infusion Assessment:  Patient tolerated infusion without incident. 2 units PRBCs and 1 unit platelets  Patient observed for 15 minutes post infusion per protocol.  Blood return noted pre and post infusion.  Site patent and intact, free from redness, edema or discomfort.  No evidence of extravasations.  Access discontinued per protocol.       Discharge Plan:   Discharge instructions reviewed with: Patient.  Patient discharged in stable condition accompanied by: sister.  Departure Mode: Ambulatory [id number].      Lisa Vasques RN

## 2021-10-19 NOTE — TELEPHONE ENCOUNTER
Received call from home care nurse. Patient had port placement on 10/15/21 by Dr. Johnson and reports that he has had difficulty swallowing since.     Please Advise!    Ki Lamb RN, BSN, OCN  10/19/2021, 3:30 PM

## 2021-10-19 NOTE — TELEPHONE ENCOUNTER
Chaparrita from [facility] is looking for verbal orders for skilled nursing  1 x a week for 9 weeks and 3 as needed  Also patient had a port placed on Friday and needing verbal okay to leave incision site open to air.    Please advise and okay to lm if needed

## 2021-10-20 NOTE — TELEPHONE ENCOUNTER
"Writer spoke to patient who reported that his swallowing issue has subsided. Doesn't think he has swelling.   Patient was advised to contact his PCP is he has further symptoms or if new symptoms arise.   See Dr. Johnson's note below:  \"If there is no swelling then I'm not sure what the relationship to the surgery would be. I would see his PCP for further workup.\"    Suzy Herron RN on 10/20/2021 at 10:35 AM    "

## 2021-10-20 NOTE — PROGRESS NOTES
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 He initially was seen in Dec. 2019 by his PCP for increased fatigue and SOB w/ exertion. CBC showed WBC 79.1, Hgb 11.9 and Plt 378. Peripheral smear showed leuko-erythroblastic reaction w/ neutrophilic left shift. BMBx (12/30/19) revealed extremely hypercellular (100%) marrow with granulocytic and megakaryocytic proliferation, megakaryocytic clustering and atypia and 1.5% circulating blasts as well as moderate-severe reticulin fibrosis (MF 2-3). 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 He was on and off Hydrea from Jan 2020 - Jan 2021. BMBx 3/26/21 revealed increased fibrosis (MF-3), cellularity 40-50%, 5% blasts by morphology and flow. 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    - He presented to clinic for routine laboratory monitoring and was found to have a WBC of 71.9, Hgb 6.4, and plt 18K with \"a lot of immature WBC and blasts\" on laboratory review. He was then directed to the ED for admission and further work-up.    - BMBx  6/4; unfortunately only core was obtained. No aspirate obtained, likely due to fibrosis and possibly excess blasts. BMBx morphology reveals marrow hypercellularity (70-80%) with left-shifted myeloid predominant maturation with approximately 5-10% myeloid blasts, markedly increased marrow fibrosis (MF3); findings consistent with blast crisis arising from previously diagnosed myeloproliferative neoplasm. Diagnosis made on the basis of increased peripheral blast percentage (23%); blast percentage in bone marrow is difficult to establish d/t bone marrow fibrosis and lack of aspirates. BMBx flow from core shows increased abnormal myeloid blasts (12%). FLT3 negative. Flow cytometry on " peripheral blood shows 28% myeloid blasts.  ? Cytogenetics and NGS sent on peripheral blood- copy number changes along the long arm of chromosome 21 present in approximately 75% of cells. 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  NGS shows ASXL1 p.G646fs, VAF=42%, JAK2 p.V617F, VAF=96%. Also loss of one copy of chromosome 7  ? DIPSS score 5 for age, WBC >25, Hgb <10, peripheral blasts on differential.    - Started on decitabine 20 mg/m2 on 6/5.     - Venetoclax started on 6/9; see below for ramp-up.     -Got  right shoulder arthrocentesis; 7 mL brown cloudy fluid drained, ~7500 WBC with 59% monos/macros and 38% neutrophils, peripheral blood elements present, few blasts present     - BM bx 7/7/2021 after 1 dose of decitabine + venetoclax. Hypercellular marrow (90%) with left-shift and myeloid-predominance; 13% blasts; rare small dysplastic megakaryocytes Markedly increased marrow fibrosis (MF-3) Peripheral blood showing 19% circulating blasts    - BMbx 8/13/2021 70-80% cellularity with dysplasia and 7.7% blasts      Interval Hx: Renny reports appetite is 'so-so'. Right shoulder still painful but slightly better. Energy is low. Needing transfusion support. Decitabine delayed this week due to worsening counts. I delayed his chemotherapy cycle to next week. His sisters continue to help him at home and he states he is doing well.     A comprehensive review of systems was completed and negative except as described above.     Pertinent PMH reviewed:   CHF with reduced EF    Physical Exam:   /75   Pulse 83   Temp 97.6  F (36.4  C) (Oral)   Resp 20   Wt 72.2 kg (159 lb 3.2 oz)   SpO2 97%   BMI 24.21 kg/m      Constitutional: NAD  Eyes: no scleral icterus  ENT: no oral lesions  Lymph: no cervical, supraclavicular, axillary LAD  Pulm: CTAB  CV: RRR, no m/r/g  GI: bowel sounds present, soft and nontender to palpation  MSK: No edema in the extremities, difficulty  abducting right shoulder  Neuro: alert, conversant, normal gait  Psych flataffect    KPS=70    Assessment and Plan: Renny's MPN with excess blasts is approximately the same as prior to cycle 1.He has multiple high risk features. I decreased venetoclax from 21 days to 14 days due to cytopenias.  Will plan for BM bx to reassess response to treatment now that he is needing transfusion support again. We discussed that this might mean the treatment isnt working. Will get NGS panel to look for targetable mutations. Continue weekly CBC and CMP. Continue ppx with fluconazole, acyclovir, and levofloxacin.      Hypokalemia: started 20 meq every day. Recheck at next lab appt.     Jeane Patel MD PhD

## 2021-10-20 NOTE — LETTER
"    10/20/2021         RE: Renny Gannon  [address]  [facility] [address]        Dear Colleague,    Thank you for referring your patient, Renny Gannon, to the [facility]. Please see a copy of my visit note below.        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 He initially was seen in Dec. 2019 by his PCP for increased fatigue and SOB w/ exertion. CBC showed WBC 79.1, Hgb 11.9 and Plt 378. Peripheral smear showed leuko-erythroblastic reaction w/ neutrophilic left shift. BMBx (12/30/19) revealed extremely hypercellular (100%) marrow with granulocytic and megakaryocytic proliferation, megakaryocytic clustering and atypia and 1.5% circulating blasts as well as moderate-severe reticulin fibrosis (MF 2-3). 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 He was on and off Hydrea from Jan 2020 - Jan 2021. BMBx 3/26/21 revealed increased fibrosis (MF-3), cellularity 40-50%, 5% blasts by morphology and flow. 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    - He presented to clinic for routine laboratory monitoring and was found to have a WBC of 71.9, Hgb 6.4, and plt 18K with \"a lot of immature WBC and blasts\" on laboratory review. He was then directed to the ED for admission and further work-up.    - BMBx  6/4; unfortunately only core was obtained. No aspirate obtained, likely due to fibrosis and possibly excess blasts. BMBx morphology reveals marrow hypercellularity (70-80%) with left-shifted myeloid predominant maturation with approximately 5-10% myeloid blasts, markedly increased marrow fibrosis (MF3); findings consistent with blast crisis arising from previously diagnosed myeloproliferative neoplasm. Diagnosis made on the basis of " increased peripheral blast percentage (23%); blast percentage in bone marrow is difficult to establish d/t bone marrow fibrosis and lack of aspirates. BMBx flow from core shows increased abnormal myeloid blasts (12%). FLT3 negative. Flow cytometry on peripheral blood shows 28% myeloid blasts.  ? Cytogenetics and NGS sent on peripheral blood- copy number changes along the long arm of chromosome 21 present in approximately 75% of cells. 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  NGS shows ASXL1 p.G646fs, VAF=42%, JAK2 p.V617F, VAF=96%. Also loss of one copy of chromosome 7  ? DIPSS score 5 for age, WBC >25, Hgb <10, peripheral blasts on differential.    - Started on decitabine 20 mg/m2 on 6/5.     - Venetoclax started on 6/9; see below for ramp-up.     -Got  right shoulder arthrocentesis; 7 mL brown cloudy fluid drained, ~7500 WBC with 59% monos/macros and 38% neutrophils, peripheral blood elements present, few blasts present     - BM bx 7/7/2021 after 1 dose of decitabine + venetoclax. Hypercellular marrow (90%) with left-shift and myeloid-predominance; 13% blasts; rare small dysplastic megakaryocytes Markedly increased marrow fibrosis (MF-3) Peripheral blood showing 19% circulating blasts    - BMbx 8/13/2021 70-80% cellularity with dysplasia and 7.7% blasts      Interval Hx: Renny reports appetite is 'so-so'. Right shoulder still painful but slightly better. Energy is low. Needing transfusion support. Decitabine delayed this week due to worsening counts. I delayed his chemotherapy cycle to next week. His sisters continue to help him at home and he states he is doing well.     A comprehensive review of systems was completed and negative except as described above.     Pertinent PMH reviewed:   CHF with reduced EF    Physical Exam:   /75   Pulse 83   Temp 97.6  F (36.4  C) (Oral)   Resp 20   Wt 72.2 kg (159 lb 3.2 oz)   SpO2 97%   BMI 24.21 kg/m       Constitutional: NAD  Eyes: no scleral icterus  ENT: no oral lesions  Lymph: no cervical, supraclavicular, axillary LAD  Pulm: CTAB  CV: RRR, no m/r/g  GI: bowel sounds present, soft and nontender to palpation  MSK: No edema in the extremities, difficulty abducting right shoulder  Neuro: alert, conversant, normal gait  Psych flataffect    KPS=70    Assessment and Plan: Renny's MPN with excess blasts is approximately the same as prior to cycle 1.He has multiple high risk features. I decreased venetoclax from 21 days to 14 days due to cytopenias.  Will plan for BM bx to reassess response to treatment now that he is needing transfusion support again. We discussed that this might mean the treatment isnt working. Will get NGS panel to look for targetable mutations. Continue weekly CBC and CMP. Continue ppx with fluconazole, acyclovir, and levofloxacin.      Hypokalemia: started 20 meq every day. Recheck at next lab appt.     Jeane Patel MD PhD                      Again, thank you for allowing me to participate in the care of your patient.        Sincerely,        Jeane Patel MD

## 2021-10-21 NOTE — PROGRESS NOTES
Infusion Nursing Note:  Renny Gannon presents today for port labs.    Patient seen by provider today: No   present during visit today: Not Applicable.    Note: New port incision intact with glue. Tender to touch.       Intravenous Access:  Labs drawn without difficulty.  Implanted Port.  Purple/green tubes drawn. Flushed with 20 ml NS after.       Jalyn Gannon RN

## 2021-10-21 NOTE — ED NOTES
[facility]  Discharge Summary    Roro Duran Patient Status:  Observation    1939 MRN [id number]   [facility] 5NW-A Attending Nadia Corbett MD   [facility] Day # 0 PCP Juve Arita     Date of Admission: 2020    Date of Disch
Oozing from port access covered with quick clot and held pressure with liter of fluids for weight  
follow-up with her regular outpatient physician Evelia Arita after discharge from the hospital.  Patient's blood sugar was low from not eating and Levemir was decreased while in hospital.   Patient improved clinically. Diarrhea resolved.   Tolerating di
taking these medications      Instructions Prescription details   LANTUS SC      Inject 30 Units into the skin daily.    Refills: 0     Levothyroxine Sodium 175 MCG Tabs      Take 200 mcg by mouth before breakfast.   Refills: 0     lisinopril 20 MG Tabs
bilaterally  Heart: S1, S2 normal, no murmur,  regular rate and rhythm  Abdomen: soft, non-tender; bowel sounds normal  Extremities: extremities normal,no cyanosis or edema  Pulses: 2+ and symmetric  Skin: Skin color, texture, turgor normal. No rashes or l
You can access the FollowMyHealth Patient Portal offered by [facility] by registering at the following website: [email or web address]. By joining [employer]’s FollowMyHealth portal, you will also be able to view your health information using other applications (apps) compatible with our system.

## 2021-10-21 NOTE — ED PROVIDER NOTES
History     Chief Complaint   Patient presents with     Vascular Access Problem     bleeding from port     HPI  Renny Gannon is a 70 year old male who presents to the emergency room with bleeding from port.  He had presented to infusion today for transfusion of packed red blood cells.  Has a history of transfusion dependent anemia, and myelodysplastic syndrome.  Recent hemoglobin was 6.5.  He tolerated his infusion well.  Was walking towards the front door of the hospital to leave when he noticed that his shirt was saturated with blood.  Came to the emergency room for evaluation.  He does not have any pain.  He does have some mild lightheadedness but no other complaints.    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     Priority: Medium     History of cholecystectomy 09/23/2021     Priority: Medium     Acute cholecystitis 08/15/2021     Priority: Medium     Pancytopenia (H) 08/15/2021     Priority: Medium     MDS (myelodysplastic syndrome) (H) 08/15/2021     Priority: Medium     Demand ischemia (H) 07/20/2021     Priority: Medium     Antineoplastic chemotherapy induced pancytopenia (H) 06/18/2021     Priority: Medium     Heart failure with reduced ejection fraction (H) 06/18/2021     Priority: Medium     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) 06/03/2021     Priority: Medium     Pleural effusion 03/28/2021     Priority: Medium     Acute on chronic anemia 03/28/2021     Priority: Medium     Thrombocytopenia (H) 01/27/2021     Priority: Medium     NSTEMI (non-ST elevated myocardial infarction) (H) 01/27/2021     Priority: Medium     Chronic diastolic congestive heart failure (H) 01/27/2021     Priority: Medium     Myelofibrosis (H) 01/14/2021     Priority: Medium     Coronary artery disease  involving coronary bypass graft of native heart without angina pectoris 01/07/2021     Priority: Medium     Anemia in other chronic diseases classified elsewhere 01/07/2021     Priority: Medium     Dyspnea on exertion 12/21/2020     Priority: Medium     CKD (chronic kidney disease) stage 3, GFR 30-59 ml/min (H) 12/21/2020     Priority: Medium     Vaccination refused by patient 08/20/2020     Priority: Medium     S/P CABG (coronary artery bypass graft) 02/10/2020     Priority: Medium     TIA (transient ischemic attack) 02/10/2020     Priority: Medium     Myeloproliferative neoplasm (H) 01/26/2020     Priority: Medium     Status post coronary angiogram 01/09/2020     Priority: Medium     Coronary artery disease involving native coronary artery of native heart with other form of angina pectoris (H) 01/02/2020     Priority: Medium     Added automatically from request for surgery [id number]       Essential hypertension 03/11/2019     Priority: Medium     Memory loss 03/11/2019     Priority: Medium        Past Medical History:    Past Medical History:   Diagnosis Date     Heart disease      History of blood transfusion      Hypertension      Leukocytosis 6/3/2021       Past Surgical History:    Past Surgical History:   Procedure Laterality Date     BONE MARROW BIOPSY, BONE SPECIMEN, NEEDLE/TROCAR N/A 12/30/2019    Procedure: BIOPSY, BONE MARROW;  Surgeon: Aguilar Krause MD;  Location: [facility] OR     BYPASS GRAFT ARTERY CORONARY N/A 1/31/2020    Procedure: CORONARY ARTERY BYPASS GRAFTING X 3 - LIMA TO LAD, SV TO OM  AND PDA; ENDOVEIN HARVEST OF BILATERAL LEGS; ON PUMP WITH SANDRA;  Surgeon: Mable Barrera MD;  Location:  OR     CV CORONARY ANGIOGRAM Left 1/9/2020    Procedure: Coronary Angiogram;  Surgeon: Devin Bentley MD;  Location:  HE[facility] CARDI[facility] CATH LAB     INSERT PORT VASCULAR ACCESS Right 10/15/2021    Procedure: ultrasound guided right internal jugular venous access port placement with  flouroscopy, right side;  Surgeon: Kieran Johnson DO;  Location: [facility] OR     IR FINE NEEDLE ASPIRATION W ULTRASOUND  6/10/2021     LAPAROSCOPIC CHOLECYSTECTOMY N/A 8/15/2021    Procedure: Laparoscopic cholecystectomy;  Surgeon: Severiano De La Rosa MD;  Location: [facility] OR     NO HISTORY OF SURGERY         Family History:    Family History   Problem Relation Age of Onset     No Known Problems Mother      Unknown/Adopted Father      Unknown/Adopted Maternal Grandmother      Unknown/Adopted Maternal Grandfather      Unknown/Adopted Paternal Grandmother      Unknown/Adopted Paternal Grandfather      Cancer Brother         lung cancer - smoking     No Known Problems Sister      Coronary Artery Disease Brother          of MI at 66     No Known Problems Sister        Social History:  Marital Status:  Single [1]  Social History     Tobacco Use     Smoking status: Former Smoker     Years: 30.00     Types: Cigarettes     Start date: 1961     Quit date: 2001     Years since quittin.7     Smokeless tobacco: Never Used   Vaping Use     Vaping Use: Never used   Substance Use Topics     Alcohol use: No     Drug use: No        Medications:    acyclovir (ZOVIRAX) 400 MG tablet  allopurinol (ZYLOPRIM) 300 MG tablet  fluconazole (DIFLUCAN) 200 MG tablet  furosemide (LASIX) 40 MG tablet  levofloxacin (LEVAQUIN) 250 MG tablet  metoprolol tartrate (LOPRESSOR) 50 MG tablet  nitroGLYcerin (NITROSTAT) 0.4 MG sublingual tablet  oxyCODONE (ROXICODONE) 5 MG tablet  oxyCODONE (XTAMPZA) 9 MG 12 hr tablet  polyethylene glycol (MIRALAX) 17 GM/Dose powder  potassium chloride ER (KLOR-CON M) 20 MEQ CR tablet  rosuvastatin (CRESTOR) 5 MG tablet  SENEXON-S 8.6-50 MG tablet  [START ON 10/25/2021] venetoclax (VENCLEXTA) 100 MG tablet  vitamin B-12 (CYANOCOBALAMIN) 250 MCG tablet  vitamin D3 (CHOLECALCIFEROL) 50 mcg (2000 units) tablet  ACE/ARB/ARNI NOT PRESCRIBED (INTENTIONAL)  ASPIRIN NOT PRESCRIBED  (INTENTIONAL)          Review of Systems   All other systems reviewed and are negative.      Physical Exam   BP: 124/68  Pulse: 90  Temp: 98.1  F (36.7  C)  Resp: 20  Weight: 73.5 kg (162 lb)  SpO2: 100 %      Physical Exam  Vitals and nursing note reviewed.   Constitutional:       General: He is not in acute distress.     Appearance: He is not diaphoretic.   HENT:      Head: Atraumatic.   Eyes:      General: No scleral icterus.     Pupils: Pupils are equal, round, and reactive to light.   Cardiovascular:      Heart sounds: Normal heart sounds.   Pulmonary:      Effort: No respiratory distress.      Breath sounds: Normal breath sounds.   Abdominal:      General: Bowel sounds are normal.      Palpations: Abdomen is soft.      Tenderness: There is no abdominal tenderness.   Musculoskeletal:         General: No tenderness.   Skin:     General: Skin is warm.      Coloration: Skin is pale.      Findings: No rash.      Comments: See photo of port below.  Did have slow oozing from access site   Neurological:      Mental Status: He is alert.                      ED Course        Procedures              Critical Care time:  none               Results for orders placed or performed during the hospital encounter of 10/21/21 (from the past 24 hour(s))   XR Chest Port 1 View    Narrative    EXAM: XR CHEST PORT 1 VIEW  LOCATION: [facility]  DATE/TIME: 10/21/2021 5:48 PM    INDICATION: Cough, port placement, bleeding post infusion  COMPARISON: 10/15/2021      Impression    IMPRESSION: Minimal change. Lung volumes are lower on current exam with slight mostly linear densities at lung bases similar to previous study. Mid and upper lung zones remain clear. Mild cardiomegaly stable, sternal wires and mediastinal clips from   previous CABG. Right-sided Port-A-Cath stable.       Medications - No data to display    Assessments & Plan (with Medical Decision Making)  Renny is a 70-year-old male with past  medical history of transfusion dependent anemia and myelodysplastic syndrome who presents to the emergency room after infusion for packed red blood cells, having persistent bleeding from his vascular access.  See history focused physical exam as above  Pale 70-year-old gentleman in no acute distress, vital signs are stable and he is afebrile.  Does have a saturated T-shirt, blood although in the front.  Is slowly bleeding from the puncture site over his port.  Applied a quick clot dressing, bolstered with additional gauze, and was taped down for gentle pressure.  Also laid a 1 L bag of normal saline to apply gentle pressure since he could not tolerate direct pressure with someone's hand.  After approximately 1 hour, rechecked.  Did not have any further bleeding or oozing from the site.  Can see puncture wound from a needle overlying the access port.  Suspect that patient's persistent bleeding and oozing after infusion was due to his low platelet count and underlying myelodysplastic syndrome.  Applied a clean new quick clot dressing and a bordered adhesive gauze.  Instructed him to leave this in place overnight and may remove tomorrow.  Return at any time if bleeding worsens or if he saturates the dressing again, or if he becomes symptomatic with lightheadedness dizziness, chest pain, shortness of breath, or any new concerns.  He is agreeable to this plan.  Discharged in no acute distress     I have reviewed the nursing notes.    I have reviewed the findings, diagnosis, plan and need for follow up with the patient.       New Prescriptions    No medications on file       Final diagnoses:   Problem with vascular access   Transfusion-dependent anemia   MDS (myelodysplastic syndrome) (H)       10/21/2021   [facility] EMERGENCY DEPT     Meghan Lowry DO  10/21/21 2028

## 2021-10-21 NOTE — DISCHARGE INSTRUCTIONS
Your chest x-ray did not show pneumonia or fluid    Glad we were able to stop your port from bleeding.  This was likely due to recent access and your difficulty with clotting due to low platelets.      A new dressing was placed over your port, and the gauze next to your skin has medication to help form a clot and stop bleeding.  You should keep this in place for the rest of the evening and may remove it tomorrow    Keep all of your appointments for transfusions and other office visits as previously scheduled.    If you bleed through the bandage tonight, if you have any new or worsening symptoms that develop, return to the ER for evaluation

## 2021-10-21 NOTE — PROGRESS NOTES
Infusion Nursing Note:  Renny Gannon presents today for PRBC/PLT transfusions.    Patient seen by provider today: No   present during visit today: Not Applicable.    Note: Talked with DR. Patel, order to give 2 Units PRBC's today. .      Intravenous Access:  Implanted Port.    Treatment Conditions:  Lab Results   Component Value Date    HGB 6.5 (LL) 10/21/2021    WBC 0.2 (LL) 10/21/2021    ANEU 0.2 (LL) 09/30/2021    ANEUTAUTO 2.0 08/26/2021    PLT 12 (LL) 10/21/2021      Lab Results   Component Value Date     10/21/2021    POTASSIUM 3.2 (L) 10/21/2021    MAG 2.1 06/18/2021    CR 0.76 10/21/2021    MINNIE 8.0 (L) 10/21/2021    BILITOTAL 0.7 10/21/2021    ALBUMIN 2.4 (L) 10/21/2021    ALT 24 10/21/2021    AST 13 10/21/2021     Results reviewed, labs MET treatment parameters, ok to proceed with treatment.  Blood transfusion consent signed 3/5/21.      Post Infusion Assessment:  Patient tolerated infusion without incident.  Blood return noted pre and post infusion.  Site patent and intact, free from redness, edema or discomfort.  No evidence of extravasations.  Access discontinued per protocol.  Lungs sounds clear post transfusions. .   Pressure applied to port site x 5 minutes for bleeding to stop. Gauze dressing taped over port site.     Discharge Plan:   Discharge instructions reviewed with: Patient.  Patient and/or family verbalized understanding of discharge instructions and all questions answered.  Patient discharged in stable condition accompanied by: self.  Departure Mode: Ambulatory.      Jalyn Gannon RN

## 2021-10-21 NOTE — ED TRIAGE NOTES
Patient states he just left infusion therapy where he received PRBC's and Platelets. When he got to lobby he realized the front of his shirt was wet from blood. Continuous oozing of blood noted from Port.

## 2021-10-26 NOTE — PROGRESS NOTES
Infusion Nursing Note:  Renny Gannon presents today for Decitabine/ possible transfusion.    Patient seen by provider today: No   present during visit today: Not Applicable.    Note: Dr Patel called w/ lab results.  Decision made to hold Decitabine and get BMBx in the future.  Also, transfuse pt w/1 unit PRBC and 1 unit Platelets today.  Pt notified of decision and in agreement.       Intravenous Access:  Portacath accessed.     Treatment Conditions:  Lab Results   Component Value Date    HGB 7.2 (L) 10/25/2021    WBC 0.3 (LL) 10/25/2021    ANEU 0.2 (LL) 09/30/2021    ANEUTAUTO 2.0 08/26/2021    PLT 8 (LL) 10/25/2021      Lab Results   Component Value Date     (L) 10/25/2021    POTASSIUM 4.6 10/25/2021    MAG 2.1 06/18/2021    CR 0.82 10/25/2021    MINNIE 8.4 (L) 10/25/2021    BILITOTAL 0.7 10/25/2021    ALBUMIN 2.4 (L) 10/25/2021    ALT 27 10/25/2021    AST 13 10/25/2021     Results reviewed, labs did NOT meet treatment parameters: Platelets 8,000 and WBC 0.3..      Post Infusion Assessment:  Patient tolerated infusion without incident.  Blood return noted pre and post infusion.  Site patent and intact, free from redness, edema or discomfort.  No evidence of extravasations.  Access discontinued per protocol.       Discharge Plan:   Discharge instructions reviewed with: Patient.  Patient discharged in stable condition accompanied by: sister.  Departure Mode: Ambulatory with walker.      Lisa Vasques RN

## 2021-10-27 NOTE — TELEPHONE ENCOUNTER
Reason for Call:  Form, our goal is to have forms completed with 72 hours, however, some forms may require a visit or additional information.    Type of letter, form or note:  Home Health Certification    Who is the form from?: Home care    Where did the form come from: form was faxed in    What clinic location was the form placed at?: [facility]     Where the form was placed: Given to JUAN Land    What number is listed as a contact on the form?: [phone]       Additional comments:     Call taken on 10/27/2021 at 1:24 PM by Fernanda Fernandez

## 2021-10-27 NOTE — TELEPHONE ENCOUNTER
Medication reconciliation completed by RN. Form and chart forwarded to PCP for signatures.  PCP:  MD Nicolasa Escobar Mai, RN

## 2021-10-28 NOTE — PROGRESS NOTES
Infusion Nursing Note:  Renny Gannon presents today for port labs, one unit PRBC, and one unit of platelets   Patient seen by provider today: No   present during visit today: Not Applicable.    Note: C/o feeling tired and dizzy. Reported some dark red blood in stools the last few days.       Intravenous Access:  Implanted Port.    Treatment Conditions:  Lab Results   Component Value Date    HGB 7.9 (L) 10/28/2021    WBC 0.5 (LL) 10/28/2021    ANEU 0.1 (LL) 10/28/2021    ANEUTAUTO 2.0 08/26/2021    PLT 19 (LL) 10/28/2021      Lab Results   Component Value Date     10/28/2021    POTASSIUM 4.8 10/28/2021    MAG 2.1 06/18/2021    CR 0.82 10/28/2021    MINNIE 8.9 10/28/2021    BILITOTAL 0.6 10/28/2021    ALBUMIN 2.6 (L) 10/28/2021    ALT 31 10/28/2021    AST 16 10/28/2021     Results reviewed, labs MET treatment parameters, ok to proceed with treatment.  Blood transfusion consent signed in March.      Post Infusion Assessment:  Patient tolerated infusion without incident.  Patient observed for 10 minutes post infusion per protocol.  Blood return noted pre and post infusion.  Site patent and intact, free from redness, edema or discomfort.  No evidence of extravasations.  Access discontinued per protocol.       Discharge Plan:   Discharge instructions reviewed with: Patient.  Patient and/or family verbalized understanding of discharge instructions and all questions answered.  Copy of AVS reviewed with patient and/or family.  Patient will return 11-1-21 for next appointment.  Patient discharged in stable condition accompanied by: sister.  Departure Mode: Ambulatory with walker.      Demetrice Hall RN

## 2021-10-28 NOTE — PATIENT INSTRUCTIONS
Pt to return on 11-1-21 for labs and possible transfusion. Copies of medication list and upcoming appointments given prior to discharge.

## 2021-11-01 NOTE — TELEPHONE ENCOUNTER
Received call from infusion that patient is reporting that he cannot make it to his lab and BMBX appointment at the [facility] due to his sister having an appointment this day and she is his transportation. Writing nurse called [facility] scheduling. They will cancel and reach out to Dr. Patel and team for rescheduling. Updated infusion to let patient know that they will be calling patient.    Ki Lamb RN, BSN, OCN  11/1/2021, 9:47 AM

## 2021-11-01 NOTE — PROGRESS NOTES
Infusion Nursing Note:  Renny Gannon presents today for labs/ transfusion.    Patient seen by provider today: No   present during visit today: Not Applicable.    Note: Pt stating that he is scheduled for a BMBx on 11/9.  He is wanting to reschedule as sister that brings him has a chemo appt that day. Ki JAMES Onc notified and arrangements made for Dr Patel's coordinator to help facilitate rescheduling.  Pt c/o constipation.  Will increase Miralax from every other day to daily.       Intravenous Access:  Labs drawn without difficulty.  Implanted Port.    Treatment Conditions:  Lab Results   Component Value Date    HGB 7.7 (L) 11/01/2021    WBC 0.5 (LL) 11/01/2021    ANEU 0.1 (LL) 11/01/2021    ANEUTAUTO 2.0 08/26/2021    PLT 18 (LL) 11/01/2021      Lab Results   Component Value Date     11/01/2021    POTASSIUM 3.8 11/01/2021    MAG 2.1 06/18/2021    CR 0.80 11/01/2021    MINNIE 8.8 11/01/2021    BILITOTAL 0.4 11/01/2021    ALBUMIN 2.6 (L) 11/01/2021    ALT 30 11/01/2021    AST 13 11/01/2021     Results reviewed, labs MET treatment parameters, ok to proceed with treatment.      Post Infusion Assessment:  Patient tolerated infusion without incident.  Patient observed for 15 minutes post transfusion per protocol.  Blood return noted pre and post infusion.  Site patent and intact, free from redness, edema or discomfort.  No evidence of extravasations.  Access discontinued per protocol.       Discharge Plan:   Discharge instructions reviewed with: Patient.  Patient discharged in stable condition accompanied by: sister.  Departure Mode: Ambulatory with walker.      Lisa Vasques RN

## 2021-11-04 NOTE — PROGRESS NOTES
Infusion Nursing Note:  Renny Gannon presents today for 1 Unit PRBC's.    Patient seen by provider today: No   present during visit today: Not Applicable.    Note: Patient states he is feeling very tired, moderate fatigue. Dizziness, lightheaded easily. SOA w/ minimal activity. Denies tinnitus or chest pain. Bleeding from gums at times but is not more than normal. Is having moderate pain in coccyx/mid upper buttocks which is not new. Blood in stool a couple days ago. Has been having sx of constipation. Is taking Miralax and Senexon. Has some gas. Denies abdominal pain. VSS, afebrile. Lung sounds clear all fields prior to transfusion. Heart rhythm regular.       Intravenous Access:  Implanted Port.    Treatment Conditions:  Lab Results   Component Value Date    HGB 8.1 (L) 11/04/2021    WBC 0.7 (LL) 11/04/2021    ANEU 0.4 (LL) 11/04/2021    ANEUTAUTO 2.0 08/26/2021    PLT 23 (LL) 11/04/2021      Results reviewed, labs did NOT meet treatment parameters: However, since patient is feeling very fatigued and experiencing dizziness/lightheadedness Harriet Gutierrez, CNP instructed to proceed with administering 1 Unit PRBC's today. Patient is not due back for possible transfusion until Mon. 11/8.  Blood transfusion consent signed 3/5/21.    Post Infusion Assessment:  Patient tolerated infusion without incident.  Blood return noted pre and post infusion.  Site patent and intact, free from redness, edema or discomfort.  No evidence of extravasations.  Access discontinued per protocol. Gauze pressure dressing held in place 1-2 minutes after removal. Transparent and tape used over gauze. Pt observed for 5 minutes following deaccess to ensure no bleeding outside of dressing.       Discharge Plan:   Copy of AVS reviewed with patient and/or family.  Patient will return 11/8 for next appointment.  Patient discharged in stable condition accompanied by: sister.  Departure Mode: Ambulatory w/walker.      Kimberley Faust,  RN

## 2021-11-04 NOTE — PROGRESS NOTES
Infusion Nursing Note:  Renny Gannon presents today for Port labs.    Patient seen by provider today: No   present during visit today: Not Applicable.    Note: Patient here for port draw. Will have transfusion if labs meet parameters. See other infusion therapy visit note for today for that assessment, treatment conditions and documentation.       Intravenous Access:    Implanted Port.  Lab draw site Right chest wall, Needle type Noncoring, Gauge 20,3/4in.  Labs drawn without difficulty      Kimberley Faust RN

## 2021-11-04 NOTE — PROGRESS NOTES
Social Work Intervention  [facility]    Data/Intervention:    Patient Name:  Renny Gannon  /Age:  1950 (71 year old)    Visit Type: in person  Referral Source: Infusion RN  Reason for Referral:  Support resources    Collaborated With:    Renny    Psychosocial Information/Concerns:  SW advised by infusion nurse that Renny has reported he is mostly spending his time laying around at home. RN requested SW meet with him about supportive resources.     SW met with Renny in the infusion center. Renny was pleasant and engaged in discussion with SW. He looked a bit uncomfortable, he kept shifting position and acknowledges that sitting for long periods is hard for him which is why he ends up laying down at home so often. SW spoke to Renny about how he is managing his needs at home. He state things are going ok. He does live alone, but feels comfortable getting around when he needs to get food, etc. His sister come several times a week to help around the house and check. Renny does already get Meals on Wheels to the house.    SW spoke with Renny about life alert buttons or other services that could come in to increase the support he is receiving, but Renny declined. He states he feels safe at home at this point and does not wish to have any additional resources right now. SW did provide card and encouraged Renny to reach out at any time.      Intervention/Education/Resources Provided:  Supportive counseling  Resource information    Assessment/Plan:  SW will remain available for ongoing support and resource needs.     Provided patient/family with contact information and availability.    NICANOR Simpson, [facility]  Clinical , Adult Oncology  Phone: [phone]

## 2021-11-04 NOTE — TELEPHONE ENCOUNTER
Noted patient was rescheduled for BMBX, labs, and follow up with Jorge.    Ki Lamb RN, BSN, OCN  11/4/2021, 9:38 AM

## 2021-11-06 NOTE — ED TRIAGE NOTES
Patient has had rectal bleeding for the last several hours. He took a shower around 2200 last evening and woke up around 0300 with blood all over in his bed. Patient is a lymphoma patient and just received blood and platelets on Thursday this week.

## 2021-11-06 NOTE — DISCHARGE INSTRUCTIONS
Your rectal bleeding is from an anal fissure.  Follow-up with your primary clinic provider in the next 3-5 days.  You are at risk for continued bleeding on and off due to your low platelet count.  Return on Monday for your planned transfusion of red blood cells and platelets.  Return to the emergency department at any time if you develop new or worsening symptoms.

## 2021-11-06 NOTE — ED PROVIDER NOTES
[facility] ED Provider Note   Patient: Renny Gannon  MRN #:  [id number]  Date of Visit: November 6, 2021    CC:     Chief Complaint   Patient presents with     Rectal Bleeding     HPI:  Renny Gannon is a 71 year old male who presented to the emergency department with intermittent rectal bleeding for the last 2-3 days.  Patient has noticed some painful bowel movements over the last few days.  He woke up this morning and had his adult diaper filled with some clots and bright red blood.  Patient states that he was awakened because he needed to urinate.  He then noticed that his adult diapers were more bloody.  He does not have any abdominal pain.  Patient has a history of lymphoma, and recently received blood and platelets.  He has a history of thrombocytopenia and acute on chronic anemia.  His most recent blood work revealed a hemoglobin of 8.1, platelet count of 23,000, and white blood count of 700.    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     Priority: Medium     History of cholecystectomy 09/23/2021     Priority: Medium     Acute cholecystitis 08/15/2021     Priority: Medium     Pancytopenia (H) 08/15/2021     Priority: Medium     MDS (myelodysplastic syndrome) (H) 08/15/2021     Priority: Medium     Demand ischemia (H) 07/20/2021     Priority: Medium     Antineoplastic chemotherapy induced pancytopenia (H) 06/18/2021     Priority: Medium     Heart failure with reduced ejection fraction (H) 06/18/2021     Priority: Medium     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) 06/03/2021     Priority: Medium     Pleural effusion 03/28/2021     Priority: Medium     Acute on chronic anemia 03/28/2021     Priority: Medium     Thrombocytopenia (H) 01/27/2021     Priority: Medium     NSTEMI (non-ST elevated myocardial infarction) (H) 01/27/2021     Priority: Medium     Chronic  diastolic congestive heart failure (H) 01/27/2021     Priority: Medium     Myelofibrosis (H) 01/14/2021     Priority: Medium     Coronary artery disease involving coronary bypass graft of native heart without angina pectoris 01/07/2021     Priority: Medium     Anemia in other chronic diseases classified elsewhere 01/07/2021     Priority: Medium     Dyspnea on exertion 12/21/2020     Priority: Medium     CKD (chronic kidney disease) stage 3, GFR 30-59 ml/min (H) 12/21/2020     Priority: Medium     Vaccination refused by patient 08/20/2020     Priority: Medium     S/P CABG (coronary artery bypass graft) 02/10/2020     Priority: Medium     TIA (transient ischemic attack) 02/10/2020     Priority: Medium     Myeloproliferative neoplasm (H) 01/26/2020     Priority: Medium     Status post coronary angiogram 01/09/2020     Priority: Medium     Coronary artery disease involving native coronary artery of native heart with other form of angina pectoris (H) 01/02/2020     Priority: Medium     Added automatically from request for surgery [id number]       Essential hypertension 03/11/2019     Priority: Medium     Memory loss 03/11/2019     Priority: Medium       Past Medical History:   Diagnosis Date     Heart disease      History of blood transfusion      Hypertension      Leukocytosis 6/3/2021       MEDS: ACE/ARB/ARNI NOT PRESCRIBED (INTENTIONAL)  acetaminophen (TYLENOL) 325 MG tablet  acyclovir (ZOVIRAX) 400 MG tablet  allopurinol (ZYLOPRIM) 300 MG tablet  ASPIRIN NOT PRESCRIBED (INTENTIONAL)  diclofenac (VOLTAREN) 1 % topical gel  fluconazole (DIFLUCAN) 200 MG tablet  furosemide (LASIX) 40 MG tablet  levofloxacin (LEVAQUIN) 250 MG tablet  Lidocaine (LIDOCARE) 4 % Patch  metoprolol tartrate (LOPRESSOR) 50 MG tablet  nitroGLYcerin (NITROSTAT) 0.4 MG sublingual tablet  oxyCODONE (ROXICODONE) 5 MG tablet  oxyCODONE (XTAMPZA) 9 MG 12 hr tablet  polyethylene glycol (MIRALAX) 17 GM/Dose powder  potassium chloride ER (KLOR-CON M) 20 MEQ  CR tablet  rosuvastatin (CRESTOR) 5 MG tablet  SENEXON-S 8.6-50 MG tablet  venetoclax (VENCLEXTA) 100 MG tablet  vitamin B-12 (CYANOCOBALAMIN) 250 MCG tablet  vitamin D3 (CHOLECALCIFEROL) 50 mcg (2000 units) tablet        ALLERGIES:    Allergies   Allergen Reactions     No Known Drug Allergies      Seasonal Allergies Other (See Comments)     Stuffy head and nose       Past Surgical History:   Procedure Laterality Date     BONE MARROW BIOPSY, BONE SPECIMEN, NEEDLE/TROCAR N/A 2019    Procedure: BIOPSY, BONE MARROW;  Surgeon: Aguilar Krause MD;  Location: [facility] OR     BYPASS GRAFT ARTERY CORONARY N/A 2020    Procedure: CORONARY ARTERY BYPASS GRAFTING X 3 - LIMA TO LAD, SV TO OM  AND PDA; ENDOVEIN HARVEST OF BILATERAL LEGS; ON PUMP WITH SANDRA;  Surgeon: Mable Barrera MD;  Location:  OR     CV CORONARY ANGIOGRAM Left 2020    Procedure: Coronary Angiogram;  Surgeon: Devin Bentley MD;  Location:  HE[facility] CARDI[facility] CATH LAB     INSERT PORT VASCULAR ACCESS Right 10/15/2021    Procedure: ultrasound guided right internal jugular venous access port placement with flouroscopy, right side;  Surgeon: Kieran Johnson DO;  Location: [facility] OR     IR FINE NEEDLE ASPIRATION W ULTRASOUND  6/10/2021     LAPAROSCOPIC CHOLECYSTECTOMY N/A 8/15/2021    Procedure: Laparoscopic cholecystectomy;  Surgeon: Severiano De La Rosa MD;  Location: [facility] OR     NO HISTORY OF SURGERY         Social History     Tobacco Use     Smoking status: Former Smoker     Years: 30.00     Types: Cigarettes     Start date: 1961     Quit date: 2001     Years since quittin.7     Smokeless tobacco: Never Used   Vaping Use     Vaping Use: Never used   Substance Use Topics     Alcohol use: No     Drug use: No         Review of Systems   Except as noted in HPI, all other systems were reviewed and are negative    Physical Exam     Vitals were reviewed  Patient Vitals for the past 12 hrs:   BP Temp Temp src Pulse  Resp SpO2 Weight   11/06/21 0545 121/61 -- -- 83 -- 91 % --   11/06/21 0530 112/67 -- -- 84 -- 96 % --   11/06/21 0515 108/62 -- -- 85 -- 95 % --   11/06/21 0500 104/61 -- -- -- -- -- --   11/06/21 0414 126/66 96.9  F (36.1  C) Temporal 82 18 98 % 68.5 kg (151 lb)     GENERAL APPEARANCE: Alert and oriented x3, good color, no distress  FACE: normal facies  EYES: Pupils are equal  HENT: normal external exam  NECK: no adenopathy or asymmetry  RESP: Increased respiratory effort and rate; clear breath sounds  CV: regular rate and rhythm; no significant murmurs, gallops or rubs  ABD: soft, no tenderness; no rebound or guarding; bowel sounds are normal  RECTAL: Patient has a blood clot overlying an anal fissure at approximately 6 o'clock position.  EXT: No calf tenderness or pitting edema  SKIN: no worrisome rash  NEURO: no facial droop; no focal deficits, voice is hoarse        Available Lab/Imaging Results     Results for orders placed or performed during the hospital encounter of 11/06/21 (from the past 24 hour(s))   CBC with platelets differential    Narrative    The following orders were created for panel order CBC with platelets differential.  Procedure                               Abnormality         Status                     ---------                               -----------         ------                     CBC with platelets and d...[[id number]]  Abnormal            Preliminary result           Please view results for these tests on the individual orders.   ABO/Rh type and screen    Narrative    The following orders were created for panel order ABO/Rh type and screen.  Procedure                               Abnormality         Status                     ---------                               -----------         ------                     Adult Type and Screen[[id number]]                            In process                   Please view results for these tests on the individual orders.   INR   Result Value  Ref Range    INR 1.12 0.85 - 1.15   Partial thromboplastin time   Result Value Ref Range    aPTT 39 (H) 22 - 38 Seconds   CBC with platelets and differential   Result Value Ref Range    WBC Count 0.7 (LL) 4.0 - 11.0 10e3/uL    RBC Count 2.73 (L) 4.40 - 5.90 10e6/uL    Hemoglobin 7.6 (L) 13.3 - 17.7 g/dL    Hematocrit 23.5 (L) 40.0 - 53.0 %    MCV 86 78 - 100 fL    MCH 27.8 26.5 - 33.0 pg    MCHC 32.3 31.5 - 36.5 g/dL    RDW 14.0 10.0 - 15.0 %    Platelet Count 14 (LL) 150 - 450 10e3/uL    NRBCs per 100 WBC 0 <1 /100    Absolute NRBCs 0.0 10e3/uL   Comprehensive metabolic panel   Result Value Ref Range    Sodium 137 133 - 144 mmol/L    Potassium 4.2 3.4 - 5.3 mmol/L    Chloride 105 94 - 109 mmol/L    Carbon Dioxide (CO2) 28 20 - 32 mmol/L    Anion Gap 4 3 - 14 mmol/L    Urea Nitrogen 25 7 - 30 mg/dL    Creatinine 0.81 0.66 - 1.25 mg/dL    Calcium 8.6 8.5 - 10.1 mg/dL    Glucose 102 (H) 70 - 99 mg/dL    Alkaline Phosphatase 83 40 - 150 U/L    AST 15 0 - 45 U/L    ALT 33 0 - 70 U/L    Protein Total 7.1 6.8 - 8.8 g/dL    Albumin 2.4 (L) 3.4 - 5.0 g/dL    Bilirubin Total 0.2 0.2 - 1.3 mg/dL    GFR Estimate 89 >60 mL/min/1.73m2              Impression     Final diagnoses:   Rectal bleeding   Anal fissure         ED Course & Medical Decision Making   Renny Gannon is a 71 year old male with transfusion dependent anemia, pancytopenia, myelodysplastic syndrome and acute leukemia who presented to the emergency department with intermittent rectal bleeding for the last 2-3 days. Patient noticed that he is having some painful bowel movements over the last few days. His stools have been hard. He woke up this morning after he had changed his adult diaper and it was stained with blood. Patient was seen shortly after arrival in the emergency department. Vital signs were stable with temp of 96.9, blood pressure 121/61, heart rate of 83, respiratory rate of 18 with 91% oxygen saturation. Exam revealed pale color, clear breath sounds,  normal heart sounds, no abdominal tenderness. On rectal exam, there is a clot of blood overlying an anal fissure at about 6 o'clock position. This is tender to the touch. Laboratory work-up reveals a white blood count of 0.7, hemoglobin of 7.6, platelet count of 14. Comprehensive metabolic panel is normal. Patient receives transfusions on Mondays and Thursdays. I offered him a blood and platelet transfusion today but he felt that he could wait until Monday to return. His rectal bleeding has not recurred since coming to the emergency department. Aftercare instructions were discussed. See discharge instructions below. Follow-up with his primary clinic provider in 3 days. Return to the ED with new or worsening symptoms.        Written after-visit summary and instructions were given at the time of discharge.    Follow up Plan:   [facility] Emergency Dept  [address] [place] [id number] [phone]    If symptoms worsen    Angus Scott MD  [address] [place] [address] [phone]    In 3 days        Discharge Instructions:   Your rectal bleeding is from an anal fissure.  Follow-up with your primary clinic provider in the next 3-5 days.  You are at risk for continued bleeding on and off due to your low platelet count.  Return on Monday for your planned transfusion of red blood cells and platelets.  Return to the emergency department at any time if you develop new or worsening symptoms.       Disclaimer: This note consists of words and symbols derived from keyboarding and dictation using voice recognition software.  As a result, there may be errors that have gone undetected.  Please consider this when interpreting information found in this note.       Zhang Alvares MD  11/06/21 [id number]

## 2021-11-08 NOTE — PROGRESS NOTES
Infusion Nursing Note:  Renny Gannon presents today for labs/ transfusion of platelets.    Patient seen by provider today: No   present during visit today: Not Applicable.    Note: Lungs clear pre and post transfusion.      Intravenous Access:  Labs drawn without difficulty.  Implanted Port.    Treatment Conditions:  Lab Results   Component Value Date    HGB 8.3 (L) 11/08/2021    WBC 1.0 (L) 11/08/2021    ANEU 0.5 (L) 11/08/2021    ANEUTAUTO 2.0 08/26/2021    PLT 10 (LL) 11/08/2021      Results reviewed, labs MET treatment parameters, ok to proceed with treatment.      Post Infusion Assessment:  Patient tolerated infusion without incident.       Discharge Plan:   Discharge instructions reviewed with: Patient.  Patient discharged in stable condition accompanied by: sister.  Departure Mode: Ambulatory w/walker.      Lisa Vasques RN

## 2021-11-09 NOTE — TELEPHONE ENCOUNTER
[facility]arrit[facility]    Ph: [phone]  Ok to leave message    RN Chaparrita calling regarding patient, she needs clarification on medication as patient is NOT taking some of his meds and she doesn't know if the directions changed or the meds were discontinued?    Patient also reporting very poor sleep approx 1 hour per night    Has had recent ED visit, and is experiencing ongoing rectal bleeding, but it is better than previously observed.    Susan Maria XRO/

## 2021-11-09 NOTE — TELEPHONE ENCOUNTER
Chaparrita from home care is calling to report patient has a medication area with some medications that are off to the side and he is unsure if he is supposed to be taking them or not.  He states very generally that he was told not to take the following medications, but he is unsure why:    Allopurinol    Lasix 40 mg daily    ASA (this is confirmed he is not to be taking this medication)    B-12    Call Chaparrita with medication clarification - [phone]          He is also needing a call back to schedule a follow up appointment as he has not followed up from 8/2021 and has been in the ED a couple of times.  Please call patient for follow up appointment.  Shelia Sheets, DENISHAN, RN

## 2021-11-10 NOTE — TELEPHONE ENCOUNTER
I recommend her to send in the list of medications that he is taking and will need to reconcile it.  thanks

## 2021-11-11 NOTE — PROGRESS NOTES
Infusion Nursing Note:  Renny Gannon presents today for Port labs.    Patient seen by provider today: No   present during visit today: Not Applicable.    Note: Port accessed for lab draw. Pt will receive transfusion if labs meet treatment parameters. See other infusion therapy visit note for today for those treatment conditions and documentation.       Intravenous Access:  Implanted Port.  Lab draw site Right chest wall, Needle type Noncoring, Gauge 20,3/4in.  Labs drawn without difficulty      Kimberley Faust RN

## 2021-11-11 NOTE — PROGRESS NOTES
Infusion Nursing Note:  Renny Gannon presents today for 1 Unit PRBC's/1 Unit Platelets.    Patient seen by provider today: No   present during visit today: Not Applicable.    Note: Renny states he is very tired today. Has sx of dizziness/lightheadedness. Denies tinnitus, pain or increased sx of SOA. B/P 99/55, HR 80, afebrile. Lungs clear prior to transfusion.       Intravenous Access:  Implanted Port.    Treatment Conditions:  Lab Results   Component Value Date    HGB 7.6 (L) 11/11/2021    WBC 1.6 (L) 11/11/2021    ANEU 0.8 (L) 11/11/2021    ANEUTAUTO 2.0 08/26/2021    PLT 19 (LL) 11/11/2021      Lab Results   Component Value Date     11/11/2021    POTASSIUM 4.2 11/11/2021    MAG 2.1 06/18/2021    CR 0.82 11/11/2021    MINNIE 8.8 11/11/2021    BILITOTAL 0.4 11/11/2021    ALBUMIN 2.7 (L) 11/11/2021    ALT 35 11/11/2021    AST 19 11/11/2021     Results reviewed, labs MET treatment parameters, ok to proceed with treatment.  Blood transfusion consent signed 3/5/21.  Plts 19,000 today.      Post Infusion Assessment:  Patient tolerated infusion without incident. VSS, No signs/symptoms of transfusion reaction. Lungs clear all fields post transfusion.   Blood return noted pre and post infusion.  Site patent and intact, free from redness, edema or discomfort.  No evidence of extravasations.  Access discontinued per protocol.       Discharge Plan:   Copy of AVS reviewed with patient. Patient will return 11/15 for next appointment.  Patient discharged in stable condition accompanied by: sister.  Departure Mode: Ambulatory w/walker.      Kimberley Faust RN

## 2021-11-15 NOTE — PROGRESS NOTES
Infusion Nursing Note:  Renny Gannon presents today for Poss transfusion.    Patient seen by provider today: No   present during visit today: Not Applicable.    Note: Patient had port labs drawn and hgb-8.3 and plats-28 so no transfusion needed. De-accessed port and patient discharged.      Intravenous Access:  Implanted Port.    Treatment Conditions:  Lab Results   Component Value Date    HGB 8.3 (L) 11/15/2021    WBC 2.1 (L) 11/15/2021    ANEU 0.8 (L) 11/11/2021    ANEUTAUTO 1.2 (L) 11/15/2021    PLT 28 (LL) 11/15/2021      Lab Results   Component Value Date     11/15/2021    POTASSIUM 3.9 11/15/2021    MAG 2.1 06/18/2021    CR 0.84 11/15/2021    MINNIE 8.8 11/15/2021    BILITOTAL 0.4 11/15/2021    ALBUMIN 2.9 (L) 11/15/2021    ALT 35 11/15/2021    AST 18 11/15/2021     Results reviewed, labs did NOT meet treatment parameters: Transfusion held.      Post Infusion Assessment:  Blood return noted pre and post labs.  Site patent and intact, free from redness, edema or discomfort.  No evidence of extravasations.  Access discontinued per protocol.       Discharge Plan:   Discharge instructions reviewed with: Patient.  Patient and/or family verbalized understanding of discharge instructions and all questions answered.  Patient discharged in stable condition accompanied by: self.  Departure Mode: Ambulatory.      Mayte Chapa RN

## 2021-11-15 NOTE — PROGRESS NOTES
Infusion Nursing Note:  Renny Gannon presents today for Port Labs.    Patient seen by provider today: No   present during visit today: Not Applicable.    Note: Port Labs without problems.      Intravenous Access:  Labs drawn without difficulty.  Implanted Port.    Treatment Conditions:  Not Applicable.      Post Infusion Assessment:  NA.       Discharge Plan:   Awaiting labs for possible transfusion.      Mayte Chapa RN

## 2021-11-18 NOTE — PROGRESS NOTES
Infusion Nursing Note:  Renny Gannon presents today for labs.and for possible transfusion which he did not end up needing today.     Patient seen by provider today: No   present during visit today: Not Applicable.    Note: N/A.      Intravenous Access:  Lab draw site port, Needle type noncoring, Gauge 20g.  Implanted Port.    Treatment Conditions:  Results reviewed, labs did NOT meet treatment parameters for transfusion today. Hbs was over 8.0 today and platelets were over 20,000.    Lab Results   Component Value Date    WBC 2.8 11/18/2021    WBC 31.7 07/07/2021     Lab Results   Component Value Date    RBC 2.92 11/18/2021    RBC 2.96 07/07/2021     Lab Results   Component Value Date    HGB 8.1 11/18/2021    HGB 8.6 07/07/2021     Lab Results   Component Value Date    HCT 25.3 11/18/2021    HCT 26.1 07/07/2021     No components found for: MCT  Lab Results   Component Value Date    MCV 87 11/18/2021    MCV 88 07/07/2021     Lab Results   Component Value Date    MCH 27.7 11/18/2021    MCH 29.1 07/07/2021     Lab Results   Component Value Date    MCHC 32.0 11/18/2021    MCHC 33.0 07/07/2021     Lab Results   Component Value Date    RDW 14.9 11/18/2021    RDW 14.7 07/07/2021     Lab Results   Component Value Date    PLT 24 11/18/2021    PLT 12 07/07/2021     .    Post Infusion Assessment:  Blood return noted pre and post infusion.  Site patent and intact, free from redness, edema or discomfort.  No evidence of extravasations.  Access discontinued per protocol.       Discharge Plan:   Patient and/or family verbalized understanding of discharge instructions and all questions answered.  Patient discharged in stable condition accompanied by: sister is picking patient up at the front entrance today.   Departure Mode: Ambulatory with a walker.   Will follow up tomorrow at the [facility] for a bone marrow biopsy.  Will follow up on Monday 11/22 for labs and possible transfusion.       Tonia Mccrary  RN

## 2021-11-18 NOTE — PROGRESS NOTES
Infusion Nursing Note:  Renny Gannon presents today for port labs.    Patient seen by provider today: No   present during visit today: Not Applicable.    Note: N/A.      Intravenous Access:  Labs drawn without difficulty.  Implanted Port.    Treatment Conditions:  Not Applicable.      Post Infusion Assessment:  Blood return noted pre and post infusion.  Site patent and intact, free from redness, edema or discomfort.  No evidence of extravasations.       Discharge Plan:   Pt awaiting labs and then will proceed based on lab results.      Tonia Mccrary RN

## 2021-11-18 NOTE — PROGRESS NOTES
BMT ONC Adult Bone Marrow Biopsy Procedure Note  November 18, 2021  /67   Pulse 79   Temp 97.4  F (36.3  C) (Oral)   Resp 16   Wt 72.7 kg (160 lb 4.4 oz)   SpO2 99%   BMI 24.38 kg/m       Learning needs assessment complete within 12 months? YES    DIAGNOSIS: MPN with excess blasts     PROCEDURE: Unilateral Bone Marrow Biopsy and Unilateral Aspirate    LOCATION: [facility] 2nd Floor    Patient s identification was positively verified by verbal identification and invasive procedure safety checklist was completed. Informed consent was obtained. Following the administration of Midazolam as pre-medication, patient was placed in the prone position and prepped and draped in a sterile manner. Approximately 10 cc of 1% Lidocaine was used over the left posterior iliac spine. Following this a 3 mm incision was made. Trephine bone marrow core(s) was (were) obtained from the [facility]. Bone marrow aspirates were obtained from the [facility]. Aspirates were attempted and 5 mL of aspirate was obtained for flow cytometry. After this the tap was dry. Subsequent sites were attempted and were also dry. Ultimately, another 29 mm of core was obtained for the remainder of the studies  sent for morphology, cytogenetics and molecular diagnostics gene rearrangement. See below for complications. Following this procedure a sterile dressing was applied to the bone marrow biopsy site(s). The patient was placed in the supine position to maintain pressure on the biopsy site. Post-procedure wound care instructions were given.     Complications: YES--extremely soft marrow and cortex requiring 8 gauge trephine. Several core attempts did not collect due to soft marrow. Patient had bleeding during the procedure that did respond to holding pressure but due to concern for small hematoma development at the end of the procedure, a platelet transfusion was given.     Risks of bleeding and further hematoma development was discussed with his sister to monitor for  over the weekend.     Pre-procedural pain: 0 out of 10 on the numeric pain rating scale.     Procedural pain: 1 out of 10 on the numeric pain rating scale.     Post-procedural pain assessment: 0 out of 10 on the numeric pain rating scale.     Interventions: YES--platelets as above     Length of procedure:46 minutes to 1 hour    Procedure performed by: Roxane Vaughan PA-C assisted by Alem Riojas CNP

## 2021-11-19 NOTE — NURSING NOTE
Drug Administration Record    Drug Name: Versed  Dose: 1 mg   Route Administered: IV  NDC#: 8955-4582-00  Amount of waste (mL): 1 mg  Reason for waste: Not Needed

## 2021-11-19 NOTE — PROGRESS NOTES
Infusion Nursing Note:  Renny Gannon presents today for one dose of Plts.     Patient seen by provider today: Yes: ETHAN Fraga   present during visit today: Not Applicable.    Note: Pt received one dose of Plts transfused following BMBX for significant bleeding with procedure, and also bleeding of gums. Plts were 24K prior to transfusion today. Pt tolerated Plts well. VSS throughout and no further bleeding noted. See Doc Flowsheet for further details.       Intravenous Access:  Implanted Port.    Treatment Conditions:  Lab Results   Component Value Date    HGB 7.7 (L) 11/19/2021    WBC 3.3 (L) 11/19/2021    ANEU 0.8 (L) 11/11/2021    ANEUTAUTO 1.9 11/19/2021    PLT 24 (LL) 11/19/2021          Post Infusion Assessment:  Patient tolerated infusion without incident.  Access discontinued per protocol.       Discharge Plan:   Discharge instructions reviewed with: Patient.  Patient and/or family verbalized understanding of discharge instructions and all questions answered.  Patient discharged in stable condition accompanied by: sisters.  Departure Mode: Ambulatory with Walker.      Leatha Yee RN

## 2021-11-19 NOTE — NURSING NOTE
Chief Complaint   Patient presents with     Infusion     Pt here for one dose of Plts. Pt with MDS/AML.      Leatha Yee RN

## 2021-11-19 NOTE — LETTER
11/19/2021         RE: Renny Gannon  [address]  [facility] [address]        Dear Colleague,    Thank you for referring your patient, Renny Gannon, to the [facility]. Please see a copy of my visit note below.    BMT ONC Adult Bone Marrow Biopsy Procedure Note  November 18, 2021  /67   Pulse 79   Temp 97.4  F (36.3  C) (Oral)   Resp 16   Wt 72.7 kg (160 lb 4.4 oz)   SpO2 99%   BMI 24.38 kg/m       Learning needs assessment complete within 12 months? YES    DIAGNOSIS: MPN with excess blasts     PROCEDURE: Unilateral Bone Marrow Biopsy and Unilateral Aspirate    LOCATION: [facility] [place] 2nd Floor    Patient s identification was positively verified by verbal identification and invasive procedure safety checklist was completed. Informed consent was obtained. Following the administration of Midazolam as pre-medication, patient was placed in the prone position and prepped and draped in a sterile manner. Approximately 10 cc of 1% Lidocaine was used over the left posterior iliac spine. Following this a 3 mm incision was made. Trephine bone marrow core(s) was (were) obtained from the [facility]. Bone marrow aspirates were obtained from the [facility]. Aspirates were attempted and 5 mL of aspirate was obtained for flow cytometry. After this the tap was dry. Subsequent sites were attempted and were also dry. Ultimately, another 29 mm of core was obtained for the remainder of the studies  sent for morphology, cytogenetics and molecular diagnostics gene rearrangement. See below for complications. Following this procedure a sterile dressing was applied to the bone marrow biopsy site(s). The patient was placed in the supine position to maintain pressure on the biopsy site. Post-procedure wound care instructions were given.     Complications: YES--extremely soft marrow and cortex requiring 8 gauge trephine. Several core attempts did not collect due to soft marrow. Patient had bleeding during the  procedure that did respond to holding pressure but due to concern for small hematoma development at the end of the procedure, a platelet transfusion was given.     Risks of bleeding and further hematoma development was discussed with his sister to monitor for over the weekend.     Pre-procedural pain: 0 out of 10 on the numeric pain rating scale.     Procedural pain: 1 out of 10 on the numeric pain rating scale.     Post-procedural pain assessment: 0 out of 10 on the numeric pain rating scale.     Interventions: YES--platelets as above     Length of procedure:46 minutes to 1 hour    Procedure performed by: Roxane Vaughan PA-C assisted by Alem Riojas CNP         Again, thank you for allowing me to participate in the care of your patient.        Sincerely,        Roxane Vaughan PA-C

## 2021-11-19 NOTE — NURSING NOTE
"Oncology Rooming Note    November 19, 2021 11:02 AM   Renny Gannon is a 71 year old male who presents for:    Chief Complaint   Patient presents with     RECHECK     Pt here for BMBX and Labs. Pt with MDS/AML.      Initial Vitals: /67   Pulse 79   Temp 97.4  F (36.3  C) (Oral)   Resp 16   Wt 72.7 kg (160 lb 4.4 oz)   SpO2 99%   BMI 24.38 kg/m   Estimated body mass index is 24.38 kg/m  as calculated from the following:    Height as of 10/28/21: 1.727 m (5' 7.99\").    Weight as of this encounter: 72.7 kg (160 lb 4.4 oz). Body surface area is 1.87 meters squared.  No Pain (0) Comment: Data Unavailable   No LMP for male patient.  Allergies reviewed: Yes  Medications reviewed: Yes    Medications: Medication refills not needed today.  Pharmacy name entered into EPIC:    [facility] [address] [place] [address] - [address]  [facility] [place], [place] - [address] [facility] [place], [place] - [address] [facility] [place], [place] - [address] [place] [address] DR  [facility] [place], [place] - [address]    Clinical concerns: Pt here for BMBX and labs today. Feeling well. Pt's two sisters are with him and are waiting in the lobby. Pt is undecided about getting Versed today. States he doesn't recall receiving this in the past. Pt is not taking any blood thinners- not even his ASA. Labs drawn by RN in clinic from right Chest Port-a-Cath. Plt count 24K today.  ETHAN Fraga was notified.      Leatha Yee RN              "

## 2021-11-19 NOTE — LETTER
Date:December 22, 2021      Provider requested that no letter be sent. Do not send.       [facility]

## 2021-11-19 NOTE — NURSING NOTE
According to Provider, Pt's BMBX was very long and difficult. Pt with significant bleeding. Pressure held x 5 minutes and bleeding stopped. Plts ordered and will be transfused. Pt placed in the Supine position. After 30 minutes no further bleeding was noted. VSS. Pressure Drsg C/D/I. Versed 1 mg IV was administered with Bx today. Pt A&O x 3 following procedure. Pt's sisters notified of Pt's condition. Pt currently receiving Plts and then will be discharged (see Infusion note).

## 2021-11-19 NOTE — LETTER
11/19/2021         RE: Renny Gannon  [address]  [facility] [facility] [address]        Dear Colleague,    Thank you for referring your patient, Renny Gannon, to the [facility] [place]. Please see a copy of my visit note below.    Infusion Nursing Note:  Renny Gannon presents today for one dose of Plts.     Patient seen by provider today: Yes: ETHAN Fraga   present during visit today: Not Applicable.    Note: Pt received one dose of Plts transfused following BMBX for significant bleeding with procedure, and also bleeding of gums. Plts were 24K prior to transfusion today. Pt tolerated Plts well. VSS throughout and no further bleeding noted. See Doc Flowsheet for further details.       Intravenous Access:  Implanted Port.    Treatment Conditions:  Lab Results   Component Value Date    HGB 7.7 (L) 11/19/2021    WBC 3.3 (L) 11/19/2021    ANEU 0.8 (L) 11/11/2021    ANEUTAUTO 1.9 11/19/2021    PLT 24 (LL) 11/19/2021          Post Infusion Assessment:  Patient tolerated infusion without incident.  Access discontinued per protocol.       Discharge Plan:   Discharge instructions reviewed with: Patient.  Patient and/or family verbalized understanding of discharge instructions and all questions answered.  Patient discharged in stable condition accompanied by: sisters.  Departure Mode: Ambulatory with Walker.      Leatha Yee RN                          Again, thank you for allowing me to participate in the care of your patient.        Sincerely,        [facility]

## 2021-11-22 NOTE — PROGRESS NOTES
Infusion Nursing Note:  Renny Gannon presents today for Port Labs.    Patient seen by provider today: No   present during visit today: Not Applicable.    Note: N/A.      Intravenous Access:  Labs drawn without difficulty.  Implanted Port.    Treatment Conditions:  Lab Results   Component Value Date    HGB 7.1 (L) 11/22/2021    WBC 3.5 (L) 11/22/2021    ANEU 0.8 (L) 11/11/2021    ANEUTAUTO 2.3 11/22/2021    PLT 39 (LL) 11/22/2021      Lab Results   Component Value Date     11/22/2021    POTASSIUM 4.0 11/22/2021    MAG 2.1 06/18/2021    CR 0.81 11/22/2021    MINNIE 8.7 11/22/2021    BILITOTAL 0.4 11/22/2021    ALBUMIN 3.0 (L) 11/22/2021    ALT 30 11/22/2021    AST 16 11/22/2021         Post Infusion Assessment:  NA.       Discharge Plan:   Awaiting treatment for poss transfusion.      Mayte Chapa RN

## 2021-11-22 NOTE — PROGRESS NOTES
Infusion Nursing Note:  Renny Gannon presents today for 1 unit prbc's.    Patient seen by provider today: No   present during visit today: Not Applicable.    Note: Lungs sound clear to air pre and post transfusion. Denied any signs or symptoms of reaction. Hemoglobin today 7.1, WBC 3.5, platelets 39. No premeds given or needed.      Intravenous Access:  Labs drawn without difficulty.  Implanted Port.    Treatment Conditions:  Lab Results   Component Value Date    HGB 7.1 (L) 11/22/2021    WBC 3.5 (L) 11/22/2021    ANEU 0.8 (L) 11/11/2021    ANEUTAUTO 2.3 11/22/2021    PLT 39 (LL) 11/22/2021      Lab Results   Component Value Date     11/22/2021    POTASSIUM 4.0 11/22/2021    MAG 2.1 06/18/2021    CR 0.81 11/22/2021    MINNEI 8.7 11/22/2021    BILITOTAL 0.4 11/22/2021    ALBUMIN 3.0 (L) 11/22/2021    ALT 30 11/22/2021    AST 16 11/22/2021         Post Infusion Assessment:  Patient tolerated transfusion without incident.  Patient observed for 15 minutes post transfusion per protocol.  Blood return noted pre and post infusion.  Site patent and intact, free from redness, edema or discomfort.  No evidence of extravasations.  Access discontinued per protocol.       Discharge Plan:   Discharge instructions reviewed with: Patient.  Patient and/or family verbalized understanding of discharge instructions and all questions answered.  Patient discharged in stable condition accompanied by: sister.  Departure Mode: Ambulatory.      Mayte Chapa RN

## 2021-11-24 NOTE — TELEPHONE ENCOUNTER
"Chaparrita with [facility] calling back on this message. She said her office has faxed the medication list 2 times and we can't seem to find it.     Chaparrita said patient has NOT been taking the Allopurinol or Lasix. He told Chaparrita that \"someone\" told him to stop those medications. He is still taking potassium which homecare nurse thought was strange since the Lasix was stopped.     Chaparrita has no clue who told patient to stop this. She does not know if she should continue them? Really stop them? Chaparrita said they have been trying to get answers for 2 weeks and have gotten no answers.     Will route to PCP to review.     DENISHA MoralesN, RN  [facility]    "

## 2021-11-24 NOTE — TELEPHONE ENCOUNTER
Okay go ahead and stop the allopurinol    Please let me know about his fluid retention status.  In the leg swelling or significant weight gain?  His blood pressure has been the lower side, need to be cautious with the Lasix.  Recommend to take half a tablet as needed for leg swelling.      Recent labs show potassium level is okay.  We will continue with the potassium supplement for now.    Aspirin should be stopped due to anemia unless is recommended by the oncologist.    Dr. NEWBY for now.    In terms of follow-up appointment, he could be seen either virtual or in the office.  Thank you    Angus

## 2021-11-26 NOTE — PROGRESS NOTES
CAlled Renny to discuss that his decitabine plus venetoclax has been working to rduce leukemia burden. The time off has permitted some count recovery,. Will restart with venetoclax reduced to 200 mg for 14 days per cycle. He would like to follow up in [place] since he lives in that area.

## 2021-11-26 NOTE — PROGRESS NOTES
Infusion Nursing Note:  Renny Gannon presents today for Port Labs.    Patient seen by provider today: No   present during visit today: Not Applicable.    Note: N/A.      Intravenous Access:  Labs drawn without difficulty.  Implanted Port.    Treatment Conditions:  Not Applicable.      Post Infusion Assessment:  NA.       Discharge Plan:   Awaiting possible treatment today.      Mayte Chapa RN

## 2021-11-26 NOTE — TELEPHONE ENCOUNTER
RN Triage    Patient Contact    Attempt # 1    Was call answered?  No.  Left message on voicemail with information to call me back.    Nadja Castro RN on 11/26/2021 at 3:41 PM

## 2021-11-29 NOTE — PROGRESS NOTES
Infusion Nursing Note:  Renny Gannon presents today for 1 unit PRBCs.    Patient seen by provider today: No   present during visit today: Not Applicable.    Note: tolerated infusion with no complications.      Intravenous Access:  Implanted Port.    Treatment Conditions:  Not Applicable.      Post Infusion Assessment:  Patient tolerated infusion without incident.  Blood return noted pre and post infusion.  Site patent and intact, free from redness, edema or discomfort.  Port site has old bruising directly over port.  Denies pain.  Access and flushed with out difficulty  No evidence of extravasations.  Access discontinued per protocol.       Discharge Plan:   Discharge instructions reviewed with: Patient.  Patient and/or family verbalized understanding of discharge instructions and all questions answered.  Patient discharged in stable condition accompanied by: self.  Departure Mode: Ambulatory.      Richa Gudino RN

## 2021-11-29 NOTE — PROGRESS NOTES
Infusion Nursing Note:  Renny Gannon presents today for port access for labs.    Patient seen by provider today: No   present during visit today: Not Applicable.    Note: N/A.      Intravenous Access:  Implanted Port.    Treatment Conditions:  Not Applicable.      Post Infusion Assessment:  Site patent and intact, free from redness, edema or discomfort.       Discharge Plan:   Hgb 7.9.  Pt will get 1 unit PRBC's today. .      Richa Gudino RN

## 2021-12-02 NOTE — PROGRESS NOTES
Infusion Nursing Note:  Renny Gannon presents today for port labs.    Patient seen by provider today: No   present during visit today: Not Applicable.    Note: N/A.      Intravenous Access:  Implanted Port.    Treatment Conditions:  Not Applicable.      Anna Mederos RN

## 2021-12-02 NOTE — PROGRESS NOTES
Infusion Nursing Note:  Renny CRAIG Jagdeep presents today for possible transfusion.    Patient seen by provider today: No   present during visit today: Not Applicable.    Note: N/A.      Intravenous Access:  Implanted Port.    Treatment Conditions:  Lab Results   Component Value Date    HGB 8.6 (L) 12/02/2021    WBC 5.0 12/02/2021    ANEU 0.8 (L) 11/11/2021    ANEUTAUTO 3.5 11/29/2021    PLT 33 (LL) 12/02/2021      No transfusion today. Parameters not met.  Deaccessed and sent home.    Discharge Plan:   Patient discharged in stable condition accompanied by: self.  Departure Mode: Ambulatory.      Anna Mederos RN

## 2021-12-02 NOTE — PATIENT INSTRUCTIONS
December 2021 Sunday Monday Tuesday Wednesday Thursday Friday Saturday                  1     2    LAB CENTRAL   8:30 AM   (15 min.)   PH INFUSION NURSE   [facility]    INFUSION 5 HR (300 MIN)   9:00 AM   (300 min.)   PH INFUSION NURSE   [facility] 3     4       5     6    LAB CENTRAL   8:30 AM   (15 min.)   PH INFUSION NURSE   [facility]    INFUSION 5 HR (300 MIN)   9:00 AM   (300 min.)   PH INFUSION CHAIR 8   [facility] 7     8     9    LAB CENTRAL   8:30 AM   (15 min.)   PH INFUSION NURSE   [facility]    INFUSION 5 HR (300 MIN)   9:00 AM   (300 min.)   PH INFUSION CHAIR 8   [facility] 10     11       12     13  Start Venetoclax    LAB CENTRAL   8:00 AM   (15 min.)   PH INFUSION NURSE   [facility]    INFUSION 5 HR (300 MIN)   8:30 AM   (300 min.)   PH INFUSION CHAIR 8   [facility] 14  Venetoclax Day 2    INFUSION 2 HR (120 MIN)  11:30 AM   (120 min.)   PH INFUSION CHAIR 13   [facility] 15  Venetoclax Day 3    INFUSION 2 HR (120 MIN)   1:30 PM   (120 min.)   PH INFUSION NURSE   [facility] 16  Venetoclax Day 4    LAB CENTRAL   8:00 AM   (15 min.)   PH INFUSION CHAIR 3   [facility]    INFUSION 5 HR (300 MIN)   8:30 AM   (300 min.)   PH INFUSION CHAIR 8   [facility] 17  Venetoclax Day 5    INFUSION 2 HR (120 MIN)  10:30 AM   (120 min.)   PH INFUSION NURSE   [facility] 18  Venetoclax Day 6     19  Venetoclax Day 7   20  Venetoclax Day 8    LAB CENTRAL   8:30 AM   (15 min.)   PH INFUSION CHAIR 8   [facility]    INFUSION 5 HR (300  MIN)   9:00 AM   (300 min.)   PH INFUSION CHAIR 8   [facility] 21  Venetoclax Day 9   22  Venetoclax Day 10   23  Venetoclax Day 11    LAB CENTRAL   8:30 AM   (15 min.)   PH INFUSION NURSE   [facility]    INFUSION 5 HR (300 MIN)   9:00 AM   (300 min.)   PH INFUSION NURSE   [facility] 24  Venetoclax Day 12   25  Venetoclax Day 13     26  Venetoclax Day 14   27  Hold Venetoclax    LAB CENTRAL   8:30 AM   (15 min.)   PH INFUSION NURSE   [facility]    INFUSION 5 HR (300 MIN)   9:00 AM   (300 min.)   PH INFUSION NURSE   [facility] 28  Hold   29  Hold   30  Hold  LAB CENTRAL   8:30 AM   (15 min.)   PH INFUSION NURSE   [facility]    INFUSION 5 HR (300 MIN)   9:00 AM   (300 min.)   PH INFUSION NURSE   [facility] 31  Hold                    January 2022 Sunday Monday Tuesday Wednesday Thursday Friday Saturday                                 1  Hold     2  Hold   3  Hold   4  Hold   5  Hold   6  Hold  Plan for follow up with Dr. Grajeda   7  Hold   8  Hold     9  Hold   10  Plan to restart Venetoclax and infusion    11     12     13     14     15       16     17     18     19     20     21     22       23     24     25     26     27     28     29       30     31

## 2021-12-02 NOTE — PROGRESS NOTES
Received message from patient's sister that they are unable to drive [facility]. Infusion in [place] able to get patient in the following week. Ok per Dr. Patel for patient to wait until 12/13/21 for infusion and patient to start oral same day. Calendar printed. See patient instructions. Patient and sister updated with plan. Sister will  this afternoon.    Ki Lamb RN, BSN, OCN  12/2/2021, 2:48 PM

## 2021-12-06 NOTE — PROGRESS NOTES
Infusion Nursing Note:  Renny Gannon presents today for labs and then possible transfusion.    Patient seen by provider today: No   present during visit today: Not Applicable.    Note: Pt here for biweekly lab draw and for possible transfusion.  Order parameters were not met today for transfusion. .      Intravenous Access:  Implanted Port..  Good blood return with both access and deaccess.     Treatment Conditions:  Lab Results   Component Value Date    HGB 8.1 (L) 12/06/2021    WBC 4.7 12/06/2021    ANEU 0.8 (L) 11/11/2021    ANEUTAUTO 3.3 12/06/2021    PLT 25 (LL) 12/06/2021      Lab Results   Component Value Date     12/06/2021    POTASSIUM 3.8 12/06/2021    MAG 2.1 06/18/2021    CR 0.90 12/06/2021    MINNIE 8.9 12/06/2021    BILITOTAL 0.4 12/06/2021    ALBUMIN 2.8 (L) 12/06/2021    ALT 22 12/06/2021    AST 16 12/06/2021     Results reviewed, labs did NOT meet treatment parameters: Hbb was more than 8 and platelets were over 20,000..      Post Infusion Assessment:  Access discontinued per protocol.       Discharge Plan:   Patient discharged in stable condition accompanied by: self. Sister is picking him up at the front entrance  Departure Mode: Ambulatory with walker.  Has an appointment to return on Thursday for labs and possible transfusion. Patient given a copy of his schedule.     Tonia Mccrary RN

## 2021-12-09 NOTE — PROGRESS NOTES
Infusion Nursing Note:  Renny Gannon presents today for transfusion of 1 unit PRBC.    Patient seen by provider today: No   present during visit today: Not Applicable.    Note: N/A.      Intravenous Access:  Peripheral IV placed.    Treatment Conditions:  Lab Results   Component Value Date    HGB 7.9 (L) 12/09/2021    WBC 4.0 12/09/2021    ANEU 3.0 12/09/2021    ANEUTAUTO 3.3 12/06/2021    PLT 24 (LL) 12/09/2021      Results reviewed, labs MET treatment parameters, ok to proceed with treatment.      Post Infusion Assessment:  Patient tolerated infusion without incident.  Patient observed for 15 minutes post transfusion per protocol.  Blood return noted pre and post infusion.  Site patent and intact, free from redness, edema or discomfort.  No evidence of extravasations.  Access discontinued per protocol.       Discharge Plan:   Discharge instructions reviewed with: Patient.  Patient discharged in stable condition accompanied by: sister.  Departure Mode: Ambulatory w/walker.      Lisa Vasques RN

## 2021-12-13 NOTE — PROGRESS NOTES
Infusion Nursing Note:  Renny Gannon presents today for Port Labs.    Patient seen by provider today: No   present during visit today: Not Applicable.    Note: N/A.      Intravenous Access:  Labs drawn without difficulty.  Implanted Port.    Treatment Conditions:  Awaiting labs today for possible chemo and/o blood transfusion.      Post Infusion Assessment:  NA.       Discharge Plan:   Awaiting for treatment.      Mayte Chapa RN

## 2021-12-14 NOTE — PROGRESS NOTES
Infusion Nursing Note:  Renny Gannon presents today for Day 2 Decitabine.    Patient seen by provider today: No   present during visit today: Not Applicable.    Note: N/A.      Intravenous Access:  Implanted Port.    Treatment Conditions:  Lab Results   Component Value Date    HGB 8.0 (L) 12/13/2021    WBC 3.0 (L) 12/13/2021    ANEU 2.1 12/13/2021    ANEUTAUTO 3.3 12/06/2021    PLT 21 (LL) 12/13/2021      Results reviewed, labs MET treatment parameters, ok to proceed with treatment.      Post Infusion Assessment:  Patient tolerated infusion without incident.  Patient observed for 15 minutes post Decitabine.  Blood return noted pre and post infusion.  Site patent and intact, free from redness, edema or discomfort.  No evidence of extravasations.  Access discontinued per protocol.       Discharge Plan:   Discharge instructions reviewed with: Patient.  Patient discharged in stable condition accompanied by: sister.  Departure Mode: Ambulatory w/ walker.      Lisa Vasques RN

## 2021-12-15 NOTE — PROGRESS NOTES
Infusion Nursing Note:  Renny Gannon presents today for Decitabine C5 D1 Decitabine.    Patient seen by provider today: No   present during visit today: Not Applicable.    Note: Patients labs were just borderline needing a transfusion but not meeting parameters. Has another blood draw on Thursday but patient reiterated that if having symptoms to let us know so we can check labs earlier. Patient verbalized understanding.      Intravenous Access:  Labs drawn without difficulty.  Implanted Port.    Treatment Conditions:  Lab Results   Component Value Date    HGB 8.0 (L) 12/13/2021    WBC 3.0 (L) 12/13/2021    ANEU 2.1 12/13/2021    ANEUTAUTO 3.3 12/06/2021    PLT 21 (LL) 12/13/2021      Lab Results   Component Value Date     12/13/2021    POTASSIUM 3.7 12/13/2021    MAG 2.1 06/18/2021    CR 0.93 12/13/2021    MINNIE 9.5 12/13/2021    BILITOTAL 0.5 12/13/2021    ALBUMIN 2.8 (L) 12/13/2021    ALT 17 12/13/2021    AST 17 12/13/2021     Results reviewed, labs MET treatment parameters, ok to proceed with treatment.      Post Infusion Assessment:  Patient tolerated infusion without incident.  Patient observed for 15 minutes post infusion per protocol.  Blood return noted pre and post infusion.  Site patent and intact, free from redness, edema or discomfort.  No evidence of extravasations.  Access discontinued per protocol.       Discharge Plan:   Discharge instructions reviewed with: Patient.  Patient and/or family verbalized understanding of discharge instructions and all questions answered.  Patient discharged in stable condition accompanied by: self.  Departure Mode: Ambulatory.      Mayte Chapa RN

## 2021-12-15 NOTE — PROGRESS NOTES
Infusion Nursing Note:  Renny Gannon presents today for C5D3 Decitabine.    Patient seen by provider today: No   present during visit today: Not Applicable.    Note: N/A.      Intravenous Access:  Implanted Port.    Treatment Conditions:  Not Applicable.      Post Infusion Assessment:  Patient tolerated infusion without incident.  Blood return noted pre and post infusion.  Site patent and intact, free from redness, edema or discomfort.  No evidence of extravasations.  Access discontinued per protocol.       Discharge Plan:   Patient and/or family verbalized understanding of discharge instructions and all questions answered.  Patient discharged in stable condition accompanied by: sister.  Departure Mode: Ambulatory w/walker.      Kimberley Faust RN

## 2021-12-15 NOTE — TELEPHONE ENCOUNTER
Chaparrita JAMES with [facility] calling to inform that patient has been discharged from [facility] services. They did set patient up for auto fill with the pharmacy on his medications. Note his metoprolol was not dispensed with the appropriate amount. Patient takes twice daily, please resend prescription to the pharmacy. Bertha Lockhart LPN

## 2021-12-16 NOTE — PROGRESS NOTES
"Infusion Nursing Note:  Renny Gannon presents today for C5D4 Decitabine and 1 Unit PRBC's.   Patient seen by provider today: No   present during visit today: Not Applicable.    Note: Patient states he feels a little more fatigued today. Did not sleep well last night. \"A little dizzy\" at times. SOA w/ moderate exertion. Denies tinnitus or pain. B/P 107/41, HR89, afebrile.       Intravenous Access:  Implanted Port.    Treatment Conditions:  Lab Results   Component Value Date    HGB 7.1 (L) 12/16/2021    WBC 2.0 (L) 12/16/2021    ANEU 2.1 12/13/2021    ANEUTAUTO 3.3 12/06/2021    PLT 21 (LL) 12/16/2021      Lab Results   Component Value Date     12/13/2021    POTASSIUM 3.7 12/13/2021    MAG 2.1 06/18/2021    CR 0.93 12/13/2021    MINNIE 9.5 12/13/2021    BILITOTAL 0.5 12/13/2021    ALBUMIN 2.8 (L) 12/13/2021    ALT 17 12/13/2021    AST 17 12/13/2021     Results reviewed, labs MET treatment parameters, ok to proceed with treatment.  Blood transfusion consent signed 03/05/21.  Hgb 7.1 g/dL today.      Post Infusion Assessment:  Patient tolerated infusion without incident. VSS, afebrile. Asymptomatic. Lungs clear all fields.  Blood return noted pre and post infusion.  Site patent and intact, free from redness, edema or discomfort.  No evidence of extravasations.  Access discontinued per protocol.       Discharge Plan:   Copy of AVS reviewed with patient. Patient will return 12/17 for next appointment.  Patient discharged in stable condition accompanied by: sister.  Departure Mode: Ambulatory w/walker.      Kimberley Faust RN                    "

## 2021-12-16 NOTE — PROGRESS NOTES
Infusion Nursing Note:  Renny Gannon presents today for Port labs.    Patient seen by provider today: No   present during visit today: Not Applicable.    Note: Pt here for port draw prior to chemo and possible blood/plts transfusion. See other infusion therapy visit note for today for treatment conditions and other documentation.       Intravenous Access:  Implanted Port.  Lab draw site Right chest wall, Needle type Noncoring, Gauge 20,3/4in.  Labs drawn without difficulty      Kimberley Faust RN

## 2021-12-17 NOTE — PROGRESS NOTES
Infusion Nursing Note:  Renny Gannon presents today for C5 D5 Decitabine.    Patient seen by provider today: No   present during visit today: Not Applicable.    Note: Patient states he feels a little better today after transfusion yesterday. Denies any new complaints.      Intravenous Access:  Implanted Port.    Treatment Conditions:  Lab Results   Component Value Date    HGB 7.1 (L) 12/16/2021    WBC 2.0 (L) 12/16/2021    ANEU 1.4 (L) 12/16/2021    ANEUTAUTO 3.3 12/06/2021    PLT 21 (LL) 12/16/2021      Lab Results   Component Value Date     12/16/2021    POTASSIUM 3.6 12/16/2021    MAG 2.1 06/18/2021    CR 0.84 12/16/2021    MINNIE 9.5 12/16/2021    BILITOTAL 0.5 12/16/2021    ALBUMIN 2.8 (L) 12/16/2021    ALT 14 12/16/2021    AST 16 12/16/2021     Results reviewed, labs MET treatment parameters, ok to proceed with treatment.      Post Infusion Assessment:  Patient tolerated infusion without incident.  Blood return noted pre and post infusion.  Site patent and intact, free from redness, edema or discomfort.  No evidence of extravasations.  Access discontinued per protocol.       Discharge Plan:   Discharge instructions reviewed with: Patient.  Patient and/or family verbalized understanding of discharge instructions and all questions answered.  Patient discharged in stable condition accompanied by: sister.  Departure Mode: Ambulatory and walker.      Mayte Chapa RN

## 2021-12-20 NOTE — PROGRESS NOTES
Infusion Nursing Note:  Renny Gannon presents today for labs and possible tranfusion.    Patient seen by provider today: No   present during visit today: Not Applicable.    Note: N/A.      Intravenous Access:  Implanted Port.    Treatment Conditions:  Lab Results   Component Value Date    HGB 7.2 (L) 12/20/2021    WBC 1.1 (L) 12/20/2021    ANEU 1.4 (L) 12/16/2021    ANEUTAUTO 0.6 (L) 12/20/2021    PLT 15 (LL) 12/20/2021      Lab Results   Component Value Date     12/20/2021    POTASSIUM 3.8 12/20/2021    MAG 2.1 06/18/2021    CR 0.78 12/20/2021    MINNIE 9.4 12/20/2021    BILITOTAL 0.4 12/20/2021    ALBUMIN 2.8 (L) 12/20/2021    ALT 13 12/20/2021    AST 11 12/20/2021     Blood transfusion consent signed 03.04/2021.      Post Infusion Assessment:  Blood return noted. Labs drawn off of port. .       Discharge Plan:   Orders parameters met for patient to get both platelets and blood today.       Tonia Mccrary RN

## 2021-12-20 NOTE — PATIENT INSTRUCTIONS
Infusion Nursing Note:  Renny Gannon presents today for labs and then possible tranfusion.    Patient seen by provider today: No   present during visit today: Not Applicable.    Note: N/A.      Intravenous Access:  Implanted Port.    Treatment Conditions:  Lab Results   Component Value Date    HGB 7.2 (L) 12/20/2021    WBC 1.1 (L) 12/20/2021    ANEU 1.4 (L) 12/16/2021    ANEUTAUTO 0.6 (L) 12/20/2021    PLT 15 (LL) 12/20/2021      Lab Results   Component Value Date     12/20/2021    POTASSIUM 3.8 12/20/2021    MAG 2.1 06/18/2021    CR 0.78 12/20/2021    MINNIE 9.4 12/20/2021    BILITOTAL 0.4 12/20/2021    ALBUMIN 2.8 (L) 12/20/2021    ALT 13 12/20/2021    AST 11 12/20/2021         Post Infusion Assessment:  Will get platelets and blood today..       Discharge Plan:   Patient will get transfusion today. .      Tonia Mccrary RN

## 2021-12-20 NOTE — PROGRESS NOTES
Infusion Nursing Note:  Renny Gannon presents today for platelets and blood .    Patient seen by provider today: No   present during visit today: Not Applicable.    Note: Port labs drawn this morning. Hgb 7.2. Platelets 63618.  Patient states that his gums have been bleeding.  Order parameters met for patient to get one unit of platelets and one unit of blood today. Lungs clear prior to starting blood transfusion.  Starting rate was at 120ml/hour for 10 minutes and then increased to 200ml/hour.  Port flushed between infusions.  Platelets started at 70ml/hour for 10 minutes then increased to 200ml/hour.       Intravenous Access:  Implanted Port.    Treatment Conditions:  Lab Results   Component Value Date    HGB 7.2 (L) 12/20/2021    WBC 1.1 (L) 12/20/2021    ANEU 1.4 (L) 12/16/2021    ANEUTAUTO 0.6 (L) 12/20/2021    PLT 15 (LL) 12/20/2021      Lab Results   Component Value Date     12/20/2021    POTASSIUM 3.8 12/20/2021    MAG 2.1 06/18/2021    CR 0.78 12/20/2021    MINNIE 9.4 12/20/2021    BILITOTAL 0.4 12/20/2021    ALBUMIN 2.8 (L) 12/20/2021    ALT 13 12/20/2021    AST 11 12/20/2021     Results reviewed, labs MET treatment parameters, ok to proceed with treatment.  Blood transfusion consent signed 03/04/2021.        Post Infusion Assessment:  Patient tolerated infusion without incident.  Patient observed for 20 minutes post infusion per protocol.  Blood return noted pre and post infusion.  Site patent and intact, free from redness, edema or discomfort.  No evidence of extravasations.  Access discontinued per protocol.       Discharge Plan:   Patient discharged in stable condition accompanied by: sister.  Departure Mode: Ambulatory.  Will be returning to infusion center on 12/23.  Tonia Mccrary RN

## 2021-12-23 NOTE — PROGRESS NOTES
Infusion Nursing Note:  Renny Gannon presents today for Port Labs.    Patient seen by provider today: No   present during visit today: Not Applicable.    Note: N/A.      Intravenous Access:  Labs drawn without difficulty.  Implanted Port.    Treatment Conditions:  Not Applicable.      Post Infusion Assessment:  NA.       Discharge Plan:   Awaiting results for possible transfusion.      Mayte Chapa RN

## 2021-12-23 NOTE — PROGRESS NOTES
Infusion Nursing Note:  Renny Gannon presents today for 1 unit Platelets.    Patient seen by provider today: No   present during visit today: Not Applicable.    Note: Lungs sound clear pre and post platelet transfusion. Patient tolerated well..      Intravenous Access:  Implanted Port.    Treatment Conditions:  Lab Results   Component Value Date    HGB 8.2 (L) 12/23/2021    WBC 0.8 (LL) 12/23/2021    ANEU 0.3 (LL) 12/23/2021    ANEUTAUTO 0.6 (L) 12/20/2021    PLT 20 (LL) 12/23/2021      Results reviewed, labs MET treatment parameters, ok to proceed with treatment.  Blood transfusion consent signed 07/07/21.      Post Infusion Assessment:  Patient tolerated transfusion without incident.  Patient observed for 15 minutes post transfusion per protocol.  Blood return noted pre and post infusion.  Site patent and intact, free from redness, edema or discomfort.  No evidence of extravasations.  Access discontinued per protocol.       Discharge Plan:   Discharge instructions reviewed with: Patient.  Patient and/or family verbalized understanding of discharge instructions and all questions answered.  Patient discharged in stable condition accompanied by: self.  Departure Mode: Ambulatory.      Mayte Chapa RN

## 2021-12-27 NOTE — PROGRESS NOTES
Infusion Nursing Note:  Renny Gannon presents today for blood transfusion and platelets.    Patient seen by provider today: No   present during visit today: Not Applicable.    Note: Patient here today for blood transfusion and platelets.  PRBC Started at 120ml/hour and increased to 200ml/hour after 10 minutes.   Platelets started at 100ml/hour and then increased to 200ml/hour after 10 minutes.   Intravenous Access:  Labs drawn without difficulty.  Implanted Port.    Treatment Conditions:  Lab Results   Component Value Date    HGB 7.0 (L) 12/27/2021    WBC 0.4 (LL) 12/27/2021    ANEU 0.3 (LL) 12/23/2021    ANEUTAUTO 0.6 (L) 12/20/2021    PLT 12 (LL) 12/27/2021      Lab Results   Component Value Date     12/27/2021    POTASSIUM 3.7 12/27/2021    MAG 2.1 06/18/2021    CR 0.84 12/27/2021    MINNIE 9.3 12/27/2021    BILITOTAL 0.6 12/27/2021    ALBUMIN 2.8 (L) 12/27/2021    ALT 17 12/27/2021    AST 11 12/27/2021     Blood transfusion consent signed 03/04/2021.      Post Infusion Assessment:  Patient tolerated infusion without incident.  Patient observed for 15 minutes post infusion per protocol.  Blood return noted pre and post infusion.  Site patent and intact, free from redness, edema or discomfort.  No evidence of extravasations.  Access discontinued per protocol.   Lungs clear both before and after PRBC.  Lungs clear both before and after platelets.      Discharge Plan:   Patient discharged in stable condition accompanied by: self.  Departure Mode: Ambulatory.  Patient will be returning on 12/30 for labs and possible platelets and/or PRBC      Tonia Mccrary RN

## 2021-12-27 NOTE — PROGRESS NOTES
Infusion Nursing Note:  Renny Gannon presents today for labs.    Patient seen by provider today: No   present during visit today: Not Applicable.    Note: Patient here today for labs and for possible PRBC and/or platelets depending on lab results and order parameters. .      Intravenous Access:  Labs drawn without difficulty.  Implanted Port.    Treatment Conditions:  Lab Results   Component Value Date    HGB 7.0 (L) 12/27/2021    WBC 0.4 (LL) 12/27/2021    ANEU 0.3 (LL) 12/23/2021    ANEUTAUTO 0.6 (L) 12/20/2021    PLT 12 (LL) 12/27/2021      Lab Results   Component Value Date     12/27/2021    POTASSIUM 3.7 12/27/2021    MAG 2.1 06/18/2021    CR 0.84 12/27/2021    MINNIE 9.3 12/27/2021    BILITOTAL 0.6 12/27/2021    ALBUMIN 2.8 (L) 12/27/2021    ALT 17 12/27/2021    AST 11 12/27/2021     Results reviewed, labs MET treatment parameters, ok to proceed with treatment.  Blood transfusion consent signed 03/05/2021.      Post Infusion Assessment:  Will proceed with transfusions once they are read, .       Discharge Plan:    .      Tonia Mccrary RN

## 2021-12-30 NOTE — PROGRESS NOTES
Infusion Nursing Note:  Renny Gannon presents today for port labs.    Patient seen by provider today: No   present during visit today: Not Applicable.    Note: N/A.      Intravenous Access:  Labs drawn without difficulty.  Implanted Port.  Purple and green tubes drawn.  Flushed port with 20ml NS after.      Jalyn Gannon RN

## 2021-12-30 NOTE — PROGRESS NOTES
Infusion Nursing Note:  Renny Gannon presents today for 1 Unit PRBC's and 1 unit PLT.    Patient seen by provider today: No   present during visit today: Not Applicable.    Note: N/A.      Intravenous Access:  Implanted Port.    Treatment Conditions:  Lab Results   Component Value Date    HGB 7.1 (L) 12/30/2021    WBC 0.3 (LL) 12/30/2021    ANEU 0.3 (LL) 12/23/2021    ANEUTAUTO 0.6 (L) 12/20/2021    PLT 9 (LL) 12/30/2021      Lab Results   Component Value Date     12/30/2021    POTASSIUM 3.6 12/30/2021    MAG 2.1 06/18/2021    CR 0.85 12/30/2021    MINNIE 9.0 12/30/2021    BILITOTAL 0.6 12/30/2021    ALBUMIN 2.6 (L) 12/30/2021    ALT 18 12/30/2021    AST 12 12/30/2021     Results reviewed, labs MET treatment parameters, ok to proceed with treatment.      Post Infusion Assessment:  Patient tolerated infusion without incident.  Blood return noted pre and post infusion.  Site patent and intact, free from redness, edema or discomfort.  No evidence of extravasations.  Access discontinued per protocol.  Lungs clear pre/post each unit blood products..       Discharge Plan:   Discharge instructions reviewed with: Patient.  Patient and/or family verbalized understanding of discharge instructions and all questions answered.  Patient discharged in stable condition accompanied by: self.  Departure Mode: Ambulatory.      Jalyn Gannon RN

## 2021-12-31 NOTE — Clinical Note
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  He has a very complicated medical history and has been seeing your colleague regularly.  He has been getting the blood transfusion regularly, couple times a week.  I think your colleague started him on allopurinol, Levaquin, acyclovir and Diflucan a while ago.  I have been refilling them but I am not for sure if he still needs to be on them anymore.  He has no history of hyperuricemia or gout.  Please re-evaluate and let me know if you have any recommendation in regard to this medications.  Also please let me know if you want to take over managing these prescriptions if you feel he needs to be on them.  I appreciate your help and expertise.  Please let me know if you have any further question.  Thank you.  Angus

## 2021-12-31 NOTE — PROGRESS NOTES
Assessment & Plan       ICD-10-CM    1. Coronary artery disease involving coronary bypass graft of native heart without angina pectoris  I25.810    2. Chronic diastolic congestive heart failure (H)  I50.32    3. Essential hypertension  I10    4. Stage 3a chronic kidney disease (H)  N18.31    5. 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 Myelofibrosis (H)  D75.81 levofloxacin (LEVAQUIN) 250 MG tablet   7. Antineoplastic chemotherapy induced pancytopenia (H)  D61.810     T45.1X5A    8. Vaccination refused by patient  Z28.21         Renny has a complex cardiac history, but overall he is doing well today.  Vital signs were stable and normal.  He did not look acutely sick today.  Medications reviewed.  Echocardiogram 5 months ago showed low normal left ventricular systolic dysfunction with ejection fraction of 50-55%.  There was severe inferior wall hypokinesis with moderate concentric left ventricular hypertrophy.  His last visit with his cardiologist was 3 months ago - no change in medication.  Will continue with the statin, beta-blocker, and Lasix.  Encouraged to continue avoiding high salt diet.  Symptoms of acute CHF exacerbation discussed.  Follow-up with the cardiologist as per her recommendation.  Symptoms that need to be seen to call in also discussed.  Recent kidney function was normal.    I recommend him to work with his oncologist closely for his leukemia and myelofibrosis.  Been getting blood transfusion regularly about 1-2 times a week.  Labs yesterday showed severe pancytopenia with a white blood count of 0.3, hemoglobin of 7.1 and platelets of 9000.  No signs of active bleeding.  Not on blood thinner including aspirin. Not taking NSAID.   I instructed him to work with oncologist closely.    He was started on allopurinol, acyclovir, Diflucan and Levaquin for awhile.  I am not sure who started him on these medications but I believe it was started by  his previous hematologist/oncologist.  I have been refilling them for him but not sure if he still need to be on them at this time.  I will refer back to his oncologist to decide whether if all or any of them can be stopped.  I refilled his Levaquin today.  No History of gout or hyperuricemia.    He was strongly recommended to get the Tdap, Covid, Pneumovax, shingles and influenza vaccinations but he declined.  Risks and benefits discussed and he is willing to take the risks.        Return in about 6 months (around 6/30/2022) for Physical Exam, folow up CAD, HTN.    Angus Clements Mai, MD  [facility]    Nely Lawson is a 71 year old who presents for the following health issues     HPI     Vascular Disease Follow-up      How often do you take nitroglycerin? Never    Do you take an aspirin every day? No    Heart Failure Follow-up    Are you experiencing any shortness of breath? No    Are you experiencing any swelling in your legs or feet?  No    Are you using more pillows than usual? No    Do you cough at night?  sometimes    Do you check your weight daily?  Yes    Have you had a weight change recently?  No    Are you having any of the following side effects from your medications? (Select all that apply)  The patient does not report symptoms of dizziness, fatigue, cough, swelling, or slow heart beat.    Since your last visit, how many times have you gone to the cardiologist, urgent care, emergency room, or hospital because of your heart failure?   More than 3 times      How many servings of fruits and vegetables do you eat daily?  0-1    On average, how many sweetened beverages do you drink each day (Examples: soda, juice, sweet tea, etc.  Do NOT count diet or artificially sweetened beverages)?   1    How many days per week do you exercise enough to make your heart beat faster? 3 or less    How many minutes a day do you exercise enough to make your heart beat faster? 9 or less    How many days  per week do you miss taking your medication? 0    Renny is here today for general follow-up.  He has a very complex medical history but overalll he is feeling pretty good.  He has been oncology for myelofibrosis and leukemia.  Stated that he has been getting the blood transfusions 2 times a week chronically which has helped with his fatigue.  He has pancytopenia with severe thrombocytopenia and leukopenia.  He has an appointment with his [facility] oncologist in a week.  Still feeling very tired but overall feeling stable - no concern about it.  No chest pain or shortness of breath.    He also has a history of MI with CHF.  Stated overall he is also feeling well.  No chest pain or shortness of breath.  No leg swelling, dyspnea or orthopnea.  Been taking the medications as prescribed.  No longer having home health nurse and not interested in home health care.  He lives alone but his sister check on him frequently.  Stated he is cook his own food - stated that he avoid high salt food.  No weight change.  Has trouble with sleeping chronically and not interested to try any medication for it.  Has not seen his heart doctor for a while.    No headache or dizziness.  No pain.  No nausea, vomiting, diarrhea or constipation.  No problem with urination.  No other concerns today.    Review of Systems   Constitutional, HEENT, cardiovascular, pulmonary, gi and gu systems are negative, except as otherwise noted.      Objective    /72   Pulse 114   Temp 98.4  F (36.9  C) (Temporal)   Wt 72.1 kg (159 lb)   SpO2 97%   BMI 24.18 kg/m    Body mass index is 24.18 kg/m .  Physical Exam   GENERAL: alert and no distress.  Answer the question appropriately  HENT: ear canals and TM's normal, nose and mouth without ulcers or lesions.  Nares are non-congested. Oropharynx is pink and moist. No tender with palpation to the sinuses.   NECK: Supple, no lymphadenopathy or thyromegaly.  No carotid bruits or JV distention.  RESP: lungs clear to  auscultation - no rales, rhonchi or wheezes.  Decreased breath sounds throughout.  CV: regular rate and rhythm, normal S1 S2, no S3 or S4, no murmur, no peripheral edema  ABDOMEN: soft, nontender, nondistended, no palpable masses organomegaly with normal bowel sound.  MS: no gross musculoskeletal defects noted, no edema.  No focal weakness.   NEURO: Normal strength and tone, mentation intact and speech normal.  No focal neurological deficit.    PSYCH: mentation appears normal, affect flat which is baseline    No results found for any visits on 12/31/21.

## 2022-01-01 ENCOUNTER — APPOINTMENT (OUTPATIENT)
Dept: CT IMAGING | Facility: CLINIC | Age: 72
End: 2022-01-01
Attending: EMERGENCY MEDICINE
Payer: COMMERCIAL

## 2022-01-01 ENCOUNTER — HOSPITAL ENCOUNTER (OUTPATIENT)
Facility: CLINIC | Age: 72
Setting detail: OBSERVATION
Discharge: NURSING, REHAB OR LONG-TERM CARE | End: 2022-01-25
Attending: EMERGENCY MEDICINE | Admitting: PEDIATRICS
Payer: COMMERCIAL

## 2022-01-01 ENCOUNTER — LAB (OUTPATIENT)
Dept: INFUSION THERAPY | Facility: CLINIC | Age: 72
End: 2022-01-01
Attending: INTERNAL MEDICINE
Payer: COMMERCIAL

## 2022-01-01 ENCOUNTER — VIRTUAL VISIT (OUTPATIENT)
Dept: ONCOLOGY | Facility: CLINIC | Age: 72
End: 2022-01-01
Payer: COMMERCIAL

## 2022-01-01 ENCOUNTER — PATIENT OUTREACH (OUTPATIENT)
Dept: ONCOLOGY | Facility: CLINIC | Age: 72
End: 2022-01-01

## 2022-01-01 ENCOUNTER — PATIENT OUTREACH (OUTPATIENT)
Dept: CARE COORDINATION | Facility: CLINIC | Age: 72
End: 2022-01-01
Payer: COMMERCIAL

## 2022-01-01 ENCOUNTER — PATIENT OUTREACH (OUTPATIENT)
Dept: CARE COORDINATION | Facility: CLINIC | Age: 72
End: 2022-01-01

## 2022-01-01 ENCOUNTER — APPOINTMENT (OUTPATIENT)
Dept: LAB | Facility: CLINIC | Age: 72
End: 2022-01-01
Payer: COMMERCIAL

## 2022-01-01 VITALS
HEART RATE: 90 BPM | OXYGEN SATURATION: 99 % | SYSTOLIC BLOOD PRESSURE: 127 MMHG | DIASTOLIC BLOOD PRESSURE: 50 MMHG | RESPIRATION RATE: 24 BRPM | TEMPERATURE: 98.2 F

## 2022-01-01 VITALS
WEIGHT: 145.9 LBS | HEART RATE: 63 BPM | SYSTOLIC BLOOD PRESSURE: 122 MMHG | BODY MASS INDEX: 22.18 KG/M2 | RESPIRATION RATE: 22 BRPM | TEMPERATURE: 97.6 F | DIASTOLIC BLOOD PRESSURE: 69 MMHG

## 2022-01-01 VITALS
OXYGEN SATURATION: 97 % | TEMPERATURE: 98.8 F | SYSTOLIC BLOOD PRESSURE: 120 MMHG | BODY MASS INDEX: 24.33 KG/M2 | HEART RATE: 91 BPM | RESPIRATION RATE: 24 BRPM | DIASTOLIC BLOOD PRESSURE: 55 MMHG | WEIGHT: 160 LBS

## 2022-01-01 VITALS
SYSTOLIC BLOOD PRESSURE: 120 MMHG | BODY MASS INDEX: 23.31 KG/M2 | TEMPERATURE: 97.6 F | HEART RATE: 77 BPM | WEIGHT: 153.8 LBS | DIASTOLIC BLOOD PRESSURE: 59 MMHG | HEIGHT: 68 IN | RESPIRATION RATE: 18 BRPM | OXYGEN SATURATION: 100 %

## 2022-01-01 VITALS
HEART RATE: 115 BPM | TEMPERATURE: 98.6 F | BODY MASS INDEX: 23.63 KG/M2 | HEIGHT: 68 IN | OXYGEN SATURATION: 93 % | SYSTOLIC BLOOD PRESSURE: 122 MMHG | WEIGHT: 155.9 LBS | DIASTOLIC BLOOD PRESSURE: 62 MMHG

## 2022-01-01 VITALS
DIASTOLIC BLOOD PRESSURE: 56 MMHG | WEIGHT: 155.6 LBS | HEART RATE: 75 BPM | BODY MASS INDEX: 23.66 KG/M2 | OXYGEN SATURATION: 98 % | RESPIRATION RATE: 24 BRPM | TEMPERATURE: 97.6 F | SYSTOLIC BLOOD PRESSURE: 102 MMHG

## 2022-01-01 VITALS
RESPIRATION RATE: 20 BRPM | DIASTOLIC BLOOD PRESSURE: 63 MMHG | HEART RATE: 92 BPM | OXYGEN SATURATION: 99 % | TEMPERATURE: 98.4 F | SYSTOLIC BLOOD PRESSURE: 111 MMHG

## 2022-01-01 VITALS
OXYGEN SATURATION: 97 % | HEIGHT: 68 IN | HEART RATE: 106 BPM | WEIGHT: 148 LBS | RESPIRATION RATE: 18 BRPM | TEMPERATURE: 96.1 F | SYSTOLIC BLOOD PRESSURE: 104 MMHG | BODY MASS INDEX: 22.43 KG/M2 | DIASTOLIC BLOOD PRESSURE: 47 MMHG

## 2022-01-01 DIAGNOSIS — Z71.89 OTHER SPECIFIED COUNSELING: ICD-10-CM

## 2022-01-01 DIAGNOSIS — I21.4 NSTEMI (NON-ST ELEVATED MYOCARDIAL INFARCTION) (H): ICD-10-CM

## 2022-01-01 DIAGNOSIS — D46.9 MDS (MYELODYSPLASTIC SYNDROME) (H): Chronic | ICD-10-CM

## 2022-01-01 DIAGNOSIS — J98.4 CAVITATING MASS IN RIGHT UPPER LUNG LOBE: ICD-10-CM

## 2022-01-01 DIAGNOSIS — D63.8 ANEMIA IN OTHER CHRONIC DISEASES CLASSIFIED ELSEWHERE: ICD-10-CM

## 2022-01-01 DIAGNOSIS — D69.6 THROMBOCYTOPENIA (H): Primary | ICD-10-CM

## 2022-01-01 DIAGNOSIS — D46.9 MDS (MYELODYSPLASTIC SYNDROME) (H): ICD-10-CM

## 2022-01-01 DIAGNOSIS — D64.9 ANEMIA, UNSPECIFIED TYPE: ICD-10-CM

## 2022-01-01 DIAGNOSIS — R04.2 HEMOPTYSIS: ICD-10-CM

## 2022-01-01 DIAGNOSIS — C95.00 ACUTE LEUKEMIA NOT HAVING ACHIEVED REMISSION (H): Primary | ICD-10-CM

## 2022-01-01 DIAGNOSIS — D61.810 ANTINEOPLASTIC CHEMOTHERAPY INDUCED PANCYTOPENIA (H): ICD-10-CM

## 2022-01-01 DIAGNOSIS — D64.9 TRANSFUSION-DEPENDENT ANEMIA: ICD-10-CM

## 2022-01-01 DIAGNOSIS — D75.81 MYELOFIBROSIS (H): ICD-10-CM

## 2022-01-01 DIAGNOSIS — R91.8 BILATERAL PULMONARY INFILTRATES ON CXR: ICD-10-CM

## 2022-01-01 DIAGNOSIS — Z11.52 ENCOUNTER FOR SCREENING LABORATORY TESTING FOR SEVERE ACUTE RESPIRATORY SYNDROME CORONAVIRUS 2 (SARS-COV-2): ICD-10-CM

## 2022-01-01 DIAGNOSIS — R79.89 HYPOURICEMIA: ICD-10-CM

## 2022-01-01 DIAGNOSIS — C95.00 ACUTE LEUKEMIA NOT HAVING ACHIEVED REMISSION (H): ICD-10-CM

## 2022-01-01 DIAGNOSIS — D69.6 THROMBOCYTOPENIA (H): ICD-10-CM

## 2022-01-01 DIAGNOSIS — J98.4 DISEASE OF LUNG: ICD-10-CM

## 2022-01-01 DIAGNOSIS — D47.1 MYELOPROLIFERATIVE DISORDER (H): Primary | ICD-10-CM

## 2022-01-01 DIAGNOSIS — R79.89 ELEVATED D-DIMER: ICD-10-CM

## 2022-01-01 DIAGNOSIS — D47.1 CHRONIC MYELOSIS (H): ICD-10-CM

## 2022-01-01 DIAGNOSIS — D64.9 TRANSFUSION-DEPENDENT ANEMIA: Primary | ICD-10-CM

## 2022-01-01 DIAGNOSIS — Z51.5 END OF LIFE CARE: Primary | ICD-10-CM

## 2022-01-01 DIAGNOSIS — D47.1 MYELOPROLIFERATIVE DISORDER (H): ICD-10-CM

## 2022-01-01 DIAGNOSIS — D47.1 PRIMARY MYELOFIBROSIS (H): ICD-10-CM

## 2022-01-01 DIAGNOSIS — T45.1X5A ANTINEOPLASTIC CHEMOTHERAPY INDUCED PANCYTOPENIA (H): ICD-10-CM

## 2022-01-01 PROBLEM — I25.118 CORONARY ARTERY DISEASE INVOLVING NATIVE CORONARY ARTERY OF NATIVE HEART WITH OTHER FORM OF ANGINA PECTORIS (H): Status: RESOLVED | Noted: 2020-01-02 | Resolved: 2022-01-01

## 2022-01-01 PROBLEM — K81.0 ACUTE CHOLECYSTITIS: Status: RESOLVED | Noted: 2021-01-01 | Resolved: 2022-01-01

## 2022-01-01 PROBLEM — I50.20 HEART FAILURE WITH REDUCED EJECTION FRACTION (H): Status: RESOLVED | Noted: 2021-01-01 | Resolved: 2022-01-01

## 2022-01-01 PROBLEM — N18.30 CKD (CHRONIC KIDNEY DISEASE) STAGE 3, GFR 30-59 ML/MIN (H): Status: RESOLVED | Noted: 2020-12-21 | Resolved: 2022-01-01

## 2022-01-01 PROBLEM — D61.818 PANCYTOPENIA (H): Status: RESOLVED | Noted: 2021-01-01 | Resolved: 2022-01-01

## 2022-01-01 LAB
ABO/RH(D): NORMAL
ALBUMIN SERPL-MCNC: 1.9 G/DL (ref 3.4–5)
ALBUMIN SERPL-MCNC: 2 G/DL (ref 3.4–5)
ALBUMIN SERPL-MCNC: 2 G/DL (ref 3.4–5)
ALBUMIN SERPL-MCNC: 2.1 G/DL (ref 3.4–5)
ALBUMIN SERPL-MCNC: 2.4 G/DL (ref 3.4–5)
ALP SERPL-CCNC: 103 U/L (ref 40–150)
ALP SERPL-CCNC: 107 U/L (ref 40–150)
ALP SERPL-CCNC: 113 U/L (ref 40–150)
ALP SERPL-CCNC: 117 U/L (ref 40–150)
ALP SERPL-CCNC: 119 U/L (ref 40–150)
ALT SERPL W P-5'-P-CCNC: 25 U/L (ref 0–70)
ALT SERPL W P-5'-P-CCNC: 25 U/L (ref 0–70)
ALT SERPL W P-5'-P-CCNC: 27 U/L (ref 0–70)
ALT SERPL W P-5'-P-CCNC: 39 U/L (ref 0–70)
ALT SERPL W P-5'-P-CCNC: 54 U/L (ref 0–70)
ANION GAP SERPL CALCULATED.3IONS-SCNC: 6 MMOL/L (ref 3–14)
ANION GAP SERPL CALCULATED.3IONS-SCNC: 7 MMOL/L (ref 3–14)
ANION GAP SERPL CALCULATED.3IONS-SCNC: 8 MMOL/L (ref 3–14)
ANTIBODY SCREEN: NEGATIVE
APTT PPP: 48 SECONDS (ref 22–38)
AST SERPL W P-5'-P-CCNC: 12 U/L (ref 0–45)
AST SERPL W P-5'-P-CCNC: 13 U/L (ref 0–45)
AST SERPL W P-5'-P-CCNC: 14 U/L (ref 0–45)
AST SERPL W P-5'-P-CCNC: 21 U/L (ref 0–45)
AST SERPL W P-5'-P-CCNC: 38 U/L (ref 0–45)
BASOPHILS # BLD MANUAL: 0 10E3/UL (ref 0–0.2)
BASOPHILS NFR BLD MANUAL: 0 %
BILIRUB SERPL-MCNC: 0.4 MG/DL (ref 0.2–1.3)
BILIRUB SERPL-MCNC: 0.5 MG/DL (ref 0.2–1.3)
BILIRUB SERPL-MCNC: 0.6 MG/DL (ref 0.2–1.3)
BILIRUB SERPL-MCNC: 0.7 MG/DL (ref 0.2–1.3)
BILIRUB SERPL-MCNC: 0.8 MG/DL (ref 0.2–1.3)
BLD PROD TYP BPU: NORMAL
BLOOD COMPONENT TYPE: NORMAL
BUN SERPL-MCNC: 16 MG/DL (ref 7–30)
BUN SERPL-MCNC: 18 MG/DL (ref 7–30)
BUN SERPL-MCNC: 19 MG/DL (ref 7–30)
BUN SERPL-MCNC: 22 MG/DL (ref 7–30)
BUN SERPL-MCNC: 31 MG/DL (ref 7–30)
CALCIUM SERPL-MCNC: 8.8 MG/DL (ref 8.5–10.1)
CALCIUM SERPL-MCNC: 8.8 MG/DL (ref 8.5–10.1)
CALCIUM SERPL-MCNC: 9.2 MG/DL (ref 8.5–10.1)
CALCIUM SERPL-MCNC: 9.3 MG/DL (ref 8.5–10.1)
CALCIUM SERPL-MCNC: 9.6 MG/DL (ref 8.5–10.1)
CHLORIDE BLD-SCNC: 101 MMOL/L (ref 94–109)
CHLORIDE BLD-SCNC: 101 MMOL/L (ref 94–109)
CHLORIDE BLD-SCNC: 102 MMOL/L (ref 94–109)
CHLORIDE BLD-SCNC: 103 MMOL/L (ref 94–109)
CHLORIDE BLD-SCNC: 99 MMOL/L (ref 94–109)
CO2 SERPL-SCNC: 24 MMOL/L (ref 20–32)
CO2 SERPL-SCNC: 25 MMOL/L (ref 20–32)
CO2 SERPL-SCNC: 26 MMOL/L (ref 20–32)
CO2 SERPL-SCNC: 26 MMOL/L (ref 20–32)
CO2 SERPL-SCNC: 27 MMOL/L (ref 20–32)
CODING SYSTEM: NORMAL
CREAT SERPL-MCNC: 0.74 MG/DL (ref 0.66–1.25)
CREAT SERPL-MCNC: 0.86 MG/DL (ref 0.66–1.25)
CREAT SERPL-MCNC: 0.89 MG/DL (ref 0.66–1.25)
CREAT SERPL-MCNC: 0.9 MG/DL (ref 0.66–1.25)
CREAT SERPL-MCNC: 0.91 MG/DL (ref 0.66–1.25)
CROSSMATCH: NORMAL
D DIMER PPP FEU-MCNC: 1.92 UG/ML FEU (ref 0–0.5)
EOSINOPHIL # BLD MANUAL: 0 10E3/UL (ref 0–0.7)
EOSINOPHIL NFR BLD MANUAL: 0 %
ERYTHROCYTE [DISTWIDTH] IN BLOOD BY AUTOMATED COUNT: 14 % (ref 10–15)
ERYTHROCYTE [DISTWIDTH] IN BLOOD BY AUTOMATED COUNT: 14.1 % (ref 10–15)
ERYTHROCYTE [DISTWIDTH] IN BLOOD BY AUTOMATED COUNT: 14.1 % (ref 10–15)
ERYTHROCYTE [DISTWIDTH] IN BLOOD BY AUTOMATED COUNT: 14.6 % (ref 10–15)
ERYTHROCYTE [DISTWIDTH] IN BLOOD BY AUTOMATED COUNT: 15 % (ref 10–15)
ERYTHROCYTE [DISTWIDTH] IN BLOOD BY AUTOMATED COUNT: 15.4 % (ref 10–15)
GAMMA INTERFERON BACKGROUND BLD IA-ACNC: 0.03 IU/ML
GFR SERPL CREATININE-BSD FRML MDRD: 90 ML/MIN/1.73M2
GFR SERPL CREATININE-BSD FRML MDRD: >90 ML/MIN/1.73M2
GLUCOSE BLD-MCNC: 158 MG/DL (ref 70–99)
GLUCOSE BLD-MCNC: 163 MG/DL (ref 70–99)
GLUCOSE BLD-MCNC: 179 MG/DL (ref 70–99)
GLUCOSE BLD-MCNC: 183 MG/DL (ref 70–99)
GLUCOSE BLD-MCNC: 187 MG/DL (ref 70–99)
HCT VFR BLD AUTO: 21 % (ref 40–53)
HCT VFR BLD AUTO: 21.7 % (ref 40–53)
HCT VFR BLD AUTO: 22 % (ref 40–53)
HCT VFR BLD AUTO: 22.3 % (ref 40–53)
HCT VFR BLD AUTO: 23.9 % (ref 40–53)
HCT VFR BLD AUTO: 24.9 % (ref 40–53)
HGB BLD-MCNC: 7.1 G/DL (ref 13.3–17.7)
HGB BLD-MCNC: 7.3 G/DL (ref 13.3–17.7)
HGB BLD-MCNC: 7.3 G/DL (ref 13.3–17.7)
HGB BLD-MCNC: 7.5 G/DL (ref 13.3–17.7)
HGB BLD-MCNC: 8 G/DL (ref 13.3–17.7)
HGB BLD-MCNC: 8.5 G/DL (ref 13.3–17.7)
HOLD SPECIMEN: NORMAL
HOLD SPECIMEN: NORMAL
INR PPP: 1.35 (ref 0.85–1.15)
ISSUE DATE AND TIME: NORMAL
LYMPHOCYTES # BLD MANUAL: 0.2 10E3/UL (ref 0.8–5.3)
LYMPHOCYTES # BLD MANUAL: 0.2 10E3/UL (ref 0.8–5.3)
LYMPHOCYTES # BLD MANUAL: 0.3 10E3/UL (ref 0.8–5.3)
LYMPHOCYTES NFR BLD MANUAL: 65 %
LYMPHOCYTES NFR BLD MANUAL: 81 %
LYMPHOCYTES NFR BLD MANUAL: 81 %
M TB IFN-G BLD-IMP: NEGATIVE
M TB IFN-G CD4+ BCKGRND COR BLD-ACNC: 3.22 IU/ML
MCH RBC QN AUTO: 29 PG (ref 26.5–33)
MCH RBC QN AUTO: 29.1 PG (ref 26.5–33)
MCH RBC QN AUTO: 29.2 PG (ref 26.5–33)
MCH RBC QN AUTO: 29.2 PG (ref 26.5–33)
MCH RBC QN AUTO: 29.7 PG (ref 26.5–33)
MCH RBC QN AUTO: 29.9 PG (ref 26.5–33)
MCHC RBC AUTO-ENTMCNC: 33.2 G/DL (ref 31.5–36.5)
MCHC RBC AUTO-ENTMCNC: 33.5 G/DL (ref 31.5–36.5)
MCHC RBC AUTO-ENTMCNC: 33.6 G/DL (ref 31.5–36.5)
MCHC RBC AUTO-ENTMCNC: 33.6 G/DL (ref 31.5–36.5)
MCHC RBC AUTO-ENTMCNC: 33.8 G/DL (ref 31.5–36.5)
MCHC RBC AUTO-ENTMCNC: 34.1 G/DL (ref 31.5–36.5)
MCV RBC AUTO: 86 FL (ref 78–100)
MCV RBC AUTO: 87 FL (ref 78–100)
MCV RBC AUTO: 90 FL (ref 78–100)
METAMYELOCYTES # BLD MANUAL: 0 10E3/UL
METAMYELOCYTES NFR BLD MANUAL: 1 %
MITOGEN IGNF BCKGRD COR BLD-ACNC: -0.01 IU/ML
MITOGEN IGNF BCKGRD COR BLD-ACNC: 0.01 IU/ML
MONOCYTES # BLD MANUAL: 0 10E3/UL (ref 0–1.3)
MONOCYTES # BLD MANUAL: 0 10E3/UL (ref 0–1.3)
MONOCYTES # BLD MANUAL: 0.1 10E3/UL (ref 0–1.3)
MONOCYTES NFR BLD MANUAL: 10 %
MONOCYTES NFR BLD MANUAL: 11 %
MONOCYTES NFR BLD MANUAL: 15 %
MYELOCYTES # BLD MANUAL: 0 10E3/UL
MYELOCYTES # BLD MANUAL: 0 10E3/UL
MYELOCYTES NFR BLD MANUAL: 1 %
MYELOCYTES NFR BLD MANUAL: 1 %
NEUTROPHILS # BLD MANUAL: 0 10E3/UL (ref 1.6–8.3)
NEUTROPHILS # BLD MANUAL: 0 10E3/UL (ref 1.6–8.3)
NEUTROPHILS # BLD MANUAL: 0.1 10E3/UL (ref 1.6–8.3)
NEUTROPHILS NFR BLD MANUAL: 20 %
NEUTROPHILS NFR BLD MANUAL: 6 %
NEUTROPHILS NFR BLD MANUAL: 8 %
PLAT MORPH BLD: ABNORMAL
PLAT MORPH BLD: NORMAL
PLATELET # BLD AUTO: 10 10E3/UL (ref 150–450)
PLATELET # BLD AUTO: 20 10E3/UL (ref 150–450)
PLATELET # BLD AUTO: 22 10E3/UL (ref 150–450)
PLATELET # BLD AUTO: 24 10E3/UL (ref 150–450)
PLATELET # BLD AUTO: 26 10E3/UL (ref 150–450)
PLATELET # BLD AUTO: 6 10E3/UL (ref 150–450)
POTASSIUM BLD-SCNC: 3.4 MMOL/L (ref 3.4–5.3)
POTASSIUM BLD-SCNC: 3.5 MMOL/L (ref 3.4–5.3)
POTASSIUM BLD-SCNC: 3.8 MMOL/L (ref 3.4–5.3)
POTASSIUM BLD-SCNC: 3.8 MMOL/L (ref 3.4–5.3)
POTASSIUM BLD-SCNC: 4 MMOL/L (ref 3.4–5.3)
PROT SERPL-MCNC: 7.2 G/DL (ref 6.8–8.8)
PROT SERPL-MCNC: 7.3 G/DL (ref 6.8–8.8)
PROT SERPL-MCNC: 7.3 G/DL (ref 6.8–8.8)
PROT SERPL-MCNC: 7.4 G/DL (ref 6.8–8.8)
PROT SERPL-MCNC: 7.7 G/DL (ref 6.8–8.8)
QUANTIFERON MITOGEN: 3.25 IU/ML
QUANTIFERON NIL TUBE: 0.03 IU/ML
QUANTIFERON TB1 TUBE: 0.04 IU/ML
QUANTIFERON TB2 TUBE: 0.02
RBC # BLD AUTO: 2.44 10E6/UL (ref 4.4–5.9)
RBC # BLD AUTO: 2.45 10E6/UL (ref 4.4–5.9)
RBC # BLD AUTO: 2.5 10E6/UL (ref 4.4–5.9)
RBC # BLD AUTO: 2.57 10E6/UL (ref 4.4–5.9)
RBC # BLD AUTO: 2.75 10E6/UL (ref 4.4–5.9)
RBC # BLD AUTO: 2.86 10E6/UL (ref 4.4–5.9)
RBC MORPH BLD: ABNORMAL
RBC MORPH BLD: NORMAL
SARS-COV-2 RNA RESP QL NAA+PROBE: NEGATIVE
SARS-COV-2 RNA RESP QL NAA+PROBE: NEGATIVE
SODIUM SERPL-SCNC: 133 MMOL/L (ref 133–144)
SODIUM SERPL-SCNC: 134 MMOL/L (ref 133–144)
SODIUM SERPL-SCNC: 134 MMOL/L (ref 133–144)
SODIUM SERPL-SCNC: 135 MMOL/L (ref 133–144)
SODIUM SERPL-SCNC: 135 MMOL/L (ref 133–144)
SPECIMEN EXPIRATION DATE: NORMAL
UNIT ABO/RH: NORMAL
UNIT NUMBER: NORMAL
UNIT STATUS: NORMAL
UNIT TYPE ISBT: 600
WBC # BLD AUTO: 0.2 10E3/UL (ref 4–11)
WBC # BLD AUTO: 0.3 10E3/UL (ref 4–11)
WBC # BLD AUTO: 0.4 10E3/UL (ref 4–11)

## 2022-01-01 PROCEDURE — 85379 FIBRIN DEGRADATION QUANT: CPT | Performed by: EMERGENCY MEDICINE

## 2022-01-01 PROCEDURE — G0378 HOSPITAL OBSERVATION PER HR: HCPCS

## 2022-01-01 PROCEDURE — 250N000011 HC RX IP 250 OP 636: Performed by: EMERGENCY MEDICINE

## 2022-01-01 PROCEDURE — P9035 PLATELET PHERES LEUKOREDUCED: HCPCS | Performed by: STUDENT IN AN ORGANIZED HEALTH CARE EDUCATION/TRAINING PROGRAM

## 2022-01-01 PROCEDURE — 99285 EMERGENCY DEPT VISIT HI MDM: CPT | Performed by: EMERGENCY MEDICINE

## 2022-01-01 PROCEDURE — 36430 TRANSFUSION BLD/BLD COMPNT: CPT

## 2022-01-01 PROCEDURE — 250N000011 HC RX IP 250 OP 636: Performed by: STUDENT IN AN ORGANIZED HEALTH CARE EDUCATION/TRAINING PROGRAM

## 2022-01-01 PROCEDURE — P9016 RBC LEUKOCYTES REDUCED: HCPCS | Performed by: INTERNAL MEDICINE

## 2022-01-01 PROCEDURE — 99207 PR CDG-CODE CATEGORY CHANGED: CPT | Performed by: FAMILY MEDICINE

## 2022-01-01 PROCEDURE — 86481 TB AG RESPONSE T-CELL SUSP: CPT | Performed by: EMERGENCY MEDICINE

## 2022-01-01 PROCEDURE — 85027 COMPLETE CBC AUTOMATED: CPT | Performed by: INTERNAL MEDICINE

## 2022-01-01 PROCEDURE — 80053 COMPREHEN METABOLIC PANEL: CPT | Performed by: STUDENT IN AN ORGANIZED HEALTH CARE EDUCATION/TRAINING PROGRAM

## 2022-01-01 PROCEDURE — 99207 PR CDG-CHARGE REQUIRED MANUAL ENTRY: CPT | Mod: 59 | Performed by: INTERNAL MEDICINE

## 2022-01-01 PROCEDURE — 99214 OFFICE O/P EST MOD 30 MIN: CPT | Performed by: INTERNAL MEDICINE

## 2022-01-01 PROCEDURE — 82040 ASSAY OF SERUM ALBUMIN: CPT | Performed by: STUDENT IN AN ORGANIZED HEALTH CARE EDUCATION/TRAINING PROGRAM

## 2022-01-01 PROCEDURE — 86850 RBC ANTIBODY SCREEN: CPT | Performed by: NURSE PRACTITIONER

## 2022-01-01 PROCEDURE — 99217 PR OBSERVATION CARE DISCHARGE: CPT | Performed by: FAMILY MEDICINE

## 2022-01-01 PROCEDURE — 258N000003 HC RX IP 258 OP 636: Performed by: STUDENT IN AN ORGANIZED HEALTH CARE EDUCATION/TRAINING PROGRAM

## 2022-01-01 PROCEDURE — 86923 COMPATIBILITY TEST ELECTRIC: CPT | Performed by: INTERNAL MEDICINE

## 2022-01-01 PROCEDURE — 87635 SARS-COV-2 COVID-19 AMP PRB: CPT | Performed by: FAMILY MEDICINE

## 2022-01-01 PROCEDURE — 80053 COMPREHEN METABOLIC PANEL: CPT

## 2022-01-01 PROCEDURE — 250N000011 HC RX IP 250 OP 636: Performed by: FAMILY MEDICINE

## 2022-01-01 PROCEDURE — 250N000013 HC RX MED GY IP 250 OP 250 PS 637: Performed by: INTERNAL MEDICINE

## 2022-01-01 PROCEDURE — 71275 CT ANGIOGRAPHY CHEST: CPT

## 2022-01-01 PROCEDURE — 85610 PROTHROMBIN TIME: CPT | Performed by: EMERGENCY MEDICINE

## 2022-01-01 PROCEDURE — 36591 DRAW BLOOD OFF VENOUS DEVICE: CPT

## 2022-01-01 PROCEDURE — 250N000013 HC RX MED GY IP 250 OP 250 PS 637: Performed by: FAMILY MEDICINE

## 2022-01-01 PROCEDURE — C9803 HOPD COVID-19 SPEC COLLECT: HCPCS | Performed by: EMERGENCY MEDICINE

## 2022-01-01 PROCEDURE — 99207 PR NOT IN PERSON INPATIENT CONSULT STATISTICAL MARKER: CPT | Performed by: INTERNAL MEDICINE

## 2022-01-01 PROCEDURE — 99225 PR SUBSEQUENT OBSERVATION CARE,LEVEL II: CPT | Performed by: FAMILY MEDICINE

## 2022-01-01 PROCEDURE — 99285 EMERGENCY DEPT VISIT HI MDM: CPT | Mod: 25 | Performed by: EMERGENCY MEDICINE

## 2022-01-01 PROCEDURE — 36415 COLL VENOUS BLD VENIPUNCTURE: CPT | Performed by: EMERGENCY MEDICINE

## 2022-01-01 PROCEDURE — 96365 THER/PROPH/DIAG IV INF INIT: CPT | Mod: 59 | Performed by: EMERGENCY MEDICINE

## 2022-01-01 PROCEDURE — 87635 SARS-COV-2 COVID-19 AMP PRB: CPT | Performed by: EMERGENCY MEDICINE

## 2022-01-01 PROCEDURE — 96361 HYDRATE IV INFUSION ADD-ON: CPT | Performed by: EMERGENCY MEDICINE

## 2022-01-01 PROCEDURE — 85014 HEMATOCRIT: CPT

## 2022-01-01 PROCEDURE — 86901 BLOOD TYPING SEROLOGIC RH(D): CPT | Performed by: NURSE PRACTITIONER

## 2022-01-01 PROCEDURE — 99219 PR INITIAL OBSERVATION CARE,LEVEL II: CPT | Mod: 95 | Performed by: INTERNAL MEDICINE

## 2022-01-01 PROCEDURE — 99207 PR CDG-MDM COMPONENT: MEETS MODERATE - UP CODED: CPT | Performed by: FAMILY MEDICINE

## 2022-01-01 PROCEDURE — 96376 TX/PRO/DX INJ SAME DRUG ADON: CPT | Mod: 59 | Performed by: EMERGENCY MEDICINE

## 2022-01-01 PROCEDURE — 85730 THROMBOPLASTIN TIME PARTIAL: CPT | Performed by: EMERGENCY MEDICINE

## 2022-01-01 PROCEDURE — 36430 TRANSFUSION BLD/BLD COMPNT: CPT | Performed by: EMERGENCY MEDICINE

## 2022-01-01 PROCEDURE — 250N000009 HC RX 250: Performed by: EMERGENCY MEDICINE

## 2022-01-01 PROCEDURE — 85027 COMPLETE CBC AUTOMATED: CPT | Performed by: STUDENT IN AN ORGANIZED HEALTH CARE EDUCATION/TRAINING PROGRAM

## 2022-01-01 PROCEDURE — 36415 COLL VENOUS BLD VENIPUNCTURE: CPT

## 2022-01-01 PROCEDURE — P9035 PLATELET PHERES LEUKOREDUCED: HCPCS | Performed by: EMERGENCY MEDICINE

## 2022-01-01 RX ORDER — MEPERIDINE HYDROCHLORIDE 25 MG/ML
25 INJECTION INTRAMUSCULAR; INTRAVENOUS; SUBCUTANEOUS EVERY 30 MIN PRN
Status: CANCELLED | OUTPATIENT
Start: 2022-03-07

## 2022-01-01 RX ORDER — NALOXONE HYDROCHLORIDE 0.4 MG/ML
0.2 INJECTION, SOLUTION INTRAMUSCULAR; INTRAVENOUS; SUBCUTANEOUS
Status: CANCELLED | OUTPATIENT
Start: 2022-04-21

## 2022-01-01 RX ORDER — HEPARIN SODIUM (PORCINE) LOCK FLUSH IV SOLN 100 UNIT/ML 100 UNIT/ML
5 SOLUTION INTRAVENOUS
Status: CANCELLED | OUTPATIENT
Start: 2022-04-19

## 2022-01-01 RX ORDER — HEPARIN SODIUM (PORCINE) LOCK FLUSH IV SOLN 100 UNIT/ML 100 UNIT/ML
5 SOLUTION INTRAVENOUS
Status: CANCELLED | OUTPATIENT
Start: 2022-01-01

## 2022-01-01 RX ORDER — ALBUTEROL SULFATE 0.83 MG/ML
2.5 SOLUTION RESPIRATORY (INHALATION)
Status: CANCELLED | OUTPATIENT
Start: 2022-03-08

## 2022-01-01 RX ORDER — ACETAMINOPHEN 325 MG/1
650 TABLET ORAL EVERY 6 HOURS PRN
Status: DISCONTINUED | OUTPATIENT
Start: 2022-01-01 | End: 2022-01-01 | Stop reason: HOSPADM

## 2022-01-01 RX ORDER — ACETAMINOPHEN 325 MG/1
650 TABLET ORAL EVERY 6 HOURS PRN
DISCHARGE
Start: 2022-01-01

## 2022-01-01 RX ORDER — MEPERIDINE HYDROCHLORIDE 25 MG/ML
25 INJECTION INTRAMUSCULAR; INTRAVENOUS; SUBCUTANEOUS EVERY 30 MIN PRN
Status: CANCELLED | OUTPATIENT
Start: 2022-04-20

## 2022-01-01 RX ORDER — HEPARIN SODIUM (PORCINE) LOCK FLUSH IV SOLN 100 UNIT/ML 100 UNIT/ML
5 SOLUTION INTRAVENOUS
Status: DISCONTINUED | OUTPATIENT
Start: 2022-01-01 | End: 2022-01-01 | Stop reason: HOSPADM

## 2022-01-01 RX ORDER — ALBUTEROL SULFATE 0.83 MG/ML
2.5 SOLUTION RESPIRATORY (INHALATION)
Status: CANCELLED | OUTPATIENT
Start: 2022-04-20

## 2022-01-01 RX ORDER — EPINEPHRINE 1 MG/ML
0.3 INJECTION, SOLUTION, CONCENTRATE INTRAVENOUS EVERY 5 MIN PRN
Status: CANCELLED | OUTPATIENT
Start: 2022-03-07

## 2022-01-01 RX ORDER — EPINEPHRINE 1 MG/ML
0.3 INJECTION, SOLUTION, CONCENTRATE INTRAVENOUS EVERY 5 MIN PRN
Status: CANCELLED | OUTPATIENT
Start: 2022-03-09

## 2022-01-01 RX ORDER — ONDANSETRON 2 MG/ML
4 INJECTION INTRAMUSCULAR; INTRAVENOUS EVERY 6 HOURS PRN
Status: DISCONTINUED | OUTPATIENT
Start: 2022-01-01 | End: 2022-01-01 | Stop reason: HOSPADM

## 2022-01-01 RX ORDER — DIPHENHYDRAMINE HYDROCHLORIDE 50 MG/ML
50 INJECTION INTRAMUSCULAR; INTRAVENOUS
Status: CANCELLED
Start: 2022-04-21

## 2022-01-01 RX ORDER — MEPERIDINE HYDROCHLORIDE 25 MG/ML
25 INJECTION INTRAMUSCULAR; INTRAVENOUS; SUBCUTANEOUS EVERY 30 MIN PRN
Status: CANCELLED | OUTPATIENT
Start: 2022-03-08

## 2022-01-01 RX ORDER — ALBUTEROL SULFATE 90 UG/1
1-2 AEROSOL, METERED RESPIRATORY (INHALATION)
Status: CANCELLED
Start: 2022-04-20

## 2022-01-01 RX ORDER — NALOXONE HYDROCHLORIDE 0.4 MG/ML
0.1 INJECTION, SOLUTION INTRAMUSCULAR; INTRAVENOUS; SUBCUTANEOUS
Status: DISCONTINUED | OUTPATIENT
Start: 2022-01-01 | End: 2022-01-01

## 2022-01-01 RX ORDER — MEPERIDINE HYDROCHLORIDE 25 MG/ML
25 INJECTION INTRAMUSCULAR; INTRAVENOUS; SUBCUTANEOUS EVERY 30 MIN PRN
Status: CANCELLED | OUTPATIENT
Start: 2022-04-19

## 2022-01-01 RX ORDER — METOPROLOL TARTRATE 50 MG
50 TABLET ORAL 2 TIMES DAILY
Qty: 30 TABLET | Refills: 5 | OUTPATIENT
Start: 2022-01-01

## 2022-01-01 RX ORDER — HEPARIN SODIUM (PORCINE) LOCK FLUSH IV SOLN 100 UNIT/ML 100 UNIT/ML
5 SOLUTION INTRAVENOUS
Status: CANCELLED | OUTPATIENT
Start: 2022-03-10

## 2022-01-01 RX ORDER — LORAZEPAM 1 MG/1
1 TABLET ORAL
Status: DISCONTINUED | OUTPATIENT
Start: 2022-01-01 | End: 2022-01-01 | Stop reason: HOSPADM

## 2022-01-01 RX ORDER — MORPHINE SULFATE 10 MG/5ML
5-10 SOLUTION ORAL
Status: DISCONTINUED | OUTPATIENT
Start: 2022-01-01 | End: 2022-01-01 | Stop reason: HOSPADM

## 2022-01-01 RX ORDER — METHYLPREDNISOLONE SODIUM SUCCINATE 125 MG/2ML
125 INJECTION, POWDER, LYOPHILIZED, FOR SOLUTION INTRAMUSCULAR; INTRAVENOUS
Status: CANCELLED
Start: 2022-03-09

## 2022-01-01 RX ORDER — ALBUTEROL SULFATE 90 UG/1
1-2 AEROSOL, METERED RESPIRATORY (INHALATION)
Status: CANCELLED
Start: 2022-03-08

## 2022-01-01 RX ORDER — METHYLPREDNISOLONE SODIUM SUCCINATE 125 MG/2ML
125 INJECTION, POWDER, LYOPHILIZED, FOR SOLUTION INTRAMUSCULAR; INTRAVENOUS
Status: CANCELLED
Start: 2022-04-20

## 2022-01-01 RX ORDER — HEPARIN SODIUM (PORCINE) LOCK FLUSH IV SOLN 100 UNIT/ML 100 UNIT/ML
5 SOLUTION INTRAVENOUS
Status: CANCELLED | OUTPATIENT
Start: 2022-03-09

## 2022-01-01 RX ORDER — ALBUTEROL SULFATE 90 UG/1
1-2 AEROSOL, METERED RESPIRATORY (INHALATION)
Status: CANCELLED
Start: 2022-04-21

## 2022-01-01 RX ORDER — ALBUTEROL SULFATE 0.83 MG/ML
2.5 SOLUTION RESPIRATORY (INHALATION)
Status: CANCELLED | OUTPATIENT
Start: 2022-03-09

## 2022-01-01 RX ORDER — LORAZEPAM 2 MG/ML
1 INJECTION INTRAMUSCULAR
Status: DISCONTINUED | OUTPATIENT
Start: 2022-01-01 | End: 2022-01-01 | Stop reason: HOSPADM

## 2022-01-01 RX ORDER — NALOXONE HYDROCHLORIDE 0.4 MG/ML
0.2 INJECTION, SOLUTION INTRAMUSCULAR; INTRAVENOUS; SUBCUTANEOUS
Status: DISCONTINUED | OUTPATIENT
Start: 2022-01-01 | End: 2022-01-01

## 2022-01-01 RX ORDER — HEPARIN SODIUM,PORCINE 10 UNIT/ML
5 VIAL (ML) INTRAVENOUS
Status: CANCELLED | OUTPATIENT
Start: 2022-03-09

## 2022-01-01 RX ORDER — EPINEPHRINE 1 MG/ML
0.3 INJECTION, SOLUTION, CONCENTRATE INTRAVENOUS EVERY 5 MIN PRN
Status: CANCELLED | OUTPATIENT
Start: 2022-04-20

## 2022-01-01 RX ORDER — ALBUTEROL SULFATE 0.83 MG/ML
2.5 SOLUTION RESPIRATORY (INHALATION)
Status: CANCELLED | OUTPATIENT
Start: 2022-03-10

## 2022-01-01 RX ORDER — NALOXONE HYDROCHLORIDE 0.4 MG/ML
0.2 INJECTION, SOLUTION INTRAMUSCULAR; INTRAVENOUS; SUBCUTANEOUS
Status: CANCELLED | OUTPATIENT
Start: 2022-04-19

## 2022-01-01 RX ORDER — ALBUTEROL SULFATE 90 UG/1
1-2 AEROSOL, METERED RESPIRATORY (INHALATION)
Status: CANCELLED
Start: 2022-04-22

## 2022-01-01 RX ORDER — LORAZEPAM 2 MG/ML
0.5 INJECTION INTRAMUSCULAR EVERY 4 HOURS PRN
Status: CANCELLED
Start: 2022-04-18

## 2022-01-01 RX ORDER — LORAZEPAM 2 MG/ML
0.5 INJECTION INTRAMUSCULAR EVERY 4 HOURS PRN
Status: CANCELLED
Start: 2022-04-20

## 2022-01-01 RX ORDER — HEPARIN SODIUM,PORCINE 10 UNIT/ML
5 VIAL (ML) INTRAVENOUS
Status: CANCELLED | OUTPATIENT
Start: 2022-04-19

## 2022-01-01 RX ORDER — LORAZEPAM 2 MG/ML
0.5 INJECTION INTRAMUSCULAR EVERY 4 HOURS PRN
Status: CANCELLED
Start: 2022-03-10

## 2022-01-01 RX ORDER — DIPHENHYDRAMINE HYDROCHLORIDE 50 MG/ML
50 INJECTION INTRAMUSCULAR; INTRAVENOUS
Status: CANCELLED
Start: 2022-04-18

## 2022-01-01 RX ORDER — DIPHENHYDRAMINE HYDROCHLORIDE 50 MG/ML
50 INJECTION INTRAMUSCULAR; INTRAVENOUS
Status: CANCELLED
Start: 2022-04-20

## 2022-01-01 RX ORDER — DIPHENHYDRAMINE HYDROCHLORIDE 50 MG/ML
50 INJECTION INTRAMUSCULAR; INTRAVENOUS
Status: CANCELLED
Start: 2022-03-08

## 2022-01-01 RX ORDER — EPINEPHRINE 1 MG/ML
0.3 INJECTION, SOLUTION, CONCENTRATE INTRAVENOUS EVERY 5 MIN PRN
Status: CANCELLED | OUTPATIENT
Start: 2022-04-19

## 2022-01-01 RX ORDER — LORAZEPAM 2 MG/ML
0.5 INJECTION INTRAMUSCULAR EVERY 4 HOURS PRN
Status: CANCELLED
Start: 2022-04-21

## 2022-01-01 RX ORDER — LORAZEPAM 2 MG/ML
0.5 INJECTION INTRAMUSCULAR EVERY 4 HOURS PRN
Status: CANCELLED
Start: 2022-03-11

## 2022-01-01 RX ORDER — ALBUTEROL SULFATE 90 UG/1
1-2 AEROSOL, METERED RESPIRATORY (INHALATION)
Status: CANCELLED
Start: 2022-04-18

## 2022-01-01 RX ORDER — DIPHENHYDRAMINE HYDROCHLORIDE 50 MG/ML
50 INJECTION INTRAMUSCULAR; INTRAVENOUS
Status: CANCELLED
Start: 2022-04-19

## 2022-01-01 RX ORDER — LOPERAMIDE HCL 2 MG
2 CAPSULE ORAL 4 TIMES DAILY PRN
Status: DISCONTINUED | OUTPATIENT
Start: 2022-01-01 | End: 2022-01-01

## 2022-01-01 RX ORDER — LORAZEPAM 2 MG/ML
1 CONCENTRATE ORAL
Status: DISCONTINUED | OUTPATIENT
Start: 2022-01-01 | End: 2022-01-01 | Stop reason: HOSPADM

## 2022-01-01 RX ORDER — LORAZEPAM 2 MG/ML
0.5 INJECTION INTRAMUSCULAR EVERY 4 HOURS PRN
Status: CANCELLED
Start: 2022-03-08

## 2022-01-01 RX ORDER — HEPARIN SODIUM (PORCINE) LOCK FLUSH IV SOLN 100 UNIT/ML 100 UNIT/ML
5 SOLUTION INTRAVENOUS
Status: CANCELLED | OUTPATIENT
Start: 2022-03-11

## 2022-01-01 RX ORDER — LORAZEPAM 1 MG/1
1 TABLET ORAL
Status: DISCONTINUED | OUTPATIENT
Start: 2022-01-01 | End: 2022-01-01

## 2022-01-01 RX ORDER — MORPHINE SULFATE 10 MG/5ML
5-10 SOLUTION ORAL
Qty: 15 ML | Refills: 0 | Status: SHIPPED | OUTPATIENT
Start: 2022-01-01

## 2022-01-01 RX ORDER — ALBUTEROL SULFATE 90 UG/1
1-2 AEROSOL, METERED RESPIRATORY (INHALATION)
Status: CANCELLED
Start: 2022-03-09

## 2022-01-01 RX ORDER — LEVOFLOXACIN 250 MG/1
250 TABLET, FILM COATED ORAL AT BEDTIME
Qty: 30 TABLET | Refills: 0 | OUTPATIENT
Start: 2022-01-01

## 2022-01-01 RX ORDER — ALBUTEROL SULFATE 0.83 MG/ML
2.5 SOLUTION RESPIRATORY (INHALATION)
Status: CANCELLED | OUTPATIENT
Start: 2022-03-11

## 2022-01-01 RX ORDER — METHYLPREDNISOLONE SODIUM SUCCINATE 125 MG/2ML
125 INJECTION, POWDER, LYOPHILIZED, FOR SOLUTION INTRAMUSCULAR; INTRAVENOUS
Status: CANCELLED
Start: 2022-04-22

## 2022-01-01 RX ORDER — MORPHINE SULFATE 2 MG/ML
1-2 INJECTION, SOLUTION INTRAMUSCULAR; INTRAVENOUS
Status: DISCONTINUED | OUTPATIENT
Start: 2022-01-01 | End: 2022-01-01 | Stop reason: HOSPADM

## 2022-01-01 RX ORDER — NALOXONE HYDROCHLORIDE 0.4 MG/ML
0.2 INJECTION, SOLUTION INTRAMUSCULAR; INTRAVENOUS; SUBCUTANEOUS
Status: CANCELLED | OUTPATIENT
Start: 2022-03-10

## 2022-01-01 RX ORDER — MEPERIDINE HYDROCHLORIDE 25 MG/ML
25 INJECTION INTRAMUSCULAR; INTRAVENOUS; SUBCUTANEOUS EVERY 30 MIN PRN
Status: CANCELLED | OUTPATIENT
Start: 2022-04-22

## 2022-01-01 RX ORDER — LORAZEPAM 2 MG/ML
0.5 INJECTION INTRAMUSCULAR EVERY 4 HOURS PRN
Status: CANCELLED
Start: 2022-04-19

## 2022-01-01 RX ORDER — METHYLPREDNISOLONE SODIUM SUCCINATE 125 MG/2ML
125 INJECTION, POWDER, LYOPHILIZED, FOR SOLUTION INTRAMUSCULAR; INTRAVENOUS
Status: CANCELLED
Start: 2022-03-11

## 2022-01-01 RX ORDER — ONDANSETRON 4 MG/1
4 TABLET, ORALLY DISINTEGRATING ORAL EVERY 6 HOURS PRN
DISCHARGE
Start: 2022-01-01

## 2022-01-01 RX ORDER — ALBUTEROL SULFATE 0.83 MG/ML
2.5 SOLUTION RESPIRATORY (INHALATION)
Status: CANCELLED | OUTPATIENT
Start: 2022-03-07

## 2022-01-01 RX ORDER — ALBUTEROL SULFATE 0.83 MG/ML
2.5 SOLUTION RESPIRATORY (INHALATION)
Status: CANCELLED | OUTPATIENT
Start: 2022-04-18

## 2022-01-01 RX ORDER — ATROPINE SULFATE 10 MG/ML
2 SOLUTION/ DROPS OPHTHALMIC EVERY 4 HOURS PRN
Status: DISCONTINUED | OUTPATIENT
Start: 2022-01-01 | End: 2022-01-01 | Stop reason: HOSPADM

## 2022-01-01 RX ORDER — DIPHENHYDRAMINE HYDROCHLORIDE 50 MG/ML
50 INJECTION INTRAMUSCULAR; INTRAVENOUS
Status: CANCELLED
Start: 2022-04-22

## 2022-01-01 RX ORDER — EPINEPHRINE 1 MG/ML
0.3 INJECTION, SOLUTION, CONCENTRATE INTRAVENOUS EVERY 5 MIN PRN
Status: CANCELLED | OUTPATIENT
Start: 2022-04-21

## 2022-01-01 RX ORDER — MEPERIDINE HYDROCHLORIDE 25 MG/ML
25 INJECTION INTRAMUSCULAR; INTRAVENOUS; SUBCUTANEOUS EVERY 30 MIN PRN
Status: CANCELLED | OUTPATIENT
Start: 2022-03-09

## 2022-01-01 RX ORDER — EPINEPHRINE 1 MG/ML
0.3 INJECTION, SOLUTION, CONCENTRATE INTRAVENOUS EVERY 5 MIN PRN
Status: CANCELLED | OUTPATIENT
Start: 2022-03-08

## 2022-01-01 RX ORDER — NALOXONE HYDROCHLORIDE 0.4 MG/ML
0.2 INJECTION, SOLUTION INTRAMUSCULAR; INTRAVENOUS; SUBCUTANEOUS
Status: CANCELLED | OUTPATIENT
Start: 2022-03-07

## 2022-01-01 RX ORDER — NALOXONE HYDROCHLORIDE 0.4 MG/ML
0.2 INJECTION, SOLUTION INTRAMUSCULAR; INTRAVENOUS; SUBCUTANEOUS
Status: CANCELLED | OUTPATIENT
Start: 2022-04-22

## 2022-01-01 RX ORDER — HEPARIN SODIUM,PORCINE 10 UNIT/ML
5 VIAL (ML) INTRAVENOUS
Status: CANCELLED | OUTPATIENT
Start: 2022-04-18

## 2022-01-01 RX ORDER — SODIUM CHLORIDE 9 MG/ML
INJECTION, SOLUTION INTRAVENOUS CONTINUOUS
Status: DISCONTINUED | OUTPATIENT
Start: 2022-01-01 | End: 2022-01-01

## 2022-01-01 RX ORDER — METHYLPREDNISOLONE SODIUM SUCCINATE 125 MG/2ML
125 INJECTION, POWDER, LYOPHILIZED, FOR SOLUTION INTRAMUSCULAR; INTRAVENOUS
Status: CANCELLED
Start: 2022-04-19

## 2022-01-01 RX ORDER — METHYLPREDNISOLONE SODIUM SUCCINATE 125 MG/2ML
125 INJECTION, POWDER, LYOPHILIZED, FOR SOLUTION INTRAMUSCULAR; INTRAVENOUS
Status: CANCELLED
Start: 2022-03-07

## 2022-01-01 RX ORDER — ALBUTEROL SULFATE 0.83 MG/ML
2.5 SOLUTION RESPIRATORY (INHALATION)
Status: CANCELLED | OUTPATIENT
Start: 2022-04-21

## 2022-01-01 RX ORDER — HEPARIN SODIUM (PORCINE) LOCK FLUSH IV SOLN 100 UNIT/ML 100 UNIT/ML
5 SOLUTION INTRAVENOUS
Status: CANCELLED | OUTPATIENT
Start: 2022-03-08

## 2022-01-01 RX ORDER — NALOXONE HYDROCHLORIDE 0.4 MG/ML
0.4 INJECTION, SOLUTION INTRAMUSCULAR; INTRAVENOUS; SUBCUTANEOUS
Status: DISCONTINUED | OUTPATIENT
Start: 2022-01-01 | End: 2022-01-01 | Stop reason: HOSPADM

## 2022-01-01 RX ORDER — TRANEXAMIC ACID 10 MG/ML
1 INJECTION, SOLUTION INTRAVENOUS ONCE
Status: COMPLETED | OUTPATIENT
Start: 2022-01-01 | End: 2022-01-01

## 2022-01-01 RX ORDER — HEPARIN SODIUM (PORCINE) LOCK FLUSH IV SOLN 100 UNIT/ML 100 UNIT/ML
5 SOLUTION INTRAVENOUS
Status: CANCELLED | OUTPATIENT
Start: 2022-04-22

## 2022-01-01 RX ORDER — NALOXONE HYDROCHLORIDE 0.4 MG/ML
0.2 INJECTION, SOLUTION INTRAMUSCULAR; INTRAVENOUS; SUBCUTANEOUS
Status: CANCELLED | OUTPATIENT
Start: 2022-04-20

## 2022-01-01 RX ORDER — METHYLPREDNISOLONE SODIUM SUCCINATE 125 MG/2ML
125 INJECTION, POWDER, LYOPHILIZED, FOR SOLUTION INTRAMUSCULAR; INTRAVENOUS
Status: CANCELLED
Start: 2022-03-08

## 2022-01-01 RX ORDER — HEPARIN SODIUM (PORCINE) LOCK FLUSH IV SOLN 100 UNIT/ML 100 UNIT/ML
5 SOLUTION INTRAVENOUS
Status: CANCELLED | OUTPATIENT
Start: 2022-04-20

## 2022-01-01 RX ORDER — LORAZEPAM 2 MG/ML
0.5 INJECTION INTRAMUSCULAR EVERY 4 HOURS PRN
Status: CANCELLED
Start: 2022-04-22

## 2022-01-01 RX ORDER — DIPHENHYDRAMINE HYDROCHLORIDE 50 MG/ML
50 INJECTION INTRAMUSCULAR; INTRAVENOUS
Status: CANCELLED
Start: 2022-03-07

## 2022-01-01 RX ORDER — HEPARIN SODIUM,PORCINE 10 UNIT/ML
5 VIAL (ML) INTRAVENOUS
Status: CANCELLED | OUTPATIENT
Start: 2022-03-10

## 2022-01-01 RX ORDER — SALIVA STIMULANT COMB. NO.3
2 SPRAY, NON-AEROSOL (ML) MUCOUS MEMBRANE
DISCHARGE
Start: 2022-01-01

## 2022-01-01 RX ORDER — MEPERIDINE HYDROCHLORIDE 25 MG/ML
25 INJECTION INTRAMUSCULAR; INTRAVENOUS; SUBCUTANEOUS EVERY 30 MIN PRN
Status: CANCELLED | OUTPATIENT
Start: 2022-03-11

## 2022-01-01 RX ORDER — LORAZEPAM 2 MG/ML
0.5 INJECTION INTRAMUSCULAR EVERY 4 HOURS PRN
Status: CANCELLED
Start: 2022-03-09

## 2022-01-01 RX ORDER — HEPARIN SODIUM,PORCINE 10 UNIT/ML
5 VIAL (ML) INTRAVENOUS
Status: CANCELLED | OUTPATIENT
Start: 2022-04-22

## 2022-01-01 RX ORDER — HEPARIN SODIUM (PORCINE) LOCK FLUSH IV SOLN 100 UNIT/ML 100 UNIT/ML
5 SOLUTION INTRAVENOUS
Status: CANCELLED | OUTPATIENT
Start: 2022-03-07

## 2022-01-01 RX ORDER — ATROPINE SULFATE 10 MG/ML
2 SOLUTION/ DROPS OPHTHALMIC EVERY 4 HOURS PRN
DISCHARGE
Start: 2022-01-01

## 2022-01-01 RX ORDER — SALIVA STIMULANT COMB. NO.3
2 SPRAY, NON-AEROSOL (ML) MUCOUS MEMBRANE
Status: DISCONTINUED | OUTPATIENT
Start: 2022-01-01 | End: 2022-01-01 | Stop reason: HOSPADM

## 2022-01-01 RX ORDER — HEPARIN SODIUM,PORCINE 10 UNIT/ML
5-10 VIAL (ML) INTRAVENOUS EVERY 24 HOURS
Status: DISCONTINUED | OUTPATIENT
Start: 2022-01-01 | End: 2022-01-01 | Stop reason: HOSPADM

## 2022-01-01 RX ORDER — MORPHINE SULFATE 20 MG/ML
5-10 SOLUTION ORAL
Status: DISCONTINUED | OUTPATIENT
Start: 2022-01-01 | End: 2022-01-01 | Stop reason: HOSPADM

## 2022-01-01 RX ORDER — HEPARIN SODIUM (PORCINE) LOCK FLUSH IV SOLN 100 UNIT/ML 100 UNIT/ML
5 SOLUTION INTRAVENOUS
Status: CANCELLED | OUTPATIENT
Start: 2022-04-18

## 2022-01-01 RX ORDER — HEPARIN SODIUM,PORCINE 10 UNIT/ML
5 VIAL (ML) INTRAVENOUS
Status: CANCELLED | OUTPATIENT
Start: 2022-03-08

## 2022-01-01 RX ORDER — NALOXONE HYDROCHLORIDE 0.4 MG/ML
0.2 INJECTION, SOLUTION INTRAMUSCULAR; INTRAVENOUS; SUBCUTANEOUS
Status: DISCONTINUED | OUTPATIENT
Start: 2022-01-01 | End: 2022-01-01 | Stop reason: HOSPADM

## 2022-01-01 RX ORDER — EPINEPHRINE 1 MG/ML
0.3 INJECTION, SOLUTION, CONCENTRATE INTRAVENOUS EVERY 5 MIN PRN
Status: CANCELLED | OUTPATIENT
Start: 2022-04-18

## 2022-01-01 RX ORDER — NALOXONE HYDROCHLORIDE 0.4 MG/ML
0.2 INJECTION, SOLUTION INTRAMUSCULAR; INTRAVENOUS; SUBCUTANEOUS
Status: CANCELLED | OUTPATIENT
Start: 2022-04-18

## 2022-01-01 RX ORDER — IOPAMIDOL 755 MG/ML
500 INJECTION, SOLUTION INTRAVASCULAR ONCE
Status: COMPLETED | OUTPATIENT
Start: 2022-01-01 | End: 2022-01-01

## 2022-01-01 RX ORDER — ALBUTEROL SULFATE 90 UG/1
1-2 AEROSOL, METERED RESPIRATORY (INHALATION)
Status: CANCELLED
Start: 2022-03-11

## 2022-01-01 RX ORDER — HEPARIN SODIUM,PORCINE 10 UNIT/ML
5 VIAL (ML) INTRAVENOUS
Status: CANCELLED | OUTPATIENT
Start: 2022-03-11

## 2022-01-01 RX ORDER — LORAZEPAM 1 MG/1
1 TABLET ORAL
Qty: 5 TABLET | Refills: 0 | Status: SHIPPED | OUTPATIENT
Start: 2022-01-01

## 2022-01-01 RX ORDER — ALBUTEROL SULFATE 0.83 MG/ML
2.5 SOLUTION RESPIRATORY (INHALATION)
Status: CANCELLED | OUTPATIENT
Start: 2022-04-19

## 2022-01-01 RX ORDER — EPINEPHRINE 1 MG/ML
0.3 INJECTION, SOLUTION, CONCENTRATE INTRAVENOUS EVERY 5 MIN PRN
Status: CANCELLED | OUTPATIENT
Start: 2022-03-10

## 2022-01-01 RX ORDER — HEPARIN SODIUM (PORCINE) LOCK FLUSH IV SOLN 100 UNIT/ML 100 UNIT/ML
5 SOLUTION INTRAVENOUS
Status: CANCELLED | OUTPATIENT
Start: 2022-04-21

## 2022-01-01 RX ORDER — METHYLPREDNISOLONE SODIUM SUCCINATE 125 MG/2ML
125 INJECTION, POWDER, LYOPHILIZED, FOR SOLUTION INTRAMUSCULAR; INTRAVENOUS
Status: CANCELLED
Start: 2022-04-18

## 2022-01-01 RX ORDER — LORAZEPAM 2 MG/ML
1 INJECTION INTRAMUSCULAR
Status: DISCONTINUED | OUTPATIENT
Start: 2022-01-01 | End: 2022-01-01

## 2022-01-01 RX ORDER — DIPHENHYDRAMINE HYDROCHLORIDE 50 MG/ML
50 INJECTION INTRAMUSCULAR; INTRAVENOUS
Status: CANCELLED
Start: 2022-03-11

## 2022-01-01 RX ORDER — HEPARIN SODIUM,PORCINE 10 UNIT/ML
5-10 VIAL (ML) INTRAVENOUS
Status: DISCONTINUED | OUTPATIENT
Start: 2022-01-01 | End: 2022-01-01 | Stop reason: HOSPADM

## 2022-01-01 RX ORDER — MORPHINE SULFATE 20 MG/ML
10 SOLUTION ORAL
Status: DISCONTINUED | OUTPATIENT
Start: 2022-01-01 | End: 2022-01-01

## 2022-01-01 RX ORDER — ONDANSETRON 4 MG/1
4 TABLET, ORALLY DISINTEGRATING ORAL EVERY 6 HOURS PRN
Status: DISCONTINUED | OUTPATIENT
Start: 2022-01-01 | End: 2022-01-01 | Stop reason: HOSPADM

## 2022-01-01 RX ORDER — ALBUTEROL SULFATE 90 UG/1
1-2 AEROSOL, METERED RESPIRATORY (INHALATION)
Status: CANCELLED
Start: 2022-03-07

## 2022-01-01 RX ORDER — NALOXONE HYDROCHLORIDE 0.4 MG/ML
0.2 INJECTION, SOLUTION INTRAMUSCULAR; INTRAVENOUS; SUBCUTANEOUS
Status: CANCELLED | OUTPATIENT
Start: 2022-03-08

## 2022-01-01 RX ORDER — MEPERIDINE HYDROCHLORIDE 25 MG/ML
25 INJECTION INTRAMUSCULAR; INTRAVENOUS; SUBCUTANEOUS EVERY 30 MIN PRN
Status: CANCELLED | OUTPATIENT
Start: 2022-04-18

## 2022-01-01 RX ORDER — LORAZEPAM 2 MG/ML
0.5 INJECTION INTRAMUSCULAR EVERY 4 HOURS PRN
Status: CANCELLED
Start: 2022-03-07

## 2022-01-01 RX ORDER — MEPERIDINE HYDROCHLORIDE 25 MG/ML
25 INJECTION INTRAMUSCULAR; INTRAVENOUS; SUBCUTANEOUS EVERY 30 MIN PRN
Status: CANCELLED | OUTPATIENT
Start: 2022-04-21

## 2022-01-01 RX ORDER — METHYLPREDNISOLONE SODIUM SUCCINATE 125 MG/2ML
125 INJECTION, POWDER, LYOPHILIZED, FOR SOLUTION INTRAMUSCULAR; INTRAVENOUS
Status: CANCELLED
Start: 2022-04-21

## 2022-01-01 RX ORDER — NALOXONE HYDROCHLORIDE 0.4 MG/ML
0.2 INJECTION, SOLUTION INTRAMUSCULAR; INTRAVENOUS; SUBCUTANEOUS
Status: CANCELLED | OUTPATIENT
Start: 2022-03-09

## 2022-01-01 RX ORDER — DIPHENHYDRAMINE HYDROCHLORIDE 50 MG/ML
50 INJECTION INTRAMUSCULAR; INTRAVENOUS
Status: CANCELLED
Start: 2022-03-09

## 2022-01-01 RX ORDER — MEPERIDINE HYDROCHLORIDE 25 MG/ML
25 INJECTION INTRAMUSCULAR; INTRAVENOUS; SUBCUTANEOUS EVERY 30 MIN PRN
Status: CANCELLED | OUTPATIENT
Start: 2022-03-10

## 2022-01-01 RX ORDER — HEPARIN SODIUM,PORCINE 10 UNIT/ML
5 VIAL (ML) INTRAVENOUS
Status: CANCELLED | OUTPATIENT
Start: 2022-04-21

## 2022-01-01 RX ORDER — EPINEPHRINE 1 MG/ML
0.3 INJECTION, SOLUTION, CONCENTRATE INTRAVENOUS EVERY 5 MIN PRN
Status: CANCELLED | OUTPATIENT
Start: 2022-03-11

## 2022-01-01 RX ORDER — NALOXONE HYDROCHLORIDE 0.4 MG/ML
0.2 INJECTION, SOLUTION INTRAMUSCULAR; INTRAVENOUS; SUBCUTANEOUS
Status: CANCELLED | OUTPATIENT
Start: 2022-03-11

## 2022-01-01 RX ORDER — ALBUTEROL SULFATE 90 UG/1
1-2 AEROSOL, METERED RESPIRATORY (INHALATION)
Status: CANCELLED
Start: 2022-03-10

## 2022-01-01 RX ORDER — METHYLPREDNISOLONE SODIUM SUCCINATE 125 MG/2ML
125 INJECTION, POWDER, LYOPHILIZED, FOR SOLUTION INTRAMUSCULAR; INTRAVENOUS
Status: CANCELLED
Start: 2022-03-10

## 2022-01-01 RX ORDER — HEPARIN SODIUM,PORCINE 10 UNIT/ML
5 VIAL (ML) INTRAVENOUS
Status: CANCELLED | OUTPATIENT
Start: 2022-04-20

## 2022-01-01 RX ORDER — EPINEPHRINE 1 MG/ML
0.3 INJECTION, SOLUTION, CONCENTRATE INTRAVENOUS EVERY 5 MIN PRN
Status: CANCELLED | OUTPATIENT
Start: 2022-04-22

## 2022-01-01 RX ORDER — HEPARIN SODIUM,PORCINE 10 UNIT/ML
5 VIAL (ML) INTRAVENOUS
Status: CANCELLED | OUTPATIENT
Start: 2022-03-07

## 2022-01-01 RX ORDER — ALBUTEROL SULFATE 0.83 MG/ML
2.5 SOLUTION RESPIRATORY (INHALATION)
Status: CANCELLED | OUTPATIENT
Start: 2022-04-22

## 2022-01-01 RX ORDER — DIPHENHYDRAMINE HYDROCHLORIDE 50 MG/ML
50 INJECTION INTRAMUSCULAR; INTRAVENOUS
Status: CANCELLED
Start: 2022-03-10

## 2022-01-01 RX ORDER — HEPARIN SODIUM (PORCINE) LOCK FLUSH IV SOLN 100 UNIT/ML 100 UNIT/ML
5-10 SOLUTION INTRAVENOUS
Status: DISCONTINUED | OUTPATIENT
Start: 2022-01-01 | End: 2022-01-01 | Stop reason: HOSPADM

## 2022-01-01 RX ORDER — ALBUTEROL SULFATE 90 UG/1
1-2 AEROSOL, METERED RESPIRATORY (INHALATION)
Status: CANCELLED
Start: 2022-04-19

## 2022-01-01 RX ADMIN — IOPAMIDOL 70 ML: 755 INJECTION, SOLUTION INTRAVENOUS at 12:12

## 2022-01-01 RX ADMIN — Medication 5 ML: at 11:32

## 2022-01-01 RX ADMIN — Medication 2 SPRAY: at 14:51

## 2022-01-01 RX ADMIN — Medication 5 ML: at 14:47

## 2022-01-01 RX ADMIN — SODIUM CHLORIDE 250 ML: 9 INJECTION, SOLUTION INTRAVENOUS at 10:12

## 2022-01-01 RX ADMIN — Medication 5 ML: at 09:11

## 2022-01-01 RX ADMIN — TAZOBACTAM SODIUM AND PIPERACILLIN SODIUM 3.38 G: 375; 3 INJECTION, SOLUTION INTRAVENOUS at 14:23

## 2022-01-01 RX ADMIN — SODIUM CHLORIDE 250 ML: 9 INJECTION, SOLUTION INTRAVENOUS at 09:35

## 2022-01-01 RX ADMIN — Medication 5 ML: at 15:01

## 2022-01-01 RX ADMIN — SODIUM CHLORIDE 70 ML: 9 INJECTION, SOLUTION INTRAVENOUS at 12:11

## 2022-01-01 RX ADMIN — Medication 5 ML: at 14:12

## 2022-01-01 RX ADMIN — Medication 5 ML: at 06:49

## 2022-01-01 RX ADMIN — Medication 5 ML: at 13:41

## 2022-01-01 RX ADMIN — TRANEXAMIC ACID 1 G: 10 INJECTION, SOLUTION INTRAVENOUS at 11:33

## 2022-01-01 RX ADMIN — SODIUM CHLORIDE 250 ML: 9 INJECTION, SOLUTION INTRAVENOUS at 10:49

## 2022-01-01 RX ADMIN — ACETAMINOPHEN 650 MG: 325 TABLET, FILM COATED ORAL at 09:15

## 2022-01-01 RX ADMIN — Medication 2 SPRAY: at 18:16

## 2022-01-01 RX ADMIN — ACETAMINOPHEN 650 MG: 325 TABLET, FILM COATED ORAL at 03:12

## 2022-01-01 RX ADMIN — Medication 5 ML: at 09:37

## 2022-01-01 ASSESSMENT — PAIN SCALES - GENERAL
PAINLEVEL: SEVERE PAIN (6)
PAINLEVEL: NO PAIN (0)
PAINLEVEL: NO PAIN (0)
PAINLEVEL: MODERATE PAIN (4)

## 2022-01-01 ASSESSMENT — MIFFLIN-ST. JEOR
SCORE: 1431.66
SCORE: 1422.13
SCORE: 1395.82

## 2022-01-03 NOTE — PROGRESS NOTES
Infusion Nursing Note:  Renny CRAIG Jagdeep presents today for labs and possible PRBC and platelets.    Patient seen by provider today: No   present during visit today: Not Applicable.    Note: N/A.      Intravenous Access:  Labs drawn without difficulty.       Lab Results   Component Value Date     01/03/2022    POTASSIUM 3.4 01/03/2022    MAG 2.1 06/18/2021    CR 0.85 12/30/2021    MINNIE 9.0 12/30/2021    BILITOTAL 0.6 12/30/2021    ALBUMIN 2.6 (L) 12/30/2021    ALT 18 12/30/2021    AST 12 12/30/2021     Results reviewed, labs MET treatment parameters, ok to proceed with treatment.  Blood transfusion consent signed 03/04/      Post Infusion Assessment:  Will proceed with blood transfusion appt.       Discharge Plan:    .      Tonia Mccrary RN

## 2022-01-03 NOTE — PROGRESS NOTES
Infusion Nursing Note:  Renny Gannon presents today for 1 unit PRBC and 1 unit platelets.    Patient seen by provider today: No   present during visit today: Not Applicable.    Note: Hgb 7.1 Platelets 20,000    Intravenous Access:  Implanted Port.    Treatment Conditions:  Lab Results   Component Value Date    HGB 7.1 (L) 12/30/2021    WBC 0.3 (LL) 12/30/2021    ANEU 0.3 (LL) 12/23/2021    ANEUTAUTO 0.6 (L) 12/20/2021    PLT 9 (LL) 12/30/2021      Lab Results   Component Value Date     01/03/2022    POTASSIUM 3.4 01/03/2022    MAG 2.1 06/18/2021    CR 0.90 01/03/2022    MINNIE 9.3 01/03/2022    BILITOTAL 0.8 01/03/2022    ALBUMIN 2.4 (L) 01/03/2022    ALT 25 01/03/2022    AST 14 01/03/2022         Post Infusion Assessment:  Patient tolerated infusion without incident.  Blood return noted pre and post infusion.  Site patent and intact, free from redness, edema or discomfort.  No evidence of extravasations.  Access discontinued per protocol.       Discharge Plan:   Patient discharged in stable condition accompanied by: sister.  Departure Mode: Ambulatory.      Tonia Mccrary RN

## 2022-01-06 NOTE — PROGRESS NOTES
[facility] Hematology / Oncology  Progress Note  Name: Renny Gannon  :  1950    MRN:  [id number]    --------------------    Assessment / Plan:  JAK2+ MF:  # Dec 2019 Presented w/ fatigue / SPENCER; WBC 79K, smear w/ leukoerythroblastosis and left shift.  # Dec 2019 Marrow 100% cellular w/ granulocytic and megakaryocytic proliferation, megakaryocytic clustering and atypia and 1.5% circulating blasts as well as moderate-severe reticulin fibrosis (MF 2-3). 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  # 2020 - 2021 Hydrea  # Mar 2021 Marrow 40-50% cellular w/ increased fibrosis (MF-3), cellularity 40-50%, 5% blasts by morphology and flow. 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  AML evolution:  # May 2021 Presented w/ fatigue; WBC 72K, Hgb 6, platelets 18k.  # 2021 Marrow no aspirate obtained, likely due to fibrosis and possibly excess blasts. BMBx morphology reveals marrow hypercellularity (70-80%) with left-shifted myeloid predominant maturation with approximately 5-10% myeloid blasts, markedly increased marrow fibrosis (MF3); findings consistent with blast crisis arising from previously diagnosed myeloproliferative neoplasm. Diagnosis made on the basis of increased peripheral blast percentage (23%); blast percentage in bone marrow is difficult to establish d/t bone marrow fibrosis and lack of aspirates. BMBx flow from core shows increased abnormal myeloid blasts (12%). FLT3 negative. Flow cytometry on peripheral blood shows 28% myeloid blasts.  Cytogenetics and NGS sent on peripheral blood- copy number changes along the long arm of chromosome 21 present in approximately 75% of cells. Among the segments present in 4 -5 extra copies is a 2.1 Mb region within 21q22.13 - 21q22.2.  NGS shows ASXL1 p.G646fs, VAF=42%, JAK2  "p.V617F, VAF=96%. Also loss of one copy of chromosome 7  # Jun 2021 Decitabine / venetoclax started.  # Jul 2021 Marrow hypercellular marrow (90%) with left-shift and myeloid-predominance; 13% blasts; rare small dysplastic megakaryocytes Markedly increased marrow fibrosis (MF-3) Peripheral blood showing 19% circulating blasts  # Aug 2021 Marrow 70-80% cellularity with dysplasia and 7.7% blasts    Clinically stable but chronically fatigued and transfusion dependent.  Holding therapy due to cytopenias; push by 1 week.  Will ask oral onc pharmacy to review chemo w/ Renny.  CBC, bb hold twice weekly next week.  Transfusion today / tomorrow.    Gilbert Grajeda MD    --------------------    Interval History:  Renny returns for follow up of MF/AML.  All in all he's about the same.  No major changes.  No infectious complaints.  No fevers.  Unsure if transfusions make him feel much better.  No major bleeding.      --------------------    Physical Exam:  VS: /62   Pulse 115   Temp 98.6  F (37  C) (Temporal)   Ht 1.727 m (5' 8\")   Wt 70.7 kg (155 lb 14.4 oz)   SpO2 93%   BMI 23.70 kg/m    GEN: Well appearing.  HEENT: PERRL, EOMI, no scleral icterus or injection; OP clear, moist mucous membranes, no oral lesions.  NECK: Supple.  MANUEL: No cervical, supraclavicular, axillary or inguinal MANUEL.  CHEST: Breathing comfortably, good airflow bilaterally, no crackles, no wheezing.  CV: RRR, s1/s2, no murmurs; strong distal pulses.  ABD: Non-tender, non-distended, soft, positive bowel sounds.  EXT: Warm and well perfused; no edema; no clubbing.  SKIN: Warm and dry, no visible rashes.  NEURO: Alert, engaged; CN 2-12 grossly intact; no gross sensorimotor deficits; gait and coordination intact; speech clear and fluent.    Labs / Imaging / Path:  We reviewed labs, personally reviewed imaging and reviewed pathology reports    "

## 2022-01-06 NOTE — PROGRESS NOTES
Infusion Nursing Note:  Renny Gannon presents today for 1 unit PRBC's..    Patient seen by provider today: Yes.    Note: 1 Init transfused without complications.      Intravenous Access:  Implanted Port.    Treatment Conditions:  Lab Results   Component Value Date    HGB 8.0 (L) 01/06/2022    WBC 0.3 (LL) 01/06/2022    ANEU 0.0 (LL) 01/06/2022    ANEUTAUTO 0.6 (L) 12/20/2021    PLT 26 (LL) 01/06/2022      Blood transfusion consent signed 07/07/21.      Post Infusion Assessment:  Patient tolerated infusion without incident.  Blood return noted pre and post infusion.  Site patent and intact, free from redness, edema or discomfort.  No evidence of extravasations.  Access discontinued per protocol.       Discharge Plan:   Discharge instructions reviewed with: Patient.  Patient and/or family verbalized understanding of discharge instructions and all questions answered.  Patient discharged in stable condition accompanied by: self.  Departure Mode: Ambulatory with walker.      Richa Gudino RN

## 2022-01-06 NOTE — LETTER
2022         RE: Renny Gannon  [address]  [place] [address]        Dear Colleague,    Thank you for referring your patient, Renny Gannon, to the [facility] [place]. Please see a copy of my visit note below.    [facility] Hematology / Oncology  Progress Note  Name: Renny Gannon  :  1950    MRN:  [id number]    --------------------    Assessment / Plan:  JAK2+ MF:  # Dec 2019 Presented w/ fatigue / SPENCER; WBC 79K, smear w/ leukoerythroblastosis and left shift.  # Dec 2019 Marrow 100% cellular w/ granulocytic and megakaryocytic proliferation, megakaryocytic clustering and atypia and 1.5% circulating blasts as well as moderate-severe reticulin fibrosis (MF 2-3). 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  # 2020 - 2021 Hydrea  # Mar 2021 Marrow 40-50% cellular w/ increased fibrosis (MF-3), cellularity 40-50%, 5% blasts by morphology and flow. 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  AML evolution:  # May 2021 Presented w/ fatigue; WBC 72K, Hgb 6, platelets 18k.  # 2021 Marrow no aspirate obtained, likely due to fibrosis and possibly excess blasts. BMBx morphology reveals marrow hypercellularity (70-80%) with left-shifted myeloid predominant maturation with approximately 5-10% myeloid blasts, markedly increased marrow fibrosis (MF3); findings consistent with blast crisis arising from previously diagnosed myeloproliferative neoplasm. Diagnosis made on the basis of increased peripheral blast percentage (23%); blast percentage in bone marrow is difficult to establish d/t bone marrow fibrosis and lack of aspirates. BMBx flow from core shows increased abnormal myeloid blasts (12%). FLT3 negative. Flow cytometry on peripheral blood shows 28% myeloid blasts.  Cytogenetics and NGS  "sent on peripheral blood- copy number changes along the long arm of chromosome 21 present in approximately 75% of cells. Among the segments present in 4 -5 extra copies is a 2.1 Mb region within 21q22.13 - 21q22.2.  NGS shows ASXL1 p.G646fs, VAF=42%, JAK2 p.V617F, VAF=96%. Also loss of one copy of chromosome 7  # Jun 2021 Decitabine / venetoclax started.  # Jul 2021 Marrow hypercellular marrow (90%) with left-shift and myeloid-predominance; 13% blasts; rare small dysplastic megakaryocytes Markedly increased marrow fibrosis (MF-3) Peripheral blood showing 19% circulating blasts  # Aug 2021 Marrow 70-80% cellularity with dysplasia and 7.7% blasts    Clinically stable but chronically fatigued and transfusion dependent.  Holding therapy due to cytopenias; push by 1 week.  Will ask oral onc pharmacy to review chemo w/ Renny.  CBC, bb hold twice weekly next week.  Transfusion today / tomorrow.    Gilbert Grajeda MD    --------------------    Interval History:  Renny returns for follow up of MF/AML.  All in all he's about the same.  No major changes.  No infectious complaints.  No fevers.  Unsure if transfusions make him feel much better.  No major bleeding.      --------------------    Physical Exam:  VS: /62   Pulse 115   Temp 98.6  F (37  C) (Temporal)   Ht 1.727 m (5' 8\")   Wt 70.7 kg (155 lb 14.4 oz)   SpO2 93%   BMI 23.70 kg/m    GEN: Well appearing.  HEENT: PERRL, EOMI, no scleral icterus or injection; OP clear, moist mucous membranes, no oral lesions.  NECK: Supple.  MANUEL: No cervical, supraclavicular, axillary or inguinal MANUEL.  CHEST: Breathing comfortably, good airflow bilaterally, no crackles, no wheezing.  CV: RRR, s1/s2, no murmurs; strong distal pulses.  ABD: Non-tender, non-distended, soft, positive bowel sounds.  EXT: Warm and well perfused; no edema; no clubbing.  SKIN: Warm and dry, no visible rashes.  NEURO: Alert, engaged; CN 2-12 grossly intact; no gross sensorimotor deficits; gait and " coordination intact; speech clear and fluent.    Labs / Imaging / Path:  We reviewed labs, personally reviewed imaging and reviewed pathology reports        Again, thank you for allowing me to participate in the care of your patient.        Sincerely,        iGlbert Grajeda MD

## 2022-01-06 NOTE — PROGRESS NOTES
Oncology Distress Screening Follow-up  Clinical Social Work  [facility]    Identified Concern and Score From Distress Screenin. How concerned are you about your ability to eat?  2       2. How concerned are you about unintended weight loss or your current weight?  0       3. How concerned are you about feeling depressed or very sad?  0       4. How concerned are you about feeling anxious or very scared?  0       5. Do you struggle with the loss of meaning and wilmar in your life?  Not at all       6. How concerned are you about work and home life issues that may be affected by your cancer?  0       7. How concerned are you about knowing what resources are available to help you?  6 Abnormal        8. Do you currently have what you would describe as Christianity or spiritual struggles?             Somewhat             Date of Distress Screenin22      Data: Renny was seen in virtual visit today for follow up acute leukemia. DANA met with Renny in the infusion center while he was getting blood transfusion.       Intervention/Education Provided:: DANA met with Renny this morning in the infusion center, introducing self and reason for call. Renny states he is doing fine, denies having questions re: resources. Renny continues to live alone. His two sisters check in on him several times throughout the week and assist with things as needed. He is not interested in any support resources right now. DANA emphasized our goal of making sure Renny had everything he needed to do well. He feels things are ok at this time, declined any support. DANA encouraged Renny to reach out at any time and to let his care team know if he would like another SW check in.       Follow-up Required: DANA will continue to remain available for support and resources needs as appropriate.           NICANOR Simpson, [facility]  Clinical , Adult Oncology  Phone: [phone]

## 2022-01-06 NOTE — LETTER
2022         RE: Renny Gannon  [address]  [place] [address]        Dear Colleague,    Thank you for referring your patient, Renny Gannon, to the [facility] [place]. Please see a copy of my visit note below.    [facility] Hematology / Oncology  Progress Note  Name: Renny Gannon  :  1950    MRN:  [id number]    --------------------    Assessment / Plan:  JAK2+ MF:  # Dec 2019 Presented w/ fatigue / SPENCER; WBC 79K, smear w/ leukoerythroblastosis and left shift.  # Dec 2019 Marrow 100% cellular w/ granulocytic and megakaryocytic proliferation, megakaryocytic clustering and atypia and 1.5% circulating blasts as well as moderate-severe reticulin fibrosis (MF 2-3). 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  # 2020 - 2021 Hydrea  # Mar 2021 Marrow 40-50% cellular w/ increased fibrosis (MF-3), cellularity 40-50%, 5% blasts by morphology and flow. 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  AML evolution:  # May 2021 Presented w/ fatigue; WBC 72K, Hgb 6, platelets 18k.  # 2021 Marrow no aspirate obtained, likely due to fibrosis and possibly excess blasts. BMBx morphology reveals marrow hypercellularity (70-80%) with left-shifted myeloid predominant maturation with approximately 5-10% myeloid blasts, markedly increased marrow fibrosis (MF3); findings consistent with blast crisis arising from previously diagnosed myeloproliferative neoplasm. Diagnosis made on the basis of increased peripheral blast percentage (23%); blast percentage in bone marrow is difficult to establish d/t bone marrow fibrosis and lack of aspirates. BMBx flow from core shows increased abnormal myeloid blasts (12%). FLT3 negative. Flow cytometry on peripheral blood shows 28% myeloid blasts.  Cytogenetics and NGS  "sent on peripheral blood- copy number changes along the long arm of chromosome 21 present in approximately 75% of cells. Among the segments present in 4 -5 extra copies is a 2.1 Mb region within 21q22.13 - 21q22.2.  NGS shows ASXL1 p.G646fs, VAF=42%, JAK2 p.V617F, VAF=96%. Also loss of one copy of chromosome 7  # Jun 2021 Decitabine / venetoclax started.  # Jul 2021 Marrow hypercellular marrow (90%) with left-shift and myeloid-predominance; 13% blasts; rare small dysplastic megakaryocytes Markedly increased marrow fibrosis (MF-3) Peripheral blood showing 19% circulating blasts  # Aug 2021 Marrow 70-80% cellularity with dysplasia and 7.7% blasts    Clinically stable but chronically fatigued and transfusion dependent.  Holding therapy due to cytopenias; push by 1 week.  Will ask oral onc pharmacy to review chemo w/ Renny.  CBC, bb hold twice weekly next week.  Transfusion today / tomorrow.    Gilbert Grajeda MD    --------------------    Interval History:  Renny returns for follow up of MF/AML.  All in all he's about the same.  No major changes.  No infectious complaints.  No fevers.  Unsure if transfusions make him feel much better.  No major bleeding.      --------------------    Physical Exam:  VS: /62   Pulse 115   Temp 98.6  F (37  C) (Temporal)   Ht 1.727 m (5' 8\")   Wt 70.7 kg (155 lb 14.4 oz)   SpO2 93%   BMI 23.70 kg/m    GEN: Well appearing.  HEENT: PERRL, EOMI, no scleral icterus or injection; OP clear, moist mucous membranes, no oral lesions.  NECK: Supple.  MANUEL: No cervical, supraclavicular, axillary or inguinal MANUEL.  CHEST: Breathing comfortably, good airflow bilaterally, no crackles, no wheezing.  CV: RRR, s1/s2, no murmurs; strong distal pulses.  ABD: Non-tender, non-distended, soft, positive bowel sounds.  EXT: Warm and well perfused; no edema; no clubbing.  SKIN: Warm and dry, no visible rashes.  NEURO: Alert, engaged; CN 2-12 grossly intact; no gross sensorimotor deficits; gait and " coordination intact; speech clear and fluent.    Labs / Imaging / Path:  We reviewed labs, personally reviewed imaging and reviewed pathology reports        Again, thank you for allowing me to participate in the care of your patient.        Sincerely,        Gilbert Grajeda MD

## 2022-01-06 NOTE — PATIENT INSTRUCTIONS
1) Please push dacogen infusions back by 1 week to allow count recovery.  2) Please ask oral onc pharmacy to review chemo w/ Renny.  3) CBC, bb hold twice weekly next week.  4) Transfusion today / tomorrow.    Today:  Continue with labs and possible transfusions two times a week. Postpone treatment by one week.    Hold your chemotherapy pill for an extra week and start on 3/17/21. See Calendar!    Please follow up with Dr. Grajeda prior to start of the next treatment cycle.      Lab Date/Time:    Office visit follow up with Dr. Grajeda Date/Time:     Infusion Date/Time:    If you have any questions or concerns please feel free to call.    If you need to reschedule please call:  Clinic or Lab Appointment - [phone]  Infusion - [phone]  Imaging - [phone]    Ki Lamb, RN, BSN, OCN  [facility]   Oncology/Hematology Care Coordinator RN  [facility]  [phone]    After Hours Oncology Nurse [facility] - [phone]        January 2022 Sunday Monday Tuesday Wednesday Thursday Friday Saturday                                 1       2     3    LAB CENTRAL   9:00 AM   (15 min.)   PH INFUSION CHAIR [address] [place] [address] Infusion [facility]    INFUSION 5 HR (300 MIN)   9:30 AM   (300 min.)   PH INFUSION NURSE   [facility] 4     5     6    LAB   7:45 AM   (15 min.)   PH LAB   [facility]    VIDEO VISIT RETURN   8:00 AM   (30 min.)   Gilbert Grajeda MD   [facility]    INFUSION 5 HR (300 MIN)   9:00 AM   (300 min.)   PH INFUSION CHAIR [address] [place] [address] 7     8       9     10  Continue to hold venetoclax    LAB CENTRAL   9:00 AM   (15 min.)   PH INFUSION CHAIR [address] [place] [address]    INFUSION 5 HR (300 MIN)   9:30 AM   (300 min.)   PH INFUSION CHAIR [address] [place] [address] 11     12      13    LAB CENTRAL   9:00 AM   (15 min.)   PH INFUSION CHAIR 10   [facility]    INFUSION 5 HR (300 MIN)   9:30 AM   (300 min.)   PH INFUSION NURSE   [facility] 14     15       16     17  Restart venetoclax day 1  LAB CENTRAL   9:00 AM   (15 min.)   PH INFUSION CHAIR 10   [facility]    INFUSION 5 HR (300 MIN)   9:30 AM   (300 min.)   PH INFUSION CHAIR 10   [facility] 18  Venetoclax Day 2    INFUSION 2 HR (120 MIN)  11:30 AM   (120 min.)   PH INFUSION NURSE   [facility] 19  Venetoclax Day 3    INFUSION 2 HR (120 MIN)   1:00 PM   (120 min.)   PH INFUSION CHAIR 10   [facility] 20  Venetoclax Day 4    LAB CENTRAL   9:00 AM   (15 min.)   PH INFUSION NURSE   [facility]    INFUSION 5 HR (300 MIN)   9:30 AM   (300 min.)   PH INFUSION CHAIR 10   [facility] 21  Venetoclax Day 5    INFUSION 2 HR (120 MIN)   1:00 PM   (120 min.)   PH INFUSION CHAIR 13   [facility] 22  Venetoclax Day 6     23  Venetoclax Day 7     24  Venetoclax Day 8    LAB CENTRAL   9:00 AM   (15 min.)   PH INFUSION CHAIR 10   [facility]    INFUSION 5 HR (300 MIN)   9:30 AM   (300 min.)   PH INFUSION CHAIR 10   [facility] 25  Venetoclax Day 9   26  Venetoclax Day 10   27  Venetoclax Day 11    LAB CENTRAL   8:30 AM   (15 min.)   PH INFUSION CHAIR 10   [facility]    INFUSION 5 HR (300 MIN)   9:00 AM   (300 min.)   PH INFUSION NURSE   [facility] 28  Venetoclax Day 12   29  Venetoclax Day 13     30  Venetoclax Day 14   31  Hold Venetoclax    LAB CENTRAL   9:00 AM   (15 min.)   PH INFUSION CHAIR 10   [facility]    INFUSION 5 HR (300 MIN)   9:30 AM   (300 min.)   PH INFUSION CHAIR 10   [facility]                                      February 2022 Sunday Monday Tuesday Wednesday Thursday Friday Saturday             1    Hold 2    Hold 3  Hold    LAB CENTRAL   9:00 AM   (15 min.)   PH INFUSION CHAIR 10   [facility]    INFUSION 5 HR (300 MIN)   9:30 AM   (300 min.)   PH INFUSION CHAIR 10   [facility] 4    Hold   5    Hold     6  Hold   7  Hold    LAB CENTRAL   8:30 AM   (15 min.)   PH INFUSION CHAIR 10   [facility]    INFUSION 5 HR (300 MIN)   9:00 AM   (300 min.)   PH INFUSION CHAIR 10   [facility] 8  Hold   9  Hold   10  Hold    LAB CENTRAL   9:00 AM   (15 min.)   PH INFUSION CHAIR 10   [facility]    INFUSION 5 HR (300 MIN)   9:30 AM   (300 min.)   PH INFUSION CHAIR 10   [facility] 11  Hold   12  Hold     13  Hold   14  Hold  LAB CENTRAL   9:00 AM   (15 min.)   PH INFUSION CHAIR 10   [facility]    INFUSION 5 HR (300 MIN)   9:30 AM   (300 min.)   PH INFUSION CHAIR 10   [facility] 15  Hold   16  Hold   17  Hold  LAB CENTRAL   9:00 AM   (15 min.)   PH INFUSION CHAIR 10   [facility]    INFUSION 5 HR (300 MIN)   9:30 AM   (300 min.)   PH INFUSION CHAIR 10   [facility] 18  Hold   19  Hold     20  Hold   21  Hold  LAB CENTRAL   9:00 AM   (15 min.)   PH INFUSION CHAIR 10   [facility]    INFUSION 5 HR (300 MIN)   9:30 AM   (300 min.)   PH INFUSION CHAIR 10   [facility] 22  Hold   23  Hold   24  Hold  LAB CENTRAL   9:00 AM   (15 min.)   PH INFUSION CHAIR  10   [facility] [place]    Plan for follow up    INFUSION 5 HR (300 MIN)   9:30 AM   (300 min.)    INFUSION CHAIR 10   [facility] Infusion [facility] 25  Hold   26  Hold     27  Hold   28  Restart venetoclax day 1  Plan for start of IV chemo again

## 2022-01-10 NOTE — PROGRESS NOTES
Infusion Nursing Note:  Renny Gannon presents today for transfusion of platelets and 1 unit PRBCs    Patient seen by provider today: No   present during visit today: Not Applicable.    Note: Lungs clear pre and post transfusion of each unit.      Intravenous Access:  Implanted Port.    Treatment Conditions:  Lab Results   Component Value Date    HGB 7.5 (L) 01/10/2022    WBC 0.3 (LL) 01/10/2022    ANEU 0.0 (LL) 01/06/2022    ANEUTAUTO 0.6 (L) 12/20/2021    PLT 10 (LL) 01/10/2022      Results reviewed, labs MET treatment parameters, ok to proceed with treatment.  Blood transfusion consent signed 3\5\21 .      Post Infusion Assessment:  Patient tolerated infusion without incident.  Patient observed for 15 minutes post transfusion.  Blood return noted pre and post infusion.  Site patent and intact, free from redness, edema or discomfort.  No evidence of extravasations.  Access discontinued per protocol.       Discharge Plan:   Discharge instructions reviewed with: Patient.  Patient discharged in stable condition accompanied by: sister.  Departure Mode: Ambulatory.      Lisa Vasques RN

## 2022-01-13 NOTE — PROGRESS NOTES
Infusion Nursing Note:  Renny Gannon presents today for Port labs.    Patient seen by provider today: No   present during visit today: Not Applicable.    Note: Pt here for port draw for possible blood/plts transfusion. See other infusion therapy visit note from today for treatment conditions and other documentation.       Intravenous Access:  Implanted Port.  Lab draw site Right chest wall, Needle type Noncoring, Gauge 20,3/4in.  Labs drawn without difficulty      Kimberley Faust RN

## 2022-01-13 NOTE — PROGRESS NOTES
Infusion Nursing Note:  Renny aGnnon presents today for Possible PRBC's/Platelets.    Patient seen by provider today: No   present during visit today: Not Applicable.    Note: Pt states he is easily SOA, same fatigue level as the other day. Denies dizziness, lightheadedness or tinnitus. B/P 102/56, HR 75, sats 98% on room air. Afebrile. No other concerns or symptoms admitted to today.       Intravenous Access:  Implanted Port.  Deaccessed with Heparin prior to discharge.  Site WDL.     Treatment Conditions:  Lab Results   Component Value Date    HGB 8.5 (L) 01/13/2022    WBC 0.3 (LL) 01/13/2022    ANEU 0.0 (LL) 01/13/2022    ANEUTAUTO 0.6 (L) 12/20/2021    PLT 24 (LL) 01/13/2022      Lab Results   Component Value Date     01/13/2022    POTASSIUM 3.5 01/13/2022    MAG 2.1 06/18/2021    CR 0.91 01/13/2022    MINNIE 9.6 01/13/2022    BILITOTAL 0.5 01/13/2022    ALBUMIN 2.1 (L) 01/13/2022    ALT 27 01/13/2022    AST 12 01/13/2022     Results reviewed, labs did NOT meet treatment parameters: Hgb and Plts not meeting parameters for treatment. No transfusion needed.       Post Infusion Assessment:  N/A.       Discharge Plan:   Copy of AVS reviewed with patient. Patient will return 1/17 for next appointment.  Patient discharged in stable condition accompanied by: sister at main entrance.  Departure Mode: Ambulatory.      Kimberley Faust RN

## 2022-01-17 NOTE — PROGRESS NOTES
Infusion Nursing Note:  Renny Gannon presents today for C6 Decitabine, possible blood transfusions.    Patient seen by provider today: No   present during visit today: Not Applicable.    Note: Denies any fever, chills or s/s bleeding. Chemo deferred until next week 1/24. Plan to transfuse 1 Unit PLT and PRBC'S.       Intravenous Access:  Implanted Port.    Treatment Conditions:  Lab Results   Component Value Date    HGB 7.3 (L) 01/17/2022    WBC 0.2 (LL) 01/17/2022    ANEU 0.0 (LL) 01/13/2022    ANEUTAUTO 0.6 (L) 12/20/2021    PLT 6 (LL) 01/17/2022      Lab Results   Component Value Date     01/17/2022    POTASSIUM 3.8 01/17/2022    MAG 2.1 06/18/2021    CR 0.74 01/17/2022    MINNIE 8.8 01/17/2022    BILITOTAL 0.4 01/17/2022    ALBUMIN 2.0 (L) 01/17/2022    ALT 25 01/17/2022    AST 13 01/17/2022     Results reviewed, labs did NOT meet treatment parameters: PLT/ANC.  Blood transfusion consent signed 7/7/21.  Lung sounds clear pre transfusion. .      Post Infusion Assessment:  Patient tolerated infusion without incident.  Blood return noted pre and post infusion.  Site patent and intact, free from redness, edema or discomfort.  No evidence of extravasations.  Access discontinued per protocol.  Lung sounds clear after each unit. .       Discharge Plan:   Discharge instructions reviewed with: Patient.  Patient and/or family verbalized understanding of discharge instructions and all questions answered.  Patient discharged in stable condition accompanied by: self.  Departure Mode: Ambulatory. Sister Colette picking him up at front entrance. New schedule of appt given to patient.       Jalyn Gannon RN

## 2022-01-17 NOTE — PROGRESS NOTES
Patient's treatment deferred. All appointment rescheduled. New oral medication calendar provided to patient, 14/42 day cycle, confirmed with pharmacy as well.     Ki Lamb RN, BSN, OCN  1/17/2022, 1:22 PM

## 2022-01-17 NOTE — PATIENT INSTRUCTIONS
January 2022 Sunday Monday Tuesday Wednesday Thursday Friday Saturday                                 1       2     3    LAB CENTRAL   9:00 AM   (15 min.)   PH INFUSION CHAIR [address] [place] [address]    INFUSION 5 HR (300 MIN)   9:30 AM   (300 min.)   PH INFUSION NURSE   [facility] 4     5     6    LAB   7:45 AM   (15 min.)   PH LAB   [facility]    VIDEO VISIT RETURN   8:00 AM   (30 min.)   Gilbert Grajeda MD   [facility]    INFUSION 5 HR (300 MIN)   9:00 AM   (300 min.)   PH INFUSION CHAIR [address] [place] [address] 7     8       9     10    LAB CENTRAL   9:00 AM   (15 min.)   PH INFUSION CHAIR [address] [place] [address]    INFUSION 5 HR (300 MIN)   9:30 AM   (300 min.)   PH INFUSION CHAIR [address] [place] [address] 11     12     13    LAB CENTRAL   9:00 AM   (15 min.)   PH INFUSION CHAIR [address] [place] [address]    INFUSION 5 HR (300 MIN)   9:30 AM   (300 min.)   PH INFUSION NURSE   [facility] 14     15       16     17    LAB CENTRAL   9:00 AM   (15 min.)   PH INFUSION CHAIR [address] [place] [address]    INFUSION 5 HR (300 MIN)   9:30 AM   (300 min.)   PH INFUSION CHAIR [address] [place] [address] 18     19     20    LAB CENTRAL   9:00 AM   (15 min.)   PH INFUSION NURSE   [facility]    INFUSION 5 HR (300 MIN)   9:30 AM   (300 min.)   PH INFUSION CHAIR [address] [place] [address] 21     22       23     24  Start Venetoclax Day 1    LAB CENTRAL   9:00 AM   (15 min.)   PH INFUSION CHAIR [address] [place] [address]    INFUSION 5 HR (300 MIN)   9:30 AM   (300 min.)   PH INFUSION CHAIR [address] [place] [address]  [facility] 25  Venetoclax Day 2    INFUSION 2 HR (120 MIN)   9:00 AM   (120 min.)   PH INFUSION NURSE   [facility] 26  Venetoclax Day 3    INFUSION 2 HR (120 MIN)   8:30 AM   (120 min.)   PH INFUSION NURSE   [facility] 27  Venetoclax Day 4    LAB CENTRAL   8:30 AM   (15 min.)   PH INFUSION CHAIR 10   [facility]    INFUSION 5 HR (300 MIN)   9:00 AM   (300 min.)   PH INFUSION NURSE   [facility] 28  Venetoclax Day 5    INFUSION 2 HR (120 MIN)   9:30 AM   (120 min.)   PH INFUSION NURSE   [facility] 29  Venetoclax Day 6   30  Venetoclax Day 7   31  Venetoclax Day 8    LAB CENTRAL   9:00 AM   (15 min.)   PH INFUSION CHAIR [address] [place] [address]    INFUSION 5 HR (300 MIN)   9:30 AM   (300 min.)   PH INFUSION CHAIR [address] [place] [address]                                      February 2022 Sunday Monday Tuesday Wednesday Thursday Friday Saturday             1  Venetoclax Day 9   2  Venetoclax Day 10   3  Venetoclax Day 11    LAB CENTRAL   9:00 AM   (15 min.)   PH INFUSION CHAIR [address] [place] [address]    INFUSION 5 HR (300 MIN)   9:30 AM   (300 min.)   PH INFUSION CHAIR [address] [place] [address] 4  Venetoclax Day 12   5  Venetoclax Day 13     6  Venetoclax Day 14   7  Hold Venetoclax    LAB CENTRAL   8:30 AM   (15 min.)   PH INFUSION CHAIR [address] [place] [address]    INFUSION 5 HR (300 MIN)   9:00 AM   (300 min.)   PH INFUSION CHAIR 10   [facility] 8  Hold Venetoclax   9  Hold Venetoclax   10  Hold Venetoclax    LAB CENTRAL   9:00 AM   (15 min.)   PH INFUSION CHAIR [address] [place] [address]    INFUSION 5 HR (300 MIN)   9:30 AM   (300 min.)   PH  INFUSION CHAIR 10   [facility] 11  Hold Venetoclax   12  Hold Venetoclax     13  Hold Venetoclax   14  Hold Venetoclax    LAB CENTRAL   9:00 AM   (15 min.)   PH INFUSION CHAIR 10   [facility]    INFUSION 5 HR (300 MIN)   9:30 AM   (300 min.)   PH INFUSION CHAIR 10   [facility] 15  Hold Venetoclax   16  Hold Venetoclax   17  Hold Venetoclax    LAB CENTRAL   9:00 AM   (15 min.)   PH INFUSION CHAIR 10   [facility]    INFUSION 5 HR (300 MIN)   9:30 AM   (300 min.)   PH INFUSION CHAIR 10   [facility] 18  Hold Venetoclax   19  Hold Venetoclax     20  Hold Venetoclax   21  Hold Venetoclax    LAB CENTRAL   9:00 AM   (15 min.)   PH INFUSION CHAIR 10   [facility]    INFUSION 5 HR (300 MIN)   9:30 AM   (300 min.)   PH INFUSION CHAIR 10   [facility] 22  Hold Venetoclax   23  Hold Venetoclax   24  Hold Venetoclax    LAB CENTRAL   8:30 AM   (15 min.)   PH INFUSION NURSE   [facility]    INFUSION 5 HR (300 MIN)   9:00 AM   (300 min.)   PH INFUSION CHAIR 10   [facility] 25  Hold Venetoclax   26  Hold Venetoclax     27  Hold Venetoclax   28  Hold Venetoclax    LAB CENTRAL  10:30 AM   (15 min.)   PH INFUSION CHAIR 8   [facility]    INFUSION 2 HR (120 MIN)  11:00 AM   (120 min.)   PH INFUSION NURSE   [facility]                                      March 2022 Sunday Monday Tuesday Wednesday Thursday Friday Saturday             1  Hold Venetoclax   2  Hold Venetoclax   3  Hold Venetoclax    LAB CENTRAL   8:00 AM   (15 min.)   PH INFUSION NURSE   [facility]    RETURN   8:30 AM   (30 min.)   Gilbert Grajeda MD M  [facility]    INFUSION 2 HR (120 MIN)   9:00 AM   (120 min.)   PH INFUSION NURSE   M [facility] 4  Hold Venetoclax   5  Hold Venetoclax     6  Hold Venetoclax   7  Plan to restart Venetoclax Day 1     LAB CENTRAL   8:30 AM   (15 min.)   PH INFUSION NURSE   M [facility]    INFUSION 5 HR (300 MIN)   9:00 AM   (300 min.)   PH INFUSION NURSE   M [facility] 8  Venetoclax Day 2    INFUSION 2 HR (120 MIN)   9:00 AM   (120 min.)   PH INFUSION NURSE   M [facility] 9  Venetoclax Day 3    INFUSION 2 HR (120 MIN)   9:00 AM   (120 min.)   PH INFUSION NURSE   [facility] 10  Venetoclax Day 4    LAB CENTRAL   8:00 AM   (15 min.)   PH INFUSION NURSE   [facility]    INFUSION 5 HR (300 MIN)   8:30 AM   (300 min.)   PH INFUSION NURSE   [facility] 11  Venetoclax Day 5    INFUSION 2 HR (120 MIN)   9:00 AM   (120 min.)   PH INFUSION NURSE   [facility] 12  Venetoclax Day 6     13  Venetoclax Day 7   14  Venetoclax Day 8   15  Venetoclax Day 9   16  Venetoclax Day 10   17  Venetoclax Day 11   18  Venetoclax Day 12   19  Venetoclax Day 13     20  Venetoclax Day 14   21     22     23     24     25     26       27     28     29     30     31

## 2022-01-17 NOTE — PROGRESS NOTES
Infusion Nursing Note:  Renny Gannon presents today for port labs.    Patient seen by provider today: No   present during visit today: Not Applicable.    Note: N/A.      Intravenous Access:  Labs drawn without difficulty.  Implanted Port.  Green/Purple tubes drawn. Flushed with 20 ml NS after.       Jalyn Gannon RN

## 2022-01-20 PROBLEM — J98.4 CAVITATING MASS IN RIGHT UPPER LUNG LOBE: Status: ACTIVE | Noted: 2022-01-01

## 2022-01-20 PROBLEM — D69.6 THROMBOCYTOPENIA (H): Status: ACTIVE | Noted: 2021-01-27

## 2022-01-20 PROBLEM — D64.9 TRANSFUSION-DEPENDENT ANEMIA: Status: ACTIVE | Noted: 2021-01-01

## 2022-01-20 PROBLEM — R91.8 BILATERAL PULMONARY INFILTRATES ON CXR: Status: ACTIVE | Noted: 2022-01-01

## 2022-01-20 PROBLEM — R79.89 ELEVATED D-DIMER: Status: ACTIVE | Noted: 2022-01-01

## 2022-01-20 PROBLEM — D47.1 MYELOPROLIFERATIVE DISORDER (H): Status: ACTIVE | Noted: 2022-01-01

## 2022-01-20 PROBLEM — R04.2 HEMOPTYSIS: Status: ACTIVE | Noted: 2022-01-01

## 2022-01-20 NOTE — ED NOTES
Bed: ED07  Expected date: 1/20/22  Expected time: 9:19 AM  Means of arrival:   Comments:  Hold for infusion pt

## 2022-01-20 NOTE — PHARMACY-VANCOMYCIN DOSING SERVICE
"Pharmacy Vancomycin Initial Note  Date of Service 2022  Patient's  1950  71 year old, male    Indication: Myeloproliferative disorder, transfusion dependent, hemoptysis with CT demonstrating bilateral infiltrates with cavitary lesion    Current estimated CrCl = Estimated Creatinine Clearance: 72.7 mL/min (based on SCr of 0.86 mg/dL).    Creatinine for last 3 days  2022:  9:09 AM Creatinine 0.86 mg/dL    Recent Vancomycin Level(s) for last 3 days  No results found for requested labs within last 72 hours.      Vancomycin IV Administrations (past 72 hours)      No vancomycin orders with administrations in past 72 hours.                Nephrotoxins and other renal medications (From now, onward)    Start     Dose/Rate Route Frequency Ordered Stop    22 1430  vancomycin (VANCOCIN) 1,500 mg in sodium chloride 0.9 % 250 mL intermittent infusion         1,500 mg  over 90 Minutes Intravenous EVERY 24 HOURS 22 1349      22 1400  piperacillin-tazobactam (ZOSYN) infusion 3.375 g        Note to Pharmacy: For [facility], [facility] and [facility]: For Zosyn-naive patients, use the \"Zosyn initial dose + extended infusion\" order panel.    3.375 g  100 mL/hr over 30 Minutes Intravenous EVERY 6 HOURS 22 1335            Contrast Orders - past 72 hours (72h ago, onward)            Start     Dose/Rate Route Frequency Ordered Stop    22 1205  iopamidol (ISOVUE-370) solution 500 mL         500 mL Intravenous ONCE 22 1201 22 1212          [employer] Prediction of Planned Initial Vancomycin Regimen  Loading dose: N/A  Regimen: 1500 mg IV every 24 hours.  Start time: 13:59 on 2022  Exposure target: AUC24 (range)400-600 mg/L.hr   AUC24,ss: 472 mg/L.hr  Probability of AUC24 > 400: 69 %  Ctrough,ss: 12.6 mg/L  Probability of Ctrough,ss > 20: 13 %  Probability of nephrotoxicity (Lodise KALA ): 8 %        Plan:  1. Start vancomycin  1500 mg IV q24h.   2. Vancomycin monitoring method: " AUC  3. Vancomycin therapeutic monitoring goal: 400-600 mg*h/L  4. Pharmacy will check vancomycin levels as appropriate in 1-3 Days.    5. Serum creatinine levels will be ordered daily for the first week of therapy and at least twice weekly for subsequent weeks.      Froilan Peterson RPH

## 2022-01-20 NOTE — PROGRESS NOTES
Infusion Nursing Note:  Renny Gannon presents today for port labs and possible PRBC's..        Note: On arrival pt noted to have red blood in mouth/teeth.  Stated he vomited 9 times in past 16 hrs with large blood clots in emesis.  Was SOB and fatigued.    Port accessed and labs collected.  ED given report and pt brought down the ED for evaluation.      Intravenous Access:  Implanted Port.    Treatment Conditions:  Lab Results   Component Value Date    HGB 7.3 (L) 01/20/2022    WBC 0.4 (LL) 01/20/2022    ANEU 0.1 (LL) 01/20/2022    ANEUTAUTO 0.6 (L) 12/20/2021    PLT 22 (LL) 01/20/2022      Lab Results   Component Value Date     01/20/2022    POTASSIUM 4.0 01/20/2022    MAG 2.1 06/18/2021    CR 0.86 01/20/2022    MINNIE 8.8 01/20/2022    BILITOTAL 0.7 01/20/2022    ALBUMIN 1.9 (L) 01/20/2022    ALT 54 01/20/2022    AST 38 01/20/2022             Discharge Plan:   Departure Mode: Wheelchair, to ED.      Richa Gudino RN

## 2022-01-20 NOTE — ED PROVIDER NOTES
For both  History     Chief Complaint   Patient presents with     Hemoptysis     HPI     Renny Gannon is a 71 year old male who presents for infusion therapy for evaluation of hemoptysis.  Patient has a known history of myeloproliferative disorder.  Diagnosed approximately 2 years ago.  He has transfusion dependent.  He was scheduled his morning for 1 unit of packed red blood cells.  He typically been transfusing him when his hemoglobin drops below 8.  In the last 2 days he has had frequent dry cough.  This morning he coughed and noted some small blood clots.  This is continued through the early morning.  Infusion therapy notified oncology who advised the patient come to the emergency room for evaluation.  1 unit of packed red blood cells has already been ordered.  1 unit of platelets is on standby.  He also has been needing platelet support with platelet counts getting as low as 6000.    CBC completed this morning hemoglobin = 7.3.  Platelet count = 22,000.:  WBC = 0.4  Absolute neutrophil count = 0.1    No hematemesis  No epistaxis  Denies chest discomfort.    Infusion nurses who know him well states that he appears very weak and pale.    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) 06/03/2021     Priority: Medium     Pleural effusion 03/28/2021     Priority: Medium     Thrombocytopenia (H) 01/27/2021     Priority: Medium     NSTEMI (non-ST elevated  myocardial infarction) (H) 01/27/2021     Priority: Medium     Chronic diastolic congestive heart failure (H) 01/27/2021     Priority: Medium     Myelofibrosis (H) 01/14/2021     Priority: Medium     Coronary artery disease involving coronary bypass graft of native heart without angina pectoris 01/07/2021     Priority: Medium     Anemia in other chronic diseases classified elsewhere 01/07/2021     Priority: Medium     Dyspnea on exertion 12/21/2020     Priority: Medium     Vaccination refused by patient 08/20/2020     Priority: Medium     S/P CABG (coronary artery bypass graft) 02/10/2020     Priority: Medium     TIA (transient ischemic attack) 02/10/2020     Priority: Medium     Myeloproliferative neoplasm (H) 01/26/2020     Priority: Medium     Status post coronary angiogram 01/09/2020     Priority: Medium     Essential hypertension 03/11/2019     Priority: Medium     Memory loss 03/11/2019     Priority: Medium        Past Medical History:    Past Medical History:   Diagnosis Date     CKD (chronic kidney disease) stage 3, GFR 30-59 ml/min (H) 12/21/2020     Heart disease      History of blood transfusion      Hypertension      Leukocytosis 6/3/2021     Lightheadedness        Past Surgical History:    Past Surgical History:   Procedure Laterality Date     BONE MARROW BIOPSY, BONE SPECIMEN, NEEDLE/TROCAR N/A 12/30/2019    Procedure: BIOPSY, BONE MARROW;  Surgeon: Aguilar Krause MD;  Location: [facility]     BYPASS GRAFT ARTERY CORONARY N/A 1/31/2020    Procedure: CORONARY ARTERY BYPASS GRAFTING X 3 - LIMA TO LAD, SV TO OM  AND PDA; ENDOVEIN HARVEST OF BILATERAL LEGS; ON PUMP WITH SANDRA;  Surgeon: Mable Barrera MD;  Location:  OR     CV CORONARY ANGIOGRAM Left 1/9/2020    Procedure: Coronary Angiogram;  Surgeon: Devin Bentley MD;  Location:  [facility]     INSERT PORT VASCULAR ACCESS Right 10/15/2021    Procedure: ultrasound guided right internal jugular venous access port  placement with flouroscopy, right side;  Surgeon: Kieran Johnson DO;  Location: [facility]     IR FINE NEEDLE ASPIRATION W ULTRASOUND  6/10/2021     LAPAROSCOPIC CHOLECYSTECTOMY N/A 8/15/2021    Procedure: Laparoscopic cholecystectomy;  Surgeon: Severiano De La Rosa MD;  Location: [facility] OR     NO HISTORY OF SURGERY         Family History:    Family History   Problem Relation Age of Onset     No Known Problems Mother      Unknown/Adopted Father      Unknown/Adopted Maternal Grandmother      Unknown/Adopted Maternal Grandfather      Unknown/Adopted Paternal Grandmother      Unknown/Adopted Paternal Grandfather      Cancer Brother         lung cancer - smoking     No Known Problems Sister      Coronary Artery Disease Brother          of MI at 66     No Known Problems Sister        Social History:  Marital Status:  Single [1]  Social History     Tobacco Use     Smoking status: Former Smoker     Years: 30.00     Types: Cigarettes     Start date: 1961     Quit date: 2001     Years since quittin.9     Smokeless tobacco: Never Used   Vaping Use     Vaping Use: Never used   Substance Use Topics     Alcohol use: No     Drug use: No        Medications:    ACE/ARB/ARNI NOT PRESCRIBED (INTENTIONAL)  acetaminophen (TYLENOL) 325 MG tablet  acyclovir (ZOVIRAX) 400 MG tablet  allopurinol (ZYLOPRIM) 300 MG tablet  ASPIRIN NOT PRESCRIBED (INTENTIONAL)  fluconazole (DIFLUCAN) 200 MG tablet  furosemide (LASIX) 40 MG tablet  levofloxacin (LEVAQUIN) 250 MG tablet  metoprolol tartrate (LOPRESSOR) 50 MG tablet  nitroGLYcerin (NITROSTAT) 0.4 MG sublingual tablet  polyethylene glycol (MIRALAX) 17 GM/Dose powder  potassium chloride ER (KLOR-CON M) 20 MEQ CR tablet  rosuvastatin (CRESTOR) 5 MG tablet  SENEXON-S 8.6-50 MG tablet  venetoclax (VENCLEXTA) 100 MG tablet  vitamin B-12 (CYANOCOBALAMIN) 250 MCG tablet  vitamin D3 (CHOLECALCIFEROL) 50 mcg (2000 units) tablet          Review of Systems   All other systems reviewed  and are negative.      Physical Exam   BP: 112/55  Pulse: 82  Temp: 97.9  F (36.6  C)  SpO2: 98 %      Physical Exam  Vitals and nursing note reviewed.   Constitutional:       Appearance: He is ill-appearing.      Comments: Thin body habitus.  Pale.  Very weak.   HENT:      Right Ear: Tympanic membrane normal.      Left Ear: Tympanic membrane normal.      Nose: Nose normal.      Comments: No epistaxis     Mouth/Throat:      Mouth: Mucous membranes are dry.      Comments: Dried blood on roof of mouth and tongue with some small strands of dried blood clots stuck to the tongue.  Eyes:      Extraocular Movements: Extraocular movements intact.      Conjunctiva/sclera: Conjunctivae normal.      Pupils: Pupils are equal, round, and reactive to light.   Cardiovascular:      Rate and Rhythm: Normal rate and regular rhythm.      Pulses: Normal pulses.      Heart sounds: Normal heart sounds.   Pulmonary:      Effort: Pulmonary effort is normal.      Breath sounds: Normal breath sounds. No rhonchi.      Comments: Infrequent cough noted.  Abdominal:      General: Abdomen is flat. Bowel sounds are normal.      Tenderness: There is no abdominal tenderness.   Musculoskeletal:      Cervical back: Normal range of motion and neck supple.   Skin:     General: Skin is dry.      Capillary Refill: Capillary refill takes 2 to 3 seconds.      Coloration: Skin is pale.   Neurological:      Mental Status: He is alert.         ED Course          Procedures              Results for orders placed or performed during the hospital encounter of 01/20/22 (from the past 24 hour(s))   Prepare pheresed platelets (unit)   Result Value Ref Range    UNIT ABO/RH A Neg     Unit Number [id number]     Unit Status Issued     Blood Component Type Platelets     Product Code [id number]     CODING SYSTEM [id number]     UNIT TYPE ISBT 0600     ISSUE DATE AND TIME [id number]    Asymptomatic COVID-19 Virus (Coronavirus) by PCR Nose    Specimen: Nose; Swab   Result  Value Ref Range    SARS CoV2 PCR Negative Negative    Narrative    Testing was performed using the ebony  SARS-CoV-2 & Influenza A/B Assay on the ebony  Yolanda  System.  This test should be ordered for the detection of SARS-COV-2 in individuals who meet SARS-CoV-2 clinical and/or epidemiological criteria. Test performance is unknown in asymptomatic patients.  This test is for in vitro diagnostic use under the FDA EUA for laboratories certified under CLIA to perform moderate and/or high complexity testing. This test has not been FDA cleared or approved.  A negative test does not rule out the presence of PCR inhibitors in the specimen or target RNA in concentration below the limit of detection for the assay. The possibility of a false negative should be considered if the patient's recent exposure or clinical presentation suggests COVID-19.  [facility] are certified under the Clinical Laboratory Improvement Amendments of 1988 (CLIA-88) as qualified to perform moderate and/or high complexity laboratory testing.   Partial thromboplastin time   Result Value Ref Range    aPTT 48 (H) 22 - 38 Seconds   D dimer quantitative   Result Value Ref Range    D-Dimer Quantitative 1.92 (H) 0.00 - 0.50 ug/mL FEU    Narrative    This D-dimer assay is intended for use in conjunction with a clinical pretest probability assessment model to exclude pulmonary embolism (PE) and deep venous thrombosis (DVT) in outpatients suspected of PE or DVT. The cut-off value is 0.50 ug/mL FEU.   INR   Result Value Ref Range    INR 1.35 (H) 0.85 - 1.15   Extra Tube    Narrative    The following orders were created for panel order Extra Tube.  Procedure                               Abnormality         Status                     ---------                               -----------         ------                     Extra Green Top (Lithium...[[id number]]                      Final result               Extra Purple Top Tube[[id number]]                             Final result                 Please view results for these tests on the individual orders.   Extra Green Top (Lithium Heparin) Tube   Result Value Ref Range    Hold Specimen JIC    Extra Purple Top Tube   Result Value Ref Range    Hold Specimen JIC    CT Chest Pulmonary Embolism w Contrast    Narrative    CT CHEST PULMONARY EMBOLISM WITH CONTRAST January 20, 2022 12:17 PM    CLINICAL HISTORY: Elevated D-dimer, hemoptysis, history of  myeloproliferative disorder, thrombocytopenia, and anemia.    TECHNIQUE: CT angiogram chest during arterial phase injection IV  contrast. 2D and 3D MIP reconstructions were performed by the CT  technologist. Dose reduction techniques were used.   CONTRAST: 70 mL Isovue 370.     COMPARISON: Chest X-rays dated 10/21/2001, CT chest dated 7/20/2021,  1/7/2021 and 12/16/2019.    FINDINGS:  ANGIOGRAM CHEST: Pulmonary arteries are normal caliber and negative  for pulmonary emboli. Thoracic aorta is negative for dissection. Mild  aneurysmal dilatation of the ascending thoracic aorta measures up to  4.4 cm in diameter, similar to the prior studies. No CT evidence of  right heart strain.    LUNGS AND PLEURA: There are patchy bilateral nodular/masslike  pulmonary consolidations which are new since the prior CT dated  7/20/2021 and likely represent inflammatory infectious processes.  Neoplastic processes are not considered likely. These measure  approximately 2.3 x 5.0 cm transaxially right upper lung lobe (image  94 series 6), 2.8 x 3.5 cm posterior aspect right upper lung lobe  adjacent to the major fissure with what appears to be central  cavitation (image 110 series 6), 2.0 x 3.8 cm in the medial aspect  right middle lung lobe (image 149 series 3). 6.4 x 3.4 cm in the  anterior aspect of the right lower lung lobe (image 221 series 6), 6.0  x 3.0 cm in the posterolateral left lower lung lobe (image 195 series  6), 1.1 cm in diameter in the posterolateral left upper lung  lobe  (image 132 series 6), 2.8 x 4.4 cm in the lateral aspect superior  segment left lower lung lobe (image 119 series 6). No pneumothorax or  significant pleural fluid collection.    MEDIASTINUM/AXILLAE: Heart is upper normal size. There are at least  moderate coronary artery calcifications. Vascular calcifications are  also noted. Minimally prominent pretracheal lymph node measures up to  0.8 cm in short axis, minimally prominent subcarinal lymph node  measures up to 0.9 cm in short axis. These are still within the normal  size criterion. Minimally prominent right hilar lymph nodes measure up  to 0.8 cm in short axis. These prominent lymph nodes are likely  reactive to the pulmonary process but are still within the normal size  criterion. No overt mediastinal, hilar, or axillary lymphadenopathy is  identified. There are sternal cerclage wires. There is a right chest  wall [id number] reservoir with internal jugular central venous  catheter again noted with catheter tip in appropriate position and was  somewhat difficult to evaluate due to the contrast in the venous  system. Tip appears to be in the mid to upper superior vena cava.    UPPER ABDOMEN: There is nonaneurysmal atherosclerosis. Patient is  status post cholecystectomy. Visualized portions of the upper  abdominal contents are otherwise unremarkable.    MUSCULOSKELETAL: There are degenerative changes in the spine. Sternal  cerclage wires are noted. No aggressive osseous lesions or acute  osseous fractures are seen.      Impression    IMPRESSION:  1.  Masslike bilateral pulmonary consolidations likely represent  infectious or inflammatory infiltrates given their multiplicity. One  in the right lung appears to have cavitation. Recommend clinical  correlation. Close interval follow-up CT chest is recommended after  resolution of current symptoms.  2.  Probable mild reactive lymphadenopathy in the chest as described  above.  3.  No evidence for pulmonary  artery embolism is identified.  4.  Atherosclerosis.  5.  Calcified bilateral pleural plaquing corresponds with a probable  prior history of industrial dust exposure.       Medications   tranexamic acid 1 g in 100 mL NS IV bag (premix) (has no administration in time range)       Assessments & Plan (with Medical Decision Making)  Renny is 71 years of age.  2 years for myeloproliferative disorder.  He is transfusion dependent.  Came in today to receive 1 unit of packed red blood cells and infusion therapy.  Reported to staff that he had been coughing up blood since early morning.  They confirm that he was having some hemoptysis of small clots.  The patient reports he has been having a dry cough for 2 days preceding.    Hemoglobin = 7.3 platelet count = 22,000.    He is weak and pale.  Did have 1 episode of hemoptysis shortly after arrival to the ED.  Small stringy blood clot.  Verify that he has no heme at emesis.  No examination findings concerning for epistaxis.  His lungs were clear he did have an infrequent dry cough.  Plan: Patient will receive 1 unit of packed red blood cells.  We will recheck his hemoglobin.  With platelet count 22,000 and active bleeding received 1 pack of platelets.  Spoke with pharmacy and we mutually agreed that he would benefit from TXA 1 g IV.  Might require hospital observation.  Screen for COVID with his recent cough.  Check D-dimer.  If elevated will consider CT for PE work-up.  He has not had any pleuritic chest discomfort.  I suspect his hemoptysis is from 2 days of infrequent harsh dry coughing that does not cause mucosal injury and with his thrombocytopenia he is having active intermittent bleeding.  Hopefully TXA helps.  1:37 PM  CT chest PE study completed hemoptysis and elevated D-dimer.  Demonstrates masslike bilateral pulmonary infiltrates most like representing an infectious less likely representing a malignant process.  The  posterior right upper lobe mass/infiltrate appears  to have a cavitary component.  Plan: Call placed to oncology.  Anticipate patient will need to be hospitalized at a facility that has access to pulmonary medicine.  QuantiFERON for TB ordered.  Broad-spectrum antibiotics with Zosyn and vancomycin started.  That should give adequate coverage for the possible causes of cavitary lesions including staph, gram-negative such as Klebsiella and Pseudomonas as well as anaerobes.  Antifungal therapy can be started with either oral or IV Voriconazole.  2:17 PM  Contacted oncology.  After care discussion agree with the care plan will they also encourage me to talk with the patient about ongoing care versus palliative comfort care.  I spent about 20 minutes with the patient at bedside.  He would like to discuss his care options with his 2 sisters.  I reached out and talked to Colette by phone for about 10 minutes as to his current health situation.  They plan to have a family care conference between the patient and his 2 sisters and let us know this afternoon as to which direction they would like to go.  If they would like to have continued care we will have to look for a bed that has pulmonary consultation capability.  Oncology said they could consider bronchoscopy did have to transfuse platelets up to above 50,000 before considering.  3:48 PM  The has chosen had a phone conversation with his 2 sisters Colette and Mary.  The patient has chosen to move to palliative care.  I contacted Colette and had a second conversation with her.  She is comfortable with that decision.  Medical team is comfortable with that decision.  Patient is as well.   will be contacted to help facilitate transition the patient to hospice.  The patient lives in a very small efficiency type apartment and the 2 sisters do not have the ability to provide assistance at his residence to supplement hospice staff.  I spoke to the family about placement at a skilled nursing facility under hospice.  They  feel that would be best to where they can then visit but not be responsible for his right care.  This might require some time to arrangement for the patient will be in the hospital at [facility] under observation.  I will discuss this with Dr. Quintana who is on-call for the hospitalist team.  5:13 PM  Spoke with patient and his Sister Colette.  They are okay with hospitalization until placement can occur.  Aware that if he starts bleeding heavily he could pass away in the hospital due to blood loss on top of his anemia.  In agreement to stick with comfort care and at this time not do any blood product replenishment/transfusions.  Colette plans to talk with Renny once again tomorrow morning to make sure that he remains steadfast in that decision.       I have reviewed the nursing notes.    I have reviewed the findings, diagnosis, plan and need for follow up with the patient.      New Prescriptions    No medications on file       Final diagnoses:   Hemoptysis   Elevated d-dimer   Myeloproliferative disorder (H) - Transfusion dependent   Transfusion-dependent anemia   Thrombocytopenia (H)   Bilateral pulmonary infiltrates on CXR   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       1/20/2022   [facility] EMERGENCY DEPT    Casey Gannon, DO  01/20/22 [id number]

## 2022-01-20 NOTE — ED NOTES
Per phone report from Lisa in infusion therapy.  Pt's Hgb today was 7.3 and they will transfuse him when less than 8.  Infusion therapy has ordered one unit for pt at this time to be given in the ED.  Also they reported that blood bank does have a unit of platelets on standby for pt if needed.

## 2022-01-20 NOTE — ED TRIAGE NOTES
Brought from infusion therapy. Pt arrived at infusion appearing pale with reports of vomiting up blood several times prior to arrival. His oncologist was contacted and he directed pt to be sent to the ER. Currently his port is accessed and labs have been sent from infusion RN

## 2022-01-21 PROBLEM — I50.32 CHRONIC DIASTOLIC CONGESTIVE HEART FAILURE (H): Status: ACTIVE | Noted: 2021-01-27

## 2022-01-21 PROBLEM — Z51.5 END OF LIFE CARE: Status: ACTIVE | Noted: 2022-01-01

## 2022-01-21 PROBLEM — R65.10 SIRS (SYSTEMIC INFLAMMATORY RESPONSE SYNDROME) (H): Status: ACTIVE | Noted: 2022-01-01

## 2022-01-21 NOTE — CONSULTS
Care Management Initial Consult    General Information  Assessment completed with: Patient,Family, Patient and sister- Rose   Type of CM/SW Visit: Initial Assessment    Primary Care Provider verified and updated as needed: Yes   Readmission within the last 30 days:        Reason for Consult: discharge planning,end of life/hospice  Advance Care Planning: Advance Care Planning Reviewed: present on chart          Communication Assessment  Patient's communication style: spoken language (English or Bilingual)    Hearing Difficulty or Deaf: no   Wear Glasses or Blind: yes    Cognitive  Cognitive/Neuro/Behavioral: WDL                      Living Environment:   People in home: alone     Current living Arrangements: apartment      Able to return to prior arrangements: no (patient unable to care for himself )       Family/Social Support:  Care provided by: self,other (see comments) (SistersRose and Mary are involved )  Provides care for: no one  Marital Status: Single  Sibling(s)          Description of Support System: Supportive,Involved    Support Assessment: Lacks necessary supervision and assistance,Lacks adequate physical care    Current Resources:   Patient receiving home care services:       Community Resources:    Equipment currently used at home: walker, rolling  Supplies currently used at home:      Employment/Financial:  Employment Status: retired        Financial Concerns: other (see comments) (Patient has money in a [employer] to pay for care)   Referral to Financial Counselor: Yes  Finance Comments: Financial counselor did screening with patient and he does have money in a [employer] to pay for care    Lifestyle & Psychosocial Needs:  Social Determinants of Health     Tobacco Use: Medium Risk     Smoking Tobacco Use: Former Smoker     Smokeless Tobacco Use: Never Used   Alcohol Use: Not on file   Financial Resource Strain: Not on file   Food Insecurity: No Food Insecurity     Worried About Running Out of Food  in the Last Year: Never true     Ran Out of Food in the Last Year: Never true   Transportation Needs: No Transportation Needs     Lack of Transportation (Medical): No     Lack of Transportation (Non-Medical): No   Physical Activity: Inactive     Days of Exercise per Week: 0 days     Minutes of Exercise per Session: 0 min   Stress: Not on file   Social Connections: Unknown     Frequency of Communication with Friends and Family: Three times a week     Frequency of Social Gatherings with Friends and Family: Twice a week     Attends Adventist Services: Never     Active Member of Clubs or Organizations: No     Attends Club or Organization Meetings: Never     Marital Status: Not on file   Intimate Partner Violence: Not on file   Depression: Not at risk     PHQ-2 Score: 0   Housing Stability: Not on file       Functional Status:  Prior to admission patient needed assistance: Sisters assisted with shopping, cleaning, laundry, and transportation as needed.        Mental Health Status:  Mental Health Status: No Current Concerns       Chemical Dependency Status:  Chemical Dependency Status: No Current Concerns             Values/Beliefs:  Spiritual, Cultural Beliefs, Adventist Practices, Values that affect care: no               Additional Information:  Care Management has been consulted to assist with discharge planning and end of life/hospice care.      Writer has spoken with patient over the phone, as he is in precautions.  Patient in agreement that he needs facility placement and hospice.  At first, patient reported that he does not have money to pay to go into a facility.  Writer made a referral the [place] Financial Counselor, Ciara.  She agreed to complete a screening with the patient over the phone.  In conversation with Ciara, patient did report that he has quite a sum of money in a [employer], which Ciara indicated could be used for his care in a facility, and patient agreed with this.  Patient had forgotten about  the account until asked directly about all of his accounts.      Writer discussed long term care facilities versus hospice home facilities.  Patient stated he is open to either option.  Patient is hoping to stay as close to [place] as possible.  Writer did discuss with him that it has been difficult to find openings at area facilities due to low staffing or no beds available.  Discussed that referrals would be sent anyway.  Writer has provided patient with written lists of skilled nursing facilities, hospice agencies, and hospice homes that provide 2/47 hospice care.      Referrals have been sent to the following SNF facilities:      [facility] (Main Phone: [phone] Admissions Phone: [phone] Fax: [phone])  1/22- DECLINED. [facility] has no LTC/Hospice beds available and none for the near future.     [facility]  (Phone: [phone] Fax: [phone])  1/22- Writer called and left a message for Mya in admissions. She is not in today and no one is covering for her.      [facility]  (Phone: [phone] Fax [phone])  1/22- An Email communication has been sent to Cecilia in admissions.      [facility] (Phone: [phone] Fax: [phone])    [facility] (Admissions Phone: [phone] Main Phone: [phone] Fax: [phone])      Referrals have been sent to the following Hospice Homes:    [facility] #[phone], Fax #[phone]- [facility]  1/22- Gloria from [facility] called and stated that they have received this patient's referral.  They do not have a bed for today, but can reassess on Monday.  Discussed pending TB test.  Gloria stated that she plans to call the patient directly and talk with him about [facility], the financials, and hospice care there.  Gloria stated that patient will need to choose a hospice agency and a referral would need to be made to see if they can accept this patient for care.  [facility]  Management to call and talk with Gloria on Monday regarding any openings.      [facility]- phone [phone], fax [phone]- [facility]   1/22- Writer has spoken with Babs.  She stated that they would not be able to assess until there is a negative TB test.  She discussed that they require $4,200.00 up front at admission and the first 3 days are not refundable.  Care Management can call Babs on Monday if TB test is negative and a bed is still needed.      [facility]- phone [phone], fax [phone]    Care Management will continue to work with patient and his family regarding a safe discharge plan.  Patient did give permission for writer to talk with his sister, Rose.  Discussed above referrals.  Sisters, Rose and Mary, plan to visit patient this afternoon.      Jane Camacho, [facility]   [phone]

## 2022-01-21 NOTE — PROGRESS NOTES
[facility]    Medicine Progress Note - Hospitalist Service    Date of Admission:  1/20/2022    Assessment & Plan     Patient is a 71-year-old gentleman with a past medical history of transfusion dependent myeloproliferative disorder who has been requiring transfusions with increasing frequency in the past months who presents with significant hemoptysis requiring transexam and acid x1 as well as blood transfusion and platelet transfusion in the emergency room.  On work-up patient was found to have bilateral pulmonary consolidations representing infectious versus inflammatory infiltrates and a right lung cavitary lesion of unknown etiology.  QuantiFERON gold test is currently pending for evaluation of tuberculosis.  Following further conversation with patient and his family regarding goals of care, patient states quality of life has declined to such a point that he does not want further aggressive intervention or investigation to occur at this time, does not want any further transfusions, and desires to focus on comfort of care until he dies.  He is aware that if he does start to actively bleed, his death may occur fairly rapidly.  Social work is currently working on placement for the patient with hospice involvement upon discharge.  Until appropriate placement has been found, we will continue with comfort care measures during this hospital stay.    Principal Problem:    End of life care    Assessment: Patient currently is pain-free without significant symptoms.  He does have mild shortness of breath even at rest and will be trying as needed O2 supplementation to see if this helps improve his symptoms.  Patient does feel at peace regarding his decision for no further transfusion and focus on comfort care going forward    Plan: We will continue with comfort care measures including supplemental O2 as needed for comfort, as needed morphine for pain or severe dyspnea, as needed Ativan for  anxiety.  Initial palliative care consultation was placed however in discussion with the nurse practitioner who was going to be performing the consultation, current management plan is felt to be appropriate without significant recommendations apart from making sure Ativan could be available sublingually in solution form as well, therefore formal consult will not occur at this time but they are available for consultation going forward if patient's symptoms become less managed.  Continue to work towards placement with hospice involvement.    Active Problems:    Hemoptysis    Assessment: Appears to have at least temporarily stopped without active bleeding for the past 12 hours    Plan: Continue with comfort care as above.  Patient is aware that if he does start to bleed again, death may occur fairly rapidly.      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      Bilateral pulmonary infiltrates on CXR    Assessment: Noted on CT scan, patient not desiring any antibiotics at this time    Plan: Continue supportive cares as above      SIRS (systemic inflammatory response syndrome) (H)    Assessment: Noted initially, could be secondary to significant include acute bleed versus developing infectious pulmonary primary    Plan: Continue comfort care as outlined above       Myeloproliferative disorder; Transfusion-dependent anemia; Thrombocytopenia (H)    Assessment: Present at baseline with patient having increased frequency and transfusion needs in recent months with declining quality of life    Plan: Patient on comfort care as outlined above, no further transfusions will be given      Essential hypertension    Assessment:  Patient is normally on metoprolol.  Blood pressures were in low normal range prior to transition to comfort care status and metoprolol has been held    Plan: No further monitoring or blood pressure medicines needed at this time      Coronary artery disease involving coronary bypass graft of native heart without angina pectoris S/P CABG (coronary artery bypass graft)    Assessment: Previous history but without symptoms concerning for angina    Plan: Hold metoprolol and Lasix at this time due to low normal blood pressures prior to transition to comfort care.  Could consider as needed nitroglycerin if symptoms concerning for angina or      Chronic diastolic congestive heart failure (H)    Assessment: Present at baseline, no symptoms concerning for volume overload    Plan: Hold metoprolol and Lasix at this time given goals of care and borderline blood pressure prior to transition to comfort care.  Monitors closely for symptoms of volume overload and consider low-dose Lasix if concern arises.     Diet: Regular Diet Adult    DVT Prophylaxis: comfort care status  Almendarez Catheter: Not present  Central Lines: PRESENT     Cardiac Monitoring: None  Code Status: No CPR- Do NOT Intubate      Disposition Plan   Expected Discharge:  once placement at a long-term care facility versus hospice house with hospice involvement is identified  Anticipated discharge location:  Awaiting care coordination huddle  Delays:  Lack of safe disposition plan       The patient's care was discussed with the Bedside Nurse, Care Coordinator/ and Patient.    Sophie Lowry MD  Hospitalist Service  [facility] ED  Securely message with the Vocera Web Console (learn more here)  Text page via [employer] Paging/Directory         Clinically Significant Risk Factors Present on Admission             # Coagulation Defect: INR = 1.35 (Ref range: 0.85 - 1.15) and/or PTT = 48 Seconds (Ref range: 22 - 38 Seconds) on  admission, will monitor for bleeding  # Platelet Defect: home medication list includes an antiplatelet medication       ______________________________________________________________________    Interval History   Patient has no pain, reports coughing up of blood appears to have slowed significantly and have been essentially minimal in the past 6 to 8 hours.  Feeling very tired and weak.  Seems overwhelmed by the events of the past days but also that the decisions made are the right choices for him.  Is feeling slightly short of air.  No other concerns.  No nursing concerns.      Data reviewed today: I reviewed all medications, new labs and imaging results over the last 24 hours.    Physical Exam   Vital Signs: Temp: 98.2  F (36.8  C) Temp src: Oral BP: 114/54 Pulse: 100   Resp: 20 SpO2: 96 % O2 Device: None (Room air)    Weight: 148 lbs 0 oz  Constitutional: awake, cooperative, cachetic and chronically ill, very pale, RR in the 40s at rest.    Respiratory: RR in the 40s, decreased breath sounds diffusely, worse in the bases, no crackles or wheezes  Cardiovascular: irregularly irregular rate in the 120s  GI: bowel sounds present and normal, abdomen soft and non-distended  Skin: very pale, intermittent bruising noted.  Dried blood on mouth and nares  Musculoskeletal: no lower extremity pitting edema present  Neurologic: Awake, alert, oriented to name, place and situation    Data   Recent Labs   Lab 01/20/22  1046 01/20/22  0909 01/17/22  0916   WBC  --  0.4* 0.2*   HGB  --  7.3* 7.3*   MCV  --  90 87   PLT  --  22* 6*   INR 1.35*  --   --    NA  --  134 135   POTASSIUM  --  4.0 3.8   CHLORIDE  --  102 103   CO2  --  24 26   BUN  --  31* 16   CR  --  0.86 0.74   ANIONGAP  --  8 6   MINNIE  --  8.8 8.8   GLC  --  183* 187*   ALBUMIN  --  1.9* 2.0*   PROTTOTAL  --  7.7 7.3   BILITOTAL  --  0.7 0.4   ALKPHOS  --  117 113   ALT  --  54 25   AST  --  38 13

## 2022-01-21 NOTE — ED NOTES
ED Nursing criteria listed below was addressed during verbal handoff:     Abnormal vitals: No  Abnormal results: Yes  Med Reconciliation completed: Yes  Meds given in ED: Yes  Any Overdue Meds: No  Core Measures: N/A  Isolation: N/A  Special needs: No  Skin assessment: Yes    Observation Patient  Education provided: Yes, Needs reinforcement

## 2022-01-21 NOTE — ASSESSMENT & PLAN NOTE
CTA showed no PE. It showed mass like bilateral pulmonary consolidations likely represent infectious or inflammatory infiltrates.  The right lung has cavitary lesion. There are calcified bilateral pleural plaquing corresponds with a probable prior history of industrial dust exposure.

## 2022-01-21 NOTE — PLAN OF CARE
"PRIMARY DIAGNOSIS: \"GENERIC\" NURSING  OUTPATIENT/OBSERVATION GOALS TO BE MET BEFORE DISCHARGE:  ADLs back to baseline: Yes    Activity and level of assistance: Up with standby assistance.    Pain status: Pain free.    Return to near baseline physical activity: Yes     Discharge Planner Nurse   Safe discharge environment identified: Yes  Barriers to discharge: No, requiring palliative care.       Entered by: Gustavo Lopez 01/21/2022 3:42 PM     Continues to have periods of hemoptysis. With moderate output. Noted to have RR of 30. Placed on O2 2 NC for comfort.   "

## 2022-01-21 NOTE — PLAN OF CARE
Patient didn't sleep well, per patient report, he normally does not. Continues to cough out bright red blood clots into Kleenex. Ambulating with steady gait into the bathroom. Port accessed in chest with IV fluid TKO. Denying any pain and declined PRN pain medications. Using call light appropriately and A&Ox4.

## 2022-01-21 NOTE — ASSESSMENT & PLAN NOTE
12 MONTH WELL CHILD EXAMINATION    Shabbir Arana is a 12 month old male here with his mother and sister for 12 month well examination.  Nursing notes reviewed and accepted.    Parental concerns include:   None .    Nursing notes reviewed and accepted.    PAST MEDICAL HISTORY:  There are no active problems to display for this patient.      MEDICATIONS:  Current Medications    No medications on file       ALLERGIES:  Allergies as of 04/11/2018   • (No Known Allergies)       Interim History:  Healthy    DIET:  Child is taking water spenser milk also Breast milk, 4x per day, mostly around sleep.     Child is not still taking bottles.    Taking solid foods - eats a variety of foods.    ELIMINATION PATTERNS:  Normal wet diapers.  Bowel movements are soft.     SLEEP:  Concerns: up a few times overnight.  Nurses once.      SOCIAL/FAMILY HISTORY:  :  With family.  Lives with:  Mom, Dad, Sister    DEVELOPMENT:  Concerns:  None  Hearing concerns:  None  Vision concerns:  None    Major Motor:    Crawling: Yes   Cruises and pulls up to a stand: Yes   Walking: No  Fine Motor:     Pincer grasp: Yes  Language:     Pointing: Yes   Imitates speech: Yes   1-2 word vocabulary: No  Social:     Plays social games: Yes   Looks for hidden objects: No    SCREENING/SAFETY:  Child is in a backseat, rear-facing car seat in the car.   Second hand smoke exposure:  no    Lead risk:  Low  TB risk:  Low    Oral health risk:  Low    ANTICIPATORY GUIDANCE:  Routine anticipatory guidance regarding safety and development of a 12 month old was discussed verbally or provided in handout and included but is not limited to:  · Reading and brain development  · Transition to whole milk or 2% milk  · Weaning bottle/pacifier  · Oral health and dentist visits  · Car safety and home safety   · Normal behavior and development    REVIEW OF SYSTEMS:   A 10 point review of systems was performed including constitutional, EENT, cardiovascular, respiratory,  Quantiferon gold test is pending.  Droplet precautions   gastrointestinal, genitourinary, skin, musculoskeletal and neurologic systems and apart from those items mentioned above, is negative.    PHYSICAL EXAM:  Visit Vitals  Pulse 132   Temp 98.6 °F (37 °C) (Tympanic)   Resp 24   Ht 31.75\" (80.6 cm)   Wt 11.7 kg   HC 47.4 cm (18.66\")   BMI 17.96 kg/m²     95 %ile (Z= 1.67) based on WHO (Boys, 0-2 years) weight-for-age data using vitals from 4/11/2018.  97 %ile (Z= 1.84) based on WHO (Boys, 0-2 years) length-for-age data using vitals from 4/11/2018.  83 %ile (Z= 0.95) based on WHO (Boys, 0-2 years) head circumference-for-age data using vitals from 4/11/2018.  88 %ile (Z= 1.18) based on WHO (Boys, 0-2 years) weight-for-recumbent length data using vitals from 4/11/2018.    GENERAL:  The patient is alert, well-nourished appearing and in no distress.  SKIN:  No rashes.  HEAD: Normocephalic   EYES:  Normal lids, sclerae and conjunctivae bilaterally.  Red reflex bilaterally.  Negative cover-uncover test.  EARS:  Normal pinnae, canals, tympanic membranes bilaterally.  NOSE:  No drainage.  Septum and turbinates normal.     OROPHARYNX:  No erythema, no exudate. Teeth: no caries noted.  NECK:  Supple, no thyromegaly, lymphadenopathy or masses.  FROM (full range of motion).  CARDIOVASCULAR:  Regular rate and rhythm, no murmur.  Femoral pulses 2+.  LUNGS:  Clear to auscultation, normal respiratory effort.  ABDOMEN:  Soft, without hepatosplenomegaly or masses.  BACK:  Straight.  MUSCULOSKELETAL:  Uses all extremities symmetrically and well.   Symmetric hip creases with full abduction.  GENITALIA:  Normal male, Puberty Stage 1  and Testicles Normal Bilaterally  NEUROLOGICAL:  Moves all extremities symmetrically and well, normal tone.    ASSESSMENT:  Healthy 12 month male with normal growth and development.    PLAN:  Labs:  Anemia screen (CBC) and Lead screen  Routine anticipatory guidance regarding safety and development of a 12 month old was discussed/provided in handout.      Vaccines  today:  Hep A, MMR, Pneumococcus (Prevnar 13) and Varicella   Patient WIR ([place] Immunization Registry) reviewed.     Counseled about each component of the vaccines, including side effects.     Consent obtained from guardian.      VIS (Vaccine Information Sheet) given.   Patient tolerated with no complications.      Follow up at 15 months of age, sooner if concerns.    Luis Fernando Hastings MD

## 2022-01-21 NOTE — PROGRESS NOTES
S-(situation): Patient registered to Observation. Patient arrived to room 248 via cart from ED    B-(background):   PAST MEDICAL HISTORY:   Past Medical History:   Diagnosis Date     CKD (chronic kidney disease) stage 3, GFR 30-59 ml/min (H) 2020     Heart disease      History of blood transfusion      Hypertension      Leukocytosis 6/3/2021     Lightheadedness        PAST SURGICAL HISTORY:   Past Surgical History:   Procedure Laterality Date     BONE MARROW BIOPSY, BONE SPECIMEN, NEEDLE/TROCAR N/A 2019    Procedure: BIOPSY, BONE MARROW;  Surgeon: Aguilar Krause MD;  Location: [facility] OR     BYPASS GRAFT ARTERY CORONARY N/A 2020    Procedure: CORONARY ARTERY BYPASS GRAFTING X 3 - LIMA TO LAD, SV TO OM  AND PDA; ENDOVEIN HARVEST OF BILATERAL LEGS; ON PUMP WITH SANDRA;  Surgeon: Mable Barrera MD;  Location:  OR     CV CORONARY ANGIOGRAM Left 2020    Procedure: Coronary Angiogram;  Surgeon: Devin Bentley MD;  Location:  [facility]ATH [facility]     INSERT PORT VASCULAR ACCESS Right 10/15/2021    Procedure: ultrasound guided right internal jugular venous access port placement with flouroscopy, right side;  Surgeon: Kieran Johnson DO;  Location: [facility] OR     IR FINE NEEDLE ASPIRATION W ULTRASOUND  6/10/2021     LAPAROSCOPIC CHOLECYSTECTOMY N/A 8/15/2021    Procedure: Laparoscopic cholecystectomy;  Surgeon: Severiano De La Rosa MD;  Location: [facility] OR     NO HISTORY OF SURGERY           SOCIAL HISTORY:   Social History     Tobacco Use     Smoking status: Former Smoker     Years: 30.00     Types: Cigarettes     Start date: 1961     Quit date: 2001     Years since quittin.9     Smokeless tobacco: Never Used   Substance Use Topics     Alcohol use: No         A-(assessment): alert and oriented.  Denies pain or nausea.  Had BM today.  Continues to spit red blood with small clots out of mouth.      R-(recommendations): Orders and observation goals reviewed  with pt, denies questions.  Comfort cares.  SW consult tomorrow.      Nursing Observation criteria listed below was met:    Skin issues/needs documented:Yes, flaky scab on left buttock about the size of lima bean, no drainage.  Pt reports it's from his underwear  Isolation needs addressed and Signage up: Yes  Fall Prevention: Education given and documented: Yes  Education Assessment documented:Yes  Admission Education Documented: Yes  New medication patient education completed and documented (Possible Side Effects of Common Medications handout): Yes  OBS video/handout Reviewed & Documented: Yes  Allergies Reviewed: Yes  Medication Reconciliation Complete: Yes  Home medications if not able to send immediately home with family stored here: NA  Reminder note placed in discharge instructions of home meds: NA  Patient has discharge needs (If yes, please explain): Yes.  Comfort cares.  SW consult  Patient discharge preferences addressed and charted on white board:  Yes

## 2022-01-21 NOTE — H&P
[facility]    History and Physical - Hospitalist Service       Date of Admission:  1/20/2022    Assessment & Plan      Hemoptysis  CT chest showed cavitary lung mass.  He received Tranexamic acid in the ER. Now wants only hospice care  No further transfusions.      Myeloproliferative disorder (H)  Transfusion dependent  WBC is 0.4 RBC is 7.3. Platelets are 22,000.  Hospice care    Bilateral pulmonary infiltrates on CXR  Noted  COVID test is negative    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  Quantiferon gold test is pending.  Droplet precautions    Elevated d-dimer  CTA showed no PE. It showed mass like bilateral pulmonary consolidations likely represent infectious or inflammatory infiltrates.  The right lung has cavitary lesion. There are calcified bilateral pleural plaquing corresponds with a probable prior history of industrial dust exposure.    Transfusion-dependent anemia  Noted    Thrombocytopenia (H)  Received Tranexamic acid in the ER.      End of life care:  Hospice with comfort measures only. Awaiting placement at Nursing home.            Diet: Regular Diet Adult    DVT Prophylaxis: VTE Prophylaxis contraindicated due to hemoptysis  Almendarez Catheter: Not present  Central Lines: PRESENT     Cardiac Monitoring: None  Code Status: No CPR- Do NOT Intubate      Clinically Significant Risk Factors Present on Admission             # Coagulation Defect: INR = 1.35 (Ref range: 0.85 - 1.15) and/or PTT = 48 Seconds (Ref range: 22 - 38 Seconds) on admission, will monitor for bleeding  # Platelet Defect: home medication list includes an antiplatelet medication       Disposition Plan            Yasoda Meesala, MD  Hospitalist Service  [facility]  Securely message with the Vocera Web Console (learn more here)  Text page via [facility] Paging/Directory         ______________________________________________________________________    Chief Complaint   Coughing up  blood.      History is obtained from the patient    History of Present Illness   Renny Gannon is a 71 year old male patient with h/o transfusion dependent myeloproliferative disorder presented for the evaluation of hemoptysis. He has 2 day h/o dry cough and has hemoptysis with blood clots for 2 days. He is weak and pale. His Hg is 7.3 in the ER. The platelets are 22,000. INR is 1.35. WBC count of 0.4. The CT chest with contrast showed no PE. It showed mass like bilateral pulmonary consolidations likely represent infectious or inflammatory infiltrates.  The right lung has cavitary lesion. There are calcified bilateral pleural plaquing corresponds with a probable prior history of industrial dust exposure. The covid test is negative. The QuantiFERON gold test is negative. He received tranexamic acid in the ER. Vanc and zosyn are ordered.The Oncologist is reached who recommended admission at a facility that has access to Pulm medicine. The ER doc spoke with the Oncologist and then the patient and his family regarding ongoing care versus Hospice. The patient and the family decided to go with Hospice and comfort measures. The patient lives at an apartment and family cannot provide care. They requested NH placement with hospice care. He is being admitted for placement.    The patient and his family are aware that if he bleeds again, he may pass away. The patient and his family are in agreement with comfort care measures and do not want transfusions.     He currently denies chest pain, shortness of breath, nausea, vomiting, diarrhea, dizziness or lightheadedness.    Review of Systems    The 10 point Review of Systems is negative other than noted in the HPI.    Past Medical History    I have reviewed this patient's medical history and updated it with pertinent information if needed.   Past Medical History:   Diagnosis Date     CKD (chronic kidney disease) stage 3, GFR 30-59 ml/min (H) 12/21/2020     Heart disease       History of blood transfusion      Hypertension      Leukocytosis 6/3/2021     Lightheadedness        Past Surgical History   I have reviewed this patient's surgical history and updated it with pertinent information if needed.  Past Surgical History:   Procedure Laterality Date     BONE MARROW BIOPSY, BONE SPECIMEN, NEEDLE/TROCAR N/A 2019    Procedure: BIOPSY, BONE MARROW;  Surgeon: Aguilar Krause MD;  Location: [facility] OR     BYPASS GRAFT ARTERY CORONARY N/A 2020    Procedure: CORONARY ARTERY BYPASS GRAFTING X 3 - LIMA TO LAD, SV TO OM  AND PDA; ENDOVEIN HARVEST OF BILATERAL LEGS; ON PUMP WITH SANDRA;  Surgeon: Mable Barrera MD;  Location:  OR     CV CORONARY ANGIOGRAM Left 2020    Procedure: Coronary Angiogram;  Surgeon: Devin Bentley MD;  Location:  [facility] CATH [facility]     INSERT PORT VASCULAR ACCESS Right 10/15/2021    Procedure: ultrasound guided right internal jugular venous access port placement with flouroscopy, right side;  Surgeon: Kieran Johnson DO;  Location: [facility] OR     IR FINE NEEDLE ASPIRATION W ULTRASOUND  6/10/2021     LAPAROSCOPIC CHOLECYSTECTOMY N/A 8/15/2021    Procedure: Laparoscopic cholecystectomy;  Surgeon: Severiano De La Rosa MD;  Location: [facility] OR     NO HISTORY OF SURGERY         Social History   I have reviewed this patient's social history and updated it with pertinent information if needed.  Social History     Tobacco Use     Smoking status: Former Smoker     Years: 30.00     Types: Cigarettes     Start date: 1961     Quit date: 2001     Years since quittin.9     Smokeless tobacco: Never Used   Vaping Use     Vaping Use: Never used   Substance Use Topics     Alcohol use: No     Drug use: No       Family History   I have reviewed this patient's family history and updated it with pertinent information if needed.  Family History   Problem Relation Age of Onset     No Known Problems Mother      Unknown/Adopted Father       Unknown/Adopted Maternal Grandmother      Unknown/Adopted Maternal Grandfather      Unknown/Adopted Paternal Grandmother      Unknown/Adopted Paternal Grandfather      Cancer Brother         lung cancer - smoking     No Known Problems Sister      Coronary Artery Disease Brother          of MI at 66     No Known Problems Sister        Prior to Admission Medications   Prior to Admission Medications   Prescriptions Last Dose Informant Patient Reported? Taking?   ACE/ARB/ARNI NOT PRESCRIBED (INTENTIONAL)  Self No No   Sig: Please choose reason not prescribed from choices below.   Patient not taking: Reported on 2021   ASPIRIN NOT PRESCRIBED (INTENTIONAL)  Self No No   Sig: Please choose reason not prescribed from choices below.   Patient not taking: Reported on 2021   SENEXON-S 8.6-50 MG tablet 2022 at 1000 Self No Yes   Sig: TAKE TWO TABLETS BY MOUTH TWO TIMES A DAY   Patient taking differently: Take 2 tablets by mouth 2 times daily as needed for constipation    acetaminophen (TYLENOL) 325 MG tablet Past Week at Unknown time Self Yes Yes   Sig: Take 325-650 mg by mouth every 6 hours as needed    acyclovir (ZOVIRAX) 400 MG tablet 2022 at 1000 Self No Yes   Sig: TAKE ONE TABLET BY MOUTH TWICE A DAY   allopurinol (ZYLOPRIM) 300 MG tablet 2022 at 1000 Self No Yes   Sig: Take 1 tablet (300 mg) by mouth daily   fluconazole (DIFLUCAN) 200 MG tablet 2022 at 1000 Self No Yes   Sig: Take 1 tablet (200 mg) by mouth daily   furosemide (LASIX) 40 MG tablet 2022 at 1000 Self No Yes   Sig: Take 1 tablet (40 mg) by mouth daily   levofloxacin (LEVAQUIN) 250 MG tablet 2022 at 1000 Self No Yes   Sig: Take 1 tablet (250 mg) by mouth At Bedtime   metoprolol tartrate (LOPRESSOR) 50 MG tablet 2022 at 1000 Self No Yes   Sig: Take 1 tablet (50 mg) by mouth 2 times daily   nitroGLYcerin (NITROSTAT) 0.4 MG sublingual tablet  Self No No   Sig: For chest pain place 1 tablet under the tongue every 5  minutes for 3 doses. If symptoms persist 5 minutes after 1st dose call 911.   polyethylene glycol (MIRALAX) 17 GM/Dose powder 1/19/2022 at Unknown time Self No Yes   Sig: Take 17 g by mouth daily   Patient taking differently: Take 17 g by mouth daily as needed for constipation Generally once or twice weekly   potassium chloride ER (KLOR-CON M) 20 MEQ CR tablet 1/20/2022 at 1000 Self No Yes   Sig: Take 1 tablet (20 mEq) by mouth 2 times daily   rosuvastatin (CRESTOR) 5 MG tablet 1/20/2022 at 1000 Self No Yes   Sig: Take 1 tablet (5 mg) by mouth daily   venetoclax (VENCLEXTA) 100 MG tablet Unknown at Unknown time Self No Yes   Sig: Take 2 tablets (200 mg) by mouth daily for 14 days   vitamin B-12 (CYANOCOBALAMIN) 250 MCG tablet 1/20/2022 at 1000 Self No Yes   Sig: TAKE ONE TABLET BY MOUTH EVERY MORNING   vitamin D3 (CHOLECALCIFEROL) 50 mcg (2000 units) tablet 1/20/2022 at 1000 Self Yes Yes   Sig: Take 1 tablet by mouth daily      Facility-Administered Medications: None     Allergies   Allergies   Allergen Reactions     No Known Drug Allergies      Seasonal Allergies Other (See Comments)     Stuffy head and nose       Physical Exam   Vital Signs: Temp: 98.2  F (36.8  C) Temp src: Oral BP: 114/54 Pulse: 100   Resp: 20 SpO2: 96 % O2 Device: None (Room air)    Weight: 148 lbs 0 oz    Physical Exam  Vitals reviewed.   Constitutional:       Appearance: He is ill-appearing.      Comments: Thin, pale.   HENT:      Head: Normocephalic and atraumatic.      Right Ear: External ear normal.      Left Ear: External ear normal.      Nose: Nose normal.      Mouth/Throat:      Mouth: Mucous membranes are dry.      Comments: Dried blood on roof of mouth and tongue with some small strands of dried blood clots stuck to the tongue  Eyes:      General: No scleral icterus.     Extraocular Movements: Extraocular movements intact.      Pupils: Pupils are equal, round, and reactive to light.   Cardiovascular:      Rate and Rhythm: Normal rate  and regular rhythm.      Pulses: Normal pulses.      Heart sounds: No murmur heard.  No gallop.    Pulmonary:      Effort: Respiratory distress (mild) present.      Breath sounds: Rhonchi (bilateral) present.   Abdominal:      General: Bowel sounds are normal. There is no distension.      Palpations: There is no mass.      Tenderness: There is no abdominal tenderness. There is no guarding or rebound.   Musculoskeletal:         General: Normal range of motion.      Cervical back: Normal range of motion and neck supple.   Skin:     General: Skin is dry.      Capillary Refill: Capillary refill takes 2 to 3 seconds.      Coloration: Skin is pale.   Neurological:      Mental Status: He is alert. Mental status is at baseline.      Cranial Nerves: No cranial nerve deficit.      Motor: Weakness (mild, generalized) present.   Psychiatric:      Comments: Calm. No agitation         Data   Data reviewed today: I reviewed all medications, new labs and imaging results over the last 24 hours.    Recent Labs   Lab 01/20/22  1046 01/20/22  0909 01/17/22  0916   WBC  --  0.4* 0.2*   HGB  --  7.3* 7.3*   MCV  --  90 87   PLT  --  22* 6*   INR 1.35*  --   --    NA  --  134 135   POTASSIUM  --  4.0 3.8   CHLORIDE  --  102 103   CO2  --  24 26   BUN  --  31* 16   CR  --  0.86 0.74   ANIONGAP  --  8 6   MINNIE  --  8.8 8.8   GLC  --  183* 187*   ALBUMIN  --  1.9* 2.0*   PROTTOTAL  --  7.7 7.3   BILITOTAL  --  0.7 0.4   ALKPHOS  --  117 113   ALT  --  54 25   AST  --  38 13       Visit/Communication Style   Virtual (Video) communication was used to evaluate Renny.  Renny consented to the use of video communication: yes    Patient's location: [facility]   Provider's location during the visit: [facility]-medicine site

## 2022-01-21 NOTE — ASSESSMENT & PLAN NOTE
CT chest showed cavitary lung mass.  He received Tranexamic acid in the ER. Now wants only hospice care  No further transfusions.

## 2022-01-22 NOTE — PROGRESS NOTES
S-(situation): shift note    B-(background): Comfort cares    A-(assessment): Pt is A&O.  Has been turned and repositioned.  Has sore on bottom.  Foam dressing applied.  Denies need for pain med.  Will remain on comfort cares.

## 2022-01-22 NOTE — UTILIZATION REVIEW
"[facility] Utilization Review  Admission Status; Secondary Review Determination     Admission Date: 1/20/2022 11:14 AM      Under the authority of the Utilization Management Committee, the utilization review process indicated a secondary review on the above patient.  The review outcome is based on review of the medical records, discussions with staff, and applying clinical experience noted on the date of the review.        (X) Observation Status Appropriate - This patient does not meet hospital inpatient criteria and is placed in observation status. If this patient's primary payer is Medicare and was admitted as an inpatient, Condition Code 44 should be used and patient status changed to \"observation\".   () Observation Status concurrent Review           RATIONALE FOR DETERMINATION   Renny Gannon is a 71 year old male with past medical history of myeloproliferative disorder requiring multiple transfusions, who presented to the emergency department with hemoptysis and is noted to have bilateral pulmonary consolidations, and is registered to observation for comfort measures and hospice placement.  Patient decided against any further work-up or IV antibiotics and is currently stable and not requiring any pain medications or palliative intervention.  Currently awaiting QuantiFERON testing results and hospice bed availability.  Palliative consult is deferred given no pain or antianxiety medication requirement. Based on severity of illness and intensity of service, patient does not meet criteria for inpatient admission.  No requirement for opioids, benzodiazepines or other palliative measures.  Observation cares are appropriate until hospice bed availability.        The information on this document is developed by the utilization review team in order for the business office to ensure compliance.  This only denotes the appropriateness of proper admission status and does not reflect the quality of care " rendered.              Sincerely,       Concha Dorantes MD, MS  Physician Advisor  Utilization Review-[place]    Phone: [phone]

## 2022-01-22 NOTE — PROGRESS NOTES
"Care Management Follow Up    Length of Stay (days): 0- patient is observation status    Expected Discharge Date: 01/22/2022     Concerns to be Addressed: discharge planning       Patient plan of care discussed at interdisciplinary rounds: Yes    Anticipated Discharge Disposition: Long Term Care,Hospice  Disposition Comments: Care Management to assist with d/c planning to long term care or to a hospice home.    Anticipated Discharge Services: Transportation Services    Anticipated Discharge DME:  None     Patient/family educated on Medicare website which has current facility and service quality ratings: yes      Education Provided on the Discharge Plan:  Yes     Patient/Family in Agreement with the Plan: yes    Referrals Placed by CM/SW: Internal Clinic Care Coordination,Hospice    Private pay costs discussed: Nursing home and Hospice Home room and board costs    Additional Information:  Writer spoke with patient over the phone today.  He stated that he did talk with his sisters, Mary and Rose, yesterday evening during their visit about the lists for nursing homes, hospice homes, and hospice agencies.  Patient stated he does not know if he wants to go to a nursing home or a hospice home.  Patient also stated that he does not know how he will be able to access his money from the [employer] to pay for his care at a facility.  Patient gave permission to writer to call his sister, Rose.  Writer has called and left a message for Rose.  Will await call back.      TB test results are still pending.  Unable to place patient in a facility until those results are back.      1602- Writer has spoken with patient's sister, Rose.  She stated that she is unsure of how much the patient is understanding of his condition and his prognosis.  She stated that patient \"didn't learn to read until he was 39 years old and I'm not sure how much he comprehends when he does read something.\"  Rose is hoping that there will be a SNF facility " not too far away that has a bed available.  She is concerned that patient will have to go somewhere 60-70 miles away.  Discussed that referrals are pending at [facility] and we will know more about bed availability on Monday, as they do not have admissions staff on the weekends.  Discussed closest hospice home, [facility], but Rose is feeling that is too far away.       Discussed financials of nursing home placement.  Rose stated that patient's money is in account with [employer], and it is not an [facility] account.  She stated that they can contact [employer] [facility] on Monday, but she thinks that they have a 5 day period before the money can be released.  Discussed that sometimes the nursing home business offices are willing to work with the patient and family regarding a payment plan.      Also discussed that patient has the option of choosing a hospice agency to provide supportive care at a facility, which is covered by Medicare.  Rose thinks that patient may choose [facility], but the agency is to be determined once it is decided where patient will discharge to.  Rose is feeling a bit overwhelmed.  Writer provided supportive listening.  Care Management will continue to assist with discharge planning.        JUANA Heath  [facility]   [phone]

## 2022-01-22 NOTE — PROGRESS NOTES
Pt remains alert and oriented x4, has been up stand by assist of one, denies pain all shift, Pt has had no cough, no complaints of coughing and coughed up no blood this shift.    Pt has been up stand by assist to the bathroom, had fair appetite, Oxygen saturations and heart rate have been stable with spot checks.   Will continue to monitor and promote comfort.

## 2022-01-23 NOTE — PROGRESS NOTES
[facility]    Medicine Progress Note - Hospitalist Service    Date of Admission:  1/20/2022    Assessment & Plan          Patient is a 71-year-old gentleman with a past medical history of transfusion dependent myeloproliferative disorder who has been requiring transfusions with increasing frequency in the past months who presents with significant hemoptysis requiring transexam and acid x1 as well as blood transfusion and platelet transfusion in the emergency room.  On work-up patient was found to have bilateral pulmonary consolidations representing infectious versus inflammatory infiltrates and a right lung cavitary lesion of unknown etiology.  QuantiFERON gold test is negative.  Following further conversation with patient and his family regarding goals of care, patient states quality of life has declined to such a point that he does not want further aggressive intervention or investigation to occur at this time, does not want any further transfusions, and desires to focus on comfort of care until he dies.  He is aware that if he does start to actively bleed, his death may occur fairly rapidly.  Social work is currently working on placement for the patient with hospice involvement upon discharge.  Until appropriate placement has been found, we will continue with comfort care measures during this hospital stay.    Principal Problem:    End of life care    Assessment: Patient currently is pain-free without significant symptoms.  Patient continues to have mild dizziness upon standing and is getting up with nursing staff but reports it is not significantly changed from yesterday    Plan: Continue with comfort care measures including supplemental O2 as needed for comfort, as needed morphine for pain or severe dyspnea, as needed Ativan for anxiety, Biotene to assist with dry mouth and patient is aware that he should only get up with nursing assistance, may need to start using commode and  urinal to avoid walking to the bathroom if dizziness upon standing worsens.  Continue to work towards placement with hospice involvement.    Active Problems:    Hemoptysis    Assessment: Appears to have stopped without significant bleeding in the past 24 hours    Plan: Continue with comfort care as above.  Patient is aware that if he does start to bleed again, death may occur fairly rapidly.      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    Assessment: Noted on work-up in the emergency department with TB QuantiFERON gold testing negative in low risk patient for TB and patient not desiring any further work-up to be performed given his course of care as above    Plan: no further work up as per patient's request      Bilateral pulmonary infiltrates on CXR    Assessment: Noted on CT scan, patient not desiring any antibiotics at this time    Plan: Continue comfort cares as above      SIRS (systemic inflammatory response syndrome) (H)    Assessment: Noted initially, could be secondary to significant include acute bleed versus developing infectious pulmonary primary    Plan: Continue comfort care as outlined above       Myeloproliferative disorder; Transfusion-dependent anemia; Thrombocytopenia (H)    Assessment: Present at baseline with patient having increased frequency and transfusion needs in recent months with declining quality of life    Plan: Patient on comfort care as outlined above, no further transfusions will be given      Essential hypertension    Assessment: Patient is normally on metoprolol.  Blood pressures were in low normal range prior to transition to comfort care status and metoprolol has been held    Plan: No further monitoring or blood pressure medicines needed at this time      Coronary artery disease involving coronary bypass graft of native heart without angina pectoris S/P CABG (coronary artery bypass graft)    Assessment: Previous history but without symptoms concerning for angina    Plan: Hold  metoprolol and Lasix at this time due to low normal blood pressures prior to transition to comfort care.  Could consider as needed nitroglycerin if symptoms concerning for angina or      Chronic diastolic congestive heart failure (H)    Assessment: Present at baseline, no symptoms concerning for volume overload    Plan: Hold metoprolol and Lasix at this time given goals of care and borderline blood pressure prior to transition to comfort care.  Monitors closely for symptoms of volume overload and consider low-dose Lasix if concern arises.       Diet: Mechanical/Dental Soft Diet    DVT Prophylaxis: comfort cares  Almendarez Catheter: Not present  Central Lines: PRESENT     Cardiac Monitoring: None  Code Status: No CPR- Do NOT Intubate      Disposition Plan   Expected Discharge: 01/24/2022     Anticipated discharge location: long-term care facility;other (see comments) (or hospice home that accepts low income)    Delays:     Placement - LTC  Comfort Care/Hospice            The patient's care was discussed with the Bedside Nurse, Care Coordinator/ and Patient.    Sophie Lowry MD  Hospitalist Service  [facility]  Securely message with the Vocera Web Console (learn more here)  Text page via [employer] Paging/Directory         Clinically Significant Risk Factors Present on Admission              # Platelet Defect: home medication list includes an antiplatelet medication       ______________________________________________________________________    Interval History   Patient's symptoms have been stable and patient has not needed anything for pain, anxiety.  Biotin is working very well for dry mouth along with sips of water.  Patient's appetite has been very poor but he is eating as much as he desires to at this time.  Does continue to have mild dizziness upon standing but reports it is no worse than previous and he is comfortable continuing to get up with nursing support.   He denies any new symptoms.  Continues to feel comfortable regarding his decisions and plan for hospice at time of discharge.  No new nursing concerns.    Data reviewed today: I reviewed all medications, new labs and imaging results over the last 24 hours..    Physical Exam   Vital Signs: Temp: (!) 96.1  F (35.6  C) Temp src: Oral BP: 104/47 Pulse: 106   Resp: 20 SpO2: 97 % O2 Device: None (Room air)    Weight: 148 lbs 0 oz  Constitutional: Awake, alert, cooperative, chronically ill and cachectic but no acute distress  Respiratory: No increased work of breathing, decreased breath sounds diffusely but without wheezes or crackles  Cardiovascular: Irregularly irregular rhythm with heart rate approximately 110  GI: Bowel sounds are present, abdomen is soft, nondistended, nontender  Skin: Ongoing bruising unchanged from previous, ongoing dried blood on lips and hears which is improved from previous  Musculoskeletal: no lower extremity pitting edema present  Neurologic: Awake, alert, oriented to name, place and situation

## 2022-01-23 NOTE — PROGRESS NOTES
[facility]    Medicine Progress Note - Hospitalist Service    Date of Admission:  1/20/2022    Assessment & Plan          Patient is a 71-year-old gentleman with a past medical history of transfusion dependent myeloproliferative disorder who has been requiring transfusions with increasing frequency in the past months who presents with significant hemoptysis requiring transexam and acid x1 as well as blood transfusion and platelet transfusion in the emergency room.  On work-up patient was found to have bilateral pulmonary consolidations representing infectious versus inflammatory infiltrates and a right lung cavitary lesion of unknown etiology.  QuantiFERON gold test is currently pending for evaluation of tuberculosis.  Following further conversation with patient and his family regarding goals of care, patient states quality of life has declined to such a point that he does not want further aggressive intervention or investigation to occur at this time, does not want any further transfusions, and desires to focus on comfort of care until he dies.  He is aware that if he does start to actively bleed, his death may occur fairly rapidly.  Social work is currently working on placement for the patient with hospice involvement upon discharge.  Until appropriate placement has been found, we will continue with comfort care measures during this hospital stay.    Principal Problem:    End of life care    Assessment: Patient currently is pain-free without significant symptoms.  Major complaint today is dry mouth and mild dizziness upon standing    Plan: We will continue with comfort care measures including supplemental O2 as needed for comfort, as needed morphine for pain or severe dyspnea, as needed Ativan for anxiety.  We will add Biotene to assist with dry mouth and patient is aware that he should only get up with nursing assistance, may need to start using commode and urinal to avoid walking  to the bathroom if dizziness upon standing worsens.  Continue to work towards placement with hospice involvement.    Active Problems:    Hemoptysis    Assessment: Appears to have stopped without significant bleeding in the past 24 hours    Plan: Continue with comfort care as above.  Patient is aware that if he does start to bleed again, death may occur fairly rapidly.      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      Bilateral pulmonary infiltrates on CXR    Assessment: Noted on CT scan, patient not desiring any antibiotics at this time    Plan: Continue comfort cares as above      SIRS (systemic inflammatory response syndrome) (H)    Assessment: Noted initially, could be secondary to significant include acute bleed versus developing infectious pulmonary primary    Plan: Continue comfort care as outlined above       Myeloproliferative disorder; Transfusion-dependent anemia; Thrombocytopenia (H)    Assessment: Present at baseline with patient having increased frequency and transfusion needs in recent months with declining quality of life    Plan: Patient on comfort care as outlined above, no further transfusions will be given      Essential hypertension    Assessment: Patient is normally on metoprolol.  Blood pressures were in low normal range prior to transition to comfort care status and metoprolol has been held    Plan: No further monitoring or blood pressure medicines needed at this time      Coronary artery disease involving coronary bypass graft of native heart without angina pectoris S/P CABG (coronary artery bypass graft)    Assessment: Previous history but without symptoms concerning for angina    Plan:  Hold metoprolol and Lasix at this time due to low normal blood pressures prior to transition to comfort care.  Could consider as needed nitroglycerin if symptoms concerning for angina or      Chronic diastolic congestive heart failure (H)    Assessment: Present at baseline, no symptoms concerning for volume overload    Plan: Hold metoprolol and Lasix at this time given goals of care and borderline blood pressure prior to transition to comfort care.  Monitors closely for symptoms of volume overload and consider low-dose Lasix if concern arises.       Diet: Mechanical/Dental Soft Diet    DVT Prophylaxis: Comfort cares  Almendarez Catheter: Not present  Central Lines: PRESENT     Cardiac Monitoring: None  Code Status: No CPR- Do NOT Intubate      Disposition Plan   Expected Discharge: 01/24/2022     Anticipated discharge location: long-term care facility;other (see comments) (or hospice home that accepts low income)    Delays:     Placement - LTC  Comfort Care/Hospice  Lab Result Pending            The patient's care was discussed with the Bedside Nurse and Patient.    Sophie Lowry MD  Hospitalist Service  [facility]  Securely message with the Vocera Web Console (learn more here)  Text page via [employer] Paging/Directory         Clinically Significant Risk Factors Present on Admission              # Platelet Defect: home medication list includes an antiplatelet medication       ______________________________________________________________________    Interval History   Patient continues to be pain-free.  Does have intermittent shortness of breath and has been using as needed oxygen with some relief when this does occur.  He does report worsening dry mouth today and also has started to have some mild lightheadedness upon standing but denies any other new symptoms.  He continues to know that comfort care is the right decision for him going forward.  No new nursing concerns.    Data  reviewed today: I reviewed all medications, new labs and imaging results over the last 24 hours.    Physical Exam   Vital Signs:                    Weight: 148 lbs 0 oz  Constitutional: Awake, alert, appears chronically ill and cachectic, very pale but interactive and cooperative  Respiratory: Ongoing mild tachypnea but improved from yesterday, currently only in the mid to upper 20s.  Decreased breath sounds diffusely but without crackles or wheezes  Cardiovascular: Irregularly irregular rhythm with heart rhythm currently in the 110 to 120 range  GI: Bowel sounds present, abdomen is soft, nondistended, nontender  Skin: Patient continues to be very pale, evidence of old bleeding and scabbed lesions on mouth are present  Musculoskeletal: no lower extremity pitting edema present  Neurologic: Awake, alert, oriented to name, place and situation    Data   Recent Labs   Lab 01/20/22  1046 01/20/22  0909 01/17/22  0916   WBC  --  0.4* 0.2*   HGB  --  7.3* 7.3*   MCV  --  90 87   PLT  --  22* 6*   INR 1.35*  --   --    NA  --  134 135   POTASSIUM  --  4.0 3.8   CHLORIDE  --  102 103   CO2  --  24 26   BUN  --  31* 16   CR  --  0.86 0.74   ANIONGAP  --  8 6   MINNIE  --  8.8 8.8   GLC  --  183* 187*   ALBUMIN  --  1.9* 2.0*   PROTTOTAL  --  7.7 7.3   BILITOTAL  --  0.7 0.4   ALKPHOS  --  117 113   ALT  --  54 25   AST  --  38 13

## 2022-01-23 NOTE — PROGRESS NOTES
S-(situation): shift note    B-(background): Comfort cares    A-(assessment): Pt is A&O.  Denies need for pain medication.  Has a poor appetite but has taken bites from his meals.  Tolerating sips of fluids.  Encouraged to drink.  Had bloody, clot like substance from his rectum in his brief.  Did not look like stool.  Bed bath given, back rubbed.  Put on a pulsating air mattress to prevent breakdown.    R-(recommendations): Will cont to monitor the above.

## 2022-01-23 NOTE — PROGRESS NOTES
Pt remains alert and oriented, uses call light as advised, continues to have have black loose stools, Pt intermittently complains of discomfort from the bed.   Will continue to monitor and promote comfort as care continues.

## 2022-01-24 NOTE — PROGRESS NOTES
Care Management Follow Up    Length of Stay (days): 0    Expected Discharge Date: 01/24/2022     Concerns to be Addressed: discharge planning     Patient plan of care discussed at interdisciplinary rounds: Yes    Anticipated Discharge Disposition: Hospice,Long Term Care  Disposition Comments: Care Management to assist with d/c planning to long term care or to a hospice home.  Anticipated Discharge Services: Transportation Services  Anticipated Discharge DME: None    Patient/family educated on Medicare website which has current facility and service quality ratings: yes  Education Provided on the Discharge Plan:  yes  Patient/Family in Agreement with the Plan: yes    Referrals Placed by CM/SW: Hospice,Transportation,Post Acute Facilities  Private pay costs discussed: transportation costs    Referrals have been sent to the following SNF facilities:       [facility] (Main Phone: [phone] Admissions Phone: [phone] Fax: [phone])  1/22- DECLINED. [facility] has no LTC/Hospice beds available and none for the near future.      [facility]  (Phone: [phone] Fax: [phone])  1/22- Writer called and left a message for Mya in admissions. She is not in today and no one is covering for her.  1/24-no LTC availability per Mya [facility]  (Phone: [phone] Fax [phone])  1/22- An Email communication has been sent to Cecilia in admissions.    1/24-no current LTC beds available      [facility] (Phone: [phone] Fax: [phone])-1/24-spoke with Leatha in admissions.  No availability for male LTC beds     [facility] (Admissions Phone: [phone] Main Phone: [phone] Fax: [phone])-1/24-no current LTC availabiltiy        Referrals have been sent to the following Hospice Homes:     [facility] #[phone], Fax #[phone]- [facility]  1/22- Gloria from [facility] called and stated that they have received  this patient's referral.  They do not have a bed for today, but can reassess on Monday.  Discussed pending TB test.  Gloria stated that she plans to call the patient directly and talk with him about [place], the financials, and hospice care there.  Gloria stated that patient will need to choose a hospice agency and a referral would need to be made to see if they can accept this patient for care.  Care Management to call and talk with Gloria on Monday regarding any openings.    1/24-spoke with Gloria ([phone]) regarding availability.  Gloria spoke directly with brayan on Friday, 1/21.  There is bed availability in next day or two.  Private pay cost discussed-($525/day, $2500 non-refundable deposit required).  Room/board includes meals/room for family and 24 hour care.         [facility]- phone [phone], fax [phone]- [place]   1/22- Writer has spoken with [facility].  She stated that they would not be able to assess until there is a negative TB test.  She discussed that they require $4,200.00 up front at admission and the first 3 days are not refundable.  Care Management can call [facility] on Monday if TB test is negative and a bed is still needed.    1/24-received call from [facility]-they do have bed availability today, however location in [place].       [facility]- phone [phone], fax [phone]          Patient ready for discharge.  Avoidable days starting 1/20.      Met with patient at bedside.  Discussed plan for LTC with hospice care at discharge.  Patient in agreement.  Discussed above referrals and goal to keep patient as close as possible to [place].  Discussed  Above referrals/denials from SNF in the area.  Discussed [facility] hospice home and [facility] home and private pay costs as above.  Patient prefers [facility] options due to location.  He is not sure of his financial situation, but confirms he has funds in his [employer] account.  He  preferred we speak with Rose regarding next steps.      Spoke with Rose via phone.  Rose states she is in agreement with [facility] in [place] if patient is.  Discussed the private pay cost as above.  Rose states she will assist patient with transfer of funds today.      Rose and patient prefer [facility] hospice if possible, family has utilized this hospice agency prior.  Gloria will speak directly with Rose at [facility]. [facility] hospice does not accept patients in [place]. Patient and family are agreeable with [facility].  Spoke with Chaparrita @ [facility] ([phone], fax: [phone]).  They are able to accept patient, planning to arrive at [facility] 1/25 @ 1300.  Faxed clinical documentation for review.      Gloria returned call to CM team, confirming they are able to accept patient to [facility] on 1/25, preferrable by 1100.  Gloria will work with Christina on financials.  No specific down payment amount due at discharge/admission to facility.  Patient needs negative COVID test within 24 hours of discharge.  Requested MD order COVID test today, 1/24.  CM team to confirm negative test prior to discharge.      Discussed transportation recommendations with nursing and Dr. Lowry.  Team feels stretcher transpsort is best for clinical picture.  Discussed with Rose navarrete and patient.  They are in agreement.      All in agreement with plan for discharge to [facility] with [facility] via stretcher transport (set up by SIOBHAN/chg RN) @ 1000 on 1/25 (pending negative COVID test 1/24).      Caty Hancock MSN, RN  Inpatient Care Coordinator  [facility] [phone]  [facility] [phone]

## 2022-01-24 NOTE — PROGRESS NOTES
Pt continues to deny pain, makes minimal to no requests, has been sleeping throughout the shift, less frequent stools shift, minimal cough no coughing up of any blood. Will continue to monitor, promote comfort, and follow plan of care.

## 2022-01-24 NOTE — PROGRESS NOTES
[facility]    Medicine Progress Note - Hospitalist Service    Date of Admission:  1/20/2022    Assessment & Plan    Patient is a 71-year-old gentleman with a past medical history of transfusion dependent myeloproliferative disorder who has been requiring transfusions with increasing frequency in the past months who presents with significant hemoptysis requiring tranexamic acid x1 as well as blood transfusion and platelet transfusion in the emergency room.  On work-up patient was found to have bilateral pulmonary consolidations representing infectious versus inflammatory infiltrates and a right lung cavitary lesion of unknown etiology.  QuantiFERON gold test is negative.  Following further conversation with patient and his family regarding goals of care, patient states quality of life has declined to such a point that he does not want further aggressive intervention or investigation to occur at this time, does not want any further transfusions, and desires to focus on comfort of care until he dies.  He is aware that if he does start to actively bleed, his death may occur fairly rapidly.  Placement has been found tomorrow at [facility] for tomorrow morning.  He remains terminal but stable at this time with minimal symptoms and no further active bleeding     Principal Problem:    End of life care    Assessment: Patient currently is pain-free without significant symptoms.  Patient continues to have mild dizziness upon standing and is getting up with nursing staff but is not changing significantly    Plan: Continue with comfort care measures including supplemental O2 as needed for comfort, as needed morphine for pain or severe dyspnea, as needed Ativan for anxiety, Biotene to assist with dry mouth and patient is aware that he should only get up with nursing assistance, may need to start using commode and urinal to avoid walking to the bathroom if dizziness upon standing  worsens.  Anticipate discharge to hospice house tomorrow when available    Active Problems:    Hemoptysis    Assessment: Appears to have stopped without significant bleeding in the past few days    Plan: Continue with comfort care as above.  Patient is aware that if he does start to bleed again, death may occur fairly rapidly.      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    Assessment: Noted on work-up in the emergency department with TB QuantiFERON gold testing negative in low risk patient for TB and patient not desiring any further work-up to be performed given his course of care as above    Plan: no further work up as per patient's request      Bilateral pulmonary infiltrates on CXR    Assessment: Noted on CT scan, patient not desiring any antibiotics at this time    Plan: Continue comfort cares as above      SIRS (systemic inflammatory response syndrome) (H)    Assessment: Noted initially, could be secondary to significant include acute bleed versus developing infectious pulmonary primary    Plan: Continue comfort care as outlined above       Myeloproliferative disorder; Transfusion-dependent anemia; Thrombocytopenia (H)    Assessment: Present at baseline with patient having increased frequency and transfusion needs in recent months with declining quality of life    Plan: Patient on comfort care as outlined above, no further transfusions will be given      Essential hypertension    Assessment: Patient is normally on metoprolol.  Blood pressures were in low normal range prior to transition to comfort care status and metoprolol has been held    Plan: No further monitoring or blood pressure medicines needed at this time      Coronary artery disease involving coronary bypass graft of native heart without angina pectoris S/P CABG (coronary artery bypass graft)    Assessment: Previous history but without symptoms concerning for angina    Plan: Hold metoprolol and Lasix at this time due to low normal blood pressures  prior to transition to comfort care.  Could consider as needed nitroglycerin if symptoms concerning for angina or      Chronic diastolic congestive heart failure (H)    Assessment: Present at baseline, no symptoms concerning for volume overload    Plan: Hold metoprolol and Lasix at this time given goals of care and borderline blood pressure prior to transition to comfort care.  Monitors closely for symptoms of volume overload and consider low-dose Lasix if concern arises.       Diet: Mechanical/Dental Soft Diet    DVT Prophylaxis: comfort cares  Almendarez Catheter: Not present  Central Lines: PRESENT     Cardiac Monitoring: None  Code Status: No CPR- Do NOT Intubate      Disposition Plan   Expected Discharge: 01/24/2022     Anticipated discharge location: long-term care facility;other (see comments) (or hospice home that accepts low income)    Delays:     Placement - LTC  Comfort Care/Hospice            The patient's care was discussed with the Bedside Nurse, Care Coordinator/, Patient and Patient's Family.    Sophie Lowry MD  Hospitalist Service  [facility]  Securely message with the Vocera Web Console (learn more here)  Text page via [employer] Paging/Directory         Clinically Significant Risk Factors Present on Admission              # Platelet Defect: home medication list includes an antiplatelet medication       ______________________________________________________________________    Interval History   Patient continues to deny any pain, continues to have occasional bowel movement that is very dark in appearance but no active bleeding noted.  He continues to have mild lightheadedness upon standing and is not able to sit for longer than approximately 15 to 20 minutes without needing to return to bed but denies any new symptoms.  No new nursing concerns.    Data reviewed today: I reviewed all medications, new labs and imaging results over the last 24  hours.    Physical Exam   Vital Signs: Temp: (!) 96.1  F (35.6  C) Temp src: Oral BP: 104/47 Pulse: 106   Resp: 24 SpO2: 97 % O2 Device: None (Room air)    Weight: 148 lbs 0 oz  Constitutional: awake, alert, cooperative, no apparent distress, appears pale, cachectic, chronically ill but in no acute distress  Respiratory: No increased work of breathing, good air exchange, clear to auscultation bilaterally, no crackles or wheezing  Cardiovascular: Irregularly irregular rhythm with heart rate in the 100-110 range  GI: Bowel sounds present, abdomen is soft, nondistended, nontender to palpation  Skin: Ongoing significant pallor with some bruising present.  The dried blood on his lips and nares are starting to come off  Musculoskeletal: no lower extremity pitting edema present  Neurologic: Awake, alert, oriented to name, place and  situation

## 2022-01-25 NOTE — TELEPHONE ENCOUNTER
Routing refill request to provider for review/approval because:  Drug not on the [facility] refill protocol     B[place] RN

## 2022-01-25 NOTE — PROGRESS NOTES
Called [facility] to set up 10am transport to [facility] tomorrow morning.  They will send rig at 1000, BLS.

## 2022-01-25 NOTE — PROGRESS NOTES
"PRIMARY DIAGNOSIS: \"GENERIC\" NURSING  OUTPATIENT/OBSERVATION GOALS TO BE MET BEFORE DISCHARGE:  1. ADLs back to baseline: No, pt fatigues easily    2. Activity and level of assistance: Up with standby assistance.  Dizziness present on position change.  Pt tolerating activity with pauses in between position change.    3. Pain status: Pain free.    4. Return to near baseline physical activity: No.  Pt sat upright in chair for dinner with goal of tolerating activity for 1.5 hours.  Pt sat upright for 50 minutes but requested to return to bed immediately after dinner, appearing very fatigued/exhausted.  Pt ate <25% of meal.  Charge RN updated with pink slip to share with provider.     Discharge Planner Nurse   Safe discharge environment identified: Yes, plan for discharge to TCU/hospice tomorrow 1/25/2022  Barriers to discharge: No       Entered by: Angelique Pratt 01/24/2022 6:19 PM     Please review provider order for any additional goals.   Nurse to notify provider when observation goals have been met and patient is ready for discharge.  "

## 2022-01-25 NOTE — PROGRESS NOTES
"PRIMARY DIAGNOSIS: \"GENERIC\" NURSING  OUTPATIENT/OBSERVATION GOALS TO BE MET BEFORE DISCHARGE:  1. ADLs back to baseline: No    2. Activity and level of assistance: Up with standby assistance.    3. Pain status: Improved-controlled with oral pain medications.    4. Return to near baseline physical activity: No     Discharge Planner Nurse   Safe discharge environment identified: Yes  Barriers to discharge: No       Entered by: Angelique Pratt 01/25/2022 9:45 AM     Please review provider order for any additional goals.   Nurse to notify provider when observation goals have been met and patient is ready for discharge.  "

## 2022-01-25 NOTE — PLAN OF CARE
Patient has been ambulating to the bathroom with stand by assist. He declines using the walker. He reports mild dizziness when up and moving around. Tylenol given for back pain. Pt did cough up 3 blood clots overnight ranging from dime to quarter size. Pt plans to transfer to hospice house later this morning.

## 2022-01-25 NOTE — PROGRESS NOTES
S-(situation): Patient discharged to [facility] via EMS    B-(background): placement for hospice with supportive cares for myeloproliferative disorder    A-(assessment): Pt is alert and oriented, calm.  Calls appropriately.  Mild dizziness present on position change.  Tolerating activity.  Poor oral intake.  Sores in mouth from chemo, pt declines medication.  Vasoline for dryness.  Tylenol given for back discomfort.  Last bowel movement: 1/24/2022     R-(recommendations):Report called to Sandra. Listed belongings gathered and sent with patient.     Discharge Nursing Criteria:     Care Plan and Patient education resolved: Yes    Vaccines  Influenza status verified at discharge:  Yes      MISC  Home medications returned to patient: NA  Medication Bin checked and emptied on discharge Yes  All paperwork sent with patient/Copy of AVS given to patient or family Yes.  Copies/scripts sent in packet for Hospice facility.

## 2022-01-25 NOTE — PLAN OF CARE
Patient denied pain throughout this shift.  He has been resting majority of this shift. He is anticipated to discharge to hospice tomorrow morning via stretcher.

## 2022-01-25 NOTE — PROGRESS NOTES
Care Management Discharge Note    Discharge Date: 01/25/2022     Discharge Disposition: [facility]    Discharge Services: [facility]     Discharge DME: None    Discharge Transportation: ambulance stretcher BLS    Private pay costs discussed: private room/amenity fees    PAS Confirmation Code:      Patient/family educated on Medicare website which has current facility and service quality ratings: yes    Education Provided on the Discharge Plan:      Persons Notified of Discharge Plans: Patient/family    Patient/Family in Agreement with the Plan: yes    Handoff Referral Completed: No    Additional Information:  Patient discharging to [facility] in [facility].  He will transfer via ambulance at 1000.  Patient will receive [facility].  Hospice to open and meet with patient family today at 1300 at [facility].  Patient and family aware of private costs and in agreement with discharge plan.      JUANA Klein  [facility] [phone]/ [place] [phone]  Care Management

## 2022-01-25 NOTE — DISCHARGE SUMMARY
[facility]  Hospitalist Discharge Summary      Date of Admission:  1/20/2022  Date of Discharge:  1/25/2022  Discharging Provider: Sophie Lowry MD  Discharge Service: Hospitalist Service    Discharge Diagnoses   Principal Problem:    End of life care  Active Problems:    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    Hemoptysis    SIRS (systemic inflammatory response syndrome) (H)    Essential hypertension    S/P CABG (coronary artery bypass graft)    Coronary artery disease involving coronary bypass graft of native heart without angina pectoris    Thrombocytopenia (H)    Chronic diastolic congestive heart failure (H)    Transfusion-dependent anemia    Myeloproliferative disorder (H)    Bilateral pulmonary infiltrates on CXR    Elevated d-dimer    Follow-ups Needed After Discharge   Follow-up Appointments     Follow Up and recommended labs and tests      Follow-up with hospice house staff and [facility] hospice following   discharge           Discharge Disposition   Discharged to [facility]  Condition at discharge: Stable but terminal    Hospital Course     Patient is a 71-year-old gentleman with a past medical history of transfusion dependent myeloproliferative disorder who has been requiring transfusions with increasing frequency in the past months who presents with significant hemoptysis requiring tranexamic acid x1 as well as blood transfusion and platelet transfusion in the emergency room with hemoptysis slowly stabilizing and resolving.  On work-up patient was found to have bilateral pulmonary consolidations representing infectious versus inflammatory infiltrates and a right lung cavitary lesion of unknown etiology.  QuantiFERON gold test is negative.  Following further conversation with patient and his family regarding goals of care, patient stated quality of life has declined to such a point that he did not want further aggressive intervention or  investigation to occur, did not want any further transfusions, and desired to focus on comfort of care until he dies.  He is aware that if he does start to actively bleed, his death may occur fairly rapidly.  Placement has been found tomorrow at [facility] and patient is discharged there via stretcher transport in stable but terminal condition.    Consultations This Hospital Stay   PHARMACY TO DOSE [facility]  SPIRITUAL HEALTH SERVICES IP CONSULT  PALLIATIVE CARE ADULT IP CONSULT  SOCIAL WORK IP CONSULT  SPIRITUAL HEALTH SERVICES IP CONSULT  SOCIAL WORK IP CONSULT  CARE MANAGEMENT / SOCIAL WORK IP CONSULT    Code Status   No CPR- Do NOT Intubate    Time Spent on this Encounter   I, Sophie Lowry MD, personally saw the patient today and spent greater than 30 minutes discharging this patient.       Sophie Lowry MD  [address] SURGICAL  [address] [place] [address]  Phone: [phone]  ______________________________________________________________________    Physical Exam   Vital Signs:          Resp: 18        Weight: 148 lbs 0 oz  Constitutional: awake, alert, cooperative, no apparent distress, weak, pale, cachetic and appears older than stated age  Respiratory: No increased work of breathing, good air exchange, clear to auscultation bilaterally, no crackles or wheezing  Cardiovascular: irregularly irregular rhythm in the 110s  GI: bowel sounds present, abdomen soft and non-tender  Skin: pale, bruising noted on arms  Musculoskeletal: no lower extremity pitting edema present  Neurologic: Awake, alert, oriented to name, place and situation        Primary Care Physician   Angus Clements Mai    Discharge Orders      Hospice Referral      Follow Up and recommended labs and tests    Follow-up with hospice house staff and [facility] hospice following discharge     Reason for your hospital stay    Coughing up blood and very weak - you have been found  to have cavitating lesions on your lungs.  Your bleeding has stopped with the administration of tranexamic acid and discontinuation of your aspirin blood thinner but in further conversation regarding goals of care you are choosing to transition to comfort care and will be going to hospice house at time of discharge     Activity - Up with nursing assistance     Additional Discharge Instructions    Oxygen 2-4 L NC as needed for comfort     Diet    Follow this diet upon discharge: Mechanical soft diet       Significant Results and Procedures   Results for orders placed or performed during the hospital encounter of 01/20/22   CT Chest Pulmonary Embolism w Contrast    Narrative    CT CHEST PULMONARY EMBOLISM WITH CONTRAST January 20, 2022 12:17 PM    CLINICAL HISTORY: Elevated D-dimer, hemoptysis, history of  myeloproliferative disorder, thrombocytopenia, and anemia.    TECHNIQUE: CT angiogram chest during arterial phase injection IV  contrast. 2D and 3D MIP reconstructions were performed by the CT  technologist. Dose reduction techniques were used.   CONTRAST: 70 mL Isovue 370.     COMPARISON: Chest X-rays dated 10/21/2001, CT chest dated 7/20/2021,  1/7/2021 and 12/16/2019.    FINDINGS:  ANGIOGRAM CHEST: Pulmonary arteries are normal caliber and negative  for pulmonary emboli. Thoracic aorta is negative for dissection. Mild  aneurysmal dilatation of the ascending thoracic aorta measures up to  4.4 cm in diameter, similar to the prior studies. No CT evidence of  right heart strain.    LUNGS AND PLEURA: There are patchy bilateral nodular/masslike  pulmonary consolidations which are new since the prior CT dated  7/20/2021 and likely represent inflammatory infectious processes.  Neoplastic processes are not considered likely. These measure  approximately 2.3 x 5.0 cm transaxially right upper lung lobe (image  94 series 6), 2.8 x 3.5 cm posterior aspect right upper lung lobe  adjacent to the major fissure with what appears  to be central  cavitation (image 110 series 6), 2.0 x 3.8 cm in the medial aspect  right middle lung lobe (image 149 series 3). 6.4 x 3.4 cm in the  anterior aspect of the right lower lung lobe (image 221 series 6), 6.0  x 3.0 cm in the posterolateral left lower lung lobe (image 195 series  6), 1.1 cm in diameter in the posterolateral left upper lung lobe  (image 132 series 6), 2.8 x 4.4 cm in the lateral aspect superior  segment left lower lung lobe (image 119 series 6). No pneumothorax or  significant pleural fluid collection.    MEDIASTINUM/AXILLAE: Heart is upper normal size. There are at least  moderate coronary artery calcifications. Vascular calcifications are  also noted. Minimally prominent pretracheal lymph node measures up to  0.8 cm in short axis, minimally prominent subcarinal lymph node  measures up to 0.9 cm in short axis. These are still within the normal  size criterion. Minimally prominent right hilar lymph nodes measure up  to 0.8 cm in short axis. These prominent lymph nodes are likely  reactive to the pulmonary process but are still within the normal size  criterion. No overt mediastinal, hilar, or axillary lymphadenopathy is  identified. There are sternal cerclage wires. There is a right chest  wall [id number] reservoir with internal jugular central venous  catheter again noted with catheter tip in appropriate position and was  somewhat difficult to evaluate due to the contrast in the venous  system. Tip appears to be in the mid to upper superior vena cava.    UPPER ABDOMEN: There is nonaneurysmal atherosclerosis. Patient is  status post cholecystectomy. Visualized portions of the upper  abdominal contents are otherwise unremarkable.    MUSCULOSKELETAL: There are degenerative changes in the spine. Sternal  cerclage wires are noted. No aggressive osseous lesions or acute  osseous fractures are seen.      Impression    IMPRESSION:  1.  Masslike bilateral pulmonary consolidations likely  represent  infectious or inflammatory infiltrates given their multiplicity. One  in the right lung appears to have cavitation. Recommend clinical  correlation. Close interval follow-up CT chest is recommended after  resolution of current symptoms.  2.  Probable mild reactive lymphadenopathy in the chest as described  above.  3.  No evidence for pulmonary artery embolism is identified.  4.  Atherosclerosis.  5.  Calcified bilateral pleural plaquing corresponds with a probable  prior history of industrial dust exposure.    LEEANNA KAUR MD         SYSTEM ID:  [id number]       Discharge Medications   Current Discharge Medication List      START taking these medications    Details   artificial saliva (BIOTENE MT) SOLN solution Swish and spit 2 mLs (2 sprays) in mouth every hour as needed for dry mouth    Associated Diagnoses: End of life care      atropine 1 % ophthalmic solution Place 2 drops under the tongue every 4 hours as needed for secretions    Associated Diagnoses: End of life care      LORazepam (ATIVAN) 1 MG tablet Take 1 tablet (1 mg) by mouth every 3 hours as needed for anxiety, seizures or agitation  Qty: 5 tablet, Refills: 0    Associated Diagnoses: End of life care      morphine 10 MG/5ML solution Take 2.5-5 mLs (5-10 mg) by mouth every hour as needed for moderate to severe pain (or dyspnea)  Qty: 15 mL, Refills: 0    Associated Diagnoses: End of life care      ondansetron (ZOFRAN-ODT) 4 MG ODT tab Take 1 tablet (4 mg) by mouth every 6 hours as needed for nausea or vomiting    Associated Diagnoses: End of life care         CONTINUE these medications which have CHANGED    Details   acetaminophen (TYLENOL) 325 MG tablet Take 2 tablets (650 mg) by mouth every 6 hours as needed for mild pain or fever (temperature over 100  F ) Maximum acetaminophen dose from all sources = 75 mg/kg/day not to exceed 4 grams/day.    Associated Diagnoses: End of life care         CONTINUE these medications which have NOT CHANGED     Details   ACE/ARB/ARNI NOT PRESCRIBED (INTENTIONAL) Please choose reason not prescribed from choices below.  Qty:      Associated Diagnoses: Congestive heart failure, unspecified HF chronicity, unspecified heart failure type (H)         STOP taking these medications       acyclovir (ZOVIRAX) 400 MG tablet Comments:   Reason for Stopping:         allopurinol (ZYLOPRIM) 300 MG tablet Comments:   Reason for Stopping:         ASPIRIN NOT PRESCRIBED (INTENTIONAL) Comments:   Reason for Stopping:         fluconazole (DIFLUCAN) 200 MG tablet Comments:   Reason for Stopping:         furosemide (LASIX) 40 MG tablet Comments:   Reason for Stopping:         levofloxacin (LEVAQUIN) 250 MG tablet Comments:   Reason for Stopping:         metoprolol tartrate (LOPRESSOR) 50 MG tablet Comments:   Reason for Stopping:         nitroGLYcerin (NITROSTAT) 0.4 MG sublingual tablet Comments:   Reason for Stopping:         polyethylene glycol (MIRALAX) 17 GM/Dose powder Comments:   Reason for Stopping:         potassium chloride ER (KLOR-CON M) 20 MEQ CR tablet Comments:   Reason for Stopping:         rosuvastatin (CRESTOR) 5 MG tablet Comments:   Reason for Stopping:         SENEXON-S 8.6-50 MG tablet Comments:   Reason for Stopping:         venetoclax (VENCLEXTA) 100 MG tablet Comments:   Reason for Stopping:         vitamin B-12 (CYANOCOBALAMIN) 250 MCG tablet Comments:   Reason for Stopping:         vitamin D3 (CHOLECALCIFEROL) 50 mcg (2000 units) tablet Comments:   Reason for Stopping:             Allergies   Allergies   Allergen Reactions     No Known Drug Allergies      Seasonal Allergies Other (See Comments)     Stuffy head and nose      negative...

## 2022-01-26 NOTE — PROGRESS NOTES
Clinic Care Coordination Contact    Background: Care Coordination referral placed from [facility] [place] discharge report for reason of patient meeting criteria for a TCM outreach call by [facility] [place] team.    Assessment: Upon chart review, [facility] Team member will cancel/close the referral for TCM outreach due to reason below:    Patient has been discharged to Hospice Care    Plan: Care Coordination referral for TCM outreach canceled.    Mickie White  Community Health Worker  [facility]  Ph: [phone]

## 2022-02-03 DIAGNOSIS — I25.810 CORONARY ARTERY DISEASE INVOLVING CORONARY BYPASS GRAFT OF NATIVE HEART WITHOUT ANGINA PECTORIS: ICD-10-CM

## 2022-02-03 DIAGNOSIS — I50.9 CONGESTIVE HEART FAILURE, UNSPECIFIED HF CHRONICITY, UNSPECIFIED HEART FAILURE TYPE (H): ICD-10-CM

## 2022-02-03 NOTE — TELEPHONE ENCOUNTER
Patient needs a prescription for rosuvastatin 5mg. Taking 1 tablet daily.  Thank you,  Marilia Perez, Pharmacy Technician  [facility]

## 2022-02-04 RX ORDER — ROSUVASTATIN CALCIUM 5 MG/1
5 TABLET, COATED ORAL DAILY
Qty: 30 TABLET | Refills: 3 | OUTPATIENT
Start: 2022-02-04

## 2022-02-04 NOTE — TELEPHONE ENCOUNTER
Routing refill request to provider for review/approval because:  Labs not current:  LDL  Medication is reported/historical      Davon Talavera RN

## 2022-02-04 NOTE — H&P
[facility]    History and Physical - Hospitalist Service       Date of Admission:  1/7/2021    Assessment & Plan   Renny Gannon is a 70 year old male with a medical history significant for myeloproliferative disorder (recent hospitalization 12/2020 for anemia with hemoglobin of 6.6), stage III CKD, CAD s/p CABG 2/2020, hypertension, and CHF with preserved EF, who presented to the ED 1/7/2021 with shortness of breath. Hemoglobin low at 6.6. Troponin elevated at 0.103 but remained stable in the ED with recheck of 0.101. BNP elevated at over 2400. Patient received 40 mg IV Lasix in the ED and transfusion of 2 units PRBC was initiated. Patient will be admitted to observation status to complete transfusion, monitor hemoglobin and serial troponin, and to monitor for diuresis with improvement in respiratory status.      Principal Problem:      Dyspnea on exertion    Acute anemia    Chronic diastolic heart failure (H)  Assessment: Patient presented with 4-5 day history of shortness of breath with exertion. Denied shortness of breath at rest. Maintaining oxygen saturations above 90% on room air. In the ED, hemoglobin noted to be low at 6.6. Denies any black or bloody stools. Of note, patient presented to [facility] on 12/21/2020 with similar symptoms and was found to be anemic with hemoglobin of 6.6. He was given 2 units of PRBC and symptomatically improved. At that time, it was thought anemia was due to hydroxyurea patient was taking for his myeloproliferative disorder. This was held on discharge. Per chart review, Dr. Cristina recommended restarting this medication on 1/5. Unclear if patient has resumed taking this medication but this was not listed on current admission medication reconciliation. In the ED for current admission, patient was also noted to have elevated BNP and imaging consistent with fluid overload. Per chart review, patient was prescribed furosemide 40 mg every morning and  20 mg at noon. Per chart review, patient has only been taking morning dose. D dimer elevated but chest CT negative for acute PE. Patient denies pain. Denies shortness of breath at rest and is maintaining oxygen saturations above 90% on room air. Denies nausea. Patient is afebrile and hemodynamically stable. WBC slightly elevated at 15. Patient received 40 mg IV Lasix in the ED and transfusion of 2 units PRBC was initiated.  Plan:   - Will admit to observation status  - Will resume prior to admission furosemide dosing of 40 mg every morning and 20 mg at noon  - Will complete transfusion of 2 units PRBC and recheck hemoglobin this evening  - Continue monitor for signs of active bleeding  - Daily weight and strict I/O  - If patient's breathing is improved and hemoglobin remains stable, anticipate hospital discharge tomorrow with outpatient follow up with oncology  - Given elevated WBC and chest xray findings, will add on procalcitonin to rule out bacterial pneumonia  - Continue to monitor lab values and vital signs closely    Active Problems:      Elevated troponin  Assessment: Patient with troponin of 0.104 upon admission. Recheck in 4 hours was 0.103. Patient denies any chest pain. EKG completed and showed some ST depression likely secondary to anemia.   Plan:   - Will check serial troponin every six hours   - EKG ordered conditionally if patient develops chest pain or new arrhythmia noted on telemetry  - May need to discuss with cardiology if troponin increasing      Essential hypertension  Assessment: Patient manages with Lasix twice daily and metoprolol  mg twice daily. Has been only taking morning dose of Lasix.   Plan:   - Will continue Lasix 40 mg in morning and 20 at noon  - Will continue metoprolol  mg twice daily      Coronary artery disease involving native coronary artery of native heart with other form of angina pectoris (H)    S/P CABG (coronary artery bypass graft)  Assessment: Patient s/p  CABG in 2/2020. Takes daily aspirin and metoprolol as above  Plan:   - Will hold aspirin given acute anemia-can likely resume at discharge      Myeloproliferative disorder (H)  Assessment: Patient follows with Dr. Cristina for myeloproliferative disorder with myelofibrosis. Had been on hydroxyurea until 12/2020 hospitalization when it was stopped due to concerns it was contributing to anemia. Per chart review, patient met with Dr. Cristina 1/5 and was to begin hydroxyurea again. Unclear if patient had restarted this medication but it was not listed on current med reconciliation.  Plan:   - Patient to follow up with Dr. Cristina as outpatient      CKD (chronic kidney disease) stage 3, GFR 30-59 ml/min  Assessment: Baseline creatinine appears to be 1.3-1.5. Creatinine on admission at 1.36 with GFR of 52  Plan:   - Continue to monitor     Diet:   2 gm sodium   DVT Prophylaxis: Observation patient  Almendarez Catheter: not present  Code Status:   DNR/DNI       Disposition Plan   Expected discharge: Tomorrow, recommended to prior living arrangement once hemoglobin stable, troponin stable, respiratory status improved, and patient medically stable for hospital discharge.  Entered: Delbert Orozco NP 01/07/2021, 12:37 PM     The patient's care was discussed with the Attending Physician, Dr. Quintana and Patient.    Delbert Orozco NP  [facility]  Contact information available via [facility] Paging/Directory      ______________________________________________________________________    Chief Complaint   Shortness of breath     History is obtained from the patient    History of Present Illness   Renny Gannon is a 70 year old male with a medical history significant for myeloproliferative disorder (recent hospitalization 12/2020 for anemia with hemoglobin of 6.6), stage III CKD, CAD s/p CABG 2/2020, hypertension, and CHF with preserved EF, who presented to the ED 1/7/2021 with shortness of breath. Patient noted  onset of symptoms approximately 4-5 days prior to admission. Notes shortness of breath with any exertion. Denies shortness of breath at rest. Denies fever, chills, or night sweats. Denies cough, congestion. Denies chest pain, nausea, vomiting, or blood in stool. In the ED, patient weight noted to be slightly elevated by 2 pounds from previous discharge in December 2020. Patient not hypoxic but BNP noted to be elevated at over 2400. Hemoglobin again low at 6.6. Troponin elevated at 0.103 but remained stable in the ED with recheck of 0.101. Patient again denies chest pain and EKG showed some ST segment depression possibly secondary to anemia. Chest xray showed findings consistent with CHF exacerbation. D dimer elevated at 1.5 but CT negative for PE. WBC mildly elevated. Patient received 40 mg IV Lasix in the ED and transfusion of 2 units PRBC was initiated. Patient will be admitted to observation status to complete transfusion, monitor hemoglobin and serial troponin, and to monitor for diuresis with improvement in respiratory status.      Review of Systems    The 10 point Review of Systems is negative other than noted in the HPI or here.     Past Medical History    I have reviewed this patient's medical history and updated it with pertinent information if needed.   Past Medical History:   Diagnosis Date     Hypertension        Past Surgical History   I have reviewed this patient's surgical history and updated it with pertinent information if needed.  Past Surgical History:   Procedure Laterality Date     BONE MARROW BIOPSY, BONE SPECIMEN, NEEDLE/TROCAR N/A 12/30/2019    Procedure: BIOPSY, BONE MARROW;  Surgeon: Aguilar Krause MD;  Location: [facility] OR     BYPASS GRAFT ARTERY CORONARY N/A 1/31/2020    Procedure: CORONARY ARTERY BYPASS GRAFTING X 3 - LIMA TO LAD, SV TO OM  AND PDA; ENDOVEIN HARVEST OF BILATERAL LEGS; ON PUMP WITH SANDRA;  Surgeon: Mable Barrera MD;  Location: [facility] OR     CV CORONARY  ANGIOGRAM Left 2020    Procedure: Coronary Angiogram;  Surgeon: Devin Bentley MD;  Location:  HE[facility] CARDI[facility] CATH LAB     NO HISTORY OF SURGERY         Social History   I have reviewed this patient's social history and updated it with pertinent information if needed.  Social History     Tobacco Use     Smoking status: Former Smoker     Years: 30.00     Types: Cigarettes     Start date: 1961     Quit date: 2001     Years since quittin.9     Smokeless tobacco: Never Used   Substance Use Topics     Alcohol use: No     Frequency: Never     Drug use: No       Family History   I have reviewed this patient's family history and updated it with pertinent information if needed.  Family History   Problem Relation Age of Onset     No Known Problems Mother      Unknown/Adopted Father      Unknown/Adopted Maternal Grandmother      Unknown/Adopted Maternal Grandfather      Unknown/Adopted Paternal Grandmother      Unknown/Adopted Paternal Grandfather      Cancer Brother         lung cancer - smoking     No Known Problems Sister      Coronary Artery Disease Brother          of MI at 66     No Known Problems Sister        Prior to Admission Medications   Prior to Admission Medications   Prescriptions Last Dose Informant Patient Reported? Taking?   Ferrous Sulfate 324 (65 Fe) MG TBEC 2021 at 0600 Self No Yes   Sig: TAKE ONE TABLET BY MOUTH ONCE DAILY AT NOON   Patient taking differently: Take 324 mg by mouth daily    GOODSENSE ASPIRIN 325 MG tablet 2021 at 0600 Self No Yes   Sig: TAKE ONE TABLET BY MOUTH ONCE DAILY IN THE MORNING   Patient taking differently: Take 325 mg by mouth daily    atorvastatin (LIPITOR) 40 MG tablet Past Month at Unknown time Self No Yes   Sig: TAKE ONE TABLET BY MOUTH EVERY NIGHT AT BEDTIME   furosemide (LASIX) 40 MG tablet 2021 at 0600 Self No Yes   Sig: TAKE ONE TABLET BY MOUTH DAILY IN THE MORNING AND TAKE 1/2 TABLET AT NOON   Patient taking differently: Take 40  mg by mouth daily    metoprolol succinate ER (TOPROL-XL) 100 MG 24 hr tablet Past Month at Unknown time Self No Yes   Sig: TAKE ONE TABLET BY MOUTH TWICE A DAY IN THE MORNING AND IN THE EVENING   omeprazole (PRILOSEC) 20 MG DR capsule  Self No No   Sig: Take 1 capsule (20 mg) by mouth daily   Patient not taking: Reported on 1/5/2021   senna-docusate (SENOKOT-S/PERICOLACE) 8.6-50 MG tablet 1/7/2021 at 0600 Self No Yes   Sig: Take 2 tablets by mouth 2 times daily   vitamin B-12 (CYANOCOBALAMIN) 250 MCG tablet 1/7/2021 at 0600 Self No Yes   Sig: TAKE ONE TABLET BY MOUTH EVERY MORNING   Patient taking differently: Take 250 mcg by mouth daily       Facility-Administered Medications: None     Allergies   Allergies   Allergen Reactions     No Known Drug Allergies      Seasonal Allergies        Physical Exam   Vital Signs: Temp: 97.2  F (36.2  C) Temp src: Oral BP: 121/76 Pulse: 87   Resp: 16 SpO2: 96 % O2 Device: None (Room air)    Weight: 220 lbs 0 oz    Constitutional: awake, alert, cooperative, no apparent distress, and appears stated age  Eyes: Lids and lashes normal, pupils equal, round and reactive to light, extra ocular muscles intact, sclera clear, conjunctiva normal  ENT: normocepalic, without obvious abnormality, atramatic  Respiratory: No increased work of breathing, good air exchange, clear to auscultation bilaterally, no crackles or wheezing  Cardiovascular: regular rate and rhythm and normal S1 and S2  GI: normal bowel sounds, non-distended and non-tender  Skin: normal skin color, texture, turgor  Musculoskeletal: 2+ edema to bilateral lower extremities, ankles, and feet  Neurologic: Awake, alert, oriented to name, place and time.  Cranial nerves II-XII are grossly intact.     Data   Data reviewed today: I reviewed all medications, new labs and imaging results over the last 24 hours.     Recent Labs   Lab 01/07/21  1039 01/07/21  0810   WBC  --  15.9*   HGB  --  6.6*   MCV  --  112*   PLT  --  70*   NA  --   142   POTASSIUM  --  4.1   CHLORIDE  --  109   CO2  --  26   BUN  --  29   CR  --  1.36*   ANIONGAP  --  7   MINNIE  --  9.2   GLC  --  103*   TROPI 0.101* 0.103*     Recent Results (from the past 24 hour(s))   XR Chest Port 1 View    Narrative    CHEST PORTABLE ONE VIEW   1/7/2021 8:20 AM     HISTORY: Short of air.    COMPARISON: 12/21/2020.      Impression    IMPRESSION: Bilateral mildly increased vascular congestion may be  worsening edema. Stable cardiomegaly. Patchy bibasilar pulmonary  opacities persist and could be atelectasis or multifocal airspace  disease with pneumonia being a possibility. There may be trace left  pleural fluid that is stable.   CT Chest Pulmonary Embolism w Contrast    Narrative    CT CHEST PULMONARY EMBOLISM WITH INTRAVENOUS CONTRAST  1/7/2021 9:13  AM     HISTORY:  Shortness of breath, elevated D-dimer examination. Status  post coronary artery bypass graft surgery, hypertension.    COMPARISON: Chest x-ray dated 1/7/2021, chest CT dated 12/16/2019.    TECHNIQUE: CT of the chest was performed following the administration  of 80 cc intravenous contrast. Images are viewed in the axial and  coronal planes. Radiation dose for this scan was reduced using  automated exposure control, adjustment of the mA and/or kV according  to patient size, or iterative reconstruction technique.    FINDINGS: No definite evidence for a pulmonary embolism. Median  sternotomy changes and postsurgical changes consistent with coronary  artery bypass graft surgery. Mild cardiomegaly.    There are small bilateral pleural effusions with adjacent atelectasis  in the lower lobes. Lungs are otherwise clear.    Again identified are pleural plaques along the hemidiaphragms  bilaterally most likely secondary to previous asbestos exposure.     The upper aspects of the abdomen are included on this exam. Again  noted is mild splenomegaly. The spleen measures approximately 17 cm in  length.      Impression    IMPRESSION:  1. No  definite pulmonary embolism.  2. Small bilateral pleural effusions with mild adjacent atelectasis.  3. Stable mild splenomegaly.    CLARY AN MD

## 2022-02-09 DIAGNOSIS — C95.00 ACUTE LEUKEMIA NOT HAVING ACHIEVED REMISSION (H): ICD-10-CM

## 2022-02-09 DIAGNOSIS — D75.81 MYELOFIBROSIS (H): ICD-10-CM

## 2022-02-09 NOTE — TELEPHONE ENCOUNTER
Patient is requesting fluconazole 200 mg tabs. I don't see it on their file.      Thank You,  Choco Chin, Pharmacy [facility] [place]

## 2022-02-10 NOTE — TELEPHONE ENCOUNTER
Routing refill request to provider for review/approval because:  Drug not active on patient's medication list    The original prescription was discontinued on 1/24/2022 by Sophie Lowry MD for the following reason: Stop at Discharge. Renewing this prescription may not be appropriate.     Belle Peralta RN

## 2022-02-11 RX ORDER — FLUCONAZOLE 200 MG/1
200 TABLET ORAL DAILY
Qty: 30 TABLET | Refills: 1 | OUTPATIENT
Start: 2022-02-11

## 2022-02-11 NOTE — TELEPHONE ENCOUNTER
Hospice care, medication was stopped.  If he is no longer in hospice, this medication should be refilled by his oncologist.

## 2022-02-17 NOTE — PROGRESS NOTES
According to the discharge orders he is not supposed to discontinue the metformin but was told to take 500 mg one in the morning and two in the evening at discharge.  He is also supposed to be taking the glimepiride 1 mg daily and 20 units of Levemir at bedtime.  The humalog was 10 units at meals with a sliding scale.    He was given 3 ml of Levemir which should last 15 days at 20 units every evening.  How is it he is out of it in three days?  Each ml has 100 units, five days each.    I sent in a refill on the Levemir to [facility] [place] two.  Two pens should last a month.  The prednisone that he is on is going to make his blood sugars run higher and somewhat erratic but should improve as he tapers it down.  He should cut down from 40 mg a day to 30 mg a day on or about Monday the 21st.  Did he get his discharge instructions?   Chemo note:  D/I: pt received zofran as premed. Brisk blood return from PICC . Day 2 Decitabine infused over 1 hr.   A/P: Pt tolerated well. Good urine output. Denies nausea. Continue to monitor for side effects.

## 2022-02-22 NOTE — TELEPHONE ENCOUNTER
Attempted to reach patient by phone. Unable to leave voicemail due to it not being set up. Will try again later.   Dianne Olivares MA 2/22/2022

## 2022-02-24 NOTE — TELEPHONE ENCOUNTER
Patient  as of 2022, per sister. Will route MRN to management. Closing encounter.  Marilyn Cerda MA on 2022 at 12:06 PM

## 2022-08-01 NOTE — PLAN OF CARE
[id number]: A&Ox4.  VSS, RA; dyspneic with exertion.   Denies pain.  TELE: NSR.  Assist x 1 with GB and walker.  Uses urinal at the bedside.  Lactic fired tonight: 0.7.  Discharge pending to TCU upon insurance authorization.       Conjuntivae and eyelids appear normal , Sclerae : White without injection

## 2022-12-26 NOTE — PROGRESS NOTES
Infusion Nursing Note:  Renny Gannon presents today for Possible transfusions today.    Patient seen by provider today: No   present during visit today: Not Applicable.    Note: Hgb-8.4 and Platelets 38 so does not meet parameters for transfusion today. Patient informed and de-accessed port.      Intravenous Access:  Implanted Port.    Treatment Conditions:  Lab Results   Component Value Date    HGB 8.4 (L) 11/26/2021    WBC 5.1 11/26/2021    ANEU 0.8 (L) 11/11/2021    ANEUTAUTO 3.6 11/26/2021    PLT 38 (LL) 11/26/2021      Results reviewed, labs did NOT meet treatment parameters: No transfusion.      Post Infusion Assessment:  NA.       Discharge Plan:   Discharge instructions reviewed with: Patient.  Patient and/or family verbalized understanding of discharge instructions and all questions answered.  Patient discharged in stable condition accompanied by: self and sister.  Departure Mode: Ambulatory and walker.      Mayte Chapa RN                     NASAL [facility]GESTION

## 2023-02-16 NOTE — PATIENT INSTRUCTIONS
38year old last seen in the office on 5/26/2023 and is  calling to say that she had intercourse on Sunday, and is concerned about a white burning discharge and slight odor and wants to be seen today      Time slot with you or can patient see work in [place] this afternoon, patient is ok with seeing another provider      Please advise    Thank you Return for C5D5 Decitabine 12/17/21 at [facility].

## 2024-03-18 NOTE — PROGRESS NOTES
[facility] Suit[facility] Post-Procedure Note    Procedure: Left Heart Cath  CS arrival time: 12:15  Accompanied by: JACOB Suresh  Concerns/abnormal assessment after procedure: None.    Plan: Surgical consult.  Then discharge to home.       Air released from TR band per protocol.    TR band removed. Bandaid applied to site. Area soft & flat.    Discharge teaching & instructions given to both pt & sisters.  All questions & concerns addressed.    Waiting for echocardiogram to be done.  Then patient will discharge to home.             n/a

## 2024-07-06 NOTE — NURSING NOTE
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    Weight as of this encounter: 72.2 kg (159 lb 3.2 oz). Body surface area is 1.86 meters squared.  No Pain (0) Comment: Data Unavailable   No LMP for male patient.  Allergies reviewed: Yes  Medications reviewed: Yes    Medications: Medication refills not needed today.  Pharmacy name entered into EPIC:    [facility] [address] [place] [address] - [address]  [facility] [place], [place] - [address]  [facility] [place], [place] - [address] [facility]  [facility] [place], [place] - [address] [facility] [place], [place] - [address]    Clinical concerns: None       Jennifer Padilla LPN            " Please refrain from using any alcohol.  Please follow up with your Primary Care Provider within 1-2 days. Return to the Emergency Department immediately if you have any new or worsening  symptoms.

## 2024-08-07 NOTE — TELEPHONE ENCOUNTER
RECORDS STATUS - ALL OTHER DIAGNOSIS      RECORDS RECEIVED FROM: Bunny/Jax   DATE RECEIVED: 3/25/2021   NOTES STATUS DETAILS   OFFICE NOTE from referring provider     OFFICE NOTE from medical oncologist Complete 3/2/2021 Virtual Visit - Myeloproliferative Disorder (H) (Primary Dx)     1/5/2021 Virtual Visit -myeloproliferative disorder     Lots more office notes in EPIC   DISCHARGE SUMMARY from hospital Complete 1/27/2021 - Anemia, unspecified type, myeloproliferative disorder     1/7/2021 Anemia in other chronic diseases classified elsewhere     More in [facility]   DISCHARGE REPORT from the ER Complete 1/27/2021  Anemia, unspecified type, myeloproliferative disorder    OPERATIVE REPORT Complete 3/2/2021 Bone Marrow Biopsy    MEDICATION LIST Complete [facility]   CLINICAL TRIAL TREATMENTS TO DATE     LABS     PATHOLOGY REPORTS Complete [facility] 3/2/2021   ANYTHING RELATED TO DIAGNOSIS Complete Labs last updated on 3/5/2021   GENONOMIC TESTING     TYPE:     IMAGING (NEED IMAGES & REPORT)     CT SCANS Complete 1/27/2021 CT Chest Pulmonary     1/7/2021 CT Chest Pulmonary    Xray Chest Complete    Complete: [facility] 1/27/2021, 1/7/2021, 12/21/2020     [facility]- Xray Chest 2/17/2021   MRI     MAMMO     ULTRASOUND Complete US Renal Complete 12/11/2020   PET       Action    Action Taken 3/9/2021 1:03PM     I called Jax to have IMG pushed to PACS. Ph: [phone]       Male

## 2024-11-02 NOTE — LETTER
12/26/2019         RE: Renny Gannon  [address]  [facility] [address]        Dear Colleague,    Thank you for referring your patient, Renny Gannon, to the [facility]. Please see a copy of my visit note below.    DATE OF VISIT: Dec 26, 2019    REASON FOR REFERRAL: Leukocytosis    CHIEF COMPLAINT:   Chief Complaint   Patient presents with     Hematology     Leukocytosis, unspecified type     Consult     Ref: Angus Scott MD       HISTORY OF PRESENT ILLNESS:   69-year-old male patient was referred today for evaluation for leukocytosis.  Presented to primary care physician with a complaint of increasing fatigue and shortness of breath on exertion.  Further work-up including a CBC which was done on December 11, 2019 showed a total white count of 79.1 with a hemoglobin is 11.9 platelet count of 378.  Peripheral blood smear revealed leukoerythroblastic reaction with neutrophilic left shift and rare circulating blasts without dysplasia.  Patient denies any bruising or bleeding.  He denies any fever or chills.  He denies any bone aches or pains.  He denies any weight loss.  He denies any mouth sores or ulcers.  His main complaint including exertional dyspnea and intermittent retrosternal chest pain.  Patient is scheduled to see cardiology soon.    REVIEW OF SYSTEMS:   Constitutional: Negative for fever, chills, and night sweats.  Increasing fatigue.  Skin: negative.  Eyes: negative.  Ears/Nose/Throat: negative.  Respiratory: Increasing shortness of breath on exertion, cough, or hemoptysis.  Cardiovascular: Intermittent chest pain.  The patient will be seen by cardiology next week.  Gastrointestinal: negative.  Genitourinary: negative.  Musculoskeletal: negative.  Neurologic: negative.  Psychiatric: negative.  Hematologic/Lymphatic/Immunologic: negative.  Endocrine: negative.    PAST MEDICAL HISTORY:   Past Medical History:   Diagnosis Date     Hypertension        PAST SURGICAL HISTORY:   Past  Surgical History:   Procedure Laterality Date     NO HISTORY OF SURGERY         ALLERGIES:   Allergies as of 2019 - Reviewed 2019   Allergen Reaction Noted     No known drug allergies  2005     Seasonal allergies  2019       MEDICATIONS:   Current Outpatient Medications   Medication Sig Dispense Refill     aspirin (ASA) 81 MG tablet Take 1 tablet (81 mg) by mouth daily 100 tablet 0     aspirin 81 MG EC tablet Take 81 mg by mouth daily       lisinopril-hydrochlorothiazide (PRINZIDE/ZESTORETIC) 20-25 MG tablet Take 1 tablet by mouth daily Please stop the 10/12.5 mg dose 90 tablet 1     metoprolol succinate ER (TOPROL-XL) 25 MG 24 hr tablet Take 0.5 tablets (12.5 mg) by mouth daily 30 tablet 0     naproxen (NAPROSYN) 500 MG tablet Take 1 tablet (500 mg) by mouth 2 times daily as needed for moderate pain 60 tablet 1        FAMILY HISTORY:   Family History   Problem Relation Age of Onset     No Known Problems Mother      Unknown/Adopted Father      Unknown/Adopted Maternal Grandmother      Unknown/Adopted Maternal Grandfather      Unknown/Adopted Paternal Grandmother      Unknown/Adopted Paternal Grandfather      Cancer Brother         lung cancer - smoking     No Known Problems Sister      Coronary Artery Disease Brother          of MI at 66     No Known Problems Sister           SOCIAL HISTORY:   Social History     Socioeconomic History     Marital status: Single     Spouse name: None     Number of children: None     Years of education: None     Highest education level: None   Occupational History     None   Social Needs     Financial resource strain: None     Food insecurity:     Worry: None     Inability: None     Transportation needs:     Medical: None     Non-medical: None   Tobacco Use     Smoking status: Former Smoker     Smokeless tobacco: Never Used   Substance and Sexual Activity     Alcohol use: No     Frequency: Never     Drug use: No     Sexual activity: Never   Lifestyle      "Physical activity:     Days per week: None     Minutes per session: None     Stress: None   Relationships     Social connections:     Talks on phone: None     Gets together: None     Attends Jain service: None     Active member of club or organization: None     Attends meetings of clubs or organizations: None     Relationship status: None     Intimate partner violence:     Fear of current or ex partner: None     Emotionally abused: None     Physically abused: None     Forced sexual activity: None   Other Topics Concern     None   Social History Narrative     None       PHYSICAL EXAMINATION:   /60   Pulse 96   Temp 96.9  F (36.1  C) (Temporal)   Resp 20   Ht 1.661 m (5' 5.4\")   Wt 93.3 kg (205 lb 11.2 oz)   SpO2 97%   BMI 33.81 kg/m     Wt Readings from Last 10 Encounters:   12/26/19 93.3 kg (205 lb 11.2 oz)   12/17/19 92.5 kg (204 lb)   12/13/19 93.7 kg (206 lb 9.6 oz)   12/11/19 92.6 kg (204 lb 1.6 oz)   03/20/19 101.6 kg (224 lb)   03/11/19 101.5 kg (223 lb 12.8 oz)   02/11/19 103.3 kg (227 lb 12.8 oz)   12/12/07 96.6 kg (213 lb)   01/25/05 98 kg (216 lb)      ECOG performance status: 0  GENERAL APPEARANCE: Healthy, alert and in no acute distress.  HEENT: Sclerae anicteric. PERRLA. Oropharynx without ulcers, lesions, or thrush.  NECK: Supple. No asymmetry or masses.  LYMPHATICS: No palpable cervical, supraclavicular, axillary, or inguinal lymphadenopathy.  RESP: Lungs clear to auscultation bilaterally without rales, rhonchi or wheezes.  CARDIOVASCULAR: Regular rate and rhythm. Normal S1, S2; no S3 or S4. No murmur, gallop, or rub.  ABDOMEN: Soft, nontender. Bowel sounds normal.  Splenomegaly MUSCULOSKELETAL: Extremities without gross deformities noted. No edema of bilateral lower extremities.  SKIN: No suspicious lesions or rashes.  NEURO: Alert and oriented x 3. Cranial nerves II-XII grossly intact.  PSYCHIATRIC: Mentation and affect appear normal.    LABORATORY RESULTS:  Hospital Outpatient " Visit on 12/17/2019   Component Date Value Ref Range Status     Target HR 12/17/2019 150   Final     Baseline Systolic BP 12/17/2019 124   Final     Baseline Diastolic BP 12/17/2019 72   Final     Last Stress Systolic BP 12/17/2019 116   Final     Last Stress Diastolic BP 12/17/2019 68   Final     Baseline HR 12/17/2019 100   Final     Max HR 12/17/2019 122   Final     Calculated Percent HR 12/17/2019 81  % Final     Rate Pressure Product 12/17/2019 14,152.0   Final     Left Ventricular EF 12/17/2019 66  % Final     Stress/rest perfusion ratio 12/17/2019 1.09   Final       IMAGING RESULTS:  Recent Results (from the past 744 hour(s))   XR Chest 2 Views    Narrative    CHEST TWO VIEW 12/11/2019 3:42 PM     HISTORY: Chest pain, unspecified type.    COMPARISON: None.      Impression    IMPRESSION: Elevation left hemidiaphragm of uncertain age and  etiology. Small amount of infiltrate and/or atelectatic change in the  right lung base. No pleural effusions. The remainder of the lungs are  clear and the heart size is borderline.    TAURUS TUCKER MD   CT Chest w/o Contrast    Narrative    CT CHEST WITHOUT CONTRAST  12/16/2019 3:52 PM    HISTORY:  Pneumonia, unresolved or complicated. Leukocytosis with WBC  of 79,000, no symptoms of infection and chest x-ray was abnormal.  Leukocytosis, unspecified type. Abnormal chest x-ray.    TECHNIQUE:  Scans obtained from the apices through the diaphragm  without IV contrast.  Radiation dose for this scan was reduced using  automated exposure control, adjustment of the mA and/or kV according  to patient size, or iterative reconstruction technique.    COMPARISON:  Chest x-rays dated 12/11/2019.    FINDINGS:  Calcific pleural plaquing along bilateral hemidiaphragms  indicates prior chronic history of industrial dust exposure. There is  atelectasis in the left lung base. Lungs are otherwise grossly clear.  No infiltrate, effusion, pneumothorax, nodule, or mass is identified.    The heart  is grossly normal size. Pulmonary artery and aortic  calcifications. The spleen is enlarged measuring 12.4 x 19.8 cm in  transverse and AP dimensions respectively. It measures at least 15.5  cm in craniocaudal dimension but is not completely included on this  study. Liver and other visualized upper abdominal contents are  otherwise grossly within normal limits given the lack of IV contrast.    No aggressive osseous lesions or acute osseous fractures are  identified. There are degenerative changes in the spine.      Impression    IMPRESSION:  1. Evidence of chronic industrial dust exposure as described above.  2. Splenomegaly. The liver appears somewhat smaller than typically  seen. Cannot exclude cirrhosis.  3. Nonaneurysmal atherosclerosis.  4. No other significant abnormalities are notified.     LEEANNA KAUR MD   NM MPI w Lexiscan   Result Value    Target     Baseline Systolic     Baseline Diastolic BP 72    Last Stress Systolic     Last Stress Diastolic BP 68    Baseline     Max     Calculated Percent HR 81    Rate Pressure Product 14,152.0    Left Ventricular EF 66    Stress/rest perfusion ratio 1.09    Narrative       The nuclear stress test is abnormal.     There is a small area of nontransmural infarction in the apical   segment(s) of the left ventricle.     There is a small area of ischemia in the basal anterior segment(s) of   the left ventricle.     The stress electrocardiogram was non-diagnostic due to baseline ST   changes. However, there was significant worsening of the ST depression in   the inferolateral and precordial leads during stress. There was also 1.5   mm ST elevation in aVR during stress. EKG changes coincided with 7/10   epigastric burning noted during the test. Consider further testing with   another stress modality or CT coronary angiogram.     Left ventricular function is normal. The left ventricular ejection   fraction at stress is 66%.     The image  quality is suboptimal due to significant extra cardiac uptake   of the radioisotope which decreases senstivity of the test.     There is no prior study for comparison.         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  I reviewed with the patient differential diagnosis.  Today we will check next-generation sequencing.  I will also arrange a bone marrow biopsy to rule out myelo infiltrative process and to rule out acute leukemia.  I will see the patient results of investigations are available to discuss further management plan.    (I10) Essential hypertension  Blood pressure is currently well controlled.  Patient currently on lisinopril-hydrochlorothiazide 20-25 mg orally daily.  Patient currently on metoprolol ER 25 mg orally daily.    The patient is ready to learn, no apparent learning barriers were identified, Diagnosis and treatment plans were explained to the patient. The patient expressed understanding of the content. The patient questions were answered to his satisfaction.    Mani Cristina MD    Chart documentation with Dragon Voice recognition Software. Although reviewed after completion, some words and grammatical errors may remain.    Again, thank you for allowing me to participate in the care of your patient.        Sincerely,        Mani Cristina MD     [phone]

## (undated) DEVICE — SYR 30ML LL W/O NDL

## (undated) DEVICE — CATH ANGIO JUDKINS R4 6FRX100CM INFINITI 534621T

## (undated) DEVICE — SUCTION MANIFOLD NEPTUNE 2 SYS 4 PORT 0702-020-000

## (undated) DEVICE — CATH JACKY 5FR 3.5 CURVE 40-5023

## (undated) DEVICE — PEN MARKING SKIN

## (undated) DEVICE — SYR 10ML LL W/O NDL 302995

## (undated) DEVICE — DEFIB PRO-PADZ LVP LQD GEL ADULT 8900-2105-01

## (undated) DEVICE — GLOVE PROTEXIS W/NEU-THERA 7.5  2D73TE75

## (undated) DEVICE — POSITIONER ASSIST ESSTECH 3S T401210S

## (undated) DEVICE — PREP CHLORAPREP 26ML TINTED ORANGE  260815

## (undated) DEVICE — Device

## (undated) DEVICE — CONNECTOR DRAIN CHEST Y EXTENSION SET 19909

## (undated) DEVICE — LINEN LEG ROLL 5489

## (undated) DEVICE — SU PROLENE 3-0 SHDA 36" 8522H

## (undated) DEVICE — DRAIN JACKSON PRATT 15FR ROUND SU130-1323

## (undated) DEVICE — BONE WAX OSTENE 3.5GM CT410

## (undated) DEVICE — TOTE ANGIO CORP PC15AT SAN32CC83O

## (undated) DEVICE — SU MONOCRYL 4-0 PS-2 18" UND Y496G

## (undated) DEVICE — SU VICRYL 0 CTX 36" J370H

## (undated) DEVICE — PACK MINI VAC CUSTOM CARDOPULMONARY BB5Z97R15

## (undated) DEVICE — SURGICEL HEMOSTAT 2X14" 1951

## (undated) DEVICE — ESU PENCIL SMOKE EVAC W/ROCKER SWITCH 0703-047-000

## (undated) DEVICE — WIRE GUIDE 0.035"X260CM SAFE-T-J EXCHANGE G00517

## (undated) DEVICE — BLADE KNIFE BEAVER 6900

## (undated) DEVICE — SURGICEL ABSORBABLE HEMOSTAT SNOW 2"X4" 2082

## (undated) DEVICE — SOMASENSOR CEREBRAL OXIMETER ADULT SAFB-SM

## (undated) DEVICE — ESU GROUND PAD UNIVERSAL W/O CORD

## (undated) DEVICE — SYR 03ML LL W/O NDL 309657

## (undated) DEVICE — SUCTION IRR STRYKERFLOW II W/TIP 250-070-520

## (undated) DEVICE — CONNECTOR PREFUSION REDUCER 3/8X1/2" W/O LL 6013

## (undated) DEVICE — GLOVE PROTEXIS BLUE W/NEU-THERA 8.0  2D73EB80

## (undated) DEVICE — SPONGE PEANUT

## (undated) DEVICE — DRSG GAUZE 4X4" TRAY

## (undated) DEVICE — SU ETHIBOND 3-0 BBDA 36" X588H

## (undated) DEVICE — KIT HAND CONTROL ANGIOTOUCH ACIST 65CM AT-P65

## (undated) DEVICE — CANNULA PERFUSION AORTIC ROOT 22FR 20012

## (undated) DEVICE — PUNCH AORTIC 4.0MMX8" RCB40

## (undated) DEVICE — SU SILK 1 TIE 10X30" SA87G

## (undated) DEVICE — LINEN TOWEL PACK X6 WHITE 5487

## (undated) DEVICE — SU PROLENE 7-0 BV-1DA 4X30" M8703

## (undated) DEVICE — SU VICRYL 3-0 SH 27" UND J416H

## (undated) DEVICE — SU VICRYL 2-0 CT-1 27" UND J259H

## (undated) DEVICE — SOL RINGERS LACTATED 1000ML BAG 2B2324X

## (undated) DEVICE — KIT ENDO VASOVIEW HEMOPRO 2 VH-4000

## (undated) DEVICE — DRAIN CHEST TUBE 32FR STR 8032

## (undated) DEVICE — SLEEVE TR BAND RADIAL COMPRESSION DEVICE 24CM TRB24-REG

## (undated) DEVICE — SU SILK 2-0 SH 30" K833H

## (undated) DEVICE — STOCKING SLEEVE COMPRESSION CALF MED

## (undated) DEVICE — SUCTION CANISTER MEDIVAC LINER 3000ML W/LID 65651-530

## (undated) DEVICE — SU ETHIBOND 0 CT-1 CR 8X18" CX21D

## (undated) DEVICE — CABLE MYO/LEAD PACING WHITE DISP 019-530

## (undated) DEVICE — SPONGE RAY-TEC 4X8" 7318

## (undated) DEVICE — ENDO POUCH UNIV RETRIEVAL SYSTEM INZII 10MM CD001

## (undated) DEVICE — ADH SKIN CLOSURE PREMIERPRO EXOFIN 1.0ML 3470

## (undated) DEVICE — SOL NACL 0.9% IRRIG 1000ML BOTTLE 2F7124

## (undated) DEVICE — SU PLEDGET SOFT TFE 3/8"X3/26"X1/16" PCP40

## (undated) DEVICE — SUCTION CATH AIRLIFE TRI-FLO W/CONTROL PORT 14FR  T60C

## (undated) DEVICE — CLIP HORIZON MULTI SM YELLOW 001204

## (undated) DEVICE — SURGICEL POWDER ABSORBABLE HEMOSTAT 3GM 3013SP

## (undated) DEVICE — MANIFOLD KIT ANGIO AUTOMATED 014613

## (undated) DEVICE — SU PROLENE 7-0 BV175-6 24' M8737

## (undated) DEVICE — SU ETHIBOND 2-0 SHDA 30" X563H

## (undated) DEVICE — SU STEEL 6 CCS 4X18" M654G

## (undated) DEVICE — SOL NACL 0.9% INJ 1000ML BAG 2B1324X

## (undated) DEVICE — COVER TRANSDUCER PROBE 7X24" 610-575

## (undated) DEVICE — DECANTER VIAL 2006S

## (undated) DEVICE — PACK GENERAL LAPAOSCOPY

## (undated) DEVICE — DEVICE ASSEMBLY SUCTION/ANTI COAG BTC93

## (undated) DEVICE — BLOWER/MISTER CLEARVIEW 22150

## (undated) DEVICE — ANTIFOG SOLUTION W/FOAM PAD 31142527

## (undated) DEVICE — ESU ELEC BLADE 2.75" COATED/INSULATED E1455

## (undated) DEVICE — COVER TABLE POLY 65X90" 8186

## (undated) DEVICE — LINEN TOWEL PACK X5 5464

## (undated) DEVICE — SPECIMEN CONTAINER 60MLW/10% FORMALIN 59601

## (undated) DEVICE — ESU HOOK TIP 5MM CONMED

## (undated) DEVICE — PACK TUBING MINI VAC CUSTOM 1/2X3/8T BB9J78R4

## (undated) DEVICE — KIT WASH CELL SAVING ATL2001

## (undated) DEVICE — DRAPE LAP TRANSVERSE 29421

## (undated) DEVICE — SU PROLENE 4-0 RB-1DA 36" 8557H

## (undated) DEVICE — SU VICRYL 0 UR-6 27" J603H

## (undated) DEVICE — CANNULA PERFUSION VENOUS 3 STAGE 29FR 15" 91429

## (undated) DEVICE — SU STEEL MYO/WIRE II STERNOTOMY 8 BE-1 3X14" 048-217

## (undated) DEVICE — RESERVOIR CELL SAVING BLOOD COLLECTION EL2120

## (undated) DEVICE — ENDO TROCAR BLADELESS 05X100MM B5LT

## (undated) DEVICE — CLIP APPLIER ENDO 05MM MED/LG 176630

## (undated) DEVICE — RX SURGIFLO HEMOSTATIC MATRIX W/THROMBIN 8ML 2994

## (undated) DEVICE — PREP CHLORAPREP W/ORANGE TINT 10.5ML 260715

## (undated) DEVICE — SU VICRYL 3-0 FS-1 27" J442H

## (undated) DEVICE — LINEN TOWEL PACK X30 5481

## (undated) DEVICE — INTRO GLIDESHEATH SLENDER 6FR 10X45CM 60-1060

## (undated) DEVICE — DRAPE C-ARM

## (undated) DEVICE — PACK OPEN HEART PV12OH524

## (undated) DEVICE — ESU SUCTION/IRRIGATION SYSTEM PISTOL GRIP

## (undated) DEVICE — DRAIN JACKSON PRATT RESERVOIR 100ML SU130-1305

## (undated) DEVICE — SU PROLENE 4-0 SHDA 36" 8521H

## (undated) DEVICE — CANNULA PERFUSION ARTERIAL EOPA 18FR 12" 77418

## (undated) DEVICE — SU PROLENE 6-0 C-1DA 30" M8706

## (undated) DEVICE — SYR EAR BULB 3OZ 0035830

## (undated) DEVICE — BIPOLAR TEMPORARY MYOCARDIAL PACING LEAD

## (undated) DEVICE — PACK MINOR PROCEDURE CUSTOM

## (undated) DEVICE — BLADE KNIFE SURG 11 371111

## (undated) DEVICE — ENDO TROCAR BLUNT TIP KII BALLOON 12X100MM C0R47

## (undated) DEVICE — ENDO FIXATION CANNULA 05MM VERSAPORT 177092F

## (undated) DEVICE — DRAPE C-ARM 60X42" 1013

## (undated) DEVICE — DEVICE TISSUE STABILIZATION OCTOBASE 28707

## (undated) DEVICE — SOL SODIUM CHLORIDE 250ML

## (undated) DEVICE — DECANTER BAG 2002S

## (undated) DEVICE — SU VICRYL 2-0 SH 27" UND J417H

## (undated) DEVICE — PROTECTOR ARM ONE-STEP TRENDELENBURG 40418

## (undated) DEVICE — NDL ECLIPSE 18GA 1.5"

## (undated) DEVICE — DRSG KERLIX 4 1/2"X4YDS ROLL 6715

## (undated) RX ORDER — HEPARIN SODIUM 200 [USP'U]/100ML
INJECTION, SOLUTION INTRAVENOUS
Status: DISPENSED
Start: 2020-01-09

## (undated) RX ORDER — GLYCOPYRROLATE 0.2 MG/ML
INJECTION, SOLUTION INTRAMUSCULAR; INTRAVENOUS
Status: DISPENSED
Start: 2020-01-31

## (undated) RX ORDER — LIDOCAINE HYDROCHLORIDE 20 MG/ML
INJECTION, SOLUTION EPIDURAL; INFILTRATION; INTRACAUDAL; PERINEURAL
Status: DISPENSED
Start: 2020-01-31

## (undated) RX ORDER — FENTANYL CITRATE-0.9 % NACL/PF 10 MCG/ML
PLASTIC BAG, INJECTION (ML) INTRAVENOUS
Status: DISPENSED
Start: 2021-01-01

## (undated) RX ORDER — CEFAZOLIN SODIUM 1 G/3ML
INJECTION, POWDER, FOR SOLUTION INTRAMUSCULAR; INTRAVENOUS
Status: DISPENSED
Start: 2020-01-31

## (undated) RX ORDER — ONDANSETRON 2 MG/ML
INJECTION INTRAMUSCULAR; INTRAVENOUS
Status: DISPENSED
Start: 2021-01-01

## (undated) RX ORDER — VECURONIUM BROMIDE 1 MG/ML
INJECTION, POWDER, LYOPHILIZED, FOR SOLUTION INTRAVENOUS
Status: DISPENSED
Start: 2020-01-31

## (undated) RX ORDER — METOPROLOL TARTRATE 25 MG/1
TABLET, FILM COATED ORAL
Status: DISPENSED
Start: 2020-01-31

## (undated) RX ORDER — FENTANYL CITRATE 50 UG/ML
INJECTION, SOLUTION INTRAMUSCULAR; INTRAVENOUS
Status: DISPENSED
Start: 2020-01-31

## (undated) RX ORDER — HEPARIN SODIUM 1000 [USP'U]/ML
INJECTION, SOLUTION INTRAVENOUS; SUBCUTANEOUS
Status: DISPENSED
Start: 2020-01-31

## (undated) RX ORDER — DEXMEDETOMIDINE HYDROCHLORIDE 100 UG/ML
INJECTION, SOLUTION INTRAVENOUS
Status: DISPENSED
Start: 2021-01-01

## (undated) RX ORDER — FENTANYL CITRATE 0.05 MG/ML
INJECTION, SOLUTION INTRAMUSCULAR; INTRAVENOUS
Status: DISPENSED
Start: 2020-01-31

## (undated) RX ORDER — HEPARIN SODIUM (PORCINE) LOCK FLUSH IV SOLN 100 UNIT/ML 100 UNIT/ML
SOLUTION INTRAVENOUS
Status: DISPENSED
Start: 2021-01-01

## (undated) RX ORDER — HEPARIN SODIUM 1000 [USP'U]/ML
INJECTION, SOLUTION INTRAVENOUS; SUBCUTANEOUS
Status: DISPENSED
Start: 2020-01-09

## (undated) RX ORDER — PROPOFOL 10 MG/ML
INJECTION, EMULSION INTRAVENOUS
Status: DISPENSED
Start: 2019-12-30

## (undated) RX ORDER — METOPROLOL TARTRATE 1 MG/ML
INJECTION, SOLUTION INTRAVENOUS
Status: DISPENSED
Start: 2021-01-01

## (undated) RX ORDER — PROPOFOL 10 MG/ML
INJECTION, EMULSION INTRAVENOUS
Status: DISPENSED
Start: 2021-01-01

## (undated) RX ORDER — NEOSTIGMINE METHYLSULFATE 1 MG/ML
VIAL (ML) INJECTION
Status: DISPENSED
Start: 2020-01-31

## (undated) RX ORDER — DEXAMETHASONE SODIUM PHOSPHATE 10 MG/ML
INJECTION, SOLUTION INTRAMUSCULAR; INTRAVENOUS
Status: DISPENSED
Start: 2021-01-01

## (undated) RX ORDER — PROTAMINE SULFATE 10 MG/ML
INJECTION, SOLUTION INTRAVENOUS
Status: DISPENSED
Start: 2020-01-31

## (undated) RX ORDER — LIDOCAINE HYDROCHLORIDE AND EPINEPHRINE 10; 10 MG/ML; UG/ML
INJECTION, SOLUTION INFILTRATION; PERINEURAL
Status: DISPENSED
Start: 2021-01-01

## (undated) RX ORDER — LIDOCAINE HYDROCHLORIDE 20 MG/ML
INJECTION, SOLUTION EPIDURAL; INFILTRATION; INTRACAUDAL; PERINEURAL
Status: DISPENSED
Start: 2021-01-01

## (undated) RX ORDER — BUPIVACAINE HYDROCHLORIDE AND EPINEPHRINE 2.5; 5 MG/ML; UG/ML
INJECTION, SOLUTION EPIDURAL; INFILTRATION; INTRACAUDAL; PERINEURAL
Status: DISPENSED
Start: 2021-01-01

## (undated) RX ORDER — MUPIROCIN 20 MG/G
OINTMENT TOPICAL
Status: DISPENSED
Start: 2020-01-31

## (undated) RX ORDER — ASPIRIN 81 MG/1
TABLET ORAL
Status: DISPENSED
Start: 2020-01-09

## (undated) RX ORDER — VERAPAMIL HYDROCHLORIDE 2.5 MG/ML
INJECTION, SOLUTION INTRAVENOUS
Status: DISPENSED
Start: 2020-01-09

## (undated) RX ORDER — FENTANYL CITRATE 50 UG/ML
INJECTION, SOLUTION INTRAMUSCULAR; INTRAVENOUS
Status: DISPENSED
Start: 2020-01-09

## (undated) RX ORDER — PHENYLEPHRINE HYDROCHLORIDE 10 MG/ML
INJECTION INTRAVENOUS
Status: DISPENSED
Start: 2021-01-01

## (undated) RX ORDER — CEFAZOLIN SODIUM 2 G/100ML
INJECTION, SOLUTION INTRAVENOUS
Status: DISPENSED
Start: 2020-01-31

## (undated) RX ORDER — PAPAVERINE HYDROCHLORIDE 30 MG/ML
INJECTION INTRAMUSCULAR; INTRAVENOUS
Status: DISPENSED
Start: 2020-01-31

## (undated) RX ORDER — LIDOCAINE HYDROCHLORIDE 20 MG/ML
INJECTION, SOLUTION EPIDURAL; INFILTRATION; INTRACAUDAL; PERINEURAL
Status: DISPENSED
Start: 2019-12-30

## (undated) RX ORDER — PROPOFOL 10 MG/ML
INJECTION, EMULSION INTRAVENOUS
Status: DISPENSED
Start: 2020-01-31

## (undated) RX ORDER — LIDOCAINE HYDROCHLORIDE 10 MG/ML
INJECTION, SOLUTION EPIDURAL; INFILTRATION; INTRACAUDAL; PERINEURAL
Status: DISPENSED
Start: 2021-01-01

## (undated) RX ORDER — EPHEDRINE SULFATE 50 MG/ML
INJECTION, SOLUTION INTRAMUSCULAR; INTRAVENOUS; SUBCUTANEOUS
Status: DISPENSED
Start: 2019-12-30

## (undated) RX ORDER — FAMOTIDINE 20 MG/1
TABLET, FILM COATED ORAL
Status: DISPENSED
Start: 2020-01-31

## (undated) RX ORDER — FENTANYL CITRATE 50 UG/ML
INJECTION, SOLUTION INTRAMUSCULAR; INTRAVENOUS
Status: DISPENSED
Start: 2019-12-30

## (undated) RX ORDER — LIDOCAINE HYDROCHLORIDE 10 MG/ML
INJECTION, SOLUTION EPIDURAL; INFILTRATION; INTRACAUDAL; PERINEURAL
Status: DISPENSED
Start: 2020-01-09

## (undated) RX ORDER — FENTANYL CITRATE 50 UG/ML
INJECTION, SOLUTION INTRAMUSCULAR; INTRAVENOUS
Status: DISPENSED
Start: 2021-01-01